# Patient Record
Sex: FEMALE | Race: WHITE | NOT HISPANIC OR LATINO | Employment: OTHER | URBAN - NONMETROPOLITAN AREA
[De-identification: names, ages, dates, MRNs, and addresses within clinical notes are randomized per-mention and may not be internally consistent; named-entity substitution may affect disease eponyms.]

---

## 2017-03-23 ENCOUNTER — APPOINTMENT (OUTPATIENT)
Dept: CT IMAGING | Facility: HOSPITAL | Age: 32
End: 2017-03-23

## 2017-03-23 ENCOUNTER — HOSPITAL ENCOUNTER (EMERGENCY)
Facility: HOSPITAL | Age: 32
Discharge: HOME | End: 2017-03-23
Attending: EMERGENCY MEDICINE | Admitting: EMERGENCY MEDICINE

## 2017-03-23 ENCOUNTER — APPOINTMENT (OUTPATIENT)
Dept: GENERAL RADIOLOGY | Facility: HOSPITAL | Age: 32
End: 2017-03-23

## 2017-03-23 VITALS
WEIGHT: 200 LBS | TEMPERATURE: 98.2 F | OXYGEN SATURATION: 98 % | DIASTOLIC BLOOD PRESSURE: 76 MMHG | HEIGHT: 59 IN | SYSTOLIC BLOOD PRESSURE: 148 MMHG | BODY MASS INDEX: 40.32 KG/M2 | RESPIRATION RATE: 16 BRPM | HEART RATE: 70 BPM

## 2017-03-23 DIAGNOSIS — R10.32 LEFT LOWER QUADRANT PAIN: Primary | ICD-10-CM

## 2017-03-23 LAB
ALBUMIN SERPL-MCNC: 4 G/DL (ref 3.5–5)
ALBUMIN/GLOB SERPL: 1.2 G/DL (ref 1.5–2.5)
ALP SERPL-CCNC: 81 U/L (ref 35–104)
ALT SERPL W P-5'-P-CCNC: 39 U/L (ref 10–36)
AMYLASE SERPL-CCNC: 24 U/L (ref 28–100)
ANION GAP SERPL CALCULATED.3IONS-SCNC: 7.1 MMOL/L (ref 3.6–11.2)
AST SERPL-CCNC: 28 U/L (ref 10–30)
B-HCG UR QL: NEGATIVE
BASOPHILS # BLD AUTO: 0.01 10*3/MM3 (ref 0–0.3)
BASOPHILS NFR BLD AUTO: 0.1 % (ref 0–2)
BILIRUB SERPL-MCNC: 0.8 MG/DL (ref 0.2–1.8)
BILIRUB UR QL STRIP: NEGATIVE
BUN BLD-MCNC: 10 MG/DL (ref 7–21)
BUN/CREAT SERPL: 14.3 (ref 7–25)
CALCIUM SPEC-SCNC: 8.9 MG/DL (ref 7.7–10)
CHLORIDE SERPL-SCNC: 103 MMOL/L (ref 99–112)
CLARITY UR: ABNORMAL
CO2 SERPL-SCNC: 27.9 MMOL/L (ref 24.3–31.9)
COLOR UR: ABNORMAL
CREAT BLD-MCNC: 0.7 MG/DL (ref 0.43–1.29)
DEPRECATED RDW RBC AUTO: 40.8 FL (ref 37–54)
EOSINOPHIL # BLD AUTO: 0.23 10*3/MM3 (ref 0–0.7)
EOSINOPHIL NFR BLD AUTO: 3.3 % (ref 0–5)
ERYTHROCYTE [DISTWIDTH] IN BLOOD BY AUTOMATED COUNT: 13 % (ref 11.5–14.5)
GFR SERPL CREATININE-BSD FRML MDRD: 96 ML/MIN/1.73
GLOBULIN UR ELPH-MCNC: 3.3 GM/DL
GLUCOSE BLD-MCNC: 202 MG/DL (ref 70–110)
GLUCOSE UR STRIP-MCNC: NEGATIVE MG/DL
HCT VFR BLD AUTO: 41.2 % (ref 37–47)
HGB BLD-MCNC: 14 G/DL (ref 12–16)
HGB UR QL STRIP.AUTO: NEGATIVE
IMM GRANULOCYTES # BLD: 0.04 10*3/MM3 (ref 0–0.03)
IMM GRANULOCYTES NFR BLD: 0.6 % (ref 0–0.5)
KETONES UR QL STRIP: NEGATIVE
LEUKOCYTE ESTERASE UR QL STRIP.AUTO: NEGATIVE
LIPASE SERPL-CCNC: 35 U/L (ref 13–60)
LYMPHOCYTES # BLD AUTO: 1.96 10*3/MM3 (ref 1–3)
LYMPHOCYTES NFR BLD AUTO: 28.4 % (ref 21–51)
MCH RBC QN AUTO: 29.3 PG (ref 27–33)
MCHC RBC AUTO-ENTMCNC: 34 G/DL (ref 33–37)
MCV RBC AUTO: 86.2 FL (ref 80–94)
MONOCYTES # BLD AUTO: 0.54 10*3/MM3 (ref 0.1–0.9)
MONOCYTES NFR BLD AUTO: 7.8 % (ref 0–10)
NEUTROPHILS # BLD AUTO: 4.11 10*3/MM3 (ref 1.4–6.5)
NEUTROPHILS NFR BLD AUTO: 59.8 % (ref 30–70)
NITRITE UR QL STRIP: NEGATIVE
OSMOLALITY SERPL CALC.SUM OF ELEC: 280.5 MOSM/KG (ref 273–305)
PH UR STRIP.AUTO: 5.5 [PH] (ref 5–8)
PLATELET # BLD AUTO: 131 10*3/MM3 (ref 130–400)
PMV BLD AUTO: 11.7 FL (ref 6–10)
POTASSIUM BLD-SCNC: 4 MMOL/L (ref 3.5–5.3)
PROT SERPL-MCNC: 7.3 G/DL (ref 6–8)
PROT UR QL STRIP: NEGATIVE
RBC # BLD AUTO: 4.78 10*6/MM3 (ref 4.2–5.4)
SODIUM BLD-SCNC: 138 MMOL/L (ref 135–153)
SP GR UR STRIP: >1.03 (ref 1–1.03)
UROBILINOGEN UR QL STRIP: ABNORMAL
WBC NRBC COR # BLD: 6.89 10*3/MM3 (ref 4.5–12.5)

## 2017-03-23 PROCEDURE — 74177 CT ABD & PELVIS W/CONTRAST: CPT

## 2017-03-23 PROCEDURE — 25010000002 ONDANSETRON PER 1 MG: Performed by: PHYSICIAN ASSISTANT

## 2017-03-23 PROCEDURE — 96374 THER/PROPH/DIAG INJ IV PUSH: CPT

## 2017-03-23 PROCEDURE — 99283 EMERGENCY DEPT VISIT LOW MDM: CPT

## 2017-03-23 PROCEDURE — 96376 TX/PRO/DX INJ SAME DRUG ADON: CPT

## 2017-03-23 PROCEDURE — 96375 TX/PRO/DX INJ NEW DRUG ADDON: CPT

## 2017-03-23 PROCEDURE — 85025 COMPLETE CBC W/AUTO DIFF WBC: CPT | Performed by: PHYSICIAN ASSISTANT

## 2017-03-23 PROCEDURE — 25010000002 HYDROMORPHONE PER 4 MG: Performed by: EMERGENCY MEDICINE

## 2017-03-23 PROCEDURE — 25010000002 KETOROLAC TROMETHAMINE PER 15 MG: Performed by: PHYSICIAN ASSISTANT

## 2017-03-23 PROCEDURE — 80053 COMPREHEN METABOLIC PANEL: CPT | Performed by: PHYSICIAN ASSISTANT

## 2017-03-23 PROCEDURE — 74022 RADEX COMPL AQT ABD SERIES: CPT | Performed by: RADIOLOGY

## 2017-03-23 PROCEDURE — 74177 CT ABD & PELVIS W/CONTRAST: CPT | Performed by: RADIOLOGY

## 2017-03-23 PROCEDURE — 83690 ASSAY OF LIPASE: CPT | Performed by: PHYSICIAN ASSISTANT

## 2017-03-23 PROCEDURE — 96361 HYDRATE IV INFUSION ADD-ON: CPT

## 2017-03-23 PROCEDURE — 74022 RADEX COMPL AQT ABD SERIES: CPT

## 2017-03-23 PROCEDURE — 82150 ASSAY OF AMYLASE: CPT | Performed by: PHYSICIAN ASSISTANT

## 2017-03-23 PROCEDURE — 0 IOPAMIDOL 61 % SOLUTION: Performed by: EMERGENCY MEDICINE

## 2017-03-23 PROCEDURE — 81003 URINALYSIS AUTO W/O SCOPE: CPT | Performed by: PHYSICIAN ASSISTANT

## 2017-03-23 PROCEDURE — 81025 URINE PREGNANCY TEST: CPT | Performed by: PHYSICIAN ASSISTANT

## 2017-03-23 RX ORDER — ONDANSETRON 2 MG/ML
4 INJECTION INTRAMUSCULAR; INTRAVENOUS ONCE
Status: COMPLETED | OUTPATIENT
Start: 2017-03-23 | End: 2017-03-23

## 2017-03-23 RX ORDER — DICYCLOMINE HCL 20 MG
20 TABLET ORAL EVERY 8 HOURS PRN
Qty: 20 TABLET | Refills: 0 | Status: SHIPPED | OUTPATIENT
Start: 2017-03-23 | End: 2018-05-04

## 2017-03-23 RX ORDER — KETOROLAC TROMETHAMINE 30 MG/ML
30 INJECTION, SOLUTION INTRAMUSCULAR; INTRAVENOUS ONCE
Status: COMPLETED | OUTPATIENT
Start: 2017-03-23 | End: 2017-03-23

## 2017-03-23 RX ORDER — SODIUM CHLORIDE 0.9 % (FLUSH) 0.9 %
10 SYRINGE (ML) INJECTION AS NEEDED
Status: DISCONTINUED | OUTPATIENT
Start: 2017-03-23 | End: 2017-03-23 | Stop reason: HOSPADM

## 2017-03-23 RX ADMIN — SODIUM CHLORIDE 1000 ML: 9 INJECTION, SOLUTION INTRAVENOUS at 09:34

## 2017-03-23 RX ADMIN — IOPAMIDOL 100 ML: 612 INJECTION, SOLUTION INTRAVENOUS at 16:16

## 2017-03-23 RX ADMIN — KETOROLAC TROMETHAMINE 30 MG: 30 INJECTION, SOLUTION INTRAMUSCULAR at 11:08

## 2017-03-23 RX ADMIN — ONDANSETRON 4 MG: 2 INJECTION INTRAMUSCULAR; INTRAVENOUS at 11:06

## 2017-03-23 RX ADMIN — HYDROMORPHONE HYDROCHLORIDE 1 MG: 1 INJECTION, SOLUTION INTRAMUSCULAR; INTRAVENOUS; SUBCUTANEOUS at 13:45

## 2017-03-23 RX ADMIN — ONDANSETRON 4 MG: 2 INJECTION INTRAMUSCULAR; INTRAVENOUS at 13:43

## 2017-03-23 NOTE — ED PROVIDER NOTES
Subjective   Patient is a 31 y.o. female presenting with abdominal pain.   History provided by:  Patient   used: No    Abdominal Pain   Pain location:  Generalized  Pain quality: dull    Pain severity:  Moderate  Onset quality:  Sudden  Duration:  3 days  Timing:  Intermittent  Progression:  Worsening  Chronicity:  New  Ineffective treatments:  None tried  Associated symptoms: constipation and nausea    Associated symptoms: no chest pain, no chills, no cough, no diarrhea, no dysuria, no fever, no shortness of breath, no sore throat and no vomiting    Risk factors: obesity    Risk factors: not pregnant        Review of Systems   Constitutional: Negative for chills and fever.   HENT: Negative for congestion, ear pain and sore throat.    Respiratory: Negative for cough, shortness of breath and wheezing.    Cardiovascular: Negative for chest pain.   Gastrointestinal: Positive for abdominal pain, constipation and nausea. Negative for diarrhea and vomiting.   Genitourinary: Negative for dysuria and flank pain.   Skin: Negative for rash.   Neurological: Negative for headaches.   Psychiatric/Behavioral: The patient is not nervous/anxious.    All other systems reviewed and are negative.      Past Medical History:   Diagnosis Date   • GERD (gastroesophageal reflux disease)    • Hypertension    • PCOS (polycystic ovarian syndrome)    • Sleep apnea        Allergies   Allergen Reactions   • Dilantin [Phenytoin]    • Keppra [Levetiracetam]    • Topamax [Topiramate]        Past Surgical History:   Procedure Laterality Date   • CARPAL TUNNEL RELEASE     • FRACTURE SURGERY         History reviewed. No pertinent family history.    Social History     Social History   • Marital status:      Spouse name: N/A   • Number of children: N/A   • Years of education: N/A     Social History Main Topics   • Smoking status: Never Smoker   • Smokeless tobacco: None   • Alcohol use No   • Drug use: No   • Sexual activity:  Not Asked     Other Topics Concern   • None     Social History Narrative           Objective   Physical Exam   Constitutional: She is oriented to person, place, and time. She appears well-developed and well-nourished.   HENT:   Head: Normocephalic.   Mouth/Throat: Oropharynx is clear and moist.   Neck: Neck supple.   Cardiovascular: Normal rate and regular rhythm.    Pulmonary/Chest: Effort normal and breath sounds normal.   Abdominal: Soft. Bowel sounds are normal. There is tenderness. There is no rebound and no guarding.   Musculoskeletal: Normal range of motion.   Neurological: She is alert and oriented to person, place, and time.   Skin: Skin is warm and dry.   Psychiatric: She has a normal mood and affect. Her behavior is normal. Judgment and thought content normal.   Nursing note and vitals reviewed.      Procedures         ED Course  ED Course   Comment By Time   Pt requesting ct scan TAMIKA Brown 03/23 1330                  [facility]    Final diagnoses:   Left lower quadrant pain            TAMIKA Brown  03/28/17 [id number]

## 2017-07-12 ENCOUNTER — TRANSCRIBE ORDERS (OUTPATIENT)
Dept: ADMINISTRATIVE | Facility: HOSPITAL | Age: 32
End: 2017-07-12

## 2017-07-12 DIAGNOSIS — Z79.4 TYPE 2 DIABETES MELLITUS WITHOUT COMPLICATION, WITH LONG-TERM CURRENT USE OF INSULIN (HCC): Primary | ICD-10-CM

## 2017-07-12 DIAGNOSIS — E11.9 TYPE 2 DIABETES MELLITUS WITHOUT COMPLICATION, WITH LONG-TERM CURRENT USE OF INSULIN (HCC): Primary | ICD-10-CM

## 2017-11-21 ENCOUNTER — HOSPITAL ENCOUNTER (OUTPATIENT)
Dept: GENERAL RADIOLOGY | Facility: HOSPITAL | Age: 32
Discharge: HOME | End: 2017-11-21
Admitting: NURSE PRACTITIONER

## 2017-11-21 ENCOUNTER — TRANSCRIBE ORDERS (OUTPATIENT)
Dept: LAB | Facility: HOSPITAL | Age: 32
End: 2017-11-21

## 2017-11-21 ENCOUNTER — APPOINTMENT (OUTPATIENT)
Dept: GENERAL RADIOLOGY | Facility: HOSPITAL | Age: 32
End: 2017-11-21

## 2017-11-21 DIAGNOSIS — R68.84 MANDIBLE PAIN: ICD-10-CM

## 2017-11-21 DIAGNOSIS — M26.621 ARTHRALGIA OF RIGHT TEMPOROMANDIBULAR JOINT: Primary | ICD-10-CM

## 2017-11-21 DIAGNOSIS — M26.621 ARTHRALGIA OF RIGHT TEMPOROMANDIBULAR JOINT: ICD-10-CM

## 2017-11-21 PROCEDURE — 70110 X-RAY EXAM OF JAW 4/> VIEWS: CPT

## 2017-11-21 PROCEDURE — 70330 X-RAY EXAM OF JAW JOINTS: CPT

## 2017-11-21 PROCEDURE — 70110 X-RAY EXAM OF JAW 4/> VIEWS: CPT | Performed by: RADIOLOGY

## 2017-11-21 PROCEDURE — 70330 X-RAY EXAM OF JAW JOINTS: CPT | Performed by: RADIOLOGY

## 2018-03-02 ENCOUNTER — HOSPITAL ENCOUNTER (EMERGENCY)
Facility: HOSPITAL | Age: 33
Discharge: HOME | End: 2018-03-03
Attending: EMERGENCY MEDICINE | Admitting: EMERGENCY MEDICINE

## 2018-03-02 ENCOUNTER — APPOINTMENT (OUTPATIENT)
Dept: CT IMAGING | Facility: HOSPITAL | Age: 33
End: 2018-03-02

## 2018-03-02 DIAGNOSIS — L02.11 ABSCESS, NECK: Primary | ICD-10-CM

## 2018-03-02 LAB
ANION GAP SERPL CALCULATED.3IONS-SCNC: 10.9 MMOL/L (ref 3.6–11.2)
B-HCG UR QL: NEGATIVE
BASOPHILS # BLD AUTO: 0.02 10*3/MM3 (ref 0–0.3)
BASOPHILS NFR BLD AUTO: 0.2 % (ref 0–2)
BILIRUB UR QL STRIP: NEGATIVE
BUN BLD-MCNC: 10 MG/DL (ref 7–21)
BUN/CREAT SERPL: 14.3 (ref 7–25)
CALCIUM SPEC-SCNC: 9.4 MG/DL (ref 7.7–10)
CHLORIDE SERPL-SCNC: 102 MMOL/L (ref 99–112)
CLARITY UR: CLEAR
CO2 SERPL-SCNC: 29.1 MMOL/L (ref 24.3–31.9)
COLOR UR: YELLOW
CREAT BLD-MCNC: 0.7 MG/DL (ref 0.43–1.29)
DEPRECATED RDW RBC AUTO: 37.7 FL (ref 37–54)
EOSINOPHIL # BLD AUTO: 0.14 10*3/MM3 (ref 0–0.7)
EOSINOPHIL NFR BLD AUTO: 1.5 % (ref 0–5)
ERYTHROCYTE [DISTWIDTH] IN BLOOD BY AUTOMATED COUNT: 12.7 % (ref 11.5–14.5)
GFR SERPL CREATININE-BSD FRML MDRD: 96 ML/MIN/1.73
GLUCOSE BLD-MCNC: 168 MG/DL (ref 70–110)
GLUCOSE UR STRIP-MCNC: ABNORMAL MG/DL
HCT VFR BLD AUTO: 39 % (ref 37–47)
HGB BLD-MCNC: 13.7 G/DL (ref 12–16)
HGB UR QL STRIP.AUTO: NEGATIVE
IMM GRANULOCYTES # BLD: 0.03 10*3/MM3 (ref 0–0.03)
IMM GRANULOCYTES NFR BLD: 0.3 % (ref 0–0.5)
KETONES UR QL STRIP: ABNORMAL
LEUKOCYTE ESTERASE UR QL STRIP.AUTO: NEGATIVE
LYMPHOCYTES # BLD AUTO: 2.8 10*3/MM3 (ref 1–3)
LYMPHOCYTES NFR BLD AUTO: 30.8 % (ref 21–51)
MCH RBC QN AUTO: 29 PG (ref 27–33)
MCHC RBC AUTO-ENTMCNC: 35.1 G/DL (ref 33–37)
MCV RBC AUTO: 82.5 FL (ref 80–94)
MONOCYTES # BLD AUTO: 0.58 10*3/MM3 (ref 0.1–0.9)
MONOCYTES NFR BLD AUTO: 6.4 % (ref 0–10)
NEUTROPHILS # BLD AUTO: 5.53 10*3/MM3 (ref 1.4–6.5)
NEUTROPHILS NFR BLD AUTO: 60.8 % (ref 30–70)
NITRITE UR QL STRIP: NEGATIVE
OSMOLALITY SERPL CALC.SUM OF ELEC: 286 MOSM/KG (ref 273–305)
PH UR STRIP.AUTO: 5.5 [PH] (ref 5–8)
PLATELET # BLD AUTO: 178 10*3/MM3 (ref 130–400)
PMV BLD AUTO: 10.2 FL (ref 6–10)
POTASSIUM BLD-SCNC: 3.6 MMOL/L (ref 3.5–5.3)
PROT UR QL STRIP: NEGATIVE
RBC # BLD AUTO: 4.73 10*6/MM3 (ref 4.2–5.4)
SODIUM BLD-SCNC: 142 MMOL/L (ref 135–153)
SP GR UR STRIP: 1.03 (ref 1–1.03)
UROBILINOGEN UR QL STRIP: ABNORMAL
WBC NRBC COR # BLD: 9.1 10*3/MM3 (ref 4.5–12.5)

## 2018-03-02 PROCEDURE — 70490 CT SOFT TISSUE NECK W/O DYE: CPT

## 2018-03-02 PROCEDURE — 70490 CT SOFT TISSUE NECK W/O DYE: CPT | Performed by: RADIOLOGY

## 2018-03-02 PROCEDURE — 96372 THER/PROPH/DIAG INJ SC/IM: CPT

## 2018-03-02 PROCEDURE — 80048 BASIC METABOLIC PNL TOTAL CA: CPT | Performed by: PHYSICIAN ASSISTANT

## 2018-03-02 PROCEDURE — 85025 COMPLETE CBC W/AUTO DIFF WBC: CPT | Performed by: PHYSICIAN ASSISTANT

## 2018-03-02 PROCEDURE — 99283 EMERGENCY DEPT VISIT LOW MDM: CPT

## 2018-03-02 PROCEDURE — 81025 URINE PREGNANCY TEST: CPT | Performed by: PHYSICIAN ASSISTANT

## 2018-03-02 PROCEDURE — 81003 URINALYSIS AUTO W/O SCOPE: CPT | Performed by: PHYSICIAN ASSISTANT

## 2018-03-02 RX ORDER — GLIPIZIDE 10 MG/1
TABLET ORAL
COMMUNITY
End: 2019-10-09

## 2018-03-03 VITALS
TEMPERATURE: 98 F | WEIGHT: 217 LBS | OXYGEN SATURATION: 99 % | DIASTOLIC BLOOD PRESSURE: 95 MMHG | SYSTOLIC BLOOD PRESSURE: 165 MMHG | RESPIRATION RATE: 16 BRPM | HEIGHT: 59 IN | HEART RATE: 75 BPM | BODY MASS INDEX: 43.75 KG/M2

## 2018-03-03 PROCEDURE — 25010000002 CEFTRIAXONE PER 250 MG: Performed by: PHYSICIAN ASSISTANT

## 2018-03-03 PROCEDURE — 96372 THER/PROPH/DIAG INJ SC/IM: CPT

## 2018-03-03 RX ORDER — CLINDAMYCIN HYDROCHLORIDE 300 MG/1
300 CAPSULE ORAL 3 TIMES DAILY
Qty: 30 CAPSULE | Refills: 0 | Status: SHIPPED | OUTPATIENT
Start: 2018-03-03 | End: 2018-03-19

## 2018-03-03 RX ORDER — CEFTRIAXONE 1 G/1
1 INJECTION, POWDER, FOR SOLUTION INTRAMUSCULAR; INTRAVENOUS ONCE
Status: COMPLETED | OUTPATIENT
Start: 2018-03-03 | End: 2018-03-03

## 2018-03-03 RX ORDER — IBUPROFEN 600 MG/1
600 TABLET ORAL EVERY 8 HOURS PRN
Qty: 15 TABLET | Refills: 0 | Status: SHIPPED | OUTPATIENT
Start: 2018-03-03 | End: 2018-07-09

## 2018-03-03 RX ORDER — LIDOCAINE HYDROCHLORIDE 10 MG/ML
2.1 INJECTION, SOLUTION EPIDURAL; INFILTRATION; INTRACAUDAL; PERINEURAL ONCE
Status: COMPLETED | OUTPATIENT
Start: 2018-03-03 | End: 2018-03-03

## 2018-03-03 RX ADMIN — CEFTRIAXONE 1 G: 1 INJECTION, POWDER, FOR SOLUTION INTRAMUSCULAR; INTRAVENOUS at 00:15

## 2018-03-03 RX ADMIN — IBUPROFEN 600 MG: 400 TABLET ORAL at 00:29

## 2018-03-03 RX ADMIN — LIDOCAINE HYDROCHLORIDE 2.1 ML: 10 INJECTION, SOLUTION EPIDURAL; INFILTRATION; INTRACAUDAL at 00:15

## 2018-03-03 NOTE — ED PROVIDER NOTES
Subjective   Patient is a 32 y.o. female presenting with abscess.   History provided by:  Patient   used: No    Abscess   Location:  Head/neck  Head/neck abscess location:  R neck  Abscess quality: painful, redness and warmth    Abscess quality: not draining, no fluctuance and no induration    Duration:  3 days  Progression:  Unchanged  Pain details:     Quality:  Dull and hot    Severity:  Moderate    Timing:  Sporadic    Progression:  Unchanged  Chronicity:  New  Context: diabetes    Context: not injected drug use and not insect bite/sting    Relieved by:  Nothing  Exacerbated by: pressure.  Ineffective treatments:  None tried  Associated symptoms: no fever, no nausea and no vomiting    Risk factors: no hx of MRSA        Review of Systems   Constitutional: Negative.  Negative for fever.   HENT: Negative.    Respiratory: Negative.    Cardiovascular: Negative.  Negative for chest pain.   Gastrointestinal: Negative for abdominal pain, nausea and vomiting.   Endocrine: Negative.    Genitourinary: Negative.  Negative for dysuria.   Skin: Positive for wound.   Neurological: Negative.    Psychiatric/Behavioral: Negative.    All other systems reviewed and are negative.      Past Medical History:   Diagnosis Date   • Diabetes mellitus    • GERD (gastroesophageal reflux disease)    • Hypertension    • PCOS (polycystic ovarian syndrome)    • Sleep apnea        Allergies   Allergen Reactions   • Dilantin [Phenytoin]    • Keppra [Levetiracetam]    • Topamax [Topiramate]        Past Surgical History:   Procedure Laterality Date   • CARPAL TUNNEL RELEASE     • FRACTURE SURGERY         History reviewed. No pertinent family history.    Social History     Social History   • Marital status:      Social History Main Topics   • Smoking status: Never Smoker   • Alcohol use No   • Drug use: No           Objective   Physical Exam   Constitutional: She is oriented to person, place, and time. She appears  well-developed and well-nourished. No distress.   HENT:   Head: Normocephalic and atraumatic.   Right Ear: External ear normal.   Left Ear: External ear normal.   Nose: Nose normal.   Eyes: Conjunctivae and EOM are normal. Pupils are equal, round, and reactive to light.   Neck: Normal range of motion. Neck supple. No JVD present. No tracheal deviation present.   Cardiovascular: Normal rate, regular rhythm and normal heart sounds.    No murmur heard.  Pulmonary/Chest: Effort normal and breath sounds normal. No respiratory distress. She has no wheezes.   Abdominal: Soft. Bowel sounds are normal. There is no tenderness.   Musculoskeletal: Normal range of motion. She exhibits no edema or deformity.   Neurological: She is alert and oriented to person, place, and time. No cranial nerve deficit.   Skin: Skin is warm and dry. Rash noted. Rash is nodular. She is not diaphoretic. No erythema. No pallor.        Acanthosis nigricans along back side of neck from left to right   Psychiatric: She has a normal mood and affect. Her behavior is normal. Thought content normal.   Nursing note and vitals reviewed.      Procedures         ED Course  ED Course   Comment By Time   Right neck abscess wasn't drained as there was no fluctuance, abscess was tender and hard to palpation. Gave 1g Rocephin while in ED and dc'ed on Clindamycin x 10 days. It was recommended to patient that should she develop fever, chills, worsening abscess to come back to the ER otherwise follow up with PCP for resolution follow up. Jonathan Edmond PA-C 03/03 0116                  [facility]  Number of Diagnoses or Management Options  Abscess, neck: new and requires workup     Amount and/or Complexity of Data Reviewed  Clinical lab tests: reviewed and ordered  Tests in the radiology section of CPT®: reviewed and ordered  Independent visualization of images, tracings, or specimens: yes    Risk of Complications, Morbidity, and/or Mortality  Presenting problems:  moderate  Diagnostic procedures: moderate  Management options: moderate    Patient Progress  Patient progress: stable      Final diagnoses:   Abscess, neck            Jonathan Edmond PA-C  03/03/18 0117

## 2018-03-03 NOTE — DISCHARGE INSTRUCTIONS
Skin Abscess  A skin abscess is an infected area on or under your skin that contains pus and other material. An abscess can happen almost anywhere on your body. Some abscesses break open (rupture) on their own. Most continue to get worse unless they are treated. The infection can spread deeper into the body and into your blood, which can make you feel sick. Treatment usually involves draining the abscess.  Follow these instructions at home:  Abscess Care   · If you have an abscess that has not drained, place a warm, clean, wet washcloth over the abscess several times a day. Do this as told by your doctor.  · Follow instructions from your doctor about how to take care of your abscess. Make sure you:  ¨ Cover the abscess with a bandage (dressing).  ¨ Change your bandage or gauze as told by your doctor.  ¨ Wash your hands with soap and water before you change the bandage or gauze. If you cannot use soap and water, use hand .  · Check your abscess every day for signs that the infection is getting worse. Check for:  ¨ More redness, swelling, or pain.  ¨ More fluid or blood.  ¨ Warmth.  ¨ More pus or a bad smell.  Medicines     · Take over-the-counter and prescription medicines only as told by your doctor.  · If you were prescribed an antibiotic medicine, take it as told by your doctor. Do not stop taking the antibiotic even if you start to feel better.  General instructions   · To avoid spreading the infection:  ¨ Do not share personal care items, towels, or hot tubs with others.  ¨ Avoid making skin-to-skin contact with other people.  · Keep all follow-up visits as told by your doctor. This is important.  Contact a doctor if:  · You have more redness, swelling, or pain around your abscess.  · You have more fluid or blood coming from your abscess.  · Your abscess feels warm when you touch it.  · You have more pus or a bad smell coming from your abscess.  · You have a fever.  · Your muscles ache.  · You have  chills.  · You feel sick.  Get help right away if:  · You have very bad (severe) pain.  · You see red streaks on your skin spreading away from the abscess.  This information is not intended to replace advice given to you by your health care provider. Make sure you discuss any questions you have with your health care provider.  Document Released: 06/05/2009 Document Revised: 08/13/2017 Document Reviewed: 10/26/2016  [employer] Interactive Patient Education © 2017 [employer]

## 2018-03-07 ENCOUNTER — OFFICE VISIT (OUTPATIENT)
Dept: PSYCHIATRY | Facility: CLINIC | Age: 33
End: 2018-03-07

## 2018-03-07 DIAGNOSIS — F33.1 MAJOR DEPRESSIVE DISORDER, RECURRENT EPISODE, MODERATE (HCC): Primary | ICD-10-CM

## 2018-03-07 DIAGNOSIS — F40.01 PANIC DISORDER WITH AGORAPHOBIA: ICD-10-CM

## 2018-03-07 PROCEDURE — 90791 PSYCH DIAGNOSTIC EVALUATION: CPT | Performed by: SOCIAL WORKER

## 2018-03-07 NOTE — PROGRESS NOTES
IDENTIFYING INFORMATION:   The patient is a 32 y.o. female who is here today for initial appointment from 10 AM to 11 AM.     CHIEF COMPLIANT:  Patient reports having depression and in the last 6 months has had a reoccurrence of a fleeting visual hallucination of a female figure passing by.    HPI: Patient reports having depression off and on since about 2011.  She is very irritable and hateful although she is normally calm and patient.  She has little energy or motivation, and no interest in doing things she used to enjoy.  Patient has has poor sleep, waking up through the night, but she sleeps in the daytime and can't stay awake.  There are periods of time when she just doesn't care about doing things; other times things matter (like housework), but she can't make herself do it.  Patient feels hopeless and worthless, and at times thinks she would be at peace, and others would be better off, if she wasn't here.  Patient has difficulty thinking, concentrating, and making decisions.    Patient feels agitated, anxious, and fatigued at times.  She has panic attacks when her heart rate increases, she feels nauseous and short of breath.  She sometimes feels pressure on her chest and dizzy.  She said that she can't tell people no and takes on too much responsibility as a result.    PAST PSYCHIATRIC HISTORY:  Patient has been hospitalized with similar symptoms 2 or 3 times at the [facility].  This was between 2011 and 2012.  She also saw an outpatient therapist at the [facility] around the same time.      SUBSTANCE ABUSE HISTORY:  No substance abuse history.      MEDICAL HISTORY:  Diabetes, sleep apnea, high blood pressure, PCOS, narcolepsy.  Patient had surgery for a left arm fracture in 2012.  Carpal tunnel surgery on right arm 2001.  Patient is allergic to Dilantin, Keppra, and Topamax      CURRENT MEDICATIONS:  Current Outpatient Prescriptions   Medication Sig Dispense Refill   • bisoprolol (ZEBETA) 10 MG  tablet Take 10 mg by mouth daily. Unsure of MG     • clindamycin (CLEOCIN) 300 MG capsule Take 1 capsule by mouth 3 (Three) Times a Day. 30 capsule 0   • dicyclomine (BENTYL) 20 MG tablet Take 1 tablet by mouth Every 8 (Eight) Hours As Needed (abd cramping). 20 tablet 0   • glipiZIDE (GLUCOTROL) 10 MG tablet Take  by mouth 2 (Two) Times a Day Before Meals. Pt unsure of dose     • ibuprofen (ADVIL,MOTRIN) 600 MG tablet Take 1 tablet by mouth Every 8 (Eight) Hours As Needed for Mild Pain . 15 tablet 0   • metFORMIN (GLUCOPHAGE) 1000 MG tablet Take 1,000 mg by mouth 2 (Two) Times a Day With Meals.     • ondansetron ODT (ZOFRAN-ODT) 4 MG disintegrating tablet Take 1 tablet by mouth every 6 (six) hours as needed for nausea or vomiting. 12 tablet 0   • sertraline (ZOLOFT) 100 MG tablet Take 100 mg by mouth daily.     • Specialty Vitamins Products (STOMACH PO) Take  by mouth daily. Unsure of medication       No current facility-administered medications for this visit.          FAMILY HISTORY:  Patient's mother has depression and anxiety.  Sister has anxiety.  Another sister abuses substances.      SOCIAL HISTORY:  Patient was raised in a two-parent family.  Her mother  in  from heart disease.  She has 2 sisters and 1 brother.  She reports a normal childhood, saying she was a good kid.  Patient reports she loved elementary school, but in middle school she got in trouble for something she didn't do and was humiliated in front of her class.  She moved to a private school where she finished high school, but she later learned that the diploma was no good because the school was not accredited.  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  She had to quit her last job at the call center because she was having seizures, difficulty functioning on the job, and having blackouts.  She has been on SSDI since then.     Patient has been  to her current  for 11 years.   "They have no children but they have custody of patient's niece and her aunts 2 daughters ages 17 and 18 also live with them full-time.  They also care for a \"drug baby\" as often as his father allows it (4 or 5 days a week).  Patient is not currently affiliated with the [employer].  Patient said she doesn't do anything for fun and doesn't really have any interests.  She will go along and do things that her  enjoys.  She reports that she used to be active in [employer], the food pantry, and they used to enjoy going to [employer] etc.      MENTAL STATUS EXAM:   Hygiene:   good  Cooperation:  Cooperative  Eye Contact:  Fair  Psychomotor Behavior:  Appropriate  Affect:  Appropriate  Hopelessness: 7  Speech:  Rambling  Thought Process:  Circum  Thought Content:  Normal  Suicidal:  Passive suicidal ideation  Homicidal:  None  Hallucinations:  Visual  Delusion:  None  Memory:  Intact  Orientation:  Person, Place, Time and Situation  Reliability:  fair  Insight:  Fair  Judgement:  Good  Impulse Control:  Good  Physical/Medical Issues:  Diabetes, high blood pressure, sleep apnea, narcolepsy, PCOS    PROBLEM LIST:  Depression, panic attacks      STRENGTHS:  Good work ethic, stable home, stable marriage, motivated for treatment       WEAKNESSES:  Can't say no.      SHORT-TERM GOALS: Patient will be compliant with clinic appointments.  Patient will be engaged in therapy, medication compliant with minimal side effects. Patient  will report decrease of symptoms and frequency.    LONG-TERM GOALS: Patient will have cessation of symptoms and be able to function at optimal levels without continued treatment.     DIAGNOSIS:     ICD-10-CM ICD-9-CM   1. Major depressive disorder, recurrent episode, moderate F33.1 296.32   2. Panic disorder with agoraphobia F40.01 300.21         PLAN:   Patient will continue in individual outpatient treatment Every other week and pharmacotherapy as scheduled.        The patient was instructed to " contact the clinic, call 911, or present to the nearest emergency room if crisis occurs.         Nuzhat Beaver LCSW

## 2018-03-19 ENCOUNTER — OFFICE VISIT (OUTPATIENT)
Dept: PSYCHIATRY | Facility: CLINIC | Age: 33
End: 2018-03-19

## 2018-03-19 ENCOUNTER — PRIOR AUTHORIZATION (OUTPATIENT)
Dept: PSYCHIATRY | Facility: CLINIC | Age: 33
End: 2018-03-19

## 2018-03-19 VITALS
SYSTOLIC BLOOD PRESSURE: 151 MMHG | DIASTOLIC BLOOD PRESSURE: 88 MMHG | WEIGHT: 215 LBS | HEART RATE: 89 BPM | BODY MASS INDEX: 43.34 KG/M2 | HEIGHT: 59 IN

## 2018-03-19 DIAGNOSIS — F33.1 MAJOR DEPRESSIVE DISORDER, RECURRENT EPISODE, MODERATE (HCC): Primary | ICD-10-CM

## 2018-03-19 DIAGNOSIS — R45.86 MOOD DISTURBANCE: ICD-10-CM

## 2018-03-19 DIAGNOSIS — F31.5 BIPOLAR DISORDER, CURRENT EPISODE DEPRESSED, SEVERE, WITH PSYCHOTIC FEATURES (HCC): ICD-10-CM

## 2018-03-19 DIAGNOSIS — F40.01 PANIC DISORDER WITH AGORAPHOBIA: ICD-10-CM

## 2018-03-19 PROCEDURE — 99214 OFFICE O/P EST MOD 30 MIN: CPT | Performed by: NURSE PRACTITIONER

## 2018-03-19 RX ORDER — METOPROLOL TARTRATE AND HYDROCHLOROTHIAZIDE 50; 25 MG/1; MG/1
TABLET ORAL
COMMUNITY
Start: 2018-03-14 | End: 2018-07-09 | Stop reason: SDUPTHER

## 2018-03-19 RX ORDER — LURASIDONE HYDROCHLORIDE 40 MG/1
40 TABLET, FILM COATED ORAL DAILY
Qty: 30 TABLET | Refills: 0 | Status: SHIPPED | OUTPATIENT
Start: 2018-03-19 | End: 2018-05-16

## 2018-03-19 RX ORDER — OMEPRAZOLE 40 MG/1
40 CAPSULE, DELAYED RELEASE ORAL DAILY
COMMUNITY
End: 2022-07-09

## 2018-03-19 NOTE — PROGRESS NOTES
"Subjective   Antoinette Pack is a 32 y.o. female who is here today for initial appointment to evaluate for medication options.     Chief Complaint:  meds arent working    HPI:    Pt presents by herself and on time.  Says she was referred her by [facility] and saw Nuzhat who thought she needed med management.  Pt states she has been on Zoloft for years.  Pt says 2011 she started seeing a \"figure' following her around and at that time she could talk to it.  Now today she sees it but it is not talking to her.  In the past the figure would not command but would \"suggest things\".  Pt does not know any trigger in 2011.  Lately her family has lost a child they were raising.  Child went back to his biological father.  Recently has been seeing the child again some and child cries to not go home with parent and maybe that is triggering the figure.  No hx abuse.  No PTSD. No OCD traits.  Pt goes through aprox 2 days of feeling \"really good\" with lots of energy.  Then will have have 1-2 weeks of not wanting to get out of bed.  Not working at present.No suicidal ideation.  \" I would never do that, it is against my [employer]\".  Rates depression currently a 7/10.  Rates anxiety a 8-9/10.  Pts main complaint is irritability.  Easily agitated. Positive mood swings.  Isolates herself.  Cannot stand to go out in public.  Wont go to her nieces child gymnastics show because she cant stand people.  Does not go to [employer].  At times feels like public places she is afraid she cannot escape.  Says the \"fear\" turns into rage.  Pt has not had menstral period for over 10 years she says due to PCOS.  Pt gets aprox 5-6 hours sleep and gets up twice to urinate.  Mind races at night.  No trouble falling asleep. Does use a Cpap mask at night.  Seeking bariatric surgery thru PCP Pt is currently taking Zoloft 50mg daily. Pt is having panic like symptoms several times a week with feeling that \"everything is closing in\".      Past Psych History:  " Pt states she has battled depression her whole life.  No suicide attempts. Hospitalized twice in [facility] for depression with suicidal ideation.      Previous Psych Meds:   Pt cannot remember previous med attempts mainly remembers Zoloft.      Substance Abuse:  No ETOH, previous tobacco use.  No hx of illiicit drug use.      Social History:  Not working.  Receives disability.  .  3 nieces live with her.  She has custody of 1 of them.         Family Psychiatric History:  family history is not on file.    Medical/Surgical History:  Past Medical History:   Diagnosis Date   • Diabetes mellitus    • GERD (gastroesophageal reflux disease)    • Hypertension    • PCOS (polycystic ovarian syndrome)    • Sleep apnea      Past Surgical History:   Procedure Laterality Date   • CARPAL TUNNEL RELEASE     • FRACTURE SURGERY         Allergies   Allergen Reactions   • Dilantin [Phenytoin]    • Keppra [Levetiracetam]    • Topamax [Topiramate]            Current Medications:   Current Outpatient Prescriptions   Medication Sig Dispense Refill   • bisoprolol (ZEBETA) 10 MG tablet Take 10 mg by mouth daily. Unsure of MG     • glipiZIDE (GLUCOTROL) 10 MG tablet Take  by mouth 2 (Two) Times a Day Before Meals. Pt unsure of dose     • metFORMIN (GLUCOPHAGE) 1000 MG tablet Take 1,000 mg by mouth 2 (Two) Times a Day With Meals.     • metoprolol-hydrochlorothiazide (LOPRESSOR HCT) 50-25 MG per tablet      • sertraline (ZOLOFT) 100 MG tablet Take 100 mg by mouth daily.     • dicyclomine (BENTYL) 20 MG tablet Take 1 tablet by mouth Every 8 (Eight) Hours As Needed (abd cramping). 20 tablet 0   • ibuprofen (ADVIL,MOTRIN) 600 MG tablet Take 1 tablet by mouth Every 8 (Eight) Hours As Needed for Mild Pain . 15 tablet 0   • lurasidone (LATUDA) 40 MG tablet tablet Take 1 tablet by mouth Daily. 30 tablet 0   • omeprazole (PRILOSEC) 40 MG capsule      • ondansetron ODT (ZOFRAN-ODT) 4 MG disintegrating tablet Take 1 tablet by mouth every 6  "(six) hours as needed for nausea or vomiting. 12 tablet 0   • Specialty Vitamins Products (STOMACH PO) Take  by mouth daily. Unsure of medication       No current facility-administered medications for this visit.          Review of Systems   Musculoskeletal:        Tendonitis in elbow   Psychiatric/Behavioral: Positive for agitation, behavioral problems and hallucinations. The patient is nervous/anxious.     denies HEENT, cardiovascular, respiratory, liver, renal, GI/, endocrine, neuro, DERM, hematology, immunology, musculoskeletal disorders.    Objective   Physical Exam   Constitutional: She appears well-developed and well-nourished.   HENT:   Head: Normocephalic.   Neck: Neck supple.   Psychiatric: She has a normal mood and affect. Her behavior is normal.     Blood pressure 151/88, pulse 89, height 149.9 cm (59\"), weight 97.5 kg (215 lb).    Mental Status Exam:   Hygiene:   good  Cooperation:  Cooperative  Eye Contact:  Good  Psychomotor Behavior:  Appropriate  Affect:  Full range  Hopelessness: 3  Speech:  Normal  Thought Process:  Goal directed  Thought Content:  Normal  Suicidal:  None  Homicidal:  None  Hallucinations:  Visual  Delusion:  None  Memory:  Intact  Orientation:  Person, Place, Time and Situation  Reliability:  good  Insight:  Good  Judgement:  Good  Impulse Control:  Good  Physical/Medical Issues:  Yes uncontrolled DM      Short-term goals: Patient will be compliant with clinic appointments.  Patient will be engaged in therapy, medication compliant with minimal side effects. Patient  will report decrease of symptoms and frequency.    Long-term goals: Patient will have minimal symptoms of  with continued medication management. Patient will be compliant with treatment and appointments.       Problem list:   Strengths:Pt desires to feel better  Weaknesses: Feels like a failure to her family    Assessment/Plan   Problems Addressed this Visit     None      Visit Diagnoses     Major depressive " disorder, recurrent episode, moderate    -  Primary    Relevant Medications    lurasidone (LATUDA) 40 MG tablet tablet    Panic disorder with agoraphobia        Relevant Medications    lurasidone (LATUDA) 40 MG tablet tablet    Mood disturbance        Bipolar disorder, current episode depressed, severe, with psychotic features        Relevant Medications    lurasidone (LATUDA) 40 MG tablet tablet        Functionality: pt having significant impairment in important areas of daily functioning.  Prognosis: Guarded dependent on medication/follow up and treatment plan compliance.    Discussed medication options.  Begin Latuda. Due to patients continual hallucination.   Long discussion with patient on side effects and onset of action.  Pt is to keep close follow up with her PCP regarding uncontrolled DM.     At some point may add a mood stabalizer. Discussed the risks, benefits, and side effects of the medication; client acknowledged and verbally consented.  Patient is aware to contact the [facility] with any worsening of symptom.  Patient is agreeable to go to the ER or call 911 should they begin SI/HI. Continue psycho therapy   Return in 4 weeks

## 2018-03-26 ENCOUNTER — OFFICE VISIT (OUTPATIENT)
Dept: PSYCHIATRY | Facility: CLINIC | Age: 33
End: 2018-03-26

## 2018-03-26 DIAGNOSIS — F31.5 BIPOLAR DISORDER, CURRENT EPISODE DEPRESSED, SEVERE, WITH PSYCHOTIC FEATURES (HCC): ICD-10-CM

## 2018-03-26 DIAGNOSIS — F33.1 MAJOR DEPRESSIVE DISORDER, RECURRENT EPISODE, MODERATE (HCC): Primary | ICD-10-CM

## 2018-03-26 DIAGNOSIS — F40.01 PANIC DISORDER WITH AGORAPHOBIA: ICD-10-CM

## 2018-03-26 PROCEDURE — 90834 PSYTX W PT 45 MINUTES: CPT | Performed by: SOCIAL WORKER

## 2018-03-26 NOTE — PROGRESS NOTES
"Date of Service: March 26, 2018  Time In: 9:30 AM  Time Out:  10:15 AM      PROGRESS NOTE  Data:  Antoinette Pack is a 32 y.o. female who met with the undersigned for a regularly scheduled individual outpatient psychotherapy session at the [facility].      HPI:   Patient reported seeing nurse practitioner, Shruthi Sierra, who put her on Latuda in addition to the Zoloft prescribed by her PCP.  She reports she is very tired today and struggled to stay alert and attentive.  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  Patient reports the child cries when he has to go home and wants to stay with her and her .  The child was a \"drug baby\" and has challenging behaviors.  Patient thinks the child's home situation is less than ideal, but she does not know of any abuse or neglect.  She doesn't want to make a report to [facility] anyway because she is afraid that dad would not let him visit if she did.  Patient feels considerable stress about this situation, but doesn't know how to handle it.      Clinical Maneuvering/Intervention:  Assisted patient in processing above session content; acknowledged and normalized patient’s thoughts, feelings, and concerns.  Expressed to patient that she really doesn't have any legal options regarding the child unless she knows that he is being abused or neglected.  If that's the case and she reports to [facility] it's possible he would be placed with her again.  Suggested that if patient wants to remain a part of the child's life that she needs to develop a positive relationship with the parent, and be supportive of the father's role and authority.  Reiterated that patient doesn't really have any legal options in terms of changing things for the child.    Discussed with patient does to take care of herself and/or to manage her depression and anxiety.  Patient said that she doesn't do anything for " fun.  When asked if she had followed up with her doctor about her diabetes, she said she had not.  She was also vague about how she manages her diabetes.  Encouraged her to do some stretching exercises to deal with pain in her hip which may allow her to begin doing some walking as a starting point for managing diabetes and weight.    Allowed patient to freely discuss issues without interruption or judgment. Provided safe, confidential environment to facilitate the development of positive therapeutic relationship and encourage open, honest communication. Assisted patient in identifying risk factors which would indicate the need for higher level of care including thoughts to harm self or others and/or self-harming behavior and encouraged patient to contact this office, call 911, or present to the nearest emergency room should any of these events occur. Discussed crisis intervention services and means to access.  Patient adamantly and convincingly denies current suicidal or homicidal ideation or perceptual disturbance.    Assessment     Diagnoses and all orders for this visit:    Major depressive disorder, recurrent episode, moderate    Bipolar disorder, current episode depressed, severe, with psychotic features    Panic disorder with agoraphobia               Mental Status Exam  Hygiene:  good  Dress:  casual  Attitude:  Cooperative  Motor Activity:  Slow  Speech:  Normal  Mood:  depressed  Affect:  depressed  Thought Processes:  Goal directed and Linear  Thought Content:  normal  Suicidal Thoughts:  denies  Homicidal Thoughts:  denies  Crisis Safety Plan: yes, to come to the emergency room.  Hallucinations:  reports    Patient's Support Network Includes:   and extended family    Progress toward goal: Not at goal    Functional Status: Moderate impairment     Prognosis: Fair with Ongoing Treatment     Plan   Patient will continue individual outpatient therapy every 2 weeks with focus on improved functioning,  decrease in symptoms, and avoiding the need for higher level of care    Patient will adhere to medication regimen as prescribed and report any side effects. Patient will contact this office, call 911 or present to the nearest emergency room should suicidal or homicidal ideations occur. Provide Cognitive Behavioral Therapy and Integrative Therapy to improve functioning, maintain stability, and avoid decompensation and the need for higher level of care.          Return in about 2 weeks (around 4/9/2018).      This document signed by Nuzhat Beaver LCSW              March 26, 2018 5:20 PM

## 2018-05-04 ENCOUNTER — APPOINTMENT (OUTPATIENT)
Dept: CT IMAGING | Facility: HOSPITAL | Age: 33
End: 2018-05-04

## 2018-05-04 ENCOUNTER — APPOINTMENT (OUTPATIENT)
Dept: ULTRASOUND IMAGING | Facility: HOSPITAL | Age: 33
End: 2018-05-04

## 2018-05-04 ENCOUNTER — HOSPITAL ENCOUNTER (EMERGENCY)
Facility: HOSPITAL | Age: 33
Discharge: HOME | End: 2018-05-04
Attending: INTERNAL MEDICINE | Admitting: EMERGENCY MEDICINE

## 2018-05-04 VITALS
WEIGHT: 220 LBS | TEMPERATURE: 98.5 F | OXYGEN SATURATION: 94 % | RESPIRATION RATE: 15 BRPM | HEIGHT: 59 IN | BODY MASS INDEX: 44.35 KG/M2 | HEART RATE: 107 BPM | DIASTOLIC BLOOD PRESSURE: 85 MMHG | SYSTOLIC BLOOD PRESSURE: 137 MMHG

## 2018-05-04 DIAGNOSIS — R11.2 NAUSEA VOMITING AND DIARRHEA: Primary | ICD-10-CM

## 2018-05-04 DIAGNOSIS — R10.9 ABDOMINAL PAIN, UNSPECIFIED ABDOMINAL LOCATION: ICD-10-CM

## 2018-05-04 DIAGNOSIS — R19.7 NAUSEA VOMITING AND DIARRHEA: Primary | ICD-10-CM

## 2018-05-04 DIAGNOSIS — I10 HYPERTENSION, UNSPECIFIED TYPE: ICD-10-CM

## 2018-05-04 LAB
ALBUMIN SERPL-MCNC: 4.3 G/DL (ref 3.5–5)
ALBUMIN/GLOB SERPL: 1.2 G/DL (ref 1.5–2.5)
ALP SERPL-CCNC: 65 U/L (ref 35–104)
ALT SERPL W P-5'-P-CCNC: 51 U/L (ref 10–36)
AMYLASE SERPL-CCNC: 44 U/L (ref 28–100)
ANION GAP SERPL CALCULATED.3IONS-SCNC: 7.6 MMOL/L (ref 3.6–11.2)
AST SERPL-CCNC: 29 U/L (ref 10–30)
BACTERIA UR QL AUTO: NORMAL /HPF
BASOPHILS # BLD AUTO: 0.02 10*3/MM3 (ref 0–0.3)
BASOPHILS NFR BLD AUTO: 0.2 % (ref 0–2)
BILIRUB SERPL-MCNC: 1 MG/DL (ref 0.2–1.8)
BILIRUB UR QL STRIP: NEGATIVE
BUN BLD-MCNC: 14 MG/DL (ref 7–21)
BUN/CREAT SERPL: 21.9 (ref 7–25)
CALCIUM SPEC-SCNC: 9.2 MG/DL (ref 7.7–10)
CHLORIDE SERPL-SCNC: 103 MMOL/L (ref 99–112)
CLARITY UR: CLEAR
CO2 SERPL-SCNC: 26.4 MMOL/L (ref 24.3–31.9)
COLOR UR: YELLOW
CREAT BLD-MCNC: 0.64 MG/DL (ref 0.43–1.29)
DEPRECATED RDW RBC AUTO: 38.4 FL (ref 37–54)
EOSINOPHIL # BLD AUTO: 0.11 10*3/MM3 (ref 0–0.7)
EOSINOPHIL NFR BLD AUTO: 1.3 % (ref 0–5)
ERYTHROCYTE [DISTWIDTH] IN BLOOD BY AUTOMATED COUNT: 13.3 % (ref 11.5–14.5)
GFR SERPL CREATININE-BSD FRML MDRD: 106 ML/MIN/1.73
GLOBULIN UR ELPH-MCNC: 3.5 GM/DL
GLUCOSE BLD-MCNC: 145 MG/DL (ref 70–110)
GLUCOSE UR STRIP-MCNC: NEGATIVE MG/DL
HCG SERPL QL: NEGATIVE
HCT VFR BLD AUTO: 40.6 % (ref 37–47)
HGB BLD-MCNC: 14.4 G/DL (ref 12–16)
HGB UR QL STRIP.AUTO: NEGATIVE
HYALINE CASTS UR QL AUTO: NORMAL /LPF
IMM GRANULOCYTES # BLD: 0.02 10*3/MM3 (ref 0–0.03)
IMM GRANULOCYTES NFR BLD: 0.2 % (ref 0–0.5)
INR PPP: 1.02 (ref 0.9–1.1)
KETONES UR QL STRIP: NEGATIVE
LEUKOCYTE ESTERASE UR QL STRIP.AUTO: NEGATIVE
LIPASE SERPL-CCNC: 44 U/L (ref 13–60)
LYMPHOCYTES # BLD AUTO: 1.56 10*3/MM3 (ref 1–3)
LYMPHOCYTES NFR BLD AUTO: 18.7 % (ref 21–51)
MCH RBC QN AUTO: 28.9 PG (ref 27–33)
MCHC RBC AUTO-ENTMCNC: 35.5 G/DL (ref 33–37)
MCV RBC AUTO: 81.5 FL (ref 80–94)
MONOCYTES # BLD AUTO: 0.44 10*3/MM3 (ref 0.1–0.9)
MONOCYTES NFR BLD AUTO: 5.3 % (ref 0–10)
NEUTROPHILS # BLD AUTO: 6.21 10*3/MM3 (ref 1.4–6.5)
NEUTROPHILS NFR BLD AUTO: 74.3 % (ref 30–70)
NITRITE UR QL STRIP: NEGATIVE
OSMOLALITY SERPL CALC.SUM OF ELEC: 276.9 MOSM/KG (ref 273–305)
PH UR STRIP.AUTO: 8 [PH] (ref 5–8)
PLATELET # BLD AUTO: 169 10*3/MM3 (ref 130–400)
PMV BLD AUTO: 10.1 FL (ref 6–10)
POTASSIUM BLD-SCNC: 4 MMOL/L (ref 3.5–5.3)
PROT SERPL-MCNC: 7.8 G/DL (ref 6–8)
PROT UR QL STRIP: ABNORMAL
PROTHROMBIN TIME: 13.6 SECONDS (ref 11–15.4)
RBC # BLD AUTO: 4.98 10*6/MM3 (ref 4.2–5.4)
RBC # UR: NORMAL /HPF
REF LAB TEST METHOD: NORMAL
SODIUM BLD-SCNC: 137 MMOL/L (ref 135–153)
SP GR UR STRIP: >1.03 (ref 1–1.03)
SQUAMOUS #/AREA URNS HPF: NORMAL /HPF
UROBILINOGEN UR QL STRIP: ABNORMAL
WBC NRBC COR # BLD: 8.36 10*3/MM3 (ref 4.5–12.5)
WBC UR QL AUTO: NORMAL /HPF

## 2018-05-04 PROCEDURE — 25010000002 IOPAMIDOL 61 % SOLUTION: Performed by: EMERGENCY MEDICINE

## 2018-05-04 PROCEDURE — 96375 TX/PRO/DX INJ NEW DRUG ADDON: CPT

## 2018-05-04 PROCEDURE — 96372 THER/PROPH/DIAG INJ SC/IM: CPT

## 2018-05-04 PROCEDURE — 85025 COMPLETE CBC W/AUTO DIFF WBC: CPT | Performed by: INTERNAL MEDICINE

## 2018-05-04 PROCEDURE — 25010000002 PROMETHAZINE PER 50 MG: Performed by: EMERGENCY MEDICINE

## 2018-05-04 PROCEDURE — 25010000002 DICYCLOMINE PER 20 MG: Performed by: EMERGENCY MEDICINE

## 2018-05-04 PROCEDURE — 80053 COMPREHEN METABOLIC PANEL: CPT | Performed by: INTERNAL MEDICINE

## 2018-05-04 PROCEDURE — 85610 PROTHROMBIN TIME: CPT | Performed by: INTERNAL MEDICINE

## 2018-05-04 PROCEDURE — 76705 ECHO EXAM OF ABDOMEN: CPT

## 2018-05-04 PROCEDURE — 25010000002 ONDANSETRON PER 1 MG

## 2018-05-04 PROCEDURE — 25010000002 ONDANSETRON PER 1 MG: Performed by: INTERNAL MEDICINE

## 2018-05-04 PROCEDURE — 74177 CT ABD & PELVIS W/CONTRAST: CPT | Performed by: RADIOLOGY

## 2018-05-04 PROCEDURE — 82150 ASSAY OF AMYLASE: CPT | Performed by: INTERNAL MEDICINE

## 2018-05-04 PROCEDURE — 81001 URINALYSIS AUTO W/SCOPE: CPT | Performed by: INTERNAL MEDICINE

## 2018-05-04 PROCEDURE — 99285 EMERGENCY DEPT VISIT HI MDM: CPT

## 2018-05-04 PROCEDURE — 96376 TX/PRO/DX INJ SAME DRUG ADON: CPT

## 2018-05-04 PROCEDURE — 25010000002 HYDROMORPHONE PER 4 MG: Performed by: EMERGENCY MEDICINE

## 2018-05-04 PROCEDURE — 25010000002 MORPHINE PER 10 MG: Performed by: EMERGENCY MEDICINE

## 2018-05-04 PROCEDURE — 83690 ASSAY OF LIPASE: CPT | Performed by: INTERNAL MEDICINE

## 2018-05-04 PROCEDURE — 96374 THER/PROPH/DIAG INJ IV PUSH: CPT

## 2018-05-04 PROCEDURE — 84703 CHORIONIC GONADOTROPIN ASSAY: CPT | Performed by: INTERNAL MEDICINE

## 2018-05-04 PROCEDURE — 74177 CT ABD & PELVIS W/CONTRAST: CPT

## 2018-05-04 PROCEDURE — 76705 ECHO EXAM OF ABDOMEN: CPT | Performed by: RADIOLOGY

## 2018-05-04 PROCEDURE — 25010000002 KETOROLAC TROMETHAMINE PER 15 MG: Performed by: EMERGENCY MEDICINE

## 2018-05-04 PROCEDURE — 96361 HYDRATE IV INFUSION ADD-ON: CPT

## 2018-05-04 RX ORDER — DICYCLOMINE HYDROCHLORIDE 10 MG/1
20 CAPSULE ORAL ONCE
Status: COMPLETED | OUTPATIENT
Start: 2018-05-04 | End: 2018-05-04

## 2018-05-04 RX ORDER — DICYCLOMINE HYDROCHLORIDE 10 MG/ML
10 INJECTION INTRAMUSCULAR ONCE
Status: COMPLETED | OUTPATIENT
Start: 2018-05-04 | End: 2018-05-04

## 2018-05-04 RX ORDER — PANTOPRAZOLE SODIUM 40 MG/10ML
40 INJECTION, POWDER, LYOPHILIZED, FOR SOLUTION INTRAVENOUS ONCE
Status: COMPLETED | OUTPATIENT
Start: 2018-05-04 | End: 2018-05-04

## 2018-05-04 RX ORDER — PROMETHAZINE HYDROCHLORIDE 25 MG/ML
12.5 INJECTION, SOLUTION INTRAMUSCULAR; INTRAVENOUS ONCE
Status: COMPLETED | OUTPATIENT
Start: 2018-05-04 | End: 2018-05-04

## 2018-05-04 RX ORDER — ONDANSETRON 4 MG/1
4 TABLET, FILM COATED ORAL EVERY 8 HOURS PRN
Qty: 8 TABLET | Refills: 0 | Status: SHIPPED | OUTPATIENT
Start: 2018-05-04 | End: 2018-07-09

## 2018-05-04 RX ORDER — HYDROMORPHONE HYDROCHLORIDE 1 MG/ML
0.5 INJECTION, SOLUTION INTRAMUSCULAR; INTRAVENOUS; SUBCUTANEOUS ONCE
Status: COMPLETED | OUTPATIENT
Start: 2018-05-04 | End: 2018-05-04

## 2018-05-04 RX ORDER — ALUMINA, MAGNESIA, AND SIMETHICONE 2400; 2400; 240 MG/30ML; MG/30ML; MG/30ML
15 SUSPENSION ORAL ONCE
Status: COMPLETED | OUTPATIENT
Start: 2018-05-04 | End: 2018-05-04

## 2018-05-04 RX ORDER — ONDANSETRON 2 MG/ML
4 INJECTION INTRAMUSCULAR; INTRAVENOUS ONCE
Status: COMPLETED | OUTPATIENT
Start: 2018-05-04 | End: 2018-05-04

## 2018-05-04 RX ORDER — PROMETHAZINE HYDROCHLORIDE 12.5 MG/1
12.5 TABLET ORAL EVERY 8 HOURS PRN
Qty: 10 TABLET | Refills: 0 | Status: SHIPPED | OUTPATIENT
Start: 2018-05-04 | End: 2018-07-09

## 2018-05-04 RX ORDER — KETOROLAC TROMETHAMINE 30 MG/ML
30 INJECTION, SOLUTION INTRAMUSCULAR; INTRAVENOUS ONCE
Status: COMPLETED | OUTPATIENT
Start: 2018-05-04 | End: 2018-05-04

## 2018-05-04 RX ORDER — DICYCLOMINE HCL 20 MG
20 TABLET ORAL EVERY 6 HOURS PRN
Qty: 15 TABLET | Refills: 0 | Status: SHIPPED | OUTPATIENT
Start: 2018-05-04 | End: 2018-08-29

## 2018-05-04 RX ORDER — MORPHINE SULFATE 2 MG/ML
2 INJECTION, SOLUTION INTRAMUSCULAR; INTRAVENOUS ONCE
Status: COMPLETED | OUTPATIENT
Start: 2018-05-04 | End: 2018-05-04

## 2018-05-04 RX ORDER — METOPROLOL TARTRATE 50 MG/1
50 TABLET, FILM COATED ORAL ONCE
Status: COMPLETED | OUTPATIENT
Start: 2018-05-04 | End: 2018-05-04

## 2018-05-04 RX ORDER — MORPHINE SULFATE 2 MG/ML
2 INJECTION, SOLUTION INTRAMUSCULAR; INTRAVENOUS ONCE
Status: DISCONTINUED | OUTPATIENT
Start: 2018-05-04 | End: 2018-05-04

## 2018-05-04 RX ORDER — ONDANSETRON 2 MG/ML
INJECTION INTRAMUSCULAR; INTRAVENOUS
Status: COMPLETED
Start: 2018-05-04 | End: 2018-05-04

## 2018-05-04 RX ORDER — HYDROCHLOROTHIAZIDE 25 MG/1
25 TABLET ORAL ONCE
Status: COMPLETED | OUTPATIENT
Start: 2018-05-04 | End: 2018-05-04

## 2018-05-04 RX ORDER — LORAZEPAM 2 MG/ML
INJECTION INTRAMUSCULAR
Status: DISCONTINUED
Start: 2018-05-04 | End: 2018-05-04 | Stop reason: WASHOUT

## 2018-05-04 RX ORDER — LABETALOL HYDROCHLORIDE 5 MG/ML
20 INJECTION, SOLUTION INTRAVENOUS ONCE
Status: COMPLETED | OUTPATIENT
Start: 2018-05-04 | End: 2018-05-04

## 2018-05-04 RX ORDER — SODIUM CHLORIDE 9 MG/ML
INJECTION, SOLUTION INTRAVENOUS
Status: DISCONTINUED
Start: 2018-05-04 | End: 2018-05-04 | Stop reason: HOSPADM

## 2018-05-04 RX ADMIN — MORPHINE SULFATE 2 MG: 2 INJECTION, SOLUTION INTRAMUSCULAR; INTRAVENOUS at 03:25

## 2018-05-04 RX ADMIN — ONDANSETRON 4 MG: 2 INJECTION INTRAMUSCULAR; INTRAVENOUS at 05:03

## 2018-05-04 RX ADMIN — HYDROCHLOROTHIAZIDE 25 MG: 25 TABLET ORAL at 09:45

## 2018-05-04 RX ADMIN — SODIUM CHLORIDE 1000 ML: 9 INJECTION, SOLUTION INTRAVENOUS at 02:05

## 2018-05-04 RX ADMIN — IOPAMIDOL 95 ML: 612 INJECTION, SOLUTION INTRAVENOUS at 04:11

## 2018-05-04 RX ADMIN — HYDROMORPHONE HYDROCHLORIDE 0.5 MG: 1 INJECTION, SOLUTION INTRAMUSCULAR; INTRAVENOUS; SUBCUTANEOUS at 04:58

## 2018-05-04 RX ADMIN — LABETALOL HYDROCHLORIDE 20 MG: 5 INJECTION, SOLUTION INTRAVENOUS at 09:04

## 2018-05-04 RX ADMIN — DICYCLOMINE HYDROCHLORIDE 20 MG: 10 CAPSULE ORAL at 09:45

## 2018-05-04 RX ADMIN — KETOROLAC TROMETHAMINE 30 MG: 30 INJECTION, SOLUTION INTRAMUSCULAR at 09:44

## 2018-05-04 RX ADMIN — DICYCLOMINE HYDROCHLORIDE 10 MG: 20 INJECTION, SOLUTION INTRAMUSCULAR at 04:57

## 2018-05-04 RX ADMIN — SODIUM CHLORIDE 1000 ML: 9 INJECTION, SOLUTION INTRAVENOUS at 09:03

## 2018-05-04 RX ADMIN — METOPROLOL TARTRATE 50 MG: 50 TABLET, FILM COATED ORAL at 09:45

## 2018-05-04 RX ADMIN — PANTOPRAZOLE SODIUM 40 MG: 40 INJECTION, POWDER, FOR SOLUTION INTRAVENOUS at 02:07

## 2018-05-04 RX ADMIN — ALUMINUM HYDROXIDE, MAGNESIUM HYDROXIDE, AND DIMETHICONE 15 ML: 400; 400; 40 SUSPENSION ORAL at 02:08

## 2018-05-04 RX ADMIN — LIDOCAINE HYDROCHLORIDE 10 ML: 20 SOLUTION ORAL; TOPICAL at 02:08

## 2018-05-04 RX ADMIN — ONDANSETRON 4 MG: 2 SOLUTION INTRAMUSCULAR; INTRAVENOUS at 02:06

## 2018-05-04 RX ADMIN — SODIUM CHLORIDE 1000 ML: 9 INJECTION, SOLUTION INTRAVENOUS at 05:18

## 2018-05-04 RX ADMIN — PROMETHAZINE HYDROCHLORIDE 12.5 MG: 25 INJECTION, SOLUTION INTRAMUSCULAR; INTRAVENOUS at 08:15

## 2018-05-04 NOTE — ED NOTES
INTO ROOM TO DISCHARGE PT, PT VITAL SIGNS BP AND HEART RATE ARE ELEVATED MD VALLE MADE AWARE DECISION TO HOLD PT FOR THE MOMENT AND TREAT SYMPTOMS     Antoinette Sánchez, RUPA  05/04/18 0816

## 2018-05-04 NOTE — ED PROVIDER NOTES
Subjective   The patient comes in complaining of abdominal pain.  The patient is prescribed as moderate.  This is in the epigastric area and in the right upper quadrant and in the left lower abdomen.  Started around 3 PM yesterday afternoon while she was eating a hamburger.  This was followed by an episode of vomiting which did relieve the pain but then the pain came back couple of hours ago.  She has also had 3 loose stools since then.  There is no hematemesis or hematochezia or melena.  She denies any fever or chills.  This is also associated with some belching of foul-smelling gas.  No other complaints            Review of Systems   Constitutional: Negative for activity change, appetite change, chills and fever.   HENT: Negative for congestion, ear pain and sore throat.    Eyes: Negative for pain and discharge.   Respiratory: Negative for cough and shortness of breath.    Cardiovascular: Negative for chest pain.   Gastrointestinal: Positive for abdominal pain, diarrhea, nausea and vomiting.   Genitourinary: Negative for difficulty urinating, flank pain and frequency.   Musculoskeletal: Negative for back pain.   Skin: Negative for rash.   Neurological: Negative for dizziness, weakness, numbness and headaches.   Psychiatric/Behavioral: Negative for behavioral problems and confusion.       Past Medical History:   Diagnosis Date   • Diabetes mellitus    • GERD (gastroesophageal reflux disease)    • Hypertension    • PCOS (polycystic ovarian syndrome)    • Sleep apnea        Allergies   Allergen Reactions   • Dilantin [Phenytoin]    • Keppra [Levetiracetam]    • Topamax [Topiramate]        Past Surgical History:   Procedure Laterality Date   • CARPAL TUNNEL RELEASE     • FRACTURE SURGERY         No family history on file.    Social History     Social History   • Marital status:      Social History Main Topics   • Smoking status: Never Smoker   • Alcohol use No   • Drug use: No     Other Topics Concern   • Not  on file           Objective   Physical Exam   Constitutional: She is oriented to person, place, and time. She appears well-developed and well-nourished. No distress.   In pain, appears uncomfortable   HENT:   Head: Normocephalic and atraumatic.   Nose: Nose normal.   Mouth/Throat: Oropharynx is clear and moist.   Eyes: Conjunctivae are normal. Pupils are equal, round, and reactive to light.   Neck: Normal range of motion. Neck supple. No JVD present.   Cardiovascular: Normal rate, regular rhythm and normal heart sounds.    Pulmonary/Chest: Effort normal and breath sounds normal. No respiratory distress. She has no wheezes.   Abdominal: Soft. She exhibits no distension. There is tenderness.   Tenderness in the right upper quadrant, epigastric area and left lower abdomen.  There is no guarding.  Abdomen is obese but benign.  There are no peritoneal signs.   Musculoskeletal: Normal range of motion. She exhibits no edema or deformity.   Neurological: She is alert and oriented to person, place, and time. No cranial nerve deficit. Coordination normal.   Skin: Skin is warm and dry.   Psychiatric: She has a normal mood and affect.       Procedures           ED Course  ED Course                  MDM  Number of Diagnoses or Management Options     Amount and/or Complexity of Data Reviewed  Clinical lab tests: ordered  Discuss the patient with other providers: yes    Risk of Complications, Morbidity, and/or Mortality  General comments: Signed out to Dr. Benjamin with labs pending           Final diagnoses:   None            Amita Harding MD  05/04/18 0137       Amita Harding MD  05/04/18 0142

## 2018-05-04 NOTE — ED NOTES
Called [facility], spoke with Jesusita. She will call us back with the radiologist.      Heather Symes  05/04/18 0545

## 2018-05-04 NOTE — ED PROVIDER NOTES
Subjective   History of Present Illness    Review of Systems    Past Medical History:   Diagnosis Date   • Diabetes mellitus    • GERD (gastroesophageal reflux disease)    • Hypertension    • PCOS (polycystic ovarian syndrome)    • Sleep apnea        Allergies   Allergen Reactions   • Dilantin [Phenytoin]    • Keppra [Levetiracetam]    • Topamax [Topiramate]        Past Surgical History:   Procedure Laterality Date   • CARPAL TUNNEL RELEASE     • FRACTURE SURGERY         History reviewed. No pertinent family history.    Social History     Social History   • Marital status:      Social History Main Topics   • Smoking status: Never Smoker   • Alcohol use No   • Drug use: No     Other Topics Concern   • Not on file           Objective   Physical Exam    Procedures           ED Course  ED Course   Comment By Time   I accepted this patient as a signout at 2 AM.  The patient is presenting with abdominal pain, vomiting.  Awaiting labs, ultrasound.Patient still appears very uncomfortable, has hypertension and tachycardia suggestive of pain as her labs are normal.  We'll CT the patient provides pain medication.  Disposition is pending Cristina Benjamin DO 05/04 9405   Patient had a negative second amount of nausea vomiting diarrhea while in the ER.  I did reconfirm with a second radiologist that the patient did not have any type of obstruction and the CT demonstrates gastroenteritis/diarrheal illness.  The patient has been kept here for some time and provided medications she has been resting comfortably.  I did discuss with her that she does not have a surgical issue and if she is wishes she may take medications at home and return if her symptoms worsen.  She states she would like to do that.  Plan is for discharge with good return instructions Cristina Benjamin,  05/04 4021   Patient was seen by Dr. Benjamin, please see her documentation above.  She had the patient up for discharge at time of shift change.  Nursing  came to me with questions about her heart rate and blood pressure.  Patient's diagnostic workup is reviewed.  On exam she is a pleasant white female who is in no apparent acute distress.  Skin is warm pink and dry.  Lungs are clear to auscultation throughout.  Heart is regular and tachycardic.  Abdomen is soft and nondistended, bowel sounds are normal.  There is mild generalized tenderness with no focal findings, no acute peritoneal signs.  Discussion with the patient and her  reveals that she is supposed take Lopressor HCT 50/25 every morning.  She states that she has only been taking a half of a tablet each morning, and she has not had this today.  Her last dose of this medication was yesterday.  I ordered some IV labetalol, and currently she is showing sinus rhythm with a rate of 105 on the monitor.  Most recent blood pressure is 157/90.  I had ordered some more Dilaudid, but nursing did not administer this as they report that the patient seemed quite drowsy when they went in to administer it.  I reviewed all the diagnostic workup and discussed the findings with the patient and her .  We have given her her morning dose of Lopressor HCT, as well as a dose of Bentyl by mouth.  Patient and her  are advised that her diagnostic workup is unrevealing, with the CAT scan showing only findings of a nonspecific diarrheal illness.  Patient is discharged home in care of her .  She will rest and drink plenty of fluids, take her medications as prescribed.  Her  will watch her closely.  She will return to the emergency department immediately if her symptoms worsen or she has any other problems. Gage Sidhu MD 05/04 1009                  [facility] [place]      Final diagnoses:   Nausea vomiting and diarrhea   Abdominal pain, unspecified abdominal location   Hypertension, unspecified type             Please note that portions of this note were completed with a voice recognition program. Efforts were  made to edit the dictations, but occasionally words are mistranscribed.       Gage Sidhu MD  05/04/18 1031

## 2018-05-04 NOTE — ED NOTES
Patient states she feels severely bloated and states she believes vomiting will help relieve her pain. Explained to patient medication ordered per provider, indications and side effects, understanding verbalized. Patient is alert and oriented x4, respirations are even and unlabored, NADN, will continue to monitor and follow plan of care.     Evelyn Enriquez RN  05/04/18 3464

## 2018-05-04 NOTE — ED NOTES
Pt drowsy, snoring respirations noted, pt awakens to verbal stimuli. Pain medication held at this time. md made aware.     Melly Fu RN  05/04/18 0973

## 2018-05-04 NOTE — ED NOTES
Patient presents to the ER from home with patient's family due to abdominal pain, nausea, and vomiting that began tonight. Patient states that she had a small bowel movement earlier, but denies any diarrhea. Patient states prior to coming to ER she had an episode of vomiting, reports that she had relief after vomiting, but states that it has built back up since. Patient states abdominal pain is around the periumbilical area and radiates into lower abdomen. Patient has no needs or questions at this time, patient and family updated on plan of care. Patient is alert and oriented x4, respirations are even and unlabored, NADN, will continue to monitor and follow plan of care.     Evelyn Enriquez, RN  05/04/18 0919

## 2018-05-04 NOTE — ED NOTES
Pt alert and oriented, skin pwd, no respiratory distress. No vomiting noted, pt reports pain and nausea is improved.      Melly Fu RN  05/04/18 7290

## 2018-05-04 NOTE — ED NOTES
Pt resting, snoring respirations noted, pt awakens easily to verbal stimuli.      Melly Fu RN  05/04/18 8128

## 2018-05-04 NOTE — DISCHARGE INSTRUCTIONS
Please come back to the ED urgently if you have any worsening symptoms  This would include inability to tolerate fluids, or fever or anything concerning

## 2018-05-04 NOTE — ED NOTES
Patient resting back on stretcher, patient ambulatory to the bathroom previously. Patient nauseous at this time, requests something to vomit in. Patient vomiting at this time, large amount of vomit noted in trash can, patient incontinent of loose brown stool at this time. Patient cleaned up, celine care performed. Patient helped back on stretcher, no further needs or questions at this time. Patient is alert and oriented x4, respirations are even and unlabored, NADN, will continue to monitor and follow plan of care.     Evelyn Enriquez RN  05/04/18 0309

## 2018-05-09 ENCOUNTER — OFFICE VISIT (OUTPATIENT)
Dept: PSYCHIATRY | Facility: CLINIC | Age: 33
End: 2018-05-09

## 2018-05-09 DIAGNOSIS — F31.5 BIPOLAR DISORDER, CURRENT EPISODE DEPRESSED, SEVERE, WITH PSYCHOTIC FEATURES (HCC): ICD-10-CM

## 2018-05-09 DIAGNOSIS — F33.1 MAJOR DEPRESSIVE DISORDER, RECURRENT EPISODE, MODERATE (HCC): Primary | ICD-10-CM

## 2018-05-09 DIAGNOSIS — F40.01 PANIC DISORDER WITH AGORAPHOBIA: ICD-10-CM

## 2018-05-09 PROCEDURE — 90834 PSYTX W PT 45 MINUTES: CPT | Performed by: SOCIAL WORKER

## 2018-05-09 NOTE — PROGRESS NOTES
"Date of Service: May 10, 2018  Time In: 10:00 AM  Time Out:  10:45 AM      PROGRESS NOTE  Data:  Antoinette Pack is a 32 y.o. female who met with the undersigned for a regularly scheduled individual outpatient therapy session at the [facility].     HPI:   Patient reports feeling tired today.  She feels like \"someone rammed me in the chest.\"   She reports her heart rate was in the 100s earlier today.  She reports having episodes of palpitations and pressure on her chest and feels exhausted afterward.  Patient doesn't know what causes it.  She couldn't identify any particular stressors, but wondered if she has a medical problem.    Clinical Maneuvering/Intervention:  Assisted patient in processing above session content; acknowledged and normalized patient’s thoughts, feelings, and concerns.  Urged her to go to the ER if she is concerned about having a heart attack, but said that it sounds like a panic attack.  Taught patient deep breathing exercise to use for intense anxiety or panic.  Encouraged patient to practice the exercise when she is not panicky in order to increase benefit when she has an episode.    Allowed patient to freely discuss issues without interruption or judgment. Provided safe, confidential environment to facilitate the development of positive therapeutic relationship and encourage open, honest communication. Assisted patient in identifying risk factors which would indicate the need for higher level of care including thoughts to harm self or others and/or self-harming behavior and encouraged patient to contact this office, call 911, or present to the nearest emergency room should any of these events occur. Discussed crisis intervention services and means to access.  Patient adamantly and convincingly denies current suicidal or homicidal ideation or perceptual disturbance.    Assessment     Diagnoses and all orders for this visit:    Major depressive disorder, recurrent episode, " moderate    Bipolar disorder, current episode depressed, severe, with psychotic features    Panic disorder with agoraphobia               Mental Status Exam  Hygiene:  good  Dress:  casual  Attitude:  Cooperative  Motor Activity:  Slow  Speech:  Normal  Mood:  constricted  Affect:  depressed  Thought Processes:  Goal directed and Linear  Thought Content:  normal  Suicidal Thoughts:  denies  Homicidal Thoughts:  denies  Crisis Safety Plan: yes, to come to the emergency room.  Hallucinations:  denies    Patient's Support Network Includes:   and extended family    Progress toward goal: Not at goal    Functional Status: Moderate impairment     Prognosis: Fair with Ongoing Treatment     Plan   Continue individual outpatient therapy every 2 weeks with focus on improved functioning and coping skills, maintaining stability and avoiding decompensation.    Patient will adhere to medication regimen as prescribed and report any side effects. Patient will contact this office, call 911 or present to the nearest emergency room should suicidal or homicidal ideations occur. Provide Cognitive Behavioral Therapy and Integrative Therapy to improve functioning, maintain stability, and avoid decompensation and the need for higher level of care.          Return in about 2 weeks (around 5/23/2018).      This document signed by Nuzhat Beaver LCSW,             May 10, 2018 6:17 PM

## 2018-05-16 ENCOUNTER — OFFICE VISIT (OUTPATIENT)
Dept: PSYCHIATRY | Facility: CLINIC | Age: 33
End: 2018-05-16

## 2018-05-16 VITALS
BODY MASS INDEX: 43.75 KG/M2 | DIASTOLIC BLOOD PRESSURE: 91 MMHG | HEART RATE: 91 BPM | WEIGHT: 217 LBS | SYSTOLIC BLOOD PRESSURE: 142 MMHG | HEIGHT: 59 IN

## 2018-05-16 DIAGNOSIS — F31.60 BIPOLAR AFFECTIVE DISORDER, CURRENT EPISODE MIXED, CURRENT EPISODE SEVERITY UNSPECIFIED (HCC): Primary | ICD-10-CM

## 2018-05-16 PROCEDURE — 99214 OFFICE O/P EST MOD 30 MIN: CPT | Performed by: NURSE PRACTITIONER

## 2018-05-16 RX ORDER — BUPROPION HYDROCHLORIDE 150 MG/1
150 TABLET ORAL DAILY
COMMUNITY
End: 2018-06-13 | Stop reason: SDUPTHER

## 2018-05-16 RX ORDER — METOPROLOL TARTRATE AND HYDROCHLOROTHIAZIDE 50; 25 MG/1; MG/1
TABLET ORAL
COMMUNITY
Start: 2018-05-07 | End: 2019-05-02

## 2018-05-16 RX ORDER — AMLODIPINE BESYLATE 2.5 MG/1
1 TABLET ORAL
COMMUNITY
Start: 2018-05-07 | End: 2020-02-03 | Stop reason: SDUPTHER

## 2018-05-16 NOTE — PROGRESS NOTES
"Subjective   Antoinette Pack is a 32 y.o. female who is here today for medication management follow-up    Chief Complaint:  \"I couldn't afford the Latuda\"    HPI:    Patient reports she could not afford Latuda.  She stated it was $600 with her insurance.  Patient reports she has been compliant with seeing  She reports having issues with irritability.  Patient continues to take Zoloft that she has been on for years.  Patient reports she is sleeping well.  \"I slept all night\".  Patient reports the Zoloft controls her tearfulness.  Rates depression 8/10.  Patient reports she worries a lot about things she can't control.  Patient reports she worries about things with her niece she takes care of and is afraid to discipline her because of . Patient reports she continues to have problems going in stores.  She does not like to go into crowded places \"it drives me crazy\".    No new medical concerns.  No new stressors.  Patient reports she can't stay awake during the day.  Patient reports she sees a shadow and sometimes a girl that briefly goes by when she is laying in bed.  \"I know it's not real, it's not gotten worse\".  She reports it's been years since they have interacted with her.  She reports seeing these occur randomly and some days it doesn't happen all. Denies SI/HI/AH.  \"Sometimes I feel like I'm a burden on everybody\".  She reports no motivation or interest in doing things.  Patient reports she has been on multiple meds in the past.  She reports she was on Abilify in the past and doesn't remember it helping.  Patient reports she has been on Lamictal in the past but doesn't remember much about it because she had a job loss and lost her job and quit getting treatment. She reports she has been on Seroquel in the past and it did not help.  Patient denies any symptoms of tameka at this time.  She does report she has been struggling with \"bad mood swings\"  For the past five years.  She reports she can be " impulsive when spending money and buying things she can't really afford.  She reports in the past she had a period of being up one night with a lot of energy cleaning and then crashed for four days.                Medical/Surgical History:  Past Medical History:   Diagnosis Date   • Anxiety    • Depression    • Diabetes mellitus    • GERD (gastroesophageal reflux disease)    • Hypertension    • PCOS (polycystic ovarian syndrome)    • Sleep apnea      Past Surgical History:   Procedure Laterality Date   • CARPAL TUNNEL RELEASE     • FRACTURE SURGERY         Allergies   Allergen Reactions   • Dilantin [Phenytoin]    • Keppra [Levetiracetam]    • Topamax [Topiramate]            Current Medications:   Current Outpatient Prescriptions   Medication Sig Dispense Refill   • amLODIPine (NORVASC) 2.5 MG tablet Take 1 tablet by mouth.     • bisoprolol (ZEBETA) 10 MG tablet Take 10 mg by mouth daily. Unsure of MG     • buPROPion XL (WELLBUTRIN XL) 150 MG 24 hr tablet Take 150 mg by mouth Daily.     • dicyclomine (BENTYL) 20 MG tablet Take 1 tablet by mouth Every 6 (Six) Hours As Needed (abdominal cramping). 15 tablet 0   • glipiZIDE (GLUCOTROL) 10 MG tablet Take  by mouth 2 (Two) Times a Day Before Meals. Pt unsure of dose     • ibuprofen (ADVIL,MOTRIN) 600 MG tablet Take 1 tablet by mouth Every 8 (Eight) Hours As Needed for Mild Pain . 15 tablet 0   • metFORMIN (GLUCOPHAGE) 1000 MG tablet Take 1,000 mg by mouth 2 (Two) Times a Day With Meals.     • metoprolol-hydrochlorothiazide (LOPRESSOR HCT) 50-25 MG per tablet      • metoprolol-hydrochlorothiazide (LOPRESSOR HCT) 50-25 MG per tablet take 1 by oral route  every day for blood pressure     • omeprazole (PRILOSEC) 40 MG capsule      • ondansetron (ZOFRAN) 4 MG tablet Take 1 tablet by mouth Every 8 (Eight) Hours As Needed for Nausea or Vomiting. 8 tablet 0   • promethazine (PHENERGAN) 12.5 MG tablet Take 1 tablet by mouth Every 8 (Eight) Hours As Needed for Nausea or Vomiting.  "10 tablet 0   • sertraline (ZOLOFT) 100 MG tablet Take 100 mg by mouth daily.     • Cariprazine HCl (VRAYLAR) 1.5 MG capsule capsule Take 1 capsule by mouth Daily. 30 capsule 0     No current facility-administered medications for this visit.          Review of Systems   Constitutional: Positive for fatigue. Negative for activity change and appetite change.   HENT: Negative.    Eyes: Negative for visual disturbance.   Respiratory: Negative.    Cardiovascular: Negative.    Gastrointestinal: Negative for nausea.   Endocrine: Negative.    Genitourinary: Negative.    Musculoskeletal: Negative for arthralgias.        Tendonitis in elbow   Skin: Negative.    Allergic/Immunologic: Negative.    Neurological: Negative for dizziness, seizures and headaches.   Hematological: Negative.    Psychiatric/Behavioral: Positive for agitation, behavioral problems, dysphoric mood and hallucinations. Negative for confusion, decreased concentration, self-injury, sleep disturbance and suicidal ideas. The patient is nervous/anxious. The patient is not hyperactive.     denies HEENT, cardiovascular, respiratory, liver, renal, GI/, endocrine, neuro, DERM, hematology, immunology, musculoskeletal disorders.    Objective   Physical Exam   Constitutional: She is oriented to person, place, and time. She appears well-developed and well-nourished. No distress.   HENT:   Head: Normocephalic.   Neck: Neck supple.   Neurological: She is alert and oriented to person, place, and time.   Skin: She is not diaphoretic.   Psychiatric: She has a normal mood and affect. Her behavior is normal.   Vitals reviewed.    Blood pressure 142/91, pulse 91, height 149.9 cm (59.02\"), weight 98.4 kg (217 lb).    Mental Status Exam:   Hygiene:   good  Cooperation:  Cooperative  Eye Contact:  Good  Psychomotor Behavior:  Appropriate  Affect:  Full range  Hopelessness: 5  Speech:  Normal  Thought Process:  Goal directed  Thought Content:  Normal  Suicidal:  None  Homicidal:  " None  Hallucinations:  Visual  Delusion:  None  Memory:  Intact  Orientation:  Person, Place, Time and Situation  Reliability:  good  Insight:  Good  Judgement:  Good  Impulse Control:  Fair  Physical/Medical Issues:  Yes uncontrolled DM      Assessment/Plan   Problems Addressed this Visit     None      Visit Diagnoses     Bipolar affective disorder, current episode mixed, current episode severity unspecified    -  Primary    Relevant Medications    Cariprazine HCl (VRAYLAR) 1.5 MG capsule capsule    buPROPion XL (WELLBUTRIN XL) 150 MG 24 hr tablet        Functionality: pt having significant impairment in important areas of daily functioning.  Prognosis: Guarded dependent on medication/follow up and treatment plan compliance.  Body mass index is 43.8 kg/m².  Patient was educated on healthier and more balanced diet choices and encouraged exercise within physical limitations.    Discussed medication options.  Discontinue Latuda as patient did not get filled because she could not afford the copay.  Patient has been on Lamictal, Seroquel, and Abilify in the past which were not effective per patient report.  Patient continues to report VH, depressive symptoms, and mood swings.  Poor motivation, fatigue, and loss of interest.  Will trial patient on Vraylar for depression, mood, and psychosis.  Discussed the risks, benefits, and side effects of the medication; client acknowledged and verbally consented.  Patient is aware to contact the [facility] with any worsening of symptom.  Patient is agreeable to go to the ER or call 911 should they begin SI/HI. Continue psycho therapy   Return in 4 weeks

## 2018-05-22 ENCOUNTER — TELEPHONE (OUTPATIENT)
Dept: PSYCHIATRY | Facility: CLINIC | Age: 33
End: 2018-05-22

## 2018-05-22 ENCOUNTER — PRIOR AUTHORIZATION (OUTPATIENT)
Dept: PSYCHIATRY | Facility: CLINIC | Age: 33
End: 2018-05-22

## 2018-05-30 ENCOUNTER — OFFICE VISIT (OUTPATIENT)
Dept: PSYCHIATRY | Facility: CLINIC | Age: 33
End: 2018-05-30

## 2018-05-30 DIAGNOSIS — F33.1 MAJOR DEPRESSIVE DISORDER, RECURRENT EPISODE, MODERATE (HCC): Primary | ICD-10-CM

## 2018-05-30 DIAGNOSIS — F40.01 PANIC DISORDER WITH AGORAPHOBIA: ICD-10-CM

## 2018-05-30 PROCEDURE — 90837 PSYTX W PT 60 MINUTES: CPT | Performed by: SOCIAL WORKER

## 2018-05-30 NOTE — PROGRESS NOTES
Date of Service: May 31, 2018  Time In:  2:10 PM  Time Out:  3:10 PM      PROGRESS NOTE  Data:  Antoinette Pack is a 33 y.o. female who met with the undersigned for a regularly scheduled individual outpatient therapy session at the [facility].     HPI:   Patient presented on time and appeared to be somewhat less depressed today.  She reports the only medication she is taking currently is Wellbutrin and a new one, Vraylar.  Patient reports she doesn't like taking medications so she doesn't want to take anything that isn't helpful.  She reports she still has no motivation and she feels like she is in a fog since starting Vraylar.  Patient talked about her medical issues including diabetes and sleep apnea.  She said diabetes is not controlled and she does not sleep well.  Patient also talked about other people in her life and the problems they are dealing with.  Patient had trouble staying focused on her own issues and identifying what she wanted/needed from today's session.    Clinical Maneuvering/Intervention:  Assisted patient in processing above session content; acknowledged and normalized patient’s thoughts, feelings, and concerns.  Therapist assisted patient to refocus on her own needs each time she got off track.  Inquired what patient does to take care of herself, and she identified taking medication.  Patient expressed the expectation that medication should take care of her depression and other mental health problems.  Provided education about depression and interaction of medical and mental health problems.  Gave information that medication is only part of treating depression; that there are things she can do every day that will improve her mood.  Also said that poor sleep and uncontrolled diabetes will also affect her mood.  Encouraged patient to create a daily routine, and perhaps begin taking a walk every day.  These activities will also help her in regulating her sleep, blood sugar, and weight.   Patient was willing to start with walking.    Allowed patient to freely discuss issues without interruption or judgment. Provided safe, confidential environment to facilitate the development of positive therapeutic relationship and encourage open, honest communication. Assisted patient in identifying risk factors which would indicate the need for higher level of care including thoughts to harm self or others and/or self-harming behavior and encouraged patient to contact this office, call 911, or present to the nearest emergency room should any of these events occur. Discussed crisis intervention services and means to access.  Patient adamantly and convincingly denies current suicidal or homicidal ideation or perceptual disturbance.    Assessment    Patient appeared to be more present with somewhat brighter affect today, but had difficulty identifying her needs and staying focused on that rather than other people.  She continues to have low energy and no motivation and poor sleep.  She will need ongoing individual therapy to address these issues and to learn positive ways to manage both mental health and medical issues.    Diagnoses and all orders for this visit:    Major depressive disorder, recurrent episode, moderate    Panic disorder with agoraphobia      Mental Status Exam  Hygiene:  good  Dress:  casual  Attitude:  Cooperative  Motor Activity:  Appropriate  Speech:  Rambling  Mood:  decreased range  Affect:  flat  Thought Processes:  Circum  Thought Content:  normal  Suicidal Thoughts:  denies  Homicidal Thoughts:  denies  Crisis Safety Plan: yes, to come to the emergency room.  Hallucinations:  denies    Patient's Support Network Includes:   and extended family    Progress toward goal: Not at goal    Functional Status: Moderate impairment     Prognosis: Fair with Ongoing Treatment     Plan   Patient will continue individual outpatient therapy every 2 weeks with focus on decreasing depression, improving  coping skills, and avoiding decompensation and the need for higher level of care.    Patient will adhere to medication regimen as prescribed and report any side effects. Patient will contact this office, call 911 or present to the nearest emergency room should suicidal or homicidal ideations occur. Provide Cognitive Behavioral Therapy and Integrative Therapy to improve functioning, maintain stability, and avoid decompensation and the need for higher level of care.          Return in about 2 weeks (around 6/13/2018).      This document signed by Nuzhat Beaver LCSW,                May 31, 2018 9:17 AM

## 2018-06-11 DIAGNOSIS — F31.60 BIPOLAR AFFECTIVE DISORDER, CURRENT EPISODE MIXED, CURRENT EPISODE SEVERITY UNSPECIFIED (HCC): ICD-10-CM

## 2018-06-11 RX ORDER — CARIPRAZINE 1.5 MG/1
CAPSULE, GELATIN COATED ORAL
Qty: 30 CAPSULE | Refills: 0 | Status: SHIPPED | OUTPATIENT
Start: 2018-06-11 | End: 2018-06-13 | Stop reason: SDUPTHER

## 2018-06-13 ENCOUNTER — OFFICE VISIT (OUTPATIENT)
Dept: PSYCHIATRY | Facility: CLINIC | Age: 33
End: 2018-06-13

## 2018-06-13 VITALS
BODY MASS INDEX: 44.76 KG/M2 | HEIGHT: 59 IN | WEIGHT: 222 LBS | HEART RATE: 82 BPM | SYSTOLIC BLOOD PRESSURE: 119 MMHG | DIASTOLIC BLOOD PRESSURE: 69 MMHG

## 2018-06-13 DIAGNOSIS — F41.1 GENERALIZED ANXIETY DISORDER: ICD-10-CM

## 2018-06-13 DIAGNOSIS — F40.01 PANIC DISORDER WITH AGORAPHOBIA: ICD-10-CM

## 2018-06-13 DIAGNOSIS — F31.60 BIPOLAR AFFECTIVE DISORDER, CURRENT EPISODE MIXED, CURRENT EPISODE SEVERITY UNSPECIFIED (HCC): Primary | ICD-10-CM

## 2018-06-13 PROCEDURE — 99214 OFFICE O/P EST MOD 30 MIN: CPT | Performed by: NURSE PRACTITIONER

## 2018-06-13 RX ORDER — RANOLAZINE 500 MG/1
500 TABLET, EXTENDED RELEASE ORAL 2 TIMES DAILY
COMMUNITY
End: 2018-08-29

## 2018-06-13 RX ORDER — BUPROPION HYDROCHLORIDE 300 MG/1
300 TABLET ORAL DAILY
Qty: 30 TABLET | Refills: 1 | Status: SHIPPED | OUTPATIENT
Start: 2018-06-13 | End: 2018-07-17 | Stop reason: SDUPTHER

## 2018-06-13 RX ORDER — HYDROCHLOROTHIAZIDE 25 MG/1
1 TABLET ORAL
COMMUNITY
Start: 2018-05-25 | End: 2021-05-10

## 2018-06-13 RX ORDER — ISOSORBIDE MONONITRATE 30 MG/1
30 TABLET, EXTENDED RELEASE ORAL DAILY
COMMUNITY
End: 2019-01-05 | Stop reason: HOSPADM

## 2018-06-13 NOTE — PROGRESS NOTES
Subjective   Antoinette Pack is a 33 y.o. female who is here today for medication management follow-up    Chief Complaint: had to start taking more meds  HPI:    States she is hesitant about meds but the Vraylar has been great. She states she has more energy and motivation to get things done. She is sleeping well at night without issue other than having to get up to urinate due to diabetes. She continues to take Zoloft and Wellbutrin XL. She is having cardiac issues apparently has two blockages and being treated with meds. She was recently in hospital less than a week ago and had a stress test and heart cath but couldn't have interventions due to the blockage being in a small artery and instructed to exercise/healthier diet/ and  Exercise. Denies any SI/HI/AH/VH. States VH went from daily to only 3-4x this past month so she has had significant improvements. She states significant improvements in depression. Having some exacerbated anxiety  She reports being less irritable and agitated, having more patience. Reports goal is to get back to being able to work/ regain employment. No other medical concerns/stressors at this time.                 Medical/Surgical History:  Past Medical History:   Diagnosis Date   • Anxiety    • Depression    • Diabetes mellitus    • GERD (gastroesophageal reflux disease)    • Hypertension    • PCOS (polycystic ovarian syndrome)    • Sleep apnea      Past Surgical History:   Procedure Laterality Date   • CARPAL TUNNEL RELEASE     • FRACTURE SURGERY         Allergies   Allergen Reactions   • Dilantin [Phenytoin]    • Keppra [Levetiracetam]    • Topamax [Topiramate]            Current Medications:   Current Outpatient Prescriptions   Medication Sig Dispense Refill   • amLODIPine (NORVASC) 2.5 MG tablet Take 1 tablet by mouth.     • bisoprolol (ZEBETA) 10 MG tablet Take 10 mg by mouth daily. Unsure of MG     • buPROPion XL (WELLBUTRIN XL) 300 MG 24 hr tablet Take 1 tablet by mouth Daily. 30  tablet 1   • Cariprazine HCl (VRAYLAR) 1.5 MG capsule capsule Take 1 capsule by mouth Daily. 30 capsule 1   • glipiZIDE (GLUCOTROL) 10 MG tablet Take  by mouth 2 (Two) Times a Day Before Meals. Pt unsure of dose     • hydrochlorothiazide (HYDRODIURIL) 25 MG tablet Take 1 tablet by mouth.     • isosorbide mononitrate (IMDUR) 30 MG 24 hr tablet Take 30 mg by mouth Daily.     • metFORMIN (GLUCOPHAGE) 1000 MG tablet Take 1,000 mg by mouth 2 (Two) Times a Day With Meals.     • metoprolol-hydrochlorothiazide (LOPRESSOR HCT) 50-25 MG per tablet      • omeprazole (PRILOSEC) 40 MG capsule      • ranolazine (RANEXA) 500 MG 12 hr tablet Take 500 mg by mouth 2 (Two) Times a Day.     • sertraline (ZOLOFT) 100 MG tablet Take 100 mg by mouth daily.     • dicyclomine (BENTYL) 20 MG tablet Take 1 tablet by mouth Every 6 (Six) Hours As Needed (abdominal cramping). 15 tablet 0   • ibuprofen (ADVIL,MOTRIN) 600 MG tablet Take 1 tablet by mouth Every 8 (Eight) Hours As Needed for Mild Pain . 15 tablet 0   • metoprolol-hydrochlorothiazide (LOPRESSOR HCT) 50-25 MG per tablet take 1 by oral route  every day for blood pressure     • ondansetron (ZOFRAN) 4 MG tablet Take 1 tablet by mouth Every 8 (Eight) Hours As Needed for Nausea or Vomiting. 8 tablet 0   • promethazine (PHENERGAN) 12.5 MG tablet Take 1 tablet by mouth Every 8 (Eight) Hours As Needed for Nausea or Vomiting. 10 tablet 0     No current facility-administered medications for this visit.          Review of Systems   Constitutional: Positive for fatigue. Negative for activity change and appetite change.   HENT: Negative.    Eyes: Negative for visual disturbance.   Respiratory: Negative.    Cardiovascular: Negative.         Recent heart cath being monitored by cardiologist   Gastrointestinal: Negative for nausea.   Endocrine: Negative.    Genitourinary: Positive for urgency.        R/t diabetes   Musculoskeletal: Negative for arthralgias.        Tendonitis in elbow   Skin:  "Negative.    Allergic/Immunologic: Negative.    Neurological: Negative for dizziness, seizures and headaches.   Hematological: Negative.    Psychiatric/Behavioral: Positive for agitation, behavioral problems, decreased concentration, dysphoric mood and hallucinations. Negative for confusion, self-injury, sleep disturbance and suicidal ideas. The patient is nervous/anxious. The patient is not hyperactive.     denies HEENT, cardiovascular, respiratory, liver, renal, GI/, endocrine, neuro, DERM, hematology, immunology, musculoskeletal disorders.    Objective   Physical Exam   Constitutional: She is oriented to person, place, and time. She appears well-developed and well-nourished. No distress.   HENT:   Head: Normocephalic.   Neck: Neck supple.   Neurological: She is alert and oriented to person, place, and time.   Skin: She is not diaphoretic.   Psychiatric: She has a normal mood and affect. Her behavior is normal.   Vitals reviewed.    Blood pressure 119/69, pulse 82, height 149.9 cm (59.02\"), weight 101 kg (222 lb).    Mental Status Exam:   Hygiene:   good  Cooperation:  Cooperative  Eye Contact:  Good  Psychomotor Behavior:  Appropriate  Affect:  Full range  Hopelessness: 5  Speech:  Normal  Thought Process:  Goal directed  Thought Content:  Normal  Suicidal:  None  Homicidal:  None  Hallucinations:  Visual  Delusion:  None  Memory:  Intact  Orientation:  Person, Place, Time and Situation  Reliability:  good  Insight:  Good  Judgement:  Good  Impulse Control:  Fair  Physical/Medical Issues:  Yes uncontrolled DM      Assessment/Plan   Problems Addressed this Visit     None      Visit Diagnoses     Bipolar affective disorder, current episode mixed, current episode severity unspecified    -  Primary    Relevant Medications    buPROPion XL (WELLBUTRIN XL) 300 MG 24 hr tablet    Cariprazine HCl (VRAYLAR) 1.5 MG capsule capsule    Panic disorder with agoraphobia        Relevant Medications    buPROPion XL (WELLBUTRIN " XL) 300 MG 24 hr tablet    Cariprazine HCl (VRAYLAR) 1.5 MG capsule capsule    Generalized anxiety disorder        Relevant Medications    buPROPion XL (WELLBUTRIN XL) 300 MG 24 hr tablet    Cariprazine HCl (VRAYLAR) 1.5 MG capsule capsule        Functionality: pt having improvements in important areas of daily functioning.  Prognosis: Guarded dependent on medication/follow up and treatment plan compliance.  Body mass index is 44.81 kg/m².  Patient was educated on healthier and more balanced diet choices and encouraged exercise within physical limitations.    Discussed medication options. Vraylar is helping for depression, mood, and psychosis and we will stay at current dose. Increase Wellbutrin XL to 300mg for ongoing anxiety as she has been on the 150mg dose for over 2 years.  Discussed the risks, benefits, and side effects of the medication; client acknowledged and verbally consented.  Patient is aware to contact the [facility] with any worsening of symptom.  Patient is agreeable to go to the ER or call 911 should they begin SI/HI. Continue psycho therapy to work on coping skills in helping alleviate anxiety and regain functionality.   Return in 4 weeks

## 2018-06-14 ENCOUNTER — OFFICE VISIT (OUTPATIENT)
Dept: GASTROENTEROLOGY | Facility: CLINIC | Age: 33
End: 2018-06-14

## 2018-06-14 ENCOUNTER — LAB (OUTPATIENT)
Dept: LAB | Facility: HOSPITAL | Age: 33
End: 2018-06-14

## 2018-06-14 ENCOUNTER — HOSPITAL ENCOUNTER (OUTPATIENT)
Dept: GENERAL RADIOLOGY | Facility: HOSPITAL | Age: 33
Discharge: HOME | End: 2018-06-14
Admitting: PHYSICIAN ASSISTANT

## 2018-06-14 VITALS
BODY MASS INDEX: 44.15 KG/M2 | DIASTOLIC BLOOD PRESSURE: 92 MMHG | HEIGHT: 59 IN | WEIGHT: 219 LBS | SYSTOLIC BLOOD PRESSURE: 135 MMHG | OXYGEN SATURATION: 96 % | HEART RATE: 82 BPM

## 2018-06-14 DIAGNOSIS — R10.32 LLQ ABDOMINAL PAIN: ICD-10-CM

## 2018-06-14 DIAGNOSIS — R19.7 DIARRHEA, UNSPECIFIED TYPE: ICD-10-CM

## 2018-06-14 DIAGNOSIS — R19.7 DIARRHEA, UNSPECIFIED TYPE: Primary | ICD-10-CM

## 2018-06-14 DIAGNOSIS — R14.0 BLOATING: ICD-10-CM

## 2018-06-14 LAB
ALBUMIN SERPL-MCNC: 4.6 G/DL (ref 3.5–5)
ALBUMIN/GLOB SERPL: 1.6 G/DL (ref 1.5–2.5)
ALP SERPL-CCNC: 100 U/L (ref 35–104)
ALT SERPL W P-5'-P-CCNC: 41 U/L (ref 10–36)
ANION GAP SERPL CALCULATED.3IONS-SCNC: 7.3 MMOL/L (ref 3.6–11.2)
AST SERPL-CCNC: 27 U/L (ref 10–30)
BASOPHILS # BLD AUTO: 0.02 10*3/MM3 (ref 0–0.3)
BASOPHILS NFR BLD AUTO: 0.2 % (ref 0–2)
BILIRUB SERPL-MCNC: 0.6 MG/DL (ref 0.2–1.8)
BUN BLD-MCNC: 10 MG/DL (ref 7–21)
BUN/CREAT SERPL: 14.3 (ref 7–25)
CALCIUM SPEC-SCNC: 9.2 MG/DL (ref 7.7–10)
CHLORIDE SERPL-SCNC: 101 MMOL/L (ref 99–112)
CO2 SERPL-SCNC: 28.7 MMOL/L (ref 24.3–31.9)
CREAT BLD-MCNC: 0.7 MG/DL (ref 0.43–1.29)
CRP SERPL-MCNC: 0.62 MG/DL (ref 0–0.99)
DEPRECATED RDW RBC AUTO: 38.9 FL (ref 37–54)
EOSINOPHIL # BLD AUTO: 0.17 10*3/MM3 (ref 0–0.7)
EOSINOPHIL NFR BLD AUTO: 2.1 % (ref 0–5)
ERYTHROCYTE [DISTWIDTH] IN BLOOD BY AUTOMATED COUNT: 13.3 % (ref 11.5–14.5)
GFR SERPL CREATININE-BSD FRML MDRD: 95 ML/MIN/1.73
GLOBULIN UR ELPH-MCNC: 2.9 GM/DL
GLUCOSE BLD-MCNC: 161 MG/DL (ref 70–110)
HCT VFR BLD AUTO: 38.3 % (ref 37–47)
HGB BLD-MCNC: 13.5 G/DL (ref 12–16)
IMM GRANULOCYTES # BLD: 0.07 10*3/MM3 (ref 0–0.03)
IMM GRANULOCYTES NFR BLD: 0.9 % (ref 0–0.5)
LYMPHOCYTES # BLD AUTO: 2.69 10*3/MM3 (ref 1–3)
LYMPHOCYTES NFR BLD AUTO: 33.3 % (ref 21–51)
MCH RBC QN AUTO: 29.5 PG (ref 27–33)
MCHC RBC AUTO-ENTMCNC: 35.2 G/DL (ref 33–37)
MCV RBC AUTO: 83.6 FL (ref 80–94)
MONOCYTES # BLD AUTO: 0.37 10*3/MM3 (ref 0.1–0.9)
MONOCYTES NFR BLD AUTO: 4.6 % (ref 0–10)
NEUTROPHILS # BLD AUTO: 4.75 10*3/MM3 (ref 1.4–6.5)
NEUTROPHILS NFR BLD AUTO: 58.9 % (ref 30–70)
OSMOLALITY SERPL CALC.SUM OF ELEC: 276.3 MOSM/KG (ref 273–305)
PLATELET # BLD AUTO: 186 10*3/MM3 (ref 130–400)
PMV BLD AUTO: 10.9 FL (ref 6–10)
POTASSIUM BLD-SCNC: 4 MMOL/L (ref 3.5–5.3)
PROT SERPL-MCNC: 7.5 G/DL (ref 6–8)
RBC # BLD AUTO: 4.58 10*6/MM3 (ref 4.2–5.4)
SODIUM BLD-SCNC: 137 MMOL/L (ref 135–153)
WBC NRBC COR # BLD: 8.07 10*3/MM3 (ref 4.5–12.5)

## 2018-06-14 PROCEDURE — 80053 COMPREHEN METABOLIC PANEL: CPT

## 2018-06-14 PROCEDURE — 36415 COLL VENOUS BLD VENIPUNCTURE: CPT

## 2018-06-14 PROCEDURE — 99214 OFFICE O/P EST MOD 30 MIN: CPT | Performed by: PHYSICIAN ASSISTANT

## 2018-06-14 PROCEDURE — 85025 COMPLETE CBC W/AUTO DIFF WBC: CPT

## 2018-06-14 PROCEDURE — 74019 RADEX ABDOMEN 2 VIEWS: CPT

## 2018-06-14 PROCEDURE — 74019 RADEX ABDOMEN 2 VIEWS: CPT | Performed by: RADIOLOGY

## 2018-06-14 PROCEDURE — 86140 C-REACTIVE PROTEIN: CPT

## 2018-06-15 ENCOUNTER — LAB (OUTPATIENT)
Dept: LAB | Facility: HOSPITAL | Age: 33
End: 2018-06-15

## 2018-06-15 DIAGNOSIS — R19.7 DIARRHEA, UNSPECIFIED TYPE: ICD-10-CM

## 2018-06-15 DIAGNOSIS — R14.0 BLOATING: ICD-10-CM

## 2018-06-15 DIAGNOSIS — R10.32 LLQ ABDOMINAL PAIN: ICD-10-CM

## 2018-06-15 LAB
027 TOXIN: NORMAL
C DIFF TOX GENS STL QL NAA+PROBE: NEGATIVE
HEMOCCULT STL QL: NEGATIVE
LACTOFERRIN STL QL LA: NEGATIVE

## 2018-06-15 PROCEDURE — 87899 AGENT NOS ASSAY W/OPTIC: CPT

## 2018-06-15 PROCEDURE — 82272 OCCULT BLD FECES 1-3 TESTS: CPT

## 2018-06-15 PROCEDURE — 87046 STOOL CULTR AEROBIC BACT EA: CPT

## 2018-06-15 PROCEDURE — 87328 CRYPTOSPORIDIUM AG IA: CPT

## 2018-06-15 PROCEDURE — 87045 FECES CULTURE AEROBIC BACT: CPT

## 2018-06-15 PROCEDURE — 82656 EL-1 FECAL QUAL/SEMIQ: CPT

## 2018-06-15 PROCEDURE — 83631 LACTOFERRIN FECAL (QUANT): CPT

## 2018-06-15 PROCEDURE — 87493 C DIFF AMPLIFIED PROBE: CPT

## 2018-06-15 PROCEDURE — 87177 OVA AND PARASITES SMEARS: CPT

## 2018-06-15 PROCEDURE — 87209 SMEAR COMPLEX STAIN: CPT

## 2018-06-15 PROCEDURE — 87329 GIARDIA AG IA: CPT

## 2018-06-15 PROCEDURE — 83993 ASSAY FOR CALPROTECTIN FECAL: CPT

## 2018-06-17 LAB — BACTERIA SPEC AEROBE CULT: NORMAL

## 2018-06-18 LAB
CRYPTOSP AG STL QL IA: NEGATIVE
G LAMBLIA AG STL QL IA: NEGATIVE

## 2018-06-20 ENCOUNTER — TELEPHONE (OUTPATIENT)
Dept: PSYCHIATRY | Facility: CLINIC | Age: 33
End: 2018-06-20

## 2018-06-20 LAB — CALPROTECTIN STL-MCNT: 76 UG/G (ref 0–120)

## 2018-06-21 LAB — ELASTASE PANC STL-MCNT: >500 UG ELAST./G

## 2018-06-22 LAB
O+P SPEC MICRO: NORMAL
OVA + PARASITE RESULT 1: NORMAL

## 2018-06-26 ENCOUNTER — OFFICE VISIT (OUTPATIENT)
Dept: PSYCHIATRY | Facility: CLINIC | Age: 33
End: 2018-06-26

## 2018-06-26 DIAGNOSIS — F41.1 GENERALIZED ANXIETY DISORDER: ICD-10-CM

## 2018-06-26 DIAGNOSIS — F40.01 PANIC DISORDER WITH AGORAPHOBIA: ICD-10-CM

## 2018-06-26 DIAGNOSIS — F31.60 BIPOLAR AFFECTIVE DISORDER, CURRENT EPISODE MIXED, CURRENT EPISODE SEVERITY UNSPECIFIED (HCC): Primary | ICD-10-CM

## 2018-06-26 PROCEDURE — 90837 PSYTX W PT 60 MINUTES: CPT | Performed by: SOCIAL WORKER

## 2018-06-26 NOTE — PROGRESS NOTES
Date of Service: June 28, 2018  Time In: 10:30 AM  Time Out:  11:30 AM      PROGRESS NOTE  Data:  Antoinette Pack is a 33 y.o. female who met with the undersigned for a regularly scheduled individual outpatient therapy session at the [facility].     HPI:   Antoinette expressed feeling odd about coming to counseling to talk about her problems.  She states there is nothing therapist can do about it, and she shouldn't unload her problems on therapist.  She did go on to talk about her frustrations with various family members, and her sense of helplessness in terms of her ability to have any impact on her own circumstances or the difficulties of her relatives.  She also talked about her own health issues including uncontrolled blood sugar, high blood pressure, and pain due to arthritis and overweight.      She said that the woman she sees sometimes (hallucination) has started talking to her, and she wonders if Vraylar could be causing that.    Clinical Maneuvering/Intervention:  Assisted patient in processing above session content; acknowledged and normalized patient’s thoughts, feelings, and concerns.  Pressured patient that I am very willing to listen to her problems.  Provided education that while neither of us can change other people's behavior, we can work on changing her own behaviors and developing skills to help her change her own circumstances.  Demonstrated setting firm limits with family members; discussed especially how to do that with her 19-year-old nephew who is demanding and stealing from family members.  Encouraged patient to talk with her , brother and sister-in-law to make a plan for dealing with nephew's behaviors.    Again encouraged patient to eat a healthy diet and exercise within her ability in order to improve blood sugar, blood pressure, and arthritis.  Also encouraged her to discuss with nurse practitioner whether Vraylar could cause a change in her hallucination.    Allowed patient  to freely discuss issues without interruption or judgment. Provided safe, confidential environment to facilitate the development of positive therapeutic relationship and encourage open, honest communication. Assisted patient in identifying risk factors which would indicate the need for higher level of care including thoughts to harm self or others and/or self-harming behavior and encouraged patient to contact this office, call 911, or present to the nearest emergency room should any of these events occur. Discussed crisis intervention services and means to access.  Patient adamantly and convincingly denies current suicidal or homicidal ideation or perceptual disturbance.    Assessment     Diagnoses and all orders for this visit:    Bipolar affective disorder, current episode mixed, current episode severity unspecified    Panic disorder with agoraphobia    Generalized anxiety disorder      Mental Status Exam  Hygiene:  fair  Dress:  casual  Attitude:  Cooperative  Motor Activity:  Appropriate  Speech:  Normal  Mood:  depressed  Affect:  flat  Thought Processes:  Goal directed and Linear  Thought Content:  normal  Suicidal Thoughts:  denies  Homicidal Thoughts:  denies  Crisis Safety Plan: yes, to come to the emergency room.  Hallucinations:  reports    Patient's Support Network Includes:   and extended family    Progress toward goal: Not at goal    Functional Status: Moderate impairment     Prognosis: Fair with Ongoing Treatment     Plan   Continue individual outpatient therapy monthly with focus on improved functioning, maintaining stability, and avoiding decompensation and the need for a higher level of care.    Patient will adhere to medication regimen as prescribed and report any side effects. Patient will contact this office, call 911 or present to the nearest emergency room should suicidal or homicidal ideations occur. Provide Cognitive Behavioral Therapy and Integrative Therapy to improve functioning,  maintain stability, and avoid decompensation and the need for higher level of care.        Return in about 1 month (around 7/26/2018).      This document signed by Nuzhat Beaver LCSW                                  June 28, 2018 5:52 PM

## 2018-07-09 ENCOUNTER — OFFICE VISIT (OUTPATIENT)
Dept: GASTROENTEROLOGY | Facility: CLINIC | Age: 33
End: 2018-07-09

## 2018-07-09 VITALS
WEIGHT: 219.2 LBS | HEIGHT: 59 IN | SYSTOLIC BLOOD PRESSURE: 141 MMHG | DIASTOLIC BLOOD PRESSURE: 88 MMHG | BODY MASS INDEX: 44.19 KG/M2 | OXYGEN SATURATION: 95 % | HEART RATE: 87 BPM

## 2018-07-09 DIAGNOSIS — R19.7 DIARRHEA, UNSPECIFIED TYPE: Primary | ICD-10-CM

## 2018-07-09 DIAGNOSIS — R10.812 LEFT UPPER QUADRANT ABDOMINAL TENDERNESS WITHOUT REBOUND TENDERNESS: ICD-10-CM

## 2018-07-09 PROCEDURE — 99213 OFFICE O/P EST LOW 20 MIN: CPT | Performed by: PHYSICIAN ASSISTANT

## 2018-07-09 NOTE — PROGRESS NOTES
Chief Complaint   Patient presents with   • Diarrhea       Antoinette aPck is a 33 y.o. female who presents to the office today for follow up appointment regarding diarrhea.     HPI  Now, she is having 3-4 loose to formed stools daily. Previously, her stools were very watery. She feels that her diabetes medication is causing her to have frequent BMs. Abdominal pain is improved, especially in the upper quadrants. Mild LLQ abdominal pain still present and chronic.     Labs 6/14/2018:  CBC normal WBC, normal H/H, normal neutr  CMP mildly elevated ALT 41  CRP normal    Stool studies 6/15/2018:  Calprotectin normal  Lactoferrin normal  Pancreatic elastase normal  O&P negative  C diff negative  Stool culture normal  Occult blood neg  Giardia/crypto neg    Abdominal film 6/14/2018 normal     CT abd/pelv 5/4/2018:  Impression:  Fluid-filled small bowel loops and large bowel suggestive of enteritis.  Fatty liver     5/4/2018 US gb:  FINDINGS:   Visualized pancreas is unremarkable.   The gallbladder is unremarkable without stones or wall thickening.   The CBD measures 3.3 mm.  Probably fatty liver infiltration.    No ascites demonstrated.   Positive sonographic Calderon sign.    Review of Systems   Constitutional: Positive for fatigue. Negative for chills and fever.   HENT: Negative for congestion, sore throat and trouble swallowing.    Eyes: Positive for visual disturbance.   Respiratory: Negative.    Cardiovascular: Positive for chest pain and palpitations. Negative for leg swelling.   Gastrointestinal: Positive for abdominal distention, abdominal pain, anal bleeding, blood in stool and nausea. Negative for constipation, diarrhea, rectal pain and vomiting.   Endocrine: Positive for heat intolerance. Negative for cold intolerance.   Genitourinary: Negative.    Musculoskeletal: Negative.    Skin: Positive for wound.   Allergic/Immunologic: Negative for environmental allergies and food allergies.   Neurological: Negative for  dizziness and light-headedness.   Hematological: Does not bruise/bleed easily.   Psychiatric/Behavioral: Negative for sleep disturbance. The patient is nervous/anxious.      Past Medical History:   Diagnosis Date   • Anxiety    • Depression    • Diabetes mellitus (CMS/HCC)    • GERD (gastroesophageal reflux disease)    • Hypertension    • PCOS (polycystic ovarian syndrome)    • Sleep apnea      Past Surgical History:   Procedure Laterality Date   • CARDIAC CATHETERIZATION     • CARPAL TUNNEL RELEASE     • COLONOSCOPY     • ENDOSCOPY     • FRACTURE SURGERY       Family History   Problem Relation Age of Onset   • Family history unknown: Yes     Social History   Substance Use Topics   • Smoking status: Never Smoker   • Smokeless tobacco: Never Used   • Alcohol use No       CURRENT MEDICATION:  •  amLODIPine (NORVASC) 2.5 MG tablet, Take 1 tablet by mouth., Disp: , Rfl:   •  buPROPion XL (WELLBUTRIN XL) 300 MG 24 hr tablet, Take 1 tablet by mouth Daily., Disp: 30 tablet, Rfl: 1  •  Cariprazine HCl (VRAYLAR) 1.5 MG capsule capsule, Take 1 capsule by mouth Daily., Disp: 30 capsule, Rfl: 1  •  dicyclomine (BENTYL) 20 MG tablet, Take 1 tablet by mouth Every 6 (Six) Hours As Needed (abdominal cramping)., Disp: 15 tablet, Rfl: 0  •  glipiZIDE (GLUCOTROL) 10 MG tablet, Take  by mouth 2 (Two) Times a Day Before Meals. Pt unsure of dose, Disp: , Rfl:   •  hydrochlorothiazide (HYDRODIURIL) 25 MG tablet, Take 1 tablet by mouth., Disp: , Rfl:   •  isosorbide mononitrate (IMDUR) 30 MG 24 hr tablet, Take 30 mg by mouth Daily., Disp: , Rfl:   •  Liraglutide (VICTOZA SC), Inject  under the skin., Disp: , Rfl:   •  metFORMIN (GLUCOPHAGE) 1000 MG tablet, Take 1,000 mg by mouth 2 (Two) Times a Day With Meals., Disp: , Rfl:   •  metoprolol-hydrochlorothiazide (LOPRESSOR HCT) 50-25 MG per tablet, take 1 by oral route  every day for blood pressure, Disp: , Rfl:   •  omeprazole (PRILOSEC) 40 MG capsule, , Disp: , Rfl:   •  ranolazine (RANEXA)  "500 MG 12 hr tablet, Take 500 mg by mouth 2 (Two) Times a Day., Disp: , Rfl:   •  sertraline (ZOLOFT) 100 MG tablet, Take 100 mg by mouth daily., Disp: , Rfl:     ALLERGIES:  Dilantin [phenytoin]; Keppra [levetiracetam]; and Topamax [topiramate]    VISIT VITALS:  /88   Pulse 87   Ht 149.9 cm (59\")   Wt 99.4 kg (219 lb 3.2 oz)   SpO2 95%   BMI 44.27 kg/m²     Physical Exam   Constitutional: She is oriented to person, place, and time. She appears well-developed and well-nourished. No distress.   hirsutism    HENT:   Head: Normocephalic and atraumatic.   Nose: Nose normal.   Mouth/Throat: Oropharynx is clear and moist.   Eyes: Conjunctivae are normal. Right eye exhibits no discharge. Left eye exhibits no discharge. No scleral icterus.   Neck: Normal range of motion. No JVD present.   Cardiovascular: Normal rate, regular rhythm and normal heart sounds.  Exam reveals no gallop and no friction rub.    No murmur heard.  Pulmonary/Chest: Effort normal and breath sounds normal. No respiratory distress. She has no wheezes. She has no rales. She exhibits no tenderness.   Abdominal: Soft. Bowel sounds are normal. She exhibits no mass. There is tenderness (LUQ and LLQ).   Truncal obesity   Musculoskeletal: Normal range of motion. She exhibits no edema or deformity.   Neurological: She is alert and oriented to person, place, and time. Coordination normal.   Skin: Skin is warm and dry. No rash noted. She is not diaphoretic. No erythema.   Psychiatric: She has a normal mood and affect. Her behavior is normal. Judgment and thought content normal.   Vitals reviewed.      Assessment/Plan     1. Diarrhea, unspecified type    2. Left upper quadrant abdominal tenderness without rebound tenderness      She reports improvement in symptoms and declines further testing for now. She was offered HIDA scan to further evaluate diarrhea or EGD and colonoscopy. She would like to wait and monitor symptoms for now. We discussed her recent " labs, stool studies and imaging studies in detail today. I have asked her to call if symptoms worsen. She agrees.     Patient's Body mass index is 44.27 kg/m². BMI is above normal parameters. Recommendations include: educational material.        Return if symptoms worsen or fail to improve.       Electronically signed 7/9/2018 12:41 PM  Kayleigh Urena PA-C, [facility]

## 2018-07-17 ENCOUNTER — OFFICE VISIT (OUTPATIENT)
Dept: PSYCHIATRY | Facility: CLINIC | Age: 33
End: 2018-07-17

## 2018-07-17 VITALS
DIASTOLIC BLOOD PRESSURE: 93 MMHG | HEART RATE: 92 BPM | HEIGHT: 59 IN | WEIGHT: 220 LBS | SYSTOLIC BLOOD PRESSURE: 151 MMHG | BODY MASS INDEX: 44.35 KG/M2

## 2018-07-17 DIAGNOSIS — F40.01 PANIC DISORDER WITH AGORAPHOBIA: ICD-10-CM

## 2018-07-17 DIAGNOSIS — F31.60 BIPOLAR AFFECTIVE DISORDER, CURRENT EPISODE MIXED, CURRENT EPISODE SEVERITY UNSPECIFIED (HCC): ICD-10-CM

## 2018-07-17 DIAGNOSIS — F41.1 GENERALIZED ANXIETY DISORDER: ICD-10-CM

## 2018-07-17 PROCEDURE — 99214 OFFICE O/P EST MOD 30 MIN: CPT | Performed by: NURSE PRACTITIONER

## 2018-07-17 RX ORDER — TRIFLUOPERAZINE HYDROCHLORIDE 1 MG/1
1 TABLET, FILM COATED ORAL 2 TIMES DAILY PRN
Qty: 60 TABLET | Refills: 1 | Status: SHIPPED | OUTPATIENT
Start: 2018-07-17 | End: 2018-09-01 | Stop reason: SDUPTHER

## 2018-07-17 RX ORDER — BUPROPION HYDROCHLORIDE 150 MG/1
300 TABLET ORAL EVERY MORNING
Qty: 60 TABLET | Refills: 1 | Status: SHIPPED | OUTPATIENT
Start: 2018-07-17 | End: 2018-09-01 | Stop reason: SDUPTHER

## 2018-07-17 NOTE — PROGRESS NOTES
"Subjective   Antoinette Pack is a 33 y.o. female who is here today for medication management follow-up    Chief Complaint: I lost my dog on Saturday  HPI:    Reports difficulty for the past 3 days due to her dog passing, had her for 4 years and her kidneys quit working. Her VH have been worse. She is having dreams \"that the things that I see it's in my dreams and i'll wake up and it's standing in the corner of the room, by the bed and i'm just overwhelmed right now.\" Reports these hallucinations are sporadic occurring more at night. Her anxiety and stress has been exacerbated. She reports no difficulty sleeping but is often disturbed due to medical reasons such as having to urinate due to diabetes. States her diabetes is not controlled and they have been elevated more recently due to situation. She feels hopeless \"i just feel blah.\" She is having increased depressive symptoms including lack of motivation, not wanting to do anything, no interest. Her  is a good support and makes attempts to get her out of the house and to do things. Currently denies any SI/HI/AH/VH.  Body mass index is 44.43 kg/m².. patient was educated to eat healthier diet choices and encouraged exercise.  Cost of the increased wellbutrin has been an issue for the 300mg xl tabs. Requests 150mg tab which is less expensive. She is compliant with meds, denies any new or exacerbated medical concerns.            Medical/Surgical History:  Past Medical History:   Diagnosis Date   • Anxiety    • Depression    • Diabetes mellitus (CMS/HCC)    • GERD (gastroesophageal reflux disease)    • Hypertension    • PCOS (polycystic ovarian syndrome)    • Sleep apnea      Past Surgical History:   Procedure Laterality Date   • CARDIAC CATHETERIZATION     • CARPAL TUNNEL RELEASE     • COLONOSCOPY     • ENDOSCOPY     • FRACTURE SURGERY         Allergies   Allergen Reactions   • Dilantin [Phenytoin] Mental Status Change   • Keppra [Levetiracetam] Mental Status " Change   • Topamax [Topiramate] Mental Status Change           Current Medications:   Current Outpatient Prescriptions   Medication Sig Dispense Refill   • amLODIPine (NORVASC) 2.5 MG tablet Take 1 tablet by mouth.     • buPROPion XL (WELLBUTRIN XL) 150 MG 24 hr tablet Take 2 tablets by mouth Every Morning. 60 tablet 1   • glipiZIDE (GLUCOTROL) 10 MG tablet Take  by mouth 2 (Two) Times a Day Before Meals. Pt unsure of dose     • hydrochlorothiazide (HYDRODIURIL) 25 MG tablet Take 1 tablet by mouth.     • isosorbide mononitrate (IMDUR) 30 MG 24 hr tablet Take 30 mg by mouth Daily.     • Liraglutide (VICTOZA SC) Inject  under the skin.     • metFORMIN (GLUCOPHAGE) 1000 MG tablet Take 1,000 mg by mouth 2 (Two) Times a Day With Meals.     • metoprolol-hydrochlorothiazide (LOPRESSOR HCT) 50-25 MG per tablet take 1 by oral route  every day for blood pressure     • omeprazole (PRILOSEC) 40 MG capsule      • ranolazine (RANEXA) 500 MG 12 hr tablet Take 500 mg by mouth 2 (Two) Times a Day.     • sertraline (ZOLOFT) 100 MG tablet Take 100 mg by mouth daily.     • Cariprazine HCl 3 MG capsule Take 1 capsule by mouth Daily. 30 capsule 1   • dicyclomine (BENTYL) 20 MG tablet Take 1 tablet by mouth Every 6 (Six) Hours As Needed (abdominal cramping). 15 tablet 0   • trifluoperazine (STELAZINE) 1 MG tablet Take 1 tablet by mouth 2 (Two) Times a Day As Needed (anxiety). 60 tablet 1     No current facility-administered medications for this visit.          Review of Systems   Constitutional: Positive for fatigue. Negative for activity change and appetite change.   HENT: Negative.    Eyes: Negative for visual disturbance.   Respiratory: Negative.    Cardiovascular: Negative.         Recent heart cath being monitored by cardiologist   Gastrointestinal: Negative for nausea.   Endocrine: Negative.    Genitourinary: Positive for urgency.        R/t diabetes   Musculoskeletal: Negative for arthralgias.        Tendonitis in elbow   Skin:  "Negative.    Allergic/Immunologic: Negative.    Neurological: Negative for dizziness, seizures and headaches.   Hematological: Negative.    Psychiatric/Behavioral: Positive for agitation, behavioral problems, decreased concentration, dysphoric mood and hallucinations. Negative for confusion, self-injury, sleep disturbance and suicidal ideas. The patient is nervous/anxious. The patient is not hyperactive.     denies HEENT, cardiovascular, respiratory, liver, renal, GI/, endocrine, neuro, DERM, hematology, immunology, musculoskeletal disorders.    Objective   Physical Exam   Constitutional: She is oriented to person, place, and time. She appears well-developed and well-nourished. No distress.   HENT:   Head: Normocephalic.   Neck: Neck supple.   Neurological: She is alert and oriented to person, place, and time.   Skin: She is not diaphoretic.   Psychiatric: She has a normal mood and affect. Her behavior is normal.   Nursing note and vitals reviewed.    Blood pressure 151/93, pulse 92, height 149.9 cm (59\"), weight 99.8 kg (220 lb).    Mental Status Exam:   Hygiene:   good  Cooperation:  Cooperative  Eye Contact:  Good  Psychomotor Behavior:  Appropriate  Affect:  Full range  Hopelessness: 5  Speech:  Normal  Thought Process:  Goal directed  Thought Content:  Normal  Suicidal:  None  Homicidal:  None  Hallucinations:  Visual  Delusion:  None  Memory:  Intact  Orientation:  Person, Place, Time and Situation  Reliability:  good  Insight:  Good  Judgement:  Good  Impulse Control:  Fair  Physical/Medical Issues:  Yes uncontrolled DM      Assessment/Plan   Problems Addressed this Visit     None      Visit Diagnoses     Bipolar affective disorder, current episode mixed, current episode severity unspecified (CMS/[facility])        Relevant Medications    buPROPion XL (WELLBUTRIN XL) 150 MG 24 hr tablet    trifluoperazine (STELAZINE) 1 MG tablet    Cariprazine HCl 3 MG capsule    Panic disorder with agoraphobia        Relevant " Medications    buPROPion XL (WELLBUTRIN XL) 150 MG 24 hr tablet    trifluoperazine (STELAZINE) 1 MG tablet    Cariprazine HCl 3 MG capsule    Generalized anxiety disorder        Relevant Medications    buPROPion XL (WELLBUTRIN XL) 150 MG 24 hr tablet    trifluoperazine (STELAZINE) 1 MG tablet    Cariprazine HCl 3 MG capsule        Functionality: pt exacerbation of anxiety due to recent loss of family pet.  Increased anxiety and stress has resulted in patient having more frequent visual hallucinations and some depressive symptoms as well.  Prognosis: Guarded dependent on medication/follow up and treatment plan compliance.  Body mass index is 44.43 kg/m².  Patient was educated on healthier and more balanced diet choices and encouraged exercise within physical limitations.    Discussed medication options. Vraylar is helping for depression, mood, but we will increase to 3 mg as she continues to have some hallucinations that are now worsening due to recent life stressor. Increase Wellbutrin XL to 300mg for ongoing anxiety as she has been on the 150mg dose for over 2 years.  Discussed the risks, benefits, and side effects of the medication; client acknowledged and verbally consented.  Patient is aware to contact the [facility] with any worsening of symptom.  Patient is agreeable to go to the ER or call 911 should they begin SI/HI. Continue psycho therapy to work on coping skills in helping alleviate anxiety and regain functionality.   Return in 4 weeks

## 2018-07-26 ENCOUNTER — PRIOR AUTHORIZATION (OUTPATIENT)
Dept: PSYCHIATRY | Facility: CLINIC | Age: 33
End: 2018-07-26

## 2018-07-26 ENCOUNTER — OFFICE VISIT (OUTPATIENT)
Dept: PSYCHIATRY | Facility: CLINIC | Age: 33
End: 2018-07-26

## 2018-07-26 DIAGNOSIS — F41.1 GENERALIZED ANXIETY DISORDER: ICD-10-CM

## 2018-07-26 DIAGNOSIS — F31.60 BIPOLAR AFFECTIVE DISORDER, CURRENT EPISODE MIXED, CURRENT EPISODE SEVERITY UNSPECIFIED (HCC): Primary | ICD-10-CM

## 2018-07-26 DIAGNOSIS — F40.01 PANIC DISORDER WITH AGORAPHOBIA: ICD-10-CM

## 2018-07-26 PROCEDURE — 90834 PSYTX W PT 45 MINUTES: CPT | Performed by: SOCIAL WORKER

## 2018-07-26 NOTE — PROGRESS NOTES
Date of Service: July 26, 2018  Time In:  11:35 AM  Time Out:  12:15 PM      PROGRESS NOTE  Data:  Antoinette Pack is a 33 y.o. female who met with the undersigned for a regularly scheduled individual outpatient therapy session at the [facility].     HPI:   Patient reports she is overwhelmed today.  She had to put down her dog last week and she is really sad.  Patient said she didn't want to cry today because it's embarrassing.    Patient said that nurse practitioner increased one medication which she thinks is helping her depression, but she is still waiting on a preauthorization for a new medication.  Patient wanted to know if the medication increase could cause her hallucination to get worse because she seems to see that female figure more frequently.    Clinical Maneuvering/Intervention:  Assisted patient in processing above session content; acknowledged and normalized patient’s thoughts, feelings, and concerns.  Facilitated patient expressing her grief about the loss of her dog.  She shared what happened to her and stories about her, and expressed sadness through her tears.    Therapist provided education that increased stress can cause an increase in symptoms, and it may be that the loss of her dog has triggered an increase in the hallucination.    Allowed patient to freely discuss issues without interruption or judgment. Provided safe, confidential environment to facilitate the development of positive therapeutic relationship and encourage open, honest communication. Assisted patient in identifying risk factors which would indicate the need for higher level of care including thoughts to harm self or others and/or self-harming behavior and encouraged patient to contact this office, call 911, or present to the nearest emergency room should any of these events occur. Discussed crisis intervention services and means to access.  Patient adamantly and convincingly denies current suicidal or homicidal ideation  or perceptual disturbance.    Assessment     Diagnoses and all orders for this visit:    Bipolar affective disorder, current episode mixed, current episode severity unspecified (CMS/HCC)    Panic disorder with agoraphobia    Generalized anxiety disorder      Mental Status Exam  Hygiene:  good  Dress:  casual  Attitude:  Cooperative  Motor Activity:  Appropriate  Speech:  Normal  Mood:  depressed  Affect:  Sad  Thought Processes:  Goal directed and Linear  Thought Content:  normal  Suicidal Thoughts:  denies  Homicidal Thoughts:  denies  Crisis Safety Plan: yes, to come to the emergency room.  Hallucinations:  Has    Patient's Support Network Includes:  , children and extended family    Progress toward goal: Not at goal    Functional Status: Moderate impairment     Prognosis: Fair with Ongoing Treatment     Plan   Patient will continue in individual outpatient therapy monthly with focus on improved functioning, maintaining stability and avoiding decompensation and the need for a higher level of care.    Patient will adhere to medication regimen as prescribed and report any side effects. Patient will contact this office, call 911 or present to the nearest emergency room should suicidal or homicidal ideations occur. Provide Cognitive Behavioral Therapy and Integrative Therapy to improve functioning, maintain stability, and avoid decompensation and the need for higher level of care.          Return in about 1 month (around 8/26/2018).      This document signed by Nuzhat Beaver LCSW                               July 26, 2018 7:52 PM

## 2018-07-30 ENCOUNTER — TELEPHONE (OUTPATIENT)
Dept: PSYCHIATRY | Facility: CLINIC | Age: 33
End: 2018-07-30

## 2018-07-30 ENCOUNTER — PRIOR AUTHORIZATION (OUTPATIENT)
Dept: PSYCHIATRY | Facility: CLINIC | Age: 33
End: 2018-07-30

## 2018-07-31 ENCOUNTER — TELEPHONE (OUTPATIENT)
Dept: PSYCHIATRY | Facility: CLINIC | Age: 33
End: 2018-07-31

## 2018-07-31 NOTE — TELEPHONE ENCOUNTER
Called [facility] they ran the wellbutrin through it is cover just owes a $1 copay

## 2018-07-31 NOTE — TELEPHONE ENCOUNTER
Tried to contact pt this morning to inform her that her Ins denied the Wellbutrin Xl 300 Mg. No answer no vm.

## 2018-08-29 ENCOUNTER — OFFICE VISIT (OUTPATIENT)
Dept: PSYCHIATRY | Facility: CLINIC | Age: 33
End: 2018-08-29

## 2018-08-29 VITALS
SYSTOLIC BLOOD PRESSURE: 128 MMHG | DIASTOLIC BLOOD PRESSURE: 78 MMHG | HEIGHT: 59 IN | HEART RATE: 87 BPM | BODY MASS INDEX: 44.55 KG/M2 | WEIGHT: 221 LBS

## 2018-08-29 DIAGNOSIS — F40.01 PANIC DISORDER WITH AGORAPHOBIA: ICD-10-CM

## 2018-08-29 DIAGNOSIS — F31.60 BIPOLAR AFFECTIVE DISORDER, CURRENT EPISODE MIXED, CURRENT EPISODE SEVERITY UNSPECIFIED (HCC): Primary | ICD-10-CM

## 2018-08-29 DIAGNOSIS — F33.1 MAJOR DEPRESSIVE DISORDER, RECURRENT EPISODE, MODERATE (HCC): ICD-10-CM

## 2018-08-29 DIAGNOSIS — F41.1 GENERALIZED ANXIETY DISORDER: ICD-10-CM

## 2018-08-29 PROCEDURE — 99214 OFFICE O/P EST MOD 30 MIN: CPT | Performed by: NURSE PRACTITIONER

## 2018-09-01 RX ORDER — TRIFLUOPERAZINE HYDROCHLORIDE 1 MG/1
1 TABLET, FILM COATED ORAL 2 TIMES DAILY PRN
Qty: 60 TABLET | Refills: 1 | Status: SHIPPED | OUTPATIENT
Start: 2018-09-01 | End: 2018-11-15

## 2018-09-01 RX ORDER — SERTRALINE HYDROCHLORIDE 100 MG/1
100 TABLET, FILM COATED ORAL DAILY
Qty: 30 TABLET | Refills: 2 | Status: SHIPPED | OUTPATIENT
Start: 2018-09-01 | End: 2018-11-15

## 2018-09-01 RX ORDER — BUPROPION HYDROCHLORIDE 150 MG/1
300 TABLET ORAL EVERY MORNING
Qty: 60 TABLET | Refills: 1 | Status: SHIPPED | OUTPATIENT
Start: 2018-09-01 | End: 2018-11-15 | Stop reason: SDUPTHER

## 2018-10-04 ENCOUNTER — OFFICE VISIT (OUTPATIENT)
Dept: PSYCHIATRY | Facility: CLINIC | Age: 33
End: 2018-10-04

## 2018-10-04 DIAGNOSIS — F31.60 BIPOLAR AFFECTIVE DISORDER, CURRENT EPISODE MIXED, CURRENT EPISODE SEVERITY UNSPECIFIED (HCC): Primary | ICD-10-CM

## 2018-10-04 DIAGNOSIS — F41.1 GENERALIZED ANXIETY DISORDER: ICD-10-CM

## 2018-10-04 DIAGNOSIS — F40.01 PANIC DISORDER WITH AGORAPHOBIA: ICD-10-CM

## 2018-10-04 PROCEDURE — 90834 PSYTX W PT 45 MINUTES: CPT | Performed by: SOCIAL WORKER

## 2018-10-04 NOTE — PROGRESS NOTES
"aboutDate of Service: October 5, 2018  Time In:  1:30 PM  Time Out:  2:15 PM      PROGRESS NOTE  Data:  Antoinette Pack is a 33 y.o. female who met with the undersigned for a regularly scheduled individual outpatient therapy session at the [facility].     HPI:   Antoinette reports her life has been chaotic since last session which is why she missed appointments.  She reports her father has been very ill, was hospitalized, and is now at home trying to recover.  He is very uncooperative with his own treatment which is frustrating and stressful for patient and the rest of her family.  Patient is worried about losing him because he doesn't follow doctor's orders, and often refuses to allow family members to help in his care.    She also reports that her  just quit a good job and doesn't have a job to go to.  She said that for a long time he would change jobs about every 3 years for no obvious reason.  He would quit before finding another job, and it caused a lot of financial issues for them.  He has been at his most recent job for considerably more than 3 years, but suddenly decided he didn't want to do it anymore.   has made an application for another job but he has not yet been interviewed or hired, and their bills are due now.  She said her  doesn't understand the problems that are created because he doesn't handle their finances.    Clinical Maneuvering/Intervention:  Assisted patient in processing above session content; acknowledged and normalized patient’s thoughts, feelings, and concerns.  Provided empathy and support as patient processed and vented feelings about the above issues.  Validated her sense of helplessness about father's health.  Reminded her that she can't control his choices or behavior, but she might choose to have a \"come to [place]\" conversation with him about the risk of death if he does not cooperate.     Encouraged her also to communicate with her  about his " impulsive behavior around jobs and ask for his cooperation the next time he wants to make a job change, so that their finances are not thrown into chaos.  Inquired about patient's own self soothing and relaxation activities.  She reports she doesn't have any.  Taught deep breathing to use when her anxiety increases.  Encouraged her to find some activities such as adult coloring, puzzles, reading, exercise that she can enjoy by herself and make time to use.  We'll discuss further in our next session.    Allowed patient to freely discuss issues without interruption or judgment. Provided safe, confidential environment to facilitate the development of positive therapeutic relationship and encourage open, honest communication. Assisted patient in identifying risk factors which would indicate the need for higher level of care including thoughts to harm self or others and/or self-harming behavior and encouraged patient to contact this office, call 911, or present to the nearest emergency room should any of these events occur. Discussed crisis intervention services and means to access.  Patient adamantly and convincingly denies current suicidal or homicidal ideation or perceptual disturbance.    Assessment     Diagnoses and all orders for this visit:    Bipolar affective disorder, current episode mixed, current episode severity unspecified (CMS/HCC)    Panic disorder with agoraphobia    Generalized anxiety disorder    Mental Status Exam  Hygiene:  good  Dress:  casual  Attitude:  Cooperative  Motor Activity:  Appropriate  Speech:  Normal  Mood:  anxious  Affect:  anxious and aggitated  Thought Processes:  Goal directed and Linear  Thought Content:  normal  Suicidal Thoughts:  denies  Homicidal Thoughts:  denies  Crisis Safety Plan: yes, to come to the emergency room.  Hallucinations:  denies    Patient's Support Network Includes:  , father and extended family    Progress toward goal: Not at goal    Functional Status:  Moderate impairment     Prognosis: Fair with Ongoing Treatment     Plan   Patient will continue in individual outpatient therapy every 2 weeks with focus on improved functioning and coping skills, maintaining stability and avoiding decompensation and the need for a higher level of care.    Patient will adhere to medication regimen as prescribed and report any side effects. Patient will contact this office, call 911 or present to the nearest emergency room should suicidal or homicidal ideations occur. Provide Cognitive Behavioral Therapy and Integrative Therapy to improve functioning, maintain stability, and avoid decompensation and the need for higher level of care.     Return in about 1 month (around 11/4/2018).      This document signed by Nuzhat Beaver LCSW,  October 5, 2018 11:52 AM

## 2018-10-05 NOTE — TREATMENT PLAN
Multi-Disciplinary Problems (from Behavioral Health Treatment Plan)    Active Problems     Problem: Anxiety  Start Date: 10/05/18    Problem Details:  The patient self-scales this problem as a 8 with 10 being the worst.       Goal Priority Start Date Expected End Date End Date    Patient will develop and implement behavioral and cognitive strategies to reduce anxiety and irrational fears. -- 10/05/18 10/05/19 --    Goal Details:  Progress toward goal:  The patient self-scales their progress related to this goal as a 7 with 10 being the worst.       Goal Intervention Frequency Start Date End Date    Help patient explore past emotional issues in relation to present anxiety. Q Month 10/05/18 10/05/19    Intervention Details:  Duration of treatment until until remission of symptoms.       Goal Intervention Frequency Start Date End Date    Help patient develop an awareness of their cognitive and physical responses to anxiety. Q Month 10/05/18 10/05/19    Intervention Details:  Duration of treatment until until remission of symptoms.             Problem: Mood Instability  Start Date: 10/05/18    Problem Details:  The patient self-scales this problem as a 6 with 10 being the worst.        Goal Priority Start Date Expected End Date End Date    Patient will achieve mood stability as evidenced by controlled behavior and a more deliberate thought process -- 10/05/18 10/05/19 --    Goal Details:  Progress toward goal:  The patient self-scales their progress related to this goal as a 5 with 10 being the worst.        Goal Intervention Frequency Start Date End Date    Provide structure and focus to patient's thoughts and actions by establishing plans and routine. Q Month 10/05/18 10/05/19    Intervention Details:  Duration of treatment until until remission of symptoms.        Goal Intervention Frequency Start Date End Date    Assist patient in setting responsible goals and limits in behavior. Q Month 10/05/18 10/05/19     Intervention Details:  Duration of treatment until until remission of symptoms.                    Reviewed By     Nuzhat Beaver LCSW 10/05/18 8551                 I have discussed and reviewed this treatment plan with the patient.  It has been printed for signatures.

## 2018-10-11 ENCOUNTER — APPOINTMENT (OUTPATIENT)
Dept: ULTRASOUND IMAGING | Facility: HOSPITAL | Age: 33
End: 2018-10-11

## 2018-10-11 ENCOUNTER — HOSPITAL ENCOUNTER (EMERGENCY)
Facility: HOSPITAL | Age: 33
Discharge: HOME | End: 2018-10-11
Attending: EMERGENCY MEDICINE | Admitting: EMERGENCY MEDICINE

## 2018-10-11 ENCOUNTER — APPOINTMENT (OUTPATIENT)
Dept: CT IMAGING | Facility: HOSPITAL | Age: 33
End: 2018-10-11

## 2018-10-11 VITALS
RESPIRATION RATE: 17 BRPM | TEMPERATURE: 97.9 F | OXYGEN SATURATION: 98 % | DIASTOLIC BLOOD PRESSURE: 108 MMHG | HEART RATE: 84 BPM | HEIGHT: 59 IN | SYSTOLIC BLOOD PRESSURE: 177 MMHG | WEIGHT: 220 LBS | BODY MASS INDEX: 44.35 KG/M2

## 2018-10-11 DIAGNOSIS — R10.32 LLQ ABDOMINAL PAIN: Primary | ICD-10-CM

## 2018-10-11 LAB
ALBUMIN SERPL-MCNC: 4.5 G/DL (ref 3.5–5)
ALBUMIN/GLOB SERPL: 1.4 G/DL (ref 1.5–2.5)
ALP SERPL-CCNC: 123 U/L (ref 35–104)
ALT SERPL W P-5'-P-CCNC: 40 U/L (ref 10–36)
ANION GAP SERPL CALCULATED.3IONS-SCNC: 6.8 MMOL/L (ref 3.6–11.2)
AST SERPL-CCNC: 31 U/L (ref 10–30)
B-HCG UR QL: NEGATIVE
BASOPHILS # BLD AUTO: 0.02 10*3/MM3 (ref 0–0.3)
BASOPHILS NFR BLD AUTO: 0.3 % (ref 0–2)
BILIRUB SERPL-MCNC: 0.7 MG/DL (ref 0.2–1.8)
BILIRUB UR QL STRIP: NEGATIVE
BUN BLD-MCNC: <5 MG/DL (ref 7–21)
BUN/CREAT SERPL: ABNORMAL (ref 7–25)
CALCIUM SPEC-SCNC: 9.1 MG/DL (ref 7.7–10)
CHLORIDE SERPL-SCNC: 101 MMOL/L (ref 99–112)
CLARITY UR: CLEAR
CO2 SERPL-SCNC: 26.2 MMOL/L (ref 24.3–31.9)
COLOR UR: YELLOW
CREAT BLD-MCNC: 0.73 MG/DL (ref 0.43–1.29)
DEPRECATED RDW RBC AUTO: 36.7 FL (ref 37–54)
EOSINOPHIL # BLD AUTO: 0.16 10*3/MM3 (ref 0–0.7)
EOSINOPHIL NFR BLD AUTO: 2.3 % (ref 0–5)
ERYTHROCYTE [DISTWIDTH] IN BLOOD BY AUTOMATED COUNT: 13.1 % (ref 11.5–14.5)
GFR SERPL CREATININE-BSD FRML MDRD: 91 ML/MIN/1.73
GLOBULIN UR ELPH-MCNC: 3.2 GM/DL
GLUCOSE BLD-MCNC: 334 MG/DL (ref 70–110)
GLUCOSE UR STRIP-MCNC: ABNORMAL MG/DL
HCT VFR BLD AUTO: 40.8 % (ref 37–47)
HGB BLD-MCNC: 14.5 G/DL (ref 12–16)
HGB UR QL STRIP.AUTO: NEGATIVE
IMM GRANULOCYTES # BLD: 0.03 10*3/MM3 (ref 0–0.03)
IMM GRANULOCYTES NFR BLD: 0.4 % (ref 0–0.5)
KETONES UR QL STRIP: NEGATIVE
LEUKOCYTE ESTERASE UR QL STRIP.AUTO: NEGATIVE
LIPASE SERPL-CCNC: 47 U/L (ref 13–60)
LYMPHOCYTES # BLD AUTO: 2.33 10*3/MM3 (ref 1–3)
LYMPHOCYTES NFR BLD AUTO: 33.6 % (ref 21–51)
MCH RBC QN AUTO: 28.5 PG (ref 27–33)
MCHC RBC AUTO-ENTMCNC: 35.5 G/DL (ref 33–37)
MCV RBC AUTO: 80.2 FL (ref 80–94)
MONOCYTES # BLD AUTO: 0.46 10*3/MM3 (ref 0.1–0.9)
MONOCYTES NFR BLD AUTO: 6.6 % (ref 0–10)
NEUTROPHILS # BLD AUTO: 3.93 10*3/MM3 (ref 1.4–6.5)
NEUTROPHILS NFR BLD AUTO: 56.8 % (ref 30–70)
NITRITE UR QL STRIP: NEGATIVE
OSMOLALITY SERPL CALC.SUM OF ELEC: NORMAL MOSM/KG (ref 273–305)
PH UR STRIP.AUTO: 5.5 [PH] (ref 5–8)
PLATELET # BLD AUTO: 165 10*3/MM3 (ref 130–400)
PMV BLD AUTO: 10.7 FL (ref 6–10)
POTASSIUM BLD-SCNC: 4.3 MMOL/L (ref 3.5–5.3)
PROT SERPL-MCNC: 7.7 G/DL (ref 6–8)
PROT UR QL STRIP: NEGATIVE
RBC # BLD AUTO: 5.09 10*6/MM3 (ref 4.2–5.4)
SODIUM BLD-SCNC: 134 MMOL/L (ref 135–153)
SP GR UR STRIP: >=1.03 (ref 1–1.03)
TROPONIN I SERPL-MCNC: 0.03 NG/ML
UROBILINOGEN UR QL STRIP: ABNORMAL
WBC NRBC COR # BLD: 6.93 10*3/MM3 (ref 4.5–12.5)

## 2018-10-11 PROCEDURE — 74177 CT ABD & PELVIS W/CONTRAST: CPT | Performed by: RADIOLOGY

## 2018-10-11 PROCEDURE — 25010000002 IOPAMIDOL 61 % SOLUTION: Performed by: EMERGENCY MEDICINE

## 2018-10-11 PROCEDURE — 76856 US EXAM PELVIC COMPLETE: CPT | Performed by: RADIOLOGY

## 2018-10-11 PROCEDURE — 85025 COMPLETE CBC W/AUTO DIFF WBC: CPT | Performed by: PHYSICIAN ASSISTANT

## 2018-10-11 PROCEDURE — 81003 URINALYSIS AUTO W/O SCOPE: CPT | Performed by: PHYSICIAN ASSISTANT

## 2018-10-11 PROCEDURE — 81025 URINE PREGNANCY TEST: CPT | Performed by: PHYSICIAN ASSISTANT

## 2018-10-11 PROCEDURE — 96374 THER/PROPH/DIAG INJ IV PUSH: CPT

## 2018-10-11 PROCEDURE — 80053 COMPREHEN METABOLIC PANEL: CPT | Performed by: PHYSICIAN ASSISTANT

## 2018-10-11 PROCEDURE — 36415 COLL VENOUS BLD VENIPUNCTURE: CPT

## 2018-10-11 PROCEDURE — 96375 TX/PRO/DX INJ NEW DRUG ADDON: CPT

## 2018-10-11 PROCEDURE — 96361 HYDRATE IV INFUSION ADD-ON: CPT

## 2018-10-11 PROCEDURE — 74177 CT ABD & PELVIS W/CONTRAST: CPT

## 2018-10-11 PROCEDURE — 93005 ELECTROCARDIOGRAM TRACING: CPT | Performed by: PHYSICIAN ASSISTANT

## 2018-10-11 PROCEDURE — 93010 ELECTROCARDIOGRAM REPORT: CPT | Performed by: INTERNAL MEDICINE

## 2018-10-11 PROCEDURE — 25010000002 ONDANSETRON PER 1 MG: Performed by: EMERGENCY MEDICINE

## 2018-10-11 PROCEDURE — 99283 EMERGENCY DEPT VISIT LOW MDM: CPT

## 2018-10-11 PROCEDURE — 84484 ASSAY OF TROPONIN QUANT: CPT | Performed by: PHYSICIAN ASSISTANT

## 2018-10-11 PROCEDURE — 25010000002 MORPHINE SULFATE (PF) 2 MG/ML SOLUTION: Performed by: EMERGENCY MEDICINE

## 2018-10-11 PROCEDURE — 83690 ASSAY OF LIPASE: CPT | Performed by: PHYSICIAN ASSISTANT

## 2018-10-11 PROCEDURE — 76856 US EXAM PELVIC COMPLETE: CPT

## 2018-10-11 RX ORDER — CIPROFLOXACIN 500 MG/1
500 TABLET, FILM COATED ORAL 2 TIMES DAILY
Qty: 20 TABLET | Refills: 0 | Status: SHIPPED | OUTPATIENT
Start: 2018-10-11 | End: 2019-01-04

## 2018-10-11 RX ORDER — MORPHINE SULFATE 2 MG/ML
4 INJECTION, SOLUTION INTRAMUSCULAR; INTRAVENOUS ONCE
Status: COMPLETED | OUTPATIENT
Start: 2018-10-11 | End: 2018-10-11

## 2018-10-11 RX ORDER — SODIUM CHLORIDE 9 MG/ML
125 INJECTION, SOLUTION INTRAVENOUS CONTINUOUS
Status: DISCONTINUED | OUTPATIENT
Start: 2018-10-11 | End: 2018-10-11 | Stop reason: HOSPADM

## 2018-10-11 RX ORDER — SODIUM CHLORIDE 0.9 % (FLUSH) 0.9 %
10 SYRINGE (ML) INJECTION AS NEEDED
Status: DISCONTINUED | OUTPATIENT
Start: 2018-10-11 | End: 2018-10-11 | Stop reason: HOSPADM

## 2018-10-11 RX ORDER — ONDANSETRON 2 MG/ML
4 INJECTION INTRAMUSCULAR; INTRAVENOUS ONCE
Status: COMPLETED | OUTPATIENT
Start: 2018-10-11 | End: 2018-10-11

## 2018-10-11 RX ORDER — METRONIDAZOLE 500 MG/1
500 TABLET ORAL 3 TIMES DAILY
Qty: 30 TABLET | Refills: 0 | Status: SHIPPED | OUTPATIENT
Start: 2018-10-11 | End: 2018-12-13

## 2018-10-11 RX ADMIN — SODIUM CHLORIDE 125 ML/HR: 9 INJECTION, SOLUTION INTRAVENOUS at 08:48

## 2018-10-11 RX ADMIN — MORPHINE SULFATE 4 MG: 2 INJECTION, SOLUTION INTRAMUSCULAR; INTRAVENOUS at 08:48

## 2018-10-11 RX ADMIN — IOPAMIDOL 100 ML: 612 INJECTION, SOLUTION INTRAVENOUS at 10:05

## 2018-10-11 RX ADMIN — ONDANSETRON 4 MG: 2 INJECTION, SOLUTION INTRAMUSCULAR; INTRAVENOUS at 08:48

## 2018-10-11 NOTE — ED NOTES
Pt alert and oriented, skin pwd, respirations even and unlabored. Pt reports abdominal pain is still present but improving.      Melly Fu, RN  10/11/18 [id number]

## 2018-10-11 NOTE — ED PROVIDER NOTES
Subjective     Abdominal Pain   Pain location:  LLQ  Pain quality: sharp    Pain radiates to:  Back  Pain severity:  Moderate  Onset quality:  Sudden  Duration:  1 day  Timing:  Constant  Progression:  Worsening  Chronicity:  Recurrent  Context comment:  Reports that she has a history of both diverticulitis and kidney stones.  She states with urination or pain does radiate through her lower abdomen.  No burning with urination.  Last bowel movement was this morning and was normal.  Relieved by:  Nothing  Worsened by:  Urination  Ineffective treatments:  None tried  Associated symptoms: nausea    Associated symptoms: no chest pain, no diarrhea, no dysuria, no fever and no vomiting        Review of Systems   Constitutional: Negative.  Negative for fever.   HENT: Negative.    Respiratory: Negative.    Cardiovascular: Negative.  Negative for chest pain.   Gastrointestinal: Positive for abdominal pain and nausea. Negative for diarrhea and vomiting.   Endocrine: Negative.    Genitourinary: Negative.  Negative for dysuria.   Skin: Negative.    Neurological: Negative.    Psychiatric/Behavioral: Negative.    All other systems reviewed and are negative.      Past Medical History:   Diagnosis Date   • Anxiety    • Depression    • Diabetes mellitus (CMS/HCC)    • Diverticulitis    • GERD (gastroesophageal reflux disease)    • Hypertension    • Kidney stone    • PCOS (polycystic ovarian syndrome)    • Sleep apnea        Allergies   Allergen Reactions   • Dilantin [Phenytoin] Mental Status Change   • Keppra [Levetiracetam] Mental Status Change   • Topamax [Topiramate] Mental Status Change       Past Surgical History:   Procedure Laterality Date   • CARDIAC CATHETERIZATION     • CARPAL TUNNEL RELEASE     • COLONOSCOPY     • ENDOSCOPY     • FRACTURE SURGERY         Family History   Problem Relation Age of Onset   • Family history unknown: Yes       Social History     Social History   • Marital status:      Social History Main  Topics   • Smoking status: Never Smoker   • Smokeless tobacco: Never Used   • Alcohol use No   • Drug use: No   • Sexual activity: Defer     Other Topics Concern   • Not on file           Objective   Physical Exam   Constitutional: She is oriented to person, place, and time. She appears well-developed and well-nourished. No distress.   HENT:   Head: Normocephalic and atraumatic.   Right Ear: External ear normal.   Left Ear: External ear normal.   Nose: Nose normal.   Eyes: Pupils are equal, round, and reactive to light. Conjunctivae and EOM are normal.   Neck: Normal range of motion. Neck supple. No JVD present. No tracheal deviation present.   Cardiovascular: Normal rate, regular rhythm and normal heart sounds.    No murmur heard.  Pulmonary/Chest: Effort normal and breath sounds normal. No respiratory distress. She has no wheezes.   Abdominal: Soft. Bowel sounds are normal. There is tenderness (left lower quadrant.  A tenderness.).   Musculoskeletal: Normal range of motion. She exhibits no edema or deformity.   Neurological: She is alert and oriented to person, place, and time. No cranial nerve deficit.   Skin: Skin is warm and dry. No rash noted. She is not diaphoretic. No erythema. No pallor.   Psychiatric: She has a normal mood and affect. Her behavior is normal. Thought content normal.   Nursing note and vitals reviewed.      Procedures           ED Course                  MDM  Number of Diagnoses or Management Options  LLQ abdominal pain: new and requires workup     Amount and/or Complexity of Data Reviewed  Clinical lab tests: reviewed and ordered  Tests in the radiology section of CPT®: reviewed and ordered  Tests in the medicine section of CPT®: reviewed and ordered  Discuss the patient with other providers: yes    Risk of Complications, Morbidity, and/or Mortality  Presenting problems: moderate          Final diagnoses:   LLQ abdominal pain            Roel Clark PA  10/11/18 1509

## 2018-11-05 ENCOUNTER — OFFICE VISIT (OUTPATIENT)
Dept: PSYCHIATRY | Facility: CLINIC | Age: 33
End: 2018-11-05

## 2018-11-05 DIAGNOSIS — F41.1 GENERALIZED ANXIETY DISORDER: ICD-10-CM

## 2018-11-05 DIAGNOSIS — F31.60 BIPOLAR AFFECTIVE DISORDER, CURRENT EPISODE MIXED, CURRENT EPISODE SEVERITY UNSPECIFIED (HCC): Primary | ICD-10-CM

## 2018-11-05 DIAGNOSIS — F40.01 PANIC DISORDER WITH AGORAPHOBIA: ICD-10-CM

## 2018-11-05 PROCEDURE — 90837 PSYTX W PT 60 MINUTES: CPT | Performed by: SOCIAL WORKER

## 2018-11-05 NOTE — PROGRESS NOTES
"Date of Service: November 5, 2018  Time In: 12:42 PM  Time Out:  1:40 PM      PROGRESS NOTE  Data:  Antoinette Pack is a 33 y.o. female who met with the undersigned for a regularly scheduled individual outpatient therapy session at the [facility].     HPI:   Antoinette reports she has been feeling overwhelmed and has been crying frequently.  She doesn't understand why.  Her dog ran away and has been gone long enough they don't expect him back.  Patient became tearful as she shared this.  She reports she hasn't had her medication for several weeks.  Her previous insurance approved Viibryd on, but she did not pick it up before the end of the month, and then her  quit his job and insurance has changed.  She has not seen Shruthi since then, so she has gone without medication.  Patient reports her  is working again.  Patient continues to have visits with their 2-year-old foster child; he is with them for an extended period now because his father is sick and contagious.  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      When therapist inquired about her self-care, patient complains that taking care of herself takes away from other family members.    Clinical Maneuvering/Intervention:  Assisted patient in processing above session content; acknowledged and normalized patient’s thoughts, feelings, and concerns.  Provided empathy and support as patient processed the above issues.  Suggested that her excessive crying may be related to not taking her medication.  Encouraged her to ask Shruthi about assistance from the drug company.  Also inquired about her blood pressure and blood sugar.  Patient reports her blood pressure is up and down and her blood sugar is generally high.  Patient states that buying healthy food for her diabetic diet is expensive and prevents her from buying it.  She also doesn't want to \"force\" other family members to eat what she should " eat for her health.  Patient states that she would like to have weight loss surgery because she thinks it would help her with portion control, which would be good for her weight, diabetes, and blood pressure.    Therapist confronted her distorted and unrealistic thinking about food and self-care.  Educated patient about healthy food choices, and encouraged her to educate her family regarding healthy food choices.  Suggested that minor adjustments in their diet would be a positive thing for the entire family, and patient can limit the amount of sweets and junk food in the house without depriving her children or her .      Urged patient to choose one aspect of self-care that she is willing to work on every day until the next time we meet.  After considering several possibilities, patient decided she would work on eating a healthy breakfast every day.    Allowed patient to freely discuss issues without interruption or judgment. Provided safe, confidential environment to facilitate the development of positive therapeutic relationship and encourage open, honest communication. Assisted patient in identifying risk factors which would indicate the need for higher level of care including thoughts to harm self or others and/or self-harming behavior and encouraged patient to contact this office, call 911, or present to the nearest emergency room should any of these events occur. Discussed crisis intervention services and means to access.  Patient adamantly and convincingly denies current suicidal or homicidal ideation or perceptual disturbance.    Assessment     Diagnoses and all orders for this visit:    Bipolar affective disorder, current episode mixed, current episode severity unspecified (CMS/HCC)    Panic disorder with agoraphobia    Generalized anxiety disorder    Mental Status Exam  Hygiene:  good  Dress:  casual  Attitude:  Cooperative  Motor Activity:  Appropriate  Speech:  Normal  Mood:  sad  Affect:   depressed  Thought Processes:  Goal directed and Linear  Thought Content:  normal  Suicidal Thoughts:  denies  Homicidal Thoughts:  denies  Crisis Safety Plan: yes, to come to the emergency room.  Hallucinations:  admits to    Patient's Support Network Includes:  , father and extended family    Progress toward goal: Not at goal    Functional Status: Moderate impairment     Prognosis: Fair with Ongoing Treatment       Plan   Patient will continue in individual outpatient therapy monthly with focus on improved functioning and coping skills, maintaining stability, and avoiding decompensation and the need for a higher level of care.    Patient will adhere to medication regimen as prescribed and report any side effects. Patient will contact this office, call 911 or present to the nearest emergency room should suicidal or homicidal ideations occur. Provide Cognitive Behavioral Therapy and Integrative Therapy to improve functioning, maintain stability, and avoid decompensation and the need for higher level of care.     Return in about 1 month (around 12/5/2018).      This document signed by Nuzhat Beaver LCSW,  November 5, 2018 3:13 PM

## 2018-11-15 ENCOUNTER — OFFICE VISIT (OUTPATIENT)
Dept: PSYCHIATRY | Facility: CLINIC | Age: 33
End: 2018-11-15

## 2018-11-15 VITALS
DIASTOLIC BLOOD PRESSURE: 93 MMHG | HEART RATE: 80 BPM | SYSTOLIC BLOOD PRESSURE: 160 MMHG | HEIGHT: 59 IN | BODY MASS INDEX: 42.94 KG/M2 | WEIGHT: 213 LBS

## 2018-11-15 DIAGNOSIS — F40.01 PANIC DISORDER WITH AGORAPHOBIA: ICD-10-CM

## 2018-11-15 DIAGNOSIS — F31.60 BIPOLAR AFFECTIVE DISORDER, CURRENT EPISODE MIXED, CURRENT EPISODE SEVERITY UNSPECIFIED (HCC): ICD-10-CM

## 2018-11-15 DIAGNOSIS — F41.1 GENERALIZED ANXIETY DISORDER: ICD-10-CM

## 2018-11-15 PROCEDURE — 99214 OFFICE O/P EST MOD 30 MIN: CPT | Performed by: NURSE PRACTITIONER

## 2018-11-15 RX ORDER — CLONAZEPAM 0.5 MG/1
0.5 TABLET ORAL 2 TIMES DAILY PRN
Qty: 60 TABLET | Refills: 0 | OUTPATIENT
Start: 2018-11-15 | End: 2019-01-05 | Stop reason: HOSPADM

## 2018-11-15 RX ORDER — VILAZODONE HYDROCHLORIDE 20 MG/1
20 TABLET ORAL EVERY MORNING
Qty: 30 TABLET | Refills: 0 | Status: SHIPPED | OUTPATIENT
Start: 2018-11-15 | End: 2018-12-13 | Stop reason: SDUPTHER

## 2018-11-15 RX ORDER — BUPROPION HYDROCHLORIDE 300 MG/1
300 TABLET ORAL EVERY MORNING
Qty: 90 TABLET | Refills: 0 | Status: SHIPPED | OUTPATIENT
Start: 2018-11-15 | End: 2019-01-21 | Stop reason: SDUPTHER

## 2018-11-15 NOTE — PROGRESS NOTES
"Subjective   Antoinette Pack is a 33 y.o. female who is here today for medication management follow-up    Chief Complaint: Depression/psychosis    HPI:    Pt's  lost insurance when he switched jobs. Pt states she hasn't had her Vraylar  She couldn't come to last appointment due to overwhelmed with her emotions. She is tearful today. She has restlessness. Her anxiety is increased. She is unable to keep her thoughts straight. Everything is a challenge, lack of motivation, feeling HHW, irritable. She is not sleeping well at night due to all the increased anxiety, feeling on edge, muscle tension. Reports \"I've been so hateful, haven't even been able to go to [employer] for fear I'll be hateful and yell at somebody.\" Her 2yr old foster child has noticed pt's frustrations. She has been more emotional.   She is now having VH of shadows of people at the end of her bed that aren't really there \"I'm not talking to it and it's not talking to me.\" States she had a dream talking to this shadow and woke up and saw at the end of the bed.  Her dog ran away and has been gone long enough they don't expect him back.  Patient became tearful as she shared this.  She reports she hasn't had her medication for several weeks.  Her previous insurance approved Viibryd on, but she did not pick it up before the end of the month, and then her  quit his job and insurance has changed.  She has not seen Shruthi since then, so she has gone without medication.  Patient reports her  is working again.  Patient continues to have visits with their 2-year-old foster child who has returned back to his bio dad; he is with pt and her  recently for an extended period now because his father is sick and contagious.  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        She reports her BP has been elevated due to all her stress medical concerns.      AIMS score 0      Medical/Surgical " History:  Past Medical History:   Diagnosis Date   • Anxiety    • Depression    • Diabetes mellitus (CMS/HCC)    • Diverticulitis    • GERD (gastroesophageal reflux disease)    • Hypertension    • Kidney stone    • PCOS (polycystic ovarian syndrome)    • Sleep apnea      Past Surgical History:   Procedure Laterality Date   • CARDIAC CATHETERIZATION     • CARPAL TUNNEL RELEASE     • COLONOSCOPY     • ENDOSCOPY     • FRACTURE SURGERY         Allergies   Allergen Reactions   • Dilantin [Phenytoin] Mental Status Change   • Keppra [Levetiracetam] Mental Status Change   • Meperidine Rash   • Topamax [Topiramate] Mental Status Change           Current Medications:   Current Outpatient Medications   Medication Sig Dispense Refill   • amLODIPine (NORVASC) 2.5 MG tablet Take 1 tablet by mouth.     • buPROPion XL (WELLBUTRIN XL) 300 MG 24 hr tablet Take 1 tablet by mouth Every Morning. 90 tablet 0   • ciprofloxacin (CIPRO) 500 MG tablet Take 1 tablet by mouth 2 (Two) Times a Day. 20 tablet 0   • diclofenac (VOLTAREN) 50 MG EC tablet Take 1 tablet by mouth 3 (Three) Times a Day. 20 tablet 0   • glipiZIDE (GLUCOTROL) 10 MG tablet Take  by mouth 2 (Two) Times a Day Before Meals. Pt unsure of dose     • hydrochlorothiazide (HYDRODIURIL) 25 MG tablet Take 1 tablet by mouth.     • isosorbide mononitrate (IMDUR) 30 MG 24 hr tablet Take 30 mg by mouth Daily.     • Liraglutide (VICTOZA SC) Inject  under the skin.     • metFORMIN (GLUCOPHAGE) 1000 MG tablet Take 1,000 mg by mouth 2 (Two) Times a Day With Meals.     • metoprolol-hydrochlorothiazide (LOPRESSOR HCT) 50-25 MG per tablet take 1 by oral route  every day for blood pressure     • metroNIDAZOLE (FLAGYL) 500 MG tablet Take 1 tablet by mouth 3 (Three) Times a Day. 30 tablet 0   • omeprazole (PRILOSEC) 40 MG capsule      • Cariprazine HCl (VRAYLAR) 1.5 & 3 MG capsule therapy Take 1.5 mg by mouth Every Morning for 1 day, THEN 3 mg Every Morning for 29 days. 30 each 0   • clonazePAM  "(KLONOPIN) 0.5 MG tablet Take 1 tablet by mouth 2 (Two) Times a Day As Needed for Anxiety. 60 tablet 0   • vilazodone (VIIBRYD) 20 MG tablet tablet Take 1 tablet by mouth Every Morning. For the first 7 days take 1/2 tab QAM. 30 tablet 0     No current facility-administered medications for this visit.          Review of Systems   Constitutional: Negative for activity change, appetite change and fatigue.   HENT: Negative.    Eyes: Negative for visual disturbance.   Respiratory: Negative.    Cardiovascular: Negative.         Recent heart cath being monitored by cardiologist   Gastrointestinal: Negative for nausea.   Endocrine: Negative.    Genitourinary: Positive for urgency.        R/t diabetes   Musculoskeletal: Negative for arthralgias.        Tendonitis in elbow   Skin: Negative.    Allergic/Immunologic: Negative.    Neurological: Negative for dizziness, seizures and headaches.   Hematological: Negative.    Psychiatric/Behavioral: Positive for agitation, behavioral problems, decreased concentration, dysphoric mood, hallucinations and sleep disturbance. Negative for confusion, self-injury and suicidal ideas. The patient is nervous/anxious. The patient is not hyperactive.     denies HEENT, cardiovascular, respiratory, liver, renal, GI/, endocrine, neuro, DERM, hematology, immunology, musculoskeletal disorders.    Objective   Physical Exam   Constitutional: She is oriented to person, place, and time. She appears well-developed and well-nourished. No distress.   HENT:   Head: Normocephalic.   Neck: Neck supple.   Neurological: She is alert and oriented to person, place, and time.   Skin: She is not diaphoretic.   Psychiatric: She has a normal mood and affect. Her behavior is normal.   Nursing note and vitals reviewed.    Blood pressure 160/93, pulse 80, height 149.9 cm (59.02\"), weight 96.6 kg (213 lb).    Mental Status Exam:   Hygiene:   good  Cooperation:  Cooperative  Eye Contact:  Good  Psychomotor Behavior:  " Appropriate  Affect:  Full range  Hopelessness: 5  Speech:  Normal  Thought Process:  Goal directed  Thought Content:  Normal  Suicidal:  None  Homicidal:  None  Hallucinations:  Visual  Delusion:  None  Memory:  Intact  Orientation:  Person, Place, Time and Situation  Reliability:  good  Insight:  Good  Judgement:  Good  Impulse Control:  Fair  Physical/Medical Issues:  Yes uncontrolled DM      Assessment/Plan   Problems Addressed this Visit     None      Visit Diagnoses     Bipolar affective disorder, current episode mixed, current episode severity unspecified (CMS/[facility])        Relevant Medications    Cariprazine HCl (VRAYLAR) 1.5 & 3 MG capsule therapy    buPROPion XL (WELLBUTRIN XL) 300 MG 24 hr tablet    vilazodone (VIIBRYD) 20 MG tablet tablet    Panic disorder with agoraphobia        Relevant Medications    Cariprazine HCl (VRAYLAR) 1.5 & 3 MG capsule therapy    buPROPion XL (WELLBUTRIN XL) 300 MG 24 hr tablet    vilazodone (VIIBRYD) 20 MG tablet tablet    Generalized anxiety disorder        Relevant Medications    Cariprazine HCl (VRAYLAR) 1.5 & 3 MG capsule therapy    buPROPion XL (WELLBUTRIN XL) 300 MG 24 hr tablet    vilazodone (VIIBRYD) 20 MG tablet tablet        Functionality:  Increased anxiety and stress has resulted in patient having more frequent visual hallucinations and some depressive symptoms as well since not having Vraylar.Prognosis: Guarded dependent on medication/follow up and treatment plan compliance.  Body mass index is 43 kg/m².  Patient was educated on healthier and more balanced diet choices and encouraged exercise within physical limitations.    Discussed medication options. Vraylar is helping for mixed bipolar symptoms and we will restart pt on this medication. Continue Wellbutrin XL to 300mg for ongoing anxiety.  DC Stelazine as not efficacious. Begin Klonopin 0.5mg bid prn for panic episodes. Continue utilizing CPAP for sleep apnea. Begin Viibryd for ongoing depressive symptoms.    Discussed the risks, benefits, and side effects of the medication; client acknowledged and verbally consented.  Patient is aware to contact the [facility] with any worsening of symptom.  Patient is agreeable to go to the ER or call 911 should they begin SI/HI. Continue psycho therapy to work on coping skills in helping alleviate anxiety and regain functionality.     She will continue seeing Nuzhat Beaver LCSW for psychotherapy.     She will take CPT3 and we will review results at next appt.    Return in 4 weeks- call with questions/concerns.

## 2018-12-06 ENCOUNTER — OFFICE VISIT (OUTPATIENT)
Dept: PSYCHIATRY | Facility: CLINIC | Age: 33
End: 2018-12-06

## 2018-12-06 DIAGNOSIS — F40.01 PANIC DISORDER WITH AGORAPHOBIA: ICD-10-CM

## 2018-12-06 DIAGNOSIS — F41.1 GENERALIZED ANXIETY DISORDER: ICD-10-CM

## 2018-12-06 DIAGNOSIS — F31.60 BIPOLAR AFFECTIVE DISORDER, CURRENT EPISODE MIXED, CURRENT EPISODE SEVERITY UNSPECIFIED (HCC): Primary | ICD-10-CM

## 2018-12-06 PROCEDURE — 90834 PSYTX W PT 45 MINUTES: CPT | Performed by: SOCIAL WORKER

## 2018-12-06 NOTE — PROGRESS NOTES
"Date of Service: December 7, 2018  Time In:  9:45 AM  Time Out:  10:30 AM      PROGRESS NOTE  Data:  Antoinette Pack is a 33 y.o. female who met with the undersigned for a regularly scheduled individual outpatient therapy session at the [facility].     HPI:   Patient reports she is doing better today.  Nurse practitioner wants her to take Viibryd, but patient could not afford to fill this prescription even after new insurance took effect.  She reports she had some Zoloft left from an old prescription and started taking that instead which has helped her mood.  She has not yet discussed this with nurse practitioner.    Patient reports having problems with her niece who lives with her.  She states her behaviors are out of control; she yells at patient and says hateful things such as wishing she never came to live with her.  Patient is struggling with how to manage the behavior, and wonders if she should be in counseling.    Patient said she is still not doing well with self-care.  She has not followed through consistently with her plan to eat a good breakfast every day.  She said that her blood sugar has been up and down, and it doesn't seem to matter what she eats.  Patient continues to struggle with the idea of \"forcing the rest of the family to eat what I eat.\"  She said the family made some dietary changes when she was first diagnosed with diabetes, but those changes felt by the wayside.  She also feels that healthy food such as fresh fruit and vegetables cost more than other convenience foods or snack foods.    Clinical Maneuvering/Intervention:  Assisted patient in processing above session content; acknowledged and normalized patient’s thoughts, feelings, and concerns.  Provided empathy and support as patient processed the above content.  Encouraged patient to inform the nurse practitioner that she can afford Viibryd, so they need to find a different medication for her, and not to change her medications " or dosages without consulting with nurse practitioner.    Discussed providing consistent limits and consequences for her niece, and if she is not able to manage the child's behavior she can consider counseling for her.  The child also acts out at school with certain teachers or staff.    Discussed patient's difficulty with self-care.  Educated patient that consistently eating healthy food will help regulate her blood sugar, blood pressure, weight, and have a positive effect on her general health.  Pointed out that the extra expense of healthy food will be offset by a reduction in medical expenses.  Also recommended buying fruits and vegetables that are in season and on sale; frozen produce is equally healthy and often less expensive than fresh produce.  Suggested that patient also educate her family about eating a healthy diet.  She need not deny sweets and treats entirely, but they should be limited, and she can offer and shoes for herself healthier options.    Allowed patient to freely discuss issues without interruption or judgment. Provided safe, confidential environment to facilitate the development of positive therapeutic relationship and encourage open, honest communication. Assisted patient in identifying risk factors which would indicate the need for higher level of care including thoughts to harm self or others and/or self-harming behavior and encouraged patient to contact this office, call 911, or present to the nearest emergency room should any of these events occur. Discussed crisis intervention services and means to access.  Patient adamantly and convincingly denies current suicidal or homicidal ideation or perceptual disturbance.    Assessment     Diagnoses and all orders for this visit:    Bipolar affective disorder, current episode mixed, current episode severity unspecified (CMS/HCC)    Panic disorder with agoraphobia    Generalized anxiety disorder    Mental Status Exam  Hygiene:  good  Dress:   casual  Attitude:  Cooperative  Motor Activity:  Appropriate  Speech:  Normal  Mood:  constricted  Affect:  calm and pleasant  Thought Processes:  Goal directed and Linear  Thought Content:  normal  Suicidal Thoughts:  denies  Homicidal Thoughts:  denies  Crisis Safety Plan: yes, to come to the emergency room.  Hallucinations:  has    Patient's Support Network Includes:  , parents and extended family    Progress toward goal: Not at goal    Functional Status: Moderate impairment     Prognosis: Guarded with Ongoing Treatment      Plan   Patient will Continue individual outpatient therapy monthly with focus on improved functioning and coping skills, maintaining stability, and avoiding decompensation and the need for a higher level of care.    Patient will adhere to medication regimen as prescribed and report any side effects. Patient will contact this office, call 911 or present to the nearest emergency room should suicidal or homicidal ideations occur. Provide Cognitive Behavioral Therapy and Integrative Therapy to improve functioning, maintain stability, and avoid decompensation and the need for higher level of care.     Return in about 1 month (around 1/6/2019).      This document signed by Nuzhat Beaver LCSW, December 7, 2018 10:18 AM

## 2018-12-13 ENCOUNTER — OFFICE VISIT (OUTPATIENT)
Dept: PSYCHIATRY | Facility: CLINIC | Age: 33
End: 2018-12-13

## 2018-12-13 VITALS
SYSTOLIC BLOOD PRESSURE: 127 MMHG | BODY MASS INDEX: 42.98 KG/M2 | WEIGHT: 213.2 LBS | HEIGHT: 59 IN | DIASTOLIC BLOOD PRESSURE: 77 MMHG

## 2018-12-13 DIAGNOSIS — F41.1 GENERALIZED ANXIETY DISORDER: ICD-10-CM

## 2018-12-13 DIAGNOSIS — F33.1 MAJOR DEPRESSIVE DISORDER, RECURRENT EPISODE, MODERATE (HCC): ICD-10-CM

## 2018-12-13 DIAGNOSIS — F40.01 PANIC DISORDER WITH AGORAPHOBIA: ICD-10-CM

## 2018-12-13 DIAGNOSIS — Z79.899 MEDICATION MANAGEMENT: ICD-10-CM

## 2018-12-13 DIAGNOSIS — F31.60 BIPOLAR AFFECTIVE DISORDER, CURRENT EPISODE MIXED, CURRENT EPISODE SEVERITY UNSPECIFIED (HCC): Primary | ICD-10-CM

## 2018-12-13 LAB
AMPHETAMINE CUT-OFF: NORMAL
BENZODIAZIPINE CUT-OFF: NORMAL
BUPRENORPHINE CUT-OFF: NORMAL
COCAINE CUT-OFF: NORMAL
EXTERNAL AMPHETAMINE SCREEN URINE: NEGATIVE
EXTERNAL BENZODIAZEPINE SCREEN URINE: NEGATIVE
EXTERNAL BUPRENORPHINE SCREEN URINE: NEGATIVE
EXTERNAL COCAINE SCREEN URINE: NEGATIVE
EXTERNAL MDMA: NEGATIVE
EXTERNAL METHADONE SCREEN URINE: NEGATIVE
EXTERNAL METHAMPHETAMINE SCREEN URINE: NEGATIVE
EXTERNAL OPIATES SCREEN URINE: NEGATIVE
EXTERNAL OXYCODONE SCREEN URINE: NEGATIVE
EXTERNAL THC SCREEN URINE: NEGATIVE
MDMA CUT-OFF: NORMAL
METHADONE CUT-OFF: NORMAL
METHAMPHETAMINE CUT-OFF: NORMAL
OPIATES CUT-OFF: NORMAL
OXYCODONE CUT-OFF: NORMAL
THC CUT-OFF: NORMAL

## 2018-12-13 PROCEDURE — 90792 PSYCH DIAG EVAL W/MED SRVCS: CPT | Performed by: NURSE PRACTITIONER

## 2018-12-13 RX ORDER — VILAZODONE HYDROCHLORIDE 20 MG/1
20 TABLET ORAL EVERY MORNING
Qty: 90 TABLET | Refills: 0 | Status: SHIPPED | OUTPATIENT
Start: 2018-12-13 | End: 2019-01-21

## 2018-12-13 RX ORDER — RANITIDINE HCL 75 MG
2 TABLET ORAL EVERY 12 HOURS
COMMUNITY
Start: 2018-11-19 | End: 2020-02-03

## 2018-12-13 RX ORDER — LISINOPRIL 10 MG/1
10 TABLET ORAL DAILY
COMMUNITY
End: 2020-02-03 | Stop reason: SDUPTHER

## 2018-12-13 NOTE — PROGRESS NOTES
Subjective   Antoinette Pack is a 33 y.o. female who is here today for medication management  Chief Complaint: Depression/psychosis/Anxiety    HPI:    Patient reports she is overall doing better.  She states she didn't realize how significant her depression and anxiety symptoms were until she started taking the medicine, Viibryd.  She states she has no significant anxiety or depression today.  Stressors include being able to maintain on current medications including Viibryd) long as they are expensive.  Shares that the Vraylar is $400 a month and Viibryd is $100 a month and that she will probably not qualify for supplemental insurance to help cover further cost of medications. Appetite is good, tolerates diet. No SI/HI/AH/VH. No new medical concerns. States she has only had to take prn Klonopin a couple times. She has been attending psychotherapy with Nuzhat Beaver LCSW and finds this very helpful. She is sleeping well without issue.     The following portions of the patient's history were reviewed and updated as appropriate: allergies, current medications, past family history, past medical history, past social history, past surgical history and problem list.       AIMS score 0      Medical/Surgical History:  Past Medical History:   Diagnosis Date   • Anxiety    • Depression    • Diabetes mellitus (CMS/HCC)    • Diverticulitis    • GERD (gastroesophageal reflux disease)    • Hypertension    • Kidney stone    • PCOS (polycystic ovarian syndrome)    • Sleep apnea      Past Surgical History:   Procedure Laterality Date   • CARDIAC CATHETERIZATION     • CARPAL TUNNEL RELEASE     • COLONOSCOPY     • ENDOSCOPY     • FRACTURE SURGERY         Allergies   Allergen Reactions   • Dilantin [Phenytoin] Mental Status Change   • Keppra [Levetiracetam] Mental Status Change   • Meperidine Rash   • Topamax [Topiramate] Mental Status Change           Current Medications:   Current Outpatient Medications   Medication Sig Dispense  Refill   • amLODIPine (NORVASC) 2.5 MG tablet Take 1 tablet by mouth.     • buPROPion XL (WELLBUTRIN XL) 300 MG 24 hr tablet Take 1 tablet by mouth Every Morning. 90 tablet 0   • clonazePAM (KLONOPIN) 0.5 MG tablet Take 1 tablet by mouth 2 (Two) Times a Day As Needed for Anxiety. 60 tablet 0   • Ergocalciferol (VITAMIN D2 PO) Take 1 capsule by mouth.     • glipiZIDE (GLUCOTROL) 10 MG tablet Take  by mouth 2 (Two) Times a Day Before Meals. Pt unsure of dose     • hydrochlorothiazide (HYDRODIURIL) 25 MG tablet Take 1 tablet by mouth.     • isosorbide mononitrate (IMDUR) 30 MG 24 hr tablet Take 30 mg by mouth Daily.     • Liraglutide (VICTOZA SC) Inject  under the skin.     • lisinopril (PRINIVIL,ZESTRIL) 10 MG tablet Take 10 mg by mouth Daily.     • metFORMIN (GLUCOPHAGE) 1000 MG tablet Take 1,000 mg by mouth 2 (Two) Times a Day With Meals.     • omeprazole (PRILOSEC) 40 MG capsule      • raNITIdine (ZANTAC) 75 MG tablet Take 2 tablets by mouth Every 12 (Twelve) Hours.     • vilazodone (VIIBRYD) 20 MG tablet tablet Take 1 tablet by mouth Every Morning. For the first 7 days take 1/2 tab QAM. 90 tablet 0   • Cariprazine HCl (VRAYLAR) 3 MG capsule Take 1 capsule by mouth Daily. 90 capsule 0   • ciprofloxacin (CIPRO) 500 MG tablet Take 1 tablet by mouth 2 (Two) Times a Day. 20 tablet 0   • diclofenac (VOLTAREN) 50 MG EC tablet Take 1 tablet by mouth 3 (Three) Times a Day. 20 tablet 0   • metoprolol-hydrochlorothiazide (LOPRESSOR HCT) 50-25 MG per tablet take 1 by oral route  every day for blood pressure       No current facility-administered medications for this visit.          Review of Systems   Constitutional: Negative for activity change, appetite change and fatigue.   HENT: Negative.    Eyes: Negative for visual disturbance.   Respiratory: Negative.    Cardiovascular: Negative.         Recent heart cath being monitored by cardiologist   Gastrointestinal: Negative for nausea.   Endocrine: Negative.    Genitourinary:  "Negative for urgency.        R/t diabetes   Musculoskeletal: Negative for arthralgias.        Tendonitis in elbow   Skin: Negative.    Allergic/Immunologic: Negative.    Neurological: Negative for dizziness, seizures and headaches.   Hematological: Negative.    Psychiatric/Behavioral: Negative for agitation, behavioral problems, confusion, decreased concentration, dysphoric mood, hallucinations, self-injury, sleep disturbance and suicidal ideas. The patient is not nervous/anxious and is not hyperactive.     denies HEENT, cardiovascular, respiratory, liver, renal, GI/, endocrine, neuro, DERM, hematology, immunology, musculoskeletal disorders.    Objective   Physical Exam   Constitutional: She is oriented to person, place, and time. She appears well-developed and well-nourished. No distress.   HENT:   Head: Normocephalic.   Neck: Neck supple.   Neurological: She is alert and oriented to person, place, and time.   Skin: She is not diaphoretic.   Psychiatric: She has a normal mood and affect. Her behavior is normal.   Nursing note and vitals reviewed.    Blood pressure 127/77, height 149.9 cm (59.02\"), weight 96.7 kg (213 lb 3.2 oz).    Mental Status Exam:   Hygiene:   good  Cooperation:  Cooperative  Eye Contact:  Good  Psychomotor Behavior:  Appropriate  Affect:  Full range  Hopelessness: 5  Speech:  Normal  Thought Process:  Goal directed  Thought Content:  Normal  Suicidal:  None  Homicidal:  None  Hallucinations:  Visual  Delusion:  None  Memory:  Intact  Orientation:  Person, Place, Time and Situation  Reliability:  good  Insight:  Good  Judgement:  Good  Impulse Control:  Fair  Physical/Medical Issues:  Yes uncontrolled DM      Assessment/Plan   Problems Addressed this Visit     None      Visit Diagnoses     Bipolar affective disorder, current episode mixed, current episode severity unspecified (CMS/[facility])    -  Primary    Relevant Medications    vilazodone (VIIBRYD) 20 MG tablet tablet    Cariprazine HCl " (VRAYLAR) 3 MG capsule    Major depressive disorder, recurrent episode, moderate (CMS/HCC)        Relevant Medications    vilazodone (VIIBRYD) 20 MG tablet tablet    Cariprazine HCl (VRAYLAR) 3 MG capsule    Panic disorder with agoraphobia        Relevant Medications    vilazodone (VIIBRYD) 20 MG tablet tablet    Cariprazine HCl (VRAYLAR) 3 MG capsule    Generalized anxiety disorder        Relevant Medications    vilazodone (VIIBRYD) 20 MG tablet tablet    Cariprazine HCl (VRAYLAR) 3 MG capsule    Medication management        Relevant Orders    [facility] Drug Screen (Completed)        Functionality: pt showing improvements in important areas of daily functioning.  Prognosis: Guarded dependent on medication/follow up and treatment plan compliance.  Body mass index is 43.03 kg/m².  Patient was educated on healthier and more balanced diet choices and encouraged exercise within physical limitations.    Discussed medication options. Vraylar is helping for mixed bipolar symptoms and we will continue on this medication.  Communication with Jesica Mcelroy, drug rep for Vraylar and Viibryd, and will further attempt to get pt on their patient assistance program to help with copays of the meds. Continue Wellbutrin XL to 300mg for ongoing anxiety.  Continue Klonopin 0.5mg bid prn for panic episodes. Continue utilizing CPAP for sleep apnea. Continue Viibryd for ongoing depressive symptoms.   Discussed the risks, benefits, and side effects of the medication; client acknowledged and verbally consented.  Patient is aware to contact the [facility] with any worsening of symptom.  Patient is agreeable to go to the ER or call 911 should they begin SI/HI. Continue psycho therapy to work on coping skills in helping alleviate anxiety and regain functionality.     She will continue seeing Nuzhat Beaver [facility] for psychotherapy.   We will get her to take CPT at next visit to r/o any ADHD.  She will apply for supplemental insurance to help  cover med costs but if denied will work with drug rep and pt assistance program  Return in 4 weeks- call with questions/concerns.

## 2019-01-04 ENCOUNTER — OFFICE VISIT (OUTPATIENT)
Dept: RETAIL CLINIC | Facility: CLINIC | Age: 34
End: 2019-01-04

## 2019-01-04 VITALS
BODY MASS INDEX: 42.58 KG/M2 | HEART RATE: 90 BPM | HEIGHT: 59 IN | RESPIRATION RATE: 16 BRPM | SYSTOLIC BLOOD PRESSURE: 130 MMHG | OXYGEN SATURATION: 97 % | WEIGHT: 211.2 LBS | DIASTOLIC BLOOD PRESSURE: 90 MMHG | TEMPERATURE: 98 F

## 2019-01-04 DIAGNOSIS — J06.9 UPPER RESPIRATORY TRACT INFECTION, UNSPECIFIED TYPE: ICD-10-CM

## 2019-01-04 DIAGNOSIS — J40 BRONCHITIS: Primary | ICD-10-CM

## 2019-01-04 PROCEDURE — 99203 OFFICE O/P NEW LOW 30 MIN: CPT | Performed by: NURSE PRACTITIONER

## 2019-01-04 RX ORDER — AZELASTINE 1 MG/ML
1 SPRAY, METERED NASAL 2 TIMES DAILY
Qty: 1 EACH | Refills: 5 | Status: SHIPPED | OUTPATIENT
Start: 2019-01-04 | End: 2020-02-03

## 2019-01-04 RX ORDER — FLUCONAZOLE 150 MG/1
150 TABLET ORAL EVERY OTHER DAY
Qty: 2 TABLET | Refills: 0 | Status: SHIPPED | OUTPATIENT
Start: 2019-01-04 | End: 2019-01-21

## 2019-01-04 RX ORDER — ALBUTEROL SULFATE 90 UG/1
2 AEROSOL, METERED RESPIRATORY (INHALATION) EVERY 4 HOURS PRN
Qty: 1 INHALER | Refills: 0 | Status: SHIPPED | OUTPATIENT
Start: 2019-01-04 | End: 2020-02-03

## 2019-01-04 RX ORDER — CEFDINIR 300 MG/1
300 CAPSULE ORAL 2 TIMES DAILY
Qty: 20 CAPSULE | Refills: 0 | Status: SHIPPED | OUTPATIENT
Start: 2019-01-04 | End: 2019-01-14

## 2019-01-04 NOTE — PROGRESS NOTES
Antoinette presents to the clinic today c/o upper respiratory symptoms which started four to five days ago. Associated symptoms include fever, chills, cough with associated wheezing and fatigue. She reports her symptoms are rapidly worsening.  She has tried OTC Cough/Cold  without adequate relief.  She does have a history of diabetes but has not checked her blood sugars during this time.     URI    This is a new problem. The current episode started in the past 7 days. The problem has been rapidly worsening. The maximum temperature recorded prior to her arrival was 101 - 101.9 F. The fever has been present for 1 to 2 days. Associated symptoms include congestion, coughing, rhinorrhea and wheezing. Pertinent negatives include no ear pain, headaches, nausea, plugged ear sensation, rash, sinus pain, sneezing, sore throat or vomiting. She has tried sleep (OTC Cough/Cold) for the symptoms.   Refer to ROS for additional information.   Vitals:    01/04/19 1210   BP: 130/90   Pulse: 90   Resp: 16   Temp: 98 °F (36.7 °C)   SpO2: 97%     The following portions of the patient's history were reviewed and updated as appropriate: allergies, current medications, past family history, past medical history, past social history, past surgical history and problem list.    Review of Systems   Constitutional: Positive for activity change, appetite change, chills, fatigue and fever.   HENT: Positive for congestion and rhinorrhea. Negative for ear pain, sinus pain, sneezing, sore throat and tinnitus.    Eyes: Negative for discharge and redness.   Respiratory: Positive for cough, chest tightness and wheezing. Negative for shortness of breath.    Gastrointestinal: Negative for nausea and vomiting.   Musculoskeletal: Negative for myalgias.   Skin: Negative for rash.   Neurological: Positive for dizziness and light-headedness. Negative for headaches.   Hematological: Negative for adenopathy.   Psychiatric/Behavioral: Positive for sleep  disturbance.     Physical Exam   Constitutional: She is oriented to person, place, and time. She appears well-developed and well-nourished. No distress.   HENT:   Head: Normocephalic.   Right Ear: Ear canal normal. Tympanic membrane is bulging. Tympanic membrane is not erythematous. A middle ear effusion is present.   Left Ear: Ear canal normal. Tympanic membrane is bulging. Tympanic membrane is not erythematous. A middle ear effusion is present.   Nose: Mucosal edema and rhinorrhea present. Right sinus exhibits no maxillary sinus tenderness and no frontal sinus tenderness. Left sinus exhibits no maxillary sinus tenderness and no frontal sinus tenderness.   Mouth/Throat: Oropharynx is clear and moist and mucous membranes are normal. No oropharyngeal exudate or posterior oropharyngeal erythema.   Eyes: Conjunctivae are normal. Pupils are equal, round, and reactive to light. Right eye exhibits no discharge. Left eye exhibits no discharge. No scleral icterus.   Neck: Neck supple.   Cardiovascular: Normal rate, regular rhythm and normal heart sounds. Exam reveals no friction rub.   No murmur heard.  Pulmonary/Chest: Effort normal. No respiratory distress. She has wheezes in the right upper field. She has rhonchi in the right upper field. She has no rales.   Abdominal: Soft. There is no tenderness. There is no guarding.   Musculoskeletal: She exhibits no edema.   Lymphadenopathy:     She has no cervical adenopathy.   Neurological: She is alert and oriented to person, place, and time.   Skin: Skin is warm and dry. No rash noted. No erythema.   Psychiatric: She has a normal mood and affect. Her speech is normal and behavior is normal. Thought content normal. Cognition and memory are normal.   Vitals reviewed.    Assessment/Plan   Problems Addressed this Visit     None      Visit Diagnoses     Bronchitis    -  Primary    Relevant Medications    cefdinir (OMNICEF) 300 MG capsule    albuterol sulfate  (90 Base) MCG/ACT  inhaler    azelastine (ASTELIN) 0.1 % nasal spray    Upper respiratory tract infection, unspecified type        Relevant Medications    cefdinir (OMNICEF) 300 MG capsule    azelastine (ASTELIN) 0.1 % nasal spray        Findings and recommendations discussed with Antoinette. Treatment options reviewed. Counseled regarding supportive care measures. Emphasized importance of glycemic control at this time. Encouraged her to seek further medical evaluation if symptoms worsen or do not improve within 48-72 hours.

## 2019-01-05 ENCOUNTER — HOSPITAL ENCOUNTER (EMERGENCY)
Facility: HOSPITAL | Age: 34
Discharge: HOME | End: 2019-01-05
Attending: EMERGENCY MEDICINE | Admitting: NURSE PRACTITIONER

## 2019-01-05 ENCOUNTER — APPOINTMENT (OUTPATIENT)
Dept: GENERAL RADIOLOGY | Facility: HOSPITAL | Age: 34
End: 2019-01-05

## 2019-01-05 VITALS
SYSTOLIC BLOOD PRESSURE: 162 MMHG | WEIGHT: 211 LBS | RESPIRATION RATE: 18 BRPM | HEIGHT: 59 IN | DIASTOLIC BLOOD PRESSURE: 97 MMHG | BODY MASS INDEX: 42.54 KG/M2 | TEMPERATURE: 98.3 F | OXYGEN SATURATION: 97 % | HEART RATE: 88 BPM

## 2019-01-05 DIAGNOSIS — J06.9 UPPER RESPIRATORY TRACT INFECTION, UNSPECIFIED TYPE: Primary | ICD-10-CM

## 2019-01-05 LAB
6-ACETYL MORPHINE: NEGATIVE
ALBUMIN SERPL-MCNC: 4.6 G/DL (ref 3.5–5)
ALBUMIN/GLOB SERPL: 1.4 G/DL (ref 1.5–2.5)
ALP SERPL-CCNC: 102 U/L (ref 35–104)
ALT SERPL W P-5'-P-CCNC: 36 U/L (ref 10–36)
AMPHET+METHAMPHET UR QL: NEGATIVE
ANION GAP SERPL CALCULATED.3IONS-SCNC: 10.6 MMOL/L (ref 3.6–11.2)
AST SERPL-CCNC: 25 U/L (ref 10–30)
BARBITURATES UR QL SCN: NEGATIVE
BASOPHILS # BLD AUTO: 0.03 10*3/MM3 (ref 0–0.3)
BASOPHILS NFR BLD AUTO: 0.5 % (ref 0–2)
BENZODIAZ UR QL SCN: NEGATIVE
BILIRUB SERPL-MCNC: 0.8 MG/DL (ref 0.2–1.8)
BILIRUB UR QL STRIP: NEGATIVE
BUN BLD-MCNC: 9 MG/DL (ref 7–21)
BUN/CREAT SERPL: 12.9 (ref 7–25)
BUPRENORPHINE SERPL-MCNC: NEGATIVE NG/ML
CALCIUM SPEC-SCNC: 9.4 MG/DL (ref 7.7–10)
CANNABINOIDS SERPL QL: NEGATIVE
CHLORIDE SERPL-SCNC: 96 MMOL/L (ref 99–112)
CLARITY UR: CLEAR
CO2 SERPL-SCNC: 27.4 MMOL/L (ref 24.3–31.9)
COCAINE UR QL: NEGATIVE
COLOR UR: YELLOW
CREAT BLD-MCNC: 0.7 MG/DL (ref 0.43–1.29)
DEPRECATED RDW RBC AUTO: 39 FL (ref 37–54)
EOSINOPHIL # BLD AUTO: 0.11 10*3/MM3 (ref 0–0.7)
EOSINOPHIL NFR BLD AUTO: 1.7 % (ref 0–5)
ERYTHROCYTE [DISTWIDTH] IN BLOOD BY AUTOMATED COUNT: 13.7 % (ref 11.5–14.5)
FLUAV AG NPH QL: NEGATIVE
FLUBV AG NPH QL IA: NEGATIVE
GFR SERPL CREATININE-BSD FRML MDRD: 95 ML/MIN/1.73
GLOBULIN UR ELPH-MCNC: 3.4 GM/DL
GLUCOSE BLD-MCNC: 310 MG/DL (ref 70–110)
GLUCOSE UR STRIP-MCNC: ABNORMAL MG/DL
HCT VFR BLD AUTO: 40.6 % (ref 37–47)
HGB BLD-MCNC: 14.4 G/DL (ref 12–16)
HGB UR QL STRIP.AUTO: NEGATIVE
IMM GRANULOCYTES # BLD AUTO: 0.03 10*3/MM3 (ref 0–0.03)
IMM GRANULOCYTES NFR BLD AUTO: 0.5 % (ref 0–0.5)
KETONES UR QL STRIP: NEGATIVE
LEUKOCYTE ESTERASE UR QL STRIP.AUTO: NEGATIVE
LYMPHOCYTES # BLD AUTO: 2.39 10*3/MM3 (ref 1–3)
LYMPHOCYTES NFR BLD AUTO: 36.2 % (ref 21–51)
MCH RBC QN AUTO: 28.9 PG (ref 27–33)
MCHC RBC AUTO-ENTMCNC: 35.5 G/DL (ref 33–37)
MCV RBC AUTO: 81.5 FL (ref 80–94)
METHADONE UR QL SCN: NEGATIVE
MONOCYTES # BLD AUTO: 0.37 10*3/MM3 (ref 0.1–0.9)
MONOCYTES NFR BLD AUTO: 5.6 % (ref 0–10)
NEUTROPHILS # BLD AUTO: 3.67 10*3/MM3 (ref 1.4–6.5)
NEUTROPHILS NFR BLD AUTO: 55.5 % (ref 30–70)
NITRITE UR QL STRIP: NEGATIVE
OPIATES UR QL: NEGATIVE
OSMOLALITY SERPL CALC.SUM OF ELEC: 278.7 MOSM/KG (ref 273–305)
OXYCODONE UR QL SCN: NEGATIVE
PCP UR QL SCN: NEGATIVE
PH UR STRIP.AUTO: <=5 [PH] (ref 5–8)
PLATELET # BLD AUTO: 157 10*3/MM3 (ref 130–400)
PMV BLD AUTO: 10.3 FL (ref 6–10)
POTASSIUM BLD-SCNC: 4.1 MMOL/L (ref 3.5–5.3)
PROT SERPL-MCNC: 8 G/DL (ref 6–8)
PROT UR QL STRIP: NEGATIVE
RBC # BLD AUTO: 4.98 10*6/MM3 (ref 4.2–5.4)
SODIUM BLD-SCNC: 134 MMOL/L (ref 135–153)
SP GR UR STRIP: >1.03 (ref 1–1.03)
UROBILINOGEN UR QL STRIP: ABNORMAL
WBC NRBC COR # BLD: 6.6 10*3/MM3 (ref 4.5–12.5)

## 2019-01-05 PROCEDURE — 71046 X-RAY EXAM CHEST 2 VIEWS: CPT | Performed by: RADIOLOGY

## 2019-01-05 PROCEDURE — 80307 DRUG TEST PRSMV CHEM ANLYZR: CPT | Performed by: NURSE PRACTITIONER

## 2019-01-05 PROCEDURE — 85025 COMPLETE CBC W/AUTO DIFF WBC: CPT | Performed by: NURSE PRACTITIONER

## 2019-01-05 PROCEDURE — 87804 INFLUENZA ASSAY W/OPTIC: CPT | Performed by: NURSE PRACTITIONER

## 2019-01-05 PROCEDURE — 99283 EMERGENCY DEPT VISIT LOW MDM: CPT

## 2019-01-05 PROCEDURE — 71046 X-RAY EXAM CHEST 2 VIEWS: CPT

## 2019-01-05 PROCEDURE — 81003 URINALYSIS AUTO W/O SCOPE: CPT | Performed by: NURSE PRACTITIONER

## 2019-01-05 PROCEDURE — 36415 COLL VENOUS BLD VENIPUNCTURE: CPT | Performed by: NURSE PRACTITIONER

## 2019-01-05 PROCEDURE — 25010000002 ONDANSETRON PER 1 MG: Performed by: NURSE PRACTITIONER

## 2019-01-05 PROCEDURE — 80053 COMPREHEN METABOLIC PANEL: CPT | Performed by: NURSE PRACTITIONER

## 2019-01-05 PROCEDURE — 87040 BLOOD CULTURE FOR BACTERIA: CPT | Performed by: NURSE PRACTITIONER

## 2019-01-05 PROCEDURE — 96361 HYDRATE IV INFUSION ADD-ON: CPT

## 2019-01-05 PROCEDURE — 96374 THER/PROPH/DIAG INJ IV PUSH: CPT

## 2019-01-05 RX ORDER — ONDANSETRON 2 MG/ML
4 INJECTION INTRAMUSCULAR; INTRAVENOUS ONCE
Status: COMPLETED | OUTPATIENT
Start: 2019-01-05 | End: 2019-01-05

## 2019-01-05 RX ORDER — SODIUM CHLORIDE 0.9 % (FLUSH) 0.9 %
10 SYRINGE (ML) INJECTION AS NEEDED
Status: DISCONTINUED | OUTPATIENT
Start: 2019-01-05 | End: 2019-01-06 | Stop reason: HOSPADM

## 2019-01-05 RX ORDER — ONDANSETRON 4 MG/1
4 TABLET, ORALLY DISINTEGRATING ORAL EVERY 6 HOURS PRN
Qty: 15 TABLET | Refills: 0 | Status: SHIPPED | OUTPATIENT
Start: 2019-01-05 | End: 2019-10-09

## 2019-01-05 RX ADMIN — ONDANSETRON 4 MG: 2 INJECTION, SOLUTION INTRAMUSCULAR; INTRAVENOUS at 20:34

## 2019-01-05 RX ADMIN — SODIUM CHLORIDE 1000 ML: 9 INJECTION, SOLUTION INTRAVENOUS at 20:34

## 2019-01-06 NOTE — ED PROVIDER NOTES
Subjective     History provided by:  Patient  URI   Presenting symptoms: congestion, cough, fatigue, rhinorrhea and sore throat    Presenting symptoms: no fever    Severity:  Moderate  Onset quality:  Gradual  Duration:  5 days  Timing:  Constant  Progression:  Worsening  Chronicity:  New  Relieved by:  None tried  Worsened by:  Nothing  Ineffective treatments:  None tried      Review of Systems   Constitutional: Positive for fatigue. Negative for fever.   HENT: Positive for congestion, rhinorrhea and sore throat.    Respiratory: Positive for cough.    Cardiovascular: Negative.  Negative for chest pain.   Gastrointestinal: Negative.  Negative for abdominal pain.   Endocrine: Negative.    Genitourinary: Negative.  Negative for dysuria.   Skin: Negative.    Neurological: Negative.    Psychiatric/Behavioral: Negative.    All other systems reviewed and are negative.      Past Medical History:   Diagnosis Date   • Anxiety    • Depression    • Diabetes mellitus (CMS/HCC)    • Diverticulitis    • GERD (gastroesophageal reflux disease)    • Hypertension    • Kidney stone    • PCOS (polycystic ovarian syndrome)    • Sleep apnea        Allergies   Allergen Reactions   • Dilantin [Phenytoin] Mental Status Change   • Keppra [Levetiracetam] Mental Status Change   • Meperidine Rash   • Topamax [Topiramate] Mental Status Change       Past Surgical History:   Procedure Laterality Date   • CARDIAC CATHETERIZATION     • CARPAL TUNNEL RELEASE     • COLONOSCOPY     • ENDOSCOPY     • FRACTURE SURGERY         Family History   Problem Relation Age of Onset   • Heart disease Mother    • COPD Mother    • Heart disease Father    • COPD Father        Social History     Socioeconomic History   • Marital status:      Spouse name: Not on file   • Number of children: Not on file   • Years of education: Not on file   • Highest education level: Not on file   Occupational History   • Occupation: Not Working   Tobacco Use   • Smoking status:  Never Smoker   • Smokeless tobacco: Never Used   Substance and Sexual Activity   • Alcohol use: No   • Drug use: No   • Sexual activity: Defer           Objective   Physical Exam   Constitutional: She is oriented to person, place, and time. She appears well-developed and well-nourished. No distress.   HENT:   Head: Normocephalic and atraumatic.   Right Ear: External ear normal.   Left Ear: External ear normal.   Nose: Nose normal.   Mouth/Throat: Posterior oropharyngeal erythema present.   Eyes: Conjunctivae and EOM are normal. Pupils are equal, round, and reactive to light.   Neck: Normal range of motion. Neck supple. No JVD present. No tracheal deviation present.   Cardiovascular: Normal rate, regular rhythm and normal heart sounds.   No murmur heard.  Pulmonary/Chest: Effort normal and breath sounds normal. No respiratory distress. She has no wheezes.   Abdominal: Soft. Bowel sounds are normal. There is no tenderness.   Musculoskeletal: Normal range of motion. She exhibits no edema or deformity.   Neurological: She is alert and oriented to person, place, and time. No cranial nerve deficit.   Skin: Skin is warm and dry. No rash noted. She is not diaphoretic. No erythema. No pallor.   Psychiatric: She has a normal mood and affect. Her behavior is normal. Thought content normal.   Nursing note and vitals reviewed.      Procedures           ED Course                  MDM  Number of Diagnoses or Management Options  Upper respiratory tract infection, unspecified type: new and does not require workup     Amount and/or Complexity of Data Reviewed  Clinical lab tests: reviewed  Tests in the radiology section of CPT®: reviewed          Final diagnoses:   Upper respiratory tract infection, unspecified type            Linda West, APRN  01/06/19 0120

## 2019-01-10 LAB
BACTERIA SPEC AEROBE CULT: NORMAL
BACTERIA SPEC AEROBE CULT: NORMAL

## 2019-01-14 ENCOUNTER — OFFICE VISIT (OUTPATIENT)
Dept: PSYCHIATRY | Facility: CLINIC | Age: 34
End: 2019-01-14

## 2019-01-14 DIAGNOSIS — F41.1 GENERALIZED ANXIETY DISORDER: ICD-10-CM

## 2019-01-14 DIAGNOSIS — F40.01 PANIC DISORDER WITH AGORAPHOBIA: ICD-10-CM

## 2019-01-14 DIAGNOSIS — F31.60 BIPOLAR AFFECTIVE DISORDER, CURRENT EPISODE MIXED, CURRENT EPISODE SEVERITY UNSPECIFIED (HCC): Primary | ICD-10-CM

## 2019-01-14 DIAGNOSIS — F33.1 MAJOR DEPRESSIVE DISORDER, RECURRENT EPISODE, MODERATE (HCC): ICD-10-CM

## 2019-01-14 PROCEDURE — 90834 PSYTX W PT 45 MINUTES: CPT | Performed by: SOCIAL WORKER

## 2019-01-14 NOTE — PROGRESS NOTES
Date of Service: January 14, 2019  Time In:  9:40 AM  Time Out:  10:30 AM      PROGRESS NOTE  Data:  Antoinette Pack is a 33 y.o. female who met with the undersigned for a regularly scheduled individual outpatient therapy session at the [facility].     HPI:   Patient was walking gingerly on tiptoes and reported that her feet break open due to dry skin from diabetes.  She has special lotion to use but has a hard time getting her feet to heal.  Otherwise patient reported having a good holiday.  However she is struggling with the niece and nephew that she has custody of.  The 7-year-old girl is especially a challenge for patient.  The girl ignores patient's direction, talks back to her, refuses to obey.  Patient said she gets really frustrated and feels like she is always being angry and critical with the child.  She knows that is not good for the child, but is at her wits end.    Clinical Maneuvering/Intervention:  Assisted patient in processing above session content; acknowledged and normalized patient’s thoughts, feelings, and concerns.  Provided empathy and support as patient processed the above content.  Discussed, demonstrated, and practiced how to set limits with oppositional/defiant children.  Urged patient to give very brief and explicit instructions to the child, and then stop talking.  Encouraged her not to explain reasons more than twice, but simply give the instruction/limit and expect compliance.  If the child continues to argue or otherwise engage patient, simply ignore her.  Also discussed using enforceable consequences such as taking away a privilege or toy, and to identify the connection between behavior and consequence for the child.  Emphasized the importance of firm limits and immediate follow through with consequences.  Also encouraged her to notice and praised her niece out loud every time she sees her do something positive.  Patient said that she will work on these ideas.  Encouraged her  to bring up the issue in counseling as often as she needs to.    Allowed patient to freely discuss issues without interruption or judgment. Provided safe, confidential environment to facilitate the development of positive therapeutic relationship and encourage open, honest communication. Assisted patient in identifying risk factors which would indicate the need for higher level of care including thoughts to harm self or others and/or self-harming behavior and encouraged patient to contact this office, call 911, or present to the nearest emergency room should any of these events occur. Discussed crisis intervention services and means to access.  Patient adamantly and convincingly denies current suicidal or homicidal ideation or perceptual disturbance.    Assessment     Diagnoses and all orders for this visit:    Bipolar affective disorder, current episode mixed, current episode severity unspecified (CMS/HCC)    Panic disorder with agoraphobia    Major depressive disorder, recurrent episode, moderate (CMS/HCC)    Generalized anxiety disorder               Mental Status Exam  Hygiene:  good  Dress:  casual  Attitude:  Cooperative  Motor Activity:  Appropriate  Speech:  Normal  Mood:  anxious  Affect:  anxious  Thought Processes:  Goal directed and Linear  Thought Content:  normal  Suicidal Thoughts:  denies  Homicidal Thoughts:  denies  Crisis Safety Plan: yes, to come to the emergency room.  Hallucinations:  denies    Patient's Support Network Includes:  , mother and extended family    Progress toward goal: Not at goal    Functional Status: Moderate impairment     Prognosis: Fair with Ongoing Treatment       Plan   Patient will continue in individual outpatient therapy monthly with focus on improved functioning and coping skills, maintaining stability, and avoiding decompensation and the need for higher level of care.    Patient will adhere to medication regimen as prescribed and report any side effects.  Patient will contact this office, call 911 or present to the nearest emergency room should suicidal or homicidal ideations occur. Provide Cognitive Behavioral Therapy and Integrative Therapy to improve functioning, maintain stability, and avoid decompensation and the need for higher level of care.        Return in about 1 month (around 2/14/2019).      This document signed by Nuzhat Beaver LCSW,  January 14, 2019 1:16 PM

## 2019-01-14 NOTE — TREATMENT PLAN
Multi-Disciplinary Problems (from Behavioral Health Treatment Plan)    Active Problems     Problem: Anxiety  Start Date: 01/14/19    Problem Details:  The patient self-scales this problem as a 7 with 10 being the worst.       Goal Priority Start Date Expected End Date End Date    Patient will develop and implement behavioral and cognitive strategies to reduce anxiety and irrational fears. -- 01/14/19 01/14/20 --    Goal Details:  Progress toward goal:  The patient self-scales their progress related to this goal as a 6 with 10 being the worst.       Goal Intervention Frequency Start Date End Date    Help patient explore past emotional issues in relation to present anxiety. Q Month 01/14/19 01/14/20    Intervention Details:  Duration of treatment until until remission of symptoms.       Goal Intervention Frequency Start Date End Date    Help patient develop an awareness of their cognitive and physical responses to anxiety. Q Month 01/14/19 01/14/20    Intervention Details:  Duration of treatment until until remission of symptoms.             Problem: Depression  Start Date: 01/14/19    Problem Details:  The patient self-scales this problem as a 7 with 10 being the worst.       Goal Priority Start Date Expected End Date End Date    Patient will demonstrate the ability to initiate new constructive life skills outside of sessions on a consistent basis. -- 01/14/19 01/14/20 --    Goal Details:  Progress toward goal:  Not appropriate to rate progress toward goal since this is the initial treatment plan.       Goal Intervention Frequency Start Date End Date    Assist patient in setting attainable activities of daily living goals. Q Month 01/14/19 01/14/20    Goal Intervention Frequency Start Date End Date    Provide education about depression Q Month 01/14/19 01/14/20    Intervention Details:  Duration of treatment until until remission of symptoms.       Goal Intervention Frequency Start Date End Date    Assist patient in  developing healthy coping strategies. Q Month 01/14/19 01/14/20    Intervention Details:  Duration of treatment until until remission of symptoms.             Problem: Mood Instability  Start Date: 01/14/19    Problem Details:  The patient self-scales this problem as a 6 with 10 being the worst.       Goal Priority Start Date Expected End Date End Date    Patient will achieve mood stability as evidenced by controlled behavior and a more deliberate thought process -- 01/14/19 -- --    Goal Details:  Progress toward goal:  The patient self-scales their progress related to this goal as a 6 with 10 being the worst.       Goal Intervention Frequency Start Date End Date    Provide structure and focus to patient's thoughts and actions by establishing plans and routine. Q Month 01/14/19 01/14/20    Intervention Details:  Duration of treatment until until remission of symptoms.       Goal Intervention Frequency Start Date End Date    Assist patient in setting responsible goals and limits in behavior. Q Month 01/14/19 01/14/20    Intervention Details:  Duration of treatment until until remission of symptoms.                   Reviewed By     Nuzhat Beaver, [facility] 01/14/19 0444                 I have discussed and reviewed this treatment plan with the patient.  It has been printed for signatures.

## 2019-01-21 ENCOUNTER — OFFICE VISIT (OUTPATIENT)
Dept: PSYCHIATRY | Facility: CLINIC | Age: 34
End: 2019-01-21

## 2019-01-21 VITALS
BODY MASS INDEX: 42.54 KG/M2 | WEIGHT: 211 LBS | HEIGHT: 59 IN | SYSTOLIC BLOOD PRESSURE: 162 MMHG | DIASTOLIC BLOOD PRESSURE: 89 MMHG

## 2019-01-21 DIAGNOSIS — F31.60 BIPOLAR AFFECTIVE DISORDER, CURRENT EPISODE MIXED, CURRENT EPISODE SEVERITY UNSPECIFIED (HCC): Primary | ICD-10-CM

## 2019-01-21 DIAGNOSIS — F41.1 GENERALIZED ANXIETY DISORDER: ICD-10-CM

## 2019-01-21 DIAGNOSIS — Z79.899 MEDICATION MANAGEMENT: ICD-10-CM

## 2019-01-21 DIAGNOSIS — F40.01 PANIC DISORDER WITH AGORAPHOBIA: ICD-10-CM

## 2019-01-21 PROCEDURE — 99214 OFFICE O/P EST MOD 30 MIN: CPT | Performed by: NURSE PRACTITIONER

## 2019-01-21 RX ORDER — ZIPRASIDONE HYDROCHLORIDE 40 MG/1
40 CAPSULE ORAL 2 TIMES DAILY WITH MEALS
Qty: 60 CAPSULE | Refills: 1 | Status: SHIPPED | OUTPATIENT
Start: 2019-01-21 | End: 2019-05-02

## 2019-01-21 RX ORDER — SERTRALINE HYDROCHLORIDE 100 MG/1
100 TABLET, FILM COATED ORAL DAILY
Qty: 90 TABLET | Refills: 0 | Status: SHIPPED | OUTPATIENT
Start: 2019-01-21 | End: 2019-05-02 | Stop reason: SDUPTHER

## 2019-01-21 RX ORDER — BUPROPION HYDROCHLORIDE 300 MG/1
300 TABLET ORAL EVERY MORNING
Qty: 90 TABLET | Refills: 0 | Status: SHIPPED | OUTPATIENT
Start: 2019-01-21 | End: 2019-05-02 | Stop reason: SDUPTHER

## 2019-01-21 RX ORDER — LAMOTRIGINE 25 MG/1
25 TABLET ORAL DAILY
Qty: 30 TABLET | Refills: 1 | Status: SHIPPED | OUTPATIENT
Start: 2019-01-21 | End: 2019-05-02 | Stop reason: SDUPTHER

## 2019-01-21 RX ORDER — SIMVASTATIN 10 MG
TABLET ORAL
COMMUNITY
Start: 2018-12-27 | End: 2019-10-09

## 2019-01-21 NOTE — PROGRESS NOTES
Subjective   Antoinette Pack is a 33 y.o. female who is here today for medication management  Chief Complaint: Depression/psychosis/Anxiety    HPI:    Patient reports she is back on Zoloft since the cost of the Viibryd was not going to be affordable even with the patient assistance program. She is continuing to take Vraylar but is not going to be able to afford it.  She comes in today walking on her tiptoes stating that her heels are cracked due to dry skin and cold weather.  She states she is compliant with taking her medications and denies any side effects.  She has no other medical concerns and is only having to take Klonopin as needed.  She continues to see Nuzhat Beaver LCSW for psychotherapy and finds her helpful.  She is sleeping well at night without issue.  Her appetite is good, tolerating diet.  Her blood pressure continues to be elevated and pulse is elevated as well.  No SI/HI/AH/VH.     The following portions of the patient's history were reviewed and updated as appropriate: allergies, current medications, past family history, past medical history, past social history, past surgical history and problem list.       AIMS score 0      Medical/Surgical History:  Past Medical History:   Diagnosis Date   • Anxiety    • Depression    • Diabetes mellitus (CMS/HCC)    • Diverticulitis    • GERD (gastroesophageal reflux disease)    • Hypertension    • Kidney stone    • PCOS (polycystic ovarian syndrome)    • Sleep apnea      Past Surgical History:   Procedure Laterality Date   • CARDIAC CATHETERIZATION     • CARPAL TUNNEL RELEASE     • COLONOSCOPY     • ENDOSCOPY     • FRACTURE SURGERY         Allergies   Allergen Reactions   • Dilantin [Phenytoin] Mental Status Change   • Keppra [Levetiracetam] Mental Status Change   • Meperidine Rash   • Topamax [Topiramate] Mental Status Change           Current Medications:   Current Outpatient Medications   Medication Sig Dispense Refill   • amLODIPine (NORVASC) 2.5 MG  tablet Take 1 tablet by mouth.     • buPROPion XL (WELLBUTRIN XL) 300 MG 24 hr tablet Take 1 tablet by mouth Every Morning. 90 tablet 0   • Ergocalciferol (VITAMIN D2 PO) Take 1 capsule by mouth.     • glipiZIDE (GLUCOTROL) 10 MG tablet Take  by mouth 2 (Two) Times a Day Before Meals. Pt unsure of dose     • hydrochlorothiazide (HYDRODIURIL) 25 MG tablet Take 1 tablet by mouth.     • Liraglutide (VICTOZA SC) Inject  under the skin.     • lisinopril (PRINIVIL,ZESTRIL) 10 MG tablet Take 10 mg by mouth Daily.     • metFORMIN (GLUCOPHAGE) 1000 MG tablet Take 1,000 mg by mouth 2 (Two) Times a Day With Meals.     • metoprolol-hydrochlorothiazide (LOPRESSOR HCT) 50-25 MG per tablet take 1 by oral route  every day for blood pressure     • omeprazole (PRILOSEC) 40 MG capsule      • raNITIdine (ZANTAC) 75 MG tablet Take 2 tablets by mouth Every 12 (Twelve) Hours.     • simvastatin (ZOCOR) 10 MG tablet      • albuterol sulfate  (90 Base) MCG/ACT inhaler Inhale 2 puffs Every 4 (Four) Hours As Needed for Wheezing. 1 inhaler 0   • azelastine (ASTELIN) 0.1 % nasal spray 1 spray into the nostril(s) as directed by provider 2 (Two) Times a Day. 1 each 5   • lamoTRIgine (LaMICtal) 25 MG tablet Take 1 tablet by mouth Daily. 30 tablet 1   • ondansetron ODT (ZOFRAN-ODT) 4 MG disintegrating tablet Take 1 tablet by mouth Every 6 (Six) Hours As Needed for Nausea. 15 tablet 0   • sertraline (ZOLOFT) 100 MG tablet Take 1 tablet by mouth Daily. 90 tablet 0   • ziprasidone (GEODON) 40 MG capsule Take 1 capsule by mouth 2 (Two) Times a Day With Meals. 60 capsule 1     No current facility-administered medications for this visit.          Review of Systems   Constitutional: Positive for fatigue. Negative for activity change and appetite change.   HENT: Negative.    Eyes: Negative for visual disturbance.   Respiratory: Negative.    Cardiovascular: Negative.         Recent heart cath being monitored by cardiologist   Gastrointestinal:  "Negative for nausea.   Endocrine: Negative.    Genitourinary: Negative.  Negative for urgency.        R/t diabetes   Musculoskeletal: Negative for arthralgias.        Tendonitis in elbow   Skin: Negative.         Cracked heels impairing her ambulating, walking on tip toes   Allergic/Immunologic: Negative.    Neurological: Negative for dizziness, seizures and headaches.   Hematological: Negative.    Psychiatric/Behavioral: Positive for agitation, dysphoric mood and hallucinations. Negative for behavioral problems, confusion, decreased concentration, self-injury, sleep disturbance and suicidal ideas. The patient is nervous/anxious. The patient is not hyperactive.     denies HEENT, cardiovascular, respiratory, liver, renal, GI/, endocrine, neuro, DERM, hematology, immunology, musculoskeletal disorders.    Objective   Physical Exam   Constitutional: She is oriented to person, place, and time. She appears well-developed and well-nourished. No distress.   HENT:   Head: Normocephalic.   Neck: Neck supple.   Neurological: She is alert and oriented to person, place, and time.   Skin: She is not diaphoretic.   Psychiatric: She has a normal mood and affect. Her behavior is normal.   Nursing note and vitals reviewed.    Blood pressure 162/89, height 149.9 cm (59\"), weight 95.7 kg (211 lb).    Mental Status Exam:   Hygiene:   good  Cooperation:  Cooperative  Eye Contact:  Good  Psychomotor Behavior:  Appropriate  Affect:  Full range  Hopelessness: 5  Speech:  Normal  Thought Process:  Goal directed  Thought Content:  Normal  Suicidal:  None  Homicidal:  None  Hallucinations:  Visual  Delusion:  None  Memory:  Intact  Orientation:  Person, Place, Time and Situation  Reliability:  good  Insight:  Good  Judgement:  Good  Impulse Control:  Fair  Physical/Medical Issues:  Yes uncontrolled DM      Assessment/Plan   Problems Addressed this Visit     None      Visit Diagnoses     Bipolar affective disorder, current episode mixed, " current episode severity unspecified (CMS/HCC)    -  Primary    Relevant Medications    ziprasidone (GEODON) 40 MG capsule    buPROPion XL (WELLBUTRIN XL) 300 MG 24 hr tablet    sertraline (ZOLOFT) 100 MG tablet    Panic disorder with agoraphobia        Relevant Medications    ziprasidone (GEODON) 40 MG capsule    buPROPion XL (WELLBUTRIN XL) 300 MG 24 hr tablet    sertraline (ZOLOFT) 100 MG tablet    Generalized anxiety disorder        Relevant Medications    ziprasidone (GEODON) 40 MG capsule    buPROPion XL (WELLBUTRIN XL) 300 MG 24 hr tablet    sertraline (ZOLOFT) 100 MG tablet    Medication management            Functionality: pt showing improvements in important areas of daily functioning.  Prognosis: Guarded dependent on medication/follow up and treatment plan compliance.  Body mass index is 42.62 kg/m².  Patient was educated on healthier and more balanced diet choices and encouraged exercise within physical limitations.    Discussed medication options. Vraylar is helping for mixed bipolar symptoms but unaffordable and therefore we will discontinue Vraylar and begin Geodon twice daily for her mood stabilization..  Communication with Jesica Mcelroy, drug rep for Vraylar and Viibryd, and will further attempt to get pt on their patient assistance program to help with further costs in chance that we may be able to return to use of Viibryd and Vraylar. Continue Wellbutrin XL to 300mg for ongoing anxiety.  Continue Klonopin 0.5mg bid prn for panic episodes. Continue utilizing CPAP for sleep apnea. Continue Zoloft and discontinue Viibryd for ongoing depressive symptoms.   Discussed the risks, benefits, and side effects of the medication; client acknowledged and verbally consented.  Patient is aware to contact the [facility] with any worsening of symptom.  Patient is agreeable to go to the ER or call 911 should they begin SI/HI. Continue psycho therapy to work on coping skills in helping alleviate anxiety and regain  functionality.     She will continue seeing Nuzhat Beaver LCSW for psychotherapy.   We will get her to take CPT at next visit to r/o any ADHD.    Return in 4 weeks- call with questions/concerns.

## 2019-01-28 ENCOUNTER — TELEPHONE (OUTPATIENT)
Dept: PSYCHIATRY | Facility: CLINIC | Age: 34
End: 2019-01-28

## 2019-01-29 RX ORDER — OXCARBAZEPINE 300 MG/1
300 TABLET, FILM COATED ORAL 2 TIMES DAILY
Qty: 60 TABLET | Refills: 0 | Status: SHIPPED | OUTPATIENT
Start: 2019-01-29 | End: 2019-05-02 | Stop reason: SDUPTHER

## 2019-01-29 NOTE — TELEPHONE ENCOUNTER
Spoke with pt concerning Geodon and intolerable SE of excessive Sedation. DC'd the Geodon and placing pt on Trileptal 300mg bid, sent to pharmacy. Thanks.

## 2019-04-02 ENCOUNTER — TRANSCRIBE ORDERS (OUTPATIENT)
Dept: ADMINISTRATIVE | Facility: HOSPITAL | Age: 34
End: 2019-04-02

## 2019-05-02 ENCOUNTER — OFFICE VISIT (OUTPATIENT)
Dept: PSYCHIATRY | Facility: CLINIC | Age: 34
End: 2019-05-02

## 2019-05-02 VITALS
BODY MASS INDEX: 42.33 KG/M2 | SYSTOLIC BLOOD PRESSURE: 149 MMHG | HEIGHT: 59 IN | WEIGHT: 210 LBS | DIASTOLIC BLOOD PRESSURE: 96 MMHG | HEART RATE: 76 BPM

## 2019-05-02 DIAGNOSIS — F31.60 BIPOLAR AFFECTIVE DISORDER, CURRENT EPISODE MIXED, CURRENT EPISODE SEVERITY UNSPECIFIED (HCC): ICD-10-CM

## 2019-05-02 DIAGNOSIS — F40.01 PANIC DISORDER WITH AGORAPHOBIA: ICD-10-CM

## 2019-05-02 DIAGNOSIS — F41.1 GENERALIZED ANXIETY DISORDER: ICD-10-CM

## 2019-05-02 PROCEDURE — 99214 OFFICE O/P EST MOD 30 MIN: CPT | Performed by: NURSE PRACTITIONER

## 2019-05-02 RX ORDER — LAMOTRIGINE 100 MG/1
TABLET ORAL
Qty: 30 TABLET | Refills: 0 | Status: SHIPPED | OUTPATIENT
Start: 2019-05-02 | End: 2019-06-04 | Stop reason: SDUPTHER

## 2019-05-02 RX ORDER — BUPROPION HYDROCHLORIDE 300 MG/1
300 TABLET ORAL EVERY MORNING
Qty: 90 TABLET | Refills: 0 | Status: SHIPPED | OUTPATIENT
Start: 2019-05-02 | End: 2019-07-17 | Stop reason: SDUPTHER

## 2019-05-02 RX ORDER — SERTRALINE HYDROCHLORIDE 100 MG/1
100 TABLET, FILM COATED ORAL DAILY
Qty: 90 TABLET | Refills: 0 | Status: SHIPPED | OUTPATIENT
Start: 2019-05-02 | End: 2019-07-17 | Stop reason: SDUPTHER

## 2019-05-02 RX ORDER — FLUCONAZOLE 150 MG/1
TABLET ORAL
COMMUNITY
Start: 2019-04-09 | End: 2019-10-09

## 2019-05-02 RX ORDER — OXCARBAZEPINE 300 MG/1
300 TABLET, FILM COATED ORAL 2 TIMES DAILY
Qty: 60 TABLET | Refills: 0 | Status: SHIPPED | OUTPATIENT
Start: 2019-05-02 | End: 2019-07-17 | Stop reason: SDUPTHER

## 2019-05-02 RX ORDER — SIMVASTATIN 20 MG
TABLET ORAL
COMMUNITY
Start: 2019-04-11 | End: 2019-06-04

## 2019-05-02 NOTE — PROGRESS NOTES
Subjective   Antoinette Pack is a 33 y.o. female who is here today for medication management  Chief Complaint: Depression/psychosis/Anxiety    HPI:    Patient comes in today reporting that her kids have been sick so she has had to miss appointments. Patient reports that she has only been taking the Lamictal, Zoloft, and Wellbutrin. Patient reports that she has noticed an increasing with seeing a person but that is because she has not been taking the Geodon because she couldn't afford it but it was making her excessively tired so she was switched to [facility]. She reports that she is sleeping at least 7-8 hours on average. She states that she has been tired during the day and taking naps often. Patient reports that her appetite has been good. She reports that she is feeling overwhelmed and completing task and feels overwhelmed and feels in a fog at times and forgetful.  Patient states that she has not been able to keep track of her medicines as one kid has had the flu and then another kid got sick with the flu within the stomach virus which has been very difficult for her.  She states that there have been days where she has skipped 2 or 3 days of her medication so she can make it last as she did not want to call and be a bother.  Highly instructed patient to call and not run out of her medications so we can get it refilled until patient is seen next visit.  Patient denies any side effects to the medications but states that she has felt more anxious lately as well as depressed due to having to skip doses even on upon multiple days.  Patient reports that she had some leftover Klonopin from when she was first prescribed and had to take some of those due to her anxiety being increased but she still feels as if it is due to not being able to take her medication daily.  Patient has reported some increased irritability as well and seen the figure more frequently but she feels that is due to not being able to take her  meds consistently.  Patient reports that her appetite has been good.  Patient denies any SI or HI.  Patient denies any AH. Patient admits to VH but states it is just a figure at times but has increased due to not taking medication consistently.       The following portions of the patient's history were reviewed and updated as appropriate: allergies, current medications, past family history, past medical history, past social history, past surgical history and problem list.       AIMS score 0      Medical/Surgical History:  Past Medical History:   Diagnosis Date   • Anxiety    • Depression    • Diabetes mellitus (CMS/HCC)    • Diverticulitis    • GERD (gastroesophageal reflux disease)    • Hypertension    • Kidney stone    • PCOS (polycystic ovarian syndrome)    • Sleep apnea      Past Surgical History:   Procedure Laterality Date   • CARDIAC CATHETERIZATION     • CARPAL TUNNEL RELEASE     • COLONOSCOPY     • ENDOSCOPY     • FRACTURE SURGERY         Allergies   Allergen Reactions   • Dilantin [Phenytoin] Mental Status Change   • Keppra [Levetiracetam] Mental Status Change   • Meperidine Rash   • Topamax [Topiramate] Mental Status Change           Current Medications:   Current Outpatient Medications   Medication Sig Dispense Refill   • amLODIPine (NORVASC) 2.5 MG tablet Take 1 tablet by mouth.     • buPROPion XL (WELLBUTRIN XL) 300 MG 24 hr tablet Take 1 tablet by mouth Every Morning. 90 tablet 0   • Ergocalciferol (VITAMIN D2 PO) Take 1 capsule by mouth.     • fluconazole (DIFLUCAN) 150 MG tablet      • glipiZIDE (GLUCOTROL) 10 MG tablet Take  by mouth 2 (Two) Times a Day Before Meals. Pt unsure of dose     • halobetasol (ULTRAVATE) 0.05 % cream      • hydrochlorothiazide (HYDRODIURIL) 25 MG tablet Take 1 tablet by mouth.     • lamoTRIgine (LaMICtal) 100 MG tablet Take 1/2 for 2 weeks if no rash take whole tablet daily. 30 tablet 0   • Liraglutide (VICTOZA SC) Inject  under the skin.     • lisinopril  (PRINIVIL,ZESTRIL) 10 MG tablet Take 10 mg by mouth Daily.     • metFORMIN (GLUCOPHAGE) 1000 MG tablet Take 1,000 mg by mouth 2 (Two) Times a Day With Meals.     • omeprazole (PRILOSEC) 40 MG capsule      • OXcarbazepine (TRILEPTAL) 300 MG tablet Take 1 tablet by mouth 2 (Two) Times a Day. 60 tablet 0   • raNITIdine (ZANTAC) 75 MG tablet Take 2 tablets by mouth Every 12 (Twelve) Hours.     • sertraline (ZOLOFT) 100 MG tablet Take 1 tablet by mouth Daily. 90 tablet 0   • simvastatin (ZOCOR) 10 MG tablet      • albuterol sulfate  (90 Base) MCG/ACT inhaler Inhale 2 puffs Every 4 (Four) Hours As Needed for Wheezing. 1 inhaler 0   • azelastine (ASTELIN) 0.1 % nasal spray 1 spray into the nostril(s) as directed by provider 2 (Two) Times a Day. 1 each 5   • ondansetron ODT (ZOFRAN-ODT) 4 MG disintegrating tablet Take 1 tablet by mouth Every 6 (Six) Hours As Needed for Nausea. 15 tablet 0   • simvastatin (ZOCOR) 20 MG tablet        No current facility-administered medications for this visit.          Review of Systems   Constitutional: Positive for fatigue. Negative for activity change and appetite change.   HENT: Negative.    Eyes: Negative for visual disturbance.   Respiratory: Negative.    Cardiovascular: Negative.         Recent heart cath being monitored by cardiologist   Gastrointestinal: Negative for nausea.   Endocrine: Negative.    Genitourinary: Negative.  Negative for urgency.        R/t diabetes   Musculoskeletal: Negative for arthralgias.        Tendonitis in elbow   Skin: Negative.         Cracked heels impairing her ambulating, walking on tip toes   Allergic/Immunologic: Negative.    Neurological: Negative for dizziness, seizures and headaches.   Hematological: Negative.    Psychiatric/Behavioral: Positive for agitation, dysphoric mood and hallucinations. Negative for behavioral problems, confusion, decreased concentration, self-injury, sleep disturbance and suicidal ideas. The patient is  "nervous/anxious. The patient is not hyperactive.     denies HEENT, cardiovascular, respiratory, liver, renal, GI/, endocrine, neuro, DERM, hematology, immunology, musculoskeletal disorders.    Objective   Physical Exam   Constitutional: She is oriented to person, place, and time. She appears well-developed and well-nourished. No distress.   HENT:   Head: Normocephalic.   Neck: Neck supple.   Neurological: She is alert and oriented to person, place, and time.   Skin: She is not diaphoretic.   Psychiatric: Her speech is normal and behavior is normal. Her mood appears anxious. Cognition and memory are normal. She exhibits a depressed mood.   Nursing note and vitals reviewed.    Blood pressure 149/96, pulse 76, height 149.9 cm (59\"), weight 95.3 kg (210 lb). Body mass index is 42.41 kg/m².      Mental Status Exam:   Hygiene:   good  Cooperation:  Cooperative  Eye Contact:  Good  Psychomotor Behavior:  Appropriate  Affect:  Full range  Hopelessness: 5  Speech:  Normal  Thought Process:  Goal directed  Thought Content:  Normal  Suicidal:  None  Homicidal:  None  Hallucinations:  Visual  Delusion:  None  Memory:  Intact  Orientation:  Person, Place, Time and Situation  Reliability:  good  Insight:  Good  Judgement:  Good  Impulse Control:  Fair  Physical/Medical Issues:  Yes uncontrolled DM      Assessment/Plan   Problems Addressed this Visit     None      Visit Diagnoses     Bipolar affective disorder, current episode mixed, current episode severity unspecified (CMS/[facility])        Relevant Medications    buPROPion XL (WELLBUTRIN XL) 300 MG 24 hr tablet    lamoTRIgine (LaMICtal) 100 MG tablet    OXcarbazepine (TRILEPTAL) 300 MG tablet    sertraline (ZOLOFT) 100 MG tablet    Panic disorder with agoraphobia        Relevant Medications    buPROPion XL (WELLBUTRIN XL) 300 MG 24 hr tablet    sertraline (ZOLOFT) 100 MG tablet    Generalized anxiety disorder        Relevant Medications    buPROPion XL (WELLBUTRIN XL) 300 MG 24 hr " tablet    sertraline (ZOLOFT) 100 MG tablet        Functionality: pt having impairments in important areas of daily functioning.  Prognosis: Guarded dependent on medication/follow up and treatment plan compliance.  Body mass index is 42.41 kg/m².  Patient was educated on healthier and more balanced diet choices and encouraged exercise within physical limitations.    I spent a total of 25 minutes in direct patient care, greater than 15 minutes (greater than 50%) were spent in coordination of care, and counseling the patient regarding bipolar and anxiety. Answered any questions patient had with medication and plan.  Continue Wellbutrin 300 XL daily.  Continue Zoloft 100 mg daily for depressive symptoms.  Continue Trileptal 300 mg twice a day for mood.  Increase Lamictal for ongoing bipolar affective symptoms.  Instructed patient to take half (50mg) for 2 weeks if no rash can take whole tablet totaling 100 mg daily. We increase Lamictal in an effort to stabilize mood.  The patient was reminded to immediately come to the hospital should there be any loss of control.  Explanation was given to her regarding Lamictal and the potential for Ever Gene syndrome and significant rash.  Patient was encouraged to check skin prior to beginning.  Patient was encouraged to report any rash and to immediately stop medication.  Instructed patient to continue utilizing CPAP for sleep apnea.   Discussed the risks, benefits, and side effects of the medication; client acknowledged and verbally consented.  Patient is aware to contact the [facility] with any worsening of symptom.  Patient is agreeable to go to the ER or call 911 should they begin SI/HI. Continue psycho therapy to work on coping skills in helping alleviate anxiety and regain functionality.   Highly encouraged patient to not run out of her medication and to call for refill if she has to miss an appointment to avoid worsening side effects or even hospitalization patient  verbally agreed and consented.    She will continue seeing [facility] for psychotherapy.       Return in 4 weeks with Shruthi Sierra PMHNP- call with questions/concerns.

## 2019-06-04 ENCOUNTER — OFFICE VISIT (OUTPATIENT)
Dept: PSYCHIATRY | Facility: CLINIC | Age: 34
End: 2019-06-04

## 2019-06-04 VITALS
HEART RATE: 101 BPM | HEIGHT: 59 IN | WEIGHT: 210 LBS | DIASTOLIC BLOOD PRESSURE: 90 MMHG | BODY MASS INDEX: 42.33 KG/M2 | SYSTOLIC BLOOD PRESSURE: 160 MMHG

## 2019-06-04 DIAGNOSIS — Z79.899 MEDICATION MANAGEMENT: ICD-10-CM

## 2019-06-04 DIAGNOSIS — F41.1 GENERALIZED ANXIETY DISORDER: ICD-10-CM

## 2019-06-04 DIAGNOSIS — F31.60 BIPOLAR AFFECTIVE DISORDER, CURRENT EPISODE MIXED, CURRENT EPISODE SEVERITY UNSPECIFIED (HCC): Primary | ICD-10-CM

## 2019-06-04 DIAGNOSIS — F40.01 PANIC DISORDER WITH AGORAPHOBIA: ICD-10-CM

## 2019-06-04 PROCEDURE — 99214 OFFICE O/P EST MOD 30 MIN: CPT | Performed by: NURSE PRACTITIONER

## 2019-06-04 RX ORDER — LAMOTRIGINE 150 MG/1
150 TABLET ORAL DAILY
Qty: 30 TABLET | Refills: 1 | Status: SHIPPED | OUTPATIENT
Start: 2019-06-04 | End: 2019-07-17 | Stop reason: SDUPTHER

## 2019-06-04 RX ORDER — PROPRANOLOL HYDROCHLORIDE 10 MG/1
10 TABLET ORAL 2 TIMES DAILY
Qty: 60 TABLET | Refills: 1 | Status: SHIPPED | OUTPATIENT
Start: 2019-06-04 | End: 2019-07-17 | Stop reason: SDUPTHER

## 2019-07-17 ENCOUNTER — OFFICE VISIT (OUTPATIENT)
Dept: PSYCHIATRY | Facility: CLINIC | Age: 34
End: 2019-07-17

## 2019-07-17 VITALS
DIASTOLIC BLOOD PRESSURE: 93 MMHG | HEIGHT: 59 IN | WEIGHT: 208.2 LBS | BODY MASS INDEX: 41.97 KG/M2 | SYSTOLIC BLOOD PRESSURE: 170 MMHG | HEART RATE: 103 BPM

## 2019-07-17 DIAGNOSIS — F41.1 GENERALIZED ANXIETY DISORDER: ICD-10-CM

## 2019-07-17 DIAGNOSIS — F31.60 BIPOLAR AFFECTIVE DISORDER, CURRENT EPISODE MIXED, CURRENT EPISODE SEVERITY UNSPECIFIED (HCC): Primary | ICD-10-CM

## 2019-07-17 DIAGNOSIS — F40.01 PANIC DISORDER WITH AGORAPHOBIA: ICD-10-CM

## 2019-07-17 DIAGNOSIS — Z79.899 MEDICATION MANAGEMENT: ICD-10-CM

## 2019-07-17 PROCEDURE — 99214 OFFICE O/P EST MOD 30 MIN: CPT | Performed by: NURSE PRACTITIONER

## 2019-07-17 RX ORDER — OXCARBAZEPINE 300 MG/1
300 TABLET, FILM COATED ORAL 2 TIMES DAILY
Qty: 180 TABLET | Refills: 0 | Status: SHIPPED | OUTPATIENT
Start: 2019-07-17 | End: 2019-10-09 | Stop reason: SDUPTHER

## 2019-07-17 RX ORDER — SERTRALINE HYDROCHLORIDE 100 MG/1
100 TABLET, FILM COATED ORAL DAILY
Qty: 90 TABLET | Refills: 0 | Status: SHIPPED | OUTPATIENT
Start: 2019-07-17 | End: 2019-10-09 | Stop reason: SDUPTHER

## 2019-07-17 RX ORDER — BUPROPION HYDROCHLORIDE 300 MG/1
300 TABLET ORAL EVERY MORNING
Qty: 90 TABLET | Refills: 0 | Status: SHIPPED | OUTPATIENT
Start: 2019-07-17 | End: 2019-10-09 | Stop reason: SDUPTHER

## 2019-07-17 RX ORDER — PROPRANOLOL HYDROCHLORIDE 10 MG/1
10 TABLET ORAL 2 TIMES DAILY
Qty: 180 TABLET | Refills: 0 | Status: SHIPPED | OUTPATIENT
Start: 2019-07-17 | End: 2019-10-09 | Stop reason: SDUPTHER

## 2019-07-17 RX ORDER — MODAFINIL 100 MG/1
TABLET ORAL
COMMUNITY
Start: 2019-06-21 | End: 2020-02-03 | Stop reason: SDUPTHER

## 2019-07-17 RX ORDER — LAMOTRIGINE 150 MG/1
150 TABLET ORAL DAILY
Qty: 90 TABLET | Refills: 0 | Status: SHIPPED | OUTPATIENT
Start: 2019-07-17 | End: 2019-10-09 | Stop reason: SDUPTHER
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
## 2019-10-09 ENCOUNTER — OFFICE VISIT (OUTPATIENT)
Dept: PSYCHIATRY | Facility: CLINIC | Age: 34
End: 2019-10-09

## 2019-10-09 VITALS
HEIGHT: 59 IN | DIASTOLIC BLOOD PRESSURE: 62 MMHG | HEART RATE: 85 BPM | BODY MASS INDEX: 42.33 KG/M2 | WEIGHT: 210 LBS | SYSTOLIC BLOOD PRESSURE: 136 MMHG

## 2019-10-09 DIAGNOSIS — F40.01 PANIC DISORDER WITH AGORAPHOBIA: ICD-10-CM

## 2019-10-09 DIAGNOSIS — F41.1 GENERALIZED ANXIETY DISORDER: ICD-10-CM

## 2019-10-09 DIAGNOSIS — F31.60 BIPOLAR AFFECTIVE DISORDER, CURRENT EPISODE MIXED, CURRENT EPISODE SEVERITY UNSPECIFIED (HCC): Primary | ICD-10-CM

## 2019-10-09 DIAGNOSIS — Z79.899 MEDICATION MANAGEMENT: ICD-10-CM

## 2019-10-09 PROCEDURE — 90792 PSYCH DIAG EVAL W/MED SRVCS: CPT | Performed by: NURSE PRACTITIONER

## 2019-10-09 RX ORDER — SERTRALINE HYDROCHLORIDE 100 MG/1
100 TABLET, FILM COATED ORAL DAILY
Qty: 90 TABLET | Refills: 0 | Status: SHIPPED | OUTPATIENT
Start: 2019-10-09 | End: 2020-02-03 | Stop reason: SDUPTHER

## 2019-10-09 RX ORDER — LAMOTRIGINE 150 MG/1
150 TABLET ORAL DAILY
Qty: 90 TABLET | Refills: 0 | Status: SHIPPED | OUTPATIENT
Start: 2019-10-09 | End: 2020-02-03 | Stop reason: SDUPTHER

## 2019-10-09 RX ORDER — BUPROPION HYDROCHLORIDE 300 MG/1
300 TABLET ORAL EVERY MORNING
Qty: 90 TABLET | Refills: 0 | Status: SHIPPED | OUTPATIENT
Start: 2019-10-09 | End: 2020-02-03 | Stop reason: SDUPTHER

## 2019-10-09 RX ORDER — OXCARBAZEPINE 300 MG/1
300 TABLET, FILM COATED ORAL 2 TIMES DAILY
Qty: 180 TABLET | Refills: 0 | Status: SHIPPED | OUTPATIENT
Start: 2019-10-09 | End: 2020-02-03

## 2019-10-09 RX ORDER — PROPRANOLOL HYDROCHLORIDE 10 MG/1
10 TABLET ORAL 2 TIMES DAILY
Qty: 180 TABLET | Refills: 0 | Status: SHIPPED | OUTPATIENT
Start: 2019-10-09 | End: 2020-02-03 | Stop reason: SDUPTHER

## 2019-10-09 RX ORDER — INSULIN GLARGINE 100 [IU]/ML
20 INJECTION, SOLUTION SUBCUTANEOUS DAILY
COMMUNITY
End: 2021-12-02

## 2019-10-09 NOTE — PROGRESS NOTES
"Subjective   Antoinette Pack is a 34 y.o. female who is here today for medication management  Chief Complaint: Depression/psychosis/Anxiety    HPI:    She reports her mood has been \"doing okay\" feeling like her anxiety and depression is manageable. She states the anxiety/irritability have been more manageable after beginning the Trileptal and Lamictal. She is continuing to take the propranolol daily. Reports today her  is off work, and shares that he aggravates her, causing her increased anxiety/agitation.  She is just getting settled into her new place, but she is still adjusting. She said it has been difficult at times because the new house is in a more secluded area, with less access to certain things. Pt reports no difficulty with sleeping, but feels she might be sleeping too much. Wears CPAP nightly. Reports she has to take at least 1 nap daily. Good appetite, tolerating diet. She has been trying to decrease her sugar intake, checks her blood sugar several times a day. Blood sugars are averaging between 300-400 everyday. Has been trying to increase her water intake.  She denies any SI/HI. Reports some AH/VH, which she notices during times of increased stress. Reports she is able to keep busy/distract herself when these hallucinations occur. No new medical concerns.  Reports ongoing issues with her feet. She has diabetic shoes from the podiatrist, but does not wear them regularly.     Pt has been soaking her feet at least twice a day and utilizing urea cream and a steroid cream.   The following portions of the patient's history were reviewed and updated as appropriate: allergies, current medications, past family history, past medical history, past social history, past surgical history and problem list.    Past Medical History:   Diagnosis Date   • Anxiety    • Depression    • Diabetes mellitus (CMS/[facility])    • Diverticulitis    • GERD (gastroesophageal reflux disease)    • Hypertension    • Kidney stone "    • PCOS (polycystic ovarian syndrome)    • Sleep apnea      Family History   Problem Relation Age of Onset   • Heart disease Mother    • COPD Mother    • Heart disease Father    • COPD Father      Social History     Socioeconomic History   • Marital status:      Spouse name: Not on file   • Number of children: Not on file   • Years of education: Not on file   • Highest education level: Not on file   Occupational History   • Occupation: Not Working   Tobacco Use   • Smoking status: Never Smoker   • Smokeless tobacco: Never Used   Substance and Sexual Activity   • Alcohol use: No   • Drug use: No   • Sexual activity: Defer       Allergies   Allergen Reactions   • Dilantin [Phenytoin] Mental Status Change   • Keppra [Levetiracetam] Mental Status Change   • Meperidine Rash   • Topamax [Topiramate] Mental Status Change     Current Medications:   Current Outpatient Medications   Medication Sig Dispense Refill   • amLODIPine (NORVASC) 2.5 MG tablet Take 1 tablet by mouth.     • azelastine (ASTELIN) 0.1 % nasal spray 1 spray into the nostril(s) as directed by provider 2 (Two) Times a Day. 1 each 5   • buPROPion XL (WELLBUTRIN XL) 300 MG 24 hr tablet Take 1 tablet by mouth Every Morning. 90 tablet 0   • Ergocalciferol (VITAMIN D2 PO) Take 1 capsule by mouth.     • halobetasol (ULTRAVATE) 0.05 % cream      • hydrochlorothiazide (HYDRODIURIL) 25 MG tablet Take 1 tablet by mouth.     • insulin glargine (LANTUS) 100 UNIT/ML injection Inject  under the skin into the appropriate area as directed Daily.     • lamoTRIgine (LaMICtal) 150 MG tablet Take 1 tablet by mouth Daily. 90 tablet 0   • lisinopril (PRINIVIL,ZESTRIL) 10 MG tablet Take 10 mg by mouth Daily.     • modafinil (PROVIGIL) 100 MG tablet      • omeprazole (PRILOSEC) 40 MG capsule      • propranolol (INDERAL) 10 MG tablet Take 1 tablet by mouth 2 (Two) Times a Day. 180 tablet 0   • sertraline (ZOLOFT) 100 MG tablet Take 1 tablet by mouth Daily. 90 tablet 0   •  "albuterol sulfate  (90 Base) MCG/ACT inhaler Inhale 2 puffs Every 4 (Four) Hours As Needed for Wheezing. 1 inhaler 0   • OXcarbazepine (TRILEPTAL) 300 MG tablet Take 1 tablet by mouth 2 (Two) Times a Day. 180 tablet 0   • raNITIdine (ZANTAC) 75 MG tablet Take 2 tablets by mouth Every 12 (Twelve) Hours.       No current facility-administered medications for this visit.          Review of Systems   Constitutional: Negative for activity change, appetite change and fatigue.   HENT: Negative.    Eyes: Negative for visual disturbance.   Respiratory: Negative.    Cardiovascular: Negative.    Gastrointestinal: Negative for nausea.   Endocrine: Negative.    Genitourinary: Negative.  Negative for urgency.        R/t diabetes   Musculoskeletal: Negative for arthralgias.        Tendonitis in elbow   Skin: Negative.         Cracked heels impairing her ambulating   Allergic/Immunologic: Negative.    Neurological: Negative for dizziness, seizures and headaches.   Hematological: Negative.    Psychiatric/Behavioral: Negative for agitation, behavioral problems, confusion, decreased concentration, dysphoric mood, hallucinations, self-injury, sleep disturbance and suicidal ideas. The patient is not nervous/anxious and is not hyperactive.     denies HEENT, cardiovascular, respiratory, liver, renal, GI/, endocrine, neuro, DERM, hematology, immunology, musculoskeletal disorders.    Objective   Physical Exam   Constitutional: She is oriented to person, place, and time. She appears well-developed and well-nourished. No distress.   HENT:   Head: Normocephalic.   Neurological: She is alert and oriented to person, place, and time.   Skin: She is not diaphoretic.   Psychiatric: Her speech is normal and behavior is normal. Her mood appears anxious. Cognition and memory are normal. She exhibits a depressed mood.   Vitals reviewed.    Blood pressure 136/62, pulse 85, height 149.9 cm (59\"), weight 95.3 kg (210 lb). Body mass index is 42.41 " kg/m².      Mental Status Exam:   Hygiene:   good  Cooperation:  Cooperative  Eye Contact:  Good  Psychomotor Behavior:  Appropriate  Affect:  Full range  Hopelessness: 5  Speech:  Normal  Thought Process:  Goal directed  Thought Content:  Normal  Suicidal:  None  Homicidal:  None  Hallucinations:  Visual  Delusion:  None  Memory:  Intact  Orientation:  Person, Place, Time and Situation  Reliability:  good  Insight:  Good  Judgement:  Good  Impulse Control:  Fair  Physical/Medical Issues:  Yes uncontrolled DM      Assessment/Plan   Problems Addressed this Visit     None      Visit Diagnoses     Bipolar affective disorder, current episode mixed, current episode severity unspecified (CMS/HCC)    -  Primary    Relevant Medications    lamoTRIgine (LaMICtal) 150 MG tablet    OXcarbazepine (TRILEPTAL) 300 MG tablet    buPROPion XL (WELLBUTRIN XL) 300 MG 24 hr tablet    sertraline (ZOLOFT) 100 MG tablet    Panic disorder with agoraphobia        Relevant Medications    buPROPion XL (WELLBUTRIN XL) 300 MG 24 hr tablet    sertraline (ZOLOFT) 100 MG tablet    propranolol (INDERAL) 10 MG tablet    Generalized anxiety disorder        Relevant Medications    buPROPion XL (WELLBUTRIN XL) 300 MG 24 hr tablet    sertraline (ZOLOFT) 100 MG tablet    propranolol (INDERAL) 10 MG tablet    Medication management            Functionality: pt having improvements in important areas of daily functioning. Mood regulated. Managing anxiety and depression symptoms more efficiently.  Prognosis: Guarded dependent on medication/follow up and treatment plan compliance.  Body mass index is 42.41 kg/m².  Patient was educated on healthier and more balanced diet choices and encouraged exercise within physical limitations.   We will continue on current med regimen. The patient was reminded to immediately come to the hospital should there be any loss of control.  Instructed patient to continue utilizing CPAP for sleep apnea.   Discussed the risks,  benefits, and side effects of the medication; client acknowledged and verbally consented.  Patient is aware to contact the [facility] with any worsening of symptom.  Patient is agreeable to go to the ER or call 911 should they begin SI/HI. Continue psycho therapy to work on coping skills in helping alleviate anxiety and regain functionality.   Highly encouraged patient to not run out of her medication and to call for refill if she has to miss an appointment to avoid worsening side effects or even hospitalization patient verbally agreed and consented.    Encouraged psychotherapy.      Return in 8-12 weeks - call with questions/concerns.

## 2019-10-21 ENCOUNTER — TRANSCRIBE ORDERS (OUTPATIENT)
Dept: ADMINISTRATIVE | Facility: HOSPITAL | Age: 34
End: 2019-10-21

## 2019-10-21 DIAGNOSIS — M79.605 LEFT LEG PAIN: Primary | ICD-10-CM

## 2019-10-22 ENCOUNTER — APPOINTMENT (OUTPATIENT)
Dept: ULTRASOUND IMAGING | Facility: HOSPITAL | Age: 34
End: 2019-10-22

## 2019-10-22 ENCOUNTER — HOSPITAL ENCOUNTER (EMERGENCY)
Facility: HOSPITAL | Age: 34
Discharge: HOME | End: 2019-10-22
Attending: FAMILY MEDICINE | Admitting: FAMILY MEDICINE

## 2019-10-22 ENCOUNTER — APPOINTMENT (OUTPATIENT)
Dept: GENERAL RADIOLOGY | Facility: HOSPITAL | Age: 34
End: 2019-10-22

## 2019-10-22 DIAGNOSIS — M79.605 PAIN OF LEFT LOWER EXTREMITY: Primary | ICD-10-CM

## 2019-10-22 LAB
ALBUMIN SERPL-MCNC: 4.29 G/DL (ref 3.5–5.2)
ALBUMIN/GLOB SERPL: 1.5 G/DL
ALP SERPL-CCNC: 87 U/L (ref 39–117)
ALT SERPL W P-5'-P-CCNC: 36 U/L (ref 1–33)
ANION GAP SERPL CALCULATED.3IONS-SCNC: 12.1 MMOL/L (ref 5–15)
AST SERPL-CCNC: 33 U/L (ref 1–32)
BASOPHILS # BLD AUTO: 0.01 10*3/MM3 (ref 0–0.2)
BASOPHILS NFR BLD AUTO: 0.2 % (ref 0–1.5)
BILIRUB SERPL-MCNC: 0.6 MG/DL (ref 0.2–1.2)
BUN BLD-MCNC: 7 MG/DL (ref 6–20)
BUN/CREAT SERPL: 13 (ref 7–25)
CALCIUM SPEC-SCNC: 8.9 MG/DL (ref 8.6–10.5)
CHLORIDE SERPL-SCNC: 98 MMOL/L (ref 98–107)
CK SERPL-CCNC: 97 U/L (ref 20–180)
CO2 SERPL-SCNC: 24.9 MMOL/L (ref 22–29)
CREAT BLD-MCNC: 0.54 MG/DL (ref 0.57–1)
DEPRECATED RDW RBC AUTO: 38.1 FL (ref 37–54)
EOSINOPHIL # BLD AUTO: 0.12 10*3/MM3 (ref 0–0.4)
EOSINOPHIL NFR BLD AUTO: 2.4 % (ref 0.3–6.2)
ERYTHROCYTE [DISTWIDTH] IN BLOOD BY AUTOMATED COUNT: 12.7 % (ref 12.3–15.4)
GFR SERPL CREATININE-BSD FRML MDRD: 128 ML/MIN/1.73
GLOBULIN UR ELPH-MCNC: 2.9 GM/DL
GLUCOSE BLD-MCNC: 204 MG/DL (ref 65–99)
HCG SERPL QL: NEGATIVE
HCT VFR BLD AUTO: 34.4 % (ref 34–46.6)
HGB BLD-MCNC: 12.3 G/DL (ref 12–15.9)
IMM GRANULOCYTES # BLD AUTO: 0.04 10*3/MM3 (ref 0–0.05)
IMM GRANULOCYTES NFR BLD AUTO: 0.8 % (ref 0–0.5)
LARGE PLATELETS: NORMAL
LYMPHOCYTES # BLD AUTO: 1.61 10*3/MM3 (ref 0.7–3.1)
LYMPHOCYTES NFR BLD AUTO: 32.9 % (ref 19.6–45.3)
MCH RBC QN AUTO: 29.7 PG (ref 26.6–33)
MCHC RBC AUTO-ENTMCNC: 35.8 G/DL (ref 31.5–35.7)
MCV RBC AUTO: 83.1 FL (ref 79–97)
MONOCYTES # BLD AUTO: 0.36 10*3/MM3 (ref 0.1–0.9)
MONOCYTES NFR BLD AUTO: 7.3 % (ref 5–12)
NEUTROPHILS # BLD AUTO: 2.76 10*3/MM3 (ref 1.7–7)
NEUTROPHILS NFR BLD AUTO: 56.4 % (ref 42.7–76)
NRBC BLD AUTO-RTO: 0 /100 WBC (ref 0–0.2)
PLATELET # BLD AUTO: 132 10*3/MM3 (ref 140–450)
PMV BLD AUTO: 9.5 FL (ref 6–12)
POTASSIUM BLD-SCNC: 4.2 MMOL/L (ref 3.5–5.2)
PROT SERPL-MCNC: 7.2 G/DL (ref 6–8.5)
RBC # BLD AUTO: 4.14 10*6/MM3 (ref 3.77–5.28)
RBC MORPH BLD: NORMAL
SODIUM BLD-SCNC: 135 MMOL/L (ref 136–145)
WBC NRBC COR # BLD: 4.9 10*3/MM3 (ref 3.4–10.8)

## 2019-10-22 PROCEDURE — 80053 COMPREHEN METABOLIC PANEL: CPT | Performed by: FAMILY MEDICINE

## 2019-10-22 PROCEDURE — 73610 X-RAY EXAM OF ANKLE: CPT | Performed by: RADIOLOGY

## 2019-10-22 PROCEDURE — 73630 X-RAY EXAM OF FOOT: CPT

## 2019-10-22 PROCEDURE — 73562 X-RAY EXAM OF KNEE 3: CPT

## 2019-10-22 PROCEDURE — 99283 EMERGENCY DEPT VISIT LOW MDM: CPT

## 2019-10-22 PROCEDURE — 93971 EXTREMITY STUDY: CPT

## 2019-10-22 PROCEDURE — 85007 BL SMEAR W/DIFF WBC COUNT: CPT | Performed by: FAMILY MEDICINE

## 2019-10-22 PROCEDURE — 25010000002 KETOROLAC TROMETHAMINE PER 15 MG: Performed by: FAMILY MEDICINE

## 2019-10-22 PROCEDURE — 82550 ASSAY OF CK (CPK): CPT | Performed by: FAMILY MEDICINE

## 2019-10-22 PROCEDURE — 93971 EXTREMITY STUDY: CPT | Performed by: RADIOLOGY

## 2019-10-22 PROCEDURE — 85025 COMPLETE CBC W/AUTO DIFF WBC: CPT | Performed by: FAMILY MEDICINE

## 2019-10-22 PROCEDURE — 73562 X-RAY EXAM OF KNEE 3: CPT | Performed by: RADIOLOGY

## 2019-10-22 PROCEDURE — 96374 THER/PROPH/DIAG INJ IV PUSH: CPT

## 2019-10-22 PROCEDURE — 84703 CHORIONIC GONADOTROPIN ASSAY: CPT | Performed by: FAMILY MEDICINE

## 2019-10-22 PROCEDURE — 73610 X-RAY EXAM OF ANKLE: CPT

## 2019-10-22 PROCEDURE — 73630 X-RAY EXAM OF FOOT: CPT | Performed by: RADIOLOGY

## 2019-10-22 RX ORDER — KETOROLAC TROMETHAMINE 30 MG/ML
30 INJECTION, SOLUTION INTRAMUSCULAR; INTRAVENOUS ONCE
Status: COMPLETED | OUTPATIENT
Start: 2019-10-22 | End: 2019-10-22

## 2019-10-22 RX ORDER — CYCLOBENZAPRINE HCL 10 MG
10 TABLET ORAL 2 TIMES DAILY PRN
Qty: 10 TABLET | Refills: 0 | Status: SHIPPED | OUTPATIENT
Start: 2019-10-22 | End: 2020-02-03

## 2019-10-22 RX ORDER — SODIUM CHLORIDE 0.9 % (FLUSH) 0.9 %
10 SYRINGE (ML) INJECTION AS NEEDED
Status: DISCONTINUED | OUTPATIENT
Start: 2019-10-22 | End: 2019-10-22 | Stop reason: HOSPADM

## 2019-10-22 RX ADMIN — KETOROLAC TROMETHAMINE 30 MG: 30 INJECTION, SOLUTION INTRAMUSCULAR; INTRAVENOUS at 21:55

## 2019-10-23 VITALS
OXYGEN SATURATION: 97 % | TEMPERATURE: 98.3 F | HEIGHT: 59 IN | BODY MASS INDEX: 42.33 KG/M2 | DIASTOLIC BLOOD PRESSURE: 89 MMHG | RESPIRATION RATE: 18 BRPM | HEART RATE: 76 BPM | WEIGHT: 210 LBS | SYSTOLIC BLOOD PRESSURE: 176 MMHG

## 2019-10-23 NOTE — ED PROVIDER NOTES
Subjective   34-year-old female with history of diabetes hypertension presents the emergency room with left leg pain for the past 4 days.  Patient states she has had no known injury.  She states that her leg hurts at her left distal medial ankle radiates to her left calf.  She denies any known injury.  She states that pain is a burning sensation.  She states that she went to her family doctor who has given her some topical ointment to apply she states that is supposed to receive an ultrasound tomorrow.  She reports the pain is persistent she therefore decided come to the emergency room for evaluation        Leg Pain   Location:  Leg  Time since incident:  4 days  Injury: no    Leg location:  L leg  Pain details:     Quality:  Burning    Radiates to:  L leg    Severity:  Moderate    Duration:  4 days    Timing:  Constant    Progression:  Unchanged  Foreign body present:  No foreign bodies  Prior injury to area:  No  Relieved by:  Nothing  Worsened by:  Nothing  Associated symptoms: swelling and tingling    Associated symptoms: no back pain, no decreased ROM, no fatigue, no itching, no neck pain and no stiffness        Review of Systems   Constitutional: Negative for fatigue.   HENT: Negative for sore throat.    Respiratory: Negative for cough, shortness of breath and wheezing.    Cardiovascular: Negative for chest pain.   Gastrointestinal: Negative for abdominal pain, diarrhea, nausea and vomiting.   Musculoskeletal: Negative for back pain, neck pain and stiffness.   Skin: Negative for itching.   Neurological: Negative for weakness, light-headedness and numbness.   All other systems reviewed and are negative.      Past Medical History:   Diagnosis Date   • Anxiety    • Depression    • Diabetes mellitus (CMS/HCC)    • Diverticulitis    • GERD (gastroesophageal reflux disease)    • Hypertension    • Kidney stone    • PCOS (polycystic ovarian syndrome)    • Sleep apnea        Allergies   Allergen Reactions   • Dilantin  [Phenytoin] Mental Status Change   • Keppra [Levetiracetam] Mental Status Change   • Meperidine Rash   • Topamax [Topiramate] Mental Status Change       Past Surgical History:   Procedure Laterality Date   • CARDIAC CATHETERIZATION     • CARPAL TUNNEL RELEASE     • COLONOSCOPY     • ENDOSCOPY     • FRACTURE SURGERY         Family History   Problem Relation Age of Onset   • Heart disease Mother    • COPD Mother    • Heart disease Father    • COPD Father        Social History     Socioeconomic History   • Marital status:      Spouse name: Not on file   • Number of children: Not on file   • Years of education: Not on file   • Highest education level: Not on file   Occupational History   • Occupation: Not Working   Tobacco Use   • Smoking status: Never Smoker   • Smokeless tobacco: Never Used   Substance and Sexual Activity   • Alcohol use: No   • Drug use: No   • Sexual activity: Defer           Objective   Physical Exam   Constitutional: She is oriented to person, place, and time. No distress.   HENT:   Head: Normocephalic and atraumatic.   Eyes: EOM are normal. Pupils are equal, round, and reactive to light.   Neck: Neck supple. No JVD present.   Cardiovascular: Normal rate, regular rhythm and normal heart sounds.   No murmur heard.  2+ radial pulse 2+ dorsalis pedis pulses no extra systoles.   Pulmonary/Chest: Effort normal and breath sounds normal. She has no wheezes. She has no rales.   Abdominal: Soft. Bowel sounds are normal. There is no tenderness. There is no guarding.   Musculoskeletal: Normal range of motion. She exhibits tenderness. She exhibits no edema.   No joint effusion.  Left leg tenderness along.  Dry cracked scaly feet bilateral feet.  Pelvis stable.   Neurological: She is alert and oriented to person, place, and time. No cranial nerve deficit or sensory deficit.   Skin: Skin is warm and dry.   Psychiatric: She has a normal mood and affect.   Nursing note and vitals  reviewed.      Procedures           ED Course  ED Course as of Oct 22 2126   Tue Oct 22, 2019   2046 Patient white blood cell count is unremarkable  [BB]   2052 Patient's EKG is unremarkable.  Patient labs otherwise unremarkable.  [BB]   2121 Patient's ultrasound is negative for deep venous thrombosis.  Plain films are unremarkable.  [BB]   2124 Have discussed limitations of imaging.  Discussed patient likely has component of neuropathy secondary to her diabetes.  Patient also may have component of muscle strain.  Discussed need for follow-up with her family doctor have discussed warning signs and results emergency room patient verbalized understanding.  [BB]      ED Course User Index  [BB] Juan Chow MD                  MDM  Number of Diagnoses or Management Options  Pain of left lower extremity: minor     Amount and/or Complexity of Data Reviewed  Clinical lab tests: ordered and reviewed  Tests in the radiology section of CPT®: ordered and reviewed    Risk of Complications, Morbidity, and/or Mortality  Presenting problems: low  Diagnostic procedures: low  Management options: low    Patient Progress  Patient progress: stable      Final diagnoses:   Pain of left lower extremity              Juan Chow MD  10/22/19 2126

## 2020-01-19 ENCOUNTER — HOSPITAL ENCOUNTER (EMERGENCY)
Facility: HOSPITAL | Age: 35
Discharge: HOME | End: 2020-01-19
Attending: FAMILY MEDICINE | Admitting: FAMILY MEDICINE

## 2020-01-19 ENCOUNTER — NURSE TRIAGE (OUTPATIENT)
Dept: CALL CENTER | Facility: HOSPITAL | Age: 35
End: 2020-01-19

## 2020-01-19 ENCOUNTER — HOSPITAL ENCOUNTER (EMERGENCY)
Facility: HOSPITAL | Age: 35
Discharge: HOME | End: 2020-01-20
Attending: EMERGENCY MEDICINE | Admitting: EMERGENCY MEDICINE

## 2020-01-19 ENCOUNTER — APPOINTMENT (OUTPATIENT)
Dept: CT IMAGING | Facility: HOSPITAL | Age: 35
End: 2020-01-19

## 2020-01-19 VITALS
TEMPERATURE: 98.4 F | SYSTOLIC BLOOD PRESSURE: 160 MMHG | OXYGEN SATURATION: 98 % | DIASTOLIC BLOOD PRESSURE: 100 MMHG | WEIGHT: 210 LBS | HEART RATE: 82 BPM | BODY MASS INDEX: 42.33 KG/M2 | RESPIRATION RATE: 18 BRPM | HEIGHT: 59 IN

## 2020-01-19 DIAGNOSIS — R10.12 LUQ PAIN: Primary | ICD-10-CM

## 2020-01-19 DIAGNOSIS — R10.84 GENERALIZED ABDOMINAL PAIN: Primary | ICD-10-CM

## 2020-01-19 LAB
ALBUMIN SERPL-MCNC: 4.19 G/DL (ref 3.5–5.2)
ALBUMIN/GLOB SERPL: 1.3 G/DL
ALP SERPL-CCNC: 83 U/L (ref 39–117)
ALT SERPL W P-5'-P-CCNC: 57 U/L (ref 1–33)
AMYLASE SERPL-CCNC: 32 U/L (ref 28–100)
ANION GAP SERPL CALCULATED.3IONS-SCNC: 12.4 MMOL/L (ref 5–15)
AST SERPL-CCNC: 40 U/L (ref 1–32)
B-HCG UR QL: NEGATIVE
BASOPHILS # BLD AUTO: 0.03 10*3/MM3 (ref 0–0.2)
BASOPHILS NFR BLD AUTO: 0.5 % (ref 0–1.5)
BILIRUB SERPL-MCNC: 0.8 MG/DL (ref 0.2–1.2)
BILIRUB UR QL STRIP: NEGATIVE
BUN BLD-MCNC: 14 MG/DL (ref 6–20)
BUN/CREAT SERPL: 22.6 (ref 7–25)
CALCIUM SPEC-SCNC: 8.8 MG/DL (ref 8.6–10.5)
CHLORIDE SERPL-SCNC: 99 MMOL/L (ref 98–107)
CLARITY UR: CLEAR
CO2 SERPL-SCNC: 25.6 MMOL/L (ref 22–29)
COLOR UR: YELLOW
CREAT BLD-MCNC: 0.62 MG/DL (ref 0.57–1)
DEPRECATED RDW RBC AUTO: 36.2 FL (ref 37–54)
EOSINOPHIL # BLD AUTO: 0.15 10*3/MM3 (ref 0–0.4)
EOSINOPHIL NFR BLD AUTO: 2.3 % (ref 0.3–6.2)
ERYTHROCYTE [DISTWIDTH] IN BLOOD BY AUTOMATED COUNT: 12.3 % (ref 12.3–15.4)
GFR SERPL CREATININE-BSD FRML MDRD: 109 ML/MIN/1.73
GLOBULIN UR ELPH-MCNC: 3.1 GM/DL
GLUCOSE BLD-MCNC: 272 MG/DL (ref 65–99)
GLUCOSE BLDC GLUCOMTR-MCNC: 280 MG/DL (ref 70–130)
GLUCOSE UR STRIP-MCNC: ABNORMAL MG/DL
HCT VFR BLD AUTO: 37.5 % (ref 34–46.6)
HGB BLD-MCNC: 13.4 G/DL (ref 12–15.9)
HGB UR QL STRIP.AUTO: NEGATIVE
IMM GRANULOCYTES # BLD AUTO: 0.05 10*3/MM3 (ref 0–0.05)
IMM GRANULOCYTES NFR BLD AUTO: 0.8 % (ref 0–0.5)
KETONES UR QL STRIP: ABNORMAL
LEUKOCYTE ESTERASE UR QL STRIP.AUTO: NEGATIVE
LIPASE SERPL-CCNC: 32 U/L (ref 13–60)
LYMPHOCYTES # BLD AUTO: 2.61 10*3/MM3 (ref 0.7–3.1)
LYMPHOCYTES NFR BLD AUTO: 40.8 % (ref 19.6–45.3)
MCH RBC QN AUTO: 29.3 PG (ref 26.6–33)
MCHC RBC AUTO-ENTMCNC: 35.7 G/DL (ref 31.5–35.7)
MCV RBC AUTO: 82.1 FL (ref 79–97)
MONOCYTES # BLD AUTO: 0.34 10*3/MM3 (ref 0.1–0.9)
MONOCYTES NFR BLD AUTO: 5.3 % (ref 5–12)
NEUTROPHILS # BLD AUTO: 3.21 10*3/MM3 (ref 1.7–7)
NEUTROPHILS NFR BLD AUTO: 50.3 % (ref 42.7–76)
NITRITE UR QL STRIP: NEGATIVE
NRBC BLD AUTO-RTO: 0 /100 WBC (ref 0–0.2)
PH UR STRIP.AUTO: 6.5 [PH] (ref 5–8)
PLATELET # BLD AUTO: 141 10*3/MM3 (ref 140–450)
PMV BLD AUTO: 10.8 FL (ref 6–12)
POTASSIUM BLD-SCNC: 4.1 MMOL/L (ref 3.5–5.2)
PROT SERPL-MCNC: 7.3 G/DL (ref 6–8.5)
PROT UR QL STRIP: NEGATIVE
RBC # BLD AUTO: 4.57 10*6/MM3 (ref 3.77–5.28)
SODIUM BLD-SCNC: 137 MMOL/L (ref 136–145)
SP GR UR STRIP: >1.03 (ref 1–1.03)
TROPONIN T SERPL-MCNC: <0.01 NG/ML (ref 0–0.03)
UROBILINOGEN UR QL STRIP: ABNORMAL
WBC NRBC COR # BLD: 6.39 10*3/MM3 (ref 3.4–10.8)

## 2020-01-19 PROCEDURE — 96375 TX/PRO/DX INJ NEW DRUG ADDON: CPT

## 2020-01-19 PROCEDURE — 25010000002 HYDROMORPHONE PER 4 MG: Performed by: NURSE PRACTITIONER

## 2020-01-19 PROCEDURE — 81003 URINALYSIS AUTO W/O SCOPE: CPT | Performed by: NURSE PRACTITIONER

## 2020-01-19 PROCEDURE — 25010000002 ONDANSETRON PER 1 MG: Performed by: NURSE PRACTITIONER

## 2020-01-19 PROCEDURE — 83690 ASSAY OF LIPASE: CPT | Performed by: NURSE PRACTITIONER

## 2020-01-19 PROCEDURE — 84484 ASSAY OF TROPONIN QUANT: CPT | Performed by: NURSE PRACTITIONER

## 2020-01-19 PROCEDURE — 99284 EMERGENCY DEPT VISIT MOD MDM: CPT

## 2020-01-19 PROCEDURE — 96374 THER/PROPH/DIAG INJ IV PUSH: CPT

## 2020-01-19 PROCEDURE — 0 IOVERSOL 68 % SOLUTION: Performed by: FAMILY MEDICINE

## 2020-01-19 PROCEDURE — 81025 URINE PREGNANCY TEST: CPT | Performed by: NURSE PRACTITIONER

## 2020-01-19 PROCEDURE — 80053 COMPREHEN METABOLIC PANEL: CPT | Performed by: NURSE PRACTITIONER

## 2020-01-19 PROCEDURE — 93005 ELECTROCARDIOGRAM TRACING: CPT | Performed by: NURSE PRACTITIONER

## 2020-01-19 PROCEDURE — 74177 CT ABD & PELVIS W/CONTRAST: CPT

## 2020-01-19 PROCEDURE — 96376 TX/PRO/DX INJ SAME DRUG ADON: CPT

## 2020-01-19 PROCEDURE — 93010 ELECTROCARDIOGRAM REPORT: CPT | Performed by: INTERNAL MEDICINE

## 2020-01-19 PROCEDURE — 25010000002 PROMETHAZINE PER 50 MG: Performed by: EMERGENCY MEDICINE

## 2020-01-19 PROCEDURE — 82150 ASSAY OF AMYLASE: CPT | Performed by: NURSE PRACTITIONER

## 2020-01-19 PROCEDURE — 85025 COMPLETE CBC W/AUTO DIFF WBC: CPT | Performed by: NURSE PRACTITIONER

## 2020-01-19 PROCEDURE — 82962 GLUCOSE BLOOD TEST: CPT

## 2020-01-19 PROCEDURE — 74177 CT ABD & PELVIS W/CONTRAST: CPT | Performed by: RADIOLOGY

## 2020-01-19 PROCEDURE — 99285 EMERGENCY DEPT VISIT HI MDM: CPT

## 2020-01-19 RX ORDER — HYDROCODONE BITARTRATE AND ACETAMINOPHEN 5; 325 MG/1; MG/1
1 TABLET ORAL EVERY 6 HOURS PRN
Qty: 10 TABLET | Refills: 0 | Status: SHIPPED | OUTPATIENT
Start: 2020-01-19 | End: 2020-02-03

## 2020-01-19 RX ORDER — HYDROMORPHONE HYDROCHLORIDE 1 MG/ML
0.5 INJECTION, SOLUTION INTRAMUSCULAR; INTRAVENOUS; SUBCUTANEOUS ONCE
Status: COMPLETED | OUTPATIENT
Start: 2020-01-19 | End: 2020-01-19

## 2020-01-19 RX ORDER — NITROFURANTOIN 25; 75 MG/1; MG/1
100 CAPSULE ORAL 2 TIMES DAILY
Qty: 14 CAPSULE | Refills: 0 | Status: SHIPPED | OUTPATIENT
Start: 2020-01-19 | End: 2020-01-26

## 2020-01-19 RX ORDER — PROMETHAZINE HYDROCHLORIDE 25 MG/ML
25 INJECTION, SOLUTION INTRAMUSCULAR; INTRAVENOUS ONCE
Status: COMPLETED | OUTPATIENT
Start: 2020-01-19 | End: 2020-01-19

## 2020-01-19 RX ORDER — SODIUM CHLORIDE 0.9 % (FLUSH) 0.9 %
10 SYRINGE (ML) INJECTION AS NEEDED
Status: DISCONTINUED | OUTPATIENT
Start: 2020-01-19 | End: 2020-01-20 | Stop reason: HOSPADM

## 2020-01-19 RX ORDER — CLONIDINE HYDROCHLORIDE 0.1 MG/1
0.1 TABLET ORAL ONCE
Status: COMPLETED | OUTPATIENT
Start: 2020-01-19 | End: 2020-01-19

## 2020-01-19 RX ORDER — ONDANSETRON 4 MG/1
4 TABLET, ORALLY DISINTEGRATING ORAL EVERY 6 HOURS PRN
Qty: 12 TABLET | Refills: 0 | Status: SHIPPED | OUTPATIENT
Start: 2020-01-19 | End: 2020-02-03

## 2020-01-19 RX ORDER — SODIUM CHLORIDE 0.9 % (FLUSH) 0.9 %
10 SYRINGE (ML) INJECTION AS NEEDED
Status: DISCONTINUED | OUTPATIENT
Start: 2020-01-19 | End: 2020-01-19 | Stop reason: HOSPADM

## 2020-01-19 RX ORDER — ONDANSETRON 2 MG/ML
4 INJECTION INTRAMUSCULAR; INTRAVENOUS ONCE
Status: COMPLETED | OUTPATIENT
Start: 2020-01-19 | End: 2020-01-19

## 2020-01-19 RX ADMIN — PROMETHAZINE HYDROCHLORIDE 25 MG: 25 INJECTION INTRAMUSCULAR; INTRAVENOUS at 23:53

## 2020-01-19 RX ADMIN — IOVERSOL 100 ML: 678 INJECTION INTRA-ARTERIAL; INTRAVENOUS at 12:01

## 2020-01-19 RX ADMIN — SODIUM CHLORIDE 1000 ML: 9 INJECTION, SOLUTION INTRAVENOUS at 23:51

## 2020-01-19 RX ADMIN — HYDROMORPHONE HYDROCHLORIDE 0.5 MG: 1 INJECTION, SOLUTION INTRAMUSCULAR; INTRAVENOUS; SUBCUTANEOUS at 10:27

## 2020-01-19 RX ADMIN — CLONIDINE HYDROCHLORIDE 0.1 MG: 0.1 TABLET ORAL at 14:22

## 2020-01-19 RX ADMIN — HYDROMORPHONE HYDROCHLORIDE 0.5 MG: 1 INJECTION, SOLUTION INTRAMUSCULAR; INTRAVENOUS; SUBCUTANEOUS at 14:23

## 2020-01-19 RX ADMIN — ONDANSETRON 4 MG: 2 INJECTION INTRAMUSCULAR; INTRAVENOUS at 10:26

## 2020-01-19 NOTE — ED NOTES
Patient resting on stretcher with NADN at this time. VSS. Oxygenation maintained on room air and pt tolerating well. Respirations even, unlabored. Skin PWD. No needs or concerns voiced. Call light within reach. Family at bedside. Patient awaiting discharge home.       Michelle Franks, RN  01/19/20 7646

## 2020-01-19 NOTE — ED NOTES
Consent for IV contrast signed by patient. Patient denies any needs, no concerns voiced regarding contrast. Resting comfortably on stretcher. Will continue to monitor.      Jay Gardner RN  01/19/20 1971

## 2020-01-19 NOTE — DISCHARGE INSTRUCTIONS
Follow up with your primary care provider in 24-48 hours.    Return to the emergency room for worsening or persistent symptoms.

## 2020-01-19 NOTE — ED PROVIDER NOTES
Subjective     History provided by:  Patient   used: No    Abdominal Pain   Pain location:  LLQ  Timing:  Constant  Progression:  Worsening  Chronicity:  New  Context: not alcohol use, not awakening from sleep, not eating, not previous surgeries, not recent illness and not retching    Relieved by:  Nothing  Worsened by:  Nothing  Ineffective treatments:  None tried  Associated symptoms: constipation, dysuria and vomiting    Associated symptoms: no anorexia and no belching    Risk factors: obesity    Risk factors comment:  Insulin dependent diabetes      Review of Systems   Constitutional: Negative.    HENT: Negative.    Eyes: Negative.    Respiratory: Negative.    Cardiovascular: Negative.    Gastrointestinal: Positive for abdominal pain, constipation and vomiting. Negative for anorexia.   Endocrine: Negative.    Genitourinary: Positive for dysuria.   Musculoskeletal: Negative.    Skin: Negative.    Allergic/Immunologic: Negative.    Neurological: Negative.    Hematological: Negative.    Psychiatric/Behavioral: Negative.        Past Medical History:   Diagnosis Date   • Anxiety    • Depression    • Diabetes mellitus (CMS/HCC)    • Diverticulitis    • GERD (gastroesophageal reflux disease)    • Hypertension    • Kidney stone    • PCOS (polycystic ovarian syndrome)    • Sleep apnea        Allergies   Allergen Reactions   • Dilantin [Phenytoin] Mental Status Change   • Keppra [Levetiracetam] Mental Status Change   • Meperidine Rash   • Topamax [Topiramate] Mental Status Change       Past Surgical History:   Procedure Laterality Date   • CARDIAC CATHETERIZATION     • CARPAL TUNNEL RELEASE     • COLONOSCOPY     • ENDOSCOPY     • FRACTURE SURGERY         Family History   Problem Relation Age of Onset   • Heart disease Mother    • COPD Mother    • Heart disease Father    • COPD Father        Social History     Socioeconomic History   • Marital status:      Spouse name: Not on file   • Number of  children: Not on file   • Years of education: Not on file   • Highest education level: Not on file   Occupational History   • Occupation: Not Working   Tobacco Use   • Smoking status: Never Smoker   • Smokeless tobacco: Never Used   Substance and Sexual Activity   • Alcohol use: No   • Drug use: No   • Sexual activity: Defer           Objective   Physical Exam   Constitutional: She is oriented to person, place, and time. She appears well-developed and well-nourished.   HENT:   Head: Normocephalic.   Right Ear: External ear normal.   Left Ear: External ear normal.   Mouth/Throat: Oropharynx is clear and moist.   Eyes: Pupils are equal, round, and reactive to light. Conjunctivae and EOM are normal.   Neck: Normal range of motion. Neck supple.   Cardiovascular: Normal rate, regular rhythm, normal heart sounds and intact distal pulses.   Pulmonary/Chest: Effort normal and breath sounds normal.   Abdominal: Soft. Bowel sounds are normal. There is tenderness in the left lower quadrant.   Musculoskeletal: Normal range of motion.   Neurological: She is alert and oriented to person, place, and time.   Skin: Skin is warm and dry. Capillary refill takes less than 2 seconds.   Psychiatric: She has a normal mood and affect. Her behavior is normal. Thought content normal.   Nursing note and vitals reviewed.      Procedures           ED Course                                               MDM    Final diagnoses:   LUQ pain            Stevenson Andre, APRN  01/19/20 0588

## 2020-01-20 VITALS
HEART RATE: 69 BPM | HEIGHT: 59 IN | SYSTOLIC BLOOD PRESSURE: 132 MMHG | RESPIRATION RATE: 16 BRPM | BODY MASS INDEX: 42.33 KG/M2 | WEIGHT: 210 LBS | DIASTOLIC BLOOD PRESSURE: 88 MMHG | OXYGEN SATURATION: 100 % | TEMPERATURE: 97.9 F

## 2020-01-20 RX ORDER — KETOROLAC TROMETHAMINE 10 MG/1
10 TABLET, FILM COATED ORAL EVERY 6 HOURS PRN
Qty: 15 TABLET | Refills: 0 | Status: SHIPPED | OUTPATIENT
Start: 2020-01-20 | End: 2020-02-03

## 2020-01-20 RX ORDER — PROMETHAZINE HYDROCHLORIDE 25 MG/1
25 TABLET ORAL EVERY 6 HOURS PRN
Qty: 30 TABLET | Refills: 0 | Status: SHIPPED | OUTPATIENT
Start: 2020-01-20 | End: 2020-02-03

## 2020-01-20 NOTE — ED PROVIDER NOTES
Subjective   34-year-old female in the emergency department with continued abdominal pain.  Was in the emergency department earlier today, and had extensive work-up done with no acute abnormalities noted.  Back to the emergency department for continued pain and nausea.  Patient has significant past medical history of anxiety, depression, diabetes mellitus, diverticulitis, GERD, hypertension, kidney stone, PCOS, and sleep apnea.          Review of Systems   Gastrointestinal: Positive for abdominal pain and nausea.   All other systems reviewed and are negative.      Past Medical History:   Diagnosis Date   • Anxiety    • Depression    • Diabetes mellitus (CMS/HCC)    • Diverticulitis    • GERD (gastroesophageal reflux disease)    • Hypertension    • Kidney stone    • PCOS (polycystic ovarian syndrome)    • Sleep apnea        Allergies   Allergen Reactions   • Dilantin [Phenytoin] Mental Status Change   • Keppra [Levetiracetam] Mental Status Change   • Meperidine Rash   • Topamax [Topiramate] Mental Status Change       Past Surgical History:   Procedure Laterality Date   • CARDIAC CATHETERIZATION     • CARPAL TUNNEL RELEASE     • COLONOSCOPY     • ENDOSCOPY     • FRACTURE SURGERY         Family History   Problem Relation Age of Onset   • Heart disease Mother    • COPD Mother    • Heart disease Father    • COPD Father        Social History     Socioeconomic History   • Marital status:      Spouse name: Not on file   • Number of children: Not on file   • Years of education: Not on file   • Highest education level: Not on file   Occupational History   • Occupation: Not Working   Tobacco Use   • Smoking status: Never Smoker   • Smokeless tobacco: Never Used   Substance and Sexual Activity   • Alcohol use: No   • Drug use: No   • Sexual activity: Defer           Objective   Physical Exam   Constitutional: She is oriented to person, place, and time. She appears well-developed and well-nourished.  Non-toxic appearance.  No distress.   HENT:   Head: Normocephalic and atraumatic.   Right Ear: External ear normal.   Left Ear: External ear normal.   Nose: Nose normal.   Mouth/Throat: Oropharynx is clear and moist and mucous membranes are normal. No oropharyngeal exudate. No tonsillar exudate.   Eyes: Pupils are equal, round, and reactive to light. Conjunctivae, EOM and lids are normal.   Neck: Normal range of motion and full passive range of motion without pain. Neck supple. No thyromegaly present.   Cardiovascular: Normal rate, regular rhythm, S1 normal, S2 normal, normal heart sounds, intact distal pulses and normal pulses.   Pulmonary/Chest: Effort normal and breath sounds normal. No tachypnea. No respiratory distress. She has no decreased breath sounds. She has no wheezes. She has no rales. She exhibits no tenderness.   Abdominal: Soft. Normal appearance and bowel sounds are normal. She exhibits no distension. There is tenderness. There is no rebound and no guarding.   Musculoskeletal: Normal range of motion. She exhibits no edema, tenderness or deformity.   Lymphadenopathy:     She has no cervical adenopathy.   Neurological: She is alert and oriented to person, place, and time. She has normal strength. No cranial nerve deficit or sensory deficit. GCS eye subscore is 4. GCS verbal subscore is 5. GCS motor subscore is 6.   Skin: Skin is warm, dry and intact. No rash noted. She is not diaphoretic. No erythema. No pallor.   Psychiatric: She has a normal mood and affect. Her speech is normal and behavior is normal. Judgment and thought content normal. Cognition and memory are normal.   Nursing note and vitals reviewed.      Procedures           ED Course  ED Course as of Jan 20 0450   Mon Jan 20, 2020   0450 Blood Pressure : **/** mmHG  Vent. Rate : 063 BPM     Atrial Rate : 063 BPM     P-R Int : 140 ms          QRS Dur : 082 ms      QT Int : 412 ms       P-R-T Axes : 062 072 091 degrees     QTc Int : 421 ms     Normal sinus  rhythm  Normal ECG  When compared with ECG of 11-OCT-2018 08:39,  No significant change was found   ECG 12 Lead [ES]   3810 Patient reports feeling better with treatment in the emergency department, and is requested to be discharged home at this time.  Return to the emergency department with any worsening symptoms.    [ES]      ED Course User Index  [ES] Daniel Muller MD                                               MDM  Number of Diagnoses or Management Options  Generalized abdominal pain: established and worsening     Amount and/or Complexity of Data Reviewed  Review and summarize past medical records: yes  Independent visualization of images, tracings, or specimens: yes    Risk of Complications, Morbidity, and/or Mortality  Presenting problems: moderate  Diagnostic procedures: moderate  Management options: moderate    Patient Progress  Patient progress: stable      Final diagnoses:   Generalized abdominal pain            Daniel Muller MD  01/20/20 0454

## 2020-01-20 NOTE — TELEPHONE ENCOUNTER
"Pt. Had been in ER at [facility] today, is home and abd. Pain is worse and constant and her BP is up and now head ache is rated 8, she is vomiting, Her  is really upset, says he does not want his wife to die, he is worried. Triage nurse sent pt. Back to ER.     Reason for Disposition  • [1] SEVERE pain (e.g., excruciating) AND [2] present > 1 hour    Additional Information  • Negative: Severe difficulty breathing (e.g., struggling for each breath, speaks in single words)  • Negative: Shock suspected (e.g., cold/pale/clammy skin, too weak to stand, low BP, rapid pulse)  • Negative: Difficult to awaken or acting confused (e.g., disoriented, slurred speech)  • Negative: Passed out (i.e., lost consciousness, collapsed and was not responding)  • Negative: Visible sweat on face or sweat dripping down face  • Negative: Sounds like a life-threatening emergency to the triager  • Negative: Followed an abdomen (stomach) injury  • Negative: Chest pain  • Negative: [1] Pain lasts > 10 minutes AND [2] age > 50  • Negative: [1] Pain lasts > 10 minutes AND [2] age > 40 AND [3] associated chest, arm, neck, upper back or jaw pain  • Negative: [1] Pain lasts > 10 minutes AND [2] age > 35 AND [3] at least one cardiac risk factor (i.e., hypertension, diabetes, obesity, smoker or strong family history of heart disease)  • Negative: [1] Pain lasts > 10 minutes AND [2] history of heart disease (i.e., heart attack, bypass surgery, angina, angioplasty, CHF; not just a heart murmur)  • Negative: [1] Pain lasts > 10 minutes AND [2] difficulty breathing  • Negative: [1] Vomiting AND [2] contains red blood  (Exception: few streaks and only occurred once)  • Negative: [1] Vomiting AND [2] contains black (\"coffee ground\") material  • Negative: Blood in bowel movements  (Exception: Blood on surface of BM with constipation)  • Negative: Black or tarry bowel movements  (Exception: chronic-unchanged  black-grey bowel movements AND is taking iron " "pills or Pepto-bismol)  • Negative: [1] Pregnant > 24 weeks AND [2] hand or face swelling  • Negative: Patient sounds very sick or weak to the triager    Answer Assessment - Initial Assessment Questions  1. LOCATION: \"Where does it hurt?\"       abd pain rated 8-10 headache now rated 8 and vomiting  2. RADIATION: \"Does the pain shoot anywhere else?\" (e.g., chest, back)      To back  3. ONSET: \"When did the pain begin?\" (e.g., minutes, hours or days ago)       Few days now worse  4. SUDDEN: \"Gradual or sudden onset?\"      Gradual and getting worse constant now  5. PATTERN \"Does the pain come and go, or is it constant?\"     - If constant: \"Is it getting better, staying the same, or worsening?\"       (Note: Constant means the pain never goes away completely; most serious pain is constant and it progresses)      - If intermittent: \"How long does it last?\" \"Do you have pain now?\"      (Note: Intermittent means the pain goes away completely between bouts)      constant  6. SEVERITY: \"How bad is the pain?\"  (e.g., Scale 1-10; mild, moderate, or severe)     - MILD (1-3): doesn't interfere with normal activities, abdomen soft and not tender to touch      - MODERATE (4-7): interferes with normal activities or awakens from sleep, tender to touch      - SEVERE (8-10): excruciating pain, doubled over, unable to do any normal activities        8-10  7. RECURRENT SYMPTOM: \"Have you ever had this type of abdominal pain before?\" If so, ask: \"When was the last time?\" and \"What happened that time?\"       no  8. AGGRAVATING FACTORS: \"Does anything seem to cause this pain?\" (e.g., foods, stress, alcohol)     Hurts  All time  9. CARDIAC SYMPTOMS: \"Do you have any of the following symptoms: chest pain, difficulty breathing, sweating, nausea?\"      no  10. OTHER SYMPTOMS: \"Do you have any other symptoms?\" (e.g., fever, vomiting, diarrhea)        Vomiting and now headach   11. PREGNANCY: \"Is there any chance you are pregnant?\" \"When was your " "last menstrual period?\"        no    Protocols used: ABDOMINAL PAIN - UPPER-ADULT-AH      "

## 2020-01-23 ENCOUNTER — HOSPITAL ENCOUNTER (EMERGENCY)
Facility: HOSPITAL | Age: 35
Discharge: HOME | End: 2020-01-23
Attending: EMERGENCY MEDICINE | Admitting: EMERGENCY MEDICINE

## 2020-01-23 VITALS
WEIGHT: 206 LBS | OXYGEN SATURATION: 100 % | BODY MASS INDEX: 41.53 KG/M2 | TEMPERATURE: 98 F | SYSTOLIC BLOOD PRESSURE: 151 MMHG | DIASTOLIC BLOOD PRESSURE: 98 MMHG | RESPIRATION RATE: 18 BRPM | HEIGHT: 59 IN | HEART RATE: 91 BPM

## 2020-01-23 DIAGNOSIS — I10 HYPERTENSION, UNSPECIFIED TYPE: Primary | ICD-10-CM

## 2020-01-23 LAB
ALBUMIN SERPL-MCNC: 3.99 G/DL (ref 3.5–5.2)
ALBUMIN/GLOB SERPL: 1.3 G/DL
ALP SERPL-CCNC: 82 U/L (ref 39–117)
ALT SERPL W P-5'-P-CCNC: 62 U/L (ref 1–33)
ANION GAP SERPL CALCULATED.3IONS-SCNC: 11.9 MMOL/L (ref 5–15)
AST SERPL-CCNC: 45 U/L (ref 1–32)
BASOPHILS # BLD AUTO: 0.03 10*3/MM3 (ref 0–0.2)
BASOPHILS NFR BLD AUTO: 0.4 % (ref 0–1.5)
BILIRUB SERPL-MCNC: 0.9 MG/DL (ref 0.2–1.2)
BUN BLD-MCNC: 10 MG/DL (ref 6–20)
BUN/CREAT SERPL: 18.5 (ref 7–25)
CALCIUM SPEC-SCNC: 8.7 MG/DL (ref 8.6–10.5)
CHLORIDE SERPL-SCNC: 97 MMOL/L (ref 98–107)
CO2 SERPL-SCNC: 25.1 MMOL/L (ref 22–29)
CREAT BLD-MCNC: 0.54 MG/DL (ref 0.57–1)
DEPRECATED RDW RBC AUTO: 36.8 FL (ref 37–54)
EOSINOPHIL # BLD AUTO: 0.18 10*3/MM3 (ref 0–0.4)
EOSINOPHIL NFR BLD AUTO: 2.4 % (ref 0.3–6.2)
ERYTHROCYTE [DISTWIDTH] IN BLOOD BY AUTOMATED COUNT: 12.2 % (ref 12.3–15.4)
GFR SERPL CREATININE-BSD FRML MDRD: 128 ML/MIN/1.73
GLOBULIN UR ELPH-MCNC: 3 GM/DL
GLUCOSE BLD-MCNC: 251 MG/DL (ref 65–99)
HCT VFR BLD AUTO: 37.9 % (ref 34–46.6)
HGB BLD-MCNC: 13.4 G/DL (ref 12–15.9)
IMM GRANULOCYTES # BLD AUTO: 0.03 10*3/MM3 (ref 0–0.05)
IMM GRANULOCYTES NFR BLD AUTO: 0.4 % (ref 0–0.5)
LYMPHOCYTES # BLD AUTO: 2.62 10*3/MM3 (ref 0.7–3.1)
LYMPHOCYTES NFR BLD AUTO: 35.1 % (ref 19.6–45.3)
MCH RBC QN AUTO: 29.4 PG (ref 26.6–33)
MCHC RBC AUTO-ENTMCNC: 35.4 G/DL (ref 31.5–35.7)
MCV RBC AUTO: 83.1 FL (ref 79–97)
MONOCYTES # BLD AUTO: 0.36 10*3/MM3 (ref 0.1–0.9)
MONOCYTES NFR BLD AUTO: 4.8 % (ref 5–12)
NEUTROPHILS # BLD AUTO: 4.25 10*3/MM3 (ref 1.7–7)
NEUTROPHILS NFR BLD AUTO: 56.9 % (ref 42.7–76)
NRBC BLD AUTO-RTO: 0 /100 WBC (ref 0–0.2)
PLATELET # BLD AUTO: 144 10*3/MM3 (ref 140–450)
PMV BLD AUTO: 10.3 FL (ref 6–12)
POTASSIUM BLD-SCNC: 4 MMOL/L (ref 3.5–5.2)
PROT SERPL-MCNC: 7 G/DL (ref 6–8.5)
RBC # BLD AUTO: 4.56 10*6/MM3 (ref 3.77–5.28)
SODIUM BLD-SCNC: 134 MMOL/L (ref 136–145)
TROPONIN T SERPL-MCNC: <0.01 NG/ML (ref 0–0.03)
WBC NRBC COR # BLD: 7.47 10*3/MM3 (ref 3.4–10.8)

## 2020-01-23 PROCEDURE — 99284 EMERGENCY DEPT VISIT MOD MDM: CPT

## 2020-01-23 PROCEDURE — 93010 ELECTROCARDIOGRAM REPORT: CPT | Performed by: INTERNAL MEDICINE

## 2020-01-23 PROCEDURE — 93005 ELECTROCARDIOGRAM TRACING: CPT | Performed by: EMERGENCY MEDICINE

## 2020-01-23 PROCEDURE — 85025 COMPLETE CBC W/AUTO DIFF WBC: CPT | Performed by: NURSE PRACTITIONER

## 2020-01-23 PROCEDURE — 80053 COMPREHEN METABOLIC PANEL: CPT | Performed by: NURSE PRACTITIONER

## 2020-01-23 PROCEDURE — 84484 ASSAY OF TROPONIN QUANT: CPT | Performed by: NURSE PRACTITIONER

## 2020-01-23 RX ORDER — CLONIDINE HYDROCHLORIDE 0.1 MG/1
0.1 TABLET ORAL ONCE
Status: COMPLETED | OUTPATIENT
Start: 2020-01-23 | End: 2020-01-23

## 2020-01-23 RX ORDER — CLONIDINE HYDROCHLORIDE 0.1 MG/1
0.1 TABLET ORAL 3 TIMES DAILY PRN
Qty: 10 TABLET | Refills: 0 | Status: SHIPPED | OUTPATIENT
Start: 2020-01-23 | End: 2021-02-22

## 2020-01-23 RX ORDER — CLONIDINE HYDROCHLORIDE 0.1 MG/1
0.2 TABLET ORAL ONCE
Status: DISCONTINUED | OUTPATIENT
Start: 2020-01-23 | End: 2020-01-23

## 2020-01-23 RX ADMIN — CLONIDINE HYDROCHLORIDE 0.1 MG: 0.1 TABLET ORAL at 12:00

## 2020-01-23 RX ADMIN — CLONIDINE HYDROCHLORIDE 0.1 MG: 0.1 TABLET ORAL at 12:47

## 2020-01-23 NOTE — ED PROVIDER NOTES
Subjective     History provided by:  Patient   used: No    Hypertension   Severity:  Moderate  Onset quality:  Sudden  Timing:  Constant  Progression:  Worsening  Chronicity:  New  Context: normal sodium, not caffeine, not herbal remedies, not noncompliance, not OTC medications used and not stress    Relieved by:  Nothing  Worsened by:  Nothing  Ineffective treatments:  None tried  Associated symptoms: no anxiety, no fatigue, no headaches and no palpitations        Review of Systems   Constitutional: Negative.  Negative for fatigue.   HENT: Negative.    Eyes: Negative.    Respiratory: Negative.    Cardiovascular: Negative.  Negative for palpitations.   Gastrointestinal: Negative.    Endocrine: Negative.    Genitourinary: Negative.    Musculoskeletal: Negative.    Skin: Negative.    Allergic/Immunologic: Negative.    Neurological: Negative.  Negative for headaches.   Hematological: Negative.    Psychiatric/Behavioral: Negative.  The patient is not nervous/anxious.        Past Medical History:   Diagnosis Date   • Anxiety    • Depression    • Diabetes mellitus (CMS/HCC)    • Diverticulitis    • GERD (gastroesophageal reflux disease)    • Hypertension    • Kidney stone    • PCOS (polycystic ovarian syndrome)    • Sleep apnea        Allergies   Allergen Reactions   • Dilantin [Phenytoin] Mental Status Change   • Keppra [Levetiracetam] Mental Status Change   • Meperidine Rash   • Topamax [Topiramate] Mental Status Change       Past Surgical History:   Procedure Laterality Date   • CARDIAC CATHETERIZATION     • CARPAL TUNNEL RELEASE     • COLONOSCOPY     • ENDOSCOPY     • FRACTURE SURGERY         Family History   Problem Relation Age of Onset   • Heart disease Mother    • COPD Mother    • Heart disease Father    • COPD Father        Social History     Socioeconomic History   • Marital status:      Spouse name: Not on file   • Number of children: Not on file   • Years of education: Not on file    • Highest education level: Not on file   Occupational History   • Occupation: Not Working   Tobacco Use   • Smoking status: Never Smoker   • Smokeless tobacco: Never Used   Substance and Sexual Activity   • Alcohol use: No   • Drug use: No   • Sexual activity: Defer           Objective   Physical Exam   Constitutional: She is oriented to person, place, and time. She appears well-developed and well-nourished.   HENT:   Head: Normocephalic.   Right Ear: External ear normal.   Left Ear: External ear normal.   Mouth/Throat: Oropharynx is clear and moist.   Eyes: Pupils are equal, round, and reactive to light. Conjunctivae and EOM are normal.   Neck: Normal range of motion. Neck supple.   Cardiovascular: Normal rate, regular rhythm, normal heart sounds and intact distal pulses.   Pulmonary/Chest: Effort normal and breath sounds normal.   Abdominal: Soft. Bowel sounds are normal.   Musculoskeletal: Normal range of motion.   Neurological: She is alert and oriented to person, place, and time.   Skin: Skin is warm and dry. Capillary refill takes less than 2 seconds.   Psychiatric: She has a normal mood and affect. Her behavior is normal. Thought content normal.   Nursing note and vitals reviewed.      Procedures           ED Course                                               MDM    Final diagnoses:   Hypertension, unspecified type            Stevenson Andre, APRN  01/23/20 1531

## 2020-02-03 ENCOUNTER — OFFICE VISIT (OUTPATIENT)
Dept: PSYCHIATRY | Facility: CLINIC | Age: 35
End: 2020-02-03

## 2020-02-03 VITALS
HEART RATE: 90 BPM | SYSTOLIC BLOOD PRESSURE: 198 MMHG | WEIGHT: 207 LBS | BODY MASS INDEX: 41.73 KG/M2 | HEIGHT: 59 IN | DIASTOLIC BLOOD PRESSURE: 116 MMHG

## 2020-02-03 DIAGNOSIS — F41.1 GENERALIZED ANXIETY DISORDER: ICD-10-CM

## 2020-02-03 DIAGNOSIS — F40.01 PANIC DISORDER WITH AGORAPHOBIA: Primary | ICD-10-CM

## 2020-02-03 DIAGNOSIS — F31.60 BIPOLAR AFFECTIVE DISORDER, CURRENT EPISODE MIXED, CURRENT EPISODE SEVERITY UNSPECIFIED (HCC): ICD-10-CM

## 2020-02-03 DIAGNOSIS — G47.419 NARCOLEPSY WITHOUT CATAPLEXY: ICD-10-CM

## 2020-02-03 DIAGNOSIS — Z79.899 MEDICATION MANAGEMENT: ICD-10-CM

## 2020-02-03 PROCEDURE — 99214 OFFICE O/P EST MOD 30 MIN: CPT | Performed by: NURSE PRACTITIONER

## 2020-02-03 RX ORDER — LISINOPRIL 10 MG/1
10 TABLET ORAL DAILY
Qty: 30 TABLET | Refills: 0 | Status: SHIPPED | OUTPATIENT
Start: 2020-02-03 | End: 2021-12-02

## 2020-02-03 RX ORDER — AMLODIPINE BESYLATE 2.5 MG/1
2.5 TABLET ORAL DAILY
Qty: 30 TABLET | Refills: 0 | Status: SHIPPED | OUTPATIENT
Start: 2020-02-03 | End: 2021-05-10

## 2020-02-03 RX ORDER — LAMOTRIGINE 150 MG/1
150 TABLET ORAL DAILY
Qty: 30 TABLET | Refills: 2 | Status: SHIPPED | OUTPATIENT
Start: 2020-02-03 | End: 2020-06-08 | Stop reason: SDUPTHER

## 2020-02-03 RX ORDER — BUPROPION HYDROCHLORIDE 300 MG/1
300 TABLET ORAL EVERY MORNING
Qty: 30 TABLET | Refills: 2 | Status: SHIPPED | OUTPATIENT
Start: 2020-02-03 | End: 2020-06-08 | Stop reason: SDUPTHER

## 2020-02-03 RX ORDER — SERTRALINE HYDROCHLORIDE 100 MG/1
100 TABLET, FILM COATED ORAL DAILY
Qty: 30 TABLET | Refills: 2 | Status: SHIPPED | OUTPATIENT
Start: 2020-02-03 | End: 2020-06-08 | Stop reason: SDUPTHER

## 2020-02-03 RX ORDER — LAMOTRIGINE 100 MG/1
TABLET ORAL
Qty: 21 TABLET | Refills: 0 | Status: SHIPPED | OUTPATIENT
Start: 2020-02-03 | End: 2020-03-16

## 2020-02-03 RX ORDER — MODAFINIL 100 MG/1
100 TABLET ORAL DAILY
Qty: 30 TABLET | Refills: 0 | Status: SHIPPED | OUTPATIENT
Start: 2020-02-03 | End: 2021-02-22

## 2020-02-03 RX ORDER — PROPRANOLOL HYDROCHLORIDE 20 MG/1
20 TABLET ORAL 2 TIMES DAILY
Qty: 60 TABLET | Refills: 2 | Status: SHIPPED | OUTPATIENT
Start: 2020-02-03 | End: 2020-06-08 | Stop reason: SDUPTHER

## 2020-02-03 NOTE — PROGRESS NOTES
Subjective   Antoinette Pack is a 34 y.o. female who is here today for medication management  Chief Complaint: Depression/psychosis/Anxiety    HPI:    She reports she lost her phone after leaving it on top of car and forgetting about it. She forgot about her last appointment because it was on her phone and she hasn't went out and replaced the phone yet. Since missing her appt she ran out of medicines for the past 1 month. She is tearful, her blood pressure is very elevated. She shares that she has been having elevated BP/pulse due to being without meds. She denies any SI/HI/AH/VH. She has been irritable, mood is labile. She has been taking better care to her feet and share they are improving, less painful.   Wears CPAP nightly. Reports she has to take at least 1 nap daily. Good appetite, tolerating diet. She has been trying to decrease her sugar intake, checks her blood sugar several times a day. Has been trying to increase her water intake.  She denies any SI/HI. Reports some AH/VH, which she notices during times of increased stress. Reports she is able to keep busy/distract herself when these hallucinations occur. No new medical concerns.  She has diabetic shoes from the podiatrist, but does not wear them regularly.     Pt has been soaking her feet at least twice a day and utilizing urea cream and a steroid cream.   The following portions of the patient's history were reviewed and updated as appropriate: allergies, current medications, past family history, past medical history, past social history, past surgical history and problem list.      Allergies   Allergen Reactions   • Dilantin [Phenytoin] Mental Status Change   • Keppra [Levetiracetam] Mental Status Change   • Meperidine Rash   • Topamax [Topiramate] Mental Status Change     Current Medications:   Current Outpatient Medications   Medication Sig Dispense Refill   • amLODIPine (NORVASC) 2.5 MG tablet Take 1 tablet by mouth Daily. 30 tablet 0   •  buPROPion XL (WELLBUTRIN XL) 300 MG 24 hr tablet Take 1 tablet by mouth Every Morning. 30 tablet 2   • cloNIDine (CATAPRES) 0.1 MG tablet Take 1 tablet by mouth 3 (Three) Times a Day As Needed (if BP greateer than 170/110). 10 tablet 0   • halobetasol (ULTRAVATE) 0.05 % cream      • hydrochlorothiazide (HYDRODIURIL) 25 MG tablet Take 1 tablet by mouth.     • insulin glargine (LANTUS) 100 UNIT/ML injection Inject 20 Units under the skin into the appropriate area as directed Daily.     • lamoTRIgine (LaMICtal) 100 MG tablet Take 0.5 tablets by mouth Daily for 14 days, THEN 1 tablet Daily for 14 days, THEN 1.5 tablets Daily for 14 days. 21 tablet 0   • lisinopril (PRINIVIL,ZESTRIL) 10 MG tablet Take 1 tablet by mouth Daily. 30 tablet 0   • modafinil (PROVIGIL) 100 MG tablet Take 1 tablet by mouth Daily. For narcolepsy 30 tablet 0   • omeprazole (PRILOSEC) 40 MG capsule      • propranolol (INDERAL) 20 MG tablet Take 1 tablet by mouth 2 (Two) Times a Day. 60 tablet 2   • sertraline (ZOLOFT) 100 MG tablet Take 1 tablet by mouth Daily. 30 tablet 2   • lamoTRIgine (LaMICtal) 150 MG tablet Take 1 tablet by mouth Daily. Take after finishing bottle of 100mg dosing 30 tablet 2     No current facility-administered medications for this visit.          Review of Systems   Constitutional: Negative for activity change, appetite change and fatigue.   Eyes: Negative for visual disturbance.   Respiratory: Negative.    Cardiovascular: Negative.    Gastrointestinal: Negative for nausea.   Endocrine: Negative.    Genitourinary: Negative.  Negative for urgency.        R/t diabetes   Musculoskeletal: Negative for arthralgias.        Tendonitis in elbow   Skin: Negative.         Cracked heels impairing her ambulating   Allergic/Immunologic: Negative.    Neurological: Positive for headaches. Negative for dizziness and seizures.   Hematological: Negative.    Psychiatric/Behavioral: Positive for agitation, confusion, decreased concentration,  "dysphoric mood and hallucinations. Negative for behavioral problems, self-injury, sleep disturbance and suicidal ideas. The patient is nervous/anxious. The patient is not hyperactive.     denies HEENT, cardiovascular, respiratory, liver, renal, GI/, endocrine, neuro, DERM, hematology, immunology, musculoskeletal disorders.    Objective   Physical Exam   Constitutional: She is oriented to person, place, and time. She appears well-developed and well-nourished. No distress.   HENT:   Head: Normocephalic.   Neurological: She is alert and oriented to person, place, and time.   Skin: She is not diaphoretic.   Psychiatric: Her speech is normal and behavior is normal. Her mood appears anxious. Cognition and memory are normal. She exhibits a depressed mood.   Vitals reviewed.    Blood pressure (!) 198/116, pulse 90, height 149.9 cm (59.02\"), weight 93.9 kg (207 lb), last menstrual period 01/08/2020. Body mass index is 41.79 kg/m².      Mental Status Exam:   Hygiene:   good  Cooperation:  Cooperative  Eye Contact:  Good  Psychomotor Behavior:  Appropriate  Affect:  Full range  Hopelessness: 5  Speech:  Normal  Thought Process:  Goal directed  Thought Content:  Normal  Suicidal:  None  Homicidal:  None  Hallucinations:  Visual  Delusion:  None  Memory:  Intact  Orientation:  Person, Place, Time and Situation  Reliability:  good  Insight:  Good  Judgement:  Good  Impulse Control:  Fair  Physical/Medical Issues:  Yes uncontrolled DM      Assessment/Plan   Problems Addressed this Visit        Digestive    Body mass index (BMI) 40.0-44.9, adult (CMS/[facility])      Other Visit Diagnoses     Panic disorder with agoraphobia    -  Primary    Relevant Medications    propranolol (INDERAL) 20 MG tablet    buPROPion XL (WELLBUTRIN XL) 300 MG 24 hr tablet    sertraline (ZOLOFT) 100 MG tablet    modafinil (PROVIGIL) 100 MG tablet    Generalized anxiety disorder        Relevant Medications    propranolol (INDERAL) 20 MG tablet    buPROPion XL " (WELLBUTRIN XL) 300 MG 24 hr tablet    sertraline (ZOLOFT) 100 MG tablet    modafinil (PROVIGIL) 100 MG tablet    Bipolar affective disorder, current episode mixed, current episode severity unspecified (CMS/HCC)        Relevant Medications    buPROPion XL (WELLBUTRIN XL) 300 MG 24 hr tablet    lamoTRIgine (LaMICtal) 100 MG tablet    sertraline (ZOLOFT) 100 MG tablet    modafinil (PROVIGIL) 100 MG tablet    lamoTRIgine (LaMICtal) 150 MG tablet    Narcolepsy without cataplexy        Relevant Medications    modafinil (PROVIGIL) 100 MG tablet    Medication management            Functionality: pt having significant impairment in important areas of daily functioning due being without meds for the past 1 month. Prognosis: Guarded dependent on medication/follow up and treatment plan compliance.  Body mass index is 41.79 kg/m².  Patient was educated on healthier and more balanced diet choices and encouraged exercise within physical limitations.   We will continue/resume current med regimen. The patient was reminded to immediately come to the hospital should there be any loss of control.  Instructed patient to continue utilizing CPAP for sleep apnea.   Discussed the risks, benefits, and side effects of the medication; client acknowledged and verbally consented.  Patient is aware to contact the [facility] with any worsening of symptom.  Patient is agreeable to go to the ER or call 911 should they begin SI/HI. Continue psycho therapy to work on coping skills in helping alleviate anxiety and regain functionality.   Highly encouraged patient to not run out of her medication and to call for refill if she has to miss an appointment to avoid worsening side effects or even hospitalization patient verbally agreed and consented.    Encouraged psychotherapy.      Return in 8-12 weeks - call with questions/concerns.

## 2020-04-13 DIAGNOSIS — F31.60 BIPOLAR AFFECTIVE DISORDER, CURRENT EPISODE MIXED, CURRENT EPISODE SEVERITY UNSPECIFIED (HCC): ICD-10-CM

## 2020-04-13 DIAGNOSIS — F40.01 PANIC DISORDER WITH AGORAPHOBIA: ICD-10-CM

## 2020-04-13 DIAGNOSIS — G47.419 NARCOLEPSY WITHOUT CATAPLEXY: ICD-10-CM

## 2020-04-13 DIAGNOSIS — F41.1 GENERALIZED ANXIETY DISORDER: ICD-10-CM

## 2020-04-13 RX ORDER — MODAFINIL 100 MG/1
100 TABLET ORAL DAILY
Qty: 30 TABLET | Refills: 0 | OUTPATIENT
Start: 2020-04-13

## 2020-04-13 RX ORDER — LISINOPRIL 10 MG/1
10 TABLET ORAL DAILY
Qty: 30 TABLET | Refills: 0 | OUTPATIENT
Start: 2020-04-13

## 2020-04-13 RX ORDER — AMLODIPINE BESYLATE 2.5 MG/1
2.5 TABLET ORAL DAILY
Qty: 30 TABLET | Refills: 0 | OUTPATIENT
Start: 2020-04-13

## 2020-04-13 NOTE — TELEPHONE ENCOUNTER
Agree with Christi, there has also been no JORGE since May 2019. I ran JORGE, she has only filled Modafinil twice since 6/2019. A neurologist should be writing Modafinil if it is for narcolepsy and perhaps she should not be on it if she is requiring antihypertensives as well. She will need an appointment. Advise to follow up with PCP for hypertension.

## 2020-04-13 NOTE — TELEPHONE ENCOUNTER
PT TRANSFERRED TO YOUR CARE FROM [facility] SE[facility]/S TO NEXT AVAILABLE WHICH IS 7/7/20 PLEASE FILL UNTIL THAT DATE

## 2020-04-13 NOTE — TELEPHONE ENCOUNTER
Prescribing these medication are beyond my scope of practice, she will need to get them from her PCP.

## 2020-04-14 NOTE — TELEPHONE ENCOUNTER
Pt made aware she stated Shruthi sent those in as a one time thing due to not being able to get ahold of PCP to fill, she will go back to getting RX from him.

## 2020-06-08 DIAGNOSIS — F41.1 GENERALIZED ANXIETY DISORDER: ICD-10-CM

## 2020-06-08 DIAGNOSIS — G47.419 NARCOLEPSY WITHOUT CATAPLEXY: ICD-10-CM

## 2020-06-08 DIAGNOSIS — F40.01 PANIC DISORDER WITH AGORAPHOBIA: ICD-10-CM

## 2020-06-08 DIAGNOSIS — F31.60 BIPOLAR AFFECTIVE DISORDER, CURRENT EPISODE MIXED, CURRENT EPISODE SEVERITY UNSPECIFIED (HCC): ICD-10-CM

## 2020-06-08 RX ORDER — SERTRALINE HYDROCHLORIDE 100 MG/1
100 TABLET, FILM COATED ORAL DAILY
Qty: 30 TABLET | Refills: 2 | Status: SHIPPED | OUTPATIENT
Start: 2020-06-08 | End: 2020-09-23 | Stop reason: SDUPTHER

## 2020-06-08 RX ORDER — BUPROPION HYDROCHLORIDE 300 MG/1
300 TABLET ORAL EVERY MORNING
Qty: 30 TABLET | Refills: 2 | Status: SHIPPED | OUTPATIENT
Start: 2020-06-08 | End: 2020-09-23 | Stop reason: SDUPTHER

## 2020-06-08 RX ORDER — LAMOTRIGINE 150 MG/1
150 TABLET ORAL DAILY
Qty: 30 TABLET | Refills: 2 | Status: SHIPPED | OUTPATIENT
Start: 2020-06-08 | End: 2020-09-23 | Stop reason: SDUPTHER

## 2020-06-08 RX ORDER — PROPRANOLOL HYDROCHLORIDE 20 MG/1
20 TABLET ORAL 2 TIMES DAILY
Qty: 60 TABLET | Refills: 2 | Status: SHIPPED | OUTPATIENT
Start: 2020-06-08 | End: 2020-09-23 | Stop reason: SDUPTHER

## 2020-06-11 ENCOUNTER — OFFICE VISIT (OUTPATIENT)
Dept: PSYCHIATRY | Facility: CLINIC | Age: 35
End: 2020-06-11

## 2020-06-11 VITALS
HEART RATE: 74 BPM | DIASTOLIC BLOOD PRESSURE: 88 MMHG | SYSTOLIC BLOOD PRESSURE: 133 MMHG | BODY MASS INDEX: 41.78 KG/M2 | HEIGHT: 59 IN

## 2020-06-11 DIAGNOSIS — Z79.899 MEDICATION MANAGEMENT: ICD-10-CM

## 2020-06-11 DIAGNOSIS — F41.1 GENERALIZED ANXIETY DISORDER: ICD-10-CM

## 2020-06-11 DIAGNOSIS — F31.75 BIPOLAR DISORDER, IN PARTIAL REMISSION, MOST RECENT EPISODE DEPRESSED (HCC): Primary | ICD-10-CM

## 2020-06-11 DIAGNOSIS — F40.01 PANIC DISORDER WITH AGORAPHOBIA: ICD-10-CM

## 2020-06-11 PROCEDURE — 99214 OFFICE O/P EST MOD 30 MIN: CPT | Performed by: PSYCHIATRY & NEUROLOGY

## 2020-06-11 RX ORDER — DULAGLUTIDE 1.5 MG/.5ML
INJECTION, SOLUTION SUBCUTANEOUS
COMMUNITY
Start: 2020-06-10 | End: 2022-01-05

## 2020-06-11 RX ORDER — OXCARBAZEPINE 300 MG/1
300 TABLET, FILM COATED ORAL 2 TIMES DAILY
Qty: 60 TABLET | Refills: 2 | Status: SHIPPED | OUTPATIENT
Start: 2020-06-11 | End: 2020-09-23 | Stop reason: SDUPTHER

## 2020-06-15 NOTE — PROGRESS NOTES
Subjective   Antoinette Pack is a 35 y.o. female who presents today for follow up    Chief Complaint: Depression and anxiety    History of Present Illness: Patient is a 35-year-old  female who presents today for follow-up.  This is my initial encounter with the patient as she was previously seeing another provider.  She is somewhat frustrated today as she was scheduled to see another provider but was late to her appointment and was unable to be seen by the other provider.  I worked on de-escalating her frustration and patient was amenable and relaxed by the end of the interview.  She reports today that she is doing relatively well.  She feels that her current medications are working for her and she denies any significant symptom burden.  She denies any medication side effects.  She denies SI/HI/AVH.    The following portions of the patient's history were reviewed and updated as appropriate: allergies, current medications, past family history, past medical history, past social history, past surgical history and problem list.      Past Medical History:  Past Medical History:   Diagnosis Date   • Anxiety    • Depression    • Diabetes mellitus (CMS/HCC)    • Diverticulitis    • GERD (gastroesophageal reflux disease)    • Hypertension    • Kidney stone    • PCOS (polycystic ovarian syndrome)    • Sleep apnea        Social History:  Social History     Socioeconomic History   • Marital status:      Spouse name: Not on file   • Number of children: Not on file   • Years of education: Not on file   • Highest education level: Not on file   Occupational History   • Occupation: Not Working   Tobacco Use   • Smoking status: Never Smoker   • Smokeless tobacco: Never Used   Substance and Sexual Activity   • Alcohol use: No   • Drug use: No   • Sexual activity: Defer       Family History:  Family History   Problem Relation Age of Onset   • Heart disease Mother    • COPD Mother    • Heart disease Father    • COPD  Father        Past Surgical History:  Past Surgical History:   Procedure Laterality Date   • CARDIAC CATHETERIZATION     • CARPAL TUNNEL RELEASE     • COLONOSCOPY     • ENDOSCOPY     • FRACTURE SURGERY     • HARDWARE REMOVAL      WRIST   • TENDON REPAIR      HAND       Problem List:  Patient Active Problem List   Diagnosis   • Body mass index (BMI) 40.0-44.9, adult (CMS/[facility])       Allergy:   Allergies   Allergen Reactions   • Dilantin [Phenytoin] Mental Status Change   • Keppra [Levetiracetam] Mental Status Change   • Meperidine Rash   • Topamax [Topiramate] Mental Status Change        Current Medications:   Current Outpatient Medications   Medication Sig Dispense Refill   • amLODIPine (NORVASC) 2.5 MG tablet Take 1 tablet by mouth Daily. 30 tablet 0   • buPROPion XL (WELLBUTRIN XL) 300 MG 24 hr tablet Take 1 tablet by mouth Every Morning. 30 tablet 2   • cloNIDine (CATAPRES) 0.1 MG tablet Take 1 tablet by mouth 3 (Three) Times a Day As Needed (if BP greateer than 170/110). 10 tablet 0   • hydrochlorothiazide (HYDRODIURIL) 25 MG tablet Take 1 tablet by mouth.     • insulin glargine (LANTUS) 100 UNIT/ML injection Inject 20 Units under the skin into the appropriate area as directed Daily.     • lamoTRIgine (LaMICtal) 150 MG tablet Take 1 tablet by mouth Daily. Take after finishing bottle of 100mg dosing 30 tablet 2   • lisinopril (PRINIVIL,ZESTRIL) 10 MG tablet Take 1 tablet by mouth Daily. 30 tablet 0   • modafinil (PROVIGIL) 100 MG tablet Take 1 tablet by mouth Daily. For narcolepsy 30 tablet 0   • omeprazole (PRILOSEC) 40 MG capsule      • propranolol (INDERAL) 20 MG tablet Take 1 tablet by mouth 2 (Two) Times a Day. 60 tablet 2   • sertraline (ZOLOFT) 100 MG tablet Take 1 tablet by mouth Daily. 30 tablet 2   • TRULICITY 1.5 MG/0.5ML solution pen-injector      • halobetasol (ULTRAVATE) 0.05 % cream      • OXcarbazepine (Trileptal) 300 MG tablet Take 1 tablet by mouth 2 (Two) Times a Day. 60 tablet 2     No current  "facility-administered medications for this visit.        Review of Symptoms:    Review of Systems   Constitutional: Negative.    HENT: Negative.    Eyes: Negative.    Respiratory: Negative.    Cardiovascular: Negative.    Gastrointestinal: Negative.    Endocrine: Negative.    Genitourinary: Negative.    Musculoskeletal: Negative.    Skin: Negative.    Allergic/Immunologic: Negative.    Neurological: Negative.    Hematological: Negative.    Psychiatric/Behavioral: Positive for agitation and stress. Negative for sleep disturbance, suicidal ideas and depressed mood. The patient is not nervous/anxious.          Physical Exam:   Blood pressure 133/88, pulse 74, height 149.9 cm (59.02\").    Appearance: Overweight  female of stated age in no acute distress  Gait, Station, Strength: Within normal limits    Mental Status Exam:   Hygiene:   good  Cooperation:  Cooperative  Eye Contact:  Good  Psychomotor Behavior:  Appropriate  Affect:  Full range  Mood: normal  Hopelessness: Denies  Speech:  Normal  Thought Process:  Goal directed and Linear  Thought Content:  Normal and Mood congruent  Suicidal:  None  Homicidal:  None  Hallucinations:  None  Delusion:  None  Memory:  Intact  Orientation:  Person, Place, Time and Situation  Reliability:  good  Insight:  Good  Judgement:  Good  Impulse Control:  Good  Physical/Medical Issues:  No        Lab Results:   Office Visit on 06/11/2020   Component Date Value Ref Range Status   • External Amphetamine Screen Urine 06/11/2020 Negative   Final   • Amphetamine Cut-Off 06/11/2020 1000NG/ML   Final   • External Benzodiazepine Screen Uri* 06/11/2020 Negative   Final   • Benzodiazipine Cut-Off 06/11/2020 300NG/ML   Final   • External Cocaine Screen Urine 06/11/2020 Negative   Final   • Cocaine Cut-Off 06/11/2020 300NG/ML   Final   • External THC Screen Urine 06/11/2020 Negative   Final   • THC Cut-Off 06/11/2020 50NG/ML   Final   • External Methadone Screen Urine 06/11/2020 Negative "   Final   • Methadone Cut-Off 06/11/2020 300NG/ML   Final   • External Methamphetamine Screen Ur* 06/11/2020 Negative   Final   • Methamphetamine Cut-Off 06/11/2020 1000NG/ML   Final   • External Oxycodone Screen Urine 06/11/2020 Negative   Final   • Oxycodone Cut-Off 06/11/2020 100NG/ML   Final   • External Buprenorphine Screen Urine 06/11/2020 Negative   Final   • Buprenorphine Cut-Off 06/11/2020 10NG/ML   Final   • External MDMA 06/11/2020 Negative   Final   • MDMA Cut-Off 06/11/2020 500NG/ML   Final   • External Opiates Screen Urine 06/11/2020 Negative   Final   • Opiates Cut-Off 06/11/2020 300NG/ML   Final       Assessment/Plan   Diagnoses and all orders for this visit:    Bipolar disorder, in partial remission, most recent episode depressed (CMS/HCC)  -     OXcarbazepine (Trileptal) 300 MG tablet; Take 1 tablet by mouth 2 (Two) Times a Day.    Medication management  -     [facility] Drug Screen    Panic disorder with agoraphobia    Generalized anxiety disorder    -This is my initial encounter with the patient  -Patient is a 35-year-old  female with a history of bipolar affective disorder currently in remission who is doing well on her current medication regimen without any side effects.  Symptom burden is stable and reduced.  -Reviewed previous available documentation  -Reviewed most recent available labs  -JORGE reviewed and appropriate. Patient counseled on use of controlled substances.   -Continue Zoloft 100 mg p.o. daily for mood  -Continue Wellbutrin  mg p.o. daily for mood  -Continue Trileptal 300 mg twice daily for mood stabilization  -Continue propranolol 20 mg p.o. twice daily for anxiety  -Continue Lamictal 150 mg p.o. daily for mood stabilization    Visit Diagnoses:    ICD-10-CM ICD-9-CM   1. Bipolar disorder, in partial remission, most recent episode depressed (CMS/HCC) F31.75 296.55   2. Medication management Z79.899 V58.69   3. Panic disorder with agoraphobia F40.01 300.21   4.  Generalized anxiety disorder F41.1 300.02       TREATMENT PLAN/GOALS: Continue supportive psychotherapy efforts and medications as indicated. Treatment and medication options discussed during today's visit. Patient acknowledged and verbally consented to continue with current treatment plan and was educated on the importance of compliance with treatment and follow-up appointments.    MEDICATION ISSUES:    Discussed medication options and treatment plan of prescribed medication as well as the risks, benefits, and side effects including potential falls, possible impaired driving and metabolic adversities among others. Patient is agreeable to call the office with any worsening of symptoms or onset of side effects. Patient is agreeable to call 911 or go to the nearest ER should he/she begin having SI/HI.     MEDS ORDERED DURING VISIT:  New Medications Ordered This Visit   Medications   • OXcarbazepine (Trileptal) 300 MG tablet     Sig: Take 1 tablet by mouth 2 (Two) Times a Day.     Dispense:  60 tablet     Refill:  2       Return in about 3 months (around 9/11/2020).             This document has been electronically signed by Nayan Randall MD  Isabel 15, 2020 08:45

## 2020-09-15 ENCOUNTER — TRANSCRIBE ORDERS (OUTPATIENT)
Dept: ADMINISTRATIVE | Facility: HOSPITAL | Age: 35
End: 2020-09-15

## 2020-09-15 DIAGNOSIS — R79.89 ELEVATED LFTS: Primary | ICD-10-CM

## 2020-09-23 ENCOUNTER — HOSPITAL ENCOUNTER (OUTPATIENT)
Dept: ULTRASOUND IMAGING | Facility: HOSPITAL | Age: 35
End: 2020-09-23

## 2020-09-23 ENCOUNTER — OFFICE VISIT (OUTPATIENT)
Dept: PSYCHIATRY | Facility: CLINIC | Age: 35
End: 2020-09-23

## 2020-09-23 VITALS
WEIGHT: 199 LBS | HEART RATE: 92 BPM | TEMPERATURE: 97.2 F | BODY MASS INDEX: 40.12 KG/M2 | SYSTOLIC BLOOD PRESSURE: 133 MMHG | HEIGHT: 59 IN | DIASTOLIC BLOOD PRESSURE: 88 MMHG

## 2020-09-23 DIAGNOSIS — F31.75 BIPOLAR DISORDER, IN PARTIAL REMISSION, MOST RECENT EPISODE DEPRESSED (HCC): ICD-10-CM

## 2020-09-23 DIAGNOSIS — F31.60 BIPOLAR AFFECTIVE DISORDER, CURRENT EPISODE MIXED, CURRENT EPISODE SEVERITY UNSPECIFIED (HCC): ICD-10-CM

## 2020-09-23 DIAGNOSIS — F41.1 GENERALIZED ANXIETY DISORDER: ICD-10-CM

## 2020-09-23 DIAGNOSIS — F40.01 PANIC DISORDER WITH AGORAPHOBIA: ICD-10-CM

## 2020-09-23 PROCEDURE — 99214 OFFICE O/P EST MOD 30 MIN: CPT | Performed by: PSYCHIATRY & NEUROLOGY

## 2020-09-23 RX ORDER — PROPRANOLOL HYDROCHLORIDE 20 MG/1
20 TABLET ORAL 2 TIMES DAILY
Qty: 60 TABLET | Refills: 2 | Status: SHIPPED | OUTPATIENT
Start: 2020-09-23 | End: 2021-01-25 | Stop reason: SDUPTHER

## 2020-09-23 RX ORDER — SERTRALINE HYDROCHLORIDE 100 MG/1
150 TABLET, FILM COATED ORAL DAILY
Qty: 45 TABLET | Refills: 2 | Status: SHIPPED | OUTPATIENT
Start: 2020-09-23 | End: 2021-01-25 | Stop reason: SDUPTHER

## 2020-09-23 RX ORDER — OXCARBAZEPINE 300 MG/1
300 TABLET, FILM COATED ORAL 2 TIMES DAILY
Qty: 60 TABLET | Refills: 2 | Status: SHIPPED | OUTPATIENT
Start: 2020-09-23 | End: 2021-01-25 | Stop reason: SDUPTHER

## 2020-09-23 RX ORDER — LAMOTRIGINE 150 MG/1
150 TABLET ORAL DAILY
Qty: 30 TABLET | Refills: 2 | Status: SHIPPED | OUTPATIENT
Start: 2020-09-23 | End: 2021-01-25 | Stop reason: SDUPTHER

## 2020-09-23 RX ORDER — BUPROPION HYDROCHLORIDE 300 MG/1
300 TABLET ORAL EVERY MORNING
Qty: 30 TABLET | Refills: 2 | Status: SHIPPED | OUTPATIENT
Start: 2020-09-23 | End: 2021-01-25 | Stop reason: SDUPTHER

## 2020-09-25 NOTE — PROGRESS NOTES
Subjective   Antoinette Pack is a 35 y.o. female who presents today for follow up    Chief Complaint: Depression and anxiety    History of Present Illness: Patient is a 35-year-old  female who presents today for follow-up.   Patient reports that she was recently placed on Trulicity and it zaps her energy.  She also was found to have low vitamin D levels and is currently receiving supplementation.  She states that she has noticed some increased irritability and anxiety as of late.  She is not currently experiencing any medication side effects from her psychotropic medications.  She denies any issues with sleep or appetite.  She denies SI/HI/AVH.    The following portions of the patient's history were reviewed and updated as appropriate: allergies, current medications, past family history, past medical history, past social history, past surgical history and problem list.      Past Medical History:  Past Medical History:   Diagnosis Date   • Anxiety    • Depression    • Diabetes mellitus (CMS/HCC)    • Diverticulitis    • GERD (gastroesophageal reflux disease)    • Hypertension    • Kidney stone    • PCOS (polycystic ovarian syndrome)    • Sleep apnea        Social History:  Social History     Socioeconomic History   • Marital status:      Spouse name: Not on file   • Number of children: Not on file   • Years of education: Not on file   • Highest education level: Not on file   Occupational History   • Occupation: Not Working   Tobacco Use   • Smoking status: Never Smoker   • Smokeless tobacco: Never Used   Substance and Sexual Activity   • Alcohol use: No   • Drug use: No   • Sexual activity: Defer       Family History:  Family History   Problem Relation Age of Onset   • Heart disease Mother    • COPD Mother    • Heart disease Father    • COPD Father        Past Surgical History:  Past Surgical History:   Procedure Laterality Date   • CARDIAC CATHETERIZATION     • CARPAL TUNNEL RELEASE     •  COLONOSCOPY     • ENDOSCOPY     • FRACTURE SURGERY     • HARDWARE REMOVAL      WRIST   • TENDON REPAIR      HAND       Problem List:  Patient Active Problem List   Diagnosis   • Body mass index (BMI) 40.0-44.9, adult (CMS/[facility])       Allergy:   Allergies   Allergen Reactions   • Dilantin [Phenytoin] Mental Status Change   • Keppra [Levetiracetam] Mental Status Change   • Meperidine Rash   • Topamax [Topiramate] Mental Status Change        Current Medications:   Current Outpatient Medications   Medication Sig Dispense Refill   • amLODIPine (NORVASC) 2.5 MG tablet Take 1 tablet by mouth Daily. 30 tablet 0   • buPROPion XL (WELLBUTRIN XL) 300 MG 24 hr tablet Take 1 tablet by mouth Every Morning. 30 tablet 2   • cloNIDine (CATAPRES) 0.1 MG tablet Take 1 tablet by mouth 3 (Three) Times a Day As Needed (if BP greateer than 170/110). 10 tablet 0   • halobetasol (ULTRAVATE) 0.05 % cream      • hydrochlorothiazide (HYDRODIURIL) 25 MG tablet Take 1 tablet by mouth.     • insulin glargine (LANTUS) 100 UNIT/ML injection Inject 20 Units under the skin into the appropriate area as directed Daily.     • lamoTRIgine (LaMICtal) 150 MG tablet Take 1 tablet by mouth Daily. Take after finishing bottle of 100mg dosing 30 tablet 2   • lisinopril (PRINIVIL,ZESTRIL) 10 MG tablet Take 1 tablet by mouth Daily. 30 tablet 0   • metFORMIN (GLUCOPHAGE) 1000 MG tablet      • modafinil (PROVIGIL) 100 MG tablet Take 1 tablet by mouth Daily. For narcolepsy 30 tablet 0   • omeprazole (PRILOSEC) 40 MG capsule      • OXcarbazepine (Trileptal) 300 MG tablet Take 1 tablet by mouth 2 (Two) Times a Day. 60 tablet 2   • propranolol (INDERAL) 20 MG tablet Take 1 tablet by mouth 2 (Two) Times a Day. 60 tablet 2   • sertraline (ZOLOFT) 100 MG tablet Take 1.5 tablets by mouth Daily. 45 tablet 2   • TRULICITY 1.5 MG/0.5ML solution pen-injector        No current facility-administered medications for this visit.        Review of Symptoms:    Review of Systems  "  Constitutional: Negative.    HENT: Negative.    Eyes: Negative.    Respiratory: Negative.    Cardiovascular: Negative.    Gastrointestinal: Negative.    Endocrine: Negative.    Genitourinary: Negative.    Musculoskeletal: Negative.    Skin: Negative.    Allergic/Immunologic: Negative.    Neurological: Negative.    Hematological: Negative.    Psychiatric/Behavioral: Positive for agitation and stress. Negative for sleep disturbance, suicidal ideas and depressed mood. The patient is nervous/anxious.          Physical Exam:   Blood pressure 133/88, pulse 92, temperature 97.2 °F (36.2 °C), height 149.9 cm (59\"), weight 90.3 kg (199 lb).    Appearance: Overweight  female of stated age in no acute distress  Gait, Station, Strength: Within normal limits    Mental Status Exam:   Hygiene:   good  Cooperation:  Cooperative  Eye Contact:  Good  Psychomotor Behavior:  Appropriate  Affect:  Full range  Mood: fluctates with irritability and anxiety   Hopelessness: Denies  Speech:  Normal  Thought Process:  Goal directed and Linear  Thought Content:  Normal and Mood congruent  Suicidal:  None  Homicidal:  None  Hallucinations:  None  Delusion:  None  Memory:  Intact  Orientation:  Person, Place, Time and Situation  Reliability:  good  Insight:  Good  Judgement:  Good  Impulse Control:  Good  Physical/Medical Issues:  No        Lab Results:   No visits with results within 1 Month(s) from this visit.   Latest known visit with results is:   Office Visit on 06/11/2020   Component Date Value Ref Range Status   • External Amphetamine Screen Urine 06/11/2020 Negative   Final   • Amphetamine Cut-Off 06/11/2020 1000NG/ML   Final   • External Benzodiazepine Screen Uri* 06/11/2020 Negative   Final   • Benzodiazipine Cut-Off 06/11/2020 300NG/ML   Final   • External Cocaine Screen Urine 06/11/2020 Negative   Final   • Cocaine Cut-Off 06/11/2020 300NG/ML   Final   • External THC Screen Urine 06/11/2020 Negative   Final   • THC Cut-Off " 06/11/2020 50NG/ML   Final   • External Methadone Screen Urine 06/11/2020 Negative   Final   • Methadone Cut-Off 06/11/2020 300NG/ML   Final   • External Methamphetamine Screen Ur* 06/11/2020 Negative   Final   • Methamphetamine Cut-Off 06/11/2020 1000NG/ML   Final   • External Oxycodone Screen Urine 06/11/2020 Negative   Final   • Oxycodone Cut-Off 06/11/2020 100NG/ML   Final   • External Buprenorphine Screen Urine 06/11/2020 Negative   Final   • Buprenorphine Cut-Off 06/11/2020 10NG/ML   Final   • External MDMA 06/11/2020 Negative   Final   • MDMA Cut-Off 06/11/2020 500NG/ML   Final   • External Opiates Screen Urine 06/11/2020 Negative   Final   • Opiates Cut-Off 06/11/2020 300NG/ML   Final       Assessment/Plan   Diagnoses and all orders for this visit:    Bipolar affective disorder, current episode mixed, current episode severity unspecified (CMS/HCC)  -     lamoTRIgine (LaMICtal) 150 MG tablet; Take 1 tablet by mouth Daily. Take after finishing bottle of 100mg dosing  -     sertraline (ZOLOFT) 100 MG tablet; Take 1.5 tablets by mouth Daily.  -     buPROPion XL (WELLBUTRIN XL) 300 MG 24 hr tablet; Take 1 tablet by mouth Every Morning.    Panic disorder with agoraphobia  -     sertraline (ZOLOFT) 100 MG tablet; Take 1.5 tablets by mouth Daily.  -     buPROPion XL (WELLBUTRIN XL) 300 MG 24 hr tablet; Take 1 tablet by mouth Every Morning.  -     propranolol (INDERAL) 20 MG tablet; Take 1 tablet by mouth 2 (Two) Times a Day.    Generalized anxiety disorder  -     sertraline (ZOLOFT) 100 MG tablet; Take 1.5 tablets by mouth Daily.  -     buPROPion XL (WELLBUTRIN XL) 300 MG 24 hr tablet; Take 1 tablet by mouth Every Morning.  -     propranolol (INDERAL) 20 MG tablet; Take 1 tablet by mouth 2 (Two) Times a Day.    Bipolar disorder, in partial remission, most recent episode depressed (CMS/HCC)  -     OXcarbazepine (Trileptal) 300 MG tablet; Take 1 tablet by mouth 2 (Two) Times a Day.      -Patient reports depressive  symptoms are well controlled but she is having increased irritability and anxiety.  -Reviewed previous available documentation  -Reviewed most recent available labs  -Increase Zoloft to 150 mg p.o. daily for mood and irritability  -Continue Wellbutrin  mg p.o. daily for mood  -Continue Trileptal 300 mg twice daily for mood stabilization  -Continue propranolol 20 mg p.o. twice daily for anxiety  -Continue Lamictal 150 mg p.o. daily for mood stabilization    Visit Diagnoses:    ICD-10-CM ICD-9-CM   1. Bipolar affective disorder, current episode mixed, current episode severity unspecified (CMS/[facility])  F31.60 296.60   2. Panic disorder with agoraphobia  F40.01 300.21   3. Generalized anxiety disorder  F41.1 300.02   4. Bipolar disorder, in partial remission, most recent episode depressed (CMS/[facility])  F31.75 296.55       TREATMENT PLAN/GOALS: Continue supportive psychotherapy efforts and medications as indicated. Treatment and medication options discussed during today's visit. Patient acknowledged and verbally consented to continue with current treatment plan and was educated on the importance of compliance with treatment and follow-up appointments.    MEDICATION ISSUES:    Discussed medication options and treatment plan of prescribed medication as well as the risks, benefits, and side effects including potential falls, possible impaired driving and metabolic adversities among others. Patient is agreeable to call the office with any worsening of symptoms or onset of side effects. Patient is agreeable to call 911 or go to the nearest ER should he/she begin having SI/HI.     MEDS ORDERED DURING VISIT:  New Medications Ordered This Visit   Medications   • lamoTRIgine (LaMICtal) 150 MG tablet     Sig: Take 1 tablet by mouth Daily. Take after finishing bottle of 100mg dosing     Dispense:  30 tablet     Refill:  2   • sertraline (ZOLOFT) 100 MG tablet     Sig: Take 1.5 tablets by mouth Daily.     Dispense:  45 tablet      Refill:  2   • buPROPion XL (WELLBUTRIN XL) 300 MG 24 hr tablet     Sig: Take 1 tablet by mouth Every Morning.     Dispense:  30 tablet     Refill:  2   • OXcarbazepine (Trileptal) 300 MG tablet     Sig: Take 1 tablet by mouth 2 (Two) Times a Day.     Dispense:  60 tablet     Refill:  2   • propranolol (INDERAL) 20 MG tablet     Sig: Take 1 tablet by mouth 2 (Two) Times a Day.     Dispense:  60 tablet     Refill:  2       Return in about 3 months (around 12/23/2020).             This document has been electronically signed by Nayan Randall MD  September 25, 2020 08:35 EDT

## 2020-12-24 ENCOUNTER — APPOINTMENT (OUTPATIENT)
Dept: CT IMAGING | Facility: HOSPITAL | Age: 35
End: 2020-12-24

## 2020-12-24 ENCOUNTER — HOSPITAL ENCOUNTER (EMERGENCY)
Facility: HOSPITAL | Age: 35
Discharge: HOME | End: 2020-12-24
Attending: EMERGENCY MEDICINE | Admitting: EMERGENCY MEDICINE

## 2020-12-24 VITALS
HEIGHT: 59 IN | DIASTOLIC BLOOD PRESSURE: 78 MMHG | HEART RATE: 77 BPM | WEIGHT: 200 LBS | OXYGEN SATURATION: 97 % | SYSTOLIC BLOOD PRESSURE: 113 MMHG | BODY MASS INDEX: 40.32 KG/M2 | TEMPERATURE: 96.5 F | RESPIRATION RATE: 18 BRPM

## 2020-12-24 DIAGNOSIS — N10 ACUTE PYELONEPHRITIS: Primary | ICD-10-CM

## 2020-12-24 LAB
ALBUMIN SERPL-MCNC: 4.18 G/DL (ref 3.5–5.2)
ALBUMIN/GLOB SERPL: 1.2 G/DL
ALP SERPL-CCNC: 94 U/L (ref 39–117)
ALT SERPL W P-5'-P-CCNC: 32 U/L (ref 1–33)
ANION GAP SERPL CALCULATED.3IONS-SCNC: 10.5 MMOL/L (ref 5–15)
AST SERPL-CCNC: 27 U/L (ref 1–32)
B-HCG UR QL: NEGATIVE
BACTERIA UR QL AUTO: ABNORMAL /HPF
BASOPHILS # BLD AUTO: 0.01 10*3/MM3 (ref 0–0.2)
BASOPHILS NFR BLD AUTO: 0.1 % (ref 0–1.5)
BILIRUB SERPL-MCNC: 0.4 MG/DL (ref 0–1.2)
BILIRUB UR QL STRIP: NEGATIVE
BUN SERPL-MCNC: 10 MG/DL (ref 6–20)
BUN/CREAT SERPL: 12.3 (ref 7–25)
CALCIUM SPEC-SCNC: 8.9 MG/DL (ref 8.6–10.5)
CHLORIDE SERPL-SCNC: 102 MMOL/L (ref 98–107)
CLARITY UR: ABNORMAL
CO2 SERPL-SCNC: 26.5 MMOL/L (ref 22–29)
COLOR UR: YELLOW
CREAT SERPL-MCNC: 0.81 MG/DL (ref 0.57–1)
CRP SERPL-MCNC: 2.03 MG/DL (ref 0–0.5)
DEPRECATED RDW RBC AUTO: 36.6 FL (ref 37–54)
EOSINOPHIL # BLD AUTO: 0.17 10*3/MM3 (ref 0–0.4)
EOSINOPHIL NFR BLD AUTO: 2.4 % (ref 0.3–6.2)
ERYTHROCYTE [DISTWIDTH] IN BLOOD BY AUTOMATED COUNT: 12 % (ref 12.3–15.4)
GFR SERPL CREATININE-BSD FRML MDRD: 80 ML/MIN/1.73
GLOBULIN UR ELPH-MCNC: 3.5 GM/DL
GLUCOSE SERPL-MCNC: 242 MG/DL (ref 65–99)
GLUCOSE UR STRIP-MCNC: ABNORMAL MG/DL
HCT VFR BLD AUTO: 37.5 % (ref 34–46.6)
HGB BLD-MCNC: 12.6 G/DL (ref 12–15.9)
HGB UR QL STRIP.AUTO: ABNORMAL
HOLD SPECIMEN: NORMAL
HOLD SPECIMEN: NORMAL
HYALINE CASTS UR QL AUTO: ABNORMAL /LPF
IMM GRANULOCYTES # BLD AUTO: 0.03 10*3/MM3 (ref 0–0.05)
IMM GRANULOCYTES NFR BLD AUTO: 0.4 % (ref 0–0.5)
KETONES UR QL STRIP: ABNORMAL
LEUKOCYTE ESTERASE UR QL STRIP.AUTO: ABNORMAL
LIPASE SERPL-CCNC: 34 U/L (ref 13–60)
LYMPHOCYTES # BLD AUTO: 2.26 10*3/MM3 (ref 0.7–3.1)
LYMPHOCYTES NFR BLD AUTO: 31.8 % (ref 19.6–45.3)
MCH RBC QN AUTO: 28.3 PG (ref 26.6–33)
MCHC RBC AUTO-ENTMCNC: 33.6 G/DL (ref 31.5–35.7)
MCV RBC AUTO: 84.3 FL (ref 79–97)
MONOCYTES # BLD AUTO: 0.35 10*3/MM3 (ref 0.1–0.9)
MONOCYTES NFR BLD AUTO: 4.9 % (ref 5–12)
NEUTROPHILS NFR BLD AUTO: 4.28 10*3/MM3 (ref 1.7–7)
NEUTROPHILS NFR BLD AUTO: 60.4 % (ref 42.7–76)
NITRITE UR QL STRIP: NEGATIVE
NRBC BLD AUTO-RTO: 0 /100 WBC (ref 0–0.2)
PH UR STRIP.AUTO: 5.5 [PH] (ref 5–8)
PLATELET # BLD AUTO: 215 10*3/MM3 (ref 140–450)
PMV BLD AUTO: 9.7 FL (ref 6–12)
POTASSIUM SERPL-SCNC: 4.2 MMOL/L (ref 3.5–5.2)
PROT SERPL-MCNC: 7.7 G/DL (ref 6–8.5)
PROT UR QL STRIP: ABNORMAL
RBC # BLD AUTO: 4.45 10*6/MM3 (ref 3.77–5.28)
RBC # UR: ABNORMAL /HPF
REF LAB TEST METHOD: ABNORMAL
SODIUM SERPL-SCNC: 139 MMOL/L (ref 136–145)
SP GR UR STRIP: 1.03 (ref 1–1.03)
SQUAMOUS #/AREA URNS HPF: ABNORMAL /HPF
UROBILINOGEN UR QL STRIP: ABNORMAL
WBC # BLD AUTO: 7.1 10*3/MM3 (ref 3.4–10.8)
WBC UR QL AUTO: ABNORMAL /HPF
WHOLE BLOOD HOLD SPECIMEN: NORMAL
WHOLE BLOOD HOLD SPECIMEN: NORMAL

## 2020-12-24 PROCEDURE — 86140 C-REACTIVE PROTEIN: CPT | Performed by: PHYSICIAN ASSISTANT

## 2020-12-24 PROCEDURE — 74178 CT ABD&PLV WO CNTR FLWD CNTR: CPT

## 2020-12-24 PROCEDURE — 87086 URINE CULTURE/COLONY COUNT: CPT | Performed by: PHYSICIAN ASSISTANT

## 2020-12-24 PROCEDURE — 81001 URINALYSIS AUTO W/SCOPE: CPT | Performed by: PHYSICIAN ASSISTANT

## 2020-12-24 PROCEDURE — 25010000002 CEFTRIAXONE PER 250 MG: Performed by: PHYSICIAN ASSISTANT

## 2020-12-24 PROCEDURE — 96375 TX/PRO/DX INJ NEW DRUG ADDON: CPT

## 2020-12-24 PROCEDURE — 83690 ASSAY OF LIPASE: CPT | Performed by: PHYSICIAN ASSISTANT

## 2020-12-24 PROCEDURE — 25010000002 IOPAMIDOL 61 % SOLUTION: Performed by: PHYSICIAN ASSISTANT

## 2020-12-24 PROCEDURE — 85025 COMPLETE CBC W/AUTO DIFF WBC: CPT | Performed by: PHYSICIAN ASSISTANT

## 2020-12-24 PROCEDURE — 99283 EMERGENCY DEPT VISIT LOW MDM: CPT

## 2020-12-24 PROCEDURE — 25010000002 KETOROLAC TROMETHAMINE PER 15 MG: Performed by: PHYSICIAN ASSISTANT

## 2020-12-24 PROCEDURE — 80053 COMPREHEN METABOLIC PANEL: CPT | Performed by: PHYSICIAN ASSISTANT

## 2020-12-24 PROCEDURE — 96365 THER/PROPH/DIAG IV INF INIT: CPT

## 2020-12-24 PROCEDURE — 25010000002 ONDANSETRON PER 1 MG: Performed by: PHYSICIAN ASSISTANT

## 2020-12-24 PROCEDURE — 81025 URINE PREGNANCY TEST: CPT | Performed by: PHYSICIAN ASSISTANT

## 2020-12-24 RX ORDER — ONDANSETRON 2 MG/ML
4 INJECTION INTRAMUSCULAR; INTRAVENOUS ONCE
Status: COMPLETED | OUTPATIENT
Start: 2020-12-24 | End: 2020-12-24

## 2020-12-24 RX ORDER — SULFAMETHOXAZOLE AND TRIMETHOPRIM 800; 160 MG/1; MG/1
1 TABLET ORAL EVERY 12 HOURS SCHEDULED
Status: DISCONTINUED | OUTPATIENT
Start: 2020-12-24 | End: 2020-12-24 | Stop reason: HOSPADM

## 2020-12-24 RX ORDER — KETOROLAC TROMETHAMINE 30 MG/ML
30 INJECTION, SOLUTION INTRAMUSCULAR; INTRAVENOUS ONCE
Status: COMPLETED | OUTPATIENT
Start: 2020-12-24 | End: 2020-12-24

## 2020-12-24 RX ORDER — SODIUM CHLORIDE 0.9 % (FLUSH) 0.9 %
10 SYRINGE (ML) INJECTION AS NEEDED
Status: DISCONTINUED | OUTPATIENT
Start: 2020-12-24 | End: 2020-12-24 | Stop reason: HOSPADM

## 2020-12-24 RX ORDER — SULFAMETHOXAZOLE AND TRIMETHOPRIM 800; 160 MG/1; MG/1
1 TABLET ORAL 2 TIMES DAILY
Qty: 28 TABLET | Refills: 0 | Status: SHIPPED | OUTPATIENT
Start: 2020-12-24 | End: 2020-12-28 | Stop reason: SDUPTHER

## 2020-12-24 RX ADMIN — IOPAMIDOL 100 ML: 612 INJECTION, SOLUTION INTRAVENOUS at 20:31

## 2020-12-24 RX ADMIN — SULFAMETHOXAZOLE AND TRIMETHOPRIM 160 MG: 800; 160 TABLET ORAL at 21:47

## 2020-12-24 RX ADMIN — ONDANSETRON 4 MG: 2 INJECTION INTRAMUSCULAR; INTRAVENOUS at 19:43

## 2020-12-24 RX ADMIN — KETOROLAC TROMETHAMINE 30 MG: 30 INJECTION, SOLUTION INTRAMUSCULAR; INTRAVENOUS at 19:43

## 2020-12-24 RX ADMIN — CEFTRIAXONE 1 G: 1 INJECTION, POWDER, FOR SOLUTION INTRAMUSCULAR; INTRAVENOUS at 20:26

## 2020-12-24 RX ADMIN — SODIUM CHLORIDE 500 ML: 9 INJECTION, SOLUTION INTRAVENOUS at 19:40

## 2020-12-25 LAB — BACTERIA SPEC AEROBE CULT: NORMAL

## 2020-12-25 NOTE — ED PROVIDER NOTES
Subjective   35-year-old female with past medical history of diabetes, diverticulitis, GERD, hypertension, nephrolithiasis, PCOS, and sleep apnea presents to the emergency room with bilateral flank pain which began suddenly approximately 2 hours prior to arrival.  States her flank pain is worse on the left than on the right.  She denies fever, dysuria, hematuria, or abdominal pain.  She states this flank pain feels similar to kidney stones and diverticulitis which she has had in the past.  Aggravating factors include movement.  Denies any alleviating factors despite Tylenol at 6 PM.      History provided by:  Patient   used: No        Review of Systems   Constitutional: Negative.  Negative for fever.   HENT: Negative.    Respiratory: Negative.    Cardiovascular: Negative.  Negative for chest pain.   Gastrointestinal: Positive for abdominal pain and nausea. Negative for vomiting.   Endocrine: Negative.    Genitourinary: Positive for flank pain. Negative for dysuria and hematuria.   Skin: Negative.    Neurological: Negative.    Psychiatric/Behavioral: Negative.    All other systems reviewed and are negative.      Past Medical History:   Diagnosis Date   • Anxiety    • Depression    • Diabetes mellitus (CMS/HCC)    • Diverticulitis    • GERD (gastroesophageal reflux disease)    • Hypertension    • Kidney stone    • PCOS (polycystic ovarian syndrome)    • Sleep apnea        Allergies   Allergen Reactions   • Dilantin [Phenytoin] Mental Status Change   • Keppra [Levetiracetam] Mental Status Change   • Meperidine Rash   • Topamax [Topiramate] Mental Status Change       Past Surgical History:   Procedure Laterality Date   • CARDIAC CATHETERIZATION     • CARPAL TUNNEL RELEASE     • COLONOSCOPY     • ENDOSCOPY     • FRACTURE SURGERY     • HARDWARE REMOVAL      WRIST   • TENDON REPAIR      HAND       Family History   Problem Relation Age of Onset   • Heart disease Mother    • COPD Mother    • Heart disease  Father    • COPD Father        Social History     Socioeconomic History   • Marital status:      Spouse name: Not on file   • Number of children: Not on file   • Years of education: Not on file   • Highest education level: Not on file   Occupational History   • Occupation: Not Working   Tobacco Use   • Smoking status: Never Smoker   • Smokeless tobacco: Never Used   Substance and Sexual Activity   • Alcohol use: No   • Drug use: No   • Sexual activity: Defer           Objective   Physical Exam  Vitals signs and nursing note reviewed.   Constitutional:       General: She is not in acute distress.     Appearance: She is well-developed. She is not diaphoretic.   HENT:      Head: Normocephalic and atraumatic.      Right Ear: External ear normal.      Left Ear: External ear normal.      Nose: Nose normal.   Eyes:      Conjunctiva/sclera: Conjunctivae normal.      Pupils: Pupils are equal, round, and reactive to light.   Neck:      Musculoskeletal: Normal range of motion and neck supple.      Vascular: No JVD.      Trachea: No tracheal deviation.   Cardiovascular:      Rate and Rhythm: Normal rate and regular rhythm.      Heart sounds: Normal heart sounds. No murmur.   Pulmonary:      Effort: Pulmonary effort is normal. No respiratory distress.      Breath sounds: Normal breath sounds. No wheezing.   Abdominal:      General: Bowel sounds are normal. There is no distension.      Palpations: Abdomen is soft.      Tenderness: There is no abdominal tenderness. There is right CVA tenderness and left CVA tenderness.   Musculoskeletal: Normal range of motion.         General: No deformity.   Skin:     General: Skin is warm and dry.      Coloration: Skin is not pale.      Findings: No erythema or rash.   Neurological:      Mental Status: She is alert and oriented to person, place, and time.      Cranial Nerves: No cranial nerve deficit.   Psychiatric:         Behavior: Behavior normal.         Thought Content: Thought  content normal.         Procedures           ED Course  ED Course as of Dec 24 2205   Thu Dec 24, 2020   2128 IMPRESSION:   1. Imaging findings most characteristic of multifocal left hilum nephritis and sequela of ascending urinary tract infection. No distinct drainable fluid collection to suggest abscess at this time. No hydronephrosis or radiopaque stone.  2. Normal appendix.  3. Diverticulosis.  4. Hepatosplenomegaly and hepatic steatosis.  5. 5 mm noncalcified subpleural nodule in the right lower lobe, partially included in the field-of-view. See follow-up recommendations above.    Signer Name: Ag Oneil MD  Signed: 12/24/2020 8:48 PM  Workstation Name: [facility]   [facility]    CT Abdomen Pelvis With & Without Contrast [TK]      ED Course User Index  [TK] Jonathan Edmond PA-C                                           MDM  Number of Diagnoses or Management Options  Acute pyelonephritis: new and requires workup     Amount and/or Complexity of Data Reviewed  Clinical lab tests: reviewed and ordered  Tests in the radiology section of CPT®: reviewed and ordered    Risk of Complications, Morbidity, and/or Mortality  Presenting problems: moderate  Diagnostic procedures: moderate  Management options: moderate    Patient Progress  Patient progress: stable      Final diagnoses:   Acute pyelonephritis            Jonathan Edmond PA-C  12/24/20 2205

## 2020-12-25 NOTE — ED NOTES
Informed consent for IV contrast obtained and placed on chart at this time.       Collette, Ashley N, RN  12/24/20 1956

## 2020-12-27 ENCOUNTER — HOSPITAL ENCOUNTER (EMERGENCY)
Facility: HOSPITAL | Age: 35
Discharge: HOME | End: 2020-12-28
Attending: EMERGENCY MEDICINE | Admitting: EMERGENCY MEDICINE

## 2020-12-27 ENCOUNTER — APPOINTMENT (OUTPATIENT)
Dept: CT IMAGING | Facility: HOSPITAL | Age: 35
End: 2020-12-27

## 2020-12-27 DIAGNOSIS — N39.0 BACTERIAL UTI: Primary | ICD-10-CM

## 2020-12-27 DIAGNOSIS — A49.9 BACTERIAL UTI: Primary | ICD-10-CM

## 2020-12-27 DIAGNOSIS — N12 PYELONEPHRITIS: ICD-10-CM

## 2020-12-27 LAB
ALBUMIN SERPL-MCNC: 4.03 G/DL (ref 3.5–5.2)
ALBUMIN/GLOB SERPL: 1.2 G/DL
ALP SERPL-CCNC: 97 U/L (ref 39–117)
ALT SERPL W P-5'-P-CCNC: 28 U/L (ref 1–33)
ANION GAP SERPL CALCULATED.3IONS-SCNC: 10.1 MMOL/L (ref 5–15)
AST SERPL-CCNC: 32 U/L (ref 1–32)
BACTERIA UR QL AUTO: ABNORMAL /HPF
BASOPHILS # BLD AUTO: 0.03 10*3/MM3 (ref 0–0.2)
BASOPHILS NFR BLD AUTO: 0.3 % (ref 0–1.5)
BILIRUB SERPL-MCNC: 0.4 MG/DL (ref 0–1.2)
BILIRUB UR QL STRIP: NEGATIVE
BUN SERPL-MCNC: 8 MG/DL (ref 6–20)
BUN/CREAT SERPL: 12.1 (ref 7–25)
CALCIUM SPEC-SCNC: 9.1 MG/DL (ref 8.6–10.5)
CHLORIDE SERPL-SCNC: 104 MMOL/L (ref 98–107)
CLARITY UR: CLEAR
CO2 SERPL-SCNC: 25.9 MMOL/L (ref 22–29)
COLOR UR: YELLOW
CREAT SERPL-MCNC: 0.66 MG/DL (ref 0.57–1)
D-LACTATE SERPL-SCNC: 1.3 MMOL/L (ref 0.5–2)
DEPRECATED RDW RBC AUTO: 36 FL (ref 37–54)
EOSINOPHIL # BLD AUTO: 0.19 10*3/MM3 (ref 0–0.4)
EOSINOPHIL NFR BLD AUTO: 2.1 % (ref 0.3–6.2)
ERYTHROCYTE [DISTWIDTH] IN BLOOD BY AUTOMATED COUNT: 12 % (ref 12.3–15.4)
GFR SERPL CREATININE-BSD FRML MDRD: 101 ML/MIN/1.73
GLOBULIN UR ELPH-MCNC: 3.5 GM/DL
GLUCOSE SERPL-MCNC: 225 MG/DL (ref 65–99)
GLUCOSE UR STRIP-MCNC: NEGATIVE MG/DL
HCT VFR BLD AUTO: 36 % (ref 34–46.6)
HGB BLD-MCNC: 12.5 G/DL (ref 12–15.9)
HGB UR QL STRIP.AUTO: ABNORMAL
HOLD SPECIMEN: NORMAL
HOLD SPECIMEN: NORMAL
HYALINE CASTS UR QL AUTO: ABNORMAL /LPF
IMM GRANULOCYTES # BLD AUTO: 0.1 10*3/MM3 (ref 0–0.05)
IMM GRANULOCYTES NFR BLD AUTO: 1.1 % (ref 0–0.5)
KETONES UR QL STRIP: NEGATIVE
LEUKOCYTE ESTERASE UR QL STRIP.AUTO: ABNORMAL
LYMPHOCYTES # BLD AUTO: 2.04 10*3/MM3 (ref 0.7–3.1)
LYMPHOCYTES NFR BLD AUTO: 23 % (ref 19.6–45.3)
MCH RBC QN AUTO: 28.7 PG (ref 26.6–33)
MCHC RBC AUTO-ENTMCNC: 34.7 G/DL (ref 31.5–35.7)
MCV RBC AUTO: 82.6 FL (ref 79–97)
MONOCYTES # BLD AUTO: 0.41 10*3/MM3 (ref 0.1–0.9)
MONOCYTES NFR BLD AUTO: 4.6 % (ref 5–12)
NEUTROPHILS NFR BLD AUTO: 6.11 10*3/MM3 (ref 1.7–7)
NEUTROPHILS NFR BLD AUTO: 68.9 % (ref 42.7–76)
NITRITE UR QL STRIP: NEGATIVE
NRBC BLD AUTO-RTO: 0 /100 WBC (ref 0–0.2)
PH UR STRIP.AUTO: 6 [PH] (ref 5–8)
PLATELET # BLD AUTO: 217 10*3/MM3 (ref 140–450)
PMV BLD AUTO: 9.6 FL (ref 6–12)
POTASSIUM SERPL-SCNC: 4.4 MMOL/L (ref 3.5–5.2)
PROT SERPL-MCNC: 7.5 G/DL (ref 6–8.5)
PROT UR QL STRIP: ABNORMAL
RBC # BLD AUTO: 4.36 10*6/MM3 (ref 3.77–5.28)
RBC # UR: ABNORMAL /HPF
REF LAB TEST METHOD: ABNORMAL
SODIUM SERPL-SCNC: 140 MMOL/L (ref 136–145)
SP GR UR STRIP: 1.01 (ref 1–1.03)
SQUAMOUS #/AREA URNS HPF: ABNORMAL /HPF
UROBILINOGEN UR QL STRIP: ABNORMAL
WBC # BLD AUTO: 8.88 10*3/MM3 (ref 3.4–10.8)
WBC UR QL AUTO: ABNORMAL /HPF
WHOLE BLOOD HOLD SPECIMEN: NORMAL
WHOLE BLOOD HOLD SPECIMEN: NORMAL

## 2020-12-27 PROCEDURE — 96375 TX/PRO/DX INJ NEW DRUG ADDON: CPT

## 2020-12-27 PROCEDURE — 25010000002 KETOROLAC TROMETHAMINE PER 15 MG: Performed by: EMERGENCY MEDICINE

## 2020-12-27 PROCEDURE — 83605 ASSAY OF LACTIC ACID: CPT | Performed by: EMERGENCY MEDICINE

## 2020-12-27 PROCEDURE — 25010000002 IOPAMIDOL 61 % SOLUTION: Performed by: EMERGENCY MEDICINE

## 2020-12-27 PROCEDURE — 25010000002 HYDROMORPHONE 1 MG/ML SOLUTION: Performed by: EMERGENCY MEDICINE

## 2020-12-27 PROCEDURE — 99283 EMERGENCY DEPT VISIT LOW MDM: CPT

## 2020-12-27 PROCEDURE — 74177 CT ABD & PELVIS W/CONTRAST: CPT

## 2020-12-27 PROCEDURE — 25010000002 CEFTRIAXONE PER 250 MG: Performed by: EMERGENCY MEDICINE

## 2020-12-27 PROCEDURE — 96365 THER/PROPH/DIAG IV INF INIT: CPT

## 2020-12-27 PROCEDURE — 36415 COLL VENOUS BLD VENIPUNCTURE: CPT

## 2020-12-27 PROCEDURE — 85025 COMPLETE CBC W/AUTO DIFF WBC: CPT | Performed by: EMERGENCY MEDICINE

## 2020-12-27 PROCEDURE — 80053 COMPREHEN METABOLIC PANEL: CPT | Performed by: EMERGENCY MEDICINE

## 2020-12-27 PROCEDURE — 87040 BLOOD CULTURE FOR BACTERIA: CPT | Performed by: EMERGENCY MEDICINE

## 2020-12-27 PROCEDURE — 25010000002 ONDANSETRON PER 1 MG: Performed by: EMERGENCY MEDICINE

## 2020-12-27 PROCEDURE — 81001 URINALYSIS AUTO W/SCOPE: CPT | Performed by: EMERGENCY MEDICINE

## 2020-12-27 PROCEDURE — 87086 URINE CULTURE/COLONY COUNT: CPT | Performed by: EMERGENCY MEDICINE

## 2020-12-27 RX ORDER — KETOROLAC TROMETHAMINE 30 MG/ML
30 INJECTION, SOLUTION INTRAMUSCULAR; INTRAVENOUS ONCE
Status: COMPLETED | OUTPATIENT
Start: 2020-12-27 | End: 2020-12-27

## 2020-12-27 RX ORDER — SODIUM CHLORIDE 0.9 % (FLUSH) 0.9 %
10 SYRINGE (ML) INJECTION AS NEEDED
Status: DISCONTINUED | OUTPATIENT
Start: 2020-12-27 | End: 2020-12-28 | Stop reason: HOSPADM

## 2020-12-27 RX ORDER — ONDANSETRON 2 MG/ML
4 INJECTION INTRAMUSCULAR; INTRAVENOUS ONCE
Status: COMPLETED | OUTPATIENT
Start: 2020-12-27 | End: 2020-12-27

## 2020-12-27 RX ADMIN — CEFTRIAXONE 2 G: 2 INJECTION, POWDER, FOR SOLUTION INTRAMUSCULAR; INTRAVENOUS at 22:38

## 2020-12-27 RX ADMIN — IOPAMIDOL 85 ML: 612 INJECTION, SOLUTION INTRAVENOUS at 22:04

## 2020-12-27 RX ADMIN — SODIUM CHLORIDE 1000 ML: 9 INJECTION, SOLUTION INTRAVENOUS at 20:37

## 2020-12-27 RX ADMIN — HYDROMORPHONE HYDROCHLORIDE 1 MG: 1 INJECTION, SOLUTION INTRAMUSCULAR; INTRAVENOUS; SUBCUTANEOUS at 22:38

## 2020-12-27 RX ADMIN — ONDANSETRON 4 MG: 2 INJECTION INTRAMUSCULAR; INTRAVENOUS at 20:37

## 2020-12-27 RX ADMIN — KETOROLAC TROMETHAMINE 30 MG: 30 INJECTION, SOLUTION INTRAMUSCULAR; INTRAVENOUS at 20:37

## 2020-12-28 ENCOUNTER — EPISODE CHANGES (OUTPATIENT)
Dept: CASE MANAGEMENT | Facility: OTHER | Age: 35
End: 2020-12-28

## 2020-12-28 VITALS
DIASTOLIC BLOOD PRESSURE: 74 MMHG | WEIGHT: 200 LBS | BODY MASS INDEX: 40.32 KG/M2 | TEMPERATURE: 98 F | SYSTOLIC BLOOD PRESSURE: 110 MMHG | HEIGHT: 59 IN | HEART RATE: 71 BPM | OXYGEN SATURATION: 99 % | RESPIRATION RATE: 16 BRPM

## 2020-12-28 LAB
BACTERIA SPEC AEROBE CULT: ABNORMAL
FLUAV RNA RESP QL NAA+PROBE: NOT DETECTED
FLUBV RNA RESP QL NAA+PROBE: NOT DETECTED
QT INTERVAL: 422 MS
QTC INTERVAL: 452 MS
SARS-COV-2 RNA RESP QL NAA+PROBE: NOT DETECTED

## 2020-12-28 PROCEDURE — 25010000002 DROPERIDOL PER 5 MG: Performed by: EMERGENCY MEDICINE

## 2020-12-28 PROCEDURE — 25010000002 HYDROMORPHONE 1 MG/ML SOLUTION: Performed by: EMERGENCY MEDICINE

## 2020-12-28 PROCEDURE — 93005 ELECTROCARDIOGRAM TRACING: CPT | Performed by: EMERGENCY MEDICINE

## 2020-12-28 PROCEDURE — 93010 ELECTROCARDIOGRAM REPORT: CPT | Performed by: INTERNAL MEDICINE

## 2020-12-28 PROCEDURE — 96375 TX/PRO/DX INJ NEW DRUG ADDON: CPT

## 2020-12-28 PROCEDURE — 87636 SARSCOV2 & INF A&B AMP PRB: CPT | Performed by: EMERGENCY MEDICINE

## 2020-12-28 PROCEDURE — 96376 TX/PRO/DX INJ SAME DRUG ADON: CPT

## 2020-12-28 RX ORDER — DROPERIDOL 2.5 MG/ML
1.25 INJECTION, SOLUTION INTRAMUSCULAR; INTRAVENOUS ONCE
Status: COMPLETED | OUTPATIENT
Start: 2020-12-28 | End: 2020-12-28

## 2020-12-28 RX ORDER — METRONIDAZOLE 500 MG/1
500 TABLET ORAL 2 TIMES DAILY
Qty: 14 TABLET | Refills: 0 | Status: SHIPPED | OUTPATIENT
Start: 2020-12-28 | End: 2021-01-04

## 2020-12-28 RX ORDER — HYDROCODONE BITARTRATE AND ACETAMINOPHEN 5; 325 MG/1; MG/1
1 TABLET ORAL EVERY 6 HOURS PRN
Qty: 10 TABLET | Refills: 0 | Status: SHIPPED | OUTPATIENT
Start: 2020-12-28 | End: 2021-05-10

## 2020-12-28 RX ORDER — SULFAMETHOXAZOLE AND TRIMETHOPRIM 800; 160 MG/1; MG/1
1 TABLET ORAL 2 TIMES DAILY
Qty: 84 TABLET | Refills: 0 | Status: SHIPPED | OUTPATIENT
Start: 2020-12-28 | End: 2021-02-08

## 2020-12-28 RX ORDER — PROMETHAZINE HYDROCHLORIDE 25 MG/1
25 TABLET ORAL EVERY 6 HOURS PRN
Qty: 30 TABLET | Refills: 0 | Status: SHIPPED | OUTPATIENT
Start: 2020-12-28 | End: 2021-05-10

## 2020-12-28 RX ADMIN — HYDROMORPHONE HYDROCHLORIDE 1 MG: 1 INJECTION, SOLUTION INTRAMUSCULAR; INTRAVENOUS; SUBCUTANEOUS at 02:15

## 2020-12-28 RX ADMIN — DROPERIDOL 1.25 MG: 2.5 INJECTION, SOLUTION INTRAMUSCULAR; INTRAVENOUS at 04:46

## 2020-12-29 ENCOUNTER — PATIENT OUTREACH (OUTPATIENT)
Dept: CASE MANAGEMENT | Facility: OTHER | Age: 35
End: 2020-12-29

## 2020-12-29 NOTE — OUTREACH NOTE
Patient Outreach Note    Patient was seen in the ED 12/27/20 for pyelonephritis and a bacterial UTI. Patient states she is doing all right but is still having pain. Pt stated the doctor had mentioned admitting her but then she was discharged home, asked if it had been an insurance issue. ACM reviewed notes and advised it appeared that upon speaking with the on-call urologist, it was determined that she could be managed with outpatient antibiotics and a follow-up in the urologist's office, pt voiced understanding, states she has an apt with urology tomorrow. Pt had no other questions at this time, will return to ED if symptoms worsen.     Brittany Hatfield RN  Ambulatory     12/29/2020, 15:12 EST

## 2020-12-30 ENCOUNTER — OFFICE VISIT (OUTPATIENT)
Dept: UROLOGY | Facility: CLINIC | Age: 35
End: 2020-12-30

## 2020-12-30 VITALS — WEIGHT: 200 LBS | HEIGHT: 59 IN | BODY MASS INDEX: 40.32 KG/M2 | TEMPERATURE: 97.1 F

## 2020-12-30 DIAGNOSIS — N10 ACUTE PYELONEPHRITIS: Primary | ICD-10-CM

## 2020-12-30 DIAGNOSIS — R35.0 FREQUENCY OF URINATION: ICD-10-CM

## 2020-12-30 DIAGNOSIS — R10.9 ACUTE LEFT FLANK PAIN: ICD-10-CM

## 2020-12-30 DIAGNOSIS — N39.0 RECURRENT UTI (URINARY TRACT INFECTION): ICD-10-CM

## 2020-12-30 LAB
BILIRUB BLD-MCNC: NEGATIVE MG/DL
CLARITY, POC: CLEAR
COLOR UR: YELLOW
GLUCOSE UR STRIP-MCNC: NEGATIVE MG/DL
KETONES UR QL: NEGATIVE
LEUKOCYTE EST, POC: NEGATIVE
NITRITE UR-MCNC: NEGATIVE MG/ML
PH UR: 6 [PH] (ref 5–8)
PROT UR STRIP-MCNC: NEGATIVE MG/DL
RBC # UR STRIP: NEGATIVE /UL
SP GR UR: 1.02 (ref 1–1.03)
UROBILINOGEN UR QL: NORMAL

## 2020-12-30 PROCEDURE — 99214 OFFICE O/P EST MOD 30 MIN: CPT | Performed by: NURSE PRACTITIONER

## 2020-12-30 PROCEDURE — 81003 URINALYSIS AUTO W/O SCOPE: CPT | Performed by: NURSE PRACTITIONER

## 2020-12-30 RX ORDER — IBUPROFEN 800 MG/1
800 TABLET ORAL EVERY 8 HOURS PRN
Qty: 60 TABLET | Refills: 0 | Status: SHIPPED | OUTPATIENT
Start: 2020-12-30 | End: 2021-05-10

## 2020-12-30 RX ORDER — ONDANSETRON 4 MG/1
4 TABLET, FILM COATED ORAL DAILY PRN
Qty: 20 TABLET | Refills: 1 | Status: SHIPPED | OUTPATIENT
Start: 2020-12-30 | End: 2021-05-10

## 2020-12-30 NOTE — PROGRESS NOTES
Chief Complaint:          Chief Complaint   Patient presents with   • Left Pyelonephritis/Recurrent urinary Tract Infection     Er fu acute pyelonephritis/left flank pain       HPI:   35 y.o. female, very pleasant but ill looking evaluated in clinic today with numerous concerns.  She is an ER follow-up.  Patient reports she presented to the ER on 12/24/2020 secondary to severe bilateral flank pain with left flank pain> right flank, colicky in nature, but constant 10/10.she also had UTI symptoms of frequency, urgency, lower back pain consistent with her prior kidney stone pain.    Patient has a significant history of kidney stones, however she had a CT scan done which was completely negative for any nephrolithiasis.  It showed findings most compatible with left sided pyelonephritis and probable inflammation/infection to her proximal right ureter and renal pelvis. No drainable fluid collection or abscess is noted on the CT.     She is seen in clinic today leaning sideways over the exam table reporting significant left flank pain.  Reports flank pain that is still very significant, urinary frequency, urgency, but denies dysuria or any burning on urination.  She has pelvic/suprapubic discomfort, and denies any episodes of gross hematuria.  He has had nausea, denies vomiting, she has had chills, but denies any fevers.  Ports a significant history of recurrent UTIs, for which she has used numerous antibiotics in the past.  However she is unsure about any documented positive urine culture.    Her urine dipstick today is completely negative for any infection, it is negative for gross/microscopic hematuria.    She has a significant past medical history as listed below    Past Medical History:        Past Medical History:   Diagnosis Date   • Anxiety    • Depression    • Diabetes mellitus (CMS/[facility])    • Diverticulitis    • GERD (gastroesophageal reflux disease)    • Hypertension    • Kidney stone    • PCOS (polycystic ovarian  syndrome)    • Sleep apnea        The following portions of the patient's history were reviewed and updated as appropriate: allergies, current medications, past family history, past medical history, past social history, past surgical history and problem list.    Current Meds:     Current Outpatient Medications   Medication Sig Dispense Refill   • amLODIPine (NORVASC) 2.5 MG tablet Take 1 tablet by mouth Daily. 30 tablet 0   • buPROPion XL (WELLBUTRIN XL) 300 MG 24 hr tablet Take 1 tablet by mouth Every Morning. 30 tablet 2   • cloNIDine (CATAPRES) 0.1 MG tablet Take 1 tablet by mouth 3 (Three) Times a Day As Needed (if BP greateer than 170/110). 10 tablet 0   • halobetasol (ULTRAVATE) 0.05 % cream      • hydrochlorothiazide (HYDRODIURIL) 25 MG tablet Take 1 tablet by mouth.     • HYDROcodone-acetaminophen (NORCO) 5-325 MG per tablet Take 1 tablet by mouth Every 6 (Six) Hours As Needed for Severe Pain . 10 tablet 0   • insulin glargine (LANTUS) 100 UNIT/ML injection Inject 20 Units under the skin into the appropriate area as directed Daily.     • lamoTRIgine (LaMICtal) 150 MG tablet Take 1 tablet by mouth Daily. Take after finishing bottle of 100mg dosing 30 tablet 2   • lisinopril (PRINIVIL,ZESTRIL) 10 MG tablet Take 1 tablet by mouth Daily. 30 tablet 0   • metFORMIN (GLUCOPHAGE) 1000 MG tablet      • metroNIDAZOLE (FLAGYL) 500 MG tablet Take 1 tablet by mouth 2 (Two) Times a Day for 7 days. 14 tablet 0   • modafinil (PROVIGIL) 100 MG tablet Take 1 tablet by mouth Daily. For narcolepsy 30 tablet 0   • omeprazole (PRILOSEC) 40 MG capsule      • OXcarbazepine (Trileptal) 300 MG tablet Take 1 tablet by mouth 2 (Two) Times a Day. 60 tablet 2   • promethazine (PHENERGAN) 25 MG tablet Take 1 tablet by mouth Every 6 (Six) Hours As Needed for Vomiting. 30 tablet 0   • propranolol (INDERAL) 20 MG tablet Take 1 tablet by mouth 2 (Two) Times a Day. 60 tablet 2   • sertraline (ZOLOFT) 100 MG tablet Take 1.5 tablets by mouth  Daily. 45 tablet 2   • sulfamethoxazole-trimethoprim (BACTRIM DS,SEPTRA DS) 800-160 MG per tablet Take 1 tablet by mouth 2 (Two) Times a Day for 42 days. 84 tablet 0   • TRULICITY 1.5 MG/0.5ML solution pen-injector      • ibuprofen (ADVIL,MOTRIN) 800 MG tablet Take 1 tablet by mouth Every 8 (Eight) Hours As Needed for Moderate Pain . 60 tablet 0   • ondansetron (Zofran) 4 MG tablet Take 1 tablet by mouth Daily As Needed for Nausea or Vomiting. 20 tablet 1     No current facility-administered medications for this visit.         Allergies:      Allergies   Allergen Reactions   • Dilantin [Phenytoin] Mental Status Change   • Keppra [Levetiracetam] Mental Status Change   • Meperidine Rash   • Topamax [Topiramate] Mental Status Change        Past Surgical History:     Past Surgical History:   Procedure Laterality Date   • CARDIAC CATHETERIZATION     • CARPAL TUNNEL RELEASE     • COLONOSCOPY     • ENDOSCOPY     • FRACTURE SURGERY     • HARDWARE REMOVAL      WRIST   • TENDON REPAIR      HAND         Social History:     Social History     Socioeconomic History   • Marital status:      Spouse name: Not on file   • Number of children: Not on file   • Years of education: Not on file   • Highest education level: Not on file   Occupational History   • Occupation: Not Working   Tobacco Use   • Smoking status: Never Smoker   • Smokeless tobacco: Never Used   Substance and Sexual Activity   • Alcohol use: No   • Drug use: No   • Sexual activity: Defer       Family History:     Family History   Problem Relation Age of Onset   • Heart disease Mother    • COPD Mother    • Heart disease Father    • COPD Father        Review of Systems:     Review of Systems   Constitutional: Positive for activity change, appetite change, chills and fatigue. Negative for fever.   HENT: Negative for congestion and sinus pressure.    Eyes: Negative for blurred vision, pain and redness.   Respiratory: Negative for chest tightness and shortness of  breath.    Cardiovascular: Negative for chest pain.   Gastrointestinal: Positive for abdominal distention, abdominal pain and nausea. Negative for constipation, diarrhea and vomiting.   Genitourinary: Positive for dysuria, flank pain, frequency, pelvic pain, pelvic pressure and urgency. Negative for difficulty urinating, dyspareunia, genital sores, hematuria, urinary incontinence and vaginal discharge.   Musculoskeletal: Positive for back pain.   Skin: Positive for dry skin and pallor.   Allergic/Immunologic: Negative for food allergies.   Neurological: Negative for dizziness, headache and confusion.   Hematological: Bruises/bleeds easily.   Psychiatric/Behavioral: Positive for sleep disturbance and stress. Negative for behavioral problems and decreased concentration. The patient is nervous/anxious.         Physical Exam:     Physical Exam  Constitutional:       General: She is not in acute distress.     Appearance: She is well-developed. She is obese. She is ill-appearing.   HENT:      Head: Normocephalic and atraumatic.   Eyes:      Pupils: Pupils are equal, round, and reactive to light.   Neck:      Musculoskeletal: Normal range of motion.      Thyroid: No thyromegaly.      Trachea: No tracheal deviation.   Cardiovascular:      Rate and Rhythm: Normal rate and regular rhythm.      Heart sounds: No murmur.   Pulmonary:      Effort: Pulmonary effort is normal. No respiratory distress.      Breath sounds: Normal breath sounds. No stridor. No wheezing.   Abdominal:      General: Bowel sounds are normal.      Palpations: Abdomen is soft.      Tenderness: There is abdominal tenderness. There is left CVA tenderness and guarding.   Genitourinary:     Labia:         Right: No tenderness.         Left: No tenderness.       Vagina: Normal. No vaginal discharge.   Musculoskeletal: Normal range of motion.         General: Tenderness present. No deformity.   Skin:     General: Skin is warm and dry.      Capillary Refill:  Capillary refill takes less than 2 seconds.      Coloration: Skin is not pale.      Findings: No erythema or rash.   Neurological:      Mental Status: She is alert and oriented to person, place, and time.      Cranial Nerves: No cranial nerve deficit.      Sensory: No sensory deficit.      Motor: Weakness present.      Coordination: Coordination normal.   Psychiatric:         Behavior: Behavior normal.         Thought Content: Thought content normal.         Judgment: Judgment normal.         Procedure:       Assessment/Plan:        ASSESSMENT  Left Flank Pain /Pyelonephritis: THE Patient has been  diagnosed with pyelonephritis and referred to us from the ER.  She reports significant history of recurrent urinary tract infections for which she has used many antibiotics in the past.  However, she is unsure of any documented positive urine culture.  Last urine culture on 12/24 in the ED showed less than 50 CFU of gram-negative bacilli.  Her urine dipstick today is completely negative for any infection, is negative for gross/microscopic hematuria.    We have reviewed/discussed her recent CT done in the ED which showed  findings most compatible with left sided pyelonephritis and probable inflammation/infection to her proximal right ureter and renal pelvis. No drainable fluid collection or abscess is noted on the CT. CT is also negative for any nephrolithiasis.    We discussed Acute pyelonephritis AS a sudden and severe kidney infection. This causes the kidneys to swell and may permanently damage them. Pyelonephritis can be life-threatening. We discussed the facts that, The infection usually starts in the lower urinary tract.( patient reports numerous UTIs in the past).     We discussed the types of organisms that are found in the urinary tract indicating that the vast majority are results of the patient's own gastrointestinal adrian.  We discussed how many of the antibiotics that are utilized can actually exacerbate these  infections by creating resistant organisms and there is only a very few antibiotics that are concentrated in the urine and do not affect the rectal reservoir nor cause recurrent yeast vaginitis.      We discussed the risk factors for recurrent infections being intercourse in younger patients and atrophic changes in older patients.  We discussed the symptoms that are found including pain, pressure, burning, frequency, urgency suprapubic pain and painful intercourse.  I discussed upper tract symptoms including fevers, chills, and indicated the workup would be much more aggressive if the patient were to present with recurrent infections in the face of upper tract symptomatology such as fever. I discussed the history of vesicoureteral reflux in young patients and finally chronic renal scarring as a result of such.          PLAN    Patient had a urine culture pending, ED visit, I will call her with results if any positive bacterial growth resistant to the current therapy and Bactrim.    We discussed continuing Bactrim DS twice daily for at least 6 weeks     I strongly recommend concomitant probiotics with treatment with antibiotics to protect the rectal reservoir including over-the-counter yogurt preparations to romario oral pills containing the appropriate probiotics.    Continue Norco 5/325 as needed flank pain-given in ER    Motrin 800 mg as needed flank pain    Zofran 4 mg as needed nausea    We discussed lower tract investigation via cystoscopy with Dr. Hagan once infection     Follow-up in 6 weeks for repeat urine analysis at that time/go to ER with worsening symptoms    Patient is agreeable plan of care    Patient reports that she is not currently experiencing any symptoms of urinary incontinence.    Patient's Body mass index is 40.4 kg/m². BMI is above normal parameters. Recommendations include: educational material, exercise counseling and nutrition counseling.    Smoking Cessation Counseling:  Never a  smoker.  Patient does not currently use any tobacco products.       Counseling was given to patient for the following topics diagnostic results including: Left pyelonephritis/Recurrent UTI, instructions for management as follows: Increase p.o. fluid intake, blood sugar control, Bactrim DS twice daily for 6 weeks, nausea medicine, pain control, risk factor reductions including: Bladder irritants such as caffeine products, citrusy/spicy foods, hypoglycemia and impressions as follows: Continue Bactrim BID X 6 WEEKS, FOLLOW UP FOR RCHECK. The interim medical history and current results were reviewed.  A treatment plan with follow-up was made for: Left flank pain, recurrent UTIs, Acute pyelonephritis [N10].             This document has been electronically signed by Griselda Cheng-Akwa, APRN December 30, 2020 23:08 EST

## 2020-12-31 NOTE — PATIENT INSTRUCTIONS
"Dietary Guidelines to Help Prevent Kidney Stones  Kidney stones are deposits of minerals and salts that form inside your kidneys. Your risk of developing kidney stones may be greater depending on your diet, your lifestyle, the medicines you take, and whether you have certain medical conditions. Most people can reduce their chances of developing kidney stones by following the instructions below. Depending on your overall health and the type of kidney stones you tend to develop, your dietitian may give you more specific instructions.  What are tips for following this plan?  Reading food labels  · Choose foods with \"no salt added\" or \"low-salt\" labels. Limit your sodium intake to less than 1500 mg per day.  · Choose foods with calcium for each meal and snack. Try to eat about 300 mg of calcium at each meal. Foods that contain 200-500 mg of calcium per serving include:  ? 8 oz (237 ml) of milk, fortified nondairy milk, and fortified fruit juice.  ? 8 oz (237 ml) of kefir, yogurt, and soy yogurt.  ? 4 oz (118 ml) of tofu.  ? 1 oz of cheese.  ? 1 cup (300 g) of dried figs.  ? 1 cup (91 g) of cooked broccoli.  ? 1-3 oz can of sardines or mackerel.  · Most people need 1000 to 1500 mg of calcium each day. Talk to your dietitian about how much calcium is recommended for you.  Shopping  · Buy plenty of fresh fruits and vegetables. Most people do not need to avoid fruits and vegetables, even if they contain nutrients that may contribute to kidney stones.  · When shopping for convenience foods, choose:  ? Whole pieces of fruit.  ? Premade salads with dressing on the side.  ? Low-fat fruit and yogurt smoothies.  · Avoid buying frozen meals or prepared deli foods.  · Look for foods with live cultures, such as yogurt and kefir.  Cooking  · Do not add salt to food when cooking. Place a salt shaker on the table and allow each person to add his or her own salt to taste.  · Use vegetable protein, such as beans, textured vegetable " protein (TVP), or tofu instead of meat in pasta, casseroles, and soups.  Meal planning    · Eat less salt, if told by your dietitian. To do this:  ? Avoid eating processed or premade food.  ? Avoid eating fast food.  · Eat less animal protein, including cheese, meat, poultry, or fish, if told by your dietitian. To do this:  ? Limit the number of times you have meat, poultry, fish, or cheese each week. Eat a diet free of meat at least 2 days a week.  ? Eat only one serving each day of meat, poultry, fish, or seafood.  ? When you prepare animal protein, cut pieces into small portion sizes. For most meat and fish, one serving is about the size of one deck of cards.  · Eat at least 5 servings of fresh fruits and vegetables each day. To do this:  ? Keep fruits and vegetables on hand for snacks.  ? Eat 1 piece of fruit or a handful of berries with breakfast.  ? Have a salad and fruit at lunch.  ? Have two kinds of vegetables at dinner.  · Limit foods that are high in a substance called oxalate. These include:  ? Spinach.  ? Rhubarb.  ? Beets.  ? Potato chips and french fries.  ? Nuts.  · If you regularly take a diuretic medicine, make sure to eat at least 1-2 fruits or vegetables high in potassium each day. These include:  ? Avocado.  ? Banana.  ? Orange, prune, carrot, or tomato juice.  ? Baked potato.  ? Cabbage.  ? Beans and split peas.  General instructions    · Drink enough fluid to keep your urine clear or pale yellow. This is the most important thing you can do.  · Talk to your health care provider and dietitian about taking daily supplements. Depending on your health and the cause of your kidney stones, you may be advised:  ? Not to take supplements with vitamin C.  ? To take a calcium supplement.  ? To take a daily probiotic supplement.  ? To take other supplements such as magnesium, fish oil, or vitamin B6.  · Take all medicines and supplements as told by your health care provider.  · Limit alcohol intake to no  more than 1 drink a day for nonpregnant women and 2 drinks a day for men. One drink equals 12 oz of beer, 5 oz of wine, or 1½ oz of hard liquor.  · Lose weight if told by your health care provider. Work with your dietitian to find strategies and an eating plan that works best for you.  What foods are not recommended?  Limit your intake of the following foods, or as told by your dietitian. Talk to your dietitian about specific foods you should avoid based on the type of kidney stones and your overall health.  Grains  Breads. Bagels. Rolls. Baked goods. Salted crackers. Cereal. Pasta.  Vegetables  Spinach. Rhubarb. Beets. Canned vegetables. Pickles. Olives.  Meats and other protein foods  Nuts. Nut butters. Large portions of meat, poultry, or fish. Salted or cured meats. Deli meats. Hot dogs. Sausages.  Dairy  Cheese.  Beverages  Regular soft drinks. Regular vegetable juice.  Seasonings and other foods  Seasoning blends with salt. Salad dressings. Canned soups. Soy sauce. Ketchup. Barbecue sauce. Canned pasta sauce. Casseroles. Pizza. Lasagna. Frozen meals. Potato chips. French fries.  Summary  · You can reduce your risk of kidney stones by making changes to your diet.  · The most important thing you can do is drink enough fluid. You should drink enough fluid to keep your urine clear or pale yellow.  · Ask your health care provider or dietitian how much protein from animal sources you should eat each day, and also how much salt and calcium you should have each day.  This information is not intended to replace advice given to you by your health care provider. Make sure you discuss any questions you have with your health care provider.  Document Revised: 04/08/2020 Document Reviewed: 11/28/2017  [employer] Patient Education © 2020 [employer]      Low-Purine Eating Plan  A low-purine eating plan involves making food choices to limit your intake of purine. Purine is a kind of uric acid. Too much uric acid in your blood  can cause certain conditions, such as gout and kidney stones. Eating a low-purine diet can help control these conditions.  What are tips for following this plan?  Reading food labels    · Avoid foods with saturated or Trans fat.  · Check the ingredient list of grains-based foods, such as bread and cereal, to make sure that they contain whole grains.  · Check the ingredient list of sauces or soups to make sure they do not contain meat or fish.  · When choosing soft drinks, check the ingredient list to make sure they do not contain high-fructose corn syrup.  Shopping  · Buy plenty of fresh fruits and vegetables.  · Avoid buying canned or fresh fish.  · Buy dairy products labeled as low-fat or nonfat.  · Avoid buying premade or processed foods. These foods are often high in fat, salt (sodium), and added sugar.  Cooking  · Use olive oil instead of butter when cooking. Oils like olive oil, canola oil, and sunflower oil contain healthy fats.  Meal planning  · Learn which foods do or do not affect you. If you find out that a food tends to cause your gout symptoms to flare up, avoid eating that food. You can enjoy foods that do not cause problems. If you have any questions about a food item, talk with your dietitian or health care provider.  · Limit foods high in fat, especially saturated fat. Fat makes it harder for your body to get rid of uric acid.  · Choose foods that are lower in fat and are lean sources of protein.  General guidelines  · Limit alcohol intake to no more than 1 drink a day for nonpregnant women and 2 drinks a day for men. One drink equals 12 oz of beer, 5 oz of wine, or 1½ oz of hard liquor. Alcohol can affect the way your body gets rid of uric acid.  · Drink plenty of water to keep your urine clear or pale yellow. Fluids can help remove uric acid from your body.  · If directed by your health care provider, take a vitamin C supplement.  · Work with your health care provider and dietitian to develop a  plan to achieve or maintain a healthy weight. Losing weight can help reduce uric acid in your blood.  What foods are recommended?  The items listed may not be a complete list. Talk with your dietitian about what dietary choices are best for you.  Foods low in purines  Foods low in purines do not need to be limited. These include:  · All fruits.  · All low-purine vegetables, pickles, and olives.  · Breads, pasta, rice, cornbread, and popcorn. Cake and other baked goods.  · All dairy foods.  · Eggs, nuts, and nut butters.  · Spices and condiments, such as salt, herbs, and vinegar.  · Plant oils, butter, and margarine.  · Water, sugar-free soft drinks, tea, coffee, and cocoa.  · Vegetable-based soups, broths, sauces, and gravies.  Foods moderate in purines  Foods moderate in purines should be limited to the amounts listed.  · ½ cup of asparagus, cauliflower, spinach, mushrooms, or green peas, each day.  · 2/3 cup uncooked oatmeal, each day.  · ¼ cup dry wheat bran or wheat germ, each day.  · 2-3 ounces of meat or poultry, each day.  · 4-6 ounces of shellfish, such as crab, lobster, oysters, or shrimp, each day.  · 1 cup cooked beans, peas, or lentils, each day.  · Soup, broths, or bouillon made from meat or fish. Limit these foods as much as possible.  What foods are not recommended?  The items listed may not be a complete list. Talk with your dietitian about what dietary choices are best for you.  Limit your intake of foods high in purines, including:  · Beer and other alcohol.  · Meat-based gravy or sauce.  · Canned or fresh fish, such as:  ? Anchovies, sardines, herring, and tuna.  ? Mussels and scallops.  ? Codfish, trout, and margo.  · Perez.  · Organ meats, such as:  ? Liver or kidney.  ? Tripe.  ? Sweetbreads (thymus gland or pancreas).  · Wild game or goose.  · Yeast or yeast extract supplements.  · Drinks sweetened with high-fructose corn syrup.  Summary  · Eating a low-purine diet can help control  conditions caused by too much uric acid in the body, such as gout or kidney stones.  · Choose low-purine foods, limit alcohol, and limit foods high in fat.  · You will learn over time which foods do or do not affect you. If you find out that a food tends to cause your gout symptoms to flare up, avoid eating that food.  This information is not intended to replace advice given to you by your health care provider. Make sure you discuss any questions you have with your health care provider.  Document Revised: 11/30/2018 Document Reviewed: 01/31/2018  [employer] Patient Education © 2020 [employer]    Discussed a kidney stone prevention diet to include increasing p.o. fluid intake, to at least 1 to 2 L of water daily.  She is to avoid caffeine products such as cola, coffee, and to avoid soft or soda drinks.  She is to decrease her sodium consumption as in  Fast foods, barroso, salted nuts, canned foods, and smoked/cured foods. She is also to decrease her oxalate consumption, as in spinach, James greens, and Rhubarb.  Also important is to decrease protein intake, as in red meats, peanut butter, and also avoid nuts.  Flank Pain, Adult  Flank pain is pain in your side. The flank is the area of your side between your upper belly (abdomen) and your back. The pain may occur over a short time (acute), or it may be long-term or come back often (chronic). It may be mild or very bad. Pain in this area can be caused by many different things.  Follow these instructions at home:    · Drink enough fluid to keep your pee (urine) clear or pale yellow.  · Rest as told by your doctor.  · Take over-the-counter and prescription medicines only as told by your doctor.  · Keep a journal to keep track of:  ? What has caused your flank pain.  ? What has made it feel better.  · Keep all follow-up visits as told by your doctor. This is important.  Contact a doctor if:  · Medicine does not help your pain.  · You have new symptoms.  · Your pain gets  worse.  · You have a fever.  · Your symptoms last longer than 2-3 days.  · You have trouble peeing.  · You are peeing more often than normal.  Get help right away if:  · You have trouble breathing.  · You are short of breath.  · Your belly hurts, or it is swollen or red.  · You feel sick to your stomach (nauseous).  · You throw up (vomit).  · You feel like you will pass out, or you do pass out (faint).  · You have blood in your pee.  Summary  · Flank pain is pain in your side. The flank is the area of your side between your upper belly (abdomen) and your back.  · Flank pain may occur over a short time (acute), or it may be long-term or come back often (chronic). It may be mild or very bad.  · Pain in this area can be caused by many different things.  · Contact your doctor if your symptoms get worse or they last longer than 2-3 days.  This information is not intended to replace advice given to you by your health care provider. Make sure you discuss any questions you have with your health care provider.  Document Revised: 11/30/2018 Document Reviewed: 04/09/2018  [employer] Patient Education © 2020 [employer]  Urinary Tract Infection, Adult  A urinary tract infection (UTI) is an infection of any part of the urinary tract. The urinary tract includes:  · The kidneys.  · The ureters.  · The bladder.  · The urethra.  These organs make, store, and get rid of pee (urine) in the body.  What are the causes?  This is caused by germs (bacteria) in your genital area. These germs grow and cause swelling (inflammation) of your urinary tract.  What increases the risk?  You are more likely to develop this condition if:  · You have a small, thin tube (catheter) to drain pee.  · You cannot control when you pee or poop (incontinence).  · You are female, and:  ? You use these methods to prevent pregnancy:  § A medicine that kills sperm (spermicide).  § A device that blocks sperm (diaphragm).  ? You have low levels of a female hormone  (estrogen).  ? You are pregnant.  · You have genes that add to your risk.  · You are sexually active.  · You take antibiotic medicines.  · You have trouble peeing because of:  ? A prostate that is bigger than normal, if you are male.  ? A blockage in the part of your body that drains pee from the bladder (urethra).  ? A kidney stone.  ? A nerve condition that affects your bladder (neurogenic bladder).  ? Not getting enough to drink.  ? Not peeing often enough.  · You have other conditions, such as:  ? Diabetes.  ? A weak disease-fighting system (immune system).  ? Sickle cell disease.  ? Gout.  ? Injury of the spine.  What are the signs or symptoms?  Symptoms of this condition include:  · Needing to pee right away (urgently).  · Peeing often.  · Peeing small amounts often.  · Pain or burning when peeing.  · Blood in the pee.  · Pee that smells bad or not like normal.  · Trouble peeing.  · Pee that is cloudy.  · Fluid coming from the vagina, if you are female.  · Pain in the belly or lower back.  Other symptoms include:  · Throwing up (vomiting).  · No urge to eat.  · Feeling mixed up (confused).  · Being tired and grouchy (irritable).  · A fever.  · Watery poop (diarrhea).  How is this treated?  This condition may be treated with:  · Antibiotic medicine.  · Other medicines.  · Drinking enough water.  Follow these instructions at home:    Medicines  · Take over-the-counter and prescription medicines only as told by your doctor.  · If you were prescribed an antibiotic medicine, take it as told by your doctor. Do not stop taking it even if you start to feel better.  General instructions  · Make sure you:  ? Pee until your bladder is empty.  ? Do not hold pee for a long time.  ? Empty your bladder after sex.  ? Wipe from front to back after pooping if you are a female. Use each tissue one time when you wipe.  · Drink enough fluid to keep your pee pale yellow.  · Keep all follow-up visits as told by your doctor. This is  important.  Contact a doctor if:  · You do not get better after 1-2 days.  · Your symptoms go away and then come back.  Get help right away if:  · You have very bad back pain.  · You have very bad pain in your lower belly.  · You have a fever.  · You are sick to your stomach (nauseous).  · You are throwing up.  Summary  · A urinary tract infection (UTI) is an infection of any part of the urinary tract.  · This condition is caused by germs in your genital area.  · There are many risk factors for a UTI. These include having a small, thin tube to drain pee and not being able to control when you pee or poop.  · Treatment includes antibiotic medicines for germs.  · Drink enough fluid to keep your pee pale yellow.  This information is not intended to replace advice given to you by your health care provider. Make sure you discuss any questions you have with your health care provider.  Document Revised: 12/05/2019 Document Reviewed: 06/27/2019  [employer] Patient Education © 2020 [employer]  Pyelonephritis, Adult    Pyelonephritis is an infection that occurs in the kidney. The kidneys are organs that help clean the blood by moving waste out of the blood and into the pee (urine). This infection can happen quickly, or it can last for a long time. In most cases, it clears up with treatment and does not cause other problems.  What are the causes?  This condition may be caused by:  · Germs (bacteria) going from the bladder up to the kidney. This may happen after having a bladder infection.  · Germs going from the blood to the kidney.  What increases the risk?  This condition is more likely to develop in:  · Pregnant women.  · Older people.  · People who have any of these conditions:  ? Diabetes.  ? Inflammation of the prostate gland (prostatitis), in males.  ? Kidney stones or bladder stones.  ? Other problems with the kidney or the parts of your body that carry pee from the kidneys to the bladder  (ureters).  ? Cancer.  · People who have a small, thin tube (catheter) placed in the bladder.  · People who are sexually active.  · Women who use a medicine that kills sperm (spermicide) to prevent pregnancy.  · People who have had a prior urinary tract infection (UTI).  What are the signs or symptoms?  Symptoms of this condition include:  · Peeing often.  · A strong urge to pee right away.  · Burning or stinging when peeing.  · Belly pain.  · Back pain.  · Pain in the side (flank area).  · Fever or chills.  · Blood in the pee, or dark pee.  · Feeling sick to your stomach (nauseous) or throwing up (vomiting).  How is this treated?  This condition may be treated by:  · Taking antibiotic medicines by mouth (orally).  · Drinking enough fluids.  If the infection is bad, you may need to stay in the hospital. You may be given antibiotics and fluids that are put directly into a vein through an IV tube.  In some cases, other treatments may be needed.  Follow these instructions at home:  Medicines  · Take your antibiotic medicine as told by your doctor. Do not stop taking the antibiotic even if you start to feel better.  · Take over-the-counter and prescription medicines only as told by your doctor.  General instructions    · Drink enough fluid to keep your pee pale yellow.  · Avoid caffeine, tea, and carbonated drinks.  · Pee (urinate) often. Avoid holding in pee for long periods of time.  · Pee before and after sex.  · After pooping (having a bowel movement), women should wipe from front to back. Use each tissue only once.  · Keep all follow-up visits as told by your doctor. This is important.  Contact a doctor if:  · You do not feel better after 2 days.  · Your symptoms get worse.  · You have a fever.  Get help right away if:  · You cannot take your medicine or drink fluids as told.  · You have chills and shaking.  · You throw up.  · You have very bad pain in your side or back.  · You feel very weak or you pass out  (faint).  Summary  · Pyelonephritis is an infection that occurs in the kidney.  · In most cases, this infection clears up with treatment and does not cause other problems.  · Take your antibiotic medicine as told by your doctor. Do not stop taking the antibiotic even if you start to feel better.  · Drink enough fluid to keep your pee pale yellow.  This information is not intended to replace advice given to you by your health care provider. Make sure you discuss any questions you have with your health care provider.  Document Revised: 10/22/2019 Document Reviewed: 10/22/2019  [employer] Patient Education © 2020 [employer]

## 2021-01-01 LAB
BACTERIA SPEC AEROBE CULT: NORMAL
BACTERIA SPEC AEROBE CULT: NORMAL

## 2021-01-25 ENCOUNTER — TELEMEDICINE (OUTPATIENT)
Dept: PSYCHIATRY | Facility: CLINIC | Age: 36
End: 2021-01-25

## 2021-01-25 DIAGNOSIS — Z79.899 MEDICATION MANAGEMENT: ICD-10-CM

## 2021-01-25 DIAGNOSIS — F41.1 GENERALIZED ANXIETY DISORDER: ICD-10-CM

## 2021-01-25 DIAGNOSIS — G47.419 NARCOLEPSY WITHOUT CATAPLEXY: ICD-10-CM

## 2021-01-25 DIAGNOSIS — F40.01 PANIC DISORDER WITH AGORAPHOBIA: ICD-10-CM

## 2021-01-25 DIAGNOSIS — F31.75 BIPOLAR DISORDER, IN PARTIAL REMISSION, MOST RECENT EPISODE DEPRESSED (HCC): Primary | ICD-10-CM

## 2021-01-25 PROCEDURE — 90792 PSYCH DIAG EVAL W/MED SRVCS: CPT | Performed by: NURSE PRACTITIONER

## 2021-01-25 RX ORDER — PROPRANOLOL HYDROCHLORIDE 20 MG/1
20 TABLET ORAL 2 TIMES DAILY
Qty: 60 TABLET | Refills: 0 | Status: SHIPPED | OUTPATIENT
Start: 2021-01-25 | End: 2021-02-22 | Stop reason: SDUPTHER

## 2021-01-25 RX ORDER — SERTRALINE HYDROCHLORIDE 100 MG/1
150 TABLET, FILM COATED ORAL DAILY
Qty: 45 TABLET | Refills: 0 | Status: SHIPPED | OUTPATIENT
Start: 2021-01-25 | End: 2021-02-22 | Stop reason: SDUPTHER

## 2021-01-25 RX ORDER — LAMOTRIGINE 150 MG/1
150 TABLET ORAL DAILY
Qty: 30 TABLET | Refills: 0 | Status: SHIPPED | OUTPATIENT
Start: 2021-01-25 | End: 2021-02-22 | Stop reason: SDUPTHER

## 2021-01-25 RX ORDER — BUPROPION HYDROCHLORIDE 300 MG/1
300 TABLET ORAL EVERY MORNING
Qty: 30 TABLET | Refills: 0 | Status: SHIPPED | OUTPATIENT
Start: 2021-01-25 | End: 2021-02-22 | Stop reason: SDUPTHER

## 2021-01-25 RX ORDER — OXCARBAZEPINE 300 MG/1
300 TABLET, FILM COATED ORAL 2 TIMES DAILY
Qty: 60 TABLET | Refills: 0 | Status: SHIPPED | OUTPATIENT
Start: 2021-01-25 | End: 2021-02-22 | Stop reason: SDUPTHER

## 2021-01-25 NOTE — PROGRESS NOTES
"This provider is located at [facility], [address] [place] [address], [facility], [address] using a telephone in a secure private environment. The Patient is seen remotely at their home address in [place], using a private telephone.  The patient is unable to be seen through a MyChart Video Visit through [facility] at today's encounter because unable to have video connection, therefore a telephone encounter was conducted. Patient is being evaluated/treated via telehealth by telephone, and stated they are in a secure environment for this session. The patient's condition being diagnosed/treated is appropriate for telemedicine. The provider identified herself as well as her credentials.   The patient, and/or patient's guardian, consent to be seen remotely, and when consent is given they understand that the consent allows for patient identifiable information to be sent to a third party as needed.   They may refuse to be seen remotely at any time. The electronic data is encrypted and password protected, and the patient and/or guardian has been advised of the potential risks to privacy not withstanding such measures.    You have chosen to receive care through a telephone visit. Do you consent to use a telephone visit for your medical care today? Yes  This visit has been rescheduled as a phone visit to comply with patient safety concerns in accordance with CDC recommendations.      Subjective   Antoinette Pack is a 35 y.o. female who presents today for initial evaluation     Chief Complaint:  Bipolar, anxiety,     History of Present Illness:    Today is an intial evaluation. States \"I have had a lot of things going on,\".  PCP (WALDO Lambert), gave her medications a couple of weeks ago, she had been out of medication. Hx of depression, bipolar and anxiety.  She sees a \"person\", in the room with her, worse when she is stressed. Has hx of hallucinations in the past, first seen for depression in 2011 or 2012.. Having more depressive symptoms,  The " "patient reports depressive symptoms including depressed mood, crying spells, hypersomnia, overeating, anhedonia, feelings of guilt, feelings of helplessness, low energy and difficulty concentrating, and have caused impairment in important areas of functioning.  Depression rated 4/10 with 10 being the worst.  States was an \"8\" before she got back on her medications. The patient reports the following symptoms of anxiety: constant anxiety/worry, restlessness/on edge, difficulty concentrating, mind goes blank, irritability, muscle tension, sleep disturbance and anxiety causes distress/impairment in important areas of functioning and have caused impairment in important areas of functioning. Anxiety rated 7/10 with 10 being the worst.  Noise bothers her. Lives with spouse, niece, a 5 yo, (she has had him since he was 6 months old) which is her brothers ex girlfriends sisters baby.  She states her sister is in the hospital in [place] for possible COVID, still in the hospital. Raised with mother and father, 1 brother, 2 sisters. Father worked a lot, not home very much. Had a difficult relationshiip with her mother, she went to live with the Preacher and his wife at age 14. Mother had addiction to opioids. Brother had addiction with opioids and meth. Her mtoher had been against drugs, it was devastating to her to see her mother become addicted. Mother passed away about 4 years ago, she states it is easier to deal with her death, than her mother \"not knowing where she was\".  Father is still living, she lives beside of him, he is very quiet, \"doesn't communicate very well.\".  She has trust issues with her siblings due to the hx of their addiction. Denies any hx drug use or abuse. She worked her entire life \"until she got sick\", at [employer], and [employer] for several years until approx 2011 or 2012. Doing better since she was restarted on her medications. Hasn't gotten \"all the way\" back to herself yet.  Denies " SI/HI/AVH.  Denies thoughts of self-harm. Denies any side effects from her medications. Sleeping has improved, getting 6 to 7 hours a night, denies nightmares.  Appetite is good.                The following portions of the patient's history were reviewed and updated as appropriate: allergies, current medications, past family history, past medical history, past social history, past surgical history and problem list.    Past Medical History:  Past Medical History:   Diagnosis Date   • Anxiety    • Depression    • Diabetes mellitus (CMS/HCC)    • Diverticulitis    • GERD (gastroesophageal reflux disease)    • Hypertension    • Kidney stone    • PCOS (polycystic ovarian syndrome)    • Sleep apnea        Social History:  Social History     Socioeconomic History   • Marital status:      Spouse name: Not on file   • Number of children: Not on file   • Years of education: Not on file   • Highest education level: Not on file   Occupational History   • Occupation: Not Working   Tobacco Use   • Smoking status: Never Smoker   • Smokeless tobacco: Never Used   Substance and Sexual Activity   • Alcohol use: No   • Drug use: No   • Sexual activity: Defer       Family History:  Family History   Problem Relation Age of Onset   • Heart disease Mother    • COPD Mother    • Heart disease Father    • COPD Father        Past Surgical History:  Past Surgical History:   Procedure Laterality Date   • CARDIAC CATHETERIZATION     • CARPAL TUNNEL RELEASE     • COLONOSCOPY     • ENDOSCOPY     • FRACTURE SURGERY     • HARDWARE REMOVAL      WRIST   • TENDON REPAIR      HAND       Problem List:  Patient Active Problem List   Diagnosis   • Body mass index (BMI) 40.0-44.9, adult       Allergy:   Allergies   Allergen Reactions   • Dilantin [Phenytoin] Mental Status Change   • Keppra [Levetiracetam] Mental Status Change   • Meperidine Rash   • Topamax [Topiramate] Mental Status Change        Current Medications:   Current Outpatient  Medications   Medication Sig Dispense Refill   • buPROPion XL (WELLBUTRIN XL) 300 MG 24 hr tablet Take 1 tablet by mouth Every Morning. 30 tablet 0   • cloNIDine (CATAPRES) 0.1 MG tablet Take 1 tablet by mouth 3 (Three) Times a Day As Needed (if BP greateer than 170/110). 10 tablet 0   • insulin glargine (LANTUS) 100 UNIT/ML injection Inject 20 Units under the skin into the appropriate area as directed Daily.     • lamoTRIgine (LaMICtal) 150 MG tablet Take 1 tablet by mouth Daily. Take after finishing bottle of 100mg dosing 30 tablet 0   • lisinopril (PRINIVIL,ZESTRIL) 10 MG tablet Take 1 tablet by mouth Daily. 30 tablet 0   • metFORMIN (GLUCOPHAGE) 1000 MG tablet      • omeprazole (PRILOSEC) 40 MG capsule      • OXcarbazepine (Trileptal) 300 MG tablet Take 1 tablet by mouth 2 (Two) Times a Day. 60 tablet 0   • propranolol (INDERAL) 20 MG tablet Take 1 tablet by mouth 2 (Two) Times a Day. 60 tablet 0   • sertraline (ZOLOFT) 100 MG tablet Take 1.5 tablets by mouth Daily. 45 tablet 0   • sulfamethoxazole-trimethoprim (BACTRIM DS,SEPTRA DS) 800-160 MG per tablet Take 1 tablet by mouth 2 (Two) Times a Day for 42 days. 84 tablet 0   • TRULICITY 1.5 MG/0.5ML solution pen-injector      • amLODIPine (NORVASC) 2.5 MG tablet Take 1 tablet by mouth Daily. 30 tablet 0   • halobetasol (ULTRAVATE) 0.05 % cream      • hydrochlorothiazide (HYDRODIURIL) 25 MG tablet Take 1 tablet by mouth.     • HYDROcodone-acetaminophen (NORCO) 5-325 MG per tablet Take 1 tablet by mouth Every 6 (Six) Hours As Needed for Severe Pain . 10 tablet 0   • ibuprofen (ADVIL,MOTRIN) 800 MG tablet Take 1 tablet by mouth Every 8 (Eight) Hours As Needed for Moderate Pain . 60 tablet 0   • modafinil (PROVIGIL) 100 MG tablet Take 1 tablet by mouth Daily. For narcolepsy 30 tablet 0   • ondansetron (Zofran) 4 MG tablet Take 1 tablet by mouth Daily As Needed for Nausea or Vomiting. 20 tablet 1   • promethazine (PHENERGAN) 25 MG tablet Take 1 tablet by mouth Every 6  (Six) Hours As Needed for Vomiting. 30 tablet 0     No current facility-administered medications for this visit.        Review of Symptoms:    Review of Systems   Constitutional: Negative.    HENT: Negative.    Eyes: Negative.    Respiratory: Negative.    Cardiovascular: Negative.    Skin: Negative.    Neurological: Negative.    Psychiatric/Behavioral: Positive for sleep disturbance and depressed mood. The patient is nervous/anxious.          Physical Exam:   There were no vitals taken for this visit.   There is no height or weight on file to calculate BMI.    Due to extenuating circumstances and possible current health risks associated with the patient being present in a clinical setting (with current health restrictions in place in regards to possible COVID 19 transmission/exposure), the patient was seen remotely today via a MyChart Video Visit through [facility] and telephone encounter.  Unable to obtain vital signs due to nature of remote visit.  Height stated at 59 inches.  Weight stated at 200 pounds.         Mental Status Exam:   Hygiene:   unable to assess  Cooperation:  Cooperative  Eye Contact:  unable to assess  Psychomotor Behavior:  unable to assess  Affect:  unable to assess  Mood: normal  Hopelessness: Denies  Speech:  Normal  Thought Process:  Goal directed and Linear  Thought Content:  Normal  Suicidal:  None  Homicidal:  None  Hallucinations:  Visual  Delusion:  None  Memory:  Intact  Orientation:  Person, Place, Time and Situation  Reliability:  good  Insight:  Good  Judgement:  Good  Impulse Control:  Good  Physical/Medical Issues:  Yes Diabetes, HTN, GERD     PHQ-Score Total:  PHQ-9 Total Score: 1      Lab Results:   Office Visit on 12/30/2020   Component Date Value Ref Range Status   • Color 12/30/2020 Yellow  Yellow, Straw, Dark Yellow, Radha Final   • Clarity, UA 12/30/2020 Clear  Clear Final   • Specific Gravity  12/30/2020 1.025  1.005 - 1.030 Final   • pH, Urine 12/30/2020 6.0  5.0 - 8.0 Final      • Leukocytes 12/30/2020 Negative  Negative Final   • Nitrite, UA 12/30/2020 Negative  Negative Final   • Protein, POC 12/30/2020 Negative  Negative mg/dL Final   • Glucose, UA 12/30/2020 Negative  Negative, 1000 mg/dL (3+) mg/dL Final   • Ketones, UA 12/30/2020 Negative  Negative Final   • Urobilinogen, UA 12/30/2020 Normal  Normal Final   • Bilirubin 12/30/2020 Negative  Negative Final   • Blood, UA 12/30/2020 Negative  Negative Final   Admission on 12/27/2020, Discharged on 12/28/2020   Component Date Value Ref Range Status   • Color, UA 12/27/2020 Yellow  Yellow, Straw Final   • Appearance, UA 12/27/2020 Clear  Clear Final   • pH, UA 12/27/2020 6.0  5.0 - 8.0 Final   • Specific Gravity, UA 12/27/2020 1.013  1.005 - 1.030 Final   • Glucose, UA 12/27/2020 Negative  Negative Final   • Ketones, UA 12/27/2020 Negative  Negative Final   • Bilirubin, UA 12/27/2020 Negative  Negative Final   • Blood, UA 12/27/2020 Moderate (2+)* Negative Final   • Protein, UA 12/27/2020 30 mg/dL (1+)* Negative Final   • Leuk Esterase, UA 12/27/2020 Small (1+)* Negative Final   • Nitrite, UA 12/27/2020 Negative  Negative Final   • Urobilinogen, UA 12/27/2020 0.2 E.U./dL  0.2 - 1.0 E.U./dL Final   • Lactate 12/27/2020 1.3  0.5 - 2.0 mmol/L Final   • Blood Culture 12/27/2020 No growth at 5 days   Final   • Blood Culture 12/27/2020 No growth at 5 days   Final   • Extra Tube 12/27/2020 hold for add-on   Final    Auto resulted   • Extra Tube 12/27/2020 Hold for add-ons.   Final    Auto resulted.   • Extra Tube 12/27/2020 hold for add-on   Final    Auto resulted   • Extra Tube 12/27/2020 Hold for add-ons.   Final    Auto resulted.   • Glucose 12/27/2020 225* 65 - 99 mg/dL Final   • BUN 12/27/2020 8  6 - 20 mg/dL Final   • Creatinine 12/27/2020 0.66  0.57 - 1.00 mg/dL Final   • Sodium 12/27/2020 140  136 - 145 mmol/L Final   • Potassium 12/27/2020 4.4  3.5 - 5.2 mmol/L Final   • Chloride 12/27/2020 104  98 - 107 mmol/L Final   • CO2 12/27/2020  25.9  22.0 - 29.0 mmol/L Final   • Calcium 12/27/2020 9.1  8.6 - 10.5 mg/dL Final   • Total Protein 12/27/2020 7.5  6.0 - 8.5 g/dL Final   • Albumin 12/27/2020 4.03  3.50 - 5.20 g/dL Final   • ALT (SGPT) 12/27/2020 28  1 - 33 U/L Final   • AST (SGOT) 12/27/2020 32  1 - 32 U/L Final   • Alkaline Phosphatase 12/27/2020 97  39 - 117 U/L Final   • Total Bilirubin 12/27/2020 0.4  0.0 - 1.2 mg/dL Final   • eGFR Non African Amer 12/27/2020 101  >60 mL/min/1.73 Final   • Globulin 12/27/2020 3.5  gm/dL Final   • A/G Ratio 12/27/2020 1.2  g/dL Final   • BUN/Creatinine Ratio 12/27/2020 12.1  7.0 - 25.0 Final   • Anion Gap 12/27/2020 10.1  5.0 - 15.0 mmol/L Final   • WBC 12/27/2020 8.88  3.40 - 10.80 10*3/mm3 Final   • RBC 12/27/2020 4.36  3.77 - 5.28 10*6/mm3 Final   • Hemoglobin 12/27/2020 12.5  12.0 - 15.9 g/dL Final   • Hematocrit 12/27/2020 36.0  34.0 - 46.6 % Final   • MCV 12/27/2020 82.6  79.0 - 97.0 fL Final   • MCH 12/27/2020 28.7  26.6 - 33.0 pg Final   • MCHC 12/27/2020 34.7  31.5 - 35.7 g/dL Final   • RDW 12/27/2020 12.0* 12.3 - 15.4 % Final   • RDW-SD 12/27/2020 36.0* 37.0 - 54.0 fl Final   • MPV 12/27/2020 9.6  6.0 - 12.0 fL Final   • Platelets 12/27/2020 217  140 - 450 10*3/mm3 Final   • Neutrophil % 12/27/2020 68.9  42.7 - 76.0 % Final   • Lymphocyte % 12/27/2020 23.0  19.6 - 45.3 % Final   • Monocyte % 12/27/2020 4.6* 5.0 - 12.0 % Final   • Eosinophil % 12/27/2020 2.1  0.3 - 6.2 % Final   • Basophil % 12/27/2020 0.3  0.0 - 1.5 % Final   • Immature Grans % 12/27/2020 1.1* 0.0 - 0.5 % Final   • Neutrophils, Absolute 12/27/2020 6.11  1.70 - 7.00 10*3/mm3 Final   • Lymphocytes, Absolute 12/27/2020 2.04  0.70 - 3.10 10*3/mm3 Final   • Monocytes, Absolute 12/27/2020 0.41  0.10 - 0.90 10*3/mm3 Final   • Eosinophils, Absolute 12/27/2020 0.19  0.00 - 0.40 10*3/mm3 Final   • Basophils, Absolute 12/27/2020 0.03  0.00 - 0.20 10*3/mm3 Final   • Immature Grans, Absolute 12/27/2020 0.10* 0.00 - 0.05 10*3/mm3 Final   • nRBC  12/27/2020 0.0  0.0 - 0.2 /100 WBC Final   • RBC, UA 12/27/2020 Too Numerous to Count* None Seen, 0-2 /HPF Final   • WBC, UA 12/27/2020 Too Numerous to Count* None Seen, 0-2 /HPF Final   • Bacteria, UA 12/27/2020 None Seen  None Seen /HPF Final   • Squamous Epithelial Cells, UA 12/27/2020 0-2  None Seen, 0-2 /HPF Final   • Hyaline Casts, UA 12/27/2020 7-12  None Seen /LPF Final   • Methodology 12/27/2020 Automated Microscopy   Final   • Urine Culture 12/27/2020 50,000 CFU/mL Mixed Gram Positive Dinora*  Final   • COVID19 12/28/2020 Not Detected  Not Detected - Ref. Range Final   • Influenza A PCR 12/28/2020 Not Detected  Not Detected Final   • Influenza B PCR 12/28/2020 Not Detected  Not Detected Final   • QT Interval 12/28/2020 422  ms Final   • QTC Interval 12/28/2020 452  ms Final       Assessment/Plan   Problems Addressed this Visit     None      Visit Diagnoses     Bipolar disorder, in partial remission, most recent episode depressed (CMS/[facility])    -  Primary    Relevant Medications    buPROPion XL (WELLBUTRIN XL) 300 MG 24 hr tablet    sertraline (ZOLOFT) 100 MG tablet    OXcarbazepine (Trileptal) 300 MG tablet    lamoTRIgine (LaMICtal) 150 MG tablet    Generalized anxiety disorder        Relevant Medications    buPROPion XL (WELLBUTRIN XL) 300 MG 24 hr tablet    sertraline (ZOLOFT) 100 MG tablet    propranolol (INDERAL) 20 MG tablet    Panic disorder with agoraphobia        Relevant Medications    buPROPion XL (WELLBUTRIN XL) 300 MG 24 hr tablet    sertraline (ZOLOFT) 100 MG tablet    propranolol (INDERAL) 20 MG tablet    Medication management        Narcolepsy without cataplexy          Diagnoses       Codes Comments    Bipolar disorder, in partial remission, most recent episode depressed (CMS/HCC)    -  Primary ICD-10-CM: F31.75  ICD-9-CM: 296.55     Generalized anxiety disorder     ICD-10-CM: F41.1  ICD-9-CM: 300.02     Panic disorder with agoraphobia     ICD-10-CM: F40.01  ICD-9-CM: 300.21     Medication  management     ICD-10-CM: Z79.899  ICD-9-CM: V58.69     Narcolepsy without cataplexy     ICD-10-CM: G47.419  ICD-9-CM: 347.00         Social History     Tobacco Use   Smoking Status Never Smoker   Smokeless Tobacco Never Used       -JORGE reviewed and appropriate. Patient counseled on use of controlled substances.   -The benefits of a healthy diet and exercise were discussed with patient, especially the positive effects they have on mental health. Patient encouraged to consider lifestyle modification regarding  diet and exercise patterns to maximize results of mental health treatment.  -Reviewed previous available documentation  -Reviewed most recent available labs   -Continue Lamictal 150 mg daily for bipolar disorder.  -Continue Zoloft 100 mg 1-1/2 tablets daily, for depression anxiety bipolar disorder.  -We will continue Wellbutrin  mg daily for depression, anxiety.  -Continue propanolol 20 mg twice daily for anxiety, panic disorder  Continue Trileptal 300 mg twice daily for bipolar disorder    Visit Diagnoses:    ICD-10-CM ICD-9-CM   1. Bipolar disorder, in partial remission, most recent episode depressed (CMS/[facility])  F31.75 296.55   2. Generalized anxiety disorder  F41.1 300.02   3. Panic disorder with agoraphobia  F40.01 300.21   4. Medication management  Z79.899 V58.69   5. Narcolepsy without cataplexy  G47.419 347.00         GOALS:  Short Term Goals: Patient will be compliant with medication, and patient will have no significant medication related side effects.  Patient will be engaged in psychotherapy as indicated.  Patient will report subjective improvement of symptoms.  Long term goals: To stabilize mood and treat/improve subjective symptoms, the patient will stay out of the hospital, the patient will be at an optimal level of functioning, and the patient will take all medications as prescribed.  The patient/guardian verbalized understanding and agreement with goals that were mutually  set.      TREATMENT PLAN: Continue supportive psychotherapy efforts and medications as indicated.  Pharmacological and Non-Pharmacological treatment options discussed during today's visit. Patient/Guardian acknowledged and verbally consented with current treatment plan and was educated on the importance of compliance with treatment and follow-up appointments.      MEDICATION ISSUES:  Discussed medication options and treatment plan of prescribed medication as well as the risks, benefits, any black box warnings, and side effects including potential falls, possible impaired driving, and metabolic adversities among others. Patient is agreeable to call the office with any worsening of symptoms or onset of side effects, or if any concerns or questions arise.  The contact information for the office is made available to the patient. Patient is agreeable to call 911 or go to the nearest ER should they begin having any SI/HI, or if any urgent concerns arise. No medication side effects or related complaints today.     MEDS ORDERED DURING VISIT:  New Medications Ordered This Visit   Medications   • buPROPion XL (WELLBUTRIN XL) 300 MG 24 hr tablet     Sig: Take 1 tablet by mouth Every Morning.     Dispense:  30 tablet     Refill:  0   • sertraline (ZOLOFT) 100 MG tablet     Sig: Take 1.5 tablets by mouth Daily.     Dispense:  45 tablet     Refill:  0   • propranolol (INDERAL) 20 MG tablet     Sig: Take 1 tablet by mouth 2 (Two) Times a Day.     Dispense:  60 tablet     Refill:  0   • OXcarbazepine (Trileptal) 300 MG tablet     Sig: Take 1 tablet by mouth 2 (Two) Times a Day.     Dispense:  60 tablet     Refill:  0   • lamoTRIgine (LaMICtal) 150 MG tablet     Sig: Take 1 tablet by mouth Daily. Take after finishing bottle of 100mg dosing     Dispense:  30 tablet     Refill:  0       Return in about 4 weeks (around 2/22/2021) for Recheck.         Progress toward goal: Not at goal    Functional Status: Mild impairment     Prognosis:  Good with Ongoing Treatment         This document has been electronically signed by CICI Ruby  January 25, 2021 10:38 EST    Part of this note may be an electronic transcription/translation of spoken language to printed text using the Dragon Dictation System.

## 2021-02-09 ENCOUNTER — OFFICE VISIT (OUTPATIENT)
Dept: UROLOGY | Facility: CLINIC | Age: 36
End: 2021-02-09

## 2021-02-09 VITALS — TEMPERATURE: 98 F | HEIGHT: 59 IN | BODY MASS INDEX: 40.32 KG/M2 | WEIGHT: 200 LBS

## 2021-02-09 DIAGNOSIS — R10.30 LOWER ABDOMINAL PAIN: ICD-10-CM

## 2021-02-09 DIAGNOSIS — N39.0 RECURRENT UTI (URINARY TRACT INFECTION): ICD-10-CM

## 2021-02-09 DIAGNOSIS — N10 ACUTE PYELONEPHRITIS: Primary | ICD-10-CM

## 2021-02-09 LAB
BILIRUB BLD-MCNC: ABNORMAL MG/DL
CLARITY, POC: CLEAR
COLOR UR: YELLOW
GLUCOSE UR STRIP-MCNC: NEGATIVE MG/DL
KETONES UR QL: NEGATIVE
LEUKOCYTE EST, POC: NEGATIVE
NITRITE UR-MCNC: NEGATIVE MG/ML
PH UR: 5.5 [PH] (ref 5–8)
PROT UR STRIP-MCNC: ABNORMAL MG/DL
RBC # UR STRIP: NEGATIVE /UL
SP GR UR: 1.03 (ref 1–1.03)
UROBILINOGEN UR QL: NORMAL

## 2021-02-09 PROCEDURE — 87147 CULTURE TYPE IMMUNOLOGIC: CPT | Performed by: NURSE PRACTITIONER

## 2021-02-09 PROCEDURE — 81003 URINALYSIS AUTO W/O SCOPE: CPT | Performed by: NURSE PRACTITIONER

## 2021-02-09 PROCEDURE — 51798 US URINE CAPACITY MEASURE: CPT | Performed by: NURSE PRACTITIONER

## 2021-02-09 PROCEDURE — 87088 URINE BACTERIA CULTURE: CPT | Performed by: NURSE PRACTITIONER

## 2021-02-09 PROCEDURE — 99214 OFFICE O/P EST MOD 30 MIN: CPT | Performed by: NURSE PRACTITIONER

## 2021-02-09 PROCEDURE — 87086 URINE CULTURE/COLONY COUNT: CPT | Performed by: NURSE PRACTITIONER

## 2021-02-09 NOTE — PROGRESS NOTES
Chief Complaint:          Chief Complaint   Patient presents with   • Recurrent uti /Pyelonephritis     follow up Flank Pain /Acute Pyelonephritis       HPI:   35 y.o. female steve patient presents to clinic for follow up. She has completed her 6 week ABX therapy with bactrim and reports significant improvement in her prior urinary symptoms.      She still reports episodes of frequency, urgency, and nocturia 2-3 times. She has urine incontinence at time which she reports embarrassing. She has Significant flank pain with left CVA tenderness, lower abdominal pain and discomfort. However, she denies dysuria, burning on urination, or gross hematuria. Reports chronic back pain, but denies  Pelvic/suprapubic pain and pressure, she denies fever or chills, she does not have nausea, vomiting, or diarrhea. Her Urine Dipstick is negative for any infection, it is negative for Gross/Microscopic Hematuria.HER PVR-48CC.    Overall she reports doing better, She reports  increasing her p.o. fluid intake to at least 2 L daily, and has significantly reduced her intake of soda drinks. She is monitoring her Carbs and getting her blood sugars better controlled.    Past Medical History:        Past Medical History:   Diagnosis Date   • Anxiety    • Depression    • Diabetes mellitus (CMS/[facility])    • Diverticulitis    • GERD (gastroesophageal reflux disease)    • Hypertension    • Kidney stone    • PCOS (polycystic ovarian syndrome)    • Sleep apnea      The following portions of the patient's history were reviewed and updated as appropriate: allergies, current medications, past family history, past medical history, past social history, past surgical history and problem list.    Current Meds:     Current Outpatient Medications   Medication Sig Dispense Refill   • amLODIPine (NORVASC) 2.5 MG tablet Take 1 tablet by mouth Daily. 30 tablet 0   • buPROPion XL (WELLBUTRIN XL) 300 MG 24 hr tablet Take 1 tablet by mouth Every Morning. 30 tablet 0    • cloNIDine (CATAPRES) 0.1 MG tablet Take 1 tablet by mouth 3 (Three) Times a Day As Needed (if BP greateer than 170/110). 10 tablet 0   • halobetasol (ULTRAVATE) 0.05 % cream      • hydrochlorothiazide (HYDRODIURIL) 25 MG tablet Take 1 tablet by mouth.     • HYDROcodone-acetaminophen (NORCO) 5-325 MG per tablet Take 1 tablet by mouth Every 6 (Six) Hours As Needed for Severe Pain . 10 tablet 0   • ibuprofen (ADVIL,MOTRIN) 800 MG tablet Take 1 tablet by mouth Every 8 (Eight) Hours As Needed for Moderate Pain . 60 tablet 0   • insulin glargine (LANTUS) 100 UNIT/ML injection Inject 20 Units under the skin into the appropriate area as directed Daily.     • lamoTRIgine (LaMICtal) 150 MG tablet Take 1 tablet by mouth Daily. Take after finishing bottle of 100mg dosing 30 tablet 0   • lisinopril (PRINIVIL,ZESTRIL) 10 MG tablet Take 1 tablet by mouth Daily. 30 tablet 0   • metFORMIN (GLUCOPHAGE) 1000 MG tablet      • modafinil (PROVIGIL) 100 MG tablet Take 1 tablet by mouth Daily. For narcolepsy 30 tablet 0   • omeprazole (PRILOSEC) 40 MG capsule      • ondansetron (Zofran) 4 MG tablet Take 1 tablet by mouth Daily As Needed for Nausea or Vomiting. 20 tablet 1   • OXcarbazepine (Trileptal) 300 MG tablet Take 1 tablet by mouth 2 (Two) Times a Day. 60 tablet 0   • promethazine (PHENERGAN) 25 MG tablet Take 1 tablet by mouth Every 6 (Six) Hours As Needed for Vomiting. 30 tablet 0   • propranolol (INDERAL) 20 MG tablet Take 1 tablet by mouth 2 (Two) Times a Day. 60 tablet 0   • sertraline (ZOLOFT) 100 MG tablet Take 1.5 tablets by mouth Daily. 45 tablet 0   • TRULICITY 1.5 MG/0.5ML solution pen-injector        No current facility-administered medications for this visit.         Allergies:      Allergies   Allergen Reactions   • Dilantin [Phenytoin] Mental Status Change   • Keppra [Levetiracetam] Mental Status Change   • Meperidine Rash   • Topamax [Topiramate] Mental Status Change        Past Surgical History:     Past Surgical  History:   Procedure Laterality Date   • CARDIAC CATHETERIZATION     • CARPAL TUNNEL RELEASE     • COLONOSCOPY     • ENDOSCOPY     • FRACTURE SURGERY     • HARDWARE REMOVAL      WRIST   • TENDON REPAIR      HAND         Social History:     Social History     Socioeconomic History   • Marital status:      Spouse name: Not on file   • Number of children: Not on file   • Years of education: Not on file   • Highest education level: Not on file   Occupational History   • Occupation: Not Working   Tobacco Use   • Smoking status: Never Smoker   • Smokeless tobacco: Never Used   Substance and Sexual Activity   • Alcohol use: No   • Drug use: No   • Sexual activity: Defer       Family History:     Family History   Problem Relation Age of Onset   • Heart disease Mother    • COPD Mother    • Heart disease Father    • COPD Father        Review of Systems:     Review of Systems   Constitutional: Positive for activity change and fatigue. Negative for chills and fever.   HENT: Negative for congestion and sinus pressure.    Eyes: Negative for blurred vision, pain and redness.   Respiratory: Negative for chest tightness and shortness of breath.    Gastrointestinal: Positive for abdominal distention and abdominal pain. Negative for constipation, diarrhea, nausea and vomiting.   Genitourinary: Positive for flank pain ( left CVA tenderness), frequency, urgency and urinary incontinence. Negative for difficulty urinating, dyspareunia, dysuria, genital sores, hematuria, pelvic pain, pelvic pressure and vaginal discharge.   Musculoskeletal: Positive for back pain.   Allergic/Immunologic: Negative for food allergies.   Neurological: Negative for dizziness, headache and confusion.   Hematological: Does not bruise/bleed easily.   Psychiatric/Behavioral: Positive for sleep disturbance and stress. Negative for behavioral problems and decreased concentration. The patient is not nervous/anxious.         Physical Exam:     Physical  Exam  Constitutional:       General: She is not in acute distress.     Appearance: She is well-developed. She is obese. She is ill-appearing.   HENT:      Head: Normocephalic and atraumatic.   Eyes:      Pupils: Pupils are equal, round, and reactive to light.   Neck:      Musculoskeletal: Normal range of motion.      Thyroid: No thyromegaly.      Trachea: No tracheal deviation.   Cardiovascular:      Rate and Rhythm: Normal rate and regular rhythm.      Heart sounds: No murmur.   Pulmonary:      Effort: Pulmonary effort is normal. No respiratory distress.      Breath sounds: Normal breath sounds. No stridor. No wheezing.   Abdominal:      General: Bowel sounds are normal. There is distension.      Palpations: Abdomen is soft.      Tenderness: There is abdominal tenderness. There is left CVA tenderness and guarding.   Genitourinary:     Labia:         Right: No tenderness.         Left: No tenderness.       Vagina: Normal. No vaginal discharge.   Musculoskeletal: Normal range of motion.         General: Tenderness present. No deformity.   Skin:     General: Skin is warm and dry.      Capillary Refill: Capillary refill takes less than 2 seconds.      Coloration: Skin is not pale.      Findings: No erythema or rash.   Neurological:      Mental Status: She is alert and oriented to person, place, and time.      Cranial Nerves: No cranial nerve deficit.      Sensory: No sensory deficit.      Coordination: Coordination normal.   Psychiatric:         Behavior: Behavior normal.         Thought Content: Thought content normal.         Judgment: Judgment normal.         Procedure:       Assessment/Plan:        ASSESSMENT  Recurrent UTIs/Acute Pyelonephritis/ Overactive Bladder: Very Pleasant female patient returns to clinic for her 6 week follow up post ABX. Therapy for Pyelonephritis. She reports significant history of recurrent urinary tract infections for which she has used many antibiotics in the past.     She is in no  apparent distress, reports doing better compared to last time. She still reports back and abdominal pain, flank pain with left CVA tenderness, urine frequency, urgency and incontinent episodes that remain very bothersome to her. However, Her Urine Dipstick today is negative for any infection, it is negative for Gross/Microscopic Hematuria. PVR-48CC     Her last  CT scan was completely negative for any nephrolithiasis.  It showed findings most compatible with left sided pyelonephritis and probable inflammation/infection to her proximal right ureter and renal pelvis. No drainable fluid collection or abscess is noted on the CT.     Again, We discussed Acute pyelonephritis AS a sudden and severe kidney infection. This causes the kidneys to swell and may permanently damage them. Pyelonephritis can be life-threatening. We discussed the facts that, The infection usually starts in the lower urinary tract.( patient reports numerous UTIs in the past).     Next, We discussed Overactive Bladder. We discussed treatable and non-treatable causes of both stress and urge urinary incontinence.  With regards to stress urinary incontinence we discussed its relationship to childbirth and pelvic health.  We discussed the grading of stress incontinence with trying to quantitate the number of pads used.  We talked about leaking urine with laughing, lifting, coughing, and sexual intercourse.   I talked about the various therapeutic options including anticholinergics, beta 3 agonists, and alpha blockade if there is a component of obstruction.  I discussed the side effects of anti-cholinergic including dry mouth, double vision.     We  Also discussed the types of organisms that are found in the urinary tract indicating that the vast majority are results of the patient's own gastrointestinal adrian.  We discussed how many of the antibiotics that are utilized can actually exacerbate these infections by creating resistant organisms and there is  only a very few antibiotics that are concentrated in the urine and do not affect the rectal reservoir nor cause recurrent yeast vaginitis.       We discussed the risk factors for recurrent infections being intercourse in younger patients and atrophic changes in older patients.  We discussed the symptoms that are found including pain, pressure, burning, frequency, urgency suprapubic pain and painful intercourse.  I discussed the history of vesicoureteral reflux in young patients and finally chronic renal scarring as a result of such.      I discussed upper tract symptoms including fevers, chills, and indicated the workup would be much more aggressive if the patient were to present with recurrent infections in the face of upper tract symptomatology such as fever.                                                               PLAN     Will resend her urine for culture, I will call her with results if any positive bacteria growth.    We discussed restarting ABX suppressive therapy with Trimpex if positive.      I strongly recommend concomitant probiotics with treatment with antibiotics to protect the rectal reservoir including over-the-counter yogurt preparations to romario oral pills containing the appropriate probiotics.     Pyridium 200 mg PRN bladder pain/spasms     Motrin 800 mg as needed flank pain     Zofran 4 mg as needed nausea    Discussed better blood sugar control , pt states currently >250     We discussed lower tract investigation via cystoscopy with Dr. Hagan once infection is cleared.      Repeat CT Abdomen /Pelvis with/without Contrast-Pyelonephritis.    Follow-up Thursday post CT scan for Review and definitive plan of care.     Patient is agreeable with plan.      Patient reports that she is not currently experiencing any symptoms of urinary incontinence.    Patient's Body mass index is 40.4 kg/m². BMI is above normal parameters. Recommendations include: educational material, exercise counseling and  nutrition counseling.    Smoking Cessation Counseling:  Never a smoker.  Patient does not currently use any tobacco products.        Counseling was given to patient for the following topics diagnostic results including: Left pyelonephritis/Recurrent UTI, instructions for management as follows: Increase p.o. fluid intake, blood sugar control,  nausea medicine, pain control, risk factor reductions including: Bladder irritants such as caffeine products, citrusy/spicy foods, hypoglycemia and impressions as follows: Repeat CT Abd/Pelvis. The interim medical history and current results were reviewed.  A treatment plan with follow-up was made for: Left flank pain,OAB, Recurrent UTIs,  Acute pyelonephritis [N10].          This document has been electronically signed by Griselda Cheng-Akwa, APRN February 9, 2021 14:30 EST

## 2021-02-09 NOTE — PATIENT INSTRUCTIONS
Urinary Frequency, Adult  Urinary frequency means urinating more often than usual. You may urinate every 1-2 hours even though you drink a normal amount of fluid and do not have a bladder infection or condition. Although you urinate more often than normal, the total amount of urine produced in a day is normal.  With urinary frequency, you may have an urgent need to urinate often. The stress and anxiety of needing to find a bathroom quickly can make this urge worse. This condition may go away on its own or you may need treatment at home. Home treatment may include bladder training, exercises, taking medicines, or making changes to your diet.  Follow these instructions at home:  Bladder health    Keep a bladder diary if told by your health care provider. Keep track of:  What you eat and drink.  How often you urinate.  How much you urinate.  Follow a bladder training program if told by your health care provider. This may include:  Learning to delay going to the bathroom.  Double urinating (voiding). This helps if you are not completely emptying your bladder.  Scheduled voiding.  Do Kegel exercises as told by your health care provider. Kegel exercises strengthen the muscles that help control urination, which may help the condition.  Eating and drinking  If told by your health care provider, make diet changes, such as:  Avoiding caffeine.  Drinking fewer fluids, especially alcohol.  Not drinking in the evening.  Avoiding foods or drinks that may irritate the bladder. These include coffee, tea, soda, artificial sweeteners, citrus, tomato-based foods, and chocolate.  Eating foods that help prevent or ease constipation. Constipation can make this condition worse. Your health care provider may recommend that you:  Drink enough fluid to keep your urine pale yellow.  Take over-the-counter or prescription medicines.  Eat foods that are high in fiber, such as beans, whole grains, and fresh fruits and vegetables.  Limit foods  that are high in fat and processed sugars, such as fried or sweet foods.  General instructions  Take over-the-counter and prescription medicines only as told by your health care provider.  Keep all follow-up visits as told by your health care provider. This is important.  Contact a health care provider if:  You start urinating more often.  You feel pain or irritation when you urinate.  You notice blood in your urine.  Your urine looks cloudy.  You develop a fever.  You begin vomiting.  Get help right away if:  You are unable to urinate.  Summary  Urinary frequency means urinating more often than usual. With urinary frequency, you may urinate every 1-2 hours even though you drink a normal amount of fluid and do not have a bladder infection or other bladder condition.  Your health care provider may recommend that you keep a bladder diary, follow a bladder training program, or make dietary changes.  If told by your health care provider, do Kegel exercises to strengthen the muscles that help control urination.  Take over-the-counter and prescription medicines only as told by your health care provider.  Contact a health care provider if your symptoms do not improve or get worse.  This information is not intended to replace advice given to you by your health care provider. Make sure you discuss any questions you have with your health care provider.  Document Revised: 06/27/2019 Document Reviewed: 06/27/2019  [employer] Patient Education © 2020 [employer]  Flank Pain, Adult  Flank pain is pain in your side. The flank is the area of your side between your upper belly (abdomen) and your back. The pain may occur over a short time (acute), or it may be long-term or come back often (chronic). It may be mild or very bad. Pain in this area can be caused by many different things.  Follow these instructions at home:    Drink enough fluid to keep your pee (urine) clear or pale yellow.  Rest as told by your doctor.  Take  over-the-counter and prescription medicines only as told by your doctor.  Keep a journal to keep track of:  What has caused your flank pain.  What has made it feel better.  Keep all follow-up visits as told by your doctor. This is important.  Contact a doctor if:  Medicine does not help your pain.  You have new symptoms.  Your pain gets worse.  You have a fever.  Your symptoms last longer than 2-3 days.  You have trouble peeing.  You are peeing more often than normal.  Get help right away if:  You have trouble breathing.  You are short of breath.  Your belly hurts, or it is swollen or red.  You feel sick to your stomach (nauseous).  You throw up (vomit).  You feel like you will pass out, or you do pass out (faint).  You have blood in your pee.  Summary  Flank pain is pain in your side. The flank is the area of your side between your upper belly (abdomen) and your back.  Flank pain may occur over a short time (acute), or it may be long-term or come back often (chronic). It may be mild or very bad.  Pain in this area can be caused by many different things.  Contact your doctor if your symptoms get worse or they last longer than 2-3 days.  This information is not intended to replace advice given to you by your health care provider. Make sure you discuss any questions you have with your health care provider.  Document Revised: 11/30/2018 Document Reviewed: 04/09/2018  [employer] Patient Education © 2020 [employer]  Pyelonephritis, Adult    Pyelonephritis is an infection that occurs in the kidney. The kidneys are organs that help clean the blood by moving waste out of the blood and into the pee (urine). This infection can happen quickly, or it can last for a long time. In most cases, it clears up with treatment and does not cause other problems.  What are the causes?  This condition may be caused by:  · Germs (bacteria) going from the bladder up to the kidney. This may happen after having a bladder infection.  · Germs  going from the blood to the kidney.  What increases the risk?  This condition is more likely to develop in:  · Pregnant women.  · Older people.  · People who have any of these conditions:  ? Diabetes.  ? Inflammation of the prostate gland (prostatitis), in males.  ? Kidney stones or bladder stones.  ? Other problems with the kidney or the parts of your body that carry pee from the kidneys to the bladder (ureters).  ? Cancer.  · People who have a small, thin tube (catheter) placed in the bladder.  · People who are sexually active.  · Women who use a medicine that kills sperm (spermicide) to prevent pregnancy.  · People who have had a prior urinary tract infection (UTI).  What are the signs or symptoms?  Symptoms of this condition include:  · Peeing often.  · A strong urge to pee right away.  · Burning or stinging when peeing.  · Belly pain.  · Back pain.  · Pain in the side (flank area).  · Fever or chills.  · Blood in the pee, or dark pee.  · Feeling sick to your stomach (nauseous) or throwing up (vomiting).  How is this treated?  This condition may be treated by:  · Taking antibiotic medicines by mouth (orally).  · Drinking enough fluids.  If the infection is bad, you may need to stay in the hospital. You may be given antibiotics and fluids that are put directly into a vein through an IV tube.  In some cases, other treatments may be needed.  Follow these instructions at home:  Medicines  · Take your antibiotic medicine as told by your doctor. Do not stop taking the antibiotic even if you start to feel better.  · Take over-the-counter and prescription medicines only as told by your doctor.  General instructions    · Drink enough fluid to keep your pee pale yellow.  · Avoid caffeine, tea, and carbonated drinks.  · Pee (urinate) often. Avoid holding in pee for long periods of time.  · Pee before and after sex.  · After pooping (having a bowel movement), women should wipe from front to back. Use each tissue only  once.  · Keep all follow-up visits as told by your doctor. This is important.  Contact a doctor if:  · You do not feel better after 2 days.  · Your symptoms get worse.  · You have a fever.  Get help right away if:  · You cannot take your medicine or drink fluids as told.  · You have chills and shaking.  · You throw up.  · You have very bad pain in your side or back.  · You feel very weak or you pass out (faint).  Summary  · Pyelonephritis is an infection that occurs in the kidney.  · In most cases, this infection clears up with treatment and does not cause other problems.  · Take your antibiotic medicine as told by your doctor. Do not stop taking the antibiotic even if you start to feel better.  · Drink enough fluid to keep your pee pale yellow.  This information is not intended to replace advice given to you by your health care provider. Make sure you discuss any questions you have with your health care provider.  Document Revised: 10/22/2019 Document Reviewed: 10/22/2019  [employer] Patient Education © 2020 [employer]

## 2021-02-11 ENCOUNTER — APPOINTMENT (OUTPATIENT)
Dept: CT IMAGING | Facility: HOSPITAL | Age: 36
End: 2021-02-11

## 2021-02-11 LAB — BACTERIA SPEC AEROBE CULT: ABNORMAL

## 2021-02-22 ENCOUNTER — TELEMEDICINE (OUTPATIENT)
Dept: PSYCHIATRY | Facility: CLINIC | Age: 36
End: 2021-02-22

## 2021-02-22 DIAGNOSIS — G47.419 NARCOLEPSY WITHOUT CATAPLEXY: ICD-10-CM

## 2021-02-22 DIAGNOSIS — F31.60 BIPOLAR AFFECTIVE DISORDER, CURRENT EPISODE MIXED, CURRENT EPISODE SEVERITY UNSPECIFIED (HCC): Primary | ICD-10-CM

## 2021-02-22 DIAGNOSIS — F40.01 PANIC DISORDER WITH AGORAPHOBIA: ICD-10-CM

## 2021-02-22 DIAGNOSIS — F41.1 GENERALIZED ANXIETY DISORDER: ICD-10-CM

## 2021-02-22 DIAGNOSIS — Z79.899 MEDICATION MANAGEMENT: ICD-10-CM

## 2021-02-22 DIAGNOSIS — F31.75 BIPOLAR DISORDER, IN PARTIAL REMISSION, MOST RECENT EPISODE DEPRESSED (HCC): ICD-10-CM

## 2021-02-22 PROCEDURE — 99214 OFFICE O/P EST MOD 30 MIN: CPT | Performed by: NURSE PRACTITIONER

## 2021-02-22 RX ORDER — OXCARBAZEPINE 300 MG/1
300 TABLET, FILM COATED ORAL 2 TIMES DAILY
Qty: 60 TABLET | Refills: 0 | Status: SHIPPED | OUTPATIENT
Start: 2021-02-22 | End: 2021-03-15 | Stop reason: SDUPTHER

## 2021-02-22 RX ORDER — PROPRANOLOL HYDROCHLORIDE 20 MG/1
20 TABLET ORAL 2 TIMES DAILY
Qty: 60 TABLET | Refills: 0 | Status: SHIPPED | OUTPATIENT
Start: 2021-02-22 | End: 2021-04-09 | Stop reason: SDUPTHER

## 2021-02-22 RX ORDER — SERTRALINE HYDROCHLORIDE 100 MG/1
150 TABLET, FILM COATED ORAL DAILY
Qty: 45 TABLET | Refills: 0 | Status: SHIPPED | OUTPATIENT
Start: 2021-02-22 | End: 2021-04-09 | Stop reason: SDUPTHER

## 2021-02-22 RX ORDER — BUPROPION HYDROCHLORIDE 300 MG/1
300 TABLET ORAL EVERY MORNING
Qty: 30 TABLET | Refills: 0 | Status: SHIPPED | OUTPATIENT
Start: 2021-02-22 | End: 2021-04-09 | Stop reason: SDUPTHER

## 2021-02-22 RX ORDER — LAMOTRIGINE 150 MG/1
150 TABLET ORAL DAILY
Qty: 30 TABLET | Refills: 0 | Status: SHIPPED | OUTPATIENT
Start: 2021-02-22 | End: 2021-04-09 | Stop reason: SDUPTHER
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
## 2021-02-22 NOTE — PROGRESS NOTES
Subjective   Antoinette Pack is a 35 y.o. female who presents today for follow up    Chief Complaint:  Bipolar disorder, anxiety    History of Present Illness:   Here today for follow up visit.       The following portions of the patient's history were reviewed and updated as appropriate: allergies, current medications, past family history, past medical history, past social history, past surgical history and problem list.      Past Medical History:  Past Medical History:   Diagnosis Date   • Anxiety    • Depression    • Diabetes mellitus (CMS/HCC)    • Diverticulitis    • GERD (gastroesophageal reflux disease)    • Hypertension    • Kidney stone    • PCOS (polycystic ovarian syndrome)    • Sleep apnea        Social History:  Social History     Socioeconomic History   • Marital status:      Spouse name: Not on file   • Number of children: Not on file   • Years of education: Not on file   • Highest education level: Not on file   Occupational History   • Occupation: Not Working   Tobacco Use   • Smoking status: Never Smoker   • Smokeless tobacco: Never Used   Substance and Sexual Activity   • Alcohol use: No   • Drug use: No   • Sexual activity: Defer       Family History:  Family History   Problem Relation Age of Onset   • Heart disease Mother    • COPD Mother    • Heart disease Father    • COPD Father        Past Surgical History:  Past Surgical History:   Procedure Laterality Date   • CARDIAC CATHETERIZATION     • CARPAL TUNNEL RELEASE     • COLONOSCOPY     • ENDOSCOPY     • FRACTURE SURGERY     • HARDWARE REMOVAL      WRIST   • TENDON REPAIR      HAND       Problem List:  Patient Active Problem List   Diagnosis   • Body mass index (BMI) 40.0-44.9, adult       Allergy:   Allergies   Allergen Reactions   • Dilantin [Phenytoin] Mental Status Change   • Keppra [Levetiracetam] Mental Status Change   • Meperidine Rash   • Topamax [Topiramate] Mental Status Change        Current Medications:   Current  Outpatient Medications   Medication Sig Dispense Refill   • amLODIPine (NORVASC) 2.5 MG tablet Take 1 tablet by mouth Daily. 30 tablet 0   • buPROPion XL (WELLBUTRIN XL) 300 MG 24 hr tablet Take 1 tablet by mouth Every Morning. 30 tablet 0   • cloNIDine (CATAPRES) 0.1 MG tablet Take 1 tablet by mouth 3 (Three) Times a Day As Needed (if BP greateer than 170/110). 10 tablet 0   • halobetasol (ULTRAVATE) 0.05 % cream      • hydrochlorothiazide (HYDRODIURIL) 25 MG tablet Take 1 tablet by mouth.     • HYDROcodone-acetaminophen (NORCO) 5-325 MG per tablet Take 1 tablet by mouth Every 6 (Six) Hours As Needed for Severe Pain . 10 tablet 0   • ibuprofen (ADVIL,MOTRIN) 800 MG tablet Take 1 tablet by mouth Every 8 (Eight) Hours As Needed for Moderate Pain . 60 tablet 0   • insulin glargine (LANTUS) 100 UNIT/ML injection Inject 20 Units under the skin into the appropriate area as directed Daily.     • lamoTRIgine (LaMICtal) 150 MG tablet Take 1 tablet by mouth Daily. Take after finishing bottle of 100mg dosing 30 tablet 0   • lisinopril (PRINIVIL,ZESTRIL) 10 MG tablet Take 1 tablet by mouth Daily. 30 tablet 0   • metFORMIN (GLUCOPHAGE) 1000 MG tablet      • modafinil (PROVIGIL) 100 MG tablet Take 1 tablet by mouth Daily. For narcolepsy 30 tablet 0   • omeprazole (PRILOSEC) 40 MG capsule      • ondansetron (Zofran) 4 MG tablet Take 1 tablet by mouth Daily As Needed for Nausea or Vomiting. 20 tablet 1   • OXcarbazepine (Trileptal) 300 MG tablet Take 1 tablet by mouth 2 (Two) Times a Day. 60 tablet 0   • promethazine (PHENERGAN) 25 MG tablet Take 1 tablet by mouth Every 6 (Six) Hours As Needed for Vomiting. 30 tablet 0   • propranolol (INDERAL) 20 MG tablet Take 1 tablet by mouth 2 (Two) Times a Day. 60 tablet 0   • sertraline (ZOLOFT) 100 MG tablet Take 1.5 tablets by mouth Daily. 45 tablet 0   • TRULICITY 1.5 MG/0.5ML solution pen-injector        No current facility-administered medications for this visit.        Review of  Symptoms:    Review of Systems   Constitutional: Negative.    HENT: Negative.    Eyes: Negative.    Respiratory: Negative.    Cardiovascular: Negative.    Neurological: Negative.    Psychiatric/Behavioral: Positive for sleep disturbance and depressed mood. The patient is nervous/anxious.        Objective   Physical Exam:   There were no vitals taken for this visit.  There is no height or weight on file to calculate BMI.    Appearance:  female appears stated age, no acute distress noted.    Gait, Station, Strength: Steady, posture erect, WNL      Mental Status Exam:   Hygiene:   good  Cooperation:  Cooperative  Eye Contact:  Good  Psychomotor Behavior:  Appropriate  Affect:  Appropriate  Mood: normal  Hopelessness: Denies  Speech:  Normal  Thought Process:  Goal directed and Linear  Thought Content:  Normal  Suicidal:  None  Homicidal:  None  Hallucinations:  None  Delusion:  None  Memory:  Intact  Orientation:  Person, Place, Time and Situation  Reliability:  good  Insight:  Good  Judgement:  Good  Impulse Control:  Good  Physical/Medical Issues:  No      PHQ-Score Total:  PHQ-9 Total Score:      Lab Results:   Office Visit on 02/09/2021   Component Date Value Ref Range Status   • Color 02/09/2021 Yellow  Yellow, Straw, Dark Yellow, Radha Final   • Clarity, UA 02/09/2021 Clear  Clear Final   • Specific Gravity  02/09/2021 1.030  1.005 - 1.030 Final   • pH, Urine 02/09/2021 5.5  5.0 - 8.0 Final   • Leukocytes 02/09/2021 Negative  Negative Final   • Nitrite, UA 02/09/2021 Negative  Negative Final   • Protein, POC 02/09/2021 1+* Negative mg/dL Final   • Glucose, UA 02/09/2021 Negative  Negative, 1000 mg/dL (3+) mg/dL Final   • Ketones, UA 02/09/2021 Negative  Negative Final   • Urobilinogen, UA 02/09/2021 Normal  Normal Final   • Bilirubin 02/09/2021 1 mg/dL* Negative Final   • Blood, UA 02/09/2021 Negative  Negative Final   • Urine Culture 02/09/2021 >100,000 CFU/mL Lactobacillus species*  Final     Lactobacillus species is a component of normal human adrian.  Clindamycin, Penicillin, and Ampicillin generally are the most active in vitro agents.  Resistance to Vancomycin and Aminoglycosides is reported.         Assessment/Plan   Problems Addressed this Visit     None      Diagnoses    None.           -The benefits of a healthy diet and exercise were discussed with patient, especially the positive effects they have on mental health. Patient encouraged to consider lifestyle modification regarding  diet and exercise patterns to maximize results of mental health treatment.  -Reviewed previous available documentation  -Reviewed most recent available labs       Visit Diagnoses:  No diagnosis found.      TREATMENT PLAN/GOALS: Continue supportive psychotherapy efforts and medications as indicated. Treatment and medication options discussed during today's visit. Patient acknowledged and verbally consented to continue with current treatment plan and was educated on the importance of compliance with treatment and follow-up appointments.    MEDICATION ISSUES:  Discussed medication options and treatment plan of prescribed medication as well as the risks, benefits, and side effects including potential falls, possible impaired driving and metabolic adversities among others. Patient is agreeable to call the office with any worsening of symptoms or onset of side effects. Patient is agreeable to call 911 or go to the nearest ER should he/she begin having SI/HI.     MEDS ORDERED DURING VISIT:  No orders of the defined types were placed in this encounter.      No follow-ups on file.         Prognosis: Guarded dependent on medication/follow up and treatment plan compliance.  Functionality: pt showing improvements in important areas of daily functioning.     Short-term goals: Patient will adhere to medication regimen and note continued improvement in symptoms over the next 3 months.   Long-term goals: Patient will be adherent to  medication management and psychotherapy with continued improvement in symptoms over the next 6 months        This document has been electronically signed by CICI Ruby   February 22, 2021 07:36 EST    Part of this note may be an electronic transcription/translation of spoken language to printed text using the Dragon Dictation System.

## 2021-02-23 ENCOUNTER — OFFICE VISIT (OUTPATIENT)
Dept: UROLOGY | Facility: CLINIC | Age: 36
End: 2021-02-23

## 2021-02-23 ENCOUNTER — HOSPITAL ENCOUNTER (OUTPATIENT)
Dept: CT IMAGING | Facility: HOSPITAL | Age: 36
Discharge: HOME | End: 2021-02-23
Admitting: NURSE PRACTITIONER

## 2021-02-23 VITALS — BODY MASS INDEX: 40.32 KG/M2 | HEIGHT: 59 IN | WEIGHT: 200 LBS

## 2021-02-23 DIAGNOSIS — N10 ACUTE PYELONEPHRITIS: ICD-10-CM

## 2021-02-23 DIAGNOSIS — N39.0 RECURRENT UTI (URINARY TRACT INFECTION): ICD-10-CM

## 2021-02-23 DIAGNOSIS — R10.30 LOWER ABDOMINAL PAIN: ICD-10-CM

## 2021-02-23 DIAGNOSIS — N32.81 OVERACTIVE BLADDER: ICD-10-CM

## 2021-02-23 DIAGNOSIS — N10 ACUTE PYELONEPHRITIS: Primary | ICD-10-CM

## 2021-02-23 DIAGNOSIS — R10.9 ACUTE LEFT FLANK PAIN: ICD-10-CM

## 2021-02-23 LAB
BILIRUB BLD-MCNC: NEGATIVE MG/DL
CLARITY, POC: CLEAR
COLOR UR: YELLOW
CREAT BLDA-MCNC: 0.5 MG/DL (ref 0.6–1.3)
GLUCOSE UR STRIP-MCNC: ABNORMAL MG/DL
KETONES UR QL: NEGATIVE
LEUKOCYTE EST, POC: NEGATIVE
NITRITE UR-MCNC: NEGATIVE MG/ML
PH UR: 5.5 [PH] (ref 5–8)
PROT UR STRIP-MCNC: ABNORMAL MG/DL
RBC # UR STRIP: NEGATIVE /UL
SP GR UR: 1.01 (ref 1–1.03)
UROBILINOGEN UR QL: NORMAL

## 2021-02-23 PROCEDURE — 25010000002 IOPAMIDOL 61 % SOLUTION: Performed by: NURSE PRACTITIONER

## 2021-02-23 PROCEDURE — 74178 CT ABD&PLV WO CNTR FLWD CNTR: CPT | Performed by: RADIOLOGY

## 2021-02-23 PROCEDURE — 81003 URINALYSIS AUTO W/O SCOPE: CPT | Performed by: NURSE PRACTITIONER

## 2021-02-23 PROCEDURE — 82565 ASSAY OF CREATININE: CPT

## 2021-02-23 PROCEDURE — 99214 OFFICE O/P EST MOD 30 MIN: CPT | Performed by: NURSE PRACTITIONER

## 2021-02-23 PROCEDURE — 74178 CT ABD&PLV WO CNTR FLWD CNTR: CPT

## 2021-02-23 RX ADMIN — IOPAMIDOL 100 ML: 612 INJECTION, SOLUTION INTRAVENOUS at 15:16

## 2021-02-23 NOTE — PROGRESS NOTES
Chief Complaint:          Chief Complaint   Patient presents with   • Pyelonephritis     reVIEW cT SCAN       HPI:   35 y.o. female returns to clinic today for follow-up.  She is here to review her CT scan results, t which showed that the inflammatory changes seen in the midpole of the left kidney on the earlier exam consistent with pyelonephritis in December have completely resolved.      Patient is now 7 weeks post antibiotic therapy with Bactrim, and reports a significant improvement in her symptoms.  She was seen 2 weeks ago with ongoing complaints of urinary symptoms of frequency, urgency, bilateral flank pain with left CVA tenderness.  However her urine culture came back unremarkable.  Showed lactobacillus species distant with vaginal adrian.    TODAY, she denies dysuria, burning on urination, or gross hematuria. Reports chronic back pain, but denies  Pelvic/suprapubic pain and pressure, she denies fever or chills, she does not have nausea, vomiting, or diarrhea. Her Urine Dipstick today is again negative for any infection, it is negative for Gross/Microscopic Hematuria.she has 3+ proteinuria, and 3+ glucosuria.     Overall she reports doing better, She reports  increasing her p.o. fluid intake to at least 2 L daily, and has significantly reduced her intake of soda drinks. She is monitoring her Carbs and getting her blood sugars better controlled.       Past Medical History:        Past Medical History:   Diagnosis Date   • Anxiety    • Depression    • Diabetes mellitus (CMS/HCC)    • Diverticulitis    • GERD (gastroesophageal reflux disease)    • Hypertension    • Kidney stone    • PCOS (polycystic ovarian syndrome)    • Sleep apnea      The following portions of the patient's history were reviewed and updated as appropriate: allergies, current medications, past family history, past medical history, past social history, past surgical history and problem list.    Current Meds:     Current Outpatient Medications    Medication Sig Dispense Refill   • amLODIPine (NORVASC) 2.5 MG tablet Take 1 tablet by mouth Daily. 30 tablet 0   • buPROPion XL (WELLBUTRIN XL) 300 MG 24 hr tablet Take 1 tablet by mouth Every Morning. 30 tablet 0   • hydrochlorothiazide (HYDRODIURIL) 25 MG tablet Take 1 tablet by mouth.     • HYDROcodone-acetaminophen (NORCO) 5-325 MG per tablet Take 1 tablet by mouth Every 6 (Six) Hours As Needed for Severe Pain . 10 tablet 0   • ibuprofen (ADVIL,MOTRIN) 800 MG tablet Take 1 tablet by mouth Every 8 (Eight) Hours As Needed for Moderate Pain . 60 tablet 0   • insulin glargine (LANTUS) 100 UNIT/ML injection Inject 20 Units under the skin into the appropriate area as directed Daily.     • lamoTRIgine (LaMICtal) 150 MG tablet Take 1 tablet by mouth Daily. Take after finishing bottle of 100mg dosing 30 tablet 0   • lisinopril (PRINIVIL,ZESTRIL) 10 MG tablet Take 1 tablet by mouth Daily. 30 tablet 0   • metFORMIN (GLUCOPHAGE) 1000 MG tablet      • omeprazole (PRILOSEC) 40 MG capsule      • ondansetron (Zofran) 4 MG tablet Take 1 tablet by mouth Daily As Needed for Nausea or Vomiting. 20 tablet 1   • OXcarbazepine (Trileptal) 300 MG tablet Take 1 tablet by mouth 2 (Two) Times a Day. 60 tablet 0   • promethazine (PHENERGAN) 25 MG tablet Take 1 tablet by mouth Every 6 (Six) Hours As Needed for Vomiting. 30 tablet 0   • propranolol (INDERAL) 20 MG tablet Take 1 tablet by mouth 2 (Two) Times a Day. 60 tablet 0   • sertraline (ZOLOFT) 100 MG tablet Take 1.5 tablets by mouth Daily. 45 tablet 0   • TRULICITY 1.5 MG/0.5ML solution pen-injector        No current facility-administered medications for this visit.         Allergies:      Allergies   Allergen Reactions   • Dilantin [Phenytoin] Mental Status Change   • Keppra [Levetiracetam] Mental Status Change   • Meperidine Rash   • Topamax [Topiramate] Mental Status Change        Past Surgical History:     Past Surgical History:   Procedure Laterality Date   • CARDIAC  CATHETERIZATION     • CARPAL TUNNEL RELEASE     • COLONOSCOPY     • ENDOSCOPY     • FRACTURE SURGERY     • HARDWARE REMOVAL      WRIST   • TENDON REPAIR      HAND         Social History:     Social History     Socioeconomic History   • Marital status:      Spouse name: Not on file   • Number of children: Not on file   • Years of education: Not on file   • Highest education level: Not on file   Occupational History   • Occupation: Not Working   Tobacco Use   • Smoking status: Never Smoker   • Smokeless tobacco: Never Used   Substance and Sexual Activity   • Alcohol use: No   • Drug use: No   • Sexual activity: Defer       Family History:     Family History   Problem Relation Age of Onset   • Heart disease Mother    • COPD Mother    • Heart disease Father    • COPD Father        Review of Systems:     Review of Systems   Constitutional: Positive for activity change, appetite change and fatigue. Negative for chills and fever.   HENT: Negative for congestion and sinus pressure.    Eyes: Negative for blurred vision, pain and redness.   Respiratory: Negative for chest tightness and shortness of breath.    Gastrointestinal: Positive for abdominal distention and abdominal pain. Negative for constipation, nausea and vomiting.   Genitourinary: Positive for flank pain, frequency and urinary incontinence. Negative for difficulty urinating, dyspareunia, dysuria, genital sores, hematuria, urgency and vaginal discharge.   Musculoskeletal: Positive for back pain.   Neurological: Positive for weakness. Negative for dizziness, headache and confusion.   Hematological: Does not bruise/bleed easily.   Psychiatric/Behavioral: Positive for sleep disturbance and stress. Negative for behavioral problems and decreased concentration. The patient is nervous/anxious.         Physical Exam:     Physical Exam  Constitutional:       General: She is in acute distress.      Appearance: She is well-developed. She is obese. She is  ill-appearing.   HENT:      Head: Normocephalic and atraumatic.   Eyes:      Pupils: Pupils are equal, round, and reactive to light.   Neck:      Musculoskeletal: Normal range of motion.      Thyroid: No thyromegaly.      Trachea: No tracheal deviation.   Cardiovascular:      Rate and Rhythm: Normal rate and regular rhythm.      Heart sounds: No murmur.   Pulmonary:      Effort: Pulmonary effort is normal. No respiratory distress.      Breath sounds: Normal breath sounds. No stridor. No wheezing.   Abdominal:      General: Bowel sounds are normal. There is distension.      Palpations: Abdomen is soft.      Tenderness: There is abdominal tenderness.   Genitourinary:     Labia:         Right: No tenderness.         Left: No tenderness.       Vagina: Normal. No vaginal discharge.   Musculoskeletal: Normal range of motion.         General: Tenderness present. No deformity.   Skin:     General: Skin is warm and dry.      Capillary Refill: Capillary refill takes less than 2 seconds.      Coloration: Skin is not pale.      Findings: No erythema or rash.   Neurological:      Mental Status: She is alert and oriented to person, place, and time.      Cranial Nerves: No cranial nerve deficit.      Sensory: No sensory deficit.      Motor: Weakness present.      Coordination: Coordination normal.   Psychiatric:         Behavior: Behavior normal.         Thought Content: Thought content normal.         Judgment: Judgment normal.         Procedure:       Assessment/Plan:        ASSESSMENT  RECURRENT UTIs /LEFT PYELONEPHRITIS -RESOLVED/OAB: Pleasant patient significant history of recurrent UTIs, post recent episodes of pyelonephritis-resolved returns to clinic today for follow-up.  She is in no apparent distress, reports doing relatively better is completing her antibiotic therapy with Bactrim.  He reports back pain which is chronic in origin, abdominal pain, pelvic and suprapubic discomfort.  Has urinary symptoms of frequency,  urgency, incontinent episodes, but denies dysuria, gross hematuria, flank pain with CVA tenderness.    Urine dipstick today is completely negative for any infection, it is negative for gross/microscopic hematuria.  She has 3+ proteinuria, 3+ glucosuria.  Last urine culture had showed lactobacillus species.    We both reviewed/discussed her repeat CT abdomen and pelvis today which showed that the inflammatory changes seen in the midpole of the left kidney on the earlier exam consistent with pyelonephritis in December have completely resolved.  Also,No adenopathy has developed in the retroperitoneum. There is no ascites. Urinary bladder is smooth in contour.  No evidence of stone or obstruction was seen involving the collecting system of either kidney.     Again, We discussed Acute pyelonephritis AS a sudden and severe kidney infection. This causes the kidneys to swell and may permanently damage them. Pyelonephritis can be life-threatening. We discussed the facts that, The infection usually starts in the lower urinary tract.( patient reports numerous UTIs in the past).      We  Also discussed the types of organisms that are found in the urinary tract indicating that the vast majority are results of the patient's own gastrointestinal adrian.  We discussed the risk factors for recurrent infections being intercourse in younger patients and atrophic changes in older patients.  We discussed the symptoms that are found including pain, pressure, burning, frequency, urgency suprapubic pain and painful intercourse.  I discussed the history of vesicoureteral reflux in young patients and finally chronic renal scarring as a result of such.     I discussed upper tract symptoms including fevers, chills, and indicated the workup would be much more aggressive if the patient were to present with recurrent infections in the face of upper tract symptomatology such as fever.    Finally, We discussed Overactive Bladder. We discussed  treatable and non-treatable causes of both stress and urge urinary incontinence.  With regards to stress urinary incontinence we discussed its relationship to childbirth and pelvic health.  We discussed the grading of stress incontinence with trying to quantitate the number of pads used.  We talked about leaking urine with laughing, lifting, coughing, and sexual intercourse.   I talked about the various therapeutic options including anticholinergics, beta 3 agonists, and alpha blockade if there is a component of obstruction.  I discussed the side effects of anti-cholinergic including dry mouth, double vision.                                                             PLAN     Will resend her urine for culture, I will call her with results if any positive bacteria growth.     We discussed restarting ABX suppressive therapy with Trimpex if positive.      I strongly recommend concomitant probiotics with treatment with antibiotics to protect the rectal reservoir including over-the-counter yogurt preparations to romario oral pills containing the appropriate probiotics.     Pyridium 200 mg PRN bladder pain/spasms     Motrin 800 mg as needed flank pain     Zofran 4 mg as needed nausea     Discussed better blood sugar control , pt states currently >250     We discussed lower tract investigation via cystoscopy with Dr. Hagan once infection is cleared.     Patient will follow up in 6 weeks or sooner with definitive plan of care.    Patient is agreeable with plan.      Patient reports that she is currently experiencing Many symptoms of urinary incontinence.    Patient's Body mass index is 40.4 kg/m². BMI is above normal parameters. Recommendations include: educational material, exercise counseling and nutrition counseling.    Smoking Cessation Counseling:  Never a smoker.  Patient does not currently use any tobacco products.        Counseling was given to patient for the following topics diagnostic results including: Left  pyelonephritis-RESOLVED, /Recurrent UTI, instructions for management as follows: Increase p.o. fluid intake, blood sugar control,  nausea medicine, pain control, risk factor reductions including: Bladder irritants such as caffeine products, citrusy/spicy foods, hypoglycemia and impressions as follows: Follow-up in 6 weeks discuss cystoscopy.  The interim medical history and current results were reviewed.  A treatment plan with follow-up was made for: Left flank pain,OAB, Recurrent UTIs,   Acute pyelonephritis [N10].          This document has been electronically signed by Griselda Cheng-Akwa, APRN February 25, 2021 20:05 EST

## 2021-02-26 NOTE — PATIENT INSTRUCTIONS
Overactive Bladder, Adult    Overactive bladder refers to a condition in which a person has a sudden need to pass urine. The person may leak urine if he or she cannot get to the bathroom fast enough (urinary incontinence). A person with this condition may also wake up several times in the night to go to the bathroom.  Overactive bladder is associated with poor nerve signals between your bladder and your brain. Your bladder may get the signal to empty before it is full. You may also have very sensitive muscles that make your bladder squeeze too soon. These symptoms might interfere with daily work or social activities.  What are the causes?  This condition may be associated with or caused by:  · Urinary tract infection.  · Infection of nearby tissues, such as the prostate.  · Prostate enlargement.  · Surgery on the uterus or urethra.  · Bladder stones, inflammation, or tumors.  · Drinking too much caffeine or alcohol.  · Certain medicines, especially medicines that get rid of extra fluid in the body (diuretics).  · Muscle or nerve weakness, especially from:  ? A spinal cord injury.  ? Stroke.  ? Multiple sclerosis.  ? Parkinson's disease.  · Diabetes.  · Constipation.  What increases the risk?  You may be at greater risk for overactive bladder if you:  · Are an older adult.  · Smoke.  · Are going through menopause.  · Have prostate problems.  · Have a neurological disease, such as stroke, dementia, Parkinson's disease, or multiple sclerosis (MS).  · Eat or drink things that irritate the bladder. These include alcohol, spicy food, and caffeine.  · Are overweight or obese.  What are the signs or symptoms?  Symptoms of this condition include:  · Sudden, strong urge to urinate.  · Leaking urine.  · Urinating 8 or more times a day.  · Waking up to urinate 2 or more times a night.  How is this diagnosed?  Your health care provider may suspect overactive bladder based on your symptoms. He or she will diagnose this condition  by:  · A physical exam and medical history.  · Blood or urine tests. You might need bladder or urine tests to help determine what is causing your overactive bladder.  You might also need to see a health care provider who specializes in urinary tract problems (urologist).  How is this treated?  Treatment for overactive bladder depends on the cause of your condition and whether it is mild or severe. You can also make lifestyle changes at home. Options include:  · Bladder training. This may include:  ? Learning to control the urge to urinate by following a schedule that directs you to urinate at regular intervals (timed voiding).  ? Doing Kegel exercises to strengthen your pelvic floor muscles, which support your bladder. Toning these muscles can help you control urination, even if your bladder muscles are overactive.  · Special devices. This may include:  ? Biofeedback, which uses sensors to help you become aware of your body's signals.  ? Electrical stimulation, which uses electrodes placed inside the body (implanted) or outside the body. These electrodes send gentle pulses of electricity to strengthen the nerves or muscles that control the bladder.  ? Women may use a plastic device that fits into the vagina and supports the bladder (pessary).  · Medicines.  ? Antibiotics to treat bladder infection.  ? Antispasmodics to stop the bladder from releasing urine at the wrong time.  ? Tricyclic antidepressants to relax bladder muscles.  ? Injections of botulinum toxin type A directly into the bladder tissue to relax bladder muscles.  · Lifestyle changes. This may include:  ? Weight loss. Talk to your health care provider about weight loss methods that would work best for you.  ? Diet changes. This may include reducing how much alcohol and caffeine you consume, or drinking fluids at different times of the day.  ? Not smoking. Do not use any products that contain nicotine or tobacco, such as cigarettes and e-cigarettes. If  you need help quitting, ask your health care provider.  · Surgery.  ? A device may be implanted to help manage the nerve signals that control urination.  ? An electrode may be implanted to stimulate electrical signals in the bladder.  ? A procedure may be done to change the shape of the bladder. This is done only in very severe cases.  Follow these instructions at home:  Lifestyle  · Make any diet or lifestyle changes that are recommended by your health care provider. These may include:  ? Drinking less fluid or drinking fluids at different times of the day.  ? Cutting down on caffeine or alcohol.  ? Doing Kegel exercises.  ? Losing weight if needed.  ? Eating a healthy and balanced diet to prevent constipation. This may include:  § Eating foods that are high in fiber, such as fresh fruits and vegetables, whole grains, and beans.  § Limiting foods that are high in fat and processed sugars, such as fried and sweet foods.  General instructions  · Take over-the-counter and prescription medicines only as told by your health care provider.  · If you were prescribed an antibiotic medicine, take it as told by your health care provider. Do not stop taking the antibiotic even if you start to feel better.  · Use any implants or pessary as told by your health care provider.  · If needed, wear pads to absorb urine leakage.  · Keep a journal or log to track how much and when you drink and when you feel the need to urinate. This will help your health care provider monitor your condition.  · Keep all follow-up visits as told by your health care provider. This is important.  Contact a health care provider if:  · You have a fever.  · Your symptoms do not get better with treatment.  · Your pain and discomfort get worse.  · You have more frequent urges to urinate.  Get help right away if:  · You are not able to control your bladder.  Summary  · Overactive bladder refers to a condition in which a person has a sudden need to pass  urine.  · Several conditions may lead to an overactive bladder.  · Treatment for overactive bladder depends on the cause and severity of your condition.  · Follow your health care provider's instructions about lifestyle changes, doing Kegel exercises, keeping a journal, and taking medicines.  This information is not intended to replace advice given to you by your health care provider. Make sure you discuss any questions you have with your health care provider.  Document Revised: 04/09/2020 Document Reviewed: 01/03/2019  Elsevier Patient Education © 2020 [employer]  Pyelonephritis, Adult    Pyelonephritis is an infection that occurs in the kidney. The kidneys are organs that help clean the blood by moving waste out of the blood and into the pee (urine). This infection can happen quickly, or it can last for a long time. In most cases, it clears up with treatment and does not cause other problems.  What are the causes?  This condition may be caused by:  · Germs (bacteria) going from the bladder up to the kidney. This may happen after having a bladder infection.  · Germs going from the blood to the kidney.  What increases the risk?  This condition is more likely to develop in:  · Pregnant women.  · Older people.  · People who have any of these conditions:  ? Diabetes.  ? Inflammation of the prostate gland (prostatitis), in males.  ? Kidney stones or bladder stones.  ? Other problems with the kidney or the parts of your body that carry pee from the kidneys to the bladder (ureters).  ? Cancer.  · People who have a small, thin tube (catheter) placed in the bladder.  · People who are sexually active.  · Women who use a medicine that kills sperm (spermicide) to prevent pregnancy.  · People who have had a prior urinary tract infection (UTI).  What are the signs or symptoms?  Symptoms of this condition include:  · Peeing often.  · A strong urge to pee right away.  · Burning or stinging when peeing.  · Belly pain.  · Back  pain.  · Pain in the side (flank area).  · Fever or chills.  · Blood in the pee, or dark pee.  · Feeling sick to your stomach (nauseous) or throwing up (vomiting).  How is this treated?  This condition may be treated by:  · Taking antibiotic medicines by mouth (orally).  · Drinking enough fluids.  If the infection is bad, you may need to stay in the hospital. You may be given antibiotics and fluids that are put directly into a vein through an IV tube.  In some cases, other treatments may be needed.  Follow these instructions at home:  Medicines  · Take your antibiotic medicine as told by your doctor. Do not stop taking the antibiotic even if you start to feel better.  · Take over-the-counter and prescription medicines only as told by your doctor.  General instructions    · Drink enough fluid to keep your pee pale yellow.  · Avoid caffeine, tea, and carbonated drinks.  · Pee (urinate) often. Avoid holding in pee for long periods of time.  · Pee before and after sex.  · After pooping (having a bowel movement), women should wipe from front to back. Use each tissue only once.  · Keep all follow-up visits as told by your doctor. This is important.  Contact a doctor if:  · You do not feel better after 2 days.  · Your symptoms get worse.  · You have a fever.  Get help right away if:  · You cannot take your medicine or drink fluids as told.  · You have chills and shaking.  · You throw up.  · You have very bad pain in your side or back.  · You feel very weak or you pass out (faint).  Summary  · Pyelonephritis is an infection that occurs in the kidney.  · In most cases, this infection clears up with treatment and does not cause other problems.  · Take your antibiotic medicine as told by your doctor. Do not stop taking the antibiotic even if you start to feel better.  · Drink enough fluid to keep your pee pale yellow.  This information is not intended to replace advice given to you by your health care provider. Make sure you  discuss any questions you have with your health care provider.  Document Revised: 10/22/2019 Document Reviewed: 10/22/2019  Elsevier Patient Education © 2020 [employer]

## 2021-03-15 DIAGNOSIS — F31.75 BIPOLAR DISORDER, IN PARTIAL REMISSION, MOST RECENT EPISODE DEPRESSED (HCC): ICD-10-CM

## 2021-03-15 RX ORDER — OXCARBAZEPINE 300 MG/1
300 TABLET, FILM COATED ORAL 2 TIMES DAILY
Qty: 60 TABLET | Refills: 0 | Status: SHIPPED | OUTPATIENT
Start: 2021-03-15 | End: 2021-04-09 | Stop reason: SDUPTHER

## 2021-03-18 ENCOUNTER — BULK ORDERING (OUTPATIENT)
Dept: CASE MANAGEMENT | Facility: OTHER | Age: 36
End: 2021-03-18

## 2021-03-18 DIAGNOSIS — Z23 IMMUNIZATION DUE: ICD-10-CM

## 2021-04-09 ENCOUNTER — OFFICE VISIT (OUTPATIENT)
Dept: PSYCHIATRY | Facility: CLINIC | Age: 36
End: 2021-04-09

## 2021-04-09 VITALS
HEART RATE: 64 BPM | WEIGHT: 207.4 LBS | BODY MASS INDEX: 41.81 KG/M2 | OXYGEN SATURATION: 98 % | SYSTOLIC BLOOD PRESSURE: 132 MMHG | DIASTOLIC BLOOD PRESSURE: 86 MMHG | HEIGHT: 59 IN

## 2021-04-09 DIAGNOSIS — F40.01 PANIC DISORDER WITH AGORAPHOBIA: ICD-10-CM

## 2021-04-09 DIAGNOSIS — G47.419 NARCOLEPSY WITHOUT CATAPLEXY: ICD-10-CM

## 2021-04-09 DIAGNOSIS — F31.75 BIPOLAR DISORDER, IN PARTIAL REMISSION, MOST RECENT EPISODE DEPRESSED (HCC): Primary | ICD-10-CM

## 2021-04-09 DIAGNOSIS — Z79.899 MEDICATION MANAGEMENT: ICD-10-CM

## 2021-04-09 DIAGNOSIS — F41.1 GENERALIZED ANXIETY DISORDER: ICD-10-CM

## 2021-04-09 PROCEDURE — 99214 OFFICE O/P EST MOD 30 MIN: CPT | Performed by: NURSE PRACTITIONER

## 2021-04-09 RX ORDER — OXCARBAZEPINE 300 MG/1
300 TABLET, FILM COATED ORAL 2 TIMES DAILY
Qty: 60 TABLET | Refills: 0 | Status: SHIPPED | OUTPATIENT
Start: 2021-04-09 | End: 2021-05-10 | Stop reason: SDUPTHER

## 2021-04-09 RX ORDER — BUPROPION HYDROCHLORIDE 300 MG/1
300 TABLET ORAL EVERY MORNING
Qty: 30 TABLET | Refills: 0 | Status: SHIPPED | OUTPATIENT
Start: 2021-04-09 | End: 2021-05-10 | Stop reason: SDUPTHER

## 2021-04-09 RX ORDER — LAMOTRIGINE 150 MG/1
150 TABLET ORAL DAILY
Qty: 30 TABLET | Refills: 0 | Status: SHIPPED | OUTPATIENT
Start: 2021-04-09 | End: 2021-05-10 | Stop reason: SDUPTHER

## 2021-04-09 RX ORDER — PROPRANOLOL HYDROCHLORIDE 20 MG/1
20 TABLET ORAL 2 TIMES DAILY
Qty: 60 TABLET | Refills: 0 | Status: SHIPPED | OUTPATIENT
Start: 2021-04-09 | End: 2021-05-10 | Stop reason: SDUPTHER

## 2021-04-09 RX ORDER — SERTRALINE HYDROCHLORIDE 100 MG/1
150 TABLET, FILM COATED ORAL DAILY
Qty: 45 TABLET | Refills: 0 | Status: SHIPPED | OUTPATIENT
Start: 2021-04-09 | End: 2021-05-10 | Stop reason: SDUPTHER
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
## 2021-05-10 ENCOUNTER — OFFICE VISIT (OUTPATIENT)
Dept: PSYCHIATRY | Facility: CLINIC | Age: 36
End: 2021-05-10

## 2021-05-10 VITALS
OXYGEN SATURATION: 96 % | WEIGHT: 202 LBS | SYSTOLIC BLOOD PRESSURE: 122 MMHG | DIASTOLIC BLOOD PRESSURE: 84 MMHG | BODY MASS INDEX: 40.72 KG/M2 | HEIGHT: 59 IN | TEMPERATURE: 97.5 F | HEART RATE: 82 BPM

## 2021-05-10 DIAGNOSIS — G47.419 NARCOLEPSY WITHOUT CATAPLEXY: ICD-10-CM

## 2021-05-10 DIAGNOSIS — F31.75 BIPOLAR DISORDER, IN PARTIAL REMISSION, MOST RECENT EPISODE DEPRESSED (HCC): Primary | ICD-10-CM

## 2021-05-10 DIAGNOSIS — F40.01 PANIC DISORDER WITH AGORAPHOBIA: ICD-10-CM

## 2021-05-10 DIAGNOSIS — Z79.899 MEDICATION MANAGEMENT: ICD-10-CM

## 2021-05-10 DIAGNOSIS — F41.1 GENERALIZED ANXIETY DISORDER: ICD-10-CM

## 2021-05-10 PROCEDURE — 99214 OFFICE O/P EST MOD 30 MIN: CPT | Performed by: NURSE PRACTITIONER

## 2021-05-10 RX ORDER — ATORVASTATIN CALCIUM 40 MG/1
TABLET, FILM COATED ORAL
COMMUNITY
Start: 2021-04-20 | End: 2021-12-02

## 2021-05-10 RX ORDER — BUPROPION HYDROCHLORIDE 300 MG/1
300 TABLET ORAL EVERY MORNING
Qty: 30 TABLET | Refills: 0 | Status: SHIPPED | OUTPATIENT
Start: 2021-05-10 | End: 2021-07-07 | Stop reason: SDUPTHER

## 2021-05-10 RX ORDER — PROPRANOLOL HYDROCHLORIDE 20 MG/1
20 TABLET ORAL 2 TIMES DAILY
Qty: 60 TABLET | Refills: 0 | Status: SHIPPED | OUTPATIENT
Start: 2021-05-10 | End: 2021-07-07 | Stop reason: SDUPTHER

## 2021-05-10 RX ORDER — LAMOTRIGINE 150 MG/1
150 TABLET ORAL DAILY
Qty: 30 TABLET | Refills: 0 | Status: SHIPPED | OUTPATIENT
Start: 2021-05-10 | End: 2021-07-07 | Stop reason: SDUPTHER

## 2021-05-10 RX ORDER — CLOBETASOL PROPIONATE 0.5 MG/G
OINTMENT TOPICAL AS NEEDED
COMMUNITY
Start: 2021-04-20

## 2021-05-10 RX ORDER — SERTRALINE HYDROCHLORIDE 100 MG/1
150 TABLET, FILM COATED ORAL DAILY
Qty: 45 TABLET | Refills: 0 | Status: SHIPPED | OUTPATIENT
Start: 2021-05-10 | End: 2021-07-07 | Stop reason: SDUPTHER

## 2021-05-10 RX ORDER — OXCARBAZEPINE 300 MG/1
300 TABLET, FILM COATED ORAL 2 TIMES DAILY
Qty: 60 TABLET | Refills: 0 | Status: SHIPPED | OUTPATIENT
Start: 2021-05-10 | End: 2021-07-07 | Stop reason: SDUPTHER

## 2021-05-10 NOTE — PROGRESS NOTES
"Subjective  Answers for HPI/ROS submitted by the patient on 5/10/2021  Please describe your symptoms.: Depression  Have you had these symptoms before?: Yes  How long have you been having these symptoms?: Greater than 2 weeks  What is the primary reason for your visit?: Other      Antoinette Pack is a 35 y.o. female who presents today for follow up    Chief Complaint:  Bipolar disorder, anxiety    History of Present Illness:   Here today for follow up visit. She is taking her medication as prescribed, denies SE. She states things are about the same. She is seeing \"the person again\", she used to see it, now it has returned, she states she knows it isn't real, it doesn't scare her. She is under a lot of stress, she has lost her grandmother and her nephew.  She feels at peace about her grandmother. She forgot her therapy appointment.  Depression 7 /10, anxiety rated 8 /10, with 10 being the worst. Her  is remodeling the home, and he is wanting her to do things that she is not capable of doing and he doesn't understand. That increases her stress level, anxiety and depression. Sleeping is in excess, she states if she sits down, she usually falls asleep. She states she gets up through the night to \"go pee\" frequently. Appetite is good, she overeats frequently. Denies SI/HI/AVH.  Denies thoughts of self-harm. Chronic health issues, no acute physical or medical issues today.  She states they get \"cuts on her bottom of her feet\", she is seeing a foot and ankle in [place].                  The following portions of the patient's history were reviewed and updated as appropriate: allergies, current medications, past family history, past medical history, past social history, past surgical history and problem list.      Past Medical History:  Past Medical History:   Diagnosis Date   • Anxiety    • Depression    • Diabetes mellitus (CMS/HCC)    • Diverticulitis    • GERD (gastroesophageal reflux disease)    • Hypertension "    • Kidney stone    • PCOS (polycystic ovarian syndrome)    • Sleep apnea        Social History:  Social History     Socioeconomic History   • Marital status:      Spouse name: Not on file   • Number of children: Not on file   • Years of education: Not on file   • Highest education level: Not on file   Tobacco Use   • Smoking status: Never Smoker   • Smokeless tobacco: Never Used   Vaping Use   • Vaping Use: Never used   Substance and Sexual Activity   • Alcohol use: No   • Drug use: No   • Sexual activity: Defer       Family History:  Family History   Problem Relation Age of Onset   • Heart disease Mother    • COPD Mother    • Heart disease Father    • COPD Father        Past Surgical History:  Past Surgical History:   Procedure Laterality Date   • CARDIAC CATHETERIZATION     • CARPAL TUNNEL RELEASE     • COLONOSCOPY     • ENDOSCOPY     • FRACTURE SURGERY     • HARDWARE REMOVAL      WRIST   • TENDON REPAIR      HAND       Problem List:  Patient Active Problem List   Diagnosis   • Body mass index (BMI) 40.0-44.9, adult       Allergy:   Allergies   Allergen Reactions   • Dilantin [Phenytoin] Mental Status Change   • Keppra [Levetiracetam] Mental Status Change   • Meperidine Rash   • Topamax [Topiramate] Mental Status Change        Current Medications:   Current Outpatient Medications   Medication Sig Dispense Refill   • atorvastatin (LIPITOR) 40 MG tablet      • buPROPion XL (WELLBUTRIN XL) 300 MG 24 hr tablet Take 1 tablet by mouth Every Morning. 30 tablet 0   • clobetasol (TEMOVATE) 0.05 % ointment      • insulin glargine (LANTUS) 100 UNIT/ML injection Inject 20 Units under the skin into the appropriate area as directed Daily.     • lamoTRIgine (LaMICtal) 150 MG tablet Take 1 tablet by mouth Daily. Take after finishing bottle of 100mg dosing 30 tablet 0   • lisinopril (PRINIVIL,ZESTRIL) 10 MG tablet Take 1 tablet by mouth Daily. 30 tablet 0   • metFORMIN (GLUCOPHAGE) 1000 MG tablet      • omeprazole  "(PRILOSEC) 40 MG capsule      • OXcarbazepine (Trileptal) 300 MG tablet Take 1 tablet by mouth 2 (Two) Times a Day. 60 tablet 0   • Proctozone-HC 2.5 % rectal cream      • propranolol (INDERAL) 20 MG tablet Take 1 tablet by mouth 2 (Two) Times a Day. 60 tablet 0   • sertraline (ZOLOFT) 100 MG tablet Take 1.5 tablets by mouth Daily. 45 tablet 0   • TRULICITY 1.5 MG/0.5ML solution pen-injector        No current facility-administered medications for this visit.       Review of Symptoms:    Review of Systems   Constitutional: Negative.    HENT: Negative.    Eyes: Negative.    Respiratory: Negative.    Cardiovascular: Negative.    Musculoskeletal: Negative.    Skin: Positive for skin lesions (small cuts on bottom of feet).   Neurological: Negative.    Psychiatric/Behavioral: Positive for depressed mood and stress. The patient is nervous/anxious.        Objective   Physical Exam:   Blood pressure 122/84, pulse 82, temperature 97.5 °F (36.4 °C), height 149.9 cm (59.02\"), weight 91.6 kg (202 lb), SpO2 96 %.  Body mass index is 40.78 kg/m².    Appearance:  female appears stated age, no acute distress noted.    Gait, Station, Strength: Steady, posture erect, WNL      Mental Status Exam:   Hygiene:   good  Cooperation:  Cooperative  Eye Contact:  Good  Psychomotor Behavior:  Appropriate  Affect:  Appropriate  Mood: sad  Hopelessness: Denies  Speech:  Normal  Thought Process:  Goal directed and Linear  Thought Content:  Normal  Suicidal:  None  Homicidal:  None  Hallucinations:  Visual   Delusion:  None  Memory:  Intact  Orientation:  Person, Place, Time and Situation  Reliability:  good  Insight:  Fair  Judgement:  Fair  Impulse Control:  Good  Physical/Medical Issues:  Yes HTN, Diabetes, GERD     PHQ-Score Total:  PHQ-9 Total Score: 22    Lab Results:   No visits with results within 1 Month(s) from this visit.   Latest known visit with results is:   Hospital Outpatient Visit on 02/23/2021   Component Date Value Ref " Range Status   • Creatinine 02/23/2021 0.50* 0.60 - 1.30 mg/dL Final    Serial Number: [id number]:  [id number]       Assessment/Plan   Problems Addressed this Visit     None      Visit Diagnoses     Bipolar disorder, in partial remission, most recent episode depressed (CMS/HCC)    -  Primary    Relevant Medications    sertraline (ZOLOFT) 100 MG tablet    OXcarbazepine (Trileptal) 300 MG tablet    buPROPion XL (WELLBUTRIN XL) 300 MG 24 hr tablet    lamoTRIgine (LaMICtal) 150 MG tablet    Generalized anxiety disorder        Relevant Medications    sertraline (ZOLOFT) 100 MG tablet    propranolol (INDERAL) 20 MG tablet    buPROPion XL (WELLBUTRIN XL) 300 MG 24 hr tablet    Panic disorder with agoraphobia        Relevant Medications    sertraline (ZOLOFT) 100 MG tablet    propranolol (INDERAL) 20 MG tablet    buPROPion XL (WELLBUTRIN XL) 300 MG 24 hr tablet    Narcolepsy without cataplexy        Medication management          Diagnoses       Codes Comments    Bipolar disorder, in partial remission, most recent episode depressed (CMS/HCC)    -  Primary ICD-10-CM: F31.75  ICD-9-CM: 296.55     Generalized anxiety disorder     ICD-10-CM: F41.1  ICD-9-CM: 300.02     Panic disorder with agoraphobia     ICD-10-CM: F40.01  ICD-9-CM: 300.21     Narcolepsy without cataplexy     ICD-10-CM: G47.419  ICD-9-CM: 347.00     Medication management     ICD-10-CM: Z79.899  ICD-9-CM: V58.69         Social History     Tobacco Use   Smoking Status Never Smoker   Smokeless Tobacco Never Used           -The benefits of a healthy diet and exercise were discussed with patient, especially the positive effects they have on mental health. Patient encouraged to consider lifestyle modification regarding  diet and exercise patterns to maximize results of mental health treatment.  -Reviewed previous available documentation  -Reviewed most recent available labs       Visit Diagnoses:    ICD-10-CM ICD-9-CM   1. Bipolar disorder, in partial remission, most  recent episode depressed (CMS/[facility])  F31.75 296.55   2. Generalized anxiety disorder  F41.1 300.02   3. Panic disorder with agoraphobia  F40.01 300.21   4. Narcolepsy without cataplexy  G47.419 347.00   5. Medication management  Z79.899 V58.69         TREATMENT PLAN/GOALS: Continue supportive psychotherapy efforts and medications as indicated. Treatment and medication options discussed during today's visit. Patient acknowledged and verbally consented to continue with current treatment plan and was educated on the importance of compliance with treatment and follow-up appointments.    MEDICATION ISSUES:  Discussed medication options and treatment plan of prescribed medication as well as the risks, benefits, and side effects including potential falls, possible impaired driving and metabolic adversities among others. Patient is agreeable to call the office with any worsening of symptoms or onset of side effects. Patient is agreeable to call 911 or go to the nearest ER should he/she begin having SI/HI.     MEDS ORDERED DURING VISIT:  New Medications Ordered This Visit   Medications   • sertraline (ZOLOFT) 100 MG tablet     Sig: Take 1.5 tablets by mouth Daily.     Dispense:  45 tablet     Refill:  0   • propranolol (INDERAL) 20 MG tablet     Sig: Take 1 tablet by mouth 2 (Two) Times a Day.     Dispense:  60 tablet     Refill:  0   • OXcarbazepine (Trileptal) 300 MG tablet     Sig: Take 1 tablet by mouth 2 (Two) Times a Day.     Dispense:  60 tablet     Refill:  0   • buPROPion XL (WELLBUTRIN XL) 300 MG 24 hr tablet     Sig: Take 1 tablet by mouth Every Morning.     Dispense:  30 tablet     Refill:  0   • lamoTRIgine (LaMICtal) 150 MG tablet     Sig: Take 1 tablet by mouth Daily. Take after finishing bottle of 100mg dosing     Dispense:  30 tablet     Refill:  0     -Continue bupropion  mg tablet take 1 tablet by mouth every morning for mood disorder and anxiety.  -Continue Lamictal 150 mg tablet take 1 tablet by  mouth daily for bipolar disorder and anxiety  -Continue oxcarbazepine 300 mg tablet take 1 tablet by mouth 2 times a day for bipolar disorder and anxiety  -Continue sertraline 100 mg tablet take 1-1/2 tablets by mouth daily for anxiety, depression and panic disorder  -Continue propanolol 20 mg tablet take 1 tablet by mouth 2 times a day for anxiety and panic disorder  -She doesn't want to change or adjust her medications at this time.  Return in about 1 month (around 6/10/2021) for Recheck.  Recommend  psychotherapy.       Prognosis: Guarded dependent on medication/follow up and treatment plan compliance.  Functionality: pt showing improvements in important areas of daily functioning.     Short-term goals: Patient will adhere to medication regimen and note continued improvement in symptoms over the next 3 months.   Long-term goals: Patient will be adherent to medication management and psychotherapy with continued improvement in symptoms over the next 6 months        This document has been electronically signed by CICI Ruby   May 10, 2021 09:51 EDT    Part of this note may be an electronic transcription/translation of spoken language to printed text using the Dragon Dictation System.  Answers for HPI/ROS submitted by the patient on 4/9/2021  Please describe your symptoms.: Depression and anxiety  Have you had these symptoms before?: Yes  How long have you been having these symptoms?: Greater than 2 weeks  What is the primary reason for your visit?: Other

## 2021-06-09 ENCOUNTER — TRANSCRIBE ORDERS (OUTPATIENT)
Dept: OTHER | Facility: OTHER | Age: 36
End: 2021-06-09

## 2021-06-09 DIAGNOSIS — IMO0002 UNCONTROLLED DIABETES MELLITUS WITH DIABETIC POLYNEUROPATHY, WITH LONG-TERM CURRENT USE OF INSULIN: Primary | ICD-10-CM

## 2021-07-07 DIAGNOSIS — F31.75 BIPOLAR DISORDER, IN PARTIAL REMISSION, MOST RECENT EPISODE DEPRESSED (HCC): ICD-10-CM

## 2021-07-07 DIAGNOSIS — F40.01 PANIC DISORDER WITH AGORAPHOBIA: ICD-10-CM

## 2021-07-07 DIAGNOSIS — F41.1 GENERALIZED ANXIETY DISORDER: ICD-10-CM

## 2021-07-07 RX ORDER — SERTRALINE HYDROCHLORIDE 100 MG/1
150 TABLET, FILM COATED ORAL DAILY
Qty: 45 TABLET | Refills: 0 | Status: SHIPPED | OUTPATIENT
Start: 2021-07-07 | End: 2021-08-02 | Stop reason: SDUPTHER

## 2021-07-07 RX ORDER — BUPROPION HYDROCHLORIDE 300 MG/1
300 TABLET ORAL EVERY MORNING
Qty: 30 TABLET | Refills: 0 | Status: SHIPPED | OUTPATIENT
Start: 2021-07-07 | End: 2021-08-02 | Stop reason: SDUPTHER

## 2021-07-07 RX ORDER — OXCARBAZEPINE 300 MG/1
300 TABLET, FILM COATED ORAL 2 TIMES DAILY
Qty: 60 TABLET | Refills: 0 | Status: SHIPPED | OUTPATIENT
Start: 2021-07-07 | End: 2021-08-02 | Stop reason: SDUPTHER

## 2021-07-07 RX ORDER — LAMOTRIGINE 150 MG/1
150 TABLET ORAL DAILY
Qty: 30 TABLET | Refills: 0 | Status: SHIPPED | OUTPATIENT
Start: 2021-07-07 | End: 2021-08-02 | Stop reason: SDUPTHER

## 2021-07-07 RX ORDER — PROPRANOLOL HYDROCHLORIDE 20 MG/1
20 TABLET ORAL 2 TIMES DAILY
Qty: 60 TABLET | Refills: 0 | Status: SHIPPED | OUTPATIENT
Start: 2021-07-07 | End: 2021-08-30 | Stop reason: SDUPTHER

## 2021-08-02 ENCOUNTER — OFFICE VISIT (OUTPATIENT)
Dept: PSYCHIATRY | Facility: CLINIC | Age: 36
End: 2021-08-02

## 2021-08-02 VITALS
HEART RATE: 85 BPM | TEMPERATURE: 97.9 F | HEIGHT: 59 IN | BODY MASS INDEX: 40.72 KG/M2 | WEIGHT: 202 LBS | OXYGEN SATURATION: 94 %

## 2021-08-02 DIAGNOSIS — F31.75 BIPOLAR DISORDER, IN PARTIAL REMISSION, MOST RECENT EPISODE DEPRESSED (HCC): Primary | ICD-10-CM

## 2021-08-02 DIAGNOSIS — F40.01 PANIC DISORDER WITH AGORAPHOBIA: ICD-10-CM

## 2021-08-02 DIAGNOSIS — Z79.899 MEDICATION MANAGEMENT: ICD-10-CM

## 2021-08-02 DIAGNOSIS — F41.1 GENERALIZED ANXIETY DISORDER: ICD-10-CM

## 2021-08-02 PROCEDURE — 99214 OFFICE O/P EST MOD 30 MIN: CPT | Performed by: NURSE PRACTITIONER

## 2021-08-02 RX ORDER — SERTRALINE HYDROCHLORIDE 100 MG/1
150 TABLET, FILM COATED ORAL DAILY
Qty: 45 TABLET | Refills: 0 | Status: SHIPPED | OUTPATIENT
Start: 2021-08-02 | End: 2021-08-30 | Stop reason: SDUPTHER

## 2021-08-02 RX ORDER — OXCARBAZEPINE 300 MG/1
300 TABLET, FILM COATED ORAL 2 TIMES DAILY
Qty: 60 TABLET | Refills: 0 | Status: SHIPPED | OUTPATIENT
Start: 2021-08-02 | End: 2021-08-30 | Stop reason: SDUPTHER

## 2021-08-02 RX ORDER — BUPROPION HYDROCHLORIDE 300 MG/1
300 TABLET ORAL EVERY MORNING
Qty: 30 TABLET | Refills: 0 | Status: SHIPPED | OUTPATIENT
Start: 2021-08-02 | End: 2021-08-30 | Stop reason: SDUPTHER

## 2021-08-02 RX ORDER — LAMOTRIGINE 150 MG/1
150 TABLET ORAL DAILY
Qty: 30 TABLET | Refills: 0 | Status: SHIPPED | OUTPATIENT
Start: 2021-08-02 | End: 2021-08-30 | Stop reason: SDUPTHER

## 2021-08-16 NOTE — PROGRESS NOTES
"Subjective      Antoinette Pack is a 36 y.o. female who presents today for follow up    Chief Complaint:  Bipolar disorder, anxiety    History of Present Illness:   Here today for follow up visit. She is taking her medication as prescribed, denies SE.  States things are about the same. Depression rated 8/10, anxiety rated 7/10, with 10 being the worst.  Sleeping is the same, she continues to get up frequently during the night to go to the bathroom, she still sleeps during the day, but she tries not to.  States she has occasional nightmares. Appetite is good.   Denies SI/HI, states she sees a \"lady\", but she isn't scared, she tries not to focus, it happens about once a day, it doesn't last more than 2 minutes, she understands that she isn't real.   Denies thoughts of self-harm. Chronic health issues, no acute physical or medical issues today.         The following portions of the patient's history were reviewed and updated as appropriate: allergies, current medications, past family history, past medical history, past social history, past surgical history and problem list.      Past Medical History:  Past Medical History:   Diagnosis Date   • Anxiety    • Depression    • Diabetes mellitus (CMS/HCC)    • Diverticulitis    • GERD (gastroesophageal reflux disease)    • Hypertension    • Kidney stone    • PCOS (polycystic ovarian syndrome)    • Sleep apnea        Social History:  Social History     Socioeconomic History   • Marital status:      Spouse name: Not on file   • Number of children: Not on file   • Years of education: Not on file   • Highest education level: Not on file   Tobacco Use   • Smoking status: Never Smoker   • Smokeless tobacco: Never Used   Vaping Use   • Vaping Use: Never used   Substance and Sexual Activity   • Alcohol use: No   • Drug use: No   • Sexual activity: Defer       Family History:  Family History   Problem Relation Age of Onset   • Heart disease Mother    • COPD Mother    • " Heart disease Father    • COPD Father        Past Surgical History:  Past Surgical History:   Procedure Laterality Date   • CARDIAC CATHETERIZATION     • CARPAL TUNNEL RELEASE     • COLONOSCOPY     • ENDOSCOPY     • FRACTURE SURGERY     • HARDWARE REMOVAL      WRIST   • TENDON REPAIR      HAND       Problem List:  Patient Active Problem List   Diagnosis   • Body mass index (BMI) 40.0-44.9, adult       Allergy:   Allergies   Allergen Reactions   • Dilantin [Phenytoin] Mental Status Change   • Keppra [Levetiracetam] Mental Status Change   • Meperidine Rash   • Topamax [Topiramate] Mental Status Change        Current Medications:   Current Outpatient Medications   Medication Sig Dispense Refill   • atorvastatin (LIPITOR) 40 MG tablet      • buPROPion XL (WELLBUTRIN XL) 300 MG 24 hr tablet Take 1 tablet by mouth Every Morning. 30 tablet 0   • clobetasol (TEMOVATE) 0.05 % ointment      • insulin glargine (LANTUS) 100 UNIT/ML injection Inject 20 Units under the skin into the appropriate area as directed Daily.     • Insulin Lispro (HUMALOG KWIKPEN SC) Inject  under the skin into the appropriate area as directed.     • lamoTRIgine (LaMICtal) 200 MG tablet Take 1 tablet by mouth Daily. 30 tablet 0   • lisinopril (PRINIVIL,ZESTRIL) 10 MG tablet Take 1 tablet by mouth Daily. 30 tablet 0   • metFORMIN (GLUCOPHAGE) 1000 MG tablet      • omeprazole (PRILOSEC) 40 MG capsule      • OXcarbazepine (Trileptal) 300 MG tablet Take 1 tablet by mouth 2 (Two) Times a Day. 60 tablet 0   • propranolol (INDERAL) 20 MG tablet Take 1 tablet by mouth 2 (Two) Times a Day. 60 tablet 0   • sertraline (ZOLOFT) 100 MG tablet Take 1.5 tablets by mouth Daily. 45 tablet 0   • TRULICITY 1.5 MG/0.5ML solution pen-injector      • Proctozone-HC 2.5 % rectal cream        No current facility-administered medications for this visit.       Review of Symptoms:    Review of Systems   Constitutional: Negative.    HENT: Negative.    Eyes: Negative.    Respiratory:  "Negative.    Cardiovascular: Negative.    Musculoskeletal: Negative.    Skin: Positive for skin lesions (small cuts on bottom of feet).   Neurological: Negative.    Psychiatric/Behavioral: Positive for depressed mood and stress. The patient is nervous/anxious.        Objective   Physical Exam:   Blood pressure 149/88, pulse 85, height 149.9 cm (59.02\"), weight 91.4 kg (201 lb 9.6 oz).  Body mass index is 40.7 kg/m².    Appearance:  female appears stated age, no acute distress noted.    Gait, Station, Strength: Steady, posture erect, WNL      Mental Status Exam:   Hygiene:   good  Cooperation:  Cooperative  Eye Contact:  Good  Psychomotor Behavior:  Appropriate  Affect:  Appropriate  Mood: sad  Hopelessness: Denies  Speech:  Normal  Thought Process:  Goal directed and Linear  Thought Content:  Normal  Suicidal:  None  Homicidal:  None  Hallucinations:  Visual   Delusion:  None  Memory:  Intact  Orientation:  Person, Place, Time and Situation  Reliability:  good  Insight:  Fair  Judgement:  Fair  Impulse Control:  Good  Physical/Medical Issues:  Yes HTN, Diabetes, GERD     PHQ-Score Total:  PHQ-9 Total Score: 1    Lab Results:   No visits with results within 1 Month(s) from this visit.   Latest known visit with results is:   Hospital Outpatient Visit on 02/23/2021   Component Date Value Ref Range Status   • Creatinine 02/23/2021 0.50* 0.60 - 1.30 mg/dL Final    Serial Number: [id number]:  [id number]       Assessment/Plan   Problems Addressed this Visit     None      Visit Diagnoses     Bipolar disorder, in partial remission, most recent episode depressed (CMS/[facility])    -  Primary    Relevant Medications    buPROPion XL (WELLBUTRIN XL) 300 MG 24 hr tablet    lamoTRIgine (LaMICtal) 200 MG tablet    OXcarbazepine (Trileptal) 300 MG tablet    sertraline (ZOLOFT) 100 MG tablet    Generalized anxiety disorder        Relevant Medications    buPROPion XL (WELLBUTRIN XL) 300 MG 24 hr tablet    propranolol (INDERAL) 20 " MG tablet    sertraline (ZOLOFT) 100 MG tablet    Panic disorder with agoraphobia        Relevant Medications    buPROPion XL (WELLBUTRIN XL) 300 MG 24 hr tablet    propranolol (INDERAL) 20 MG tablet    sertraline (ZOLOFT) 100 MG tablet    Medication management          Diagnoses       Codes Comments    Bipolar disorder, in partial remission, most recent episode depressed (CMS/HCC)    -  Primary ICD-10-CM: F31.75  ICD-9-CM: 296.55     Generalized anxiety disorder     ICD-10-CM: F41.1  ICD-9-CM: 300.02     Panic disorder with agoraphobia     ICD-10-CM: F40.01  ICD-9-CM: 300.21     Medication management     ICD-10-CM: Z79.899  ICD-9-CM: V58.69         Social History     Tobacco Use   Smoking Status Never Smoker   Smokeless Tobacco Never Used           -The benefits of a healthy diet and exercise were discussed with patient, especially the positive effects they have on mental health. Patient encouraged to consider lifestyle modification regarding  diet and exercise patterns to maximize results of mental health treatment.  -Reviewed previous available documentation  -Reviewed most recent available labs       Visit Diagnoses:    ICD-10-CM ICD-9-CM   1. Bipolar disorder, in partial remission, most recent episode depressed (CMS/HCC)  F31.75 296.55   2. Generalized anxiety disorder  F41.1 300.02   3. Panic disorder with agoraphobia  F40.01 300.21   4. Medication management  Z79.899 V58.69         TREATMENT PLAN/GOALS: Continue supportive psychotherapy efforts and medications as indicated. Treatment and medication options discussed during today's visit. Patient acknowledged and verbally consented to continue with current treatment plan and was educated on the importance of compliance with treatment and follow-up appointments.    MEDICATION ISSUES:  Discussed medication options and treatment plan of prescribed medication as well as the risks, benefits, and side effects including potential falls, possible impaired driving and  metabolic adversities among others. Patient is agreeable to call the office with any worsening of symptoms or onset of side effects. Patient is agreeable to call 911 or go to the nearest ER should he/she begin having SI/HI.     MEDS ORDERED DURING VISIT:  New Medications Ordered This Visit   Medications   • buPROPion XL (WELLBUTRIN XL) 300 MG 24 hr tablet     Sig: Take 1 tablet by mouth Every Morning.     Dispense:  30 tablet     Refill:  0   • lamoTRIgine (LaMICtal) 200 MG tablet     Sig: Take 1 tablet by mouth Daily.     Dispense:  30 tablet     Refill:  0   • OXcarbazepine (Trileptal) 300 MG tablet     Sig: Take 1 tablet by mouth 2 (Two) Times a Day.     Dispense:  60 tablet     Refill:  0   • propranolol (INDERAL) 20 MG tablet     Sig: Take 1 tablet by mouth 2 (Two) Times a Day.     Dispense:  60 tablet     Refill:  0   • sertraline (ZOLOFT) 100 MG tablet     Sig: Take 1.5 tablets by mouth Daily.     Dispense:  45 tablet     Refill:  0     -Continue bupropion  mg tablet take 1 tablet by mouth every morning for mood disorder and anxiety.  -Increase Lamictal 200 mg tablet take 1 tablet by mouth daily for bipolar disorder and anxiety  -Continue oxcarbazepine 300 mg tablet take 1 tablet by mouth 2 times a day for bipolar disorder and anxiety  -Continue sertraline 100 mg tablet take 1-1/2 tablets by mouth daily for anxiety, depression and panic disorder  -Continue propanolol 20 mg tablet take 1 tablet by mouth 2 times a day for anxiety and panic disorder  -Recommend  psychotherapy.      Return in about 1 month (around 9/30/2021) for Recheck.       Prognosis: Guarded dependent on medication/follow up and treatment plan compliance.  Functionality: pt showing improvements in important areas of daily functioning.     Short-term goals: Patient will adhere to medication regimen and note continued improvement in symptoms over the next 3 months.   Long-term goals: Patient will be adherent to medication management and  psychotherapy with continued improvement in symptoms over the next 6 months    I spent 30 minutes caring for Antoinette on this date of service. This time includes time spent by me in the following activities: preparing for the visit, obtaining and/or reviewing a separately obtained history, performing a medically appropriate examination and/or evaluation, counseling and educating the patient/family/caregiver, ordering medications, tests, or procedures and documenting information in the medical record    Access to MH/SA Psychotherapy Notes: Patient cites privacy concerns and/or if otherwise noted, MH/SA records are not to be released to [facility]. Patient understands she can request a physical copy of Medical and/or MH/SA records at any time.          This document has been electronically signed by CICI Ruby   August 30, 2021 09:30 EDT

## 2021-08-27 ENCOUNTER — TRANSCRIBE ORDERS (OUTPATIENT)
Dept: ADMINISTRATIVE | Facility: HOSPITAL | Age: 36
End: 2021-08-27

## 2021-08-27 DIAGNOSIS — Z01.818 OTHER SPECIFIED PRE-OPERATIVE EXAMINATION: Primary | ICD-10-CM

## 2021-08-30 ENCOUNTER — OFFICE VISIT (OUTPATIENT)
Dept: PSYCHIATRY | Facility: CLINIC | Age: 36
End: 2021-08-30

## 2021-08-30 VITALS
SYSTOLIC BLOOD PRESSURE: 149 MMHG | BODY MASS INDEX: 40.64 KG/M2 | HEIGHT: 59 IN | HEART RATE: 85 BPM | DIASTOLIC BLOOD PRESSURE: 88 MMHG | WEIGHT: 201.6 LBS

## 2021-08-30 DIAGNOSIS — F31.75 BIPOLAR DISORDER, IN PARTIAL REMISSION, MOST RECENT EPISODE DEPRESSED (HCC): Primary | ICD-10-CM

## 2021-08-30 DIAGNOSIS — Z79.899 MEDICATION MANAGEMENT: ICD-10-CM

## 2021-08-30 DIAGNOSIS — F40.01 PANIC DISORDER WITH AGORAPHOBIA: ICD-10-CM

## 2021-08-30 DIAGNOSIS — F41.1 GENERALIZED ANXIETY DISORDER: ICD-10-CM

## 2021-08-30 PROCEDURE — 99214 OFFICE O/P EST MOD 30 MIN: CPT | Performed by: NURSE PRACTITIONER

## 2021-08-30 RX ORDER — SERTRALINE HYDROCHLORIDE 100 MG/1
150 TABLET, FILM COATED ORAL DAILY
Qty: 45 TABLET | Refills: 0 | Status: SHIPPED | OUTPATIENT
Start: 2021-08-30 | End: 2021-09-27 | Stop reason: SDUPTHER

## 2021-08-30 RX ORDER — PROPRANOLOL HYDROCHLORIDE 20 MG/1
20 TABLET ORAL 2 TIMES DAILY
Qty: 60 TABLET | Refills: 0 | Status: SHIPPED | OUTPATIENT
Start: 2021-08-30 | End: 2021-09-27 | Stop reason: SDUPTHER

## 2021-08-30 RX ORDER — BUPROPION HYDROCHLORIDE 300 MG/1
300 TABLET ORAL EVERY MORNING
Qty: 30 TABLET | Refills: 0 | Status: SHIPPED | OUTPATIENT
Start: 2021-08-30 | End: 2021-09-27 | Stop reason: SDUPTHER

## 2021-08-30 RX ORDER — LAMOTRIGINE 200 MG/1
200 TABLET ORAL DAILY
Qty: 30 TABLET | Refills: 0 | Status: SHIPPED | OUTPATIENT
Start: 2021-08-30 | End: 2021-09-27 | Stop reason: SDUPTHER

## 2021-08-30 RX ORDER — OXCARBAZEPINE 300 MG/1
300 TABLET, FILM COATED ORAL 2 TIMES DAILY
Qty: 60 TABLET | Refills: 0 | Status: SHIPPED | OUTPATIENT
Start: 2021-08-30 | End: 2021-09-27 | Stop reason: SDUPTHER

## 2021-09-01 ENCOUNTER — LAB (OUTPATIENT)
Dept: LAB | Facility: HOSPITAL | Age: 36
End: 2021-09-01

## 2021-09-01 DIAGNOSIS — Z01.818 OTHER SPECIFIED PRE-OPERATIVE EXAMINATION: ICD-10-CM

## 2021-09-01 PROCEDURE — C9803 HOPD COVID-19 SPEC COLLECT: HCPCS | Performed by: INTERNAL MEDICINE

## 2021-09-01 PROCEDURE — U0004 COV-19 TEST NON-CDC HGH THRU: HCPCS | Performed by: INTERNAL MEDICINE

## 2021-09-01 PROCEDURE — U0005 INFEC AGEN DETEC AMPLI PROBE: HCPCS | Performed by: INTERNAL MEDICINE

## 2021-09-02 LAB — SARS-COV-2 ORF1AB RESP QL NAA+PROBE: NOT DETECTED

## 2021-09-15 NOTE — PROGRESS NOTES
"Subjective      Antoinette Pack is a 36 y.o. female who presents today for follow up    Chief Complaint:  Bipolar disorder, anxiety    History of Present Illness:   Here today for follow up visit. She is taking her medication as prescribed, denies SE.        States things are about the same. Depression rated 8/10, anxiety rated 7/10, with 10 being the worst.  Sleeping is the same, she continues to get up frequently during the night to go to the bathroom, she still sleeps during the day, but she tries not to.  States she has occasional nightmares. Appetite is good.   Denies SI/HI, states she sees a \"lady\", but she isn't scared, she tries not to focus, it happens about once a day, it doesn't last more than 2 minutes, she understands that she isn't real.   Denies thoughts of self-harm. Chronic health issues, no acute physical or medical issues today.         The following portions of the patient's history were reviewed and updated as appropriate: allergies, current medications, past family history, past medical history, past social history, past surgical history and problem list.      Past Medical History:  Past Medical History:   Diagnosis Date   • Anxiety    • Depression    • Diabetes mellitus (CMS/HCC)    • Diverticulitis    • GERD (gastroesophageal reflux disease)    • Hypertension    • Kidney stone    • PCOS (polycystic ovarian syndrome)    • Sleep apnea        Social History:  Social History     Socioeconomic History   • Marital status:      Spouse name: Not on file   • Number of children: Not on file   • Years of education: Not on file   • Highest education level: Not on file   Tobacco Use   • Smoking status: Never Smoker   • Smokeless tobacco: Never Used   Vaping Use   • Vaping Use: Never used   Substance and Sexual Activity   • Alcohol use: No   • Drug use: No   • Sexual activity: Defer       Family History:  Family History   Problem Relation Age of Onset   • Heart disease Mother    • COPD Mother  "   • Heart disease Father    • COPD Father        Past Surgical History:  Past Surgical History:   Procedure Laterality Date   • CARDIAC CATHETERIZATION     • CARPAL TUNNEL RELEASE     • COLONOSCOPY     • ENDOSCOPY     • FRACTURE SURGERY     • HARDWARE REMOVAL      WRIST   • TENDON REPAIR      HAND       Problem List:  Patient Active Problem List   Diagnosis   • Body mass index (BMI) 40.0-44.9, adult       Allergy:   Allergies   Allergen Reactions   • Dilantin [Phenytoin] Mental Status Change   • Keppra [Levetiracetam] Mental Status Change   • Meperidine Rash   • Topamax [Topiramate] Mental Status Change        Current Medications:   Current Outpatient Medications   Medication Sig Dispense Refill   • atorvastatin (LIPITOR) 40 MG tablet      • buPROPion XL (WELLBUTRIN XL) 300 MG 24 hr tablet Take 1 tablet by mouth Every Morning. 30 tablet 0   • clobetasol (TEMOVATE) 0.05 % ointment      • insulin glargine (LANTUS) 100 UNIT/ML injection Inject 20 Units under the skin into the appropriate area as directed Daily.     • Insulin Lispro (HUMALOG KWIKPEN SC) Inject  under the skin into the appropriate area as directed.     • lamoTRIgine (LaMICtal) 200 MG tablet Take 1 tablet by mouth Daily. 30 tablet 0   • lisinopril (PRINIVIL,ZESTRIL) 10 MG tablet Take 1 tablet by mouth Daily. 30 tablet 0   • metFORMIN (GLUCOPHAGE) 1000 MG tablet      • omeprazole (PRILOSEC) 40 MG capsule      • OXcarbazepine (Trileptal) 300 MG tablet Take 1 tablet by mouth 2 (Two) Times a Day. 60 tablet 0   • Proctozone-HC 2.5 % rectal cream      • propranolol (INDERAL) 20 MG tablet Take 1 tablet by mouth 2 (Two) Times a Day. 60 tablet 0   • sertraline (ZOLOFT) 100 MG tablet Take 1.5 tablets by mouth Daily. 45 tablet 0   • TRULICITY 1.5 MG/0.5ML solution pen-injector        No current facility-administered medications for this visit.       Review of Symptoms:    Review of Systems   Constitutional: Negative.    HENT: Negative.    Eyes: Negative.     Respiratory: Negative.    Cardiovascular: Negative.    Skin: Positive for skin lesions (small cuts on bottom of feet).   Neurological: Negative.    Psychiatric/Behavioral: Positive for depressed mood and stress. The patient is nervous/anxious.        Objective   Physical Exam:   There were no vitals taken for this visit.  There is no height or weight on file to calculate BMI.    Appearance:  female appears stated age, no acute distress noted.    Gait, Station, Strength: Steady, posture erect, WNL      Mental Status Exam:   Hygiene:   good  Cooperation:  Cooperative  Eye Contact:  Good  Psychomotor Behavior:  Appropriate  Affect:  Appropriate  Mood: sad  Hopelessness: Denies  Speech:  Normal  Thought Process:  Goal directed and Linear  Thought Content:  Normal  Suicidal:  None  Homicidal:  None  Hallucinations:  Visual   Delusion:  None  Memory:  Intact  Orientation:  Person, Place, Time and Situation  Reliability:  good  Insight:  Fair  Judgement:  Fair  Impulse Control:  Good  Physical/Medical Issues:  Yes HTN, Diabetes, GERD     PHQ-Score Total:  PHQ-9 Total Score:      Lab Results:   No visits with results within 1 Month(s) from this visit.   Latest known visit with results is:   Transcribe Orders on 08/27/2021   Component Date Value Ref Range Status   • COVID19 09/01/2021 Not Detected  Not Detected - Ref. Range Final       Assessment/Plan   Problems Addressed this Visit     None      Visit Diagnoses     Bipolar disorder, in partial remission, most recent episode depressed (CMS/HCC)    -  Primary    Generalized anxiety disorder        Panic disorder with agoraphobia        Medication management          Diagnoses       Codes Comments    Bipolar disorder, in partial remission, most recent episode depressed (CMS/HCC)    -  Primary ICD-10-CM: F31.75  ICD-9-CM: 296.55     Generalized anxiety disorder     ICD-10-CM: F41.1  ICD-9-CM: 300.02     Panic disorder with agoraphobia     ICD-10-CM: F40.01  ICD-9-CM:  300.21     Medication management     ICD-10-CM: Z79.899  ICD-9-CM: V58.69         Social History     Tobacco Use   Smoking Status Never Smoker   Smokeless Tobacco Never Used     JORGE reviewed and appropriate. Patient counseled on use of controlled substances.         -The benefits of a healthy diet and exercise were discussed with patient, especially the positive effects they have on mental health. Patient encouraged to consider lifestyle modification regarding  diet and exercise patterns to maximize results of mental health treatment.  -Reviewed previous available documentation  -Reviewed most recent available labs       Visit Diagnoses:    ICD-10-CM ICD-9-CM   1. Bipolar disorder, in partial remission, most recent episode depressed (CMS/[facility])  F31.75 296.55   2. Generalized anxiety disorder  F41.1 300.02   3. Panic disorder with agoraphobia  F40.01 300.21   4. Medication management  Z79.899 V58.69         TREATMENT PLAN/GOALS: Continue supportive psychotherapy efforts and medications as indicated. Treatment and medication options discussed during today's visit. Patient acknowledged and verbally consented to continue with current treatment plan and was educated on the importance of compliance with treatment and follow-up appointments.    MEDICATION ISSUES:  Discussed medication options and treatment plan of prescribed medication as well as the risks, benefits, and side effects including potential falls, possible impaired driving and metabolic adversities among others. Patient is agreeable to call the office with any worsening of symptoms or onset of side effects. Patient is agreeable to call 911 or go to the nearest ER should he/she begin having SI/HI.     MEDS ORDERED DURING VISIT:  No orders of the defined types were placed in this encounter.    -Continue bupropion  mg tablet take 1 tablet by mouth every morning for mood disorder and anxiety.  -Increase Lamictal 200 mg tablet take 1 tablet by mouth daily  for bipolar disorder and anxiety  -Continue oxcarbazepine 300 mg tablet take 1 tablet by mouth 2 times a day for bipolar disorder and anxiety  -Continue sertraline 100 mg tablet take 1-1/2 tablets by mouth daily for anxiety, depression and panic disorder  -Continue propanolol 20 mg tablet take 1 tablet by mouth 2 times a day for anxiety and panic disorder  -Recommend  psychotherapy.      No follow-ups on file.       Prognosis: Guarded dependent on medication/follow up and treatment plan compliance.  Functionality: pt showing improvements in important areas of daily functioning.     Short-term goals: Patient will adhere to medication regimen and note continued improvement in symptoms over the next 3 months.   Long-term goals: Patient will be adherent to medication management and psychotherapy with continued improvement in symptoms over the next 6 months    I spent 30 minutes caring for Antoinette on this date of service. This time includes time spent by me in the following activities: preparing for the visit, obtaining and/or reviewing a separately obtained history, performing a medically appropriate examination and/or evaluation, counseling and educating the patient/family/caregiver, ordering medications, tests, or procedures and documenting information in the medical record    Access to MH/SA Psychotherapy Notes: Patient cites privacy concerns and/or if otherwise noted, MH/SA records are not to be released to [facility]. Patient understands she can request a physical copy of Medical and/or MH/SA records at any time.          This document has been electronically signed by CICI Ruby   September 15, 2021 07:48 EDT

## 2021-09-27 ENCOUNTER — TELEMEDICINE (OUTPATIENT)
Dept: PSYCHIATRY | Facility: CLINIC | Age: 36
End: 2021-09-27

## 2021-09-27 DIAGNOSIS — F31.75 BIPOLAR DISORDER, IN PARTIAL REMISSION, MOST RECENT EPISODE DEPRESSED (HCC): Primary | ICD-10-CM

## 2021-09-27 DIAGNOSIS — F40.01 PANIC DISORDER WITH AGORAPHOBIA: ICD-10-CM

## 2021-09-27 DIAGNOSIS — F41.1 GENERALIZED ANXIETY DISORDER: ICD-10-CM

## 2021-09-27 DIAGNOSIS — Z79.899 MEDICATION MANAGEMENT: ICD-10-CM

## 2021-09-27 PROCEDURE — 99214 OFFICE O/P EST MOD 30 MIN: CPT | Performed by: NURSE PRACTITIONER

## 2021-09-27 RX ORDER — OXCARBAZEPINE 300 MG/1
300 TABLET, FILM COATED ORAL 2 TIMES DAILY
Qty: 60 TABLET | Refills: 0 | Status: SHIPPED | OUTPATIENT
Start: 2021-09-27 | End: 2021-10-25 | Stop reason: SDUPTHER

## 2021-09-27 RX ORDER — PROPRANOLOL HYDROCHLORIDE 20 MG/1
20 TABLET ORAL 2 TIMES DAILY
Qty: 60 TABLET | Refills: 0 | Status: SHIPPED | OUTPATIENT
Start: 2021-09-27 | End: 2021-10-25 | Stop reason: SDUPTHER

## 2021-09-27 RX ORDER — LAMOTRIGINE 200 MG/1
200 TABLET ORAL DAILY
Qty: 30 TABLET | Refills: 0 | Status: SHIPPED | OUTPATIENT
Start: 2021-09-27 | End: 2021-10-25 | Stop reason: SDUPTHER

## 2021-09-27 RX ORDER — SERTRALINE HYDROCHLORIDE 100 MG/1
150 TABLET, FILM COATED ORAL DAILY
Qty: 45 TABLET | Refills: 0 | Status: SHIPPED | OUTPATIENT
Start: 2021-09-27 | End: 2021-10-25 | Stop reason: SDUPTHER

## 2021-09-27 RX ORDER — BUPROPION HYDROCHLORIDE 300 MG/1
300 TABLET ORAL EVERY MORNING
Qty: 30 TABLET | Refills: 0 | Status: SHIPPED | OUTPATIENT
Start: 2021-09-27 | End: 2021-10-25 | Stop reason: SDUPTHER

## 2021-09-27 RX ORDER — BUPROPION HYDROCHLORIDE 150 MG/1
150 TABLET ORAL EVERY MORNING
Qty: 30 TABLET | Refills: 0 | Status: SHIPPED | OUTPATIENT
Start: 2021-09-27 | End: 2021-10-25 | Stop reason: SDUPTHER

## 2021-09-27 NOTE — PROGRESS NOTES
"This provider is located at the [facility] (through [facility]), [address], [place], [address] using a secure [employer] Video Visit through [employer]. Patient is being seen remotely via telehealth at their home address in [place], and stated they are in a secure environment for this session. The patient's condition being diagnosed/treated is appropriate for telemedicine. The provider identified herself as well as her credentials.   The patient, and/or patients guardian, consent to be seen remotely, and when consent is given they understand that the consent allows for patient identifiable information to be sent to a third party as needed.   They may refuse to be seen remotely at any time. The electronic data is encrypted and password protected, and the patient and/or guardian has been advised of the potential risks to privacy not withstanding such measures.    Subjective   Antoinette Pack is a 36 y.o. female who presents today for follow up    Chief Complaint:  Bipolar disorder, depression, anxiety    History of Present Illness:   Here for a follow-up visit.  She is taking her medication as prescribed, denies side effects. Things are going \"alright\".  She still sees \"an image\", looks like a \"person\", sees it 2 to 3 times a week.  It has been happening for years. She understands it isn't really there.  It doesn't bother her.  Depression rated 8/10, anxiety rated 6/10, with 10 being the worst.  Sleeping is the same, she wants to \"sleep all of the time\".  She has nightmares, she states sometimes she has bad ones, that occur twice a week.  Appetite is good.  Denies SI/HI/AVH.  Denies thoughts of self-harm.  Chronic health issues, no acute physical or medical issues today       The following portions of the patient's history were reviewed and updated as appropriate: allergies, current medications, past family history, past medical history, past social history, past surgical " history and problem list.      Past Medical History:  Past Medical History:   Diagnosis Date   • Anxiety    • Depression    • Diabetes mellitus (CMS/HCC)    • Diverticulitis    • GERD (gastroesophageal reflux disease)    • Hypertension    • Kidney stone    • PCOS (polycystic ovarian syndrome)    • Sleep apnea        Social History:  Social History     Socioeconomic History   • Marital status:      Spouse name: Not on file   • Number of children: Not on file   • Years of education: Not on file   • Highest education level: Not on file   Tobacco Use   • Smoking status: Never Smoker   • Smokeless tobacco: Never Used   Vaping Use   • Vaping Use: Never used   Substance and Sexual Activity   • Alcohol use: No   • Drug use: No   • Sexual activity: Defer       Family History:  Family History   Problem Relation Age of Onset   • Heart disease Mother    • COPD Mother    • Heart disease Father    • COPD Father        Past Surgical History:  Past Surgical History:   Procedure Laterality Date   • CARDIAC CATHETERIZATION     • CARPAL TUNNEL RELEASE     • COLONOSCOPY     • ENDOSCOPY     • FRACTURE SURGERY     • HARDWARE REMOVAL      WRIST   • TENDON REPAIR      HAND       Problem List:  Patient Active Problem List   Diagnosis   • Body mass index (BMI) 40.0-44.9, adult        Allergy:   Allergies   Allergen Reactions   • Dilantin [Phenytoin] Mental Status Change   • Keppra [Levetiracetam] Mental Status Change   • Meperidine Rash   • Topamax [Topiramate] Mental Status Change        Current Medications:   Current Outpatient Medications   Medication Sig Dispense Refill   • atorvastatin (LIPITOR) 40 MG tablet      • buPROPion XL (Wellbutrin XL) 150 MG 24 hr tablet Take 1 tablet by mouth Every Morning. 30 tablet 0   • buPROPion XL (WELLBUTRIN XL) 300 MG 24 hr tablet Take 1 tablet by mouth Every Morning. 30 tablet 0   • clobetasol (TEMOVATE) 0.05 % ointment      • insulin glargine (LANTUS) 100 UNIT/ML injection Inject 20 Units under  the skin into the appropriate area as directed Daily.     • Insulin Lispro (HUMALOG KWIKPEN SC) Inject  under the skin into the appropriate area as directed.     • lamoTRIgine (LaMICtal) 200 MG tablet Take 1 tablet by mouth Daily. 30 tablet 0   • lisinopril (PRINIVIL,ZESTRIL) 10 MG tablet Take 1 tablet by mouth Daily. 30 tablet 0   • metFORMIN (GLUCOPHAGE) 1000 MG tablet      • omeprazole (PRILOSEC) 40 MG capsule      • OXcarbazepine (Trileptal) 300 MG tablet Take 1 tablet by mouth 2 (Two) Times a Day. 60 tablet 0   • propranolol (INDERAL) 20 MG tablet Take 1 tablet by mouth 2 (Two) Times a Day. 60 tablet 0   • sertraline (ZOLOFT) 100 MG tablet Take 1.5 tablets by mouth Daily. 45 tablet 0   • TRULICITY 1.5 MG/0.5ML solution pen-injector        No current facility-administered medications for this visit.       Review of Symptoms:    Review of Systems   Constitutional: Negative.    HENT: Negative.    Eyes: Negative.    Respiratory: Negative.    Cardiovascular: Negative.    Neurological: Negative.    Psychiatric/Behavioral: Positive for depressed mood. The patient is nervous/anxious.          Physical Exam:   There were no vitals taken for this visit.  There is no height or weight on file to calculate BMI.    Appearance:  female appears stated age, no acute distress noted.    Gait, Station, Strength: Steady, posture erect, WNL      Mental Status Exam:   Hygiene:   good  Cooperation:  Cooperative  Eye Contact:  Good  Psychomotor Behavior:  Appropriate  Affect:  Appropriate  Mood: depressed  Hopelessness: Denies  Speech:  Normal  Thought Process:  Goal directed and Linear  Thought Content:  Normal  Suicidal:  None  Homicidal:  None  Hallucinations:  Visual  Delusion:  None  Memory:  Intact  Orientation:  Person, Place, Time and Situation  Reliability:  good  Insight:  Fair  Judgement:  Fair  Impulse Control:  Fair  Physical/Medical Issues:  Yes DIABETES, HTN, GERD     PHQ-Score Total:  PHQ-9 Total Score:         Lab Results:   No visits with results within 1 Month(s) from this visit.   Latest known visit with results is:   Transcribe Orders on 08/27/2021   Component Date Value Ref Range Status   • COVID19 09/01/2021 Not Detected  Not Detected - Ref. Range Final       Assessment/Plan   Problems Addressed this Visit     None      Visit Diagnoses     Bipolar disorder, in partial remission, most recent episode depressed (CMS/HCC)    -  Primary    Relevant Medications    buPROPion XL (WELLBUTRIN XL) 300 MG 24 hr tablet    lamoTRIgine (LaMICtal) 200 MG tablet    OXcarbazepine (Trileptal) 300 MG tablet    sertraline (ZOLOFT) 100 MG tablet    buPROPion XL (Wellbutrin XL) 150 MG 24 hr tablet    Generalized anxiety disorder        Relevant Medications    propranolol (INDERAL) 20 MG tablet    buPROPion XL (WELLBUTRIN XL) 300 MG 24 hr tablet    sertraline (ZOLOFT) 100 MG tablet    buPROPion XL (Wellbutrin XL) 150 MG 24 hr tablet    Panic disorder with agoraphobia        Relevant Medications    propranolol (INDERAL) 20 MG tablet    buPROPion XL (WELLBUTRIN XL) 300 MG 24 hr tablet    sertraline (ZOLOFT) 100 MG tablet    buPROPion XL (Wellbutrin XL) 150 MG 24 hr tablet    Medication management        Bipolar disorder, in partial remission, most recent episode depressed (HCC)        Relevant Medications    buPROPion XL (WELLBUTRIN XL) 300 MG 24 hr tablet    lamoTRIgine (LaMICtal) 200 MG tablet    OXcarbazepine (Trileptal) 300 MG tablet    sertraline (ZOLOFT) 100 MG tablet    buPROPion XL (Wellbutrin XL) 150 MG 24 hr tablet      Diagnoses       Codes Comments    Bipolar disorder, in partial remission, most recent episode depressed (CMS/HCC)    -  Primary ICD-10-CM: F31.75  ICD-9-CM: 296.55     Generalized anxiety disorder     ICD-10-CM: F41.1  ICD-9-CM: 300.02     Panic disorder with agoraphobia     ICD-10-CM: F40.01  ICD-9-CM: 300.21     Medication management     ICD-10-CM: Z79.899  ICD-9-CM: V58.69     Bipolar disorder, in partial  remission, most recent episode depressed (HCC)     ICD-10-CM: F31.75  ICD-9-CM: 296.55         Social History     Tobacco Use   Smoking Status Never Smoker   Smokeless Tobacco Never Used     JORGE reviewed and appropriate. Patient counseled on use of controlled substances.     -The benefits of a healthy diet and exercise were discussed with patient, especially the positive effects they have on mental health. Patient encouraged to consider lifestyle modification regarding  diet and exercise patterns to maximize results of mental health treatment.  -Reviewed previous available documentation  -Reviewed most recent available labs   -Session began at 10:40 am and ended at 11:00 am    Visit Diagnoses:    ICD-10-CM ICD-9-CM   1. Bipolar disorder, in partial remission, most recent episode depressed (CMS/HCC)  F31.75 296.55   2. Generalized anxiety disorder  F41.1 300.02   3. Panic disorder with agoraphobia  F40.01 300.21   4. Medication management  Z79.899 V58.69   5. Bipolar disorder, in partial remission, most recent episode depressed (HCC)  F31.75 296.55       TREATMENT PLAN/GOALS: Continue supportive psychotherapy efforts and medications as indicated. Treatment and medication options discussed during today's visit. Patient acknowledged and verbally consented to continue with current treatment plan and was educated on the importance of compliance with treatment and follow-up appointments.    MEDICATION ISSUES:    Discussed medication options and treatment plan of prescribed medication as well as the risks, benefits, and side effects including potential falls, possible impaired driving and metabolic adversities among others. Patient is agreeable to call the office with any worsening of symptoms or onset of side effects. Patient is agreeable to call 911 or go to the nearest ER should he/she begin having SI/HI.     MEDS ORDERED DURING VISIT:  New Medications Ordered This Visit   Medications   • propranolol (INDERAL) 20 MG  tablet     Sig: Take 1 tablet by mouth 2 (Two) Times a Day.     Dispense:  60 tablet     Refill:  0   • buPROPion XL (WELLBUTRIN XL) 300 MG 24 hr tablet     Sig: Take 1 tablet by mouth Every Morning.     Dispense:  30 tablet     Refill:  0   • lamoTRIgine (LaMICtal) 200 MG tablet     Sig: Take 1 tablet by mouth Daily.     Dispense:  30 tablet     Refill:  0   • OXcarbazepine (Trileptal) 300 MG tablet     Sig: Take 1 tablet by mouth 2 (Two) Times a Day.     Dispense:  60 tablet     Refill:  0   • sertraline (ZOLOFT) 100 MG tablet     Sig: Take 1.5 tablets by mouth Daily.     Dispense:  45 tablet     Refill:  0   • buPROPion XL (Wellbutrin XL) 150 MG 24 hr tablet     Sig: Take 1 tablet by mouth Every Morning.     Dispense:  30 tablet     Refill:  0       Return in about 1 month (around 10/27/2021) for Recheck.  -Continue propanolol 20 mg tablet take 1 tablet 2 times a day for anxiety.  -Continue bupropion  mg 24-hour tablet take 1 tablet by mouth every morning for depression and anxiety  -Add bupropion  mg 24-hour tablet take 1 tablet by mouth every morning for depression and anxiety  -Continue lamotrigine 200 mg tablet take 1 tablet by mouth daily for bipolar disorder.  -Continue oxcarbazepine 300 mg tablet take 1 tablet by mouth 2 times a day for bipolar disorder.  -Continue sertraline 100 mg tablet take 1.5 tablets by mouth daily for anxiety and depression.             This document has been electronically signed by CICI Ruby  September 27, 2021 10:55 EDT    Part of this note may be an electronic transcription/translation of spoken language to printed text using the Dragon Dictation System.

## 2021-10-08 NOTE — PROGRESS NOTES
"Subjective      Antoinette Pack is a 36 y.o. female who presents today for follow up    Chief Complaint:  Bipolar disorder, anxiety    History of Present Illness:   Here today for follow up visit. She is taking her medication as prescribed, denies SE.        States things are about the same. Depression rated 8/10, anxiety rated 7/10, with 10 being the worst.  Sleeping is the same, she continues to get up frequently during the night to go to the bathroom, she still sleeps during the day, but she tries not to.  States she has occasional nightmares. Appetite is good.   Denies SI/HI, states she sees a \"lady\", but she isn't scared, she tries not to focus, it happens about once a day, it doesn't last more than 2 minutes, she understands that she isn't real.   Denies thoughts of self-harm. Chronic health issues, no acute physical or medical issues today.         The following portions of the patient's history were reviewed and updated as appropriate: allergies, current medications, past family history, past medical history, past social history, past surgical history and problem list.      Past Medical History:  Past Medical History:   Diagnosis Date   • Anxiety    • Depression    • Diabetes mellitus (CMS/HCC)    • Diverticulitis    • GERD (gastroesophageal reflux disease)    • Hypertension    • Kidney stone    • PCOS (polycystic ovarian syndrome)    • Sleep apnea        Social History:  Social History     Socioeconomic History   • Marital status:      Spouse name: Not on file   • Number of children: Not on file   • Years of education: Not on file   • Highest education level: Not on file   Tobacco Use   • Smoking status: Never Smoker   • Smokeless tobacco: Never Used   Vaping Use   • Vaping Use: Never used   Substance and Sexual Activity   • Alcohol use: No   • Drug use: No   • Sexual activity: Defer       Family History:  Family History   Problem Relation Age of Onset   • Heart disease Mother    • COPD Mother  "   • Heart disease Father    • COPD Father        Past Surgical History:  Past Surgical History:   Procedure Laterality Date   • CARDIAC CATHETERIZATION     • CARPAL TUNNEL RELEASE     • COLONOSCOPY     • ENDOSCOPY     • FRACTURE SURGERY     • HARDWARE REMOVAL      WRIST   • TENDON REPAIR      HAND       Problem List:  Patient Active Problem List   Diagnosis   • Body mass index (BMI) 40.0-44.9, adult       Allergy:   Allergies   Allergen Reactions   • Dilantin [Phenytoin] Mental Status Change   • Keppra [Levetiracetam] Mental Status Change   • Meperidine Rash   • Topamax [Topiramate] Mental Status Change        Current Medications:   Current Outpatient Medications   Medication Sig Dispense Refill   • atorvastatin (LIPITOR) 40 MG tablet      • buPROPion XL (Wellbutrin XL) 150 MG 24 hr tablet Take 1 tablet by mouth Every Morning. 30 tablet 0   • buPROPion XL (WELLBUTRIN XL) 300 MG 24 hr tablet Take 1 tablet by mouth Every Morning. 30 tablet 0   • clobetasol (TEMOVATE) 0.05 % ointment      • insulin glargine (LANTUS) 100 UNIT/ML injection Inject 20 Units under the skin into the appropriate area as directed Daily.     • Insulin Lispro (HUMALOG KWIKPEN SC) Inject  under the skin into the appropriate area as directed.     • lamoTRIgine (LaMICtal) 200 MG tablet Take 1 tablet by mouth Daily. 30 tablet 0   • lisinopril (PRINIVIL,ZESTRIL) 10 MG tablet Take 1 tablet by mouth Daily. 30 tablet 0   • metFORMIN (GLUCOPHAGE) 1000 MG tablet      • omeprazole (PRILOSEC) 40 MG capsule      • OXcarbazepine (Trileptal) 300 MG tablet Take 1 tablet by mouth 2 (Two) Times a Day. 60 tablet 0   • propranolol (INDERAL) 20 MG tablet Take 1 tablet by mouth 2 (Two) Times a Day. 60 tablet 0   • sertraline (ZOLOFT) 100 MG tablet Take 1.5 tablets by mouth Daily. 45 tablet 0   • TRULICITY 1.5 MG/0.5ML solution pen-injector        No current facility-administered medications for this visit.       Review of Symptoms:    Review of Systems    Constitutional: Negative.    HENT: Negative.    Eyes: Negative.    Respiratory: Negative.    Cardiovascular: Negative.    Musculoskeletal: Negative.    Skin: Positive for skin lesions (small cuts on bottom of feet).   Neurological: Negative.    Psychiatric/Behavioral: Positive for depressed mood and stress. The patient is nervous/anxious.        Objective   Physical Exam:   There were no vitals taken for this visit.  There is no height or weight on file to calculate BMI.    Appearance:  female appears stated age, no acute distress noted.    Gait, Station, Strength: Steady, posture erect, WNL      Mental Status Exam:   Hygiene:   good  Cooperation:  Cooperative  Eye Contact:  Good  Psychomotor Behavior:  Appropriate  Affect:  Appropriate  Mood: sad  Hopelessness: Denies  Speech:  Normal  Thought Process:  Goal directed and Linear  Thought Content:  Normal  Suicidal:  None  Homicidal:  None  Hallucinations:  Visual   Delusion:  None  Memory:  Intact  Orientation:  Person, Place, Time and Situation  Reliability:  good  Insight:  Fair  Judgement:  Fair  Impulse Control:  Good  Physical/Medical Issues:  Yes HTN, Diabetes, GERD     PHQ-Score Total:  PHQ-9 Total Score:      Lab Results:   No visits with results within 1 Month(s) from this visit.   Latest known visit with results is:   Transcribe Orders on 08/27/2021   Component Date Value Ref Range Status   • COVID19 09/01/2021 Not Detected  Not Detected - Ref. Range Final       Assessment/Plan   Problems Addressed this Visit     None      Visit Diagnoses     Bipolar disorder, in partial remission, most recent episode depressed (HCC)    -  Primary    Generalized anxiety disorder        Panic disorder with agoraphobia        Medication management          Diagnoses       Codes Comments    Bipolar disorder, in partial remission, most recent episode depressed (HCC)    -  Primary ICD-10-CM: F31.75  ICD-9-CM: 296.55     Generalized anxiety disorder     ICD-10-CM:  F41.1  ICD-9-CM: 300.02     Panic disorder with agoraphobia     ICD-10-CM: F40.01  ICD-9-CM: 300.21     Medication management     ICD-10-CM: Z79.899  ICD-9-CM: V58.69         Social History     Tobacco Use   Smoking Status Never Smoker   Smokeless Tobacco Never Used     JORGE reviewed and appropriate. Patient counseled on use of controlled substances.         -The benefits of a healthy diet and exercise were discussed with patient, especially the positive effects they have on mental health. Patient encouraged to consider lifestyle modification regarding  diet and exercise patterns to maximize results of mental health treatment.  -Reviewed previous available documentation  -Reviewed most recent available labs       Visit Diagnoses:    ICD-10-CM ICD-9-CM   1. Bipolar disorder, in partial remission, most recent episode depressed (HCC)  F31.75 296.55   2. Generalized anxiety disorder  F41.1 300.02   3. Panic disorder with agoraphobia  F40.01 300.21   4. Medication management  Z79.899 V58.69         TREATMENT PLAN/GOALS: Continue supportive psychotherapy efforts and medications as indicated. Treatment and medication options discussed during today's visit. Patient acknowledged and verbally consented to continue with current treatment plan and was educated on the importance of compliance with treatment and follow-up appointments.    MEDICATION ISSUES:  Discussed medication options and treatment plan of prescribed medication as well as the risks, benefits, and side effects including potential falls, possible impaired driving and metabolic adversities among others. Patient is agreeable to call the office with any worsening of symptoms or onset of side effects. Patient is agreeable to call 911 or go to the nearest ER should he/she begin having SI/HI.     MEDS ORDERED DURING VISIT:  No orders of the defined types were placed in this encounter.    -Continue bupropion  mg tablet take 1 tablet by mouth every morning for  mood disorder and anxiety.  -Increase Lamictal 200 mg tablet take 1 tablet by mouth daily for bipolar disorder and anxiety  -Continue oxcarbazepine 300 mg tablet take 1 tablet by mouth 2 times a day for bipolar disorder and anxiety  -Continue sertraline 100 mg tablet take 1-1/2 tablets by mouth daily for anxiety, depression and panic disorder  -Continue propanolol 20 mg tablet take 1 tablet by mouth 2 times a day for anxiety and panic disorder  -Recommend  psychotherapy.      No follow-ups on file.       Prognosis: Guarded dependent on medication/follow up and treatment plan compliance.  Functionality: pt showing improvements in important areas of daily functioning.     Short-term goals: Patient will adhere to medication regimen and note continued improvement in symptoms over the next 3 months.   Long-term goals: Patient will be adherent to medication management and psychotherapy with continued improvement in symptoms over the next 6 months    I spent 30 minutes caring for Antoinette on this date of service. This time includes time spent by me in the following activities: preparing for the visit, obtaining and/or reviewing a separately obtained history, performing a medically appropriate examination and/or evaluation, counseling and educating the patient/family/caregiver, ordering medications, tests, or procedures and documenting information in the medical record            This document has been electronically signed by CICI Ruby   October 8, 2021 07:44 EDT

## 2021-10-25 ENCOUNTER — TELEMEDICINE (OUTPATIENT)
Dept: PSYCHIATRY | Facility: CLINIC | Age: 36
End: 2021-10-25

## 2021-10-25 DIAGNOSIS — F40.01 PANIC DISORDER WITH AGORAPHOBIA: ICD-10-CM

## 2021-10-25 DIAGNOSIS — F41.1 GENERALIZED ANXIETY DISORDER: ICD-10-CM

## 2021-10-25 DIAGNOSIS — F31.75 BIPOLAR DISORDER, IN PARTIAL REMISSION, MOST RECENT EPISODE DEPRESSED (HCC): Primary | ICD-10-CM

## 2021-10-25 DIAGNOSIS — Z79.899 MEDICATION MANAGEMENT: ICD-10-CM

## 2021-10-25 PROCEDURE — 99214 OFFICE O/P EST MOD 30 MIN: CPT | Performed by: NURSE PRACTITIONER

## 2021-10-25 RX ORDER — BUPROPION HYDROCHLORIDE 150 MG/1
150 TABLET ORAL EVERY MORNING
Qty: 30 TABLET | Refills: 0 | Status: SHIPPED | OUTPATIENT
Start: 2021-10-25 | End: 2021-12-02 | Stop reason: SDUPTHER

## 2021-10-25 RX ORDER — PROPRANOLOL HYDROCHLORIDE 20 MG/1
20 TABLET ORAL 2 TIMES DAILY
Qty: 60 TABLET | Refills: 0 | Status: SHIPPED | OUTPATIENT
Start: 2021-10-25 | End: 2021-12-02 | Stop reason: SDUPTHER

## 2021-10-25 RX ORDER — OXCARBAZEPINE 300 MG/1
300 TABLET, FILM COATED ORAL 2 TIMES DAILY
Qty: 60 TABLET | Refills: 0 | Status: SHIPPED | OUTPATIENT
Start: 2021-10-25 | End: 2021-12-02 | Stop reason: SDUPTHER

## 2021-10-25 RX ORDER — SERTRALINE HYDROCHLORIDE 100 MG/1
150 TABLET, FILM COATED ORAL DAILY
Qty: 45 TABLET | Refills: 0 | Status: SHIPPED | OUTPATIENT
Start: 2021-10-25 | End: 2021-12-02 | Stop reason: SDUPTHER

## 2021-10-25 RX ORDER — BUPROPION HYDROCHLORIDE 300 MG/1
300 TABLET ORAL EVERY MORNING
Qty: 30 TABLET | Refills: 0 | Status: SHIPPED | OUTPATIENT
Start: 2021-10-25 | End: 2021-12-02 | Stop reason: SDUPTHER

## 2021-10-25 RX ORDER — LAMOTRIGINE 200 MG/1
200 TABLET ORAL DAILY
Qty: 30 TABLET | Refills: 0 | Status: SHIPPED | OUTPATIENT
Start: 2021-10-25 | End: 2021-12-02 | Stop reason: SDUPTHER

## 2021-10-25 NOTE — PROGRESS NOTES
"This provider is located at the [facility] (through [facility]), [address] [place], [facility], [address] using a secure [employer] Video Visit through [employer]. Patient is being seen remotely via telehealth at their home address in [place], and stated they are in a secure environment for this session. The patient's condition being diagnosed/treated is appropriate for telemedicine. The provider identified herself as well as her credentials.   The patient, and/or patients guardian, consent to be seen remotely, and when consent is given they understand that the consent allows for patient identifiable information to be sent to a third party as needed.   They may refuse to be seen remotely at any time. The electronic data is encrypted and password protected, and the patient and/or guardian has been advised of the potential risks to privacy not withstanding such measures.    Unable to complete visit using a video connection to the patient. A phone visit was used to complete this visits. Total time of discussion was 15 minutes.      Subjective   Antoinette Pack is a 36 y.o. female who presents today for follow up    Chief Complaint:  Bipolar disorder, depression, anxiety    History of Present Illness:   Here today for follow up visit. She is taking her medication as prescribed, denies SE.  She just started the new Wellbutrin dosage 2 days ago. She had difficulty getting it filled at the pharmacy. She states her memory is poor. Things are going alright. She states she feels ok, but sometimes she sleeps a lot. She states she sees \"that person that isn't there\" at times.  Depression rated 8/10, anxiety rated 7-8/10, with 10 being the worst.  Sleeping is up and down, she gets up frequently to go to the bathroom during the night. She has nightmares, has 3 to 4 times a week, not too bothersome. Appetite is good. Denies thoughts of self-harm. Chronic health issues, no acute physical or " medical issues today.           The following portions of the patient's history were reviewed and updated as appropriate: allergies, current medications, past family history, past medical history, past social history, past surgical history and problem list.      Past Medical History:  Past Medical History:   Diagnosis Date   • Anxiety    • Depression    • Diabetes mellitus (HCC)    • Diverticulitis    • GERD (gastroesophageal reflux disease)    • Hypertension    • Kidney stone    • PCOS (polycystic ovarian syndrome)    • Sleep apnea        Social History:  Social History     Socioeconomic History   • Marital status:    Tobacco Use   • Smoking status: Never Smoker   • Smokeless tobacco: Never Used   Vaping Use   • Vaping Use: Never used   Substance and Sexual Activity   • Alcohol use: No   • Drug use: No   • Sexual activity: Defer       Family History:  Family History   Problem Relation Age of Onset   • Heart disease Mother    • COPD Mother    • Heart disease Father    • COPD Father        Past Surgical History:  Past Surgical History:   Procedure Laterality Date   • CARDIAC CATHETERIZATION     • CARPAL TUNNEL RELEASE     • COLONOSCOPY     • ENDOSCOPY     • FRACTURE SURGERY     • HARDWARE REMOVAL      WRIST   • TENDON REPAIR      HAND       Problem List:  Patient Active Problem List   Diagnosis   • Body mass index (BMI) 40.0-44.9, adult        Allergy:   Allergies   Allergen Reactions   • Dilantin [Phenytoin] Mental Status Change   • Keppra [Levetiracetam] Mental Status Change   • Meperidine Rash   • Topamax [Topiramate] Mental Status Change        Current Medications:   Current Outpatient Medications   Medication Sig Dispense Refill   • atorvastatin (LIPITOR) 40 MG tablet      • buPROPion XL (Wellbutrin XL) 150 MG 24 hr tablet Take 1 tablet by mouth Every Morning. Take with the Bupropion  mg tablet for total daily dose of 450 mg 30 tablet 0   • buPROPion XL (WELLBUTRIN XL) 300 MG 24 hr tablet Take 1  tablet by mouth Every Morning. Take with Bupropion  mg tablet for total dosage of 450 mg per day. 30 tablet 0   • clobetasol (TEMOVATE) 0.05 % ointment      • insulin glargine (LANTUS) 100 UNIT/ML injection Inject 20 Units under the skin into the appropriate area as directed Daily.     • Insulin Lispro (HUMALOG KWIKPEN SC) Inject  under the skin into the appropriate area as directed.     • lamoTRIgine (LaMICtal) 200 MG tablet Take 1 tablet by mouth Daily. 30 tablet 0   • lisinopril (PRINIVIL,ZESTRIL) 10 MG tablet Take 1 tablet by mouth Daily. 30 tablet 0   • metFORMIN (GLUCOPHAGE) 1000 MG tablet      • omeprazole (PRILOSEC) 40 MG capsule      • OXcarbazepine (Trileptal) 300 MG tablet Take 1 tablet by mouth 2 (Two) Times a Day. 60 tablet 0   • propranolol (INDERAL) 20 MG tablet Take 1 tablet by mouth 2 (Two) Times a Day. 60 tablet 0   • sertraline (ZOLOFT) 100 MG tablet Take 1.5 tablets by mouth Daily. 45 tablet 0   • TRULICITY 1.5 MG/0.5ML solution pen-injector        No current facility-administered medications for this visit.       Review of Symptoms:    Review of Systems   Constitutional: Negative.    HENT: Negative.    Eyes: Negative.    Respiratory: Negative.    Cardiovascular: Negative.    Neurological: Negative.    Psychiatric/Behavioral: Positive for depressed mood. The patient is nervous/anxious.          Physical Exam:   There were no vitals taken for this visit.  There is no height or weight on file to calculate BMI.    Appearance:  female appears stated age, no acute distress noted.    Gait, Station, Strength: Steady, posture erect, WNL      Mental Status Exam:   Hygiene:   good  Cooperation:  Cooperative  Eye Contact:  Good  Psychomotor Behavior:  Appropriate  Affect:  Appropriate  Mood: depressed  Hopelessness: Denies  Speech:  Normal  Thought Process:  Goal directed and Linear  Thought Content:  Normal  Suicidal:  None  Homicidal:  None  Hallucinations:  Visual  Delusion:  None  Memory:   Deficits  Orientation:  Person, Place, Time and Situation  Reliability:  good  Insight:  Fair  Judgement:  Fair  Impulse Control:  Fair  Physical/Medical Issues:  Yes DIABETES, HTN, GERD     PHQ-Score Total:  PHQ-9 Total Score:        Lab Results:   No visits with results within 1 Month(s) from this visit.   Latest known visit with results is:   Transcribe Orders on 08/27/2021   Component Date Value Ref Range Status   • COVID19 09/01/2021 Not Detected  Not Detected - Ref. Range Final       Assessment/Plan   Problems Addressed this Visit     None      Visit Diagnoses     Bipolar disorder, in partial remission, most recent episode depressed (HCC)    -  Primary    Relevant Medications    buPROPion XL (Wellbutrin XL) 150 MG 24 hr tablet    buPROPion XL (WELLBUTRIN XL) 300 MG 24 hr tablet    lamoTRIgine (LaMICtal) 200 MG tablet    sertraline (ZOLOFT) 100 MG tablet    propranolol (INDERAL) 20 MG tablet    OXcarbazepine (Trileptal) 300 MG tablet    Generalized anxiety disorder        Relevant Medications    buPROPion XL (Wellbutrin XL) 150 MG 24 hr tablet    buPROPion XL (WELLBUTRIN XL) 300 MG 24 hr tablet    lamoTRIgine (LaMICtal) 200 MG tablet    sertraline (ZOLOFT) 100 MG tablet    propranolol (INDERAL) 20 MG tablet    OXcarbazepine (Trileptal) 300 MG tablet    Panic disorder with agoraphobia        Relevant Medications    buPROPion XL (Wellbutrin XL) 150 MG 24 hr tablet    buPROPion XL (WELLBUTRIN XL) 300 MG 24 hr tablet    lamoTRIgine (LaMICtal) 200 MG tablet    sertraline (ZOLOFT) 100 MG tablet    propranolol (INDERAL) 20 MG tablet    OXcarbazepine (Trileptal) 300 MG tablet    Medication management        Relevant Medications    buPROPion XL (Wellbutrin XL) 150 MG 24 hr tablet    buPROPion XL (WELLBUTRIN XL) 300 MG 24 hr tablet    lamoTRIgine (LaMICtal) 200 MG tablet    sertraline (ZOLOFT) 100 MG tablet    propranolol (INDERAL) 20 MG tablet    OXcarbazepine (Trileptal) 300 MG tablet      Diagnoses       Codes  Comments    Bipolar disorder, in partial remission, most recent episode depressed (HCC)    -  Primary ICD-10-CM: F31.75  ICD-9-CM: 296.55     Generalized anxiety disorder     ICD-10-CM: F41.1  ICD-9-CM: 300.02     Panic disorder with agoraphobia     ICD-10-CM: F40.01  ICD-9-CM: 300.21     Medication management     ICD-10-CM: Z79.899  ICD-9-CM: V58.69         Social History     Tobacco Use   Smoking Status Never Smoker   Smokeless Tobacco Never Used     JORGE reviewed and appropriate. Patient counseled on use of controlled substances.     -The benefits of a healthy diet and exercise were discussed with patient, especially the positive effects they have on mental health. Patient encouraged to consider lifestyle modification regarding  diet and exercise patterns to maximize results of mental health treatment.  -Reviewed previous available documentation  -Reviewed most recent available labs     -Session began at 8:30 am and ended at 8:45 am    Visit Diagnoses:    ICD-10-CM ICD-9-CM   1. Bipolar disorder, in partial remission, most recent episode depressed (HCC)  F31.75 296.55   2. Generalized anxiety disorder  F41.1 300.02   3. Panic disorder with agoraphobia  F40.01 300.21   4. Medication management  Z79.899 V58.69       TREATMENT PLAN/GOALS: Continue supportive psychotherapy efforts and medications as indicated. Treatment and medication options discussed during today's visit. Patient acknowledged and verbally consented to continue with current treatment plan and was educated on the importance of compliance with treatment and follow-up appointments.    MEDICATION ISSUES:    Discussed medication options and treatment plan of prescribed medication as well as the risks, benefits, and side effects including potential falls, possible impaired driving and metabolic adversities among others. Patient is agreeable to call the office with any worsening of symptoms or onset of side effects. Patient is agreeable to call 911 or go  to the nearest ER should he/she begin having SI/HI.     MEDS ORDERED DURING VISIT:  New Medications Ordered This Visit   Medications   • buPROPion XL (Wellbutrin XL) 150 MG 24 hr tablet     Sig: Take 1 tablet by mouth Every Morning. Take with the Bupropion  mg tablet for total daily dose of 450 mg     Dispense:  30 tablet     Refill:  0   • buPROPion XL (WELLBUTRIN XL) 300 MG 24 hr tablet     Sig: Take 1 tablet by mouth Every Morning. Take with Bupropion  mg tablet for total dosage of 450 mg per day.     Dispense:  30 tablet     Refill:  0   • lamoTRIgine (LaMICtal) 200 MG tablet     Sig: Take 1 tablet by mouth Daily.     Dispense:  30 tablet     Refill:  0   • sertraline (ZOLOFT) 100 MG tablet     Sig: Take 1.5 tablets by mouth Daily.     Dispense:  45 tablet     Refill:  0   • propranolol (INDERAL) 20 MG tablet     Sig: Take 1 tablet by mouth 2 (Two) Times a Day.     Dispense:  60 tablet     Refill:  0   • OXcarbazepine (Trileptal) 300 MG tablet     Sig: Take 1 tablet by mouth 2 (Two) Times a Day.     Dispense:  60 tablet     Refill:  0       Return in about 1 month (around 11/25/2021) for Recheck.  -Continue propanolol 20 mg tablet take 1 tablet 2 times a day for anxiety.  -Continue bupropion  mg 24-hour tablet take 1 tablet by mouth every morning for depression and anxiety  -Continue bupropion  mg 24-hour tablet take 1 tablet by mouth every morning for depression and anxiety  -Continue lamotrigine 200 mg tablet take 1 tablet by mouth daily for bipolar disorder.  -Continue oxcarbazepine 300 mg tablet take 1 tablet by mouth 2 times a day for bipolar disorder.  -Continue sertraline 100 mg tablet take 1.5 tablets by mouth daily for anxiety and depression.    I spent 30 minutes caring for Antoinette on this date of service. This time includes time spent by me in the following activities: preparing for the visit, obtaining and/or reviewing a separately obtained history, performing a medically  appropriate examination and/or evaluation, counseling and educating the patient/family/caregiver, ordering medications, tests, or procedures and documenting information in the medical record             This document has been electronically signed by Martha Conn, APRN  October 25, 2021 08:48 EDT    Part of this note may be an electronic transcription/translation of spoken language to printed text using the Dragon Dictation System.

## 2021-12-01 NOTE — PROGRESS NOTES
"Subjective      Antoinette Pack is a 36 y.o. female who presents today for follow up    Chief Complaint:  Bipolar disorder, anxiety    History of Present Illness:   Here today for follow up visit. She is taking her medication as prescribed, denies SE. Depression rated 8/10, anxiety rated 7/10, with 10 being the worst, moods rated 7-8/10, with 10 being the worst.   She is stating she has increased memory problems, forgetting appointments, etc. She states she discussed with PCP and she told her it was being caused by medications. She states her diabetes isn't Sleeping is up and down, she gets up frequently during the night to go to the bathroom, she has bad dreams, they are not particularly bothersome. Appetite is good. Denies SI/HI/, she continues to \"see a girl\", she states this has been occurring for several years. It has not changed significantly.  Denies thoughts of self-harm. Denies thoughts of self-harm. Chronic health issues, no acute physical or medical issues today.   She states she is going to PCP office today to have labs completed. She will bring copies for my review at next visit.        The following portions of the patient's history were reviewed and updated as appropriate: allergies, current medications, past family history, past medical history, past social history, past surgical history and problem list.      Past Medical History:  Past Medical History:   Diagnosis Date   • Anxiety    • Depression    • Diabetes mellitus (HCC)    • Diverticulitis    • GERD (gastroesophageal reflux disease)    • Hypertension    • Kidney stone    • PCOS (polycystic ovarian syndrome)    • Sleep apnea        Social History:  Social History     Socioeconomic History   • Marital status:    Tobacco Use   • Smoking status: Never Smoker   • Smokeless tobacco: Never Used   Vaping Use   • Vaping Use: Never used   Substance and Sexual Activity   • Alcohol use: No   • Drug use: No   • Sexual activity: Defer "       Family History:  Family History   Problem Relation Age of Onset   • Heart disease Mother    • COPD Mother    • Heart disease Father    • COPD Father        Past Surgical History:  Past Surgical History:   Procedure Laterality Date   • CARDIAC CATHETERIZATION     • CARPAL TUNNEL RELEASE     • COLONOSCOPY     • ENDOSCOPY     • FRACTURE SURGERY     • HARDWARE REMOVAL      WRIST   • TENDON REPAIR      HAND       Problem List:  Patient Active Problem List   Diagnosis   • Body mass index (BMI) 40.0-44.9, adult       Allergy:   Allergies   Allergen Reactions   • Dilantin [Phenytoin] Mental Status Change   • Keppra [Levetiracetam] Mental Status Change   • Meperidine Rash   • Topamax [Topiramate] Mental Status Change        Current Medications:   Current Outpatient Medications   Medication Sig Dispense Refill   • buPROPion XL (Wellbutrin XL) 150 MG 24 hr tablet Take 1 tablet by mouth Every Morning. Take with the Bupropion  mg tablet for total daily dose of 450 mg 30 tablet 0   • buPROPion XL (WELLBUTRIN XL) 300 MG 24 hr tablet Take 1 tablet by mouth Every Morning. Take with Bupropion  mg tablet for total dosage of 450 mg per day. 30 tablet 0   • clobetasol (TEMOVATE) 0.05 % ointment      • fluconazole (DIFLUCAN) 150 MG tablet      • fluticasone (FLONASE) 50 MCG/ACT nasal spray      • ibuprofen (ADVIL,MOTRIN) 800 MG tablet      • Januvia 100 MG tablet      • lamoTRIgine (LaMICtal) 200 MG tablet Take 1 tablet by mouth Daily. 30 tablet 0   • Levemir FlexTouch 100 UNIT/ML injection      • lisinopril (PRINIVIL,ZESTRIL) 40 MG tablet      • metFORMIN (GLUCOPHAGE) 1000 MG tablet      • neomycin-polymyxin-dexamethasone (MAXITROL) 3.5-14124-7.1 ophthalmic suspension      • NovoLOG FlexPen 100 UNIT/ML solution pen-injector sc pen      • omeprazole (PRILOSEC) 40 MG capsule      • OXcarbazepine (Trileptal) 150 MG tablet Take 1 tablet by mouth 2 (Two) Times a Day. 60 tablet 0   • propranolol (INDERAL) 20 MG tablet Take 1  "tablet by mouth 2 (Two) Times a Day. 60 tablet 0   • rosuvastatin (CRESTOR) 40 MG tablet      • sertraline (ZOLOFT) 100 MG tablet Take 1.5 tablets by mouth Daily. 45 tablet 0   • TRULICITY 1.5 MG/0.5ML solution pen-injector        No current facility-administered medications for this visit.       Review of Symptoms:    Review of Systems   Constitutional: Negative.    HENT: Negative.    Eyes: Negative.    Respiratory: Negative.    Cardiovascular: Negative.    Skin: Positive for skin lesions (small cuts on bottom of feet).   Neurological: Negative.    Psychiatric/Behavioral: Positive for depressed mood and stress. The patient is nervous/anxious.        Objective   Physical Exam:   Blood pressure 130/82, pulse 87, height 149.9 cm (59.02\"), weight 91.2 kg (201 lb).  Body mass index is 40.58 kg/m².    Appearance:  female appears stated age, no acute distress noted.    Gait, Station, Strength: Steady, posture erect, WNL      Mental Status Exam:   Hygiene:   good  Cooperation:  Cooperative  Eye Contact:  Good  Psychomotor Behavior:  Appropriate  Affect:  Appropriate  Mood: sad  Hopelessness: Denies  Speech:  Normal  Thought Process:  Goal directed and Linear  Thought Content:  Normal  Suicidal:  None  Homicidal:  None  Hallucinations:  Visual   Delusion:  None  Memory:  Deficits  Orientation:  Person, Place, Time and Situation  Reliability:  good  Insight:  Fair  Judgement:  Fair  Impulse Control:  Good  Physical/Medical Issues:  Yes HTN, Diabetes, GERD     PHQ-Score Total:  PHQ-9 Total Score: 17    Lab Results:   No visits with results within 1 Month(s) from this visit.   Latest known visit with results is:   Transcribe Orders on 08/27/2021   Component Date Value Ref Range Status   • COVID19 09/01/2021 Not Detected  Not Detected - Ref. Range Final       Assessment/Plan   Problems Addressed this Visit     None      Visit Diagnoses     Bipolar disorder, in partial remission, most recent episode depressed (HCC)    - "  Primary    Relevant Medications    OXcarbazepine (Trileptal) 150 MG tablet    buPROPion XL (Wellbutrin XL) 150 MG 24 hr tablet    buPROPion XL (WELLBUTRIN XL) 300 MG 24 hr tablet    lamoTRIgine (LaMICtal) 200 MG tablet    sertraline (ZOLOFT) 100 MG tablet    propranolol (INDERAL) 20 MG tablet    Generalized anxiety disorder        Relevant Medications    OXcarbazepine (Trileptal) 150 MG tablet    buPROPion XL (Wellbutrin XL) 150 MG 24 hr tablet    buPROPion XL (WELLBUTRIN XL) 300 MG 24 hr tablet    lamoTRIgine (LaMICtal) 200 MG tablet    sertraline (ZOLOFT) 100 MG tablet    propranolol (INDERAL) 20 MG tablet    Panic disorder with agoraphobia        Relevant Medications    OXcarbazepine (Trileptal) 150 MG tablet    buPROPion XL (Wellbutrin XL) 150 MG 24 hr tablet    buPROPion XL (WELLBUTRIN XL) 300 MG 24 hr tablet    lamoTRIgine (LaMICtal) 200 MG tablet    sertraline (ZOLOFT) 100 MG tablet    propranolol (INDERAL) 20 MG tablet    Medication management        Relevant Medications    OXcarbazepine (Trileptal) 150 MG tablet    buPROPion XL (Wellbutrin XL) 150 MG 24 hr tablet    buPROPion XL (WELLBUTRIN XL) 300 MG 24 hr tablet    lamoTRIgine (LaMICtal) 200 MG tablet    sertraline (ZOLOFT) 100 MG tablet    propranolol (INDERAL) 20 MG tablet      Diagnoses       Codes Comments    Bipolar disorder, in partial remission, most recent episode depressed (HCC)    -  Primary ICD-10-CM: F31.75  ICD-9-CM: 296.55     Generalized anxiety disorder     ICD-10-CM: F41.1  ICD-9-CM: 300.02     Panic disorder with agoraphobia     ICD-10-CM: F40.01  ICD-9-CM: 300.21     Medication management     ICD-10-CM: Z79.899  ICD-9-CM: V58.69         Social History     Tobacco Use   Smoking Status Never Smoker   Smokeless Tobacco Never Used       JORGE reviewed and appropriate. Patient counseled on use of controlled substances.       -The benefits of a healthy diet and exercise were discussed with patient, especially the positive effects they have  on mental health. Patient encouraged to consider lifestyle modification regarding  diet and exercise patterns to maximize results of mental health treatment.  -Reviewed previous available documentation  -Reviewed most recent available labs       Visit Diagnoses:    ICD-10-CM ICD-9-CM   1. Bipolar disorder, in partial remission, most recent episode depressed (HCC)  F31.75 296.55   2. Generalized anxiety disorder  F41.1 300.02   3. Panic disorder with agoraphobia  F40.01 300.21   4. Medication management  Z79.899 V58.69         TREATMENT PLAN/GOALS: Continue supportive psychotherapy efforts and medications as indicated. Treatment and medication options discussed during today's visit. Patient acknowledged and verbally consented to continue with current treatment plan and was educated on the importance of compliance with treatment and follow-up appointments.    MEDICATION ISSUES:  Discussed medication options and treatment plan of prescribed medication as well as the risks, benefits, and side effects including potential falls, possible impaired driving and metabolic adversities among others. Patient is agreeable to call the office with any worsening of symptoms or onset of side effects. Patient is agreeable to call 911 or go to the nearest ER should he/she begin having SI/HI.     MEDS ORDERED DURING VISIT:  New Medications Ordered This Visit   Medications   • OXcarbazepine (Trileptal) 150 MG tablet     Sig: Take 1 tablet by mouth 2 (Two) Times a Day.     Dispense:  60 tablet     Refill:  0   • buPROPion XL (Wellbutrin XL) 150 MG 24 hr tablet     Sig: Take 1 tablet by mouth Every Morning. Take with the Bupropion  mg tablet for total daily dose of 450 mg     Dispense:  30 tablet     Refill:  0   • buPROPion XL (WELLBUTRIN XL) 300 MG 24 hr tablet     Sig: Take 1 tablet by mouth Every Morning. Take with Bupropion  mg tablet for total dosage of 450 mg per day.     Dispense:  30 tablet     Refill:  0   •  lamoTRIgine (LaMICtal) 200 MG tablet     Sig: Take 1 tablet by mouth Daily.     Dispense:  30 tablet     Refill:  0   • sertraline (ZOLOFT) 100 MG tablet     Sig: Take 1.5 tablets by mouth Daily.     Dispense:  45 tablet     Refill:  0   • propranolol (INDERAL) 20 MG tablet     Sig: Take 1 tablet by mouth 2 (Two) Times a Day.     Dispense:  60 tablet     Refill:  0     -Continue bupropion  mg tablet take 1 tablet by mouth every morning for mood disorder and anxiety.  -Continue Lamictal 200 mg tablet take 1 tablet by mouth daily for bipolar disorder and anxiety  -Continue sertraline 100 mg tablet take 1-1/2 tablets by mouth daily for anxiety, depression and panic disorder  -Continue propanolol 20 mg tablet take 1 tablet by mouth 2 times a day for anxiety and panic disorder    -Decrease oxcarbazepine 150 mg tablet take 1 tablet by mouth 2 times a day for bipolar disorder and anxiety, see if memory improves.  -Recommend  psychotherapy.      Return in about 1 month (around 1/2/2022) for Recheck.       Prognosis: Guarded dependent on medication/follow up and treatment plan compliance.  Functionality: pt showing improvements in important areas of daily functioning.     Short-term goals: Patient will adhere to medication regimen and note continued improvement in symptoms over the next 3 months.   Long-term goals: Patient will be adherent to medication management and psychotherapy with continued improvement in symptoms over the next 6 months    I spent 30 minutes caring for Antoinette on this date of service. This time includes time spent by me in the following activities: preparing for the visit, obtaining and/or reviewing a separately obtained history, performing a medically appropriate examination and/or evaluation, counseling and educating the patient/family/caregiver, ordering medications, tests, or procedures and documenting information in the medical record            This document has been electronically signed  by Martha Conn, APRN   December 2, 2021 09:30 EST

## 2021-12-02 ENCOUNTER — OFFICE VISIT (OUTPATIENT)
Dept: PSYCHIATRY | Facility: CLINIC | Age: 36
End: 2021-12-02

## 2021-12-02 VITALS
WEIGHT: 201 LBS | HEART RATE: 87 BPM | BODY MASS INDEX: 40.52 KG/M2 | HEIGHT: 59 IN | DIASTOLIC BLOOD PRESSURE: 82 MMHG | SYSTOLIC BLOOD PRESSURE: 130 MMHG

## 2021-12-02 DIAGNOSIS — F40.01 PANIC DISORDER WITH AGORAPHOBIA: ICD-10-CM

## 2021-12-02 DIAGNOSIS — F41.1 GENERALIZED ANXIETY DISORDER: ICD-10-CM

## 2021-12-02 DIAGNOSIS — F31.75 BIPOLAR DISORDER, IN PARTIAL REMISSION, MOST RECENT EPISODE DEPRESSED (HCC): Primary | ICD-10-CM

## 2021-12-02 DIAGNOSIS — Z79.899 MEDICATION MANAGEMENT: ICD-10-CM

## 2021-12-02 PROCEDURE — 99214 OFFICE O/P EST MOD 30 MIN: CPT | Performed by: NURSE PRACTITIONER

## 2021-12-02 RX ORDER — FLUCONAZOLE 150 MG/1
TABLET ORAL
COMMUNITY
Start: 2021-11-30 | End: 2022-02-17

## 2021-12-02 RX ORDER — INSULIN DETEMIR 100 [IU]/ML
30 INJECTION, SOLUTION SUBCUTANEOUS 2 TIMES DAILY
COMMUNITY
Start: 2021-11-30 | End: 2022-02-17

## 2021-12-02 RX ORDER — BUPROPION HYDROCHLORIDE 300 MG/1
300 TABLET ORAL EVERY MORNING
Qty: 30 TABLET | Refills: 0 | Status: SHIPPED | OUTPATIENT
Start: 2021-12-02 | End: 2022-01-05 | Stop reason: SDUPTHER

## 2021-12-02 RX ORDER — IBUPROFEN 800 MG/1
TABLET ORAL AS NEEDED
COMMUNITY
Start: 2021-11-27 | End: 2022-12-13

## 2021-12-02 RX ORDER — PROPRANOLOL HYDROCHLORIDE 20 MG/1
20 TABLET ORAL 2 TIMES DAILY
Qty: 60 TABLET | Refills: 0 | Status: SHIPPED | OUTPATIENT
Start: 2021-12-02 | End: 2022-01-05 | Stop reason: SDUPTHER

## 2021-12-02 RX ORDER — INSULIN ASPART 100 [IU]/ML
5 INJECTION, SOLUTION INTRAVENOUS; SUBCUTANEOUS
COMMUNITY
Start: 2021-11-05 | End: 2022-02-17 | Stop reason: SDUPTHER

## 2021-12-02 RX ORDER — OXCARBAZEPINE 150 MG/1
150 TABLET, FILM COATED ORAL 2 TIMES DAILY
Qty: 60 TABLET | Refills: 0 | Status: SHIPPED | OUTPATIENT
Start: 2021-12-02 | End: 2022-01-05 | Stop reason: SDUPTHER

## 2021-12-02 RX ORDER — NEOMYCIN SULFATE, POLYMYXIN B SULFATE AND DEXAMETHASONE 3.5; 10000; 1 MG/ML; [USP'U]/ML; MG/ML
SUSPENSION/ DROPS OPHTHALMIC
COMMUNITY
Start: 2021-09-03 | End: 2022-02-17

## 2021-12-02 RX ORDER — LISINOPRIL 40 MG/1
40 TABLET ORAL DAILY
COMMUNITY
Start: 2021-11-03 | End: 2022-07-09

## 2021-12-02 RX ORDER — ROSUVASTATIN CALCIUM 40 MG/1
40 TABLET, COATED ORAL NIGHTLY
COMMUNITY
Start: 2021-11-30 | End: 2022-07-09

## 2021-12-02 RX ORDER — LAMOTRIGINE 200 MG/1
200 TABLET ORAL DAILY
Qty: 30 TABLET | Refills: 0 | Status: SHIPPED | OUTPATIENT
Start: 2021-12-02 | End: 2022-01-05 | Stop reason: SDUPTHER

## 2021-12-02 RX ORDER — BUPROPION HYDROCHLORIDE 150 MG/1
150 TABLET ORAL EVERY MORNING
Qty: 30 TABLET | Refills: 0 | Status: SHIPPED | OUTPATIENT
Start: 2021-12-02 | End: 2022-01-05 | Stop reason: SDUPTHER

## 2021-12-02 RX ORDER — SITAGLIPTIN 100 MG/1
100 TABLET, FILM COATED ORAL DAILY
COMMUNITY
Start: 2021-11-30 | End: 2022-02-17 | Stop reason: SDUPTHER

## 2021-12-02 RX ORDER — SERTRALINE HYDROCHLORIDE 100 MG/1
150 TABLET, FILM COATED ORAL DAILY
Qty: 45 TABLET | Refills: 0 | Status: SHIPPED | OUTPATIENT
Start: 2021-12-02 | End: 2022-01-05 | Stop reason: SDUPTHER

## 2021-12-02 RX ORDER — FLUTICASONE PROPIONATE 50 MCG
1 SPRAY, SUSPENSION (ML) NASAL DAILY PRN
COMMUNITY
Start: 2021-11-30 | End: 2022-12-13

## 2021-12-09 ENCOUNTER — TRANSCRIBE ORDERS (OUTPATIENT)
Dept: ADMINISTRATIVE | Facility: HOSPITAL | Age: 36
End: 2021-12-09

## 2021-12-09 DIAGNOSIS — E05.90 HYPERTHYROIDISM: Primary | ICD-10-CM

## 2022-01-05 ENCOUNTER — TELEMEDICINE (OUTPATIENT)
Dept: PSYCHIATRY | Facility: CLINIC | Age: 37
End: 2022-01-05

## 2022-01-05 DIAGNOSIS — F40.01 PANIC DISORDER WITH AGORAPHOBIA: ICD-10-CM

## 2022-01-05 DIAGNOSIS — F31.75 BIPOLAR DISORDER, IN PARTIAL REMISSION, MOST RECENT EPISODE DEPRESSED: ICD-10-CM

## 2022-01-05 DIAGNOSIS — Z79.899 MEDICATION MANAGEMENT: Primary | ICD-10-CM

## 2022-01-05 DIAGNOSIS — F41.1 GENERALIZED ANXIETY DISORDER: ICD-10-CM

## 2022-01-05 PROCEDURE — 99214 OFFICE O/P EST MOD 30 MIN: CPT | Performed by: NURSE PRACTITIONER

## 2022-01-05 RX ORDER — SERTRALINE HYDROCHLORIDE 100 MG/1
150 TABLET, FILM COATED ORAL DAILY
Qty: 45 TABLET | Refills: 1 | Status: SHIPPED | OUTPATIENT
Start: 2022-01-05 | End: 2022-03-02 | Stop reason: SDUPTHER

## 2022-01-05 RX ORDER — OXCARBAZEPINE 150 MG/1
150 TABLET, FILM COATED ORAL 2 TIMES DAILY
Qty: 60 TABLET | Refills: 1 | Status: SHIPPED | OUTPATIENT
Start: 2022-01-05 | End: 2022-05-26

## 2022-01-05 RX ORDER — PROPRANOLOL HYDROCHLORIDE 20 MG/1
20 TABLET ORAL 2 TIMES DAILY
Qty: 60 TABLET | Refills: 1 | Status: SHIPPED | OUTPATIENT
Start: 2022-01-05 | End: 2022-02-17

## 2022-01-05 RX ORDER — BUPROPION HYDROCHLORIDE 300 MG/1
300 TABLET ORAL EVERY MORNING
Qty: 30 TABLET | Refills: 1 | Status: SHIPPED | OUTPATIENT
Start: 2022-01-05 | End: 2022-03-02 | Stop reason: SDUPTHER

## 2022-01-05 RX ORDER — BUPROPION HYDROCHLORIDE 150 MG/1
150 TABLET ORAL EVERY MORNING
Qty: 30 TABLET | Refills: 1 | Status: SHIPPED | OUTPATIENT
Start: 2022-01-05 | End: 2022-02-17

## 2022-01-05 RX ORDER — LAMOTRIGINE 200 MG/1
200 TABLET ORAL DAILY
Qty: 30 TABLET | Refills: 1 | Status: SHIPPED | OUTPATIENT
Start: 2022-01-05 | End: 2022-03-02 | Stop reason: SDUPTHER

## 2022-01-05 NOTE — PROGRESS NOTES
"This provider is located at the [facility] (through [facility]), [address], [place], [address] using a secure [employer] Video Visit through [employer]. Patient is being seen remotely via telehealth at their home address in [place], and stated they are in a secure environment for this session. The patient's condition being diagnosed/treated is appropriate for telemedicine. The provider identified herself as well as her credentials.   The patient, and/or patients guardian, consent to be seen remotely, and when consent is given they understand that the consent allows for patient identifiable information to be sent to a third party as needed.   They may refuse to be seen remotely at any time. The electronic data is encrypted and password protected, and the patient and/or guardian has been advised of the potential risks to privacy not withstanding such measures.        Subjective   Antoinette Pack is a 36 y.o. female who presents today for follow up    Chief Complaint:  Bipolar disorder, depression, anxiety    History of Present Illness:   Here today for follow up visit. She is taking her medication as prescribed, denies SE.  Things are going about the same, she states she has had \"COVID\", she states she tires easily. Depression rated 7/10, anxiety rated 6/10, with 10 being the worst, moods rated 7/10, with 10 being the worst.  Sleeping has been excessive due to being ill with COVID, has occasional nightmares. Appetite is good, she has lost weight with being ill, her sense of smell hasn't completely returned from being ill. She states her  has been ill as well, and her stress has increased, her vision is still the \"girl\", a little more frequently.  Denies SI/HI/AH.  Denies thoughts of self-harm.  Chronic health issues, no acute physical or medical issues today.         The following portions of the patient's history were reviewed and updated as appropriate: " allergies, current medications, past family history, past medical history, past social history, past surgical history and problem list.      Past Medical History:  Past Medical History:   Diagnosis Date   • Anxiety    • Depression    • Diabetes mellitus (HCC)    • Diverticulitis    • GERD (gastroesophageal reflux disease)    • Hypertension    • Kidney stone    • PCOS (polycystic ovarian syndrome)    • Sleep apnea        Social History:  Social History     Socioeconomic History   • Marital status:    Tobacco Use   • Smoking status: Never Smoker   • Smokeless tobacco: Never Used   Vaping Use   • Vaping Use: Never used   Substance and Sexual Activity   • Alcohol use: No   • Drug use: No   • Sexual activity: Defer       Family History:  Family History   Problem Relation Age of Onset   • Heart disease Mother    • COPD Mother    • Heart disease Father    • COPD Father        Past Surgical History:  Past Surgical History:   Procedure Laterality Date   • CARDIAC CATHETERIZATION     • CARPAL TUNNEL RELEASE     • COLONOSCOPY     • ENDOSCOPY     • FRACTURE SURGERY     • HARDWARE REMOVAL      WRIST   • TENDON REPAIR      HAND       Problem List:  Patient Active Problem List   Diagnosis   • Body mass index (BMI) 40.0-44.9, adult        Allergy:   Allergies   Allergen Reactions   • Dilantin [Phenytoin] Mental Status Change   • Keppra [Levetiracetam] Mental Status Change   • Meperidine Rash   • Topamax [Topiramate] Mental Status Change        Current Medications:   Current Outpatient Medications   Medication Sig Dispense Refill   • buPROPion XL (Wellbutrin XL) 150 MG 24 hr tablet Take 1 tablet by mouth Every Morning. Take with the Bupropion  mg tablet for total daily dose of 450 mg 30 tablet 1   • buPROPion XL (WELLBUTRIN XL) 300 MG 24 hr tablet Take 1 tablet by mouth Every Morning. Take with Bupropion  mg tablet for total dosage of 450 mg per day. 30 tablet 1   • clobetasol (TEMOVATE) 0.05 % ointment      •  fluconazole (DIFLUCAN) 150 MG tablet      • fluticasone (FLONASE) 50 MCG/ACT nasal spray      • ibuprofen (ADVIL,MOTRIN) 800 MG tablet      • Januvia 100 MG tablet      • lamoTRIgine (LaMICtal) 200 MG tablet Take 1 tablet by mouth Daily. 30 tablet 1   • Levemir FlexTouch 100 UNIT/ML injection      • lisinopril (PRINIVIL,ZESTRIL) 40 MG tablet      • metFORMIN (GLUCOPHAGE) 1000 MG tablet      • neomycin-polymyxin-dexamethasone (MAXITROL) 3.5-14255-5.1 ophthalmic suspension      • NovoLOG FlexPen 100 UNIT/ML solution pen-injector sc pen      • omeprazole (PRILOSEC) 40 MG capsule      • OXcarbazepine (Trileptal) 150 MG tablet Take 1 tablet by mouth 2 (Two) Times a Day. 60 tablet 1   • propranolol (INDERAL) 20 MG tablet Take 1 tablet by mouth 2 (Two) Times a Day. 60 tablet 1   • rosuvastatin (CRESTOR) 40 MG tablet      • sertraline (ZOLOFT) 100 MG tablet Take 1.5 tablets by mouth Daily. 45 tablet 1     No current facility-administered medications for this visit.       Review of Symptoms:    Review of Systems   Constitutional: Negative.    HENT: Negative.    Eyes: Negative.    Respiratory: Negative.    Cardiovascular: Negative.    Neurological: Negative.    Psychiatric/Behavioral: Positive for depressed mood. The patient is nervous/anxious.          Physical Exam:   There were no vitals taken for this visit.  There is no height or weight on file to calculate BMI.    Appearance:  female appears stated age, no acute distress noted.    Gait, Station, Strength: Steady, posture erect, WNL      Mental Status Exam:   Hygiene:   good  Cooperation:  Cooperative  Eye Contact:  Good  Psychomotor Behavior:  Appropriate  Affect:  Appropriate  Mood: sad  Hopelessness: Denies  Speech:  Normal  Thought Process:  Goal directed and Linear  Thought Content:  Normal  Suicidal:  None  Homicidal:  None  Hallucinations:  Visual  Delusion:  None  Memory:  Deficits  Orientation:  Person, Place, Time and Situation  Reliability:   good  Insight:  Fair  Judgement:  Fair  Impulse Control:  Fair  Physical/Medical Issues:  Yes DIABETES, HTN, GERD     PHQ-Score Total:  PHQ-9 Total Score:        Lab Results:   No visits with results within 1 Month(s) from this visit.   Latest known visit with results is:   Transcribe Orders on 08/27/2021   Component Date Value Ref Range Status   • COVID19 09/01/2021 Not Detected  Not Detected - Ref. Range Final       Assessment/Plan   Problems Addressed this Visit     None      Visit Diagnoses     Medication management    -  Primary    Relevant Medications    buPROPion XL (Wellbutrin XL) 150 MG 24 hr tablet    buPROPion XL (WELLBUTRIN XL) 300 MG 24 hr tablet    lamoTRIgine (LaMICtal) 200 MG tablet    OXcarbazepine (Trileptal) 150 MG tablet    propranolol (INDERAL) 20 MG tablet    sertraline (ZOLOFT) 100 MG tablet    Other Relevant Orders    [facility] Drug Screen    Bipolar disorder, in partial remission, most recent episode depressed (HCC)        Relevant Medications    buPROPion XL (Wellbutrin XL) 150 MG 24 hr tablet    buPROPion XL (WELLBUTRIN XL) 300 MG 24 hr tablet    lamoTRIgine (LaMICtal) 200 MG tablet    OXcarbazepine (Trileptal) 150 MG tablet    propranolol (INDERAL) 20 MG tablet    sertraline (ZOLOFT) 100 MG tablet    Generalized anxiety disorder        Relevant Medications    buPROPion XL (Wellbutrin XL) 150 MG 24 hr tablet    buPROPion XL (WELLBUTRIN XL) 300 MG 24 hr tablet    lamoTRIgine (LaMICtal) 200 MG tablet    OXcarbazepine (Trileptal) 150 MG tablet    propranolol (INDERAL) 20 MG tablet    sertraline (ZOLOFT) 100 MG tablet    Panic disorder with agoraphobia        Relevant Medications    buPROPion XL (Wellbutrin XL) 150 MG 24 hr tablet    buPROPion XL (WELLBUTRIN XL) 300 MG 24 hr tablet    lamoTRIgine (LaMICtal) 200 MG tablet    OXcarbazepine (Trileptal) 150 MG tablet    propranolol (INDERAL) 20 MG tablet    sertraline (ZOLOFT) 100 MG tablet      Diagnoses       Codes Comments    Medication  management    -  Primary ICD-10-CM: Z79.899  ICD-9-CM: V58.69     Bipolar disorder, in partial remission, most recent episode depressed (HCC)     ICD-10-CM: F31.75  ICD-9-CM: 296.55     Generalized anxiety disorder     ICD-10-CM: F41.1  ICD-9-CM: 300.02     Panic disorder with agoraphobia     ICD-10-CM: F40.01  ICD-9-CM: 300.21         Social History     Tobacco Use   Smoking Status Never Smoker   Smokeless Tobacco Never Used     JORGE reviewed and appropriate. Patient counseled on use of controlled substances.     -The benefits of a healthy diet and exercise were discussed with patient, especially the positive effects they have on mental health. Patient encouraged to consider lifestyle modification regarding  diet and exercise patterns to maximize results of mental health treatment.  -Reviewed previous available documentation  -Reviewed most recent available labs   -Lengthy discussion with patient on the possible side effects of antipsychotic medications including increased cholesterol, increased blood sugar, and possibility of weight gain.  Also discussed the need to monitor lab work associated with this.  The risk of muscle movement disorders with this class of medication was also discussed.  \    -Session began at 8:45 am and ended at 9:05 am    Visit Diagnoses:    ICD-10-CM ICD-9-CM   1. Medication management  Z79.899 V58.69   2. Bipolar disorder, in partial remission, most recent episode depressed (HCC)  F31.75 296.55   3. Generalized anxiety disorder  F41.1 300.02   4. Panic disorder with agoraphobia  F40.01 300.21       TREATMENT PLAN/GOALS: Continue supportive psychotherapy efforts and medications as indicated. Treatment and medication options discussed during today's visit. Patient acknowledged and verbally consented to continue with current treatment plan and was educated on the importance of compliance with treatment and follow-up appointments.    MEDICATION ISSUES:    Discussed medication options and  treatment plan of prescribed medication as well as the risks, benefits, and side effects including potential falls, possible impaired driving and metabolic adversities among others. Patient is agreeable to call the office with any worsening of symptoms or onset of side effects. Patient is agreeable to call 911 or go to the nearest ER should he/she begin having SI/HI.     MEDS ORDERED DURING VISIT:  New Medications Ordered This Visit   Medications   • buPROPion XL (Wellbutrin XL) 150 MG 24 hr tablet     Sig: Take 1 tablet by mouth Every Morning. Take with the Bupropion  mg tablet for total daily dose of 450 mg     Dispense:  30 tablet     Refill:  1   • buPROPion XL (WELLBUTRIN XL) 300 MG 24 hr tablet     Sig: Take 1 tablet by mouth Every Morning. Take with Bupropion  mg tablet for total dosage of 450 mg per day.     Dispense:  30 tablet     Refill:  1   • lamoTRIgine (LaMICtal) 200 MG tablet     Sig: Take 1 tablet by mouth Daily.     Dispense:  30 tablet     Refill:  1   • OXcarbazepine (Trileptal) 150 MG tablet     Sig: Take 1 tablet by mouth 2 (Two) Times a Day.     Dispense:  60 tablet     Refill:  1   • propranolol (INDERAL) 20 MG tablet     Sig: Take 1 tablet by mouth 2 (Two) Times a Day.     Dispense:  60 tablet     Refill:  1   • sertraline (ZOLOFT) 100 MG tablet     Sig: Take 1.5 tablets by mouth Daily.     Dispense:  45 tablet     Refill:  1       Return in about 2 months (around 3/5/2022) for Recheck.  -Continue propanolol 20 mg tablet take 1 tablet 2 times a day for anxiety.  -Continue bupropion  mg 24-hour tablet take 1 tablet by mouth every morning for depression and anxiety  -Continue bupropion  mg 24-hour tablet take 1 tablet by mouth every morning for depression and anxiety  -Continue lamotrigine 200 mg tablet take 1 tablet by mouth daily for bipolar disorder.  -Continue oxcarbazepine 300 mg tablet take 1 tablet by mouth 2 times a day for bipolar disorder.  -Continue  sertraline 100 mg tablet take 1.5 tablets by mouth daily for anxiety and depression.    I spent 30 minutes caring for Antoinette on this date of service. This time includes time spent by me in the following activities: preparing for the visit, obtaining and/or reviewing a separately obtained history, performing a medically appropriate examination and/or evaluation, counseling and educating the patient/family/caregiver, ordering medications, tests, or procedures and documenting information in the medical record             This document has been electronically signed by Martha Conn, CICI  January 5, 2022 09:01 EST    Part of this note may be an electronic transcription/translation of spoken language to printed text using the Dragon Dictation System.

## 2022-01-12 ENCOUNTER — TRANSCRIBE ORDERS (OUTPATIENT)
Dept: LAB | Facility: HOSPITAL | Age: 37
End: 2022-01-12

## 2022-01-12 DIAGNOSIS — Z01.818 OTHER SPECIFIED PRE-OPERATIVE EXAMINATION: Primary | ICD-10-CM

## 2022-01-14 ENCOUNTER — HOSPITAL ENCOUNTER (OUTPATIENT)
Dept: ULTRASOUND IMAGING | Facility: HOSPITAL | Age: 37
Discharge: HOME | End: 2022-01-14

## 2022-01-14 ENCOUNTER — LAB (OUTPATIENT)
Dept: LAB | Facility: HOSPITAL | Age: 37
End: 2022-01-14

## 2022-01-14 DIAGNOSIS — E05.90 HYPERTHYROIDISM: ICD-10-CM

## 2022-01-14 DIAGNOSIS — Z01.818 OTHER SPECIFIED PRE-OPERATIVE EXAMINATION: ICD-10-CM

## 2022-01-14 PROCEDURE — 76536 US EXAM OF HEAD AND NECK: CPT | Performed by: RADIOLOGY

## 2022-01-14 PROCEDURE — 76536 US EXAM OF HEAD AND NECK: CPT

## 2022-02-17 ENCOUNTER — OFFICE VISIT (OUTPATIENT)
Dept: ENDOCRINOLOGY | Facility: CLINIC | Age: 37
End: 2022-02-17

## 2022-02-17 ENCOUNTER — LAB (OUTPATIENT)
Dept: LAB | Facility: HOSPITAL | Age: 37
End: 2022-02-17

## 2022-02-17 ENCOUNTER — SPECIALTY PHARMACY (OUTPATIENT)
Dept: PHARMACY | Facility: HOSPITAL | Age: 37
End: 2022-02-17

## 2022-02-17 VITALS
SYSTOLIC BLOOD PRESSURE: 115 MMHG | HEART RATE: 91 BPM | WEIGHT: 199 LBS | BODY MASS INDEX: 40.12 KG/M2 | OXYGEN SATURATION: 97 % | DIASTOLIC BLOOD PRESSURE: 72 MMHG | TEMPERATURE: 97.4 F | HEIGHT: 59 IN

## 2022-02-17 DIAGNOSIS — IMO0002 DIABETES MELLITUS TYPE 2, UNCONTROLLED, WITH COMPLICATIONS: Primary | ICD-10-CM

## 2022-02-17 DIAGNOSIS — E04.0 SIMPLE GOITER: ICD-10-CM

## 2022-02-17 LAB
ERYTHROCYTE [SEDIMENTATION RATE] IN BLOOD: 20 MM/HR (ref 0–20)
EXPIRATION DATE: ABNORMAL
GLUCOSE BLDC GLUCOMTR-MCNC: 186 MG/DL (ref 70–130)
HBA1C MFR BLD: 8.8 %
Lab: ABNORMAL
T3FREE SERPL-MCNC: 3.43 PG/ML (ref 2–4.4)
T4 FREE SERPL-MCNC: 0.97 NG/DL (ref 0.93–1.7)
TSH SERPL DL<=0.05 MIU/L-ACNC: 0.5 UIU/ML (ref 0.27–4.2)

## 2022-02-17 PROCEDURE — 3052F HG A1C>EQUAL 8.0%<EQUAL 9.0%: CPT | Performed by: NURSE PRACTITIONER

## 2022-02-17 PROCEDURE — 36415 COLL VENOUS BLD VENIPUNCTURE: CPT | Performed by: NURSE PRACTITIONER

## 2022-02-17 PROCEDURE — 83036 HEMOGLOBIN GLYCOSYLATED A1C: CPT | Performed by: NURSE PRACTITIONER

## 2022-02-17 PROCEDURE — 85652 RBC SED RATE AUTOMATED: CPT | Performed by: NURSE PRACTITIONER

## 2022-02-17 PROCEDURE — 82962 GLUCOSE BLOOD TEST: CPT | Performed by: NURSE PRACTITIONER

## 2022-02-17 PROCEDURE — 84443 ASSAY THYROID STIM HORMONE: CPT | Performed by: NURSE PRACTITIONER

## 2022-02-17 PROCEDURE — 84481 FREE ASSAY (FT-3): CPT | Performed by: NURSE PRACTITIONER

## 2022-02-17 PROCEDURE — 84439 ASSAY OF FREE THYROXINE: CPT | Performed by: NURSE PRACTITIONER

## 2022-02-17 PROCEDURE — 84445 ASSAY OF TSI GLOBULIN: CPT | Performed by: NURSE PRACTITIONER

## 2022-02-17 PROCEDURE — 99214 OFFICE O/P EST MOD 30 MIN: CPT | Performed by: NURSE PRACTITIONER

## 2022-02-17 RX ORDER — INSULIN DEGLUDEC INJECTION 100 U/ML
50 INJECTION, SOLUTION SUBCUTANEOUS
Qty: 15 ML | Refills: 5 | Status: SHIPPED | OUTPATIENT
Start: 2022-02-17 | End: 2022-05-26 | Stop reason: SDUPTHER

## 2022-02-17 RX ORDER — PROPRANOLOL HYDROCHLORIDE 10 MG/1
10 TABLET ORAL DAILY
COMMUNITY
End: 2022-05-26

## 2022-02-17 RX ORDER — SITAGLIPTIN 100 MG/1
100 TABLET, FILM COATED ORAL DAILY
Qty: 30 TABLET | Refills: 5 | Status: SHIPPED | OUTPATIENT
Start: 2022-02-17 | End: 2022-05-26 | Stop reason: SDUPTHER

## 2022-02-17 RX ORDER — INSULIN ASPART 100 [IU]/ML
5 INJECTION, SOLUTION INTRAVENOUS; SUBCUTANEOUS
Qty: 15 ML | Refills: 5 | Status: SHIPPED | OUTPATIENT
Start: 2022-02-17 | End: 2022-05-26 | Stop reason: SDUPTHER

## 2022-02-17 NOTE — ASSESSMENT & PLAN NOTE
-Clinically euthyroid.  -She has a noted 16mm nodule.  Once labs are results will determine further evaluation of this.  -Suspect that the patient may have thyroiditis based on history and symptoms.  Needs further labs to determine to be drawn today.  -Follow-up in 3 months.

## 2022-02-17 NOTE — ASSESSMENT & PLAN NOTE
-Diabetes is not at goal with A1C >8.   -Discussed the dietary and exercise guidelines with patient.  She has done diabetes education in the past but it has been a long time.  She is willing to speak with the diabetes educators again for dietary assistance.  -She is interested in a CGM.  Will send RX to pharmacy.  -She has seen podiatry in the past.  She will schedule an appt with them for diabetic foot care.  -Discussed the importance of keeping yearly eye exams.  -Since she is having issues with controlling her BG, will change the Levemir to Tresiba to 50 units qhs.  -Continue with Novolog 5 units TID with meals for now.  May need dose adjustments in time.  Will adjust as needed to help better control her BG's.  -Continue Metformin 1,000 mg BID.  -Follow-up in 3 months.

## 2022-02-17 NOTE — PROGRESS NOTES
Specialty Pharmacy Patient Management Program  Endocrinology Initial Assessment       Antoinette Pack is a 36 y.o. female with Type 2 Diabetes seen by an Endocrinology provider and enrolled in the Endocrinology Patient Management program offered by [facility].  An initial outreach was conducted, including assessment of therapy appropriateness and specialty medication education for Metformin, Januvia and Levemir. The patient was introduced to services offered by [facility], including: regular assessments, refill coordination, curbside pick-up or mail order delivery options, prior authorization maintenance, and financial assistance programs as applicable. The patient was also provided with contact information for the pharmacy team.     Insurance Coverage & Financial Support  Patient stated that she was on an assistance program that has  and will need further assistance with her medications.     Relevant Past Medical History and Comorbidities  Relevant medical history and concomitant health conditions were discussed with the patient. The patient's chart has been reviewed for relevant past medical history and comorbid health conditions and updated as necessary.   Past Medical History:   Diagnosis Date   • Anxiety    • Depression    • Diabetes mellitus (HCC)    • Diverticulitis    • GERD (gastroesophageal reflux disease)    • Hypertension    • Kidney stone    • PCOS (polycystic ovarian syndrome)    • Sleep apnea      Social History     Socioeconomic History   • Marital status:    Tobacco Use   • Smoking status: Former Smoker   • Smokeless tobacco: Never Used   Vaping Use   • Vaping Use: Never used   Substance and Sexual Activity   • Alcohol use: No   • Drug use: No   • Sexual activity: Defer       Allergies  Known allergies and reactions were discussed with the patient. The patient's chart has been reviewed for  allergy information and updated as  necessary.   Dilantin [phenytoin], Keppra [levetiracetam], Meperidine, and Topamax [topiramate]    Current Medication List  This medication list has been reviewed with the patient and evaluated for any interactions or necessary modifications/recommendations, and updated to include all prescription medications, OTC medications, and supplements the patient is currently taking.  This list reflects what is contained in the patient's profile, which has also been marked as reviewed to communicate to other providers it is the most up to date version of the patient's current medication therapy.     Current Outpatient Medications:   •  buPROPion XL (WELLBUTRIN XL) 300 MG 24 hr tablet, Take 1 tablet by mouth Every Morning. Take with Bupropion  mg tablet for total dosage of 450 mg per day., Disp: 30 tablet, Rfl: 1  •  clobetasol (TEMOVATE) 0.05 % ointment, , Disp: , Rfl:   •  fluticasone (FLONASE) 50 MCG/ACT nasal spray, 1 spray into the nostril(s) as directed by provider Daily., Disp: , Rfl:   •  Januvia 100 MG tablet, Take 100 mg by mouth Daily., Disp: , Rfl:   •  lamoTRIgine (LaMICtal) 200 MG tablet, Take 1 tablet by mouth Daily., Disp: 30 tablet, Rfl: 1  •  Levemir FlexTouch 100 UNIT/ML injection, Inject 30 Units under the skin into the appropriate area as directed 2 (Two) Times a Day., Disp: , Rfl:   •  lisinopril (PRINIVIL,ZESTRIL) 40 MG tablet, Take 40 mg by mouth Daily., Disp: , Rfl:   •  metFORMIN (GLUCOPHAGE) 1000 MG tablet, Take 1,000 mg by mouth 2 (Two) Times a Day With Meals., Disp: , Rfl:   •  NovoLOG FlexPen 100 UNIT/ML solution pen-injector sc pen, 5 Units 3 (Three) Times a Day With Meals. Takes based on what her glucose level is, Disp: , Rfl:   •  omeprazole (PRILOSEC) 40 MG capsule, Take 40 mg by mouth Daily., Disp: , Rfl:   •  OXcarbazepine (Trileptal) 150 MG tablet, Take 1 tablet by mouth 2 (Two) Times a Day. (Patient taking differently: Take 150 mg by mouth Daily.), Disp: 60 tablet, Rfl: 1  •   propranolol (INDERAL) 10 MG tablet, Take 10 mg by mouth Daily., Disp: , Rfl:   •  rosuvastatin (CRESTOR) 40 MG tablet, Take 40 mg by mouth Every Night., Disp: , Rfl:   •  sertraline (ZOLOFT) 100 MG tablet, Take 1.5 tablets by mouth Daily., Disp: 45 tablet, Rfl: 1  •  ibuprofen (ADVIL,MOTRIN) 800 MG tablet, , Disp: , Rfl:     Drug Interactions  Hypoglycemic effects of Insulin may be prolonged when given with Beta-Adrenergic Blockers(Nonselective). Unexpected effects (e.g. hypertension, bradycardia) may occur during hypoglycemic episodes.    Recommended Medications Assessment  • Aspirin - Patient was advised to take by another provider but refused  • Statin - Currently Taking  • ACEi/ARB - Currently Taking      Relevant Laboratory Values  A1C Last 3 Results    HGBA1C Last 3 Results 2/17/22   Hemoglobin A1C 8.8           Lab Results   Component Value Date    HGBA1C 8.8 02/17/2022     Lab Results   Component Value Date    GLUCOSE 225 (H) 12/27/2020    CALCIUM 9.1 12/27/2020     12/27/2020    K 4.4 12/27/2020    CO2 25.9 12/27/2020     12/27/2020    BUN 8 12/27/2020    CREATININE 0.50 (L) 02/23/2021    EGFRIFAFRI  09/05/2016      Comment:      <15 Indicative of kidney failure.    EGFRIFNONA 101 12/27/2020    BCR 12.1 12/27/2020    ANIONGAP 10.1 12/27/2020     Lab Results   Component Value Date    CHLPL 159 09/22/2015    TRIG 131 09/22/2015    HDL 38 (L) 09/22/2015    LDL 95 09/22/2015       Adherence and Self-Administration  • Barriers to Patient Adherence and/or Self-Administration: None  • Methods for Supporting Patient Adherence and/or Self-Administration: None    Vaccination Status:   COVID 19: Not interested  Influenza: Not interested  Pneumococcal: UTD  Hep B: Not interested      Goals of Therapy  • Patient Goals of Therapy: Take medication every day.    • Clinical Goals or Therapeutic Targets, If Applicable: A1C reduction      Reassessment Plan & Follow-Up  1. Patient was started on Tresiba, Novolog,  and FreeStyle Esvin. Levemir was discontinued.   2. Pharmacist to perform regular reassessments no more than (6) months from the previous assessment.  3. Welcome information and patient satisfaction survey to be sent by retail team with patient's initial fill.  4. Care Coordinator to set up future refill outreaches, coordinate prescription delivery, and escalate clinical questions to pharmacist.     Additional Plans, Therapy Recommendations or Therapy Problems to Be Addressed: Patient was interested in CGM. Prescription was sent in.   Recent Provider Changes:  Patient was started on Tresiba, Novolog, and FreeStyle Esvin. Levemir was discontinued.     Attestation  I attest that the initiated specialty medication(s) are appropriate for the patient based on my assessment.  If the prescribed therapy is at any point deemed not appropriate based on the current or future assessments, a consultation will be initiated with the patient's specialty care provider to determine the best course of action. The revised plan of therapy will be documented along with any additional patient education provided.       Sravani Perez, PharmKENNA  2/17/2022  10:27 EST

## 2022-02-17 NOTE — PROGRESS NOTES
Chief Complaint   Patient presents with   • Thyroid Problem     initial encounter        Referring Provider  Stanfrod Arriaza, *     HPI   Antoinette Pack is a 36 y.o. female had concerns including Thyroid Problem (initial encounter).     She had blood work done at her PCP and has some tenderness when they examined it.  She had a US thyroid performed. She had COVID in Dec 2021.  Has some mild edema in her feet and legs.  She has some intermittent compressive symptoms. She has noticed that her neck size has gotten larger.     Birth state: [place]  Previous history of radiation to face/neck:no  Consuming foods high in iodine: no  Family history of thyroid complications:none    The following portions of the patient's history were reviewed and updated as appropriate: allergies, current medications, past family history, past medical history, past social history, past surgical history and problem list.    Patient asked if we cared for diabetic patients as well and expressed that she would like for us to manage her diabetes as well as her thyroid issues.    Diabetes was diagnosed 8-9 years ago.  Complications include none.  Last ophtho exam was 2021, Dr. Bruno Office.  Current medications for diabetes include Januvia 100 mg daily, Metformin 1,000 mg BID, Novolog 5 units TID with meals, Levemir 30 units BID.  She also takes multiple doses of short acting insulin throughout the day to keep her blood sugar from getting too high.  Past meds: Trulicity, GI Issues, has problems with yeast, Glipizide-unsure why it was stopped.  She checks her blood sugar 2-3 times per day.   Hypos:rarely  Fasting range: 150-200+  Can be anywhere from 300-400    Diet: Is not really watching what she is eating.    Past Medical History:   Diagnosis Date   • Anxiety    • Depression    • Diabetes mellitus (HCC)    • Diverticulitis    • GERD (gastroesophageal reflux disease)    • Hypertension    • Kidney stone    • PCOS (polycystic ovarian  syndrome)    • Sleep apnea      Past Surgical History:   Procedure Laterality Date   • CARDIAC CATHETERIZATION     • CARPAL TUNNEL RELEASE     • COLONOSCOPY     • ENDOSCOPY     • FRACTURE SURGERY     • HARDWARE REMOVAL      WRIST   • TENDON REPAIR      HAND      Family History   Problem Relation Age of Onset   • Heart disease Mother    • COPD Mother    • Heart disease Father    • COPD Father       Social History     Socioeconomic History   • Marital status:    Tobacco Use   • Smoking status: Former Smoker   • Smokeless tobacco: Never Used   Vaping Use   • Vaping Use: Never used   Substance and Sexual Activity   • Alcohol use: No   • Drug use: No   • Sexual activity: Defer      Allergies   Allergen Reactions   • Dilantin [Phenytoin] Mental Status Change   • Keppra [Levetiracetam] Mental Status Change   • Meperidine Rash   • Topamax [Topiramate] Mental Status Change      Current Outpatient Medications on File Prior to Visit   Medication Sig Dispense Refill   • buPROPion XL (WELLBUTRIN XL) 300 MG 24 hr tablet Take 1 tablet by mouth Every Morning. Take with Bupropion  mg tablet for total dosage of 450 mg per day. 30 tablet 1   • clobetasol (TEMOVATE) 0.05 % ointment      • fluticasone (FLONASE) 50 MCG/ACT nasal spray 1 spray into the nostril(s) as directed by provider Daily.     • ibuprofen (ADVIL,MOTRIN) 800 MG tablet      • lamoTRIgine (LaMICtal) 200 MG tablet Take 1 tablet by mouth Daily. 30 tablet 1   • lisinopril (PRINIVIL,ZESTRIL) 40 MG tablet Take 40 mg by mouth Daily.     • omeprazole (PRILOSEC) 40 MG capsule Take 40 mg by mouth Daily.     • OXcarbazepine (Trileptal) 150 MG tablet Take 1 tablet by mouth 2 (Two) Times a Day. (Patient taking differently: Take 150 mg by mouth Daily.) 60 tablet 1   • propranolol (INDERAL) 10 MG tablet Take 10 mg by mouth Daily.     • rosuvastatin (CRESTOR) 40 MG tablet Take 40 mg by mouth Every Night.     • sertraline (ZOLOFT) 100 MG tablet Take 1.5 tablets by mouth  "Daily. 45 tablet 1   • [DISCONTINUED] Januvia 100 MG tablet Take 100 mg by mouth Daily.     • [DISCONTINUED] Levemir FlexTouch 100 UNIT/ML injection Inject 30 Units under the skin into the appropriate area as directed 2 (Two) Times a Day.     • [DISCONTINUED] metFORMIN (GLUCOPHAGE) 1000 MG tablet Take 1,000 mg by mouth 2 (Two) Times a Day With Meals.     • [DISCONTINUED] NovoLOG FlexPen 100 UNIT/ML solution pen-injector sc pen 5 Units 3 (Three) Times a Day With Meals. Takes based on what her glucose level is     • [DISCONTINUED] buPROPion XL (Wellbutrin XL) 150 MG 24 hr tablet Take 1 tablet by mouth Every Morning. Take with the Bupropion  mg tablet for total daily dose of 450 mg 30 tablet 1   • [DISCONTINUED] fluconazole (DIFLUCAN) 150 MG tablet      • [DISCONTINUED] neomycin-polymyxin-dexamethasone (MAXITROL) 3.5-14590-1.1 ophthalmic suspension      • [DISCONTINUED] propranolol (INDERAL) 20 MG tablet Take 1 tablet by mouth 2 (Two) Times a Day. 60 tablet 1     No current facility-administered medications on file prior to visit.      Review of Systems   Constitutional: Positive for fatigue. Negative for unexpected weight gain and unexpected weight loss.   HENT:        Neck tenderness   Eyes: Positive for blurred vision and visual disturbance.   Cardiovascular: Positive for palpitations.   Gastrointestinal: Positive for constipation.   Endocrine: Positive for heat intolerance, polydipsia, polyphagia and polyuria. Negative for cold intolerance.   Skin: Positive for dry skin.        Hair thinning   Neurological: Positive for tremors.   Psychiatric/Behavioral: Positive for agitation and sleep disturbance. The patient is nervous/anxious.    All other systems reviewed and are negative.     /72 (BP Location: Left arm, Patient Position: Sitting, Cuff Size: Adult)   Pulse 91   Temp 97.4 °F (36.3 °C) (Oral)   Ht 149.9 cm (59\")   Wt 90.3 kg (199 lb)   LMP 02/17/2022 (LMP Unknown)   SpO2 97%   Breastfeeding No "   BMI 40.19 kg/m²      Physical Exam  Vitals reviewed.   Constitutional:       Appearance: Normal appearance.   Eyes:      Extraocular Movements: Extraocular movements intact.   Neck:      Thyroid: Thyromegaly and thyroid tenderness present. No thyroid mass.   Cardiovascular:      Rate and Rhythm: Normal rate and regular rhythm.      Pulses: Normal pulses.           Dorsalis pedis pulses are 2+ on the right side and 2+ on the left side.        Posterior tibial pulses are 2+ on the right side and 2+ on the left side.      Heart sounds: Normal heart sounds.   Pulmonary:      Effort: Pulmonary effort is normal.      Breath sounds: Normal breath sounds.   Feet:      Right foot:      Protective Sensation: 10 sites tested. 10 sites sensed.      Skin integrity: Callus, dry skin and fissure present.      Toenail Condition: Right toenails are long.      Left foot:      Protective Sensation: 10 sites tested. 10 sites sensed.      Skin integrity: Callus, dry skin and fissure present.      Toenail Condition: Left toenails are long.      Comments: Diabetic Foot Exam Performed and Monofilament Test Performed  Skin:     General: Skin is warm.   Neurological:      Mental Status: She is alert and oriented to person, place, and time.   Psychiatric:         Mood and Affect: Mood normal.         Behavior: Behavior normal.         Thought Content: Thought content normal.         Judgment: Judgment normal.       Labs/Imaging  CMP  Lab Results   Component Value Date    GLUCOSE 225 (H) 12/27/2020    BUN 8 12/27/2020    CREATININE 0.50 (L) 02/23/2021    EGFRIFNONA 101 12/27/2020    EGFRIFAFRI  09/05/2016      Comment:      <15 Indicative of kidney failure.    BCR 12.1 12/27/2020    K 4.4 12/27/2020    CO2 25.9 12/27/2020    CALCIUM 9.1 12/27/2020    ALBUMIN 4.03 12/27/2020    LABIL2 1.3 (L) 02/05/2016    AST 32 12/27/2020    ALT 28 12/27/2020        CBC w/DIFF   Lab Results   Component Value Date    WBC 8.88 12/27/2020    RBC 4.36  12/27/2020    HGB 12.5 12/27/2020    HCT 36.0 12/27/2020    MCV 82.6 12/27/2020    MCH 28.7 12/27/2020    MCHC 34.7 12/27/2020    RDW 12.0 (L) 12/27/2020    RDWSD 36.0 (L) 12/27/2020    MPV 9.6 12/27/2020     12/27/2020    NEUTRORELPCT 68.9 12/27/2020    LYMPHORELPCT 23.0 12/27/2020    MONORELPCT 4.6 (L) 12/27/2020    EOSRELPCT 2.1 12/27/2020    BASORELPCT 0.3 12/27/2020    AUTOIGPER 1.1 (H) 12/27/2020    NEUTROABS 6.11 12/27/2020    LYMPHSABS 2.04 12/27/2020    MONOSABS 0.41 12/27/2020    EOSABS 0.19 12/27/2020    BASOSABS 0.03 12/27/2020    AUTOIGNUM 0.10 (H) 12/27/2020    NRBC 0.0 12/27/2020       TSH  Lab Results   Component Value Date    TSH 0.971 09/22/2015       T4  Lab Results   Component Value Date    FREET4 1.20 09/22/2015     No results found for: Y0WMNDA    T3  No results found for: T3FREE  No results found for: Z3FXKFU    TRAb  No results found for: TSURCPAB    TPO  No results found for: THYROIDAB    No valid procedures specified.    Assessment and Plan    Diagnoses and all orders for this visit:    1. Diabetes mellitus type 2, uncontrolled, with complications (HCC) (Primary)  Assessment & Plan:  -Diabetes is not at goal with A1C >8.   -Discussed the dietary and exercise guidelines with patient.  She has done diabetes education in the past but it has been a long time.  She is willing to speak with the diabetes educators again for dietary assistance.  -She is interested in a CGM.  Will send RX to pharmacy.  -She has seen podiatry in the past.  She will schedule an appt with them for diabetic foot care.  -Discussed the importance of keeping yearly eye exams.  -Since she is having issues with controlling her BG, will change the Levemir to Tresiba to 50 units qhs.  -Continue with Novolog 5 units TID with meals for now.  May need dose adjustments in time.  Will adjust as needed to help better control her BG's.  -Continue Metformin 1,000 mg BID.  -Follow-up in 3 months.    Orders:  -     POC  Glycosylated Hemoglobin (Hb A1C)  -     POC Glucose Fingerstick  -     Ambulatory Referral to Specialty Pharmacy  -     Ambulatory Referral to Diabetic Education    2. Simple goiter  Assessment & Plan:  -Clinically euthyroid.  -She has a noted 16mm nodule.  Once labs are results will determine further evaluation of this.  -Suspect that the patient may have thyroiditis based on history and symptoms.  Needs further labs to determine to be drawn today.  -Follow-up in 3 months.    Orders:  -     TSH  -     T4, Free  -     T3, Free  -     Thyroid Stimulating Immunoglobulin  -     Sedimentation Rate       Return in about 3 months (around 5/17/2022) for Follow-up appointment. The patient was instructed to contact the clinic with any interval questions or concerns.    CICI Veliz   Endocrinology

## 2022-02-19 LAB — TSI SER-ACNC: <0.1 IU/L (ref 0–0.55)

## 2022-03-02 ENCOUNTER — TELEMEDICINE (OUTPATIENT)
Dept: PSYCHIATRY | Facility: CLINIC | Age: 37
End: 2022-03-02

## 2022-03-02 DIAGNOSIS — F40.01 PANIC DISORDER WITH AGORAPHOBIA: ICD-10-CM

## 2022-03-02 DIAGNOSIS — Z79.899 MEDICATION MANAGEMENT: ICD-10-CM

## 2022-03-02 DIAGNOSIS — F31.75 BIPOLAR DISORDER, IN PARTIAL REMISSION, MOST RECENT EPISODE DEPRESSED: Primary | ICD-10-CM

## 2022-03-02 DIAGNOSIS — F41.1 GENERALIZED ANXIETY DISORDER: ICD-10-CM

## 2022-03-02 PROCEDURE — 99214 OFFICE O/P EST MOD 30 MIN: CPT | Performed by: NURSE PRACTITIONER

## 2022-03-02 RX ORDER — BUPROPION HYDROCHLORIDE 300 MG/1
300 TABLET ORAL EVERY MORNING
Qty: 30 TABLET | Refills: 1 | Status: SHIPPED | OUTPATIENT
Start: 2022-03-02 | End: 2022-05-09 | Stop reason: SDUPTHER

## 2022-03-02 RX ORDER — LAMOTRIGINE 200 MG/1
200 TABLET ORAL DAILY
Qty: 30 TABLET | Refills: 1 | Status: SHIPPED | OUTPATIENT
Start: 2022-03-02 | End: 2022-05-09 | Stop reason: SDUPTHER

## 2022-03-02 RX ORDER — SERTRALINE HYDROCHLORIDE 100 MG/1
150 TABLET, FILM COATED ORAL DAILY
Qty: 45 TABLET | Refills: 1 | Status: SHIPPED | OUTPATIENT
Start: 2022-03-02 | End: 2022-05-09 | Stop reason: SDUPTHER

## 2022-03-02 RX ORDER — BUPROPION HYDROCHLORIDE 150 MG/1
150 TABLET ORAL EVERY MORNING
Qty: 30 TABLET | Refills: 1 | Status: SHIPPED | OUTPATIENT
Start: 2022-03-02 | End: 2022-05-09 | Stop reason: SDUPTHER

## 2022-03-02 NOTE — PROGRESS NOTES
"Subjective      Antoinette Pack is a 36 y.o. female who presents today for follow up    Chief Complaint:  Bipolar disorder, anxiety    History of Present Illness:     Here today for follow up visit. She is taking her medication as prescribed, denies SE. She states she hasn't been taking Trileptal, she didn't get it at the pharmacy and hasn't taken it in about a month. She cannot tell a significant difference by not taking it. Things have been going ok. Her insulin has been changed. PCP is Shawna Lambert, last saw her in November, she is seeing endocrinologist in [place], they are managing her diabetes and thyroid.   Depression rated 7/10, anxiety rated 6/10, with 10 being the worst, moods rated 7/10, with 10 being the worst.  Sleeping is good, she states she saw her \"sleep apnea\" physician yesterday, he gave her a medication \"to help her stay awake\". Denies NM. Appetite is good. She states her  is about the same.  Denies SI/HI/AH. Her visual hallucinations have not changed.   Denies thoughts of self-harm.  Chronic health issues, no acute physical or medical issues today.                The following portions of the patient's history were reviewed and updated as appropriate: allergies, current medications, past family history, past medical history, past social history, past surgical history and problem list.      Past Medical History:  Past Medical History:   Diagnosis Date   • Anxiety    • Depression    • Diabetes mellitus (HCC)    • Diverticulitis    • GERD (gastroesophageal reflux disease)    • Hypertension    • Kidney stone    • PCOS (polycystic ovarian syndrome)    • Sleep apnea        Social History:  Social History     Socioeconomic History   • Marital status:    Tobacco Use   • Smoking status: Former Smoker   • Smokeless tobacco: Never Used   Vaping Use   • Vaping Use: Never used   Substance and Sexual Activity   • Alcohol use: No   • Drug use: No   • Sexual activity: Defer       Family " History:  Family History   Problem Relation Age of Onset   • Heart disease Mother    • COPD Mother    • Heart disease Father    • COPD Father        Past Surgical History:  Past Surgical History:   Procedure Laterality Date   • CARDIAC CATHETERIZATION     • CARPAL TUNNEL RELEASE     • COLONOSCOPY     • ENDOSCOPY     • FRACTURE SURGERY     • HARDWARE REMOVAL      WRIST   • TENDON REPAIR      HAND       Problem List:  Patient Active Problem List   Diagnosis   • Body mass index (BMI) 40.0-44.9, adult   • Simple goiter   • Diabetes mellitus type 2, uncontrolled, with complications ([facility])       Allergy:   Allergies   Allergen Reactions   • Dilantin [Phenytoin] Mental Status Change   • Keppra [Levetiracetam] Mental Status Change   • Meperidine Rash   • Topamax [Topiramate] Mental Status Change        Current Medications:   Current Outpatient Medications   Medication Sig Dispense Refill   • buPROPion XL (Wellbutrin XL) 150 MG 24 hr tablet Take 1 tablet by mouth Every Morning. 30 tablet 1   • buPROPion XL (WELLBUTRIN XL) 300 MG 24 hr tablet Take 1 tablet by mouth Every Morning. Take with Bupropion  mg tablet for total dosage of 450 mg per day. 30 tablet 1   • clobetasol (TEMOVATE) 0.05 % ointment      • Continuous Blood Gluc  (FreeStyle Esvin 2 [place]) device Use as directed to cotinuously monitor blood glucose. 1 each 0   • Continuous Blood Gluc Sensor (FreeStyle Esvin 2 Sensor) misc Use as directed and change sensor Every 14 (Fourteen) Days. 2 each 5   • fluticasone (FLONASE) 50 MCG/ACT nasal spray 1 spray into the nostril(s) as directed by provider Daily.     • ibuprofen (ADVIL,MOTRIN) 800 MG tablet      • insulin aspart (NovoLOG FlexPen) 100 UNIT/ML solution pen-injector sc pen Inject 5 Units under the skin into the appropriate area as directed 3 (Three) Times a Day With Meals. 15 mL 5   • insulin degludec (Tresiba FlexTouch) 100 UNIT/ML solution pen-injector injection Inject 50 Units under the skin into  the appropriate area as directed every night at bedtime. 15 mL 5   • Januvia 100 MG tablet Take 1 tablet by mouth Daily. 30 tablet 5   • lamoTRIgine (LaMICtal) 200 MG tablet Take 1 tablet by mouth Daily. 30 tablet 1   • lisinopril (PRINIVIL,ZESTRIL) 40 MG tablet Take 40 mg by mouth Daily.     • metFORMIN (GLUCOPHAGE) 1000 MG tablet Take 1 tablet by mouth 2 (Two) Times a Day With Meals. 60 tablet 5   • omeprazole (PRILOSEC) 40 MG capsule Take 40 mg by mouth Daily.     • OXcarbazepine (Trileptal) 150 MG tablet Take 1 tablet by mouth 2 (Two) Times a Day. (Patient taking differently: Take 150 mg by mouth Daily.) 60 tablet 1   • propranolol (INDERAL) 10 MG tablet Take 10 mg by mouth Daily.     • rosuvastatin (CRESTOR) 40 MG tablet Take 40 mg by mouth Every Night.     • sertraline (ZOLOFT) 100 MG tablet Take 1 & 1/2 tablets by mouth Daily. 45 tablet 1     No current facility-administered medications for this visit.       Review of Symptoms:    Review of Systems   Constitutional: Negative.    HENT: Negative.    Eyes: Negative.    Respiratory: Negative.    Cardiovascular: Negative.    Skin: Positive for skin lesions (small cuts on bottom of feet).   Neurological: Negative.    Psychiatric/Behavioral: Positive for depressed mood and stress. The patient is nervous/anxious.        Objective   Physical Exam:   Last menstrual period 02/17/2022, not currently breastfeeding.  There is no height or weight on file to calculate BMI.    Appearance:  female appears stated age, no acute distress noted.    Gait, Station, Strength: Steady, posture erect, WNL      Mental Status Exam:   Hygiene:   good  Cooperation:  Cooperative  Eye Contact:  Good  Psychomotor Behavior:  Appropriate  Affect:  Appropriate  Mood: sad  Hopelessness: Denies  Speech:  Normal  Thought Process:  Goal directed and Linear  Thought Content:  Normal  Suicidal:  None  Homicidal:  None  Hallucinations:  Visual   Delusion:  None  Memory:  Deficits  Orientation:   Person, Place, Time and Situation  Reliability:  good  Insight:  Fair  Judgement:  Fair  Impulse Control:  Good  Physical/Medical Issues:  Yes HTN, Diabetes, GERD     PHQ-Score Total:  PHQ-9 Total Score:      Lab Results:   Office Visit on 02/17/2022   Component Date Value Ref Range Status   • TSH 02/17/2022 0.497  0.270 - 4.200 uIU/mL Final   • Free T4 02/17/2022 0.97  0.93 - 1.70 ng/dL Final   • T3, Free 02/17/2022 3.43  2.00 - 4.40 pg/mL Final   • Thyroid Stimulating Immunoglobulin 02/17/2022 <0.10  0.00 - 0.55 IU/L Final   • Sed Rate 02/17/2022 20  0 - 20 mm/hr Final   • Hemoglobin A1C 02/17/2022 8.8  % Final   • Lot Number 02/17/2022 [id number]   Final   • Expiration Date 02/17/2022 10/21/2023   Final   • Glucose 02/17/2022 186* 70 - 130 mg/dL Final       Assessment/Plan   Problems Addressed this Visit     None      Visit Diagnoses     Bipolar disorder, in partial remission, most recent episode depressed (HCC)    -  Primary    Relevant Medications    buPROPion XL (WELLBUTRIN XL) 300 MG 24 hr tablet    lamoTRIgine (LaMICtal) 200 MG tablet    sertraline (ZOLOFT) 100 MG tablet    buPROPion XL (Wellbutrin XL) 150 MG 24 hr tablet    Generalized anxiety disorder        Relevant Medications    buPROPion XL (WELLBUTRIN XL) 300 MG 24 hr tablet    lamoTRIgine (LaMICtal) 200 MG tablet    sertraline (ZOLOFT) 100 MG tablet    buPROPion XL (Wellbutrin XL) 150 MG 24 hr tablet    Panic disorder with agoraphobia        Relevant Medications    buPROPion XL (WELLBUTRIN XL) 300 MG 24 hr tablet    lamoTRIgine (LaMICtal) 200 MG tablet    sertraline (ZOLOFT) 100 MG tablet    buPROPion XL (Wellbutrin XL) 150 MG 24 hr tablet    Medication management        Relevant Medications    buPROPion XL (WELLBUTRIN XL) 300 MG 24 hr tablet    lamoTRIgine (LaMICtal) 200 MG tablet    sertraline (ZOLOFT) 100 MG tablet      Diagnoses       Codes Comments    Bipolar disorder, in partial remission, most recent episode depressed (HCC)    -  Primary  ICD-10-CM: F31.75  ICD-9-CM: 296.55     Generalized anxiety disorder     ICD-10-CM: F41.1  ICD-9-CM: 300.02     Panic disorder with agoraphobia     ICD-10-CM: F40.01  ICD-9-CM: 300.21     Medication management     ICD-10-CM: Z79.899  ICD-9-CM: V58.69         Social History     Tobacco Use   Smoking Status Former Smoker   Smokeless Tobacco Never Used       JORGE reviewed and appropriate. Patient counseled on use of controlled substances.       -The benefits of a healthy diet and exercise were discussed with patient, especially the positive effects they have on mental health. Patient encouraged to consider lifestyle modification regarding  diet and exercise patterns to maximize results of mental health treatment.  -Reviewed previous available documentation  -Reviewed most recent available labs   -Lengthy discussion with patient on the possible side effects of antipsychotic medications including increased cholesterol, increased blood sugar, and possibility of weight gain.  Also discussed the need to monitor lab work associated with this.  The risk of muscle movement disorders with this class of medication was also discussed.      Visit Diagnoses:    ICD-10-CM ICD-9-CM   1. Bipolar disorder, in partial remission, most recent episode depressed (HCC)  F31.75 296.55   2. Generalized anxiety disorder  F41.1 300.02   3. Panic disorder with agoraphobia  F40.01 300.21   4. Medication management  Z79.899 V58.69         TREATMENT PLAN/GOALS: Continue supportive psychotherapy efforts and medications as indicated. Treatment and medication options discussed during today's visit. Patient acknowledged and verbally consented to continue with current treatment plan and was educated on the importance of compliance with treatment and follow-up appointments.    MEDICATION ISSUES:  Discussed medication options and treatment plan of prescribed medication as well as the risks, benefits, and side effects including potential falls, possible  impaired driving and metabolic adversities among others. Patient is agreeable to call the office with any worsening of symptoms or onset of side effects. Patient is agreeable to call 911 or go to the nearest ER should he/she begin having SI/HI.     MEDS ORDERED DURING VISIT:  New Medications Ordered This Visit   Medications   • buPROPion XL (WELLBUTRIN XL) 300 MG 24 hr tablet     Sig: Take 1 tablet by mouth Every Morning. Take with Bupropion  mg tablet for total dosage of 450 mg per day.     Dispense:  30 tablet     Refill:  1   • lamoTRIgine (LaMICtal) 200 MG tablet     Sig: Take 1 tablet by mouth Daily.     Dispense:  30 tablet     Refill:  1   • sertraline (ZOLOFT) 100 MG tablet     Sig: Take 1 & 1/2 tablets by mouth Daily.     Dispense:  45 tablet     Refill:  1   • buPROPion XL (Wellbutrin XL) 150 MG 24 hr tablet     Sig: Take 1 tablet by mouth Every Morning.     Dispense:  30 tablet     Refill:  1     Return in about 2 months (around 3/5/2022) for Recheck.    -Continue bupropion  mg 24-hour tablet take 1 tablet by mouth every morning for depression and anxiety  -Continue bupropion  mg 24-hour tablet take 1 tablet by mouth every morning for depression and anxiety  -Continue lamotrigine 200 mg tablet take 1 tablet by mouth daily for bipolar disorder.  -Hold Trileptal for now.  -Continue sertraline 100 mg tablet take 1.5 tablets by mouth daily for anxiety and depression.    Return in about 2 months (around 5/2/2022) for Recheck.       Prognosis: Guarded dependent on medication/follow up and treatment plan compliance.  Functionality: pt showing improvements in important areas of daily functioning.     Short-term goals: Patient will adhere to medication regimen and note continued improvement in symptoms over the next 3 months.   Long-term goals: Patient will be adherent to medication management and psychotherapy with continued improvement in symptoms over the next 6 months    I spent 30 minutes  caring for Antoinette on this date of service. This time includes time spent by me in the following activities: preparing for the visit, obtaining and/or reviewing a separately obtained history, performing a medically appropriate examination and/or evaluation, counseling and educating the patient/family/caregiver, ordering medications, tests, or procedures and documenting information in the medical record            This document has been electronically signed by CICI Ruby   March 2, 2022 13:17 EST

## 2022-03-16 ENCOUNTER — SPECIALTY PHARMACY (OUTPATIENT)
Dept: PHARMACY | Facility: HOSPITAL | Age: 37
End: 2022-03-16

## 2022-04-04 DIAGNOSIS — R10.9 ACUTE LEFT FLANK PAIN: Primary | ICD-10-CM

## 2022-04-05 RX ORDER — ONDANSETRON 4 MG/1
TABLET, FILM COATED ORAL
Qty: 20 TABLET | Refills: 3 | Status: SHIPPED | OUTPATIENT
Start: 2022-04-05 | End: 2022-07-09 | Stop reason: SDUPTHER

## 2022-04-11 ENCOUNTER — SPECIALTY PHARMACY (OUTPATIENT)
Dept: PHARMACY | Facility: HOSPITAL | Age: 37
End: 2022-04-11

## 2022-05-09 ENCOUNTER — SPECIALTY PHARMACY (OUTPATIENT)
Dept: PHARMACY | Facility: HOSPITAL | Age: 37
End: 2022-05-09

## 2022-05-09 DIAGNOSIS — Z79.899 MEDICATION MANAGEMENT: ICD-10-CM

## 2022-05-09 DIAGNOSIS — F41.1 GENERALIZED ANXIETY DISORDER: ICD-10-CM

## 2022-05-09 DIAGNOSIS — F40.01 PANIC DISORDER WITH AGORAPHOBIA: ICD-10-CM

## 2022-05-09 DIAGNOSIS — F31.75 BIPOLAR DISORDER, IN PARTIAL REMISSION, MOST RECENT EPISODE DEPRESSED: ICD-10-CM

## 2022-05-09 RX ORDER — BUPROPION HYDROCHLORIDE 150 MG/1
150 TABLET ORAL EVERY MORNING
Qty: 30 TABLET | Refills: 1 | Status: SHIPPED | OUTPATIENT
Start: 2022-05-09 | End: 2022-07-14 | Stop reason: SDUPTHER

## 2022-05-09 RX ORDER — BUPROPION HYDROCHLORIDE 300 MG/1
300 TABLET ORAL EVERY MORNING
Qty: 30 TABLET | Refills: 1 | Status: SHIPPED | OUTPATIENT
Start: 2022-05-09 | End: 2022-07-14 | Stop reason: SDUPTHER

## 2022-05-09 RX ORDER — LAMOTRIGINE 200 MG/1
200 TABLET ORAL DAILY
Qty: 30 TABLET | Refills: 1 | Status: SHIPPED | OUTPATIENT
Start: 2022-05-09 | End: 2022-07-14 | Stop reason: SDUPTHER

## 2022-05-09 RX ORDER — SERTRALINE HYDROCHLORIDE 100 MG/1
150 TABLET, FILM COATED ORAL DAILY
Qty: 45 TABLET | Refills: 1 | Status: SHIPPED | OUTPATIENT
Start: 2022-05-09 | End: 2022-07-14 | Stop reason: SDUPTHER

## 2022-05-26 ENCOUNTER — OFFICE VISIT (OUTPATIENT)
Dept: ENDOCRINOLOGY | Facility: CLINIC | Age: 37
End: 2022-05-26

## 2022-05-26 ENCOUNTER — SPECIALTY PHARMACY (OUTPATIENT)
Dept: PHARMACY | Facility: HOSPITAL | Age: 37
End: 2022-05-26

## 2022-05-26 VITALS
HEART RATE: 84 BPM | BODY MASS INDEX: 41.24 KG/M2 | OXYGEN SATURATION: 98 % | HEIGHT: 59 IN | WEIGHT: 204.6 LBS | DIASTOLIC BLOOD PRESSURE: 78 MMHG | SYSTOLIC BLOOD PRESSURE: 150 MMHG

## 2022-05-26 DIAGNOSIS — Z79.4 TYPE 2 DIABETES MELLITUS WITH HYPERGLYCEMIA, WITH LONG-TERM CURRENT USE OF INSULIN: Primary | ICD-10-CM

## 2022-05-26 DIAGNOSIS — E11.65 TYPE 2 DIABETES MELLITUS WITH HYPERGLYCEMIA, WITH LONG-TERM CURRENT USE OF INSULIN: Primary | ICD-10-CM

## 2022-05-26 LAB
EXPIRATION DATE: NORMAL
GLUCOSE BLDC GLUCOMTR-MCNC: 228 MG/DL (ref 70–130)
HBA1C MFR BLD: 8.7 %
Lab: NORMAL

## 2022-05-26 PROCEDURE — 83036 HEMOGLOBIN GLYCOSYLATED A1C: CPT | Performed by: NURSE PRACTITIONER

## 2022-05-26 PROCEDURE — 99213 OFFICE O/P EST LOW 20 MIN: CPT | Performed by: NURSE PRACTITIONER

## 2022-05-26 PROCEDURE — 82962 GLUCOSE BLOOD TEST: CPT | Performed by: NURSE PRACTITIONER

## 2022-05-26 PROCEDURE — 3052F HG A1C>EQUAL 8.0%<EQUAL 9.0%: CPT | Performed by: NURSE PRACTITIONER

## 2022-05-26 RX ORDER — INSULIN DEGLUDEC INJECTION 100 U/ML
50 INJECTION, SOLUTION SUBCUTANEOUS 2 TIMES DAILY
Qty: 90 ML | Refills: 1 | Status: SHIPPED | OUTPATIENT
Start: 2022-05-26 | End: 2022-09-12 | Stop reason: SDUPTHER

## 2022-05-26 RX ORDER — SITAGLIPTIN 100 MG/1
100 TABLET, FILM COATED ORAL DAILY
Qty: 90 TABLET | Refills: 1 | Status: SHIPPED | OUTPATIENT
Start: 2022-05-26 | End: 2022-09-12

## 2022-05-26 RX ORDER — INSULIN ASPART 100 [IU]/ML
10 INJECTION, SOLUTION INTRAVENOUS; SUBCUTANEOUS
Qty: 30 ML | Refills: 1 | Status: SHIPPED | OUTPATIENT
Start: 2022-05-26 | End: 2022-07-14

## 2022-05-26 RX ORDER — MODAFINIL 200 MG/1
200 TABLET ORAL DAILY
COMMUNITY
End: 2022-12-13

## 2022-05-26 NOTE — PROGRESS NOTES
Specialty Pharmacy Patient Management Program  Endocrinology Reassessment     Antoinette Pack is a 37 y.o. female with Type 2 Diabetes seen by an Endocrinology provider and enrolled in the Endocrinology Patient Management program offered by [facility].  A follow-up outreach was conducted, including assessment of continued therapy appropriateness, medication adherence, and side effect incidence and management for Insulin therapy, Metformin, Januvia, and CGM.    Patient is currently taking Novolog 5 units three times daily with meals, Tresiba 50 units in the morning and 50 units at night, Metformin 1,000 mg twice daily, and Januvia 100 mg daily. He uses FreeStyle Esvin to monitor BG with values between 200-300s. He reports no low BG <70.     Changes to Insurance Coverage or Financial Support  None - on patient assistance program     Relevant Past Medical History and Comorbidities  Relevant medical history and concomitant health conditions were discussed with the patient. The patient's chart has been reviewed for relevant past medical history and comorbid health conditions and updated as necessary.   Past Medical History:   Diagnosis Date   • Anxiety    • Depression    • Diabetes mellitus (HCC)    • Diverticulitis    • GERD (gastroesophageal reflux disease)    • Hypertension    • Kidney stone    • PCOS (polycystic ovarian syndrome)    • Sleep apnea      Social History     Socioeconomic History   • Marital status:    Tobacco Use   • Smoking status: Former Smoker   • Smokeless tobacco: Never Used   Vaping Use   • Vaping Use: Never used   Substance and Sexual Activity   • Alcohol use: No   • Drug use: No   • Sexual activity: Defer          Allergies  Known allergies and reactions were discussed with the patient. The patient's chart has been reviewed for allergy information and updated as necessary.   Dilantin [phenytoin], Keppra [levetiracetam], Meperidine, and Topamax  [topiramate]    Allergies reviewed by Sravani Perez [facility] on 5/26/2022 at  9:36 AM    Relevant Laboratory Values  A1C Last 3 Results    HGBA1C Last 3 Results 2/17/22 5/26/22   Hemoglobin A1C 8.8 8.7           Lab Results   Component Value Date    HGBA1C 8.7 05/26/2022     Lab Results   Component Value Date    GLUCOSE 225 (H) 12/27/2020    CALCIUM 9.1 12/27/2020     12/27/2020    K 4.4 12/27/2020    CO2 25.9 12/27/2020     12/27/2020    BUN 8 12/27/2020    CREATININE 0.50 (L) 02/23/2021    EGFRIFAFRI  09/05/2016      Comment:      <15 Indicative of kidney failure.    EGFRIFNONA 101 12/27/2020    BCR 12.1 12/27/2020    ANIONGAP 10.1 12/27/2020     Lab Results   Component Value Date    CHLPL 159 09/22/2015    TRIG 131 09/22/2015    HDL 38 (L) 09/22/2015    LDL 95 09/22/2015         Current Medication List  This medication list has been reviewed with the patient and evaluated for any interactions or necessary modifications/recommendations, and updated to include all prescription medications, OTC medications, and supplements the patient is currently taking.  This list reflects what is contained in the patient's profile, which has also been marked as reviewed to communicate to other providers it is the most up to date version of the patient's current medication therapy.     Current Outpatient Medications:   •  buPROPion XL (Wellbutrin XL) 150 MG 24 hr tablet, Take 1 tablet by mouth Every Morning., Disp: 30 tablet, Rfl: 1  •  buPROPion XL (WELLBUTRIN XL) 300 MG 24 hr tablet, Take 1 tablet by mouth Every Morning. Take with Bupropion  mg tablet for total dosage of 450 mg per day., Disp: 30 tablet, Rfl: 1  •  clobetasol (TEMOVATE) 0.05 % ointment, As Needed., Disp: , Rfl:   •  Continuous Blood Gluc  (FreeStyle Esvin 2 [place]) device, Use as directed to cotinuously monitor blood glucose., Disp: 1 each, Rfl: 0  •  Continuous Blood Gluc Sensor (FreeStyle Esvin 2 Sensor) misc, Use as directed and change  sensor Every 14 (Fourteen) Days., Disp: 6 each, Rfl: 1  •  fluticasone (FLONASE) 50 MCG/ACT nasal spray, 1 spray into the nostril(s) as directed by provider Daily As Needed., Disp: , Rfl:   •  ibuprofen (ADVIL,MOTRIN) 800 MG tablet, As Needed., Disp: , Rfl:   •  insulin aspart (NovoLOG FlexPen) 100 UNIT/ML solution pen-injector sc pen, Inject 10 Units under the skin into the appropriate area as directed 3 (Three) Times a Day With Meals., Disp: 30 mL, Rfl: 1  •  insulin degludec (Tresiba FlexTouch) 100 UNIT/ML solution pen-injector injection, Inject 50 Units under the skin into the appropriate area as directed 2 (Two) Times a Day., Disp: 90 mL, Rfl: 1  •  Januvia 100 MG tablet, Take 1 tablet by mouth Daily., Disp: 90 tablet, Rfl: 1  •  lamoTRIgine (LaMICtal) 200 MG tablet, Take 1 tablet by mouth Daily., Disp: 30 tablet, Rfl: 1  •  lisinopril (PRINIVIL,ZESTRIL) 40 MG tablet, Take 40 mg by mouth Daily., Disp: , Rfl:   •  metFORMIN (GLUCOPHAGE) 1000 MG tablet, Take 1 tablet by mouth 2 (Two) Times a Day With Meals., Disp: 180 tablet, Rfl: 1  •  modafinil (PROVIGIL) 200 MG tablet, Take 200 mg by mouth Daily., Disp: , Rfl:   •  omeprazole (priLOSEC) 40 MG capsule, Take 40 mg by mouth Daily., Disp: , Rfl:   •  ondansetron (ZOFRAN) 4 MG tablet, TAKE ONE TABLET BY MOUTH DAILY AS NEEDED FOR NAUSEA OR VOMITING, Disp: 20 tablet, Rfl: 3  •  rosuvastatin (CRESTOR) 40 MG tablet, Take 40 mg by mouth Every Night., Disp: , Rfl:   •  sertraline (ZOLOFT) 100 MG tablet, Take 1 & 1/2 tablets by mouth Daily., Disp: 45 tablet, Rfl: 1    Medicines reviewed by Sravani Perez [facility] on 5/26/2022 at  9:41 AM    Drug Interactions  · Coadministration of metformin and NSAIDs may increase the risk of acute renal failure and lactic acidosis.  · ACE inhibitors may enhance the adverse/toxic effect of metformin. This includes both a risk for hypoglycemia and for lactic acidosis.    Adverse Drug Reactions  • Adverse Reactions Experienced: None   • Plan for  ADR Management: Not Required    Hospitalizations and Urgent Care Since Last Assessment  • Hospitalizations or Admissions: None  • ED Visits: None   • Urgent Office Visits: None    Adherence and Self-Administration  Adherence related patient's specialty therapy was discussed with the patient. The Adherence segment of this outreach has been reviewed and updated.     • Ongoing or New Barriers to Patient Adherence and/or Self-Administration: None   • Methods for Supporting Patient Adherence and/or Self-Administration: None Required    Goals of Therapy  Goals related to the patient's specialty therapy was discussed with the patient. The Patient Goals segment of this outreach has been reviewed and updated.    Goals     • HEMOGLOBIN A1C < 7            Quality of Life Assessment   Quality of Life related to the patient's specialty therapy was discussed with the patient. The QOL segment of this outreach has been reviewed and updated.     Quality of Life Assessment  Quality of Life Improvement Scale: No change    Reassessment Plan & Follow-Up  1. Medication Therapy Changes: Patient's diabetes remains unchanged with A1C down to 8.7% from 8.8%. Increase Novolog to 10 units TID with meals.  2. Additional Plans, Therapy Recommendations, or Therapy Problems to Be Addressed: Patient needs refills on all medications. Will send in new RX to pharmacy and mail out to patient.  3. Pharmacist to perform regular reassessments no more than (6) months from the previous assessment.  4. Care Coordinator to set up future refill outreaches, coordinate prescription delivery, and escalate clinical questions to pharmacist.     Attestation  I attest that the specialty medication(s) addressed above are appropriate for the patient based on my reassessment.  If the prescribed therapy is at any point deemed not appropriate based on the current or future assessments, a consultation will be initiated with the patient's specialty care provider to  determine the best course of action. The revised plan of therapy will be documented along with any additional patient education provided.     Sravani Perez PharmD   5/26/2022  10:27 EDT

## 2022-05-26 NOTE — PATIENT INSTRUCTIONS
-Novolog 10 units three times day with meals.  Tresiba 50 twice day  Metformin 1000 mg twice day  Januvia 100 mg daily.

## 2022-05-26 NOTE — ASSESSMENT & PLAN NOTE
-Diabetes is not changed with A1C 8.8 to 8.7.   -Discussed the dietary and exercise guidelines with patient.  She has done diabetes education in the past but it has been a long time.  She is willing to speak with the diabetes educators again for dietary assistance.  -She is to continue using Freestyle Esvin CGM.  -Discussed the importance of keeping yearly eye exams.  -Continue Tresiba 50 units BID.  -Continue Novolog 10 units TID with meals.  -Continue Metformin 1,000 mg BID.  -Continue Januvia 100 mg QD.  -S/S hypoglycemia reviewed with Rule of 15's advised.  -Follow-up in 3 months.

## 2022-05-26 NOTE — PROGRESS NOTES
Chief Complaint   Patient presents with   • Diabetes        Antoinette Pack is a 37 y.o. female had concerns including Diabetes.     Birth state: [place]  Previous history of radiation to face/neck:no  Consuming foods high in iodine: no  Family history of thyroid complications:none    The following portions of the patient's history were reviewed and updated as appropriate: allergies, current medications, past family history, past medical history, past social history, past surgical history and problem list.    Diabetes was diagnosed 8-9 years ago.  Complications include none.  Last ophtho exam was 2021, Dr. Bruno Office.  Current medications for diabetes include Januvia 100 mg daily, Metformin 1,000 mg BID, Novolog 5 units TID with meals, Tresiba 50 units BID.  She also takes multiple doses of short acting insulin throughout the day to keep her blood sugar from getting too high.  Past meds: Trulicity, GI Issues, has problems with yeast, Glipizide-unsure why it was stopped.  She checks her blood sugar 2-3 times per day.   Hypos:rarely  Fasting range: 150-200+  Can be anywhere from 300-400    Diet: Is not really watching what she is eating.    Past Medical History:   Diagnosis Date   • Anxiety    • Depression    • Diabetes mellitus (HCC)    • Diverticulitis    • GERD (gastroesophageal reflux disease)    • Hypertension    • Kidney stone    • PCOS (polycystic ovarian syndrome)    • Sleep apnea      Past Surgical History:   Procedure Laterality Date   • CARDIAC CATHETERIZATION     • CARPAL TUNNEL RELEASE     • COLONOSCOPY     • ENDOSCOPY     • FRACTURE SURGERY     • HARDWARE REMOVAL      WRIST   • TENDON REPAIR      HAND      Family History   Problem Relation Age of Onset   • Heart disease Mother    • COPD Mother    • Heart disease Father    • COPD Father       Social History     Socioeconomic History   • Marital status:    Tobacco Use   • Smoking status: Former Smoker   • Smokeless tobacco: Never Used   Vaping Use    • Vaping Use: Never used   Substance and Sexual Activity   • Alcohol use: No   • Drug use: No   • Sexual activity: Defer      Allergies   Allergen Reactions   • Dilantin [Phenytoin] Mental Status Change   • Keppra [Levetiracetam] Mental Status Change   • Meperidine Rash   • Topamax [Topiramate] Mental Status Change      Current Outpatient Medications on File Prior to Visit   Medication Sig Dispense Refill   • buPROPion XL (Wellbutrin XL) 150 MG 24 hr tablet Take 1 tablet by mouth Every Morning. 30 tablet 1   • buPROPion XL (WELLBUTRIN XL) 300 MG 24 hr tablet Take 1 tablet by mouth Every Morning. Take with Bupropion  mg tablet for total dosage of 450 mg per day. 30 tablet 1   • clobetasol (TEMOVATE) 0.05 % ointment As Needed.     • Continuous Blood Gluc  (FreeStyle Esvin 2 [place]) device Use as directed to cotinuously monitor blood glucose. 1 each 0   • fluticasone (FLONASE) 50 MCG/ACT nasal spray 1 spray into the nostril(s) as directed by provider Daily As Needed.     • ibuprofen (ADVIL,MOTRIN) 800 MG tablet As Needed.     • lamoTRIgine (LaMICtal) 200 MG tablet Take 1 tablet by mouth Daily. 30 tablet 1   • lisinopril (PRINIVIL,ZESTRIL) 40 MG tablet Take 40 mg by mouth Daily.     • omeprazole (priLOSEC) 40 MG capsule Take 40 mg by mouth Daily.     • ondansetron (ZOFRAN) 4 MG tablet TAKE ONE TABLET BY MOUTH DAILY AS NEEDED FOR NAUSEA OR VOMITING 20 tablet 3   • rosuvastatin (CRESTOR) 40 MG tablet Take 40 mg by mouth Every Night.     • sertraline (ZOLOFT) 100 MG tablet Take 1 & 1/2 tablets by mouth Daily. 45 tablet 1   • [DISCONTINUED] Continuous Blood Gluc Sensor (FreeStyle Esvin 2 Sensor) misc Use as directed and change sensor Every 14 (Fourteen) Days. 2 each 5   • [DISCONTINUED] insulin aspart (NovoLOG FlexPen) 100 UNIT/ML solution pen-injector sc pen Inject 5 Units under the skin into the appropriate area as directed 3 (Three) Times a Day With Meals. 15 mL 5   • [DISCONTINUED] insulin degludec  "(Tresiba FlexTouch) 100 UNIT/ML solution pen-injector injection Inject 50 Units under the skin into the appropriate area as directed every night at bedtime. (Patient taking differently: Inject 50 Units under the skin into the appropriate area as directed 2 (Two) Times a Day. 50 units in the morning and 50 units at night) 15 mL 5   • [DISCONTINUED] Januvia 100 MG tablet Take 1 tablet by mouth Daily. 30 tablet 5   • [DISCONTINUED] metFORMIN (GLUCOPHAGE) 1000 MG tablet Take 1 tablet by mouth 2 (Two) Times a Day With Meals. 60 tablet 5   • [DISCONTINUED] OXcarbazepine (Trileptal) 150 MG tablet Take 1 tablet by mouth 2 (Two) Times a Day. (Patient taking differently: Take 150 mg by mouth Daily.) 60 tablet 1   • [DISCONTINUED] propranolol (INDERAL) 10 MG tablet Take 10 mg by mouth Daily.       No current facility-administered medications on file prior to visit.      Review of Systems   Constitutional: Positive for diaphoresis and fatigue. Negative for unexpected weight gain and unexpected weight loss.   HENT:        Neck tenderness   Eyes: Positive for blurred vision and visual disturbance.   Cardiovascular: Positive for palpitations.   Gastrointestinal: Positive for constipation.   Endocrine: Positive for heat intolerance, polydipsia, polyphagia and polyuria. Negative for cold intolerance.   Skin: Positive for dry skin.        Hair thinning   Neurological: Positive for tremors.   Psychiatric/Behavioral: Positive for agitation and sleep disturbance. The patient is nervous/anxious.    All other systems reviewed and are negative.     /78 (BP Location: Left arm, Patient Position: Sitting, Cuff Size: Adult)   Pulse 84   Ht 149.9 cm (59\")   Wt 92.8 kg (204 lb 9.6 oz)   SpO2 98%   BMI 41.32 kg/m²      Physical Exam  Vitals reviewed.   Constitutional:       Appearance: Normal appearance.   Eyes:      Extraocular Movements: Extraocular movements intact.   Cardiovascular:      Rate and Rhythm: Normal rate.      Pulses: " Normal pulses.   Pulmonary:      Effort: Pulmonary effort is normal.   Skin:     General: Skin is warm.   Neurological:      Mental Status: She is alert and oriented to person, place, and time.   Psychiatric:         Mood and Affect: Mood normal.         Behavior: Behavior normal.         Thought Content: Thought content normal.         Judgment: Judgment normal.       Labs/Imaging  CMP  Lab Results   Component Value Date    GLUCOSE 225 (H) 12/27/2020    BUN 8 12/27/2020    CREATININE 0.50 (L) 02/23/2021    EGFRIFNONA 101 12/27/2020    EGFRIFAFRI  09/05/2016      Comment:      <15 Indicative of kidney failure.    BCR 12.1 12/27/2020    K 4.4 12/27/2020    CO2 25.9 12/27/2020    CALCIUM 9.1 12/27/2020    ALBUMIN 4.03 12/27/2020    LABIL2 1.3 (L) 02/05/2016    AST 32 12/27/2020    ALT 28 12/27/2020        CBC w/DIFF   Lab Results   Component Value Date    WBC 8.88 12/27/2020    RBC 4.36 12/27/2020    HGB 12.5 12/27/2020    HCT 36.0 12/27/2020    MCV 82.6 12/27/2020    MCH 28.7 12/27/2020    MCHC 34.7 12/27/2020    RDW 12.0 (L) 12/27/2020    RDWSD 36.0 (L) 12/27/2020    MPV 9.6 12/27/2020     12/27/2020    NEUTRORELPCT 68.9 12/27/2020    LYMPHORELPCT 23.0 12/27/2020    MONORELPCT 4.6 (L) 12/27/2020    EOSRELPCT 2.1 12/27/2020    BASORELPCT 0.3 12/27/2020    AUTOIGPER 1.1 (H) 12/27/2020    NEUTROABS 6.11 12/27/2020    LYMPHSABS 2.04 12/27/2020    MONOSABS 0.41 12/27/2020    EOSABS 0.19 12/27/2020    BASOSABS 0.03 12/27/2020    AUTOIGNUM 0.10 (H) 12/27/2020    NRBC 0.0 12/27/2020       TSH  Lab Results   Component Value Date    TSH 0.497 02/17/2022    TSH 0.971 09/22/2015       T4  Lab Results   Component Value Date    FREET4 0.97 02/17/2022    FREET4 1.20 09/22/2015     No results found for: G0AKTWL    T3  Lab Results   Component Value Date    T3FREE 3.43 02/17/2022     No results found for: W0WWLRS    TRAb  No results found for: TSURCPAB    TPO  No results found for: THYROIDAB    No valid procedures  specified.    Assessment and Plan    Diagnoses and all orders for this visit:    1. Type 2 diabetes mellitus with hyperglycemia, with long-term current use of insulin (HCC) (Primary)  Assessment & Plan:  -Diabetes is not changed with A1C 8.8 to 8.7.   -Discussed the dietary and exercise guidelines with patient.  She has done diabetes education in the past but it has been a long time.  She is willing to speak with the diabetes educators again for dietary assistance.  -She is to continue using Freestyle Esvin CGM.  -Discussed the importance of keeping yearly eye exams.  -Continue Tresiba 50 units BID.  -Continue Novolog 10 units TID with meals.  -Continue Metformin 1,000 mg BID.  -Continue Januvia 100 mg QD.  -S/S hypoglycemia reviewed with Rule of 15's advised.  -Follow-up in 3 months.    Orders:  -     POC Glucose Fingerstick  -     POC Glycosylated Hemoglobin (Hb A1C)       Return in about 3 months (around 8/26/2022) for Follow-up appointment, A1C. The patient was instructed to contact the clinic with any interval questions or concerns.    This document has been electronically signed by CICI Veliz  May 26, 2022 10:35 EDT  Endocrinology

## 2022-07-09 ENCOUNTER — APPOINTMENT (OUTPATIENT)
Dept: CT IMAGING | Facility: HOSPITAL | Age: 37
End: 2022-07-09

## 2022-07-09 ENCOUNTER — HOSPITAL ENCOUNTER (EMERGENCY)
Facility: HOSPITAL | Age: 37
Discharge: HOME | End: 2022-07-09
Attending: EMERGENCY MEDICINE | Admitting: EMERGENCY MEDICINE

## 2022-07-09 ENCOUNTER — APPOINTMENT (OUTPATIENT)
Dept: ULTRASOUND IMAGING | Facility: HOSPITAL | Age: 37
End: 2022-07-09

## 2022-07-09 VITALS
HEIGHT: 59 IN | RESPIRATION RATE: 18 BRPM | OXYGEN SATURATION: 99 % | BODY MASS INDEX: 40.32 KG/M2 | DIASTOLIC BLOOD PRESSURE: 100 MMHG | WEIGHT: 200 LBS | TEMPERATURE: 97.7 F | SYSTOLIC BLOOD PRESSURE: 139 MMHG | HEART RATE: 88 BPM

## 2022-07-09 DIAGNOSIS — R10.9 ACUTE LEFT FLANK PAIN: ICD-10-CM

## 2022-07-09 DIAGNOSIS — K59.00 CONSTIPATION, UNSPECIFIED CONSTIPATION TYPE: ICD-10-CM

## 2022-07-09 DIAGNOSIS — R10.9 ABDOMINAL PAIN, UNSPECIFIED ABDOMINAL LOCATION: Primary | ICD-10-CM

## 2022-07-09 LAB
ALBUMIN SERPL-MCNC: 4.39 G/DL (ref 3.5–5.2)
ALBUMIN/GLOB SERPL: 1.4 G/DL
ALP SERPL-CCNC: 81 U/L (ref 39–117)
ALT SERPL W P-5'-P-CCNC: 35 U/L (ref 1–33)
ANION GAP SERPL CALCULATED.3IONS-SCNC: 11.3 MMOL/L (ref 5–15)
AST SERPL-CCNC: 28 U/L (ref 1–32)
BASOPHILS # BLD AUTO: 0.02 10*3/MM3 (ref 0–0.2)
BASOPHILS NFR BLD AUTO: 0.2 % (ref 0–1.5)
BILIRUB SERPL-MCNC: 0.5 MG/DL (ref 0–1.2)
BILIRUB UR QL STRIP: NEGATIVE
BUN SERPL-MCNC: 9 MG/DL (ref 6–20)
BUN/CREAT SERPL: 14.3 (ref 7–25)
CALCIUM SPEC-SCNC: 9.2 MG/DL (ref 8.6–10.5)
CHLORIDE SERPL-SCNC: 99 MMOL/L (ref 98–107)
CLARITY UR: CLEAR
CO2 SERPL-SCNC: 23.7 MMOL/L (ref 22–29)
COLOR UR: YELLOW
CREAT SERPL-MCNC: 0.63 MG/DL (ref 0.57–1)
CRP SERPL-MCNC: 0.62 MG/DL (ref 0–0.5)
DEPRECATED RDW RBC AUTO: 38.4 FL (ref 37–54)
EGFRCR SERPLBLD CKD-EPI 2021: 115.9 ML/MIN/1.73
EOSINOPHIL # BLD AUTO: 0.11 10*3/MM3 (ref 0–0.4)
EOSINOPHIL NFR BLD AUTO: 1.4 % (ref 0.3–6.2)
ERYTHROCYTE [DISTWIDTH] IN BLOOD BY AUTOMATED COUNT: 12.7 % (ref 12.3–15.4)
ERYTHROCYTE [SEDIMENTATION RATE] IN BLOOD: 16 MM/HR (ref 0–20)
GLOBULIN UR ELPH-MCNC: 3.1 GM/DL
GLUCOSE SERPL-MCNC: 225 MG/DL (ref 65–99)
GLUCOSE UR STRIP-MCNC: ABNORMAL MG/DL
HCG SERPL QL: NEGATIVE
HCT VFR BLD AUTO: 39.6 % (ref 34–46.6)
HGB BLD-MCNC: 14 G/DL (ref 12–15.9)
HGB UR QL STRIP.AUTO: NEGATIVE
HOLD SPECIMEN: NORMAL
IMM GRANULOCYTES # BLD AUTO: 0.03 10*3/MM3 (ref 0–0.05)
IMM GRANULOCYTES NFR BLD AUTO: 0.4 % (ref 0–0.5)
KETONES UR QL STRIP: NEGATIVE
LEUKOCYTE ESTERASE UR QL STRIP.AUTO: NEGATIVE
LIPASE SERPL-CCNC: 28 U/L (ref 13–60)
LYMPHOCYTES # BLD AUTO: 2.53 10*3/MM3 (ref 0.7–3.1)
LYMPHOCYTES NFR BLD AUTO: 31.4 % (ref 19.6–45.3)
MCH RBC QN AUTO: 29.6 PG (ref 26.6–33)
MCHC RBC AUTO-ENTMCNC: 35.4 G/DL (ref 31.5–35.7)
MCV RBC AUTO: 83.7 FL (ref 79–97)
MONOCYTES # BLD AUTO: 0.39 10*3/MM3 (ref 0.1–0.9)
MONOCYTES NFR BLD AUTO: 4.8 % (ref 5–12)
NEUTROPHILS NFR BLD AUTO: 4.99 10*3/MM3 (ref 1.7–7)
NEUTROPHILS NFR BLD AUTO: 61.8 % (ref 42.7–76)
NITRITE UR QL STRIP: NEGATIVE
NRBC BLD AUTO-RTO: 0 /100 WBC (ref 0–0.2)
PH UR STRIP.AUTO: 7.5 [PH] (ref 5–8)
PLATELET # BLD AUTO: 125 10*3/MM3 (ref 140–450)
PMV BLD AUTO: 9.8 FL (ref 6–12)
POTASSIUM SERPL-SCNC: 4.4 MMOL/L (ref 3.5–5.2)
PROT SERPL-MCNC: 7.5 G/DL (ref 6–8.5)
PROT UR QL STRIP: NEGATIVE
QT INTERVAL: 358 MS
QTC INTERVAL: 442 MS
RBC # BLD AUTO: 4.73 10*6/MM3 (ref 3.77–5.28)
SODIUM SERPL-SCNC: 134 MMOL/L (ref 136–145)
SP GR UR STRIP: 1.01 (ref 1–1.03)
TROPONIN T SERPL-MCNC: <0.01 NG/ML (ref 0–0.03)
UROBILINOGEN UR QL STRIP: ABNORMAL
WBC NRBC COR # BLD: 8.07 10*3/MM3 (ref 3.4–10.8)
WHOLE BLOOD HOLD COAG: NORMAL
WHOLE BLOOD HOLD SPECIMEN: NORMAL

## 2022-07-09 PROCEDURE — 25010000002 ONDANSETRON PER 1 MG: Performed by: EMERGENCY MEDICINE

## 2022-07-09 PROCEDURE — 74177 CT ABD & PELVIS W/CONTRAST: CPT | Performed by: RADIOLOGY

## 2022-07-09 PROCEDURE — 85652 RBC SED RATE AUTOMATED: CPT | Performed by: PHYSICIAN ASSISTANT

## 2022-07-09 PROCEDURE — 84484 ASSAY OF TROPONIN QUANT: CPT | Performed by: PHYSICIAN ASSISTANT

## 2022-07-09 PROCEDURE — 96374 THER/PROPH/DIAG INJ IV PUSH: CPT

## 2022-07-09 PROCEDURE — 84703 CHORIONIC GONADOTROPIN ASSAY: CPT | Performed by: PHYSICIAN ASSISTANT

## 2022-07-09 PROCEDURE — 85025 COMPLETE CBC W/AUTO DIFF WBC: CPT | Performed by: PHYSICIAN ASSISTANT

## 2022-07-09 PROCEDURE — 80053 COMPREHEN METABOLIC PANEL: CPT | Performed by: PHYSICIAN ASSISTANT

## 2022-07-09 PROCEDURE — 86140 C-REACTIVE PROTEIN: CPT | Performed by: PHYSICIAN ASSISTANT

## 2022-07-09 PROCEDURE — 96375 TX/PRO/DX INJ NEW DRUG ADDON: CPT

## 2022-07-09 PROCEDURE — 93005 ELECTROCARDIOGRAM TRACING: CPT | Performed by: PHYSICIAN ASSISTANT

## 2022-07-09 PROCEDURE — 36415 COLL VENOUS BLD VENIPUNCTURE: CPT

## 2022-07-09 PROCEDURE — 83690 ASSAY OF LIPASE: CPT | Performed by: PHYSICIAN ASSISTANT

## 2022-07-09 PROCEDURE — 76705 ECHO EXAM OF ABDOMEN: CPT

## 2022-07-09 PROCEDURE — 25010000002 MORPHINE PER 10 MG: Performed by: EMERGENCY MEDICINE

## 2022-07-09 PROCEDURE — 74177 CT ABD & PELVIS W/CONTRAST: CPT

## 2022-07-09 PROCEDURE — 81003 URINALYSIS AUTO W/O SCOPE: CPT | Performed by: PHYSICIAN ASSISTANT

## 2022-07-09 PROCEDURE — 25010000002 IOPAMIDOL 61 % SOLUTION: Performed by: EMERGENCY MEDICINE

## 2022-07-09 PROCEDURE — 99283 EMERGENCY DEPT VISIT LOW MDM: CPT

## 2022-07-09 PROCEDURE — 93010 ELECTROCARDIOGRAM REPORT: CPT | Performed by: SPECIALIST

## 2022-07-09 PROCEDURE — 76705 ECHO EXAM OF ABDOMEN: CPT | Performed by: RADIOLOGY

## 2022-07-09 RX ORDER — ONDANSETRON 4 MG/1
4 TABLET, FILM COATED ORAL EVERY 8 HOURS PRN
Qty: 15 TABLET | Refills: 0 | Status: SHIPPED | OUTPATIENT
Start: 2022-07-09 | End: 2022-12-13

## 2022-07-09 RX ORDER — POLYETHYLENE GLYCOL 3350 17 G/17G
17 POWDER, FOR SOLUTION ORAL DAILY
Qty: 578 G | Refills: 0 | Status: SHIPPED | OUTPATIENT
Start: 2022-07-09 | End: 2022-12-13

## 2022-07-09 RX ORDER — MAGNESIUM CARB/ALUMINUM HYDROX 105-160MG
296 TABLET,CHEWABLE ORAL ONCE
Status: COMPLETED | OUTPATIENT
Start: 2022-07-09 | End: 2022-07-09

## 2022-07-09 RX ORDER — SODIUM CHLORIDE 0.9 % (FLUSH) 0.9 %
10 SYRINGE (ML) INJECTION AS NEEDED
Status: DISCONTINUED | OUTPATIENT
Start: 2022-07-09 | End: 2022-07-09 | Stop reason: HOSPADM

## 2022-07-09 RX ORDER — ONDANSETRON 2 MG/ML
4 INJECTION INTRAMUSCULAR; INTRAVENOUS ONCE
Status: COMPLETED | OUTPATIENT
Start: 2022-07-09 | End: 2022-07-09

## 2022-07-09 RX ADMIN — MORPHINE SULFATE 4 MG: 4 INJECTION, SOLUTION INTRAMUSCULAR; INTRAVENOUS at 13:23

## 2022-07-09 RX ADMIN — IOPAMIDOL 87 ML: 612 INJECTION, SOLUTION INTRAVENOUS at 14:14

## 2022-07-09 RX ADMIN — ONDANSETRON 4 MG: 2 INJECTION INTRAMUSCULAR; INTRAVENOUS at 13:23

## 2022-07-09 RX ADMIN — MAGNESIUM CITRATE 296 ML: 1.75 LIQUID ORAL at 15:26

## 2022-07-13 NOTE — PROGRESS NOTES
"This provider is located at the [facility] (through [facility]), [address], [place], [address] using a secure [employer] Video Visit through [email or web address]. Patient is being seen remotely via telehealth at their home address in [place], and stated they are in a secure environment for this session. The patient's condition being diagnosed/treated is appropriate for telemedicine. The provider identified herself as well as her credentials.   The patient, and/or patients guardian, consent to be seen remotely, and when consent is given they understand that the consent allows for patient identifiable information to be sent to a third party as needed.   They may refuse to be seen remotely at any time. The electronic data is encrypted and password protected, and the patient and/or guardian has been advised of the potential risks to privacy not withstanding such measures.        Subjective   Antoinette Pack is a 37 y.o. female who presents today for follow up    Chief Complaint:  Bipolar disorder, depression, anxiety    History of Present Illness:   Today for follow up visit. She is taking her medication as prescribed, denies SE. Depression rated 7/10, anxiety rated 6/10, with 10 being the worst, moods rated 7/10, with 10 being the worst.            She states she hasn't been taking Trileptal, she didn't get it at the pharmacy and hasn't taken it in about a month. She cannot tell a significant difference by not taking it. Things have been going ok. Her insulin has been changed. PCP is Shawna Lambert, last saw her in November, she is seeing endocrinologist in [place], they are managing her diabetes and thyroid.     Sleeping is good, she states she saw her \"sleep apnea\" physician yesterday, he gave her a medication \"to help her stay awake\". Denies NM. Appetite is good. She states her  is about the same.  Denies SI/HI/AH. Her visual hallucinations have not changed.   Denies " thoughts of self-harm.  Chronic health issues, no acute physical or medical issues today.          The following portions of the patient's history were reviewed and updated as appropriate: allergies, current medications, past family history, past medical history, past social history, past surgical history and problem list.      Past Medical History:  Past Medical History:   Diagnosis Date   • Anxiety    • Depression    • Diabetes mellitus (HCC)    • Diverticulitis    • GERD (gastroesophageal reflux disease)    • Hypertension    • Kidney stone    • PCOS (polycystic ovarian syndrome)    • Sleep apnea        Social History:  Social History     Socioeconomic History   • Marital status:    Tobacco Use   • Smoking status: Former Smoker   • Smokeless tobacco: Never Used   Vaping Use   • Vaping Use: Never used   Substance and Sexual Activity   • Alcohol use: No   • Drug use: No   • Sexual activity: Defer       Family History:  Family History   Problem Relation Age of Onset   • Heart disease Mother    • COPD Mother    • Heart disease Father    • COPD Father        Past Surgical History:  Past Surgical History:   Procedure Laterality Date   • CARDIAC CATHETERIZATION     • CARPAL TUNNEL RELEASE     • COLONOSCOPY     • ENDOSCOPY     • FRACTURE SURGERY     • HARDWARE REMOVAL      WRIST   • TENDON REPAIR      HAND       Problem List:  Patient Active Problem List   Diagnosis   • Body mass index (BMI) 40.0-44.9, adult   • Simple goiter   • Type 2 diabetes mellitus with hyperglycemia, with long-term current use of insulin (HCC)   • Type 2 diabetes mellitus with hyperglycemia, with long-term current use of insulin (HCC)        Allergy:   Allergies   Allergen Reactions   • Dilantin [Phenytoin] Mental Status Change   • Keppra [Levetiracetam] Mental Status Change   • Meperidine Rash   • Topamax [Topiramate] Mental Status Change        Current Medications:   Current Outpatient Medications   Medication Sig Dispense Refill   •  buPROPion XL (Wellbutrin XL) 150 MG 24 hr tablet Take 1 tablet by mouth Every Morning. 30 tablet 1   • buPROPion XL (WELLBUTRIN XL) 300 MG 24 hr tablet Take 1 tablet by mouth Every Morning. Take with Bupropion  mg tablet for total dosage of 450 mg per day. 30 tablet 1   • clobetasol (TEMOVATE) 0.05 % ointment As Needed.     • Continuous Blood Gluc  (FreeStyle Esvin 2 Rescue) device Use as directed to cotinuously monitor blood glucose. 1 each 0   • Continuous Blood Gluc Sensor (FreeStyle Esvin 2 Sensor) misc Use as directed and change sensor Every 14 (Fourteen) Days. 6 each 1   • diclofenac (VOLTAREN) 50 MG EC tablet Take 1 tablet by mouth 3 (Three) Times a Day As Needed (pain). 20 tablet 0   • fluticasone (FLONASE) 50 MCG/ACT nasal spray 1 spray into the nostril(s) as directed by provider Daily As Needed.     • ibuprofen (ADVIL,MOTRIN) 800 MG tablet As Needed.     • insulin aspart (NovoLOG FlexPen) 100 UNIT/ML solution pen-injector sc pen Inject 10 Units under the skin into the appropriate area as directed 3 (Three) Times a Day With Meals. 30 mL 1   • insulin degludec (Tresiba FlexTouch) 100 UNIT/ML solution pen-injector injection Inject 50 Units under the skin into the appropriate area as directed 2 (Two) Times a Day. 90 mL 1   • Januvia 100 MG tablet Take 1 tablet by mouth Daily. 90 tablet 1   • lamoTRIgine (LaMICtal) 200 MG tablet Take 1 tablet by mouth Daily. 30 tablet 1   • lisinopril (PRINIVIL,ZESTRIL) 40 MG tablet Take 1 tablet by mouth Daily. 90 tablet 0   • metFORMIN (GLUCOPHAGE) 1000 MG tablet Take 1 tablet by mouth 2 (Two) Times a Day With Meals. 180 tablet 1   • modafinil (PROVIGIL) 200 MG tablet Take 200 mg by mouth Daily.     • omeprazole (priLOSEC) 40 MG capsule TAKE ONE (1) CAPSULE BY MOUTH EVERY DAY BEFORE A MEAL 90 capsule 1   • ondansetron (ZOFRAN) 4 MG tablet Take 1 tablet by mouth Every 8 (Eight) Hours As Needed for Nausea or Vomiting. 15 tablet 0   • polyethylene glycol (MIRALAX)  17 GM/SCOOP powder Take 17 g by mouth Daily. 578 g 0   • rosuvastatin (CRESTOR) 40 MG tablet Take 1 tablet by mouth Every Night at bedtime 90 tablet 0   • sertraline (ZOLOFT) 100 MG tablet Take 1 & 1/2 tablets by mouth Daily. 45 tablet 1     No current facility-administered medications for this visit.       Review of Symptoms:    Review of Systems   Constitutional: Negative.    HENT: Negative.    Eyes: Negative.    Respiratory: Negative.    Cardiovascular: Negative.    Neurological: Negative.    Psychiatric/Behavioral: Positive for depressed mood. The patient is nervous/anxious.    All other systems reviewed and are negative.        Physical Exam:   not currently breastfeeding.  There is no height or weight on file to calculate BMI.    Appearance:  female appears stated age, no acute distress noted.    Gait, Station, Strength: Steady, posture erect, WNL      Mental Status Exam:  Hygiene:   good  Cooperation:  Cooperative  Eye Contact:  Good  Psychomotor Behavior:  Appropriate  Affect:  Appropriate  Mood: sad  Hopelessness: Denies  Speech:  Normal  Thought Process:  Goal directed and Linear  Thought Content:  Normal  Suicidal:  None  Homicidal:  None  Hallucinations:  Visual  Delusion:  None  Memory:  Deficits  Orientation:  Person, Place, Time and Situation  Reliability:  good  Insight:  Fair  Judgement:  Fair  Impulse Control:  Fair  Physical/Medical Issues:  Yes DIABETES, HTN, GERD     PHQ-Score Total:  PHQ-9 Total Score:        Lab Results:   Admission on 07/09/2022, Discharged on 07/09/2022   Component Date Value Ref Range Status   • Glucose 07/09/2022 225 (A) 65 - 99 mg/dL Final   • BUN 07/09/2022 9  6 - 20 mg/dL Final   • Creatinine 07/09/2022 0.63  0.57 - 1.00 mg/dL Final   • Sodium 07/09/2022 134 (A) 136 - 145 mmol/L Final   • Potassium 07/09/2022 4.4  3.5 - 5.2 mmol/L Final   • Chloride 07/09/2022 99  98 - 107 mmol/L Final   • CO2 07/09/2022 23.7  22.0 - 29.0 mmol/L Final   • Calcium 07/09/2022 9.2   8.6 - 10.5 mg/dL Final   • Total Protein 07/09/2022 7.5  6.0 - 8.5 g/dL Final   • Albumin 07/09/2022 4.39  3.50 - 5.20 g/dL Final   • ALT (SGPT) 07/09/2022 35 (A) 1 - 33 U/L Final   • AST (SGOT) 07/09/2022 28  1 - 32 U/L Final   • Alkaline Phosphatase 07/09/2022 81  39 - 117 U/L Final   • Total Bilirubin 07/09/2022 0.5  0.0 - 1.2 mg/dL Final   • Globulin 07/09/2022 3.1  gm/dL Final   • A/G Ratio 07/09/2022 1.4  g/dL Final   • BUN/Creatinine Ratio 07/09/2022 14.3  7.0 - 25.0 Final   • Anion Gap 07/09/2022 11.3  5.0 - 15.0 mmol/L Final   • eGFR 07/09/2022 115.9  >60.0 mL/min/1.73 Final    National Kidney Foundation and American Society of Nephrology (ASN) Task Force recommended calculation based on the Chronic Kidney Disease Epidemiology Collaboration (CKD-EPI) equation refit without adjustment for race.   • Lipase 07/09/2022 28  13 - 60 U/L Final   • HCG Qualitative 07/09/2022 Negative  Negative Final   • Color, UA 07/09/2022 Yellow  Yellow, Straw Final   • Appearance, UA 07/09/2022 Clear  Clear Final   • pH, UA 07/09/2022 7.5  5.0 - 8.0 Final   • Specific Gravity, UA 07/09/2022 1.014  1.005 - 1.030 Final   • Glucose, UA 07/09/2022 250 mg/dL (1+) (A) Negative Final   • Ketones, UA 07/09/2022 Negative  Negative Final   • Bilirubin, UA 07/09/2022 Negative  Negative Final   • Blood, UA 07/09/2022 Negative  Negative Final   • Protein, UA 07/09/2022 Negative  Negative Final   • Leuk Esterase, UA 07/09/2022 Negative  Negative Final   • Nitrite, UA 07/09/2022 Negative  Negative Final   • Urobilinogen, UA 07/09/2022 0.2 E.U./dL  0.2 - 1.0 E.U./dL Final   • C-Reactive Protein 07/09/2022 0.62 (A) 0.00 - 0.50 mg/dL Final   • Sed Rate 07/09/2022 16  0 - 20 mm/hr Final   • QT Interval 07/09/2022 358  ms Final   • QTC Interval 07/09/2022 442  ms Final   • Troponin T 07/09/2022 <0.010  0.000 - 0.030 ng/mL Final   • WBC 07/09/2022 8.07  3.40 - 10.80 10*3/mm3 Final   • RBC 07/09/2022 4.73  3.77 - 5.28 10*6/mm3 Final   • Hemoglobin  07/09/2022 14.0  12.0 - 15.9 g/dL Final   • Hematocrit 07/09/2022 39.6  34.0 - 46.6 % Final   • MCV 07/09/2022 83.7  79.0 - 97.0 fL Final   • MCH 07/09/2022 29.6  26.6 - 33.0 pg Final   • MCHC 07/09/2022 35.4  31.5 - 35.7 g/dL Final   • RDW 07/09/2022 12.7  12.3 - 15.4 % Final   • RDW-SD 07/09/2022 38.4  37.0 - 54.0 fl Final   • MPV 07/09/2022 9.8  6.0 - 12.0 fL Final   • Platelets 07/09/2022 125 (A) 140 - 450 10*3/mm3 Final   • Neutrophil % 07/09/2022 61.8  42.7 - 76.0 % Final   • Lymphocyte % 07/09/2022 31.4  19.6 - 45.3 % Final   • Monocyte % 07/09/2022 4.8 (A) 5.0 - 12.0 % Final   • Eosinophil % 07/09/2022 1.4  0.3 - 6.2 % Final   • Basophil % 07/09/2022 0.2  0.0 - 1.5 % Final   • Immature Grans % 07/09/2022 0.4  0.0 - 0.5 % Final   • Neutrophils, Absolute 07/09/2022 4.99  1.70 - 7.00 10*3/mm3 Final   • Lymphocytes, Absolute 07/09/2022 2.53  0.70 - 3.10 10*3/mm3 Final   • Monocytes, Absolute 07/09/2022 0.39  0.10 - 0.90 10*3/mm3 Final   • Eosinophils, Absolute 07/09/2022 0.11  0.00 - 0.40 10*3/mm3 Final   • Basophils, Absolute 07/09/2022 0.02  0.00 - 0.20 10*3/mm3 Final   • Immature Grans, Absolute 07/09/2022 0.03  0.00 - 0.05 10*3/mm3 Final   • nRBC 07/09/2022 0.0  0.0 - 0.2 /100 WBC Final   • Extra Tube 07/09/2022 Hold for add-ons.   Final    Auto resulted.   • Extra Tube 07/09/2022 hold for add-on   Final    Auto resulted   • Extra Tube 07/09/2022 Hold for add-ons.   Final    Auto resulted       Assessment & Plan   Problems Addressed this Visit    None     Visit Diagnoses     Bipolar disorder, in partial remission, most recent episode depressed (HCC)    -  Primary    Panic disorder with agoraphobia        Generalized anxiety disorder        Medication management          Diagnoses       Codes Comments    Bipolar disorder, in partial remission, most recent episode depressed (HCC)    -  Primary ICD-10-CM: F31.75  ICD-9-CM: 296.55     Panic disorder with agoraphobia     ICD-10-CM: F40.01  ICD-9-CM: 300.21      Generalized anxiety disorder     ICD-10-CM: F41.1  ICD-9-CM: 300.02     Medication management     ICD-10-CM: Z79.899  ICD-9-CM: V58.69         Social History     Tobacco Use   Smoking Status Former Smoker   Smokeless Tobacco Never Used     JORGE reviewed and appropriate. Patient counseled on use of controlled substances.     -The benefits of a healthy diet and exercise were discussed with patient, especially the positive effects they have on mental health. Patient encouraged to consider lifestyle modification regarding  diet and exercise patterns to maximize results of mental health treatment.  -Reviewed previous available documentation  -Reviewed most recent available labs   -Lengthy discussion with patient on the possible side effects of antipsychotic medications including increased cholesterol, increased blood sugar, and possibility of weight gain.  Also discussed the need to monitor lab work associated with this.  The risk of muscle movement disorders with this class of medication was also discussed.      Visit Diagnoses:    ICD-10-CM ICD-9-CM   1. Bipolar disorder, in partial remission, most recent episode depressed (HCC)  F31.75 296.55   2. Panic disorder with agoraphobia  F40.01 300.21   3. Generalized anxiety disorder  F41.1 300.02   4. Medication management  Z79.899 V58.69       TREATMENT PLAN/GOALS: Continue supportive psychotherapy efforts and medications as indicated. Treatment and medication options discussed during today's visit. Patient acknowledged and verbally consented to continue with current treatment plan and was educated on the importance of compliance with treatment and follow-up appointments.    MEDICATION ISSUES:    Discussed medication options and treatment plan of prescribed medication as well as the risks, benefits, and side effects including potential falls, possible impaired driving and metabolic adversities among others. Patient is agreeable to call the office with any worsening of  symptoms or onset of side effects. Patient is agreeable to call 911 or go to the nearest ER should he/she begin having SI/HI.     MEDS ORDERED DURING VISIT:  No orders of the defined types were placed in this encounter.      No follow-ups on file.  -Continue propanolol 20 mg tablet take 1 tablet 2 times a day for anxiety.  -Continue bupropion  mg 24-hour tablet take 1 tablet by mouth every morning for depression and anxiety  -Continue bupropion  mg 24-hour tablet take 1 tablet by mouth every morning for depression and anxiety  -Continue lamotrigine 200 mg tablet take 1 tablet by mouth daily for bipolar disorder.  -Continue oxcarbazepine 300 mg tablet take 1 tablet by mouth 2 times a day for bipolar disorder.  -Continue sertraline 100 mg tablet take 1.5 tablets by mouth daily for anxiety and depression.    I spent *** minutes caring for Antoinette on this date of service. This time includes time spent by me in the following activities: {TIMEACTIVITIES:49065}             This document has been electronically signed by CICI Ruby  July 13, 2022 13:37 EDT    Part of this note may be an electronic transcription/translation of spoken language to printed text using the Dragon Dictation System.

## 2022-07-14 ENCOUNTER — OFFICE VISIT (OUTPATIENT)
Dept: PSYCHIATRY | Facility: CLINIC | Age: 37
End: 2022-07-14

## 2022-07-14 ENCOUNTER — SPECIALTY PHARMACY (OUTPATIENT)
Dept: PHARMACY | Facility: HOSPITAL | Age: 37
End: 2022-07-14

## 2022-07-14 DIAGNOSIS — F31.75 BIPOLAR DISORDER, IN PARTIAL REMISSION, MOST RECENT EPISODE DEPRESSED: Primary | ICD-10-CM

## 2022-07-14 DIAGNOSIS — F40.01 PANIC DISORDER WITH AGORAPHOBIA: ICD-10-CM

## 2022-07-14 DIAGNOSIS — Z79.899 MEDICATION MANAGEMENT: ICD-10-CM

## 2022-07-14 DIAGNOSIS — F41.1 GENERALIZED ANXIETY DISORDER: ICD-10-CM

## 2022-07-14 PROCEDURE — 99214 OFFICE O/P EST MOD 30 MIN: CPT | Performed by: NURSE PRACTITIONER

## 2022-07-14 RX ORDER — BUPROPION HYDROCHLORIDE 150 MG/1
150 TABLET ORAL EVERY MORNING
Qty: 30 TABLET | Refills: 1 | Status: SHIPPED | OUTPATIENT
Start: 2022-07-14 | End: 2022-09-08

## 2022-07-14 RX ORDER — INSULIN LISPRO 100 [IU]/ML
10 INJECTION, SOLUTION INTRAVENOUS; SUBCUTANEOUS 3 TIMES DAILY
Qty: 15 ML | Refills: 5 | Status: SHIPPED | OUTPATIENT
Start: 2022-07-14 | End: 2022-09-12 | Stop reason: SDUPTHER

## 2022-07-14 RX ORDER — SERTRALINE HYDROCHLORIDE 100 MG/1
150 TABLET, FILM COATED ORAL DAILY
Qty: 45 TABLET | Refills: 1 | Status: SHIPPED | OUTPATIENT
Start: 2022-07-14 | End: 2022-09-08 | Stop reason: SDUPTHER

## 2022-07-14 RX ORDER — LAMOTRIGINE 200 MG/1
200 TABLET ORAL DAILY
Qty: 30 TABLET | Refills: 1 | Status: SHIPPED | OUTPATIENT
Start: 2022-07-14 | End: 2022-09-08 | Stop reason: SDUPTHER

## 2022-07-14 RX ORDER — BUPROPION HYDROCHLORIDE 300 MG/1
300 TABLET ORAL EVERY MORNING
Qty: 30 TABLET | Refills: 1 | Status: SHIPPED | OUTPATIENT
Start: 2022-07-14 | End: 2022-09-08 | Stop reason: SDUPTHER

## 2022-07-14 NOTE — PROGRESS NOTES
This provider is located at [facility], [address] [place] [address], [facility], [id number] using a telephone in a secure private environment. The Patient is seen remotely at their home address in [place], using a private telephone.  The patient is unable to be seen through a [facility] at today's encounter because technical difficulties, therefore a telephone encounter was conducted. Patient is being evaluated/treated via telehealth by telephone, and stated they are in a secure environment for this session. The patient's condition being diagnosed/treated is appropriate for telemedicine. The provider identified herself as well as her credentials.   The patient, and/or patient's guardian, consent to be seen remotely, and when consent is given they understand that the consent allows for patient identifiable information to be sent to a third party as needed.   They may refuse to be seen remotely at any time. The electronic data is encrypted and password protected, and the patient and/or guardian has been advised of the potential risks to privacy not withstanding such measures.    You have chosen to receive care through a telephone visit. Do you consent to use a telephone visit for your medical care today? Yes  This visit has been rescheduled as a phone visit to comply with patient safety concerns in accordance with CDC recommendations.      Subjective   Antoinette Pack is a 37 y.o. female who presents today for follow up    Chief Complaint:  Bipolar disorder, anxiety    History of Present Illness:    Today for follow up visit. She is taking her medication as prescribed, denies SE. Things are going alright. Depression rated 6/10, anxiety rated 6/10, with 10 being the worst, moods rated 5/10, with 10 being the worst. Sleeping is good, getting about 4 to 5 hours a night, she takes naps during the day, denies NM.  Appetite is good. She is still trying to get blood sugar better under control, trying to eat  smaller portions, trying to cut down her carb's. She states visual hallucinations has not changed. Denies SI/HI/AH.  Denies thoughts of self-harm. Chronic health issues, no acute physical or medical issues today       The following portions of the patient's history were reviewed and updated as appropriate: allergies, current medications, past family history, past medical history, past social history, past surgical history and problem list.    Past Medical History:  Past Medical History:   Diagnosis Date   • Anxiety    • Depression    • Diabetes mellitus (HCC)    • Diverticulitis    • GERD (gastroesophageal reflux disease)    • Hypertension    • Kidney stone    • PCOS (polycystic ovarian syndrome)    • Sleep apnea        Social History:  Social History     Socioeconomic History   • Marital status:    Tobacco Use   • Smoking status: Former Smoker   • Smokeless tobacco: Never Used   Vaping Use   • Vaping Use: Never used   Substance and Sexual Activity   • Alcohol use: No   • Drug use: No   • Sexual activity: Defer       Family History:  Family History   Problem Relation Age of Onset   • Heart disease Mother    • COPD Mother    • Heart disease Father    • COPD Father        Past Surgical History:  Past Surgical History:   Procedure Laterality Date   • CARDIAC CATHETERIZATION     • CARPAL TUNNEL RELEASE     • COLONOSCOPY     • ENDOSCOPY     • FRACTURE SURGERY     • HARDWARE REMOVAL      WRIST   • TENDON REPAIR      HAND       Problem List:  Patient Active Problem List   Diagnosis   • Body mass index (BMI) 40.0-44.9, adult   • Simple goiter   • Type 2 diabetes mellitus with hyperglycemia, with long-term current use of insulin (HCC)   • Type 2 diabetes mellitus with hyperglycemia, with long-term current use of insulin ([facility])       Allergy:   Allergies   Allergen Reactions   • Dilantin [Phenytoin] Mental Status Change   • Keppra [Levetiracetam] Mental Status Change   • Meperidine Rash   • Topamax [Topiramate] Mental  Status Change        Current Medications:   Current Outpatient Medications   Medication Sig Dispense Refill   • buPROPion XL (Wellbutrin XL) 150 MG 24 hr tablet Take 1 tablet by mouth Every Morning. 30 tablet 1   • buPROPion XL (WELLBUTRIN XL) 300 MG 24 hr tablet Take 1 tablet by mouth Every Morning. Take with Bupropion  mg tablet for total dosage of 450 mg per day. 30 tablet 1   • lamoTRIgine (LaMICtal) 200 MG tablet Take 1 tablet by mouth Daily. 30 tablet 1   • sertraline (ZOLOFT) 100 MG tablet Take 1 & 1/2 tablets by mouth Daily. 45 tablet 1   • clobetasol (TEMOVATE) 0.05 % ointment As Needed.     • Continuous Blood Gluc  (FreeStyle Esvin 2 [place]) device Use as directed to cotinuously monitor blood glucose. 1 each 0   • Continuous Blood Gluc Sensor (FreeStyle Esvin 2 Sensor) misc Use as directed and change sensor Every 14 (Fourteen) Days. 6 each 1   • diclofenac (VOLTAREN) 50 MG EC tablet Take 1 tablet by mouth 3 (Three) Times a Day As Needed (pain). 20 tablet 0   • fluticasone (FLONASE) 50 MCG/ACT nasal spray 1 spray into the nostril(s) as directed by provider Daily As Needed.     • ibuprofen (ADVIL,MOTRIN) 800 MG tablet As Needed.     • insulin aspart (NovoLOG FlexPen) 100 UNIT/ML solution pen-injector sc pen Inject 10 Units under the skin into the appropriate area as directed 3 (Three) Times a Day With Meals. 30 mL 1   • insulin degludec (Tresiba FlexTouch) 100 UNIT/ML solution pen-injector injection Inject 50 Units under the skin into the appropriate area as directed 2 (Two) Times a Day. 90 mL 1   • Januvia 100 MG tablet Take 1 tablet by mouth Daily. 90 tablet 1   • lisinopril (PRINIVIL,ZESTRIL) 40 MG tablet Take 1 tablet by mouth Daily. 90 tablet 0   • metFORMIN (GLUCOPHAGE) 1000 MG tablet Take 1 tablet by mouth 2 (Two) Times a Day With Meals. 180 tablet 1   • modafinil (PROVIGIL) 200 MG tablet Take 200 mg by mouth Daily.     • omeprazole (priLOSEC) 40 MG capsule TAKE ONE (1) CAPSULE BY MOUTH  EVERY DAY BEFORE A MEAL 90 capsule 1   • ondansetron (ZOFRAN) 4 MG tablet Take 1 tablet by mouth Every 8 (Eight) Hours As Needed for Nausea or Vomiting. 15 tablet 0   • polyethylene glycol (MIRALAX) 17 GM/SCOOP powder Take 17 g by mouth Daily. 578 g 0   • rosuvastatin (CRESTOR) 40 MG tablet Take 1 tablet by mouth Every Night at bedtime 90 tablet 0     No current facility-administered medications for this visit.       Review of Symptoms:    Review of Systems   Psychiatric/Behavioral: Positive for hallucinations, sleep disturbance and depressed mood. Negative for self-injury and suicidal ideas. The patient is nervous/anxious.    All other systems reviewed and are negative.        Physical Exam:   not currently breastfeeding.   There is no height or weight on file to calculate BMI.    Due to extenuating circumstances and possible current health risks associated with the patient being present in a clinical setting (with current health restrictions in place in regards to possible COVID 19 transmission/exposure), the patient was seen remotely today via a MyChart Video Visit through [facility] and telephone encounter.  Unable to obtain vital signs due to nature of remote visit.  Height stated at 59 inches.  Weight stated at 200 pounds.         Mental Status Exam: 7/14/22  Hygiene:   unable to assess  Cooperation:  Cooperative  Eye Contact:  unable to assess  Psychomotor Behavior:  unable to assess  Affect:  unable to assess  Mood: normal  Hopelessness: Denies  Speech:  Normal  Thought Process:  Goal directed and Linear  Thought Content:  Normal  Suicidal:  None  Homicidal:  None  Hallucinations:  Visual  Delusion:  None  Memory:  Intact  Orientation:  Person, Place, Time and Situation  Reliability:  fair  Insight:  Fair  Judgement:  Fair  Impulse Control:  Fair  Physical/Medical Issues:  No      PHQ-Score Total:  PHQ-9 Total Score:        Lab Results:   Admission on 07/09/2022, Discharged on 07/09/2022   Component Date Value Ref  Range Status   • Glucose 07/09/2022 225 (A) 65 - 99 mg/dL Final   • BUN 07/09/2022 9  6 - 20 mg/dL Final   • Creatinine 07/09/2022 0.63  0.57 - 1.00 mg/dL Final   • Sodium 07/09/2022 134 (A) 136 - 145 mmol/L Final   • Potassium 07/09/2022 4.4  3.5 - 5.2 mmol/L Final   • Chloride 07/09/2022 99  98 - 107 mmol/L Final   • CO2 07/09/2022 23.7  22.0 - 29.0 mmol/L Final   • Calcium 07/09/2022 9.2  8.6 - 10.5 mg/dL Final   • Total Protein 07/09/2022 7.5  6.0 - 8.5 g/dL Final   • Albumin 07/09/2022 4.39  3.50 - 5.20 g/dL Final   • ALT (SGPT) 07/09/2022 35 (A) 1 - 33 U/L Final   • AST (SGOT) 07/09/2022 28  1 - 32 U/L Final   • Alkaline Phosphatase 07/09/2022 81  39 - 117 U/L Final   • Total Bilirubin 07/09/2022 0.5  0.0 - 1.2 mg/dL Final   • Globulin 07/09/2022 3.1  gm/dL Final   • A/G Ratio 07/09/2022 1.4  g/dL Final   • BUN/Creatinine Ratio 07/09/2022 14.3  7.0 - 25.0 Final   • Anion Gap 07/09/2022 11.3  5.0 - 15.0 mmol/L Final   • eGFR 07/09/2022 115.9  >60.0 mL/min/1.73 Final    National Kidney Foundation and American Society of Nephrology (ASN) Task Force recommended calculation based on the Chronic Kidney Disease Epidemiology Collaboration (CKD-EPI) equation refit without adjustment for race.   • Lipase 07/09/2022 28  13 - 60 U/L Final   • HCG Qualitative 07/09/2022 Negative  Negative Final   • Color, UA 07/09/2022 Yellow  Yellow, Straw Final   • Appearance, UA 07/09/2022 Clear  Clear Final   • pH, UA 07/09/2022 7.5  5.0 - 8.0 Final   • Specific Gravity, UA 07/09/2022 1.014  1.005 - 1.030 Final   • Glucose, UA 07/09/2022 250 mg/dL (1+) (A) Negative Final   • Ketones, UA 07/09/2022 Negative  Negative Final   • Bilirubin, UA 07/09/2022 Negative  Negative Final   • Blood, UA 07/09/2022 Negative  Negative Final   • Protein, UA 07/09/2022 Negative  Negative Final   • Leuk Esterase, UA 07/09/2022 Negative  Negative Final   • Nitrite, UA 07/09/2022 Negative  Negative Final   • Urobilinogen, UA 07/09/2022 0.2 E.U./dL  0.2 -  1.0 E.U./dL Final   • C-Reactive Protein 07/09/2022 0.62 (A) 0.00 - 0.50 mg/dL Final   • Sed Rate 07/09/2022 16  0 - 20 mm/hr Final   • QT Interval 07/09/2022 358  ms Final   • QTC Interval 07/09/2022 442  ms Final   • Troponin T 07/09/2022 <0.010  0.000 - 0.030 ng/mL Final   • WBC 07/09/2022 8.07  3.40 - 10.80 10*3/mm3 Final   • RBC 07/09/2022 4.73  3.77 - 5.28 10*6/mm3 Final   • Hemoglobin 07/09/2022 14.0  12.0 - 15.9 g/dL Final   • Hematocrit 07/09/2022 39.6  34.0 - 46.6 % Final   • MCV 07/09/2022 83.7  79.0 - 97.0 fL Final   • MCH 07/09/2022 29.6  26.6 - 33.0 pg Final   • MCHC 07/09/2022 35.4  31.5 - 35.7 g/dL Final   • RDW 07/09/2022 12.7  12.3 - 15.4 % Final   • RDW-SD 07/09/2022 38.4  37.0 - 54.0 fl Final   • MPV 07/09/2022 9.8  6.0 - 12.0 fL Final   • Platelets 07/09/2022 125 (A) 140 - 450 10*3/mm3 Final   • Neutrophil % 07/09/2022 61.8  42.7 - 76.0 % Final   • Lymphocyte % 07/09/2022 31.4  19.6 - 45.3 % Final   • Monocyte % 07/09/2022 4.8 (A) 5.0 - 12.0 % Final   • Eosinophil % 07/09/2022 1.4  0.3 - 6.2 % Final   • Basophil % 07/09/2022 0.2  0.0 - 1.5 % Final   • Immature Grans % 07/09/2022 0.4  0.0 - 0.5 % Final   • Neutrophils, Absolute 07/09/2022 4.99  1.70 - 7.00 10*3/mm3 Final   • Lymphocytes, Absolute 07/09/2022 2.53  0.70 - 3.10 10*3/mm3 Final   • Monocytes, Absolute 07/09/2022 0.39  0.10 - 0.90 10*3/mm3 Final   • Eosinophils, Absolute 07/09/2022 0.11  0.00 - 0.40 10*3/mm3 Final   • Basophils, Absolute 07/09/2022 0.02  0.00 - 0.20 10*3/mm3 Final   • Immature Grans, Absolute 07/09/2022 0.03  0.00 - 0.05 10*3/mm3 Final   • nRBC 07/09/2022 0.0  0.0 - 0.2 /100 WBC Final   • Extra Tube 07/09/2022 Hold for add-ons.   Final    Auto resulted.   • Extra Tube 07/09/2022 hold for add-on   Final    Auto resulted   • Extra Tube 07/09/2022 Hold for add-ons.   Final    Auto resulted       Assessment & Plan   Problems Addressed this Visit    None     Visit Diagnoses     Bipolar disorder, in partial remission, most  recent episode depressed (HCC)    -  Primary    Relevant Medications    sertraline (ZOLOFT) 100 MG tablet    lamoTRIgine (LaMICtal) 200 MG tablet    buPROPion XL (WELLBUTRIN XL) 300 MG 24 hr tablet    buPROPion XL (Wellbutrin XL) 150 MG 24 hr tablet    Panic disorder with agoraphobia        Relevant Medications    sertraline (ZOLOFT) 100 MG tablet    lamoTRIgine (LaMICtal) 200 MG tablet    buPROPion XL (WELLBUTRIN XL) 300 MG 24 hr tablet    buPROPion XL (Wellbutrin XL) 150 MG 24 hr tablet    Generalized anxiety disorder        Relevant Medications    sertraline (ZOLOFT) 100 MG tablet    lamoTRIgine (LaMICtal) 200 MG tablet    buPROPion XL (WELLBUTRIN XL) 300 MG 24 hr tablet    buPROPion XL (Wellbutrin XL) 150 MG 24 hr tablet    Medication management        Relevant Medications    sertraline (ZOLOFT) 100 MG tablet    lamoTRIgine (LaMICtal) 200 MG tablet    buPROPion XL (WELLBUTRIN XL) 300 MG 24 hr tablet      Diagnoses       Codes Comments    Bipolar disorder, in partial remission, most recent episode depressed (HCC)    -  Primary ICD-10-CM: F31.75  ICD-9-CM: 296.55     Panic disorder with agoraphobia     ICD-10-CM: F40.01  ICD-9-CM: 300.21     Generalized anxiety disorder     ICD-10-CM: F41.1  ICD-9-CM: 300.02     Medication management     ICD-10-CM: Z79.899  ICD-9-CM: V58.69           Social History     Tobacco Use   Smoking Status Former Smoker   Smokeless Tobacco Never Used     JORGE reviewed and appropriate. Patient counseled on use of controlled substances.     -The benefits of a healthy diet and exercise were discussed with patient, especially the positive effects they have on mental health. Patient encouraged to consider lifestyle modification regarding  diet and exercise patterns to maximize results of mental health treatment.  -Reviewed previous available documentation  -Reviewed most recent available labs   -Lengthy discussion with patient on the possible side effects of antipsychotic medications  including increased cholesterol, increased blood sugar, and possibility of weight gain.  Also discussed the need to monitor lab work associated with this.  The risk of muscle movement disorders with this class of medication was also discussed.    - Began at 8:30 am and ended at 8:43 am    Visit Diagnoses:    ICD-10-CM ICD-9-CM   1. Bipolar disorder, in partial remission, most recent episode depressed (HCC)  F31.75 296.55   2. Panic disorder with agoraphobia  F40.01 300.21   3. Generalized anxiety disorder  F41.1 300.02   4. Medication management  Z79.899 V58.69         GOALS:  Short Term Goals: Patient will be compliant with medication, and patient will have no significant medication related side effects.  Patient will be engaged in psychotherapy as indicated.  Patient will report subjective improvement of symptoms.  Long term goals: To stabilize mood and treat/improve subjective symptoms, the patient will stay out of the hospital, the patient will be at an optimal level of functioning, and the patient will take all medications as prescribed.  The patient/guardian verbalized understanding and agreement with goals that were mutually set.      TREATMENT PLAN: Continue supportive psychotherapy efforts and medications as indicated. Pharmacological and Non-Pharmacological treatment options discussed during today's visit. Patient/Guardian acknowledged and verbally consented with current treatment plan and was educated on the importance of compliance with treatment and follow-up appointments.      MEDICATION ISSUES:  Discussed medication options and treatment plan of prescribed medication as well as the risks, benefits, any black box warnings, and side effects including potential falls, possible impaired driving, and metabolic adversities among others. Patient is agreeable to call the office with any worsening of symptoms or onset of side effects, or if any concerns or questions arise.  The contact information for the  office is made available to the patient. Patient is agreeable to call 911 or go to the nearest ER should they begin having any SI/HI, or if any urgent concerns arise. No medication side effects or related complaints today.     MEDS ORDERED DURING VISIT:  New Medications Ordered This Visit   Medications   • sertraline (ZOLOFT) 100 MG tablet     Sig: Take 1 & 1/2 tablets by mouth Daily.     Dispense:  45 tablet     Refill:  1   • lamoTRIgine (LaMICtal) 200 MG tablet     Sig: Take 1 tablet by mouth Daily.     Dispense:  30 tablet     Refill:  1   • buPROPion XL (WELLBUTRIN XL) 300 MG 24 hr tablet     Sig: Take 1 tablet by mouth Every Morning. Take with Bupropion  mg tablet for total dosage of 450 mg per day.     Dispense:  30 tablet     Refill:  1   • buPROPion XL (Wellbutrin XL) 150 MG 24 hr tablet     Sig: Take 1 tablet by mouth Every Morning.     Dispense:  30 tablet     Refill:  1     Continue bupropion  mg 24-hour tablet take 1 tablet by mouth every morning for depression and anxiety  -Continue bupropion  mg 24-hour tablet take 1 tablet by mouth every morning for depression and anxiety  -Continue lamotrigine 200 mg tablet take 1 tablet by mouth daily for bipolar disorder.  -Continue sertraline 100 mg tablet take 1.5 tablets by mouth daily for anxiety and depression.       Return in about 2 months (around 9/14/2022) for Recheck.       Progress toward goal: Not at goal    Functional Status: Moderate impairment     Prognosis: Guarded with Ongoing Treatment    I spent 30 minutes caring for Antoinette on this date of service. This time includes time spent by me in the following activities: preparing for the visit, obtaining and/or reviewing a separately obtained history, performing a medically appropriate examination and/or evaluation, counseling and educating the patient/family/caregiver, ordering medications, tests, or procedures and documenting information in the medical record        This document  has been electronically signed by Martha Conn, CICI  July 14, 2022 08:51 EDT    Part of this note may be an electronic transcription/translation of spoken language to printed text using the Dragon Dictation System.

## 2022-07-14 NOTE — PROGRESS NOTES
Specialty Pharmacy Patient Management Program  Endocrinology Reassessment     Antoinette Pack is a 37 y.o. female with Type 2 Diabetes enrolled in the Endocrinology Patient Management program offered by [facility].  A follow-up was conducted, including assessment of continued therapy appropriateness, medication adherence, and side effect incidence and management for Insulin therapy, Metformin, Januvia, and CGM.    Patient's insurance is requiring the switch from Novolog to Humalog. Will send in new prescription to pharmacy. She is doing well with current medications and reports no issues.     Changes to Insurance Coverage or Financial Support  On patient assistance program  Insurance is requiring switch from Novolog to Humalog for coverage     Relevant Past Medical History and Comorbidities  Relevant medical history and concomitant health conditions were discussed with the patient. The patient's chart has been reviewed for relevant past medical history and comorbid health conditions and updated as necessary.   Past Medical History:   Diagnosis Date   • Anxiety    • Depression    • Diabetes mellitus (HCC)    • Diverticulitis    • GERD (gastroesophageal reflux disease)    • Hypertension    • Kidney stone    • PCOS (polycystic ovarian syndrome)    • Sleep apnea      Social History     Socioeconomic History   • Marital status:    Tobacco Use   • Smoking status: Former Smoker   • Smokeless tobacco: Never Used   Vaping Use   • Vaping Use: Never used   Substance and Sexual Activity   • Alcohol use: No   • Drug use: No   • Sexual activity: Defer          Allergies  Known allergies and reactions were discussed with the patient. The patient's chart has been reviewed for allergy information and updated as necessary.   Dilantin [phenytoin], Keppra [levetiracetam], Meperidine, and Topamax [topiramate]         Relevant Laboratory Values  A1C Last 3 Results    HGBA1C Last 3 Results 2/17/22  5/26/22   Hemoglobin A1C 8.8 8.7           Lab Results   Component Value Date    HGBA1C 8.7 05/26/2022     Lab Results   Component Value Date    GLUCOSE 225 (H) 07/09/2022    CALCIUM 9.2 07/09/2022     (L) 07/09/2022    K 4.4 07/09/2022    CO2 23.7 07/09/2022    CL 99 07/09/2022    BUN 9 07/09/2022    CREATININE 0.63 07/09/2022    EGFRIFAFRI  09/05/2016      Comment:      <15 Indicative of kidney failure.    EGFRIFNONA 101 12/27/2020    BCR 14.3 07/09/2022    ANIONGAP 11.3 07/09/2022     Lab Results   Component Value Date    CHLPL 159 09/22/2015    TRIG 131 09/22/2015    HDL 38 (L) 09/22/2015    LDL 95 09/22/2015         Current Medication List  This medication list has been reviewed with the patient and evaluated for any interactions or necessary modifications/recommendations, and updated to include all prescription medications, OTC medications, and supplements the patient is currently taking.  This list reflects what is contained in the patient's profile, which has also been marked as reviewed to communicate to other providers it is the most up to date version of the patient's current medication therapy.     Current Outpatient Medications:   •  buPROPion XL (Wellbutrin XL) 150 MG 24 hr tablet, Take 1 tablet by mouth Every Morning., Disp: 30 tablet, Rfl: 1  •  buPROPion XL (WELLBUTRIN XL) 300 MG 24 hr tablet, Take 1 tablet by mouth Every Morning. Take with Bupropion  mg tablet for total dosage of 450 mg per day., Disp: 30 tablet, Rfl: 1  •  clobetasol (TEMOVATE) 0.05 % ointment, As Needed., Disp: , Rfl:   •  Continuous Blood Gluc  (FreeStyle Esvin 2 [place]) device, Use as directed to cotinuously monitor blood glucose., Disp: 1 each, Rfl: 0  •  Continuous Blood Gluc Sensor (FreeStyle Esvin 2 Sensor) misc, Use as directed and change sensor Every 14 (Fourteen) Days., Disp: 6 each, Rfl: 1  •  diclofenac (VOLTAREN) 50 MG EC tablet, Take 1 tablet by mouth 3 (Three) Times a Day As Needed (pain)., Disp:  20 tablet, Rfl: 0  •  fluticasone (FLONASE) 50 MCG/ACT nasal spray, 1 spray into the nostril(s) as directed by provider Daily As Needed., Disp: , Rfl:   •  ibuprofen (ADVIL,MOTRIN) 800 MG tablet, As Needed., Disp: , Rfl:   •  insulin aspart (NovoLOG FlexPen) 100 UNIT/ML solution pen-injector sc pen, Inject 10 Units under the skin into the appropriate area as directed 3 (Three) Times a Day With Meals., Disp: 30 mL, Rfl: 1  •  insulin degludec (Tresiba FlexTouch) 100 UNIT/ML solution pen-injector injection, Inject 50 Units under the skin into the appropriate area as directed 2 (Two) Times a Day., Disp: 90 mL, Rfl: 1  •  Januvia 100 MG tablet, Take 1 tablet by mouth Daily., Disp: 90 tablet, Rfl: 1  •  lamoTRIgine (LaMICtal) 200 MG tablet, Take 1 tablet by mouth Daily., Disp: 30 tablet, Rfl: 1  •  lisinopril (PRINIVIL,ZESTRIL) 40 MG tablet, Take 1 tablet by mouth Daily., Disp: 90 tablet, Rfl: 0  •  metFORMIN (GLUCOPHAGE) 1000 MG tablet, Take 1 tablet by mouth 2 (Two) Times a Day With Meals., Disp: 180 tablet, Rfl: 1  •  modafinil (PROVIGIL) 200 MG tablet, Take 200 mg by mouth Daily., Disp: , Rfl:   •  omeprazole (priLOSEC) 40 MG capsule, TAKE ONE (1) CAPSULE BY MOUTH EVERY DAY BEFORE A MEAL, Disp: 90 capsule, Rfl: 1  •  ondansetron (ZOFRAN) 4 MG tablet, Take 1 tablet by mouth Every 8 (Eight) Hours As Needed for Nausea or Vomiting., Disp: 15 tablet, Rfl: 0  •  polyethylene glycol (MIRALAX) 17 GM/SCOOP powder, Take 17 g by mouth Daily., Disp: 578 g, Rfl: 0  •  rosuvastatin (CRESTOR) 40 MG tablet, Take 1 tablet by mouth Every Night at bedtime, Disp: 90 tablet, Rfl: 0  •  sertraline (ZOLOFT) 100 MG tablet, Take 1 & 1/2 tablets by mouth Daily., Disp: 45 tablet, Rfl: 1  No current facility-administered medications for this visit.       Drug Interactions  · Coadministration of metformin and NSAIDs may increase the risk of acute renal failure and lactic acidosis.  · ACE inhibitors may enhance the adverse/toxic effect of  metformin. This includes both a risk for hypoglycemia and for lactic acidosis.    Adverse Drug Reactions  • Adverse Reactions Experienced: None   • Plan for ADR Management: Not Required    Hospitalizations and Urgent Care Since Last Assessment  • Hospitalizations or Admissions: None  • ED Visits: None   • Urgent Office Visits: None    Adherence and Self-Administration  Adherence related patient's specialty therapy was discussed with the patient. The Adherence segment of this outreach has been reviewed and updated.     • Ongoing or New Barriers to Patient Adherence and/or Self-Administration: None   • Methods for Supporting Patient Adherence and/or Self-Administration: None Required    Goals of Therapy  Goals related to the patient's specialty therapy was discussed with the patient. The Patient Goals segment of this outreach has been reviewed and updated.    Goals     • HEMOGLOBIN A1C < 7          Reassessment Plan & Follow-Up  1. Medication Therapy Changes: Patient's insurance requires the switch from Novolog to Humalog. Will send in new RX and mail to patient.   2. Additional Plans, Therapy Recommendations, or Therapy Problems to Be Addressed: None  3. Pharmacist to perform regular reassessments no more than (6) months from the previous assessment.  4. Care Coordinator to set up future refill outreaches, coordinate prescription delivery, and escalate clinical questions to pharmacist.     Attestation  I attest that the specialty medication(s) addressed above are appropriate for the patient based on my reassessment.  If the prescribed therapy is at any point deemed not appropriate based on the current or future assessments, a consultation will be initiated with the patient's specialty care provider to determine the best course of action. The revised plan of therapy will be documented along with any additional patient education provided.     Sravani Perez, Manny   7/14/2022  14:20 EDT

## 2022-08-17 ENCOUNTER — SPECIALTY PHARMACY (OUTPATIENT)
Dept: PHARMACY | Facility: HOSPITAL | Age: 37
End: 2022-08-17

## 2022-09-01 ENCOUNTER — SPECIALTY PHARMACY (OUTPATIENT)
Dept: PHARMACY | Facility: HOSPITAL | Age: 37
End: 2022-09-01

## 2022-09-01 RX ORDER — METFORMIN HYDROCHLORIDE 500 MG/1
1000 TABLET, EXTENDED RELEASE ORAL 2 TIMES DAILY WITH MEALS
Qty: 360 TABLET | Refills: 1 | Status: SHIPPED | OUTPATIENT
Start: 2022-09-01 | End: 2023-01-24

## 2022-09-01 NOTE — PROGRESS NOTES
Specialty Pharmacy Patient Management Program  Endocrinology Reassessment     Antoinette Pack is a 37 y.o. female with Type 2 Diabetes enrolled in the Endocrinology Patient Management program offered by [facility].  A follow-up was conducted, including assessment of continued therapy appropriateness, medication adherence, and side effect incidence and management for Insulin therapy, Metformin, Januvia, and CGM.    Patient reports no changes to her medications. She reports no low BG <70. She is having issues with GI upset with metformin and would like to make changes to her therapy today.     Changes to Insurance Coverage or Financial Support  On patient assistance program    Relevant Past Medical History and Comorbidities  Relevant medical history and concomitant health conditions were discussed with the patient. The patient's chart has been reviewed for relevant past medical history and comorbid health conditions and updated as necessary.   Past Medical History:   Diagnosis Date   • Anxiety    • Depression    • Diabetes mellitus (HCC)    • Diverticulitis    • GERD (gastroesophageal reflux disease)    • Hypertension    • Kidney stone    • PCOS (polycystic ovarian syndrome)    • Sleep apnea      Social History     Socioeconomic History   • Marital status:    Tobacco Use   • Smoking status: Former Smoker   • Smokeless tobacco: Never Used   Vaping Use   • Vaping Use: Never used   Substance and Sexual Activity   • Alcohol use: No   • Drug use: No   • Sexual activity: Defer       Problem list reviewed by Sravani Perez RPH on 9/1/2022 at  3:08 PM    Allergies  Known allergies and reactions were discussed with the patient. The patient's chart has been reviewed for allergy information and updated as necessary.   Dilantin [phenytoin], Keppra [levetiracetam], Meperidine, and Topamax [topiramate]    Allergies reviewed by Sravani Perez RPH on 9/1/2022 at  3:03 PM    Relevant Laboratory  Values  A1C Last 3 Results    HGBA1C Last 3 Results 2/17/22 5/26/22   Hemoglobin A1C 8.8 8.7           Lab Results   Component Value Date    HGBA1C 8.7 05/26/2022     Lab Results   Component Value Date    GLUCOSE 225 (H) 07/09/2022    CALCIUM 9.2 07/09/2022     (L) 07/09/2022    K 4.4 07/09/2022    CO2 23.7 07/09/2022    CL 99 07/09/2022    BUN 9 07/09/2022    CREATININE 0.63 07/09/2022    EGFRIFAFRI  09/05/2016      Comment:      <15 Indicative of kidney failure.    EGFRIFNONA 101 12/27/2020    BCR 14.3 07/09/2022    ANIONGAP 11.3 07/09/2022     Lab Results   Component Value Date    CHLPL 159 09/22/2015    TRIG 131 09/22/2015    HDL 38 (L) 09/22/2015    LDL 95 09/22/2015         Current Medication List  This medication list has been reviewed with the patient and evaluated for any interactions or necessary modifications/recommendations, and updated to include all prescription medications, OTC medications, and supplements the patient is currently taking.  This list reflects what is contained in the patient's profile, which has also been marked as reviewed to communicate to other providers it is the most up to date version of the patient's current medication therapy.     Current Outpatient Medications:   •  buPROPion XL (WELLBUTRIN XL) 300 MG 24 hr tablet, Take 1 tablet by mouth Every Morning. Take with Bupropion  mg tablet for total dosage of 450 mg per day., Disp: 30 tablet, Rfl: 1  •  clobetasol (TEMOVATE) 0.05 % ointment, As Needed., Disp: , Rfl:   •  Continuous Blood Gluc  (FreeStyle Esvin 2 [place]) device, Use as directed to cotinuously monitor blood glucose., Disp: 1 each, Rfl: 0  •  Continuous Blood Gluc Sensor (FreeStyle Esvin 2 Sensor) misc, Use as directed and change sensor Every 14 (Fourteen) Days., Disp: 6 each, Rfl: 1  •  fluticasone (FLONASE) 50 MCG/ACT nasal spray, 1 spray into the nostril(s) as directed by provider Daily As Needed., Disp: , Rfl:   •  insulin degludec (Tresiba  FlexTouch) 100 UNIT/ML solution pen-injector injection, Inject 50 Units under the skin into the appropriate area as directed 2 (Two) Times a Day., Disp: 90 mL, Rfl: 1  •  Insulin Lispro, 1 Unit Dial, (HumaLOG KwikPen) 100 UNIT/ML solution pen-injector, Inject 10 Units under the skin into the appropriate area as directed 3 (Three) Times a Day., Disp: 15 mL, Rfl: 5  •  Januvia 100 MG tablet, Take 1 tablet by mouth Daily., Disp: 90 tablet, Rfl: 1  •  lamoTRIgine (LaMICtal) 200 MG tablet, Take 1 tablet by mouth Daily., Disp: 30 tablet, Rfl: 1  •  lisinopril (PRINIVIL,ZESTRIL) 40 MG tablet, Take 1 tablet by mouth Daily., Disp: 90 tablet, Rfl: 0  •  modafinil (PROVIGIL) 200 MG tablet, Take 200 mg by mouth Daily., Disp: , Rfl:   •  omeprazole (priLOSEC) 40 MG capsule, TAKE ONE (1) CAPSULE BY MOUTH EVERY DAY BEFORE A MEAL, Disp: 90 capsule, Rfl: 1  •  ondansetron (ZOFRAN) 4 MG tablet, Take 1 tablet by mouth Every 8 (Eight) Hours As Needed for Nausea or Vomiting., Disp: 15 tablet, Rfl: 0  •  rosuvastatin (CRESTOR) 40 MG tablet, Take 1 tablet by mouth Every Night at bedtime, Disp: 90 tablet, Rfl: 0  •  sertraline (ZOLOFT) 100 MG tablet, Take 1 & 1/2 tablets by mouth Daily., Disp: 45 tablet, Rfl: 1  •  brompheniramine-pseudoephedrine-DM (Bromfed DM) 30-2-10 MG/5ML syrup, Take 10 mL by mouth every 6 hours as needed., Disp: 180 mL, Rfl: 0  •  buPROPion XL (Wellbutrin XL) 150 MG 24 hr tablet, Take 1 tablet by mouth Every Morning., Disp: 30 tablet, Rfl: 1  •  ibuprofen (ADVIL,MOTRIN) 800 MG tablet, As Needed., Disp: , Rfl:   •  metFORMIN ER (GLUCOPHAGE-XR) 500 MG 24 hr tablet, Take 2 tablets by mouth 2 (Two) Times a Day With Meals., Disp: 360 tablet, Rfl: 1  •  Nirmatrelvir & Ritonavir (Paxlovid) 20 x 150 MG & 10 x 100MG tablet therapy pack tablet, Take 1 dose (3 tablets) by mouth 2 times daily for 5 days as directed on package instructions.., Disp: 30 tablet, Rfl: 0  •  polyethylene glycol (MIRALAX) 17 GM/SCOOP powder, Take 17 g  by mouth Daily., Disp: 578 g, Rfl: 0    Medicines reviewed by Sravani Perez [facility] on 9/1/2022 at  3:08 PM    Drug Interactions  · Coadministration of metformin and NSAIDs may increase the risk of acute renal failure and lactic acidosis.  · ACE inhibitors may enhance the adverse/toxic effect of metformin. This includes both a risk for hypoglycemia and for lactic acidosis.    Adverse Drug Reactions  • Adverse Reactions Experienced: Upset stomach with metformin  • Plan for ADR Management: Pharmacist to consult provider - consider switching to extended release formulation    Hospitalizations and Urgent Care Since Last Assessment  • Hospitalizations or Admissions: None  • ED Visits: None   • Urgent Office Visits: None    Adherence and Self-Administration  Adherence related patient's specialty therapy was discussed with the patient. The Adherence segment of this outreach has been reviewed and updated.     • Ongoing or New Barriers to Patient Adherence and/or Self-Administration: None   • Methods for Supporting Patient Adherence and/or Self-Administration: None Required    Goals of Therapy  Goals related to the patient's specialty therapy was discussed with the patient. The Patient Goals segment of this outreach has been reviewed and updated.    Goals     • HEMOGLOBIN A1C < 7          Reassessment Plan & Follow-Up  1. Medication Therapy Changes: Prescriber agreed to switch metformin from regular release to extended release. Will send new RX for Metformin XR 1,000 mg twice daily and mail to patient.   2. Additional Plans, Therapy Recommendations, or Therapy Problems to Be Addressed: None  3. Pharmacist to perform regular reassessments no more than (6) months from the previous assessment.  4. Care Coordinator to set up future refill outreaches, coordinate prescription delivery, and escalate clinical questions to pharmacist.     Attestation  I attest that the specialty medication(s) addressed above are appropriate for the  patient based on my reassessment.  If the prescribed therapy is at any point deemed not appropriate based on the current or future assessments, a consultation will be initiated with the patient's specialty care provider to determine the best course of action. The revised plan of therapy will be documented along with any additional patient education provided.     Sravani Perez, Manny   9/1/2022  16:12 EDT

## 2022-09-08 ENCOUNTER — TELEMEDICINE (OUTPATIENT)
Dept: PSYCHIATRY | Facility: CLINIC | Age: 37
End: 2022-09-08

## 2022-09-08 DIAGNOSIS — F40.01 PANIC DISORDER WITH AGORAPHOBIA: ICD-10-CM

## 2022-09-08 DIAGNOSIS — F31.75 BIPOLAR DISORDER, IN PARTIAL REMISSION, MOST RECENT EPISODE DEPRESSED: Primary | ICD-10-CM

## 2022-09-08 DIAGNOSIS — F41.1 GENERALIZED ANXIETY DISORDER: ICD-10-CM

## 2022-09-08 DIAGNOSIS — Z79.899 MEDICATION MANAGEMENT: ICD-10-CM

## 2022-09-08 PROCEDURE — 99214 OFFICE O/P EST MOD 30 MIN: CPT | Performed by: NURSE PRACTITIONER

## 2022-09-08 RX ORDER — SERTRALINE HYDROCHLORIDE 100 MG/1
150 TABLET, FILM COATED ORAL DAILY
Qty: 45 TABLET | Refills: 2 | Status: SHIPPED | OUTPATIENT
Start: 2022-09-08 | End: 2022-12-07 | Stop reason: SDUPTHER

## 2022-09-08 RX ORDER — BUPROPION HYDROCHLORIDE 300 MG/1
300 TABLET ORAL EVERY MORNING
Qty: 30 TABLET | Refills: 2 | Status: SHIPPED | OUTPATIENT
Start: 2022-09-08 | End: 2022-12-07 | Stop reason: SDUPTHER

## 2022-09-08 RX ORDER — LAMOTRIGINE 200 MG/1
200 TABLET ORAL DAILY
Qty: 30 TABLET | Refills: 2 | Status: SHIPPED | OUTPATIENT
Start: 2022-09-08 | End: 2022-12-07 | Stop reason: ALTCHOICE

## 2022-09-08 NOTE — PROGRESS NOTES
This provider is located at the [facility] (through [facility]), [address], [place], [address] using a secure MyChart Video Visit through [employer]. Patient is being seen remotely via telehealth at their home address in [place], and stated they are in a secure environment for this session. The patient's condition being diagnosed/treated is appropriate for telemedicine. The provider identified herself as well as her credentials.   The patient, and/or patients guardian, consent to be seen remotely, and when consent is given they understand that the consent allows for patient identifiable information to be sent to a third party as needed.   They may refuse to be seen remotely at any time. The electronic data is encrypted and password protected, and the patient and/or guardian has been advised of the potential risks to privacy not withstanding such measures.        Subjective   Antoinette Pack is a 37 y.o. female who presents today for follow up    Chief Complaint:  Bipolar disorder, depression, anxiety    History of Present Illness:   Today for follow up visit. She is taking her medication as prescribed, denies SE. Things are going well.   Depression rated 6/10, anxiety rated 7/10, with 10 being the worst, moods rated 6/10, with 10 being the worst. She states her moods are sometimes up and down.   Sleep is ok, but she gets up frequently to go to the bathroom. Getting about 5 to 6 hours, has occasional nightmares, she states she is able to cope with them, they have not changed.  Appetite is good.   Denies SI/HI/AH.  Denies thoughts of self-harm. Continues to have visual hallucinations, has not changed.  Chronic health issues, no acute physical or medical issues today         The following portions of the patient's history were reviewed and updated as appropriate: allergies, current medications, past family history, past medical history, past social history, past  surgical history and problem list.      Past Medical History:  Past Medical History:   Diagnosis Date   • Anxiety    • Depression    • Diabetes mellitus (HCC)    • Diverticulitis    • GERD (gastroesophageal reflux disease)    • Hypertension    • Kidney stone    • PCOS (polycystic ovarian syndrome)    • Sleep apnea        Social History:  Social History     Socioeconomic History   • Marital status:    Tobacco Use   • Smoking status: Former Smoker   • Smokeless tobacco: Never Used   Vaping Use   • Vaping Use: Never used   Substance and Sexual Activity   • Alcohol use: No   • Drug use: No   • Sexual activity: Defer       Family History:  Family History   Problem Relation Age of Onset   • Heart disease Mother    • COPD Mother    • Heart disease Father    • COPD Father        Past Surgical History:  Past Surgical History:   Procedure Laterality Date   • CARDIAC CATHETERIZATION     • CARPAL TUNNEL RELEASE     • COLONOSCOPY     • ENDOSCOPY     • FRACTURE SURGERY     • HARDWARE REMOVAL      WRIST   • TENDON REPAIR      HAND       Problem List:  Patient Active Problem List   Diagnosis   • Body mass index (BMI) 40.0-44.9, adult   • Simple goiter   • Type 2 diabetes mellitus with hyperglycemia, with long-term current use of insulin ([facility])   • Type 2 diabetes mellitus with hyperglycemia, with long-term current use of insulin ([facility])        Allergy:   Allergies   Allergen Reactions   • Dilantin [Phenytoin] Mental Status Change   • Keppra [Levetiracetam] Mental Status Change   • Meperidine Rash   • Topamax [Topiramate] Mental Status Change        Current Medications:   Current Outpatient Medications   Medication Sig Dispense Refill   • buPROPion XL (WELLBUTRIN XL) 300 MG 24 hr tablet Take 1 tablet by mouth Every Morning. 30 tablet 2   • lamoTRIgine (LaMICtal) 200 MG tablet Take 1 tablet by mouth Daily. 30 tablet 2   • sertraline (ZOLOFT) 100 MG tablet Take 1 & 1/2 tablets by mouth Daily. 45 tablet 2   • clobetasol (TEMOVATE)  0.05 % ointment As Needed.     • Continuous Blood Gluc  (FreeStyle Esvin 2 [place]) device Use as directed to cotinuously monitor blood glucose. 1 each 0   • Continuous Blood Gluc Sensor (FreeStyle Esvin 2 Sensor) misc Use as directed and change sensor Every 14 (Fourteen) Days. 6 each 1   • fluticasone (FLONASE) 50 MCG/ACT nasal spray 1 spray into the nostril(s) as directed by provider Daily As Needed.     • ibuprofen (ADVIL,MOTRIN) 800 MG tablet As Needed.     • insulin degludec (Tresiba FlexTouch) 100 UNIT/ML solution pen-injector injection Inject 50 Units under the skin into the appropriate area as directed 2 (Two) Times a Day. 90 mL 1   • Insulin Lispro, 1 Unit Dial, (HumaLOG KwikPen) 100 UNIT/ML solution pen-injector Inject 10 Units under the skin into the appropriate area as directed 3 (Three) Times a Day. 15 mL 5   • Januvia 100 MG tablet Take 1 tablet by mouth Daily. 90 tablet 1   • lisinopril (PRINIVIL,ZESTRIL) 40 MG tablet Take 1 tablet by mouth Daily. 90 tablet 0   • metFORMIN ER (GLUCOPHAGE-XR) 500 MG 24 hr tablet Take 2 tablets by mouth 2 (Two) Times a Day With Meals. 360 tablet 1   • modafinil (PROVIGIL) 200 MG tablet Take 200 mg by mouth Daily.     • omeprazole (priLOSEC) 40 MG capsule TAKE ONE (1) CAPSULE BY MOUTH EVERY DAY BEFORE A MEAL 90 capsule 1   • ondansetron (ZOFRAN) 4 MG tablet Take 1 tablet by mouth Every 8 (Eight) Hours As Needed for Nausea or Vomiting. 15 tablet 0   • polyethylene glycol (MIRALAX) 17 GM/SCOOP powder Take 17 g by mouth Daily. 578 g 0   • rosuvastatin (CRESTOR) 40 MG tablet Take 1 tablet by mouth Every Night at bedtime 90 tablet 0     No current facility-administered medications for this visit.       Review of Symptoms:    Review of Systems   Psychiatric/Behavioral: Positive for hallucinations and depressed mood. Negative for self-injury and suicidal ideas. The patient is nervous/anxious.    All other systems reviewed and are negative.        Physical Exam:   not  currently breastfeeding.  There is no height or weight on file to calculate BMI.    Appearance:  female appears stated age, no acute distress noted.    Gait, Station, Strength: Steady, posture erect, WNL      Mental Status Exam: 9/8/22  Hygiene:   good  Cooperation:  Cooperative  Eye Contact:  Good  Psychomotor Behavior:  Appropriate  Affect:  Appropriate  Mood: euthymic  Hopelessness: Denies  Speech:  Normal  Thought Process:  Linear  Thought Content:  Mood congruent  Suicidal:  None  Homicidal:  None  Hallucinations:  Visual  Delusion:  None  Memory:  Deficits  Orientation:  Person, Place, Time and Situation  Reliability:  good  Insight:  Fair  Judgement:  Fair  Impulse Control:  Fair  Physical/Medical Issues:  Yes DIABETES, HTN, GERD     PHQ-Score Total:  PHQ-9 Total Score:        Lab Results:   No visits with results within 1 Month(s) from this visit.   Latest known visit with results is:   Admission on 07/09/2022, Discharged on 07/09/2022   Component Date Value Ref Range Status   • Glucose 07/09/2022 225 (A) 65 - 99 mg/dL Final   • BUN 07/09/2022 9  6 - 20 mg/dL Final   • Creatinine 07/09/2022 0.63  0.57 - 1.00 mg/dL Final   • Sodium 07/09/2022 134 (A) 136 - 145 mmol/L Final   • Potassium 07/09/2022 4.4  3.5 - 5.2 mmol/L Final   • Chloride 07/09/2022 99  98 - 107 mmol/L Final   • CO2 07/09/2022 23.7  22.0 - 29.0 mmol/L Final   • Calcium 07/09/2022 9.2  8.6 - 10.5 mg/dL Final   • Total Protein 07/09/2022 7.5  6.0 - 8.5 g/dL Final   • Albumin 07/09/2022 4.39  3.50 - 5.20 g/dL Final   • ALT (SGPT) 07/09/2022 35 (A) 1 - 33 U/L Final   • AST (SGOT) 07/09/2022 28  1 - 32 U/L Final   • Alkaline Phosphatase 07/09/2022 81  39 - 117 U/L Final   • Total Bilirubin 07/09/2022 0.5  0.0 - 1.2 mg/dL Final   • Globulin 07/09/2022 3.1  gm/dL Final   • A/G Ratio 07/09/2022 1.4  g/dL Final   • BUN/Creatinine Ratio 07/09/2022 14.3  7.0 - 25.0 Final   • Anion Gap 07/09/2022 11.3  5.0 - 15.0 mmol/L Final   • eGFR 07/09/2022  115.9  >60.0 mL/min/1.73 Final    National Kidney Foundation and American Society of Nephrology (ASN) Task Force recommended calculation based on the Chronic Kidney Disease Epidemiology Collaboration (CKD-EPI) equation refit without adjustment for race.   • Lipase 07/09/2022 28  13 - 60 U/L Final   • HCG Qualitative 07/09/2022 Negative  Negative Final   • Color, UA 07/09/2022 Yellow  Yellow, Straw Final   • Appearance, UA 07/09/2022 Clear  Clear Final   • pH, UA 07/09/2022 7.5  5.0 - 8.0 Final   • Specific Gravity, UA 07/09/2022 1.014  1.005 - 1.030 Final   • Glucose, UA 07/09/2022 250 mg/dL (1+) (A) Negative Final   • Ketones, UA 07/09/2022 Negative  Negative Final   • Bilirubin, UA 07/09/2022 Negative  Negative Final   • Blood, UA 07/09/2022 Negative  Negative Final   • Protein, UA 07/09/2022 Negative  Negative Final   • Leuk Esterase, UA 07/09/2022 Negative  Negative Final   • Nitrite, UA 07/09/2022 Negative  Negative Final   • Urobilinogen, UA 07/09/2022 0.2 E.U./dL  0.2 - 1.0 E.U./dL Final   • C-Reactive Protein 07/09/2022 0.62 (A) 0.00 - 0.50 mg/dL Final   • Sed Rate 07/09/2022 16  0 - 20 mm/hr Final   • QT Interval 07/09/2022 358  ms Final   • QTC Interval 07/09/2022 442  ms Final   • Troponin T 07/09/2022 <0.010  0.000 - 0.030 ng/mL Final   • WBC 07/09/2022 8.07  3.40 - 10.80 10*3/mm3 Final   • RBC 07/09/2022 4.73  3.77 - 5.28 10*6/mm3 Final   • Hemoglobin 07/09/2022 14.0  12.0 - 15.9 g/dL Final   • Hematocrit 07/09/2022 39.6  34.0 - 46.6 % Final   • MCV 07/09/2022 83.7  79.0 - 97.0 fL Final   • MCH 07/09/2022 29.6  26.6 - 33.0 pg Final   • MCHC 07/09/2022 35.4  31.5 - 35.7 g/dL Final   • RDW 07/09/2022 12.7  12.3 - 15.4 % Final   • RDW-SD 07/09/2022 38.4  37.0 - 54.0 fl Final   • MPV 07/09/2022 9.8  6.0 - 12.0 fL Final   • Platelets 07/09/2022 125 (A) 140 - 450 10*3/mm3 Final   • Neutrophil % 07/09/2022 61.8  42.7 - 76.0 % Final   • Lymphocyte % 07/09/2022 31.4  19.6 - 45.3 % Final   • Monocyte % 07/09/2022  4.8 (A) 5.0 - 12.0 % Final   • Eosinophil % 07/09/2022 1.4  0.3 - 6.2 % Final   • Basophil % 07/09/2022 0.2  0.0 - 1.5 % Final   • Immature Grans % 07/09/2022 0.4  0.0 - 0.5 % Final   • Neutrophils, Absolute 07/09/2022 4.99  1.70 - 7.00 10*3/mm3 Final   • Lymphocytes, Absolute 07/09/2022 2.53  0.70 - 3.10 10*3/mm3 Final   • Monocytes, Absolute 07/09/2022 0.39  0.10 - 0.90 10*3/mm3 Final   • Eosinophils, Absolute 07/09/2022 0.11  0.00 - 0.40 10*3/mm3 Final   • Basophils, Absolute 07/09/2022 0.02  0.00 - 0.20 10*3/mm3 Final   • Immature Grans, Absolute 07/09/2022 0.03  0.00 - 0.05 10*3/mm3 Final   • nRBC 07/09/2022 0.0  0.0 - 0.2 /100 WBC Final   • Extra Tube 07/09/2022 Hold for add-ons.   Final    Auto resulted.   • Extra Tube 07/09/2022 hold for add-on   Final    Auto resulted   • Extra Tube 07/09/2022 Hold for add-ons.   Final    Auto resulted       Assessment & Plan   Problems Addressed this Visit    None     Visit Diagnoses     Bipolar disorder, in partial remission, most recent episode depressed (HCC)    -  Primary    Relevant Medications    lamoTRIgine (LaMICtal) 200 MG tablet    buPROPion XL (WELLBUTRIN XL) 300 MG 24 hr tablet    sertraline (ZOLOFT) 100 MG tablet    Panic disorder with agoraphobia        Relevant Medications    lamoTRIgine (LaMICtal) 200 MG tablet    buPROPion XL (WELLBUTRIN XL) 300 MG 24 hr tablet    sertraline (ZOLOFT) 100 MG tablet    Generalized anxiety disorder        Relevant Medications    lamoTRIgine (LaMICtal) 200 MG tablet    buPROPion XL (WELLBUTRIN XL) 300 MG 24 hr tablet    sertraline (ZOLOFT) 100 MG tablet    Medication management        Relevant Medications    lamoTRIgine (LaMICtal) 200 MG tablet    buPROPion XL (WELLBUTRIN XL) 300 MG 24 hr tablet    sertraline (ZOLOFT) 100 MG tablet      Diagnoses       Codes Comments    Bipolar disorder, in partial remission, most recent episode depressed (HCC)    -  Primary ICD-10-CM: F31.75  ICD-9-CM: 296.55     Panic disorder with  agoraphobia     ICD-10-CM: F40.01  ICD-9-CM: 300.21     Generalized anxiety disorder     ICD-10-CM: F41.1  ICD-9-CM: 300.02     Medication management     ICD-10-CM: Z79.899  ICD-9-CM: V58.69         Social History     Tobacco Use   Smoking Status Former Smoker   Smokeless Tobacco Never Used     JORGE reviewed and appropriate. Patient counseled on use of controlled substances.     -The benefits of a healthy diet and exercise were discussed with patient, especially the positive effects they have on mental health. Patient encouraged to consider lifestyle modification regarding  diet and exercise patterns to maximize results of mental health treatment.  -Reviewed previous available documentation  -Reviewed most recent available labs   -Lengthy discussion with patient on the possible side effects of antipsychotic medications including increased cholesterol, increased blood sugar, and possibility of weight gain.  Also discussed the need to monitor lab work associated with this.  The risk of muscle movement disorders with this class of medication was also discussed.  -This APRN has discussed that a very slow dose titration when starting, or changing doses, of lamotrigine may reduce the incidence of skin rash and other side effects.  The dosage should not be titrated upwards or increased faster than recommended due to the possibility of the discussed side effects and risk of development of a skin rash (which can become life threatening).  This APRN has also discussed that if the patient stops taking the lamotrigine for 5 days or longer, it will be necessary to restart the drug with an initial dose titration, as rashes have been reported on reexposure.  If the patient/guardian and Provider decide to stop the lamotrigine, the patient/guardian will follow the directions of this APRN/this office as a guided taper over about two weeks is appropriate due to the risk of relapse in bipolar disorder with those with bipolar  disorder, the risk of seizures in those with epilepsy, and discontinuation symptoms upon rapid discontinuation of lamotrigine. The patient/guardian verbalizes understanding of benefits and risks as discussed, the patient/guardian feels the benefits outweigh the risks and is agreeable to continue/take lamotrigine as discussed.  The patient/guardian is advised should any side effects or rash develops they are to stop the lamotrigine immediately and contact this APRN/this office or go to the emergency department immediately.  The patient/guardian verbalizes understanding and agreement with treatment plan in their own words.          Visit Diagnoses:    ICD-10-CM ICD-9-CM   1. Bipolar disorder, in partial remission, most recent episode depressed (HCC)  F31.75 296.55   2. Panic disorder with agoraphobia  F40.01 300.21   3. Generalized anxiety disorder  F41.1 300.02   4. Medication management  Z79.899 V58.69       TREATMENT PLAN/GOALS: Continue supportive psychotherapy efforts and medications as indicated. Treatment and medication options discussed during today's visit. Patient acknowledged and verbally consented to continue with current treatment plan and was educated on the importance of compliance with treatment and follow-up appointments.    MEDICATION ISSUES:    Discussed medication options and treatment plan of prescribed medication as well as the risks, benefits, and side effects including potential falls, possible impaired driving and metabolic adversities among others. Patient is agreeable to call the office with any worsening of symptoms or onset of side effects. Patient is agreeable to call 911 or go to the nearest ER should he/she begin having SI/HI.     MEDS ORDERED DURING VISIT:  New Medications Ordered This Visit   Medications   • lamoTRIgine (LaMICtal) 200 MG tablet     Sig: Take 1 tablet by mouth Daily.     Dispense:  30 tablet     Refill:  2   • buPROPion XL (WELLBUTRIN XL) 300 MG 24 hr tablet     Sig:  Take 1 tablet by mouth Every Morning.     Dispense:  30 tablet     Refill:  2   • sertraline (ZOLOFT) 100 MG tablet     Sig: Take 1 & 1/2 tablets by mouth Daily.     Dispense:  45 tablet     Refill:  2       Return in about 3 months (around 12/8/2022) for Recheck.    -Continue bupropion  mg 24-hour tablet take 1 tablet by mouth every morning for depression and anxiety  -D/c bupropion , patient requests to stop due to cost. May consider adding back on if moods worsen.   -Continue lamotrigine 200 mg tablet take 1 tablet by mouth daily for bipolar disorder.  -Continue sertraline 100 mg tablet take 1.5 tablets by mouth daily for anxiety and depression.    I spent 30 minutes caring for Antoinette on this date of service. This time includes time spent by me in the following activities: preparing for the visit, obtaining and/or reviewing a separately obtained history, performing a medically appropriate examination and/or evaluation, counseling and educating the patient/family/caregiver, ordering medications, tests, or procedures and documenting information in the medical record             This document has been electronically signed by Martha Conn, CICI  September 8, 2022 08:51 EDT    Part of this note may be an electronic transcription/translation of spoken language to printed text using the Dragon Dictation System.

## 2022-09-12 ENCOUNTER — SPECIALTY PHARMACY (OUTPATIENT)
Dept: PHARMACY | Facility: HOSPITAL | Age: 37
End: 2022-09-12

## 2022-09-12 ENCOUNTER — OFFICE VISIT (OUTPATIENT)
Dept: ENDOCRINOLOGY | Facility: CLINIC | Age: 37
End: 2022-09-12

## 2022-09-12 VITALS
DIASTOLIC BLOOD PRESSURE: 74 MMHG | OXYGEN SATURATION: 98 % | WEIGHT: 204.8 LBS | SYSTOLIC BLOOD PRESSURE: 142 MMHG | HEIGHT: 59 IN | BODY MASS INDEX: 41.29 KG/M2 | HEART RATE: 105 BPM

## 2022-09-12 DIAGNOSIS — E11.65 TYPE 2 DIABETES MELLITUS WITH HYPERGLYCEMIA, WITH LONG-TERM CURRENT USE OF INSULIN: Primary | ICD-10-CM

## 2022-09-12 DIAGNOSIS — Z79.4 TYPE 2 DIABETES MELLITUS WITH HYPERGLYCEMIA, WITH LONG-TERM CURRENT USE OF INSULIN: Primary | ICD-10-CM

## 2022-09-12 LAB
EXPIRATION DATE: NORMAL
GLUCOSE BLDC GLUCOMTR-MCNC: 253 MG/DL (ref 70–130)
HBA1C MFR BLD: 8.9 %
Lab: NORMAL

## 2022-09-12 PROCEDURE — 82962 GLUCOSE BLOOD TEST: CPT | Performed by: NURSE PRACTITIONER

## 2022-09-12 PROCEDURE — 83036 HEMOGLOBIN GLYCOSYLATED A1C: CPT | Performed by: NURSE PRACTITIONER

## 2022-09-12 PROCEDURE — 99213 OFFICE O/P EST LOW 20 MIN: CPT | Performed by: NURSE PRACTITIONER

## 2022-09-12 RX ORDER — INSULIN LISPRO 100 [IU]/ML
10 INJECTION, SOLUTION INTRAVENOUS; SUBCUTANEOUS 3 TIMES DAILY
Qty: 15 ML | Refills: 5 | Status: SHIPPED | OUTPATIENT
Start: 2022-09-12 | End: 2022-12-13 | Stop reason: SDUPTHER

## 2022-09-12 RX ORDER — INSULIN DEGLUDEC INJECTION 100 U/ML
50 INJECTION, SOLUTION SUBCUTANEOUS 2 TIMES DAILY
Qty: 90 ML | Refills: 1 | Status: SHIPPED | OUTPATIENT
Start: 2022-09-12 | End: 2022-12-13 | Stop reason: SDUPTHER

## 2022-09-12 NOTE — PATIENT INSTRUCTIONS
Metformin 1,000 mg BID  Tresiba 50 units BID  Novolog 5 units TID    Ozempic 0.25 mg weekly.  When you are taking Ozempic do not take Januvia.    If you cannot tolerate Ozempic, call the office for further instructions.

## 2022-09-12 NOTE — PROGRESS NOTES
Specialty Pharmacy Patient Management Program  Endocrinology Reassessment     Antoinette Pack is a 37 y.o. female with Type 2 Diabetes enrolled in the Endocrinology Patient Management program offered by [facility].  A follow-up was conducted, including assessment of continued therapy appropriateness, medication adherence, and side effect incidence and management for Insulin therapy, Metformin, Januvia, and CGM.    Patient reports taking Tresiba 50 units BID, Humalog 10 units TID with meals, Januvia 100 mg, Metformin ER 1,000 mg BID. She reports no low BG <70. She uses a Freestyle Libre2 to monitor her BG with an average of 242 mg/dL. She was reporting GI upset with Metformin, was switched to extended release formulation and just started taking in the past week. Patient still reports some GI upset, but may improve with continued use of ER formulation.     Patient denies history of pancreatitis/pancreas issues, denies history or family history of thyroid cancer, and reports issues with UTI/yeast infections.     In the past, the patient has tried:     Drug Dose Reason for Discontinuation Notes   Trulicity  GI upset    Glipizide   Unsure                   Changes to Insurance Coverage or Financial Support  On patient assistance program  · Ozempic would have a $25 co-pay but would be covered with assistance program     Relevant Past Medical History and Comorbidities  Relevant medical history and concomitant health conditions were discussed with the patient. The patient's chart has been reviewed for relevant past medical history and comorbid health conditions and updated as necessary.   Past Medical History:   Diagnosis Date   • Anxiety    • Depression    • Diabetes mellitus (HCC)    • Diverticulitis    • GERD (gastroesophageal reflux disease)    • Hypertension    • Kidney stone    • PCOS (polycystic ovarian syndrome)    • Sleep apnea      Social History     Socioeconomic History   • Marital  status:    Tobacco Use   • Smoking status: Former Smoker   • Smokeless tobacco: Never Used   Vaping Use   • Vaping Use: Never used   Substance and Sexual Activity   • Alcohol use: No   • Drug use: No   • Sexual activity: Defer       Problem list reviewed by Cesilia Nash, Pharmacy Intern on 9/12/2022 at  2:16 PM    Allergies  Known allergies and reactions were discussed with the patient. The patient's chart has been reviewed for allergy information and updated as necessary.   Dilantin [phenytoin], Keppra [levetiracetam], Meperidine, and Topamax [topiramate]    Allergies reviewed by Cseilia Nash, Pharmacy Intern on 9/12/2022 at  2:12 PM    Relevant Laboratory Values  A1C Last 3 Results    HGBA1C Last 3 Results 2/17/22 5/26/22 9/12/22   Hemoglobin A1C 8.8 8.7 8.9           Lab Results   Component Value Date    HGBA1C 8.9 09/12/2022     Lab Results   Component Value Date    GLUCOSE 225 (H) 07/09/2022    CALCIUM 9.2 07/09/2022     (L) 07/09/2022    K 4.4 07/09/2022    CO2 23.7 07/09/2022    CL 99 07/09/2022    BUN 9 07/09/2022    CREATININE 0.63 07/09/2022    EGFRIFAFRI  09/05/2016      Comment:      <15 Indicative of kidney failure.    EGFRIFNONA 101 12/27/2020    BCR 14.3 07/09/2022    ANIONGAP 11.3 07/09/2022     Lab Results   Component Value Date    CHLPL 159 09/22/2015    TRIG 131 09/22/2015    HDL 38 (L) 09/22/2015    LDL 95 09/22/2015       Current Medication List  This medication list has been reviewed with the patient and evaluated for any interactions or necessary modifications/recommendations, and updated to include all prescription medications, OTC medications, and supplements the patient is currently taking.  This list reflects what is contained in the patient's profile, which has also been marked as reviewed to communicate to other providers it is the most up to date version of the patient's current medication therapy.     Current Outpatient Medications:   •  buPROPion XL (WELLBUTRIN XL)  300 MG 24 hr tablet, Take 1 tablet by mouth Every Morning., Disp: 30 tablet, Rfl: 2  •  clobetasol (TEMOVATE) 0.05 % ointment, As Needed., Disp: , Rfl:   •  Continuous Blood Gluc  (FreeStyle Esvin 2 [place]) device, Use as directed to cotinuously monitor blood glucose., Disp: 1 each, Rfl: 0  •  Continuous Blood Gluc Sensor (FreeStyle Esvin 2 Sensor) misc, Use as directed and change sensor Every 14 (Fourteen) Days., Disp: 6 each, Rfl: 1  •  fluticasone (FLONASE) 50 MCG/ACT nasal spray, 1 spray into the nostril(s) as directed by provider Daily As Needed., Disp: , Rfl:   •  insulin degludec (Tresiba FlexTouch) 100 UNIT/ML solution pen-injector injection, Inject 50 Units under the skin into the appropriate area as directed 2 (Two) Times a Day., Disp: 90 mL, Rfl: 1  •  Insulin Lispro, 1 Unit Dial, (HumaLOG KwikPen) 100 UNIT/ML solution pen-injector, Inject 10 Units under the skin into the appropriate area as directed 3 (Three) Times a Day., Disp: 15 mL, Rfl: 5  •  Januvia 100 MG tablet, Take 1 tablet by mouth Daily., Disp: 90 tablet, Rfl: 1  •  lamoTRIgine (LaMICtal) 200 MG tablet, Take 1 tablet by mouth Daily., Disp: 30 tablet, Rfl: 2  •  lisinopril (PRINIVIL,ZESTRIL) 40 MG tablet, Take 1 tablet by mouth Daily., Disp: 90 tablet, Rfl: 0  •  metFORMIN ER (GLUCOPHAGE-XR) 500 MG 24 hr tablet, Take 2 tablets by mouth 2 (Two) Times a Day With Meals., Disp: 360 tablet, Rfl: 1  •  modafinil (PROVIGIL) 200 MG tablet, Take 200 mg by mouth Daily., Disp: , Rfl:   •  omeprazole (priLOSEC) 40 MG capsule, TAKE ONE (1) CAPSULE BY MOUTH EVERY DAY BEFORE A MEAL, Disp: 90 capsule, Rfl: 1  •  polyethylene glycol (MIRALAX) 17 GM/SCOOP powder, Take 17 g by mouth Daily. (Patient taking differently: Take 17 g by mouth As Needed.), Disp: 578 g, Rfl: 0  •  rosuvastatin (CRESTOR) 40 MG tablet, Take 1 tablet by mouth Every Night at bedtime, Disp: 90 tablet, Rfl: 0  •  sertraline (ZOLOFT) 100 MG tablet, Take 1 & 1/2 tablets by mouth Daily.,  Disp: 45 tablet, Rfl: 2  •  ibuprofen (ADVIL,MOTRIN) 800 MG tablet, As Needed., Disp: , Rfl:   •  ondansetron (ZOFRAN) 4 MG tablet, Take 1 tablet by mouth Every 8 (Eight) Hours As Needed for Nausea or Vomiting., Disp: 15 tablet, Rfl: 0    Medicines reviewed by Cesilia Nash, Pharmacy Intern on 9/12/2022 at  2:16 PM    Drug Interactions  · Coadministration of metformin and NSAIDs may increase the risk of acute renal failure and lactic acidosis.  · ACE inhibitors may enhance the adverse/toxic effect of metformin. This includes both a risk for hypoglycemia and for lactic acidosis.    Adverse Drug Reactions  • Adverse Reactions Experienced: None  • Plan for ADR Management: None required    Hospitalizations and Urgent Care Since Last Assessment  • Hospitalizations or Admissions: None  • ED Visits: None   • Urgent Office Visits: None    Adherence and Self-Administration  Adherence related patient's specialty therapy was discussed with the patient. The Adherence segment of this outreach has been reviewed and updated.   • Ongoing or New Barriers to Patient Adherence and/or Self-Administration: None   • Methods for Supporting Patient Adherence and/or Self-Administration: None Required    Goals of Therapy  Goals related to the patient's specialty therapy was discussed with the patient. The Patient Goals segment of this outreach has been reviewed and updated.    Goals     • Consistently take medications as prescribed     • HEMOGLOBIN A1C < 7          Reassessment Plan & Follow-Up  1. Medication Therapy Changes: Prescriber started Ozempic 0.25 mg weekly and provided patient with sample.   · Provided injection technique education to the patient, she seemed to have a good understanding of counseling with no questions.   2. Additional Plans, Therapy Recommendations, or Therapy Problems to Be Addressed:  · Patient's diabetes remains uncontrolled with a worsened A1c from 8.7% to 8.9% today.   · Patient has tried Trulicity in the  past but is open to trying a different GLP-1 agonist. Recommend switching to Ozempic, if started, stop Januvia as they cannot be used together.   · Patient reports not changing pen needles after each injection and reusing them. Discussed proper use of pen needles and the importance of changing needles to prevent infections.   3. Pharmacist to perform regular reassessments no more than (6) months from the previous assessment.  4. Care Coordinator to set up future refill outreaches, coordinate prescription delivery, and escalate clinical questions to pharmacist.   · Patient is due for refills, patient stated she will call when she gets him to inform us what she needs filled.     Education Provided  OZEMPIC® (semaglutide)  Medication Expectations   Why am I taking this medication? You are taking Ozempic, along with diet and exercise, to lower blood sugar because you have type 2 diabetes. Diabetes is not curable but with proper medication and treatment, we can keep your blood sugar within your personalized target range. This medication may also help you lose some weight, and it helps reduce the risk of death from heart attack, and stroke in adults with type 2 diabetes and known heart disease.   What should I expect while on this medication? You should expect to see your blood sugar and A1c decrease over time and you may also lose some weight.   How does the medication work? Ozempic is a non-insulin injection that works with your body's own ability to lower blood sugar and A1c and helps your body release its own insulin in response to your blood sugar rising.  This medication also slows down food from leaving your stomach, making you feel whittaker for longer.   How long will I be on this medication for? The amount of time you will be on this medication will be determined by your doctor based on blood sugar and A1c control. You will most likely be on this medication or another diabetes medication throughout your lifetime.  Do not abruptly stop this medication without talking to your doctor first.    How do I take this medication? Take as directed on your prescription label. Ozempic is injected under the skin (subcutaneously) of your stomach, thigh or upper arm.  Use this medication once weekly, on the same day each week, and it can be given with or without food.  Use a different injection site each week in the same body region.     For each new prefilled pen, prime the needle before the first injection by turning the dose selector to the flow check symbol and injecting into the air (priming is not required for subsequent injections).   • Once needle is inserted, continue to press the button until the dial has returned to 0 and for an additional 6 seconds before removing.    What are some possible side effects? You may notice you don't feel as hungry, especially when you first start using Ozempic.  The most common side effects are nausea, diarrhea, vomiting, stomach pain, and constipation.      Stop using Ozempic and call your doctor immediately if you have severe pain in your stomach area that will not go away as this could be a sign of pancreatitis (inflammation of your pancreas).  Tell your doctor if you get a lump or swelling in your neck, hoarseness, difficulty swallowing, or feel short of breath (these may be symptoms of thyroid cancer).  Talk with your doctor if you have changes in vision while taking Ozempic.   What happens if I miss a dose? If you miss a dose, take it as soon as you remember as long as it is within 5 days after your missed dose.  If more than 5 days have passed, skip the missed dose and resume Ozempic on the regularly scheduled day.     Medication Safety   What are things I should warn my doctor immediately about? Do not use Ozempic if you or a family member have ever had medullary thyroid cancer (MTC) or Multiple Endocrine Neoplasia syndrome type 2 (MEN 2).    • Tell your doctor if you get a lump or swelling  in your neck, hoarseness, difficulty swallowing, or feel short of breath (these may be symptoms of thyroid cancer).    Tell your doctor if you have or have had problems with your kidneys or pancreas.   • Stop using Ozempic and get medical help right away if you have severe pain in your stomach area that will not go away as this could be a sign of pancreatitis (inflammation of your pancreas)  Tell your doctor if you have problem digesting food or slowed emptying of your stomach (gastroparesis).    Let your doctor know if you have changes in vision while taking Ozempic or have been diagnosed with diabetic retinopathy.    Talk to your doctor if you are pregnant, planning to become pregnant, or breastfeeding.     Get medical help right away if you notice any signs/symptoms of an allergic reaction (rash, hives, difficulty breathing, etc.).   What are things that I should be cautious of? Be cautious of any side effects from this medication. Talk to your doctor if any new ones develop or aren't getting better.   What are some medications that can interact with this one? Taking Ozempic with other medications that also lower your blood sugar such as insulin and glipizide/glimepiride/glyburide may increase the risk of low blood sugar.  • Your doctor may reduce the dose of these medications when you start Ozempic to minimize low blood sugars    It should not be taken with other medicines called GLP-1 receptor agonists, because these work the same way as Ozempic.      Because Ozempic slows stomach emptying, it can affect the way some medicines work.    Always tell your doctor or pharmacist immediately if you start taking any new medications, including over-the-counter medications, vitamins, and herbal supplements.     Medication Storage/Handling   How should I handle this medication? Keep this medication out of reach of pets/children and keep the pen capped when not in use.   How does this medication need to be stored? Store in  the refrigerator prior to first use, but do not freeze.  After first use, you may continue to store in the refrigerator or at room temperature for 56 days.  Protect from excessive heat and sunlight.   How should I dispose of this medication? Used Ozempic pens should be thrown away after 56 days.  Place your used Ozempic pen and needle in an approved sharps container after use.  If you do not have a sharps container, you may use a household container made of heavy-duty plastic with a tight-fitting lid that is leak resistant (e.g., heavy-duty plastic laundry detergent bottle).    If your doctor decides to stop this medication, take to your local police station for proper disposal. Some pharmacies also have take-back bins for medication drop-off.      Resources/Support   How can I remind myself to take this medication? You can download reminder apps to help you manage your refills. You may also set an alarm on your phone to remind you.    Is financial support available?  [employer] can provide co-pay cards if you have commercial insurance or patient assistance if you have Medicare or no insurance.    Which vaccines are recommended for me? Talk to your doctor about these vaccines:  • Flu   • Coronavirus (COVID-19)   • Pneumococcal (pneumonia)   • Tdap   • Hepatitis B   • Zoster (shingles)        Attestation  I attest that the specialty medication(s) addressed above are appropriate for the patient based on my reassessment.  If the prescribed therapy is at any point deemed not appropriate based on the current or future assessments, a consultation will be initiated with the patient's specialty care provider to determine the best course of action. The revised plan of therapy will be documented along with any additional patient education provided.     Cesilia Nash, Pharmacy Intern  Sravani Perez, PriyaD   9/12/2022  14:25 EDT

## 2022-09-12 NOTE — PROGRESS NOTES
Chief Complaint   Patient presents with   • Diabetes     F/u        Antoinette Pack is a 37 y.o. female had concerns including Diabetes (F/u).     Birth state: [place]  Previous history of radiation to face/neck:no  Consuming foods high in iodine: no  Family history of thyroid complications:none    The following portions of the patient's history were reviewed and updated as appropriate: allergies, current medications, past family history, past medical history, past social history, past surgical history and problem list.    Diabetes was diagnosed 8-9 years ago.  Complications include none.  Last ophtho exam was 2021, Dr. Bruno Office.  Current medications for diabetes include Januvia 100 mg daily, Metformin 1,000 mg BID, Humalog 10 units TID with meals, Tresiba 50 units BID.  She also takes multiple doses of short acting insulin throughout the day to keep her blood sugar from getting too high.  Past meds: Trulicity, GI Issues, has problems with yeast, Glipizide-unsure why it was stopped.  She checks her blood with Freestyle Esvin.   Hypos: rarely  Fasting range: 150-200+  Can be anywhere from 300-400    Diet: Is not really watching what she is eating.    Past Medical History:   Diagnosis Date   • Anxiety    • Depression    • Diabetes mellitus (HCC)    • Diverticulitis    • GERD (gastroesophageal reflux disease)    • Hypertension    • Kidney stone    • PCOS (polycystic ovarian syndrome)    • Sleep apnea      Past Surgical History:   Procedure Laterality Date   • CARDIAC CATHETERIZATION     • CARPAL TUNNEL RELEASE     • COLONOSCOPY     • ENDOSCOPY     • FRACTURE SURGERY     • HARDWARE REMOVAL      WRIST   • TENDON REPAIR      HAND      Family History   Problem Relation Age of Onset   • Heart disease Mother    • COPD Mother    • Heart disease Father    • COPD Father       Social History     Socioeconomic History   • Marital status:    Tobacco Use   • Smoking status: Former Smoker   • Smokeless tobacco: Never Used    Vaping Use   • Vaping Use: Never used   Substance and Sexual Activity   • Alcohol use: No   • Drug use: No   • Sexual activity: Defer      Allergies   Allergen Reactions   • Dilantin [Phenytoin] Mental Status Change   • Keppra [Levetiracetam] Mental Status Change   • Meperidine Rash   • Topamax [Topiramate] Mental Status Change      Current Outpatient Medications on File Prior to Visit   Medication Sig Dispense Refill   • buPROPion XL (WELLBUTRIN XL) 300 MG 24 hr tablet Take 1 tablet by mouth Every Morning. 30 tablet 2   • clobetasol (TEMOVATE) 0.05 % ointment As Needed.     • Continuous Blood Gluc  (FreeStyle Esvin 2 [place]) device Use as directed to cotinuously monitor blood glucose. 1 each 0   • Continuous Blood Gluc Sensor (FreeStyle Esvin 2 Sensor) misc Use as directed and change sensor Every 14 (Fourteen) Days. 6 each 1   • fluticasone (FLONASE) 50 MCG/ACT nasal spray 1 spray into the nostril(s) as directed by provider Daily As Needed.     • ibuprofen (ADVIL,MOTRIN) 800 MG tablet As Needed.     • insulin degludec (Tresiba FlexTouch) 100 UNIT/ML solution pen-injector injection Inject 50 Units under the skin into the appropriate area as directed 2 (Two) Times a Day. 90 mL 1   • Insulin Lispro, 1 Unit Dial, (HumaLOG KwikPen) 100 UNIT/ML solution pen-injector Inject 10 Units under the skin into the appropriate area as directed 3 (Three) Times a Day. 15 mL 5   • Januvia 100 MG tablet Take 1 tablet by mouth Daily. 90 tablet 1   • lamoTRIgine (LaMICtal) 200 MG tablet Take 1 tablet by mouth Daily. 30 tablet 2   • lisinopril (PRINIVIL,ZESTRIL) 40 MG tablet Take 1 tablet by mouth Daily. 90 tablet 0   • metFORMIN ER (GLUCOPHAGE-XR) 500 MG 24 hr tablet Take 2 tablets by mouth 2 (Two) Times a Day With Meals. 360 tablet 1   • modafinil (PROVIGIL) 200 MG tablet Take 200 mg by mouth Daily.     • omeprazole (priLOSEC) 40 MG capsule TAKE ONE (1) CAPSULE BY MOUTH EVERY DAY BEFORE A MEAL 90 capsule 1   • ondansetron  "(ZOFRAN) 4 MG tablet Take 1 tablet by mouth Every 8 (Eight) Hours As Needed for Nausea or Vomiting. 15 tablet 0   • polyethylene glycol (MIRALAX) 17 GM/SCOOP powder Take 17 g by mouth Daily. (Patient taking differently: Take 17 g by mouth As Needed.) 578 g 0   • rosuvastatin (CRESTOR) 40 MG tablet Take 1 tablet by mouth Every Night at bedtime 90 tablet 0   • sertraline (ZOLOFT) 100 MG tablet Take 1 & 1/2 tablets by mouth Daily. 45 tablet 2     No current facility-administered medications on file prior to visit.      Review of Systems   Constitutional: Positive for diaphoresis and fatigue. Negative for unexpected weight gain and unexpected weight loss.   HENT:        Neck tenderness   Eyes: Positive for blurred vision and visual disturbance.   Cardiovascular: Positive for palpitations.   Gastrointestinal: Positive for constipation.   Endocrine: Positive for heat intolerance, polydipsia, polyphagia and polyuria. Negative for cold intolerance.   Skin: Positive for dry skin.        Hair thinning   Neurological: Positive for tremors.   Psychiatric/Behavioral: Positive for agitation and sleep disturbance. The patient is nervous/anxious.    All other systems reviewed and are negative.     /74 (BP Location: Left arm, Patient Position: Sitting, Cuff Size: Adult)   Pulse 105   Ht 149.9 cm (59\")   Wt 92.9 kg (204 lb 12.8 oz)   SpO2 98%   BMI 41.36 kg/m²      Physical Exam  Vitals reviewed.   Constitutional:       Appearance: Normal appearance.   Eyes:      Extraocular Movements: Extraocular movements intact.   Cardiovascular:      Rate and Rhythm: Normal rate.      Pulses: Normal pulses.   Pulmonary:      Effort: Pulmonary effort is normal.   Skin:     General: Skin is warm.   Neurological:      Mental Status: She is alert and oriented to person, place, and time.   Psychiatric:         Mood and Affect: Mood normal.         Behavior: Behavior normal.         Thought Content: Thought content normal.         Judgment: " Judgment normal.       Labs/Imaging  CMP  Lab Results   Component Value Date    GLUCOSE 225 (H) 07/09/2022    BUN 9 07/09/2022    CREATININE 0.63 07/09/2022    EGFRIFNONA 101 12/27/2020    EGFRIFAFRI  09/05/2016      Comment:      <15 Indicative of kidney failure.    BCR 14.3 07/09/2022    K 4.4 07/09/2022    CO2 23.7 07/09/2022    CALCIUM 9.2 07/09/2022    ALBUMIN 4.39 07/09/2022    LABIL2 1.3 (L) 02/05/2016    AST 28 07/09/2022    ALT 35 (H) 07/09/2022        CBC w/DIFF   Lab Results   Component Value Date    WBC 8.07 07/09/2022    RBC 4.73 07/09/2022    HGB 14.0 07/09/2022    HCT 39.6 07/09/2022    MCV 83.7 07/09/2022    MCH 29.6 07/09/2022    MCHC 35.4 07/09/2022    RDW 12.7 07/09/2022    RDWSD 38.4 07/09/2022    MPV 9.8 07/09/2022     (L) 07/09/2022    NEUTRORELPCT 61.8 07/09/2022    LYMPHORELPCT 31.4 07/09/2022    MONORELPCT 4.8 (L) 07/09/2022    EOSRELPCT 1.4 07/09/2022    BASORELPCT 0.2 07/09/2022    AUTOIGPER 0.4 07/09/2022    NEUTROABS 4.99 07/09/2022    LYMPHSABS 2.53 07/09/2022    MONOSABS 0.39 07/09/2022    EOSABS 0.11 07/09/2022    BASOSABS 0.02 07/09/2022    AUTOIGNUM 0.03 07/09/2022    NRBC 0.0 07/09/2022       TSH  Lab Results   Component Value Date    TSH 0.497 02/17/2022    TSH 0.971 09/22/2015       T4  Lab Results   Component Value Date    FREET4 0.97 02/17/2022    FREET4 1.20 09/22/2015     No results found for: [id number]    T3  Lab Results   Component Value Date    T3FREE 3.43 02/17/2022     No results found for: [id number]    TRAb  No results found for: TSURCPAB    TPO  No results found for: THYROIDAB    No valid procedures specified.    Assessment and Plan    Diagnoses and all orders for this visit:    1. Type 2 diabetes mellitus with hyperglycemia, with long-term current use of insulin (HCC) (Primary)  Assessment & Plan:  -Diabetes is not changed with A1C 8.7 to 8.9.   -Discussed the dietary and exercise guidelines with patient.  She has done diabetes education in the past but it has been  a long time.  She is willing to speak with the diabetes educators again for dietary assistance.  -She is to continue using Freestyle Esvin CGM.  -Discussed the importance of keeping yearly eye exams.  -Continue Tresiba 50 units BID.  -Continue Novolog 10 units TID with meals.  -Continue Metformin 1,000 mg BID.  -Discontinue Januvia 100 mg QD.  -Start Ozempic 0.25 mg weekly.  Patient has no personal history of pancreatitis, no family history of MEN syndrome or medullary thyroid cancer. Possible side effects including nausea, bloating, other GI upset and rarely pancreatitis were discussed. She was advised to call the office with any symptoms or concerns.   -S/S hypoglycemia reviewed with Rule of 15's advised.  -Follow-up in 4 weeks.    Orders:  -     POC Glucose Fingerstick  -     POC Glycosylated Hemoglobin (Hb A1C)  -     TSH  -     T4, Free  -     T3, Free       Return in about 4 weeks (around 10/10/2022) for Follow-up appointment. The patient was instructed to contact the clinic with any interval questions or concerns.    This document has been electronically signed by CICI Veliz  September 12, 2022 14:54 EDT  Endocrinology

## 2022-09-12 NOTE — ASSESSMENT & PLAN NOTE
-Diabetes is not changed with A1C 8.7 to 8.9.   -Discussed the dietary and exercise guidelines with patient.  She has done diabetes education in the past but it has been a long time.  She is willing to speak with the diabetes educators again for dietary assistance.  -She is to continue using Freestyle Esvin CGM.  -Discussed the importance of keeping yearly eye exams.  -Continue Tresiba 50 units BID.  -Continue Novolog 10 units TID with meals.  -Continue Metformin 1,000 mg BID.  -Discontinue Januvia 100 mg QD.  -Start Ozempic 0.25 mg weekly.  Patient has no personal history of pancreatitis, no family history of MEN syndrome or medullary thyroid cancer. Possible side effects including nausea, bloating, other GI upset and rarely pancreatitis were discussed. She was advised to call the office with any symptoms or concerns.   -S/S hypoglycemia reviewed with Rule of 15's advised.  -Follow-up in 4 weeks.

## 2022-10-04 ENCOUNTER — SPECIALTY PHARMACY (OUTPATIENT)
Dept: PHARMACY | Facility: HOSPITAL | Age: 37
End: 2022-10-04

## 2022-10-04 NOTE — PROGRESS NOTES
Have her stop the Ozempic for 2 weeks and see if the symptoms resolve or not.  Then we can adjust further based on those.

## 2022-10-04 NOTE — PROGRESS NOTES
Patient stated that she is still having GI issues. She isn't sure if the cause is metformin or Ozempic.

## 2022-10-10 ENCOUNTER — OFFICE VISIT (OUTPATIENT)
Dept: ENDOCRINOLOGY | Facility: CLINIC | Age: 37
End: 2022-10-10

## 2022-10-10 VITALS
BODY MASS INDEX: 41.57 KG/M2 | HEIGHT: 59 IN | OXYGEN SATURATION: 96 % | WEIGHT: 206.2 LBS | SYSTOLIC BLOOD PRESSURE: 142 MMHG | HEART RATE: 104 BPM | DIASTOLIC BLOOD PRESSURE: 78 MMHG

## 2022-10-10 DIAGNOSIS — E11.65 TYPE 2 DIABETES MELLITUS WITH HYPERGLYCEMIA, WITH LONG-TERM CURRENT USE OF INSULIN: Primary | ICD-10-CM

## 2022-10-10 DIAGNOSIS — Z79.4 TYPE 2 DIABETES MELLITUS WITH HYPERGLYCEMIA, WITH LONG-TERM CURRENT USE OF INSULIN: Primary | ICD-10-CM

## 2022-10-10 LAB — GLUCOSE BLDC GLUCOMTR-MCNC: 243 MG/DL (ref 70–130)

## 2022-10-10 PROCEDURE — 82962 GLUCOSE BLOOD TEST: CPT | Performed by: NURSE PRACTITIONER

## 2022-10-10 PROCEDURE — 99213 OFFICE O/P EST LOW 20 MIN: CPT | Performed by: NURSE PRACTITIONER

## 2022-10-10 NOTE — ASSESSMENT & PLAN NOTE
-Diabetes is managed.   -Discussed the dietary and exercise guidelines with patient.    -She is to continue using Freestyle Esvin CGM.  -Discussed the importance of keeping yearly eye exams.  -Continue Tresiba 50 units BID.  -Continue Novolog 10 units TID with meals.  -Discontinue for Metformin for 2 weeks to see if this resolves her GI Issues.  -Stay off of the Ozempic to see if this continues to resolve GI Issues..  -Discussed with patient that with these new medication adjustments that she may notice increased BG's.  If they are overall she is to increase Tresiba.  If they are PPD, she is to increase Humalog AC meals.   -S/S hypoglycemia reviewed with Rule of 15's advised.  -Follow-up in 4 weeks.

## 2022-10-10 NOTE — PROGRESS NOTES
Chief Complaint   Patient presents with   • Diabetes     F/u        Antoinette Pack is a 37 y.o. female had concerns including Diabetes (F/u).   T2DM    Birth state: [place]  Previous history of radiation to face/neck:no  Consuming foods high in iodine: no  Family history of thyroid complications:none    The following portions of the patient's history were reviewed and updated as appropriate: allergies, current medications, past family history, past medical history, past social history, past surgical history and problem list.    History:  Diabetes was diagnosed 8-9 years ago.  Complications include none.  Last ophtho exam was 2021, Dr. Bruno Office.  Current medications for diabetes include Metformin 1,000 mg BID, Humalog 10 units TID with meals, Tresiba 50 units BID, stopped Ozempic 0.25 mg weekly due to GI Issues.  Past meds: Trulicity, GI Issues, has problems with yeast, Glipizide-unsure why it was stopped  She checks her blood with Freestyle Esvin.   Hypos: rarely  Fasting range: 150-200+  Can be anywhere from 300-400    Diet: Is not really watching what she is eating.    She has continuing to have GI issues for some time.  Last visit we changed her Metformin to XR.  She is not sure if her GI issues are from the Ozempic or the Metformin.  She has stopped the Ozempic 2 weeks ago and really hasn't noticed an improvement in her symptoms.    Past Medical History:   Diagnosis Date   • Anxiety    • Depression    • Diabetes mellitus (HCC)    • Diverticulitis    • GERD (gastroesophageal reflux disease)    • Hypertension    • Kidney stone    • PCOS (polycystic ovarian syndrome)    • Sleep apnea      Past Surgical History:   Procedure Laterality Date   • CARDIAC CATHETERIZATION     • CARPAL TUNNEL RELEASE     • COLONOSCOPY     • ENDOSCOPY     • FRACTURE SURGERY     • HARDWARE REMOVAL      WRIST   • TENDON REPAIR      HAND      Family History   Problem Relation Age of Onset   • Heart disease Mother    • COPD Mother    •  Heart disease Father    • COPD Father       Social History     Socioeconomic History   • Marital status:    Tobacco Use   • Smoking status: Former   • Smokeless tobacco: Never   Vaping Use   • Vaping Use: Never used   Substance and Sexual Activity   • Alcohol use: No   • Drug use: No   • Sexual activity: Defer      Allergies   Allergen Reactions   • Dilantin [Phenytoin] Mental Status Change   • Keppra [Levetiracetam] Mental Status Change   • Meperidine Rash   • Topamax [Topiramate] Mental Status Change      Current Outpatient Medications on File Prior to Visit   Medication Sig Dispense Refill   • buPROPion XL (WELLBUTRIN XL) 300 MG 24 hr tablet Take 1 tablet by mouth Every Morning. 30 tablet 2   • clobetasol (TEMOVATE) 0.05 % ointment As Needed.     • Continuous Blood Gluc  (FreeStyle Esvin 2 [place]) device Use as directed to cotinuously monitor blood glucose. 1 each 0   • Continuous Blood Gluc Sensor (FreeStyle Esvin 2 Sensor) misc Use as directed and change sensor Every 14 (Fourteen) Days. 6 each 1   • fluticasone (FLONASE) 50 MCG/ACT nasal spray 1 spray into the nostril(s) as directed by provider Daily As Needed.     • ibuprofen (ADVIL,MOTRIN) 800 MG tablet As Needed.     • insulin degludec (Tresiba FlexTouch) 100 UNIT/ML solution pen-injector injection Inject 50 Units under the skin into the appropriate area as directed 2 (Two) Times a Day. 90 mL 1   • Insulin Lispro, 1 Unit Dial, (HumaLOG KwikPen) 100 UNIT/ML solution pen-injector Inject 10 Units under the skin into the appropriate area as directed 3 (Three) Times a Day. 15 mL 5   • lamoTRIgine (LaMICtal) 200 MG tablet Take 1 tablet by mouth Daily. 30 tablet 2   • lisinopril (PRINIVIL,ZESTRIL) 40 MG tablet Take 1 tablet by mouth Daily. 90 tablet 0   • metFORMIN ER (GLUCOPHAGE-XR) 500 MG 24 hr tablet Take 2 tablets by mouth 2 (Two) Times a Day With Meals. 360 tablet 1   • modafinil (PROVIGIL) 200 MG tablet Take 200 mg by mouth Daily.     •  "omeprazole (priLOSEC) 40 MG capsule TAKE ONE (1) CAPSULE BY MOUTH EVERY DAY BEFORE A MEAL 90 capsule 1   • ondansetron (ZOFRAN) 4 MG tablet Take 1 tablet by mouth Every 8 (Eight) Hours As Needed for Nausea or Vomiting. 15 tablet 0   • polyethylene glycol (MIRALAX) 17 GM/SCOOP powder Take 17 g by mouth Daily. (Patient taking differently: Take 17 g by mouth As Needed.) 578 g 0   • rosuvastatin (CRESTOR) 40 MG tablet Take 1 tablet by mouth Every Night at bedtime 90 tablet 0   • sertraline (ZOLOFT) 100 MG tablet Take 1 & 1/2 tablets by mouth Daily. 45 tablet 2     No current facility-administered medications on file prior to visit.      Review of Systems   Constitutional: Positive for diaphoresis and fatigue. Negative for unexpected weight gain and unexpected weight loss.   HENT:        Neck tenderness   Eyes: Positive for blurred vision and visual disturbance.   Cardiovascular: Positive for palpitations.   Gastrointestinal: Positive for constipation.   Endocrine: Positive for heat intolerance, polydipsia, polyphagia and polyuria. Negative for cold intolerance.   Skin: Positive for dry skin.        Hair thinning   Neurological: Positive for tremors.   Psychiatric/Behavioral: Positive for agitation and sleep disturbance. The patient is nervous/anxious.    All other systems reviewed and are negative.     /78 (BP Location: Left arm, Patient Position: Sitting, Cuff Size: Adult)   Pulse 104   Ht 149.9 cm (59\")   Wt 93.5 kg (206 lb 3.2 oz)   SpO2 96%   BMI 41.65 kg/m²      Physical Exam  Vitals reviewed.   Constitutional:       Appearance: Normal appearance.   Eyes:      Extraocular Movements: Extraocular movements intact.   Cardiovascular:      Rate and Rhythm: Normal rate.      Pulses: Normal pulses.   Pulmonary:      Effort: Pulmonary effort is normal.   Skin:     General: Skin is warm.   Neurological:      Mental Status: She is alert and oriented to person, place, and time.   Psychiatric:         Mood and " Affect: Mood normal.         Behavior: Behavior normal.         Thought Content: Thought content normal.         Judgment: Judgment normal.       Labs/Imaging  CMP  Lab Results   Component Value Date    GLUCOSE 225 (H) 07/09/2022    BUN 9 07/09/2022    CREATININE 0.63 07/09/2022    EGFRIFNONA 101 12/27/2020    EGFRIFAFRI  09/05/2016      Comment:      <15 Indicative of kidney failure.    BCR 14.3 07/09/2022    K 4.4 07/09/2022    CO2 23.7 07/09/2022    CALCIUM 9.2 07/09/2022    ALBUMIN 4.39 07/09/2022    LABIL2 1.3 (L) 02/05/2016    AST 28 07/09/2022    ALT 35 (H) 07/09/2022        CBC w/DIFF   Lab Results   Component Value Date    WBC 8.07 07/09/2022    RBC 4.73 07/09/2022    HGB 14.0 07/09/2022    HCT 39.6 07/09/2022    MCV 83.7 07/09/2022    MCH 29.6 07/09/2022    MCHC 35.4 07/09/2022    RDW 12.7 07/09/2022    RDWSD 38.4 07/09/2022    MPV 9.8 07/09/2022     (L) 07/09/2022    NEUTRORELPCT 61.8 07/09/2022    LYMPHORELPCT 31.4 07/09/2022    MONORELPCT 4.8 (L) 07/09/2022    EOSRELPCT 1.4 07/09/2022    BASORELPCT 0.2 07/09/2022    AUTOIGPER 0.4 07/09/2022    NEUTROABS 4.99 07/09/2022    LYMPHSABS 2.53 07/09/2022    MONOSABS 0.39 07/09/2022    EOSABS 0.11 07/09/2022    BASOSABS 0.02 07/09/2022    AUTOIGNUM 0.03 07/09/2022    NRBC 0.0 07/09/2022       TSH  Lab Results   Component Value Date    TSH 0.497 02/17/2022    TSH 0.971 09/22/2015       T4  Lab Results   Component Value Date    FREET4 0.97 02/17/2022    FREET4 1.20 09/22/2015     No results found for: [id number]    T3  Lab Results   Component Value Date    T3FREE 3.43 02/17/2022     No results found for: [id number]    TRAb  No results found for: TSURCPAB    TPO  No results found for: THYROIDAB    No valid procedures specified.    Assessment and Plan    Diagnoses and all orders for this visit:    1. Type 2 diabetes mellitus with hyperglycemia, with long-term current use of insulin (HCC) (Primary)  Assessment & Plan:  -Diabetes is managed.   -Discussed the dietary  and exercise guidelines with patient.    -She is to continue using Freestyle Esvin CGM.  -Discussed the importance of keeping yearly eye exams.  -Continue Tresiba 50 units BID.  -Continue Novolog 10 units TID with meals.  -Discontinue for Metformin for 2 weeks to see if this resolves her GI Issues.  -Stay off of the Ozempic to see if this continues to resolve GI Issues..  -Discussed with patient that with these new medication adjustments that she may notice increased BG's.  If they are overall she is to increase Tresiba.  If they are PPD, she is to increase Humalog AC meals.   -S/S hypoglycemia reviewed with Rule of 15's advised.  -Follow-up in 4 weeks.    Orders:  -     POC Glucose Fingerstick  -     Ambulatory Referral to Bariatric Surgery       Return in about 4 weeks (around 11/7/2022) for Follow-up appointment. The patient was instructed to contact the clinic with any interval questions or concerns.    This document has been electronically signed by CICI Veliz  October 10, 2022 16:41 EDT  Endocrinology

## 2022-10-18 ENCOUNTER — SPECIALTY PHARMACY (OUTPATIENT)
Dept: PHARMACY | Facility: HOSPITAL | Age: 37
End: 2022-10-18

## 2022-10-24 ENCOUNTER — HOSPITAL ENCOUNTER (EMERGENCY)
Facility: HOSPITAL | Age: 37
Discharge: HOME | End: 2022-10-24
Attending: STUDENT IN AN ORGANIZED HEALTH CARE EDUCATION/TRAINING PROGRAM | Admitting: STUDENT IN AN ORGANIZED HEALTH CARE EDUCATION/TRAINING PROGRAM

## 2022-10-24 ENCOUNTER — APPOINTMENT (OUTPATIENT)
Dept: ULTRASOUND IMAGING | Facility: HOSPITAL | Age: 37
End: 2022-10-24

## 2022-10-24 VITALS
HEART RATE: 74 BPM | BODY MASS INDEX: 41.53 KG/M2 | OXYGEN SATURATION: 98 % | WEIGHT: 206 LBS | TEMPERATURE: 98.1 F | HEIGHT: 59 IN | SYSTOLIC BLOOD PRESSURE: 122 MMHG | RESPIRATION RATE: 18 BRPM | DIASTOLIC BLOOD PRESSURE: 51 MMHG

## 2022-10-24 DIAGNOSIS — O03.9 MISCARRIAGE: Primary | ICD-10-CM

## 2022-10-24 LAB
ABO GROUP BLD: NORMAL
ABO GROUP BLD: NORMAL
ALBUMIN SERPL-MCNC: 4.05 G/DL (ref 3.5–5.2)
ALBUMIN/GLOB SERPL: 1.5 G/DL
ALP SERPL-CCNC: 69 U/L (ref 39–117)
ALT SERPL W P-5'-P-CCNC: 23 U/L (ref 1–33)
ANION GAP SERPL CALCULATED.3IONS-SCNC: 13.6 MMOL/L (ref 5–15)
AST SERPL-CCNC: 29 U/L (ref 1–32)
BASOPHILS # BLD AUTO: 0.02 10*3/MM3 (ref 0–0.2)
BASOPHILS NFR BLD AUTO: 0.3 % (ref 0–1.5)
BILIRUB SERPL-MCNC: 0.5 MG/DL (ref 0–1.2)
BLD GP AB SCN SERPL QL: NEGATIVE
BUN SERPL-MCNC: 10 MG/DL (ref 6–20)
BUN/CREAT SERPL: 16.1 (ref 7–25)
CALCIUM SPEC-SCNC: 8.8 MG/DL (ref 8.6–10.5)
CHLORIDE SERPL-SCNC: 101 MMOL/L (ref 98–107)
CO2 SERPL-SCNC: 21.4 MMOL/L (ref 22–29)
CREAT SERPL-MCNC: 0.62 MG/DL (ref 0.57–1)
CRP SERPL-MCNC: 1.01 MG/DL (ref 0–0.5)
DEPRECATED RDW RBC AUTO: 39.9 FL (ref 37–54)
EGFRCR SERPLBLD CKD-EPI 2021: 116.3 ML/MIN/1.73
EOSINOPHIL # BLD AUTO: 0.15 10*3/MM3 (ref 0–0.4)
EOSINOPHIL NFR BLD AUTO: 2.1 % (ref 0.3–6.2)
ERYTHROCYTE [DISTWIDTH] IN BLOOD BY AUTOMATED COUNT: 13.1 % (ref 12.3–15.4)
GLOBULIN UR ELPH-MCNC: 2.8 GM/DL
GLUCOSE SERPL-MCNC: 224 MG/DL (ref 65–99)
HCG INTACT+B SERPL-ACNC: 96.45 MIU/ML
HCT VFR BLD AUTO: 29 % (ref 34–46.6)
HCT VFR BLD AUTO: 32.1 % (ref 34–46.6)
HGB BLD-MCNC: 10.6 G/DL (ref 12–15.9)
HGB BLD-MCNC: 11.4 G/DL (ref 12–15.9)
HOLD SPECIMEN: NORMAL
HOLD SPECIMEN: NORMAL
IMM GRANULOCYTES # BLD AUTO: 0.05 10*3/MM3 (ref 0–0.05)
IMM GRANULOCYTES NFR BLD AUTO: 0.7 % (ref 0–0.5)
LYMPHOCYTES # BLD AUTO: 2.07 10*3/MM3 (ref 0.7–3.1)
LYMPHOCYTES NFR BLD AUTO: 29.3 % (ref 19.6–45.3)
MCH RBC QN AUTO: 30 PG (ref 26.6–33)
MCHC RBC AUTO-ENTMCNC: 35.5 G/DL (ref 31.5–35.7)
MCV RBC AUTO: 84.5 FL (ref 79–97)
MONOCYTES # BLD AUTO: 0.42 10*3/MM3 (ref 0.1–0.9)
MONOCYTES NFR BLD AUTO: 5.9 % (ref 5–12)
NEUTROPHILS NFR BLD AUTO: 4.36 10*3/MM3 (ref 1.7–7)
NEUTROPHILS NFR BLD AUTO: 61.7 % (ref 42.7–76)
NRBC BLD AUTO-RTO: 0 /100 WBC (ref 0–0.2)
NUMBER OF DOSES: NORMAL
PLATELET # BLD AUTO: 141 10*3/MM3 (ref 140–450)
PMV BLD AUTO: 9.8 FL (ref 6–12)
POTASSIUM SERPL-SCNC: 4.2 MMOL/L (ref 3.5–5.2)
PROT SERPL-MCNC: 6.8 G/DL (ref 6–8.5)
RBC # BLD AUTO: 3.8 10*6/MM3 (ref 3.77–5.28)
RH BLD: POSITIVE
RH BLD: POSITIVE
SODIUM SERPL-SCNC: 136 MMOL/L (ref 136–145)
WBC NRBC COR # BLD: 7.07 10*3/MM3 (ref 3.4–10.8)
WHOLE BLOOD HOLD COAG: NORMAL
WHOLE BLOOD HOLD SPECIMEN: NORMAL

## 2022-10-24 PROCEDURE — 86850 RBC ANTIBODY SCREEN: CPT | Performed by: NURSE PRACTITIONER

## 2022-10-24 PROCEDURE — 99283 EMERGENCY DEPT VISIT LOW MDM: CPT

## 2022-10-24 PROCEDURE — 80053 COMPREHEN METABOLIC PANEL: CPT | Performed by: NURSE PRACTITIONER

## 2022-10-24 PROCEDURE — 84702 CHORIONIC GONADOTROPIN TEST: CPT | Performed by: NURSE PRACTITIONER

## 2022-10-24 PROCEDURE — 36415 COLL VENOUS BLD VENIPUNCTURE: CPT

## 2022-10-24 PROCEDURE — 85014 HEMATOCRIT: CPT | Performed by: NURSE PRACTITIONER

## 2022-10-24 PROCEDURE — 76801 OB US < 14 WKS SINGLE FETUS: CPT

## 2022-10-24 PROCEDURE — 86140 C-REACTIVE PROTEIN: CPT | Performed by: NURSE PRACTITIONER

## 2022-10-24 PROCEDURE — 86900 BLOOD TYPING SEROLOGIC ABO: CPT

## 2022-10-24 PROCEDURE — 85025 COMPLETE CBC W/AUTO DIFF WBC: CPT | Performed by: NURSE PRACTITIONER

## 2022-10-24 PROCEDURE — 86901 BLOOD TYPING SEROLOGIC RH(D): CPT

## 2022-10-24 PROCEDURE — 85018 HEMOGLOBIN: CPT | Performed by: NURSE PRACTITIONER

## 2022-10-24 PROCEDURE — 86901 BLOOD TYPING SEROLOGIC RH(D): CPT | Performed by: NURSE PRACTITIONER

## 2022-10-24 PROCEDURE — 86900 BLOOD TYPING SEROLOGIC ABO: CPT | Performed by: NURSE PRACTITIONER

## 2022-10-24 RX ORDER — SODIUM CHLORIDE 0.9 % (FLUSH) 0.9 %
10 SYRINGE (ML) INJECTION AS NEEDED
Status: DISCONTINUED | OUTPATIENT
Start: 2022-10-24 | End: 2022-10-24 | Stop reason: HOSPADM

## 2022-10-24 RX ADMIN — SODIUM CHLORIDE, POTASSIUM CHLORIDE, SODIUM LACTATE AND CALCIUM CHLORIDE 1000 ML: 600; 310; 30; 20 INJECTION, SOLUTION INTRAVENOUS at 01:00

## 2022-10-24 NOTE — ED PROVIDER NOTES
Subjective   History of Present Illness  Patient is a 37-year-old female with significant past medical history positive for anxiety, depression, type 2 diabetes, hypertension, PCOS, sleep apnea presenting to the ER complaints of vaginal bleeding during pregnancy.  Patient states that she was pregnant but did not know that and until about 1 week ago when she started having vaginal bleeding she thought it was a heavy period and took a pregnancy test just because her friend stated that she only had heavy vaginal bleeding during a miscarriage.  Patient states her pregnancy test was positive so she seen her OB/GYN the next day which could not find a heartbeat for her baby.  Patient has had vaginal bleeding since.  Patient reports minimal Blight vaginal bleeding but had a gush of hemorrhage just prior to arrival.  Patient denies any dizziness, cough, tachycardia, palpitations, fever, nausea, vomiting or any additional symptoms today.    History provided by:  Patient   used: No        Review of Systems   Constitutional: Negative.  Negative for fever.   HENT: Negative.    Respiratory: Negative.    Cardiovascular: Negative.  Negative for chest pain.   Gastrointestinal: Negative.  Negative for abdominal pain.   Endocrine: Negative.    Genitourinary: Positive for vaginal bleeding. Negative for dysuria.   Skin: Negative.    Neurological: Negative.    Psychiatric/Behavioral: Negative.    All other systems reviewed and are negative.      Past Medical History:   Diagnosis Date   • Anxiety    • Depression    • Diabetes mellitus (HCC)    • Diverticulitis    • GERD (gastroesophageal reflux disease)    • Hypertension    • Kidney stone    • PCOS (polycystic ovarian syndrome)    • Sleep apnea        Allergies   Allergen Reactions   • Dilantin [Phenytoin] Mental Status Change   • Keppra [Levetiracetam] Mental Status Change   • Meperidine Rash   • Topamax [Topiramate] Mental Status Change       Past Surgical History:    Procedure Laterality Date   • CARDIAC CATHETERIZATION     • CARPAL TUNNEL RELEASE     • COLONOSCOPY     • ENDOSCOPY     • FRACTURE SURGERY     • HARDWARE REMOVAL      WRIST   • TENDON REPAIR      HAND       Family History   Problem Relation Age of Onset   • Heart disease Mother    • COPD Mother    • Heart disease Father    • COPD Father        Social History     Socioeconomic History   • Marital status:    Tobacco Use   • Smoking status: Former   • Smokeless tobacco: Never   Vaping Use   • Vaping Use: Never used   Substance and Sexual Activity   • Alcohol use: No   • Drug use: No   • Sexual activity: Defer           Objective   Physical Exam  Vitals and nursing note reviewed.   Constitutional:       General: She is not in acute distress.     Appearance: Normal appearance. She is well-developed. She is not diaphoretic.   HENT:      Head: Normocephalic and atraumatic.      Right Ear: External ear normal.      Left Ear: External ear normal.      Nose: Nose normal.   Eyes:      Conjunctiva/sclera: Conjunctivae normal.      Pupils: Pupils are equal, round, and reactive to light.   Neck:      Vascular: No JVD.      Trachea: No tracheal deviation.   Cardiovascular:      Rate and Rhythm: Normal rate and regular rhythm.      Heart sounds: Normal heart sounds. No murmur heard.  Pulmonary:      Effort: Pulmonary effort is normal. No respiratory distress.      Breath sounds: Normal breath sounds. No wheezing.   Abdominal:      General: Bowel sounds are normal.      Palpations: Abdomen is soft.      Tenderness: There is no abdominal tenderness.   Musculoskeletal:         General: No deformity. Normal range of motion.      Cervical back: Normal range of motion and neck supple.   Skin:     General: Skin is warm and dry.      Capillary Refill: Capillary refill takes less than 2 seconds.      Coloration: Skin is not pale.      Findings: No erythema or rash.   Neurological:      General: No focal deficit present.      Mental  Status: She is alert and oriented to person, place, and time. Mental status is at baseline.      Cranial Nerves: No cranial nerve deficit.   Psychiatric:         Mood and Affect: Mood normal.         Behavior: Behavior normal.         Thought Content: Thought content normal.         Judgment: Judgment normal.         Procedures           ED Course  ED Course as of 10/25/22 1540   Mon Oct 24, 2022   0155 US Ob < 14 Weeks Single or First Gestation  IMPRESSION:     1. No intrauterine or extrauterine gestation identified. Recommend continued follow-up with serial beta hCG and ultrasound as clinically warranted.  2. Neither ovary is clearly visualized. No adnexal region masses or significant free pelvic fluid.      Signer Name: Bryce Acevedo MD   Signed: 10/24/2022 1:50 AM   Workstation Name: [email or web address]-    [facility] []   0328 Work up and results were discussed throughly with the patients.  The patient will be discharged for further monitoring and management with their PCP.  Red flags, warning signs, worsening symptoms, and when to return to the ER discussed with and understood by the patients.  Patient will follow up with their PCP and OBGYN in a timely manner.  Vitals stable at discharge.  [SM]      ED Course User Index  [] Char Myrick, APRN                                           MDM    Final diagnoses:   Miscarriage       ED Disposition  ED Disposition     ED Disposition   Discharge    Condition   Stable    Comment   --             Shawna Lambert DO  [address] [place] [address] [phone]    Schedule an appointment as soon as possible for a visit in 2 days      Rashi Sequeira MD  [address]  [place] [address]  [phone]    Schedule an appointment as soon as possible for a visit today           Medication List      Changed    polyethylene glycol 17 GM/SCOOP powder  Commonly known as: MIRALAX  Take 17 g by mouth Daily.  What changed:    · when to take this  · reasons to take this             Char Myrick, CICI  10/24/22 6111       Char Myrick, CICI  10/25/22 6579

## 2022-10-27 ENCOUNTER — OFFICE VISIT (OUTPATIENT)
Dept: ENDOCRINOLOGY | Facility: CLINIC | Age: 37
End: 2022-10-27

## 2022-10-27 VITALS
WEIGHT: 207.8 LBS | HEART RATE: 77 BPM | OXYGEN SATURATION: 99 % | BODY MASS INDEX: 41.89 KG/M2 | SYSTOLIC BLOOD PRESSURE: 140 MMHG | DIASTOLIC BLOOD PRESSURE: 82 MMHG | HEIGHT: 59 IN

## 2022-10-27 DIAGNOSIS — E11.65 TYPE 2 DIABETES MELLITUS WITH HYPERGLYCEMIA, WITH LONG-TERM CURRENT USE OF INSULIN: Primary | ICD-10-CM

## 2022-10-27 DIAGNOSIS — Z79.4 TYPE 2 DIABETES MELLITUS WITH HYPERGLYCEMIA, WITH LONG-TERM CURRENT USE OF INSULIN: Primary | ICD-10-CM

## 2022-10-27 LAB — GLUCOSE BLDC GLUCOMTR-MCNC: 113 MG/DL (ref 70–130)

## 2022-10-27 PROCEDURE — 82962 GLUCOSE BLOOD TEST: CPT | Performed by: NURSE PRACTITIONER

## 2022-10-27 PROCEDURE — 99213 OFFICE O/P EST LOW 20 MIN: CPT | Performed by: NURSE PRACTITIONER

## 2022-10-27 NOTE — PROGRESS NOTES
Chief Complaint   Patient presents with   • Diabetes     F/u        Antoinette Pack is a 37 y.o. female had concerns including Diabetes (F/u).   T2DM    She had called and informed that she was pregnant.  At this time we stopped her Metformin and Ozempic and just had her on insulin therapy.  She had a miscarriage 5 days ago.  She has followed with OB and they are hopeful that she will be able to conceive again and they would like to keep trying to have a baby.      Birth state: [place]  Previous history of radiation to face/neck:no  Consuming foods high in iodine: no  Family history of thyroid complications:none    The following portions of the patient's history were reviewed and updated as appropriate: allergies, current medications, past family history, past medical history, past social history, past surgical history and problem list.    History:  Diabetes was diagnosed 8-9 years ago.  Complications include none.  Last ophtho exam was 2021, Dr. Bruno Office.  Current medications for diabetes include Humalog 10-20 units TID with meals, Tresiba 50 units BID.  Past meds: Trulicity, GI Issues, has problems with yeast, Glipizide-unsure why it was stopped  She checks her blood with Freestyle Esvin.   Hypos: rarely  Fasting range: 150-200+  Can be anywhere from 300-400    Diet: Is not really watching what she is eating.    She has continuing to have GI issues for some time.  Last visit we changed her Metformin to XR.  She is not sure if her GI issues are from the Ozempic or the Metformin.  She has stopped the Ozempic 2 weeks ago and really hasn't noticed an improvement in her symptoms.    Past Medical History:   Diagnosis Date   • Anxiety    • Depression    • Diabetes mellitus (HCC)    • Diverticulitis    • GERD (gastroesophageal reflux disease)    • Hypertension    • Kidney stone    • PCOS (polycystic ovarian syndrome)    • Sleep apnea      Past Surgical History:   Procedure Laterality Date   • CARDIAC CATHETERIZATION      • CARPAL TUNNEL RELEASE     • COLONOSCOPY     • ENDOSCOPY     • FRACTURE SURGERY     • HARDWARE REMOVAL      WRIST   • TENDON REPAIR      HAND      Family History   Problem Relation Age of Onset   • Heart disease Mother    • COPD Mother    • Heart disease Father    • COPD Father       Social History     Socioeconomic History   • Marital status:    Tobacco Use   • Smoking status: Former   • Smokeless tobacco: Never   Vaping Use   • Vaping Use: Never used   Substance and Sexual Activity   • Alcohol use: No   • Drug use: No   • Sexual activity: Defer      Allergies   Allergen Reactions   • Dilantin [Phenytoin] Mental Status Change   • Keppra [Levetiracetam] Mental Status Change   • Meperidine Rash   • Topamax [Topiramate] Mental Status Change      Current Outpatient Medications on File Prior to Visit   Medication Sig Dispense Refill   • buPROPion XL (WELLBUTRIN XL) 300 MG 24 hr tablet Take 1 tablet by mouth Every Morning. 30 tablet 2   • clobetasol (TEMOVATE) 0.05 % ointment As Needed.     • Continuous Blood Gluc  (FreeStyle Esvin 2 Thorp) device Use as directed to cotinuously monitor blood glucose. 1 each 0   • Continuous Blood Gluc Sensor (FreeStyle Esvin 2 Sensor) misc Use as directed and change sensor Every 14 (Fourteen) Days. 6 each 1   • fluticasone (FLONASE) 50 MCG/ACT nasal spray 1 spray into the nostril(s) as directed by provider Daily As Needed.     • ibuprofen (ADVIL,MOTRIN) 800 MG tablet As Needed.     • insulin degludec (Tresiba FlexTouch) 100 UNIT/ML solution pen-injector injection Inject 50 Units under the skin into the appropriate area as directed 2 (Two) Times a Day. 90 mL 1   • Insulin Lispro, 1 Unit Dial, (HumaLOG KwikPen) 100 UNIT/ML solution pen-injector Inject 10 Units under the skin into the appropriate area as directed 3 (Three) Times a Day. 15 mL 5   • lamoTRIgine (LaMICtal) 200 MG tablet Take 1 tablet by mouth Daily. 30 tablet 2   • lisinopril (PRINIVIL,ZESTRIL) 40 MG tablet  "Take 1 tablet by mouth Daily. 90 tablet 0   • metFORMIN ER (GLUCOPHAGE-XR) 500 MG 24 hr tablet Take 2 tablets by mouth 2 (Two) Times a Day With Meals. 360 tablet 1   • modafinil (PROVIGIL) 200 MG tablet Take 200 mg by mouth Daily.     • omeprazole (priLOSEC) 40 MG capsule TAKE ONE (1) CAPSULE BY MOUTH EVERY DAY BEFORE A MEAL 90 capsule 1   • ondansetron (ZOFRAN) 4 MG tablet Take 1 tablet by mouth Every 8 (Eight) Hours As Needed for Nausea or Vomiting. 15 tablet 0   • polyethylene glycol (MIRALAX) 17 GM/SCOOP powder Take 17 g by mouth Daily. (Patient taking differently: Take 17 g by mouth As Needed.) 578 g 0   • rosuvastatin (CRESTOR) 40 MG tablet Take 1 tablet by mouth Every Night at bedtime 90 tablet 0   • sertraline (ZOLOFT) 100 MG tablet Take 1 & 1/2 tablets by mouth Daily. 45 tablet 2     No current facility-administered medications on file prior to visit.      Review of Systems   Constitutional: Positive for diaphoresis and fatigue. Negative for unexpected weight gain and unexpected weight loss.   HENT:        Neck tenderness   Eyes: Positive for blurred vision and visual disturbance.   Cardiovascular: Positive for palpitations.   Gastrointestinal: Positive for constipation.   Endocrine: Positive for heat intolerance, polydipsia, polyphagia and polyuria. Negative for cold intolerance.   Skin: Positive for dry skin.        Hair thinning   Neurological: Positive for tremors.   Psychiatric/Behavioral: Positive for agitation and sleep disturbance. The patient is nervous/anxious.    All other systems reviewed and are negative.     /82 (BP Location: Left arm, Patient Position: Sitting, Cuff Size: Adult)   Pulse 77   Ht 149.9 cm (59\")   Wt 94.3 kg (207 lb 12.8 oz)   LMP  (LMP Unknown)   SpO2 99%   Breastfeeding No   BMI 41.97 kg/m²      Physical Exam  Vitals reviewed.   Constitutional:       Appearance: Normal appearance.   Eyes:      Extraocular Movements: Extraocular movements intact.   Cardiovascular: "      Rate and Rhythm: Normal rate.      Pulses: Normal pulses.   Pulmonary:      Effort: Pulmonary effort is normal.   Skin:     General: Skin is warm.   Neurological:      Mental Status: She is alert and oriented to person, place, and time.   Psychiatric:         Mood and Affect: Mood normal.         Behavior: Behavior normal.         Thought Content: Thought content normal.         Judgment: Judgment normal.       Labs/Imaging  CMP  Lab Results   Component Value Date    GLUCOSE 224 (H) 10/24/2022    BUN 10 10/24/2022    CREATININE 0.62 10/24/2022    EGFRIFNONA 101 12/27/2020    EGFRIFAFRI  09/05/2016      Comment:      <15 Indicative of kidney failure.    BCR 16.1 10/24/2022    K 4.2 10/24/2022    CO2 21.4 (L) 10/24/2022    CALCIUM 8.8 10/24/2022    ALBUMIN 4.05 10/24/2022    LABIL2 1.3 (L) 02/05/2016    AST 29 10/24/2022    ALT 23 10/24/2022        CBC w/DIFF   Lab Results   Component Value Date    WBC 7.07 10/24/2022    RBC 3.80 10/24/2022    HGB 10.6 (L) 10/24/2022    HCT 29.0 (L) 10/24/2022    MCV 84.5 10/24/2022    MCH 30.0 10/24/2022    MCHC 35.5 10/24/2022    RDW 13.1 10/24/2022    RDWSD 39.9 10/24/2022    MPV 9.8 10/24/2022     10/24/2022    NEUTRORELPCT 61.7 10/24/2022    LYMPHORELPCT 29.3 10/24/2022    MONORELPCT 5.9 10/24/2022    EOSRELPCT 2.1 10/24/2022    BASORELPCT 0.3 10/24/2022    AUTOIGPER 0.7 (H) 10/24/2022    NEUTROABS 4.36 10/24/2022    LYMPHSABS 2.07 10/24/2022    MONOSABS 0.42 10/24/2022    EOSABS 0.15 10/24/2022    BASOSABS 0.02 10/24/2022    AUTOIGNUM 0.05 10/24/2022    NRBC 0.0 10/24/2022       TSH  Lab Results   Component Value Date    TSH 0.497 02/17/2022    TSH 0.971 09/22/2015       T4  Lab Results   Component Value Date    FREET4 0.97 02/17/2022    FREET4 1.20 09/22/2015     No results found for: T6LFVCD    T3  Lab Results   Component Value Date    T3FREE 3.43 02/17/2022     No results found for: G9ORDRS    TRAb  No results found for: TSURCPAB    TPO  No results found for:  THYROIDAB    No valid procedures specified.    Assessment and Plan    Diagnoses and all orders for this visit:    1. Type 2 diabetes mellitus with hyperglycemia, with long-term current use of insulin (HCC) (Primary)  Assessment & Plan:  -Diabetes is managed.   -Discussed the dietary and exercise guidelines with patient.    -She is to continue using Freestyle Esvin CGM.  -Discussed the importance of keeping yearly eye exams.  -Continue Tresiba 50 units BID.  -Continue Novolog 10-20 units TID with meals.  -Discussed with patient and  that with their hopes on conceiving again that we will continue with insulin therapy for her diabetes management and manage things closer if she becomes pregnant again.   -S/S hypoglycemia reviewed with Rule of 15's advised.  -Follow-up in 2 months or sooner if she becomes pregnant.    Orders:  -     POC Glucose Fingerstick       Return in about 2 months (around 12/27/2022) for Follow-up appointment, A1C. The patient was instructed to contact the clinic with any interval questions or concerns.    This document has been electronically signed by CICI Veliz  October 27, 2022 12:25 EDT  Endocrinology

## 2022-10-27 NOTE — ASSESSMENT & PLAN NOTE
-Diabetes is managed.   -Discussed the dietary and exercise guidelines with patient.    -She is to continue using Freestyle Esvin CGM.  -Discussed the importance of keeping yearly eye exams.  -Continue Tresiba 50 units BID.  -Continue Novolog 10-20 units TID with meals.  -Discussed with patient and  that with their hopes on conceiving again that we will continue with insulin therapy for her diabetes management and manage things closer if she becomes pregnant again.   -S/S hypoglycemia reviewed with Rule of 15's advised.  -Follow-up in 2 months or sooner if she becomes pregnant.

## 2022-10-28 DIAGNOSIS — F41.1 GENERALIZED ANXIETY DISORDER: ICD-10-CM

## 2022-10-28 DIAGNOSIS — F31.75 BIPOLAR DISORDER, IN PARTIAL REMISSION, MOST RECENT EPISODE DEPRESSED: ICD-10-CM

## 2022-10-28 DIAGNOSIS — Z79.899 MEDICATION MANAGEMENT: ICD-10-CM

## 2022-10-28 DIAGNOSIS — F40.01 PANIC DISORDER WITH AGORAPHOBIA: ICD-10-CM

## 2022-10-28 RX ORDER — BUPROPION HYDROCHLORIDE 300 MG/1
300 TABLET ORAL EVERY MORNING
Qty: 30 TABLET | Refills: 2 | Status: CANCELLED | OUTPATIENT
Start: 2022-10-28

## 2022-11-16 ENCOUNTER — SPECIALTY PHARMACY (OUTPATIENT)
Dept: PHARMACY | Facility: HOSPITAL | Age: 37
End: 2022-11-16

## 2022-12-02 NOTE — PROGRESS NOTES
Subjective      Antoinette Pack is a 37 y.o. female who presents today for follow up    Chief Complaint:  Bipolar disorder, anxiety    History of Present Illness:   History of Present Illness    Here today for follow up visit. She is taking her medication as prescribed, denies SE. States medication has changed, she had a miscarriage at end of October/November. She is only taking Wellbutrin. She states she is wanting to become pregnant, OB/GYN and PCP took her off of multiple medications.  Depression rated 8/10, anxiety rated 6/10, with 10 being the worst, moods rated 10/10, with 10 being the worst.   Sleep is ok, getting about 4 to 6 hours a night, if her blood sugar is elevated she is up and down through the night. Has occasional nightmares.  Appetite is good.  Denies SI/HI/AH.  Denies thoughts of self-harm. States she is seeing more things, she states she is able to understand those aren't real.   Chronic health issues, no acute physical or medical issues today         The following portions of the patient's history were reviewed and updated as appropriate: allergies, current medications, past family history, past medical history, past social history, past surgical history and problem list.      Past Medical History:  Past Medical History:   Diagnosis Date   • Anxiety    • Depression    • Diabetes mellitus (HCC)    • Diverticulitis    • GERD (gastroesophageal reflux disease)    • Hypertension    • Kidney stone    • PCOS (polycystic ovarian syndrome)    • Sleep apnea        Social History:  Social History     Socioeconomic History   • Marital status:    Tobacco Use   • Smoking status: Former   • Smokeless tobacco: Never   Vaping Use   • Vaping Use: Never used   Substance and Sexual Activity   • Alcohol use: No   • Drug use: No   • Sexual activity: Defer       Family History:  Family History   Problem Relation Age of Onset   • Heart disease Mother    • COPD Mother    • Heart disease Father    • COPD  Father        Past Surgical History:  Past Surgical History:   Procedure Laterality Date   • CARDIAC CATHETERIZATION     • CARPAL TUNNEL RELEASE     • COLONOSCOPY     • ENDOSCOPY     • FRACTURE SURGERY     • HARDWARE REMOVAL      WRIST   • TENDON REPAIR      HAND       Problem List:  Patient Active Problem List   Diagnosis   • Body mass index (BMI) 40.0-44.9, adult   • Simple goiter   • Type 2 diabetes mellitus with hyperglycemia, with long-term current use of insulin (HCC)   • Type 2 diabetes mellitus with hyperglycemia, with long-term current use of insulin (HCC)       Allergy:   Allergies   Allergen Reactions   • Dilantin [Phenytoin] Mental Status Change   • Keppra [Levetiracetam] Mental Status Change   • Meperidine Rash   • Topamax [Topiramate] Mental Status Change        Current Medications:   Current Outpatient Medications   Medication Sig Dispense Refill   • amoxicillin (AMOXIL) 500 MG capsule Take 1 capsule by mouth every 12 hours. 20 capsule 0   • buPROPion XL (WELLBUTRIN XL) 300 MG 24 hr tablet Take 1 tablet by mouth Every Morning. 30 tablet 2   • clobetasol (TEMOVATE) 0.05 % ointment As Needed.     • Continuous Blood Gluc  (FreeStyle Esvin 2 [place]) device Use as directed to cotinuously monitor blood glucose. 1 each 0   • Continuous Blood Gluc Sensor (FreeStyle Esvin 2 Sensor) misc Use as directed and change sensor Every 14 (Fourteen) Days. 6 each 1   • famotidine (Pepcid) 40 MG tablet Take 1 tablet by mouth Daily. 30 tablet 2   • fluticasone (FLONASE) 50 MCG/ACT nasal spray 1 spray into the nostril(s) as directed by provider Daily As Needed.     • ibuprofen (ADVIL,MOTRIN) 800 MG tablet As Needed.     • insulin degludec (Tresiba FlexTouch) 100 UNIT/ML solution pen-injector injection Inject 50 Units under the skin into the appropriate area as directed 2 (Two) Times a Day. 90 mL 1   • Insulin Lispro, 1 Unit Dial, (HumaLOG KwikPen) 100 UNIT/ML solution pen-injector Inject 10 Units under the skin into  "the appropriate area as directed 3 (Three) Times a Day. 15 mL 5   • NIFEdipine CC (ADALAT CC) 30 MG 24 hr tablet Take 1 tablet by mouth Daily. 30 tablet 0   • omeprazole (priLOSEC) 40 MG capsule TAKE ONE (1) CAPSULE BY MOUTH EVERY DAY BEFORE A MEAL 90 capsule 1   • ondansetron (ZOFRAN) 4 MG tablet Take 1 tablet by mouth Every 8 (Eight) Hours As Needed for Nausea or Vomiting. 15 tablet 0   • polyethylene glycol (MIRALAX) 17 GM/SCOOP powder Take 17 g by mouth Daily. (Patient taking differently: Take 17 g by mouth As Needed.) 578 g 0   • sertraline (ZOLOFT) 50 MG tablet Take 1 tablet daily for 2 weeks, then increase to 2 tablets daily. 60 tablet 2   • clomiPHENE (CLOMID) 50 MG tablet TAKE 2 TABLETS BY MOUTH DAILY ON DAYS 3 through 7 OF MENSES. 10 tablet 3   • fluconazole (Diflucan) 150 MG tablet Take 1 tablet by mouth once. May repeat in 72 hours as needed. 2 tablet 0   • metFORMIN ER (GLUCOPHAGE-XR) 500 MG 24 hr tablet Take 2 tablets by mouth 2 (Two) Times a Day With Meals. 360 tablet 1   • modafinil (PROVIGIL) 200 MG tablet Take 200 mg by mouth Daily.     • rosuvastatin (CRESTOR) 40 MG tablet Take 1 tablet by mouth Every Night at bedtime 90 tablet 0     No current facility-administered medications for this visit.       Review of Symptoms:    Review of Systems   Psychiatric/Behavioral: Positive for hallucinations, sleep disturbance, depressed mood and stress. Negative for dysphoric mood, self-injury and suicidal ideas. The patient is nervous/anxious.    All other systems reviewed and are negative.      Objective   Physical Exam:   Blood pressure 138/84, pulse 96, height 149.9 cm (59\"), weight 96.2 kg (212 lb), not currently breastfeeding.  Body mass index is 42.82 kg/m².    Appearance:  female appears stated age, no acute distress noted.    Gait, Station, Strength: Steady, posture erect, WNL      Mental Status Exam: 12/7/22  Hygiene:   good  Cooperation:  Cooperative  Eye Contact:  Good  Psychomotor Behavior: "  Appropriate  Affect:  Appropriate  Mood: sad  Hopelessness: Denies  Speech:  Normal  Thought Process:  Linear  Thought Content:  Mood congruent  Suicidal:  None  Homicidal:  None  Hallucinations:  Visual   Delusion:  None  Memory:  Deficits  Orientation:  Person, Place, Time and Situation  Reliability:  good  Insight:  Fair  Judgement:  Fair  Impulse Control:  Good  Physical/Medical Issues:  Yes HTN, Diabetes, GERD     PHQ-Score Total:  PHQ-9 Total Score:      Lab Results:   No visits with results within 1 Month(s) from this visit.   Latest known visit with results is:   Office Visit on 10/27/2022   Component Date Value Ref Range Status   • Glucose 10/27/2022 113  70 - 130 mg/dL Final       Assessment & Plan   Problems Addressed this Visit    None  Visit Diagnoses     Bipolar disorder, in partial remission, most recent episode depressed (HCC)    -  Primary    Relevant Medications    buPROPion XL (WELLBUTRIN XL) 300 MG 24 hr tablet    sertraline (ZOLOFT) 50 MG tablet    Panic disorder with agoraphobia        Relevant Medications    buPROPion XL (WELLBUTRIN XL) 300 MG 24 hr tablet    sertraline (ZOLOFT) 50 MG tablet    Generalized anxiety disorder        Relevant Medications    buPROPion XL (WELLBUTRIN XL) 300 MG 24 hr tablet    sertraline (ZOLOFT) 50 MG tablet    Medication management        Relevant Medications    buPROPion XL (WELLBUTRIN XL) 300 MG 24 hr tablet    sertraline (ZOLOFT) 50 MG tablet    Narcolepsy without cataplexy          Diagnoses       Codes Comments    Bipolar disorder, in partial remission, most recent episode depressed (HCC)    -  Primary ICD-10-CM: F31.75  ICD-9-CM: 296.55     Panic disorder with agoraphobia     ICD-10-CM: F40.01  ICD-9-CM: 300.21     Generalized anxiety disorder     ICD-10-CM: F41.1  ICD-9-CM: 300.02     Medication management     ICD-10-CM: Z79.899  ICD-9-CM: V58.69     Narcolepsy without cataplexy     ICD-10-CM: G47.419  ICD-9-CM: 347.00         Social History     Tobacco  Use   Smoking Status Former   Smokeless Tobacco Never       JORGE reviewed and appropriate. Patient counseled on use of controlled substances.       -The benefits of a healthy diet and exercise were discussed with patient, especially the positive effects they have on mental health. Patient encouraged to consider lifestyle modification regarding  diet and exercise patterns to maximize results of mental health treatment.  -Reviewed previous available documentation  -Reviewed most recent available labs   -Lengthy discussion with patient on the possible side effects of antipsychotic medications including increased cholesterol, increased blood sugar, and possibility of weight gain.  Also discussed the need to monitor lab work associated with this.  The risk of muscle movement disorders with this class of medication was also discussed.      Visit Diagnoses:    ICD-10-CM ICD-9-CM   1. Bipolar disorder, in partial remission, most recent episode depressed (HCC)  F31.75 296.55   2. Panic disorder with agoraphobia  F40.01 300.21   3. Generalized anxiety disorder  F41.1 300.02   4. Medication management  Z79.899 V58.69   5. Narcolepsy without cataplexy  G47.419 347.00         TREATMENT PLAN/GOALS: Continue supportive psychotherapy efforts and medications as indicated. Treatment and medication options discussed during today's visit. Patient acknowledged and verbally consented to continue with current treatment plan and was educated on the importance of compliance with treatment and follow-up appointments.    MEDICATION ISSUES:  Discussed medication options and treatment plan of prescribed medication as well as the risks, benefits, and side effects including potential falls, possible impaired driving and metabolic adversities among others. Patient is agreeable to call the office with any worsening of symptoms or onset of side effects. Patient is agreeable to call 911 or go to the nearest ER should he/she begin having SI/HI.      MEDS ORDERED DURING VISIT:  New Medications Ordered This Visit   Medications   • buPROPion XL (WELLBUTRIN XL) 300 MG 24 hr tablet     Sig: Take 1 tablet by mouth Every Morning.     Dispense:  30 tablet     Refill:  2   • sertraline (ZOLOFT) 50 MG tablet     Sig: Take 1 tablet daily for 2 weeks, then increase to 2 tablets daily.     Dispense:  60 tablet     Refill:  2     Return in about 2 months (around 3/5/2022) for Recheck.    -Continue bupropion  mg 24-hour tablet take 1 tablet by mouth every morning for depression and anxiety  -D/C lamotrigine .  -D/C Trileptal  -Restart sertraline 50 mg tablet take 1 tablet daily x 2 weeks, then increase to 2 tablets daily.      -Patient was educated that the above medications can have potential risk to a developing fetus. Pt verbalizes understanding and acknowledged.      Return in about 3 months (around 3/7/2023).       Prognosis: Guarded dependent on medication/follow up and treatment plan compliance.  Functionality: pt showing improvements in important areas of daily functioning.     Short-term goals: Patient will adhere to medication regimen and note continued improvement in symptoms over the next 3 months.   Long-term goals: Patient will be adherent to medication management and psychotherapy with continued improvement in symptoms over the next 6 months    I spent 30 minutes caring for Antoinette on this date of service. This time includes time spent by me in the following activities: preparing for the visit, obtaining and/or reviewing a separately obtained history, performing a medically appropriate examination and/or evaluation, counseling and educating the patient/family/caregiver, ordering medications, tests, or procedures and documenting information in the medical record            This document has been electronically signed by CICI Lopez   December 7, 2022 09:00 EST

## 2022-12-07 ENCOUNTER — OFFICE VISIT (OUTPATIENT)
Dept: PSYCHIATRY | Facility: CLINIC | Age: 37
End: 2022-12-07

## 2022-12-07 VITALS
SYSTOLIC BLOOD PRESSURE: 138 MMHG | WEIGHT: 212 LBS | BODY MASS INDEX: 42.74 KG/M2 | HEIGHT: 59 IN | HEART RATE: 96 BPM | DIASTOLIC BLOOD PRESSURE: 84 MMHG

## 2022-12-07 DIAGNOSIS — G47.419 NARCOLEPSY WITHOUT CATAPLEXY: ICD-10-CM

## 2022-12-07 DIAGNOSIS — F41.1 GENERALIZED ANXIETY DISORDER: ICD-10-CM

## 2022-12-07 DIAGNOSIS — Z79.899 MEDICATION MANAGEMENT: ICD-10-CM

## 2022-12-07 DIAGNOSIS — F40.01 PANIC DISORDER WITH AGORAPHOBIA: ICD-10-CM

## 2022-12-07 DIAGNOSIS — F31.75 BIPOLAR DISORDER, IN PARTIAL REMISSION, MOST RECENT EPISODE DEPRESSED: Primary | ICD-10-CM

## 2022-12-07 PROCEDURE — 99214 OFFICE O/P EST MOD 30 MIN: CPT | Performed by: NURSE PRACTITIONER

## 2022-12-07 RX ORDER — BUPROPION HYDROCHLORIDE 300 MG/1
300 TABLET ORAL EVERY MORNING
Qty: 30 TABLET | Refills: 2 | Status: SHIPPED | OUTPATIENT
Start: 2022-12-07 | End: 2023-03-02 | Stop reason: SDUPTHER

## 2022-12-12 ENCOUNTER — SPECIALTY PHARMACY (OUTPATIENT)
Dept: PHARMACY | Facility: HOSPITAL | Age: 37
End: 2022-12-12

## 2022-12-13 ENCOUNTER — OFFICE VISIT (OUTPATIENT)
Dept: ENDOCRINOLOGY | Facility: CLINIC | Age: 37
End: 2022-12-13

## 2022-12-13 ENCOUNTER — SPECIALTY PHARMACY (OUTPATIENT)
Dept: PHARMACY | Facility: HOSPITAL | Age: 37
End: 2022-12-13

## 2022-12-13 VITALS
HEART RATE: 88 BPM | DIASTOLIC BLOOD PRESSURE: 92 MMHG | BODY MASS INDEX: 42.98 KG/M2 | SYSTOLIC BLOOD PRESSURE: 156 MMHG | WEIGHT: 213.2 LBS | HEIGHT: 59 IN | OXYGEN SATURATION: 97 %

## 2022-12-13 DIAGNOSIS — Z79.4 TYPE 2 DIABETES MELLITUS WITH HYPERGLYCEMIA, WITH LONG-TERM CURRENT USE OF INSULIN: Primary | ICD-10-CM

## 2022-12-13 DIAGNOSIS — E11.65 TYPE 2 DIABETES MELLITUS WITH HYPERGLYCEMIA, WITH LONG-TERM CURRENT USE OF INSULIN: Primary | ICD-10-CM

## 2022-12-13 LAB
EXPIRATION DATE: NORMAL
GLUCOSE BLDC GLUCOMTR-MCNC: 214 MG/DL (ref 70–130)
HBA1C MFR BLD: 7.2 %
Lab: NORMAL

## 2022-12-13 PROCEDURE — 82962 GLUCOSE BLOOD TEST: CPT | Performed by: NURSE PRACTITIONER

## 2022-12-13 PROCEDURE — 83036 HEMOGLOBIN GLYCOSYLATED A1C: CPT | Performed by: NURSE PRACTITIONER

## 2022-12-13 PROCEDURE — 99213 OFFICE O/P EST LOW 20 MIN: CPT | Performed by: NURSE PRACTITIONER

## 2022-12-13 RX ORDER — INSULIN LISPRO 100 [IU]/ML
60 INJECTION, SOLUTION INTRAVENOUS; SUBCUTANEOUS 3 TIMES DAILY
Qty: 60 ML | Refills: 5 | Status: SHIPPED | OUTPATIENT
Start: 2022-12-13 | End: 2023-03-15

## 2022-12-13 RX ORDER — BLOOD SUGAR DIAGNOSTIC
1 STRIP MISCELLANEOUS
Qty: 200 EACH | Refills: 5 | Status: SHIPPED | OUTPATIENT
Start: 2022-12-13 | End: 2023-01-24 | Stop reason: SDUPTHER

## 2022-12-13 RX ORDER — INSULIN DEGLUDEC INJECTION 100 U/ML
50 INJECTION, SOLUTION SUBCUTANEOUS 2 TIMES DAILY
Qty: 90 ML | Refills: 1 | Status: SHIPPED | OUTPATIENT
Start: 2022-12-13 | End: 2023-03-15 | Stop reason: SDUPTHER

## 2022-12-13 RX ORDER — BLOOD-GLUCOSE SENSOR
1 EACH MISCELLANEOUS
Qty: 6 EACH | Refills: 1 | Status: SHIPPED | OUTPATIENT
Start: 2022-12-13 | End: 2023-01-24

## 2022-12-13 NOTE — PROGRESS NOTES
Specialty Pharmacy Patient Management Program  Endocrinology Reassessment     Antoinette Pack is a 37 y.o. female with Type 2 Diabetes enrolled in the Endocrinology Patient Management program offered by [facility].  A follow-up was conducted, including assessment of continued therapy appropriateness, medication adherence, and side effect incidence and management for Insulin therapy and CGM.    Patient reports taking Tresiba 50 units BID and Humalog 30-60 units TID with meals. She uses a Freestyle Libre2 to monitor her BG. She denies low BG <70. Patient denies history of pancreatitis/pancreas issues, denies history or family history of thyroid cancer, and reports issues with UTI/yeast infections.     In the past, the patient has tried:     Drug Dose Reason for Discontinuation Notes   Trulicity  GI upset    Glipizide   Unsure                   Changes to Insurance Coverage or Financial Support  On patient assistance program    Relevant Past Medical History and Comorbidities  Relevant medical history and concomitant health conditions were discussed with the patient. The patient's chart has been reviewed for relevant past medical history and comorbid health conditions and updated as necessary.   Past Medical History:   Diagnosis Date   • Anxiety    • Depression    • Diabetes mellitus (HCC)    • Diverticulitis    • GERD (gastroesophageal reflux disease)    • Hypertension    • Kidney stone    • PCOS (polycystic ovarian syndrome)    • Sleep apnea      Social History     Socioeconomic History   • Marital status:    Tobacco Use   • Smoking status: Former   • Smokeless tobacco: Never   Vaping Use   • Vaping Use: Never used   Substance and Sexual Activity   • Alcohol use: No   • Drug use: No   • Sexual activity: Defer       Problem list reviewed by Sravani Perez RPH on 12/13/2022 at  9:53 AM    Allergies  Known allergies and reactions were discussed with the patient. The patient's chart  has been reviewed for allergy information and updated as necessary.   Dilantin [phenytoin], Keppra [levetiracetam], Meperidine, and Topamax [topiramate]    Allergies reviewed by Sravani Perez RP on 12/13/2022 at  9:46 AM    Relevant Laboratory Values  A1C Last 3 Results    HGBA1C Last 3 Results 5/26/22 9/12/22 12/13/22   Hemoglobin A1C 8.7 8.9 7.2           Lab Results   Component Value Date    HGBA1C 7.2 12/13/2022     Lab Results   Component Value Date    GLUCOSE 224 (H) 10/24/2022    CALCIUM 8.8 10/24/2022     10/24/2022    K 4.2 10/24/2022    CO2 21.4 (L) 10/24/2022     10/24/2022    BUN 10 10/24/2022    CREATININE 0.62 10/24/2022    EGFRIFAFRI  09/05/2016      Comment:      <15 Indicative of kidney failure.    EGFRIFNONA 101 12/27/2020    BCR 16.1 10/24/2022    ANIONGAP 13.6 10/24/2022     Lab Results   Component Value Date    CHLPL 159 09/22/2015    TRIG 131 09/22/2015    HDL 38 (L) 09/22/2015    LDL 95 09/22/2015       Current Medication List  This medication list has been reviewed with the patient and evaluated for any interactions or necessary modifications/recommendations, and updated to include all prescription medications, OTC medications, and supplements the patient is currently taking.  This list reflects what is contained in the patient's profile, which has also been marked as reviewed to communicate to other providers it is the most up to date version of the patient's current medication therapy.     Current Outpatient Medications:   •  buPROPion XL (WELLBUTRIN XL) 300 MG 24 hr tablet, Take 1 tablet by mouth Every Morning., Disp: 30 tablet, Rfl: 2  •  clobetasol (TEMOVATE) 0.05 % ointment, As Needed., Disp: , Rfl:   •  Continuous Blood Gluc  (FreeStyle Esvin 2 [place]) device, Use as directed to cotinuously monitor blood glucose., Disp: 1 each, Rfl: 0  •  famotidine (Pepcid) 40 MG tablet, Take 1 tablet by mouth Daily., Disp: 30 tablet, Rfl: 2  •  insulin degludec (Tresiba  FlexTouch) 100 UNIT/ML solution pen-injector injection, Inject 50 Units under the skin into the appropriate area as directed 2 (Two) Times a Day., Disp: 90 mL, Rfl: 1  •  Insulin Lispro, 1 Unit Dial, (HumaLOG KwikPen) 100 UNIT/ML solution pen-injector, Inject 10 Units under the skin into the appropriate area as directed 3 (Three) Times a Day. (Patient taking differently: Inject 30-60 Units under the skin into the appropriate area as directed 3 (Three) Times a Day.), Disp: 15 mL, Rfl: 5  •  NIFEdipine CC (ADALAT CC) 30 MG 24 hr tablet, Take 1 tablet by mouth Daily., Disp: 30 tablet, Rfl: 0  •  sertraline (ZOLOFT) 50 MG tablet, Take 1 tablet daily for 2 weeks, then increase to 2 tablets daily., Disp: 60 tablet, Rfl: 2  •  clomiPHENE (CLOMID) 50 MG tablet, TAKE 2 TABLETS BY MOUTH DAILY ON DAYS 3 through 7 OF MENSES., Disp: 10 tablet, Rfl: 3  •  metFORMIN ER (GLUCOPHAGE-XR) 500 MG 24 hr tablet, Take 2 tablets by mouth 2 (Two) Times a Day With Meals., Disp: 360 tablet, Rfl: 1  •  omeprazole (priLOSEC) 40 MG capsule, TAKE ONE (1) CAPSULE BY MOUTH EVERY DAY BEFORE A MEAL, Disp: 90 capsule, Rfl: 1    Medicines reviewed by Sravani Perez [facility] on 12/13/2022 at  9:52 AM    Drug Interactions  None relevant to diabetes care     Adverse Drug Reactions  • Adverse Reactions Experienced: None  • Plan for ADR Management: None required    Hospitalizations and Urgent Care Since Last Assessment  • Hospitalizations or Admissions: None  • ED Visits: None   • Urgent Office Visits: None    Adherence and Self-Administration  Adherence related patient's specialty therapy was discussed with the patient. The Adherence segment of this outreach has been reviewed and updated.   • Ongoing or New Barriers to Patient Adherence and/or Self-Administration: None   • Methods for Supporting Patient Adherence and/or Self-Administration: None Required    Goals of Therapy  Goals related to the patient's specialty therapy was discussed with the patient. The  Patient Goals segment of this outreach has been reviewed and updated.    Goals     • Consistently take medications as prescribed     • HEMOGLOBIN A1C < 7          Reassessment Plan & Follow-Up  1. Patient's diabetes has improved with A1C down to 7.2% from 8.9%.   2. Medication Therapy Changes:   · Humalog up to 60 units three times daily with meals   · Tresiba 50 units twice daily  3. Additional Plans, Therapy Recommendations, or Therapy Problems to Be Addressed:  · Patient would like to try FreeStyle Esvin 3. Helped her download nagi on her phone and provided sample in clinic.   · Will send updated prescriptions to [employer] for shipping services.    4. Pharmacist to perform regular reassessments no more than (6) months from the previous assessment.  5. Care Coordinator to set up future refill outreaches, coordinate prescription delivery, and escalate clinical questions to pharmacist.     Education Provided for DM medications:  The patient has been provided with the following education and any applicable administration techniques (i.e. self-injection) have been demonstrated for the therapies indicated. All questions and concerns have been addressed prior to the patient receiving the medication, and the patient has verbalized understanding of the education and any materials provided.  Additional patient education shall be provided and documented upon request by the patient, provider or payer.      FreeStyle Esvin 3:  Educated patient on using Freestyle Esvin 3 device to monitor BG:     Freestyle Esvin 3 has two parts: a sensor and a reader, which is an nagi on smart phone.   • The smart phone can allow readings to be automatically sent to the clinic using our invitation code allowing us to review for your appointment.    Sensors:  • Sensors come pre-assembled.  • Sensors are placed on back of patient's upper arm. Do not apply to the stomach.  • Educated on application process:   o Clean area with alcohol beforehand.  "  o Sensor placement is important and you will want to change your insertion site with each sensor. Using the same site too often might not allow the skin to heal, causing scarring or skin irritation.  • Must be changed every 14 days. The aaliyah will notify you when it's time to change the sensor. One month supply will include two sensors.  • Choose a site:   o At least 3 inches from any injection site  o Away from scarring, tattoos, irritation, and bones  o Unlikely to be bumped, pushed, or laid on while sleeping  • To apply the sensor, press firmly and listen for the click. Pull back slowly after a few seconds.   • Patient may purchase sensor covers if concerned about sensor falling off or use a band-aid.  • Sensor goes into the subcutaneous (fatty) part of the skin. This can cause a real-time lag. If a finger stick is slightly off from your sensor reading, this is why.     Aaliyah on Smart Phone:  • Will be used to alert you when blood sugar is low or high and to obtain blood sugar readings. Low alerts are set for 70 and high alerts for 250.   • To start a new sensor:  o Open the Aaliyah on smart phone and tap \"start new sensor\"  o Scan the sensor by touching it with the top of your smart phone. You'll receive a tone and vibration when you've successfully started it.   • When starting a new sensor, there is a 60 minute warm up period before you will get BG readings.  • Aaliyah with notify you when it is time to change sensor.    Attestation  I attest that the specialty medication(s) addressed above are appropriate for the patient based on my reassessment.  If the prescribed therapy is at any point deemed not appropriate based on the current or future assessments, a consultation will be initiated with the patient's specialty care provider to determine the best course of action. The revised plan of therapy will be documented along with any additional patient education provided.     Sravani Perez, PharmD   12/13/2022  10:00 EST  "

## 2022-12-13 NOTE — ASSESSMENT & PLAN NOTE
-Diabetes is improving with A1c down from 8.9 to 7.2.   -Discussed the dietary and exercise guidelines with patient.    -She is to continue using Freestyle Esvin CGM.  -Discussed the importance of keeping yearly eye exams.  -Continue Tresiba 50 units BID.  -Continue Novolog 30-60 units TID with meals.  -Discussed importance of following with OB.  -S/S hypoglycemia reviewed with Rule of 15's advised.  -Follow-up in 3 months.

## 2022-12-13 NOTE — PROGRESS NOTES
Chief Complaint   Patient presents with   • Diabetes     F/u. Pt stated she has had to use more insulin.         Antoinette Pack is a 37 y.o. female had concerns including Diabetes (F/u. Pt stated she has had to use more insulin. ).   T2DM    Birth state: [place]  Previous history of radiation to face/neck:no  Consuming foods high in iodine: no  Family history of thyroid complications:none    The following portions of the patient's history were reviewed and updated as appropriate: allergies, current medications, past family history, past medical history, past social history, past surgical history and problem list.    History:  Diabetes was diagnosed 8-9 years ago.  Complications include none.  Last ophtho exam was 2021, Dr. Bruno Office.  Current medications for diabetes include Humalog 30-60 units TID with meals, Tresiba 50 units BID.  Past meds: Trulicity, GI Issues, has problems with yeast, Glipizide-unsure why it was stopped  She checks her blood with Freestyle Esvin CGM. She would like to start using the Esvin 3.  Hypos: rarely  Fasting range: 150-200+    Diet: has made improvements to her diet.    She has had an abnormal menstrual cycle last week and is still seeing OB trying to conceive pregnancy.    Past Medical History:   Diagnosis Date   • Anxiety    • Depression    • Diabetes mellitus (HCC)    • Diverticulitis    • GERD (gastroesophageal reflux disease)    • Hypertension    • Kidney stone    • PCOS (polycystic ovarian syndrome)    • Sleep apnea      Past Surgical History:   Procedure Laterality Date   • CARDIAC CATHETERIZATION     • CARPAL TUNNEL RELEASE     • COLONOSCOPY     • ENDOSCOPY     • FRACTURE SURGERY     • HARDWARE REMOVAL      WRIST   • TENDON REPAIR      HAND      Family History   Problem Relation Age of Onset   • Heart disease Mother    • COPD Mother    • Heart disease Father    • COPD Father       Social History     Socioeconomic History   • Marital status:    Tobacco Use   • Smoking  status: Former   • Smokeless tobacco: Never   Vaping Use   • Vaping Use: Never used   Substance and Sexual Activity   • Alcohol use: No   • Drug use: No   • Sexual activity: Defer      Allergies   Allergen Reactions   • Dilantin [Phenytoin] Mental Status Change   • Keppra [Levetiracetam] Mental Status Change   • Meperidine Rash   • Topamax [Topiramate] Mental Status Change      Current Outpatient Medications on File Prior to Visit   Medication Sig Dispense Refill   • buPROPion XL (WELLBUTRIN XL) 300 MG 24 hr tablet Take 1 tablet by mouth Every Morning. 30 tablet 2   • clobetasol (TEMOVATE) 0.05 % ointment As Needed.     • clomiPHENE (CLOMID) 50 MG tablet TAKE 2 TABLETS BY MOUTH DAILY ON DAYS 3 through 7 OF MENSES. 10 tablet 3   • Continuous Blood Gluc  ([place] Esvin 2 [place]) device Use as directed to cotinuously monitor blood glucose. 1 each 0   • famotidine (Pepcid) 40 MG tablet Take 1 tablet by mouth Daily. 30 tablet 2   • insulin degludec (Tresiba FlexTouch) 100 UNIT/ML solution pen-injector injection Inject 50 Units under the skin into the appropriate area as directed 2 (Two) Times a Day. 90 mL 1   • Insulin Lispro, 1 Unit Dial, (HumaLOG KwikPen) 100 UNIT/ML solution pen-injector Inject 10 Units under the skin into the appropriate area as directed 3 (Three) Times a Day. (Patient taking differently: Inject 30-60 Units under the skin into the appropriate area as directed 3 (Three) Times a Day.) 15 mL 5   • metFORMIN ER (GLUCOPHAGE-XR) 500 MG 24 hr tablet Take 2 tablets by mouth 2 (Two) Times a Day With Meals. 360 tablet 1   • NIFEdipine CC (ADALAT CC) 30 MG 24 hr tablet Take 1 tablet by mouth Daily. 30 tablet 0   • omeprazole (priLOSEC) 40 MG capsule TAKE ONE (1) CAPSULE BY MOUTH EVERY DAY BEFORE A MEAL 90 capsule 1   • sertraline (ZOLOFT) 50 MG tablet Take 1 tablet daily for 2 weeks, then increase to 2 tablets daily. 60 tablet 2   • [DISCONTINUED] amoxicillin (AMOXIL) 500 MG capsule Take 1 capsule by  mouth every 12 hours. 20 capsule 0   • [DISCONTINUED] Continuous Blood Gluc Sensor (FreeStyle Esvin 2 Sensor) misc Use as directed and change sensor Every 14 (Fourteen) Days. 6 each 1   • [DISCONTINUED] fluconazole (Diflucan) 150 MG tablet Take 1 tablet by mouth once. May repeat in 72 hours as needed. 2 tablet 0   • [DISCONTINUED] fluticasone (FLONASE) 50 MCG/ACT nasal spray 1 spray into the nostril(s) as directed by provider Daily As Needed.     • [DISCONTINUED] ibuprofen (ADVIL,MOTRIN) 800 MG tablet As Needed.     • [DISCONTINUED] labetalol (NORMODYNE) 100 MG tablet Take 1/2 tablet by mouth 2 times every day 30 tablet 0   • [DISCONTINUED] modafinil (PROVIGIL) 200 MG tablet Take 200 mg by mouth Daily.     • [DISCONTINUED] ondansetron (ZOFRAN) 4 MG tablet Take 1 tablet by mouth Every 8 (Eight) Hours As Needed for Nausea or Vomiting. 15 tablet 0   • [DISCONTINUED] polyethylene glycol (MIRALAX) 17 GM/SCOOP powder Take 17 g by mouth Daily. (Patient taking differently: Take 17 g by mouth As Needed.) 578 g 0   • [DISCONTINUED] rosuvastatin (CRESTOR) 40 MG tablet Take 1 tablet by mouth Every Night at bedtime 90 tablet 0     No current facility-administered medications on file prior to visit.      Review of Systems   Constitutional: Positive for diaphoresis and fatigue. Negative for unexpected weight gain and unexpected weight loss.   HENT:        Neck tenderness   Eyes: Positive for blurred vision and visual disturbance.   Cardiovascular: Positive for palpitations.   Gastrointestinal: Positive for constipation.   Endocrine: Positive for heat intolerance, polydipsia, polyphagia and polyuria. Negative for cold intolerance.   Skin: Positive for dry skin.        Hair thinning   Neurological: Positive for tremors.   Psychiatric/Behavioral: Positive for agitation and sleep disturbance. The patient is nervous/anxious.    All other systems reviewed and are negative.     /92 (BP Location: Left arm, Patient Position: Sitting,  "Cuff Size: Adult)   Pulse 88   Ht 149.9 cm (59\")   Wt 96.7 kg (213 lb 3.2 oz)   SpO2 97%   BMI 43.06 kg/m²      Physical Exam  Vitals reviewed.   Constitutional:       Appearance: Normal appearance.   Eyes:      Extraocular Movements: Extraocular movements intact.   Cardiovascular:      Rate and Rhythm: Normal rate.      Pulses: Normal pulses.   Pulmonary:      Effort: Pulmonary effort is normal.   Skin:     General: Skin is warm.   Neurological:      Mental Status: She is alert and oriented to person, place, and time.   Psychiatric:         Mood and Affect: Mood normal.         Behavior: Behavior normal.         Thought Content: Thought content normal.         Judgment: Judgment normal.        Labs/Imaging  CMP  Lab Results   Component Value Date    GLUCOSE 224 (H) 10/24/2022    BUN 10 10/24/2022    CREATININE 0.62 10/24/2022    EGFRIFNONA 101 12/27/2020    EGFRIFAFRI  09/05/2016      Comment:      <15 Indicative of kidney failure.    BCR 16.1 10/24/2022    K 4.2 10/24/2022    CO2 21.4 (L) 10/24/2022    CALCIUM 8.8 10/24/2022    ALBUMIN 4.05 10/24/2022    LABIL2 1.3 (L) 02/05/2016    AST 29 10/24/2022    ALT 23 10/24/2022        CBC w/DIFF   Lab Results   Component Value Date    WBC 7.07 10/24/2022    RBC 3.80 10/24/2022    HGB 10.6 (L) 10/24/2022    HCT 29.0 (L) 10/24/2022    MCV 84.5 10/24/2022    MCH 30.0 10/24/2022    MCHC 35.5 10/24/2022    RDW 13.1 10/24/2022    RDWSD 39.9 10/24/2022    MPV 9.8 10/24/2022     10/24/2022    NEUTRORELPCT 61.7 10/24/2022    LYMPHORELPCT 29.3 10/24/2022    MONORELPCT 5.9 10/24/2022    EOSRELPCT 2.1 10/24/2022    BASORELPCT 0.3 10/24/2022    AUTOIGPER 0.7 (H) 10/24/2022    NEUTROABS 4.36 10/24/2022    LYMPHSABS 2.07 10/24/2022    MONOSABS 0.42 10/24/2022    EOSABS 0.15 10/24/2022    BASOSABS 0.02 10/24/2022    AUTOIGNUM 0.05 10/24/2022    NRBC 0.0 10/24/2022       TSH  Lab Results   Component Value Date    TSH 0.497 02/17/2022    TSH 0.971 09/22/2015       T4  Lab " Results   Component Value Date    FREET4 0.97 02/17/2022    FREET4 1.20 09/22/2015     No results found for: W7EJIUV    T3  Lab Results   Component Value Date    T3FREE 3.43 02/17/2022     No results found for: L4WTETR    TRAb  No results found for: TSURCPAB    TPO  No results found for: THYROIDAB    No valid procedures specified.    Assessment and Plan    Diagnoses and all orders for this visit:    1. Type 2 diabetes mellitus with hyperglycemia, with long-term current use of insulin (HCC) (Primary)  Assessment & Plan:  -Diabetes is improving with A1c down from 8.9 to 7.2.   -Discussed the dietary and exercise guidelines with patient.    -She is to continue using Freestyle Esvin CGM.  -Discussed the importance of keeping yearly eye exams.  -Continue Tresiba 50 units BID.  -Continue Novolog 30-60 units TID with meals.  -Discussed importance of following with OB.  -S/S hypoglycemia reviewed with Rule of 15's advised.  -Follow-up in 3 months.    Orders:  -     POC Glucose Fingerstick  -     POC Glycosylated Hemoglobin (Hb A1C)       Return in about 3 months (around 3/13/2023) for Follow-up appointment, A1C. The patient was instructed to contact the clinic with any interval questions or concerns.    This document has been electronically signed by CICI Veliz  December 13, 2022 10:24 EST  Endocrinology

## 2022-12-28 ENCOUNTER — SPECIALTY PHARMACY (OUTPATIENT)
Dept: PHARMACY | Facility: HOSPITAL | Age: 37
End: 2022-12-28

## 2022-12-28 RX ORDER — FLUCONAZOLE 150 MG/1
150 TABLET ORAL ONCE
Qty: 2 TABLET | Refills: 0 | Status: SHIPPED | OUTPATIENT
Start: 2022-12-28 | End: 2023-01-24

## 2023-01-17 ENCOUNTER — SPECIALTY PHARMACY (OUTPATIENT)
Dept: PHARMACY | Facility: HOSPITAL | Age: 38
End: 2023-01-17
Payer: COMMERCIAL

## 2023-01-17 DIAGNOSIS — Z79.899 MEDICATION MANAGEMENT: ICD-10-CM

## 2023-01-17 DIAGNOSIS — F31.75 BIPOLAR DISORDER, IN PARTIAL REMISSION, MOST RECENT EPISODE DEPRESSED: ICD-10-CM

## 2023-01-17 DIAGNOSIS — F41.1 GENERALIZED ANXIETY DISORDER: ICD-10-CM

## 2023-01-17 DIAGNOSIS — F40.01 PANIC DISORDER WITH AGORAPHOBIA: ICD-10-CM

## 2023-01-18 RX ORDER — SERTRALINE HYDROCHLORIDE 100 MG/1
100 TABLET, FILM COATED ORAL DAILY
Qty: 30 TABLET | Refills: 2 | Status: SHIPPED | OUTPATIENT
Start: 2023-01-18 | End: 2023-03-02 | Stop reason: SDUPTHER

## 2023-01-24 ENCOUNTER — SPECIALTY PHARMACY (OUTPATIENT)
Dept: PHARMACY | Facility: HOSPITAL | Age: 38
End: 2023-01-24
Payer: COMMERCIAL

## 2023-01-24 RX ORDER — INSULIN ASPART 100 [IU]/ML
60 INJECTION, SOLUTION INTRAVENOUS; SUBCUTANEOUS
Qty: 45 ML | Refills: 5 | Status: SHIPPED | OUTPATIENT
Start: 2023-01-24 | End: 2023-03-15 | Stop reason: SDUPTHER

## 2023-01-24 RX ORDER — PROCHLORPERAZINE 25 MG/1
1 SUPPOSITORY RECTAL CONTINUOUS
Qty: 1 EACH | Refills: 0 | Status: SHIPPED | OUTPATIENT
Start: 2023-01-24

## 2023-01-24 RX ORDER — PROCHLORPERAZINE 25 MG/1
1 SUPPOSITORY RECTAL
Qty: 1 EACH | Refills: 1 | Status: SHIPPED | OUTPATIENT
Start: 2023-01-24 | End: 2023-03-15 | Stop reason: SDUPTHER

## 2023-01-24 RX ORDER — PROCHLORPERAZINE 25 MG/1
SUPPOSITORY RECTAL
Qty: 9 EACH | Refills: 1 | Status: SHIPPED | OUTPATIENT
Start: 2023-01-24 | End: 2023-03-15 | Stop reason: SDUPTHER

## 2023-01-24 NOTE — PROGRESS NOTES
Specialty Pharmacy Patient Management Program  Endocrinology Reassessment     Antoinette Pack is a 37 y.o. female with Type 2 Diabetes enrolled in the Endocrinology Patient Management program offered by [facility].  A follow-up was conducted, including assessment of continued therapy appropriateness, medication adherence, and side effect incidence and management for Insulin therapy and CGM.    Patient reports taking Tresiba 50 units BID and Humalog 30-60 units TID with meals. She uses a Freestyle Libre2 to monitor her BG. She denies low BG <70. Patient denies history of pancreatitis/pancreas issues, denies history or family history of thyroid cancer, and reports issues with UTI/yeast infections.     Her insurance is requiring switch to Dexcom and Novolog for coverage.    In the past, the patient has tried:     Drug Dose Reason for Discontinuation Notes   Trulicity  GI upset    Glipizide   Unsure                   Changes to Insurance Coverage or Financial Support  None    Relevant Past Medical History and Comorbidities  Relevant medical history and concomitant health conditions were discussed with the patient. The patient's chart has been reviewed for relevant past medical history and comorbid health conditions and updated as necessary.   Past Medical History:   Diagnosis Date   • Anxiety    • Depression    • Diabetes mellitus (HCC)    • Diverticulitis    • GERD (gastroesophageal reflux disease)    • Hypertension    • Kidney stone    • PCOS (polycystic ovarian syndrome)    • Sleep apnea      Social History     Socioeconomic History   • Marital status:    Tobacco Use   • Smoking status: Former   • Smokeless tobacco: Never   Vaping Use   • Vaping Use: Never used   Substance and Sexual Activity   • Alcohol use: No   • Drug use: No   • Sexual activity: Defer       Problem list reviewed by Sravani Perez RP on 1/24/2023 at  8:54 AM    Allergies  Known allergies and reactions were  discussed with the patient. The patient's chart has been reviewed for allergy information and updated as necessary.   Dilantin [phenytoin], Keppra [levetiracetam], Meperidine, and Topamax [topiramate]    Allergies reviewed by Sravani Perez RP on 1/24/2023 at  8:54 AM    Relevant Laboratory Values  A1C Last 3 Results    HGBA1C Last 3 Results 5/26/22 9/12/22 12/13/22   Hemoglobin A1C 8.7 8.9 7.2           Lab Results   Component Value Date    HGBA1C 7.2 12/13/2022     Lab Results   Component Value Date    GLUCOSE 224 (H) 10/24/2022    CALCIUM 8.8 10/24/2022     10/24/2022    K 4.2 10/24/2022    CO2 21.4 (L) 10/24/2022     10/24/2022    BUN 10 10/24/2022    CREATININE 0.62 10/24/2022    EGFRIFAFRI  09/05/2016      Comment:      <15 Indicative of kidney failure.    EGFRIFNONA 101 12/27/2020    BCR 16.1 10/24/2022    ANIONGAP 13.6 10/24/2022     Lab Results   Component Value Date    CHLPL 159 09/22/2015    TRIG 131 09/22/2015    HDL 38 (L) 09/22/2015    LDL 95 09/22/2015       Current Medication List  This medication list has been reviewed with the patient and evaluated for any interactions or necessary modifications/recommendations, and updated to include all prescription medications, OTC medications, and supplements the patient is currently taking.  This list reflects what is contained in the patient's profile, which has also been marked as reviewed to communicate to other providers it is the most up to date version of the patient's current medication therapy.     Current Outpatient Medications:   •  buPROPion XL (WELLBUTRIN XL) 300 MG 24 hr tablet, Take 1 tablet by mouth Every Morning., Disp: 30 tablet, Rfl: 2  •  clobetasol (TEMOVATE) 0.05 % ointment, As Needed., Disp: , Rfl:   •  insulin degludec (Tresiba FlexTouch) 100 UNIT/ML solution pen-injector injection, Inject 50 Units under the skin into the appropriate area as directed 2 (Two) Times a Day., Disp: 90 mL, Rfl: 1  •  Insulin Lispro, 1 Unit Dial,  (HumaLOG KwikPen) 100 UNIT/ML solution pen-injector, Inject 60 Units under the skin into the appropriate area as directed 3 (Three) Times a Day., Disp: 60 mL, Rfl: 5  •  Insulin Pen Needle 32G X 4 MM misc, Use to inject insulin up to 4 times daily as directed, Disp: 200 each, Rfl: 5  •  NIFEdipine CC (ADALAT CC) 30 MG 24 hr tablet, Take 1 tablet by mouth Daily., Disp: 30 tablet, Rfl: 1  •  omeprazole (priLOSEC) 40 MG capsule, Take 1 capsule by mouth every day before a meal, Disp: 30 capsule, Rfl: 5  •  sertraline (ZOLOFT) 100 MG tablet, Take 1 tablet by mouth Daily., Disp: 30 tablet, Rfl: 2  •  clomiPHENE (CLOMID) 50 MG tablet, TAKE 2 TABLETS BY MOUTH DAILY ON DAYS 3 through 7 OF MENSES., Disp: 10 tablet, Rfl: 3  •  Continuous Blood Gluc  (Dexcom G6 ) device, Use to continuously monitor blood glucose, Disp: 1 each, Rfl: 0  •  Continuous Blood Gluc Sensor (Dexcom G6 Sensor), Change every 10 days, Disp: 9 each, Rfl: 1  •  Continuous Blood Gluc Transmit (Dexcom G6 Transmitter) misc, Use 1 Every 3 (Three) Months., Disp: 1 each, Rfl: 1  •  insulin aspart (NovoLOG FlexPen) 100 UNIT/ML solution pen-injector sc pen, Inject 60 Units under the skin into the appropriate area as directed 3 (Three) Times a Day With Meals., Disp: 45 mL, Rfl: 5    Medicines reviewed by Sravani Perez [facility] on 1/24/2023 at  8:57 AM    Drug Interactions  None relevant to diabetes care     Adverse Drug Reactions  • Adverse Reactions Experienced: None  • Plan for ADR Management: None required    Hospitalizations and Urgent Care Since Last Assessment  • Hospitalizations or Admissions: None  • ED Visits: None   • Urgent Office Visits: None    Adherence and Self-Administration  Adherence related patient's specialty therapy was discussed with the patient. The Adherence segment of this outreach has been reviewed and updated.   • Ongoing or New Barriers to Patient Adherence and/or Self-Administration: None   • Methods for Supporting Patient  Adherence and/or Self-Administration: None Required    Goals of Therapy  Goals related to the patient's specialty therapy was discussed with the patient. The Patient Goals segment of this outreach has been reviewed and updated.    Goals     • Consistently take medications as prescribed     • HEMOGLOBIN A1C < 7          Reassessment Plan & Follow-Up  1. Patient's insurance is requiring Dexcom and Novolog for coverage.   2. Medication Therapy Changes:   · Discontinue Humalog. Start Novolog up to 60 units three times daily with meals   · Discontinue FreeStyle Esvin. Start using Dexcom to monitor BG.   3. Additional Plans, Therapy Recommendations, or Therapy Problems to Be Addressed:   · Will send updated prescriptions to [facility] for shipping services.    4. Pharmacist to perform regular reassessments no more than (6) months from the previous assessment.  5. Care Coordinator to set up future refill outreaches, coordinate prescription delivery, and escalate clinical questions to pharmacist.     Education Provided for DM medications:  The patient has been provided with the following education and any applicable administration techniques (i.e. self-injection) have been demonstrated for the therapies indicated. All questions and concerns have been addressed prior to the patient receiving the medication, and the patient has verbalized understanding of the education and any materials provided.  Additional patient education shall be provided and documented upon request by the patient, provider or payer.      Dexcom Patient Education  Educated patient on using Dexcom device to monitor BG:   Patient is using phone to monitor blood sugars.    Sensors:  • Educated on application process: clean area with alcohol beforehand. Place the sensor patch in the applicator and remove the plastic covering the adhesive. Break the small orange piece off the button. Place the applicator against the skin where you'd like to place the sensor  and push the orange button to inject the needle into the skin. Discard the applicator. Slide the transmitter onto the sensor, narrow part first and click it into place.  • Must be changed every 10 days. One month supply will include three sensors in one box.  • Sensor is placed on the abdomen, back of upper arm, or the upper buttocks (ages 2-17 years). Sensor placement is important and you will want to change your insertion site with each sensor.    o Using the same site too often might not allow the skin to heal, causing scarring or skin irritation.  • Choose a site:  o At least 3 inches from any insulin pump infusion set or injection site  o Away from waistbands, scarring, tattoos, irritation, and bones  o Unlikely to be bumped, pushed, or laid on while sleeping    Transmitter:  • Do not discard the transmitter with each sensor change. The sensor is good for 90 days.  • When switching sensor, remove the transmitter from the sensor patch, discard the sensor, reapply a new sensor and place transmitter into sensor.      [place] on Smart Phone:  • Will be used to alert you when blood sugar is low or high and to obtain blood sugar readings. High BG alerts are set at 250 and low BG alerts are set at 70.   • Will take 2 hours to warm up with each new sensor.   • Each time a new sensor is started, type in the unique code for the new sensor.  • Each time a new transmitter is started, type in the unique code for the new transmitter.     Attestation  I attest that the specialty medication(s) addressed above are appropriate for the patient based on my reassessment.  If the prescribed therapy is at any point deemed not appropriate based on the current or future assessments, a consultation will be initiated with the patient's specialty care provider to determine the best course of action. The revised plan of therapy will be documented along with any additional patient education provided.     Sravani Perez, PharmD    1/24/2023  10:56 EST

## 2023-02-16 ENCOUNTER — SPECIALTY PHARMACY (OUTPATIENT)
Dept: PHARMACY | Facility: HOSPITAL | Age: 38
End: 2023-02-16
Payer: MEDICARE

## 2023-02-17 NOTE — PROGRESS NOTES
PA required for Dexcom sensors and transmitters on patients secondary insurance. Submitted today.     Sravani Perez, PharmD  Clinical Specialty Pharmacist, Endocrinology  02/17/23  11:56 EST

## 2023-03-02 ENCOUNTER — TELEMEDICINE (OUTPATIENT)
Dept: PSYCHIATRY | Facility: CLINIC | Age: 38
End: 2023-03-02
Payer: COMMERCIAL

## 2023-03-02 DIAGNOSIS — F31.75 BIPOLAR DISORDER, IN PARTIAL REMISSION, MOST RECENT EPISODE DEPRESSED: Primary | ICD-10-CM

## 2023-03-02 DIAGNOSIS — F40.01 PANIC DISORDER WITH AGORAPHOBIA: ICD-10-CM

## 2023-03-02 DIAGNOSIS — F41.1 GENERALIZED ANXIETY DISORDER: ICD-10-CM

## 2023-03-02 DIAGNOSIS — Z79.899 MEDICATION MANAGEMENT: ICD-10-CM

## 2023-03-02 PROCEDURE — 99214 OFFICE O/P EST MOD 30 MIN: CPT | Performed by: NURSE PRACTITIONER

## 2023-03-02 RX ORDER — SERTRALINE HYDROCHLORIDE 100 MG/1
150 TABLET, FILM COATED ORAL DAILY
Qty: 45 TABLET | Refills: 2 | Status: SHIPPED | OUTPATIENT
Start: 2023-03-02

## 2023-03-02 RX ORDER — BUPROPION HYDROCHLORIDE 300 MG/1
300 TABLET ORAL EVERY MORNING
Qty: 30 TABLET | Refills: 2 | Status: SHIPPED | OUTPATIENT
Start: 2023-03-02

## 2023-03-02 NOTE — PROGRESS NOTES
This provider is located at the [facility] (through [facility]), [address], [place], [address] using a secure MyChart Video Visit through [employer]. Patient is being seen remotely via telehealth at their home address in [place], and stated they are in a secure environment for this session. The patient's condition being diagnosed/treated is appropriate for telemedicine. The provider identified herself as well as her credentials.   The patient, and/or patients guardian, consent to be seen remotely, and when consent is given they understand that the consent allows for patient identifiable information to be sent to a third party as needed.   They may refuse to be seen remotely at any time. The electronic data is encrypted and password protected, and the patient and/or guardian has been advised of the potential risks to privacy not withstanding such measures.        Subjective   Antoinette Pack is a 37 y.o. female who presents today for follow up    Chief Complaint:  Bipolar disorder, depression, anxiety    History of Present Illness:   History of Present Illness  Here today for follow up visit. She is taking her medication as prescribed, denies SE. Things have been going alright. She states she is trying to get pregnant.   Depression rated 7-8/10, anxiety rated 6/10, with 10 being the worst, moods rated 6/10, with 10 being the worst.   Sleep is ok, getting about 5 hours a night,she gets up couple of times to go to bathroom. Has occasional nightmares, unchanged..  Appetite is good.  Denies SI/HI/AH.  Denies thoughts of self-harm. She states she randomly sees things, but occurring less frequent. .   Chronic health issues, no acute physical or medical issues today         The following portions of the patient's history were reviewed and updated as appropriate: allergies, current medications, past family history, past medical history, past social history, past surgical  history and problem list.      Past Medical History:  Past Medical History:   Diagnosis Date   • Anxiety    • Depression    • Diabetes mellitus (HCC)    • Diverticulitis    • GERD (gastroesophageal reflux disease)    • Hypertension    • Kidney stone    • PCOS (polycystic ovarian syndrome)    • Sleep apnea        Social History:  Social History     Socioeconomic History   • Marital status:    Tobacco Use   • Smoking status: Former   • Smokeless tobacco: Never   Vaping Use   • Vaping Use: Never used   Substance and Sexual Activity   • Alcohol use: No   • Drug use: No   • Sexual activity: Defer       Family History:  Family History   Problem Relation Age of Onset   • Heart disease Mother    • COPD Mother    • Heart disease Father    • COPD Father        Past Surgical History:  Past Surgical History:   Procedure Laterality Date   • CARDIAC CATHETERIZATION     • CARPAL TUNNEL RELEASE     • COLONOSCOPY     • ENDOSCOPY     • FRACTURE SURGERY     • HARDWARE REMOVAL      WRIST   • TENDON REPAIR      HAND       Problem List:  Patient Active Problem List   Diagnosis   • Body mass index (BMI) 40.0-44.9, adult   • Simple goiter   • Type 2 diabetes mellitus with hyperglycemia, with long-term current use of insulin ([facility])   • Type 2 diabetes mellitus with hyperglycemia, with long-term current use of insulin ([facility])        Allergy:   Allergies   Allergen Reactions   • Dilantin [Phenytoin] Mental Status Change   • Keppra [Levetiracetam] Mental Status Change   • Meperidine Rash   • Topamax [Topiramate] Mental Status Change        Current Medications:   Current Outpatient Medications   Medication Sig Dispense Refill   • buPROPion XL (WELLBUTRIN XL) 300 MG 24 hr tablet Take 1 tablet by mouth Every Morning. 30 tablet 2   • sertraline (ZOLOFT) 100 MG tablet Take 1.5 tablets by mouth Daily. 45 tablet 2   • clobetasol (TEMOVATE) 0.05 % ointment As Needed.     • clomiPHENE (CLOMID) 50 MG tablet TAKE 2 TABLETS BY MOUTH DAILY ON DAYS 3  through 7 OF MENSES. 10 tablet 3   • Continuous Blood Gluc  (Dexcom G6 ) device Use to continuously monitor blood glucose 1 each 0   • Continuous Blood Gluc Sensor (Dexcom G6 Sensor) Change every 10 days 9 each 1   • Continuous Blood Gluc Transmit (Dexcom G6 Transmitter) misc Use 1 Every 3 (Three) Months. 1 each 1   • insulin aspart (NovoLOG FlexPen) 100 UNIT/ML solution pen-injector sc pen Inject 60 Units under the skin into the appropriate area as directed 3 (Three) Times a Day With Meals. 45 mL 5   • insulin degludec (Tresiba FlexTouch) 100 UNIT/ML solution pen-injector injection Inject 50 Units under the skin into the appropriate area as directed 2 (Two) Times a Day. 90 mL 1   • Insulin Lispro, 1 Unit Dial, (HumaLOG KwikPen) 100 UNIT/ML solution pen-injector Inject 60 Units under the skin into the appropriate area as directed 3 (Three) Times a Day. 60 mL 5   • Insulin Pen Needle 32G X 4 MM misc Use to inject insulin up to 4 times daily as directed 200 each 5   • NIFEdipine CC (ADALAT CC) 30 MG 24 hr tablet Take 1 tablet by mouth Daily. 30 tablet 1   • NIFEdipine CC (ADALAT CC) 30 MG 24 hr tablet Take 1 tablet by mouth Daily. 30 tablet 1   • omeprazole (priLOSEC) 40 MG capsule Take 1 capsule by mouth every day before a meal 30 capsule 5     No current facility-administered medications for this visit.       Review of Symptoms:    Review of Systems   Psychiatric/Behavioral: Positive for hallucinations, sleep disturbance, depressed mood and stress. Negative for self-injury and suicidal ideas. The patient is nervous/anxious.    All other systems reviewed and are negative.        Physical Exam:   not currently breastfeeding.  There is no height or weight on file to calculate BMI.    Appearance:  female appears stated age, no acute distress noted.    Gait, Station, Strength: Steady, posture erect, WNL      Mental Status Exam: 3/2/23  Hygiene:   good  Cooperation:  Cooperative  Eye Contact:   Good  Psychomotor Behavior:  Appropriate  Affect:  Appropriate  Mood: euthymic  Hopelessness: Denies  Speech:  Normal  Thought Process:  Linear  Thought Content:  Mood congruent  Suicidal:  None  Homicidal:  None  Hallucinations:  Visual  Delusion:  None  Memory:  Deficits  Orientation:  Person, Place, Time and Situation  Reliability:  good  Insight:  Fair  Judgement:  Fair  Impulse Control:  Fair       PHQ-Score Total:  PHQ-9 Total Score:        Lab Results:   No visits with results within 1 Month(s) from this visit.   Latest known visit with results is:   Office Visit on 12/13/2022   Component Date Value Ref Range Status   • Glucose 12/13/2022 214 (A)  70 - 130 mg/dL Final   • Hemoglobin A1C 12/13/2022 7.2  % Final   • Lot Number 12/13/2022 [id number]   Final   • Expiration Date 12/13/2022 2024-07-03   Final       Assessment & Plan   Problems Addressed this Visit    None  Visit Diagnoses     Bipolar disorder, in partial remission, most recent episode depressed (HCC)    -  Primary    Relevant Medications    sertraline (ZOLOFT) 100 MG tablet    buPROPion XL (WELLBUTRIN XL) 300 MG 24 hr tablet    Panic disorder with agoraphobia        Relevant Medications    sertraline (ZOLOFT) 100 MG tablet    buPROPion XL (WELLBUTRIN XL) 300 MG 24 hr tablet    Generalized anxiety disorder        Relevant Medications    sertraline (ZOLOFT) 100 MG tablet    buPROPion XL (WELLBUTRIN XL) 300 MG 24 hr tablet    Medication management        Relevant Medications    sertraline (ZOLOFT) 100 MG tablet    buPROPion XL (WELLBUTRIN XL) 300 MG 24 hr tablet      Diagnoses       Codes Comments    Bipolar disorder, in partial remission, most recent episode depressed (HCC)    -  Primary ICD-10-CM: F31.75  ICD-9-CM: 296.55     Panic disorder with agoraphobia     ICD-10-CM: F40.01  ICD-9-CM: 300.21     Generalized anxiety disorder     ICD-10-CM: F41.1  ICD-9-CM: 300.02     Medication management     ICD-10-CM: Z79.899  ICD-9-CM: V58.69         Social  History     Tobacco Use   Smoking Status Former   Smokeless Tobacco Never     JORGE reviewed and appropriate. Patient counseled on use of controlled substances.     -The benefits of a healthy diet and exercise were discussed with patient, especially the positive effects they have on mental health. Patient encouraged to consider lifestyle modification regarding  diet and exercise patterns to maximize results of mental health treatment.  -Reviewed previous available documentation  -Reviewed most recent available labs   -Lengthy discussion with patient on the possible side effects of antipsychotic medications including increased cholesterol, increased blood sugar, and possibility of weight gain.  Also discussed the need to monitor lab work associated with this.  The risk of muscle movement disorders with this class of medication was also discussed.  -This APRN has discussed that a very slow dose titration when starting, or changing doses, of lamotrigine may reduce the incidence of skin rash and other side effects.  The dosage should not be titrated upwards or increased faster than recommended due to the possibility of the discussed side effects and risk of development of a skin rash (which can become life threatening).  This APRN has also discussed that if the patient stops taking the lamotrigine for 5 days or longer, it will be necessary to restart the drug with an initial dose titration, as rashes have been reported on reexposure.  If the patient/guardian and Provider decide to stop the lamotrigine, the patient/guardian will follow the directions of this APRN/this office as a guided taper over about two weeks is appropriate due to the risk of relapse in bipolar disorder with those with bipolar disorder, the risk of seizures in those with epilepsy, and discontinuation symptoms upon rapid discontinuation of lamotrigine. The patient/guardian verbalizes understanding of benefits and risks as discussed, the  patient/guardian feels the benefits outweigh the risks and is agreeable to continue/take lamotrigine as discussed.  The patient/guardian is advised should any side effects or rash develops they are to stop the lamotrigine immediately and contact this APRN/this office or go to the emergency department immediately.  The patient/guardian verbalizes understanding and agreement with treatment plan in their own words.          Visit Diagnoses:    ICD-10-CM ICD-9-CM   1. Bipolar disorder, in partial remission, most recent episode depressed (HCC)  F31.75 296.55   2. Panic disorder with agoraphobia  F40.01 300.21   3. Generalized anxiety disorder  F41.1 300.02   4. Medication management  Z79.899 V58.69       TREATMENT PLAN/GOALS: Continue supportive psychotherapy efforts and medications as indicated. Treatment and medication options discussed during today's visit. Patient acknowledged and verbally consented to continue with current treatment plan and was educated on the importance of compliance with treatment and follow-up appointments.    MEDICATION ISSUES:    Discussed medication options and treatment plan of prescribed medication as well as the risks, benefits, and side effects including potential falls, possible impaired driving and metabolic adversities among others. Patient is agreeable to call the office with any worsening of symptoms or onset of side effects. Patient is agreeable to call 911 or go to the nearest ER should he/she begin having SI/HI.     MEDS ORDERED DURING VISIT:  New Medications Ordered This Visit   Medications   • sertraline (ZOLOFT) 100 MG tablet     Sig: Take 1.5 tablets by mouth Daily.     Dispense:  45 tablet     Refill:  2   • buPROPion XL (WELLBUTRIN XL) 300 MG 24 hr tablet     Sig: Take 1 tablet by mouth Every Morning.     Dispense:  30 tablet     Refill:  2       Return in about 3 months (around 6/2/2023).  -Continue bupropion  mg 24-hour tablet take 1 tablet by mouth every morning for  depression and anxiety  -Increase sertraline 100 mg tablet take 1.5 tablest daily for depression    I spent 30 minutes caring for Antoinette on this date of service. This time includes time spent by me in the following activities: preparing for the visit, obtaining and/or reviewing a separately obtained history, performing a medically appropriate examination and/or evaluation, counseling and educating the patient/family/caregiver, ordering medications, tests, or procedures and documenting information in the medical record             This document has been electronically signed by CICI Lopez  March 2, 2023 08:37 EST    Part of this note may be an electronic transcription/translation of spoken language to printed text using the Dragon Dictation System.

## 2023-03-13 ENCOUNTER — SPECIALTY PHARMACY (OUTPATIENT)
Dept: PHARMACY | Facility: HOSPITAL | Age: 38
End: 2023-03-13
Payer: MEDICARE

## 2023-03-15 ENCOUNTER — SPECIALTY PHARMACY (OUTPATIENT)
Dept: PHARMACY | Facility: HOSPITAL | Age: 38
End: 2023-03-15
Payer: MEDICARE

## 2023-03-15 ENCOUNTER — OFFICE VISIT (OUTPATIENT)
Dept: ENDOCRINOLOGY | Facility: CLINIC | Age: 38
End: 2023-03-15
Payer: COMMERCIAL

## 2023-03-15 VITALS
HEART RATE: 78 BPM | OXYGEN SATURATION: 98 % | HEIGHT: 59 IN | WEIGHT: 215.2 LBS | SYSTOLIC BLOOD PRESSURE: 114 MMHG | BODY MASS INDEX: 43.38 KG/M2 | DIASTOLIC BLOOD PRESSURE: 82 MMHG

## 2023-03-15 DIAGNOSIS — Z79.4 TYPE 2 DIABETES MELLITUS WITH HYPERGLYCEMIA, WITH LONG-TERM CURRENT USE OF INSULIN: Primary | ICD-10-CM

## 2023-03-15 DIAGNOSIS — E11.65 TYPE 2 DIABETES MELLITUS WITH HYPERGLYCEMIA, WITH LONG-TERM CURRENT USE OF INSULIN: Primary | ICD-10-CM

## 2023-03-15 LAB
EXPIRATION DATE: NORMAL
GLUCOSE BLDC GLUCOMTR-MCNC: 103 MG/DL (ref 70–130)
HBA1C MFR BLD: 6.8 %
Lab: NORMAL

## 2023-03-15 PROCEDURE — 82962 GLUCOSE BLOOD TEST: CPT | Performed by: NURSE PRACTITIONER

## 2023-03-15 PROCEDURE — 99213 OFFICE O/P EST LOW 20 MIN: CPT | Performed by: NURSE PRACTITIONER

## 2023-03-15 PROCEDURE — 83036 HEMOGLOBIN GLYCOSYLATED A1C: CPT | Performed by: NURSE PRACTITIONER

## 2023-03-15 RX ORDER — PROCHLORPERAZINE 25 MG/1
1 SUPPOSITORY RECTAL
Qty: 1 EACH | Refills: 1 | Status: SHIPPED | OUTPATIENT
Start: 2023-03-15

## 2023-03-15 RX ORDER — PROCHLORPERAZINE 25 MG/1
SUPPOSITORY RECTAL
Qty: 9 EACH | Refills: 1 | Status: SHIPPED | OUTPATIENT
Start: 2023-03-15

## 2023-03-15 RX ORDER — INSULIN DEGLUDEC INJECTION 100 U/ML
50 INJECTION, SOLUTION SUBCUTANEOUS 2 TIMES DAILY
Qty: 90 ML | Refills: 1 | Status: SHIPPED | OUTPATIENT
Start: 2023-03-15

## 2023-03-15 RX ORDER — INSULIN ASPART 100 [IU]/ML
60 INJECTION, SOLUTION INTRAVENOUS; SUBCUTANEOUS
Qty: 60 ML | Refills: 5 | Status: SHIPPED | OUTPATIENT
Start: 2023-03-15

## 2023-03-15 NOTE — PROGRESS NOTES
Specialty Pharmacy Patient Management Program  Endocrinology Reassessment     Antoinette Pack is a 37 y.o. female with Type 2 Diabetes enrolled in the Endocrinology Patient Management program offered by [facility]. A follow-up was conducted, including assessment of continued therapy appropriateness, medication adherence, and side effect incidence and management for Insulin therapy and CGM.     Patient reports taking Tresiba 50 units BID and Humalog 60 units TID with meals. She uses Dexcom to monitor her BG. She denies low BG <70. Patient denies history of pancreatitis/pancreas issues, denies history or family history of thyroid cancer, and reports issues with UTI/yeast infections.     In the past, the patient has tried:     Drug Dose Reason for Discontinuation Notes   Trulicity  GI upset    Glipizide   Unsure                   Changes to Insurance Coverage or Financial Support  None    Relevant Past Medical History and Comorbidities  Relevant medical history and concomitant health conditions were discussed with the patient. The patient's chart has been reviewed for relevant past medical history and comorbid health conditions and updated as necessary.   Past Medical History:   Diagnosis Date   • Anxiety    • Depression    • Diabetes mellitus (HCC)    • Diverticulitis    • GERD (gastroesophageal reflux disease)    • Hypertension    • Kidney stone    • PCOS (polycystic ovarian syndrome)    • Sleep apnea      Social History     Socioeconomic History   • Marital status:    Tobacco Use   • Smoking status: Former   • Smokeless tobacco: Never   Vaping Use   • Vaping Use: Never used   Substance and Sexual Activity   • Alcohol use: No   • Drug use: No   • Sexual activity: Defer       Problem list reviewed by Sravani Perez RPH on 3/15/2023 at  9:03 AM    Allergies  Known allergies and reactions were discussed with the patient. The patient's chart has been reviewed for allergy information  and updated as necessary.   Dilantin [phenytoin], Keppra [levetiracetam], Meperidine, and Topamax [topiramate]    Allergies reviewed by Sravani Perez RP on 3/15/2023 at  9:03 AM    Relevant Laboratory Values  A1C Last 3 Results    HGBA1C Last 3 Results 9/12/22 12/13/22 3/15/23   Hemoglobin A1C 8.9 7.2 6.8           Lab Results   Component Value Date    HGBA1C 6.8 03/15/2023     Lab Results   Component Value Date    GLUCOSE 224 (H) 10/24/2022    CALCIUM 8.8 10/24/2022     10/24/2022    K 4.2 10/24/2022    CO2 21.4 (L) 10/24/2022     10/24/2022    BUN 10 10/24/2022    CREATININE 0.62 10/24/2022    EGFRIFAFRI  09/05/2016      Comment:      <15 Indicative of kidney failure.    EGFRIFNONA 101 12/27/2020    BCR 16.1 10/24/2022    ANIONGAP 13.6 10/24/2022     Lab Results   Component Value Date    CHLPL 159 09/22/2015    TRIG 131 09/22/2015    HDL 38 (L) 09/22/2015    LDL 95 09/22/2015       Current Medication List  This medication list has been reviewed with the patient and evaluated for any interactions or necessary modifications/recommendations, and updated to include all prescription medications, OTC medications, and supplements the patient is currently taking.  This list reflects what is contained in the patient's profile, which has also been marked as reviewed to communicate to other providers it is the most up to date version of the patient's current medication therapy.     Current Outpatient Medications:   •  buPROPion XL (WELLBUTRIN XL) 300 MG 24 hr tablet, Take 1 tablet by mouth Every Morning., Disp: 30 tablet, Rfl: 2  •  clobetasol (TEMOVATE) 0.05 % ointment, As Needed., Disp: , Rfl:   •  clomiPHENE (CLOMID) 50 MG tablet, TAKE 2 TABLETS BY MOUTH DAILY ON DAYS 3 through 7 OF MENSES., Disp: 10 tablet, Rfl: 3  •  Continuous Blood Gluc  (Dexcom G6 ) device, Use to continuously monitor blood glucose, Disp: 1 each, Rfl: 0  •  Continuous Blood Gluc Sensor (Dexcom G6 Sensor), Change every 10  days, Disp: 9 each, Rfl: 1  •  Continuous Blood Gluc Transmit (Dexcom G6 Transmitter) misc, Use 1 Every 3 (Three) Months., Disp: 1 each, Rfl: 1  •  insulin aspart (NovoLOG FlexPen) 100 UNIT/ML solution pen-injector sc pen, Inject 60 Units under the skin into the appropriate area as directed 3 (Three) Times a Day With Meals., Disp: 60 mL, Rfl: 5  •  insulin degludec (Tresiba FlexTouch) 100 UNIT/ML solution pen-injector injection, Inject 50 Units under the skin into the appropriate area as directed 2 (Two) Times a Day., Disp: 90 mL, Rfl: 1  •  Insulin Pen Needle 32G X 4 MM misc, Use to inject insulin up to 4 times daily as directed, Disp: 400 each, Rfl: 1  •  NIFEdipine CC (ADALAT CC) 30 MG 24 hr tablet, Take 1 tablet by mouth Daily., Disp: 30 tablet, Rfl: 1  •  NIFEdipine CC (ADALAT CC) 30 MG 24 hr tablet, Take 1 tablet by mouth Daily., Disp: 30 tablet, Rfl: 1  •  omeprazole (priLOSEC) 40 MG capsule, Take 1 capsule by mouth every day before a meal, Disp: 30 capsule, Rfl: 5  •  sertraline (ZOLOFT) 100 MG tablet, Take 1 & ½ tablets by mouth Daily., Disp: 45 tablet, Rfl: 2    Medicines reviewed by Sravani Perez, [facility] on 3/15/2023 at  9:05 AM    Drug Interactions  The subcutaneous absorption of Insulin may be accelerated by Nifedipine.    Adverse Drug Reactions  • Adverse Reactions Experienced: None  • Plan for ADR Management: None required    Hospitalizations and Urgent Care Since Last Assessment  • Hospitalizations or Admissions: None  • ED Visits: None   • Urgent Office Visits: None    Adherence and Self-Administration  Adherence related patient's specialty therapy was discussed with the patient. The Adherence segment of this outreach has been reviewed and updated.   • Ongoing or New Barriers to Patient Adherence and/or Self-Administration: None   • Methods for Supporting Patient Adherence and/or Self-Administration: None Required    Goals of Therapy  Goals related to the patient's specialty therapy was discussed with  the patient. The Patient Goals segment of this outreach has been reviewed and updated.    Goals     • Consistently take medications as prescribed     • HEMOGLOBIN A1C < 7          Reassessment Plan & Follow-Up  1. Patient's diabetes has improved and is now at goal with A1C down to 6.8% from 7.2%. No pharmacologic recommendations at this time   2. Medication Therapy Changes: None today.   · Continue Novolog 60 units three times daily   · Continue Tresiba 50 units twice daily   3. Additional Plans, Therapy Recommendations, or Therapy Problems to Be Addressed:   · Will send updated prescriptions to [facility] for shipping services.    4. Pharmacist to perform regular reassessments no more than (6) months from the previous assessment.  5. Care Coordinator to set up future refill outreaches, coordinate prescription delivery, and escalate clinical questions to pharmacist.     Attestation  I attest that the specialty medication(s) addressed above are appropriate for the patient based on my reassessment.  If the prescribed therapy is at any point deemed not appropriate based on the current or future assessments, a consultation will be initiated with the patient's specialty care provider to determine the best course of action. The revised plan of therapy will be documented along with any additional patient education provided.     Sravani Perez, PharmD, [facility]  Clinical Specialty Pharmacist, Endocrinology  3/15/2023  11:02 EDT

## 2023-03-15 NOTE — PROGRESS NOTES
Chief Complaint   Patient presents with   • Diabetes        Antoinette Pack is a 37 y.o. female had concerns including Diabetes.   T2DM    Birth state: [place]  Previous history of radiation to face/neck:no  Consuming foods high in iodine: no  Family history of thyroid complications:none    The following portions of the patient's history were reviewed and updated as appropriate: allergies, current medications, past family history, past medical history, past social history, past surgical history and problem list.    History:  Diabetes was diagnosed 8-9 years ago.  Complications include none.  Last ophtho exam was 2021, Dr. Bruno Office.  Current medications for diabetes include Humalog 30-60 units TID with meals, Tresiba 50 units BID.  Past meds: Trulicity, GI Issues, has problems with yeast, Glipizide-unsure why it was stopped  She checks her blood with Freestyle Esvin CGM. She would like to start using the Esvin 3.  Hypos: rarely  Fasting range: 150-200+    Diet: has made improvements to her diet.    She has had an abnormal menstrual cycle last week and is still seeing OB trying to conceive pregnancy.    Past Medical History:   Diagnosis Date   • Anxiety    • Depression    • Diabetes mellitus (HCC)    • Diverticulitis    • GERD (gastroesophageal reflux disease)    • Hypertension    • Kidney stone    • PCOS (polycystic ovarian syndrome)    • Sleep apnea      Past Surgical History:   Procedure Laterality Date   • CARDIAC CATHETERIZATION     • CARPAL TUNNEL RELEASE     • COLONOSCOPY     • ENDOSCOPY     • FRACTURE SURGERY     • HARDWARE REMOVAL      WRIST   • TENDON REPAIR      HAND      Family History   Problem Relation Age of Onset   • Heart disease Mother    • COPD Mother    • Heart disease Father    • COPD Father       Social History     Socioeconomic History   • Marital status:    Tobacco Use   • Smoking status: Former   • Smokeless tobacco: Never   Vaping Use   • Vaping Use: Never used   Substance and  Sexual Activity   • Alcohol use: No   • Drug use: No   • Sexual activity: Defer      Allergies   Allergen Reactions   • Dilantin [Phenytoin] Mental Status Change   • Keppra [Levetiracetam] Mental Status Change   • Meperidine Rash   • Topamax [Topiramate] Mental Status Change      Current Outpatient Medications on File Prior to Visit   Medication Sig Dispense Refill   • buPROPion XL (WELLBUTRIN XL) 300 MG 24 hr tablet Take 1 tablet by mouth Every Morning. 30 tablet 2   • clobetasol (TEMOVATE) 0.05 % ointment As Needed.     • clomiPHENE (CLOMID) 50 MG tablet TAKE 2 TABLETS BY MOUTH DAILY ON DAYS 3 through 7 OF MENSES. 10 tablet 3   • Continuous Blood Gluc  (Dexcom G6 ) device Use to continuously monitor blood glucose 1 each 0   • NIFEdipine CC (ADALAT CC) 30 MG 24 hr tablet Take 1 tablet by mouth Daily. 30 tablet 1   • NIFEdipine CC (ADALAT CC) 30 MG 24 hr tablet Take 1 tablet by mouth Daily. 30 tablet 1   • omeprazole (priLOSEC) 40 MG capsule Take 1 capsule by mouth every day before a meal 30 capsule 5   • sertraline (ZOLOFT) 100 MG tablet Take 1 & ½ tablets by mouth Daily. 45 tablet 2   • [DISCONTINUED] labetalol (NORMODYNE) 100 MG tablet Take 1/2 tablet by mouth 2 times every day 30 tablet 0     No current facility-administered medications on file prior to visit.      Review of Systems   Constitutional: Positive for diaphoresis and fatigue. Negative for unexpected weight gain and unexpected weight loss.   HENT:        Neck tenderness   Eyes: Positive for blurred vision and visual disturbance.   Cardiovascular: Positive for palpitations.   Gastrointestinal: Positive for constipation.   Endocrine: Positive for heat intolerance, polydipsia, polyphagia and polyuria. Negative for cold intolerance.   Skin: Positive for dry skin.        Hair thinning   Neurological: Positive for tremors.   Psychiatric/Behavioral: Positive for agitation and sleep disturbance. The patient is nervous/anxious.    All other  "systems reviewed and are negative.     /82 (BP Location: Left arm, Patient Position: Sitting, Cuff Size: Adult)   Pulse 78   Ht 149.9 cm (59\")   Wt 97.6 kg (215 lb 3.2 oz)   SpO2 98%   BMI 43.47 kg/m²      Physical Exam  Vitals reviewed.   Constitutional:       Appearance: Normal appearance.   Eyes:      Extraocular Movements: Extraocular movements intact.   Cardiovascular:      Rate and Rhythm: Normal rate.      Pulses: Normal pulses.   Pulmonary:      Effort: Pulmonary effort is normal.   Skin:     General: Skin is warm.   Neurological:      Mental Status: She is alert and oriented to person, place, and time.   Psychiatric:         Mood and Affect: Mood normal.         Behavior: Behavior normal.         Thought Content: Thought content normal.         Judgment: Judgment normal.        Labs/Imaging  CMP  Lab Results   Component Value Date    GLUCOSE 224 (H) 10/24/2022    BUN 10 10/24/2022    CREATININE 0.62 10/24/2022    EGFRIFNONA 101 12/27/2020    EGFRIFAFRI  09/05/2016      Comment:      <15 Indicative of kidney failure.    BCR 16.1 10/24/2022    K 4.2 10/24/2022    CO2 21.4 (L) 10/24/2022    CALCIUM 8.8 10/24/2022    ALBUMIN 4.05 10/24/2022    LABIL2 1.3 (L) 02/05/2016    AST 29 10/24/2022    ALT 23 10/24/2022        CBC w/DIFF   Lab Results   Component Value Date    WBC 7.07 10/24/2022    RBC 3.80 10/24/2022    HGB 10.6 (L) 10/24/2022    HCT 29.0 (L) 10/24/2022    MCV 84.5 10/24/2022    MCH 30.0 10/24/2022    MCHC 35.5 10/24/2022    RDW 13.1 10/24/2022    RDWSD 39.9 10/24/2022    MPV 9.8 10/24/2022     10/24/2022    NEUTRORELPCT 61.7 10/24/2022    LYMPHORELPCT 29.3 10/24/2022    MONORELPCT 5.9 10/24/2022    EOSRELPCT 2.1 10/24/2022    BASORELPCT 0.3 10/24/2022    AUTOIGPER 0.7 (H) 10/24/2022    NEUTROABS 4.36 10/24/2022    LYMPHSABS 2.07 10/24/2022    MONOSABS 0.42 10/24/2022    EOSABS 0.15 10/24/2022    BASOSABS 0.02 10/24/2022    AUTOIGNUM 0.05 10/24/2022    NRBC 0.0 10/24/2022 "       TSH  Lab Results   Component Value Date    TSH 0.497 02/17/2022    TSH 0.971 09/22/2015       T4  Lab Results   Component Value Date    FREET4 0.97 02/17/2022    FREET4 1.20 09/22/2015     No results found for: C4NXOKM    T3  Lab Results   Component Value Date    T3FREE 3.43 02/17/2022     No results found for: F2TAGUN    TRAb  No results found for: TSURCPAB    TPO  No results found for: THYROIDAB    No valid procedures specified.    Assessment and Plan    Diagnoses and all orders for this visit:    1. Type 2 diabetes mellitus with hyperglycemia, with long-term current use of insulin (HCC) (Primary)  Assessment & Plan:  -Diabetes is improving with A1c down from 7.2 to 6.8.   -Discussed the dietary and exercise guidelines with patient.    -She is to continue using Freestyle Esvin CGM.  -Discussed the importance of keeping yearly eye exams.  -Continue Tresiba 50 units BID.  -Continue Novolog 30-60 units TID with meals.  -Discussed importance of following with OB.  -S/S hypoglycemia reviewed with Rule of 15's advised.  -Follow-up in 3 months.    Orders:  -     POC Glucose Fingerstick  -     POC Glycosylated Hemoglobin (Hb A1C)       Return in about 3 months (around 6/15/2023) for Follow-up appointment, A1C. The patient was instructed to contact the clinic with any interval questions or concerns.    This document has been electronically signed by CICI Veliz  March 16, 2023 08:16 EDT  Endocrinology

## 2023-03-16 NOTE — ASSESSMENT & PLAN NOTE
-Diabetes is improving with A1c down from 7.2 to 6.8.   -Discussed the dietary and exercise guidelines with patient.    -She is to continue using Freestyle Esvin CGM.  -Discussed the importance of keeping yearly eye exams.  -Continue Tresiba 50 units BID.  -Continue Novolog 30-60 units TID with meals.  -Discussed importance of following with OB.  -S/S hypoglycemia reviewed with Rule of 15's advised.  -Follow-up in 3 months.

## 2023-03-23 ENCOUNTER — TELEPHONE (OUTPATIENT)
Dept: PHARMACY | Facility: HOSPITAL | Age: 38
End: 2023-03-23
Payer: MEDICARE

## 2023-03-23 NOTE — TELEPHONE ENCOUNTER
It looks like 1 was sent in by another provider. I usually only do this if the medication I prescribe causes the yeast infection. She is not on  meds that would cause it  So I would not generally write for diflucan

## 2023-05-03 ENCOUNTER — LAB (OUTPATIENT)
Dept: LAB | Facility: HOSPITAL | Age: 38
End: 2023-05-03
Payer: MEDICARE

## 2023-05-03 ENCOUNTER — TRANSCRIBE ORDERS (OUTPATIENT)
Dept: ADMINISTRATIVE | Facility: HOSPITAL | Age: 38
End: 2023-05-03
Payer: MEDICARE

## 2023-05-03 DIAGNOSIS — I10 ESSENTIAL (PRIMARY) HYPERTENSION: ICD-10-CM

## 2023-05-03 DIAGNOSIS — I10 ESSENTIAL (PRIMARY) HYPERTENSION: Primary | ICD-10-CM

## 2023-05-03 LAB
ALBUMIN SERPL-MCNC: 3.9 G/DL (ref 3.5–5.2)
ALBUMIN/GLOB SERPL: 1.4 G/DL
ALP SERPL-CCNC: 78 U/L (ref 39–117)
ALT SERPL W P-5'-P-CCNC: 22 U/L (ref 1–33)
ANION GAP SERPL CALCULATED.3IONS-SCNC: 8.7 MMOL/L (ref 5–15)
AST SERPL-CCNC: 18 U/L (ref 1–32)
BASOPHILS # BLD AUTO: 0.02 10*3/MM3 (ref 0–0.2)
BASOPHILS NFR BLD AUTO: 0.3 % (ref 0–1.5)
BILIRUB SERPL-MCNC: 0.5 MG/DL (ref 0–1.2)
BUN SERPL-MCNC: 11 MG/DL (ref 6–20)
BUN/CREAT SERPL: 19 (ref 7–25)
CALCIUM SPEC-SCNC: 9 MG/DL (ref 8.6–10.5)
CHLORIDE SERPL-SCNC: 105 MMOL/L (ref 98–107)
CHOLEST SERPL-MCNC: 184 MG/DL (ref 0–200)
CO2 SERPL-SCNC: 24.3 MMOL/L (ref 22–29)
CREAT SERPL-MCNC: 0.58 MG/DL (ref 0.57–1)
DEPRECATED RDW RBC AUTO: 42.3 FL (ref 37–54)
EGFRCR SERPLBLD CKD-EPI 2021: 118.2 ML/MIN/1.73
EOSINOPHIL # BLD AUTO: 0.24 10*3/MM3 (ref 0–0.4)
EOSINOPHIL NFR BLD AUTO: 3.9 % (ref 0.3–6.2)
ERYTHROCYTE [DISTWIDTH] IN BLOOD BY AUTOMATED COUNT: 14.5 % (ref 12.3–15.4)
GLOBULIN UR ELPH-MCNC: 2.8 GM/DL
GLUCOSE SERPL-MCNC: 75 MG/DL (ref 65–99)
HCT VFR BLD AUTO: 34.1 % (ref 34–46.6)
HDLC SERPL-MCNC: 40 MG/DL (ref 40–60)
HGB BLD-MCNC: 11.4 G/DL (ref 12–15.9)
IMM GRANULOCYTES # BLD AUTO: 0.03 10*3/MM3 (ref 0–0.05)
IMM GRANULOCYTES NFR BLD AUTO: 0.5 % (ref 0–0.5)
LDLC SERPL CALC-MCNC: 127 MG/DL (ref 0–100)
LDLC/HDLC SERPL: 3.13 {RATIO}
LYMPHOCYTES # BLD AUTO: 2.02 10*3/MM3 (ref 0.7–3.1)
LYMPHOCYTES NFR BLD AUTO: 32.5 % (ref 19.6–45.3)
MCH RBC QN AUTO: 27.1 PG (ref 26.6–33)
MCHC RBC AUTO-ENTMCNC: 33.4 G/DL (ref 31.5–35.7)
MCV RBC AUTO: 81 FL (ref 79–97)
MONOCYTES # BLD AUTO: 0.43 10*3/MM3 (ref 0.1–0.9)
MONOCYTES NFR BLD AUTO: 6.9 % (ref 5–12)
NEUTROPHILS NFR BLD AUTO: 3.47 10*3/MM3 (ref 1.7–7)
NEUTROPHILS NFR BLD AUTO: 55.9 % (ref 42.7–76)
NRBC BLD AUTO-RTO: 0 /100 WBC (ref 0–0.2)
PLATELET # BLD AUTO: 159 10*3/MM3 (ref 140–450)
PMV BLD AUTO: 10.4 FL (ref 6–12)
POTASSIUM SERPL-SCNC: 4.2 MMOL/L (ref 3.5–5.2)
PROT SERPL-MCNC: 6.7 G/DL (ref 6–8.5)
RBC # BLD AUTO: 4.21 10*6/MM3 (ref 3.77–5.28)
SODIUM SERPL-SCNC: 138 MMOL/L (ref 136–145)
T4 FREE SERPL-MCNC: 0.94 NG/DL (ref 0.93–1.7)
TRIGL SERPL-MCNC: 95 MG/DL (ref 0–150)
TSH SERPL DL<=0.05 MIU/L-ACNC: 0.61 UIU/ML (ref 0.27–4.2)
VLDLC SERPL-MCNC: 17 MG/DL (ref 5–40)
WBC NRBC COR # BLD: 6.21 10*3/MM3 (ref 3.4–10.8)

## 2023-05-03 PROCEDURE — 84443 ASSAY THYROID STIM HORMONE: CPT

## 2023-05-03 PROCEDURE — 36415 COLL VENOUS BLD VENIPUNCTURE: CPT

## 2023-05-03 PROCEDURE — 85025 COMPLETE CBC W/AUTO DIFF WBC: CPT

## 2023-05-03 PROCEDURE — 80061 LIPID PANEL: CPT

## 2023-05-03 PROCEDURE — 80053 COMPREHEN METABOLIC PANEL: CPT

## 2023-05-03 PROCEDURE — 84439 ASSAY OF FREE THYROXINE: CPT

## 2023-05-10 ENCOUNTER — APPOINTMENT (OUTPATIENT)
Dept: GENERAL RADIOLOGY | Facility: HOSPITAL | Age: 38
End: 2023-05-10
Payer: MEDICARE

## 2023-05-10 ENCOUNTER — HOSPITAL ENCOUNTER (EMERGENCY)
Facility: HOSPITAL | Age: 38
Discharge: HOME | End: 2023-05-10
Attending: EMERGENCY MEDICINE | Admitting: EMERGENCY MEDICINE
Payer: MEDICARE

## 2023-05-10 VITALS
RESPIRATION RATE: 18 BRPM | HEIGHT: 59 IN | HEART RATE: 96 BPM | TEMPERATURE: 98.1 F | OXYGEN SATURATION: 99 % | WEIGHT: 222 LBS | BODY MASS INDEX: 44.76 KG/M2 | DIASTOLIC BLOOD PRESSURE: 85 MMHG | SYSTOLIC BLOOD PRESSURE: 143 MMHG

## 2023-05-10 DIAGNOSIS — F41.9 ANXIETY: ICD-10-CM

## 2023-05-10 DIAGNOSIS — I10 PRIMARY HYPERTENSION: ICD-10-CM

## 2023-05-10 DIAGNOSIS — E11.69 TYPE 2 DIABETES MELLITUS WITH OTHER SPECIFIED COMPLICATION, UNSPECIFIED WHETHER LONG TERM INSULIN USE: Primary | ICD-10-CM

## 2023-05-10 LAB
ALBUMIN SERPL-MCNC: 4 G/DL (ref 3.5–5.2)
ALBUMIN/GLOB SERPL: 1.3 G/DL
ALP SERPL-CCNC: 100 U/L (ref 39–117)
ALT SERPL W P-5'-P-CCNC: 19 U/L (ref 1–33)
ANION GAP SERPL CALCULATED.3IONS-SCNC: 12.6 MMOL/L (ref 5–15)
AST SERPL-CCNC: 17 U/L (ref 1–32)
BASOPHILS # BLD AUTO: 0.04 10*3/MM3 (ref 0–0.2)
BASOPHILS NFR BLD AUTO: 0.5 % (ref 0–1.5)
BILIRUB SERPL-MCNC: 0.3 MG/DL (ref 0–1.2)
BUN SERPL-MCNC: 8 MG/DL (ref 6–20)
BUN/CREAT SERPL: 14.5 (ref 7–25)
CALCIUM SPEC-SCNC: 8.9 MG/DL (ref 8.6–10.5)
CHLORIDE SERPL-SCNC: 103 MMOL/L (ref 98–107)
CO2 SERPL-SCNC: 23.4 MMOL/L (ref 22–29)
CREAT SERPL-MCNC: 0.55 MG/DL (ref 0.57–1)
DEPRECATED RDW RBC AUTO: 41.1 FL (ref 37–54)
EGFRCR SERPLBLD CKD-EPI 2021: 119.7 ML/MIN/1.73
EOSINOPHIL # BLD AUTO: 0.27 10*3/MM3 (ref 0–0.4)
EOSINOPHIL NFR BLD AUTO: 3.5 % (ref 0.3–6.2)
ERYTHROCYTE [DISTWIDTH] IN BLOOD BY AUTOMATED COUNT: 14.1 % (ref 12.3–15.4)
GLOBULIN UR ELPH-MCNC: 3.1 GM/DL
GLUCOSE SERPL-MCNC: 215 MG/DL (ref 65–99)
HCT VFR BLD AUTO: 33.8 % (ref 34–46.6)
HGB BLD-MCNC: 11.3 G/DL (ref 12–15.9)
IMM GRANULOCYTES # BLD AUTO: 0.04 10*3/MM3 (ref 0–0.05)
IMM GRANULOCYTES NFR BLD AUTO: 0.5 % (ref 0–0.5)
LYMPHOCYTES # BLD AUTO: 2.31 10*3/MM3 (ref 0.7–3.1)
LYMPHOCYTES NFR BLD AUTO: 30.2 % (ref 19.6–45.3)
MCH RBC QN AUTO: 27.1 PG (ref 26.6–33)
MCHC RBC AUTO-ENTMCNC: 33.4 G/DL (ref 31.5–35.7)
MCV RBC AUTO: 81.1 FL (ref 79–97)
MONOCYTES # BLD AUTO: 0.37 10*3/MM3 (ref 0.1–0.9)
MONOCYTES NFR BLD AUTO: 4.8 % (ref 5–12)
NEUTROPHILS NFR BLD AUTO: 4.62 10*3/MM3 (ref 1.7–7)
NEUTROPHILS NFR BLD AUTO: 60.5 % (ref 42.7–76)
NRBC BLD AUTO-RTO: 0 /100 WBC (ref 0–0.2)
PLATELET # BLD AUTO: 158 10*3/MM3 (ref 140–450)
PMV BLD AUTO: 9.9 FL (ref 6–12)
POTASSIUM SERPL-SCNC: 3.6 MMOL/L (ref 3.5–5.2)
PROT SERPL-MCNC: 7.1 G/DL (ref 6–8.5)
RBC # BLD AUTO: 4.17 10*6/MM3 (ref 3.77–5.28)
SODIUM SERPL-SCNC: 139 MMOL/L (ref 136–145)
WBC NRBC COR # BLD: 7.65 10*3/MM3 (ref 3.4–10.8)

## 2023-05-10 PROCEDURE — 25010000002 LORAZEPAM PER 2 MG: Performed by: EMERGENCY MEDICINE

## 2023-05-10 PROCEDURE — 99283 EMERGENCY DEPT VISIT LOW MDM: CPT

## 2023-05-10 PROCEDURE — 71045 X-RAY EXAM CHEST 1 VIEW: CPT

## 2023-05-10 PROCEDURE — 93005 ELECTROCARDIOGRAM TRACING: CPT | Performed by: EMERGENCY MEDICINE

## 2023-05-10 PROCEDURE — 96376 TX/PRO/DX INJ SAME DRUG ADON: CPT

## 2023-05-10 PROCEDURE — 96374 THER/PROPH/DIAG INJ IV PUSH: CPT

## 2023-05-10 PROCEDURE — 85025 COMPLETE CBC W/AUTO DIFF WBC: CPT | Performed by: EMERGENCY MEDICINE

## 2023-05-10 PROCEDURE — 80053 COMPREHEN METABOLIC PANEL: CPT | Performed by: EMERGENCY MEDICINE

## 2023-05-10 PROCEDURE — 71045 X-RAY EXAM CHEST 1 VIEW: CPT | Performed by: RADIOLOGY

## 2023-05-10 RX ORDER — HYDROXYZINE HYDROCHLORIDE 25 MG/1
25 TABLET, FILM COATED ORAL 3 TIMES DAILY PRN
Qty: 20 TABLET | Refills: 0 | Status: SHIPPED | OUTPATIENT
Start: 2023-05-10

## 2023-05-10 RX ORDER — LORAZEPAM 2 MG/ML
1 INJECTION INTRAMUSCULAR ONCE
Status: COMPLETED | OUTPATIENT
Start: 2023-05-10 | End: 2023-05-10

## 2023-05-10 RX ORDER — SODIUM CHLORIDE 0.9 % (FLUSH) 0.9 %
10 SYRINGE (ML) INJECTION AS NEEDED
Status: DISCONTINUED | OUTPATIENT
Start: 2023-05-10 | End: 2023-05-10 | Stop reason: HOSPADM

## 2023-05-10 RX ADMIN — LORAZEPAM 1 MG: 2 INJECTION INTRAMUSCULAR; INTRAVENOUS at 21:07

## 2023-05-10 RX ADMIN — LORAZEPAM 1 MG: 2 INJECTION INTRAMUSCULAR; INTRAVENOUS at 19:01

## 2023-05-11 LAB
QT INTERVAL: 328 MS
QTC INTERVAL: 439 MS

## 2023-05-11 NOTE — ED PROVIDER NOTES
Subjective   History of Present Illness  Presents to ER with shortness of breath, and anxiety    Anxiety  Presents for initial visit. Symptoms include dry mouth, feeling of choking, hyperventilation and nervous/anxious behavior.     Her past medical history is significant for anxiety/panic attacks.       Review of Systems   Constitutional: Positive for activity change and fatigue.   Eyes: Negative.    Respiratory: Negative.    Cardiovascular: Negative.    Gastrointestinal: Negative.    Endocrine: Negative.    Genitourinary: Negative.    Musculoskeletal: Negative.    Allergic/Immunologic: Negative.    Neurological: Negative.    Hematological: Negative.    Psychiatric/Behavioral: The patient is nervous/anxious.        Past Medical History:   Diagnosis Date   • Anxiety    • Depression    • Diabetes mellitus    • Diverticulitis    • GERD (gastroesophageal reflux disease)    • Hypertension    • Kidney stone    • PCOS (polycystic ovarian syndrome)    • Sleep apnea        Allergies   Allergen Reactions   • Dilantin [Phenytoin] Mental Status Change   • Keppra [Levetiracetam] Mental Status Change   • Meperidine Rash   • Topamax [Topiramate] Mental Status Change       Past Surgical History:   Procedure Laterality Date   • CARDIAC CATHETERIZATION     • CARPAL TUNNEL RELEASE     • COLONOSCOPY     • ENDOSCOPY     • FRACTURE SURGERY     • HARDWARE REMOVAL      WRIST   • TENDON REPAIR      HAND       Family History   Problem Relation Age of Onset   • Heart disease Mother    • COPD Mother    • Heart disease Father    • COPD Father        Social History     Socioeconomic History   • Marital status:    Tobacco Use   • Smoking status: Former   • Smokeless tobacco: Never   Vaping Use   • Vaping Use: Never used   Substance and Sexual Activity   • Alcohol use: No   • Drug use: No   • Sexual activity: Defer           Objective   Physical Exam  Vitals and nursing note reviewed.   HENT:      Head: Normocephalic.   Eyes:      Pupils:  Pupils are equal, round, and reactive to light.   Cardiovascular:      Rate and Rhythm: Normal rate.   Pulmonary:      Effort: Pulmonary effort is normal.   Abdominal:      Palpations: Abdomen is soft.   Musculoskeletal:         General: Normal range of motion.      Cervical back: Normal range of motion.   Skin:     General: Skin is warm.      Capillary Refill: Capillary refill takes less than 2 seconds.   Neurological:      Mental Status: She is alert.   Psychiatric:      Comments: Anxious, hyperventilating         Procedures           ED Course  ED Course as of 05/10/23 2107   Wed May 10, 2023   1821 eCG 18:20 Sinus tachycardia, rate 108. QT/qTc 328/439 [MISHEL]      ED Course User Index  [MISHEL] Morales Brice MD                                           [facility]    Final diagnoses:   Type 2 diabetes mellitus with other specified complication, unspecified whether long term insulin use   Primary hypertension   Anxiety       ED Disposition  ED Disposition     ED Disposition   Discharge    Condition   Stable    Comment   --             Shawna Lambert DO  [address] [facility] [address]  [phone]    Schedule an appointment as soon as possible for a visit   If symptoms worsen         Medication List      New Prescriptions    hydrOXYzine 25 MG tablet  Commonly known as: ATARAX  Take 1 tablet by mouth 3 (Three) Times a Day As Needed for Itching.           Where to Get Your Medications      [facility] [place] [address]    Hours: 8AM-6PM Mon-Fri Phone: [phone]   · hydrOXYzine 25 MG tablet          Morales Brice MD  05/10/23 2107

## 2023-05-24 NOTE — PROGRESS NOTES
This provider is located at the [facility] (through [facility]), [address], [facility], [address] using a secure [employer] Video Visit through [employer]. Patient is being seen remotely via telehealth at their home address in [place], and stated they are in a secure environment for this session. The patient's condition being diagnosed/treated is appropriate for telemedicine. The provider identified herself as well as her credentials.   The patient, and/or patients guardian, consent to be seen remotely, and when consent is given they understand that the consent allows for patient identifiable information to be sent to a third party as needed.   They may refuse to be seen remotely at any time. The electronic data is encrypted and password protected, and the patient and/or guardian has been advised of the potential risks to privacy not withstanding such measures.    Unable to complete visit using a video connection to the patient. A phone visit was used to complete this visits. Total time of discussion was 10 minutes.      Subjective   Antoinette Pack is a 38 y.o. female who presents today for follow up    Chief Complaint:  Bipolar disorder, depression, anxiety    History of Present Illness:   History of Present Illness  Today is a follow-up visit.     Medication compliance: patient is compliant with medications, denies SE.  Depression rated 7/10,   anxiety rated 9/10, with 10 being the worst.  Sleep is good, getting about 6 hours a night,    Appetite is good.  Hallucinations: Patient has visual hallucinations and denies auditory hallucinations  Self-harm: Patient denies thoughts of self-harm.  Alcohol use: no  Drug use: no  Marijuana use: no     Chronic health issues, no acute physical or medical issues today.    Details:  Things have been going ok. She states she sees things, a girl in a dress, she knows that it isn't real. She has occurs more often when she is under more  stress. She states she is trying to get pregnant, she has period every month, last period was last Wednesday, normal for her.         The following portions of the patient's history were reviewed and updated as appropriate: allergies, current medications, past family history, past medical history, past social history, past surgical history and problem list.      Past Medical History:  Past Medical History:   Diagnosis Date   • Anxiety    • Depression    • Diabetes mellitus    • Diverticulitis    • GERD (gastroesophageal reflux disease)    • Hypertension    • Kidney stone    • PCOS (polycystic ovarian syndrome)    • Sleep apnea        Social History:  Social History     Socioeconomic History   • Marital status:    Tobacco Use   • Smoking status: Former   • Smokeless tobacco: Never   Vaping Use   • Vaping Use: Never used   Substance and Sexual Activity   • Alcohol use: No   • Drug use: No   • Sexual activity: Defer       Family History:  Family History   Problem Relation Age of Onset   • Heart disease Mother    • COPD Mother    • Heart disease Father    • COPD Father        Past Surgical History:  Past Surgical History:   Procedure Laterality Date   • CARDIAC CATHETERIZATION     • CARPAL TUNNEL RELEASE     • COLONOSCOPY     • ENDOSCOPY     • FRACTURE SURGERY     • HARDWARE REMOVAL      WRIST   • TENDON REPAIR      HAND       Problem List:  Patient Active Problem List   Diagnosis   • Body mass index (BMI) 40.0-44.9, adult   • Simple goiter   • Type 2 diabetes mellitus with hyperglycemia, with long-term current use of insulin   • Type 2 diabetes mellitus with hyperglycemia, with long-term current use of insulin        Allergy:   Allergies   Allergen Reactions   • Dilantin [Phenytoin] Mental Status Change   • Keppra [Levetiracetam] Mental Status Change   • Meperidine Rash   • Topamax [Topiramate] Mental Status Change        Current Medications:   Current Outpatient Medications   Medication Sig Dispense Refill   •  buPROPion XL (WELLBUTRIN XL) 300 MG 24 hr tablet Take 1 tablet by mouth Every Morning. 30 tablet 2   • sertraline (ZOLOFT) 100 MG tablet Take 1 & ½ tablets by mouth Daily. 45 tablet 2   • amoxicillin-clavulanate (AUGMENTIN) 875-125 MG per tablet Take 1 tablet by mouth Every 12 (Twelve) Hours. 20 tablet 0   • clobetasol (TEMOVATE) 0.05 % ointment As Needed.     • clomiPHENE (CLOMID) 50 MG tablet TAKE 2 TABLETS BY MOUTH DAILY ON DAYS 3 through 7 OF MENSES. 10 tablet 3   • Continuous Blood Gluc  (Dexcom G6 ) device Use to continuously monitor blood glucose 1 each 0   • Continuous Blood Gluc Sensor (Dexcom G6 Sensor) Change every 10 days 9 each 1   • Continuous Blood Gluc Transmit (Dexcom G6 Transmitter) misc Use 1 Every 3 (Three) Months. 1 each 1   • fluconazole (Diflucan) 150 MG tablet Take 1 tablet by mouth once 1 tablet 0   • hydrOXYzine (ATARAX) 25 MG tablet Take 1 tablet by mouth 3 (Three) Times a Day As Needed for Itching. 20 tablet 0   • insulin aspart (NovoLOG FlexPen) 100 UNIT/ML solution pen-injector sc pen Inject 60 Units under the skin into the appropriate area as directed 3 (Three) Times a Day With Meals. 60 mL 5   • insulin degludec (Tresiba FlexTouch) 100 UNIT/ML solution pen-injector injection Inject 50 Units under the skin into the appropriate area as directed 2 (Two) Times a Day. 90 mL 1   • Insulin Pen Needle 32G X 4 MM misc Use to inject insulin up to 4 times daily as directed 400 each 1   • NIFEdipine CC (ADALAT CC) 30 MG 24 hr tablet Take 1 tablet by mouth Daily. 30 tablet 1   • NIFEdipine CC (ADALAT CC) 30 MG 24 hr tablet Take 1 tablet by mouth Daily. 30 tablet 1   • NIFEdipine CC (ADALAT CC) 30 MG 24 hr tablet Take 1 tablet by mouth Daily. 30 tablet 1   • ofloxacin (FLOXIN) 0.3 % otic solution Instill 2 drops into affected ear(s) 2 times every day as directed by doctor. 10 mL 0   • omeprazole (priLOSEC) 40 MG capsule Take 1 capsule by mouth every day before a meal 30 capsule 5      No current facility-administered medications for this visit.       Review of Symptoms:    Review of Systems   Psychiatric/Behavioral: Positive for dysphoric mood, hallucinations, sleep disturbance, depressed mood and stress. Negative for self-injury and suicidal ideas. The patient is nervous/anxious.    All other systems reviewed and are negative.        Physical Exam:   not currently breastfeeding.  There is no height or weight on file to calculate BMI.    Appearance:  female appears stated age, no acute distress noted.    Gait, Station, Strength: Steady, posture erect, WNL      Mental Status Exam: 5/31/23  Hygiene:   unable to assess  Cooperation:  Cooperative  Eye Contact:  unable to assess  Psychomotor Behavior:  unable to assess  Affect:  unable to assess  Mood: euthymic  Hopelessness: Denies  Speech:  Normal  Thought Process:  Linear  Thought Content:  Mood congruent  Suicidal:  None  Homicidal:  None  Hallucinations:  Visual  Delusion:  None  Memory:  Deficits  Orientation:  Person, Place, Time and Situation  Reliability:  good  Insight:  Fair  Judgement:  Fair  Impulse Control:  Fair       PHQ-Score Total:  PHQ-9 Total Score:        Lab Results:   Admission on 05/10/2023, Discharged on 05/10/2023   Component Date Value Ref Range Status   • Glucose 05/10/2023 215 (H)  65 - 99 mg/dL Final   • BUN 05/10/2023 8  6 - 20 mg/dL Final   • Creatinine 05/10/2023 0.55 (L)  0.57 - 1.00 mg/dL Final   • Sodium 05/10/2023 139  136 - 145 mmol/L Final   • Potassium 05/10/2023 3.6  3.5 - 5.2 mmol/L Final   • Chloride 05/10/2023 103  98 - 107 mmol/L Final   • CO2 05/10/2023 23.4  22.0 - 29.0 mmol/L Final   • Calcium 05/10/2023 8.9  8.6 - 10.5 mg/dL Final   • Total Protein 05/10/2023 7.1  6.0 - 8.5 g/dL Final   • Albumin 05/10/2023 4.0  3.5 - 5.2 g/dL Final   • ALT (SGPT) 05/10/2023 19  1 - 33 U/L Final   • AST (SGOT) 05/10/2023 17  1 - 32 U/L Final   • Alkaline Phosphatase 05/10/2023 100  39 - 117 U/L Final   •  Total Bilirubin 05/10/2023 0.3  0.0 - 1.2 mg/dL Final   • Globulin 05/10/2023 3.1  gm/dL Final   • A/G Ratio 05/10/2023 1.3  g/dL Final   • BUN/Creatinine Ratio 05/10/2023 14.5  7.0 - 25.0 Final   • Anion Gap 05/10/2023 12.6  5.0 - 15.0 mmol/L Final   • eGFR 05/10/2023 119.7  >60.0 mL/min/1.73 Final   • WBC 05/10/2023 7.65  3.40 - 10.80 10*3/mm3 Final   • RBC 05/10/2023 4.17  3.77 - 5.28 10*6/mm3 Final   • Hemoglobin 05/10/2023 11.3 (L)  12.0 - 15.9 g/dL Final   • Hematocrit 05/10/2023 33.8 (L)  34.0 - 46.6 % Final   • MCV 05/10/2023 81.1  79.0 - 97.0 fL Final   • MCH 05/10/2023 27.1  26.6 - 33.0 pg Final   • MCHC 05/10/2023 33.4  31.5 - 35.7 g/dL Final   • RDW 05/10/2023 14.1  12.3 - 15.4 % Final   • RDW-SD 05/10/2023 41.1  37.0 - 54.0 fl Final   • MPV 05/10/2023 9.9  6.0 - 12.0 fL Final   • Platelets 05/10/2023 158  140 - 450 10*3/mm3 Final   • Neutrophil % 05/10/2023 60.5  42.7 - 76.0 % Final   • Lymphocyte % 05/10/2023 30.2  19.6 - 45.3 % Final   • Monocyte % 05/10/2023 4.8 (L)  5.0 - 12.0 % Final   • Eosinophil % 05/10/2023 3.5  0.3 - 6.2 % Final   • Basophil % 05/10/2023 0.5  0.0 - 1.5 % Final   • Immature Grans % 05/10/2023 0.5  0.0 - 0.5 % Final   • Neutrophils, Absolute 05/10/2023 4.62  1.70 - 7.00 10*3/mm3 Final   • Lymphocytes, Absolute 05/10/2023 2.31  0.70 - 3.10 10*3/mm3 Final   • Monocytes, Absolute 05/10/2023 0.37  0.10 - 0.90 10*3/mm3 Final   • Eosinophils, Absolute 05/10/2023 0.27  0.00 - 0.40 10*3/mm3 Final   • Basophils, Absolute 05/10/2023 0.04  0.00 - 0.20 10*3/mm3 Final   • Immature Grans, Absolute 05/10/2023 0.04  0.00 - 0.05 10*3/mm3 Final   • nRBC 05/10/2023 0.0  0.0 - 0.2 /100 WBC Final   • QT Interval 05/10/2023 328  ms Final   • QTC Interval 05/10/2023 439  ms Final   Lab on 05/03/2023   Component Date Value Ref Range Status   • Glucose 05/03/2023 75  65 - 99 mg/dL Final   • BUN 05/03/2023 11  6 - 20 mg/dL Final   • Creatinine 05/03/2023 0.58  0.57 - 1.00 mg/dL Final   • Sodium  05/03/2023 138  136 - 145 mmol/L Final   • Potassium 05/03/2023 4.2  3.5 - 5.2 mmol/L Final   • Chloride 05/03/2023 105  98 - 107 mmol/L Final   • CO2 05/03/2023 24.3  22.0 - 29.0 mmol/L Final   • Calcium 05/03/2023 9.0  8.6 - 10.5 mg/dL Final   • Total Protein 05/03/2023 6.7  6.0 - 8.5 g/dL Final   • Albumin 05/03/2023 3.9  3.5 - 5.2 g/dL Final   • ALT (SGPT) 05/03/2023 22  1 - 33 U/L Final   • AST (SGOT) 05/03/2023 18  1 - 32 U/L Final   • Alkaline Phosphatase 05/03/2023 78  39 - 117 U/L Final   • Total Bilirubin 05/03/2023 0.5  0.0 - 1.2 mg/dL Final   • Globulin 05/03/2023 2.8  gm/dL Final   • A/G Ratio 05/03/2023 1.4  g/dL Final   • BUN/Creatinine Ratio 05/03/2023 19.0  7.0 - 25.0 Final   • Anion Gap 05/03/2023 8.7  5.0 - 15.0 mmol/L Final   • eGFR 05/03/2023 118.2  >60.0 mL/min/1.73 Final   • Total Cholesterol 05/03/2023 184  0 - 200 mg/dL Final   • Triglycerides 05/03/2023 95  0 - 150 mg/dL Final   • HDL Cholesterol 05/03/2023 40  40 - 60 mg/dL Final   • LDL Cholesterol  05/03/2023 127 (H)  0 - 100 mg/dL Final   • VLDL Cholesterol 05/03/2023 17  5 - 40 mg/dL Final   • LDL/HDL Ratio 05/03/2023 3.13   Final   • TSH 05/03/2023 0.606  0.270 - 4.200 uIU/mL Final   • Free T4 05/03/2023 0.94  0.93 - 1.70 ng/dL Final   • WBC 05/03/2023 6.21  3.40 - 10.80 10*3/mm3 Final   • RBC 05/03/2023 4.21  3.77 - 5.28 10*6/mm3 Final   • Hemoglobin 05/03/2023 11.4 (L)  12.0 - 15.9 g/dL Final   • Hematocrit 05/03/2023 34.1  34.0 - 46.6 % Final   • MCV 05/03/2023 81.0  79.0 - 97.0 fL Final   • MCH 05/03/2023 27.1  26.6 - 33.0 pg Final   • MCHC 05/03/2023 33.4  31.5 - 35.7 g/dL Final   • RDW 05/03/2023 14.5  12.3 - 15.4 % Final   • RDW-SD 05/03/2023 42.3  37.0 - 54.0 fl Final   • MPV 05/03/2023 10.4  6.0 - 12.0 fL Final   • Platelets 05/03/2023 159  140 - 450 10*3/mm3 Final   • Neutrophil % 05/03/2023 55.9  42.7 - 76.0 % Final   • Lymphocyte % 05/03/2023 32.5  19.6 - 45.3 % Final   • Monocyte % 05/03/2023 6.9  5.0 - 12.0 % Final   •  Eosinophil % 05/03/2023 3.9  0.3 - 6.2 % Final   • Basophil % 05/03/2023 0.3  0.0 - 1.5 % Final   • Immature Grans % 05/03/2023 0.5  0.0 - 0.5 % Final   • Neutrophils, Absolute 05/03/2023 3.47  1.70 - 7.00 10*3/mm3 Final   • Lymphocytes, Absolute 05/03/2023 2.02  0.70 - 3.10 10*3/mm3 Final   • Monocytes, Absolute 05/03/2023 0.43  0.10 - 0.90 10*3/mm3 Final   • Eosinophils, Absolute 05/03/2023 0.24  0.00 - 0.40 10*3/mm3 Final   • Basophils, Absolute 05/03/2023 0.02  0.00 - 0.20 10*3/mm3 Final   • Immature Grans, Absolute 05/03/2023 0.03  0.00 - 0.05 10*3/mm3 Final   • nRBC 05/03/2023 0.0  0.0 - 0.2 /100 WBC Final       Assessment & Plan   Problems Addressed this Visit    None  Visit Diagnoses     Bipolar disorder, in partial remission, most recent episode depressed    -  Primary    Relevant Medications    sertraline (ZOLOFT) 100 MG tablet    buPROPion XL (WELLBUTRIN XL) 300 MG 24 hr tablet    Panic disorder with agoraphobia        Relevant Medications    sertraline (ZOLOFT) 100 MG tablet    buPROPion XL (WELLBUTRIN XL) 300 MG 24 hr tablet    Generalized anxiety disorder        Relevant Medications    sertraline (ZOLOFT) 100 MG tablet    buPROPion XL (WELLBUTRIN XL) 300 MG 24 hr tablet    Medication management        Relevant Medications    sertraline (ZOLOFT) 100 MG tablet    buPROPion XL (WELLBUTRIN XL) 300 MG 24 hr tablet      Diagnoses       Codes Comments    Bipolar disorder, in partial remission, most recent episode depressed    -  Primary ICD-10-CM: F31.75  ICD-9-CM: 296.55     Panic disorder with agoraphobia     ICD-10-CM: F40.01  ICD-9-CM: 300.21     Generalized anxiety disorder     ICD-10-CM: F41.1  ICD-9-CM: 300.02     Medication management     ICD-10-CM: Z79.899  ICD-9-CM: V58.69         Social History     Tobacco Use   Smoking Status Former   Smokeless Tobacco Never     JORGE reviewed and appropriate. Patient counseled on use of controlled substances.     -The benefits of a healthy diet and exercise were  discussed with patient, especially the positive effects they have on mental health. Patient encouraged to consider lifestyle modification regarding  diet and exercise patterns to maximize results of mental health treatment.  -Reviewed previous available documentation  -Reviewed most recent available labs   -Lengthy discussion with patient on the possible side effects of antipsychotic medications including increased cholesterol, increased blood sugar, and possibility of weight gain.  Also discussed the need to monitor lab work associated with this.  The risk of muscle movement disorders with this class of medication was also discussed.  -This APRN has discussed that a very slow dose titration when starting, or changing doses, of lamotrigine may reduce the incidence of skin rash and other side effects.  The dosage should not be titrated upwards or increased faster than recommended due to the possibility of the discussed side effects and risk of development of a skin rash (which can become life threatening).  This APRN has also discussed that if the patient stops taking the lamotrigine for 5 days or longer, it will be necessary to restart the drug with an initial dose titration, as rashes have been reported on reexposure.  If the patient/guardian and Provider decide to stop the lamotrigine, the patient/guardian will follow the directions of this APRN/this office as a guided taper over about two weeks is appropriate due to the risk of relapse in bipolar disorder with those with bipolar disorder, the risk of seizures in those with epilepsy, and discontinuation symptoms upon rapid discontinuation of lamotrigine. The patient/guardian verbalizes understanding of benefits and risks as discussed, the patient/guardian feels the benefits outweigh the risks and is agreeable to continue/take lamotrigine as discussed.  The patient/guardian is advised should any side effects or rash develops they are to stop the lamotrigine  immediately and contact this APRN/this office or go to the emergency department immediately.  The patient/guardian verbalizes understanding and agreement with treatment plan in their own words.          Visit Diagnoses:    ICD-10-CM ICD-9-CM   1. Bipolar disorder, in partial remission, most recent episode depressed  F31.75 296.55   2. Panic disorder with agoraphobia  F40.01 300.21   3. Generalized anxiety disorder  F41.1 300.02   4. Medication management  Z79.899 V58.69       TREATMENT PLAN/GOALS: Continue supportive psychotherapy efforts and medications as indicated. Treatment and medication options discussed during today's visit. Patient acknowledged and verbally consented to continue with current treatment plan and was educated on the importance of compliance with treatment and follow-up appointments.    MEDICATION ISSUES:    Discussed medication options and treatment plan of prescribed medication as well as the risks, benefits, and side effects including potential falls, possible impaired driving and metabolic adversities among others. Patient is agreeable to call the office with any worsening of symptoms or onset of side effects. Patient is agreeable to call 911 or go to the nearest ER should he/she begin having SI/HI.     MEDS ORDERED DURING VISIT:  New Medications Ordered This Visit   Medications   • sertraline (ZOLOFT) 100 MG tablet     Sig: Take 1 & ½ tablets by mouth Daily.     Dispense:  45 tablet     Refill:  2   • buPROPion XL (WELLBUTRIN XL) 300 MG 24 hr tablet     Sig: Take 1 tablet by mouth Every Morning.     Dispense:  30 tablet     Refill:  2       Return in about 3 months (around 8/31/2023).  -Continue bupropion  mg 24-hour tablet take 1 tablet by mouth every morning for depression and anxiety  -Continue sertraline 100 mg tablet take 1.5 tablest daily for depression    I spent 30 minutes caring for Antoinette on this date of service. This time includes time spent by me in the following  activities: preparing for the visit, obtaining and/or reviewing a separately obtained history, performing a medically appropriate examination and/or evaluation, counseling and educating the patient/family/caregiver, ordering medications, tests, or procedures and documenting information in the medical record             This document has been electronically signed by CICI Lopez  May 31, 2023 09:02 EDT    Part of this note may be an electronic transcription/translation of spoken language to printed text using the Dragon Dictation System.

## 2023-05-31 ENCOUNTER — TELEMEDICINE (OUTPATIENT)
Dept: PSYCHIATRY | Facility: CLINIC | Age: 38
End: 2023-05-31

## 2023-05-31 DIAGNOSIS — F40.01 PANIC DISORDER WITH AGORAPHOBIA: ICD-10-CM

## 2023-05-31 DIAGNOSIS — F31.75 BIPOLAR DISORDER, IN PARTIAL REMISSION, MOST RECENT EPISODE DEPRESSED: Primary | ICD-10-CM

## 2023-05-31 DIAGNOSIS — F41.1 GENERALIZED ANXIETY DISORDER: ICD-10-CM

## 2023-05-31 DIAGNOSIS — Z79.899 MEDICATION MANAGEMENT: ICD-10-CM

## 2023-05-31 RX ORDER — SERTRALINE HYDROCHLORIDE 100 MG/1
150 TABLET, FILM COATED ORAL DAILY
Qty: 45 TABLET | Refills: 2 | Status: SHIPPED | OUTPATIENT
Start: 2023-05-31

## 2023-05-31 RX ORDER — BUPROPION HYDROCHLORIDE 300 MG/1
300 TABLET ORAL EVERY MORNING
Qty: 30 TABLET | Refills: 2 | Status: SHIPPED | OUTPATIENT
Start: 2023-05-31

## 2023-06-06 ENCOUNTER — LAB (OUTPATIENT)
Dept: LAB | Facility: HOSPITAL | Age: 38
End: 2023-06-06
Payer: MEDICARE

## 2023-06-06 ENCOUNTER — TRANSCRIBE ORDERS (OUTPATIENT)
Dept: ADMINISTRATIVE | Facility: HOSPITAL | Age: 38
End: 2023-06-06
Payer: MEDICARE

## 2023-06-06 DIAGNOSIS — R60.9 EDEMA, UNSPECIFIED TYPE: ICD-10-CM

## 2023-06-06 DIAGNOSIS — R60.9 EDEMA, UNSPECIFIED TYPE: Primary | ICD-10-CM

## 2023-06-06 DIAGNOSIS — I10 ESSENTIAL (PRIMARY) HYPERTENSION: ICD-10-CM

## 2023-06-06 LAB
ANION GAP SERPL CALCULATED.3IONS-SCNC: 11 MMOL/L (ref 5–15)
BUN SERPL-MCNC: 11 MG/DL (ref 6–20)
BUN/CREAT SERPL: 18 (ref 7–25)
CALCIUM SPEC-SCNC: 8.6 MG/DL (ref 8.6–10.5)
CHLORIDE SERPL-SCNC: 103 MMOL/L (ref 98–107)
CO2 SERPL-SCNC: 24 MMOL/L (ref 22–29)
CREAT SERPL-MCNC: 0.61 MG/DL (ref 0.57–1)
EGFRCR SERPLBLD CKD-EPI 2021: 116.1 ML/MIN/1.73
GLUCOSE SERPL-MCNC: 114 MG/DL (ref 65–99)
POTASSIUM SERPL-SCNC: 4.2 MMOL/L (ref 3.5–5.2)
SODIUM SERPL-SCNC: 138 MMOL/L (ref 136–145)

## 2023-06-06 PROCEDURE — 80048 BASIC METABOLIC PNL TOTAL CA: CPT

## 2023-06-06 PROCEDURE — 36415 COLL VENOUS BLD VENIPUNCTURE: CPT

## 2023-07-20 ENCOUNTER — OFFICE VISIT (OUTPATIENT)
Dept: ENDOCRINOLOGY | Facility: CLINIC | Age: 38
End: 2023-07-20
Payer: COMMERCIAL

## 2023-07-20 VITALS
WEIGHT: 203.2 LBS | BODY MASS INDEX: 40.96 KG/M2 | HEART RATE: 86 BPM | SYSTOLIC BLOOD PRESSURE: 112 MMHG | DIASTOLIC BLOOD PRESSURE: 68 MMHG | OXYGEN SATURATION: 97 % | HEIGHT: 59 IN

## 2023-07-20 DIAGNOSIS — Z79.4 TYPE 2 DIABETES MELLITUS WITH HYPERGLYCEMIA, WITH LONG-TERM CURRENT USE OF INSULIN: Primary | ICD-10-CM

## 2023-07-20 DIAGNOSIS — E11.65 TYPE 2 DIABETES MELLITUS WITH HYPERGLYCEMIA, WITH LONG-TERM CURRENT USE OF INSULIN: Primary | ICD-10-CM

## 2023-07-20 LAB
EXPIRATION DATE: NORMAL
GLUCOSE BLDC GLUCOMTR-MCNC: 271 MG/DL (ref 70–130)
HBA1C MFR BLD: 7.1 %
Lab: NORMAL

## 2023-07-20 PROCEDURE — 3051F HG A1C>EQUAL 7.0%<8.0%: CPT | Performed by: NURSE PRACTITIONER

## 2023-07-20 PROCEDURE — 1159F MED LIST DOCD IN RCRD: CPT | Performed by: NURSE PRACTITIONER

## 2023-07-20 PROCEDURE — 1160F RVW MEDS BY RX/DR IN RCRD: CPT | Performed by: NURSE PRACTITIONER

## 2023-07-20 PROCEDURE — 99213 OFFICE O/P EST LOW 20 MIN: CPT | Performed by: NURSE PRACTITIONER

## 2023-07-20 PROCEDURE — 82947 ASSAY GLUCOSE BLOOD QUANT: CPT | Performed by: NURSE PRACTITIONER

## 2023-07-20 PROCEDURE — 83036 HEMOGLOBIN GLYCOSYLATED A1C: CPT | Performed by: NURSE PRACTITIONER

## 2023-07-20 NOTE — PROGRESS NOTES
Chief Complaint   Patient presents with    Type 2 diabetes mellitus with hyperglycemia, with long-term        Antoinettejessenia Pack is a 38 y.o. female had concerns including Type 2 diabetes mellitus with hyperglycemia, with long-term.   T2DM    She has been having increased GI issues and is going to have     Birth state: [place]  Previous history of radiation to face/neck:no  Consuming foods high in iodine: no  Family history of thyroid complications:none    The following portions of the patient's history were reviewed and updated as appropriate: allergies, current medications, past family history, past medical history, past social history, past surgical history and problem list.    History:  Diabetes was diagnosed 8-9 years ago.  Complications include none.  Last ophtho exam was 2021, Dr. Bruno Office.  Current medications for diabetes include Humalog 30-60 units TID with meals, Tresiba 50 units BID.  Past meds: Trulicity, GI Issues, has problems with yeast, Glipizide-unsure why it was stopped  She checks her blood with Freestyle Esvin CGM. She would like to start using the Esvin 3.  Hypos: rarely  Fasting range: 150-200+    Diet: has made improvements to her diet.    She has had an abnormal menstrual cycle last week and is still seeing OB trying to conceive pregnancy.    Past Medical History:   Diagnosis Date    Anxiety     Depression     Diabetes mellitus     Diverticulitis     GERD (gastroesophageal reflux disease)     Hypertension     Kidney stone     PCOS (polycystic ovarian syndrome)     Sleep apnea      Past Surgical History:   Procedure Laterality Date    CARDIAC CATHETERIZATION      CARPAL TUNNEL RELEASE      COLONOSCOPY      ENDOSCOPY      FRACTURE SURGERY      HARDWARE REMOVAL      WRIST    TENDON REPAIR      HAND      Family History   Problem Relation Age of Onset    Heart disease Mother     COPD Mother     Heart disease Father     COPD Father       Social History     Socioeconomic History    Marital status:     Tobacco Use    Smoking status: Former    Smokeless tobacco: Never   Vaping Use    Vaping Use: Never used   Substance and Sexual Activity    Alcohol use: No    Drug use: No    Sexual activity: Defer      Allergies   Allergen Reactions    Dilantin [Phenytoin] Mental Status Change    Keppra [Levetiracetam] Mental Status Change    Meperidine Rash    Topamax [Topiramate] Mental Status Change      Current Outpatient Medications on File Prior to Visit   Medication Sig Dispense Refill    buPROPion XL (WELLBUTRIN XL) 300 MG 24 hr tablet Take 1 tablet by mouth Every Morning. 30 tablet 2    Continuous Blood Gluc  (Dexcom G6 ) device Use to continuously monitor blood glucose 1 each 0    Continuous Blood Gluc Sensor (Dexcom G6 Sensor) Change every 10 days 9 each 1    Continuous Blood Gluc Transmit (Dexcom G6 Transmitter) misc Use 1 Every 3 (Three) Months. 1 each 1    Hydrocort-Pramoxine, Perianal, (Proctofoam HC) 1-1 % rectal foam Insert 1 application into the rectum 2 (Two) Times a Day. (Patient not taking: Reported on 7/20/2023) 10 g 0    insulin aspart (NovoLOG FlexPen) 100 UNIT/ML solution pen-injector sc pen Inject 60 Units under the skin into the appropriate area as directed 3 (Three) Times a Day With Meals. 60 mL 5    insulin degludec (Tresiba FlexTouch) 100 UNIT/ML solution pen-injector injection Inject 50 Units under the skin into the appropriate area as directed 2 (Two) Times a Day. 90 mL 1    Insulin Pen Needle 32G X 4 MM misc Use to inject insulin up to 4 times daily as directed 400 each 1    mesalamine (CANASA) 1000 MG suppository Insert 1 suppository into the rectum Every Night.      norgestimate-ethinyl estradiol (Ortho Tri-Cyclen, 28,) 0.18/0.215/0.25 MG-35 MCG per tablet Take 1 tablet by mouth daily 28 tablet 11    ondansetron ODT (ZOFRAN-ODT) 4 MG disintegrating tablet Place 1 tablet on the tongue Every 6 (Six) Hours As Needed for Nausea or Vomiting. 12 tablet 0    sertraline (ZOLOFT)  "100 MG tablet Take 1 & ½ tablets by mouth Daily. 45 tablet 2    [DISCONTINUED] labetalol (NORMODYNE) 100 MG tablet Take 1/2 tablet by mouth 2 times every day 30 tablet 0    [DISCONTINUED] metroNIDAZOLE (FLAGYL) 500 MG tablet Take 1 tablet by mouth 4 (Four) Times a Day for 10 days. 40 tablet 0    [DISCONTINUED] NIFEdipine CC (ADALAT CC) 30 MG 24 hr tablet Take 1 tablet by mouth Daily. 30 tablet 1    [DISCONTINUED] NIFEdipine CC (ADALAT CC) 30 MG 24 hr tablet Take 1 tablet by mouth Daily. 30 tablet 1    [DISCONTINUED] NIFEdipine CC (ADALAT CC) 30 MG 24 hr tablet Take 1 tablet by mouth Daily. 30 tablet 1    [DISCONTINUED] omeprazole (priLOSEC) 40 MG capsule Take 1 capsule by mouth every day before a meal 30 capsule 5    [DISCONTINUED] omeprazole (priLOSEC) 40 MG capsule Take 1 capsule by mouth Daily before a meal 90 capsule 0     No current facility-administered medications on file prior to visit.      Review of Systems   Constitutional:  Positive for diaphoresis and fatigue. Negative for unexpected weight gain and unexpected weight loss.   HENT:          Neck tenderness   Eyes:  Positive for blurred vision and visual disturbance.   Cardiovascular:  Positive for palpitations.   Gastrointestinal:  Positive for constipation.   Endocrine: Positive for heat intolerance, polydipsia, polyphagia and polyuria. Negative for cold intolerance.   Skin:  Positive for dry skin.        Hair thinning   Neurological:  Positive for tremors.   Psychiatric/Behavioral:  Positive for agitation and sleep disturbance. The patient is nervous/anxious.    All other systems reviewed and are negative.   /68 (BP Location: Right arm, Patient Position: Sitting, Cuff Size: Adult)   Pulse 86   Ht 149.9 cm (59\")   Wt 92.2 kg (203 lb 3.2 oz)   SpO2 97%   BMI 41.04 kg/m²      Physical Exam  Vitals reviewed.   Constitutional:       Appearance: Normal appearance.   Eyes:      Extraocular Movements: Extraocular movements intact.   Cardiovascular: "      Rate and Rhythm: Normal rate.      Pulses: Normal pulses.   Pulmonary:      Effort: Pulmonary effort is normal.   Skin:     General: Skin is warm.   Neurological:      Mental Status: She is alert and oriented to person, place, and time.   Psychiatric:         Mood and Affect: Mood normal.         Behavior: Behavior normal.         Thought Content: Thought content normal.         Judgment: Judgment normal.      Labs/Imaging  CMP  Lab Results   Component Value Date    GLUCOSE 119 (H) 07/01/2023    BUN 12 07/01/2023    CREATININE 0.64 07/01/2023    EGFRIFNONA 101 12/27/2020    EGFRIFAFRI  09/05/2016      Comment:      <15 Indicative of kidney failure.    BCR 18.8 07/01/2023    K 4.3 07/01/2023    CO2 23.2 07/01/2023    CALCIUM 9.0 07/01/2023    ALBUMIN 3.6 07/01/2023    LABIL2 1.3 (L) 02/05/2016    AST 13 07/01/2023    ALT 13 07/01/2023        CBC w/DIFF   Lab Results   Component Value Date    WBC 4.95 07/17/2023    RBC 3.96 07/17/2023    HGB 10.0 (L) 07/17/2023    HCT 31.0 (L) 07/17/2023    MCV 78.3 (L) 07/17/2023    MCH 25.3 (L) 07/17/2023    MCHC 32.3 07/17/2023    RDW 14.1 07/17/2023    RDWSD 39.1 07/17/2023    MPV 10.7 07/17/2023     07/17/2023    NEUTRORELPCT 58.5 07/01/2023    LYMPHORELPCT 33.7 07/01/2023    MONORELPCT 4.7 (L) 07/01/2023    EOSRELPCT 2.8 07/01/2023    BASORELPCT 0.3 07/01/2023    AUTOIGPER 0.0 07/01/2023    NEUTROABS 3.58 07/01/2023    LYMPHSABS 2.06 07/01/2023    MONOSABS 0.29 07/01/2023    EOSABS 0.17 07/01/2023    BASOSABS 0.02 07/01/2023    AUTOIGNUM 0.00 07/01/2023    NRBC 0.0 07/01/2023       TSH  Lab Results   Component Value Date    TSH 0.606 05/03/2023    TSH 0.497 02/17/2022    TSH 0.971 09/22/2015       T4  Lab Results   Component Value Date    FREET4 0.94 05/03/2023    FREET4 0.97 02/17/2022    FREET4 1.20 09/22/2015     No results found for: D3FLLZO    T3  Lab Results   Component Value Date    T3FREE 3.43 02/17/2022     No results found for: U9RTPQH    TRAb  No results  found for: TSURCPAB    TPO  No results found for: THYROIDAB    No valid procedures specified.    Assessment and Plan    Diagnoses and all orders for this visit:    1. Type 2 diabetes mellitus with hyperglycemia, with long-term current use of insulin (Primary)  -     POC Glucose, Blood  -     POC Glycosylated Hemoglobin (Hb A1C)         No follow-ups on file. The patient was instructed to contact the clinic with any interval questions or concerns.    This document has been electronically signed by CICI Veliz  July 20, 2023 15:49 EDT  Endocrinology

## 2023-07-20 NOTE — PROGRESS NOTES
Chief Complaint   Patient presents with    Type 2 diabetes mellitus with hyperglycemia, with long-term        Antoinettejessenia Pack is a 38 y.o. female had concerns including Type 2 diabetes mellitus with hyperglycemia, with long-term.   T2DM    She has been having increased GI issues and is working on getting this tested.  She is still interested in getting bariatric surgery.    Birth state: [place]  Previous history of radiation to face/neck:no  Consuming foods high in iodine: no  Family history of thyroid complications:none    The following portions of the patient's history were reviewed and updated as appropriate: allergies, current medications, past family history, past medical history, past social history, past surgical history and problem list.    History:  Diabetes was diagnosed 8-9 years ago.  Complications include none.  Last ophtho exam was 2021, Dr. Bruno Office.  Current medications for diabetes include Humalog 30-60 units TID with meals, Tresiba 50 units BID.  Past meds: Trulicity, GI Issues, has problems with yeast, Glipizide-unsure why it was stopped  She checks her blood with Freestyle Esvin CGM. She would like to start using the Esvin 3.  Hypos: rarely  Fasting range: 150-200+    Diet: has made improvements to her diet.    She has had an abnormal menstrual cycle last week and is still seeing OB trying to conceive pregnancy.    Past Medical History:   Diagnosis Date    Anxiety     Depression     Diabetes mellitus     Diverticulitis     GERD (gastroesophageal reflux disease)     Hypertension     Kidney stone     PCOS (polycystic ovarian syndrome)     Sleep apnea      Past Surgical History:   Procedure Laterality Date    CARDIAC CATHETERIZATION      CARPAL TUNNEL RELEASE      COLONOSCOPY      ENDOSCOPY      FRACTURE SURGERY      HARDWARE REMOVAL      WRIST    TENDON REPAIR      HAND      Family History   Problem Relation Age of Onset    Heart disease Mother     COPD Mother     Heart disease Father     COPD  Father       Social History     Socioeconomic History    Marital status:    Tobacco Use    Smoking status: Former    Smokeless tobacco: Never   Vaping Use    Vaping Use: Never used   Substance and Sexual Activity    Alcohol use: No    Drug use: No    Sexual activity: Defer      Allergies   Allergen Reactions    Dilantin [Phenytoin] Mental Status Change    Keppra [Levetiracetam] Mental Status Change    Meperidine Rash    Topamax [Topiramate] Mental Status Change      Current Outpatient Medications on File Prior to Visit   Medication Sig Dispense Refill    buPROPion XL (WELLBUTRIN XL) 300 MG 24 hr tablet Take 1 tablet by mouth Every Morning. 30 tablet 2    Continuous Blood Gluc  (Dexcom G6 ) device Use to continuously monitor blood glucose 1 each 0    Hydrocort-Pramoxine, Perianal, (Proctofoam HC) 1-1 % rectal foam Insert 1 application into the rectum 2 (Two) Times a Day. (Patient not taking: Reported on 7/20/2023) 10 g 0    Insulin Pen Needle 32G X 4 MM misc Use to inject insulin up to 4 times daily as directed 400 each 1    mesalamine (CANASA) 1000 MG suppository Insert 1 suppository into the rectum Every Night.      norgestimate-ethinyl estradiol (ORTHO TRI-CYCLEN,TRINESSA) 0.18/0.215/0.25 MG-35 MCG per tablet Take 1 tablet by mouth daily 28 tablet 11    ondansetron ODT (ZOFRAN-ODT) 4 MG disintegrating tablet Place 1 tablet on the tongue Every 6 (Six) Hours As Needed for Nausea or Vomiting. 12 tablet 0    sertraline (ZOLOFT) 100 MG tablet Take 1 & ½ tablets by mouth Daily. 45 tablet 2    [DISCONTINUED] labetalol (NORMODYNE) 100 MG tablet Take 1/2 tablet by mouth 2 times every day 30 tablet 0     No current facility-administered medications on file prior to visit.      Review of Systems   Constitutional:  Positive for diaphoresis and fatigue. Negative for unexpected weight gain and unexpected weight loss.   HENT:          Neck tenderness   Eyes:  Positive for blurred vision and visual  "disturbance.   Cardiovascular:  Positive for palpitations.   Gastrointestinal:  Positive for constipation.   Endocrine: Positive for heat intolerance, polydipsia, polyphagia and polyuria. Negative for cold intolerance.   Skin:  Positive for dry skin.        Hair thinning   Neurological:  Positive for tremors.   Psychiatric/Behavioral:  Positive for agitation and sleep disturbance. The patient is nervous/anxious.    All other systems reviewed and are negative.   Symptoms are improving.     /68 (BP Location: Right arm, Patient Position: Sitting, Cuff Size: Adult)   Pulse 86   Ht 149.9 cm (59\")   Wt 92.2 kg (203 lb 3.2 oz)   SpO2 97%   BMI 41.04 kg/m²      Physical Exam  Vitals reviewed.   Constitutional:       Appearance: Normal appearance.   Eyes:      Extraocular Movements: Extraocular movements intact.   Cardiovascular:      Rate and Rhythm: Normal rate.      Pulses: Normal pulses.   Pulmonary:      Effort: Pulmonary effort is normal.   Skin:     General: Skin is warm.   Neurological:      Mental Status: She is alert and oriented to person, place, and time.   Psychiatric:         Mood and Affect: Mood normal.         Behavior: Behavior normal.         Thought Content: Thought content normal.         Judgment: Judgment normal.      Labs/Imaging  CMP  Lab Results   Component Value Date    GLUCOSE 119 (H) 07/01/2023    BUN 12 07/01/2023    CREATININE 0.64 07/01/2023    EGFRIFNONA 101 12/27/2020    EGFRIFAFRI  09/05/2016      Comment:      <15 Indicative of kidney failure.    BCR 18.8 07/01/2023    K 4.3 07/01/2023    CO2 23.2 07/01/2023    CALCIUM 9.0 07/01/2023    ALBUMIN 3.6 07/01/2023    LABIL2 1.3 (L) 02/05/2016    AST 13 07/01/2023    ALT 13 07/01/2023        CBC w/DIFF   Lab Results   Component Value Date    WBC 4.95 07/17/2023    RBC 3.96 07/17/2023    HGB 10.0 (L) 07/17/2023    HCT 31.0 (L) 07/17/2023    MCV 78.3 (L) 07/17/2023    MCH 25.3 (L) 07/17/2023    MCHC 32.3 07/17/2023    RDW 14.1 " 07/17/2023    RDWSD 39.1 07/17/2023    MPV 10.7 07/17/2023     07/17/2023    NEUTRORELPCT 58.5 07/01/2023    LYMPHORELPCT 33.7 07/01/2023    MONORELPCT 4.7 (L) 07/01/2023    EOSRELPCT 2.8 07/01/2023    BASORELPCT 0.3 07/01/2023    AUTOIGPER 0.0 07/01/2023    NEUTROABS 3.58 07/01/2023    LYMPHSABS 2.06 07/01/2023    MONOSABS 0.29 07/01/2023    EOSABS 0.17 07/01/2023    BASOSABS 0.02 07/01/2023    AUTOIGNUM 0.00 07/01/2023    NRBC 0.0 07/01/2023       TSH  Lab Results   Component Value Date    TSH 0.606 05/03/2023    TSH 0.497 02/17/2022    TSH 0.971 09/22/2015       T4  Lab Results   Component Value Date    FREET4 0.94 05/03/2023    FREET4 0.97 02/17/2022    FREET4 1.20 09/22/2015     No results found for: O1VTZBR    T3  Lab Results   Component Value Date    T3FREE 3.43 02/17/2022     No results found for: E1LUXYG    TRAb  No results found for: TSURCPAB    TPO  No results found for: THYROIDAB    No valid procedures specified.    Assessment and Plan    Diagnoses and all orders for this visit:    1. Type 2 diabetes mellitus with hyperglycemia, with long-term current use of insulin (Primary)  Assessment & Plan:  -Diabetes remains stable with A1c 7.1.   -Discussed the dietary and exercise guidelines with patient.    -She is to continue using Freestyle Esvin CGM.  -Discussed the importance of keeping yearly eye exams.  -Continue Tresiba 50 units BID.  -Continue Novolog 30-60 units TID with meals.  -Discussed importance of following with GI.  We will send another referral for bariatric surgery once her GI issues are determined.  -S/S hypoglycemia reviewed with Rule of 15's advised.  -Follow-up in 3 months.    Orders:  -     POC Glucose, Blood  -     POC Glycosylated Hemoglobin (Hb A1C)         Return in about 3 months (around 10/20/2023) for Follow-up appointment, A1C. The patient was instructed to contact the clinic with any interval questions or concerns.    This document has been electronically signed by Vonnie  CICI Key  July 27, 2023 12:51 EDT  Endocrinology

## 2023-07-27 NOTE — ASSESSMENT & PLAN NOTE
-Diabetes remains stable with A1c 7.1.   -Discussed the dietary and exercise guidelines with patient.    -She is to continue using Freestyle Esvin CGM.  -Discussed the importance of keeping yearly eye exams.  -Continue Tresiba 50 units BID.  -Continue Novolog 30-60 units TID with meals.  -Discussed importance of following with GI.  We will send another referral for bariatric surgery once her GI issues are determined.  -S/S hypoglycemia reviewed with Rule of 15's advised.  -Follow-up in 3 months.

## 2023-08-08 ENCOUNTER — SPECIALTY PHARMACY (OUTPATIENT)
Dept: PHARMACY | Facility: HOSPITAL | Age: 38
End: 2023-08-08
Payer: MEDICARE

## 2023-08-08 RX ORDER — ACYCLOVIR 400 MG/1
1 TABLET ORAL
Qty: 3 EACH | Refills: 5 | Status: SHIPPED | OUTPATIENT
Start: 2023-08-08

## 2023-08-24 ENCOUNTER — TRANSCRIBE ORDERS (OUTPATIENT)
Dept: ADMINISTRATIVE | Facility: HOSPITAL | Age: 38
End: 2023-08-24
Payer: MEDICARE

## 2023-08-24 DIAGNOSIS — R07.9 CHEST PAIN, UNSPECIFIED TYPE: Primary | ICD-10-CM

## 2023-08-24 NOTE — PROGRESS NOTES
This provider is located at the [facility] (through [facility]), [address], [place], [address] using a secure MyChart Video Visit through [employer]. Patient is being seen remotely via telehealth at their home address in [place], and stated they are in a secure environment for this session. The patient's condition being diagnosed/treated is appropriate for telemedicine. The provider identified herself as well as her credentials.   The patient, and/or patients guardian, consent to be seen remotely, and when consent is given they understand that the consent allows for patient identifiable information to be sent to a third party as needed.   They may refuse to be seen remotely at any time. The electronic data is encrypted and password protected, and the patient and/or guardian has been advised of the potential risks to privacy not withstanding such measures.      Subjective   Antoinette Pack is a 38 y.o. female who presents today for follow up    Chief Complaint:  Bipolar disorder, depression, anxiety    History of Present Illness:   History of Present Illness  Today is a follow-up visit.     Medication compliance: patient is compliant with medications, denies SE.  Depression rated 8/10, with 10 being the worst.  Anxiety rated 8/10, with 10 being the worst.    Sleep is good, getting about 7 hours a night,  Nightmares: Occasional  Appetite is good.  Hallucinations: Patient has visual hallucinations, sees a girl in a dress, but usually when she is stressed, she is aware it isn't real.  Self-harm: Patient denies thoughts of self-harm.  Alcohol use: no  Drug use: no  Marijuana use: no     Chronic health issues, no acute physical or medical issues today.    Details: she states things have been going ok. She is under care of cardiologist at [facility]. Overall she is doing ok on current mediations.     The following portions of the patient's history were reviewed  and updated as appropriate: allergies, current medications, past family history, past medical history, past social history, past surgical history and problem list.      Past Medical History:  Past Medical History:   Diagnosis Date    Anxiety     Depression     Diabetes mellitus     Diverticulitis     GERD (gastroesophageal reflux disease)     Hypertension     Kidney stone     PCOS (polycystic ovarian syndrome)     Sleep apnea        Social History:  Social History     Socioeconomic History    Marital status:    Tobacco Use    Smoking status: Former    Smokeless tobacco: Never   Vaping Use    Vaping Use: Never used   Substance and Sexual Activity    Alcohol use: No    Drug use: No    Sexual activity: Defer       Family History:  Family History   Problem Relation Age of Onset    Heart disease Mother     COPD Mother     Heart disease Father     COPD Father        Past Surgical History:  Past Surgical History:   Procedure Laterality Date    CARDIAC CATHETERIZATION      CARPAL TUNNEL RELEASE      COLONOSCOPY      ENDOSCOPY      FRACTURE SURGERY      HARDWARE REMOVAL      WRIST    TENDON REPAIR      HAND       Problem List:  Patient Active Problem List   Diagnosis    Body mass index (BMI) 40.0-44.9, adult    Simple goiter    Type 2 diabetes mellitus with hyperglycemia, with long-term current use of insulin    Type 2 diabetes mellitus with hyperglycemia, with long-term current use of insulin        Allergy:   Allergies   Allergen Reactions    Dilantin [Phenytoin] Mental Status Change    Keppra [Levetiracetam] Mental Status Change    Meperidine Rash    Topamax [Topiramate] Mental Status Change        Current Medications:   Current Outpatient Medications   Medication Sig Dispense Refill    buPROPion XL (WELLBUTRIN XL) 300 MG 24 hr tablet Take 1 tablet by mouth Every Morning. 30 tablet 2    sertraline (ZOLOFT) 100 MG tablet Take 1.5 tablets by mouth Daily. 45 tablet 2    Continuous Blood Gluc  (Dexcom G6  ) device Use to continuously monitor blood glucose 1 each 0    Continuous Blood Gluc Sensor (Dexcom G7 Sensor) misc 1 each Every 10 (Ten) Days. 3 each 5    Continuous Blood Gluc Transmit (Dexcom G6 Transmitter) misc Use 1 Every 3 (Three) Months. 1 each 1    Hydrocort-Pramoxine, Perianal, (Proctofoam HC) 1-1 % rectal foam Insert 1 application into the rectum 2 (Two) Times a Day. (Patient not taking: Reported on 7/20/2023) 10 g 0    insulin aspart (NovoLOG FlexPen) 100 UNIT/ML solution pen-injector sc pen Inject 60 Units under the skin into the appropriate area as directed 3 (Three) Times a Day With Meals. 60 mL 5    insulin degludec (Tresiba FlexTouch) 100 UNIT/ML solution pen-injector injection Inject 50 Units under the skin into the appropriate area as directed 2 (Two) Times a Day. 90 mL 1    Insulin Pen Needle 32G X 4 MM misc Use to inject insulin up to 4 times daily as directed 400 each 1    mesalamine (CANASA) 1000 MG suppository Insert 1 suppository into the rectum Every Night.      norgestimate-ethinyl estradiol (ORTHO TRI-CYCLEN,TRINESSA) 0.18/0.215/0.25 MG-35 MCG per tablet Take 1 tablet by mouth daily 28 tablet 11    ondansetron ODT (ZOFRAN-ODT) 4 MG disintegrating tablet Place 1 tablet on the tongue Every 6 (Six) Hours As Needed for Nausea or Vomiting. 12 tablet 0    pantoprazole (PROTONIX) 40 MG EC tablet Take 1 tablet by mouth Daily.       No current facility-administered medications for this visit.       Review of Symptoms:    Review of Systems   Psychiatric/Behavioral:  Positive for dysphoric mood, hallucinations, sleep disturbance, depressed mood and stress. Negative for self-injury and suicidal ideas. The patient is nervous/anxious.    All other systems reviewed and are negative.      Physical Exam:   not currently breastfeeding.  There is no height or weight on file to calculate BMI.    8/31/23    MENTAL STATUS EXAM   General Appearance:  Cleanly groomed and dressed  Eye Contact:  Good eye  contact  Attitude:  Cooperative  Motor Activity:  Normal gait, posture  Speech:  Normal rate, tone, volume  Language:  Spontaneous  Mood and affect:  Normal, pleasant  Hopelessness:  Denies  Thought Process:  Logical  Associations/ Thought Content:  No delusions  Hallucinations:  None  Suicidal Ideations:  Not present  Homicidal Ideation:  Not present  Sensorium:  Alert  Orientation:  Person, place, time and situation  Insight:  Fair  Judgement:  Fair  Reliability:  Fair  Impulse Control:  Fair       PHQ-Score Total:  PHQ-9 Total Score:      Lab Results:   Admission on 08/26/2023, Discharged on 08/26/2023   Component Date Value Ref Range Status    QT Interval 08/26/2023 362  ms Final    QTC Interval 08/26/2023 440  ms Final    Glucose 08/26/2023 227 (H)  65 - 99 mg/dL Final    BUN 08/26/2023 9  6 - 20 mg/dL Final    Creatinine 08/26/2023 0.63  0.57 - 1.00 mg/dL Final    Sodium 08/26/2023 138  136 - 145 mmol/L Final    Potassium 08/26/2023 4.1  3.5 - 5.2 mmol/L Final    Chloride 08/26/2023 102  98 - 107 mmol/L Final    CO2 08/26/2023 24.6  22.0 - 29.0 mmol/L Final    Calcium 08/26/2023 9.3  8.6 - 10.5 mg/dL Final    Total Protein 08/26/2023 7.5  6.0 - 8.5 g/dL Final    Albumin 08/26/2023 3.9  3.5 - 5.2 g/dL Final    ALT (SGPT) 08/26/2023 22  1 - 33 U/L Final    AST (SGOT) 08/26/2023 22  1 - 32 U/L Final    Alkaline Phosphatase 08/26/2023 87  39 - 117 U/L Final    Total Bilirubin 08/26/2023 0.3  0.0 - 1.2 mg/dL Final    Globulin 08/26/2023 3.6  gm/dL Final    A/G Ratio 08/26/2023 1.1  g/dL Final    BUN/Creatinine Ratio 08/26/2023 14.3  7.0 - 25.0 Final    Anion Gap 08/26/2023 11.4  5.0 - 15.0 mmol/L Final    eGFR 08/26/2023 115.2  >60.0 mL/min/1.73 Final    HS Troponin T 08/26/2023 <6  <10 ng/L Final    Extra Tube 08/26/2023 Hold for add-ons.   Final    Auto resulted.    Extra Tube 08/26/2023 hold for add-on   Final    Auto resulted    Extra Tube 08/26/2023 Hold for add-ons.   Final    Auto resulted.    Extra Tube  08/26/2023 Hold for add-ons.   Final    Auto resulted    WBC 08/26/2023 6.93  3.40 - 10.80 10*3/mm3 Final    RBC 08/26/2023 4.26  3.77 - 5.28 10*6/mm3 Final    Hemoglobin 08/26/2023 10.1 (L)  12.0 - 15.9 g/dL Final    Hematocrit 08/26/2023 32.7 (L)  34.0 - 46.6 % Final    MCV 08/26/2023 76.8 (L)  79.0 - 97.0 fL Final    MCH 08/26/2023 23.7 (L)  26.6 - 33.0 pg Final    MCHC 08/26/2023 30.9 (L)  31.5 - 35.7 g/dL Final    RDW 08/26/2023 14.3  12.3 - 15.4 % Final    RDW-SD 08/26/2023 39.3  37.0 - 54.0 fl Final    MPV 08/26/2023 9.8  6.0 - 12.0 fL Final    Platelets 08/26/2023 202  140 - 450 10*3/mm3 Final    Neutrophil % 08/26/2023 56.5  42.7 - 76.0 % Final    Lymphocyte % 08/26/2023 34.3  19.6 - 45.3 % Final    Monocyte % 08/26/2023 5.9  5.0 - 12.0 % Final    Eosinophil % 08/26/2023 2.7  0.3 - 6.2 % Final    Basophil % 08/26/2023 0.3  0.0 - 1.5 % Final    Immature Grans % 08/26/2023 0.3  0.0 - 0.5 % Final    Neutrophils, Absolute 08/26/2023 3.91  1.70 - 7.00 10*3/mm3 Final    Lymphocytes, Absolute 08/26/2023 2.38  0.70 - 3.10 10*3/mm3 Final    Monocytes, Absolute 08/26/2023 0.41  0.10 - 0.90 10*3/mm3 Final    Eosinophils, Absolute 08/26/2023 0.19  0.00 - 0.40 10*3/mm3 Final    Basophils, Absolute 08/26/2023 0.02  0.00 - 0.20 10*3/mm3 Final    Immature Grans, Absolute 08/26/2023 0.02  0.00 - 0.05 10*3/mm3 Final    nRBC 08/26/2023 0.0  0.0 - 0.2 /100 WBC Final    HS Troponin T 08/26/2023 7  <10 ng/L Final    Troponin T Delta 08/26/2023    Final    Unable to calculate.       Assessment & Plan   Problems Addressed this Visit    None  Visit Diagnoses       Bipolar disorder, in partial remission, most recent episode depressed    -  Primary    Relevant Medications    buPROPion XL (WELLBUTRIN XL) 300 MG 24 hr tablet    sertraline (ZOLOFT) 100 MG tablet    Panic disorder with agoraphobia        Relevant Medications    buPROPion XL (WELLBUTRIN XL) 300 MG 24 hr tablet    sertraline (ZOLOFT) 100 MG tablet    Generalized anxiety  disorder        Relevant Medications    buPROPion XL (WELLBUTRIN XL) 300 MG 24 hr tablet    sertraline (ZOLOFT) 100 MG tablet    Medication management        Relevant Medications    buPROPion XL (WELLBUTRIN XL) 300 MG 24 hr tablet    sertraline (ZOLOFT) 100 MG tablet          Diagnoses         Codes Comments    Bipolar disorder, in partial remission, most recent episode depressed    -  Primary ICD-10-CM: F31.75  ICD-9-CM: 296.55     Panic disorder with agoraphobia     ICD-10-CM: F40.01  ICD-9-CM: 300.21     Generalized anxiety disorder     ICD-10-CM: F41.1  ICD-9-CM: 300.02     Medication management     ICD-10-CM: Z79.899  ICD-9-CM: V58.69           Social History     Tobacco Use   Smoking Status Former   Smokeless Tobacco Never     JORGE reviewed and appropriate. Patient counseled on use of controlled substances.     -The benefits of a healthy diet and exercise were discussed with patient, especially the positive effects they have on mental health. Patient encouraged to consider lifestyle modification regarding  diet and exercise patterns to maximize results of mental health treatment.  -Reviewed previous available documentation  -Reviewed most recent available labs   -Lengthy discussion with patient on the possible side effects of antipsychotic medications including increased cholesterol, increased blood sugar, and possibility of weight gain.  Also discussed the need to monitor lab work associated with this.  The risk of muscle movement disorders with this class of medication was also discussed.  -This APRN has discussed that a very slow dose titration when starting, or changing doses, of lamotrigine may reduce the incidence of skin rash and other side effects.  The dosage should not be titrated upwards or increased faster than recommended due to the possibility of the discussed side effects and risk of development of a skin rash (which can become life threatening).  This APRN has also discussed that if the  patient stops taking the lamotrigine for 5 days or longer, it will be necessary to restart the drug with an initial dose titration, as rashes have been reported on reexposure.  If the patient/guardian and Provider decide to stop the lamotrigine, the patient/guardian will follow the directions of this APRN/this office as a guided taper over about two weeks is appropriate due to the risk of relapse in bipolar disorder with those with bipolar disorder, the risk of seizures in those with epilepsy, and discontinuation symptoms upon rapid discontinuation of lamotrigine. The patient/guardian verbalizes understanding of benefits and risks as discussed, the patient/guardian feels the benefits outweigh the risks and is agreeable to continue/take lamotrigine as discussed.  The patient/guardian is advised should any side effects or rash develops they are to stop the lamotrigine immediately and contact this APRN/this office or go to the emergency department immediately.  The patient/guardian verbalizes understanding and agreement with treatment plan in their own words.      Visit Diagnoses:    ICD-10-CM ICD-9-CM   1. Bipolar disorder, in partial remission, most recent episode depressed  F31.75 296.55   2. Panic disorder with agoraphobia  F40.01 300.21   3. Generalized anxiety disorder  F41.1 300.02   4. Medication management  Z79.899 V58.69       TREATMENT PLAN/GOALS: Continue supportive psychotherapy efforts and medications as indicated. Treatment and medication options discussed during today's visit. Patient acknowledged and verbally consented to continue with current treatment plan and was educated on the importance of compliance with treatment and follow-up appointments.    MEDICATION ISSUES:    Discussed medication options and treatment plan of prescribed medication as well as the risks, benefits, and side effects including potential falls, possible impaired driving and metabolic adversities among others. Patient is  agreeable to call the office with any worsening of symptoms or onset of side effects. Patient is agreeable to call 911 or go to the nearest ER should he/she begin having SI/HI.     MEDS ORDERED DURING VISIT:  New Medications Ordered This Visit   Medications    buPROPion XL (WELLBUTRIN XL) 300 MG 24 hr tablet     Sig: Take 1 tablet by mouth Every Morning.     Dispense:  30 tablet     Refill:  2    sertraline (ZOLOFT) 100 MG tablet     Sig: Take 1.5 tablets by mouth Daily.     Dispense:  45 tablet     Refill:  2       Return in about 3 months (around 11/30/2023).  -Continue bupropion  mg 24-hour tablet take 1 tablet by mouth every morning for depression and anxiety  -Continue sertraline 100 mg tablet take 1.5 tablest daily for depression    I spent 30 minutes caring for Antoinette on this date of service. This time includes time spent by me in the following activities: preparing for the visit, obtaining and/or reviewing a separately obtained history, performing a medically appropriate examination and/or evaluation, counseling and educating the patient/family/caregiver, ordering medications, tests, or procedures, and documenting information in the medical record               This document has been electronically signed by CICI Lopez  August 31, 2023 09:12 EDT    Part of this note may be an electronic transcription/translation of spoken language to printed text using the Dragon Dictation System.

## 2023-08-26 ENCOUNTER — APPOINTMENT (OUTPATIENT)
Dept: GENERAL RADIOLOGY | Facility: HOSPITAL | Age: 38
End: 2023-08-26
Payer: MEDICARE

## 2023-08-26 ENCOUNTER — HOSPITAL ENCOUNTER (EMERGENCY)
Facility: HOSPITAL | Age: 38
Discharge: HOME | End: 2023-08-26
Attending: STUDENT IN AN ORGANIZED HEALTH CARE EDUCATION/TRAINING PROGRAM
Payer: MEDICARE

## 2023-08-26 VITALS
DIASTOLIC BLOOD PRESSURE: 65 MMHG | OXYGEN SATURATION: 95 % | HEIGHT: 59 IN | WEIGHT: 208 LBS | BODY MASS INDEX: 41.93 KG/M2 | TEMPERATURE: 98.2 F | SYSTOLIC BLOOD PRESSURE: 114 MMHG | RESPIRATION RATE: 18 BRPM | HEART RATE: 82 BPM

## 2023-08-26 DIAGNOSIS — R06.02 SHORTNESS OF BREATH: Primary | ICD-10-CM

## 2023-08-26 DIAGNOSIS — R00.2 PALPITATIONS: ICD-10-CM

## 2023-08-26 LAB
ALBUMIN SERPL-MCNC: 3.9 G/DL (ref 3.5–5.2)
ALBUMIN/GLOB SERPL: 1.1 G/DL
ALP SERPL-CCNC: 87 U/L (ref 39–117)
ALT SERPL W P-5'-P-CCNC: 22 U/L (ref 1–33)
ANION GAP SERPL CALCULATED.3IONS-SCNC: 11.4 MMOL/L (ref 5–15)
AST SERPL-CCNC: 22 U/L (ref 1–32)
BASOPHILS # BLD AUTO: 0.02 10*3/MM3 (ref 0–0.2)
BASOPHILS NFR BLD AUTO: 0.3 % (ref 0–1.5)
BILIRUB SERPL-MCNC: 0.3 MG/DL (ref 0–1.2)
BUN SERPL-MCNC: 9 MG/DL (ref 6–20)
BUN/CREAT SERPL: 14.3 (ref 7–25)
CALCIUM SPEC-SCNC: 9.3 MG/DL (ref 8.6–10.5)
CHLORIDE SERPL-SCNC: 102 MMOL/L (ref 98–107)
CO2 SERPL-SCNC: 24.6 MMOL/L (ref 22–29)
CREAT SERPL-MCNC: 0.63 MG/DL (ref 0.57–1)
DEPRECATED RDW RBC AUTO: 39.3 FL (ref 37–54)
EGFRCR SERPLBLD CKD-EPI 2021: 115.2 ML/MIN/1.73
EOSINOPHIL # BLD AUTO: 0.19 10*3/MM3 (ref 0–0.4)
EOSINOPHIL NFR BLD AUTO: 2.7 % (ref 0.3–6.2)
ERYTHROCYTE [DISTWIDTH] IN BLOOD BY AUTOMATED COUNT: 14.3 % (ref 12.3–15.4)
GEN 5 2HR TROPONIN T REFLEX: 7 NG/L
GLOBULIN UR ELPH-MCNC: 3.6 GM/DL
GLUCOSE SERPL-MCNC: 227 MG/DL (ref 65–99)
HCT VFR BLD AUTO: 32.7 % (ref 34–46.6)
HGB BLD-MCNC: 10.1 G/DL (ref 12–15.9)
HOLD SPECIMEN: NORMAL
HOLD SPECIMEN: NORMAL
IMM GRANULOCYTES # BLD AUTO: 0.02 10*3/MM3 (ref 0–0.05)
IMM GRANULOCYTES NFR BLD AUTO: 0.3 % (ref 0–0.5)
LYMPHOCYTES # BLD AUTO: 2.38 10*3/MM3 (ref 0.7–3.1)
LYMPHOCYTES NFR BLD AUTO: 34.3 % (ref 19.6–45.3)
MCH RBC QN AUTO: 23.7 PG (ref 26.6–33)
MCHC RBC AUTO-ENTMCNC: 30.9 G/DL (ref 31.5–35.7)
MCV RBC AUTO: 76.8 FL (ref 79–97)
MONOCYTES # BLD AUTO: 0.41 10*3/MM3 (ref 0.1–0.9)
MONOCYTES NFR BLD AUTO: 5.9 % (ref 5–12)
NEUTROPHILS NFR BLD AUTO: 3.91 10*3/MM3 (ref 1.7–7)
NEUTROPHILS NFR BLD AUTO: 56.5 % (ref 42.7–76)
NRBC BLD AUTO-RTO: 0 /100 WBC (ref 0–0.2)
PLATELET # BLD AUTO: 202 10*3/MM3 (ref 140–450)
PMV BLD AUTO: 9.8 FL (ref 6–12)
POTASSIUM SERPL-SCNC: 4.1 MMOL/L (ref 3.5–5.2)
PROT SERPL-MCNC: 7.5 G/DL (ref 6–8.5)
RBC # BLD AUTO: 4.26 10*6/MM3 (ref 3.77–5.28)
SODIUM SERPL-SCNC: 138 MMOL/L (ref 136–145)
TROPONIN T DELTA: NORMAL
TROPONIN T SERPL HS-MCNC: <6 NG/L
WBC NRBC COR # BLD: 6.93 10*3/MM3 (ref 3.4–10.8)
WHOLE BLOOD HOLD COAG: NORMAL
WHOLE BLOOD HOLD SPECIMEN: NORMAL

## 2023-08-26 PROCEDURE — 80053 COMPREHEN METABOLIC PANEL: CPT | Performed by: STUDENT IN AN ORGANIZED HEALTH CARE EDUCATION/TRAINING PROGRAM

## 2023-08-26 PROCEDURE — 71045 X-RAY EXAM CHEST 1 VIEW: CPT

## 2023-08-26 PROCEDURE — 36415 COLL VENOUS BLD VENIPUNCTURE: CPT

## 2023-08-26 PROCEDURE — 84484 ASSAY OF TROPONIN QUANT: CPT | Performed by: STUDENT IN AN ORGANIZED HEALTH CARE EDUCATION/TRAINING PROGRAM

## 2023-08-26 PROCEDURE — 71045 X-RAY EXAM CHEST 1 VIEW: CPT | Performed by: RADIOLOGY

## 2023-08-26 PROCEDURE — 93005 ELECTROCARDIOGRAM TRACING: CPT | Performed by: STUDENT IN AN ORGANIZED HEALTH CARE EDUCATION/TRAINING PROGRAM

## 2023-08-26 PROCEDURE — 85025 COMPLETE CBC W/AUTO DIFF WBC: CPT | Performed by: STUDENT IN AN ORGANIZED HEALTH CARE EDUCATION/TRAINING PROGRAM

## 2023-08-26 PROCEDURE — 99284 EMERGENCY DEPT VISIT MOD MDM: CPT

## 2023-08-26 RX ORDER — SODIUM CHLORIDE 0.9 % (FLUSH) 0.9 %
10 SYRINGE (ML) INJECTION AS NEEDED
Status: DISCONTINUED | OUTPATIENT
Start: 2023-08-26 | End: 2023-08-27 | Stop reason: HOSPADM

## 2023-08-26 RX ORDER — ASPIRIN 81 MG/1
324 TABLET, CHEWABLE ORAL ONCE
Status: COMPLETED | OUTPATIENT
Start: 2023-08-26 | End: 2023-08-26

## 2023-08-26 RX ADMIN — ASPIRIN 243 MG: 81 TABLET, CHEWABLE ORAL at 17:23

## 2023-08-26 NOTE — ED PROVIDER NOTES
Subjective   History of Present Illness  38-year-old female with past medical history of diabetes, hypertension, and depression presents to the ER due to concerns for radiating chest pain.  Patient had chest pain radiating through the anterior chest.  Confirmed increasing shortness of breath with laying flat.  Noted symptoms are worsened with exertion.  Little relief with rest.  No obvious fever or chills.  No cough.  No congestion.  Vital signs stable.      Review of Systems   Constitutional:  Positive for fatigue.   Respiratory:  Positive for shortness of breath.    Cardiovascular:  Positive for chest pain.   All other systems reviewed and are negative.    Past Medical History:   Diagnosis Date    Anxiety     Depression     Diabetes mellitus     Diverticulitis     GERD (gastroesophageal reflux disease)     Hypertension     Kidney stone     PCOS (polycystic ovarian syndrome)     Sleep apnea        Allergies   Allergen Reactions    Dilantin [Phenytoin] Mental Status Change    Keppra [Levetiracetam] Mental Status Change    Meperidine Rash    Topamax [Topiramate] Mental Status Change       Past Surgical History:   Procedure Laterality Date    CARDIAC CATHETERIZATION      CARPAL TUNNEL RELEASE      COLONOSCOPY      ENDOSCOPY      FRACTURE SURGERY      HARDWARE REMOVAL      WRIST    TENDON REPAIR      HAND       Family History   Problem Relation Age of Onset    Heart disease Mother     COPD Mother     Heart disease Father     COPD Father        Social History     Socioeconomic History    Marital status:    Tobacco Use    Smoking status: Former    Smokeless tobacco: Never   Vaping Use    Vaping Use: Never used   Substance and Sexual Activity    Alcohol use: No    Drug use: No    Sexual activity: Defer           Objective   Physical Exam  Constitutional:       General: She is not in acute distress.     Appearance: Normal appearance. She is not ill-appearing.   HENT:      Head: Normocephalic and atraumatic.       Right Ear: External ear normal.      Left Ear: External ear normal.      Nose: Nose normal.      Mouth/Throat:      Mouth: Mucous membranes are moist.   Eyes:      Extraocular Movements: Extraocular movements intact.      Pupils: Pupils are equal, round, and reactive to light.   Cardiovascular:      Rate and Rhythm: Normal rate and regular rhythm.      Heart sounds: No murmur heard.  Pulmonary:      Effort: Pulmonary effort is normal. No respiratory distress.      Breath sounds: Normal breath sounds. No wheezing.   Abdominal:      General: Bowel sounds are normal.      Palpations: Abdomen is soft.      Tenderness: There is no abdominal tenderness.   Musculoskeletal:         General: No deformity or signs of injury. Normal range of motion.      Cervical back: Normal range of motion and neck supple.   Skin:     General: Skin is warm and dry.      Findings: No erythema.   Neurological:      General: No focal deficit present.      Mental Status: She is alert and oriented to person, place, and time. Mental status is at baseline.      Cranial Nerves: No cranial nerve deficit.   Psychiatric:         Mood and Affect: Mood normal.         Behavior: Behavior normal.         Thought Content: Thought content normal.       Procedures           ED Course  ED Course as of 08/26/23 2131   Sat Aug 26, 2023   1632 EKG notes sinus rhythm.  89 bpm.  I directly evaluated the EKG of this patient and noted no acute abnormalities.  Electronically signed by Gerardo Cisneros DO, 08/26/23, 4:32 PM EDT.   [SF]   8613 Care of this patient was transferred to the next attending physician at shift change.  Complete discussion of presentation, labs, imaging, care, and expected course occurred during transition of providers.  Vitals stable at transfer of care.    Electronically signed by Gerardo Cisneros DO, 08/26/23, 6:36 PM EDT.   [SF]      ED Course User Index  [SF] Gerardo Cisneros DO                                           Medical Decision  Making  21:28 EDT  I received pt in sign-out from Dr. Cisneros.  On my exam, 37 yo with 1-wk h/o intermittent shortness of breath and some palpitations.  EKG ok.  Cxr with question left lower infiltrate/atelectasis but no fever, no cough, wbc ok.  Trop x2 neg and labs otherwise unremarkable with no other evidence of ACS, pneumothorax, pulmonary edema, electrolyte abnormality or other emergent medical condition clinically.  Pt doing ok, appears comfortable, VSS, seems stable for home care.  Discussed results and offered empiric trx for pneumonia, but pt declined.  Discussed poc for dc home, symptom management, follow-up, return precautions.    Problems Addressed:  Palpitations: complicated acute illness or injury  Shortness of breath: complicated acute illness or injury    Amount and/or Complexity of Data Reviewed  External Data Reviewed: labs, radiology, ECG and notes.  Labs: ordered.  Radiology: ordered.  ECG/medicine tests: ordered.    Risk  OTC drugs.  Prescription drug management.        Final diagnoses:   Shortness of breath   Palpitations       ED Disposition  ED Disposition       ED Disposition   Discharge    Condition   Stable    Comment   --               Shawna Lambert DO  [address] [address]  [phone]          [facility] EMERGENCY DEPARTMENT  [address] [place] [id number] [phone]    As needed         Medication List      No changes were made to your prescriptions during this visit.            Andres Madrid MD  08/26/23 2746

## 2023-08-27 LAB
QT INTERVAL: 362 MS
QTC INTERVAL: 440 MS

## 2023-08-27 NOTE — DISCHARGE INSTRUCTIONS
Your were evaluated for shortness of breath and palpitations.  Your tests were ok.  Please continue to treat symptoms at home as needed and follow up with your doctor.  Please return to the Emergency Department with any concerning symptoms.  Andres Madrid MD

## 2023-08-31 ENCOUNTER — TELEMEDICINE (OUTPATIENT)
Dept: PSYCHIATRY | Facility: CLINIC | Age: 38
End: 2023-08-31
Payer: COMMERCIAL

## 2023-08-31 DIAGNOSIS — F41.1 GENERALIZED ANXIETY DISORDER: ICD-10-CM

## 2023-08-31 DIAGNOSIS — F31.75 BIPOLAR DISORDER, IN PARTIAL REMISSION, MOST RECENT EPISODE DEPRESSED: Primary | ICD-10-CM

## 2023-08-31 DIAGNOSIS — F40.01 PANIC DISORDER WITH AGORAPHOBIA: ICD-10-CM

## 2023-08-31 DIAGNOSIS — Z79.899 MEDICATION MANAGEMENT: ICD-10-CM

## 2023-08-31 RX ORDER — SERTRALINE HYDROCHLORIDE 100 MG/1
150 TABLET, FILM COATED ORAL DAILY
Qty: 45 TABLET | Refills: 2 | Status: SHIPPED | OUTPATIENT
Start: 2023-08-31 | End: 2023-08-31 | Stop reason: SDUPTHER

## 2023-08-31 RX ORDER — BUPROPION HYDROCHLORIDE 300 MG/1
300 TABLET ORAL EVERY MORNING
Qty: 30 TABLET | Refills: 2 | Status: SHIPPED | OUTPATIENT
Start: 2023-08-31 | End: 2023-08-31 | Stop reason: SDUPTHER

## 2023-08-31 RX ORDER — SERTRALINE HYDROCHLORIDE 100 MG/1
150 TABLET, FILM COATED ORAL DAILY
Qty: 45 TABLET | Refills: 2 | Status: SHIPPED | OUTPATIENT
Start: 2023-08-31

## 2023-08-31 RX ORDER — BUPROPION HYDROCHLORIDE 300 MG/1
300 TABLET ORAL EVERY MORNING
Qty: 30 TABLET | Refills: 2 | Status: SHIPPED | OUTPATIENT
Start: 2023-08-31

## 2023-09-03 ENCOUNTER — HOSPITAL ENCOUNTER (EMERGENCY)
Facility: HOSPITAL | Age: 38
Discharge: HOME | End: 2023-09-03
Attending: STUDENT IN AN ORGANIZED HEALTH CARE EDUCATION/TRAINING PROGRAM
Payer: COMMERCIAL

## 2023-09-03 VITALS
RESPIRATION RATE: 16 BRPM | SYSTOLIC BLOOD PRESSURE: 157 MMHG | HEART RATE: 87 BPM | TEMPERATURE: 98.7 F | WEIGHT: 208 LBS | OXYGEN SATURATION: 96 % | HEIGHT: 59 IN | BODY MASS INDEX: 41.93 KG/M2 | DIASTOLIC BLOOD PRESSURE: 90 MMHG

## 2023-09-03 DIAGNOSIS — L03.115 CELLULITIS OF RIGHT LOWER EXTREMITY: Primary | ICD-10-CM

## 2023-09-03 LAB
ALBUMIN SERPL-MCNC: 4 G/DL (ref 3.5–5.2)
ALBUMIN/GLOB SERPL: 1.1 G/DL
ALP SERPL-CCNC: 96 U/L (ref 39–117)
ALT SERPL W P-5'-P-CCNC: 19 U/L (ref 1–33)
ANION GAP SERPL CALCULATED.3IONS-SCNC: 12.4 MMOL/L (ref 5–15)
AST SERPL-CCNC: 23 U/L (ref 1–32)
BASOPHILS # BLD AUTO: 0.03 10*3/MM3 (ref 0–0.2)
BASOPHILS NFR BLD AUTO: 0.4 % (ref 0–1.5)
BILIRUB SERPL-MCNC: 0.4 MG/DL (ref 0–1.2)
BUN SERPL-MCNC: 13 MG/DL (ref 6–20)
BUN/CREAT SERPL: 20.6 (ref 7–25)
CALCIUM SPEC-SCNC: 9.2 MG/DL (ref 8.6–10.5)
CHLORIDE SERPL-SCNC: 99 MMOL/L (ref 98–107)
CO2 SERPL-SCNC: 21.6 MMOL/L (ref 22–29)
CREAT SERPL-MCNC: 0.63 MG/DL (ref 0.57–1)
CRP SERPL-MCNC: 1.55 MG/DL (ref 0–0.5)
DEPRECATED RDW RBC AUTO: 38.4 FL (ref 37–54)
EGFRCR SERPLBLD CKD-EPI 2021: 115.2 ML/MIN/1.73
EOSINOPHIL # BLD AUTO: 0.2 10*3/MM3 (ref 0–0.4)
EOSINOPHIL NFR BLD AUTO: 2.5 % (ref 0.3–6.2)
ERYTHROCYTE [DISTWIDTH] IN BLOOD BY AUTOMATED COUNT: 14.4 % (ref 12.3–15.4)
ERYTHROCYTE [SEDIMENTATION RATE] IN BLOOD: 57 MM/HR (ref 0–20)
GLOBULIN UR ELPH-MCNC: 3.6 GM/DL
GLUCOSE SERPL-MCNC: 253 MG/DL (ref 65–99)
HCT VFR BLD AUTO: 32.2 % (ref 34–46.6)
HGB BLD-MCNC: 10.1 G/DL (ref 12–15.9)
IMM GRANULOCYTES # BLD AUTO: 0.02 10*3/MM3 (ref 0–0.05)
IMM GRANULOCYTES NFR BLD AUTO: 0.3 % (ref 0–0.5)
LYMPHOCYTES # BLD AUTO: 2.13 10*3/MM3 (ref 0.7–3.1)
LYMPHOCYTES NFR BLD AUTO: 26.6 % (ref 19.6–45.3)
MCH RBC QN AUTO: 23.7 PG (ref 26.6–33)
MCHC RBC AUTO-ENTMCNC: 31.4 G/DL (ref 31.5–35.7)
MCV RBC AUTO: 75.4 FL (ref 79–97)
MONOCYTES # BLD AUTO: 0.43 10*3/MM3 (ref 0.1–0.9)
MONOCYTES NFR BLD AUTO: 5.4 % (ref 5–12)
NEUTROPHILS NFR BLD AUTO: 5.19 10*3/MM3 (ref 1.7–7)
NEUTROPHILS NFR BLD AUTO: 64.8 % (ref 42.7–76)
NRBC BLD AUTO-RTO: 0 /100 WBC (ref 0–0.2)
PLATELET # BLD AUTO: 192 10*3/MM3 (ref 140–450)
PMV BLD AUTO: 10.3 FL (ref 6–12)
POTASSIUM SERPL-SCNC: 4.2 MMOL/L (ref 3.5–5.2)
PROT SERPL-MCNC: 7.6 G/DL (ref 6–8.5)
RBC # BLD AUTO: 4.27 10*6/MM3 (ref 3.77–5.28)
SODIUM SERPL-SCNC: 133 MMOL/L (ref 136–145)
WBC NRBC COR # BLD: 8 10*3/MM3 (ref 3.4–10.8)

## 2023-09-03 PROCEDURE — 85652 RBC SED RATE AUTOMATED: CPT | Performed by: NURSE PRACTITIONER

## 2023-09-03 PROCEDURE — 96375 TX/PRO/DX INJ NEW DRUG ADDON: CPT

## 2023-09-03 PROCEDURE — 85025 COMPLETE CBC W/AUTO DIFF WBC: CPT | Performed by: NURSE PRACTITIONER

## 2023-09-03 PROCEDURE — 80053 COMPREHEN METABOLIC PANEL: CPT | Performed by: NURSE PRACTITIONER

## 2023-09-03 PROCEDURE — 96365 THER/PROPH/DIAG IV INF INIT: CPT

## 2023-09-03 PROCEDURE — 25010000002 DALBAVANCIN 500 MG RECONSTITUTED SOLUTION 1 EACH VIAL: Performed by: NURSE PRACTITIONER

## 2023-09-03 PROCEDURE — 99283 EMERGENCY DEPT VISIT LOW MDM: CPT

## 2023-09-03 PROCEDURE — 25010000002 KETOROLAC TROMETHAMINE PER 15 MG: Performed by: STUDENT IN AN ORGANIZED HEALTH CARE EDUCATION/TRAINING PROGRAM

## 2023-09-03 PROCEDURE — 86140 C-REACTIVE PROTEIN: CPT | Performed by: NURSE PRACTITIONER

## 2023-09-03 PROCEDURE — 63710000001 ONDANSETRON ODT 4 MG TABLET DISPERSIBLE: Performed by: NURSE PRACTITIONER

## 2023-09-03 RX ORDER — SODIUM CHLORIDE 0.9 % (FLUSH) 0.9 %
10 SYRINGE (ML) INJECTION AS NEEDED
Status: DISCONTINUED | OUTPATIENT
Start: 2023-09-03 | End: 2023-09-03 | Stop reason: HOSPADM

## 2023-09-03 RX ORDER — ONDANSETRON 4 MG/1
4 TABLET, ORALLY DISINTEGRATING ORAL ONCE
Status: COMPLETED | OUTPATIENT
Start: 2023-09-03 | End: 2023-09-03

## 2023-09-03 RX ORDER — KETOROLAC TROMETHAMINE 30 MG/ML
30 INJECTION, SOLUTION INTRAMUSCULAR; INTRAVENOUS ONCE
Status: COMPLETED | OUTPATIENT
Start: 2023-09-03 | End: 2023-09-03

## 2023-09-03 RX ORDER — DEXTROSE MONOHYDRATE 50 MG/ML
25 INJECTION, SOLUTION INTRAVENOUS AS NEEDED
Status: DISCONTINUED | OUTPATIENT
Start: 2023-09-03 | End: 2023-09-03 | Stop reason: HOSPADM

## 2023-09-03 RX ADMIN — KETOROLAC TROMETHAMINE 30 MG: 30 INJECTION, SOLUTION INTRAMUSCULAR at 18:02

## 2023-09-03 RX ADMIN — DALBAVANCIN 1500 MG: 500 INJECTION, POWDER, FOR SOLUTION INTRAVENOUS at 17:51

## 2023-09-03 RX ADMIN — DEXTROSE MONOHYDRATE 25 ML: 50 INJECTION, SOLUTION INTRAVENOUS at 17:51

## 2023-09-03 RX ADMIN — ONDANSETRON 4 MG: 4 TABLET, ORALLY DISINTEGRATING ORAL at 18:29

## 2023-09-03 NOTE — ED PROVIDER NOTES
Subjective   History of Present Illness  Patient is a 38-year-old female presents emerged today complaint of a possible abscess on her right posterior upper leg.  Patient reports it has been there for 4 to 5 days states that her has been well usually pop the areas and they would get better.  Patient reports that he did this and it did drain considerable amount of pus and states that it is now spreading around to the anterior portion of the right leg.  Patient denies fever.  Patient denies chest pain or shortness of breath.  Patient denies any alleviating or worsening factors.    Abscess    Review of Systems   Constitutional: Negative.    HENT: Negative.     Eyes: Negative.    Respiratory: Negative.     Cardiovascular: Negative.    Gastrointestinal: Negative.    Endocrine: Negative.    Genitourinary: Negative.    Musculoskeletal: Negative.    Skin: Negative.    Allergic/Immunologic: Negative.    Neurological: Negative.    Hematological: Negative.    Psychiatric/Behavioral: Negative.       Past Medical History:   Diagnosis Date    Anxiety     Depression     Diabetes mellitus     Diverticulitis     GERD (gastroesophageal reflux disease)     Hypertension     Kidney stone     PCOS (polycystic ovarian syndrome)     Sleep apnea        Allergies   Allergen Reactions    Dilantin [Phenytoin] Mental Status Change    Keppra [Levetiracetam] Mental Status Change    Meperidine Rash    Topamax [Topiramate] Mental Status Change       Past Surgical History:   Procedure Laterality Date    CARDIAC CATHETERIZATION      CARPAL TUNNEL RELEASE      COLONOSCOPY      ENDOSCOPY      FRACTURE SURGERY      HARDWARE REMOVAL      WRIST    TENDON REPAIR      HAND       Family History   Problem Relation Age of Onset    Heart disease Mother     COPD Mother     Heart disease Father     COPD Father        Social History     Socioeconomic History    Marital status:    Tobacco Use    Smoking status: Former    Smokeless tobacco: Never   Vaping Use     Vaping Use: Never used   Substance and Sexual Activity    Alcohol use: No    Drug use: No    Sexual activity: Defer           Objective   Physical Exam  Vitals and nursing note reviewed.   Constitutional:       Appearance: She is well-developed.   HENT:      Head: Normocephalic.      Right Ear: External ear normal.      Left Ear: External ear normal.   Eyes:      Conjunctiva/sclera: Conjunctivae normal.      Pupils: Pupils are equal, round, and reactive to light.   Cardiovascular:      Rate and Rhythm: Normal rate and regular rhythm.      Heart sounds: Normal heart sounds.   Pulmonary:      Effort: Pulmonary effort is normal.      Breath sounds: Normal breath sounds.   Abdominal:      General: Bowel sounds are normal.      Palpations: Abdomen is soft.   Musculoskeletal:         General: Normal range of motion.      Cervical back: Normal range of motion and neck supple.   Skin:     General: Skin is warm and dry.      Capillary Refill: Capillary refill takes less than 2 seconds.      Findings: Erythema (4 cm circular area of erythema on posterior thigh with streaking of erythema medially and anteriorly) present.   Neurological:      Mental Status: She is alert and oriented to person, place, and time.   Psychiatric:         Behavior: Behavior normal.         Thought Content: Thought content normal.       Procedures           ED Course                                           Medical Decision Making  Problems Addressed:  Cellulitis of right lower extremity: complicated acute illness or injury    Amount and/or Complexity of Data Reviewed  Labs: ordered.    Risk  Prescription drug management.        Final diagnoses:   Cellulitis of right lower extremity       ED Disposition  ED Disposition       ED Disposition   Discharge    Condition   Stable    Comment   --               Shawna Lambert, DO  [address] [place] [address]  [phone]    Schedule an appointment as soon as possible for a visit   For  further evaluation         Medication List      No changes were made to your prescriptions during this visit.            Stevenson Andre, APRN  09/09/23 102

## 2023-09-05 ENCOUNTER — HOSPITAL ENCOUNTER (OUTPATIENT)
Dept: NUCLEAR MEDICINE | Facility: HOSPITAL | Age: 38
Discharge: HOME | End: 2023-09-05
Payer: MEDICARE

## 2023-09-05 ENCOUNTER — HOSPITAL ENCOUNTER (OUTPATIENT)
Dept: CARDIOLOGY | Facility: HOSPITAL | Age: 38
Discharge: HOME | End: 2023-09-05
Payer: MEDICARE

## 2023-09-05 DIAGNOSIS — R07.9 CHEST PAIN, UNSPECIFIED TYPE: ICD-10-CM

## 2023-09-05 PROCEDURE — A9500 TC99M SESTAMIBI: HCPCS | Performed by: FAMILY MEDICINE

## 2023-09-05 PROCEDURE — 25010000002 REGADENOSON 0.4 MG/5ML SOLUTION: Performed by: FAMILY MEDICINE

## 2023-09-05 PROCEDURE — 93017 CV STRESS TEST TRACING ONLY: CPT

## 2023-09-05 PROCEDURE — 78452 HT MUSCLE IMAGE SPECT MULT: CPT

## 2023-09-05 PROCEDURE — 0 TECHNETIUM SESTAMIBI: Performed by: FAMILY MEDICINE

## 2023-09-05 RX ORDER — REGADENOSON 0.08 MG/ML
0.4 INJECTION, SOLUTION INTRAVENOUS
Status: COMPLETED | OUTPATIENT
Start: 2023-09-05 | End: 2023-09-05

## 2023-09-05 RX ADMIN — REGADENOSON 0.4 MG: 0.08 INJECTION, SOLUTION INTRAVENOUS at 11:09

## 2023-09-05 RX ADMIN — TECHNETIUM TC 99M SESTAMIBI 1 DOSE: 1 INJECTION INTRAVENOUS at 08:49

## 2023-09-05 RX ADMIN — TECHNETIUM TC 99M SESTAMIBI 1 DOSE: 1 INJECTION INTRAVENOUS at 11:09

## 2023-09-06 LAB
BH CV REST NUCLEAR ISOTOPE DOSE: 10.1 MCI
BH CV STRESS COMMENTS STAGE 1: NORMAL
BH CV STRESS DOSE REGADENOSON STAGE 1: 0.4
BH CV STRESS DURATION MIN STAGE 1: 0
BH CV STRESS DURATION SEC STAGE 1: 10
BH CV STRESS NUCLEAR ISOTOPE DOSE: 30.1 MCI
BH CV STRESS PROTOCOL 1: NORMAL
BH CV STRESS RECOVERY BP: NORMAL MMHG
BH CV STRESS RECOVERY HR: 88 BPM
BH CV STRESS STAGE 1: 1
LV EF NUC BP: 60 %
MAXIMAL PREDICTED HEART RATE: 180 BPM
PERCENT MAX PREDICTED HR: 63.33 %
STRESS BASELINE BP: NORMAL MMHG
STRESS BASELINE HR: 80 BPM
STRESS PERCENT HR: 75 %
STRESS POST PEAK BP: NORMAL MMHG
STRESS POST PEAK HR: 114 BPM
STRESS TARGET HR: 153 BPM

## 2023-09-15 ENCOUNTER — SPECIALTY PHARMACY (OUTPATIENT)
Dept: PHARMACY | Facility: HOSPITAL | Age: 38
End: 2023-09-15
Payer: MEDICARE

## 2023-09-16 PROCEDURE — 99284 EMERGENCY DEPT VISIT MOD MDM: CPT

## 2023-09-17 ENCOUNTER — HOSPITAL ENCOUNTER (EMERGENCY)
Facility: HOSPITAL | Age: 38
Discharge: HOME | End: 2023-09-17
Attending: STUDENT IN AN ORGANIZED HEALTH CARE EDUCATION/TRAINING PROGRAM | Admitting: STUDENT IN AN ORGANIZED HEALTH CARE EDUCATION/TRAINING PROGRAM
Payer: MEDICARE

## 2023-09-17 ENCOUNTER — APPOINTMENT (OUTPATIENT)
Dept: CT IMAGING | Facility: HOSPITAL | Age: 38
End: 2023-09-17
Payer: MEDICARE

## 2023-09-17 ENCOUNTER — APPOINTMENT (OUTPATIENT)
Dept: ULTRASOUND IMAGING | Facility: HOSPITAL | Age: 38
End: 2023-09-17
Payer: MEDICARE

## 2023-09-17 VITALS
DIASTOLIC BLOOD PRESSURE: 61 MMHG | HEIGHT: 59 IN | OXYGEN SATURATION: 92 % | SYSTOLIC BLOOD PRESSURE: 122 MMHG | TEMPERATURE: 98.7 F | BODY MASS INDEX: 41.33 KG/M2 | HEART RATE: 100 BPM | RESPIRATION RATE: 19 BRPM | WEIGHT: 205 LBS

## 2023-09-17 DIAGNOSIS — K85.00 IDIOPATHIC ACUTE PANCREATITIS WITHOUT INFECTION OR NECROSIS: Primary | ICD-10-CM

## 2023-09-17 LAB
ALBUMIN SERPL-MCNC: 4 G/DL (ref 3.5–5.2)
ALBUMIN/GLOB SERPL: 1.3 G/DL
ALP SERPL-CCNC: 102 U/L (ref 39–117)
ALT SERPL W P-5'-P-CCNC: 14 U/L (ref 1–33)
AMYLASE SERPL-CCNC: 116 U/L (ref 28–100)
ANION GAP SERPL CALCULATED.3IONS-SCNC: 11 MMOL/L (ref 5–15)
AST SERPL-CCNC: 12 U/L (ref 1–32)
B-HCG UR QL: NEGATIVE
BASOPHILS # BLD AUTO: 0.03 10*3/MM3 (ref 0–0.2)
BASOPHILS NFR BLD AUTO: 0.4 % (ref 0–1.5)
BILIRUB SERPL-MCNC: <0.2 MG/DL (ref 0–1.2)
BILIRUB UR QL STRIP: NEGATIVE
BUN SERPL-MCNC: 12 MG/DL (ref 6–20)
BUN/CREAT SERPL: 17.6 (ref 7–25)
CALCIUM SPEC-SCNC: 9.8 MG/DL (ref 8.6–10.5)
CHLORIDE SERPL-SCNC: 103 MMOL/L (ref 98–107)
CLARITY UR: CLEAR
CO2 SERPL-SCNC: 22 MMOL/L (ref 22–29)
COLOR UR: YELLOW
CREAT SERPL-MCNC: 0.68 MG/DL (ref 0.57–1)
CRP SERPL-MCNC: 1.11 MG/DL (ref 0–0.5)
D-LACTATE SERPL-SCNC: 1.4 MMOL/L (ref 0.5–2)
DEPRECATED RDW RBC AUTO: 40 FL (ref 37–54)
EGFRCR SERPLBLD CKD-EPI 2021: 113.1 ML/MIN/1.73
EOSINOPHIL # BLD AUTO: 0.22 10*3/MM3 (ref 0–0.4)
EOSINOPHIL NFR BLD AUTO: 3.2 % (ref 0.3–6.2)
ERYTHROCYTE [DISTWIDTH] IN BLOOD BY AUTOMATED COUNT: 14.6 % (ref 12.3–15.4)
ERYTHROCYTE [SEDIMENTATION RATE] IN BLOOD: 38 MM/HR (ref 0–20)
GLOBULIN UR ELPH-MCNC: 3.1 GM/DL
GLUCOSE SERPL-MCNC: 382 MG/DL (ref 65–99)
GLUCOSE UR STRIP-MCNC: ABNORMAL MG/DL
HBA1C MFR BLD: 9.3 % (ref 4.8–5.6)
HCG INTACT+B SERPL-ACNC: 18.36 MIU/ML
HCG SERPL QL: POSITIVE
HCT VFR BLD AUTO: 31.2 % (ref 34–46.6)
HGB BLD-MCNC: 9.5 G/DL (ref 12–15.9)
HGB UR QL STRIP.AUTO: NEGATIVE
HOLD SPECIMEN: NORMAL
IMM GRANULOCYTES # BLD AUTO: 0.02 10*3/MM3 (ref 0–0.05)
IMM GRANULOCYTES NFR BLD AUTO: 0.3 % (ref 0–0.5)
KETONES UR QL STRIP: NEGATIVE
LEUKOCYTE ESTERASE UR QL STRIP.AUTO: NEGATIVE
LIPASE SERPL-CCNC: 649 U/L (ref 13–60)
LYMPHOCYTES # BLD AUTO: 2.22 10*3/MM3 (ref 0.7–3.1)
LYMPHOCYTES NFR BLD AUTO: 32.2 % (ref 19.6–45.3)
MCH RBC QN AUTO: 23.2 PG (ref 26.6–33)
MCHC RBC AUTO-ENTMCNC: 30.4 G/DL (ref 31.5–35.7)
MCV RBC AUTO: 76.1 FL (ref 79–97)
MONOCYTES # BLD AUTO: 0.46 10*3/MM3 (ref 0.1–0.9)
MONOCYTES NFR BLD AUTO: 6.7 % (ref 5–12)
NEUTROPHILS NFR BLD AUTO: 3.95 10*3/MM3 (ref 1.7–7)
NEUTROPHILS NFR BLD AUTO: 57.2 % (ref 42.7–76)
NITRITE UR QL STRIP: NEGATIVE
NRBC BLD AUTO-RTO: 0 /100 WBC (ref 0–0.2)
PH UR STRIP.AUTO: 6 [PH] (ref 5–8)
PLATELET # BLD AUTO: 177 10*3/MM3 (ref 140–450)
PMV BLD AUTO: 10.2 FL (ref 6–12)
POTASSIUM SERPL-SCNC: 4.2 MMOL/L (ref 3.5–5.2)
PROT SERPL-MCNC: 7.1 G/DL (ref 6–8.5)
PROT UR QL STRIP: NEGATIVE
RBC # BLD AUTO: 4.1 10*6/MM3 (ref 3.77–5.28)
SODIUM SERPL-SCNC: 136 MMOL/L (ref 136–145)
SP GR UR STRIP: >1.03 (ref 1–1.03)
UROBILINOGEN UR QL STRIP: ABNORMAL
WBC NRBC COR # BLD: 6.9 10*3/MM3 (ref 3.4–10.8)
WHOLE BLOOD HOLD COAG: NORMAL
WHOLE BLOOD HOLD SPECIMEN: NORMAL

## 2023-09-17 PROCEDURE — 85025 COMPLETE CBC W/AUTO DIFF WBC: CPT | Performed by: NURSE PRACTITIONER

## 2023-09-17 PROCEDURE — 81003 URINALYSIS AUTO W/O SCOPE: CPT | Performed by: NURSE PRACTITIONER

## 2023-09-17 PROCEDURE — 36415 COLL VENOUS BLD VENIPUNCTURE: CPT

## 2023-09-17 PROCEDURE — 83690 ASSAY OF LIPASE: CPT | Performed by: NURSE PRACTITIONER

## 2023-09-17 PROCEDURE — 83605 ASSAY OF LACTIC ACID: CPT | Performed by: NURSE PRACTITIONER

## 2023-09-17 PROCEDURE — 81025 URINE PREGNANCY TEST: CPT | Performed by: NURSE PRACTITIONER

## 2023-09-17 PROCEDURE — 80053 COMPREHEN METABOLIC PANEL: CPT | Performed by: NURSE PRACTITIONER

## 2023-09-17 PROCEDURE — 83036 HEMOGLOBIN GLYCOSYLATED A1C: CPT | Performed by: NURSE PRACTITIONER

## 2023-09-17 PROCEDURE — 84702 CHORIONIC GONADOTROPIN TEST: CPT | Performed by: NURSE PRACTITIONER

## 2023-09-17 PROCEDURE — 63710000001 PROMETHAZINE PER 25 MG: Performed by: NURSE PRACTITIONER

## 2023-09-17 PROCEDURE — 25010000002 PROCHLORPERAZINE 10 MG/2ML SOLUTION: Performed by: NURSE PRACTITIONER

## 2023-09-17 PROCEDURE — 25010000002 MORPHINE PER 10 MG: Performed by: NURSE PRACTITIONER

## 2023-09-17 PROCEDURE — 76705 ECHO EXAM OF ABDOMEN: CPT

## 2023-09-17 PROCEDURE — 85652 RBC SED RATE AUTOMATED: CPT | Performed by: NURSE PRACTITIONER

## 2023-09-17 PROCEDURE — 86140 C-REACTIVE PROTEIN: CPT | Performed by: NURSE PRACTITIONER

## 2023-09-17 PROCEDURE — 96374 THER/PROPH/DIAG INJ IV PUSH: CPT

## 2023-09-17 PROCEDURE — 84703 CHORIONIC GONADOTROPIN ASSAY: CPT | Performed by: NURSE PRACTITIONER

## 2023-09-17 PROCEDURE — 96375 TX/PRO/DX INJ NEW DRUG ADDON: CPT

## 2023-09-17 PROCEDURE — 82150 ASSAY OF AMYLASE: CPT | Performed by: NURSE PRACTITIONER

## 2023-09-17 RX ORDER — PROMETHAZINE HYDROCHLORIDE 25 MG/1
25 TABLET ORAL ONCE
Status: COMPLETED | OUTPATIENT
Start: 2023-09-17 | End: 2023-09-17

## 2023-09-17 RX ORDER — SODIUM CHLORIDE 0.9 % (FLUSH) 0.9 %
10 SYRINGE (ML) INJECTION AS NEEDED
Status: DISCONTINUED | OUTPATIENT
Start: 2023-09-17 | End: 2023-09-17 | Stop reason: HOSPADM

## 2023-09-17 RX ORDER — HYDROCODONE BITARTRATE AND ACETAMINOPHEN 7.5; 325 MG/1; MG/1
1 TABLET ORAL ONCE
Status: COMPLETED | OUTPATIENT
Start: 2023-09-17 | End: 2023-09-17

## 2023-09-17 RX ORDER — OXYCODONE AND ACETAMINOPHEN 10; 325 MG/1; MG/1
1 TABLET ORAL ONCE
Status: COMPLETED | OUTPATIENT
Start: 2023-09-17 | End: 2023-09-17

## 2023-09-17 RX ORDER — PROCHLORPERAZINE EDISYLATE 5 MG/ML
5 INJECTION INTRAMUSCULAR; INTRAVENOUS ONCE
Status: COMPLETED | OUTPATIENT
Start: 2023-09-17 | End: 2023-09-17

## 2023-09-17 RX ORDER — PROMETHAZINE HYDROCHLORIDE 25 MG/1
25 TABLET ORAL EVERY 6 HOURS PRN
Qty: 20 TABLET | Refills: 0 | Status: SHIPPED | OUTPATIENT
Start: 2023-09-17 | End: 2023-09-22

## 2023-09-17 RX ORDER — OXYCODONE HYDROCHLORIDE AND ACETAMINOPHEN 5; 325 MG/1; MG/1
1 TABLET ORAL EVERY 6 HOURS PRN
Qty: 12 TABLET | Refills: 0 | Status: SHIPPED | OUTPATIENT
Start: 2023-09-17 | End: 2023-09-20

## 2023-09-17 RX ADMIN — PROCHLORPERAZINE EDISYLATE 5 MG: 5 INJECTION INTRAMUSCULAR; INTRAVENOUS at 01:46

## 2023-09-17 RX ADMIN — MORPHINE SULFATE 4 MG: 4 INJECTION, SOLUTION INTRAMUSCULAR; INTRAVENOUS at 01:46

## 2023-09-17 RX ADMIN — OXYCODONE HYDROCHLORIDE AND ACETAMINOPHEN 1 TABLET: 10; 325 TABLET ORAL at 05:08

## 2023-09-17 RX ADMIN — PROMETHAZINE HYDROCHLORIDE 25 MG: 25 TABLET ORAL at 05:08

## 2023-09-17 RX ADMIN — SODIUM CHLORIDE 1000 ML: 9 INJECTION, SOLUTION INTRAVENOUS at 01:46

## 2023-09-17 RX ADMIN — HYDROCODONE BITARTRATE AND ACETAMINOPHEN 1 TABLET: 7.5; 325 TABLET ORAL at 04:03

## 2023-09-17 NOTE — ED NOTES
MEDICAL SCREENING:    Reason for Visit: Abdominal Pain    Patient initially seen in triage.  The patient was advised further evaluation and diagnostic testing will be needed, some of the treatment and testing will be initiated in the lobby in order to begin the process.  The patient will be returned to the waiting area for the time being and possibly be re-assessed by a subsequent ED provider.  The patient will be brought back to the treatment area in as timely manner as possible.     Char Myrick, APRN  09/17/23 0009

## 2023-09-18 ENCOUNTER — HOSPITAL ENCOUNTER (OUTPATIENT)
Facility: HOSPITAL | Age: 38
Setting detail: OBSERVATION
Discharge: HOME | End: 2023-09-20
Attending: EMERGENCY MEDICINE | Admitting: STUDENT IN AN ORGANIZED HEALTH CARE EDUCATION/TRAINING PROGRAM
Payer: MEDICARE

## 2023-09-18 ENCOUNTER — APPOINTMENT (OUTPATIENT)
Dept: ULTRASOUND IMAGING | Facility: HOSPITAL | Age: 38
End: 2023-09-18
Payer: MEDICARE

## 2023-09-18 DIAGNOSIS — K85.00 IDIOPATHIC ACUTE PANCREATITIS WITHOUT INFECTION OR NECROSIS: Primary | ICD-10-CM

## 2023-09-18 PROBLEM — K85.90 ACUTE PANCREATITIS: Status: ACTIVE | Noted: 2023-09-18

## 2023-09-18 LAB
ALBUMIN SERPL-MCNC: 4.2 G/DL (ref 3.5–5.2)
ALBUMIN/GLOB SERPL: 1.2 G/DL
ALP SERPL-CCNC: 89 U/L (ref 39–117)
ALT SERPL W P-5'-P-CCNC: 16 U/L (ref 1–33)
AMYLASE SERPL-CCNC: 79 U/L (ref 28–100)
ANION GAP SERPL CALCULATED.3IONS-SCNC: 8.1 MMOL/L (ref 5–15)
AST SERPL-CCNC: 16 U/L (ref 1–32)
BASOPHILS # BLD AUTO: 0.03 10*3/MM3 (ref 0–0.2)
BASOPHILS NFR BLD AUTO: 0.5 % (ref 0–1.5)
BILIRUB SERPL-MCNC: 0.5 MG/DL (ref 0–1.2)
BILIRUB UR QL STRIP: NEGATIVE
BUN SERPL-MCNC: 11 MG/DL (ref 6–20)
BUN/CREAT SERPL: 18.3 (ref 7–25)
CALCIUM SPEC-SCNC: 9.2 MG/DL (ref 8.6–10.5)
CHLORIDE SERPL-SCNC: 103 MMOL/L (ref 98–107)
CHOLEST SERPL-MCNC: 173 MG/DL (ref 0–200)
CLARITY UR: CLEAR
CO2 SERPL-SCNC: 23.9 MMOL/L (ref 22–29)
COLOR UR: YELLOW
CREAT SERPL-MCNC: 0.6 MG/DL (ref 0.57–1)
CRP SERPL-MCNC: 1.61 MG/DL (ref 0–0.5)
D-LACTATE SERPL-SCNC: 0.7 MMOL/L (ref 0.5–2)
DEPRECATED RDW RBC AUTO: 39.3 FL (ref 37–54)
EGFRCR SERPLBLD CKD-EPI 2021: 116.5 ML/MIN/1.73
EOSINOPHIL # BLD AUTO: 0.16 10*3/MM3 (ref 0–0.4)
EOSINOPHIL NFR BLD AUTO: 2.5 % (ref 0.3–6.2)
ERYTHROCYTE [DISTWIDTH] IN BLOOD BY AUTOMATED COUNT: 14.6 % (ref 12.3–15.4)
GLOBULIN UR ELPH-MCNC: 3.5 GM/DL
GLUCOSE BLDC GLUCOMTR-MCNC: 95 MG/DL (ref 70–130)
GLUCOSE SERPL-MCNC: 212 MG/DL (ref 65–99)
GLUCOSE UR STRIP-MCNC: NEGATIVE MG/DL
HCG INTACT+B SERPL-ACNC: 49.53 MIU/ML
HCG SERPL QL: POSITIVE
HCT VFR BLD AUTO: 33 % (ref 34–46.6)
HDLC SERPL-MCNC: 33 MG/DL (ref 40–60)
HGB BLD-MCNC: 10.2 G/DL (ref 12–15.9)
HGB UR QL STRIP.AUTO: NEGATIVE
HOLD SPECIMEN: NORMAL
HOLD SPECIMEN: NORMAL
IMM GRANULOCYTES # BLD AUTO: 0.02 10*3/MM3 (ref 0–0.05)
IMM GRANULOCYTES NFR BLD AUTO: 0.3 % (ref 0–0.5)
KETONES UR QL STRIP: NEGATIVE
LDLC SERPL CALC-MCNC: 120 MG/DL (ref 0–100)
LDLC/HDLC SERPL: 3.57 {RATIO}
LEUKOCYTE ESTERASE UR QL STRIP.AUTO: NEGATIVE
LIPASE SERPL-CCNC: 222 U/L (ref 13–60)
LYMPHOCYTES # BLD AUTO: 1.7 10*3/MM3 (ref 0.7–3.1)
LYMPHOCYTES NFR BLD AUTO: 26.1 % (ref 19.6–45.3)
MCH RBC QN AUTO: 23.3 PG (ref 26.6–33)
MCHC RBC AUTO-ENTMCNC: 30.9 G/DL (ref 31.5–35.7)
MCV RBC AUTO: 75.5 FL (ref 79–97)
MONOCYTES # BLD AUTO: 0.31 10*3/MM3 (ref 0.1–0.9)
MONOCYTES NFR BLD AUTO: 4.8 % (ref 5–12)
NEUTROPHILS NFR BLD AUTO: 4.29 10*3/MM3 (ref 1.7–7)
NEUTROPHILS NFR BLD AUTO: 65.8 % (ref 42.7–76)
NITRITE UR QL STRIP: NEGATIVE
NRBC BLD AUTO-RTO: 0 /100 WBC (ref 0–0.2)
PH UR STRIP.AUTO: 6.5 [PH] (ref 5–8)
PLATELET # BLD AUTO: 180 10*3/MM3 (ref 140–450)
PMV BLD AUTO: 9.8 FL (ref 6–12)
POTASSIUM SERPL-SCNC: 4.3 MMOL/L (ref 3.5–5.2)
PROT SERPL-MCNC: 7.7 G/DL (ref 6–8.5)
PROT UR QL STRIP: NEGATIVE
RBC # BLD AUTO: 4.37 10*6/MM3 (ref 3.77–5.28)
SODIUM SERPL-SCNC: 135 MMOL/L (ref 136–145)
SP GR UR STRIP: 1.01 (ref 1–1.03)
TRIGL SERPL-MCNC: 111 MG/DL (ref 0–150)
UROBILINOGEN UR QL STRIP: NORMAL
VLDLC SERPL-MCNC: 20 MG/DL (ref 5–40)
WBC NRBC COR # BLD: 6.51 10*3/MM3 (ref 3.4–10.8)
WHOLE BLOOD HOLD COAG: NORMAL
WHOLE BLOOD HOLD SPECIMEN: NORMAL

## 2023-09-18 PROCEDURE — 84703 CHORIONIC GONADOTROPIN ASSAY: CPT

## 2023-09-18 PROCEDURE — 76700 US EXAM ABDOM COMPLETE: CPT

## 2023-09-18 PROCEDURE — G0378 HOSPITAL OBSERVATION PER HR: HCPCS

## 2023-09-18 PROCEDURE — 96375 TX/PRO/DX INJ NEW DRUG ADDON: CPT

## 2023-09-18 PROCEDURE — 25010000002 PIPERACILLIN SOD-TAZOBACTAM PER 1 G: Performed by: STUDENT IN AN ORGANIZED HEALTH CARE EDUCATION/TRAINING PROGRAM

## 2023-09-18 PROCEDURE — 25010000002 ONDANSETRON PER 1 MG: Performed by: STUDENT IN AN ORGANIZED HEALTH CARE EDUCATION/TRAINING PROGRAM

## 2023-09-18 PROCEDURE — 25010000002 MORPHINE PER 10 MG

## 2023-09-18 PROCEDURE — 36415 COLL VENOUS BLD VENIPUNCTURE: CPT

## 2023-09-18 PROCEDURE — 76801 OB US < 14 WKS SINGLE FETUS: CPT | Performed by: RADIOLOGY

## 2023-09-18 PROCEDURE — 83690 ASSAY OF LIPASE: CPT

## 2023-09-18 PROCEDURE — 99284 EMERGENCY DEPT VISIT MOD MDM: CPT

## 2023-09-18 PROCEDURE — 84702 CHORIONIC GONADOTROPIN TEST: CPT

## 2023-09-18 PROCEDURE — 96376 TX/PRO/DX INJ SAME DRUG ADON: CPT

## 2023-09-18 PROCEDURE — 25010000002 ONDANSETRON PER 1 MG

## 2023-09-18 PROCEDURE — 85025 COMPLETE CBC W/AUTO DIFF WBC: CPT

## 2023-09-18 PROCEDURE — 63710000001 PROMETHAZINE PER 25 MG

## 2023-09-18 PROCEDURE — 83605 ASSAY OF LACTIC ACID: CPT

## 2023-09-18 PROCEDURE — 96365 THER/PROPH/DIAG IV INF INIT: CPT

## 2023-09-18 PROCEDURE — 93005 ELECTROCARDIOGRAM TRACING: CPT | Performed by: STUDENT IN AN ORGANIZED HEALTH CARE EDUCATION/TRAINING PROGRAM

## 2023-09-18 PROCEDURE — 82948 REAGENT STRIP/BLOOD GLUCOSE: CPT

## 2023-09-18 PROCEDURE — 86140 C-REACTIVE PROTEIN: CPT | Performed by: STUDENT IN AN ORGANIZED HEALTH CARE EDUCATION/TRAINING PROGRAM

## 2023-09-18 PROCEDURE — 81003 URINALYSIS AUTO W/O SCOPE: CPT

## 2023-09-18 PROCEDURE — 87040 BLOOD CULTURE FOR BACTERIA: CPT | Performed by: STUDENT IN AN ORGANIZED HEALTH CARE EDUCATION/TRAINING PROGRAM

## 2023-09-18 PROCEDURE — 96361 HYDRATE IV INFUSION ADD-ON: CPT

## 2023-09-18 PROCEDURE — 80053 COMPREHEN METABOLIC PANEL: CPT

## 2023-09-18 PROCEDURE — 76700 US EXAM ABDOM COMPLETE: CPT | Performed by: RADIOLOGY

## 2023-09-18 PROCEDURE — 25010000002 MORPHINE PER 10 MG: Performed by: STUDENT IN AN ORGANIZED HEALTH CARE EDUCATION/TRAINING PROGRAM

## 2023-09-18 PROCEDURE — 82150 ASSAY OF AMYLASE: CPT

## 2023-09-18 PROCEDURE — 76801 OB US < 14 WKS SINGLE FETUS: CPT

## 2023-09-18 PROCEDURE — 80061 LIPID PANEL: CPT | Performed by: STUDENT IN AN ORGANIZED HEALTH CARE EDUCATION/TRAINING PROGRAM

## 2023-09-18 PROCEDURE — 93010 ELECTROCARDIOGRAM REPORT: CPT | Performed by: INTERNAL MEDICINE

## 2023-09-18 RX ORDER — PROMETHAZINE HYDROCHLORIDE 25 MG/1
25 TABLET ORAL EVERY 6 HOURS PRN
Status: DISCONTINUED | OUTPATIENT
Start: 2023-09-18 | End: 2023-09-20 | Stop reason: HOSPADM

## 2023-09-18 RX ORDER — ROSUVASTATIN CALCIUM 20 MG/1
20 TABLET, COATED ORAL NIGHTLY
Status: DISCONTINUED | OUTPATIENT
Start: 2023-09-18 | End: 2023-09-18

## 2023-09-18 RX ORDER — AMOXICILLIN 250 MG
2 CAPSULE ORAL 2 TIMES DAILY
Status: DISCONTINUED | OUTPATIENT
Start: 2023-09-18 | End: 2023-09-20 | Stop reason: HOSPADM

## 2023-09-18 RX ORDER — METOPROLOL SUCCINATE 50 MG/1
50 TABLET, EXTENDED RELEASE ORAL DAILY
COMMUNITY
End: 2023-09-20 | Stop reason: HOSPADM

## 2023-09-18 RX ORDER — SODIUM CHLORIDE 9 MG/ML
40 INJECTION, SOLUTION INTRAVENOUS AS NEEDED
Status: DISCONTINUED | OUTPATIENT
Start: 2023-09-18 | End: 2023-09-20 | Stop reason: HOSPADM

## 2023-09-18 RX ORDER — MODAFINIL 100 MG/1
200 TABLET ORAL 2 TIMES DAILY
Status: CANCELLED | OUTPATIENT
Start: 2023-09-18

## 2023-09-18 RX ORDER — PROMETHAZINE HYDROCHLORIDE 25 MG/1
12.5 TABLET ORAL ONCE
Status: COMPLETED | OUTPATIENT
Start: 2023-09-18 | End: 2023-09-18

## 2023-09-18 RX ORDER — BISACODYL 10 MG
10 SUPPOSITORY, RECTAL RECTAL DAILY PRN
Status: DISCONTINUED | OUTPATIENT
Start: 2023-09-18 | End: 2023-09-18

## 2023-09-18 RX ORDER — FUROSEMIDE 40 MG/1
40 TABLET ORAL DAILY
Status: DISCONTINUED | OUTPATIENT
Start: 2023-09-18 | End: 2023-09-18

## 2023-09-18 RX ORDER — ACETAMINOPHEN 325 MG/1
650 TABLET ORAL EVERY 4 HOURS PRN
Status: DISCONTINUED | OUTPATIENT
Start: 2023-09-18 | End: 2023-09-20 | Stop reason: HOSPADM

## 2023-09-18 RX ORDER — LANSOPRAZOLE 30 MG/1
30 CAPSULE, DELAYED RELEASE ORAL 2 TIMES DAILY
COMMUNITY

## 2023-09-18 RX ORDER — LISINOPRIL 10 MG/1
40 TABLET ORAL DAILY
Status: CANCELLED | OUTPATIENT
Start: 2023-09-18

## 2023-09-18 RX ORDER — BISACODYL 5 MG/1
5 TABLET, DELAYED RELEASE ORAL DAILY PRN
Status: DISCONTINUED | OUTPATIENT
Start: 2023-09-18 | End: 2023-09-18

## 2023-09-18 RX ORDER — ACETAMINOPHEN AND CODEINE PHOSPHATE 300; 30 MG/1; MG/1
1 TABLET ORAL ONCE
Status: COMPLETED | OUTPATIENT
Start: 2023-09-18 | End: 2023-09-18

## 2023-09-18 RX ORDER — INSULIN LISPRO 100 [IU]/ML
20 INJECTION, SOLUTION INTRAVENOUS; SUBCUTANEOUS
Status: DISCONTINUED | OUTPATIENT
Start: 2023-09-19 | End: 2023-09-19

## 2023-09-18 RX ORDER — ASPIRIN 81 MG/1
81 TABLET ORAL DAILY
Status: DISCONTINUED | OUTPATIENT
Start: 2023-09-18 | End: 2023-09-20 | Stop reason: HOSPADM

## 2023-09-18 RX ORDER — ACETAMINOPHEN 500 MG
1000 TABLET ORAL ONCE
Status: COMPLETED | OUTPATIENT
Start: 2023-09-18 | End: 2023-09-18

## 2023-09-18 RX ORDER — ROSUVASTATIN CALCIUM 20 MG/1
20 TABLET, COATED ORAL NIGHTLY
COMMUNITY
End: 2023-09-20

## 2023-09-18 RX ORDER — ONDANSETRON 2 MG/ML
4 INJECTION INTRAMUSCULAR; INTRAVENOUS ONCE
Status: COMPLETED | OUTPATIENT
Start: 2023-09-18 | End: 2023-09-18

## 2023-09-18 RX ORDER — ENOXAPARIN SODIUM 100 MG/ML
40 INJECTION SUBCUTANEOUS NIGHTLY
Status: DISCONTINUED | OUTPATIENT
Start: 2023-09-18 | End: 2023-09-20 | Stop reason: HOSPADM

## 2023-09-18 RX ORDER — POTASSIUM CHLORIDE 20 MEQ/1
10 TABLET, EXTENDED RELEASE ORAL DAILY
Status: DISCONTINUED | OUTPATIENT
Start: 2023-09-18 | End: 2023-09-20 | Stop reason: HOSPADM

## 2023-09-18 RX ORDER — METOPROLOL SUCCINATE 50 MG/1
50 TABLET, EXTENDED RELEASE ORAL DAILY
Status: DISCONTINUED | OUTPATIENT
Start: 2023-09-18 | End: 2023-09-18

## 2023-09-18 RX ORDER — FUROSEMIDE 40 MG/1
40 TABLET ORAL DAILY
COMMUNITY

## 2023-09-18 RX ORDER — SODIUM CHLORIDE 0.9 % (FLUSH) 0.9 %
10 SYRINGE (ML) INJECTION AS NEEDED
Status: DISCONTINUED | OUTPATIENT
Start: 2023-09-18 | End: 2023-09-20 | Stop reason: HOSPADM

## 2023-09-18 RX ORDER — SODIUM CHLORIDE 0.9 % (FLUSH) 0.9 %
10 SYRINGE (ML) INJECTION EVERY 12 HOURS SCHEDULED
Status: DISCONTINUED | OUTPATIENT
Start: 2023-09-18 | End: 2023-09-20 | Stop reason: HOSPADM

## 2023-09-18 RX ORDER — ONDANSETRON 2 MG/ML
4 INJECTION INTRAMUSCULAR; INTRAVENOUS EVERY 6 HOURS PRN
Status: DISCONTINUED | OUTPATIENT
Start: 2023-09-18 | End: 2023-09-20 | Stop reason: HOSPADM

## 2023-09-18 RX ORDER — LISINOPRIL 40 MG/1
40 TABLET ORAL DAILY
COMMUNITY
End: 2023-09-20 | Stop reason: HOSPADM

## 2023-09-18 RX ORDER — MORPHINE SULFATE 2 MG/ML
2 INJECTION, SOLUTION INTRAMUSCULAR; INTRAVENOUS
Status: DISCONTINUED | OUTPATIENT
Start: 2023-09-18 | End: 2023-09-20 | Stop reason: HOSPADM

## 2023-09-18 RX ORDER — MODAFINIL 200 MG/1
200 TABLET ORAL 2 TIMES DAILY
COMMUNITY
End: 2023-09-20

## 2023-09-18 RX ORDER — MORPHINE SULFATE 2 MG/ML
2 INJECTION, SOLUTION INTRAMUSCULAR; INTRAVENOUS ONCE
Status: COMPLETED | OUTPATIENT
Start: 2023-09-18 | End: 2023-09-18

## 2023-09-18 RX ORDER — POLYETHYLENE GLYCOL 3350 17 G/17G
17 POWDER, FOR SOLUTION ORAL DAILY PRN
Status: DISCONTINUED | OUTPATIENT
Start: 2023-09-18 | End: 2023-09-20 | Stop reason: HOSPADM

## 2023-09-18 RX ORDER — SIMETHICONE 80 MG
80 TABLET,CHEWABLE ORAL ONCE
Status: COMPLETED | OUTPATIENT
Start: 2023-09-18 | End: 2023-09-18

## 2023-09-18 RX ORDER — PANTOPRAZOLE SODIUM 40 MG/1
40 TABLET, DELAYED RELEASE ORAL
Status: DISCONTINUED | OUTPATIENT
Start: 2023-09-19 | End: 2023-09-18

## 2023-09-18 RX ORDER — OXYCODONE HYDROCHLORIDE AND ACETAMINOPHEN 5; 325 MG/1; MG/1
1 TABLET ORAL EVERY 6 HOURS PRN
Status: CANCELLED | OUTPATIENT
Start: 2023-09-18 | End: 2023-09-20

## 2023-09-18 RX ORDER — MESALAMINE 1000 MG/1
1000 SUPPOSITORY RECTAL NIGHTLY
Status: DISCONTINUED | OUTPATIENT
Start: 2023-09-18 | End: 2023-09-20 | Stop reason: HOSPADM

## 2023-09-18 RX ORDER — POTASSIUM CHLORIDE 750 MG/1
10 TABLET, FILM COATED, EXTENDED RELEASE ORAL DAILY
COMMUNITY

## 2023-09-18 RX ORDER — SODIUM CHLORIDE 9 MG/ML
125 INJECTION, SOLUTION INTRAVENOUS CONTINUOUS
Status: DISCONTINUED | OUTPATIENT
Start: 2023-09-18 | End: 2023-09-18

## 2023-09-18 RX ORDER — SODIUM CHLORIDE, SODIUM LACTATE, POTASSIUM CHLORIDE, CALCIUM CHLORIDE 600; 310; 30; 20 MG/100ML; MG/100ML; MG/100ML; MG/100ML
125 INJECTION, SOLUTION INTRAVENOUS CONTINUOUS
Status: DISCONTINUED | OUTPATIENT
Start: 2023-09-18 | End: 2023-09-20 | Stop reason: HOSPADM

## 2023-09-18 RX ORDER — PANTOPRAZOLE SODIUM 40 MG/1
40 TABLET, DELAYED RELEASE ORAL
Status: DISCONTINUED | OUTPATIENT
Start: 2023-09-19 | End: 2023-09-20 | Stop reason: HOSPADM

## 2023-09-18 RX ORDER — ASPIRIN 81 MG/1
81 TABLET ORAL DAILY
COMMUNITY

## 2023-09-18 RX ADMIN — MORPHINE SULFATE 2 MG: 2 INJECTION, SOLUTION INTRAMUSCULAR; INTRAVENOUS at 17:32

## 2023-09-18 RX ADMIN — ONDANSETRON 4 MG: 2 INJECTION INTRAMUSCULAR; INTRAVENOUS at 20:16

## 2023-09-18 RX ADMIN — MORPHINE SULFATE 2 MG: 2 INJECTION, SOLUTION INTRAMUSCULAR; INTRAVENOUS at 08:58

## 2023-09-18 RX ADMIN — Medication 10 ML: at 20:17

## 2023-09-18 RX ADMIN — SODIUM CHLORIDE, POTASSIUM CHLORIDE, SODIUM LACTATE AND CALCIUM CHLORIDE 125 ML/HR: 600; 310; 30; 20 INJECTION, SOLUTION INTRAVENOUS at 21:45

## 2023-09-18 RX ADMIN — MESALAMINE 1000 MG: 1000 SUPPOSITORY RECTAL at 21:45

## 2023-09-18 RX ADMIN — ONDANSETRON 4 MG: 2 INJECTION INTRAMUSCULAR; INTRAVENOUS at 18:05

## 2023-09-18 RX ADMIN — PROMETHAZINE HYDROCHLORIDE 12.5 MG: 25 TABLET ORAL at 11:02

## 2023-09-18 RX ADMIN — ONDANSETRON 4 MG: 2 INJECTION INTRAMUSCULAR; INTRAVENOUS at 08:57

## 2023-09-18 RX ADMIN — MORPHINE SULFATE 2 MG: 2 INJECTION, SOLUTION INTRAMUSCULAR; INTRAVENOUS at 20:15

## 2023-09-18 RX ADMIN — SODIUM CHLORIDE 125 ML/HR: 9 INJECTION, SOLUTION INTRAVENOUS at 08:56

## 2023-09-18 RX ADMIN — PIPERACILLIN SODIUM AND TAZOBACTAM SODIUM 4.5 G: 4; .5 INJECTION, POWDER, LYOPHILIZED, FOR SOLUTION INTRAVENOUS at 20:16

## 2023-09-18 RX ADMIN — ACETAMINOPHEN 1000 MG: 500 TABLET ORAL at 11:02

## 2023-09-18 RX ADMIN — SIMETHICONE 80 MG: 80 TABLET, CHEWABLE ORAL at 11:02

## 2023-09-18 RX ADMIN — ACETAMINOPHEN AND CODEINE PHOSPHATE 1 TABLET: 300; 30 TABLET ORAL at 13:23

## 2023-09-18 RX ADMIN — ACETAMINOPHEN 650 MG: 325 TABLET ORAL at 23:06

## 2023-09-18 RX ADMIN — SIMETHICONE 80 MG: 80 TABLET, CHEWABLE ORAL at 23:02

## 2023-09-18 NOTE — ED PROVIDER NOTES
Subjective   History of Present Illness  Patient is a 38-year-old female with significant past medical history positive for PCOS, hypertension, GERD, diverticulitis, type 2 diabetes presenting to the ER complaints of epigastric abdominal pain radiating to her mid back.  Patient is not a daily drinker.  Patient has not started any new medications.  Patient reports that pain started abruptly about 2 days ago and worsening and becoming more constant.  Patient describes pain as squeezing.  Patient denies nausea or vomiting.  Patient denies diarrhea known sick contacts recent travel or any additional symptoms today.    History provided by:  Patient   used: No      Review of Systems   Constitutional: Negative.  Negative for fever.   HENT: Negative.     Respiratory: Negative.     Cardiovascular: Negative.  Negative for chest pain.   Gastrointestinal:  Positive for abdominal pain.   Endocrine: Negative.    Genitourinary: Negative.  Negative for dysuria.   Skin: Negative.    Neurological: Negative.    Psychiatric/Behavioral: Negative.     All other systems reviewed and are negative.    Past Medical History:   Diagnosis Date    Anxiety     Depression     Diabetes mellitus     Diverticulitis     GERD (gastroesophageal reflux disease)     Hypertension     Kidney stone     PCOS (polycystic ovarian syndrome)     Sleep apnea        Allergies   Allergen Reactions    Dilantin [Phenytoin] Mental Status Change    Keppra [Levetiracetam] Mental Status Change    Meperidine Rash    Topamax [Topiramate] Mental Status Change       Past Surgical History:   Procedure Laterality Date    CARDIAC CATHETERIZATION      CARPAL TUNNEL RELEASE      COLONOSCOPY      ENDOSCOPY      FRACTURE SURGERY      HARDWARE REMOVAL      WRIST    TENDON REPAIR      HAND       Family History   Problem Relation Age of Onset    Heart disease Mother     COPD Mother     Heart disease Father     COPD Father        Social History     Socioeconomic  History    Marital status:    Tobacco Use    Smoking status: Former    Smokeless tobacco: Never   Vaping Use    Vaping Use: Never used   Substance and Sexual Activity    Alcohol use: No    Drug use: No    Sexual activity: Defer           Objective   Physical Exam  Vitals and nursing note reviewed.   Constitutional:       General: She is in acute distress.      Appearance: She is well-developed. She is not diaphoretic.   HENT:      Head: Normocephalic and atraumatic.      Right Ear: External ear normal.      Left Ear: External ear normal.      Nose: Nose normal.   Eyes:      Conjunctiva/sclera: Conjunctivae normal.      Pupils: Pupils are equal, round, and reactive to light.   Neck:      Vascular: No JVD.      Trachea: No tracheal deviation.   Cardiovascular:      Rate and Rhythm: Normal rate and regular rhythm.      Heart sounds: Normal heart sounds. No murmur heard.  Pulmonary:      Effort: Pulmonary effort is normal. No respiratory distress.      Breath sounds: Normal breath sounds. No wheezing.   Abdominal:      General: Bowel sounds are normal.      Palpations: Abdomen is soft.      Tenderness: There is abdominal tenderness in the epigastric area.   Musculoskeletal:         General: No deformity. Normal range of motion.      Cervical back: Normal range of motion and neck supple.   Skin:     General: Skin is warm and dry.      Capillary Refill: Capillary refill takes 2 to 3 seconds.      Coloration: Skin is not pale.      Findings: No erythema or rash.   Neurological:      Mental Status: She is alert and oriented to person, place, and time.      Cranial Nerves: No cranial nerve deficit.   Psychiatric:         Behavior: Behavior normal.         Thought Content: Thought content normal.       Procedures       Results for orders placed or performed during the hospital encounter of 09/17/23   Comprehensive Metabolic Panel    Specimen: Blood   Result Value Ref Range    Glucose 382 (H) 65 - 99 mg/dL    BUN 12 6 -  20 mg/dL    Creatinine 0.68 0.57 - 1.00 mg/dL    Sodium 136 136 - 145 mmol/L    Potassium 4.2 3.5 - 5.2 mmol/L    Chloride 103 98 - 107 mmol/L    CO2 22.0 22.0 - 29.0 mmol/L    Calcium 9.8 8.6 - 10.5 mg/dL    Total Protein 7.1 6.0 - 8.5 g/dL    Albumin 4.0 3.5 - 5.2 g/dL    ALT (SGPT) 14 1 - 33 U/L    AST (SGOT) 12 1 - 32 U/L    Alkaline Phosphatase 102 39 - 117 U/L    Total Bilirubin <0.2 0.0 - 1.2 mg/dL    Globulin 3.1 gm/dL    A/G Ratio 1.3 g/dL    BUN/Creatinine Ratio 17.6 7.0 - 25.0    Anion Gap 11.0 5.0 - 15.0 mmol/L    eGFR 113.1 >60.0 mL/min/1.73   Lipase    Specimen: Blood   Result Value Ref Range    Lipase 649 (H) 13 - 60 U/L   hCG, Serum, Qualitative    Specimen: Blood   Result Value Ref Range    HCG Qualitative Positive (A) Negative   Urinalysis With Culture If Indicated - Urine, Clean Catch    Specimen: Urine, Clean Catch   Result Value Ref Range    Color, UA Yellow Yellow, Straw    Appearance, UA Clear Clear    pH, UA 6.0 5.0 - 8.0    Specific Gravity, UA >1.030 (H) 1.005 - 1.030    Glucose, UA >=1000 mg/dL (3+) (A) Negative    Ketones, UA Negative Negative    Bilirubin, UA Negative Negative    Blood, UA Negative Negative    Protein, UA Negative Negative    Leuk Esterase, UA Negative Negative    Nitrite, UA Negative Negative    Urobilinogen, UA 1.0 E.U./dL 0.2 - 1.0 E.U./dL   Lactic Acid, Plasma    Specimen: Blood   Result Value Ref Range    Lactate 1.4 0.5 - 2.0 mmol/L   Sedimentation Rate    Specimen: Blood   Result Value Ref Range    Sed Rate 38 (H) 0 - 20 mm/hr   C-reactive Protein    Specimen: Blood   Result Value Ref Range    C-Reactive Protein 1.11 (H) 0.00 - 0.50 mg/dL   CBC Auto Differential    Specimen: Blood   Result Value Ref Range    WBC 6.90 3.40 - 10.80 10*3/mm3    RBC 4.10 3.77 - 5.28 10*6/mm3    Hemoglobin 9.5 (L) 12.0 - 15.9 g/dL    Hematocrit 31.2 (L) 34.0 - 46.6 %    MCV 76.1 (L) 79.0 - 97.0 fL    MCH 23.2 (L) 26.6 - 33.0 pg    MCHC 30.4 (L) 31.5 - 35.7 g/dL    RDW 14.6 12.3 - 15.4  %    RDW-SD 40.0 37.0 - 54.0 fl    MPV 10.2 6.0 - 12.0 fL    Platelets 177 140 - 450 10*3/mm3    Neutrophil % 57.2 42.7 - 76.0 %    Lymphocyte % 32.2 19.6 - 45.3 %    Monocyte % 6.7 5.0 - 12.0 %    Eosinophil % 3.2 0.3 - 6.2 %    Basophil % 0.4 0.0 - 1.5 %    Immature Grans % 0.3 0.0 - 0.5 %    Neutrophils, Absolute 3.95 1.70 - 7.00 10*3/mm3    Lymphocytes, Absolute 2.22 0.70 - 3.10 10*3/mm3    Monocytes, Absolute 0.46 0.10 - 0.90 10*3/mm3    Eosinophils, Absolute 0.22 0.00 - 0.40 10*3/mm3    Basophils, Absolute 0.03 0.00 - 0.20 10*3/mm3    Immature Grans, Absolute 0.02 0.00 - 0.05 10*3/mm3    nRBC 0.0 0.0 - 0.2 /100 WBC   hCG, Quantitative, Pregnancy    Specimen: Blood   Result Value Ref Range    HCG Quantitative 18.36 mIU/mL   Hemoglobin A1c    Specimen: Blood   Result Value Ref Range    Hemoglobin A1C 9.30 (H) 4.80 - 5.60 %   Amylase    Specimen: Blood   Result Value Ref Range    Amylase 116 (H) 28 - 100 U/L   Pregnancy, Urine - Urine, Clean Catch    Specimen: Urine, Clean Catch   Result Value Ref Range    HCG, Urine QL Negative Negative   Green Top (Gel)   Result Value Ref Range    Extra Tube Hold for add-ons.    Lavender Top   Result Value Ref Range    Extra Tube hold for add-on    Light Blue Top   Result Value Ref Range    Extra Tube Hold for add-ons.       US Pancreas   Final Result   1. Suboptimal evaluation of the pancreas due to overlying bowel gas.   2. No definite cholelithiasis although evaluation of the gallbladder is   limited due to lack of an adequate acoustic window.       This report was finalized on 9/17/2023 4:04 AM by Ag Dunn MD.               ED Course  ED Course as of 09/18/23 1546   Sun Sep 17, 2023   0053 HCG Qualitative(!): Positive  Patient reports she was told that she couldn't get pregnant by her OBGYN. Patient has recently changed oral BC and been on antibiotics  [SM]   0107 Lipase(!): 649 [SM]   0115 C-Reactive Protein(!): 1.11 [SM]   0115 Hemoglobin A1C(!): 9.30 [SM]   0141 HCG  Quantitative: 18.36  Patient declines CT due to risk of pregnancy.  []   0147 Amylase(!): 116 [SM]   0410 US Pancreas []   0443 D/W Patient admission vs discharge. Patient would like to be discharged home and will return with worsening symptoms. D/W Attending provider she agrees.  []      ED Course User Index  [] Char Myrick, CICI                                           Medical Decision Making  Problems Addressed:  Idiopathic acute pancreatitis without infection or necrosis: complicated acute illness or injury    Amount and/or Complexity of Data Reviewed  Labs: ordered. Decision-making details documented in ED Course.  Radiology: ordered. Decision-making details documented in ED Course.    Risk  Prescription drug management.        Final diagnoses:   Idiopathic acute pancreatitis without infection or necrosis       ED Disposition  ED Disposition       ED Disposition   Discharge    Condition   Stable    Comment   --               Shawna Lambert DO  [address] [facility] [address] [phone]    Schedule an appointment as soon as possible for a visit in 2 days  As needed, If symptoms worsen         Medication List        New Prescriptions      oxyCODONE-acetaminophen 5-325 MG per tablet  Commonly known as: PERCOCET  Take 1 tablet by mouth Every 6 (Six) Hours As Needed for Severe Pain for up to 3 days.     promethazine 25 MG tablet  Commonly known as: PHENERGAN  Take 1 tablet by mouth Every 6 (Six) Hours As Needed for Nausea or Vomiting for up to 5 days.               Where to Get Your Medications        These medications were sent to [facility] [id number] - [place] [address] - [phone]  - [phone] FX  [address] [place] [place] [address]      Phone: [phone]   oxyCODONE-acetaminophen 5-325 MG per tablet  promethazine 25 MG tablet            Char Myrick, CICI  09/18/23 7959

## 2023-09-18 NOTE — CASE MANAGEMENT/SOCIAL WORK
Discharge Planning Assessment  ALLEN Palma     Patient Name: Antoinette Pack  MRN: [id number]  Today's Date: 9/18/2023    Admit Date: 9/18/2023    Plan: Spoke with patient at bedside. Patient lives with spouse Michelle [phone]. Patient has no POA or Living Will. Patient does use a c-pap machine from [facility]. Patient has no Oxygen or Home Health. PCP [facility] Gregorio Clark. Paitent will return home at discharge and family will transport.   Discharge Needs Assessment       Row Name 09/18/23 1446       Living Environment    People in Home spouse    Name(s) of People in Home [place] [phone]    Primary Care Provided by self    Provides Primary Care For no one    Family Caregiver if Needed spouse    Quality of Family Relationships unable to assess    Able to Return to Prior Arrangements yes       Resource/Environmental Concerns    Resource/Environmental Concerns none    Transportation Concerns none       Transition Planning    Patient/Family Anticipates Transition to home    Patient/Family Anticipated Services at Transition none    Transportation Anticipated family or friend will provide       Discharge Needs Assessment    Readmission Within the Last 30 Days no previous admission in last 30 days    Equipment Currently Used at Home cpap    Concerns to be Addressed discharge planning    Anticipated Changes Related to Illness none    Equipment Needed After Discharge none                   Discharge Plan       Row Name 09/18/23 4068       Plan    Plan Spoke with patient at bedside. Patient lives with spouse Michelle [phone]. Patient has no POA or Living Will. Patient does use a c-pap machine from [facility]. Patient has no Oxygen or Home Health. PCP [facility]. Yessenia Clark Paitent will return home at discharge and family will transport.                  Continued Care and Services - Admitted Since 9/18/2023    Coordination has not been started for this encounter.       Selected  Continued Care - Episodes Includes continued care and service providers with selected services from the active episodes listed below      [facility] Episode start date: 2/17/2022   There are no active outsourced providers for this episode.                 Expected Discharge Date and Time       Expected Discharge Date Expected Discharge Time    Sep 19, 2023            Demographic Summary       Row Name 09/18/23 1445       General Information    Admission Type observation    Arrived From home    Referral Source emergency department    Reason for Consult discharge planning    Preferred Language English                 Kemi Wang

## 2023-09-18 NOTE — ED PROVIDER NOTES
Subjective   History of Present Illness  38-year-old female patient presents to [facility] ED with chief complaint of left upper quadrant abdominal pain and nausea.  Patient states that she was evaluated in our facility's ED 2 nights ago and was diagnosed with pancreatitis.  She states that she was given oral antiemetics and analgesics.  Symptoms had improved yesterday, however returned and worsened today.  She denies any vomiting this morning.  She does report bright red bloody bowel movement 2 nights ago, however denies any since that time.  States that she had a colonoscopy at the beginning of the year and was diagnosed with inflammatory bowel disease, and at the time was having frequent bloody bowel movements which resolved after treatment.  She denies any fever, chills, chest pain, shortness of breath, palpitations, or other acute symptoms at this time.  She does report that her serum hCG was elevated 2 nights ago and although she is on birth control, she has been on oral antibiotics around 1 month ago for lower extremity cellulitis therefore she states it is possible that she could be pregnant as she is still having menstrual periods.     History provided by:  Patient   used: No      Review of Systems   Constitutional:  Negative for chills, fatigue and fever.   HENT:  Negative for congestion, sore throat and trouble swallowing.    Respiratory:  Negative for cough, shortness of breath and wheezing.    Cardiovascular:  Negative for chest pain, palpitations and leg swelling.   Gastrointestinal:  Positive for abdominal pain, blood in stool (48 hours ago) and nausea. Negative for constipation and diarrhea.   Genitourinary:  Negative for difficulty urinating, flank pain, frequency and urgency.   Musculoskeletal:  Negative for back pain, gait problem, neck pain and neck stiffness.   Neurological:  Negative for dizziness, tremors, seizures, syncope, facial asymmetry, speech difficulty, weakness,  light-headedness, numbness and headaches.     Past Medical History:   Diagnosis Date    Anxiety     Depression     Diabetes mellitus     Diverticulitis     GERD (gastroesophageal reflux disease)     Hypertension     Kidney stone     PCOS (polycystic ovarian syndrome)     Sleep apnea        Allergies   Allergen Reactions    Dilantin [Phenytoin] Mental Status Change    Keppra [Levetiracetam] Mental Status Change    Meperidine Rash    Topamax [Topiramate] Mental Status Change       Past Surgical History:   Procedure Laterality Date    CARDIAC CATHETERIZATION      CARPAL TUNNEL RELEASE      COLONOSCOPY      ENDOSCOPY      FRACTURE SURGERY      HARDWARE REMOVAL      WRIST    TENDON REPAIR      HAND       Family History   Problem Relation Age of Onset    Heart disease Mother     COPD Mother     Heart disease Father     COPD Father        Social History     Socioeconomic History    Marital status:    Tobacco Use    Smoking status: Former    Smokeless tobacco: Never   Vaping Use    Vaping Use: Never used   Substance and Sexual Activity    Alcohol use: No    Drug use: No    Sexual activity: Defer           Objective   Physical Exam  Vitals reviewed.   Constitutional:       General: She is awake. She is not in acute distress.     Appearance: She is obese. She is not ill-appearing or diaphoretic.      Comments: Room air.   HENT:      Head: Normocephalic and atraumatic.      Mouth/Throat:      Mouth: Mucous membranes are moist.      Pharynx: Oropharynx is clear.   Eyes:      Extraocular Movements: Extraocular movements intact.      Pupils: Pupils are equal, round, and reactive to light.   Cardiovascular:      Rate and Rhythm: Normal rate and regular rhythm.      Pulses: Normal pulses.           Dorsalis pedis pulses are 2+ on the right side and 2+ on the left side.      Heart sounds: Normal heart sounds. No murmur heard.    No friction rub.   Pulmonary:      Effort: Pulmonary effort is normal. No accessory muscle usage,  respiratory distress or retractions.      Breath sounds: No wheezing, rhonchi or rales.   Abdominal:      General: Bowel sounds are normal. There is distension (Mild).      Palpations: Abdomen is soft.      Tenderness: There is abdominal tenderness in the left upper quadrant. There is guarding.      Hernia: No hernia is present.   Musculoskeletal:      Cervical back: Neck supple. No rigidity.      Right lower leg: No edema.      Left lower leg: No edema.   Skin:     General: Skin is warm and dry.      Capillary Refill: Capillary refill takes 2 to 3 seconds.   Neurological:      Mental Status: She is alert and oriented to person, place, and time. Mental status is at baseline.      Cranial Nerves: No dysarthria or facial asymmetry.      Sensory: Sensation is intact.      Motor: No weakness or tremor.   Psychiatric:         Attention and Perception: Attention normal.         Mood and Affect: Mood normal.         Speech: Speech normal.         Behavior: Behavior normal. Behavior is cooperative.         Thought Content: Thought content normal.         Cognition and Memory: Cognition normal.         Judgment: Judgment normal.       Procedures           ED Course  ED Course as of 09/18/23 2042   Mon Sep 18, 2023   1352 Attempted to call OBGYN on-call physician; no answer at this time. [MC]   1405 Discussed with OBGYN (Dr. Joseph); states that patient is likely pregnant however no gestational sac would be visible until Hcg ~3000. She will need repeat Hcg levels in 48 hours. If patient is admitted for observation by Hospitalist due to pancreatitis, OB willing to consult.  [MC]   1443 Discussed with Hospitalist (Dr. Santoyo). Agreeable to admit.  [MC]      ED Course User Index  [MC] Amber Barraza APRN      Results for orders placed or performed during the hospital encounter of 09/18/23   Comprehensive Metabolic Panel    Specimen: Arm, Right; Blood   Result Value Ref Range    Glucose 212 (H) 65 - 99 mg/dL    BUN 11 6 - 20  mg/dL    Creatinine 0.60 0.57 - 1.00 mg/dL    Sodium 135 (L) 136 - 145 mmol/L    Potassium 4.3 3.5 - 5.2 mmol/L    Chloride 103 98 - 107 mmol/L    CO2 23.9 22.0 - 29.0 mmol/L    Calcium 9.2 8.6 - 10.5 mg/dL    Total Protein 7.7 6.0 - 8.5 g/dL    Albumin 4.2 3.5 - 5.2 g/dL    ALT (SGPT) 16 1 - 33 U/L    AST (SGOT) 16 1 - 32 U/L    Alkaline Phosphatase 89 39 - 117 U/L    Total Bilirubin 0.5 0.0 - 1.2 mg/dL    Globulin 3.5 gm/dL    A/G Ratio 1.2 g/dL    BUN/Creatinine Ratio 18.3 7.0 - 25.0    Anion Gap 8.1 5.0 - 15.0 mmol/L    eGFR 116.5 >60.0 mL/min/1.73   Lipase    Specimen: Arm, Right; Blood   Result Value Ref Range    Lipase 222 (H) 13 - 60 U/L   Urinalysis With Microscopic If Indicated (No Culture) - Urine, Clean Catch    Specimen: Urine, Clean Catch   Result Value Ref Range    Color, UA Yellow Yellow, Straw    Appearance, UA Clear Clear    pH, UA 6.5 5.0 - 8.0    Specific Gravity, UA 1.007 1.005 - 1.030    Glucose, UA Negative Negative    Ketones, UA Negative Negative    Bilirubin, UA Negative Negative    Blood, UA Negative Negative    Protein, UA Negative Negative    Leuk Esterase, UA Negative Negative    Nitrite, UA Negative Negative    Urobilinogen, UA 0.2 E.U./dL 0.2 - 1.0 E.U./dL   Lactic Acid, Plasma    Specimen: Arm, Right; Blood   Result Value Ref Range    Lactate 0.7 0.5 - 2.0 mmol/L   Amylase    Specimen: Arm, Right; Blood   Result Value Ref Range    Amylase 79 28 - 100 U/L   CBC Auto Differential    Specimen: Arm, Right; Blood   Result Value Ref Range    WBC 6.51 3.40 - 10.80 10*3/mm3    RBC 4.37 3.77 - 5.28 10*6/mm3    Hemoglobin 10.2 (L) 12.0 - 15.9 g/dL    Hematocrit 33.0 (L) 34.0 - 46.6 %    MCV 75.5 (L) 79.0 - 97.0 fL    MCH 23.3 (L) 26.6 - 33.0 pg    MCHC 30.9 (L) 31.5 - 35.7 g/dL    RDW 14.6 12.3 - 15.4 %    RDW-SD 39.3 37.0 - 54.0 fl    MPV 9.8 6.0 - 12.0 fL    Platelets 180 140 - 450 10*3/mm3    Neutrophil % 65.8 42.7 - 76.0 %    Lymphocyte % 26.1 19.6 - 45.3 %    Monocyte % 4.8 (L) 5.0 - 12.0  %    Eosinophil % 2.5 0.3 - 6.2 %    Basophil % 0.5 0.0 - 1.5 %    Immature Grans % 0.3 0.0 - 0.5 %    Neutrophils, Absolute 4.29 1.70 - 7.00 10*3/mm3    Lymphocytes, Absolute 1.70 0.70 - 3.10 10*3/mm3    Monocytes, Absolute 0.31 0.10 - 0.90 10*3/mm3    Eosinophils, Absolute 0.16 0.00 - 0.40 10*3/mm3    Basophils, Absolute 0.03 0.00 - 0.20 10*3/mm3    Immature Grans, Absolute 0.02 0.00 - 0.05 10*3/mm3    nRBC 0.0 0.0 - 0.2 /100 WBC   hCG, Serum, Qualitative    Specimen: Arm, Right; Blood   Result Value Ref Range    HCG Qualitative Positive (A) Negative   hCG, Quantitative, Pregnancy    Specimen: Arm, Right; Blood   Result Value Ref Range    HCG Quantitative 49.53 mIU/mL   C-reactive Protein    Specimen: Arm, Right; Blood   Result Value Ref Range    C-Reactive Protein 1.61 (H) 0.00 - 0.50 mg/dL   Lipid Panel    Specimen: Arm, Right; Blood   Result Value Ref Range    Total Cholesterol 173 0 - 200 mg/dL    Triglycerides 111 0 - 150 mg/dL    HDL Cholesterol 33 (L) 40 - 60 mg/dL    LDL Cholesterol  120 (H) 0 - 100 mg/dL    VLDL Cholesterol 20 5 - 40 mg/dL    LDL/HDL Ratio 3.57    Green Top (Gel)   Result Value Ref Range    Extra Tube Hold for add-ons.    Lavender Top   Result Value Ref Range    Extra Tube hold for add-on    Gold Top - SST   Result Value Ref Range    Extra Tube Hold for add-ons.    Light Blue Top   Result Value Ref Range    Extra Tube Hold for add-ons.     US Abdomen Complete    Result Date: 9/18/2023  1.  No biliary dilatation. 2.  Question minimal amount of pericholecystic fluid without significant gallbladder wall thickening. No shadowing gallstones identified.  This report was finalized on 9/18/2023 10:59 AM by Dr. Bharath Chávez MD.      US Ob < 14 Weeks Single or First Gestation    Result Date: 9/18/2023  1.  No intrauterine gestation identified. 2.  Unremarkable sonographic appearance of right ovary. 3.  Nonvisualization of left ovary. 4.  No complex cystic masses identified. Continued follow-up  with serial beta hCG measurements is recommended to exclude the possibility of an early ectopic.  This report was finalized on 9/18/2023 1:22 PM by Dr. Bharath Chávez MD.      US Pancreas    Result Date: 9/17/2023  1. Suboptimal evaluation of the pancreas due to overlying bowel gas. 2. No definite cholelithiasis although evaluation of the gallbladder is limited due to lack of an adequate acoustic window.  This report was finalized on 9/17/2023 4:04 AM by Ag Dunn MD.                                         Medical Decision Making  38-year-old female patient presents to [facility] [place] ED with chief complaint of left upper quadrant abdominal pain and nausea.  Patient states that she was evaluated in our facility's ED 2 nights ago and was diagnosed with pancreatitis.  She states that she was given oral antiemetics and analgesics.  Symptoms had improved yesterday, however returned and worsened today.  She denies any vomiting this morning.  She does report bright red bloody bowel movement 2 nights ago, however denies any since that time.  States that she had a colonoscopy at the beginning of the year and was diagnosed with inflammatory bowel disease, and at the time was having frequent bloody bowel movements which resolved after treatment.  She denies any fever, chills, chest pain, shortness of breath, palpitations, or other acute symptoms at this time.  She does report that her serum hCG was elevated 2 nights ago and although she is on birth control, she has been on oral antibiotics around 1 month ago for lower extremity cellulitis therefore she states it is possible that she could be pregnant as she is still having menstrual periods. Serum and urine hCG qualitative positive; hCG quantitative increased from 2 days ago.  OB ultrasound obtained which did not note IUP or ectopic.  Discussed with OB/GYN who states that patient is likely pregnant as gestational sac would not be visible this early.  States that it is okay for  patient to have morphine, Zofran, and IV fluids.  States that OB service will not admit the patient.  Discussed with hospitalist for observation in setting of intractable nausea and vomiting, pancreatitis.  Agreeable for admission.  Discussed with patient; she voiced agreement understanding with no further questions or concerns at this time.    Problems Addressed:  Idiopathic acute pancreatitis without infection or necrosis: complicated acute illness or injury    Amount and/or Complexity of Data Reviewed  Labs: ordered. Decision-making details documented in ED Course.  Radiology: ordered. Decision-making details documented in ED Course.  ECG/medicine tests: ordered.    Risk  OTC drugs.  Prescription drug management.  Decision regarding hospitalization.        Final diagnoses:   Idiopathic acute pancreatitis without infection or necrosis       ED Disposition  ED Disposition       ED Disposition   Decision to Admit    Condition   --    Comment   Level of Care: Med/Surg [1]   Diagnosis: Acute pancreatitis [577.0.ICD-9-CM]                 No follow-up provider specified.       Medication List      No changes were made to your prescriptions during this visit.            Amber Barraza, APRN  09/18/23 9021

## 2023-09-18 NOTE — H&P
[facility]    Patient Identification:  Name:  Antoinette Pack  Age:  38 y.o.  Sex:  female  :  1985  MRN:  [id number]   Admit Date: 2023   Visit Number:  [id number]  Room number:  114/14  Primary Care Physician:  Shawna Lambert DO     Subjective     Chief complaint:    Chief Complaint   Patient presents with    Abdominal Pain       History of presenting illness:   Patient is a 38-year-old female with history significant for type 2 diabetes mellitus, history of diverticulitis and hypertension who presented to the ER with complaints of abdominal pain and nausea.  Patient states her symptoms started on Saturday in which she experience midepigastric/left upper quadrant pain with radiation around to the back.  She notes several bowel movements throughout Saturday and  with hematochezia during that time but this is since subsided.  She was seen 2 days ago and diagnosed with mild pancreatitis which improved with oral antiemetics and pain medication was discharged home.  Patient states her pain worsened today which prompted presentation back to the ER.    2 nights ago when she was seen in the ER beta-hCG was positive, hCG quant was 18.  Repeat beta-hCG was positive with hCG quant 50.  She does take birth control and has not missed a menstrual cycle to date.  Non-OB ultrasound did not detect an IUP and could not visualize left ovary.  OB was consulted from ER which noted IUP would not be present until beta-hCG levels were much higher.    Of note, patient does take mesalamine raising question to ulcerative colitis diagnoses.  Patient has midepigastric tenderness to palpation and lipase greater than 3 times upper limit of normal (albeit decreased from 2 nights ago) therefore meeting 2/3 of the [place] criteria.    ---------------------------------------------------------------------------------------------------------------------   Review of Systems    Constitutional:  Negative for chills, fatigue and fever.   HENT:  Negative for ear pain, sinus pain and sore throat.    Respiratory:  Negative for cough, chest tightness, shortness of breath and wheezing.    Cardiovascular:  Negative for chest pain, palpitations and leg swelling.   Gastrointestinal:  Positive for abdominal pain, blood in stool, nausea and vomiting. Negative for constipation and diarrhea.   Genitourinary:  Negative for dysuria, hematuria and urgency.   Musculoskeletal:  Negative for arthralgias and myalgias.   Neurological:  Negative for dizziness, syncope and light-headedness.   Psychiatric/Behavioral:  Negative for confusion.    ---------------------------------------------------------------------------------------------------------------------   Past Medical History:   Diagnosis Date    Anxiety     Depression     Diabetes mellitus     Diverticulitis     GERD (gastroesophageal reflux disease)     Hypertension     Kidney stone     PCOS (polycystic ovarian syndrome)     Sleep apnea      Past Surgical History:   Procedure Laterality Date    CARDIAC CATHETERIZATION      CARPAL TUNNEL RELEASE      COLONOSCOPY      ENDOSCOPY      FRACTURE SURGERY      HARDWARE REMOVAL      WRIST    TENDON REPAIR      HAND     Family History   Problem Relation Age of Onset    Heart disease Mother     COPD Mother     Heart disease Father     COPD Father      Social History     Socioeconomic History    Marital status:    Tobacco Use    Smoking status: Former    Smokeless tobacco: Never   Vaping Use    Vaping Use: Never used   Substance and Sexual Activity    Alcohol use: No    Drug use: No    Sexual activity: Defer     ---------------------------------------------------------------------------------------------------------------------   Allergies:  Dilantin [phenytoin], Keppra [levetiracetam], Meperidine, and Topamax  [topiramate]  ---------------------------------------------------------------------------------------------------------------------   Medications below are reported home medications pulling from within the system; at this time, these medications have not been reconciled unless otherwise specified and are in the verification process for further verifcation as current home medications.    Prior to Admission Medications       Prescriptions Last Dose Informant Patient Reported? Taking?    aspirin 81 MG EC tablet Past Week Self Yes Yes    Take 1 tablet by mouth Daily.    furosemide (LASIX) 40 MG tablet Past Week Pharmacy, Self Yes Yes    Take 1 tablet by mouth Daily.    insulin aspart (NovoLOG FlexPen) 100 UNIT/ML solution pen-injector sc pen 9/17/2023 Pharmacy, Self No Yes    Inject 60 Units under the skin into the appropriate area as directed 3 (Three) Times a Day With Meals.    insulin degludec (Tresiba FlexTouch) 100 UNIT/ML solution pen-injector injection 9/17/2023 Pharmacy, Self No Yes    Inject 50 Units under the skin into the appropriate area as directed 2 (Two) Times a Day.    lansoprazole (PREVACID) 30 MG capsule Past Week Pharmacy, Self Yes Yes    Take 1 capsule by mouth 2 (Two) Times a Day.    lisinopril (PRINIVIL,ZESTRIL) 40 MG tablet Past Week Pharmacy, Self Yes Yes    Take 1 tablet by mouth Daily.    mesalamine (CANASA) 1000 MG suppository 9/17/2023 Pharmacy, Self Yes Yes    Insert 1 suppository into the rectum Every Night.    modafinil (PROVIGIL) 200 MG tablet Past Week Pharmacy, Self Yes Yes    Take 1 tablet by mouth 2 (Two) Times a Day.    oxyCODONE-acetaminophen (PERCOCET) 5-325 MG per tablet 9/17/2023 Pharmacy, Self No Yes    Take 1 tablet by mouth Every 6 (Six) Hours As Needed for Severe Pain for up to 3 days.    potassium chloride 10 MEQ CR tablet Past Week Pharmacy, Self Yes Yes    Take 1 tablet by mouth Daily.    promethazine (PHENERGAN) 25 MG tablet 9/17/2023 Pharmacy, Self No Yes    Take 1  tablet by mouth Every 6 (Six) Hours As Needed for Nausea or Vomiting for up to 5 days.    rosuvastatin (CRESTOR) 20 MG tablet 9/17/2023 Pharmacy, Self Yes Yes    Take 1 tablet by mouth Every Night.    Continuous Blood Gluc  (Dexcom G6 ) device Unknown Pharmacy, Self No No    Use to continuously monitor blood glucose    Continuous Blood Gluc Transmit (Dexcom G6 Transmitter) [facility] Unknown Pharmacy, Self No No    Use 1 Every 3 (Three) Months.    Insulin Pen Needle 32G X 4 MM [facility] Unknown Pharmacy, Self No No    Use to inject insulin up to 4 times daily as directed    metoprolol succinate XL (TOPROL-XL) 50 MG 24 hr tablet Unknown Pharmacy, Self Yes No    Take 1 tablet by mouth Daily.    norgestimate-ethinyl estradiol (ORTHO TRI-CYCLEN,TRINESSA) 0.18/0.215/0.25 MG-35 MCG per tablet 9/16/2023 Pharmacy, Self No No    Take 1 tablet by mouth daily          Objective     Vital Signs:  Temp:  [98.3 °F (36.8 °C)] 98.3 °F (36.8 °C)  Heart Rate:  [68-91] 72  Resp:  [19] 19  BP: (127-162)/() 128/72    Mean Arterial Pressure (Non-Invasive) for the past 24 hrs (Last 3 readings):   Noninvasive MAP (mmHg)   09/18/23 1530 108   09/18/23 1500 123   09/18/23 1430 109     SpO2:  [92 %-100 %] 97 %  on   ;   Device (Oxygen Therapy): room air  Body mass index is 41.4 kg/m².    Wt Readings from Last 3 Encounters:   09/18/23 93 kg (205 lb)   09/16/23 93 kg (205 lb)   09/03/23 94.3 kg (208 lb)      ---------------------------------------------------------------------------------------------------------------------   Physical Exam:  Constitutional: Obese middle-aged female, well-developed and well-nourished.  No respiratory distress.      HENT:  Head: Normocephalic and atraumatic.  Mouth:  Moist mucous membranes.    Eyes:  Conjunctivae and EOM are normal.  No scleral icterus.  Neck:  Neck supple.  No JVD present.    Cardiovascular:  Normal rate, regular rhythm and normal heart sounds with no murmur.  Pulmonary/Chest:  No  respiratory distress, no wheezes, no crackles, with normal breath sounds and good air movement.  Abdominal:  Soft.  Bowel sounds are normal.  No distention.  Generalized tenderness to palpation most notably in the mid epigastric and upper quadrants bilaterally.  No rebound or guarding.  Musculoskeletal:  No tenderness, and no deformity.  No red or swollen joints anywhere.    Neurological:  Alert and oriented to person, place, and time.  No cranial nerve deficit.  No tongue deviation.  No facial droop.  No slurred speech.   Skin:  Skin is warm and dry.  No rash noted.  No pallor.   Peripheral vascular:  No edema and pulses on all 4 extremities.    ---------------------------------------------------------------------------------------------------------------------  EKG: Ordered  No orders to display       Telemetry: Reviewed    I have personally looked at both the EKG and the telemetry strips.    Last echocardiogram:    --------------------------------------------------------------------------------------------------------------------  Labs:  Results from last 7 days   Lab Units 09/18/23  0828 09/17/23  0023   LACTATE mmol/L 0.7 1.4   SED RATE mm/hr  --  38*   CRP mg/dL  --  1.11*   WBC 10*3/mm3 6.51 6.90   HEMOGLOBIN g/dL 10.2* 9.5*   HEMATOCRIT % 33.0* 31.2*   MCV fL 75.5* 76.1*   MCHC g/dL 30.9* 30.4*   PLATELETS 10*3/mm3 180 177         Results from last 7 days   Lab Units 09/18/23  0828 09/17/23  0023   SODIUM mmol/L 135* 136   POTASSIUM mmol/L 4.3 4.2   CHLORIDE mmol/L 103 103   CO2 mmol/L 23.9 22.0   BUN mg/dL 11 12   CREATININE mg/dL 0.60 0.68   CALCIUM mg/dL 9.2 9.8   GLUCOSE mg/dL 212* 382*   ALBUMIN g/dL 4.2 4.0   BILIRUBIN mg/dL 0.5 <0.2   ALK PHOS U/L 89 102   AST (SGOT) U/L 16 12   ALT (SGPT) U/L 16 14   Estimated Creatinine Clearance: 130.8 mL/min (by C-G formula based on SCr of 0.6 mg/dL).    No results found for: AMMONIA          Hemoglobin A1C   Date/Time Value Ref Range Status   09/17/2023 0023 9.30  (H) 4.80 - 5.60 % Final     Lab Results   Component Value Date    TSH 0.606 05/03/2023    FREET4 0.94 05/03/2023     Lab Results   Component Value Date    PREGTESTUR Negative 09/17/2023    HCGQUANT 49.53 09/18/2023     Pain Management Panel  More data may exist         Latest Ref Rng & Units 1/5/2019 10/19/2015   Pain Management Panel   Amphetamine, Urine Qual Negative Negative  Negative    Barbiturates Screen, Urine Negative Negative  Negative    Benzodiazepine Screen, Urine Negative Negative  Negative    Buprenorphine, Screen, Urine Negative Negative  -   Cocaine Screen, Urine Negative Negative  Negative    Methadone Screen , Urine Negative Negative  Negative      Brief Urine Lab Results  (Last result in the past 365 days)        Color   Clarity   Blood   Leuk Est   Nitrite   Protein   CREAT   Urine HCG        09/18/23 0847 Yellow   Clear   Negative   Negative   Negative   Negative                 No results found for: BLOODCX  No results found for: URINECX  No results found for: WOUNDCX  No results found for: STOOLCX    I have personally looked at the labs and they are summarized above.  ----------------------------------------------------------------------------------------------------------------------  Detailed radiology reports for the last 24 hours:    Imaging Results (Last 24 Hours)       Procedure Component Value Units Date/Time    US Ob < 14 Weeks Single or First Gestation [[id number]] Collected: 09/18/23 1321     Updated: 09/18/23 1324    Narrative:      EXAM:    US Pregnancy, Transvaginal     EXAM DATE:    9/18/2023 12:41 PM     CLINICAL HISTORY:    rule out IUP or ectopic     TECHNIQUE:    Real-time transvaginal obstetrical ultrasound of the maternal pelvis  and a first trimester pregnancy with image documentation.  Transvaginal  imaging was used for better evaluation of the fetus and adnexa.     COMPARISON:    No relevant prior studies available.     FINDINGS:    Gestation:  No intrauterine gestation  sac identified.    Placenta/amniotic fluid:  Cannot be adequately evaluated due to the  early gestational age.    Uterus/cervix:  Uterus is 6.1 x 3.7 x 4.2 cm.  Endometrium is 9 mm in  thickness and appears to reflect the proliferative phase of cycle.  No  myometrial mass.    Ovaries:  Left ovary is nonvisualized.  Right ovary is 2.2 x 1.6 x 2.5  cm and demonstrates preserved Doppler flow.  No mass.    Free fluid:  No pelvic free fluid.       Impression:      1.  No intrauterine gestation identified.  2.  Unremarkable sonographic appearance of right ovary.  3.  Nonvisualization of left ovary.  4.  No complex cystic masses identified. Continued follow-up with serial  beta hCG measurements is recommended to exclude the possibility of an  early ectopic.     This report was finalized on 9/18/2023 1:22 PM by Dr. Bharath Chávez MD.       US Abdomen Complete [[id number]] Collected: 09/18/23 1057     Updated: 09/18/23 1101    Narrative:      EXAM:    US Abdomen Complete     EXAM DATE:    9/18/2023 10:26 AM     CLINICAL HISTORY:    recent dx pancreatitis, ongoing generalized abd pain, worse in the  LUQ, nausea, serum/urine Hcg positive     TECHNIQUE:    Real-time ultrasound of the abdomen with image documentation.     COMPARISON:    No relevant prior studies available.     FINDINGS:    Liver:  Unremarkable as visualized.  No mass.  No intrahepatic bile  duct dilation.    Gallbladder:  Question minimal amount of pericholecystic fluid without  significant gallbladder wall thickening. No shadowing gallstones  identified.    Common bile duct:  Common bile duct is 3.8 mm.  No stones.  No biliary  dilatation.    Pancreas:  Unremarkable as visualized.    Kidneys:  Right kidney is 12.6 x 4.9 cm.  Left kidney is 12.3 x 5.1  cm.  No stones.  No hydronephrosis.    Spleen:  Spleen is 13.9 cm.    Aorta:  Unremarkable as visualized.  No aneurysm.    Inferior vena cava:  Unremarkable as visualized.       Impression:      1.  No biliary  dilatation.  2.  Question minimal amount of pericholecystic fluid without significant  gallbladder wall thickening. No shadowing gallstones identified.     This report was finalized on 9/18/2023 10:59 AM by Dr. Bharath Chávez MD.             Final impressions for the last 30 days of radiology reports:    US Abdomen Complete    Result Date: 9/18/2023  1.  No biliary dilatation. 2.  Question minimal amount of pericholecystic fluid without significant gallbladder wall thickening. No shadowing gallstones identified.  This report was finalized on 9/18/2023 10:59 AM by Dr. Bharath Chávez MD.      US Ob < 14 Weeks Single or First Gestation    Result Date: 9/18/2023  1.  No intrauterine gestation identified. 2.  Unremarkable sonographic appearance of right ovary. 3.  Nonvisualization of left ovary. 4.  No complex cystic masses identified. Continued follow-up with serial beta hCG measurements is recommended to exclude the possibility of an early ectopic.  This report was finalized on 9/18/2023 1:22 PM by Dr. Bharath Chávez MD.      XR Chest 1 View    Result Date: 8/26/2023  Subsegmental atelectasis/airspace disease in the left lower lobe.  This report was finalized on 8/26/2023 9:11 PM by Alex Pallas, DO.      US Pancreas    Result Date: 9/17/2023  1. Suboptimal evaluation of the pancreas due to overlying bowel gas. 2. No definite cholelithiasis although evaluation of the gallbladder is limited due to lack of an adequate acoustic window.  This report was finalized on 9/17/2023 4:04 AM by Ag Dunn MD.     I have personally looked at the radiology images and read the final radiology report.    Assessment & Plan      Patient is a 38-year-old female with history significant for type 2 diabetes mellitus, history of diverticulitis and hypertension who presented to the ER with complaints of abdominal pain and nausea.    #Acute pancreatitis, unknown etiology  #Acute colitis versus diverticulitis versus UC flare  --Patient  presented w/ nausea/vomiting and hematochezia for the past 3 days.  Was seen in the ER 2 days ago, improved with antiemetics and pain meds and discharged home.  She does not take any GLP-1's or SGLT2's  --Patient does take mesalamine raising the question of ulcerative colitis, no previous biopsies on file.  Also has a history of diverticulitis and given the history provided hematochezia, raises concern for potential underlying infectious etiology although she is afebrile and inflammatory markers are only mildly elevated  --Admission labs showed lipase 649 yesterday, lipase 222 today  --CT not obtained due to positive pregnancy  --Blood cultures were x2  --Check inflammatory markers  --[place] diet if tolerated  --Continue IV fluid maintenance, as needed antiemetics and as needed pain control  --Continue mesalamine home med  --Start empiric Zosyn to cover infectious etiologies  --Admit to [facility]    #Early pregnancy  --Given her age and the fact that she takes oral contraceptives, initially felt beta-hCG may have been falsely positive.  Ultrasound ordered to rule out ectopic pregnancy, right ovary was unremarkable, left ovary not visualized  --Beta-hCG positive on 9/17, beta-hCG quant 18, repeat 50 today  --Ultrasound OB noted no IUP although noted by OB/GYN unable to detect in the situations given beta hCG quant levels, repeat beta hCG quant tomorrow  --OB/GYN consulted for inpatient assistance, appreciate recommendations     #Type 2 diabetes mellitus   --A1c 9.3%  --Continue basal 50 units twice daily, reduce mealtime to 20 units 3 times daily AC, adjust as needed to, would add glipizide but is pregnant, DC lisinopril    #Essential hypertension, DC lisinopril due to positive pregnancy test  #Generalized anxiety/depression disorder, mother did not feel DC due to pregnancy  #PCOS  #Sleep apnea    Checklist:  Antibiotics: Rocephin, Flagyl  Steroids: None  DVT ppx: lovenox  GI ppx: ppi  Diet: [place]  Code: CPR,  full  Risk/dispo: Patient is a high risk due to acute pancreatitis and presumed early pregnancy.  Anticipate 24 to 48-hour stay pending clinical course.  Disposition expected Home when medically stable.    Jarrett Santoyo DO  [facility] Center[facility]  09/18/23  18:25 EDT

## 2023-09-19 LAB
ANION GAP SERPL CALCULATED.3IONS-SCNC: 9.7 MMOL/L (ref 5–15)
BASOPHILS # BLD AUTO: 0.02 10*3/MM3 (ref 0–0.2)
BASOPHILS NFR BLD AUTO: 0.3 % (ref 0–1.5)
BUN SERPL-MCNC: 10 MG/DL (ref 6–20)
BUN/CREAT SERPL: 16.1 (ref 7–25)
CALCIUM SPEC-SCNC: 8.7 MG/DL (ref 8.6–10.5)
CHLORIDE SERPL-SCNC: 106 MMOL/L (ref 98–107)
CO2 SERPL-SCNC: 21.3 MMOL/L (ref 22–29)
CREAT SERPL-MCNC: 0.62 MG/DL (ref 0.57–1)
DEPRECATED RDW RBC AUTO: 40.1 FL (ref 37–54)
EGFRCR SERPLBLD CKD-EPI 2021: 115.6 ML/MIN/1.73
EOSINOPHIL # BLD AUTO: 0.15 10*3/MM3 (ref 0–0.4)
EOSINOPHIL NFR BLD AUTO: 2.4 % (ref 0.3–6.2)
ERYTHROCYTE [DISTWIDTH] IN BLOOD BY AUTOMATED COUNT: 14.6 % (ref 12.3–15.4)
ERYTHROCYTE [SEDIMENTATION RATE] IN BLOOD: 36 MM/HR (ref 0–20)
GLUCOSE BLDC GLUCOMTR-MCNC: 124 MG/DL (ref 70–130)
GLUCOSE SERPL-MCNC: 104 MG/DL (ref 65–99)
HCG INTACT+B SERPL-ACNC: 63.9 MIU/ML
HCT VFR BLD AUTO: 29 % (ref 34–46.6)
HGB BLD-MCNC: 9 G/DL (ref 12–15.9)
IMM GRANULOCYTES # BLD AUTO: 0.01 10*3/MM3 (ref 0–0.05)
IMM GRANULOCYTES NFR BLD AUTO: 0.2 % (ref 0–0.5)
LYMPHOCYTES # BLD AUTO: 2.37 10*3/MM3 (ref 0.7–3.1)
LYMPHOCYTES NFR BLD AUTO: 37.2 % (ref 19.6–45.3)
MCH RBC QN AUTO: 23.7 PG (ref 26.6–33)
MCHC RBC AUTO-ENTMCNC: 31 G/DL (ref 31.5–35.7)
MCV RBC AUTO: 76.5 FL (ref 79–97)
MONOCYTES # BLD AUTO: 0.38 10*3/MM3 (ref 0.1–0.9)
MONOCYTES NFR BLD AUTO: 6 % (ref 5–12)
NEUTROPHILS NFR BLD AUTO: 3.44 10*3/MM3 (ref 1.7–7)
NEUTROPHILS NFR BLD AUTO: 53.9 % (ref 42.7–76)
NRBC BLD AUTO-RTO: 0 /100 WBC (ref 0–0.2)
PLATELET # BLD AUTO: 163 10*3/MM3 (ref 140–450)
PMV BLD AUTO: 10.2 FL (ref 6–12)
POTASSIUM SERPL-SCNC: 3.8 MMOL/L (ref 3.5–5.2)
QT INTERVAL: 410 MS
QTC INTERVAL: 451 MS
RBC # BLD AUTO: 3.79 10*6/MM3 (ref 3.77–5.28)
SODIUM SERPL-SCNC: 137 MMOL/L (ref 136–145)
WBC NRBC COR # BLD: 6.37 10*3/MM3 (ref 3.4–10.8)

## 2023-09-19 PROCEDURE — 96375 TX/PRO/DX INJ NEW DRUG ADDON: CPT

## 2023-09-19 PROCEDURE — 25010000002 ONDANSETRON PER 1 MG: Performed by: STUDENT IN AN ORGANIZED HEALTH CARE EDUCATION/TRAINING PROGRAM

## 2023-09-19 PROCEDURE — 96366 THER/PROPH/DIAG IV INF ADDON: CPT

## 2023-09-19 PROCEDURE — 84702 CHORIONIC GONADOTROPIN TEST: CPT | Performed by: STUDENT IN AN ORGANIZED HEALTH CARE EDUCATION/TRAINING PROGRAM

## 2023-09-19 PROCEDURE — 96376 TX/PRO/DX INJ SAME DRUG ADON: CPT

## 2023-09-19 PROCEDURE — 85025 COMPLETE CBC W/AUTO DIFF WBC: CPT | Performed by: STUDENT IN AN ORGANIZED HEALTH CARE EDUCATION/TRAINING PROGRAM

## 2023-09-19 PROCEDURE — 82948 REAGENT STRIP/BLOOD GLUCOSE: CPT

## 2023-09-19 PROCEDURE — 25010000002 KETOROLAC TROMETHAMINE PER 15 MG: Performed by: STUDENT IN AN ORGANIZED HEALTH CARE EDUCATION/TRAINING PROGRAM

## 2023-09-19 PROCEDURE — G0378 HOSPITAL OBSERVATION PER HR: HCPCS

## 2023-09-19 PROCEDURE — 96361 HYDRATE IV INFUSION ADD-ON: CPT

## 2023-09-19 PROCEDURE — 85652 RBC SED RATE AUTOMATED: CPT | Performed by: STUDENT IN AN ORGANIZED HEALTH CARE EDUCATION/TRAINING PROGRAM

## 2023-09-19 PROCEDURE — 63710000001 INSULIN DETEMIR PER 5 UNITS: Performed by: STUDENT IN AN ORGANIZED HEALTH CARE EDUCATION/TRAINING PROGRAM

## 2023-09-19 PROCEDURE — 25010000002 PIPERACILLIN SOD-TAZOBACTAM PER 1 G: Performed by: STUDENT IN AN ORGANIZED HEALTH CARE EDUCATION/TRAINING PROGRAM

## 2023-09-19 PROCEDURE — 25010000002 MORPHINE PER 10 MG: Performed by: STUDENT IN AN ORGANIZED HEALTH CARE EDUCATION/TRAINING PROGRAM

## 2023-09-19 PROCEDURE — 80048 BASIC METABOLIC PNL TOTAL CA: CPT | Performed by: STUDENT IN AN ORGANIZED HEALTH CARE EDUCATION/TRAINING PROGRAM

## 2023-09-19 RX ORDER — KETOROLAC TROMETHAMINE 30 MG/ML
30 INJECTION, SOLUTION INTRAMUSCULAR; INTRAVENOUS EVERY 6 HOURS PRN
Status: DISCONTINUED | OUTPATIENT
Start: 2023-09-19 | End: 2023-09-20 | Stop reason: HOSPADM

## 2023-09-19 RX ADMIN — Medication 10 ML: at 08:03

## 2023-09-19 RX ADMIN — DOCUSATE SODIUM 50 MG AND SENNOSIDES 8.6 MG 2 TABLET: 8.6; 5 TABLET, FILM COATED ORAL at 21:09

## 2023-09-19 RX ADMIN — ACETAMINOPHEN 650 MG: 325 TABLET ORAL at 08:01

## 2023-09-19 RX ADMIN — MESALAMINE 1000 MG: 1000 SUPPOSITORY RECTAL at 21:09

## 2023-09-19 RX ADMIN — ASPIRIN 81 MG: 81 TABLET, COATED ORAL at 08:01

## 2023-09-19 RX ADMIN — PIPERACILLIN SODIUM AND TAZOBACTAM SODIUM 4.5 G: 4; .5 INJECTION, POWDER, LYOPHILIZED, FOR SOLUTION INTRAVENOUS at 03:11

## 2023-09-19 RX ADMIN — KETOROLAC TROMETHAMINE 30 MG: 30 INJECTION, SOLUTION INTRAMUSCULAR; INTRAVENOUS at 21:22

## 2023-09-19 RX ADMIN — KETOROLAC TROMETHAMINE 30 MG: 30 INJECTION, SOLUTION INTRAMUSCULAR; INTRAVENOUS at 11:29

## 2023-09-19 RX ADMIN — ONDANSETRON 4 MG: 2 INJECTION INTRAMUSCULAR; INTRAVENOUS at 14:39

## 2023-09-19 RX ADMIN — PIPERACILLIN SODIUM AND TAZOBACTAM SODIUM 4.5 G: 4; .5 INJECTION, POWDER, LYOPHILIZED, FOR SOLUTION INTRAVENOUS at 11:17

## 2023-09-19 RX ADMIN — Medication 10 ML: at 21:11

## 2023-09-19 RX ADMIN — INSULIN DETEMIR 45 UNITS: 100 INJECTION, SOLUTION SUBCUTANEOUS at 09:28

## 2023-09-19 RX ADMIN — ONDANSETRON 4 MG: 2 INJECTION INTRAMUSCULAR; INTRAVENOUS at 09:22

## 2023-09-19 RX ADMIN — PANTOPRAZOLE SODIUM 40 MG: 40 TABLET, DELAYED RELEASE ORAL at 18:07

## 2023-09-19 RX ADMIN — MORPHINE SULFATE 2 MG: 2 INJECTION, SOLUTION INTRAMUSCULAR; INTRAVENOUS at 03:34

## 2023-09-19 RX ADMIN — INSULIN DETEMIR 45 UNITS: 100 INJECTION, SOLUTION SUBCUTANEOUS at 21:10

## 2023-09-19 RX ADMIN — SODIUM CHLORIDE, POTASSIUM CHLORIDE, SODIUM LACTATE AND CALCIUM CHLORIDE 125 ML/HR: 600; 310; 30; 20 INJECTION, SOLUTION INTRAVENOUS at 07:59

## 2023-09-19 RX ADMIN — ONDANSETRON 4 MG: 2 INJECTION INTRAMUSCULAR; INTRAVENOUS at 03:11

## 2023-09-19 RX ADMIN — PANTOPRAZOLE SODIUM 40 MG: 40 TABLET, DELAYED RELEASE ORAL at 08:01

## 2023-09-19 RX ADMIN — DOCUSATE SODIUM 50 MG AND SENNOSIDES 8.6 MG 2 TABLET: 8.6; 5 TABLET, FILM COATED ORAL at 08:01

## 2023-09-19 RX ADMIN — PIPERACILLIN SODIUM AND TAZOBACTAM SODIUM 4.5 G: 4; .5 INJECTION, POWDER, LYOPHILIZED, FOR SOLUTION INTRAVENOUS at 18:01

## 2023-09-19 RX ADMIN — ACETAMINOPHEN 650 MG: 325 TABLET ORAL at 14:40

## 2023-09-19 NOTE — PROGRESS NOTES
Antimicrobial Length of Therapy:  Piperacillin/tazobactam day 1/7    Thank you,  Leonard Elizabeth  Pharmacy Resident  9/19/2023  12:48 EDT

## 2023-09-19 NOTE — PLAN OF CARE
Goal Outcome Evaluation:  Plan of Care Reviewed With: patient        Progress: no change  Outcome Evaluation: Pt. resting in bed at this time. Pt had complaints of pain and nausea, PRN meds given, see MAR. Pt refused bath this shift, Pt. educated on the importance of hygiene, pt. still refused at this time. Pt. ambulated to bathroom throughout shift. VSS, no acute changes at this time, Will continue with plan of care.

## 2023-09-19 NOTE — PAYOR COMM NOTE
"CONTACT: NIKITA GIBSON RN  UTILIZATION MANAGEMENT DEPT.  [facility]  [address] [place] [address]  PHONE: [phone]  FAX: [phone]    Observation status- AUTH REQUEST    REF# [id number]    Rafita Pack (38 y.o. Female)       Date of Birth   1985    Social Security Number       Address   [address] [id number]    Home Phone   [phone]    MRN   [id number]       Shinto   None    Marital Status                               Admission Date   9/18/23    Admission Type   Emergency    Admitting Provider   Jarrett Santoyo DO    Attending Provider   Jarrett Santoyo DO    Department, Room/Bed   [place] [address]/       Discharge Date       Discharge Disposition       Discharge Destination                                 Attending Provider: Jarrett Santoyo DO    Allergies: Dilantin [Phenytoin], Keppra [Levetiracetam], Meperidine, Topamax [Topiramate]    Isolation: None   Infection: COVID Screen (preop/placement) (01/12/22)   Code Status: CPR    Ht: 149.9 cm (59\")   Wt: 92.6 kg (204 lb 2.3 oz)    Admission Cmt: None   Principal Problem: Acute pancreatitis [K85.90]                   Active Insurance as of 9/18/2023       Primary Coverage       Payor Plan Insurance Group Employer/Plan Group    [facility]ICARE REPLACEMENT [facility]        Payor Plan Address Payor Plan Phone Number Payor Plan Fax Number Effective Dates    [address] [address] [phone]  2/14/2023 - None Entered    [facility] [address]         Subscriber Name Subscriber Birth Date Member ID       RAFITA PACK 1985 [id number]               Secondary Coverage       Payor Plan Insurance Group Employer/Plan Group    MISC REFERENCE BASED PRICING MISC REFERENCE BASED PRICING [id number]       Coverage Address Coverage Phone Number Coverage Fax Number Effective Dates    [address] [phone]  " 10/24/2022 - None Entered    [facility] [id number]         Subscriber Name Subscriber Birth Date Member ID       MICHELLE PACK 1989 [id number]                     Emergency Contacts        (Rel.) Home Phone Work Phone Mobile Phone    Jonel Iverson (Father) [phone] -- --    Michelle Pack (Spouse) [phone] -- --                 History & Physical        Jarrett Santoyo DO at 23 1824              [facility]    Patient Identification:  Name:  Antoinette Pack  Age:  38 y.o.  Sex:  female  :  1985  MRN:  [id number]   Admit Date: 2023   Visit Number:  [id number]  Room number:  114/14  Primary Care Physician:  Shawna Lambert DO     Subjective     Chief complaint:    Chief Complaint   Patient presents with    Abdominal Pain       History of presenting illness:   Patient is a 38-year-old female with history significant for type 2 diabetes mellitus, history of diverticulitis and hypertension who presented to the ER with complaints of abdominal pain and nausea.  Patient states her symptoms started on Saturday in which she experience midepigastric/left upper quadrant pain with radiation around to the back.  She notes several bowel movements throughout Saturday and  with hematochezia during that time but this is since subsided.  She was seen 2 days ago and diagnosed with mild pancreatitis which improved with oral antiemetics and pain medication was discharged home.  Patient states her pain worsened today which prompted presentation back to the ER.    2 nights ago when she was seen in the ER beta-hCG was positive, hCG quant was 18.  Repeat beta-hCG was positive with hCG quant 50.  She does take birth control and has not missed a menstrual cycle to date.  Non-OB ultrasound did not detect an IUP and could not visualize left ovary.  OB was consulted from ER which noted IUP would not be present until beta-hCG levels were  much higher.    Of note, patient does take mesalamine raising question to ulcerative colitis diagnoses.  Patient has midepigastric tenderness to palpation and lipase greater than 3 times upper limit of normal (albeit decreased from 2 nights ago) therefore meeting 2/3 of the [place] criteria.    ---------------------------------------------------------------------------------------------------------------------   Review of Systems   Constitutional:  Negative for chills, fatigue and fever.   HENT:  Negative for ear pain, sinus pain and sore throat.    Respiratory:  Negative for cough, chest tightness, shortness of breath and wheezing.    Cardiovascular:  Negative for chest pain, palpitations and leg swelling.   Gastrointestinal:  Positive for abdominal pain, blood in stool, nausea and vomiting. Negative for constipation and diarrhea.   Genitourinary:  Negative for dysuria, hematuria and urgency.   Musculoskeletal:  Negative for arthralgias and myalgias.   Neurological:  Negative for dizziness, syncope and light-headedness.   Psychiatric/Behavioral:  Negative for confusion.    ---------------------------------------------------------------------------------------------------------------------   Past Medical History:   Diagnosis Date    Anxiety     Depression     Diabetes mellitus     Diverticulitis     GERD (gastroesophageal reflux disease)     Hypertension     Kidney stone     PCOS (polycystic ovarian syndrome)     Sleep apnea      Past Surgical History:   Procedure Laterality Date    CARDIAC CATHETERIZATION      CARPAL TUNNEL RELEASE      COLONOSCOPY      ENDOSCOPY      FRACTURE SURGERY      HARDWARE REMOVAL      WRIST    TENDON REPAIR      HAND     Family History   Problem Relation Age of Onset    Heart disease Mother     COPD Mother     Heart disease Father     COPD Father      Social History     Socioeconomic History    Marital status:    Tobacco Use    Smoking status: Former    Smokeless tobacco: Never    Vaping Use    Vaping Use: Never used   Substance and Sexual Activity    Alcohol use: No    Drug use: No    Sexual activity: Defer     ---------------------------------------------------------------------------------------------------------------------   Allergies:  Dilantin [phenytoin], Keppra [levetiracetam], Meperidine, and Topamax [topiramate]  ---------------------------------------------------------------------------------------------------------------------   Medications below are reported home medications pulling from within the system; at this time, these medications have not been reconciled unless otherwise specified and are in the verification process for further verifcation as current home medications.    Prior to Admission Medications       Prescriptions Last Dose Informant Patient Reported? Taking?    aspirin 81 MG EC tablet Past Week Self Yes Yes    Take 1 tablet by mouth Daily.    furosemide (LASIX) 40 MG tablet Past Week Pharmacy, Self Yes Yes    Take 1 tablet by mouth Daily.    insulin aspart (NovoLOG FlexPen) 100 UNIT/ML solution pen-injector sc pen 9/17/2023 Pharmacy, Self No Yes    Inject 60 Units under the skin into the appropriate area as directed 3 (Three) Times a Day With Meals.    insulin degludec (Tresiba FlexTouch) 100 UNIT/ML solution pen-injector injection 9/17/2023 Pharmacy, Self No Yes    Inject 50 Units under the skin into the appropriate area as directed 2 (Two) Times a Day.    lansoprazole (PREVACID) 30 MG capsule Past Week Pharmacy, Self Yes Yes    Take 1 capsule by mouth 2 (Two) Times a Day.    lisinopril (PRINIVIL,ZESTRIL) 40 MG tablet Past Week Pharmacy, Self Yes Yes    Take 1 tablet by mouth Daily.    mesalamine (CANASA) 1000 MG suppository 9/17/2023 Pharmacy, Self Yes Yes    Insert 1 suppository into the rectum Every Night.    modafinil (PROVIGIL) 200 MG tablet Past Week Pharmacy, Self Yes Yes    Take 1 tablet by mouth 2 (Two) Times a Day.    oxyCODONE-acetaminophen  (PERCOCET) 5-325 MG per tablet 9/17/2023 Pharmacy, Self No Yes    Take 1 tablet by mouth Every 6 (Six) Hours As Needed for Severe Pain for up to 3 days.    potassium chloride 10 MEQ CR tablet Past Week Pharmacy, Self Yes Yes    Take 1 tablet by mouth Daily.    promethazine (PHENERGAN) 25 MG tablet 9/17/2023 Pharmacy, Self No Yes    Take 1 tablet by mouth Every 6 (Six) Hours As Needed for Nausea or Vomiting for up to 5 days.    rosuvastatin (CRESTOR) 20 MG tablet 9/17/2023 Pharmacy, Self Yes Yes    Take 1 tablet by mouth Every Night.    Continuous Blood Gluc  (Dexcom G6 ) device Unknown Pharmacy, Self No No    Use to continuously monitor blood glucose    Continuous Blood Gluc Transmit (Dexcom G6 Transmitter) [facility] Unknown Pharmacy, Self No No    Use 1 Every 3 (Three) Months.    Insulin Pen Needle 32G X 4 MM [facility] Unknown Pharmacy, Self No No    Use to inject insulin up to 4 times daily as directed    metoprolol succinate XL (TOPROL-XL) 50 MG 24 hr tablet Unknown Pharmacy, Self Yes No    Take 1 tablet by mouth Daily.    norgestimate-ethinyl estradiol (ORTHO TRI-CYCLEN,TRINESSA) 0.18/0.215/0.25 MG-35 MCG per tablet 9/16/2023 Pharmacy, Self No No    Take 1 tablet by mouth daily          Objective     Vital Signs:  Temp:  [98.3 °F (36.8 °C)] 98.3 °F (36.8 °C)  Heart Rate:  [68-91] 72  Resp:  [19] 19  BP: (127-162)/() 128/72    Mean Arterial Pressure (Non-Invasive) for the past 24 hrs (Last 3 readings):   Noninvasive MAP (mmHg)   09/18/23 1530 108   09/18/23 1500 123   09/18/23 1430 109     SpO2:  [92 %-100 %] 97 %  on   ;   Device (Oxygen Therapy): room air  Body mass index is 41.4 kg/m².    Wt Readings from Last 3 Encounters:   09/18/23 93 kg (205 lb)   09/16/23 93 kg (205 lb)   09/03/23 94.3 kg (208 lb)      ---------------------------------------------------------------------------------------------------------------------   Physical Exam:  Constitutional: Obese middle-aged female, well-developed  and well-nourished.  No respiratory distress.      HENT:  Head: Normocephalic and atraumatic.  Mouth:  Moist mucous membranes.    Eyes:  Conjunctivae and EOM are normal.  No scleral icterus.  Neck:  Neck supple.  No JVD present.    Cardiovascular:  Normal rate, regular rhythm and normal heart sounds with no murmur.  Pulmonary/Chest:  No respiratory distress, no wheezes, no crackles, with normal breath sounds and good air movement.  Abdominal:  Soft.  Bowel sounds are normal.  No distention.  Generalized tenderness to palpation most notably in the mid epigastric and upper quadrants bilaterally.  No rebound or guarding.  Musculoskeletal:  No tenderness, and no deformity.  No red or swollen joints anywhere.    Neurological:  Alert and oriented to person, place, and time.  No cranial nerve deficit.  No tongue deviation.  No facial droop.  No slurred speech.   Skin:  Skin is warm and dry.  No rash noted.  No pallor.   Peripheral vascular:  No edema and pulses on all 4 extremities.    ---------------------------------------------------------------------------------------------------------------------  EKG: Ordered  No orders to display       Telemetry: Reviewed    I have personally looked at both the EKG and the telemetry strips.    Last echocardiogram:    --------------------------------------------------------------------------------------------------------------------  Labs:  Results from last 7 days   Lab Units 09/18/23  0828 09/17/23  0023   LACTATE mmol/L 0.7 1.4   SED RATE mm/hr  --  38*   CRP mg/dL  --  1.11*   WBC 10*3/mm3 6.51 6.90   HEMOGLOBIN g/dL 10.2* 9.5*   HEMATOCRIT % 33.0* 31.2*   MCV fL 75.5* 76.1*   MCHC g/dL 30.9* 30.4*   PLATELETS 10*3/mm3 180 177         Results from last 7 days   Lab Units 09/18/23  0828 09/17/23  0023   SODIUM mmol/L 135* 136   POTASSIUM mmol/L 4.3 4.2   CHLORIDE mmol/L 103 103   CO2 mmol/L 23.9 22.0   BUN mg/dL 11 12   CREATININE mg/dL 0.60 0.68   CALCIUM mg/dL 9.2 9.8   GLUCOSE  mg/dL 212* 382*   ALBUMIN g/dL 4.2 4.0   BILIRUBIN mg/dL 0.5 <0.2   ALK PHOS U/L 89 102   AST (SGOT) U/L 16 12   ALT (SGPT) U/L 16 14   Estimated Creatinine Clearance: 130.8 mL/min (by C-G formula based on SCr of 0.6 mg/dL).    No results found for: AMMONIA          Hemoglobin A1C   Date/Time Value Ref Range Status   09/17/2023 0023 9.30 (H) 4.80 - 5.60 % Final     Lab Results   Component Value Date    TSH 0.606 05/03/2023    FREET4 0.94 05/03/2023     Lab Results   Component Value Date    PREGTESTUR Negative 09/17/2023    HCGQUANT 49.53 09/18/2023     Pain Management Panel  More data may exist         Latest Ref Rng & Units 1/5/2019 10/19/2015   Pain Management Panel   Amphetamine, Urine Qual Negative Negative  Negative    Barbiturates Screen, Urine Negative Negative  Negative    Benzodiazepine Screen, Urine Negative Negative  Negative    Buprenorphine, Screen, Urine Negative Negative  -   Cocaine Screen, Urine Negative Negative  Negative    Methadone Screen , Urine Negative Negative  Negative      Brief Urine Lab Results  (Last result in the past 365 days)        Color   Clarity   Blood   Leuk Est   Nitrite   Protein   CREAT   Urine HCG        09/18/23 0847 Yellow   Clear   Negative   Negative   Negative   Negative                 No results found for: BLOODCX  No results found for: URINECX  No results found for: WOUNDCX  No results found for: STOOLCX    I have personally looked at the labs and they are summarized above.  ----------------------------------------------------------------------------------------------------------------------  Detailed radiology reports for the last 24 hours:    Imaging Results (Last 24 Hours)       Procedure Component Value Units Date/Time    US Ob < 14 Weeks Single or First Gestation [[id number]] Collected: 09/18/23 1321     Updated: 09/18/23 1324    Narrative:      EXAM:    US Pregnancy, Transvaginal     EXAM DATE:    9/18/2023 12:41 PM     CLINICAL HISTORY:    rule out IUP or  ectopic     TECHNIQUE:    Real-time transvaginal obstetrical ultrasound of the maternal pelvis  and a first trimester pregnancy with image documentation.  Transvaginal  imaging was used for better evaluation of the fetus and adnexa.     COMPARISON:    No relevant prior studies available.     FINDINGS:    Gestation:  No intrauterine gestation sac identified.    Placenta/amniotic fluid:  Cannot be adequately evaluated due to the  early gestational age.    Uterus/cervix:  Uterus is 6.1 x 3.7 x 4.2 cm.  Endometrium is 9 mm in  thickness and appears to reflect the proliferative phase of cycle.  No  myometrial mass.    Ovaries:  Left ovary is nonvisualized.  Right ovary is 2.2 x 1.6 x 2.5  cm and demonstrates preserved Doppler flow.  No mass.    Free fluid:  No pelvic free fluid.       Impression:      1.  No intrauterine gestation identified.  2.  Unremarkable sonographic appearance of right ovary.  3.  Nonvisualization of left ovary.  4.  No complex cystic masses identified. Continued follow-up with serial  beta hCG measurements is recommended to exclude the possibility of an  early ectopic.     This report was finalized on 9/18/2023 1:22 PM by Dr. Bharath Chávez MD.       US Abdomen Complete [[id number]] Collected: 09/18/23 1057     Updated: 09/18/23 1101    Narrative:      EXAM:    US Abdomen Complete     EXAM DATE:    9/18/2023 10:26 AM     CLINICAL HISTORY:    recent dx pancreatitis, ongoing generalized abd pain, worse in the  LUQ, nausea, serum/urine Hcg positive     TECHNIQUE:    Real-time ultrasound of the abdomen with image documentation.     COMPARISON:    No relevant prior studies available.     FINDINGS:    Liver:  Unremarkable as visualized.  No mass.  No intrahepatic bile  duct dilation.    Gallbladder:  Question minimal amount of pericholecystic fluid without  significant gallbladder wall thickening. No shadowing gallstones  identified.    Common bile duct:  Common bile duct is 3.8 mm.  No stones.  No  biliary  dilatation.    Pancreas:  Unremarkable as visualized.    Kidneys:  Right kidney is 12.6 x 4.9 cm.  Left kidney is 12.3 x 5.1  cm.  No stones.  No hydronephrosis.    Spleen:  Spleen is 13.9 cm.    Aorta:  Unremarkable as visualized.  No aneurysm.    Inferior vena cava:  Unremarkable as visualized.       Impression:      1.  No biliary dilatation.  2.  Question minimal amount of pericholecystic fluid without significant  gallbladder wall thickening. No shadowing gallstones identified.     This report was finalized on 9/18/2023 10:59 AM by Dr. Bharath Chávez MD.             Final impressions for the last 30 days of radiology reports:    US Abdomen Complete    Result Date: 9/18/2023  1.  No biliary dilatation. 2.  Question minimal amount of pericholecystic fluid without significant gallbladder wall thickening. No shadowing gallstones identified.  This report was finalized on 9/18/2023 10:59 AM by Dr. Bharath Chávez MD.      US Ob < 14 Weeks Single or First Gestation    Result Date: 9/18/2023  1.  No intrauterine gestation identified. 2.  Unremarkable sonographic appearance of right ovary. 3.  Nonvisualization of left ovary. 4.  No complex cystic masses identified. Continued follow-up with serial beta hCG measurements is recommended to exclude the possibility of an early ectopic.  This report was finalized on 9/18/2023 1:22 PM by Dr. Bharath Chávez MD.      XR Chest 1 View    Result Date: 8/26/2023  Subsegmental atelectasis/airspace disease in the left lower lobe.  This report was finalized on 8/26/2023 9:11 PM by Alex Pallas, DO.      US Pancreas    Result Date: 9/17/2023  1. Suboptimal evaluation of the pancreas due to overlying bowel gas. 2. No definite cholelithiasis although evaluation of the gallbladder is limited due to lack of an adequate acoustic window.  This report was finalized on 9/17/2023 4:04 AM by Ag Dunn MD.     I have personally looked at the radiology images and read the final radiology  report.    Assessment & Plan      Patient is a 38-year-old female with history significant for type 2 diabetes mellitus, history of diverticulitis and hypertension who presented to the ER with complaints of abdominal pain and nausea.    #Acute pancreatitis, unknown etiology  #Acute colitis versus diverticulitis versus UC flare  --Patient presented w/ nausea/vomiting and hematochezia for the past 3 days.  Was seen in the ER 2 days ago, improved with antiemetics and pain meds and discharged home.  She does not take any GLP-1's or SGLT2's  --Patient does take mesalamine raising the question of ulcerative colitis, no previous biopsies on file.  Also has a history of diverticulitis and given the history provided hematochezia, raises concern for potential underlying infectious etiology although she is afebrile and inflammatory markers are only mildly elevated  --Admission labs showed lipase 649 yesterday, lipase 222 today  --CT not obtained due to positive pregnancy  --Blood cultures were x2  --Check inflammatory markers  --Cascade diet if tolerated  --Continue IV fluid maintenance, as needed antiemetics and as needed pain control  --Continue mesalamine home med  --Start empiric Zosyn to cover infectious etiologies  --Admit to [facility]    #Early pregnancy  --Given her age and the fact that she takes oral contraceptives, initially felt beta-hCG may have been falsely positive.  Ultrasound ordered to rule out ectopic pregnancy, right ovary was unremarkable, left ovary not visualized  --Beta-hCG positive on 9/17, beta-hCG quant 18, repeat 50 today  --Ultrasound OB noted no IUP although noted by OB/GYN unable to detect in the situations given beta hCG quant levels, repeat beta hCG quant tomorrow  --OB/GYN consulted for inpatient assistance, appreciate recommendations     #Type 2 diabetes mellitus   --A1c 9.3%  --Continue basal 50 units twice daily, reduce mealtime to 20 units 3 times daily AC, adjust as needed to, would add  glipizide but is pregnant, DC lisinopril    #Essential hypertension, DC lisinopril due to positive pregnancy test  #Generalized anxiety/depression disorder, mother did not feel DC due to pregnancy  #PCOS  #Sleep apnea    Checklist:  Antibiotics: Rocephin, Flagyl  Steroids: None  DVT ppx: lovenox  GI ppx: ppi  Diet: Sunburst  Code: CPR, full  Risk/dispo: Patient is a high risk due to acute pancreatitis and presumed early pregnancy.  Anticipate 24 to 48-hour stay pending clinical course.  Disposition expected Home when medically stable.    Jarrett Santoyo DO  [facility]  09/18/23  18:25 EDT      Electronically signed by Jarrett Santoyo DO at 09/18/23 1902          Emergency Department Notes        Amber Barraza APRN at 09/18/23 0819          Subjective   History of Present Illness  38-year-old female patient presents to [facility] ED with chief complaint of left upper quadrant abdominal pain and nausea.  Patient states that she was evaluated in our facility's ED 2 nights ago and was diagnosed with pancreatitis.  She states that she was given oral antiemetics and analgesics.  Symptoms had improved yesterday, however returned and worsened today.  She denies any vomiting this morning.  She does report bright red bloody bowel movement 2 nights ago, however denies any since that time.  States that she had a colonoscopy at the beginning of the year and was diagnosed with inflammatory bowel disease, and at the time was having frequent bloody bowel movements which resolved after treatment.  She denies any fever, chills, chest pain, shortness of breath, palpitations, or other acute symptoms at this time.  She does report that her serum hCG was elevated 2 nights ago and although she is on birth control, she has been on oral antibiotics around 1 month ago for lower extremity cellulitis therefore she states it is possible that she could be pregnant as she is still having menstrual periods.     History  provided by:  Patient   used: No      Review of Systems   Constitutional:  Negative for chills, fatigue and fever.   HENT:  Negative for congestion, sore throat and trouble swallowing.    Respiratory:  Negative for cough, shortness of breath and wheezing.    Cardiovascular:  Negative for chest pain, palpitations and leg swelling.   Gastrointestinal:  Positive for abdominal pain, blood in stool (48 hours ago) and nausea. Negative for constipation and diarrhea.   Genitourinary:  Negative for difficulty urinating, flank pain, frequency and urgency.   Musculoskeletal:  Negative for back pain, gait problem, neck pain and neck stiffness.   Neurological:  Negative for dizziness, tremors, seizures, syncope, facial asymmetry, speech difficulty, weakness, light-headedness, numbness and headaches.     Past Medical History:   Diagnosis Date    Anxiety     Depression     Diabetes mellitus     Diverticulitis     GERD (gastroesophageal reflux disease)     Hypertension     Kidney stone     PCOS (polycystic ovarian syndrome)     Sleep apnea        Allergies   Allergen Reactions    Dilantin [Phenytoin] Mental Status Change    Keppra [Levetiracetam] Mental Status Change    Meperidine Rash    Topamax [Topiramate] Mental Status Change       Past Surgical History:   Procedure Laterality Date    CARDIAC CATHETERIZATION      CARPAL TUNNEL RELEASE      COLONOSCOPY      ENDOSCOPY      FRACTURE SURGERY      HARDWARE REMOVAL      WRIST    TENDON REPAIR      HAND       Family History   Problem Relation Age of Onset    Heart disease Mother     COPD Mother     Heart disease Father     COPD Father        Social History     Socioeconomic History    Marital status:    Tobacco Use    Smoking status: Former    Smokeless tobacco: Never   Vaping Use    Vaping Use: Never used   Substance and Sexual Activity    Alcohol use: No    Drug use: No    Sexual activity: Defer           Objective   Physical Exam  Vitals reviewed.    Constitutional:       General: She is awake. She is not in acute distress.     Appearance: She is obese. She is not ill-appearing or diaphoretic.      Comments: Room air.   HENT:      Head: Normocephalic and atraumatic.      Mouth/Throat:      Mouth: Mucous membranes are moist.      Pharynx: Oropharynx is clear.   Eyes:      Extraocular Movements: Extraocular movements intact.      Pupils: Pupils are equal, round, and reactive to light.   Cardiovascular:      Rate and Rhythm: Normal rate and regular rhythm.      Pulses: Normal pulses.           Dorsalis pedis pulses are 2+ on the right side and 2+ on the left side.      Heart sounds: Normal heart sounds. No murmur heard.    No friction rub.   Pulmonary:      Effort: Pulmonary effort is normal. No accessory muscle usage, respiratory distress or retractions.      Breath sounds: No wheezing, rhonchi or rales.   Abdominal:      General: Bowel sounds are normal. There is distension (Mild).      Palpations: Abdomen is soft.      Tenderness: There is abdominal tenderness in the left upper quadrant. There is guarding.      Hernia: No hernia is present.   Musculoskeletal:      Cervical back: Neck supple. No rigidity.      Right lower leg: No edema.      Left lower leg: No edema.   Skin:     General: Skin is warm and dry.      Capillary Refill: Capillary refill takes 2 to 3 seconds.   Neurological:      Mental Status: She is alert and oriented to person, place, and time. Mental status is at baseline.      Cranial Nerves: No dysarthria or facial asymmetry.      Sensory: Sensation is intact.      Motor: No weakness or tremor.   Psychiatric:         Attention and Perception: Attention normal.         Mood and Affect: Mood normal.         Speech: Speech normal.         Behavior: Behavior normal. Behavior is cooperative.         Thought Content: Thought content normal.         Cognition and Memory: Cognition normal.         Judgment: Judgment normal.       Procedures          ED  Course  ED Course as of 09/18/23 2042   Mon Sep 18, 2023   1352 Attempted to call OBGYN on-call physician; no answer at this time. [MC]   1405 Discussed with OBGYN (Dr. Joseph); states that patient is likely pregnant however no gestational sac would be visible until Hcg ~3000. She will need repeat Hcg levels in 48 hours. If patient is admitted for observation by Hospitalist due to pancreatitis, OB willing to consult.  []   1443 Discussed with Hospitalist (Dr. Santoyo). Agreeable to admit.  [MC]      ED Course User Index  [MC] Amber Barraza APRN      Results for orders placed or performed during the hospital encounter of 09/18/23   Comprehensive Metabolic Panel    Specimen: Arm, Right; Blood   Result Value Ref Range    Glucose 212 (H) 65 - 99 mg/dL    BUN 11 6 - 20 mg/dL    Creatinine 0.60 0.57 - 1.00 mg/dL    Sodium 135 (L) 136 - 145 mmol/L    Potassium 4.3 3.5 - 5.2 mmol/L    Chloride 103 98 - 107 mmol/L    CO2 23.9 22.0 - 29.0 mmol/L    Calcium 9.2 8.6 - 10.5 mg/dL    Total Protein 7.7 6.0 - 8.5 g/dL    Albumin 4.2 3.5 - 5.2 g/dL    ALT (SGPT) 16 1 - 33 U/L    AST (SGOT) 16 1 - 32 U/L    Alkaline Phosphatase 89 39 - 117 U/L    Total Bilirubin 0.5 0.0 - 1.2 mg/dL    Globulin 3.5 gm/dL    A/G Ratio 1.2 g/dL    BUN/Creatinine Ratio 18.3 7.0 - 25.0    Anion Gap 8.1 5.0 - 15.0 mmol/L    eGFR 116.5 >60.0 mL/min/1.73   Lipase    Specimen: Arm, Right; Blood   Result Value Ref Range    Lipase 222 (H) 13 - 60 U/L   Urinalysis With Microscopic If Indicated (No Culture) - Urine, Clean Catch    Specimen: Urine, Clean Catch   Result Value Ref Range    Color, UA Yellow Yellow, Straw    Appearance, UA Clear Clear    pH, UA 6.5 5.0 - 8.0    Specific Gravity, UA 1.007 1.005 - 1.030    Glucose, UA Negative Negative    Ketones, UA Negative Negative    Bilirubin, UA Negative Negative    Blood, UA Negative Negative    Protein, UA Negative Negative    Leuk Esterase, UA Negative Negative    Nitrite, UA Negative Negative     Urobilinogen, UA 0.2 E.U./dL 0.2 - 1.0 E.U./dL   Lactic Acid, Plasma    Specimen: Arm, Right; Blood   Result Value Ref Range    Lactate 0.7 0.5 - 2.0 mmol/L   Amylase    Specimen: Arm, Right; Blood   Result Value Ref Range    Amylase 79 28 - 100 U/L   CBC Auto Differential    Specimen: Arm, Right; Blood   Result Value Ref Range    WBC 6.51 3.40 - 10.80 10*3/mm3    RBC 4.37 3.77 - 5.28 10*6/mm3    Hemoglobin 10.2 (L) 12.0 - 15.9 g/dL    Hematocrit 33.0 (L) 34.0 - 46.6 %    MCV 75.5 (L) 79.0 - 97.0 fL    MCH 23.3 (L) 26.6 - 33.0 pg    MCHC 30.9 (L) 31.5 - 35.7 g/dL    RDW 14.6 12.3 - 15.4 %    RDW-SD 39.3 37.0 - 54.0 fl    MPV 9.8 6.0 - 12.0 fL    Platelets 180 140 - 450 10*3/mm3    Neutrophil % 65.8 42.7 - 76.0 %    Lymphocyte % 26.1 19.6 - 45.3 %    Monocyte % 4.8 (L) 5.0 - 12.0 %    Eosinophil % 2.5 0.3 - 6.2 %    Basophil % 0.5 0.0 - 1.5 %    Immature Grans % 0.3 0.0 - 0.5 %    Neutrophils, Absolute 4.29 1.70 - 7.00 10*3/mm3    Lymphocytes, Absolute 1.70 0.70 - 3.10 10*3/mm3    Monocytes, Absolute 0.31 0.10 - 0.90 10*3/mm3    Eosinophils, Absolute 0.16 0.00 - 0.40 10*3/mm3    Basophils, Absolute 0.03 0.00 - 0.20 10*3/mm3    Immature Grans, Absolute 0.02 0.00 - 0.05 10*3/mm3    nRBC 0.0 0.0 - 0.2 /100 WBC   hCG, Serum, Qualitative    Specimen: Arm, Right; Blood   Result Value Ref Range    HCG Qualitative Positive (A) Negative   hCG, Quantitative, Pregnancy    Specimen: Arm, Right; Blood   Result Value Ref Range    HCG Quantitative 49.53 mIU/mL   C-reactive Protein    Specimen: Arm, Right; Blood   Result Value Ref Range    C-Reactive Protein 1.61 (H) 0.00 - 0.50 mg/dL   Lipid Panel    Specimen: Arm, Right; Blood   Result Value Ref Range    Total Cholesterol 173 0 - 200 mg/dL    Triglycerides 111 0 - 150 mg/dL    HDL Cholesterol 33 (L) 40 - 60 mg/dL    LDL Cholesterol  120 (H) 0 - 100 mg/dL    VLDL Cholesterol 20 5 - 40 mg/dL    LDL/HDL Ratio 3.57    Green Top (Gel)   Result Value Ref Range    Extra Tube Hold for  add-ons.    Lavender Top   Result Value Ref Range    Extra Tube hold for add-on    Gold Top - SST   Result Value Ref Range    Extra Tube Hold for add-ons.    Light Blue Top   Result Value Ref Range    Extra Tube Hold for add-ons.     US Abdomen Complete    Result Date: 9/18/2023  1.  No biliary dilatation. 2.  Question minimal amount of pericholecystic fluid without significant gallbladder wall thickening. No shadowing gallstones identified.  This report was finalized on 9/18/2023 10:59 AM by Dr. Bharath Chávez MD.      US Ob < 14 Weeks Single or First Gestation    Result Date: 9/18/2023  1.  No intrauterine gestation identified. 2.  Unremarkable sonographic appearance of right ovary. 3.  Nonvisualization of left ovary. 4.  No complex cystic masses identified. Continued follow-up with serial beta hCG measurements is recommended to exclude the possibility of an early ectopic.  This report was finalized on 9/18/2023 1:22 PM by Dr. Bharath Chávez MD.      US Pancreas    Result Date: 9/17/2023  1. Suboptimal evaluation of the pancreas due to overlying bowel gas. 2. No definite cholelithiasis although evaluation of the gallbladder is limited due to lack of an adequate acoustic window.  This report was finalized on 9/17/2023 4:04 AM by Ag Dunn MD.                                         Medical Decision Making  38-year-old female patient presents to [facility] ED with chief complaint of left upper quadrant abdominal pain and nausea.  Patient states that she was evaluated in our facility's ED 2 nights ago and was diagnosed with pancreatitis.  She states that she was given oral antiemetics and analgesics.  Symptoms had improved yesterday, however returned and worsened today.  She denies any vomiting this morning.  She does report bright red bloody bowel movement 2 nights ago, however denies any since that time.  States that she had a colonoscopy at the beginning of the year and was diagnosed with inflammatory bowel disease,  and at the time was having frequent bloody bowel movements which resolved after treatment.  She denies any fever, chills, chest pain, shortness of breath, palpitations, or other acute symptoms at this time.  She does report that her serum hCG was elevated 2 nights ago and although she is on birth control, she has been on oral antibiotics around 1 month ago for lower extremity cellulitis therefore she states it is possible that she could be pregnant as she is still having menstrual periods. Serum and urine hCG qualitative positive; hCG quantitative increased from 2 days ago.  OB ultrasound obtained which did not note IUP or ectopic.  Discussed with OB/GYN who states that patient is likely pregnant as gestational sac would not be visible this early.  States that it is okay for patient to have morphine, Zofran, and IV fluids.  States that OB service will not admit the patient.  Discussed with hospitalist for observation in setting of intractable nausea and vomiting, pancreatitis.  Agreeable for admission.  Discussed with patient; she voiced agreement understanding with no further questions or concerns at this time.    Problems Addressed:  Idiopathic acute pancreatitis without infection or necrosis: complicated acute illness or injury    Amount and/or Complexity of Data Reviewed  Labs: ordered. Decision-making details documented in ED Course.  Radiology: ordered. Decision-making details documented in ED Course.  ECG/medicine tests: ordered.    Risk  OTC drugs.  Prescription drug management.  Decision regarding hospitalization.        Final diagnoses:   Idiopathic acute pancreatitis without infection or necrosis       ED Disposition  ED Disposition       ED Disposition   Decision to Admit    Condition   --    Comment   Level of Care: Med/Surg [1]   Diagnosis: Acute pancreatitis [577.0.ICD-9-CM]                 No follow-up provider specified.       Medication List      No changes were made to your prescriptions during  this visit.            Amber Barraza APRN  09/18/23 2042      Electronically signed by Amber Barraza APRN at 09/18/23 2042       Facility-Administered Medications as of 9/19/2023   Medication Dose Route Frequency Provider Last Rate Last Admin    [COMPLETED] acetaminophen (TYLENOL) tablet 1,000 mg  1,000 mg Oral Once Amber Barraza APRN   1,000 mg at 09/18/23 1102    acetaminophen (TYLENOL) tablet 650 mg  650 mg Oral Q4H PRN Jarrett Santoyo DO   650 mg at 09/19/23 0801    [COMPLETED] acetaminophen-codeine (TYLENOL with CODEINE #3) 300-30 MG per tablet 1 tablet  1 tablet Oral Once Amber Barraza APRN   1 tablet at 09/18/23 1323    aspirin EC tablet 81 mg  81 mg Oral Daily Jarrett Santoyo DO   81 mg at 09/19/23 0801    Enoxaparin Sodium (LOVENOX) syringe 40 mg  40 mg Subcutaneous Nightly Jarrett Santoyo DO        insulin detemir (LEVEMIR) injection 45 Units  45 Units Subcutaneous Q12H Jarrett Santoyo DO   45 Units at 09/19/23 0928    ketorolac (TORADOL) injection 30 mg  30 mg Intravenous Q6H PRN Jarrett Santoyo DO   30 mg at 09/19/23 1129    lactated ringers infusion  125 mL/hr Intravenous Continuous Jarrett Santoyo  mL/hr at 09/19/23 0759 125 mL/hr at 09/19/23 0759    mesalamine (CANASA) suppository 1,000 mg  1,000 mg Rectal Nightly Jarrett Santoyo DO   1,000 mg at 09/18/23 2145    [COMPLETED] morphine injection 2 mg  2 mg Intravenous Once Amber Barraza APRN   2 mg at 09/18/23 0858    morphine injection 2 mg  2 mg Intravenous Q3H PRN Jarrett Santoyo DO   2 mg at 09/19/23 0334    [COMPLETED] morphine injection 2 mg  2 mg Intravenous Once Amber Barraza APRN   2 mg at 09/18/23 1732    [COMPLETED] ondansetron (ZOFRAN) injection 4 mg  4 mg Intravenous Once Amber Barraza APRN   4 mg at 09/18/23 0857    ondansetron (ZOFRAN) injection 4 mg  4 mg Intravenous Q6H PRN Jarrett Santoyo DO   4 mg at 09/19/23 0951     [COMPLETED] ondansetron (ZOFRAN) injection 4 mg  4 mg Intravenous Once Jarrett Santoyo DO   4 mg at 09/18/23 1805    pantoprazole (PROTONIX) EC tablet 40 mg  40 mg Oral BID AC Jarrett Santoyo DO   40 mg at 09/19/23 0801    [COMPLETED] piperacillin-tazobactam (ZOSYN) IVPB 4.5 g in 100 mL NS VTB  4.5 g Intravenous Once Jarrett Santoyo DO   Stopped at 09/18/23 2155    piperacillin-tazobactam (ZOSYN) IVPB 4.5 g in 100 mL NS VTB  4.5 g Intravenous Q8H Jarrett Santoyo DO 0 mL/hr at 09/19/23 0430 4.5 g at 09/19/23 1117    sennosides-docusate (PERICOLACE) 8.6-50 MG per tablet 2 tablet  2 tablet Oral BID Jarrett Santoyo DO   2 tablet at 09/19/23 0801    And    polyethylene glycol (MIRALAX) packet 17 g  17 g Oral Daily PRN Jarrett Santoyo DO        potassium chloride (K-DUR,KLOR-CON) CR tablet 10 mEq  10 mEq Oral Daily Jarrett Santoyo DO        [COMPLETED] promethazine (PHENERGAN) tablet 12.5 mg  12.5 mg Oral Once Amber Barraza, APRN   12.5 mg at 09/18/23 1102    promethazine (PHENERGAN) tablet 25 mg  25 mg Oral Q6H PRN Jarrett Santoyo DO        [COMPLETED] simethicone (MYLICON) chewable tablet 80 mg  80 mg Oral Once Amber Barraza, APRN   80 mg at 09/18/23 1102    [COMPLETED] simethicone (MYLICON) chewable tablet 80 mg  80 mg Oral Once Nell Cancino PA-C   80 mg at 09/18/23 2302    sodium chloride 0.9 % flush 10 mL  10 mL Intravenous PRN Jarrett Santoyo DO        sodium chloride 0.9 % flush 10 mL  10 mL Intravenous PRN Jarrett Santoyo DO        sodium chloride 0.9 % flush 10 mL  10 mL Intravenous Q12H Jarrett Santoyo DO   10 mL at 09/19/23 0803    sodium chloride 0.9 % flush 10 mL  10 mL Intravenous PRN Jarrett Santoyo,         sodium chloride 0.9 % infusion 40 mL  40 mL Intravenous PRN Jarrett Santoyo,          Orders (all)        Start     Ordered    09/19/23 1000  insulin detemir  (LEVEMIR) injection 45 Units  Every 12 Hours Scheduled         09/19/23 0753    09/19/23 0906  PT Consult: Eval & Treat Functional Mobility Below Baseline  Once         09/19/23 0906 09/19/23 0905  ketorolac (TORADOL) injection 30 mg  Every 6 Hours PRN         09/19/23 0905    09/19/23 0800  Oral Care  2 Times Daily       09/18/23 1925 09/19/23 0800  Insulin Lispro (humaLOG) injection 20 Units  3 Times Daily With Meals,   Status:  Discontinued         09/18/23 1925 09/19/23 0730  pantoprazole (PROTONIX) EC tablet 40 mg  2 Times Daily Before Meals         09/18/23 1925 09/19/23 0600  pantoprazole (PROTONIX) EC tablet 40 mg  Every Early Morning,   Status:  Discontinued         09/18/23 1925 09/19/23 0600  Sedimentation Rate  Morning Draw         09/18/23 1925 09/19/23 0600  CBC & Differential  Daily       09/18/23 1925 09/19/23 0600  Basic Metabolic Panel  Daily       09/18/23 1925 09/19/23 0600  hCG, Quantitative, Pregnancy  Morning Draw         09/18/23 1901 09/19/23 0600  CBC Auto Differential  PROCEDURE ONCE         09/18/23 2205 09/19/23 0300  piperacillin-tazobactam (ZOSYN) IVPB 4.5 g in 100 mL NS VTB  Every 8 Hours         09/18/23 1932 09/19/23 0145  piperacillin-tazobactam (ZOSYN) IVPB 3.375 g in 100 mL NS VTB  Every 8 Hours,   Status:  Discontinued         09/18/23 1925 09/18/23 2145  simethicone (MYLICON) chewable tablet 80 mg  Once         09/18/23 2058 09/18/23 2100  sodium chloride 0.9 % flush 10 mL  Every 12 Hours Scheduled         09/18/23 1925 09/18/23 2100  sennosides-docusate (PERICOLACE) 8.6-50 MG per tablet 2 tablet  2 Times Daily        See Hyperspace for full Linked Orders Report.    09/18/23 1925 09/18/23 2100  Enoxaparin Sodium (LOVENOX) syringe 40 mg  Nightly         09/18/23 1925 09/18/23 2100  mesalamine (CANASA) suppository 1,000 mg  Nightly         09/18/23 1925 09/18/23 2100  rosuvastatin (CRESTOR) tablet 20 mg  Nightly,   Status:   Discontinued         09/18/23 1925 09/18/23 2100  insulin detemir (LEVEMIR) injection 50 Units  Every 12 Hours Scheduled,   Status:  Discontinued         09/18/23 1925 09/18/23 2100  piperacillin-tazobactam (ZOSYN) IVPB 4.5 g in 100 mL NS VTB  Once         09/18/23 1932 09/18/23 2047  POC Glucose Once  PROCEDURE ONCE        Comments: Complete no more than 45 minutes prior to patient eating      09/18/23 2040 09/18/23 2042  Initiate & Follow Hypercapnic Monitoring Guideline for Opioid Administration via EtCO2 and / or SpO2  Continuous        Comments: Follow Hypercapnic Monitoring Guideline As Outlined in Process Instructions (Open Order Report to View Full Instructions)    09/18/23 2041 09/18/23 2042  Opioid Administration - Document EtCO2 and / or SpO2 With Each Set of Vitals & Any Change in Patient Status  Per Order Details        Comments: With Each Set of Vitals & Any Change in Patient Status    09/18/23 2041 09/18/23 2042  Opioid Administration - Notify Provider Hypercapnic Monitoring  Until Discontinued         09/18/23 2041 09/18/23 2042  Opioid Administration - Continuous Pulse Oximetry (SpO2)  Continuous         09/18/23 2041 09/18/23 2015  lactated ringers infusion  Continuous         09/18/23 1925 09/18/23 2015  aspirin EC tablet 81 mg  Daily         09/18/23 1925 09/18/23 2015  furosemide (LASIX) tablet 40 mg  Daily,   Status:  Discontinued         09/18/23 1925 09/18/23 2015  metoprolol succinate XL (TOPROL-XL) 24 hr tablet 50 mg  Daily,   Status:  Discontinued         09/18/23 1925 09/18/23 2015  potassium chloride (K-DUR,KLOR-CON) CR tablet 10 mEq  Daily         09/18/23 1925 09/18/23 2015  piperacillin-tazobactam (ZOSYN) IVPB 3.375 g in 100 mL NS VTB  Once,   Status:  Discontinued         09/18/23 1925 09/18/23 2000  Vital Signs  Every 4 Hours       09/18/23 1925 09/18/23 1921  Intake & Output  Every Shift       09/18/23 1925 09/18/23 1921  Weigh  Patient  Once         09/18/23 1925 09/18/23 1921  Insert Peripheral IV  Once         09/18/23 1925 09/18/23 1921  Saline Lock & Maintain IV Access  Continuous         09/18/23 1925 09/18/23 1921  Discontinue Cardiac Monitoring  Once         09/18/23 1925 09/18/23 1921  C-reactive Protein  Once         09/18/23 1925 09/18/23 1921  ECG 12 Lead Pre-Op / Pre-Procedure  Once         09/18/23 1925 09/18/23 1921  Lipid Panel  Once         09/18/23 1925 09/18/23 1921  Diet: Gastrointestinal Diets; Low Irritant; Texture: Regular Texture (IDDSI 7); Fluid Consistency: Thin (IDDSI 0)  Diet Effective Now         09/18/23 1925 09/18/23 1921  Blood Culture - Blood, Arm, Left  Once        See Hyperspace for full Linked Orders Report.    09/18/23 1925 09/18/23 1921  Blood Culture - Blood, Arm, Left  Once        See Hyperspace for full Linked Orders Report.    09/18/23 1925 09/18/23 1920  polyethylene glycol (MIRALAX) packet 17 g  Daily PRN        See Hyperspace for full Linked Orders Report.    09/18/23 1925 09/18/23 1920  bisacodyl (DULCOLAX) EC tablet 5 mg  Daily PRN,   Status:  Discontinued        See Hyperspace for full Linked Orders Report.    09/18/23 1925 09/18/23 1920  bisacodyl (DULCOLAX) suppository 10 mg  Daily PRN,   Status:  Discontinued        See Hyperspace for full Linked Orders Report.    09/18/23 1925 09/18/23 1920  morphine injection 2 mg  Every 3 Hours PRN         09/18/23 1925 09/18/23 1920  sodium chloride 0.9 % flush 10 mL  As Needed         09/18/23 1925 09/18/23 1920  sodium chloride 0.9 % infusion 40 mL  As Needed         09/18/23 1925 09/18/23 1920  acetaminophen (TYLENOL) tablet 650 mg  Every 4 Hours PRN         09/18/23 1925 09/18/23 1920  ondansetron (ZOFRAN) injection 4 mg  Every 6 Hours PRN         09/18/23 1925 09/18/23 1920  promethazine (PHENERGAN) tablet 25 mg  Every 6 Hours PRN         09/18/23 1925 09/18/23 1756  ondansetron (ZOFRAN)  injection 4 mg  Once         09/18/23 1740    09/18/23 1730  morphine injection 2 mg  Once         09/18/23 1714    09/18/23 1440  Inpatient Obstetrics / Gynecology Consult  Once        Specialty:  Obstetrics and Gynecology  Provider:  Rigo Joseph DO    09/18/23 1440    09/18/23 1438  Initiate Observation Status  Once         09/18/23 1439    09/18/23 1438  Code Status and Medical Interventions:  Continuous         09/18/23 1439    09/18/23 1330  acetaminophen-codeine (TYLENOL with CODEINE #3) 300-30 MG per tablet 1 tablet  Once         09/18/23 1314    09/18/23 1152  hCG, Quantitative, Pregnancy  STAT         09/18/23 1151    09/18/23 1151  US Ob < 14 Weeks Single or First Gestation  1 Time Imaging         09/18/23 1151    09/18/23 0957  promethazine (PHENERGAN) tablet 12.5 mg  Once         09/18/23 0941    09/18/23 0957  simethicone (MYLICON) chewable tablet 80 mg  Once         09/18/23 0941    09/18/23 0957  acetaminophen (TYLENOL) tablet 1,000 mg  Once         09/18/23 0941    09/18/23 0938  US Abdomen Complete  1 Time Imaging         09/18/23 0938    09/18/23 0903  sodium chloride 0.9 % infusion  Continuous,   Status:  Discontinued         09/18/23 0847    09/18/23 0903  morphine injection 2 mg  Once         09/18/23 0847    09/18/23 0903  ondansetron (ZOFRAN) injection 4 mg  Once         09/18/23 0847    09/18/23 0844  Pregnancy, Urine - Urine, Clean Catch  STAT,   Status:  Canceled         09/18/23 0843    09/18/23 0843  hCG, Serum, Qualitative  STAT         09/18/23 0842    09/18/23 0820  Comprehensive Metabolic Panel  Once         09/18/23 0819    09/18/23 0820  Lipase  Once         09/18/23 0819    09/18/23 0820  Urinalysis With Microscopic If Indicated (No Culture) - Urine, Clean Catch  STAT         09/18/23 0819    09/18/23 0820  Lactic Acid, Plasma  Once         09/18/23 0819    09/18/23 0820  POC Urine Pregnancy  STAT,   Status:  Canceled         09/18/23 0819    09/18/23 0820  Amylase   Once         09/18/23 0819    09/18/23 0820  Cardiac Monitoring  Continuous,   Status:  Canceled        Comments: Follow Standing Orders As Outlined in Process Instructions (Open Order Report to View Full Instructions)    09/18/23 0819    09/18/23 0820  NPO Diet NPO Type: Strict NPO  Diet Effective Now,   Status:  Canceled         09/18/23 0819    09/18/23 0820  Undress and Gown  Once         09/18/23 0819    09/18/23 0820  Vital Signs  Per Hospital Policy         09/18/23 0819    09/18/23 0820  Insert Peripheral IV  Once,   Status:  Canceled        See Hyperspace for full Linked Orders Report.    09/18/23 0819 09/18/23 0819  sodium chloride 0.9 % flush 10 mL  As Needed        See Hyperspace for full Linked Orders Report.    09/18/23 0819    09/18/23 0819  Insert Peripheral IV  Once         09/18/23 0819    09/18/23 0819  sodium chloride 0.9 % flush 10 mL  As Needed         09/18/23 0819    09/18/23 0819  [place]ards Draw  Once         09/18/23 0819    09/18/23 0819  CBC & Differential  Once         09/18/23 0819    09/18/23 0819  Green Top (Gel)  PROCEDURE ONCE         09/18/23 0819    09/18/23 0819  Lavender Top  PROCEDURE ONCE         09/18/23 0819    09/18/23 0819  Gold Top - SST  PROCEDURE ONCE         09/18/23 0819    09/18/23 0819  Light Blue Top  PROCEDURE ONCE         09/18/23 0819    09/18/23 0819  CBC Auto Differential  PROCEDURE ONCE         09/18/23 0819    --  aspirin 81 MG EC tablet  Daily         09/18/23 1739    --  furosemide (LASIX) 40 MG tablet  Daily         09/18/23 1743    --  lansoprazole (PREVACID) 30 MG capsule  2 Times Daily         09/18/23 1743    --  lisinopril (PRINIVIL,ZESTRIL) 40 MG tablet  Daily         09/18/23 1743    --  metoprolol succinate XL (TOPROL-XL) 50 MG 24 hr tablet  Daily         09/18/23 1743    --  modafinil (PROVIGIL) 200 MG tablet  2 Times Daily         09/18/23 1743    --  potassium chloride 10 MEQ CR tablet  Daily         09/18/23 1743    --  rosuvastatin (CRESTOR)  20 MG tablet  Nightly         09/18/23 0131                  Physician Progress Notes (all)    No notes of this type exist for this encounter.       Consult Notes (all)    No notes of this type exist for this encounter.

## 2023-09-19 NOTE — PLAN OF CARE
Goal Outcome Evaluation:  Plan of Care Reviewed With: patient           Outcome Evaluation: Pt resting in bed at this time. Complaints of nausea and pain that was managed with PRN medication. Pt ambulates in room independently. New IV inserted due to IV in L arm leaking. New IV in R upper arm. Tolerated well. No acute changes. Will continue with plan of care.

## 2023-09-19 NOTE — PROGRESS NOTES
[facility] HOSPITALIST PROGRESS NOTE     Patient Identification:  Name:  Antoinette Pack  Age:  38 y.o.  Sex:  female  :  1985  MRN:  [id number]  Visit Number:  [id number]  ROOM: [address] [place] [address]     Primary Care Provider:  Shawna Lambert DO    Length of stay in inpatient status:  0    Subjective     Chief Compliant:    Chief Complaint   Patient presents with    Abdominal Pain       History of Presenting Illness:    Patient seen in follow-up for acute pancreatitis.  Says she feels better today and not as nauseous.  No fevers noted overnight.  She has no acute complaints.    Objective     Current Hospital Meds:aspirin, 81 mg, Oral, Daily  enoxaparin, 40 mg, Subcutaneous, Nightly  insulin glargine, 45 Units, Subcutaneous, Q12H  mesalamine, 1,000 mg, Rectal, Nightly  pantoprazole, 40 mg, Oral, BID AC  piperacillin-tazobactam, 4.5 g, Intravenous, Q8H  potassium chloride, 10 mEq, Oral, Daily  senna-docusate sodium, 2 tablet, Oral, BID  sodium chloride, 10 mL, Intravenous, Q12H    lactated ringers, 125 mL/hr, Last Rate: 125 mL/hr (23 0759)        Current Antimicrobial Therapy:  Anti-Infectives (From admission, onward)      Ordered     Dose/Rate Route Frequency Start Stop    23  piperacillin-tazobactam (ZOSYN) IVPB 4.5 g in 100 mL NS VTB        Ordering Provider: Jarrett Santoyo DO    4.5 g  over 4 Hours Intravenous Every 8 Hours 23 0300 23 0259    23 193  piperacillin-tazobactam (ZOSYN) IVPB 4.5 g in 100 mL NS VTB        Ordering Provider: Jarrett Santoyo DO    4.5 g  over 30 Minutes Intravenous Once 23 2100 23          Current Diuretic Therapy:  Diuretics (From admission, onward)      None          ----------------------------------------------------------------------------------------------------------------------  Vital Signs:  Temp:  [97.7 °F (36.5 °C)-98.9 °F (37.2 °C)] 98.4 °F (36.9 °C)  Heart Rate:  [68-91] 79  Resp:   [18-19] 18  BP: (113-155)/(57-92) 117/59  SpO2:  [92 %-98 %] 97 %  on   ;   Device (Oxygen Therapy): room air  Body mass index is 41.23 kg/m².    Wt Readings from Last 3 Encounters:   09/18/23 92.6 kg (204 lb 2.3 oz)   09/16/23 93 kg (205 lb)   09/03/23 94.3 kg (208 lb)     Intake & Output (last 3 days)         09/16 0701 09/17 0700 09/17 0701 09/18 0700 09/18 0701 09/19 0700 09/19 0701 09/20 0700    P.O.   0 180    Total Intake(mL/kg)   0 (0) 180 (1.9)    Net   0 +180            Urine Unmeasured Occurrence   3 x 2 x    Stool Unmeasured Occurrence    1 x          Diet: Gastrointestinal Diets; Low Irritant; Texture: Regular Texture (IDDSI 7); Fluid Consistency: Thin (IDDSI 0)  ----------------------------------------------------------------------------------------------------------------------  Physical exam:  Constitutional: Middle-age obese female, nontoxic, speaking clearly in full sentences,, Well-developed and well-nourished, resting comfortably in bed, no acute distress.      HENT:  Head:  Normocephalic and atraumatic.  Mouth:  Moist mucous membranes.    Eyes:  Conjunctivae and EOM are normal. No scleral icterus.   Cardiovascular:  Normal rate, regular rhythm and normal heart sounds with no murmur. No JVD.   Pulmonary/Chest:  No respiratory distress, no wheezes, no crackles, with normal breath sounds and good air movement. Unlabored. No accessory muscle use.  Abdominal:  Soft. No distension.  Mild tenderness palpation, improved from prior. bowel sounds present. No rebound or guarding.   Musculoskeletal:  No tenderness, and no deformity.  No red or swollen joints anywhere.    Neurological:  Alert and oriented to person, place, and time.  No cranial nerve deficit.   Nonfocal.   Skin:  Skin is warm and dry. No rash noted. No pallor.   Peripheral vascular:  No clubbing, no cyanosis, no edema. Pedal and tibial pulses 2 out of 4 bilaterally.    ----------------------------------------------------------------------------------------------------------------------  Results from last 7 days   Lab Units 09/19/23 0054 09/18/23 0828 09/17/23  0023   CRP mg/dL  --  1.61* 1.11*   LACTATE mmol/L  --  0.7 1.4   WBC 10*3/mm3 6.37 6.51 6.90   HEMOGLOBIN g/dL 9.0* 10.2* 9.5*   HEMATOCRIT % 29.0* 33.0* 31.2*   MCV fL 76.5* 75.5* 76.1*   MCHC g/dL 31.0* 30.9* 30.4*   PLATELETS 10*3/mm3 163 180 177         Results from last 7 days   Lab Units 09/19/23 0054 09/18/23 0828 09/17/23  0023   SODIUM mmol/L 137 135* 136   POTASSIUM mmol/L 3.8 4.3 4.2   CHLORIDE mmol/L 106 103 103   CO2 mmol/L 21.3* 23.9 22.0   BUN mg/dL 10 11 12   CREATININE mg/dL 0.62 0.60 0.68   CALCIUM mg/dL 8.7 9.2 9.8   GLUCOSE mg/dL 104* 212* 382*   ALBUMIN g/dL  --  4.2 4.0   BILIRUBIN mg/dL  --  0.5 <0.2   ALK PHOS U/L  --  89 102   AST (SGOT) U/L  --  16 12   ALT (SGPT) U/L  --  16 14   Estimated Creatinine Clearance: 126.2 mL/min (by C-G formula based on SCr of 0.62 mg/dL).  No results found for: AMMONIA          Results from last 7 days   Lab Units 09/18/23 0828   CHOLESTEROL mg/dL 173   TRIGLYCERIDES mg/dL 111   HDL CHOL mg/dL 33*   LDL CHOL mg/dL 120*     Hemoglobin A1C   Date/Time Value Ref Range Status   09/17/2023 0023 9.30 (H) 4.80 - 5.60 % Final     Glucose   Date/Time Value Ref Range Status   09/18/2023 2040 95 70 - 130 mg/dL Final     Lab Results   Component Value Date    TSH 0.606 05/03/2023    FREET4 0.94 05/03/2023     Lab Results   Component Value Date    PREGTESTUR Negative 09/17/2023    HCGQUANT 63.90 09/19/2023     Pain Management Panel  More data may exist         Latest Ref Rng & Units 1/5/2019 10/19/2015   Pain Management Panel   Amphetamine, Urine Qual Negative Negative  Negative    Barbiturates Screen, Urine Negative Negative  Negative    Benzodiazepine Screen, Urine Negative Negative  Negative    Buprenorphine, Screen, Urine Negative Negative  -   Cocaine Screen, Urine  Negative Negative  Negative    Methadone Screen , Urine Negative Negative  Negative      Brief Urine Lab Results  (Last result in the past 365 days)        Color   Clarity   Blood   Leuk Est   Nitrite   Protein   CREAT   Urine HCG        09/18/23 0847 Yellow   Clear   Negative   Negative   Negative   Negative                 No results found for: BLOODCX  No results found for: URINECX  No results found for: WOUNDCX  No results found for: STOOLCX  No results found for: RESPCX  No results found for: AFBCX  Results from last 7 days   Lab Units 09/19/23  0054 09/18/23  0828 09/17/23  0023   LACTATE mmol/L  --  0.7 1.4   SED RATE mm/hr 36*  --  38*   CRP mg/dL  --  1.61* 1.11*       I have personally looked at the labs and they are summarized above.  ----------------------------------------------------------------------------------------------------------------------  Detailed radiology reports for the last 24 hours:  Imaging Results (Last 24 Hours)       ** No results found for the last 24 hours. **          Assessment & Plan      Patient is a 38-year-old female with history significant for type 2 diabetes mellitus, history of diverticulitis and hypertension who presented to the ER with complaints of abdominal pain and nausea.     #Acute pancreatitis, unknown etiology  #Acute colitis versus diverticulitis  #Suspected ulcerative colitis versus Crohn's disease  --Patient presented w/ nausea/vomiting and hematochezia for the past 3 days.  Was seen in the ER 2 days ago, improved with antiemetics and pain meds and discharged home.  She does not take any GLP-1's or SGLT2's  --Patient does take mesalamine raising the question of ulcerative colitis, no previous biopsies on file.  Also has a history of diverticulitis and given the history provided hematochezia, raises concern for potential underlying infectious etiology although she is afebrile and inflammatory markers are only mildly elevated  --Previous colonoscopy noted  concerns for UC versus Crohn's with plan repeat colonoscopy in the future and have him put on mesalamine prior  --Admission labs showed lipase 649 9/17, lipase 222 9/18  --CT not obtained due to positive pregnancy  --Blood cultures NGTD  --Inflammatory markers mildly elevated therefore acute AI flare unlikely  --[place] diet if tolerated  --Continue IV fluid maintenance, as needed antiemetics and as needed pain control  --Continue mesalamine  --Continue empiric Zosyn to cover infectious etiologies  --[facility], likely home tomorrow if symptoms continue to improve     #Early pregnancy  --Given her age and the fact that she takes oral contraceptives, initially felt beta-hCG may have been falsely positive.  Ultrasound ordered to rule out ectopic pregnancy, right ovary was unremarkable, left ovary not visualized  --Beta-hCG positive on 9/17, beta-hCG quant 18, repeat 50 today  --Ultrasound OB noted no IUP although noted by OB/GYN unable to detect in the situations given beta hCG quant levels, repeat beta hCG as recommended by OB/GYN  --OB/GYN consulted for inpatient assistance, appreciate recommendations     #Type 2 diabetes mellitus   --A1c 9.3%  --Continue reduced basal twice daily, DC'd mealtime, SSI 3 times daily AC, adjust as needed to, would add glipizide but is pregnant     #Essential hypertension, DC lisinopril due to positive pregnancy test  #Generalized anxiety/depression disorder, mother did not feel DC due to pregnancy  #PCOS  #Sleep apnea     Checklist:  Antibiotics: zosyn  Steroids: None  DVT ppx: lovenox  GI ppx: ppi  Diet: [place]  Code: CPR, full  Risk/dispo: Patient is a high risk due to acute pancreatitis and presumed early pregnancy.  Anticipate 24 to 48-hour stay pending clinical course.  Disposition expected Home when medically stable.    Jarrett Santoyo,   [place] Hospitalist  09/19/23  14:41 EDT

## 2023-09-20 VITALS
BODY MASS INDEX: 41.16 KG/M2 | TEMPERATURE: 97.8 F | OXYGEN SATURATION: 98 % | DIASTOLIC BLOOD PRESSURE: 86 MMHG | HEIGHT: 59 IN | SYSTOLIC BLOOD PRESSURE: 147 MMHG | HEART RATE: 81 BPM | WEIGHT: 204.15 LBS | RESPIRATION RATE: 17 BRPM

## 2023-09-20 LAB
ANION GAP SERPL CALCULATED.3IONS-SCNC: 8.2 MMOL/L (ref 5–15)
BASOPHILS # BLD AUTO: 0.02 10*3/MM3 (ref 0–0.2)
BASOPHILS NFR BLD AUTO: 0.3 % (ref 0–1.5)
BUN SERPL-MCNC: 10 MG/DL (ref 6–20)
BUN/CREAT SERPL: 15.4 (ref 7–25)
CALCIUM SPEC-SCNC: 8.5 MG/DL (ref 8.6–10.5)
CHLORIDE SERPL-SCNC: 104 MMOL/L (ref 98–107)
CO2 SERPL-SCNC: 20.8 MMOL/L (ref 22–29)
CREAT SERPL-MCNC: 0.65 MG/DL (ref 0.57–1)
DEPRECATED RDW RBC AUTO: 39.2 FL (ref 37–54)
EGFRCR SERPLBLD CKD-EPI 2021: 114.3 ML/MIN/1.73
EOSINOPHIL # BLD AUTO: 0.18 10*3/MM3 (ref 0–0.4)
EOSINOPHIL NFR BLD AUTO: 2.9 % (ref 0.3–6.2)
ERYTHROCYTE [DISTWIDTH] IN BLOOD BY AUTOMATED COUNT: 14.6 % (ref 12.3–15.4)
GLUCOSE SERPL-MCNC: 187 MG/DL (ref 65–99)
HCT VFR BLD AUTO: 28.3 % (ref 34–46.6)
HGB BLD-MCNC: 8.7 G/DL (ref 12–15.9)
IMM GRANULOCYTES # BLD AUTO: 0.01 10*3/MM3 (ref 0–0.05)
IMM GRANULOCYTES NFR BLD AUTO: 0.2 % (ref 0–0.5)
LYMPHOCYTES # BLD AUTO: 2.12 10*3/MM3 (ref 0.7–3.1)
LYMPHOCYTES NFR BLD AUTO: 34.6 % (ref 19.6–45.3)
MCH RBC QN AUTO: 23.1 PG (ref 26.6–33)
MCHC RBC AUTO-ENTMCNC: 30.7 G/DL (ref 31.5–35.7)
MCV RBC AUTO: 75.1 FL (ref 79–97)
MONOCYTES # BLD AUTO: 0.44 10*3/MM3 (ref 0.1–0.9)
MONOCYTES NFR BLD AUTO: 7.2 % (ref 5–12)
NEUTROPHILS NFR BLD AUTO: 3.36 10*3/MM3 (ref 1.7–7)
NEUTROPHILS NFR BLD AUTO: 54.8 % (ref 42.7–76)
NRBC BLD AUTO-RTO: 0 /100 WBC (ref 0–0.2)
PLATELET # BLD AUTO: 162 10*3/MM3 (ref 140–450)
PMV BLD AUTO: 10.1 FL (ref 6–12)
POTASSIUM SERPL-SCNC: 3.8 MMOL/L (ref 3.5–5.2)
RBC # BLD AUTO: 3.77 10*6/MM3 (ref 3.77–5.28)
SODIUM SERPL-SCNC: 133 MMOL/L (ref 136–145)
WBC NRBC COR # BLD: 6.13 10*3/MM3 (ref 3.4–10.8)

## 2023-09-20 PROCEDURE — 85025 COMPLETE CBC W/AUTO DIFF WBC: CPT | Performed by: STUDENT IN AN ORGANIZED HEALTH CARE EDUCATION/TRAINING PROGRAM

## 2023-09-20 PROCEDURE — 80048 BASIC METABOLIC PNL TOTAL CA: CPT | Performed by: STUDENT IN AN ORGANIZED HEALTH CARE EDUCATION/TRAINING PROGRAM

## 2023-09-20 PROCEDURE — 25010000002 PIPERACILLIN SOD-TAZOBACTAM PER 1 G: Performed by: STUDENT IN AN ORGANIZED HEALTH CARE EDUCATION/TRAINING PROGRAM

## 2023-09-20 PROCEDURE — 25010000002 KETOROLAC TROMETHAMINE PER 15 MG: Performed by: STUDENT IN AN ORGANIZED HEALTH CARE EDUCATION/TRAINING PROGRAM

## 2023-09-20 PROCEDURE — 96376 TX/PRO/DX INJ SAME DRUG ADON: CPT

## 2023-09-20 PROCEDURE — 97161 PT EVAL LOW COMPLEX 20 MIN: CPT

## 2023-09-20 PROCEDURE — G0378 HOSPITAL OBSERVATION PER HR: HCPCS

## 2023-09-20 PROCEDURE — 63710000001 INSULIN DETEMIR PER 5 UNITS: Performed by: STUDENT IN AN ORGANIZED HEALTH CARE EDUCATION/TRAINING PROGRAM

## 2023-09-20 RX ORDER — BUDESONIDE 3 MG/1
9 CAPSULE, COATED PELLETS ORAL DAILY
Status: DISCONTINUED | OUTPATIENT
Start: 2023-09-20 | End: 2023-09-20 | Stop reason: HOSPADM

## 2023-09-20 RX ORDER — BUDESONIDE 3 MG/1
9 CAPSULE, COATED PELLETS ORAL DAILY
Qty: 9 CAPSULE | Refills: 0 | Status: SHIPPED | OUTPATIENT
Start: 2023-09-20 | End: 2023-09-23

## 2023-09-20 RX ORDER — CIPROFLOXACIN 500 MG/1
500 TABLET, FILM COATED ORAL 2 TIMES DAILY
Qty: 10 TABLET | Refills: 0 | Status: SHIPPED | OUTPATIENT
Start: 2023-09-20 | End: 2023-09-20

## 2023-09-20 RX ORDER — METRONIDAZOLE 500 MG/1
500 TABLET ORAL 3 TIMES DAILY
Qty: 15 TABLET | Refills: 0 | Status: SHIPPED | OUTPATIENT
Start: 2023-09-20 | End: 2023-09-20

## 2023-09-20 RX ORDER — AMOXICILLIN 250 MG
1 CAPSULE ORAL 2 TIMES DAILY
Qty: 60 TABLET | Refills: 0 | Status: SHIPPED | OUTPATIENT
Start: 2023-09-20 | End: 2023-10-20

## 2023-09-20 RX ORDER — INSULIN ASPART 100 [IU]/ML
20 INJECTION, SOLUTION INTRAVENOUS; SUBCUTANEOUS
Qty: 60 ML | Refills: 5
Start: 2023-09-20

## 2023-09-20 RX ORDER — POLYETHYLENE GLYCOL 3350 17 G/17G
17 POWDER, FOR SOLUTION ORAL DAILY PRN
Qty: 510 G | Refills: 0 | Status: SHIPPED | OUTPATIENT
Start: 2023-09-20

## 2023-09-20 RX ORDER — AMOXICILLIN AND CLAVULANATE POTASSIUM 875; 125 MG/1; MG/1
1 TABLET, FILM COATED ORAL EVERY 8 HOURS
Qty: 15 TABLET | Refills: 0 | Status: SHIPPED | OUTPATIENT
Start: 2023-09-20 | End: 2023-09-25

## 2023-09-20 RX ADMIN — PANTOPRAZOLE SODIUM 40 MG: 40 TABLET, DELAYED RELEASE ORAL at 08:27

## 2023-09-20 RX ADMIN — PIPERACILLIN SODIUM AND TAZOBACTAM SODIUM 4.5 G: 4; .5 INJECTION, POWDER, LYOPHILIZED, FOR SOLUTION INTRAVENOUS at 02:42

## 2023-09-20 RX ADMIN — SODIUM CHLORIDE, POTASSIUM CHLORIDE, SODIUM LACTATE AND CALCIUM CHLORIDE 125 ML/HR: 600; 310; 30; 20 INJECTION, SOLUTION INTRAVENOUS at 02:39

## 2023-09-20 RX ADMIN — INSULIN DETEMIR 45 UNITS: 100 INJECTION, SOLUTION SUBCUTANEOUS at 08:27

## 2023-09-20 RX ADMIN — KETOROLAC TROMETHAMINE 30 MG: 30 INJECTION, SOLUTION INTRAMUSCULAR; INTRAVENOUS at 06:22

## 2023-09-20 RX ADMIN — POTASSIUM CHLORIDE 10 MEQ: 1500 TABLET, EXTENDED RELEASE ORAL at 08:27

## 2023-09-20 RX ADMIN — DOCUSATE SODIUM 50 MG AND SENNOSIDES 8.6 MG 2 TABLET: 8.6; 5 TABLET, FILM COATED ORAL at 08:27

## 2023-09-20 NOTE — THERAPY EVALUATION
Acute Care - Physical Therapy Initial Evaluation   [facility]     Patient Name: Antoinette Pack  : 1985  MRN: [id number]  Today's Date: 2023   Onset of Illness/Injury or Date of Surgery: 23 (admission date)  Visit Dx:     ICD-10-CM ICD-9-CM   1. Idiopathic acute pancreatitis without infection or necrosis  K85.00 577.0     Patient Active Problem List   Diagnosis    Body mass index (BMI) 40.0-44.9, adult    Simple goiter    Type 2 diabetes mellitus with hyperglycemia, with long-term current use of insulin    Type 2 diabetes mellitus with hyperglycemia, with long-term current use of insulin    Acute pancreatitis     Past Medical History:   Diagnosis Date    Anxiety     Depression     Diabetes mellitus     Diverticulitis     GERD (gastroesophageal reflux disease)     Hypertension     Kidney stone     PCOS (polycystic ovarian syndrome)     Sleep apnea      Past Surgical History:   Procedure Laterality Date    CARDIAC CATHETERIZATION      CARPAL TUNNEL RELEASE      COLONOSCOPY      ENDOSCOPY      FRACTURE SURGERY      HARDWARE REMOVAL      WRIST    TENDON REPAIR      HAND     PT Assessment (last 12 hours)       PT Evaluation and Treatment       Row Name 23 1100          Physical Therapy Time and Intention    Document Type evaluation  -     Mode of Treatment physical therapy  -     Patient Effort good  -KH     Comment Pt seen for evaluation this date with pt in agreement prior to D/C from facility. Pt lives at home and was grossly (I) PLOF. Pt explains that she has dermal related issues with LE that may cause her to develop gait pattern d/t pain. Pt has not had falls at home, and does not have any AD based on discussion.  Evaluation performed for any potential pt benefit in which pt was eduated on sensory deficits of diabeties.  -       Row Name 23 1100          General Information    Patient Profile Reviewed yes  -DANA     Onset of Illness/Injury or Date of Surgery 23  admission  date  -     Referring Physician Dr. Santoyo  -     Patient Observations alert;cooperative;agree to therapy  -     Patient/Family/Caregiver Comments/Observations Pt states she has no issues with getting around at home. RN staff states pt has been ambulating independently  -     General Observations of Patient Pt seen sitting in recliner in room, pleasant and agreeable ot therapy evaluation.  -     Prior Level of Function independent:  -     Equipment Currently Used at Home none  -     Existing Precautions/Restrictions no known precautions/restrictions  -       Row Name 09/20/23 1100          Living Environment    Current Living Arrangements home  -     People in Home spouse;child(isabella), dependent  -       Row Name 09/20/23 1100          Cognition    Cognitive Function WNL  Pt not formally asked orientation questions however pt seems oriented and alert cognition  -       Row Name 09/20/23 1100          Range of Motion Comprehensive    Comment, General Range of Motion AROM assessed in chair, grossly WFL  -       Row Name 09/20/23 1100          Strength Comprehensive (MMT)    Comment, General Manual Muscle Testing (MMT) Assessment Grossly 5/5 with hip flex/abd/add, knee ext/flex, DF, PF seems WFL  -       Row Name 09/20/23 1100          Bed Mobility    Comment, (Bed Mobility) Not observed as pt was seen sitting in pt chair  -       Row Name 09/20/23 1100          Transfers    Transfers sit-stand transfer;stand-sit transfer  -       Row Name 09/20/23 1100          Sit-Stand Transfer    Sit-Stand [place] (Transfers) independent  -       Row Name 09/20/23 1100          Stand-Sit Transfer    Stand-Sit [place] (Transfers) independent  -       Row Name 09/20/23 1100          Gait/Stairs (Locomotion)    Gait/Stairs Locomotion gait/ambulation independence  -     [place] Level (Gait) independent  -     Patient was able to Ambulate yes  -     Distance in Feet (Gait) Grossly  8-10' for assessment  -     Deviations/Abnormal Patterns (Gait) other (see comments)  Pt demonstrates slight limp however pt explains it is related to pain from feet. Pt states she has had ot use a W/C prior.  -       Row Name 09/20/23 1100          Balance    Comment, Balance Not formally tested however pt states she has not had falls at home  -       Row Name 09/20/23 1100          Plan of Care Review    Outcome Evaluation Pt seen for evaluation this date prior to D/C. Pt does ont demonstrate need for therapeutic intervention at all. Pt may benefit from AD use of cane d/t pain in feet however pt explains she did not need one. PT asked if pt needed anything/equipment and she expressed interest in cane for family use.  -       Row Name 09/20/23 1100          Positioning and Restraints    Pre-Treatment Position sitting in chair/recliner  -     Post Treatment Position chair  -KH     In Chair sitting  -       Row Name 09/20/23 1100          Therapy Assessment/Plan (PT)    Therapy Frequency (PT) evaluation only  -               User Key  (r) = Recorded By, (t) = Taken By, (c) = Cosigned By      Initials Name Provider Type    Shiloh Mo PT Physical Therapist                      PT Recommendation and Plan  Therapy Frequency (PT): evaluation only  Outcome Evaluation: Pt seen for evaluation this date prior to D/C. Pt does ont demonstrate need for therapeutic intervention at all. Pt may benefit from AD use of cane d/t pain in feet however pt explains she did not need one. PT asked if pt needed anything/equipment and she expressed interest in cane for family use.       Time Calculation:    PT Charges       Row Name 09/20/23 5992             Time Calculation    Start Time 1100  -KH      PT Received On 09/20/23  -                User Key  (r) = Recorded By, (t) = Taken By, (c) = Cosigned By      Initials Name Provider Type    Shiloh Mo PT Physical Therapist                  Therapy Charges  for Today       Code Description Service Date Service Provider Modifiers Qty    24285681031 HC PT EVAL LOW COMPLEXITY 2 9/20/2023 Shiloh Obregon, PT GP 1            PT G-Codes  AM-PAC 6 Clicks Score (PT): 24    Shiloh Obregon, PT  9/20/2023

## 2023-09-20 NOTE — DISCHARGE SUMMARY
[facility] HOSPITALISTS DISCHARGE SUMMARY    Patient Identification:  Name:  Antoinette Pack  Age:  38 y.o.  Sex:  female  :  1985  MRN:  [id number]  Visit Number:  [id number]    Date of Admission: 2023  Date of Discharge:  2023     PCP: Shawna Lambert DO    DISCHARGE DIAGNOSIS  #Acute pancreatitis, unknown etiology  #Acute colitis versus diverticulitis  #Suspected ulcerative colitis versus Crohn's disease  #Type 2 diabetes mellitus    #Early pregnancy   #Essential hypertension  #Generalized anxiety/depression disorder  #PCOS  #Sleep apnea    CONSULTS   OB/GYN    PROCEDURES PERFORMED  None    HOSPITAL COURSE  Patient is a 38 y.o. female presented to [facility] complaining of abdominal pain and nausea.  Please see the admitting history and physical for further details.  Patient had a history significant for suspected ulcerative colitis versus Crohn's disease and has been on mesalamine suppositories with plans for repeat colonoscopy and biopsy in the future.  Pregnancy test was confirmed positive with increase in beta hCG therefore unable to obtain CT scan.  Lipase was mildly elevated but did meet criteria for pancreatitis-with typical midepigastric tenderness on exam therefore met 2/3 of the [place] criteria.  She was treated empirically with IV fluids and pain control.  Started empirically on antibiotics in the event this was an infectious colitis.  Unclear if it was infectious as she did not have a fever or no significant leukocytosis.  Did have some improvements but still complained of some discomfort.  Given the questionable Crohn's versus UC, I did give her 3 days of budesonide p.o. to avoid prednisone which would cause issues with glucose control.  Also not entirely convinced it is an autoimmune flare as inflammatory markers were mildly elevated.    On the day of discharge she had significantly improved and felt ready for discharge home.  In regards to  pregnancy, lisinopril was discontinued and medications adjusted due to suspected pregnancy.  She has an appointment with Dr. Razo today at [facility].    In regards to colitis, she was discharged with Augmentin to complete monotherapy for potential infectious source, budesonide for potential autoimmune flare and recommended to follow-up with GI within a week for further work-up and evaluation.    In regards to insulin regimen, instructed her to avoid high-dose mealtime insulin as she did not require to inpatient and to monitor glucose levels regularly as her appetite has not yet returned to baseline.  She stated she would contact endocrinology and would keep a close monitor on glucose as she was comfortable with this plan.    VITAL SIGNS:  Temp:  [97.7 °F (36.5 °C)-98.7 °F (37.1 °C)] 97.8 °F (36.6 °C)  Heart Rate:  [68-87] 81  Resp:  [17-18] 17  BP: (103-147)/(53-86) 147/86  SpO2:  [98 %] 98 %  on   ;   Device (Oxygen Therapy): room air    Body mass index is 41.23 kg/m².  Wt Readings from Last 3 Encounters:   09/18/23 92.6 kg (204 lb 2.3 oz)   09/16/23 93 kg (205 lb)   09/03/23 94.3 kg (208 lb)       PHYSICAL EXAM:  Constitutional:  Well-developed and well-nourished.  No respiratory distress.      HENT:  Head:  Normocephalic and atraumatic.  Mouth:  Moist mucous membranes.    Eyes:  Conjunctivae and EOM are normal.  No scleral icterus.    Neck:  Neck supple.  No JVD present.    Cardiovascular:  Normal rate, regular rhythm and normal heart sounds with no murmur.  Pulmonary/Chest:  No respiratory distress, no wheezes, no crackles, with normal breath sounds and good air movement.  Abdominal:  Soft. No distension and no tenderness.   Musculoskeletal:  No tenderness, and no deformity.  No red or swollen joints anywhere.    Neurological:  Alert and oriented to person, place, and time.  No cranial nerve deficit.    Skin:  Skin is warm and dry. No rash noted. No pallor.   Peripheral vascular: no clubbing, no cyanosis, no  edema.    DISCHARGE DISPOSITION   Stable    DISCHARGE MEDICATIONS:     Discharge Medications        New Medications        Instructions Start Date   amoxicillin-clavulanate 875-125 MG per tablet  Commonly known as: AUGMENTIN   1 tablet, Oral, Every 8 Hours      Budesonide 3 MG 24 hr capsule  Commonly known as: ENTOCORT EC   9 mg, Oral, Daily      polyethylene glycol 17 GM/SCOOP powder  Commonly known as: MIRALAX   Mix 17 grams of powder in 4 to 8 ounces of liquid and take by mouth Daily As Needed (Use if senna-docusate is ineffective).      sennosides-docusate 8.6-50 MG per tablet  Commonly known as: PERICOLACE   1 tablet, Oral, 2 Times Daily             Changes to Medications        Instructions Start Date   NovoLOG FlexPen 100 UNIT/ML solution pen-injector sc pen  Generic drug: insulin aspart  What changed: how much to take   20 Units, Subcutaneous, 3 Times Daily With Meals             Continue These Medications        Instructions Start Date   aspirin 81 MG EC tablet   81 mg, Oral, Daily      BD Pen Needle Brandee U/F 32G X 4 MM misc  Generic drug: Insulin Pen Needle   Use to inject insulin up to 4 times daily as directed      Dexcom G6  device   Use to continuously monitor blood glucose      Dexcom G6 Transmitter misc   Use 1 Every 3 (Three) Months.      furosemide 40 MG tablet  Commonly known as: LASIX   40 mg, Oral, Daily      lansoprazole 30 MG capsule  Commonly known as: PREVACID   30 mg, Oral, 2 Times Daily      mesalamine 1000 MG suppository  Commonly known as: CANASA   1,000 mg, Rectal, Nightly      oxyCODONE-acetaminophen 5-325 MG per tablet  Commonly known as: PERCOCET   1 tablet, Oral, Every 6 Hours PRN      potassium chloride 10 MEQ CR tablet   10 mEq, Oral, Daily      promethazine 25 MG tablet  Commonly known as: PHENERGAN   25 mg, Oral, Every 6 Hours PRN      Tresiba FlexTouch 100 UNIT/ML solution pen-injector injection  Generic drug: insulin degludec   50 Units, Subcutaneous, 2 Times Daily              Stop These Medications      lisinopril 40 MG tablet  Commonly known as: PRINIVIL,ZESTRIL     metoprolol succinate XL 50 MG 24 hr tablet  Commonly known as: TOPROL-XL     modafinil 200 MG tablet  Commonly known as: PROVIGIL     rosuvastatin 20 MG tablet  Commonly known as: CRESTOR     Tri-Sprintec 0.18/0.215/0.25 MG-35 MCG per tablet  Generic drug: norgestimate-ethinyl estradiol              Diet Instructions    GI low irritant         Activity Instructions    Activity as Tolerated         Additional Instructions for the Follow-ups that You Need to Schedule       Discharge Follow-up with PCP   As directed       Currently Documented PCP:    Shawna Lambert DO    PCP Phone Number:    [phone]     Follow Up Details: 1wk hsopital fu        Discharge Follow-up with Specialty: GI   As directed      Specialty: GI   Follow Up Details: UC vs chrons        Discharge Follow-up with Specialty: OBGYN; 1 Week   As directed      Specialty: OBGYN   Follow Up: 1 Week   Follow Up Details: +pregnancy               Follow-up Information       Shawna Lambert DO .    Specialty: Family Medicine  Why: 1wk hsopital fu  Contact information:  [address] [place] [id number]  [phone]                              TEST  RESULTS PENDING AT DISCHARGE  Pending Labs       Order Current Status    Blood Culture - Blood, Arm, Left Preliminary result    Blood Culture - Blood, Arm, Left Preliminary result             CODE STATUS  Code Status and Medical Interventions:   Ordered at: 09/18/23 1439     Code Status (Patient has no pulse and is not breathing):    CPR (Attempt to Resuscitate)     Medical Interventions (Patient has pulse or is breathing):    Full Support       The 10-year ASCVD risk score (Ac YADAV, et al., 2019) is: 2.9%    Values used to calculate the score:      Age: 38 years      Sex: Female      Is Non- : No      Diabetic: Yes      Tobacco smoker: No      Systolic Blood Pressure:  147 mmHg      Is BP treated: No      HDL Cholesterol: 33 mg/dL      Total Cholesterol: 173 mg/dL     Jarrett Santoyo DO  [facility] Hospitalist  09/20/23  11:06 EDT    Please note that this discharge summary required more than 30 minutes to complete.

## 2023-09-20 NOTE — PAYOR COMM NOTE
"CONTACT: NIKITA GIBSON RN  UTILIZATION MANAGEMENT DEPT.  [facility]  [address] [place] [address]  PHONE: [phone]  FAX: [phone]        DISCHARGE NOTIFICATION  DC DATE: 9/20/23 TO HOME    REF#[id number]     AWAITING OBSERVATION AUTHORIZATION PLEASE        Rafita Pack (38 y.o. Female)       Date of Birth   1985    Social Security Number       Address   [address] [id number]    Home Phone   [phone]    MRN   [id number]       Cheondoism   None    Marital Status                               Admission Date   9/18/23    Admission Type   Emergency    Admitting Provider   Jarrett Santoyo DO    Attending Provider       Department, Room/Bed   [place] [address]/       Discharge Date   9/20/2023    Discharge Disposition   Home or Self Care    Discharge Destination   Home                              Attending Provider: (none)   Allergies: Dilantin [Phenytoin], Keppra [Levetiracetam], Meperidine, Topamax [Topiramate]    Isolation: None   Infection: COVID Screen (preop/placement) (01/12/22)   Code Status: CPR    Ht: 149.9 cm (59\")   Wt: 92.6 kg (204 lb 2.3 oz)    Admission Cmt: None   Principal Problem: Acute pancreatitis [K85.90]                   Active Insurance as of 9/18/2023       Primary Coverage       Payor Plan Insurance Group Employer/Plan Group    [facility]IC[facility]        Payor Plan Address Payor Plan Phone Number Payor Plan Fax Number Effective Dates     [address] [address] [phone]  2/14/2023 - None Entered    [facility] [address]         Subscriber Name Subscriber Birth Date Member ID       RAFITA PACK 1985 [id number]               Secondary Coverage       Payor Plan Insurance Group Employer/Plan Group    MISC REFERENCE BASED PRICING MISC REFERENCE BASED PRICING [id number]       Coverage Address Coverage Phone Number Coverage Fax Number " Effective Dates    [address] [phone]  10/24/2022 - None Entered    [facility] [place] [address]         Subscriber Name Subscriber Birth Date Member ID       MICHELLE PACK 1989 [id number]                     Emergency Contacts        (Rel.) Home Phone Work Phone Mobile Phone    Jonel Iverson (Father) [phone] -- --    Michelle Pack (Spouse) [phone] -- --                 Discharge Summary        Jarrett Santoyo DO at 23 1106              [facility] HOSPITALISTS DISCHARGE SUMMARY    Patient Identification:  Name:  Antoinette Pack  Age:  38 y.o.  Sex:  female  :  1985  MRN:  [id number]  Visit Number:  [id number]    Date of Admission: 2023  Date of Discharge:  2023     PCP: Shawna Lambert DO    DISCHARGE DIAGNOSIS  #Acute pancreatitis, unknown etiology  #Acute colitis versus diverticulitis  #Suspected ulcerative colitis versus Crohn's disease  #Type 2 diabetes mellitus    #Early pregnancy   #Essential hypertension  #Generalized anxiety/depression disorder  #PCOS  #Sleep apnea    CONSULTS   OB/GYN    PROCEDURES PERFORMED  None    HOSPITAL COURSE  Patient is a 38 y.o. female presented to [facility] complaining of abdominal pain and nausea.  Please see the admitting history and physical for further details.  Patient had a history significant for suspected ulcerative colitis versus Crohn's disease and has been on mesalamine suppositories with plans for repeat colonoscopy and biopsy in the future.  Pregnancy test was confirmed positive with increase in beta hCG therefore unable to obtain CT scan.  Lipase was mildly elevated but did meet criteria for pancreatitis-with typical midepigastric tenderness on exam therefore met 2/3 of the Key criteria.  She was treated empirically with IV fluids and pain control.  Started empirically on antibiotics in the event this was an infectious colitis.  Unclear if it was infectious as she did not  have a fever or no significant leukocytosis.  Did have some improvements but still complained of some discomfort.  Given the questionable Crohn's versus UC, I did give her 3 days of budesonide p.o. to avoid prednisone which would cause issues with glucose control.  Also not entirely convinced it is an autoimmune flare as inflammatory markers were mildly elevated.    On the day of discharge she had significantly improved and felt ready for discharge home.  In regards to pregnancy, lisinopril was discontinued and medications adjusted due to suspected pregnancy.  She has an appointment with Dr. Razo today at [facility].    In regards to colitis, she was discharged with Augmentin to complete monotherapy for potential infectious source, budesonide for potential autoimmune flare and recommended to follow-up with GI within a week for further work-up and evaluation.    In regards to insulin regimen, instructed her to avoid high-dose mealtime insulin as she did not require to inpatient and to monitor glucose levels regularly as her appetite has not yet returned to baseline.  She stated she would contact endocrinology and would keep a close monitor on glucose as she was comfortable with this plan.    VITAL SIGNS:  Temp:  [97.7 °F (36.5 °C)-98.7 °F (37.1 °C)] 97.8 °F (36.6 °C)  Heart Rate:  [68-87] 81  Resp:  [17-18] 17  BP: (103-147)/(53-86) 147/86  SpO2:  [98 %] 98 %  on   ;   Device (Oxygen Therapy): room air    Body mass index is 41.23 kg/m².  Wt Readings from Last 3 Encounters:   09/18/23 92.6 kg (204 lb 2.3 oz)   09/16/23 93 kg (205 lb)   09/03/23 94.3 kg (208 lb)       PHYSICAL EXAM:  Constitutional:  Well-developed and well-nourished.  No respiratory distress.      HENT:  Head:  Normocephalic and atraumatic.  Mouth:  Moist mucous membranes.    Eyes:  Conjunctivae and EOM are normal.  No scleral icterus.    Neck:  Neck supple.  No JVD present.    Cardiovascular:  Normal rate, regular rhythm and normal heart sounds with no  murmur.  Pulmonary/Chest:  No respiratory distress, no wheezes, no crackles, with normal breath sounds and good air movement.  Abdominal:  Soft. No distension and no tenderness.   Musculoskeletal:  No tenderness, and no deformity.  No red or swollen joints anywhere.    Neurological:  Alert and oriented to person, place, and time.  No cranial nerve deficit.    Skin:  Skin is warm and dry. No rash noted. No pallor.   Peripheral vascular: no clubbing, no cyanosis, no edema.    DISCHARGE DISPOSITION   Stable    DISCHARGE MEDICATIONS:     Discharge Medications        New Medications        Instructions Start Date   amoxicillin-clavulanate 875-125 MG per tablet  Commonly known as: AUGMENTIN   1 tablet, Oral, Every 8 Hours      Budesonide 3 MG 24 hr capsule  Commonly known as: ENTOCORT EC   9 mg, Oral, Daily      polyethylene glycol 17 GM/SCOOP powder  Commonly known as: MIRALAX   Mix 17 grams of powder in 4 to 8 ounces of liquid and take by mouth Daily As Needed (Use if senna-docusate is ineffective).      sennosides-docusate 8.6-50 MG per tablet  Commonly known as: PERICOLACE   1 tablet, Oral, 2 Times Daily             Changes to Medications        Instructions Start Date   NovoLOG FlexPen 100 UNIT/ML solution pen-injector sc pen  Generic drug: insulin aspart  What changed: how much to take   20 Units, Subcutaneous, 3 Times Daily With Meals             Continue These Medications        Instructions Start Date   aspirin 81 MG EC tablet   81 mg, Oral, Daily      BD Pen Needle Brandee U/F 32G X 4 MM misc  Generic drug: Insulin Pen Needle   Use to inject insulin up to 4 times daily as directed      Dexcom G6  device   Use to continuously monitor blood glucose      Dexcom G6 Transmitter misc   Use 1 Every 3 (Three) Months.      furosemide 40 MG tablet  Commonly known as: LASIX   40 mg, Oral, Daily      lansoprazole 30 MG capsule  Commonly known as: PREVACID   30 mg, Oral, 2 Times Daily      mesalamine 1000 MG  suppository  Commonly known as: CANASA   1,000 mg, Rectal, Nightly      oxyCODONE-acetaminophen 5-325 MG per tablet  Commonly known as: PERCOCET   1 tablet, Oral, Every 6 Hours PRN      potassium chloride 10 MEQ CR tablet   10 mEq, Oral, Daily      promethazine 25 MG tablet  Commonly known as: PHENERGAN   25 mg, Oral, Every 6 Hours PRN      Tresiba FlexTouch 100 UNIT/ML solution pen-injector injection  Generic drug: insulin degludec   50 Units, Subcutaneous, 2 Times Daily             Stop These Medications      lisinopril 40 MG tablet  Commonly known as: PRINIVIL,ZESTRIL     metoprolol succinate XL 50 MG 24 hr tablet  Commonly known as: TOPROL-XL     modafinil 200 MG tablet  Commonly known as: PROVIGIL     rosuvastatin 20 MG tablet  Commonly known as: CRESTOR     Tri-Sprintec 0.18/0.215/0.25 MG-35 MCG per tablet  Generic drug: norgestimate-ethinyl estradiol              Diet Instructions    GI low irritant         Activity Instructions    Activity as Tolerated         Additional Instructions for the Follow-ups that You Need to Schedule       Discharge Follow-up with PCP   As directed       Currently Documented PCP:    Shawna Lambert DO    PCP Phone Number:    [phone]     Follow Up Details: 1wk hsopital fu        Discharge Follow-up with Specialty: GI   As directed      Specialty: GI   Follow Up Details: UC vs chrons        Discharge Follow-up with Specialty: OBGYN; 1 Week   As directed      Specialty: OBGYN   Follow Up: 1 Week   Follow Up Details: +pregnancy               Follow-up Information       Shawna Lambert DO .    Specialty: Family Medicine  Why: 1wk hsopital fu  Contact information:  [address] [place] [address] [phone]                              TEST  RESULTS PENDING AT DISCHARGE  Pending Labs       Order Current Status    Blood Culture - Blood, Arm, Left Preliminary result    Blood Culture - Blood, Arm, Left Preliminary result             CODE STATUS  Code Status and  Medical Interventions:   Ordered at: 09/18/23 1439     Code Status (Patient has no pulse and is not breathing):    CPR (Attempt to Resuscitate)     Medical Interventions (Patient has pulse or is breathing):    Full Support       The 10-year ASCVD risk score (Ac YADAV, et al., 2019) is: 2.9%    Values used to calculate the score:      Age: 38 years      Sex: Female      Is Non- : No      Diabetic: Yes      Tobacco smoker: No      Systolic Blood Pressure: 147 mmHg      Is BP treated: No      HDL Cholesterol: 33 mg/dL      Total Cholesterol: 173 mg/dL     Jarrett Santoyo DO  [facility]  09/20/23  11:06 EDT    Please note that this discharge summary required more than 30 minutes to complete.      Electronically signed by Jarrett Santoyo DO at 09/20/23 1114       Discharge Order (From admission, onward)       Start     Ordered    09/20/23 0848  Discharge patient  Once        Expected Discharge Date: 09/20/23   Expected Discharge Time: Morning   Discharge Disposition: Home or Self Care   Physician of Record for Attribution - Please select from Treatment Team: JARRETT SANTOYO [[id number]]   Review needed by CMO to determine Physician of Record: No      Question Answer Comment   Physician of Record for Attribution - Please select from Treatment Team JARRETT SANTOYO    Review needed by CMO to determine Physician of Record No        09/20/23 0863

## 2023-09-20 NOTE — PLAN OF CARE
Goal Outcome Evaluation:  Plan of Care Reviewed With: patient        Progress: no change  Outcome Evaluation: Pt has beed resting in bed throughout the night. pt c/o pain this shift, prn medication given. Pt ambulates in room independently. no other pt complaints or acute changes noted at this time. will continue with plan of care.

## 2023-09-21 ENCOUNTER — READMISSION MANAGEMENT (OUTPATIENT)
Dept: CALL CENTER | Facility: HOSPITAL | Age: 38
End: 2023-09-21
Payer: MEDICARE

## 2023-09-21 NOTE — OUTREACH NOTE
Prep Survey      Flowsheet Row Responses   [facility] facility patient discharged from? [facility]   Is LACE score < 7 ? No   Eligibility Readm Mgmt   Discharge diagnosis Acute pancreatitis   Does the patient have one of the following disease processes/diagnoses(primary or secondary)? Other   Does the patient have Home health ordered? No   Is there a DME ordered? No   Prep survey completed? Yes            Lizz GOEL - Registered Nurse

## 2023-09-23 LAB
BACTERIA SPEC AEROBE CULT: NORMAL
BACTERIA SPEC AEROBE CULT: NORMAL

## 2023-09-26 ENCOUNTER — READMISSION MANAGEMENT (OUTPATIENT)
Dept: CALL CENTER | Facility: HOSPITAL | Age: 38
End: 2023-09-26
Payer: MEDICARE

## 2023-09-26 NOTE — OUTREACH NOTE
Medical Week 1 Survey      Flowsheet Row Responses   [facility] patient discharged from? [facility]   Does the patient have one of the following disease processes/diagnoses(primary or secondary)? Other   Week 1 attempt successful? Yes   Call start time 1120   Call end time 1128   Discharge diagnosis Acute pancreatitis   Meds reviewed with patient/caregiver? Yes   Is the patient having any side effects they believe may be caused by any medication additions or changes? No   Does the patient have all medications ordered at discharge? Yes  [did not fill Miralax and stool softener, not needed]   Prescription comments was instructed by MD not to take Entocort due to causing increased glucoses   Is the patient taking all medications as directed (includes completed medication regime)? No   What is preventing the patient from taking all medications as directed? Side effects   Nursing Interventions Nurse provided patient education   Does the patient have a primary care provider?  Yes   Does the patient have an appointment with their PCP within 7 days of discharge? Yes   Has the patient kept scheduled appointments due by today? Yes   Has home health visited the patient within 72 hours of discharge? N/A   Psychosocial issues? No   Did the patient receive a copy of their discharge instructions? Yes   Nursing interventions Reviewed instructions with patient   What is the patient's perception of their health status since discharge? Improving   Is the patient/caregiver able to teach back signs and symptoms related to disease process for when to call PCP? Yes   Is the patient/caregiver able to teach back signs and symptoms related to disease process for when to call 911? Yes   Is the patient/caregiver able to teach back the hierarchy of who to call/visit for symptoms/problems? PCP, Specialist, Home health nurse, Urgent Care, ED, 911 Yes   If the patient is a current smoker, are they able to teach back resources for cessation? Not  a smoker   Additional teach back comments states digestion improving, has had some blood in stool, will discuss with GI specialist, endocrine and OB this week   Week 1 call completed? Yes   Call end time 1128            Chloé ZAMBRANO - Registered Nurse

## 2023-09-27 ENCOUNTER — LAB (OUTPATIENT)
Dept: LAB | Facility: HOSPITAL | Age: 38
End: 2023-09-27
Payer: MEDICARE

## 2023-09-27 ENCOUNTER — OFFICE VISIT (OUTPATIENT)
Dept: ENDOCRINOLOGY | Facility: CLINIC | Age: 38
End: 2023-09-27
Payer: COMMERCIAL

## 2023-09-27 ENCOUNTER — TRANSCRIBE ORDERS (OUTPATIENT)
Dept: ADMINISTRATIVE | Facility: HOSPITAL | Age: 38
End: 2023-09-27
Payer: MEDICARE

## 2023-09-27 ENCOUNTER — SPECIALTY PHARMACY (OUTPATIENT)
Dept: PHARMACY | Facility: HOSPITAL | Age: 38
End: 2023-09-27
Payer: MEDICARE

## 2023-09-27 VITALS
DIASTOLIC BLOOD PRESSURE: 76 MMHG | WEIGHT: 208.6 LBS | HEIGHT: 59 IN | HEART RATE: 76 BPM | BODY MASS INDEX: 42.05 KG/M2 | SYSTOLIC BLOOD PRESSURE: 126 MMHG | OXYGEN SATURATION: 100 %

## 2023-09-27 DIAGNOSIS — E11.65 TYPE 2 DIABETES MELLITUS WITH HYPERGLYCEMIA, WITH LONG-TERM CURRENT USE OF INSULIN: Primary | ICD-10-CM

## 2023-09-27 DIAGNOSIS — K62.5 HEMORRHAGE OF RECTUM AND ANUS: ICD-10-CM

## 2023-09-27 DIAGNOSIS — K62.5 HEMORRHAGE OF RECTUM AND ANUS: Primary | ICD-10-CM

## 2023-09-27 DIAGNOSIS — Z79.4 TYPE 2 DIABETES MELLITUS WITH HYPERGLYCEMIA, WITH LONG-TERM CURRENT USE OF INSULIN: Primary | ICD-10-CM

## 2023-09-27 PROCEDURE — 36415 COLL VENOUS BLD VENIPUNCTURE: CPT

## 2023-09-27 PROCEDURE — 85025 COMPLETE CBC W/AUTO DIFF WBC: CPT

## 2023-09-27 PROCEDURE — 80053 COMPREHEN METABOLIC PANEL: CPT

## 2023-09-27 PROCEDURE — 86140 C-REACTIVE PROTEIN: CPT

## 2023-09-27 RX ORDER — PROCHLORPERAZINE 25 MG/1
1 SUPPOSITORY RECTAL
Qty: 1 EACH | Refills: 1 | Status: SHIPPED | OUTPATIENT
Start: 2023-09-27

## 2023-09-27 RX ORDER — ACYCLOVIR 400 MG/1
1 TABLET ORAL
COMMUNITY
End: 2023-09-27

## 2023-09-27 RX ORDER — INSULIN PMP CART,AUT,G6/7,CNTR
1 EACH SUBCUTANEOUS EVERY OTHER DAY
Qty: 15 EACH | Refills: 5 | Status: SHIPPED | OUTPATIENT
Start: 2023-09-27

## 2023-09-27 RX ORDER — INSULIN PMP CART,AUT,G6/7,CNTR
1 EACH SUBCUTANEOUS TAKE AS DIRECTED
Qty: 1 KIT | Refills: 0 | Status: SHIPPED | OUTPATIENT
Start: 2023-09-27

## 2023-09-27 RX ORDER — PROCHLORPERAZINE 25 MG/1
SUPPOSITORY RECTAL
Qty: 3 EACH | Refills: 5 | Status: SHIPPED | OUTPATIENT
Start: 2023-09-27

## 2023-09-27 NOTE — PROGRESS NOTES
Specialty Pharmacy Patient Management Program  Endocrinology Reassessment     Antoinette Pack is a 38 y.o. female with Type 2 Diabetes (Pregnancy) enrolled in the Endocrinology Patient Management program offered by [facility]. A follow-up was conducted, including assessment of continued therapy appropriateness, medication adherence, and side effect incidence and management for Insulin therapy and CGM.     Patient has positive hCG with rising levels. She is currently awaiting confirmation via ultrasound for pregnancy. She reports taking Tresiba 50 units BID and Humalog on a sliding scale with meals. She uses Dexcom to monitor her BG. She denies low BG <70.     In the past, the patient has tried:     Drug Dose Reason for Discontinuation Notes   Trulicity  GI upset    Glipizide   Unsure                   Changes to Insurance Coverage or Financial Support  None    Relevant Past Medical History and Comorbidities  Relevant medical history and concomitant health conditions were discussed with the patient. The patient's chart has been reviewed for relevant past medical history and comorbid health conditions and updated as necessary.   Past Medical History:   Diagnosis Date    Anxiety     Depression     Diabetes mellitus     Diverticulitis     GERD (gastroesophageal reflux disease)     Hypertension     Kidney stone     PCOS (polycystic ovarian syndrome)     Sleep apnea      Social History     Socioeconomic History    Marital status:    Tobacco Use    Smoking status: Former    Smokeless tobacco: Never   Vaping Use    Vaping Use: Never used   Substance and Sexual Activity    Alcohol use: No    Drug use: No    Sexual activity: Defer       Problem list reviewed by Sravani Perez RPH on 9/27/2023 at 10:58 AM    Allergies  Known allergies and reactions were discussed with the patient. The patient's chart has been reviewed for allergy information and updated as necessary.   Dilantin [phenytoin],  Keppra [levetiracetam], Meperidine, and Topamax [topiramate]    Allergies reviewed by Sravani Perez RP on 9/27/2023 at 10:58 AM    Relevant Laboratory Values  A1C Last 3 Results          3/15/2023    09:01 7/20/2023    15:44 9/17/2023    00:23   HGBA1C Last 3 Results   Hemoglobin A1C 6.8  7.1  9.30      Lab Results   Component Value Date    HGBA1C 9.30 (H) 09/17/2023     Lab Results   Component Value Date    GLUCOSE 187 (H) 09/20/2023    CALCIUM 8.5 (L) 09/20/2023     (L) 09/20/2023    K 3.8 09/20/2023    CO2 20.8 (L) 09/20/2023     09/20/2023    BUN 10 09/20/2023    CREATININE 0.65 09/20/2023    EGFRIFAFRI  09/05/2016      Comment:      <15 Indicative of kidney failure.    EGFRIFNONA 101 12/27/2020    BCR 15.4 09/20/2023    ANIONGAP 8.2 09/20/2023     Lab Results   Component Value Date    CHOL 173 09/18/2023    CHLPL 159 09/22/2015    TRIG 111 09/18/2023    HDL 33 (L) 09/18/2023     (H) 09/18/2023       Current Medication List  This medication list has been reviewed with the patient and evaluated for any interactions or necessary modifications/recommendations, and updated to include all prescription medications, OTC medications, and supplements the patient is currently taking.  This list reflects what is contained in the patient's profile, which has also been marked as reviewed to communicate to other providers it is the most up to date version of the patient's current medication therapy.     Current Outpatient Medications:     Continuous Blood Gluc Sensor (Dexcom G7 Sensor) misc, Use 1 each Every 10 (Ten) Days., Disp: , Rfl:     insulin aspart (NovoLOG FlexPen) 100 UNIT/ML solution pen-injector sc pen, Inject 20 Units under the skin into the appropriate area as directed 3 (Three) Times a Day With Meals. (Patient taking differently: Inject  under the skin into the appropriate area as directed 3 (Three) Times a Day With Meals. Sliding scale with meals), Disp: 60 mL, Rfl: 5    insulin degludec  (Tresiba FlexTouch) 100 UNIT/ML solution pen-injector injection, Inject 50 Units under the skin into the appropriate area as directed 2 (Two) Times a Day., Disp: 90 mL, Rfl: 1    Insulin Pen Needle 32G X 4 MM misc, Use to inject insulin up to 4 times daily as directed, Disp: 400 each, Rfl: 1    lansoprazole (PREVACID) 30 MG capsule, Take 1 capsule by mouth 2 (Two) Times a Day., Disp: , Rfl:     mesalamine (CANASA) 1000 MG suppository, Insert 1 suppository into the rectum Every Night., Disp: , Rfl:     Prenatal Vit-Fe Fumarate-FA (PRENATAL PO), Take 1 tablet by mouth Daily., Disp: , Rfl:   No current facility-administered medications for this visit.    Medicines reviewed by Sravani Perez [facility] on 9/27/2023 at 11:03 AM    Drug Interactions  No significant drug-drug interactions with diabetes medications expected according to literature.     Adverse Drug Reactions  Adverse Reactions Experienced: None  Plan for ADR Management: None required    Hospitalizations and Urgent Care Since Last Assessment  Hospitalizations or Admissions: None  ED Visits: None   Urgent Office Visits: None    Adherence and Self-Administration  Adherence related patient's specialty therapy was discussed with the patient. The Adherence segment of this outreach has been reviewed and updated.   Ongoing or New Barriers to Patient Adherence and/or Self-Administration: None   Methods for Supporting Patient Adherence and/or Self-Administration: None Required    Goals of Therapy  Goals related to the patient's specialty therapy was discussed with the patient. The Patient Goals segment of this outreach has been reviewed and updated.    Goals Addressed Today        HEMOGLOBIN A1C < 7            Reassessment Plan & Follow-Up  Patient's diabetes has worsened with A1C up to 9.3% from 7.1%.  Medication Therapy Changes:   Pt will begin therapy with OmniPod 5 and Dexcom G6. These will integrate to help manage insulin delivery.   Pt will continue with daily  injections until she is trained and starts using insulin pump.   Additional Plans, Therapy Recommendations, or Therapy Problems to Be Addressed:   Pt will follow-up in clinic every 4 weeks throughout pregnancy once confirmed.   Pharmacist to perform regular reassessments no more than (6) months from the previous assessment.  Care Coordinator to set up future refill outreaches, coordinate prescription delivery, and escalate clinical questions to pharmacist.     Attestation  I attest the patient was actively involved in and has agreed to the above plan of care. If the prescribed therapy is at any point deemed not appropriate based on the current or future assessments, a consultation will be initiated with the patient's specialty care provider to determine the best course of action. The revised plan of therapy will be documented along with any required assessments and/or additional patient education provided.     Sravani Perez, PharmD, [facility]  Clinical Specialty Pharmacist, Endocrinology  9/27/2023  17:00 EDT

## 2023-09-27 NOTE — PROGRESS NOTES
Chief Complaint   Patient presents with    Diabetes        Antoinette Pack is a 38 y.o. female had concerns including Diabetes.   T2DM    She recently found out that she is pregnant.  She is very early and is noted to see OB/GYN this upcoming week for follow-up.  She has been feeling well.     Birth state: [place]  Previous history of radiation to face/neck:no  Consuming foods high in iodine: no  Family history of thyroid complications:none    The following portions of the patient's history were reviewed and updated as appropriate: allergies, current medications, past family history, past medical history, past social history, past surgical history and problem list.    History:  Diabetes was diagnosed 8-9 years ago.  Complications include none.  Last ophtho exam was 2021, Dr. Bruno Office.  Current medications for diabetes include Humalog 30-60 units TID with meals, Tresiba 50 units BID.  Past meds: Trulicity, GI Issues, has problems with yeast, Glipizide-unsure why it was stopped  She checks her BG's with Dexcom CGM.  Hypos: rarely  Fasting range: 150-200+    Diet: has made improvements to her diet.    She has had an abnormal menstrual cycle last week and is still seeing OB trying to conceive pregnancy.    Past Medical History:   Diagnosis Date    Anxiety     Depression     Diabetes mellitus     Diverticulitis     GERD (gastroesophageal reflux disease)     Hypertension     Kidney stone     PCOS (polycystic ovarian syndrome)     Sleep apnea      Past Surgical History:   Procedure Laterality Date    CARDIAC CATHETERIZATION      CARPAL TUNNEL RELEASE      COLONOSCOPY      ENDOSCOPY      FRACTURE SURGERY      HARDWARE REMOVAL      WRIST    TENDON REPAIR      HAND      Family History   Problem Relation Age of Onset    Heart disease Mother     COPD Mother     Heart disease Father     COPD Father       Social History     Socioeconomic History    Marital status:    Tobacco Use    Smoking status: Former    Smokeless  tobacco: Never   Vaping Use    Vaping Use: Never used   Substance and Sexual Activity    Alcohol use: No    Drug use: No    Sexual activity: Defer      Allergies   Allergen Reactions    Dilantin [Phenytoin] Mental Status Change    Keppra [Levetiracetam] Mental Status Change    Meperidine Rash    Topamax [Topiramate] Mental Status Change      Current Outpatient Medications on File Prior to Visit   Medication Sig Dispense Refill    insulin aspart (NovoLOG FlexPen) 100 UNIT/ML solution pen-injector sc pen Inject 20 Units under the skin into the appropriate area as directed 3 (Three) Times a Day With Meals. (Patient taking differently: Inject  under the skin into the appropriate area as directed 3 (Three) Times a Day With Meals. Sliding scale with meals) 60 mL 5    insulin degludec (Tresiba FlexTouch) 100 UNIT/ML solution pen-injector injection Inject 50 Units under the skin into the appropriate area as directed 2 (Two) Times a Day. 90 mL 1    Insulin Pen Needle 32G X 4 MM misc Use to inject insulin up to 4 times daily as directed 400 each 1    lansoprazole (PREVACID) 30 MG capsule Take 1 capsule by mouth 2 (Two) Times a Day.      mesalamine (CANASA) 1000 MG suppository Insert 1 suppository into the rectum Every Night.      [DISCONTINUED] labetalol (NORMODYNE) 100 MG tablet Take 1/2 tablet by mouth 2 times every day 30 tablet 0     No current facility-administered medications on file prior to visit.      Review of Systems   Constitutional:  Positive for diaphoresis and fatigue. Negative for unexpected weight gain and unexpected weight loss.   HENT:          Neck tenderness   Eyes:  Positive for blurred vision and visual disturbance.   Cardiovascular:  Positive for palpitations.   Gastrointestinal:  Positive for constipation.   Endocrine: Positive for heat intolerance, polydipsia, polyphagia and polyuria. Negative for cold intolerance.   Skin:  Positive for dry skin.        Hair thinning   Neurological:  Positive for  "tremors.   Psychiatric/Behavioral:  Positive for agitation and sleep disturbance. The patient is nervous/anxious.    All other systems reviewed and are negative.     /76 (BP Location: Left arm, Patient Position: Sitting, Cuff Size: Adult)   Pulse 76   Ht 149.9 cm (59\")   Wt 94.6 kg (208 lb 9.6 oz)   LMP  (LMP Unknown)   SpO2 100%   BMI 42.13 kg/m²      Physical Exam  Vitals reviewed.   Constitutional:       Appearance: Normal appearance.   Eyes:      Extraocular Movements: Extraocular movements intact.   Cardiovascular:      Rate and Rhythm: Normal rate.      Pulses: Normal pulses.   Pulmonary:      Effort: Pulmonary effort is normal.   Skin:     General: Skin is warm.   Neurological:      Mental Status: She is alert and oriented to person, place, and time.   Psychiatric:         Mood and Affect: Mood normal.         Behavior: Behavior normal.         Thought Content: Thought content normal.         Judgment: Judgment normal.      Labs/Imaging  CMP  Lab Results   Component Value Date    GLUCOSE 70 09/27/2023    BUN 9 09/27/2023    CREATININE 0.49 (L) 09/27/2023    EGFRIFNONA 101 12/27/2020    EGFRIFAFRI  09/05/2016      Comment:      <15 Indicative of kidney failure.    BCR 18.4 09/27/2023    K 4.1 09/27/2023    CO2 22.2 09/27/2023    CALCIUM 9.3 09/27/2023    ALBUMIN 4.3 09/27/2023    LABIL2 1.3 (L) 02/05/2016    AST 15 09/27/2023    ALT 14 09/27/2023        CBC w/DIFF   Lab Results   Component Value Date    WBC 6.89 09/27/2023    RBC 3.98 09/27/2023    HGB 9.5 (L) 09/27/2023    HCT 30.1 (L) 09/27/2023    MCV 75.6 (L) 09/27/2023    MCH 23.9 (L) 09/27/2023    MCHC 31.6 09/27/2023    RDW 15.8 (H) 09/27/2023    RDWSD 43.1 09/27/2023    MPV 10.8 09/27/2023     09/27/2023    NEUTRORELPCT 59.3 09/27/2023    LYMPHORELPCT 30.6 09/27/2023    MONORELPCT 7.1 09/27/2023    EOSRELPCT 2.3 09/27/2023    BASORELPCT 0.3 09/27/2023    AUTOIGPER 0.4 09/27/2023    NEUTROABS 4.08 09/27/2023    LYMPHSABS 2.11 " 2023    MONOSABS 0.49 2023    EOSABS 0.16 2023    BASOSABS 0.02 2023    AUTOIGNUM 0.03 2023    NRBC 0.0 2023       TSH  Lab Results   Component Value Date    TSH 0.606 2023    TSH 0.497 2022    TSH 0.971 2015       T4  Lab Results   Component Value Date    FREET4 0.94 2023    FREET4 0.97 2022    FREET4 1.20 2015     No results found for: U7EWRDI    T3  Lab Results   Component Value Date    T3FREE 3.43 2022     No results found for: E6FVIBS    TRAb  No results found for: TSURCPAB    TPO  No results found for: THYROIDAB    No valid procedures specified.    Assessment and Plan    Diagnoses and all orders for this visit:    1. Type 2 diabetes mellitus with hyperglycemia, with long-term current use of insulin (Primary)  Assessment & Plan:  -Reviewed instructions for glycemic monitoring and the monitoring of urine ketones.  -Discussed that gestational diabetes can become harder to control as pregnancy progresses. Reviewed need for routine follow up from now until delivery.   -Patient instructed to send glucose data weekly via [employer].  -Risks of gestational diabetes were reviewed, these include, but are not limited to: LGA infant, macrosomia, stillbirth,  hypoglycemia, hyperbilirubinemia, birth injury,  birth, respiratory complications following delivery, polyhydramnios, hypocalcemia, maternal preeclampsia.  -Nutritional goals reviewed: Patient advised to eat 3 moderate sized meals daily as well as 2-4 snacks, including a bedtime snack. Discussed need for carbohydrate awareness. Patient given goals for carbohydrate intake for meals/snacks.  -Glycemic Targets during pregnancy were reviewed, as follows: fasting glucose <95, 1 hour post prandial <140, 2 hour postprandial <120.  -Patient advised that she will need to monitor blood glucose up to 6-8 times daily. In the event that insurance may not cover adequate testing supplies, she  was instructed she may need to purchase on OTC meter and strips.  -Continue using CGM.  2 week data download is as follows:  Very High: 16%  High: 31%  In Range: 51%  Low: 1%   Very Low: <1%  Trend shows overall hypers with increased hypers with meals.  -Discussed improving BG's with the use of a CGM and insulin pump.  She would like to start using this. Will send information to  for pump to get her started on this.  -Continue with current insulin until starting on insulin pump.  -Patient will return for follow up in 4 weeks. She was instructed to contact the office in the interim if glucoses not at target.            Return in about 4 weeks (around 10/25/2023) for Follow-up appointment. The patient was instructed to contact the clinic with any interval questions or concerns.    This document has been electronically signed by CICI Veliz  September 28, 2023 09:44 EDT  Endocrinology

## 2023-09-28 LAB
ALBUMIN SERPL-MCNC: 4.3 G/DL (ref 3.5–5.2)
ALBUMIN/GLOB SERPL: 1.5 G/DL
ALP SERPL-CCNC: 65 U/L (ref 39–117)
ALT SERPL W P-5'-P-CCNC: 14 U/L (ref 1–33)
ANION GAP SERPL CALCULATED.3IONS-SCNC: 8.8 MMOL/L (ref 5–15)
AST SERPL-CCNC: 15 U/L (ref 1–32)
BASOPHILS # BLD AUTO: 0.02 10*3/MM3 (ref 0–0.2)
BASOPHILS NFR BLD AUTO: 0.3 % (ref 0–1.5)
BILIRUB SERPL-MCNC: 0.3 MG/DL (ref 0–1.2)
BUN SERPL-MCNC: 9 MG/DL (ref 6–20)
BUN/CREAT SERPL: 18.4 (ref 7–25)
CALCIUM SPEC-SCNC: 9.3 MG/DL (ref 8.6–10.5)
CHLORIDE SERPL-SCNC: 108 MMOL/L (ref 98–107)
CO2 SERPL-SCNC: 22.2 MMOL/L (ref 22–29)
CREAT SERPL-MCNC: 0.49 MG/DL (ref 0.57–1)
CRP SERPL-MCNC: 0.32 MG/DL (ref 0–0.5)
DEPRECATED RDW RBC AUTO: 43.1 FL (ref 37–54)
EGFRCR SERPLBLD CKD-EPI 2021: 122.4 ML/MIN/1.73
EOSINOPHIL # BLD AUTO: 0.16 10*3/MM3 (ref 0–0.4)
EOSINOPHIL NFR BLD AUTO: 2.3 % (ref 0.3–6.2)
ERYTHROCYTE [DISTWIDTH] IN BLOOD BY AUTOMATED COUNT: 15.8 % (ref 12.3–15.4)
GLOBULIN UR ELPH-MCNC: 2.8 GM/DL
GLUCOSE SERPL-MCNC: 70 MG/DL (ref 65–99)
HCT VFR BLD AUTO: 30.1 % (ref 34–46.6)
HGB BLD-MCNC: 9.5 G/DL (ref 12–15.9)
IMM GRANULOCYTES # BLD AUTO: 0.03 10*3/MM3 (ref 0–0.05)
IMM GRANULOCYTES NFR BLD AUTO: 0.4 % (ref 0–0.5)
LYMPHOCYTES # BLD AUTO: 2.11 10*3/MM3 (ref 0.7–3.1)
LYMPHOCYTES NFR BLD AUTO: 30.6 % (ref 19.6–45.3)
MCH RBC QN AUTO: 23.9 PG (ref 26.6–33)
MCHC RBC AUTO-ENTMCNC: 31.6 G/DL (ref 31.5–35.7)
MCV RBC AUTO: 75.6 FL (ref 79–97)
MONOCYTES # BLD AUTO: 0.49 10*3/MM3 (ref 0.1–0.9)
MONOCYTES NFR BLD AUTO: 7.1 % (ref 5–12)
NEUTROPHILS NFR BLD AUTO: 4.08 10*3/MM3 (ref 1.7–7)
NEUTROPHILS NFR BLD AUTO: 59.3 % (ref 42.7–76)
NRBC BLD AUTO-RTO: 0 /100 WBC (ref 0–0.2)
PLATELET # BLD AUTO: 200 10*3/MM3 (ref 140–450)
PMV BLD AUTO: 10.8 FL (ref 6–12)
POTASSIUM SERPL-SCNC: 4.1 MMOL/L (ref 3.5–5.2)
PROT SERPL-MCNC: 7.1 G/DL (ref 6–8.5)
RBC # BLD AUTO: 3.98 10*6/MM3 (ref 3.77–5.28)
SODIUM SERPL-SCNC: 139 MMOL/L (ref 136–145)
WBC NRBC COR # BLD: 6.89 10*3/MM3 (ref 3.4–10.8)

## 2023-09-28 NOTE — ASSESSMENT & PLAN NOTE
-Reviewed instructions for glycemic monitoring and the monitoring of urine ketones.  -Discussed that gestational diabetes can become harder to control as pregnancy progresses. Reviewed need for routine follow up from now until delivery.   -Patient instructed to send glucose data weekly via [employer].  -Risks of gestational diabetes were reviewed, these include, but are not limited to: LGA infant, macrosomia, stillbirth,  hypoglycemia, hyperbilirubinemia, birth injury,  birth, respiratory complications following delivery, polyhydramnios, hypocalcemia, maternal preeclampsia.  -Nutritional goals reviewed: Patient advised to eat 3 moderate sized meals daily as well as 2-4 snacks, including a bedtime snack. Discussed need for carbohydrate awareness. Patient given goals for carbohydrate intake for meals/snacks.  -Glycemic Targets during pregnancy were reviewed, as follows: fasting glucose <95, 1 hour post prandial <140, 2 hour postprandial <120.  -Patient advised that she will need to monitor blood glucose up to 6-8 times daily. In the event that insurance may not cover adequate testing supplies, she was instructed she may need to purchase on OTC meter and strips.  -Continue using CGM.  2 week data download is as follows:  Very High: 16%  High: 31%  In Range: 51%  Low: 1%   Very Low: <1%  Trend shows overall hypers with increased hypers with meals.  -Discussed improving BG's with the use of a CGM and insulin pump.  She would like to start using this. Will send information to  for pump to get her started on this.  -Continue with current insulin until starting on insulin pump.  -Patient will return for follow up in 4 weeks. She was instructed to contact the office in the interim if glucoses not at target.    47F, h.o HTN, HLD, thyroid?, smoker, p.w acute shortness of breath and difficulty breathing tonight. Never had similar symptoms, no copd or asthma. Denied any chest pain, fever, n/v, abdominal pain. No LE swelling or pain, no hormone use and no hx of clots/cancer. Pt states her thyroid is enlarging but no sign of stridor or wheezes, rash, rhinorrhea or sore throat.

## 2023-10-12 ENCOUNTER — TRANSCRIBE ORDERS (OUTPATIENT)
Dept: OBSTETRICS AND GYNECOLOGY | Facility: HOSPITAL | Age: 38
End: 2023-10-12
Payer: MEDICARE

## 2023-10-12 DIAGNOSIS — O09.511 PRIMIGRAVIDA OF ADVANCED MATERNAL AGE IN FIRST TRIMESTER: ICD-10-CM

## 2023-10-12 DIAGNOSIS — O99.210 OBESITY IN PREGNANCY, ANTEPARTUM: ICD-10-CM

## 2023-10-12 DIAGNOSIS — G40.909 MATERNAL SEIZURE DISORDER DURING PREGNANCY IN FIRST TRIMESTER: ICD-10-CM

## 2023-10-12 DIAGNOSIS — O99.351 MATERNAL SEIZURE DISORDER DURING PREGNANCY IN FIRST TRIMESTER: ICD-10-CM

## 2023-10-12 DIAGNOSIS — E11.65 TYPE 2 DIABETES MELLITUS WITH HYPERGLYCEMIA, WITH LONG-TERM CURRENT USE OF INSULIN: Primary | ICD-10-CM

## 2023-10-12 DIAGNOSIS — O10.919 HTN IN PREGNANCY, CHRONIC: ICD-10-CM

## 2023-10-12 DIAGNOSIS — Z79.4 TYPE 2 DIABETES MELLITUS WITH HYPERGLYCEMIA, WITH LONG-TERM CURRENT USE OF INSULIN: Primary | ICD-10-CM

## 2023-10-13 ENCOUNTER — TRANSCRIBE ORDERS (OUTPATIENT)
Dept: ADMINISTRATIVE | Facility: HOSPITAL | Age: 38
End: 2023-10-13
Payer: MEDICARE

## 2023-10-13 DIAGNOSIS — I50.9 HEART FAILURE, UNSPECIFIED HF CHRONICITY, UNSPECIFIED HEART FAILURE TYPE: Primary | ICD-10-CM

## 2023-10-18 ENCOUNTER — SPECIALTY PHARMACY (OUTPATIENT)
Dept: PHARMACY | Facility: HOSPITAL | Age: 38
End: 2023-10-18
Payer: MEDICARE

## 2023-10-18 RX ORDER — INSULIN ASPART 100 [IU]/ML
INJECTION, SOLUTION INTRAVENOUS; SUBCUTANEOUS
Qty: 60 ML | Refills: 5 | Status: SHIPPED | OUTPATIENT
Start: 2023-10-18

## 2023-10-18 NOTE — PROGRESS NOTES
Specialty Pharmacy Patient Management Program  Endocrinology Reassessment     Antoinette Pack is a 38 y.o. female with Type 2 Diabetes (Pregnancy) enrolled in the Endocrinology Patient Management program offered by [facility]. A follow-up was conducted, including assessment of continued therapy appropriateness, medication adherence, and side effect incidence and management for Insulin therapy and CGM.     Patient started on insulin pump. Needs a new prescription for novolog vials.    In the past, the patient has tried:     Drug Dose Reason for Discontinuation Notes   Trulicity  GI upset    Glipizide   Unsure                   Changes to Insurance Coverage or Financial Support  None    Relevant Past Medical History and Comorbidities  Relevant medical history and concomitant health conditions were discussed with the patient. The patient's chart has been reviewed for relevant past medical history and comorbid health conditions and updated as necessary.   Past Medical History:   Diagnosis Date    Anxiety     Depression     Diabetes mellitus     Diverticulitis     GERD (gastroesophageal reflux disease)     Hypertension     Kidney stone     PCOS (polycystic ovarian syndrome)     Sleep apnea      Social History     Socioeconomic History    Marital status:    Tobacco Use    Smoking status: Former    Smokeless tobacco: Never   Vaping Use    Vaping Use: Never used   Substance and Sexual Activity    Alcohol use: No    Drug use: No    Sexual activity: Defer       Problem list reviewed by Emperatriz Clarke RPH on 10/18/2023 at  8:49 AM    Allergies  Known allergies and reactions were discussed with the patient. The patient's chart has been reviewed for allergy information and updated as necessary.   Dilantin [phenytoin], Keppra [levetiracetam], Meperidine, and Topamax [topiramate]    Allergies reviewed by Emperatriz Clarke RPH on 10/18/2023 at  8:48 AM    Relevant Laboratory Values  A1C Last 3  Results          3/15/2023    09:01 7/20/2023    15:44 9/17/2023    00:23   HGBA1C Last 3 Results   Hemoglobin A1C 6.8  7.1  9.30      Lab Results   Component Value Date    HGBA1C 9.30 (H) 09/17/2023     Lab Results   Component Value Date    GLUCOSE 70 09/27/2023    CALCIUM 9.3 09/27/2023     09/27/2023    K 4.1 09/27/2023    CO2 22.2 09/27/2023     (H) 09/27/2023    BUN 9 09/27/2023    CREATININE 0.49 (L) 09/27/2023    EGFRIFAFRI  09/05/2016      Comment:      <15 Indicative of kidney failure.    EGFRIFNONA 101 12/27/2020    BCR 18.4 09/27/2023    ANIONGAP 8.8 09/27/2023     Lab Results   Component Value Date    CHOL 173 09/18/2023    CHLPL 159 09/22/2015    TRIG 111 09/18/2023    HDL 33 (L) 09/18/2023     (H) 09/18/2023       Current Medication List  This medication list has been reviewed with the patient and evaluated for any interactions or necessary modifications/recommendations, and updated to include all prescription medications, OTC medications, and supplements the patient is currently taking.  This list reflects what is contained in the patient's profile, which has also been marked as reviewed to communicate to other providers it is the most up to date version of the patient's current medication therapy.     Current Outpatient Medications:     Continuous Blood Gluc Sensor (Dexcom G6 Sensor), Use Every 10 (Ten) Days., Disp: 3 each, Rfl: 5    Continuous Blood Gluc Transmit (Dexcom G6 Transmitter) misc, Use 1 each Every 3 (Three) Months., Disp: 1 each, Rfl: 1    Insulin Aspart (novoLOG) 100 UNIT/ML injection, For use in insulin pump.  units daily., Disp: 60 mL, Rfl: 5    insulin degludec (Tresiba FlexTouch) 100 UNIT/ML solution pen-injector injection, Inject 50 Units under the skin into the appropriate area as directed 2 (Two) Times a Day., Disp: 90 mL, Rfl: 1    Insulin Disposable Pump (Omnipod 5 G6 Intro, Gen 5,) kit, Use 1 kit Take As Directed., Disp: 1 kit, Rfl: 0    Insulin  Disposable Pump (Omnipod 5 G6 Pod, Gen 5,) misc, Use 1 each Every Other Day., Disp: 15 each, Rfl: 5    Insulin Pen Needle 32G X 4 MM misc, Use to inject insulin up to 4 times daily as directed, Disp: 400 each, Rfl: 1    lansoprazole (PREVACID) 30 MG capsule, Take 1 capsule by mouth 2 (Two) Times a Day., Disp: , Rfl:     mesalamine (CANASA) 1000 MG suppository, Insert 1 suppository into the rectum Every Night., Disp: , Rfl:     Prenatal Vit-Fe Fumarate-FA (PRENATAL PO), Take 1 tablet by mouth Daily., Disp: , Rfl:   No current facility-administered medications for this visit.    Medicines reviewed by Emperatriz Clarke [facility] on 10/18/2023 at  8:48 AM    Drug Interactions  No significant drug-drug interactions with diabetes medications expected according to literature.     Adverse Drug Reactions  Adverse Reactions Experienced: None  Plan for ADR Management: None required    Hospitalizations and Urgent Care Since Last Assessment  Hospitalizations or Admissions: None  ED Visits: None   Urgent Office Visits: None    Adherence and Self-Administration  Adherence related patient's specialty therapy was discussed with the patient. The Adherence segment of this outreach has been reviewed and updated.   Ongoing or New Barriers to Patient Adherence and/or Self-Administration: None   Methods for Supporting Patient Adherence and/or Self-Administration: None Required    Goals of Therapy  Goals related to the patient's specialty therapy was discussed with the patient. The Patient Goals segment of this outreach has been reviewed and updated.    Goals Addressed Today        Consistently take medications as prescribed            Reassessment Plan & Follow-Up  Patient started on insulin pump. Will need script for novolog vials for use in pump.   Medication Therapy Changes:   Switch to novolog vials per provider  Additional Plans, Therapy Recommendations, or Therapy Problems to Be Addressed:   Will send updated rx to the  apothecary  Pharmacist to perform regular reassessments no more than (6) months from the previous assessment.  Care Coordinator to set up future refill outreaches, coordinate prescription delivery, and escalate clinical questions to pharmacist.     Attestation  I attest the patient was actively involved in and has agreed to the above plan of care. If the prescribed therapy is at any point deemed not appropriate based on the current or future assessments, a consultation will be initiated with the patient's specialty care provider to determine the best course of action. The revised plan of therapy will be documented along with any required assessments and/or additional patient education provided.    Thank you,    Emperatriz Clarke, PharmD  Community PGY1 Pharmacy Resident  [facility]  10/18/23  08:54 EDT

## 2023-10-24 ENCOUNTER — SPECIALTY PHARMACY (OUTPATIENT)
Dept: PHARMACY | Facility: HOSPITAL | Age: 38
End: 2023-10-24
Payer: MEDICARE

## 2023-10-24 ENCOUNTER — OFFICE VISIT (OUTPATIENT)
Dept: ENDOCRINOLOGY | Facility: CLINIC | Age: 38
End: 2023-10-24
Payer: COMMERCIAL

## 2023-10-24 VITALS — BODY MASS INDEX: 41.77 KG/M2 | WEIGHT: 207.2 LBS | HEIGHT: 59 IN

## 2023-10-24 DIAGNOSIS — E11.65 TYPE 2 DIABETES MELLITUS WITH HYPERGLYCEMIA, WITH LONG-TERM CURRENT USE OF INSULIN: Primary | ICD-10-CM

## 2023-10-24 DIAGNOSIS — Z79.4 TYPE 2 DIABETES MELLITUS WITH HYPERGLYCEMIA, WITH LONG-TERM CURRENT USE OF INSULIN: Primary | ICD-10-CM

## 2023-10-24 DIAGNOSIS — O24.111 PRE-EXISTING TYPE 2 DIABETES MELLITUS WITH HYPERGLYCEMIA DURING PREGNANCY IN FIRST TRIMESTER: ICD-10-CM

## 2023-10-24 DIAGNOSIS — E11.65 PRE-EXISTING TYPE 2 DIABETES MELLITUS WITH HYPERGLYCEMIA DURING PREGNANCY IN FIRST TRIMESTER: ICD-10-CM

## 2023-10-24 PROCEDURE — 99214 OFFICE O/P EST MOD 30 MIN: CPT | Performed by: NURSE PRACTITIONER

## 2023-10-24 PROCEDURE — 95251 CONT GLUC MNTR ANALYSIS I&R: CPT | Performed by: NURSE PRACTITIONER

## 2023-10-24 NOTE — PROGRESS NOTES
Chief Complaint   Patient presents with    Diabetes        Antoinette Pack is a 38 y.o. female had concerns including Diabetes.   T2DM    She is currently 9w3d pregnant.  She recently had an US and was noted to be normal and doing well.  She has been noting increased BG's and has been having to administer additional boluses with her regular meal time boluses to keep her BG's in range.     Birth state: [place]  Previous history of radiation to face/neck:no  Consuming foods high in iodine: no  Family history of thyroid complications:none    The following portions of the patient's history were reviewed and updated as appropriate: allergies, current medications, past family history, past medical history, past social history, past surgical history and problem list.    History:  Diabetes was diagnosed 8-9 years ago.  Complications include none.  Last ophtho exam was 2021, Dr. Bruno Office.  Current medications for diabetes include Humalog 30-60 units TID with meals, Tresiba 50 units BID.  Past meds: Trulicity, GI Issues, has problems with yeast, Glipizide-unsure why it was stopped  She checks her BG's with Dexcom CGM.  Hypos: rarely  Fasting range: 150-200+    Diet: has made improvements to her diet.    Past Medical History:   Diagnosis Date    Anxiety     Depression     Diabetes mellitus     Diverticulitis     GERD (gastroesophageal reflux disease)     Hypertension     Kidney stone     PCOS (polycystic ovarian syndrome)     Sleep apnea      Past Surgical History:   Procedure Laterality Date    CARDIAC CATHETERIZATION      CARPAL TUNNEL RELEASE      COLONOSCOPY      ENDOSCOPY      FRACTURE SURGERY      HARDWARE REMOVAL      WRIST    TENDON REPAIR      HAND      Family History   Problem Relation Age of Onset    Heart disease Mother     COPD Mother     Heart disease Father     COPD Father       Social History     Socioeconomic History    Marital status:    Tobacco Use    Smoking status: Former    Smokeless tobacco:  Never   Vaping Use    Vaping Use: Never used   Substance and Sexual Activity    Alcohol use: No    Drug use: No    Sexual activity: Defer      Allergies   Allergen Reactions    Dilantin [Phenytoin] Mental Status Change    Keppra [Levetiracetam] Mental Status Change    Meperidine Rash    Topamax [Topiramate] Mental Status Change      Current Outpatient Medications on File Prior to Visit   Medication Sig Dispense Refill    Continuous Blood Gluc Sensor (Dexcom G6 Sensor) Use Every 10 (Ten) Days. 3 each 5    Continuous Blood Gluc Transmit (Dexcom G6 Transmitter) misc Use 1 each Every 3 (Three) Months. 1 each 1    Insulin Aspart (novoLOG) 100 UNIT/ML injection For use in insulin pump. Max Daily Dose: 200 units daily. 60 mL 5    insulin degludec (Tresiba FlexTouch) 100 UNIT/ML solution pen-injector injection Inject 50 Units under the skin into the appropriate area as directed 2 (Two) Times a Day. 90 mL 1    Insulin Disposable Pump (Omnipod 5 G6 Intro, Gen 5,) kit Use 1 kit Take As Directed. 1 kit 0    Insulin Disposable Pump (Omnipod 5 G6 Pod, Gen 5,) misc Use 1 each Every Other Day. 15 each 5    Insulin Pen Needle 32G X 4 MM misc Use to inject insulin up to 4 times daily as directed 400 each 1    lansoprazole (PREVACID) 30 MG capsule Take 1 capsule by mouth 2 (Two) Times a Day.      mesalamine (CANASA) 1000 MG suppository Insert 1 suppository into the rectum Every Night.      Prenatal Vit-Fe Fumarate-FA (PRENATAL PO) Take 1 tablet by mouth Daily.      [DISCONTINUED] labetalol (NORMODYNE) 100 MG tablet Take 1/2 tablet by mouth 2 times every day 30 tablet 0     No current facility-administered medications on file prior to visit.      Review of Systems   Constitutional:  Positive for diaphoresis and fatigue. Negative for unexpected weight gain and unexpected weight loss.   HENT:          Neck tenderness   Eyes:  Positive for blurred vision and visual disturbance.   Cardiovascular:  Positive for palpitations.  "  Gastrointestinal:  Positive for constipation.   Endocrine: Positive for heat intolerance, polydipsia, polyphagia and polyuria. Negative for cold intolerance.   Skin:  Positive for dry skin.        Hair thinning   Neurological:  Positive for tremors.   Psychiatric/Behavioral:  Positive for agitation and sleep disturbance. The patient is nervous/anxious.    All other systems reviewed and are negative.       Ht 149.9 cm (59\")   Wt 94 kg (207 lb 3.2 oz)   LMP  (LMP Unknown)   BMI 41.85 kg/m²      Physical Exam  Vitals reviewed.   Constitutional:       Appearance: Normal appearance.   Eyes:      Extraocular Movements: Extraocular movements intact.   Cardiovascular:      Rate and Rhythm: Normal rate.      Pulses: Normal pulses.           Dorsalis pedis pulses are 2+ on the right side and 2+ on the left side.        Posterior tibial pulses are 2+ on the right side and 2+ on the left side.   Pulmonary:      Effort: Pulmonary effort is normal.   Feet:      Right foot:      Protective Sensation: 10 sites tested.  7 sites sensed.      Skin integrity: Callus and dry skin present.      Toenail Condition: Right toenails are abnormally thick.      Left foot:      Protective Sensation: 10 sites tested.  7 sites sensed.      Skin integrity: Callus and dry skin present.      Toenail Condition: Left toenails are abnormally thick.      Comments: Diabetic Foot Exam Performed and Monofilament Test Performed     Skin:     General: Skin is warm.   Neurological:      Mental Status: She is alert and oriented to person, place, and time.   Psychiatric:         Mood and Affect: Mood normal.         Behavior: Behavior normal.         Thought Content: Thought content normal.         Judgment: Judgment normal.        Labs/Imaging  CMP  Lab Results   Component Value Date    GLUCOSE 82 10/27/2023    BUN 10 10/27/2023    CREATININE 0.52 (L) 10/27/2023    EGFRIFNONA 101 12/27/2020    EGFRIFAFRI  09/05/2016      Comment:      <15 Indicative of " "kidney failure.    BCR 19.2 10/27/2023    K 3.7 10/27/2023    CO2 21.9 (L) 10/27/2023    CALCIUM 9.5 10/27/2023    ALBUMIN 4.3 10/27/2023    LABIL2 1.3 (L) 02/05/2016    AST 15 10/27/2023    ALT 17 10/27/2023        CBC w/DIFF   Lab Results   Component Value Date    WBC 10.33 10/27/2023    RBC 4.73 10/27/2023    HGB 11.2 (L) 10/27/2023    HCT 36.8 10/27/2023    MCV 77.8 (L) 10/27/2023    MCH 23.7 (L) 10/27/2023    MCHC 30.4 (L) 10/27/2023    RDW 15.1 10/27/2023    RDWSD 42.5 10/27/2023    MPV 10.2 10/27/2023     10/27/2023    NEUTRORELPCT 73.2 10/27/2023    LYMPHORELPCT 19.4 (L) 10/27/2023    MONORELPCT 5.7 10/27/2023    EOSRELPCT 1.0 10/27/2023    BASORELPCT 0.3 10/27/2023    AUTOIGPER 0.4 10/27/2023    NEUTROABS 7.57 (H) 10/27/2023    LYMPHSABS 2.00 10/27/2023    MONOSABS 0.59 10/27/2023    EOSABS 0.10 10/27/2023    BASOSABS 0.03 10/27/2023    AUTOIGNUM 0.04 10/27/2023    NRBC 0.0 10/27/2023       TSH  Lab Results   Component Value Date    TSH 0.606 05/03/2023    TSH 0.497 02/17/2022    TSH 0.971 09/22/2015       T4  Lab Results   Component Value Date    FREET4 0.94 05/03/2023    FREET4 0.97 02/17/2022    FREET4 1.20 09/22/2015     No results found for: \"W9RCMKB\"    T3  Lab Results   Component Value Date    T3FREE 3.43 02/17/2022     No results found for: \"L3CYPKK\"    TRAb  No results found for: \"TSURCPAB\"    TPO  No results found for: \"THYROIDAB\"    No valid procedures specified.    Assessment and Plan    Diagnoses and all orders for this visit:    1. Type 2 diabetes mellitus with hyperglycemia, with long-term current use of insulin (Primary)  Assessment & Plan:  -Reviewed instructions for glycemic monitoring and the monitoring of urine ketones.  -Discussed that gestational diabetes can become harder to control as pregnancy progresses. Reviewed need for routine follow up from now until delivery.   -Patient instructed to send glucose data weekly via [employer].  -Risks of gestational diabetes were reviewed, " these include, but are not limited to: LGA infant, macrosomia, stillbirth,  hypoglycemia, hyperbilirubinemia, birth injury,  birth, respiratory complications following delivery, polyhydramnios, hypocalcemia, maternal preeclampsia.  -Nutritional goals reviewed: Patient advised to eat 3 moderate sized meals daily as well as 2-4 snacks, including a bedtime snack. Discussed need for carbohydrate awareness. Patient given goals for carbohydrate intake for meals/snacks.  -Glycemic Targets during pregnancy were reviewed, as follows: fasting glucose <95, 1 hour post prandial <140, 2 hour postprandial <120.  -Patient advised that she will need to monitor blood glucose up to 6-8 times daily. In the event that insurance may not cover adequate testing supplies, she was instructed she may need to purchase on OTC meter and strips.  -Continue using CGM.  2 week data download is as follows:  Very High: 0%  High: 12%  In Range: 88%  Low: 0%   Very Low: 0%  Trend shows overall regulated BG's with some mild hypers post meal.  Insulin pump adjustments as below:  Basal: 2.3 u/hr  CR; 1:2  ISF: 18  -Patient will return for follow up in 4 weeks. She was instructed to contact the office in the interim if glucoses not at target.     Orders:  -     Ambulatory Referral to Podiatry    2. Pre-existing type 2 diabetes mellitus with hyperglycemia during pregnancy in first trimester  Assessment & Plan:  -Reviewed instructions for glycemic monitoring and the monitoring of urine ketones.  -Discussed that gestational diabetes can become harder to control as pregnancy progresses. Reviewed need for routine follow up from now until delivery.   -Patient instructed to send glucose data weekly via [employer].  -Risks of gestational diabetes were reviewed, these include, but are not limited to: LGA infant, macrosomia, stillbirth,  hypoglycemia, hyperbilirubinemia, birth injury,  birth, respiratory complications following delivery,  polyhydramnios, hypocalcemia, maternal preeclampsia.  -Nutritional goals reviewed: Patient advised to eat 3 moderate sized meals daily as well as 2-4 snacks, including a bedtime snack. Discussed need for carbohydrate awareness. Patient given goals for carbohydrate intake for meals/snacks.  -Glycemic Targets during pregnancy were reviewed, as follows: fasting glucose <95, 1 hour post prandial <140, 2 hour postprandial <120.  -Patient advised that she will need to monitor blood glucose up to 6-8 times daily. In the event that insurance may not cover adequate testing supplies, she was instructed she may need to purchase on OTC meter and strips.  -Continue using CGM.  2 week data download is as follows:  Very High: 0%  High: 12%  In Range: 88%  Low: 0%   Very Low: 0%  Trend shows overall regulated BG's with some mild hypers post meal.  Insulin pump adjustments as below:  Basal: 2.3 u/hr  CR; 1:2  ISF: 18  -Patient will return for follow up in 4 weeks. She was instructed to contact the office in the interim if glucoses not at target.              Return in about 4 weeks (around 11/21/2023) for Follow-up appointment. The patient was instructed to contact the clinic with any interval questions or concerns.    This document has been electronically signed by CICI Veliz  October 29, 2023 23:07 EDT  Endocrinology

## 2023-10-24 NOTE — PROGRESS NOTES
Specialty Pharmacy Patient Management Program  Endocrinology Reassessment     Antoinette Pack is a 38 y.o. female with Type 2 Diabetes (Pregnancy) enrolled in the Endocrinology Patient Management program offered by [facility]. A follow-up was conducted, including assessment of continued therapy appropriateness, medication adherence, and side effect incidence and management for Insulin therapy and CGM.     Patient started on insulin pump. Needs her omnipod filled, verified she received the novolog vials in the mail.     States she received a letter from her insurance company stating the company had not come to a deal with [facility]. Plans on bringing the letter to her next visit to discuss.     In the past, the patient has tried:     Drug Dose Reason for Discontinuation Notes   Trulicity  GI upset    Glipizide   Unsure                   Changes to Insurance Coverage or Financial Support  None    Relevant Past Medical History and Comorbidities  Relevant medical history and concomitant health conditions were discussed with the patient. The patient's chart has been reviewed for relevant past medical history and comorbid health conditions and updated as necessary.   Past Medical History:   Diagnosis Date    Anxiety     Depression     Diabetes mellitus     Diverticulitis     GERD (gastroesophageal reflux disease)     Hypertension     Kidney stone     PCOS (polycystic ovarian syndrome)     Sleep apnea      Social History     Socioeconomic History    Marital status:    Tobacco Use    Smoking status: Former    Smokeless tobacco: Never   Vaping Use    Vaping Use: Never used   Substance and Sexual Activity    Alcohol use: No    Drug use: No    Sexual activity: Defer            Allergies  Known allergies and reactions were discussed with the patient. The patient's chart has been reviewed for allergy information and updated as necessary.   Dilantin [phenytoin], Keppra [levetiracetam],  Meperidine, and Topamax [topiramate]         Relevant Laboratory Values  A1C Last 3 Results          3/15/2023    09:01 7/20/2023    15:44 9/17/2023    00:23   HGBA1C Last 3 Results   Hemoglobin A1C 6.8  7.1  9.30      Lab Results   Component Value Date    HGBA1C 9.30 (H) 09/17/2023     Lab Results   Component Value Date    GLUCOSE 70 09/27/2023    CALCIUM 9.3 09/27/2023     09/27/2023    K 4.1 09/27/2023    CO2 22.2 09/27/2023     (H) 09/27/2023    BUN 9 09/27/2023    CREATININE 0.49 (L) 09/27/2023    EGFRIFAFRI  09/05/2016      Comment:      <15 Indicative of kidney failure.    EGFRIFNONA 101 12/27/2020    BCR 18.4 09/27/2023    ANIONGAP 8.8 09/27/2023     Lab Results   Component Value Date    CHOL 173 09/18/2023    CHLPL 159 09/22/2015    TRIG 111 09/18/2023    HDL 33 (L) 09/18/2023     (H) 09/18/2023       Current Medication List  This medication list has been reviewed with the patient and evaluated for any interactions or necessary modifications/recommendations, and updated to include all prescription medications, OTC medications, and supplements the patient is currently taking.  This list reflects what is contained in the patient's profile, which has also been marked as reviewed to communicate to other providers it is the most up to date version of the patient's current medication therapy.     Current Outpatient Medications:     Continuous Blood Gluc Sensor (Dexcom G6 Sensor), Use Every 10 (Ten) Days., Disp: 3 each, Rfl: 5    Continuous Blood Gluc Transmit (Dexcom G6 Transmitter) misc, Use 1 each Every 3 (Three) Months., Disp: 1 each, Rfl: 1    Insulin Aspart (novoLOG) 100 UNIT/ML injection, For use in insulin pump. Max Daily Dose: 200 units daily., Disp: 60 mL, Rfl: 5    insulin degludec (Tresiba FlexTouch) 100 UNIT/ML solution pen-injector injection, Inject 50 Units under the skin into the appropriate area as directed 2 (Two) Times a Day., Disp: 90 mL, Rfl: 1    Insulin Disposable Pump  (Omnipod 5 G6 Intro, Gen 5,) kit, Use 1 kit Take As Directed., Disp: 1 kit, Rfl: 0    Insulin Disposable Pump (Omnipod 5 G6 Pod, Gen 5,) misc, Use 1 each Every Other Day., Disp: 15 each, Rfl: 5    Insulin Pen Needle 32G X 4 MM misc, Use to inject insulin up to 4 times daily as directed, Disp: 400 each, Rfl: 1    lansoprazole (PREVACID) 30 MG capsule, Take 1 capsule by mouth 2 (Two) Times a Day., Disp: , Rfl:     mesalamine (CANASA) 1000 MG suppository, Insert 1 suppository into the rectum Every Night., Disp: , Rfl:     Prenatal Vit-Fe Fumarate-FA (PRENATAL PO), Take 1 tablet by mouth Daily., Disp: , Rfl:          Drug Interactions  No significant drug-drug interactions with diabetes medications expected according to literature.     Adverse Drug Reactions  Adverse Reactions Experienced: None  Plan for ADR Management: None required    Hospitalizations and Urgent Care Since Last Assessment  Hospitalizations or Admissions: None  ED Visits: None   Urgent Office Visits: None    Adherence and Self-Administration  Adherence related patient's specialty therapy was discussed with the patient. The Adherence segment of this outreach has been reviewed and updated.   Ongoing or New Barriers to Patient Adherence and/or Self-Administration: None   Methods for Supporting Patient Adherence and/or Self-Administration: None Required    Goals of Therapy  Goals related to the patient's specialty therapy was discussed with the patient. The Patient Goals segment of this outreach has been reviewed and updated.    Goals Addressed Today    None        Reassessment Plan & Follow-Up  Fill Omnipods in the apothecary, patient planning to  following visit.   Pharmacist to perform regular reassessments no more than (6) months from the previous assessment.  Care Coordinator to set up future refill outreaches, coordinate prescription delivery, and escalate clinical questions to pharmacist.     Attestation  I attest the patient was actively  involved in and has agreed to the above plan of care. If the prescribed therapy is at any point deemed not appropriate based on the current or future assessments, a consultation will be initiated with the patient's specialty care provider to determine the best course of action. The revised plan of therapy will be documented along with any required assessments and/or additional patient education provided.    Thank you,    Emperatriz Clarke, PharmD  Community PGY1 Pharmacy Resident  [facility]  10/24/23  10:50 EDT

## 2023-10-27 ENCOUNTER — APPOINTMENT (OUTPATIENT)
Dept: ULTRASOUND IMAGING | Facility: HOSPITAL | Age: 38
End: 2023-10-27
Payer: MEDICARE

## 2023-10-27 LAB
ALBUMIN SERPL-MCNC: 4.3 G/DL (ref 3.5–5.2)
ALBUMIN/GLOB SERPL: 1.2 G/DL
ALP SERPL-CCNC: 72 U/L (ref 39–117)
ALT SERPL W P-5'-P-CCNC: 17 U/L (ref 1–33)
ANION GAP SERPL CALCULATED.3IONS-SCNC: 13.1 MMOL/L (ref 5–15)
AST SERPL-CCNC: 15 U/L (ref 1–32)
BASOPHILS # BLD AUTO: 0.03 10*3/MM3 (ref 0–0.2)
BASOPHILS NFR BLD AUTO: 0.3 % (ref 0–1.5)
BILIRUB SERPL-MCNC: 0.3 MG/DL (ref 0–1.2)
BUN SERPL-MCNC: 10 MG/DL (ref 6–20)
BUN/CREAT SERPL: 19.2 (ref 7–25)
CALCIUM SPEC-SCNC: 9.5 MG/DL (ref 8.6–10.5)
CHLORIDE SERPL-SCNC: 104 MMOL/L (ref 98–107)
CO2 SERPL-SCNC: 21.9 MMOL/L (ref 22–29)
CREAT SERPL-MCNC: 0.52 MG/DL (ref 0.57–1)
DEPRECATED RDW RBC AUTO: 42.5 FL (ref 37–54)
EGFRCR SERPLBLD CKD-EPI 2021: 120.6 ML/MIN/1.73
EOSINOPHIL # BLD AUTO: 0.1 10*3/MM3 (ref 0–0.4)
EOSINOPHIL NFR BLD AUTO: 1 % (ref 0.3–6.2)
ERYTHROCYTE [DISTWIDTH] IN BLOOD BY AUTOMATED COUNT: 15.1 % (ref 12.3–15.4)
GLOBULIN UR ELPH-MCNC: 3.6 GM/DL
GLUCOSE SERPL-MCNC: 82 MG/DL (ref 65–99)
HCT VFR BLD AUTO: 36.8 % (ref 34–46.6)
HGB BLD-MCNC: 11.2 G/DL (ref 12–15.9)
HOLD SPECIMEN: NORMAL
HOLD SPECIMEN: NORMAL
IMM GRANULOCYTES # BLD AUTO: 0.04 10*3/MM3 (ref 0–0.05)
IMM GRANULOCYTES NFR BLD AUTO: 0.4 % (ref 0–0.5)
LYMPHOCYTES # BLD AUTO: 2 10*3/MM3 (ref 0.7–3.1)
LYMPHOCYTES NFR BLD AUTO: 19.4 % (ref 19.6–45.3)
MCH RBC QN AUTO: 23.7 PG (ref 26.6–33)
MCHC RBC AUTO-ENTMCNC: 30.4 G/DL (ref 31.5–35.7)
MCV RBC AUTO: 77.8 FL (ref 79–97)
MONOCYTES # BLD AUTO: 0.59 10*3/MM3 (ref 0.1–0.9)
MONOCYTES NFR BLD AUTO: 5.7 % (ref 5–12)
NEUTROPHILS NFR BLD AUTO: 7.57 10*3/MM3 (ref 1.7–7)
NEUTROPHILS NFR BLD AUTO: 73.2 % (ref 42.7–76)
NRBC BLD AUTO-RTO: 0 /100 WBC (ref 0–0.2)
PLATELET # BLD AUTO: 174 10*3/MM3 (ref 140–450)
PMV BLD AUTO: 10.2 FL (ref 6–12)
POTASSIUM SERPL-SCNC: 3.7 MMOL/L (ref 3.5–5.2)
PROT SERPL-MCNC: 7.9 G/DL (ref 6–8.5)
RBC # BLD AUTO: 4.73 10*6/MM3 (ref 3.77–5.28)
SODIUM SERPL-SCNC: 139 MMOL/L (ref 136–145)
TROPONIN T SERPL HS-MCNC: 9 NG/L
WBC NRBC COR # BLD: 10.33 10*3/MM3 (ref 3.4–10.8)
WHOLE BLOOD HOLD COAG: NORMAL
WHOLE BLOOD HOLD SPECIMEN: NORMAL

## 2023-10-27 PROCEDURE — 76801 OB US < 14 WKS SINGLE FETUS: CPT | Performed by: RADIOLOGY

## 2023-10-27 PROCEDURE — 99284 EMERGENCY DEPT VISIT MOD MDM: CPT

## 2023-10-27 PROCEDURE — 93010 ELECTROCARDIOGRAM REPORT: CPT | Performed by: INTERNAL MEDICINE

## 2023-10-27 PROCEDURE — 84702 CHORIONIC GONADOTROPIN TEST: CPT | Performed by: NURSE PRACTITIONER

## 2023-10-27 PROCEDURE — 85025 COMPLETE CBC W/AUTO DIFF WBC: CPT | Performed by: STUDENT IN AN ORGANIZED HEALTH CARE EDUCATION/TRAINING PROGRAM

## 2023-10-27 PROCEDURE — 84484 ASSAY OF TROPONIN QUANT: CPT | Performed by: STUDENT IN AN ORGANIZED HEALTH CARE EDUCATION/TRAINING PROGRAM

## 2023-10-27 PROCEDURE — 80053 COMPREHEN METABOLIC PANEL: CPT | Performed by: STUDENT IN AN ORGANIZED HEALTH CARE EDUCATION/TRAINING PROGRAM

## 2023-10-27 PROCEDURE — 36415 COLL VENOUS BLD VENIPUNCTURE: CPT

## 2023-10-27 PROCEDURE — 93005 ELECTROCARDIOGRAM TRACING: CPT | Performed by: STUDENT IN AN ORGANIZED HEALTH CARE EDUCATION/TRAINING PROGRAM

## 2023-10-27 PROCEDURE — 76801 OB US < 14 WKS SINGLE FETUS: CPT

## 2023-10-27 RX ORDER — SODIUM CHLORIDE 0.9 % (FLUSH) 0.9 %
10 SYRINGE (ML) INJECTION AS NEEDED
Status: DISCONTINUED | OUTPATIENT
Start: 2023-10-27 | End: 2023-10-28 | Stop reason: HOSPADM

## 2023-10-28 ENCOUNTER — HOSPITAL ENCOUNTER (EMERGENCY)
Facility: HOSPITAL | Age: 38
Discharge: HOME | End: 2023-10-28
Attending: STUDENT IN AN ORGANIZED HEALTH CARE EDUCATION/TRAINING PROGRAM
Payer: MEDICARE

## 2023-10-28 VITALS
SYSTOLIC BLOOD PRESSURE: 157 MMHG | BODY MASS INDEX: 41.73 KG/M2 | HEIGHT: 59 IN | DIASTOLIC BLOOD PRESSURE: 94 MMHG | OXYGEN SATURATION: 99 % | WEIGHT: 207 LBS | HEART RATE: 80 BPM | RESPIRATION RATE: 18 BRPM | TEMPERATURE: 98 F

## 2023-10-28 DIAGNOSIS — R07.9 CHEST PAIN, UNSPECIFIED TYPE: Primary | ICD-10-CM

## 2023-10-28 LAB
GEN 5 2HR TROPONIN T REFLEX: 9 NG/L
HCG INTACT+B SERPL-ACNC: NORMAL MIU/ML
QT INTERVAL: 348 MS
QTC INTERVAL: 441 MS
TROPONIN T DELTA: 0 NG/L

## 2023-10-28 PROCEDURE — 84484 ASSAY OF TROPONIN QUANT: CPT | Performed by: STUDENT IN AN ORGANIZED HEALTH CARE EDUCATION/TRAINING PROGRAM

## 2023-10-28 NOTE — ED NOTES
MEDICAL SCREENING:    Reason for Visit: heart racing, chest pressure, 10wks pregnant    Patient initially seen in triage.  The patient was advised further evaluation and diagnostic testing will be needed, some of the treatment and testing will be initiated in the lobby in order to begin the process.  The patient will be returned to the waiting area for the time being and possibly be re-assessed by a subsequent ED provider.  The patient will be brought back to the treatment area in as timely manner as possible.       Jarrett Santoyo,   10/27/23 [id number]

## 2023-10-29 PROBLEM — O24.111: Status: ACTIVE | Noted: 2022-02-17

## 2023-10-29 NOTE — ED PROVIDER NOTES
Subjective   History of Present Illness  Patient is a 38-year-old female with past medical history significant for hypertension, GERD, diverticulitis, diabetes mellitus, depression, anxiety, and PCOS.  She presents the ED today with complaints of chest pain and palpitations.  She is currently 10 weeks pregnant.  She reports history of MI in the past.  She states that she felt like her heart was racing and she could feel it skipping beats.  She denies any shortness of breath.  Denies any other significant complaints.        Review of Systems   Constitutional: Negative.  Negative for fever.   HENT: Negative.     Eyes: Negative.    Respiratory: Negative.     Cardiovascular:  Positive for chest pain and palpitations.   Gastrointestinal: Negative.  Negative for abdominal pain.   Endocrine: Negative.    Genitourinary: Negative.  Negative for dysuria.   Musculoskeletal: Negative.    Skin: Negative.    Allergic/Immunologic: Negative.    Neurological: Negative.    Hematological: Negative.    Psychiatric/Behavioral: Negative.     All other systems reviewed and are negative.      Past Medical History:   Diagnosis Date    Anxiety     Depression     Diabetes mellitus     Diverticulitis     GERD (gastroesophageal reflux disease)     Hypertension     Kidney stone     PCOS (polycystic ovarian syndrome)     Sleep apnea        Allergies   Allergen Reactions    Dilantin [Phenytoin] Mental Status Change    Keppra [Levetiracetam] Mental Status Change    Meperidine Rash    Topamax [Topiramate] Mental Status Change       Past Surgical History:   Procedure Laterality Date    CARDIAC CATHETERIZATION      CARPAL TUNNEL RELEASE      COLONOSCOPY      ENDOSCOPY      FRACTURE SURGERY      HARDWARE REMOVAL      WRIST    TENDON REPAIR      HAND       Family History   Problem Relation Age of Onset    Heart disease Mother     COPD Mother     Heart disease Father     COPD Father        Social History     Socioeconomic History    Marital status:     Tobacco Use    Smoking status: Former    Smokeless tobacco: Never   Vaping Use    Vaping Use: Never used   Substance and Sexual Activity    Alcohol use: No    Drug use: No    Sexual activity: Defer           Objective   Physical Exam  Vitals and nursing note reviewed.   Constitutional:       General: She is not in acute distress.     Appearance: She is well-developed. She is not diaphoretic.   HENT:      Head: Normocephalic and atraumatic.      Right Ear: External ear normal.      Left Ear: External ear normal.      Nose: Nose normal.   Eyes:      Conjunctiva/sclera: Conjunctivae normal.      Pupils: Pupils are equal, round, and reactive to light.   Neck:      Vascular: No JVD.      Trachea: No tracheal deviation.   Cardiovascular:      Rate and Rhythm: Normal rate and regular rhythm.      Heart sounds: Normal heart sounds. No murmur heard.  Pulmonary:      Effort: Pulmonary effort is normal. No respiratory distress.      Breath sounds: Normal breath sounds. No wheezing.   Abdominal:      General: Bowel sounds are normal.      Palpations: Abdomen is soft.      Tenderness: There is no abdominal tenderness.   Musculoskeletal:         General: No deformity. Normal range of motion.      Cervical back: Normal range of motion and neck supple.   Skin:     General: Skin is warm and dry.      Coloration: Skin is not pale.      Findings: No erythema or rash.   Neurological:      Mental Status: She is alert and oriented to person, place, and time.      Cranial Nerves: No cranial nerve deficit.   Psychiatric:         Behavior: Behavior normal.         Thought Content: Thought content normal.         Procedures       Results for orders placed or performed during the hospital encounter of 10/28/23   Comprehensive Metabolic Panel    Specimen: Arm, Right; Blood   Result Value Ref Range    Glucose 82 65 - 99 mg/dL    BUN 10 6 - 20 mg/dL    Creatinine 0.52 (L) 0.57 - 1.00 mg/dL    Sodium 139 136 - 145 mmol/L    Potassium  3.7 3.5 - 5.2 mmol/L    Chloride 104 98 - 107 mmol/L    CO2 21.9 (L) 22.0 - 29.0 mmol/L    Calcium 9.5 8.6 - 10.5 mg/dL    Total Protein 7.9 6.0 - 8.5 g/dL    Albumin 4.3 3.5 - 5.2 g/dL    ALT (SGPT) 17 1 - 33 U/L    AST (SGOT) 15 1 - 32 U/L    Alkaline Phosphatase 72 39 - 117 U/L    Total Bilirubin 0.3 0.0 - 1.2 mg/dL    Globulin 3.6 gm/dL    A/G Ratio 1.2 g/dL    BUN/Creatinine Ratio 19.2 7.0 - 25.0    Anion Gap 13.1 5.0 - 15.0 mmol/L    eGFR 120.6 >60.0 mL/min/1.73   High Sensitivity Troponin T    Specimen: Arm, Right; Blood   Result Value Ref Range    HS Troponin T 9 <10 ng/L   CBC Auto Differential    Specimen: Arm, Right; Blood   Result Value Ref Range    WBC 10.33 3.40 - 10.80 10*3/mm3    RBC 4.73 3.77 - 5.28 10*6/mm3    Hemoglobin 11.2 (L) 12.0 - 15.9 g/dL    Hematocrit 36.8 34.0 - 46.6 %    MCV 77.8 (L) 79.0 - 97.0 fL    MCH 23.7 (L) 26.6 - 33.0 pg    MCHC 30.4 (L) 31.5 - 35.7 g/dL    RDW 15.1 12.3 - 15.4 %    RDW-SD 42.5 37.0 - 54.0 fl    MPV 10.2 6.0 - 12.0 fL    Platelets 174 140 - 450 10*3/mm3    Neutrophil % 73.2 42.7 - 76.0 %    Lymphocyte % 19.4 (L) 19.6 - 45.3 %    Monocyte % 5.7 5.0 - 12.0 %    Eosinophil % 1.0 0.3 - 6.2 %    Basophil % 0.3 0.0 - 1.5 %    Immature Grans % 0.4 0.0 - 0.5 %    Neutrophils, Absolute 7.57 (H) 1.70 - 7.00 10*3/mm3    Lymphocytes, Absolute 2.00 0.70 - 3.10 10*3/mm3    Monocytes, Absolute 0.59 0.10 - 0.90 10*3/mm3    Eosinophils, Absolute 0.10 0.00 - 0.40 10*3/mm3    Basophils, Absolute 0.03 0.00 - 0.20 10*3/mm3    Immature Grans, Absolute 0.04 0.00 - 0.05 10*3/mm3    nRBC 0.0 0.0 - 0.2 /100 WBC   High Sensitivity Troponin T 2Hr    Specimen: Arm, Left; Blood   Result Value Ref Range    HS Troponin T 9 <10 ng/L    Troponin T Delta 0 >=-4 - <+4 ng/L   hCG, Quantitative, Pregnancy    Specimen: Arm, Right; Blood   Result Value Ref Range    HCG Quantitative 86,484.00 mIU/mL   ECG 12 Lead Chest Pain   Result Value Ref Range    QT Interval 348 ms    QTC Interval 441 ms   Green  Top (Gel)   Result Value Ref Range    Extra Tube Hold for add-ons.    Lavender Top   Result Value Ref Range    Extra Tube hold for add-on    Gold Top - SST   Result Value Ref Range    Extra Tube Hold for add-ons.    Light Blue Top   Result Value Ref Range    Extra Tube Hold for add-ons.       US Ob < 14 Weeks Single or First Gestation   Final Result   1. Single live intrauterine pregnancy with size and dates as above.       This report was finalized on 10/28/2023 1:53 AM by Ag Dunn MD.               ED Course  ED Course as of 10/28/23 2150   Fri Oct 27, 2023   2053 EKG notes sinus rhythm.  97 bpm.  I directly evaluated the EKG of this patient and noted no acute abnormalities.  Electronically signed by Gerardo Cisneros DO, 10/27/23, 8:53 PM EDT.   [SF]   Sat Oct 28, 2023   0145 Patient is feeling better.  Denies any chest pain.  Advised her to follow-up with cardiology and OB.  Return to the ED with any worsening. [MB]      ED Course User Index  [MB] Daisy Mueller APRN  [SF] Gerardo Cisneros DO                                           Medical Decision Making  Problems Addressed:  Chest pain, unspecified type: complicated acute illness or injury    Amount and/or Complexity of Data Reviewed  Labs: ordered.  Radiology: ordered.  ECG/medicine tests: ordered.        Final diagnoses:   Chest pain, unspecified type       ED Disposition  ED Disposition       ED Disposition   Discharge    Condition   Stable    Comment   --               Shawna Lambert DO  [address] [facility] [address]  [phone]    Call in 2 days           Medication List      No changes were made to your prescriptions during this visit.            Daisy Mueller APRN  10/28/23 2150

## 2023-10-30 NOTE — ASSESSMENT & PLAN NOTE
-Reviewed instructions for glycemic monitoring and the monitoring of urine ketones.  -Discussed that gestational diabetes can become harder to control as pregnancy progresses. Reviewed need for routine follow up from now until delivery.   -Patient instructed to send glucose data weekly via [employer].  -Risks of gestational diabetes were reviewed, these include, but are not limited to: LGA infant, macrosomia, stillbirth,  hypoglycemia, hyperbilirubinemia, birth injury,  birth, respiratory complications following delivery, polyhydramnios, hypocalcemia, maternal preeclampsia.  -Nutritional goals reviewed: Patient advised to eat 3 moderate sized meals daily as well as 2-4 snacks, including a bedtime snack. Discussed need for carbohydrate awareness. Patient given goals for carbohydrate intake for meals/snacks.  -Glycemic Targets during pregnancy were reviewed, as follows: fasting glucose <95, 1 hour post prandial <140, 2 hour postprandial <120.  -Patient advised that she will need to monitor blood glucose up to 6-8 times daily. In the event that insurance may not cover adequate testing supplies, she was instructed she may need to purchase on OTC meter and strips.  -Continue using CGM.  2 week data download is as follows:  Very High: 0%  High: 12%  In Range: 88%  Low: 0%   Very Low: 0%  Trend shows overall regulated BG's with some mild hypers post meal.  Insulin pump adjustments as below:  Basal: 2.3 u/hr  CR; 1:2  ISF: 18  -Patient will return for follow up in 4 weeks. She was instructed to contact the office in the interim if glucoses not at target.

## 2023-11-03 LAB
EXTERNAL CHLAMYDIA SCREEN: NEGATIVE
EXTERNAL GONORRHEA SCREEN: NEGATIVE
EXTERNAL SYPHILIS RPR SCREEN: NORMAL

## 2023-11-06 ENCOUNTER — TELEPHONE (OUTPATIENT)
Dept: ENDOCRINOLOGY | Facility: CLINIC | Age: 38
End: 2023-11-06
Payer: COMMERCIAL

## 2023-11-06 NOTE — TELEPHONE ENCOUNTER
Attempted to call patient without answer.  Please call and have her increase her basal settings from 2.3 u/hr to 2.75 u/hr as it appears that she has been having to administer significant boluses to maintain BG's.  Have her caution with administering increase boluses as well to prevent further hypos.

## 2023-11-08 ENCOUNTER — HOSPITAL ENCOUNTER (OUTPATIENT)
Dept: CARDIOLOGY | Facility: HOSPITAL | Age: 38
Discharge: HOME | End: 2023-11-08
Admitting: OBSTETRICS & GYNECOLOGY
Payer: COMMERCIAL

## 2023-11-08 DIAGNOSIS — I50.9 HEART FAILURE, UNSPECIFIED HF CHRONICITY, UNSPECIFIED HEART FAILURE TYPE: ICD-10-CM

## 2023-11-08 LAB
BH CV ECHO MEAS - AO MAX PG: 12.8 MMHG
BH CV ECHO MEAS - AO MEAN PG: 7 MMHG
BH CV ECHO MEAS - AO ROOT DIAM: 2.6 CM
BH CV ECHO MEAS - AO V2 MAX: 179 CM/SEC
BH CV ECHO MEAS - AO V2 VTI: 34.9 CM
BH CV ECHO MEAS - AVA(I,D): 2.7 CM2
BH CV ECHO MEAS - EDV(MOD-SP4): 70.1 ML
BH CV ECHO MEAS - EF(MOD-BP): 56 %
BH CV ECHO MEAS - EF(MOD-SP4): 55.8 %
BH CV ECHO MEAS - ESV(MOD-SP4): 31 ML
BH CV ECHO MEAS - LA DIMENSION: 4.4 CM
BH CV ECHO MEAS - LAT PEAK E' VEL: 8.4 CM/SEC
BH CV ECHO MEAS - LV DIASTOLIC VOL/BSA (35-75): 37.5 CM2
BH CV ECHO MEAS - LV MAX PG: 6.9 MMHG
BH CV ECHO MEAS - LV MEAN PG: 3 MMHG
BH CV ECHO MEAS - LV SYSTOLIC VOL/BSA (12-30): 16.6 CM2
BH CV ECHO MEAS - LV V1 MAX: 131 CM/SEC
BH CV ECHO MEAS - LV V1 VTI: 27.6 CM
BH CV ECHO MEAS - LVOT AREA: 3.5 CM2
BH CV ECHO MEAS - LVOT DIAM: 2.1 CM
BH CV ECHO MEAS - MED PEAK E' VEL: 7.7 CM/SEC
BH CV ECHO MEAS - MV A MAX VEL: 80.8 CM/SEC
BH CV ECHO MEAS - MV E MAX VEL: 152 CM/SEC
BH CV ECHO MEAS - MV E/A: 1.88
BH CV ECHO MEAS - PA ACC TIME: 0.1 SEC
BH CV ECHO MEAS - RAP SYSTOLE: 10 MMHG
BH CV ECHO MEAS - RVSP: 35.2 MMHG
BH CV ECHO MEAS - SI(MOD-SP4): 20.9 ML/M2
BH CV ECHO MEAS - SV(LVOT): 95.6 ML
BH CV ECHO MEAS - SV(MOD-SP4): 39.1 ML
BH CV ECHO MEAS - TAPSE (>1.6): 3.2 CM
BH CV ECHO MEAS - TR MAX PG: 25.2 MMHG
BH CV ECHO MEAS - TR MAX VEL: 251 CM/SEC
BH CV ECHO MEASUREMENTS AVERAGE E/E' RATIO: 18.88
LEFT ATRIUM VOLUME INDEX: 34.4 ML/M2

## 2023-11-08 PROCEDURE — 93306 TTE W/DOPPLER COMPLETE: CPT

## 2023-11-15 ENCOUNTER — HOSPITAL ENCOUNTER (OUTPATIENT)
Dept: WOMENS IMAGING | Facility: HOSPITAL | Age: 38
Discharge: HOME | End: 2023-11-15
Admitting: OBSTETRICS & GYNECOLOGY
Payer: COMMERCIAL

## 2023-11-15 ENCOUNTER — LAB (OUTPATIENT)
Dept: LAB | Facility: HOSPITAL | Age: 38
End: 2023-11-15
Payer: COMMERCIAL

## 2023-11-15 ENCOUNTER — OFFICE VISIT (OUTPATIENT)
Dept: OBSTETRICS AND GYNECOLOGY | Facility: HOSPITAL | Age: 38
End: 2023-11-15
Payer: COMMERCIAL

## 2023-11-15 VITALS — WEIGHT: 209 LBS | SYSTOLIC BLOOD PRESSURE: 155 MMHG | BODY MASS INDEX: 42.21 KG/M2 | DIASTOLIC BLOOD PRESSURE: 75 MMHG

## 2023-11-15 DIAGNOSIS — Z3A.12 12 WEEKS GESTATION OF PREGNANCY: ICD-10-CM

## 2023-11-15 DIAGNOSIS — O24.119 PRE-EXISTING TYPE 2 DIABETES AFFECTING PREGNANCY, ANTEPARTUM: Primary | ICD-10-CM

## 2023-11-15 DIAGNOSIS — E11.65 TYPE 2 DIABETES MELLITUS WITH HYPERGLYCEMIA, WITH LONG-TERM CURRENT USE OF INSULIN: ICD-10-CM

## 2023-11-15 DIAGNOSIS — G40.909 MATERNAL SEIZURE DISORDER DURING PREGNANCY IN FIRST TRIMESTER: ICD-10-CM

## 2023-11-15 DIAGNOSIS — Z86.79 HISTORY OF CHF (CONGESTIVE HEART FAILURE): ICD-10-CM

## 2023-11-15 DIAGNOSIS — E11.65 PRE-EXISTING TYPE 2 DIABETES MELLITUS WITH HYPERGLYCEMIA DURING PREGNANCY IN FIRST TRIMESTER: ICD-10-CM

## 2023-11-15 DIAGNOSIS — O24.311 PREEXISTING DIABETES COMPLICATING PREGNANCY IN FIRST TRIMESTER, ANTEPARTUM: ICD-10-CM

## 2023-11-15 DIAGNOSIS — E66.01 MORBID OBESITY WITH BMI OF 40.0-44.9, ADULT: ICD-10-CM

## 2023-11-15 DIAGNOSIS — Z87.19 HISTORY OF PANCREATITIS: ICD-10-CM

## 2023-11-15 DIAGNOSIS — O99.210 OBESITY IN PREGNANCY, ANTEPARTUM: ICD-10-CM

## 2023-11-15 DIAGNOSIS — O24.111 PRE-EXISTING TYPE 2 DIABETES MELLITUS WITH HYPERGLYCEMIA DURING PREGNANCY IN FIRST TRIMESTER: ICD-10-CM

## 2023-11-15 DIAGNOSIS — O10.919 HTN IN PREGNANCY, CHRONIC: ICD-10-CM

## 2023-11-15 DIAGNOSIS — G47.30 SLEEP APNEA IN ADULT: ICD-10-CM

## 2023-11-15 DIAGNOSIS — Z79.4 TYPE 2 DIABETES MELLITUS WITH HYPERGLYCEMIA, WITH LONG-TERM CURRENT USE OF INSULIN: ICD-10-CM

## 2023-11-15 DIAGNOSIS — O10.919 CHRONIC HYPERTENSION AFFECTING PREGNANCY: ICD-10-CM

## 2023-11-15 DIAGNOSIS — O99.351 MATERNAL SEIZURE DISORDER DURING PREGNANCY IN FIRST TRIMESTER: ICD-10-CM

## 2023-11-15 DIAGNOSIS — O09.511 PRIMIGRAVIDA OF ADVANCED MATERNAL AGE IN FIRST TRIMESTER: ICD-10-CM

## 2023-11-15 LAB
ALP SERPL-CCNC: 58 U/L (ref 39–117)
ALT SERPL W P-5'-P-CCNC: 12 U/L (ref 1–33)
AST SERPL-CCNC: 13 U/L (ref 1–32)
BILIRUB SERPL-MCNC: 0.3 MG/DL (ref 0–1.2)
CREAT SERPL-MCNC: 0.45 MG/DL (ref 0.57–1)
DEPRECATED RDW RBC AUTO: 43.3 FL (ref 37–54)
ERYTHROCYTE [DISTWIDTH] IN BLOOD BY AUTOMATED COUNT: 15.7 % (ref 12.3–15.4)
HCT VFR BLD AUTO: 32.5 % (ref 34–46.6)
HGB BLD-MCNC: 10.4 G/DL (ref 12–15.9)
LDH SERPL-CCNC: 133 U/L (ref 135–214)
MCH RBC QN AUTO: 24.5 PG (ref 26.6–33)
MCHC RBC AUTO-ENTMCNC: 32 G/DL (ref 31.5–35.7)
MCV RBC AUTO: 76.5 FL (ref 79–97)
NT-PROBNP SERPL-MCNC: <36 PG/ML (ref 0–450)
PLATELET # BLD AUTO: 152 10*3/MM3 (ref 140–450)
PMV BLD AUTO: 11.4 FL (ref 6–12)
RBC # BLD AUTO: 4.25 10*6/MM3 (ref 3.77–5.28)
TSH SERPL DL<=0.05 MIU/L-ACNC: 0.01 UIU/ML (ref 0.27–4.2)
URATE SERPL-MCNC: 3.9 MG/DL (ref 2.4–5.7)
WBC NRBC COR # BLD: 8.59 10*3/MM3 (ref 3.4–10.8)

## 2023-11-15 PROCEDURE — 82565 ASSAY OF CREATININE: CPT | Performed by: OBSTETRICS & GYNECOLOGY

## 2023-11-15 PROCEDURE — 83615 LACTATE (LD) (LDH) ENZYME: CPT | Performed by: OBSTETRICS & GYNECOLOGY

## 2023-11-15 PROCEDURE — 84460 ALANINE AMINO (ALT) (SGPT): CPT | Performed by: OBSTETRICS & GYNECOLOGY

## 2023-11-15 PROCEDURE — 84450 TRANSFERASE (AST) (SGOT): CPT | Performed by: OBSTETRICS & GYNECOLOGY

## 2023-11-15 PROCEDURE — 83880 ASSAY OF NATRIURETIC PEPTIDE: CPT | Performed by: OBSTETRICS & GYNECOLOGY

## 2023-11-15 PROCEDURE — 84075 ASSAY ALKALINE PHOSPHATASE: CPT | Performed by: OBSTETRICS & GYNECOLOGY

## 2023-11-15 PROCEDURE — 76801 OB US < 14 WKS SINGLE FETUS: CPT

## 2023-11-15 PROCEDURE — 85027 COMPLETE CBC AUTOMATED: CPT | Performed by: OBSTETRICS & GYNECOLOGY

## 2023-11-15 PROCEDURE — 82247 BILIRUBIN TOTAL: CPT | Performed by: OBSTETRICS & GYNECOLOGY

## 2023-11-15 PROCEDURE — 84443 ASSAY THYROID STIM HORMONE: CPT | Performed by: OBSTETRICS & GYNECOLOGY

## 2023-11-15 PROCEDURE — 36415 COLL VENOUS BLD VENIPUNCTURE: CPT | Performed by: OBSTETRICS & GYNECOLOGY

## 2023-11-15 PROCEDURE — 76813 OB US NUCHAL MEAS 1 GEST: CPT

## 2023-11-15 PROCEDURE — 84550 ASSAY OF BLOOD/URIC ACID: CPT | Performed by: OBSTETRICS & GYNECOLOGY

## 2023-11-15 RX ORDER — FOLIC ACID 1 MG/1
1 TABLET ORAL DAILY
COMMUNITY

## 2023-11-15 NOTE — PROGRESS NOTES
"    Maternal/Fetal Medicine Consult Note     Name: Antoinette Pack    : 1985     MRN: [id number]     Referring Provider: Geovani Razo III, MD    Chief Complaint  hx CHF, CHTN, AMA, T2DM, obesity    Subjective     History of Present Illness:  Antoinette Pack is a 38 y.o.  12w4d who presents today for a dating/viability ultrasound and to discuss her multiple co-morbidities.     Patient denies VB/LOF/cramping.    ANDRE: Estimated Date of Delivery: 24     ROS:   As noted in HPI.     Past Medical History:   Diagnosis Date    Anxiety     CHF (congestive heart failure) 2023    Colitis     Depression     Diabetes mellitus 2018    Diverticulitis     GERD (gastroesophageal reflux disease)     Hypertension     Kidney stone     Pancreatitis 2023    PCOS (polycystic ovarian syndrome)     Sleep apnea       Past Surgical History:   Procedure Laterality Date    CARDIAC CATHETERIZATION      CARPAL TUNNEL RELEASE      COLONOSCOPY      ENDOSCOPY      FRACTURE SURGERY      HARDWARE REMOVAL      WRIST    TENDON REPAIR      HAND      OB History          2    Para   0    Term   0       0    AB   1    Living   0         SAB   1    IAB   0    Ectopic   0    Molar   0    Multiple   0    Live Births   0          Obstetric Comments   FOB #1  Pregnancy #1 SAB at 6 weeks  FOB #1 Pregnancy #2  current               Objective     Vital Signs  /75   Wt 94.8 kg (209 lb)   LMP  (LMP Unknown)   Estimated body mass index is 42.21 kg/m² as calculated from the following:    Height as of 10/27/23: 149.9 cm (59\").    Weight as of this encounter: 94.8 kg (209 lb).    Physical Exam  Constitutional:       Appearance: Normal appearance. She is obese.   HENT:      Head: Normocephalic and atraumatic.   Pulmonary:      Effort: Pulmonary effort is normal.   Musculoskeletal:         General: Normal range of motion.   Neurological:      General: No focal deficit present.      Mental Status: She is alert and " oriented to person, place, and time.   Psychiatric:         Mood and Affect: Mood normal.         Behavior: Behavior normal.         Thought Content: Thought content normal.         Judgment: Judgment normal.       Ultrasound Impression:   Viable SIUP with S=D, fundal placenta, nl CL, nl JACQUIE. NT measures WNL.     Assessment and Plan     Diagnoses and all orders for this visit:    1. Pre-existing type 2 diabetes affecting pregnancy, antepartum (Primary)  Assessment & Plan:  Patient sees Dr. Key. Currently using Omniopod%, approximately 200u/day. Reports basal is approximately 2.75u/hr. Explained that Omnipod5 is not my favorite insulin pump for pregnancy as the algorithm and target are not tight enough. Currently reports that her fasting blood glucose is in the 130's.     We reviewed the goals for her blood glucose in pregnancy, including fasting blood sugars < 90-95mg/dl and 2-hr post-prandial values < 110-120mg/dl. We talked about how a smooth blood glucose curve is almost as important as working towards these goals as baby and mom feel the impacts of the low values and baby particularly feels the high values. We discussed the importance of protein in her diet and of eating scheduled meals and eating high protein snacks between meals and at bedtime.    We reviewed the complications associated with poorly controlled diabetes in pregnancy including increased risk for miscarriage, congenital abnormalities, macrosomia, need for operative delivery, damage to maternal or fetal structures at delivery, stillbirth, and childhood obesity and/or early development of Type 2 DM.     - I have asked that she send us blood glucose for review weekly   - Rx sent in for daily ASA 81mgs   - Recommend an eye exam   - Ordered TSH, preeclampsia panel, and sent her home with supplies for a 24hr urine protein   - Follow-up scheduled in 4 weeks   - We will see her again at 20wks for an anatomic survey, at 24wks for a fetal echo, and  then q 4 wks for growth and delivery planning    3. Morbid obesity with BMI of 40.0-44.9, adult  -     [facility]; Future    4. Chronic hypertension affecting pregnancy  Assessment & Plan:  Patient reports having CHTN for many years. She is not currently taking any medications for it. BP today is 155/75, 159/90, 149/63.     Reviewed with patient that multiple things in her history put her at risk for preeclampsia and that we will want to keep her BP < 140/90 per ACOG guidelines to keep her safe.      - Recommend patient start a daily baby ASA 81mgs   - Recommend patient start Labetalol 100mgs TID   - Explained we will likely need to titrate this medication to achieve BP's < 140/90   - Ordered baseline labs to be sent today -- TSH, preeclampsia panel, BNP   - Patient sent home with supplies for 24hr urine protein   - We will plan q 4 wk ultrasounds and visits to check on patient    Orders:  -     [facility]; Future  -     Preeclampsia Panel  -     BNP  -     aspirin (ASPIR) 81 MG EC tablet; Take 1 tablet by mouth Daily.  Dispense: 30 tablet; Refill: 6  -     labetalol (NORMODYNE) 100 MG tablet; Take 1 tablet by mouth 3 times a day.  Dispense: 90 tablet; Refill: 6    5. History of CHF (congestive heart failure)  Assessment & Plan:  Diagnosed in 5/3/2023 when patient was noted to have markedly increased lower extremity swelling. She had had episodes of palpitations and mild shortness of air at about the same time frame.   Echo at that time performed at [facility] showed EF of 50%, mild LVH, posterior and lateral walls of LV were noted to be severely hypokinetic, and was noted to have Grade III diastolic dysfunction.   Patient was placed on metoprolol, lasix, and lisinopril and improved over time.   She then had a stress test performed on 2023 that showed EF of 60%, no wall motion abnormalities.   Finally, she had another echo in [place] on 10/13/2023 that showed EF of 56%,  normal diastolic dysfunction, left atrium moderately dilates, and abnormal appearing aortic valve with sclerosis.     Patient is currently without complaints of SOA/palpitations/CP/leg swelling. She is on no meds, but was told by her Cardiologist that I would likely put her on a B-blocker for her blood pressure and that this would help with any palpitations.     We discussed at length that treatment of heart failure with a preserved ejection fraction ('diastolic heart failure') includes strict control of systolic and diastolic hypertension, control of pulmonary congestion and peripheral edema with diuretics, and control of heart rate if needed. If patient requires diuresis, lasix and/or HCTZ are appropriate in pregnancy. Coreg is an alpha/beta blocker that is not expected to increase the risk of congenital anomalies. It has, however, been associated with  hypoglycemia and feeding difficulties. I would consider this a less ideal but appropriate second line agent if necessary for maternal stability.      - Recommended to patient that she follow closely with her Cardiologist, even q month, throughout the pregnancy   - Would at some point in patient's future recommend a JAYDON to get a better idea of what is going on with her aortic valve   - Ordered BNP as baseline today   - Reviewed with patient the importance of keeping /90 or less, will restart Labetalol to work towards this   - Asked patient to start checking her BP's at home   - We will see her q 4 wks for evaluation as well    - May need to deliver at hospital with Cardiac support available, will see how she tolerates pregnancy    Orders:  -     [facility]; Future  -     BNP    6. History of pancreatitis  Assessment & Plan:  Currently resolved and without symptoms. Unclear etiology.     Orders:  -     [facility]; Future    7. Sleep apnea in adult  -     [facility]; Future    8. 12  weeks gestation of pregnancy  -     [facility] [place]; Future    9. Preexisting diabetes complicating pregnancy in first trimester, antepartum         Follow Up  Return in about 4 weeks (around 2023).    I spent 45 minutes caring for the patient on the day of service. This included: obtaining or reviewing a separately obtained medical history, reviewing patient records, performing a medically appropriate exam and/or evaluation, counseling or educating the patient/family/caregiver, ordering medications, labs, and/or procedures and documenting such in the medical record. This does not include time spent on review and interpretation of other tests such as fetal ultrasound or the performance of other procedures such as amniocentesis or CVS.      Dolores Fernandez MD  11/15/2023

## 2023-11-15 NOTE — LETTER
2023     Geovani Razo III, MD  [address]  [address]  [facility] [address]    Patient: Antoinette Pack   YOB: 1985   Date of Visit: 11/15/2023       Dear Geovani Razo III, MD    Antoinette Pack was in my office today. Below is a copy of my note.    If you have questions, please do not hesitate to call me. I look forward to following Antoinette along with you.         Sincerely,        Dolores Fernandez MD        CC: No Recipients                    Maternal/Fetal Medicine Consult Note     Name: Antoinette Pack    : 1985     MRN: [id number]     Referring Provider: Geovani Razo III, MD    Chief Complaint  hx CHF, CHTN, AMA, T2DM, obesity    Subjective     History of Present Illness:  Antoinette Pack is a 38 y.o.  12w4d who presents today for a dating/viability ultrasound and to discuss her multiple co-morbidities.     Patient denies VB/LOF/cramping.    ANDRE: Estimated Date of Delivery: 24     ROS:   As noted in HPI.     Past Medical History:   Diagnosis Date   • Anxiety    • CHF (congestive heart failure) 2023   • Colitis    • Depression    • Diabetes mellitus    • Diverticulitis    • GERD (gastroesophageal reflux disease)    • Hypertension    • Kidney stone    • Pancreatitis 2023   • PCOS (polycystic ovarian syndrome)    • Sleep apnea       Past Surgical History:   Procedure Laterality Date   • CARDIAC CATHETERIZATION     • CARPAL TUNNEL RELEASE     • COLONOSCOPY     • ENDOSCOPY     • FRACTURE SURGERY     • HARDWARE REMOVAL      WRIST   • TENDON REPAIR      HAND      OB History          2    Para   0    Term   0       0    AB   1    Living   0         SAB   1    IAB   0    Ectopic   0    Molar   0    Multiple   0    Live Births   0          Obstetric Comments   FOB #1  Pregnancy #1 SAB at 6 weeks  FOB #1 Pregnancy #2  current               Objective     Vital Signs  /75   Wt 94.8 kg (209 lb)   LMP  (LMP  "Unknown)   Estimated body mass index is 42.21 kg/m² as calculated from the following:    Height as of 10/27/23: 149.9 cm (59\").    Weight as of this encounter: 94.8 kg (209 lb).    Physical Exam  Constitutional:       Appearance: Normal appearance. She is obese.   HENT:      Head: Normocephalic and atraumatic.   Pulmonary:      Effort: Pulmonary effort is normal.   Musculoskeletal:         General: Normal range of motion.   Neurological:      General: No focal deficit present.      Mental Status: She is alert and oriented to person, place, and time.   Psychiatric:         Mood and Affect: Mood normal.         Behavior: Behavior normal.         Thought Content: Thought content normal.         Judgment: Judgment normal.       Ultrasound Impression:   Viable SIUP with S=D, fundal placenta, nl CL, nl JACQUIE. NT measures WNL.     Assessment and Plan     Diagnoses and all orders for this visit:    1. Pre-existing type 2 diabetes affecting pregnancy, antepartum (Primary)  Assessment & Plan:  Patient sees Dr. Key. Currently using Omniopod%, approximately 200u/day. Reports basal is approximately 2.75u/hr. Explained that Omnipod5 is not my favorite insulin pump for pregnancy as the algorithm and target are not tight enough. Currently reports that her fasting blood glucose is in the 130's.     We reviewed the goals for her blood glucose in pregnancy, including fasting blood sugars < 90-95mg/dl and 2-hr post-prandial values < 110-120mg/dl. We talked about how a smooth blood glucose curve is almost as important as working towards these goals as baby and mom feel the impacts of the low values and baby particularly feels the high values. We discussed the importance of protein in her diet and of eating scheduled meals and eating high protein snacks between meals and at bedtime.    We reviewed the complications associated with poorly controlled diabetes in pregnancy including increased risk for miscarriage, congenital " abnormalities, macrosomia, need for operative delivery, damage to maternal or fetal structures at delivery, stillbirth, and childhood obesity and/or early development of Type 2 DM.     - I have asked that she send us blood glucose for review weekly   - Rx sent in for daily ASA 81mgs   - Recommend an eye exam   - Ordered TSH, preeclampsia panel, and sent her home with supplies for a 24hr urine protein   - Follow-up scheduled in 4 weeks   - We will see her again at 20wks for an anatomic survey, at 24wks for a fetal echo, and then q 4 wks for growth and delivery planning    3. Morbid obesity with BMI of 40.0-44.9, adult  -     [facility] [place]; Future    4. Chronic hypertension affecting pregnancy  Assessment & Plan:  Patient reports having CHTN for many years. She is not currently taking any medications for it. BP today is 155/75, 159/90, 149/63.     Reviewed with patient that multiple things in her history put her at risk for preeclampsia and that we will want to keep her BP < 140/90 per ACOG guidelines to keep her safe.      - Recommend patient start a daily baby ASA 81mgs   - Recommend patient start Labetalol 100mgs TID   - Explained we will likely need to titrate this medication to achieve BP's < 140/90   - Ordered baseline labs to be sent today -- TSH, preeclampsia panel, BNP   - Patient sent home with supplies for 24hr urine protein   - We will plan q 4 wk ultrasounds and visits to check on patient    Orders:  -     [facility] [place]; Future  -     Preeclampsia Panel  -     BNP  -     aspirin (ASPIR) 81 MG EC tablet; Take 1 tablet by mouth Daily.  Dispense: 30 tablet; Refill: 6  -     labetalol (NORMODYNE) 100 MG tablet; Take 1 tablet by mouth 3 times a day.  Dispense: 90 tablet; Refill: 6    5. History of CHF (congestive heart failure)  Assessment & Plan:  Diagnosed in 5/3/2023 when patient was noted to have markedly increased lower extremity swelling. She had had episodes of  palpitations and mild shortness of air at about the same time frame.   [place] [facility] [place] 50%, mild LVH, posterior and lateral walls of LV were noted to be severely hypokinetic, and was noted to have Grade III diastolic dysfunction.   Patient was placed on metoprolol, lasix, and lisinopril and improved over time.   She then had a stress test performed on 2023 that showed EF of 60%, no wall motion abnormalities.   Finally, she had another echo in [place] on 10/13/2023 that showed EF of 56%, normal diastolic dysfunction, left atrium moderately dilates, and abnormal appearing aortic valve with sclerosis.     Patient is currently without complaints of SOA/palpitations/CP/leg swelling. She is on no meds, but was told by her Cardiologist that I would likely put her on a B-blocker for her blood pressure and that this would help with any palpitations.     We discussed at length that treatment of heart failure with a preserved ejection fraction ('diastolic heart failure') includes strict control of systolic and diastolic hypertension, control of pulmonary congestion and peripheral edema with diuretics, and control of heart rate if needed. If patient requires diuresis, lasix and/or HCTZ are appropriate in pregnancy. Coreg is an alpha/beta blocker that is not expected to increase the risk of congenital anomalies. It has, however, been associated with  hypoglycemia and feeding difficulties. I would consider this a less ideal but appropriate second line agent if necessary for maternal stability.      - Recommended to patient that she follow closely with her Cardiologist, even q month, throughout the pregnancy   - Would at some point in patient's future recommend a JAYDON to get a better idea of what is going on with her aortic valve   - Ordered BNP as baseline today   - Reviewed with patient the importance of keeping /90 or less, will restart Labetalol to work towards this   - Asked  patient to start checking her BP's at home   - We will see her q 4 wks for evaluation as well    - May need to deliver at hospital with Cardiac support available, will see how she tolerates pregnancy    Orders:  -     [facility]; Future  -     BNP    6. History of pancreatitis  Assessment & Plan:  Currently resolved and without symptoms. Unclear etiology.     Orders:  -     [facility]; Future    7. Sleep apnea in adult  -     [facility]; Future    8. 12 weeks gestation of pregnancy  -     [facility]; Future    9. Preexisting diabetes complicating pregnancy in first trimester, antepartum         Follow Up  Return in about 4 weeks (around 2023).    I spent 45 minutes caring for the patient on the day of service. This included: obtaining or reviewing a separately obtained medical history, reviewing patient records, performing a medically appropriate exam and/or evaluation, counseling or educating the patient/family/caregiver, ordering medications, labs, and/or procedures and documenting such in the medical record. This does not include time spent on review and interpretation of other tests such as fetal ultrasound or the performance of other procedures such as amniocentesis or CVS.      Dolores Fernandez MD  11/15/2023

## 2023-11-15 NOTE — ASSESSMENT & PLAN NOTE
Patient with single episode of pancreatitis of unclear etiology, diagnosed approximately 8wks ago. Currently without symptoms.

## 2023-11-15 NOTE — PROGRESS NOTES
This provider is located at the [facility] (through [facility]), [address], [facility], [address] using a secure MyChart Video Visit through [employer]. Patient is being seen remotely via telehealth at their home address in [place], and stated they are in a secure environment for this session. The patient's condition being diagnosed/treated is appropriate for telemedicine. The provider identified herself as well as her credentials.   The patient, and/or patients guardian, consent to be seen remotely, and when consent is given they understand that the consent allows for patient identifiable information to be sent to a third party as needed.   They may refuse to be seen remotely at any time. The electronic data is encrypted and password protected, and the patient and/or guardian has been advised of the potential risks to privacy not withstanding such measures.      Subjective   Antoinette Pack is a 38 y.o. female who presents today for follow up    Chief Complaint:  Bipolar disorder, depression, anxiety    History of Present Illness:   History of Present Illness  Today is a follow-up visit.     Medication compliance: patient is NOT on psychiatric medications, were stopped when she found out she was pregnant.   Depression rated 5/10, with 10 being the worst.  Anxiety rated 7/10, with 10 being the worst.    Sleep is good, getting about 7 hours a night,  Nightmares: Occasional  Appetite is good.  Hallucinations: Patient denies auditory hallucinations and visual hallucinations  Self-harm: Patient denies thoughts of self-harm.  Alcohol use: no  Drug use: no  Marijuana use: no     Chronic health issues, no acute physical or medical issues today.    Details:  She is 14 weeks pregnant.  She states it was not a planned pregnancy. She was cautioned about not getting pregnant due to cardiac issues earlier this year. She also had pancreatitis recently. She is happy about the  pregnancy, it is a girl, she had genetic testing completed. She was taken off of all of her medications. She states her blood pressure is down, and working towards getting blood sugar under better control.  She now has insulin pump. She states she has been off of psychiatric medications for 3 months, she states she is little more emotional. She doesn't feel like her depression or anxiety has worsened significantly.  She is under care of OB/GYN at [facility], but she is going to start seeing a high risk pregnancy provider in [place]. Instructed to discuss possible restarting psychiatric medications in the future.         The following portions of the patient's history were reviewed and updated as appropriate: allergies, current medications, past family history, past medical history, past social history, past surgical history and problem list.      Past Medical History:  Past Medical History:   Diagnosis Date    Anxiety     CHF (congestive heart failure) 05/2023    Colitis 2023    Depression     Diabetes mellitus 2018    Diverticulitis     GERD (gastroesophageal reflux disease)     Hypertension     Kidney stone     Pancreatitis 09/2023    PCOS (polycystic ovarian syndrome)     Sleep apnea        Social History:  Social History     Socioeconomic History    Marital status:    Tobacco Use    Smoking status: Former    Smokeless tobacco: Never   Vaping Use    Vaping Use: Never used   Substance and Sexual Activity    Alcohol use: No    Drug use: No    Sexual activity: Defer       Family History:  Family History   Problem Relation Age of Onset    Heart disease Mother     COPD Mother     Heart disease Father     COPD Father        Past Surgical History:  Past Surgical History:   Procedure Laterality Date    CARDIAC CATHETERIZATION      CARPAL TUNNEL RELEASE      COLONOSCOPY      ENDOSCOPY      FRACTURE SURGERY      HARDWARE REMOVAL      WRIST    TENDON REPAIR      HAND       Problem List:  Patient Active Problem  List   Diagnosis    Simple goiter    Pre-existing type 2 diabetes mellitus with hyperglycemia during pregnancy in second trimester    Acute pancreatitis    History of CHF (congestive heart failure)    Chronic hypertension affecting pregnancy    History of pancreatitis    Morbid obesity with BMI of 40.0-44.9, adult    Pre-existing type 2 diabetes affecting pregnancy, antepartum    History of seizure disorder    Obstructive sleep apnea syndrome        Allergy:   Allergies   Allergen Reactions    Dilantin [Phenytoin] Mental Status Change    Keppra [Levetiracetam] Mental Status Change    Meperidine Rash    Topamax [Topiramate] Mental Status Change        Current Medications:   Current Outpatient Medications   Medication Sig Dispense Refill    aspirin (ASPIR) 81 MG EC tablet Take 1 tablet by mouth Daily. 30 tablet 6    Continuous Blood Gluc Sensor (Dexcom G6 Sensor) Use Every 10 (Ten) Days. 3 each 5    Continuous Blood Gluc Transmit (Dexcom G6 Transmitter) misc Use 1 each Every 3 (Three) Months. 1 each 1    folic acid (FOLVITE) 1 MG tablet Take 1 tablet by mouth Daily.      Insulin Aspart (novoLOG) 100 UNIT/ML injection For use in insulin pump. Max Daily Dose: 200 units daily. 60 mL 5    insulin degludec (Tresiba FlexTouch) 100 UNIT/ML solution pen-injector injection Inject 50 Units under the skin into the appropriate area as directed 2 (Two) Times a Day. (Patient not taking: Reported on 11/15/2023) 90 mL 1    Insulin Disposable Pump (Omnipod 5 G6 Intro, Gen 5,) kit Use 1 kit Take As Directed. 1 kit 0    Insulin Disposable Pump (Omnipod 5 G6 Pod, Gen 5,) misc Use 1 each Every Other Day. 15 each 5    Insulin Pen Needle 32G X 4 MM misc Use to inject insulin up to 4 times daily as directed 400 each 1    labetalol (NORMODYNE) 100 MG tablet Take 1 tablet by mouth 3 times a day. 90 tablet 6    lansoprazole (PREVACID) 30 MG capsule Take 1 capsule by mouth 2 (Two) Times a Day.      mesalamine (CANASA) 1000 MG suppository Insert  1 suppository into the rectum Every Night.      Prenatal Vit-Fe Fumarate-FA (PRENATAL PO) Take 1 tablet by mouth Daily.       No current facility-administered medications for this visit.       Review of Symptoms:    Review of Systems   Psychiatric/Behavioral:  Positive for dysphoric mood, depressed mood and stress. Negative for agitation, hallucinations, sleep disturbance and suicidal ideas. The patient is nervous/anxious.    All other systems reviewed and are negative.        Physical Exam:   not currently breastfeeding.  There is no height or weight on file to calculate BMI.    11/29/23    MENTAL STATUS EXAM   General Appearance:  Cleanly groomed and dressed  Eye Contact:  Good eye contact  Attitude:  Cooperative  Motor Activity:  Normal gait, posture  Speech:  Normal rate, tone, volume  Language:  Spontaneous  Mood and affect:  Normal, pleasant  Hopelessness:  Denies  Thought Process:  Logical  Associations/ Thought Content:  No delusions  Hallucinations:  None  Suicidal Ideations:  Not present  Homicidal Ideation:  Not present  Sensorium:  Alert  Orientation:  Person, place, time and situation  Insight:  Fair  Judgement:  Fair  Reliability:  Fair  Impulse Control:  Fair       PHQ-Score Total:  PHQ-9 Total Score:      Lab Results:   Office Visit on 11/15/2023   Component Date Value Ref Range Status    Alkaline Phosphatase 11/15/2023 58  39 - 117 U/L Final    ALT (SGPT) 11/15/2023 12  1 - 33 U/L Final    AST (SGOT) 11/15/2023 13  1 - 32 U/L Final    Creatinine 11/15/2023 0.45 (L)  0.57 - 1.00 mg/dL Final    Total Bilirubin 11/15/2023 0.3  0.0 - 1.2 mg/dL Final    LDH 11/15/2023 133 (L)  135 - 214 U/L Final    Uric Acid 11/15/2023 3.9  2.4 - 5.7 mg/dL Final    WBC 11/15/2023 8.59  3.40 - 10.80 10*3/mm3 Final    RBC 11/15/2023 4.25  3.77 - 5.28 10*6/mm3 Final    Hemoglobin 11/15/2023 10.4 (L)  12.0 - 15.9 g/dL Final    Hematocrit 11/15/2023 32.5 (L)  34.0 - 46.6 % Final    MCV 11/15/2023 76.5 (L)  79.0 - 97.0 fL  Final    MCH 11/15/2023 24.5 (L)  26.6 - 33.0 pg Final    MCHC 11/15/2023 32.0  31.5 - 35.7 g/dL Final    RDW 11/15/2023 15.7 (H)  12.3 - 15.4 % Final    RDW-SD 11/15/2023 43.3  37.0 - 54.0 fl Final    MPV 11/15/2023 11.4  6.0 - 12.0 fL Final    Platelets 11/15/2023 152  140 - 450 10*3/mm3 Final    TSH 11/15/2023 0.007 (L)  0.270 - 4.200 uIU/mL Final    proBNP 11/15/2023 <36.0  0.0 - 450.0 pg/mL Final   Hospital Outpatient Visit on 11/08/2023   Component Date Value Ref Range Status    LV Sys Vol (BSA corrected) 11/08/2023 16.6  cm2 Final    LV Hester Vol (BSA corrected) 11/08/2023 37.5  cm2 Final    LVOT area 11/08/2023 3.5  cm2 Final    LVOT diam 11/08/2023 2.10  cm Final    EDV(MOD-sp4) 11/08/2023 70.1  ml Final    ESV(MOD-sp4) 11/08/2023 31.0  ml Final    SV(MOD-sp4) 11/08/2023 39.1  ml Final    SI(MOD-sp4) 11/08/2023 20.9  ml/m2 Final    EF(MOD-sp4) 11/08/2023 55.8  % Final    MV E max florian 11/08/2023 152.0  cm/sec Final    MV A max florian 11/08/2023 80.8  cm/sec Final    MV E/A 11/08/2023 1.88   Final    LA ESV Index (BP) 11/08/2023 34.4  ml/m2 Final    Med Peak E' Florian 11/08/2023 7.7  cm/sec Final    Lat Peak E' Florian 11/08/2023 8.4  cm/sec Final    Avg E/e' ratio 11/08/2023 18.88   Final    SV(LVOT) 11/08/2023 95.6  ml Final    TAPSE (>1.6) 11/08/2023 3.2  cm Final    LA dimension (2D)  11/08/2023 4.4  cm Final    LV V1 max 11/08/2023 131.0  cm/sec Final    LV V1 max PG 11/08/2023 6.9  mmHg Final    LV V1 mean PG 11/08/2023 3.0  mmHg Final    LV V1 VTI 11/08/2023 27.6  cm Final    Ao pk florian 11/08/2023 179.0  cm/sec Final    Ao max PG 11/08/2023 12.8  mmHg Final    Ao mean PG 11/08/2023 7.0  mmHg Final    Ao V2 VTI 11/08/2023 34.9  cm Final    JONATHON(I,D) 11/08/2023 2.7  cm2 Final    TR max florian 11/08/2023 251.0  cm/sec Final    TR max PG 11/08/2023 25.2  mmHg Final    RVSP(TR) 11/08/2023 35.2  mmHg Final    RAP systole 11/08/2023 10.0  mmHg Final    PA acc time 11/08/2023 0.10  sec Final    Ao root diam 11/08/2023 2.6  cm  Final    EF(MOD-bp) 11/08/2023 56.0  % Final       Assessment & Plan   Problems Addressed this Visit    None  Visit Diagnoses       Bipolar disorder, in partial remission, most recent episode depressed    -  Primary    Panic disorder with agoraphobia        Generalized anxiety disorder        Medication management              Diagnoses         Codes Comments    Bipolar disorder, in partial remission, most recent episode depressed    -  Primary ICD-10-CM: F31.75  ICD-9-CM: 296.55     Panic disorder with agoraphobia     ICD-10-CM: F40.01  ICD-9-CM: 300.21     Generalized anxiety disorder     ICD-10-CM: F41.1  ICD-9-CM: 300.02     Medication management     ICD-10-CM: Z79.899  ICD-9-CM: V58.69           Social History     Tobacco Use   Smoking Status Former   Smokeless Tobacco Never     JORGE reviewed and appropriate. Patient counseled on use of controlled substances.     -The benefits of a healthy diet and exercise were discussed with patient, especially the positive effects they have on mental health. Patient encouraged to consider lifestyle modification regarding  diet and exercise patterns to maximize results of mental health treatment.  -Reviewed previous available documentation  -Reviewed most recent available labs     -At this time she doesn't want to restart psychiatric medications, she will discuss possible psychiatric medication with her high risk OB/GYN at her appt in 2 weeks.     -Instructed to contact this office if depression, anxiety or mood increases or changes significantly.       Visit Diagnoses:    ICD-10-CM ICD-9-CM   1. Bipolar disorder, in partial remission, most recent episode depressed  F31.75 296.55   2. Panic disorder with agoraphobia  F40.01 300.21   3. Generalized anxiety disorder  F41.1 300.02   4. Medication management  Z79.899 V58.69         TREATMENT PLAN/GOALS: Continue supportive psychotherapy efforts and medications as indicated. Treatment and medication options discussed during  today's visit. Patient acknowledged and verbally consented to continue with current treatment plan and was educated on the importance of compliance with treatment and follow-up appointments.    MEDICATION ISSUES:    Discussed medication options and treatment plan of prescribed medication as well as the risks, benefits, and side effects including potential falls, possible impaired driving and metabolic adversities among others. Patient is agreeable to call the office with any worsening of symptoms or onset of side effects. Patient is agreeable to call 911 or go to the nearest ER should he/she begin having SI/HI.     MEDS ORDERED DURING VISIT:  No orders of the defined types were placed in this encounter.      Return in about 1 month (around 12/29/2023).  -No medications prescribed today.    I spent 30 minutes caring for Antoinette on this date of service. This time includes time spent by me in the following activities: preparing for the visit, obtaining and/or reviewing a separately obtained history, performing a medically appropriate examination and/or evaluation, counseling and educating the patient/family/caregiver, ordering medications, tests, or procedures, and documenting information in the medical record               This document has been electronically signed by CICI Lopez  November 29, 2023 10:34 EST    Part of this note may be an electronic transcription/translation of spoken language to printed text using the Dragon Dictation System.

## 2023-11-15 NOTE — PROGRESS NOTES
Patient denies bleeding, leaking fluid or cramping  NIPT not done  Patients next follow up with Lori Razo is 12/8/2023  Patient is followed by [place] with [facility] in [place].  Patient has an omnipod and dexcom.  Dexcom sharing code is [id number]  Patient became diaphoretic with tachycardia.  BS per dexcom 61.  Peanut butter cracker and juice offered.  Cracker eaten.  BS up to 75  Patient did complain of headache this morning, denies at this time.

## 2023-11-16 LAB
EXTERNAL HEPATITIS B SURFACE ANTIGEN: NEGATIVE
EXTERNAL RUBELLA QUALITATIVE: NORMAL
EXTERNAL SYPHILIS RPR SCREEN: NORMAL
HIV1 P24 AG SERPL QL IA: NORMAL

## 2023-11-21 ENCOUNTER — SPECIALTY PHARMACY (OUTPATIENT)
Dept: PHARMACY | Facility: HOSPITAL | Age: 38
End: 2023-11-21
Payer: COMMERCIAL

## 2023-11-21 PROBLEM — G47.33 OBSTRUCTIVE SLEEP APNEA SYNDROME: Status: ACTIVE | Noted: 2023-11-21

## 2023-11-21 PROBLEM — O24.119 PRE-EXISTING TYPE 2 DIABETES AFFECTING PREGNANCY, ANTEPARTUM: Status: ACTIVE | Noted: 2023-11-21

## 2023-11-21 PROBLEM — Z86.69 HISTORY OF SEIZURE DISORDER: Status: ACTIVE | Noted: 2023-11-21

## 2023-11-21 PROBLEM — Z86.79 HISTORY OF CHF (CONGESTIVE HEART FAILURE): Status: ACTIVE | Noted: 2023-11-21

## 2023-11-21 PROBLEM — E66.01 MORBID OBESITY WITH BMI OF 40.0-44.9, ADULT: Status: ACTIVE | Noted: 2023-11-21

## 2023-11-21 PROBLEM — E11.65 TYPE 2 DIABETES MELLITUS WITH HYPERGLYCEMIA, WITH LONG-TERM CURRENT USE OF INSULIN: Status: ACTIVE | Noted: 2022-02-17

## 2023-11-21 PROBLEM — Z79.4 TYPE 2 DIABETES MELLITUS WITH HYPERGLYCEMIA, WITH LONG-TERM CURRENT USE OF INSULIN: Status: ACTIVE | Noted: 2022-02-17

## 2023-11-21 PROBLEM — Z87.19 HISTORY OF PANCREATITIS: Status: ACTIVE | Noted: 2023-11-21

## 2023-11-21 PROBLEM — O10.919 CHRONIC HYPERTENSION AFFECTING PREGNANCY: Status: ACTIVE | Noted: 2023-11-21

## 2023-11-21 PROBLEM — O24.311 PREEXISTING DIABETES COMPLICATING PREGNANCY IN FIRST TRIMESTER, ANTEPARTUM: Status: ACTIVE | Noted: 2022-02-17

## 2023-11-21 RX ORDER — LABETALOL 100 MG/1
100 TABLET, FILM COATED ORAL 3 TIMES DAILY
Qty: 90 TABLET | Refills: 6 | Status: SHIPPED | OUTPATIENT
Start: 2023-11-21

## 2023-11-21 RX ORDER — ASPIRIN 81 MG/1
81 TABLET ORAL DAILY
Qty: 30 TABLET | Refills: 6 | Status: SHIPPED | OUTPATIENT
Start: 2023-11-21

## 2023-11-21 NOTE — ASSESSMENT & PLAN NOTE
Patient reports having CHTN for many years. She is not currently taking any medications for it. BP today is 155/75, 159/90, 149/63.     Reviewed with patient that multiple things in her history put her at risk for preeclampsia and that we will want to keep her BP < 140/90 per ACOG guidelines to keep her safe.      - Recommend patient start a daily baby ASA 81mgs   - Recommend patient start Labetalol 100mgs TID   - Explained we will likely need to titrate this medication to achieve BP's < 140/90   - Ordered baseline labs to be sent today -- TSH, preeclampsia panel, BNP   - Patient sent home with supplies for 24hr urine protein   - We will plan q 4 wk ultrasounds and visits to check on patient

## 2023-11-21 NOTE — ASSESSMENT & PLAN NOTE
Diagnosed in 5/3/2023 when patient was noted to have markedly increased lower extremity swelling. She had had episodes of palpitations and mild shortness of air at about the same time frame.   Echo at that time performed at [facility] showed EF of 50%, mild LVH, posterior and lateral walls of LV were noted to be severely hypokinetic, and was noted to have Grade III diastolic dysfunction.   Patient was placed on metoprolol, lasix, and lisinopril and improved over time.   She then had a stress test performed on 2023 that showed EF of 60%, no wall motion abnormalities.   Finally, she had another echo in [place] on 10/13/2023 that showed EF of 56%, normal diastolic dysfunction, left atrium moderately dilates, and abnormal appearing aortic valve with sclerosis.     Patient is currently without complaints of SOA/palpitations/CP/leg swelling. She is on no meds, but was told by her Cardiologist that I would likely put her on a B-blocker for her blood pressure and that this would help with any palpitations.     We discussed at length that treatment of heart failure with a preserved ejection fraction ('diastolic heart failure') includes strict control of systolic and diastolic hypertension, control of pulmonary congestion and peripheral edema with diuretics, and control of heart rate if needed. If patient requires diuresis, lasix and/or HCTZ are appropriate in pregnancy. Coreg is an alpha/beta blocker that is not expected to increase the risk of congenital anomalies. It has, however, been associated with  hypoglycemia and feeding difficulties. I would consider this a less ideal but appropriate second line agent if necessary for maternal stability.      - Recommended to patient that she follow closely with her Cardiologist, even q month, throughout the pregnancy   - Would at some point in patient's future recommend a JAYDON to get a better idea of what is going on with her aortic valve   - Ordered BNP as baseline today   -  Reviewed with patient the importance of keeping /90 or less, will restart Labetalol to work towards this   - Asked patient to start checking her BP's at home   - We will see her q 4 wks for evaluation as well    - May need to deliver at hospital with Cardiac support available, will see how she tolerates pregnancy

## 2023-11-21 NOTE — ASSESSMENT & PLAN NOTE
Encourage patient to reliably use CPAP as ALEJANDRO is associated with hypertension refractory to medications.

## 2023-11-21 NOTE — ASSESSMENT & PLAN NOTE
Patient sees Dr. Key. Currently using Omniopod%, approximately 200u/day. Reports basal is approximately 2.75u/hr. Explained that Omnipod5 is not my favorite insulin pump for pregnancy as the algorithm and target are not tight enough. Currently reports that her fasting blood glucose is in the 130's.     We reviewed the goals for her blood glucose in pregnancy, including fasting blood sugars < 90-95mg/dl and 2-hr post-prandial values < 110-120mg/dl. We talked about how a smooth blood glucose curve is almost as important as working towards these goals as baby and mom feel the impacts of the low values and baby particularly feels the high values. We discussed the importance of protein in her diet and of eating scheduled meals and eating high protein snacks between meals and at bedtime.    We reviewed the complications associated with poorly controlled diabetes in pregnancy including increased risk for miscarriage, congenital abnormalities, macrosomia, need for operative delivery, damage to maternal or fetal structures at delivery, stillbirth, and childhood obesity and/or early development of Type 2 DM.     - I have asked that she send us blood glucose for review weekly   - Rx sent in for daily ASA 81mgs   - Recommend an eye exam   - Ordered TSH, preeclampsia panel, and sent her home with supplies for a 24hr urine protein   - Follow-up scheduled in 4 weeks   - We will see her again at 20wks for an anatomic survey, at 24wks for a fetal echo, and then q 4 wks for growth and delivery planning

## 2023-11-22 ENCOUNTER — OFFICE VISIT (OUTPATIENT)
Dept: ENDOCRINOLOGY | Facility: CLINIC | Age: 38
End: 2023-11-22
Payer: COMMERCIAL

## 2023-11-22 VITALS
HEIGHT: 59 IN | WEIGHT: 206.6 LBS | BODY MASS INDEX: 41.65 KG/M2 | DIASTOLIC BLOOD PRESSURE: 60 MMHG | HEART RATE: 82 BPM | SYSTOLIC BLOOD PRESSURE: 125 MMHG | OXYGEN SATURATION: 97 %

## 2023-11-22 DIAGNOSIS — O24.112 PRE-EXISTING TYPE 2 DIABETES MELLITUS WITH HYPERGLYCEMIA DURING PREGNANCY IN SECOND TRIMESTER: Primary | ICD-10-CM

## 2023-11-22 DIAGNOSIS — E11.65 PRE-EXISTING TYPE 2 DIABETES MELLITUS WITH HYPERGLYCEMIA DURING PREGNANCY IN SECOND TRIMESTER: Primary | ICD-10-CM

## 2023-11-22 NOTE — PROGRESS NOTES
Chief Complaint   Patient presents with    Diabetes        Antoinette Pack is a 38 y.o. female had concerns including Diabetes.   T2DM    She is currently 13w pregnant.  She recently had an US and was noted to be normal and doing well.  She has been noting increased BG's and has been having to administer additional boluses with her regular meal time boluses to keep her BG's in range.     Birth state: [place]  Previous history of radiation to face/neck: none  Consuming foods high in iodine: no  Family history of thyroid complications:none    The following portions of the patient's history were reviewed and updated as appropriate: allergies, current medications, past family history, past medical history, past social history, past surgical history and problem list.    History:  Diabetes was diagnosed 8-9 years ago.  Complications include none.  Last ophtho exam was 2021, Dr. Bruno Office.  Current medications for diabetes include uses Novolog in an Omnipod 5 insulin pump.  Past meds: Trulicity, GI Issues, has problems with yeast, Glipizide-unsure why it was stopped  She checks her BG's with Dexcom CGM.  Hypos: rarely  Fasting range: 150-200+  Current pump settings:  Basal:   12A: 2.75 u/hr  CR: 1:2  ISF: 18    Diet: has made improvements to her diet.    Past Medical History:   Diagnosis Date    Anxiety     CHF (congestive heart failure) 05/2023    Colitis 2023    Depression     Diabetes mellitus 2018    Diverticulitis     GERD (gastroesophageal reflux disease)     Hypertension     Kidney stone     Pancreatitis 09/2023    PCOS (polycystic ovarian syndrome)     Sleep apnea      Past Surgical History:   Procedure Laterality Date    CARDIAC CATHETERIZATION      CARPAL TUNNEL RELEASE      COLONOSCOPY      ENDOSCOPY      FRACTURE SURGERY      HARDWARE REMOVAL      WRIST    TENDON REPAIR      HAND      Family History   Problem Relation Age of Onset    Heart disease Mother     COPD Mother     Heart disease Father     COPD  Father       Social History     Socioeconomic History    Marital status:    Tobacco Use    Smoking status: Former    Smokeless tobacco: Never   Vaping Use    Vaping Use: Never used   Substance and Sexual Activity    Alcohol use: No    Drug use: No    Sexual activity: Defer      Allergies   Allergen Reactions    Dilantin [Phenytoin] Mental Status Change    Keppra [Levetiracetam] Mental Status Change    Meperidine Rash    Topamax [Topiramate] Mental Status Change      Current Outpatient Medications on File Prior to Visit   Medication Sig Dispense Refill    aspirin (ASPIR) 81 MG EC tablet Take 1 tablet by mouth Daily. 30 tablet 6    Continuous Blood Gluc Sensor (Dexcom G6 Sensor) Use Every 10 (Ten) Days. 3 each 5    Continuous Blood Gluc Transmit (Dexcom G6 Transmitter) misc Use 1 each Every 3 (Three) Months. 1 each 1    folic acid (FOLVITE) 1 MG tablet Take 1 tablet by mouth Daily.      Insulin Aspart (novoLOG) 100 UNIT/ML injection For use in insulin pump. Max Daily Dose: 200 units daily. 60 mL 5    insulin degludec (Tresiba FlexTouch) 100 UNIT/ML solution pen-injector injection Inject 50 Units under the skin into the appropriate area as directed 2 (Two) Times a Day. (Patient not taking: Reported on 11/15/2023) 90 mL 1    Insulin Disposable Pump (Omnipod 5 G6 Intro, Gen 5,) kit Use 1 kit Take As Directed. 1 kit 0    Insulin Disposable Pump (Omnipod 5 G6 Pod, Gen 5,) misc Use 1 each Every Other Day. 15 each 5    Insulin Pen Needle 32G X 4 MM misc Use to inject insulin up to 4 times daily as directed 400 each 1    labetalol (NORMODYNE) 100 MG tablet Take 1 tablet by mouth 3 times a day. 90 tablet 6    lansoprazole (PREVACID) 30 MG capsule Take 1 capsule by mouth 2 (Two) Times a Day.      mesalamine (CANASA) 1000 MG suppository Insert 1 suppository into the rectum Every Night.      Prenatal Vit-Fe Fumarate-FA (PRENATAL PO) Take 1 tablet by mouth Daily.       No current facility-administered medications on file  "prior to visit.      Review of Systems   Constitutional:  Positive for diaphoresis and fatigue. Negative for unexpected weight gain and unexpected weight loss.   HENT:          Neck tenderness   Eyes:  Positive for blurred vision and visual disturbance.   Cardiovascular:  Positive for palpitations.   Gastrointestinal:  Positive for constipation.   Endocrine: Positive for heat intolerance, polydipsia, polyphagia and polyuria. Negative for cold intolerance.   Skin:  Positive for dry skin.        Hair thinning   Neurological:  Positive for tremors.   Psychiatric/Behavioral:  Positive for agitation and sleep disturbance. The patient is nervous/anxious.    All other systems reviewed and are negative.       /60 (BP Location: Left arm, Patient Position: Sitting, Cuff Size: Adult)   Pulse 82   Ht 149.9 cm (59\")   Wt 93.7 kg (206 lb 9.6 oz)   LMP  (LMP Unknown)   SpO2 97%   BMI 41.73 kg/m²      Physical Exam  Vitals reviewed.   Constitutional:       Appearance: Normal appearance.   Eyes:      Extraocular Movements: Extraocular movements intact.   Cardiovascular:      Rate and Rhythm: Normal rate.   Pulmonary:      Effort: Pulmonary effort is normal.   Skin:     General: Skin is warm.   Neurological:      General: No focal deficit present.      Mental Status: She is alert and oriented to person, place, and time.   Psychiatric:         Mood and Affect: Mood normal.         Behavior: Behavior normal.         Thought Content: Thought content normal.         Judgment: Judgment normal.        Labs/Imaging  CMP  Lab Results   Component Value Date    GLUCOSE 82 10/27/2023    BUN 10 10/27/2023    CREATININE 0.45 (L) 11/15/2023    EGFRIFNONA 101 12/27/2020    EGFRIFAFRI  09/05/2016      Comment:      <15 Indicative of kidney failure.    BCR 19.2 10/27/2023    K 3.7 10/27/2023    CO2 21.9 (L) 10/27/2023    CALCIUM 9.5 10/27/2023    ALBUMIN 4.3 10/27/2023    LABIL2 1.3 (L) 02/05/2016    AST 13 11/15/2023    ALT 12 11/15/2023 " "       CBC w/DIFF   Lab Results   Component Value Date    WBC 8.59 11/15/2023    RBC 4.25 11/15/2023    HGB 10.4 (L) 11/15/2023    HCT 32.5 (L) 11/15/2023    MCV 76.5 (L) 11/15/2023    MCH 24.5 (L) 11/15/2023    MCHC 32.0 11/15/2023    RDW 15.7 (H) 11/15/2023    RDWSD 43.3 11/15/2023    MPV 11.4 11/15/2023     11/15/2023    NEUTRORELPCT 73.2 10/27/2023    LYMPHORELPCT 19.4 (L) 10/27/2023    MONORELPCT 5.7 10/27/2023    EOSRELPCT 1.0 10/27/2023    BASORELPCT 0.3 10/27/2023    AUTOIGPER 0.4 10/27/2023    NEUTROABS 7.57 (H) 10/27/2023    LYMPHSABS 2.00 10/27/2023    MONOSABS 0.59 10/27/2023    EOSABS 0.10 10/27/2023    BASOSABS 0.03 10/27/2023    AUTOIGNUM 0.04 10/27/2023    NRBC 0.0 10/27/2023       TSH  Lab Results   Component Value Date    TSH 0.007 (L) 11/15/2023    TSH 0.606 2023    TSH 0.497 2022       T4  Lab Results   Component Value Date    FREET4 0.94 2023    FREET4 0.97 2022    FREET4 1.20 2015     No results found for: \"W9DUFZS\"    T3  Lab Results   Component Value Date    T3FREE 3.43 2022     No results found for: \"U8YMNHW\"    TRAb  No results found for: \"TSURCPAB\"    TPO  No results found for: \"THYROIDAB\"    No valid procedures specified.    Assessment and Plan    Diagnoses and all orders for this visit:    1. Pre-existing type 2 diabetes mellitus with hyperglycemia during pregnancy in second trimester (Primary)  Assessment & Plan:  -Reviewed instructions for glycemic monitoring and the monitoring of urine ketones.  -Discussed that gestational diabetes can become harder to control as pregnancy progresses. Reviewed need for routine follow up from now until delivery.   -Patient instructed to send glucose data weekly via [employer].  -Risks of gestational diabetes were reviewed, these include, but are not limited to: LGA infant, macrosomia, stillbirth,  hypoglycemia, hyperbilirubinemia, birth injury,  birth, respiratory complications following delivery, " polyhydramnios, hypocalcemia, maternal preeclampsia.  -Nutritional goals reviewed: Patient advised to eat 3 moderate sized meals daily as well as 2-4 snacks, including a bedtime snack. Discussed need for carbohydrate awareness. Patient given goals for carbohydrate intake for meals/snacks.  -Glycemic Targets during pregnancy were reviewed, as follows: fasting glucose <95, 1 hour post prandial <140, 2 hour postprandial <120.  2 week insulin pump data download is as follows:  Very High: 0%  High: 14%  IN Range: 86%  Low: 0%  Very Low: 0%  Trend shows overall hyperglycemia above goal BG's for pregnancy.  -Discussed that her BG's are elevated above goal for pregnancy.  Will increase insulin pump settings:  Basal:   12A: 3 u/hr  CR: 1:2  ISF: 18  Discussed the importance of counting carbs appropriately and administering meal time doses of insulin.  -We discussed various insulin pumps as she has been having issues controlling her BG's with this pump.  Discussed switching to the tandem insulin pump to help better control BG's as well as this holds more insulin as she will require more insulin as she progresses with her pregnancy. She will look at this and let me know if she would like to switch.  -Patient advised that she will need to monitor blood glucose up to 6-8 times daily. In the event that insurance may not cover adequate testing supplies, she was instructed she may need to purchase on OTC meter and strips.  -Patient will return for follow up in 4 weeks. She was instructed to contact the office in the interim if glucoses not at target.                 Return in about 4 weeks (around 12/20/2023) for Follow-up appointment. The patient was instructed to contact the clinic with any interval questions or concerns.    This document has been electronically signed by CICI Veliz  November 22, 2023 15:54 EST  Endocrinology

## 2023-11-22 NOTE — ASSESSMENT & PLAN NOTE
-Reviewed instructions for glycemic monitoring and the monitoring of urine ketones.  -Discussed that gestational diabetes can become harder to control as pregnancy progresses. Reviewed need for routine follow up from now until delivery.   -Patient instructed to send glucose data weekly via [employer].  -Risks of gestational diabetes were reviewed, these include, but are not limited to: LGA infant, macrosomia, stillbirth,  hypoglycemia, hyperbilirubinemia, birth injury,  birth, respiratory complications following delivery, polyhydramnios, hypocalcemia, maternal preeclampsia.  -Nutritional goals reviewed: Patient advised to eat 3 moderate sized meals daily as well as 2-4 snacks, including a bedtime snack. Discussed need for carbohydrate awareness. Patient given goals for carbohydrate intake for meals/snacks.  -Glycemic Targets during pregnancy were reviewed, as follows: fasting glucose <95, 1 hour post prandial <140, 2 hour postprandial <120.  2 week insulin pump data download is as follows:  Very High: 0%  High: 14%  IN Range: 86%  Low: 0%  Very Low: 0%  Trend shows overall hyperglycemia above goal BG's for pregnancy.  -Discussed that her BG's are elevated above goal for pregnancy.  Will increase insulin pump settings:  Basal:   12A: 3 u/hr  CR: 1:2  ISF: 18  Discussed the importance of counting carbs appropriately and administering meal time doses of insulin.  -We discussed various insulin pumps as she has been having issues controlling her BG's with this pump.  Discussed switching to the tandem insulin pump to help better control BG's as well as this holds more insulin as she will require more insulin as she progresses with her pregnancy. She will look at this and let me know if she would like to switch.  -Patient advised that she will need to monitor blood glucose up to 6-8 times daily. In the event that insurance may not cover adequate testing supplies, she was instructed she may need to purchase on  OTC meter and strips.  -Patient will return for follow up in 4 weeks. She was instructed to contact the office in the interim if glucoses not at target.

## 2023-11-28 ENCOUNTER — TELEPHONE (OUTPATIENT)
Dept: OBSTETRICS AND GYNECOLOGY | Facility: HOSPITAL | Age: 38
End: 2023-11-28
Payer: COMMERCIAL

## 2023-11-29 ENCOUNTER — TELEMEDICINE (OUTPATIENT)
Dept: PSYCHIATRY | Facility: CLINIC | Age: 38
End: 2023-11-29
Payer: COMMERCIAL

## 2023-11-29 DIAGNOSIS — F40.01 PANIC DISORDER WITH AGORAPHOBIA: ICD-10-CM

## 2023-11-29 DIAGNOSIS — Z79.899 MEDICATION MANAGEMENT: ICD-10-CM

## 2023-11-29 DIAGNOSIS — F41.1 GENERALIZED ANXIETY DISORDER: ICD-10-CM

## 2023-11-29 DIAGNOSIS — F31.75 BIPOLAR DISORDER, IN PARTIAL REMISSION, MOST RECENT EPISODE DEPRESSED: Primary | ICD-10-CM

## 2023-12-06 ENCOUNTER — TELEPHONE (OUTPATIENT)
Dept: OBSTETRICS AND GYNECOLOGY | Facility: HOSPITAL | Age: 38
End: 2023-12-06
Payer: MEDICARE

## 2023-12-06 NOTE — TELEPHONE ENCOUNTER
Spoke with patient over the phone.  Informed patient that Dr. Fernandez has reviewed your log and settings she would like to make the following changes.  Max basal rat increase to 5units/hour  Active Insulin time change to 3 hours  Blood glucose correction threshold change to 110 mg/dl  Change insulin :Carb ratio to 1 unit:1.8 gms carbs.  Please send in blood sugar log and omnipod settings again next Tuesday.  Patient voices understanding.  A detailed [employer] message sent as well  Thank you  Hetal Marie RN

## 2023-12-08 ENCOUNTER — PREP FOR SURGERY (OUTPATIENT)
Dept: OTHER | Facility: HOSPITAL | Age: 38
End: 2023-12-08
Payer: MEDICARE

## 2023-12-08 ENCOUNTER — HOSPITAL ENCOUNTER (INPATIENT)
Facility: HOSPITAL | Age: 38
LOS: 1 days | Discharge: HOME | End: 2023-12-09
Attending: OBSTETRICS & GYNECOLOGY | Admitting: OBSTETRICS & GYNECOLOGY
Payer: MEDICARE

## 2023-12-08 DIAGNOSIS — O09.522 MULTIGRAVIDA OF ADVANCED MATERNAL AGE IN SECOND TRIMESTER: ICD-10-CM

## 2023-12-08 DIAGNOSIS — E66.01 MORBID OBESITY WITH BMI OF 40.0-44.9, ADULT: ICD-10-CM

## 2023-12-08 DIAGNOSIS — E11.9 TYPE 2 DIABETES MELLITUS WITHOUT COMPLICATION, WITH LONG-TERM CURRENT USE OF INSULIN: ICD-10-CM

## 2023-12-08 DIAGNOSIS — R56.9 SEIZURES: ICD-10-CM

## 2023-12-08 DIAGNOSIS — I10 ESSENTIAL HYPERTENSION: ICD-10-CM

## 2023-12-08 DIAGNOSIS — R06.01 ORTHOPNEA: Primary | ICD-10-CM

## 2023-12-08 DIAGNOSIS — Z86.79 HISTORY OF CHF (CONGESTIVE HEART FAILURE): ICD-10-CM

## 2023-12-08 DIAGNOSIS — Z79.4 TYPE 2 DIABETES MELLITUS WITHOUT COMPLICATION, WITH LONG-TERM CURRENT USE OF INSULIN: ICD-10-CM

## 2023-12-08 LAB
ANION GAP SERPL CALCULATED.3IONS-SCNC: 9.3 MMOL/L (ref 5–15)
BASOPHILS # BLD AUTO: 0.01 10*3/MM3 (ref 0–0.2)
BASOPHILS NFR BLD AUTO: 0.2 % (ref 0–1.5)
BUN SERPL-MCNC: 10 MG/DL (ref 6–20)
BUN/CREAT SERPL: 19.6 (ref 7–25)
CALCIUM SPEC-SCNC: 8.9 MG/DL (ref 8.6–10.5)
CHLORIDE SERPL-SCNC: 106 MMOL/L (ref 98–107)
CO2 SERPL-SCNC: 20.7 MMOL/L (ref 22–29)
CREAT SERPL-MCNC: 0.51 MG/DL (ref 0.57–1)
DEPRECATED RDW RBC AUTO: 42.1 FL (ref 37–54)
EGFRCR SERPLBLD CKD-EPI 2021: 121.2 ML/MIN/1.73
EOSINOPHIL # BLD AUTO: 0.06 10*3/MM3 (ref 0–0.4)
EOSINOPHIL NFR BLD AUTO: 0.9 % (ref 0.3–6.2)
ERYTHROCYTE [DISTWIDTH] IN BLOOD BY AUTOMATED COUNT: 14.4 % (ref 12.3–15.4)
FLUAV RNA RESP QL NAA+PROBE: NOT DETECTED
FLUBV RNA RESP QL NAA+PROBE: NOT DETECTED
GLUCOSE BLDC GLUCOMTR-MCNC: 105 MG/DL (ref 70–130)
GLUCOSE BLDC GLUCOMTR-MCNC: 114 MG/DL (ref 70–130)
GLUCOSE BLDC GLUCOMTR-MCNC: 79 MG/DL (ref 70–130)
GLUCOSE SERPL-MCNC: 113 MG/DL (ref 65–99)
HCT VFR BLD AUTO: 32 % (ref 34–46.6)
HGB BLD-MCNC: 9.8 G/DL (ref 12–15.9)
IMM GRANULOCYTES # BLD AUTO: 0.03 10*3/MM3 (ref 0–0.05)
IMM GRANULOCYTES NFR BLD AUTO: 0.5 % (ref 0–0.5)
LYMPHOCYTES # BLD AUTO: 1.35 10*3/MM3 (ref 0.7–3.1)
LYMPHOCYTES NFR BLD AUTO: 21.1 % (ref 19.6–45.3)
MCH RBC QN AUTO: 24.7 PG (ref 26.6–33)
MCHC RBC AUTO-ENTMCNC: 30.6 G/DL (ref 31.5–35.7)
MCV RBC AUTO: 80.8 FL (ref 79–97)
MONOCYTES # BLD AUTO: 0.26 10*3/MM3 (ref 0.1–0.9)
MONOCYTES NFR BLD AUTO: 4.1 % (ref 5–12)
NEUTROPHILS NFR BLD AUTO: 4.68 10*3/MM3 (ref 1.7–7)
NEUTROPHILS NFR BLD AUTO: 73.2 % (ref 42.7–76)
NRBC BLD AUTO-RTO: 0 /100 WBC (ref 0–0.2)
PLATELET # BLD AUTO: 136 10*3/MM3 (ref 140–450)
PMV BLD AUTO: 10.5 FL (ref 6–12)
POTASSIUM SERPL-SCNC: 3.6 MMOL/L (ref 3.5–5.2)
RBC # BLD AUTO: 3.96 10*6/MM3 (ref 3.77–5.28)
SARS-COV-2 RNA RESP QL NAA+PROBE: NOT DETECTED
SODIUM SERPL-SCNC: 136 MMOL/L (ref 136–145)
T4 FREE SERPL-MCNC: 1.03 NG/DL (ref 0.93–1.7)
TSH SERPL DL<=0.05 MIU/L-ACNC: 0.02 UIU/ML (ref 0.27–4.2)
WBC NRBC COR # BLD AUTO: 6.39 10*3/MM3 (ref 3.4–10.8)

## 2023-12-08 PROCEDURE — 87636 SARSCOV2 & INF A&B AMP PRB: CPT | Performed by: OBSTETRICS & GYNECOLOGY

## 2023-12-08 PROCEDURE — 85025 COMPLETE CBC W/AUTO DIFF WBC: CPT

## 2023-12-08 PROCEDURE — 84439 ASSAY OF FREE THYROXINE: CPT | Performed by: HOSPITALIST

## 2023-12-08 PROCEDURE — 99222 1ST HOSP IP/OBS MODERATE 55: CPT | Performed by: SPECIALIST

## 2023-12-08 PROCEDURE — 82948 REAGENT STRIP/BLOOD GLUCOSE: CPT

## 2023-12-08 PROCEDURE — 99233 SBSQ HOSP IP/OBS HIGH 50: CPT

## 2023-12-08 PROCEDURE — 80048 BASIC METABOLIC PNL TOTAL CA: CPT

## 2023-12-08 PROCEDURE — 84443 ASSAY THYROID STIM HORMONE: CPT

## 2023-12-08 RX ORDER — PANTOPRAZOLE SODIUM 40 MG/1
40 TABLET, DELAYED RELEASE ORAL
Status: DISCONTINUED | OUTPATIENT
Start: 2023-12-08 | End: 2023-12-09 | Stop reason: HOSPADM

## 2023-12-08 RX ORDER — SODIUM CHLORIDE 0.9 % (FLUSH) 0.9 %
10 SYRINGE (ML) INJECTION AS NEEDED
Status: DISCONTINUED | OUTPATIENT
Start: 2023-12-08 | End: 2023-12-09 | Stop reason: HOSPADM

## 2023-12-08 RX ORDER — SODIUM CHLORIDE 9 MG/ML
40 INJECTION, SOLUTION INTRAVENOUS AS NEEDED
Status: CANCELLED | OUTPATIENT
Start: 2023-12-08

## 2023-12-08 RX ORDER — ONDANSETRON 4 MG/1
8 TABLET, FILM COATED ORAL EVERY 8 HOURS PRN
Status: CANCELLED | OUTPATIENT
Start: 2023-12-08

## 2023-12-08 RX ORDER — ASPIRIN 81 MG/1
81 TABLET ORAL DAILY
Status: DISCONTINUED | OUTPATIENT
Start: 2023-12-08 | End: 2023-12-09 | Stop reason: HOSPADM

## 2023-12-08 RX ORDER — SODIUM CHLORIDE 9 MG/ML
40 INJECTION, SOLUTION INTRAVENOUS AS NEEDED
Status: DISCONTINUED | OUTPATIENT
Start: 2023-12-08 | End: 2023-12-09 | Stop reason: HOSPADM

## 2023-12-08 RX ORDER — DEXTROSE MONOHYDRATE 25 G/50ML
25 INJECTION, SOLUTION INTRAVENOUS
Status: DISCONTINUED | OUTPATIENT
Start: 2023-12-08 | End: 2023-12-09 | Stop reason: HOSPADM

## 2023-12-08 RX ORDER — DOCUSATE SODIUM 100 MG/1
100 CAPSULE, LIQUID FILLED ORAL 2 TIMES DAILY PRN
Status: DISCONTINUED | OUTPATIENT
Start: 2023-12-08 | End: 2023-12-09 | Stop reason: HOSPADM

## 2023-12-08 RX ORDER — LABETALOL 100 MG/1
100 TABLET, FILM COATED ORAL DAILY
COMMUNITY

## 2023-12-08 RX ORDER — NICOTINE POLACRILEX 4 MG
15 LOZENGE BUCCAL
Status: DISCONTINUED | OUTPATIENT
Start: 2023-12-08 | End: 2023-12-09 | Stop reason: HOSPADM

## 2023-12-08 RX ORDER — ONDANSETRON 2 MG/ML
4 INJECTION INTRAMUSCULAR; INTRAVENOUS EVERY 8 HOURS PRN
Status: DISCONTINUED | OUTPATIENT
Start: 2023-12-08 | End: 2023-12-09 | Stop reason: HOSPADM

## 2023-12-08 RX ORDER — PRENATAL VIT/IRON FUM/FOLIC AC 27MG-0.8MG
1 TABLET ORAL DAILY
Status: DISCONTINUED | OUTPATIENT
Start: 2023-12-08 | End: 2023-12-09 | Stop reason: HOSPADM

## 2023-12-08 RX ORDER — LABETALOL 100 MG/1
100 TABLET, FILM COATED ORAL DAILY
Status: DISCONTINUED | OUTPATIENT
Start: 2023-12-08 | End: 2023-12-09 | Stop reason: HOSPADM

## 2023-12-08 RX ORDER — ONDANSETRON 2 MG/ML
4 INJECTION INTRAMUSCULAR; INTRAVENOUS EVERY 8 HOURS PRN
Status: CANCELLED | OUTPATIENT
Start: 2023-12-08

## 2023-12-08 RX ORDER — MESALAMINE 1000 MG/1
1000 SUPPOSITORY RECTAL NIGHTLY
Status: DISCONTINUED | OUTPATIENT
Start: 2023-12-08 | End: 2023-12-09 | Stop reason: HOSPADM

## 2023-12-08 RX ORDER — SODIUM CHLORIDE 0.9 % (FLUSH) 0.9 %
10 SYRINGE (ML) INJECTION EVERY 12 HOURS SCHEDULED
Status: CANCELLED | OUTPATIENT
Start: 2023-12-08

## 2023-12-08 RX ORDER — DOCUSATE SODIUM 100 MG/1
100 CAPSULE, LIQUID FILLED ORAL 2 TIMES DAILY PRN
Status: CANCELLED | OUTPATIENT
Start: 2023-12-08

## 2023-12-08 RX ORDER — ACETAMINOPHEN 325 MG/1
650 TABLET ORAL EVERY 4 HOURS PRN
Status: CANCELLED | OUTPATIENT
Start: 2023-12-08

## 2023-12-08 RX ORDER — SODIUM CHLORIDE 0.9 % (FLUSH) 0.9 %
10 SYRINGE (ML) INJECTION AS NEEDED
Status: CANCELLED | OUTPATIENT
Start: 2023-12-08

## 2023-12-08 RX ORDER — SODIUM CHLORIDE 0.9 % (FLUSH) 0.9 %
10 SYRINGE (ML) INJECTION EVERY 12 HOURS SCHEDULED
Status: DISCONTINUED | OUTPATIENT
Start: 2023-12-08 | End: 2023-12-09 | Stop reason: HOSPADM

## 2023-12-08 RX ORDER — ACETAMINOPHEN 325 MG/1
650 TABLET ORAL EVERY 4 HOURS PRN
Status: DISCONTINUED | OUTPATIENT
Start: 2023-12-08 | End: 2023-12-09 | Stop reason: HOSPADM

## 2023-12-08 RX ORDER — FOLIC ACID 1 MG/1
1 TABLET ORAL DAILY
Status: DISCONTINUED | OUTPATIENT
Start: 2023-12-08 | End: 2023-12-09 | Stop reason: HOSPADM

## 2023-12-08 RX ORDER — BISACODYL 10 MG
10 SUPPOSITORY, RECTAL RECTAL DAILY PRN
Status: DISCONTINUED | OUTPATIENT
Start: 2023-12-08 | End: 2023-12-09 | Stop reason: HOSPADM

## 2023-12-08 RX ORDER — LIDOCAINE HYDROCHLORIDE 10 MG/ML
0.5 INJECTION, SOLUTION INFILTRATION; PERINEURAL ONCE AS NEEDED
Status: DISCONTINUED | OUTPATIENT
Start: 2023-12-08 | End: 2023-12-09 | Stop reason: HOSPADM

## 2023-12-08 RX ORDER — FAMOTIDINE 20 MG/1
20 TABLET, FILM COATED ORAL 2 TIMES DAILY PRN
Status: DISCONTINUED | OUTPATIENT
Start: 2023-12-08 | End: 2023-12-09 | Stop reason: HOSPADM

## 2023-12-08 RX ORDER — FAMOTIDINE 20 MG/1
20 TABLET, FILM COATED ORAL 2 TIMES DAILY PRN
Status: CANCELLED | OUTPATIENT
Start: 2023-12-08

## 2023-12-08 RX ORDER — ONDANSETRON 4 MG/1
8 TABLET, FILM COATED ORAL EVERY 8 HOURS PRN
Status: DISCONTINUED | OUTPATIENT
Start: 2023-12-08 | End: 2023-12-09 | Stop reason: HOSPADM

## 2023-12-08 RX ORDER — LIDOCAINE HYDROCHLORIDE 10 MG/ML
0.5 INJECTION, SOLUTION INFILTRATION; PERINEURAL ONCE AS NEEDED
Status: CANCELLED | OUTPATIENT
Start: 2023-12-08

## 2023-12-08 RX ORDER — BISACODYL 10 MG
10 SUPPOSITORY, RECTAL RECTAL DAILY PRN
Status: CANCELLED | OUTPATIENT
Start: 2023-12-08

## 2023-12-08 RX ADMIN — MESALAMINE 1000 MG: 1000 SUPPOSITORY RECTAL at 20:53

## 2023-12-08 RX ADMIN — PANTOPRAZOLE SODIUM 40 MG: 40 TABLET, DELAYED RELEASE ORAL at 21:00

## 2023-12-08 RX ADMIN — LABETALOL HYDROCHLORIDE 100 MG: 100 TABLET, FILM COATED ORAL at 20:59

## 2023-12-08 RX ADMIN — ACETAMINOPHEN 650 MG: 325 TABLET ORAL at 21:15

## 2023-12-08 NOTE — CONSULTS
[facility]  CONSULT  NOTE      Patient information:  Date of Admit: 12/8/2023  Date of Consult: 12/08/23  Hospitalist/Referring MD:Geovani Razo III, MD;   PCP: Shawna Lambert DO  MRN:  [id number]  Visit Number:  [id number]    LOS: 0  CODE STATUS:  Code Status and Medical Interventions:   Ordered at: 12/08/23 1024     Level Of Support Discussed With:    Patient     Code Status (Patient has no pulse and is not breathing):    CPR (Attempt to Resuscitate)     Medical Interventions (Patient has pulse or is breathing):    Full Support       PROBLEM LIST: Principal Problem:    Orthopnea      Inpatient Cardiology Consult  Consult performed by: Niharika Dean APRN  Consult ordered by: Geovani Razo III, MD        11:44 EST  12/8/2023    General Cardiology Consulting Physician: Dr. Janey Guerrero MD    Assessment    Patient is 15 weeks pregnant  History of HFpEF since May 2023 according to the patient was consistent orthopnea for many months  Recent upper respiratory symptoms with worsening shortness of breath class IIIb  Nonobstructive coronary artery disease with recent stress test negative for ischemia  Essential hypertension  Obstructive sleep apnea compliant with the CPAP  Dyslipidemia  Diabetes mellitus          Recommendations   1.  Patient is not overtly fluid overloaded her most recent echo back in October with normal systolic function we will repeat the echocardiac for further assessment  2.  It was noted that her TSH back on 11/15/2023 was 0.007?  Hyperthyroidism we will repeat the labs  3.  Blood pressure is mildly evaded will recommend starting labetalol if is okay with OB/GYN  4.  Continue the CPAP treatment  5.  No evidence of acute ischemia        Reason for Cardiology consultation:    Subjective Data   ADMISSION INFORMATION:  No chief complaint on file.    History of Present Illness    Antoinette Pack is a 38 y.o. female with a past medical  history significant for HTN, CHF, 15 weeks 6 days IUP, DM, PCOS, ALEJANDRO, GERD, Diverticulitis, Colitis, and Anxiety/Depression.      Patient presented to [facility] ([facility] emergency room (ER) on 12/8/2023 with complaints of increased shortness of breath x 1 week.     Cardiology has been consulted for further evaluation and management.     Primary Cardiologist has been Dr. Antoinette Lima and she was last seen in the office on 10/05/2023.     Patient is in room 233 and was examined by Dr. Guerrero.  Patient is lying in bed resting quietly. No acute distress  noted at this time.  She reports increased shortness of breath x 1 week associated with URI symptoms.  She reports seeing her PCP in Wednesday and was negative COVID and FLU.  She reports history of CHF in 05/2023 that had resolved but she has not been able to lay flat since 05/2023.  She reports being able to come off all her previously prescribed meds in May and on her last f/u with Cardiology she was doing great.      ORDERS:   Complete ECHO    Cardiac risk factors:diabetes mellitus, hypercholesterolemia, and Obesity      Last Echo: Results for orders placed during the hospital encounter of 11/08/23    Adult Transthoracic Echo Complete W/ Cont if Necessary Per Protocol    Interpretation Summary    Left ventricular systolic function is normal. Calculated left ventricular EF = 56% Left ventricular ejection fraction appears to be 56 - 60%.    Left ventricular diastolic function was normal.    The left atrial cavity is mild to moderately dilated.    Estimated right ventricular systolic pressure from tricuspid regurgitation is mildly elevated (35-45 mmHg).         Last Stress: Results for orders placed during the hospital encounter of 09/05/23    Stress test with myocardial perfusion one day    Interpretation Summary  Images from the original result were not included.      A pharmacological stress test was performed using regadenoson.    Findings  consistent with a normal ECG stress test.    Myocardial perfusion imaging indicates a normal myocardial perfusion study with no evidence of ischemia.    Subtle fixed anterior wall perfusion defect most likely presents breast on admission due to normal anterior wall motion on the gated scans.    Normal LV cavity size. Normal LV wall motion noted.    Left ventricular ejection fraction is normal (Calculated EF = 60%).    Impressions are consistent with a low risk study.        Last Cath:  none                         Past Medical History:   Diagnosis Date    Anxiety     CHF (congestive heart failure) 05/2023    Colitis 2023    Depression     Diabetes mellitus 2018    Diverticulitis     GERD (gastroesophageal reflux disease)     Hypertension     Kidney stone     Pancreatitis 09/2023    PCOS (polycystic ovarian syndrome)     Sleep apnea      Past Surgical History:   Procedure Laterality Date    CARDIAC CATHETERIZATION      CARPAL TUNNEL RELEASE      COLONOSCOPY      ENDOSCOPY      FRACTURE SURGERY      HARDWARE REMOVAL      WRIST    TENDON REPAIR      HAND     Family History   Problem Relation Age of Onset    Heart disease Mother     COPD Mother     Heart disease Father     COPD Father      Social History     Tobacco Use    Smoking status: Former    Smokeless tobacco: Never   Vaping Use    Vaping Use: Never used   Substance Use Topics    Alcohol use: No    Drug use: No     ALLERGIES: Dilantin [phenytoin], Keppra [levetiracetam], Meperidine, and Topamax [topiramate]    Medications listed below are reported home medications pulling from within the system:  Medications Prior to Admission   Medication Sig Dispense Refill Last Dose    aspirin (ASPIR) 81 MG EC tablet Take 1 tablet by mouth Daily. 30 tablet 6     Continuous Blood Gluc Sensor (Dexcom G6 Sensor) Use Every 10 (Ten) Days. 3 each 5     Continuous Blood Gluc Transmit (Dexcom G6 Transmitter) misc Use 1 each Every 3 (Three) Months. 1 each 1     folic acid (FOLVITE) 1  MG tablet Take 1 tablet by mouth Daily.       Insulin Aspart (novoLOG) 100 UNIT/ML injection For use in insulin pump. Max Daily Dose: 200 units daily. 60 mL 5     insulin degludec (Tresiba FlexTouch) 100 UNIT/ML solution pen-injector injection Inject 50 Units under the skin into the appropriate area as directed 2 (Two) Times a Day. (Patient not taking: Reported on 11/15/2023) 90 mL 1     Insulin Disposable Pump (Omnipod 5 G6 Intro, Gen 5,) kit Use 1 kit Take As Directed. 1 kit 0     Insulin Disposable Pump (Omnipod 5 G6 Pod, Gen 5,) misc Use 1 each Every Other Day. 15 each 5     Insulin Pen Needle 32G X 4 MM misc Use to inject insulin up to 4 times daily as directed 400 each 1     labetalol (NORMODYNE) 100 MG tablet Take 1 tablet by mouth 3 times a day. 90 tablet 6     lansoprazole (PREVACID) 30 MG capsule Take 1 capsule by mouth 2 (Two) Times a Day.       mesalamine (CANASA) 1000 MG suppository Insert 1 suppository into the rectum Every Night.       Prenatal Vit-Fe Fumarate-FA (PRENATAL PO) Take 1 tablet by mouth Daily.          Review of Systems   Constitutional:  Negative for activity change, appetite change and diaphoresis.   HENT:  Negative for facial swelling and trouble swallowing.    Eyes:  Negative for visual disturbance.   Respiratory:  Positive for shortness of breath.    Cardiovascular:  Negative for chest pain, palpitations and leg swelling.   Gastrointestinal:  Negative for blood in stool, nausea and vomiting.   Endocrine: Negative.    Genitourinary:  Negative for hematuria.   Musculoskeletal:  Negative for gait problem.   Skin:  Negative for color change.   Neurological:  Negative for dizziness, syncope, speech difficulty, weakness, light-headedness and headaches.   Psychiatric/Behavioral:  Negative for agitation and behavioral problems.        Objective Data   Vital Signs  Temp:  [97.8 °F (36.6 °C)] 97.8 °F (36.6 °C)  Heart Rate:  [96] 96  Resp:  [16] 16  BP: (149)/(80) 149/80  Device (Oxygen  Therapy): room air  Vital Signs (last 72 hrs)         12/05 0700  12/06 0659 12/06 0700  12/07 0659 12/07 0700  12/08 0659 12/08 0700 12/08 1144   Most Recent      Temp (°F)         97.8     97.8 (36.6) 12/08 0900    Heart Rate         96     96 12/08 0900    Resp         16     16 12/08 0900    BP         149/80     149/80 12/08 0900    SpO2 (%)         94     94 12/08 0900          BMI:   There is no height or weight on file to calculate BMI.  WEIGHT:  Wt Readings from Last 3 Encounters:   11/22/23 93.7 kg (206 lb 9.6 oz)   11/15/23 94.8 kg (209 lb)   10/27/23 93.9 kg (207 lb)     DIET:  Diet: Diabetic Diets; Gestational Consistent Carbohydrate; Texture: Regular Texture (IDDSI 7); Fluid Consistency: Thin (IDDSI 0)  I&O:  Intake & Output (last 3 days)       None            Physical Exam  Constitutional:       Appearance: Normal appearance.   HENT:      Head: Normocephalic and atraumatic.      Nose: Nose normal.      Mouth/Throat:      Mouth: Mucous membranes are moist.      Pharynx: Oropharynx is clear.   Eyes:      Conjunctiva/sclera: Conjunctivae normal.   Cardiovascular:      Rate and Rhythm: Normal rate and regular rhythm.      Pulses: Normal pulses.      Heart sounds: Normal heart sounds.   Pulmonary:      Effort: Pulmonary effort is normal.      Breath sounds: Wheezing present.   Abdominal:      General: Bowel sounds are normal.      Palpations: Abdomen is soft.   Musculoskeletal:         General: Normal range of motion.      Cervical back: Normal range of motion.      Right lower leg: Edema present.      Left lower leg: Edema present.      Comments: Trace edema BLE    Skin:     General: Skin is warm and dry.   Neurological:      General: No focal deficit present.      Mental Status: She is alert and oriented to person, place, and time.   Psychiatric:         Mood and Affect: Mood normal.         Behavior: Behavior normal.         Results review   Results Review:    I have reviewed the patient's new clinical  "results. 12/08/23 11:44 EST        Lab Results   Component Value Date    PROBNP <36.0 11/15/2023             Lab Results   Component Value Date    MG 2.0 07/01/2023     Lab Results   Component Value Date    CHOL 173 09/18/2023    TRIG 111 09/18/2023    HDL 33 (L) 09/18/2023     (H) 09/18/2023     Estimated Creatinine Clearance: 175.2 mL/min (A) (by C-G formula based on SCr of 0.45 mg/dL (L)).  Lab Results   Component Value Date    HGBA1C 9.30 (H) 09/17/2023    HGBA1C 7.1 07/20/2023    HGBA1C 6.8 03/15/2023     Lab Results   Component Value Date    INR 0.98 07/01/2023    INR 1.02 05/04/2018    INR 0.92 09/22/2015     No components found for: \"DIG\"  Lab Results   Component Value Date    TSH 0.007 (L) 11/15/2023      No results found for: \"URICACID\"  Pain Management Panel  More data may exist         Latest Ref Rng & Units 1/5/2019 10/19/2015   Pain Management Panel   Amphetamine, Urine Qual Negative Negative  Negative    Barbiturates Screen, Urine Negative Negative  Negative    Benzodiazepine Screen, Urine Negative Negative  Negative    Buprenorphine, Screen, Urine Negative Negative  -   Cocaine Screen, Urine Negative Negative  Negative    Methadone Screen , Urine Negative Negative  Negative      Microbiology Results (last 10 days)       ** No results found for the last 240 hours. **           No results found for: \"BLOODCX\"      ECG/EMG Results (last 24 hours)       ** No results found for the last 24 hours. **          TELEMETRY:   Patient is not on continuous cardia monitor at this time      RADIOLOGY STUDIES:  Imaging Results (Last 72 Hours)       ** No results found for the last 72 hours. **            ALLERGIES: Dilantin [phenytoin], Keppra [levetiracetam], Meperidine, and Topamax [topiramate]    CURRENT MEDICATIONS:  Current list of medications may not reflect those currently placed in orders that are not signed or are being held.     sodium chloride, 10 mL, Intravenous, Q12H           acetaminophen    " bisacodyl    docusate sodium    famotidine    lidocaine    ondansetron **OR** ondansetron    sodium chloride    sodium chloride        Thank you very much for asking us to be involved in this patient's care.  We will follow along with you. Please do not hesitate to call for any questions or concerns.     I have discussed the patients findings and recommendations with patient.    Electronically signed by CICI Greene, 12/08/23, 11:54 AM EST.   Electronically signed by Janey Guerrero MD, 12/08/23, 1:30 PM EST.                        Please note that portions of this note were copied and has been reviewed and is accurate as of 12/8/2023 .      Please note that portions of this note were completed with a voice recognition program.

## 2023-12-08 NOTE — H&P
Chief complaint Orthopnea. Back Pain      History of Present Illness: Patient is a 38 y.o. female who presents with IUP at 15w6d weeks gestation. G 2 P 0010      Past Medical History:   Diagnosis Date    Anxiety     CHF (congestive heart failure) 05/2023    Colitis 2023    Depression     Diabetes mellitus 2018    Diverticulitis     GERD (gastroesophageal reflux disease)     Hypertension     Kidney stone     Pancreatitis 09/2023    PCOS (polycystic ovarian syndrome)     Sleep apnea      Blood Type: O   Fetal Gender: Female    Past Surgical History:   Procedure Laterality Date    CARDIAC CATHETERIZATION      CARPAL TUNNEL RELEASE      COLONOSCOPY      ENDOSCOPY      FRACTURE SURGERY      HARDWARE REMOVAL      WRIST    TENDON REPAIR      HAND     Family History   Problem Relation Age of Onset    Heart disease Mother     COPD Mother     Heart disease Father     COPD Father      Social History     Tobacco Use    Smoking status: Former    Smokeless tobacco: Never   Vaping Use    Vaping Use: Never used   Substance Use Topics    Alcohol use: No    Drug use: No     (Not in a hospital admission)    Allergies:  Dilantin [phenytoin], Keppra [levetiracetam], Meperidine, and Topamax [topiramate]      Vital Signs       Radiology  Imaging Results (Last 24 Hours)       ** No results found for the last 24 hours. **            Labs  Lab Results (last 24 hours)       ** No results found for the last 24 hours. **              Review of Systems    The following systems were reviewed and negative;  ENT, cardiovascular, gastrointestinal, genitourinary, breast, endocrine and allergies / immunologic.    +Orthopnea, Difficulty laying flat    Physical Exam:      General Appearance:    Alert, cooperative, in no acute distress   Head:    Normocephalic, without obvious abnormality, atraumatic   Eyes:            Lids and lashes normal, conjunctivae and sclerae normal, no   icterus, no pallor, corneas clear, PERRLA   Ears:    Ears appear  intact with no abnormalities noted   Throat:   No oral lesions, no thrush, oral mucosa moist   Neck:   No adenopathy, supple, trachea midline, no thyromegaly, no     carotid bruit, no JVD   Back:     No kyphosis present, no scoliosis present, no skin lesions,       erythema or scars, no tenderness to percussion or                   palpation,   range of motion normal   Lungs:     Clear to auscultation,respirations regular, even and                   unlabored    Heart:    Regular rhythm and normal rate, normal S1 and S2, no            murmur, no gallop, no rub, no click   Breast Exam:    Deferred   Abdomen:     Normal bowel sounds, no masses, no organomegaly, soft        non-tender, non-distended, no guarding, no rebound                 tenderness   Genitalia:    Deferred   Extremities:   Moves all extremities well, no edema, no cyanosis, no              redness   Pulses:   Pulses palpable and equal bilaterally   Skin:   No bleeding, bruising or rash   Lymph nodes:   No palpable adenopathy   Neurologic:   Cranial nerves 2 - 12 grossly intact, sensation intact, DTR        present and equal bilaterally         Assessment: Patient is a 38 y.o. female who presents with IUP at 15w6d weeks gestation. G 2 P 0010. Orthopnea. Back Pain.       Plan of Care: Admit. Proceed with hospitalist consult & Cardiology consult       Geovani Razo III, MD  12/08/23  09:58 EST

## 2023-12-08 NOTE — H&P
Chief complaint Orthopnea. Back Pain      History of Present Illness: Patient is a 38 y.o. female who presents with IUP at 15w6d weeks gestation. G 2 P 0010      Past Medical History:   Diagnosis Date    Anxiety     CHF (congestive heart failure) 05/2023    Colitis 2023    Depression     Diabetes mellitus 2018    Diverticulitis     GERD (gastroesophageal reflux disease)     Hypertension     Kidney stone     Pancreatitis 09/2023    PCOS (polycystic ovarian syndrome)     Sleep apnea      Blood Type: O   Fetal Gender: Female    Past Surgical History:   Procedure Laterality Date    CARDIAC CATHETERIZATION      CARPAL TUNNEL RELEASE      COLONOSCOPY      ENDOSCOPY      FRACTURE SURGERY      HARDWARE REMOVAL      WRIST    TENDON REPAIR      HAND     Family History   Problem Relation Age of Onset    Heart disease Mother     COPD Mother     Heart disease Father     COPD Father      Social History     Tobacco Use    Smoking status: Former    Smokeless tobacco: Never   Vaping Use    Vaping Use: Never used   Substance Use Topics    Alcohol use: No    Drug use: No     Medications Prior to Admission   Medication Sig Dispense Refill Last Dose    aspirin (ASPIR) 81 MG EC tablet Take 1 tablet by mouth Daily. 30 tablet 6     Continuous Blood Gluc Sensor (Dexcom G6 Sensor) Use Every 10 (Ten) Days. 3 each 5     Continuous Blood Gluc Transmit (Dexcom G6 Transmitter) misc Use 1 each Every 3 (Three) Months. 1 each 1     folic acid (FOLVITE) 1 MG tablet Take 1 tablet by mouth Daily.       Insulin Aspart (novoLOG) 100 UNIT/ML injection For use in insulin pump. Max Daily Dose: 200 units daily. 60 mL 5     insulin degludec (Tresiba FlexTouch) 100 UNIT/ML solution pen-injector injection Inject 50 Units under the skin into the appropriate area as directed 2 (Two) Times a Day. (Patient not taking: Reported on 11/15/2023) 90 mL 1     Insulin Disposable Pump (Omnipod 5 G6 Intro, Gen 5,) kit Use 1 kit Take As Directed. 1 kit 0     Insulin  Disposable Pump (Omnipod 5 G6 Pod, Gen 5,) misc Use 1 each Every Other Day. 15 each 5     Insulin Pen Needle 32G X 4 MM misc Use to inject insulin up to 4 times daily as directed 400 each 1     labetalol (NORMODYNE) 100 MG tablet Take 1 tablet by mouth 3 times a day. 90 tablet 6     lansoprazole (PREVACID) 30 MG capsule Take 1 capsule by mouth 2 (Two) Times a Day.       mesalamine (CANASA) 1000 MG suppository Insert 1 suppository into the rectum Every Night.       Prenatal Vit-Fe Fumarate-FA (PRENATAL PO) Take 1 tablet by mouth Daily.        Allergies:  Dilantin [phenytoin], Keppra [levetiracetam], Meperidine, and Topamax [topiramate]      Vital Signs  Temp:  [97.8 °F (36.6 °C)] 97.8 °F (36.6 °C)  Heart Rate:  [96] 96  Resp:  [16] 16  BP: (149)/(80) 149/80    Radiology  Imaging Results (Last 24 Hours)       ** No results found for the last 24 hours. **            Labs  Lab Results (last 24 hours)       ** No results found for the last 24 hours. **              Review of Systems    The following systems were reviewed and negative;  ENT, cardiovascular, gastrointestinal, genitourinary, breast, endocrine and allergies / immunologic.    +Orthopnea, Difficulty laying flat    Physical Exam:      General Appearance:    Alert, cooperative, in no acute distress   Head:    Normocephalic, without obvious abnormality, atraumatic   Eyes:            Lids and lashes normal, conjunctivae and sclerae normal, no   icterus, no pallor, corneas clear, PERRLA   Ears:    Ears appear intact with no abnormalities noted   Throat:   No oral lesions, no thrush, oral mucosa moist   Neck:   No adenopathy, supple, trachea midline, no thyromegaly, no     carotid bruit, no JVD   Back:     No kyphosis present, no scoliosis present, no skin lesions,       erythema or scars, no tenderness to percussion or                   palpation,   range of motion normal   Lungs:     Clear to auscultation,respirations regular, even and                   unlabored     Heart:    Regular rhythm and normal rate, normal S1 and S2, no            murmur, no gallop, no rub, no click   Breast Exam:    Deferred   Abdomen:     Normal bowel sounds, no masses, no organomegaly, soft        non-tender, non-distended, no guarding, no rebound                 tenderness   Genitalia:    Deferred   Extremities:   Moves all extremities well, no edema, no cyanosis, no              redness   Pulses:   Pulses palpable and equal bilaterally   Skin:   No bleeding, bruising or rash   Lymph nodes:   No palpable adenopathy   Neurologic:   Cranial nerves 2 - 12 grossly intact, sensation intact, DTR        present and equal bilaterally         Assessment: Patient is a 38 y.o. female who presents with IUP at 15w6d weeks gestation. G 2 P 0010. Orthopnea. Back Pain.       Plan of Care: Admit. Proceed with hospitalist consult & Cardiology consult       Geovani Razo III, MD  12/08/23  11:29 EST

## 2023-12-08 NOTE — CONSULTS
[facility] Medicine Services  CONSULT NOTE     Inpatient Hospitalist Consult  Consult performed by: Rob Haywood PA-C  Consult ordered by: Geovani Razo III, MD          Patient Identification:  Name:  Antoinette Pack  Age:  38 y.o.  Sex:  female  :  1985  MRN:  [id number]  Visit Number:  [id number]  Primary care provider:  Shawna Lambert DO    Length of stay:  0    Subjective     Chief Complaint:  No chief complaint on file.      Requesting Provider: Dr. Razo     Reason for Consultation: IDDM management       History of presenting illness:     Antoinette Pack is a 38 y.o. female admitted by OB secondary to orthopnea and back pain. Her PMHx is significant for CAD, CHF, IDDM, ALEJANDRO, HTN, goiter     Hospitalist services were consulted for diabetes management.      Patient was seen and examined with family member at the bedside. She reports she has not been feeling well for about 9 days though symptoms seem to have improved significantly over the past 2 days. She has had rhinorrhea/congestion, cough, sore throat and mild shortness of breath. She is also significantly orthopneic but denies any increased edema. Was comfortable appearing on RA during exam without increased work of breathing. She denies any sick contacts. No fever or chills. She was seen in clinic and tested negative for covid, flu, and strep. She denied chest pain or palpitations during exam. She is an insulin requiring diabetic. She wears a dexcom and omnipod. Her omnipod is currently set to an automated mode which provides basal and correction dose insulin. She notes her blood sugar is fairly well controlled. She had visit with endocrinology this AM and some adjustments were made. Her last A1c was 7.2.     ---------------------------------------------------------------------------------------------------------------------  Review of Systems   Constitutional:  Negative for chills and fever.    HENT:  Positive for congestion, rhinorrhea and sore throat.    Respiratory:  Positive for cough and shortness of breath.    Cardiovascular:  Negative for chest pain and leg swelling.   Gastrointestinal:  Negative for abdominal pain.   Genitourinary:  Negative for difficulty urinating and dysuria.   Musculoskeletal:  Positive for arthralgias and myalgias.   Skin:  Negative for rash and wound.   Neurological:  Negative for dizziness and light-headedness.          ---------------------------------------------------------------------------------------------------------------------   Past History:  Past Medical History:   Diagnosis Date    Anxiety     CHF (congestive heart failure) 05/2023    Colitis 2023    Depression     Diabetes mellitus 2018    Diverticulitis     GERD (gastroesophageal reflux disease)     Hypertension     Kidney stone     Pancreatitis 09/2023    PCOS (polycystic ovarian syndrome)     Sleep apnea      Past Surgical History:   Procedure Laterality Date    CARDIAC CATHETERIZATION      CARPAL TUNNEL RELEASE      COLONOSCOPY      ENDOSCOPY      FRACTURE SURGERY      HARDWARE REMOVAL      WRIST    TENDON REPAIR      HAND     Family History   Problem Relation Age of Onset    Heart disease Mother     COPD Mother     Heart disease Father     COPD Father      Social History     Socioeconomic History    Marital status:    Tobacco Use    Smoking status: Former    Smokeless tobacco: Never   Vaping Use    Vaping Use: Never used   Substance and Sexual Activity    Alcohol use: No    Drug use: No    Sexual activity: Defer     ---------------------------------------------------------------------------------------------------------------------   Allergies:  Dilantin [phenytoin], Keppra [levetiracetam], Meperidine, and Topamax [topiramate]  ---------------------------------------------------------------------------------------------------------------------   Prior to Admission Medications       Prescriptions  Last Dose Informant Patient Reported? Taking?    aspirin (ASPIR) 81 MG EC tablet   No No    Take 1 tablet by mouth Daily.    Continuous Blood Gluc Sensor (Dexcom G6 Sensor)   No No    Use Every 10 (Ten) Days.    Continuous Blood Gluc Transmit (Dexcom G6 Transmitter) misc   No No    Use 1 each Every 3 (Three) Months.    folic acid (FOLVITE) 1 MG tablet   Yes No    Take 1 tablet by mouth Daily.    Insulin Aspart (novoLOG) 100 UNIT/ML injection   No No    For use in insulin pump. Max Daily Dose: 200 units daily.    insulin degludec (Tresiba FlexTouch) 100 UNIT/ML solution pen-injector injection  Pharmacy, Self No No    Inject 50 Units under the skin into the appropriate area as directed 2 (Two) Times a Day.    Patient not taking:  Reported on 11/15/2023    Insulin Disposable Pump (Omnipod 5 G6 Intro, Gen 5,) kit   No No    Use 1 kit Take As Directed.    Insulin Disposable Pump (Omnipod 5 G6 Pod, Gen 5,) misc   No No    Use 1 each Every Other Day.    Insulin Pen Needle 32G X 4 MM [facility]  Pharmacy, Self No No    Use to inject insulin up to 4 times daily as directed    labetalol (NORMODYNE) 100 MG tablet   No No    Take 1 tablet by mouth 3 times a day.    lansoprazole (PREVACID) 30 MG capsule  Pharmacy, Self Yes No    Take 1 capsule by mouth 2 (Two) Times a Day.    mesalamine (CANASA) 1000 MG suppository  Pharmacy, Self Yes No    Insert 1 suppository into the rectum Every Night.    Prenatal Vit-Fe Fumarate-FA (PRENATAL PO)   Yes No    Take 1 tablet by mouth Daily.          ---------------------------------------------------------------------------------------------------------------------     Objective      Procedures:      Hospital Meds:  sodium chloride, 10 mL, Intravenous, Q12H         ---------------------------------------------------------------------------------------------------------------------   Vital Signs:  Temp:  [97.8 °F (36.6 °C)] 97.8 °F (36.6 °C)  Heart Rate:  [96] 96  Resp:  [16] 16  BP: (149)/(80)  "149/80  No data found.  SpO2 Percentage    12/08/23 0900   SpO2: 94%     SpO2:  [94 %] 94 %  on   ;   Device (Oxygen Therapy): room air    There is no height or weight on file to calculate BMI.  Wt Readings from Last 3 Encounters:   11/22/23 93.7 kg (206 lb 9.6 oz)   11/15/23 94.8 kg (209 lb)   10/27/23 93.9 kg (207 lb)      No intake or output data in the 24 hours ending 12/08/23 1227  Diet: Diabetic Diets; Gestational Consistent Carbohydrate; Texture: Regular Texture (IDDSI 7); Fluid Consistency: Thin (IDDSI 0)  ---------------------------------------------------------------------------------------------------------------------   Physical exam:  Physical Exam  Vitals and nursing note reviewed.   Constitutional:       General: She is not in acute distress.  HENT:      Head: Normocephalic and atraumatic.   Eyes:      Extraocular Movements: Extraocular movements intact.      Conjunctiva/sclera: Conjunctivae normal.   Cardiovascular:      Rate and Rhythm: Normal rate and regular rhythm.   Pulmonary:      Effort: Pulmonary effort is normal.      Breath sounds: Normal breath sounds.   Abdominal:      Palpations: Abdomen is soft.   Musculoskeletal:      Right lower leg: No edema.      Left lower leg: No edema.   Skin:     General: Skin is warm and dry.   Neurological:      Mental Status: She is alert. Mental status is at baseline.   Psychiatric:         Mood and Affect: Mood normal.         Behavior: Behavior normal.       ---------------------------------------------------------------------------------------------------------------------     ---------------------------------------------------------------------------------------------------------------------                   Invalid input(s): \"PROT\"Estimated Creatinine Clearance: 175.2 mL/min (A) (by C-G formula based on SCr of 0.45 mg/dL (L)).  No results found for: \"AMMONIA\"    Glucose   Date/Time Value Ref Range Status   12/08/2023 1219 114 70 - 130 mg/dL Final " "    Lab Results   Component Value Date    HGBA1C 9.30 (H) 09/17/2023     Lab Results   Component Value Date    TSH 0.007 (L) 11/15/2023    FREET4 0.94 05/03/2023       No results found for: \"BLOODCX\"  No results found for: \"URINECX\"  No results found for: \"WOUNDCX\"  No results found for: \"STOOLCX\"  No results found for: \"RESPCX\"  Pain Management Panel  More data may exist         Latest Ref Rng & Units 1/5/2019 10/19/2015   Pain Management Panel   Amphetamine, Urine Qual Negative Negative  Negative    Barbiturates Screen, Urine Negative Negative  Negative    Benzodiazepine Screen, Urine Negative Negative  Negative    Buprenorphine, Screen, Urine Negative Negative  -   Cocaine Screen, Urine Negative Negative  Negative    Methadone Screen , Urine Negative Negative  Negative      I have personally reviewed the above laboratory results.   ---------------------------------------------------------------------------------------------------------------------  Imaging Results (Last 7 Days)       ** No results found for the last 168 hours. **          I have personally reviewed the above radiology results.   ----------------------------------------------------------------------------------------------------------------------    Assessment/Plan       IDDM II  Okay to continue with patient supplied omnipod for now  We will monitor FSBG closely and make changes as needed  Hypoglycemia protocol ordered  She will benefit from close follow up with endocrinology throughout pregnancy    Hx Thyroid goiter  TSH on recent outpatient labs significantly low at 0.007  Repeat TSH and T4 pending    Likely Viral URI  Basic labs pending  Patient reports symptoms present for 9 days, improving for the past 2. Had previously tested negative for Covid/flu  Stable on RA  Imaging risk felt to outweigh benefit currently  Would be hesitant to initiate antibiotics unless significant leukocytosis or fever develops  Will continue to monitor " closely    Orthopnea  CAD  CHF  Cardiology following  TTE pending    Pregnancy  Management per primary    Thank you for the consult and allowing us to participate in the care of your patient, Hospitalist Services will continue to follow. Please do not hesitate to call with any questions or concerns.      Rob Haywood PA-C  12/08/23  12:27 EST    Attestation: I personally discussed the patient's history of presenting illness, physical examination, assessment, and plan with my attending physician, Dr. Bloom.

## 2023-12-08 NOTE — PLAN OF CARE
Goal Outcome Evaluation:           Problem: Adult Inpatient Plan of Care  Goal: Plan of Care Review  Outcome: Ongoing, Progressing  Goal: Patient-Specific Goal (Individualized)  Outcome: Ongoing, Progressing  Goal: Absence of Hospital-Acquired Illness or Injury  Outcome: Ongoing, Progressing  Intervention: Identify and Manage Fall Risk  Recent Flowsheet Documentation  Taken 12/8/2023 0900 by Tiffanie Castellanos RN  Safety Promotion/Fall Prevention: safety round/check completed  Intervention: Prevent and Manage VTE (Venous Thromboembolism) Risk  Recent Flowsheet Documentation  Taken 12/8/2023 0900 by Tiffaine Castellanos RN  Activity Management: up ad nimesh  Goal: Optimal Comfort and Wellbeing  Outcome: Ongoing, Progressing  Intervention: Provide Person-Centered Care  Recent Flowsheet Documentation  Taken 12/8/2023 0900 by Tiffanie Castellanos RN  Trust Relationship/Rapport:   care explained   choices provided   emotional support provided   empathic listening provided   questions answered   reassurance provided   questions encouraged   thoughts/feelings acknowledged  Goal: Readiness for Transition of Care  Outcome: Ongoing, Progressing  Intervention: Mutually Develop Transition Plan  Recent Flowsheet Documentation  Taken 12/8/2023 1700 by Tiffanie Castellanos, RN  Equipment Currently Used at Home: cpap  Transportation Anticipated: car, drives self  Patient/Family Anticipated Services at Transition: none  Patient/Family Anticipates Transition to: home           Vss. Pt currently 96% on room air. Tolerating cons carb diet. Glucose monitored, pt managing with her device. Denies any n/v. Denies any pain. Pt has productive cough. Pt states she has to sit up straight to breathe comfortably. Wears cpap at night, pt has it at bedside.  this shift. Pt updated on current poc. Denies any needs at this time.

## 2023-12-09 ENCOUNTER — APPOINTMENT (OUTPATIENT)
Dept: CARDIOLOGY | Facility: HOSPITAL | Age: 38
End: 2023-12-09
Payer: MEDICARE

## 2023-12-09 VITALS
TEMPERATURE: 98.3 F | SYSTOLIC BLOOD PRESSURE: 148 MMHG | RESPIRATION RATE: 16 BRPM | OXYGEN SATURATION: 100 % | HEART RATE: 82 BPM | DIASTOLIC BLOOD PRESSURE: 81 MMHG

## 2023-12-09 PROBLEM — R06.01 ORTHOPNEA: Status: RESOLVED | Noted: 2023-12-08 | Resolved: 2023-12-09

## 2023-12-09 LAB
BH CV ECHO MEAS - ACS: 1.9 CM
BH CV ECHO MEAS - AO MAX PG: 14 MMHG
BH CV ECHO MEAS - AO MEAN PG: 8 MMHG
BH CV ECHO MEAS - AO ROOT DIAM: 3.1 CM
BH CV ECHO MEAS - AO V2 MAX: 187 CM/SEC
BH CV ECHO MEAS - AO V2 VTI: 37.8 CM
BH CV ECHO MEAS - EDV(CUBED): 185.2 ML
BH CV ECHO MEAS - EDV(MOD-SP4): 67.6 ML
BH CV ECHO MEAS - EF(MOD-SP4): 60.4 %
BH CV ECHO MEAS - ESV(CUBED): 59.3 ML
BH CV ECHO MEAS - ESV(MOD-SP4): 26.8 ML
BH CV ECHO MEAS - FS: 31.6 %
BH CV ECHO MEAS - IVS/LVPW: 1 CM
BH CV ECHO MEAS - IVSD: 1 CM
BH CV ECHO MEAS - LA DIMENSION: 4.3 CM
BH CV ECHO MEAS - LAT PEAK E' VEL: 11.2 CM/SEC
BH CV ECHO MEAS - LV DIASTOLIC VOL/BSA (35-75): 36.2 CM2
BH CV ECHO MEAS - LV MASS(C)D: 226.4 GRAMS
BH CV ECHO MEAS - LV SYSTOLIC VOL/BSA (12-30): 14.4 CM2
BH CV ECHO MEAS - LVIDD: 5.7 CM
BH CV ECHO MEAS - LVIDS: 3.9 CM
BH CV ECHO MEAS - LVOT AREA: 4.2 CM2
BH CV ECHO MEAS - LVOT DIAM: 2.3 CM
BH CV ECHO MEAS - LVPWD: 1 CM
BH CV ECHO MEAS - MED PEAK E' VEL: 9.3 CM/SEC
BH CV ECHO MEAS - MV A MAX VEL: 86.7 CM/SEC
BH CV ECHO MEAS - MV E MAX VEL: 153 CM/SEC
BH CV ECHO MEAS - MV E/A: 1.76
BH CV ECHO MEAS - PA ACC TIME: 0.11 SEC
BH CV ECHO MEAS - RAP SYSTOLE: 10 MMHG
BH CV ECHO MEAS - RVSP: 31.3 MMHG
BH CV ECHO MEAS - SI(MOD-SP4): 21.8 ML/M2
BH CV ECHO MEAS - SV(MOD-SP4): 40.8 ML
BH CV ECHO MEAS - TAPSE (>1.6): 1.63 CM
BH CV ECHO MEAS - TR MAX PG: 21.3 MMHG
BH CV ECHO MEAS - TR MAX VEL: 231 CM/SEC
BH CV ECHO MEASUREMENTS AVERAGE E/E' RATIO: 14.93
GLUCOSE BLDC GLUCOMTR-MCNC: 91 MG/DL (ref 70–130)
LEFT ATRIUM VOLUME INDEX: 29 ML/M2

## 2023-12-09 PROCEDURE — 93306 TTE W/DOPPLER COMPLETE: CPT

## 2023-12-09 PROCEDURE — 82948 REAGENT STRIP/BLOOD GLUCOSE: CPT

## 2023-12-09 PROCEDURE — 99232 SBSQ HOSP IP/OBS MODERATE 35: CPT

## 2023-12-09 PROCEDURE — 93306 TTE W/DOPPLER COMPLETE: CPT | Performed by: SPECIALIST

## 2023-12-09 PROCEDURE — 99232 SBSQ HOSP IP/OBS MODERATE 35: CPT | Performed by: SPECIALIST

## 2023-12-09 RX ADMIN — PANTOPRAZOLE SODIUM 40 MG: 40 TABLET, DELAYED RELEASE ORAL at 06:35

## 2023-12-09 RX ADMIN — ASPIRIN 81 MG: 81 TABLET, COATED ORAL at 09:09

## 2023-12-09 RX ADMIN — Medication 1 MG: at 09:09

## 2023-12-09 RX ADMIN — LABETALOL HYDROCHLORIDE 100 MG: 100 TABLET, FILM COATED ORAL at 09:09

## 2023-12-09 RX ADMIN — PRENATAL VIT W/ FE FUMARATE-FA TAB 27-0.8 MG 1 TABLET: 27-0.8 TAB at 09:09

## 2023-12-09 NOTE — PLAN OF CARE
Problem: Asthma Comorbidity  Goal: Maintenance of Asthma Control  Outcome: Adequate for Care Transition  Intervention: Maintain Asthma Symptom Control  Recent Flowsheet Documentation  Taken 12/9/2023 0925 by Lauren Sánchez RN  Medication Review/Management: medications reviewed     Problem: Behavioral Health Comorbidity  Goal: Maintenance of Behavioral Health Symptom Control  Outcome: Adequate for Care Transition  Intervention: Maintain Behavioral Health Symptom Control  Recent Flowsheet Documentation  Taken 12/9/2023 0925 by Lauren Sánchez RN  Medication Review/Management: medications reviewed     Problem: COPD (Chronic Obstructive Pulmonary Disease) Comorbidity  Goal: Maintenance of COPD Symptom Control  Outcome: Adequate for Care Transition  Intervention: Maintain COPD-Symptom Control  Recent Flowsheet Documentation  Taken 12/9/2023 0925 by Lauren Sánchez RN  Medication Review/Management: medications reviewed     Problem: Diabetes Comorbidity  Goal: Blood Glucose Level Within Targeted Range  Outcome: Adequate for Care Transition     Problem: Heart Failure Comorbidity  Goal: Maintenance of Heart Failure Symptom Control  Outcome: Adequate for Care Transition  Intervention: Maintain Heart Failure-Management  Recent Flowsheet Documentation  Taken 12/9/2023 0925 by Lauren Sánchez RN  Medication Review/Management: medications reviewed     Problem: Hypertension Comorbidity  Goal: Blood Pressure in Desired Range  Outcome: Adequate for Care Transition  Intervention: Maintain Blood Pressure Management  Recent Flowsheet Documentation  Taken 12/9/2023 0925 by Lauren Sánchez RN  Medication Review/Management: medications reviewed     Problem: Obstructive Sleep Apnea Risk or Actual Comorbidity Management  Goal: Unobstructed Breathing During Sleep  Outcome: Adequate for Care Transition     Problem: Osteoarthritis Comorbidity  Goal: Maintenance of Osteoarthritis Symptom Control  Outcome: Adequate for Care  Transition  Intervention: Maintain Osteoarthritis Symptom Control  Recent Flowsheet Documentation  Taken 12/9/2023 0925 by Lauren Sánchez RN  Activity Management:   up ad nimesh   ambulated to bathroom  Medication Review/Management: medications reviewed     Problem: Pain Chronic (Persistent) (Comorbidity Management)  Goal: Acceptable Pain Control and Functional Ability  Outcome: Adequate for Care Transition  Intervention: Manage Persistent Pain  Recent Flowsheet Documentation  Taken 12/9/2023 0925 by Lauren Sánchez RN  Medication Review/Management: medications reviewed  Intervention: Develop Pain Management Plan  Recent Flowsheet Documentation  Taken 12/9/2023 0925 by Lauren Sánchez RN  Pain Management Interventions: pain management plan reviewed with patient/caregiver  Intervention: Optimize Psychosocial Wellbeing  Recent Flowsheet Documentation  Taken 12/9/2023 0925 by Lauren Sánchez RN  Diversional Activities:   television   smartphone  Family/Support System Care: self-care encouraged     Problem: Seizure Disorder Comorbidity  Goal: Maintenance of Seizure Control  Outcome: Adequate for Care Transition     Problem: Maternal-Fetal Wellbeing  Goal: Optimal Maternal-Fetal Wellbeing  Outcome: Adequate for Care Transition  Intervention: Support Psychosocial Response to Complications During Pregnancy  Recent Flowsheet Documentation  Taken 12/9/2023 0925 by Lauren Sánchez RN  Family/Support System Care: self-care encouraged     Problem: Adult Inpatient Plan of Care  Goal: Plan of Care Review  Outcome: Adequate for Care Transition  Goal: Patient-Specific Goal (Individualized)  Outcome: Adequate for Care Transition  Goal: Absence of Hospital-Acquired Illness or Injury  Outcome: Adequate for Care Transition  Intervention: Identify and Manage Fall Risk  Recent Flowsheet Documentation  Taken 12/9/2023 0925 by Lauren Sánchez RN  Safety Promotion/Fall Prevention: safety round/check completed  Intervention: Prevent Skin  Injury  Recent Flowsheet Documentation  Taken 12/9/2023 0925 by Lauren Sánchez RN  Body Position: sitting up in bed  Intervention: Prevent and Manage VTE (Venous Thromboembolism) Risk  Recent Flowsheet Documentation  Taken 12/9/2023 0925 by Lauren Sánchez RN  Activity Management:   up ad nimesh   ambulated to bathroom  Goal: Optimal Comfort and Wellbeing  Outcome: Adequate for Care Transition  Intervention: Monitor Pain and Promote Comfort  Recent Flowsheet Documentation  Taken 12/9/2023 0925 by Lauren Sánchez RN  Pain Management Interventions: pain management plan reviewed with patient/caregiver  Intervention: Provide Person-Centered Care  Recent Flowsheet Documentation  Taken 12/9/2023 0925 by Lauren Sánchez RN  Trust Relationship/Rapport:   care explained   choices provided   questions answered   questions encouraged  Goal: Readiness for Transition of Care  Outcome: Adequate for Care Transition   Goal Outcome Evaluation:

## 2023-12-09 NOTE — DISCHARGE SUMMARY
Discharge Summary    Admit Date: 12/8/2023 10:11 AM    Admit Diagnosis: Orthopnea [R06.01]    Date of Discharge:  12/9/2023    Discharge Diagnosis: Same        Hospital Course  Patient is a 38 y.o. female, admitted yesterday secondary to orthopnea.  Patient doing better. Symptoms have improved. Patient tolerating a regular diet and ambulating without difficulty. Will discharge to home today.         Vital Signs  Temp:  [97.9 °F (36.6 °C)-98.5 °F (36.9 °C)] 98.3 °F (36.8 °C)  Heart Rate:  [82-94] 82  Resp:  [16-18] 16  BP: (122-148)/(65-81) 148/81    Review of Systems    The following systems were reviewed and negative;  ENT, respiratory, cardiovascular, gastrointestinal, genitourinary, breast, endocrine and allergies / immunologic.      Physical Exam:      General Appearance:    Alert, cooperative, in no acute distress   Head:    Normocephalic, without obvious abnormality, atraumatic   Eyes:            Lids and lashes normal, conjunctivae and sclerae normal, no   icterus, no pallor, corneas clear, PERRLA   Ears:    Ears appear intact with no abnormalities noted   Throat:   No oral lesions, no thrush, oral mucosa moist   Neck:   No adenopathy, supple, trachea midline, no thyromegaly, no     carotid bruit, no JVD   Back:     No kyphosis present, no scoliosis present, no skin lesions,       erythema or scars, no tenderness to percussion or                   palpation,   range of motion normal   Lungs:     Clear to auscultation,respirations regular, even and                   unlabored    Heart:    Regular rhythm and normal rate, normal S1 and S2, no            murmur, no gallop, no rub, no click   Breast Exam:    Deferred   Abdomen:     Normal bowel sounds, no masses, no organomegaly, soft        non-tender, non-distended, no guarding, no rebound                 tenderness   Genitalia:    Deferred   Extremities:   Moves all extremities well, no edema, no cyanosis, no              redness   Pulses:   Pulses palpable and  equal bilaterally   Skin:   No bleeding, bruising or rash   Lymph nodes:   No palpable adenopathy   Neurologic:   Cranial nerves 2 - 12 grossly intact, sensation intact, DTR        present and equal bilaterally             Condition on Discharge:  Stable    Urine Output Good    Discharge Diet: Regular    Follow-up Appointments  Patient will follow up in clinic next week.        Geovani Razo MD.   12/09/23  14:03 EST

## 2023-12-09 NOTE — PAYOR COMM NOTE
"[facility]  SERVANDO CAM  PHONE  [phone]  FAX  [phone]  NPI:  [id number]    REQUEST FOR INPATIENT AUTH    Antoinette Pack (38 y.o. Female)       Date of Birth   1985    Social Security Number       Address   [address] [facility] [address]    Home Phone   [phone]    MRN   [id number]       Pentecostalism   None    Marital Status                               Admission Date   12/8/23    Admission Type   Urgent    Admitting Provider   Geovani Razo III, MD    Attending Provider   Geovani Razo III, MD    Department, Room/Bed   [facility], W233/1       Discharge Date       Discharge Disposition       Discharge Destination                                 Attending Provider: Geovani Razo III, MD    Allergies: Dilantin [Phenytoin], Keppra [Levetiracetam], Meperidine, Topamax [Topiramate]    Isolation: None   Infection: COVID Screen (preop/placement) (01/12/22)   Code Status: CPR    Ht: 149.9 cm (59\")   Wt: 93.7 kg (206 lb 9.6 oz)    Admission Cmt: None   Principal Problem: Orthopnea [R06.01]                   Active Insurance as of 12/8/2023       Primary Coverage       Payor Plan Insurance Group Employer/Plan Group    [employer] [place] MEDICARE REPLACEMENT WELLCARE MED ADV PPO        Payor Plan Address Payor Plan Phone Number Payor Plan Fax Number Effective Dates    [address] [address]   11/1/2023 - None Entered    [facility] [id number]         Subscriber Name Subscriber Birth Date Member ID       ANTOINETTE PACK 1985 [id number]               Secondary Coverage       Payor Plan Insurance Group Employer/Plan Group    MISC REFERENCE BASED PRICING MISC REFERENCE BASED PRICING [id number]       Coverage Address Coverage Phone Number Coverage Fax Number Effective Dates    [address] [phone]  10/24/2022 - None Entered    [facility] [id number]         Subscriber Name Subscriber Birth Date Member ID       CHRISTOPHER PACK 8/22/1987 [id number]  "                    Emergency Contacts        (Rel.) Home Phone Work Phone Mobile Phone    Jonel Iverson (Father) [phone] -- --    Michelle Pack (Spouse) -- -- [phone]                 History & Physical        Geovani Razo III, MD at 12/08/23 1129                Chief complaint Orthopnea. Back Pain      History of Present Illness: Patient is a 38 y.o. female who presents with IUP at 15w6d weeks gestation. G 2 P 0010      Past Medical History:   Diagnosis Date    Anxiety     CHF (congestive heart failure) 05/2023    Colitis 2023    Depression     Diabetes mellitus 2018    Diverticulitis     GERD (gastroesophageal reflux disease)     Hypertension     Kidney stone     Pancreatitis 09/2023    PCOS (polycystic ovarian syndrome)     Sleep apnea      Blood Type: O   Fetal Gender: Female    Past Surgical History:   Procedure Laterality Date    CARDIAC CATHETERIZATION      CARPAL TUNNEL RELEASE      COLONOSCOPY      ENDOSCOPY      FRACTURE SURGERY      HARDWARE REMOVAL      WRIST    TENDON REPAIR      HAND     Family History   Problem Relation Age of Onset    Heart disease Mother     COPD Mother     Heart disease Father     COPD Father      Social History     Tobacco Use    Smoking status: Former    Smokeless tobacco: Never   Vaping Use    Vaping Use: Never used   Substance Use Topics    Alcohol use: No    Drug use: No     Medications Prior to Admission   Medication Sig Dispense Refill Last Dose    aspirin (ASPIR) 81 MG EC tablet Take 1 tablet by mouth Daily. 30 tablet 6     Continuous Blood Gluc Sensor (Dexcom G6 Sensor) Use Every 10 (Ten) Days. 3 each 5     Continuous Blood Gluc Transmit (Dexcom G6 Transmitter) misc Use 1 each Every 3 (Three) Months. 1 each 1     folic acid (FOLVITE) 1 MG tablet Take 1 tablet by mouth Daily.       Insulin Aspart (novoLOG) 100 UNIT/ML injection For use in insulin pump. Max Daily Dose: 200 units daily. 60 mL 5     insulin degludec (Tresiba FlexTouch) 100 UNIT/ML  solution pen-injector injection Inject 50 Units under the skin into the appropriate area as directed 2 (Two) Times a Day. (Patient not taking: Reported on 11/15/2023) 90 mL 1     Insulin Disposable Pump (Omnipod 5 G6 Intro, Gen 5,) kit Use 1 kit Take As Directed. 1 kit 0     Insulin Disposable Pump (Omnipod 5 G6 Pod, Gen 5,) misc Use 1 each Every Other Day. 15 each 5     Insulin Pen Needle 32G X 4 MM misc Use to inject insulin up to 4 times daily as directed 400 each 1     labetalol (NORMODYNE) 100 MG tablet Take 1 tablet by mouth 3 times a day. 90 tablet 6     lansoprazole (PREVACID) 30 MG capsule Take 1 capsule by mouth 2 (Two) Times a Day.       mesalamine (CANASA) 1000 MG suppository Insert 1 suppository into the rectum Every Night.       Prenatal Vit-Fe Fumarate-FA (PRENATAL PO) Take 1 tablet by mouth Daily.        Allergies:  Dilantin [phenytoin], Keppra [levetiracetam], Meperidine, and Topamax [topiramate]      Vital Signs  Temp:  [97.8 °F (36.6 °C)] 97.8 °F (36.6 °C)  Heart Rate:  [96] 96  Resp:  [16] 16  BP: (149)/(80) 149/80    Radiology  Imaging Results (Last 24 Hours)       ** No results found for the last 24 hours. **            Labs  Lab Results (last 24 hours)       ** No results found for the last 24 hours. **              Review of Systems    The following systems were reviewed and negative;  ENT, cardiovascular, gastrointestinal, genitourinary, breast, endocrine and allergies / immunologic.    +Orthopnea, Difficulty laying flat    Physical Exam:      General Appearance:    Alert, cooperative, in no acute distress   Head:    Normocephalic, without obvious abnormality, atraumatic   Eyes:            Lids and lashes normal, conjunctivae and sclerae normal, no   icterus, no pallor, corneas clear, PERRLA   Ears:    Ears appear intact with no abnormalities noted   Throat:   No oral lesions, no thrush, oral mucosa moist   Neck:   No adenopathy, supple, trachea midline, no thyromegaly, no     carotid bruit,  no JVD   Back:     No kyphosis present, no scoliosis present, no skin lesions,       erythema or scars, no tenderness to percussion or                   palpation,   range of motion normal   Lungs:     Clear to auscultation,respirations regular, even and                   unlabored    Heart:    Regular rhythm and normal rate, normal S1 and S2, no            murmur, no gallop, no rub, no click   Breast Exam:    Deferred   Abdomen:     Normal bowel sounds, no masses, no organomegaly, soft        non-tender, non-distended, no guarding, no rebound                 tenderness   Genitalia:    Deferred   Extremities:   Moves all extremities well, no edema, no cyanosis, no              redness   Pulses:   Pulses palpable and equal bilaterally   Skin:   No bleeding, bruising or rash   Lymph nodes:   No palpable adenopathy   Neurologic:   Cranial nerves 2 - 12 grossly intact, sensation intact, DTR        present and equal bilaterally         Assessment: Patient is a 38 y.o. female who presents with IUP at 15w6d weeks gestation. G 2 P 0010. Orthopnea. Back Pain.       Plan of Care: Admit. Proceed with hospitalist consult & Cardiology consult       Geovani Razo III, MD  12/08/23  11:29 EST      Electronically signed by Geovani Razo III, MD at 12/08/23 1129       Current Facility-Administered Medications   Medication Dose Route Frequency Provider Last Rate Last Admin    acetaminophen (TYLENOL) tablet 650 mg  650 mg Oral Q4H PRN Geovani Razo III, MD   650 mg at 12/08/23 2115    aspirin EC tablet 81 mg  81 mg Oral Daily Geovani Razo III, MD        bisacodyl (DULCOLAX) suppository 10 mg  10 mg Rectal Daily PRN Geovani Razo III, MD        dextrose (D50W) (25 g/50 mL) IV injection 25 g  25 g Intravenous Q15 Min PRN Rob Haywood PA-C        dextrose (GLUTOSE) oral gel 15 g  15 g Oral Q15 Min PRN Rob Haywood PA-C        docusate sodium (COLACE) capsule 100 mg  100 mg Oral BID PRN Geovani Razo III, MD         famotidine (PEPCID) tablet 20 mg  20 mg Oral BID PRN Geovani Razo III, MD        folic acid (FOLVITE) tablet 1 mg  1 mg Oral Daily Geovani Razo III, MD        glucagon (human recombinant) (GLUCAGEN DIAGNOSTIC) injection 1 mg  1 mg Intramuscular Q15 Min PRN Rob Haywood PA-C        insulin patient supplied pump   Subcutaneous Continuous Geovani Razo III, MD        labetalol (NORMODYNE) tablet 100 mg  100 mg Oral Daily Geovani Razo III, MD   100 mg at 12/08/23 2059    lidocaine (XYLOCAINE) 1 % injection 0.5 mL  0.5 mL Intradermal Once PRN Geovani Razo III, MD        mesalamine (CANASA) suppository 1,000 mg  1,000 mg Rectal Nightly Geovani Razo III, MD   1,000 mg at 12/08/23 2053    ondansetron (ZOFRAN) tablet 8 mg  8 mg Oral Q8H PRN Geovani Razo III, MD        Or    ondansetron (ZOFRAN) injection 4 mg  4 mg Intravenous Q8H PRN Geovani Razo III, MD        pantoprazole (PROTONIX) EC tablet 40 mg  40 mg Oral BID AC Geovani Razo III, MD   40 mg at 12/08/23 2100    prenatal vitamin 27-0.8 tablet 1 tablet  1 tablet Oral Daily Geovani Razo III, MD        sodium chloride 0.9 % flush 10 mL  10 mL Intravenous Q12H Geovani Razo III, MD        sodium chloride 0.9 % flush 10 mL  10 mL Intravenous PRN Geovani Razo III, MD        sodium chloride 0.9 % infusion 40 mL  40 mL Intravenous PRN Geovani Razo III, MD         Orders (last 24 hrs)        Start     Ordered    12/08/23 2116  POC Glucose Once  PROCEDURE ONCE        Comments: Complete no more than 45 minutes prior to patient eating      12/08/23 2108 12/08/23 2100  mesalamine (CANASA) suppository 1,000 mg  Nightly         12/08/23 1812    12/08/23 2000  aspirin EC tablet 81 mg  Daily         12/08/23 1851    12/08/23 2000  folic acid (FOLVITE) tablet 1 mg  Daily         12/08/23 1851 12/08/23 2000  labetalol (NORMODYNE) tablet 100 mg  Daily         12/08/23 1851    12/08/23 2000  pantoprazole (PROTONIX) EC tablet 40 mg  2 Times  Daily Before Meals         12/08/23 1851 12/08/23 2000  prenatal vitamin 27-0.8 tablet 1 tablet  Daily         12/08/23 1851    12/08/23 1945  insulin patient supplied pump  Continuous         12/08/23 1851 12/08/23 1704  POC Glucose Once  PROCEDURE ONCE        Comments: Complete no more than 45 minutes prior to patient eating      12/08/23 1649    12/08/23 1700  POC Glucose Finger 4x Daily Before Meals & at Bedtime  4 Times Daily Before Meals & at Bedtime,   Status:  Canceled      Comments: Complete no more than 45 minutes prior to patient eating      12/08/23 1244    12/08/23 1700  POC Glucose 4x Daily Before Meals & at Bedtime  4 Times Daily Before Meals & at Bedtime      Comments: Complete no more than 45 minutes prior to patient eating      12/08/23 1245    12/08/23 1647  COVID-19 and FLU A/B PCR, 1 HR TAT - Swab, Nasopharynx  Once         12/08/23 1646    12/08/23 1244  dextrose (GLUTOSE) oral gel 15 g  Every 15 Minutes PRN         12/08/23 1245    12/08/23 1244  dextrose (D50W) (25 g/50 mL) IV injection 25 g  Every 15 Minutes PRN         12/08/23 1245    12/08/23 1244  glucagon (human recombinant) (GLUCAGEN DIAGNOSTIC) injection 1 mg  Every 15 Minutes PRN         12/08/23 1245    12/08/23 1238  Basic Metabolic Panel  Once         12/08/23 1237    12/08/23 1238  TSH  Once         12/08/23 1237    12/08/23 1237  CBC & Differential  Once         12/08/23 1237    12/08/23 1237  CBC Auto Differential  PROCEDURE ONCE         12/08/23 1237    12/08/23 1228  POC Glucose Once  PROCEDURE ONCE        Comments: Complete no more than 45 minutes prior to patient eating      12/08/23 1219    12/08/23 1200  Vital Signs q 4 while awake  Every 4 Hours      Comments: While the patient is awake.    12/08/23 1024    12/08/23 1157  T4, Free  STAT         12/08/23 1157    12/08/23 1131  Admit To Obstetrics Inpatient  Once         12/08/23 1131    12/08/23 1119  Adult Transthoracic Echo Complete W/ Cont if Necessary Per  Protocol  Once         12/08/23 1119    12/08/23 1115  sodium chloride 0.9 % flush 10 mL  Every 12 Hours Scheduled         12/08/23 1024    12/08/23 1025  Inpatient Cardiology Consult  Once        Specialty:  Cardiology  Provider:  (Not yet assigned)    12/08/23 1024    12/08/23 1025  Inpatient Hospitalist Consult  Once        Specialty:  Hospitalist  Provider:  (Not yet assigned)    12/08/23 1024    12/08/23 1025  Code Status and Medical Interventions:  Continuous         12/08/23 1024    12/08/23 1025  Place Sequential Compression Device  Once         12/08/23 1024    12/08/23 1025  Maintain Sequential Compression Device  Continuous         12/08/23 1024    12/08/23 1025  Vital Signs Per hospital policy  Per Hospital Policy         12/08/23 1024    12/08/23 1025  Intermittent Auscultation FOR LOW RISK PATIENTS - NST on Admission Along With Intermittent Auscultation of Fetal Heart Tones.  Per Order Details        Comments: Intermittent Auscultation FOR LOW RISK PATIENTS - NST on Admission Along With Intermittent Auscultation of Fetal Heart Tones.    12/08/23 1024    12/08/23 1025  NST Low risk >24 weeks:  Monitor for 30 minutes BID (Antepartum)  Once        Comments: Fetal monitoring/NST:  Antepartum >24 weeks monitor fetus 30 minutes twice daily    12/08/23 1024    12/08/23 1025  Antepartum Patients  <24 Weeks - Document Fetal Heart Tones Daily and PRN.  Per Order Details        Comments: For Antepartum Patients Less Than 24 Weeks - Document Fetal Heart Tones Daily & PRN.    12/08/23 1024    12/08/23 1025  Notify Physician (specified)  Until Discontinued        Comments: /90 x 2.    12/08/23 1024    12/08/23 1025  Notify physician for hyperstimulus (per hospital algorithm)  Until Discontinued         12/08/23 1024    12/08/23 1025  Notify physician if membranes ruptured, bleeding greater than 1 pad an hour, fetal heart tone abnormality, and severe pain  Until Discontinued         12/08/23 1024    12/08/23  1025  Up as Tolerated  Until Discontinued         12/08/23 1024    12/08/23 1025  Insert Peripheral IV  Once         12/08/23 1024    12/08/23 1025  Saline Lock & Maintain IV Access  Continuous         12/08/23 1024    12/08/23 1025  Diet: Diabetic Diets; Gestational Consistent Carbohydrate; Texture: Regular Texture (IDDSI 7); Fluid Consistency: Thin (IDDSI 0)  Diet Effective Now         12/08/23 1024    12/08/23 1024  sodium chloride 0.9 % flush 10 mL  As Needed         12/08/23 1024    12/08/23 1024  sodium chloride 0.9 % infusion 40 mL  As Needed         12/08/23 1024    12/08/23 1024  lidocaine (XYLOCAINE) 1 % injection 0.5 mL  Once As Needed         12/08/23 1024    12/08/23 1024  acetaminophen (TYLENOL) tablet 650 mg  Every 4 Hours PRN         12/08/23 1024    12/08/23 1024  famotidine (PEPCID) tablet 20 mg  2 Times Daily PRN         12/08/23 1024    12/08/23 1024  ondansetron (ZOFRAN) tablet 8 mg  Every 8 Hours PRN        See Hyperspace for full Linked Orders Report.    12/08/23 1024    12/08/23 1024  ondansetron (ZOFRAN) injection 4 mg  Every 8 Hours PRN        See Hyperspace for full Linked Orders Report.    12/08/23 1024    12/08/23 1024  docusate sodium (COLACE) capsule 100 mg  2 Times Daily PRN         12/08/23 1024    12/08/23 1024  bisacodyl (DULCOLAX) suppository 10 mg  Daily PRN         12/08/23 1024    Unscheduled  Follow Hypoglycemia Standing Orders For Blood Glucose <70 & Notify Provider of Treatment  As Needed      Comments: Follow Hypoglycemia Orders As Outlined in Process Instructions (Open Order Report to View Full Instructions)  Notify Provider Any Time Hypoglycemia Treatment is Administered    12/08/23 1245    --  labetalol (NORMODYNE) 100 MG tablet  Daily         12/08/23 1701                  Physician Progress Notes (last 24 hours)  Notes from 12/08/23 0628 through 12/09/23 0628   No notes of this type exist for this encounter.          Consult Notes (last 24 hours)        Rob Haywood  EVANS Tovar at 23 1226        Consult Orders    1. Inpatient Hospitalist Consult [[id number]] ordered by Geovani Razo III, MD              Attestation signed by Maeve Bloom MD at 23 9450    I have reviewed this documentation and agree.  Also suggested patient for upright position for at least 30 minutes on our after eating or drinking for possibility of reflux.                         [facility] Medicine Services  CONSULT NOTE     Inpatient Hospitalist Consult  Consult performed by: Rob Haywood PA-C  Consult ordered by: Geovani Razo III, MD          Patient Identification:  Name:  Antoinette Pack  Age:  38 y.o.  Sex:  female  :  1985  MRN:  [id number]  Visit Number:  [id number]  Primary care provider:  Shawna Lambert DO    Length of stay:  0    Subjective     Chief Complaint:  No chief complaint on file.      Requesting Provider: Dr. Razo     Reason for Consultation: IDDM management       History of presenting illness:     Antoinette Pack is a 38 y.o. female admitted by OB secondary to orthopnea and back pain. Her PMHx is significant for CAD, CHF, IDDM, ALEJANDRO, HTN, goiter     Hospitalist services were consulted for diabetes management.      Patient was seen and examined with family member at the bedside. She reports she has not been feeling well for about 9 days though symptoms seem to have improved significantly over the past 2 days. She has had rhinorrhea/congestion, cough, sore throat and mild shortness of breath. She is also significantly orthopneic but denies any increased edema. Was comfortable appearing on RA during exam without increased work of breathing. She denies any sick contacts. No fever or chills. She was seen in clinic and tested negative for covid, flu, and strep. She denied chest pain or palpitations during exam. She is an insulin requiring diabetic. She wears a dexcom and omnipod. Her omnipod is currently set to an  automated mode which provides basal and correction dose insulin. She notes her blood sugar is fairly well controlled. She had visit with endocrinology this AM and some adjustments were made. Her last A1c was 7.2.     ---------------------------------------------------------------------------------------------------------------------  Review of Systems   Constitutional:  Negative for chills and fever.   HENT:  Positive for congestion, rhinorrhea and sore throat.    Respiratory:  Positive for cough and shortness of breath.    Cardiovascular:  Negative for chest pain and leg swelling.   Gastrointestinal:  Negative for abdominal pain.   Genitourinary:  Negative for difficulty urinating and dysuria.   Musculoskeletal:  Positive for arthralgias and myalgias.   Skin:  Negative for rash and wound.   Neurological:  Negative for dizziness and light-headedness.          ---------------------------------------------------------------------------------------------------------------------   Past History:  Past Medical History:   Diagnosis Date    Anxiety     CHF (congestive heart failure) 05/2023    Colitis 2023    Depression     Diabetes mellitus 2018    Diverticulitis     GERD (gastroesophageal reflux disease)     Hypertension     Kidney stone     Pancreatitis 09/2023    PCOS (polycystic ovarian syndrome)     Sleep apnea      Past Surgical History:   Procedure Laterality Date    CARDIAC CATHETERIZATION      CARPAL TUNNEL RELEASE      COLONOSCOPY      ENDOSCOPY      FRACTURE SURGERY      HARDWARE REMOVAL      WRIST    TENDON REPAIR      HAND     Family History   Problem Relation Age of Onset    Heart disease Mother     COPD Mother     Heart disease Father     COPD Father      Social History     Socioeconomic History    Marital status:    Tobacco Use    Smoking status: Former    Smokeless tobacco: Never   Vaping Use    Vaping Use: Never used   Substance and Sexual Activity    Alcohol use: No    Drug use: No    Sexual  activity: Defer     ---------------------------------------------------------------------------------------------------------------------   Allergies:  Dilantin [phenytoin], Keppra [levetiracetam], Meperidine, and Topamax [topiramate]  ---------------------------------------------------------------------------------------------------------------------   Prior to Admission Medications       Prescriptions Last Dose Informant Patient Reported? Taking?    aspirin (ASPIR) 81 MG EC tablet   No No    Take 1 tablet by mouth Daily.    Continuous Blood Gluc Sensor (Dexcom G6 Sensor)   No No    Use Every 10 (Ten) Days.    Continuous Blood Gluc Transmit (Dexcom G6 Transmitter) misc   No No    Use 1 each Every 3 (Three) Months.    folic acid (FOLVITE) 1 MG tablet   Yes No    Take 1 tablet by mouth Daily.    Insulin Aspart (novoLOG) 100 UNIT/ML injection   No No    For use in insulin pump. Max Daily Dose: 200 units daily.    insulin degludec (Tresiba FlexTouch) 100 UNIT/ML solution pen-injector injection  Pharmacy, Self No No    Inject 50 Units under the skin into the appropriate area as directed 2 (Two) Times a Day.    Patient not taking:  Reported on 11/15/2023    Insulin Disposable Pump (Omnipod 5 G6 Intro, Gen 5,) kit   No No    Use 1 kit Take As Directed.    Insulin Disposable Pump (Omnipod 5 G6 Pod, Gen 5,) misc   No No    Use 1 each Every Other Day.    Insulin Pen Needle 32G X 4 MM [facility] [place]  Pharmacy, Self No No    Use to inject insulin up to 4 times daily as directed    labetalol (NORMODYNE) 100 MG tablet   No No    Take 1 tablet by mouth 3 times a day.    lansoprazole (PREVACID) 30 MG capsule  Pharmacy, Self Yes No    Take 1 capsule by mouth 2 (Two) Times a Day.    mesalamine (CANASA) 1000 MG suppository  Pharmacy, Self Yes No    Insert 1 suppository into the rectum Every Night.    Prenatal Vit-Fe Fumarate-FA (PRENATAL PO)   Yes No    Take 1 tablet by mouth Daily.           ---------------------------------------------------------------------------------------------------------------------     Objective      Procedures:      Hospital Meds:  sodium chloride, 10 mL, Intravenous, Q12H         ---------------------------------------------------------------------------------------------------------------------   Vital Signs:  Temp:  [97.8 °F (36.6 °C)] 97.8 °F (36.6 °C)  Heart Rate:  [96] 96  Resp:  [16] 16  BP: (149)/(80) 149/80  No data found.  SpO2 Percentage    12/08/23 0900   SpO2: 94%     SpO2:  [94 %] 94 %  on   ;   Device (Oxygen Therapy): room air    There is no height or weight on file to calculate BMI.  Wt Readings from Last 3 Encounters:   11/22/23 93.7 kg (206 lb 9.6 oz)   11/15/23 94.8 kg (209 lb)   10/27/23 93.9 kg (207 lb)      No intake or output data in the 24 hours ending 12/08/23 1227  Diet: Diabetic Diets; Gestational Consistent Carbohydrate; Texture: Regular Texture (IDDSI 7); Fluid Consistency: Thin (IDDSI 0)  ---------------------------------------------------------------------------------------------------------------------   Physical exam:  Physical Exam  Vitals and nursing note reviewed.   Constitutional:       General: She is not in acute distress.  HENT:      Head: Normocephalic and atraumatic.   Eyes:      Extraocular Movements: Extraocular movements intact.      Conjunctiva/sclera: Conjunctivae normal.   Cardiovascular:      Rate and Rhythm: Normal rate and regular rhythm.   Pulmonary:      Effort: Pulmonary effort is normal.      Breath sounds: Normal breath sounds.   Abdominal:      Palpations: Abdomen is soft.   Musculoskeletal:      Right lower leg: No edema.      Left lower leg: No edema.   Skin:     General: Skin is warm and dry.   Neurological:      Mental Status: She is alert. Mental status is at baseline.   Psychiatric:         Mood and Affect: Mood normal.         Behavior: Behavior normal.  "      ---------------------------------------------------------------------------------------------------------------------     ---------------------------------------------------------------------------------------------------------------------                   Invalid input(s): \"PROT\"Estimated Creatinine Clearance: 175.2 mL/min (A) (by C-G formula based on SCr of 0.45 mg/dL (L)).  No results found for: \"AMMONIA\"    Glucose   Date/Time Value Ref Range Status   12/08/2023 1219 114 70 - 130 mg/dL Final     Lab Results   Component Value Date    HGBA1C 9.30 (H) 09/17/2023     Lab Results   Component Value Date    TSH 0.007 (L) 11/15/2023    FREET4 0.94 05/03/2023       No results found for: \"BLOODCX\"  No results found for: \"URINECX\"  No results found for: \"WOUNDCX\"  No results found for: \"STOOLCX\"  No results found for: \"RESPCX\"  Pain Management Panel  More data may exist         Latest Ref Rng & Units 1/5/2019 10/19/2015   Pain Management Panel   Amphetamine, Urine Qual Negative Negative  Negative    Barbiturates Screen, Urine Negative Negative  Negative    Benzodiazepine Screen, Urine Negative Negative  Negative    Buprenorphine, Screen, Urine Negative Negative  -   Cocaine Screen, Urine Negative Negative  Negative    Methadone Screen , Urine Negative Negative  Negative      I have personally reviewed the above laboratory results.   ---------------------------------------------------------------------------------------------------------------------  Imaging Results (Last 7 Days)       ** No results found for the last 168 hours. **          I have personally reviewed the above radiology results.   ----------------------------------------------------------------------------------------------------------------------    Assessment/Plan       IDDM II  Okay to continue with patient supplied omnipod for now  We will monitor [facility]SB[facility] closely and make changes as needed  Hypoglycemia protocol ordered  She will benefit from close " follow up with endocrinology throughout pregnancy    Hx Thyroid goiter  TSH on recent outpatient labs significantly low at 0.007  Repeat TSH and T4 pending    Likely Viral URI  Basic labs pending  Patient reports symptoms present for 9 days, improving for the past 2. Had previously tested negative for Covid/flu  Stable on RA  Imaging risk felt to outweigh benefit currently  Would be hesitant to initiate antibiotics unless significant leukocytosis or fever develops  Will continue to monitor closely    Orthopnea  CAD  CHF  Cardiology following  TTE pending    Pregnancy  Management per primary    Thank you for the consult and allowing us to participate in the care of your patient, Hospitalist Services will continue to follow. Please do not hesitate to call with any questions or concerns.      Rob Haywood PA-C  12/08/23  12:27 EST    Attestation: I personally discussed the patient's history of presenting illness, physical examination, assessment, and plan with my attending physician, Dr. Bloom.     Electronically signed by Maeve Bloom MD at 12/08/23 1634       Janey Guerrero MD at 12/08/23 1144        Consult Orders    1. Inpatient Cardiology Consult [[id number]] ordered by Geovani Razo III, MD                     [facility]  CONSULT  NOTE      Patient information:  Date of Admit: 12/8/2023  Date of Consult: 12/08/23  Hospitalist/Referring MD:Geovani Razo III, MD;   PCP: Shawna Lambert DO  MRN:  [id number]  Visit Number:  [id number]    LOS: 0  CODE STATUS:  Code Status and Medical Interventions:   Ordered at: 12/08/23 1024     Level Of Support Discussed With:    Patient     Code Status (Patient has no pulse and is not breathing):    CPR (Attempt to Resuscitate)     Medical Interventions (Patient has pulse or is breathing):    Full Support       PROBLEM LIST: Principal Problem:    Orthopnea      Inpatient Cardiology Consult  Consult performed by: Pool,  CICI Norwood  Consult ordered by: Geovani Razo III, MD        11:44 EST  12/8/2023    General Cardiology Consulting Physician: Dr. Janey Guerrero MD    Assessment    Patient is 15 weeks pregnant  History of HFpEF since May 2023 according to the patient was consistent orthopnea for many months  Recent upper respiratory symptoms with worsening shortness of breath class IIIb  Nonobstructive coronary artery disease with recent stress test negative for ischemia  Essential hypertension  Obstructive sleep apnea compliant with the CPAP  Dyslipidemia  Diabetes mellitus          Recommendations   1.  Patient is not overtly fluid overloaded her most recent echo back in October with normal systolic function we will repeat the echocardiac for further assessment  2.  It was noted that her TSH back on 11/15/2023 was 0.007?  Hyperthyroidism we will repeat the labs  3.  Blood pressure is mildly evaded will recommend starting labetalol if is okay with OB/GYN  4.  Continue the CPAP treatment  5.  No evidence of acute ischemia        Reason for Cardiology consultation:    Subjective Data   ADMISSION INFORMATION:  No chief complaint on file.    History of Present Illness    Antoinette Pack is a 38 y.o. female with a past medical history significant for HTN, CHF, 15 weeks 6 days IUP, DM, PCOS, ALEJANDRO, GERD, Diverticulitis, Colitis, and Anxiety/Depression.      Patient presented to [facility] emergency room (ER) on 12/8/2023 with complaints of increased shortness of breath x 1 week.     Cardiology has been consulted for further evaluation and management.     Primary Cardiologist has been Dr. Antoinette Lima and she was last seen in the office on 10/05/2023.     Patient is in room 233 and was examined by Dr. Guerrero.  Patient is lying in bed resting quietly. No acute distress  noted at this time.  She reports increased shortness of breath x 1 week associated with URI symptoms.  She reports seeing her PCP in  Wednesday and was negative COVID and FLU.  She reports history of CHF in 05/2023 that had resolved but she has not been able to lay flat since 05/2023.  She reports being able to come off all her previously prescribed meds in May and on her last f/u with Cardiology she was doing great.      ORDERS:   Complete ECHO    Cardiac risk factors:diabetes mellitus, hypercholesterolemia, and Obesity      Last Echo: Results for orders placed during the hospital encounter of 11/08/23    Adult Transthoracic Echo Complete W/ Cont if Necessary Per Protocol    Interpretation Summary    Left ventricular systolic function is normal. Calculated left ventricular EF = 56% Left ventricular ejection fraction appears to be 56 - 60%.    Left ventricular diastolic function was normal.    The left atrial cavity is mild to moderately dilated.    Estimated right ventricular systolic pressure from tricuspid regurgitation is mildly elevated (35-45 mmHg).         Last Stress: Results for orders placed during the hospital encounter of 09/05/23    Stress test with myocardial perfusion one day    Interpretation Summary  Images from the original result were not included.      A pharmacological stress test was performed using regadenoson.    Findings consistent with a normal ECG stress test.    Myocardial perfusion imaging indicates a normal myocardial perfusion study with no evidence of ischemia.    Subtle fixed anterior wall perfusion defect most likely presents breast on admission due to normal anterior wall motion on the gated scans.    Normal LV cavity size. Normal LV wall motion noted.    Left ventricular ejection fraction is normal (Calculated EF = 60%).    Impressions are consistent with a low risk study.        Last Cath:  none                         Past Medical History:   Diagnosis Date    Anxiety     CHF (congestive heart failure) 05/2023    Colitis 2023    Depression     Diabetes mellitus 2018    Diverticulitis     GERD (gastroesophageal  reflux disease)     Hypertension     Kidney stone     Pancreatitis 09/2023    PCOS (polycystic ovarian syndrome)     Sleep apnea      Past Surgical History:   Procedure Laterality Date    CARDIAC CATHETERIZATION      CARPAL TUNNEL RELEASE      COLONOSCOPY      ENDOSCOPY      FRACTURE SURGERY      HARDWARE REMOVAL      WRIST    TENDON REPAIR      HAND     Family History   Problem Relation Age of Onset    Heart disease Mother     COPD Mother     Heart disease Father     COPD Father      Social History     Tobacco Use    Smoking status: Former    Smokeless tobacco: Never   Vaping Use    Vaping Use: Never used   Substance Use Topics    Alcohol use: No    Drug use: No     ALLERGIES: Dilantin [phenytoin], Keppra [levetiracetam], Meperidine, and Topamax [topiramate]    Medications listed below are reported home medications pulling from within the system:  Medications Prior to Admission   Medication Sig Dispense Refill Last Dose    aspirin (ASPIR) 81 MG EC tablet Take 1 tablet by mouth Daily. 30 tablet 6     Continuous Blood Gluc Sensor (Dexcom G6 Sensor) Use Every 10 (Ten) Days. 3 each 5     Continuous Blood Gluc Transmit (Dexcom G6 Transmitter) misc Use 1 each Every 3 (Three) Months. 1 each 1     folic acid (FOLVITE) 1 MG tablet Take 1 tablet by mouth Daily.       Insulin Aspart (novoLOG) 100 UNIT/ML injection For use in insulin pump. Max Daily Dose: 200 units daily. 60 mL 5     insulin degludec (Tresiba FlexTouch) 100 UNIT/ML solution pen-injector injection Inject 50 Units under the skin into the appropriate area as directed 2 (Two) Times a Day. (Patient not taking: Reported on 11/15/2023) 90 mL 1     Insulin Disposable Pump (Omnipod 5 G6 Intro, Gen 5,) kit Use 1 kit Take As Directed. 1 kit 0     Insulin Disposable Pump (Omnipod 5 G6 Pod, Gen 5,) misc Use 1 each Every Other Day. 15 each 5     Insulin Pen Needle 32G X 4 MM misc Use to inject insulin up to 4 times daily as directed 400 each 1     labetalol (NORMODYNE) 100  MG tablet Take 1 tablet by mouth 3 times a day. 90 tablet 6     lansoprazole (PREVACID) 30 MG capsule Take 1 capsule by mouth 2 (Two) Times a Day.       mesalamine (CANASA) 1000 MG suppository Insert 1 suppository into the rectum Every Night.       Prenatal Vit-Fe Fumarate-FA (PRENATAL PO) Take 1 tablet by mouth Daily.          Review of Systems   Constitutional:  Negative for activity change, appetite change and diaphoresis.   HENT:  Negative for facial swelling and trouble swallowing.    Eyes:  Negative for visual disturbance.   Respiratory:  Positive for shortness of breath.    Cardiovascular:  Negative for chest pain, palpitations and leg swelling.   Gastrointestinal:  Negative for blood in stool, nausea and vomiting.   Endocrine: Negative.    Genitourinary:  Negative for hematuria.   Musculoskeletal:  Negative for gait problem.   Skin:  Negative for color change.   Neurological:  Negative for dizziness, syncope, speech difficulty, weakness, light-headedness and headaches.   Psychiatric/Behavioral:  Negative for agitation and behavioral problems.        Objective Data   Vital Signs  Temp:  [97.8 °F (36.6 °C)] 97.8 °F (36.6 °C)  Heart Rate:  [96] 96  Resp:  [16] 16  BP: (149)/(80) 149/80  Device (Oxygen Therapy): room air  Vital Signs (last 72 hrs)         12/05 0700  12/06 0659 12/06 0700  12/07 0659 12/07 0700  12/08 0659 12/08 0700  12/08 1144   Most Recent      Temp (°F)         97.8     97.8 (36.6) 12/08 0900    Heart Rate         96     96 12/08 0900    Resp         16     16 12/08 0900    BP         149/80     149/80 12/08 0900    SpO2 (%)         94     94 12/08 0900          BMI:   There is no height or weight on file to calculate BMI.  WEIGHT:  Wt Readings from Last 3 Encounters:   11/22/23 93.7 kg (206 lb 9.6 oz)   11/15/23 94.8 kg (209 lb)   10/27/23 93.9 kg (207 lb)     DIET:  Diet: Diabetic Diets; Gestational Consistent Carbohydrate; Texture: Regular Texture (IDDSI 7); Fluid Consistency: Thin (IDDSI  "0)  I&O:  Intake & Output (last 3 days)       None            Physical Exam  Constitutional:       Appearance: Normal appearance.   HENT:      Head: Normocephalic and atraumatic.      Nose: Nose normal.      Mouth/Throat:      Mouth: Mucous membranes are moist.      Pharynx: Oropharynx is clear.   Eyes:      Conjunctiva/sclera: Conjunctivae normal.   Cardiovascular:      Rate and Rhythm: Normal rate and regular rhythm.      Pulses: Normal pulses.      Heart sounds: Normal heart sounds.   Pulmonary:      Effort: Pulmonary effort is normal.      Breath sounds: Wheezing present.   Abdominal:      General: Bowel sounds are normal.      Palpations: Abdomen is soft.   Musculoskeletal:         General: Normal range of motion.      Cervical back: Normal range of motion.      Right lower leg: Edema present.      Left lower leg: Edema present.      Comments: Trace edema BLE    Skin:     General: Skin is warm and dry.   Neurological:      General: No focal deficit present.      Mental Status: She is alert and oriented to person, place, and time.   Psychiatric:         Mood and Affect: Mood normal.         Behavior: Behavior normal.         Results review   Results Review:    I have reviewed the patient's new clinical results. 12/08/23 11:44 EST        Lab Results   Component Value Date    PROBNP <36.0 11/15/2023             Lab Results   Component Value Date    MG 2.0 07/01/2023     Lab Results   Component Value Date    CHOL 173 09/18/2023    TRIG 111 09/18/2023    HDL 33 (L) 09/18/2023     (H) 09/18/2023     Estimated Creatinine Clearance: 175.2 mL/min (A) (by C-G formula based on SCr of 0.45 mg/dL (L)).  Lab Results   Component Value Date    HGBA1C 9.30 (H) 09/17/2023    HGBA1C 7.1 07/20/2023    HGBA1C 6.8 03/15/2023     Lab Results   Component Value Date    INR 0.98 07/01/2023    INR 1.02 05/04/2018    INR 0.92 09/22/2015     No components found for: \"DIG\"  Lab Results   Component Value Date    TSH 0.007 (L) " "11/15/2023      No results found for: \"URICACID\"  Pain Management Panel  More data may exist         Latest Ref Rng & Units 1/5/2019 10/19/2015   Pain Management Panel   Amphetamine, Urine Qual Negative Negative  Negative    Barbiturates Screen, Urine Negative Negative  Negative    Benzodiazepine Screen, Urine Negative Negative  Negative    Buprenorphine, Screen, Urine Negative Negative  -   Cocaine Screen, Urine Negative Negative  Negative    Methadone Screen , Urine Negative Negative  Negative      Microbiology Results (last 10 days)       ** No results found for the last 240 hours. **           No results found for: \"BLOODCX\"      ECG/EMG Results (last 24 hours)       ** No results found for the last 24 hours. **          TELEMETRY:   Patient is not on continuous cardia monitor at this time      RADIOLOGY STUDIES:  Imaging Results (Last 72 Hours)       ** No results found for the last 72 hours. **            ALLERGIES: Dilantin [phenytoin], Keppra [levetiracetam], Meperidine, and Topamax [topiramate]    CURRENT MEDICATIONS:  Current list of medications may not reflect those currently placed in orders that are not signed or are being held.     sodium chloride, 10 mL, Intravenous, Q12H           acetaminophen    bisacodyl    docusate sodium    famotidine    lidocaine    ondansetron **OR** ondansetron    sodium chloride    sodium chloride        Thank you very much for asking us to be involved in this patient's care.  We will follow along with you. Please do not hesitate to call for any questions or concerns.     I have discussed the patients findings and recommendations with patient.    Electronically signed by CICI Greene, 12/08/23, 11:54 AM EST.   Electronically signed by Janey Guerrero MD, 12/08/23, 1:30 PM EST.                        Please note that portions of this note were copied and has been reviewed and is accurate as of 12/8/2023 .      Please note that portions of this note were completed with " a voice recognition program.     Electronically signed by Janey Guerrero MD at 12/08/23 5648

## 2023-12-09 NOTE — PROGRESS NOTES
LOS: 1 day     Name: Antoinette Pack  Age/Sex: 38 y.o. female  :  1985        PCP: Shawna Lambert DO  REF: No Known Provider    Principal Problem:    Orthopnea      Reason for follow-up: Shortness of breath    Subjective     Subjective     Antoinette Pack is a 38 y.o. female with a past medical history significant for HTN, CHF, 15 weeks 6 days IUP, DM, PCOS, ALEJANDRO, GERD, Diverticulitis, Colitis, and Anxiety/Depression.      Interval History: Patient states her breathing is around the same.  TSH 0.022.  Echocardiogram has been ordered.  Reports she recently has been battling upper respiratory infection but is feeling better from this.  Blood pressure mildly elevated    Vital Signs  Temp:  [97.9 °F (36.6 °C)-98.5 °F (36.9 °C)] 98.3 °F (36.8 °C)  Heart Rate:  [82-94] 82  Resp:  [16-18] 16  BP: (122-148)/(65-81) 148/81  Vital Signs (last 72 hrs)          0700   0659  0700   0659  0700   0659  0700   1322   Most Recent      Temp (°F)     97.8 -  98.5      98.3     98.3 (36.8) 909    Heart Rate     82 -  96      82     82 909    Resp     16 -  18      16     16 909    BP     122/66 -  149/80      148/81     148/81 909    SpO2 (%)     94 -  100      100     100 909          There were no vitals filed for this visit.   There is no height or weight on file to calculate BMI.  No intake or output data in the 24 hours ending 23 1322  Objective:  Vital signs: (most recent): Blood pressure 148/81, pulse 82, temperature 98.3 °F (36.8 °C), temperature source Oral, resp. rate 16, SpO2 100%, not currently breastfeeding.                Objective       Physical Exam:     General Appearance:    Alert, cooperative, in no acute distress   Head:    Normocephalic, without obvious abnormality, atraumatic   Eyes:            Conjunctivae and sclerae normal, no   icterus, no pallor, corneas clear.   Neck:   No adenopathy, supple, trachea  midline, no thyromegaly, no   carotid bruit, no JVD   Lungs:     Clear to auscultation,respirations regular, even and                  unlabored    Heart:    Regular rhythm and normal rate, normal S1 and S2, no            murmur, no gallop, no rub, no click   Chest Wall:    No abnormalities observed   Abdomen:     Normal bowel sounds, no masses, no organomegaly, soft        nontender, nondistended, no guarding, no rebound                tenderness   Extremities:   Moves all extremities well, no edema, no cyanosis, no             redness   Pulses:   Pulses palpable and equal bilaterally   Skin:   No bleeding, bruising or rash       Neurologic:   Alert and oriented      Results review       Results Review:   Results from last 7 days   Lab Units 12/08/23  1239   WBC 10*3/mm3 6.39   HEMOGLOBIN g/dL 9.8*   PLATELETS 10*3/mm3 136*     Results from last 7 days   Lab Units 12/08/23  1220   SODIUM mmol/L 136   POTASSIUM mmol/L 3.6   CHLORIDE mmol/L 106   CO2 mmol/L 20.7*   BUN mg/dL 10   CREATININE mg/dL 0.51*   CALCIUM mg/dL 8.9   GLUCOSE mg/dL 113*         Lab Results   Component Value Date    INR 0.98 07/01/2023    INR 1.02 05/04/2018    INR 0.92 09/22/2015     Lab Results   Component Value Date    MG 2.0 07/01/2023     Lab Results   Component Value Date    TSH 0.022 (L) 12/08/2023    CHLPL 159 09/22/2015    TRIG 111 09/18/2023    HDL 33 (L) 09/18/2023     (H) 09/18/2023      Imaging Results (Last 48 Hours)       ** No results found for the last 48 hours. **          Lab Results   Component Value Date    BNP 11 09/21/2015              Echo   Results for orders placed during the hospital encounter of 11/08/23    Adult Transthoracic Echo Complete W/ Cont if Necessary Per Protocol    Interpretation Summary    Left ventricular systolic function is normal. Calculated left ventricular EF = 56% Left ventricular ejection fraction appears to be 56 - 60%.    Left ventricular diastolic function was normal.    The left atrial  cavity is mild to moderately dilated.    Estimated right ventricular systolic pressure from tricuspid regurgitation is mildly elevated (35-45 mmHg).       I reviewed the patient's new clinical results.    Telemetry: Not on telemetry monitor     Medication Review:   aspirin, 81 mg, Oral, Daily  folic acid, 1 mg, Oral, Daily  labetalol, 100 mg, Oral, Daily  mesalamine, 1,000 mg, Rectal, Nightly  pantoprazole, 40 mg, Oral, BID AC  prenatal vitamin 27-0.8, 1 tablet, Oral, Daily  sodium chloride, 10 mL, Intravenous, Q12H        insulin,         Assessment      Assessment:  Patient is 15 weeks pregnant  History of HFpEF since May 2023 according to the patient was consistent orthopnea for many months  Recent upper respiratory symptoms with worsening shortness of breath class IIIb  Nonobstructive coronary artery disease with recent stress test negative for ischemia  Essential hypertension  Obstructive sleep apnea compliant with the CPAP  Dyslipidemia  Diabetes mellitus  Hyperthyroidism        Plan     Recommendations:  Echocardiogram with normal systolic function patient not significantly fluid or fluid overloaded  Concern if the symptoms are partly related to hyperthyroidism with low TSH  Patient insisting to be discharged home with recommended follow-up with her primary cardiologist in [place] within a week or 2  Patient was seen by endocrinology previously regarding his thyroid status would also recommend follow-up with endocrinology    I discussed the patient's findings and my recommendations with patient and family    Electronically signed by CICI Porter, 12/09/23, 1:22 PM EST.   Electronically signed by Janey Guerrero MD, 12/09/23, 1:46 PM EST.      Please note that portions of this note were completed with a voice recognition program.

## 2023-12-09 NOTE — PROGRESS NOTES
Patient Identification:  Name:  Antoinette Pack  Age:  38 y.o.  Sex:  female  :  1985  MRN:  [id number]  Visit Number:  [id number]  Primary Care Provider:  Shawna Lambert DO    Length of stay:  1    Subjective/Interval History/Consultants/Procedures     Chief Complaint: No chief complaint on file.      Subjective/Interval History:    38 y.o. female who was admitted on 2023 with orthopnea, back pain    PMH is significant for CAD, CHF, IDDM, ALEJANDRO, HTN, goiter   For complete admission information, please see history and physical.     Today, the patient was seen and examined with family at bedside. Overall she stated she felt about the same. Appeared comfortable on RA. Continues to have nagging cough. We discussed abnormal TSH and she stated this began when she had Covid about a year ago and endocrinology has been monitoring. She had no new complaints.      Room location at the time of evaluation was [facility].    ----------------------------------------------------------------------------------------------------------------------  Current Hospital Meds:  aspirin, 81 mg, Oral, Daily  folic acid, 1 mg, Oral, Daily  labetalol, 100 mg, Oral, Daily  mesalamine, 1,000 mg, Rectal, Nightly  pantoprazole, 40 mg, Oral, BID AC  prenatal vitamin 27-0.8, 1 tablet, Oral, Daily  sodium chloride, 10 mL, Intravenous, Q12H      insulin,       ----------------------------------------------------------------------------------------------------------------------      Objective     Vital Signs:  Temp:  [97.9 °F (36.6 °C)-98.5 °F (36.9 °C)] 98.3 °F (36.8 °C)  Heart Rate:  [82-94] 82  Resp:  [16-18] 16  BP: (122-148)/(65-81) 148/81  There were no vitals filed for this visit.  There is no height or weight on file to calculate BMI.  No intake or output data in the 24 hours ending 23 1355  No intake/output data recorded.  Diet: Diabetic Diets; Gestational Consistent Carbohydrate; Texture: Regular Texture (IDDSI 7); Fluid  "Consistency: Thin (IDDSI 0)  ----------------------------------------------------------------------------------------------------------------------    Physical Exam  Vitals and nursing note reviewed.   Constitutional:       General: She is not in acute distress.  HENT:      Head: Normocephalic and atraumatic.   Eyes:      Extraocular Movements: Extraocular movements intact.      Conjunctiva/sclera: Conjunctivae normal.   Cardiovascular:      Rate and Rhythm: Normal rate.   Pulmonary:      Effort: Pulmonary effort is normal. No respiratory distress.   Musculoskeletal:      Right lower leg: No edema.      Left lower leg: No edema.   Skin:     General: Skin is warm and dry.   Neurological:      Mental Status: She is alert. Mental status is at baseline.   Psychiatric:         Mood and Affect: Mood normal.         Behavior: Behavior normal.                ----------------------------------------------------------------------------------------------------------------------  Tele:      ----------------------------------------------------------------------------------------------------------------------      Results from last 7 days   Lab Units 12/08/23  1239   WBC 10*3/mm3 6.39   HEMOGLOBIN g/dL 9.8*   HEMATOCRIT % 32.0*   MCV fL 80.8   MCHC g/dL 30.6*   PLATELETS 10*3/mm3 136*         Results from last 7 days   Lab Units 12/08/23  1220   SODIUM mmol/L 136   POTASSIUM mmol/L 3.6   CHLORIDE mmol/L 106   CO2 mmol/L 20.7*   BUN mg/dL 10   CREATININE mg/dL 0.51*   CALCIUM mg/dL 8.9   GLUCOSE mg/dL 113*   Estimated Creatinine Clearance: 154.6 mL/min (A) (by C-G formula based on SCr of 0.51 mg/dL (L)).  No results found for: \"AMMONIA\"      No results found for: \"BLOODCX\"  No results found for: \"URINECX\"  No results found for: \"WOUNDCX\"  No results found for: \"STOOLCX\"  ----------------------------------------------------------------------------------------------------------------------  Imaging Results (Last 24 Hours)       ** " No results found for the last 24 hours. **          ----------------------------------------------------------------------------------------------------------------------   I have reviewed the above laboratory values for 12/09/23    Assessment/Plan     Active Hospital Problems    Diagnosis  POA    **Orthopnea [R06.01]  Yes         ASSESSMENT/PLAN:    IDDM II  Okay to continue with patient supplied omnipod for now  We will monitor FSBG closely and make changes as needed  Hypoglycemia protocol ordered  Blood glucose has been very well controlled since admission.  She will benefit from close follow up with endocrinology throughout pregnancy     Hx Thyroid goiter  TSH on recent outpatient labs significantly low at 0.007  Repeat TSH low though T4 WNL  Patient noted this was an ongoing issue and endocrinology has been monitoring.   Recommend continued follow up.     Likely Viral URI  Patient reports symptoms present for 9 days, improving for the past 2. Had previously tested negative for Covid/flu- negative again this admission  Stable on RA  Repeat labs reviewed, no leukocytosis or left shift.  She remains stable on RA, afebrile  No further work up planned. Okay for DC from our standpoint.      Orthopnea  CAD  CHF  Cardiology following  TTE repeated this admission.     Pregnancy  Management per primary    Thank you for the consult and allowing us to participate in the care of your patient. Please do not hesitate to call with any questions or concerns.     Attestation: I personally discussed the patient's history of presenting illness, physical examination, assessment, and plan with my attending physician, Dr. Bloom.      Rob Haywood PA-C  12/09/23  13:55 EST

## 2023-12-09 NOTE — PLAN OF CARE
Goal Outcome Evaluation:         Pt tolerated shift well, pt v/s stable, I&O adequate, no s/s of distress noted. All pt needs met

## 2023-12-11 NOTE — PAYOR COMM NOTE
"CONTACT:  VLADIMIR CANTRELL MSN, RN  UTILIZATION MANAGEMENT DEPT.  [facility]  [address] [facility], [address]  PHONE:  [phone]  FAX: [phone]    PATIENT DISCHARGED TO HOME ON 12/9/23, AWAITING AUTHORIZATION DETERMINATION.    Antoinette Pack (38 y.o. Female)       Date of Birth   1985    Social Security Number       Address   [address] [facility] [address]    Home Phone   [phone]    MRN   [id number]       Yarsanism   None    Marital Status                               Admission Date   12/8/23    Admission Type   Urgent    Admitting Provider   Geovani Razo III, MD    Attending Provider       Department, Room/Bed   [facility], W233/1       Discharge Date   12/9/2023    Discharge Disposition   Home or Self Care    Discharge Destination                                 Attending Provider: (none)   Allergies: Dilantin [Phenytoin], Keppra [Levetiracetam], Meperidine, Topamax [Topiramate]    Isolation: None   Infection: COVID Screen (preop/placement) (01/12/22)   Code Status: Prior    Ht: 149.9 cm (59\")   Wt: 93.7 kg (206 lb 9.6 oz)    Admission Cmt: None   Principal Problem: Orthopnea [R06.01]                   Active Insurance as of 12/8/2023       Primary Coverage       Payor Plan Insurance Group Employer/Plan Group    [employer] [place] MEDICARE REPLACEMENT WELLCARE MED ADV PPO        Payor Plan Address Payor Plan Phone Number Payor Plan Fax Number Effective Dates    [address] [address]   11/1/2023 - None Entered    [facility] [address]         Subscriber Name Subscriber Birth Date Member ID       ANTOINETTE PACK 1985 [id number]               Secondary Coverage       Payor Plan Insurance Group Employer/Plan Group    MISC REFERENCE BASED PRICING MISC REFERENCE BASED PRICING [id number]       Coverage Address Coverage Phone Number Coverage Fax Number Effective Dates    [address] [phone]  10/24/2022 - None Entered    [facility]" [id number]         Subscriber Name Subscriber Birth Date Member ID       CHRISTOPHER PACK 8/22/1987 [id number]                     Emergency Contacts        (Rel.) Home Phone Work Phone Mobile Phone    Jonel Iverson (Father) [phone] -- --    Christopher Pack (Spouse) -- -- [phone]                 Discharge Summary        Geovani Razo III, MD at 12/09/23 1403          Discharge Summary    Admit Date: 12/8/2023 10:11 AM    Admit Diagnosis: Orthopnea [R06.01]    Date of Discharge:  12/9/2023    Discharge Diagnosis: Same        Hospital Course  Patient is a 38 y.o. female, admitted yesterday secondary to orthopnea.  Patient doing better. Symptoms have improved. Patient tolerating a regular diet and ambulating without difficulty. Will discharge to home today.         Vital Signs  Temp:  [97.9 °F (36.6 °C)-98.5 °F (36.9 °C)] 98.3 °F (36.8 °C)  Heart Rate:  [82-94] 82  Resp:  [16-18] 16  BP: (122-148)/(65-81) 148/81    Review of Systems    The following systems were reviewed and negative;  ENT, respiratory, cardiovascular, gastrointestinal, genitourinary, breast, endocrine and allergies / immunologic.      Physical Exam:      General Appearance:    Alert, cooperative, in no acute distress   Head:    Normocephalic, without obvious abnormality, atraumatic   Eyes:            Lids and lashes normal, conjunctivae and sclerae normal, no   icterus, no pallor, corneas clear, PERRLA   Ears:    Ears appear intact with no abnormalities noted   Throat:   No oral lesions, no thrush, oral mucosa moist   Neck:   No adenopathy, supple, trachea midline, no thyromegaly, no     carotid bruit, no JVD   Back:     No kyphosis present, no scoliosis present, no skin lesions,       erythema or scars, no tenderness to percussion or                   palpation,   range of motion normal   Lungs:     Clear to auscultation,respirations regular, even and                   unlabored    Heart:    Regular rhythm and normal rate, normal S1  and S2, no            murmur, no gallop, no rub, no click   Breast Exam:    Deferred   Abdomen:     Normal bowel sounds, no masses, no organomegaly, soft        non-tender, non-distended, no guarding, no rebound                 tenderness   Genitalia:    Deferred   Extremities:   Moves all extremities well, no edema, no cyanosis, no              redness   Pulses:   Pulses palpable and equal bilaterally   Skin:   No bleeding, bruising or rash   Lymph nodes:   No palpable adenopathy   Neurologic:   Cranial nerves 2 - 12 grossly intact, sensation intact, DTR        present and equal bilaterally             Condition on Discharge:  Stable    Urine Output Good    Discharge Diet: Regular    Follow-up Appointments  Patient will follow up in clinic next week.        Geovani Razo MD.   12/09/23  14:03 EST    Electronically signed by Geovani Razo III, MD at 12/09/23 3169

## 2023-12-11 NOTE — PAYOR COMM NOTE
"CONTACT:  VLADIMIR CANTRELL MSN, RN  UTILIZATION MANAGEMENT DEPT.  [facility]  [address] [facility], [address]  PHONE:  [phone]  FAX: [phone]    CLINICALS FOR INPATIENT AUTHORIZATION REQUEST    CASE REFERENCE # [id number]    Antoinette Pack (38 y.o. Female)       Date of Birth   1985    Social Security Number       Address   [address] [facility] [address]    Home Phone   [phone]    MRN   [id number]       [place]   None    Marital Status                               Admission Date   12/8/23    Admission Type   Urgent    Admitting Provider   Geovani Razo III, MD    Attending Provider       Department, Room/Bed   [facility], W233/1       Discharge Date   12/9/2023    Discharge Disposition   Home or Self Care    Discharge Destination                                 Attending Provider: (none)   Allergies: Dilantin [Phenytoin], Keppra [Levetiracetam], Meperidine, Topamax [Topiramate]    Isolation: None   Infection: COVID Screen (preop/placement) (01/12/22)   Code Status: Prior    Ht: 149.9 cm (59\")   Wt: 93.7 kg (206 lb 9.6 oz)    Admission Cmt: None   Principal Problem: Orthopnea [R06.01]                   Active Insurance as of 12/8/2023       Primary Coverage       Payor Plan Insurance Group Employer/Plan Group    [employer] [place] MEDICARE REPLACEMENT WELLCARE MED ADV PPO        Payor Plan Address Payor Plan Phone Number Payor Plan Fax Number Effective Dates    [address] [address]   11/1/2023 - None Entered    [facility] [id number]         Subscriber Name Subscriber Birth Date Member ID       ANTOINETTE PACK 1985 [id number]               Secondary Coverage       Payor Plan Insurance Group Employer/Plan Group    MISC REFERENCE BASED PRICING MISC REFERENCE BASED PRICING [id number]       Coverage Address Coverage Phone Number Coverage Fax Number Effective Dates    [address] [phone]  10/24/2022 - None Entered    [facility]" [place] [id number]         Subscriber Name Subscriber Birth Date Member ID       CHRISTOPHER PACK 8/22/1987 [id number]                     Emergency Contacts        (Rel.) Home Phone Work Phone Mobile Phone    Jonel Iverson (Father) [phone] -- --    Christopher Pack (Spouse) -- -- [phone]                 History & Physical        Geovani Razo III, MD at 12/08/23 1129                Chief complaint Orthopnea. Back Pain      History of Present Illness: Patient is a 38 y.o. female who presents with IUP at 15w6d weeks gestation. G 2 P 0010      Past Medical History:   Diagnosis Date    Anxiety     CHF (congestive heart failure) 05/2023    Colitis 2023    Depression     Diabetes mellitus 2018    Diverticulitis     GERD (gastroesophageal reflux disease)     Hypertension     Kidney stone     Pancreatitis 09/2023    PCOS (polycystic ovarian syndrome)     Sleep apnea      Blood Type: O   Fetal Gender: Female    Past Surgical History:   Procedure Laterality Date    CARDIAC CATHETERIZATION      CARPAL TUNNEL RELEASE      COLONOSCOPY      ENDOSCOPY      FRACTURE SURGERY      HARDWARE REMOVAL      WRIST    TENDON REPAIR      HAND     Family History   Problem Relation Age of Onset    Heart disease Mother     COPD Mother     Heart disease Father     COPD Father      Social History     Tobacco Use    Smoking status: Former    Smokeless tobacco: Never   Vaping Use    Vaping Use: Never used   Substance Use Topics    Alcohol use: No    Drug use: No     Medications Prior to Admission   Medication Sig Dispense Refill Last Dose    aspirin (ASPIR) 81 MG EC tablet Take 1 tablet by mouth Daily. 30 tablet 6     Continuous Blood Gluc Sensor (Dexcom G6 Sensor) Use Every 10 (Ten) Days. 3 each 5     Continuous Blood Gluc Transmit (Dexcom G6 Transmitter) misc Use 1 each Every 3 (Three) Months. 1 each 1     folic acid (FOLVITE) 1 MG tablet Take 1 tablet by mouth Daily.       Insulin Aspart (novoLOG) 100 UNIT/ML injection For use in  insulin pump. Max Daily Dose: 200 units daily. 60 mL 5     insulin degludec (Tresiba FlexTouch) 100 UNIT/ML solution pen-injector injection Inject 50 Units under the skin into the appropriate area as directed 2 (Two) Times a Day. (Patient not taking: Reported on 11/15/2023) 90 mL 1     Insulin Disposable Pump (Omnipod 5 G6 Intro, Gen 5,) kit Use 1 kit Take As Directed. 1 kit 0     Insulin Disposable Pump (Omnipod 5 G6 Pod, Gen 5,) misc Use 1 each Every Other Day. 15 each 5     Insulin Pen Needle 32G X 4 MM misc Use to inject insulin up to 4 times daily as directed 400 each 1     labetalol (NORMODYNE) 100 MG tablet Take 1 tablet by mouth 3 times a day. 90 tablet 6     lansoprazole (PREVACID) 30 MG capsule Take 1 capsule by mouth 2 (Two) Times a Day.       mesalamine (CANASA) 1000 MG suppository Insert 1 suppository into the rectum Every Night.       Prenatal Vit-Fe Fumarate-FA (PRENATAL PO) Take 1 tablet by mouth Daily.        Allergies:  Dilantin [phenytoin], Keppra [levetiracetam], Meperidine, and Topamax [topiramate]      Vital Signs  Temp:  [97.8 °F (36.6 °C)] 97.8 °F (36.6 °C)  Heart Rate:  [96] 96  Resp:  [16] 16  BP: (149)/(80) 149/80    Radiology  Imaging Results (Last 24 Hours)       ** No results found for the last 24 hours. **            Labs  Lab Results (last 24 hours)       ** No results found for the last 24 hours. **              Review of Systems    The following systems were reviewed and negative;  ENT, cardiovascular, gastrointestinal, genitourinary, breast, endocrine and allergies / immunologic.    +Orthopnea, Difficulty laying flat    Physical Exam:      General Appearance:    Alert, cooperative, in no acute distress   Head:    Normocephalic, without obvious abnormality, atraumatic   Eyes:            Lids and lashes normal, conjunctivae and sclerae normal, no   icterus, no pallor, corneas clear, PERRLA   Ears:    Ears appear intact with no abnormalities noted   Throat:   No oral lesions, no  thrush, oral mucosa moist   Neck:   No adenopathy, supple, trachea midline, no thyromegaly, no     carotid bruit, no JVD   Back:     No kyphosis present, no scoliosis present, no skin lesions,       erythema or scars, no tenderness to percussion or                   palpation,   range of motion normal   Lungs:     Clear to auscultation,respirations regular, even and                   unlabored    Heart:    Regular rhythm and normal rate, normal S1 and S2, no            murmur, no gallop, no rub, no click   Breast Exam:    Deferred   Abdomen:     Normal bowel sounds, no masses, no organomegaly, soft        non-tender, non-distended, no guarding, no rebound                 tenderness   Genitalia:    Deferred   Extremities:   Moves all extremities well, no edema, no cyanosis, no              redness   Pulses:   Pulses palpable and equal bilaterally   Skin:   No bleeding, bruising or rash   Lymph nodes:   No palpable adenopathy   Neurologic:   Cranial nerves 2 - 12 grossly intact, sensation intact, DTR        present and equal bilaterally         Assessment: Patient is a 38 y.o. female who presents with IUP at 15w6d weeks gestation. G 2 P 0010. Orthopnea. Back Pain.       Plan of Care: Admit. Proceed with hospitalist consult & Cardiology consult       Geovani Razo III, MD  12/08/23  11:29 EST      Electronically signed by Geovani Razo III, MD at 12/08/23 1129       Vital Signs (last day) before discharge       Date/Time Temp Temp src Pulse Resp BP Patient Position SpO2    12/09/23 0909 98.3 (36.8) Oral 82 16 148/81 -- 100    12/09/23 0500 98.1 (36.7) Oral 83 18 122/66 Lying 96    12/09/23 0300 -- -- -- -- -- -- 98    12/09/23 0100 -- -- -- -- -- -- 97    12/08/23 2300 98.5 (36.9) Oral 85 18 124/65 Lying 98    12/08/23 2059 -- -- 82 -- 141/75 -- --    12/08/23 1930 97.9 (36.6) Oral 94 18 132/74 Sitting 97    12/08/23 1609 97.9 (36.6) Oral 87 18 124/66 Lying 97    12/08/23 1300 97.9 (36.6) Oral 94 18 129/58 Lying 100     12/08/23 0900 97.8 (36.6) Oral 96 16 149/80 Lying 94          Intake & Output (last day)       None          Medication Administration Report for Antoinette Pack as of 12/11/23 0910     Legend:    Given Hold Not Given Due Canceled Entry Other Actions    Time Time (Time) Time Time-Action         Discontinued     Completed     Future     MAR Hold     Linked             Medications 12/08/23 12/09/23     Discontinued Medications    acetaminophen (TYLENOL) tablet 650 mg  Dose: 650 mg  Freq: Every 4 Hours PRN Route: PO  PRN Reasons: Mild Pain,Headache  Start: 12/08/23 1024   End: 12/09/23 1610   Admin Instructions:   Based on patient request - if ordered for moderate or severe pain, provider allows for administration of a medication prescribed for a lower pain scale.    Do not exceed 4 grams of acetaminophen in a 24 hr period. Max dose of 2gm for AST/ALT greater than 120 units/L.    If given for pain, use the following pain scale:   Mild Pain = Pain Score of 1-3, CPOT 1-2  Moderate Pain = Pain Score of 4-6, CPOT 3-4  Severe Pain = Pain Score of 7-10, CPOT 5-8    2115-Given                aspirin EC tablet 81 mg  Dose: 81 mg  Freq: Daily Route: PO  Start: 12/08/23 2000   End: 12/09/23 1610   Admin Instructions:   Do not crush or chew the capsules or tablets. The drug may not work as designed if the capsule or tablet is crushed or chewed. Swallow whole.  Do not exceed 4 grams of aspirin in a 24 hr period.    If given for pain, use the following pain scale:   Mild Pain = Pain Score of 1-3, CPOT 1-2  Moderate Pain = Pain Score of 4-6, CPOT 3-4  Severe Pain = Pain Score of 7-10, CPOT 5-8    (2049)-Not Given [C]            0909-Given               bisacodyl (DULCOLAX) suppository 10 mg  Dose: 10 mg  Freq: Daily PRN Route: RE  PRN Reason: Constipation  Start: 12/08/23 1024   End: 12/09/23 1610   Admin Instructions:   Hold for diarrhea         dextrose (D50W) (25 g/50 mL) IV injection 25 g  Dose: 25 g  Freq: Every 15  Minutes PRN Route: IV  PRN Reason: Low Blood Sugar  PRN Comment: Blood Sugar Less Than 70  Start: 12/08/23 1244   End: 12/09/23 1610   Admin Instructions:   Blood sugar less than 70; patient has IV access - Unresponsive, NPO or Unable To Safely Swallow         dextrose (GLUTOSE) oral gel 15 g  Dose: 15 g  Freq: Every 15 Minutes PRN Route: PO  PRN Reason: Low Blood Sugar  PRN Comment: Blood sugar less than 70  Start: 12/08/23 1244   End: 12/09/23 1610   Admin Instructions:   BS<70, Patient Alert, Is not NPO, Can safely swallow.         docusate sodium (COLACE) capsule 100 mg  Dose: 100 mg  Freq: 2 Times Daily PRN Route: PO  PRN Reason: Constipation  PRN Comment: constipation  Start: 12/08/23 1024   End: 12/09/23 1610   Admin Instructions:   Swallow whole.  Do not open, crush, or chew capsule.         famotidine (PEPCID) tablet 20 mg  Dose: 20 mg  Freq: 2 Times Daily PRN Route: PO  PRN Reason: Heartburn  Start: 12/08/23 1024   End: 12/09/23 1610         folic acid (FOLVITE) tablet 1 mg  Dose: 1 mg  Freq: Daily Route: PO  Start: 12/08/23 2000   End: 12/09/23 1610    (2050)-Not Given [C]            0909-Given               glucagon (human recombinant) (GLUCAGEN DIAGNOSTIC) injection 1 mg  Dose: 1 mg  Freq: Every 15 Minutes PRN Route: IM  PRN Reason: Low Blood Sugar  PRN Comment: Blood Glucose Less Than 70  Start: 12/08/23 1244   End: 12/09/23 1610   Admin Instructions:   Blood Glucose Less Than 70 - Patient Without IV Access - Unresponsive, NPO or Unable To Safely Swallow  Reconstitute powder for injection by adding 1 mL of -supplied sterile diluent or sterile water for injection to a vial containing 1 mg of the drug, to provide solutions containing 1 mg/mL. Shake vial gently to dissolve.         insulin patient supplied pump  Freq: Continuous Route: SC  Start: 12/08/23 1945   End: 12/09/23 1610   Admin Instructions:   Continue with patient managed pump.   Order specific questions:   Type of Insulin  Aspart  Type of Pump Other  Bolus Amount (unit(s)) 30  Basal Rate (unit(s)/hr) 5  Correction Factor (units/mL)  (Carb ratio 1.8)      1945                labetalol (NORMODYNE) tablet 100 mg  Dose: 100 mg  Freq: Daily Route: PO  Start: 12/08/23 2000   End: 12/09/23 1610   Admin Instructions:   Hold for SBP less than 100, DBP less than 60, or heart rate less than 50    2059-Given            0909-Given               lidocaine (XYLOCAINE) 1 % injection 0.5 mL  Dose: 0.5 mL  Freq: Once As Needed Route: ID  PRN Comment: IV Start  Start: 12/08/23 1024   End: 12/09/23 1610         mesalamine (CANASA) suppository 1,000 mg  Dose: 1,000 mg  Freq: Nightly Route: RE  Start: 12/08/23 2100   End: 12/09/23 1610   Admin Instructions:   Retain in rectum for at least 1-3 hours.    2053-Given                ondansetron (ZOFRAN) tablet 8 mg  Dose: 8 mg  Freq: Every 8 Hours PRN Route: PO  PRN Reasons: Nausea,Vomiting  Start: 12/08/23 1024   End: 12/09/23 1610   Admin Instructions:   If BOTH ondansetron (ZOFRAN) and promethazine (PHENERGAN) are ordered use ondansetron first and THEN promethazine IF ondansetron is ineffective.  If both oral and IV ordered, offer oral first, unless NPO.        Or  ondansetron (ZOFRAN) injection 4 mg  Dose: 4 mg  Freq: Every 8 Hours PRN Route: IV  PRN Reasons: Nausea,Vomiting  Start: 12/08/23 1024   End: 12/09/23 1610   Admin Instructions:   If BOTH ondansetron (ZOFRAN) and promethazine (PHENERGAN) are ordered use ondansetron first and THEN promethazine IF ondansetron is ineffective.  If both oral and IV ordered, offer oral first, unless NPO.         pantoprazole (PROTONIX) EC tablet 40 mg  Dose: 40 mg  Freq: 2 Times Daily Before Meals Route: PO  Start: 12/08/23 2000   End: 12/09/23 1610   Admin Instructions:   Swallow whole; do not crush, split, or chew.    2100-Given            0635-Given               prenatal vitamin 27-0.8 tablet 1 tablet  Dose: 1 tablet  Freq: Daily Route: PO  Start: 12/08/23 2000    End: 12/09/23 1610    (2119)-Not Given [C]            0909-Given               sodium chloride 0.9 % flush 10 mL  Dose: 10 mL  Freq: As Needed Route: IV  PRN Reason: Line Care  Start: 12/08/23 1024   End: 12/09/23 1610         sodium chloride 0.9 % flush 10 mL  Dose: 10 mL  Freq: Every 12 Hours Scheduled Route: IV  Start: 12/08/23 1115   End: 12/09/23 1610    1115     (2100)-Not Given           (0900)-Not Given               sodium chloride 0.9 % infusion 40 mL  Dose: 40 mL  Freq: As Needed Route: IV  PRN Reason: Line Care  Start: 12/08/23 1024   End: 12/09/23 1610   Admin Instructions:   Following administration of an IV intermittent medication, flush line with 40mL NS at 100mL/hr.                    Lab Results (all)       Procedure Component Value Units Date/Time    POC Glucose Once [[id number]]  (Normal) Collected: 12/09/23 0626    Specimen: Blood Updated: 12/09/23 0642     Glucose 91 mg/dL     POC Glucose Once [[id number]]  (Normal) Collected: 12/08/23 2108    Specimen: Blood Updated: 12/08/23 2115     Glucose 105 mg/dL     COVID-19 and FLU A/B PCR, 1 HR TAT - Swab, Nasopharynx [[id number]]  (Normal) Collected: 12/08/23 1712    Specimen: Swab from Nasopharynx Updated: 12/08/23 1820     COVID19 Not Detected     Influenza A PCR Not Detected     Influenza B PCR Not Detected    Narrative:      Fact sheet for providers: https://www.fda.gov/media/328564/download    Fact sheet for patients: https://www.fda.gov/media/875098/download    Test performed by PCR.    POC Glucose Once [[id number]]  (Normal) Collected: 12/08/23 1649    Specimen: Blood Updated: 12/08/23 1703     Glucose 79 mg/dL     TSH [[id number]]  (Abnormal) Collected: 12/08/23 1220    Specimen: Blood Updated: 12/08/23 1316     TSH 0.022 uIU/mL     Basic Metabolic Panel [[id number]]  (Abnormal) Collected: 12/08/23 1220    Specimen: Blood Updated: 12/08/23 1309     Glucose 113 mg/dL      BUN 10 mg/dL      Creatinine 0.51 mg/dL      Sodium 136 mmol/L      Potassium  3.6 mmol/L      Comment: Slight hemolysis detected by analyzer. Result may be falsely elevated.        Chloride 106 mmol/L      CO2 20.7 mmol/L      Calcium 8.9 mg/dL      BUN/Creatinine Ratio 19.6     Anion Gap 9.3 mmol/L      eGFR 121.2 mL/min/1.73     Narrative:      GFR Normal >60  Chronic Kidney Disease <60  Kidney Failure <15      T4, Free [[id number]]  (Normal) Collected: 12/08/23 1220    Specimen: Blood Updated: 12/08/23 1255     Free T4 1.03 ng/dL     Narrative:      Results may be falsely increased if patient taking Biotin.      CBC & Differential [[id number]]  (Abnormal) Collected: 12/08/23 1239    Specimen: Blood Updated: 12/08/23 1244    Narrative:      The following orders were created for panel order CBC & Differential.  Procedure                               Abnormality         Status                     ---------                               -----------         ------                     CBC Auto Differential[[id number]]        Abnormal            Final result                 Please view results for these tests on the individual orders.    CBC Auto Differential [[id number]]  (Abnormal) Collected: 12/08/23 1239    Specimen: Blood Updated: 12/08/23 1244     WBC 6.39 10*3/mm3      RBC 3.96 10*6/mm3      Hemoglobin 9.8 g/dL      Hematocrit 32.0 %      MCV 80.8 fL      MCH 24.7 pg      MCHC 30.6 g/dL      RDW 14.4 %      RDW-SD 42.1 fl      MPV 10.5 fL      Platelets 136 10*3/mm3      Neutrophil % 73.2 %      Lymphocyte % 21.1 %      Monocyte % 4.1 %      Eosinophil % 0.9 %      Basophil % 0.2 %      Immature Grans % 0.5 %      Neutrophils, Absolute 4.68 10*3/mm3      Lymphocytes, Absolute 1.35 10*3/mm3      Monocytes, Absolute 0.26 10*3/mm3      Eosinophils, Absolute 0.06 10*3/mm3      Basophils, Absolute 0.01 10*3/mm3      Immature Grans, Absolute 0.03 10*3/mm3      nRBC 0.0 /100 WBC     POC Glucose Once [[id number]]  (Normal) Collected: 12/08/23 1219    Specimen: Blood Updated: 12/08/23 1227     Glucose  114 mg/dL           Imaging Results (All)       None          ECG/EMG Results (all)       Procedure Component Value Units Date/Time    Adult Transthoracic Echo Complete W/ Cont if Necessary Per Protocol [[id number]] Resulted: 12/09/23 1336     Updated: 12/09/23 1339     LVIDd 5.7 cm      LVIDs 3.9 cm      IVSd 1.00 cm      LVPWd 1.00 cm      FS 31.6 %      IVS/LVPW 1.00 cm      ESV(cubed) 59.3 ml      LV Sys Vol (BSA corrected) 14.4 cm2      EDV(cubed) 185.2 ml      LV Hester Vol (BSA corrected) 36.2 cm2      LV mass(C)d 226.4 grams      LVOT area 4.2 cm2      LVOT diam 2.30 cm      EDV(MOD-sp4) 67.6 ml      ESV(MOD-sp4) 26.8 ml      SV(MOD-sp4) 40.8 ml      SI(MOD-sp4) 21.8 ml/m2      EF(MOD-sp4) 60.4 %      MV E max florian 153.0 cm/sec      MV A max florian 86.7 cm/sec      MV E/A 1.76     LA ESV Index (BP) 29.0 ml/m2      Med Peak E' Florian 9.3 cm/sec      Lat Peak E' Florian 11.2 cm/sec      Avg E/e' ratio 14.93     TAPSE (>1.6) 1.63 cm      LA dimension (2D)  4.3 cm      Ao pk florian 187.0 cm/sec      Ao max PG 14.0 mmHg      Ao mean PG 8.0 mmHg      Ao V2 VTI 37.8 cm      TR max florian 231.0 cm/sec      TR max PG 21.3 mmHg      RVSP(TR) 31.3 mmHg      RAP systole 10.0 mmHg      PA acc time 0.11 sec      Ao root diam 3.1 cm      ACS 1.90 cm     Narrative:        Left ventricular systolic function is normal. Left ventricular ejection   fraction appears to be 56 - 60%.    The left ventricular cavity is mildly dilated.    Left ventricular diastolic function was normal.    The left atrial cavity is mildly dilated.    Estimated right ventricular systolic pressure from tricuspid   regurgitation is normal (<35 mmHg).            Orders (all)        Start     Ordered    12/09/23 1405  Discontinue IV  Once,   Status:  Canceled         12/09/23 1405    12/09/23 1401  Discharge patient  Once         12/09/23 1400    12/09/23 0642  POC Glucose Once  PROCEDURE ONCE        Comments: Complete no more than 45 minutes prior to patient eating       12/09/23 0626    12/09/23 0000  Discharge Follow-up with Specified Provider: og; 1 Week         12/09/23 1405    12/08/23 2116  POC Glucose Once  PROCEDURE ONCE        Comments: Complete no more than 45 minutes prior to patient eating      12/08/23 2108    12/08/23 2100  mesalamine (CANASA) suppository 1,000 mg  Nightly,   Status:  Discontinued         12/08/23 1812 12/08/23 2000  aspirin EC tablet 81 mg  Daily,   Status:  Discontinued         12/08/23 1851 12/08/23 2000  folic acid (FOLVITE) tablet 1 mg  Daily,   Status:  Discontinued         12/08/23 1851 12/08/23 2000  labetalol (NORMODYNE) tablet 100 mg  Daily,   Status:  Discontinued         12/08/23 1851 12/08/23 2000  pantoprazole (PROTONIX) EC tablet 40 mg  2 Times Daily Before Meals,   Status:  Discontinued         12/08/23 1851 12/08/23 2000  prenatal vitamin 27-0.8 tablet 1 tablet  Daily,   Status:  Discontinued         12/08/23 1851 12/08/23 1945  insulin patient supplied pump  Continuous,   Status:  Discontinued         12/08/23 1851 12/08/23 1704  POC Glucose Once  PROCEDURE ONCE        Comments: Complete no more than 45 minutes prior to patient eating      12/08/23 1649    12/08/23 1700  POC Glucose Finger 4x Daily Before Meals & at Bedtime  4 Times Daily Before Meals & at Bedtime,   Status:  Canceled      Comments: Complete no more than 45 minutes prior to patient eating      12/08/23 1244    12/08/23 1700  POC Glucose 4x Daily Before Meals & at Bedtime  4 Times Daily Before Meals & at Bedtime,   Status:  Canceled      Comments: Complete no more than 45 minutes prior to patient eating      12/08/23 1245    12/08/23 1647  COVID-19 and FLU A/B PCR, 1 HR TAT - Swab, Nasopharynx  Once         12/08/23 1646    12/08/23 1244  Follow Hypoglycemia Standing Orders For Blood Glucose <70 & Notify Provider of Treatment  As Needed,   Status:  Canceled      Comments: Follow Hypoglycemia Orders As Outlined in Process Instructions (Open  Order Report to View Full Instructions)  Notify Provider Any Time Hypoglycemia Treatment is Administered    12/08/23 1245    12/08/23 1244  dextrose (GLUTOSE) oral gel 15 g  Every 15 Minutes PRN,   Status:  Discontinued         12/08/23 1245    12/08/23 1244  dextrose (D50W) (25 g/50 mL) IV injection 25 g  Every 15 Minutes PRN,   Status:  Discontinued         12/08/23 1245    12/08/23 1244  glucagon (human recombinant) (GLUCAGEN DIAGNOSTIC) injection 1 mg  Every 15 Minutes PRN,   Status:  Discontinued         12/08/23 1245    12/08/23 1238  Basic Metabolic Panel  Once         12/08/23 1237    12/08/23 1238  TSH  Once         12/08/23 1237    12/08/23 1237  CBC & Differential  Once         12/08/23 1237    12/08/23 1237  CBC Auto Differential  PROCEDURE ONCE         12/08/23 1237    12/08/23 1228  POC Glucose Once  PROCEDURE ONCE        Comments: Complete no more than 45 minutes prior to patient eating      12/08/23 1219    12/08/23 1200  Vital Signs q 4 while awake  Every 4 Hours,   Status:  Canceled      Comments: While the patient is awake.    12/08/23 1024    12/08/23 1157  T4, Free  STAT         12/08/23 1157    12/08/23 1131  Admit To Obstetrics Inpatient  Once         12/08/23 1131    12/08/23 1119  Adult Transthoracic Echo Complete W/ Cont if Necessary Per Protocol  Once         12/08/23 1119    12/08/23 1115  sodium chloride 0.9 % flush 10 mL  Every 12 Hours Scheduled,   Status:  Discontinued         12/08/23 1024    12/08/23 1025  Inpatient Cardiology Consult  Once        Specialty:  Cardiology  Provider:  (Not yet assigned)    12/08/23 1024    12/08/23 1025  Inpatient Hospitalist Consult  Once        Specialty:  Hospitalist  Provider:  (Not yet assigned)    12/08/23 1024    12/08/23 1025  Code Status and Medical Interventions:  Continuous,   Status:  Canceled         12/08/23 1024    12/08/23 1025  Place Sequential Compression Device  Once,   Status:  Canceled         12/08/23 1024    12/08/23 1025   Maintain Sequential Compression Device  Continuous,   Status:  Canceled         12/08/23 1024    12/08/23 1025  Vital Signs Per hospital policy  Per Hospital Policy,   Status:  Canceled         12/08/23 1024    12/08/23 1025  Intermittent Auscultation FOR LOW RISK PATIENTS - NST on Admission Along With Intermittent Auscultation of Fetal Heart Tones.  Per Order Details,   Status:  Canceled        Comments: Intermittent Auscultation FOR LOW RISK PATIENTS - NST on Admission Along With Intermittent Auscultation of Fetal Heart Tones.    12/08/23 1024    12/08/23 1025  NST Low risk >24 weeks:  Monitor for 30 minutes BID (Antepartum)  Once,   Status:  Canceled        Comments: Fetal monitoring/NST:  Antepartum >24 weeks monitor fetus 30 minutes twice daily    12/08/23 1024    12/08/23 1025  Antepartum Patients  <24 Weeks - Document Fetal Heart Tones Daily and PRN.  Per Order Details,   Status:  Canceled        Comments: For Antepartum Patients Less Than 24 Weeks - Document Fetal Heart Tones Daily & PRN.    12/08/23 1024    12/08/23 1025  Notify Physician (specified)  Until Discontinued,   Status:  Canceled        Comments: /90 x 2.    12/08/23 1024    12/08/23 1025  Notify physician for hyperstimulus (per hospital algorithm)  Until Discontinued,   Status:  Canceled         12/08/23 1024    12/08/23 1025  Notify physician if membranes ruptured, bleeding greater than 1 pad an hour, fetal heart tone abnormality, and severe pain  Until Discontinued,   Status:  Canceled         12/08/23 1024    12/08/23 1025  Up as Tolerated  Until Discontinued,   Status:  Canceled         12/08/23 1024    12/08/23 1025  Insert Peripheral IV  Once,   Status:  Canceled         12/08/23 1024    12/08/23 1025  Saline Lock & Maintain IV Access  Continuous,   Status:  Canceled         12/08/23 1024    12/08/23 1025  Diet: Diabetic Diets; Gestational Consistent Carbohydrate; Texture: Regular Texture (IDDSI 7); Fluid Consistency: Thin (IDDSI 0)   Diet Effective Now,   Status:  Canceled         23 1024    23 1024  sodium chloride 0.9 % flush 10 mL  As Needed,   Status:  Discontinued         23 1024    23 1024  sodium chloride 0.9 % infusion 40 mL  As Needed,   Status:  Discontinued         23 1024    23 1024  lidocaine (XYLOCAINE) 1 % injection 0.5 mL  Once As Needed,   Status:  Discontinued         23 1024    23 1024  acetaminophen (TYLENOL) tablet 650 mg  Every 4 Hours PRN,   Status:  Discontinued         23 1024    23 1024  famotidine (PEPCID) tablet 20 mg  2 Times Daily PRN,   Status:  Discontinued         23 1024    23 1024  ondansetron (ZOFRAN) tablet 8 mg  Every 8 Hours PRN,   Status:  Discontinued        See Hyperspace for full Linked Orders Report.    23 1024    23 1024  ondansetron (ZOFRAN) injection 4 mg  Every 8 Hours PRN,   Status:  Discontinued        See Hyperspace for full Linked Orders Report.    23 1024    23 1024  docusate sodium (COLACE) capsule 100 mg  2 Times Daily PRN,   Status:  Discontinued         23 1024    23 1024  bisacodyl (DULCOLAX) suppository 10 mg  Daily PRN,   Status:  Discontinued         23 1024    --  labetalol (NORMODYNE) 100 MG tablet  Daily         23 1701                     Physician Progress Notes (all)        Rob Haywood PA-C at 23 1355          Patient Identification:  Name:  Antoinette Pack  Age:  38 y.o.  Sex:  female  :  1985  MRN:  [id number]  Visit Number:  [id number]  Primary Care Provider:  Shawna Lambert DO    Length of stay:  1    Subjective/Interval History/Consultants/Procedures     Chief Complaint: No chief complaint on file.      Subjective/Interval History:    38 y.o. female who was admitted on 2023 with orthopnea, back pain    PMH is significant for CAD, CHF, IDDM, ALEJANDRO, HTN, goiter   For complete admission information, please see history  and physical.     Today, the patient was seen and examined with family at bedside. Overall she stated she felt about the same. Appeared comfortable on RA. Continues to have nagging cough. We discussed abnormal TSH and she stated this began when she had Covid about a year ago and endocrinology has been monitoring. She had no new complaints.      Room location at the time of evaluation was [facility].    ----------------------------------------------------------------------------------------------------------------------  Current Hospital Meds:  aspirin, 81 mg, Oral, Daily  folic acid, 1 mg, Oral, Daily  labetalol, 100 mg, Oral, Daily  mesalamine, 1,000 mg, Rectal, Nightly  pantoprazole, 40 mg, Oral, BID AC  prenatal vitamin 27-0.8, 1 tablet, Oral, Daily  sodium chloride, 10 mL, Intravenous, Q12H      insulin,       ----------------------------------------------------------------------------------------------------------------------      Objective     Vital Signs:  Temp:  [97.9 °F (36.6 °C)-98.5 °F (36.9 °C)] 98.3 °F (36.8 °C)  Heart Rate:  [82-94] 82  Resp:  [16-18] 16  BP: (122-148)/(65-81) 148/81  There were no vitals filed for this visit.  There is no height or weight on file to calculate BMI.  No intake or output data in the 24 hours ending 12/09/23 2927  No intake/output data recorded.  Diet: Diabetic Diets; Gestational Consistent Carbohydrate; Texture: Regular Texture (IDDSI 7); Fluid Consistency: Thin (IDDSI 0)  ----------------------------------------------------------------------------------------------------------------------    Physical Exam  Vitals and nursing note reviewed.   Constitutional:       General: She is not in acute distress.  HENT:      Head: Normocephalic and atraumatic.   Eyes:      Extraocular Movements: Extraocular movements intact.      Conjunctiva/sclera: Conjunctivae normal.   Cardiovascular:      Rate and Rhythm: Normal rate.   Pulmonary:      Effort: Pulmonary effort is normal. No  "respiratory distress.   Musculoskeletal:      Right lower leg: No edema.      Left lower leg: No edema.   Skin:     General: Skin is warm and dry.   Neurological:      Mental Status: She is alert. Mental status is at baseline.   Psychiatric:         Mood and Affect: Mood normal.         Behavior: Behavior normal.                ----------------------------------------------------------------------------------------------------------------------  Tele:      ----------------------------------------------------------------------------------------------------------------------      Results from last 7 days   Lab Units 12/08/23  1239   WBC 10*3/mm3 6.39   HEMOGLOBIN g/dL 9.8*   HEMATOCRIT % 32.0*   MCV fL 80.8   MCHC g/dL 30.6*   PLATELETS 10*3/mm3 136*         Results from last 7 days   Lab Units 12/08/23  1220   SODIUM mmol/L 136   POTASSIUM mmol/L 3.6   CHLORIDE mmol/L 106   CO2 mmol/L 20.7*   BUN mg/dL 10   CREATININE mg/dL 0.51*   CALCIUM mg/dL 8.9   GLUCOSE mg/dL 113*   Estimated Creatinine Clearance: 154.6 mL/min (A) (by C-G formula based on SCr of 0.51 mg/dL (L)).  No results found for: \"AMMONIA\"      No results found for: \"BLOODCX\"  No results found for: \"URINECX\"  No results found for: \"WOUNDCX\"  No results found for: \"STOOLCX\"  ----------------------------------------------------------------------------------------------------------------------  Imaging Results (Last 24 Hours)       ** No results found for the last 24 hours. **          ----------------------------------------------------------------------------------------------------------------------   I have reviewed the above laboratory values for 12/09/23    Assessment/Plan     Active Hospital Problems    Diagnosis  POA    **Orthopnea [R06.01]  Yes         ASSESSMENT/PLAN:    IDDM II  Okay to continue with patient supplied omnipod for now  We will monitor FSBG closely and make changes as needed  Hypoglycemia protocol ordered  Blood glucose has been very " well controlled since admission.  She will benefit from close follow up with endocrinology throughout pregnancy     Hx Thyroid goiter  TSH on recent outpatient labs significantly low at 0.007  Repeat TSH low though T4 WNL  Patient noted this was an ongoing issue and endocrinology has been monitoring.   Recommend continued follow up.     Likely Viral URI  Patient reports symptoms present for 9 days, improving for the past 2. Had previously tested negative for Covid/flu- negative again this admission  Stable on RA  Repeat labs reviewed, no leukocytosis or left shift.  She remains stable on RA, afebrile  No further work up planned. Okay for DC from our standpoint.      Orthopnea  CAD  CHF  Cardiology following  TTE repeated this admission.     Pregnancy  Management per primary    Thank you for the consult and allowing us to participate in the care of your patient. Please do not hesitate to call with any questions or concerns.     Attestation: I personally discussed the patient's history of presenting illness, physical examination, assessment, and plan with my attending physician, Dr. Bloom.      Rob Haywood PA-C  23  13:55 EST     Electronically signed by Rob Haywood PA-C at 23 1409       Janey Guerrero MD at 23 1322               LOS: 1 day     Name: Antoinette Pack  Age/Sex: 38 y.o. female  :  1985        PCP: Shawna Lambert DO  REF: No Known Provider    Principal Problem:    Orthopnea      Reason for follow-up: Shortness of breath    Subjective     Subjective     Antoinette Pack is a 38 y.o. female with a past medical history significant for HTN, CHF, 15 weeks 6 days IUP, DM, PCOS, ALEJANDOR, GERD, Diverticulitis, Colitis, and Anxiety/Depression.      Interval History: Patient states her breathing is around the same.  TSH 0.022.  Echocardiogram has been ordered.  Reports she recently has been battling upper respiratory infection but is feeling better from this.   Blood pressure mildly elevated    Vital Signs  Temp:  [97.9 °F (36.6 °C)-98.5 °F (36.9 °C)] 98.3 °F (36.8 °C)  Heart Rate:  [82-94] 82  Resp:  [16-18] 16  BP: (122-148)/(65-81) 148/81  Vital Signs (last 72 hrs)         12/06 0700  12/07 0659 12/07 0700  12/08 0659 12/08 0700 12/09 0659 12/09 0700 12/09 1322   Most Recent      Temp (°F)     97.8 -  98.5      98.3     98.3 (36.8) 12/09 0909    Heart Rate     82 -  96      82     82 12/09 0909    Resp     16 -  18      16     16 12/09 0909    BP     122/66 -  149/80      148/81     148/81 12/09 0909    SpO2 (%)     94 -  100      100     100 12/09 0909          There were no vitals filed for this visit.   There is no height or weight on file to calculate BMI.  No intake or output data in the 24 hours ending 12/09/23 1322  Objective:  Vital signs: (most recent): Blood pressure 148/81, pulse 82, temperature 98.3 °F (36.8 °C), temperature source Oral, resp. rate 16, SpO2 100%, not currently breastfeeding.                Objective       Physical Exam:     General Appearance:    Alert, cooperative, in no acute distress   Head:    Normocephalic, without obvious abnormality, atraumatic   Eyes:            Conjunctivae and sclerae normal, no   icterus, no pallor, corneas clear.   Neck:   No adenopathy, supple, trachea midline, no thyromegaly, no   carotid bruit, no JVD   Lungs:     Clear to auscultation,respirations regular, even and                  unlabored    Heart:    Regular rhythm and normal rate, normal S1 and S2, no            murmur, no gallop, no rub, no click   Chest Wall:    No abnormalities observed   Abdomen:     Normal bowel sounds, no masses, no organomegaly, soft        nontender, nondistended, no guarding, no rebound                tenderness   Extremities:   Moves all extremities well, no edema, no cyanosis, no             redness   Pulses:   Pulses palpable and equal bilaterally   Skin:   No bleeding, bruising or rash       Neurologic:   Alert and  oriented      Results review       Results Review:   Results from last 7 days   Lab Units 12/08/23  1239   WBC 10*3/mm3 6.39   HEMOGLOBIN g/dL 9.8*   PLATELETS 10*3/mm3 136*     Results from last 7 days   Lab Units 12/08/23  1220   SODIUM mmol/L 136   POTASSIUM mmol/L 3.6   CHLORIDE mmol/L 106   CO2 mmol/L 20.7*   BUN mg/dL 10   CREATININE mg/dL 0.51*   CALCIUM mg/dL 8.9   GLUCOSE mg/dL 113*         Lab Results   Component Value Date    INR 0.98 07/01/2023    INR 1.02 05/04/2018    INR 0.92 09/22/2015     Lab Results   Component Value Date    MG 2.0 07/01/2023     Lab Results   Component Value Date    TSH 0.022 (L) 12/08/2023    CHLPL 159 09/22/2015    TRIG 111 09/18/2023    HDL 33 (L) 09/18/2023     (H) 09/18/2023      Imaging Results (Last 48 Hours)       ** No results found for the last 48 hours. **          Lab Results   Component Value Date    BNP 11 09/21/2015              Echo   Results for orders placed during the hospital encounter of 11/08/23    Adult Transthoracic Echo Complete W/ Cont if Necessary Per Protocol    Interpretation Summary    Left ventricular systolic function is normal. Calculated left ventricular EF = 56% Left ventricular ejection fraction appears to be 56 - 60%.    Left ventricular diastolic function was normal.    The left atrial cavity is mild to moderately dilated.    Estimated right ventricular systolic pressure from tricuspid regurgitation is mildly elevated (35-45 mmHg).       I reviewed the patient's new clinical results.    Telemetry: Not on telemetry monitor     Medication Review:   aspirin, 81 mg, Oral, Daily  folic acid, 1 mg, Oral, Daily  labetalol, 100 mg, Oral, Daily  mesalamine, 1,000 mg, Rectal, Nightly  pantoprazole, 40 mg, Oral, BID AC  prenatal vitamin 27-0.8, 1 tablet, Oral, Daily  sodium chloride, 10 mL, Intravenous, Q12H        insulin,         Assessment      Assessment:  Patient is 15 weeks pregnant  History of HFpEF since May 2023 according to the patient  was consistent orthopnea for many months  Recent upper respiratory symptoms with worsening shortness of breath class IIIb  Nonobstructive coronary artery disease with recent stress test negative for ischemia  Essential hypertension  Obstructive sleep apnea compliant with the CPAP  Dyslipidemia  Diabetes mellitus  Hyperthyroidism        Plan     Recommendations:  Echocardiogram with normal systolic function patient not significantly fluid or fluid overloaded  Concern if the symptoms are partly related to hyperthyroidism with low TSH  Patient insisting to be discharged home with recommended follow-up with her primary cardiologist in [place] within a week or 2  Patient was seen by endocrinology previously regarding his thyroid status would also recommend follow-up with endocrinology    I discussed the patient's findings and my recommendations with patient and family    Electronically signed by CICI Porter, 23, 1:22 PM EST.   Electronically signed by Janey Guerrero MD, 23, 1:46 PM EST.      Please note that portions of this note were completed with a voice recognition program.     Electronically signed by Janey Guerrero MD at 23 1347          Consult Notes (all)        Rob Haywood PA-C at 23 1226        Consult Orders    1. Inpatient Hospitalist Consult [[id number]] ordered by Geovani Razo III, MD              Attestation signed by Maeve Bloom MD at 23 3218    I have reviewed this documentation and agree.  Also suggested patient for upright position for at least 30 minutes on our after eating or drinking for possibility of reflux.                         [facility]  CONSULT NOTE     Inpatient Hospitalist Consult  Consult performed by: Rob Haywood PA-C  Consult ordered by: Geovani Razo III, MD          Patient Identification:  Name:  Antoinette Pack  Age:  38 y.o.  Sex:  female  :  1985  MRN:   [id number]  Visit Number:  [id number]  Primary care provider:  Shawna Lambert DO    Length of stay:  0    Subjective     Chief Complaint:  No chief complaint on file.      Requesting Provider: Dr. Razo     Reason for Consultation: IDDM management       History of presenting illness:     Antoinette Pack is a 38 y.o. female admitted by OB secondary to orthopnea and back pain. Her PMHx is significant for CAD, CHF, IDDM, ALEJANDRO, HTN, goiter     Hospitalist services were consulted for diabetes management.      Patient was seen and examined with family member at the bedside. She reports she has not been feeling well for about 9 days though symptoms seem to have improved significantly over the past 2 days. She has had rhinorrhea/congestion, cough, sore throat and mild shortness of breath. She is also significantly orthopneic but denies any increased edema. Was comfortable appearing on RA during exam without increased work of breathing. She denies any sick contacts. No fever or chills. She was seen in clinic and tested negative for covid, flu, and strep. She denied chest pain or palpitations during exam. She is an insulin requiring diabetic. She wears a dexcom and omnipod. Her omnipod is currently set to an automated mode which provides basal and correction dose insulin. She notes her blood sugar is fairly well controlled. She had visit with endocrinology this AM and some adjustments were made. Her last A1c was 7.2.     ---------------------------------------------------------------------------------------------------------------------  Review of Systems   Constitutional:  Negative for chills and fever.   HENT:  Positive for congestion, rhinorrhea and sore throat.    Respiratory:  Positive for cough and shortness of breath.    Cardiovascular:  Negative for chest pain and leg swelling.   Gastrointestinal:  Negative for abdominal pain.   Genitourinary:  Negative for difficulty urinating and dysuria.    Musculoskeletal:  Positive for arthralgias and myalgias.   Skin:  Negative for rash and wound.   Neurological:  Negative for dizziness and light-headedness.          ---------------------------------------------------------------------------------------------------------------------   Past History:  Past Medical History:   Diagnosis Date    Anxiety     CHF (congestive heart failure) 05/2023    Colitis 2023    Depression     Diabetes mellitus 2018    Diverticulitis     GERD (gastroesophageal reflux disease)     Hypertension     Kidney stone     Pancreatitis 09/2023    PCOS (polycystic ovarian syndrome)     Sleep apnea      Past Surgical History:   Procedure Laterality Date    CARDIAC CATHETERIZATION      CARPAL TUNNEL RELEASE      COLONOSCOPY      ENDOSCOPY      FRACTURE SURGERY      HARDWARE REMOVAL      WRIST    TENDON REPAIR      HAND     Family History   Problem Relation Age of Onset    Heart disease Mother     COPD Mother     Heart disease Father     COPD Father      Social History     Socioeconomic History    Marital status:    Tobacco Use    Smoking status: Former    Smokeless tobacco: Never   Vaping Use    Vaping Use: Never used   Substance and Sexual Activity    Alcohol use: No    Drug use: No    Sexual activity: Defer     ---------------------------------------------------------------------------------------------------------------------   Allergies:  Dilantin [phenytoin], Keppra [levetiracetam], Meperidine, and Topamax [topiramate]  ---------------------------------------------------------------------------------------------------------------------   Prior to Admission Medications       Prescriptions Last Dose Informant Patient Reported? Taking?    aspirin (ASPIR) 81 MG EC tablet   No No    Take 1 tablet by mouth Daily.    Continuous Blood Gluc Sensor (Dexcom G6 Sensor)   No No    Use Every 10 (Ten) Days.    Continuous Blood Gluc Transmit (Dexcom G6 Transmitter) misc   No No    Use 1 each Every 3  (Three) Months.    folic acid (FOLVITE) 1 MG tablet   Yes No    Take 1 tablet by mouth Daily.    Insulin Aspart (novoLOG) 100 UNIT/ML injection   No No    For use in insulin pump. Max Daily Dose: 200 units daily.    insulin degludec (Tresiba FlexTouch) 100 UNIT/ML solution pen-injector injection  Pharmacy, Self No No    Inject 50 Units under the skin into the appropriate area as directed 2 (Two) Times a Day.    Patient not taking:  Reported on 11/15/2023    Insulin Disposable Pump (Omnipod 5 G6 Intro, Gen 5,) kit   No No    Use 1 kit Take As Directed.    Insulin Disposable Pump (Omnipod 5 G6 Pod, Gen 5,) misc   No No    Use 1 each Every Other Day.    Insulin Pen Needle 32G X 4 MM [facility]  Pharmacy, Self No No    Use to inject insulin up to 4 times daily as directed    labetalol (NORMODYNE) 100 MG tablet   No No    Take 1 tablet by mouth 3 times a day.    lansoprazole (PREVACID) 30 MG capsule  Pharmacy, Self Yes No    Take 1 capsule by mouth 2 (Two) Times a Day.    mesalamine (CANASA) 1000 MG suppository  Pharmacy, Self Yes No    Insert 1 suppository into the rectum Every Night.    Prenatal Vit-Fe Fumarate-FA (PRENATAL PO)   Yes No    Take 1 tablet by mouth Daily.          ---------------------------------------------------------------------------------------------------------------------     Objective      Procedures:      Hospital Meds:  sodium chloride, 10 mL, Intravenous, Q12H         ---------------------------------------------------------------------------------------------------------------------   Vital Signs:  Temp:  [97.8 °F (36.6 °C)] 97.8 °F (36.6 °C)  Heart Rate:  [96] 96  Resp:  [16] 16  BP: (149)/(80) 149/80  No data found.  SpO2 Percentage    12/08/23 0900   SpO2: 94%     SpO2:  [94 %] 94 %  on   ;   Device (Oxygen Therapy): room air    There is no height or weight on file to calculate BMI.  Wt Readings from Last 3 Encounters:   11/22/23 93.7 kg (206 lb 9.6 oz)   11/15/23 94.8 kg (209 lb)   10/27/23  "93.9 kg (207 lb)      No intake or output data in the 24 hours ending 12/08/23 3662  Diet: Diabetic Diets; Gestational Consistent Carbohydrate; Texture: Regular Texture (IDDSI 7); Fluid Consistency: Thin (IDDSI 0)  ---------------------------------------------------------------------------------------------------------------------   Physical exam:  Physical Exam  Vitals and nursing note reviewed.   Constitutional:       General: She is not in acute distress.  HENT:      Head: Normocephalic and atraumatic.   Eyes:      Extraocular Movements: Extraocular movements intact.      Conjunctiva/sclera: Conjunctivae normal.   Cardiovascular:      Rate and Rhythm: Normal rate and regular rhythm.   Pulmonary:      Effort: Pulmonary effort is normal.      Breath sounds: Normal breath sounds.   Abdominal:      Palpations: Abdomen is soft.   Musculoskeletal:      Right lower leg: No edema.      Left lower leg: No edema.   Skin:     General: Skin is warm and dry.   Neurological:      Mental Status: She is alert. Mental status is at baseline.   Psychiatric:         Mood and Affect: Mood normal.         Behavior: Behavior normal.       ---------------------------------------------------------------------------------------------------------------------     ---------------------------------------------------------------------------------------------------------------------                   Invalid input(s): \"PROT\"Estimated Creatinine Clearance: 175.2 mL/min (A) (by C-G formula based on SCr of 0.45 mg/dL (L)).  No results found for: \"AMMONIA\"    Glucose   Date/Time Value Ref Range Status   12/08/2023 1219 114 70 - 130 mg/dL Final     Lab Results   Component Value Date    HGBA1C 9.30 (H) 09/17/2023     Lab Results   Component Value Date    TSH 0.007 (L) 11/15/2023    FREET4 0.94 05/03/2023       No results found for: \"BLOODCX\"  No results found for: \"URINECX\"  No results found for: \"WOUNDCX\"  No results found for: \"STOOLCX\"  No results " "found for: \"RESPCX\"  Pain Management Panel  More data may exist         Latest Ref Rng & Units 1/5/2019 10/19/2015   Pain Management Panel   Amphetamine, Urine Qual Negative Negative  Negative    Barbiturates Screen, Urine Negative Negative  Negative    Benzodiazepine Screen, Urine Negative Negative  Negative    Buprenorphine, Screen, Urine Negative Negative  -   Cocaine Screen, Urine Negative Negative  Negative    Methadone Screen , Urine Negative Negative  Negative      I have personally reviewed the above laboratory results.   ---------------------------------------------------------------------------------------------------------------------  Imaging Results (Last 7 Days)       ** No results found for the last 168 hours. **          I have personally reviewed the above radiology results.   ----------------------------------------------------------------------------------------------------------------------    Assessment/Plan       IDDM II  Okay to continue with patient supplied omnipod for now  We will monitor FSBG closely and make changes as needed  Hypoglycemia protocol ordered  She will benefit from close follow up with endocrinology throughout pregnancy    Hx Thyroid goiter  TSH on recent outpatient labs significantly low at 0.007  Repeat TSH and T4 pending    Likely Viral URI  Basic labs pending  Patient reports symptoms present for 9 days, improving for the past 2. Had previously tested negative for Covid/flu  Stable on RA  Imaging risk felt to outweigh benefit currently  Would be hesitant to initiate antibiotics unless significant leukocytosis or fever develops  Will continue to monitor closely    Orthopnea  CAD  CHF  Cardiology following  TTE pending    Pregnancy  Management per primary    Thank you for the consult and allowing us to participate in the care of your patient, Hospitalist Services will continue to follow. Please do not hesitate to call with any questions or concerns.      Rob Haywood, " EVANS  12/08/23  12:27 EST    Attestation: I personally discussed the patient's history of presenting illness, physical examination, assessment, and plan with my attending physician, Dr. Bloom.     Electronically signed by Maeve Bloom MD at 12/08/23 1634       Janey Guerrero MD at 12/08/23 1144        Consult Orders    1. Inpatient Cardiology Consult [[id number]] ordered by Geovani Razo III, MD                     [facility]  CONSULT  NOTE      Patient information:  Date of Admit: 12/8/2023  Date of Consult: 12/08/23  Hospitalist/Referring MD:Geovani Razo III, MD;   PCP: Shawna Lambert DO  MRN:  [id number]  Visit Number:  [id number]    LOS: 0  CODE STATUS:  Code Status and Medical Interventions:   Ordered at: 12/08/23 1024     Level Of Support Discussed With:    Patient     Code Status (Patient has no pulse and is not breathing):    CPR (Attempt to Resuscitate)     Medical Interventions (Patient has pulse or is breathing):    Full Support       PROBLEM LIST: Principal Problem:    Orthopnea      Inpatient Cardiology Consult  Consult performed by: Niharika Dean APRN  Consult ordered by: Geovani Razo III, MD        11:44 EST  12/8/2023    General Cardiology Consulting Physician: Dr. Janey Guerrero MD    Assessment    Patient is 15 weeks pregnant  History of HFpEF since May 2023 according to the patient was consistent orthopnea for many months  Recent upper respiratory symptoms with worsening shortness of breath class IIIb  Nonobstructive coronary artery disease with recent stress test negative for ischemia  Essential hypertension  Obstructive sleep apnea compliant with the CPAP  Dyslipidemia  Diabetes mellitus          Recommendations   1.  Patient is not overtly fluid overloaded her most recent echo back in October with normal systolic function we will repeat the echocardiac for further assessment  2.  It was noted that her TSH back on 11/15/2023 was  0.007?  Hyperthyroidism we will repeat the labs  3.  Blood pressure is mildly evaded will recommend starting labetalol if is okay with OB/GYN  4.  Continue the CPAP treatment  5.  No evidence of acute ischemia        Reason for Cardiology consultation:    Subjective Data   ADMISSION INFORMATION:  No chief complaint on file.    History of Present Illness    Antoinette Pack is a 38 y.o. female with a past medical history significant for HTN, CHF, 15 weeks 6 days IUP, DM, PCOS, ALEJANDRO, GERD, Diverticulitis, Colitis, and Anxiety/Depression.      Patient presented to [facility] ([facility] emergency room (ER) on 12/8/2023 with complaints of increased shortness of breath x 1 week.     Cardiology has been consulted for further evaluation and management.     Primary Cardiologist has been Dr. Antoinette Lima and she was last seen in the office on 10/05/2023.     Patient is in room 233 and was examined by Dr. Guerrero.  Patient is lying in bed resting quietly. No acute distress  noted at this time.  She reports increased shortness of breath x 1 week associated with URI symptoms.  She reports seeing her PCP in Wednesday and was negative COVID and FLU.  She reports history of CHF in 05/2023 that had resolved but she has not been able to lay flat since 05/2023.  She reports being able to come off all her previously prescribed meds in May and on her last f/u with Cardiology she was doing great.      ORDERS:   Complete ECHO    Cardiac risk factors:diabetes mellitus, hypercholesterolemia, and Obesity      Last Echo: Results for orders placed during the hospital encounter of 11/08/23    Adult Transthoracic Echo Complete W/ Cont if Necessary Per Protocol    Interpretation Summary    Left ventricular systolic function is normal. Calculated left ventricular EF = 56% Left ventricular ejection fraction appears to be 56 - 60%.    Left ventricular diastolic function was normal.    The left atrial cavity is mild to moderately  dilated.    Estimated right ventricular systolic pressure from tricuspid regurgitation is mildly elevated (35-45 mmHg).         Last Stress: Results for orders placed during the hospital encounter of 09/05/23    Stress test with myocardial perfusion one day    Interpretation Summary  Images from the original result were not included.      A pharmacological stress test was performed using regadenoson.    Findings consistent with a normal ECG stress test.    Myocardial perfusion imaging indicates a normal myocardial perfusion study with no evidence of ischemia.    Subtle fixed anterior wall perfusion defect most likely presents breast on admission due to normal anterior wall motion on the gated scans.    Normal LV cavity size. Normal LV wall motion noted.    Left ventricular ejection fraction is normal (Calculated EF = 60%).    Impressions are consistent with a low risk study.        Last Cath:  none                         Past Medical History:   Diagnosis Date    Anxiety     CHF (congestive heart failure) 05/2023    Colitis 2023    Depression     Diabetes mellitus 2018    Diverticulitis     GERD (gastroesophageal reflux disease)     Hypertension     Kidney stone     Pancreatitis 09/2023    PCOS (polycystic ovarian syndrome)     Sleep apnea      Past Surgical History:   Procedure Laterality Date    CARDIAC CATHETERIZATION      CARPAL TUNNEL RELEASE      COLONOSCOPY      ENDOSCOPY      FRACTURE SURGERY      HARDWARE REMOVAL      WRIST    TENDON REPAIR      HAND     Family History   Problem Relation Age of Onset    Heart disease Mother     COPD Mother     Heart disease Father     COPD Father      Social History     Tobacco Use    Smoking status: Former    Smokeless tobacco: Never   Vaping Use    Vaping Use: Never used   Substance Use Topics    Alcohol use: No    Drug use: No     ALLERGIES: Dilantin [phenytoin], Keppra [levetiracetam], Meperidine, and Topamax [topiramate]    Medications listed below are reported home  medications pulling from within the system:  Medications Prior to Admission   Medication Sig Dispense Refill Last Dose    aspirin (ASPIR) 81 MG EC tablet Take 1 tablet by mouth Daily. 30 tablet 6     Continuous Blood Gluc Sensor (Dexcom G6 Sensor) Use Every 10 (Ten) Days. 3 each 5     Continuous Blood Gluc Transmit (Dexcom G6 Transmitter) misc Use 1 each Every 3 (Three) Months. 1 each 1     folic acid (FOLVITE) 1 MG tablet Take 1 tablet by mouth Daily.       Insulin Aspart (novoLOG) 100 UNIT/ML injection For use in insulin pump. Max Daily Dose: 200 units daily. 60 mL 5     insulin degludec (Tresiba FlexTouch) 100 UNIT/ML solution pen-injector injection Inject 50 Units under the skin into the appropriate area as directed 2 (Two) Times a Day. (Patient not taking: Reported on 11/15/2023) 90 mL 1     Insulin Disposable Pump (Omnipod 5 G6 Intro, Gen 5,) kit Use 1 kit Take As Directed. 1 kit 0     Insulin Disposable Pump (Omnipod 5 G6 Pod, Gen 5,) misc Use 1 each Every Other Day. 15 each 5     Insulin Pen Needle 32G X 4 MM misc Use to inject insulin up to 4 times daily as directed 400 each 1     labetalol (NORMODYNE) 100 MG tablet Take 1 tablet by mouth 3 times a day. 90 tablet 6     lansoprazole (PREVACID) 30 MG capsule Take 1 capsule by mouth 2 (Two) Times a Day.       mesalamine (CANASA) 1000 MG suppository Insert 1 suppository into the rectum Every Night.       Prenatal Vit-Fe Fumarate-FA (PRENATAL PO) Take 1 tablet by mouth Daily.          Review of Systems   Constitutional:  Negative for activity change, appetite change and diaphoresis.   HENT:  Negative for facial swelling and trouble swallowing.    Eyes:  Negative for visual disturbance.   Respiratory:  Positive for shortness of breath.    Cardiovascular:  Negative for chest pain, palpitations and leg swelling.   Gastrointestinal:  Negative for blood in stool, nausea and vomiting.   Endocrine: Negative.    Genitourinary:  Negative for hematuria.   Musculoskeletal:   Negative for gait problem.   Skin:  Negative for color change.   Neurological:  Negative for dizziness, syncope, speech difficulty, weakness, light-headedness and headaches.   Psychiatric/Behavioral:  Negative for agitation and behavioral problems.        Objective Data   Vital Signs  Temp:  [97.8 °F (36.6 °C)] 97.8 °F (36.6 °C)  Heart Rate:  [96] 96  Resp:  [16] 16  BP: (149)/(80) 149/80  Device (Oxygen Therapy): room air  Vital Signs (last 72 hrs)         12/05 0700  12/06 0659 12/06 0700  12/07 0659 12/07 0700  12/08 0659 12/08 0700  12/08 1144   Most Recent      Temp (°F)         97.8     97.8 (36.6) 12/08 0900    Heart Rate         96     96 12/08 0900    Resp         16     16 12/08 0900    BP         149/80     149/80 12/08 0900    SpO2 (%)         94     94 12/08 0900          BMI:   There is no height or weight on file to calculate BMI.  WEIGHT:  Wt Readings from Last 3 Encounters:   11/22/23 93.7 kg (206 lb 9.6 oz)   11/15/23 94.8 kg (209 lb)   10/27/23 93.9 kg (207 lb)     DIET:  Diet: Diabetic Diets; Gestational Consistent Carbohydrate; Texture: Regular Texture (IDDSI 7); Fluid Consistency: Thin (IDDSI 0)  I&O:  Intake & Output (last 3 days)       None            Physical Exam  Constitutional:       Appearance: Normal appearance.   HENT:      Head: Normocephalic and atraumatic.      Nose: Nose normal.      Mouth/Throat:      Mouth: Mucous membranes are moist.      Pharynx: Oropharynx is clear.   Eyes:      Conjunctiva/sclera: Conjunctivae normal.   Cardiovascular:      Rate and Rhythm: Normal rate and regular rhythm.      Pulses: Normal pulses.      Heart sounds: Normal heart sounds.   Pulmonary:      Effort: Pulmonary effort is normal.      Breath sounds: Wheezing present.   Abdominal:      General: Bowel sounds are normal.      Palpations: Abdomen is soft.   Musculoskeletal:         General: Normal range of motion.      Cervical back: Normal range of motion.      Right lower leg: Edema present.       "Left lower leg: Edema present.      Comments: Trace edema BLE    Skin:     General: Skin is warm and dry.   Neurological:      General: No focal deficit present.      Mental Status: She is alert and oriented to person, place, and time.   Psychiatric:         Mood and Affect: Mood normal.         Behavior: Behavior normal.         Results review   Results Review:    I have reviewed the patient's new clinical results. 12/08/23 11:44 EST        Lab Results   Component Value Date    PROBNP <36.0 11/15/2023             Lab Results   Component Value Date    MG 2.0 07/01/2023     Lab Results   Component Value Date    CHOL 173 09/18/2023    TRIG 111 09/18/2023    HDL 33 (L) 09/18/2023     (H) 09/18/2023     Estimated Creatinine Clearance: 175.2 mL/min (A) (by C-G formula based on SCr of 0.45 mg/dL (L)).  Lab Results   Component Value Date    HGBA1C 9.30 (H) 09/17/2023    HGBA1C 7.1 07/20/2023    HGBA1C 6.8 03/15/2023     Lab Results   Component Value Date    INR 0.98 07/01/2023    INR 1.02 05/04/2018    INR 0.92 09/22/2015     No components found for: \"DIG\"  Lab Results   Component Value Date    TSH 0.007 (L) 11/15/2023      No results found for: \"URICACID\"  Pain Management Panel  More data may exist         Latest Ref Rng & Units 1/5/2019 10/19/2015   Pain Management Panel   Amphetamine, Urine Qual Negative Negative  Negative    Barbiturates Screen, Urine Negative Negative  Negative    Benzodiazepine Screen, Urine Negative Negative  Negative    Buprenorphine, Screen, Urine Negative Negative  -   Cocaine Screen, Urine Negative Negative  Negative    Methadone Screen , Urine Negative Negative  Negative      Microbiology Results (last 10 days)       ** No results found for the last 240 hours. **           No results found for: \"BLOODCX\"      ECG/EMG Results (last 24 hours)       ** No results found for the last 24 hours. **          TELEMETRY:   Patient is not on continuous cardia monitor at this time      RADIOLOGY " STUDIES:  Imaging Results (Last 72 Hours)       ** No results found for the last 72 hours. **            ALLERGIES: Dilantin [phenytoin], Keppra [levetiracetam], Meperidine, and Topamax [topiramate]    CURRENT MEDICATIONS:  Current list of medications may not reflect those currently placed in orders that are not signed or are being held.     sodium chloride, 10 mL, Intravenous, Q12H           acetaminophen    bisacodyl    docusate sodium    famotidine    lidocaine    ondansetron **OR** ondansetron    sodium chloride    sodium chloride        Thank you very much for asking us to be involved in this patient's care.  We will follow along with you. Please do not hesitate to call for any questions or concerns.     I have discussed the patients findings and recommendations with patient.    Electronically signed by CICI Greene, 12/08/23, 11:54 AM EST.   Electronically signed by Janey Guerrero MD, 12/08/23, 1:30 PM EST.                        Please note that portions of this note were copied and has been reviewed and is accurate as of 12/8/2023 .      Please note that portions of this note were completed with a voice recognition program.     Electronically signed by Janey Guerrero MD at 12/08/23 1333          Discharge Summary        Geovani Razo III, MD at 12/09/23 1403          Discharge Summary    Admit Date: 12/8/2023 10:11 AM    Admit Diagnosis: Orthopnea [R06.01]    Date of Discharge:  12/9/2023    Discharge Diagnosis: Same        Hospital Course  Patient is a 38 y.o. female, admitted yesterday secondary to orthopnea.  Patient doing better. Symptoms have improved. Patient tolerating a regular diet and ambulating without difficulty. Will discharge to home today.         Vital Signs  Temp:  [97.9 °F (36.6 °C)-98.5 °F (36.9 °C)] 98.3 °F (36.8 °C)  Heart Rate:  [82-94] 82  Resp:  [16-18] 16  BP: (122-148)/(65-81) 148/81    Review of Systems    The following systems were reviewed and negative;  ENT,  respiratory, cardiovascular, gastrointestinal, genitourinary, breast, endocrine and allergies / immunologic.      Physical Exam:      General Appearance:    Alert, cooperative, in no acute distress   Head:    Normocephalic, without obvious abnormality, atraumatic   Eyes:            Lids and lashes normal, conjunctivae and sclerae normal, no   icterus, no pallor, corneas clear, PERRLA   Ears:    Ears appear intact with no abnormalities noted   Throat:   No oral lesions, no thrush, oral mucosa moist   Neck:   No adenopathy, supple, trachea midline, no thyromegaly, no     carotid bruit, no JVD   Back:     No kyphosis present, no scoliosis present, no skin lesions,       erythema or scars, no tenderness to percussion or                   palpation,   range of motion normal   Lungs:     Clear to auscultation,respirations regular, even and                   unlabored    Heart:    Regular rhythm and normal rate, normal S1 and S2, no            murmur, no gallop, no rub, no click   Breast Exam:    Deferred   Abdomen:     Normal bowel sounds, no masses, no organomegaly, soft        non-tender, non-distended, no guarding, no rebound                 tenderness   Genitalia:    Deferred   Extremities:   Moves all extremities well, no edema, no cyanosis, no              redness   Pulses:   Pulses palpable and equal bilaterally   Skin:   No bleeding, bruising or rash   Lymph nodes:   No palpable adenopathy   Neurologic:   Cranial nerves 2 - 12 grossly intact, sensation intact, DTR        present and equal bilaterally             Condition on Discharge:  Stable    Urine Output Good    Discharge Diet: Regular    Follow-up Appointments  Patient will follow up in clinic next week.        Geovani Razo MD.   12/09/23  14:03 EST    Electronically signed by Geovani Razo III, MD at 12/09/23 0524

## 2023-12-13 ENCOUNTER — TELEPHONE (OUTPATIENT)
Dept: OBSTETRICS AND GYNECOLOGY | Facility: HOSPITAL | Age: 38
End: 2023-12-13
Payer: MEDICARE

## 2023-12-14 ENCOUNTER — OFFICE VISIT (OUTPATIENT)
Dept: OBSTETRICS AND GYNECOLOGY | Facility: HOSPITAL | Age: 38
End: 2023-12-14
Payer: COMMERCIAL

## 2023-12-14 ENCOUNTER — HOSPITAL ENCOUNTER (OUTPATIENT)
Dept: WOMENS IMAGING | Facility: HOSPITAL | Age: 38
Discharge: HOME | End: 2023-12-14
Admitting: OBSTETRICS & GYNECOLOGY
Payer: COMMERCIAL

## 2023-12-14 VITALS — DIASTOLIC BLOOD PRESSURE: 70 MMHG | BODY MASS INDEX: 42.21 KG/M2 | WEIGHT: 209 LBS | SYSTOLIC BLOOD PRESSURE: 133 MMHG

## 2023-12-14 DIAGNOSIS — O10.919 CHRONIC HYPERTENSION AFFECTING PREGNANCY: ICD-10-CM

## 2023-12-14 DIAGNOSIS — E11.65 PRE-EXISTING TYPE 2 DIABETES MELLITUS WITH HYPERGLYCEMIA DURING PREGNANCY IN SECOND TRIMESTER: Primary | ICD-10-CM

## 2023-12-14 DIAGNOSIS — G47.30 SLEEP APNEA IN ADULT: ICD-10-CM

## 2023-12-14 DIAGNOSIS — Z86.79 HISTORY OF CHF (CONGESTIVE HEART FAILURE): ICD-10-CM

## 2023-12-14 DIAGNOSIS — Z3A.12 12 WEEKS GESTATION OF PREGNANCY: ICD-10-CM

## 2023-12-14 DIAGNOSIS — Z87.19 HISTORY OF PANCREATITIS: ICD-10-CM

## 2023-12-14 DIAGNOSIS — O24.111 PRE-EXISTING TYPE 2 DIABETES MELLITUS WITH HYPERGLYCEMIA DURING PREGNANCY IN FIRST TRIMESTER: ICD-10-CM

## 2023-12-14 DIAGNOSIS — E66.01 MORBID OBESITY WITH BMI OF 40.0-44.9, ADULT: ICD-10-CM

## 2023-12-14 DIAGNOSIS — E11.65 PRE-EXISTING TYPE 2 DIABETES MELLITUS WITH HYPERGLYCEMIA DURING PREGNANCY IN FIRST TRIMESTER: ICD-10-CM

## 2023-12-14 DIAGNOSIS — O24.112 PRE-EXISTING TYPE 2 DIABETES MELLITUS WITH HYPERGLYCEMIA DURING PREGNANCY IN SECOND TRIMESTER: Primary | ICD-10-CM

## 2023-12-14 DIAGNOSIS — G47.33 OBSTRUCTIVE SLEEP APNEA SYNDROME: ICD-10-CM

## 2023-12-14 PROCEDURE — 76815 OB US LIMITED FETUS(S): CPT

## 2023-12-14 NOTE — PROGRESS NOTES
Patient denies bleeding, leaking fluid or cramping  NIPT negative  Patients next follow up with Dr. Razo is 12/19/2023     Patient with SOB.  Resting pulse ox on room air was 98%.  Ambulated patient around unit.  Pulse ox remained between 98% and 100%.  After ambulating sitting in chair pulse ox at 98%.

## 2023-12-15 NOTE — PAYOR COMM NOTE
"[facility]  NPI:[id number]    Utilization Review  Contact: Jessica Carballo RN  Phone: [phone]  Fax:[phone]    DISCHARGE NOTIFICATION   Pending ref# [id number]       Rafita Pack (38 y.o. Female)       Date of Birth   1985    Social Security Number       Address   [address] [id number]    Home Phone   [phone]    MRN   [id number]       Yazidism   None    Marital Status                               Admission Date   12/8/23    Admission Type   Urgent    Admitting Provider   Geovani Razo III, MD    Attending Provider       Department, Room/Bed   [facility], W233/1       Discharge Date   12/9/2023    Discharge Disposition   Home or Self Care    Discharge Destination                                 Attending Provider: (none)   Allergies: Dilantin [Phenytoin], Keppra [Levetiracetam], Meperidine, Topamax [Topiramate]    Isolation: None   Infection: COVID Screen (preop/placement) (01/12/22)   Code Status: Prior    Ht: 149.9 cm (59\")   Wt: 94.8 kg (209 lb)    Admission Cmt: None   Principal Problem: Orthopnea [R06.01]                   Active Insurance as of 12/8/2023       Primary Coverage       Payor Plan Insurance Group Employer/Plan Group    [employer]LL[employer] [place] MEDICARE REPLACEMENT WELLCARE MED ADV PPO        Payor Plan Address Payor Plan Phone Number Payor Plan Fax Number Effective Dates    [address] [address]   11/1/2023 - None Entered    [facility] [address]         Subscriber Name Subscriber Birth Date Member ID       RAFITA PACK 1985 [id number]               Secondary Coverage       Payor Plan Insurance Group Employer/Plan Group    MISC REFERENCE BASED PRICING MISC REFERENCE BASED PRICING [id number]       Coverage Address Coverage Phone Number Coverage Fax Number Effective Dates    [address] [phone]  10/24/2022 - None Entered    [facility] [id number]         Subscriber Name Subscriber Birth Date " Member ID       CHRISTOPHER PACK 8/22/1987 [id number]                     Emergency Contacts        (Rel.) Home Phone Work Phone Mobile Phone    Jonel Iverson (Father) [phone] -- --    Christopher Pack (Spouse) -- -- [phone]                 Discharge Summary        Geovani Razo III, MD at 12/09/23 1403          Discharge Summary    Admit Date: 12/8/2023 10:11 AM    Admit Diagnosis: Orthopnea [R06.01]    Date of Discharge:  12/9/2023    Discharge Diagnosis: Same        Hospital Course  Patient is a 38 y.o. female, admitted yesterday secondary to orthopnea.  Patient doing better. Symptoms have improved. Patient tolerating a regular diet and ambulating without difficulty. Will discharge to home today.         Vital Signs  Temp:  [97.9 °F (36.6 °C)-98.5 °F (36.9 °C)] 98.3 °F (36.8 °C)  Heart Rate:  [82-94] 82  Resp:  [16-18] 16  BP: (122-148)/(65-81) 148/81    Review of Systems    The following systems were reviewed and negative;  ENT, respiratory, cardiovascular, gastrointestinal, genitourinary, breast, endocrine and allergies / immunologic.      Physical Exam:      General Appearance:    Alert, cooperative, in no acute distress   Head:    Normocephalic, without obvious abnormality, atraumatic   Eyes:            Lids and lashes normal, conjunctivae and sclerae normal, no   icterus, no pallor, corneas clear, PERRLA   Ears:    Ears appear intact with no abnormalities noted   Throat:   No oral lesions, no thrush, oral mucosa moist   Neck:   No adenopathy, supple, trachea midline, no thyromegaly, no     carotid bruit, no JVD   Back:     No kyphosis present, no scoliosis present, no skin lesions,       erythema or scars, no tenderness to percussion or                   palpation,   range of motion normal   Lungs:     Clear to auscultation,respirations regular, even and                   unlabored    Heart:    Regular rhythm and normal rate, normal S1 and S2, no            murmur, no gallop, no rub, no  click   Breast Exam:    Deferred   Abdomen:     Normal bowel sounds, no masses, no organomegaly, soft        non-tender, non-distended, no guarding, no rebound                 tenderness   Genitalia:    Deferred   Extremities:   Moves all extremities well, no edema, no cyanosis, no              redness   Pulses:   Pulses palpable and equal bilaterally   Skin:   No bleeding, bruising or rash   Lymph nodes:   No palpable adenopathy   Neurologic:   Cranial nerves 2 - 12 grossly intact, sensation intact, DTR        present and equal bilaterally             Condition on Discharge:  Stable    Urine Output Good    Discharge Diet: Regular    Follow-up Appointments  Patient will follow up in clinic next week.        Geovani Razo MD.   12/09/23  14:03 EST    Electronically signed by Geovani Razo III, MD at 12/09/23 4553

## 2023-12-19 NOTE — ASSESSMENT & PLAN NOTE
Reports good control. Has follow up with Endocrine scheduled.   Will be turning in baseline 24 hour urine kit

## 2023-12-19 NOTE — ASSESSMENT & PLAN NOTE
Patient admitted for rule out heart failure to [place] 12/8 - 12/9. Normal echo at that time. Has follow up with Cardiology.   Here vital signs are normal with normal ambulating pulse ox

## 2023-12-19 NOTE — PROGRESS NOTES
"    Maternal/Fetal Medicine Follow Up Note     Name: Antoinette Pack    : 1985     MRN: [id number]     Referring Provider: Geovani Razo III, MD    Chief Complaint  Advanced Maternal Age  T2DM   H/o diastolic heart failure   CHTN     Subjective     History of Present Illness:  Antoinette Pack is a 38 y.o.  17w3d who presents today for follow up in the setting of AMA, CHTN, diastolic heart failure history, T2DM   Was hospitalized - for viral respiratory illness though concern was for CHF. Echo was normal at that time. Patient reports feeling much improved though still with some residual SOA and dry cough. No orthopnea. Some swelling.        ANDRE: Estimated Date of Delivery: 24     ROS:   As noted in HPI.     Objective     Vital Signs  /70   Wt 94.8 kg (209 lb)   LMP  (LMP Unknown)   Estimated body mass index is 42.21 kg/m² as calculated from the following:    Height as of 23: 149.9 cm (59\").    Weight as of this encounter: 94.8 kg (209 lb).    Ultrasound Impression:   See viewpoint      Assessment and Plan     Antoinette Pack is a 38 y.o.  17w3d     Diagnoses and all orders for this visit:    1. Pre-existing type 2 diabetes mellitus with hyperglycemia during pregnancy in second trimester (Primary)  Assessment & Plan:  Reports good control. Has follow up with Endocrine scheduled.   Will be turning in baseline 24 hour urine kit         2. Obstructive sleep apnea syndrome  Assessment & Plan:  Using BiPap       3. History of CHF (congestive heart failure)  Assessment & Plan:  Patient admitted for rule out heart failure to [place]  - . Normal echo at that time. Has follow up with Cardiology.   Here vital signs no      4. Chronic hypertension affecting pregnancy  Assessment & Plan:  Stable on Labetalol 100 mg BID though its prescribed TID (she is going to increase)            Follow Up  4 weeks     I spent 30 minutes caring for the patient on the day of " service. This included: obtaining or reviewing a separately obtained medical history, reviewing patient records, performing a medically appropriate exam and/or evaluation, counseling or educating the patient/family/caregiver, ordering medications, labs, and/or procedures and documenting such in the medical record. This does not include time spent on review and interpretation of other tests such as fetal ultrasound or the performance of other procedures such as amniocentesis or CVS.    Rosmery Orellana MD FACOG  Maternal Fetal Medicine, [facility]     2023

## 2023-12-20 ENCOUNTER — TELEPHONE (OUTPATIENT)
Dept: OBSTETRICS AND GYNECOLOGY | Facility: HOSPITAL | Age: 38
End: 2023-12-20
Payer: MEDICARE

## 2023-12-20 NOTE — TELEPHONE ENCOUNTER
"Attempted to speak with patient over the phone but received voice mail.  Message left with call back number and stating a detailed [place] message is being sent.  Dr. Fernandez reviewed patients blood sugar report and s not making any changes this week.  She states \"These look really good!\".  Hetal Marie RN  "

## 2023-12-20 NOTE — PROGRESS NOTES
"Enter Query Response Below      Query Response: Orthopnea due to upper respiratory infection              If applicable, please update the problem list.     Patient: Antoinette Pack        : 1985  Account: [id number]           Admit Date: 2023        How to Respond to this query:       a. Click New Note     b. Answer query within the yellow box.                c. Update the Problem List, if applicable.      If you have any questions about this query contact me at: [email or web address]     Dr. Razo    38-year-old female with IUP at 15w6d admitted 23 with \"orthopnea\" per the H&P and dc summary, past medical history of HFpEF.   cardiology consult \"  She reports increased shortness of breath x 1 week associated  with URI symptoms.  She reports seeing her PCP in Wednesday and was negative COVID and FLU.  She reports history of CHF in 2023 that had resolved but she has not been able to lay flat since 2023\".  hospitalist \"She reports she has not been feeling well for about 9 days though symptoms seem to have improved significantly over the past 2 days. She has had rhinorrhea/congestion, cough, sore throat  and mild shortness of breath. She is also significantly orthopneic but denies any increased edema. \"    Please clarify the etiology of the patients orthopnea:  Orthopnea due to upper respiratory infection  Orthopnea due to ________  Other- specify_____  Unable to determine    By submitting this query, we are merely seeking further clarification of documentation to accurately reflect all conditions that you are monitoring, evaluating, treating or that extend the hospitalization or utilize additional resources of care. Please utilize your independent clinical judgment when addressing the question(s) above.     This query and your response, once completed, will be entered into the legal medical record.    Sincerely,  Ekaterina Minaya MSN, RN, CCDS  Clinical Documentation Integrity Program "

## 2023-12-21 ENCOUNTER — OFFICE VISIT (OUTPATIENT)
Dept: ENDOCRINOLOGY | Facility: CLINIC | Age: 38
End: 2023-12-21
Payer: MEDICARE

## 2023-12-21 ENCOUNTER — SPECIALTY PHARMACY (OUTPATIENT)
Dept: ENDOCRINOLOGY | Facility: CLINIC | Age: 38
End: 2023-12-21
Payer: MEDICARE

## 2023-12-21 VITALS
HEART RATE: 86 BPM | BODY MASS INDEX: 42.05 KG/M2 | DIASTOLIC BLOOD PRESSURE: 68 MMHG | SYSTOLIC BLOOD PRESSURE: 110 MMHG | WEIGHT: 208.6 LBS | OXYGEN SATURATION: 98 % | HEIGHT: 59 IN

## 2023-12-21 DIAGNOSIS — O24.112 PRE-EXISTING TYPE 2 DIABETES MELLITUS WITH HYPERGLYCEMIA DURING PREGNANCY IN SECOND TRIMESTER: Primary | ICD-10-CM

## 2023-12-21 DIAGNOSIS — E11.65 PRE-EXISTING TYPE 2 DIABETES MELLITUS WITH HYPERGLYCEMIA DURING PREGNANCY IN SECOND TRIMESTER: Primary | ICD-10-CM

## 2023-12-21 NOTE — PROGRESS NOTES
Chief Complaint   Patient presents with    Diabetes        Antoinette Pack is a 38 y.o. female had concerns including Diabetes.   T2DM    She is currently 17w6d pregnant.  She recently had an US and was noted to be normal and doing well.  She has been noting increased BG's and has been having to administer additional boluses with her regular meal time boluses to keep her BG's in range.   She is having a GIRL. She has been doing well and denies any issues.    Birth state: [place]  Previous history of radiation to face/neck: none  Consuming foods high in iodine: no  Family history of thyroid complications:none    The following portions of the patient's history were reviewed and updated as appropriate: allergies, current medications, past family history, past medical history, past social history, past surgical history and problem list.    History:  Diabetes was diagnosed 8-9 years ago.  Complications include none.  Last ophtho exam was 2021, Dr. Bruno Office.  Current medications for diabetes include uses Novolog in an Omnipod 5 insulin pump.  Past meds: Trulicity, GI Issues, has problems with yeast, Glipizide-unsure why it was stopped  She checks her BG's with Dexcom CGM.  Hypos: rarely  Fasting range: 150-200+  Current pump settings:  Basal:   12A: 3 u/hr  CR: 1:1.6  ISF: 18    Diet: has made improvements to her diet.    Past Medical History:   Diagnosis Date    Anxiety     CHF (congestive heart failure) 05/2023    Colitis 2023    Depression     Diabetes mellitus 2018    Diverticulitis     GERD (gastroesophageal reflux disease)     Hypertension     Kidney stone     Pancreatitis 09/2023    PCOS (polycystic ovarian syndrome)     Sleep apnea      Past Surgical History:   Procedure Laterality Date    CARDIAC CATHETERIZATION      CARPAL TUNNEL RELEASE      COLONOSCOPY      ENDOSCOPY      FRACTURE SURGERY      HARDWARE REMOVAL      WRIST    TENDON REPAIR      HAND      Family History   Problem Relation Age of Onset     Heart disease Mother     COPD Mother     Heart disease Father     COPD Father       Social History     Socioeconomic History    Marital status:    Tobacco Use    Smoking status: Former    Smokeless tobacco: Never   Vaping Use    Vaping Use: Never used   Substance and Sexual Activity    Alcohol use: No    Drug use: No    Sexual activity: Defer      Allergies   Allergen Reactions    Dilantin [Phenytoin] Mental Status Change    Keppra [Levetiracetam] Mental Status Change    Meperidine Rash    Topamax [Topiramate] Mental Status Change      Current Outpatient Medications on File Prior to Visit   Medication Sig Dispense Refill    aspirin (ASPIR) 81 MG EC tablet Take 1 tablet by mouth Daily. 30 tablet 6    Continuous Blood Gluc Sensor (Dexcom G6 Sensor) Use Every 10 (Ten) Days. 3 each 5    Continuous Blood Gluc Transmit (Dexcom G6 Transmitter) misc Use 1 each Every 3 (Three) Months. 1 each 1    folic acid (FOLVITE) 1 MG tablet Take 1 tablet by mouth Daily.      Insulin Aspart (novoLOG) 100 UNIT/ML injection For use in insulin pump. Max Daily Dose: 200 units daily. 60 mL 5    Insulin Disposable Pump (Omnipod 5 G6 Intro, Gen 5,) kit Use 1 kit Take As Directed. 1 kit 0    Insulin Disposable Pump (Omnipod 5 G6 Pod, Gen 5,) misc Use 1 each Every Other Day. 15 each 5    Insulin Pen Needle 32G X 4 MM misc Use to inject insulin up to 4 times daily as directed 400 each 1    labetalol (NORMODYNE) 100 MG tablet Take 1 tablet by mouth Daily.      lansoprazole (PREVACID) 30 MG capsule Take 1 capsule by mouth 2 (Two) Times a Day.      Prenatal Vit-Fe Fumarate-FA (PRENATAL PO) Take 1 tablet by mouth Daily.       No current facility-administered medications on file prior to visit.      Review of Systems   Constitutional:  Positive for diaphoresis and fatigue. Negative for unexpected weight gain and unexpected weight loss.   HENT:          Neck tenderness   Eyes:  Positive for blurred vision and visual disturbance.   Cardiovascular:  " Positive for palpitations.   Gastrointestinal:  Positive for constipation.   Endocrine: Positive for heat intolerance, polydipsia, polyphagia and polyuria. Negative for cold intolerance.   Skin:  Positive for dry skin.        Hair thinning   Neurological:  Positive for tremors.   Psychiatric/Behavioral:  Positive for agitation and sleep disturbance. The patient is nervous/anxious.    All other systems reviewed and are negative.     /68 (BP Location: Left arm, Patient Position: Sitting, Cuff Size: Adult)   Pulse 86   Ht 149.9 cm (59\")   Wt 94.6 kg (208 lb 9.6 oz)   LMP  (LMP Unknown)   SpO2 98%   BMI 42.13 kg/m²      Physical Exam  Vitals reviewed.   Constitutional:       Appearance: Normal appearance.   Eyes:      Extraocular Movements: Extraocular movements intact.   Cardiovascular:      Rate and Rhythm: Normal rate.   Pulmonary:      Effort: Pulmonary effort is normal.   Skin:     General: Skin is warm.   Neurological:      General: No focal deficit present.      Mental Status: She is alert and oriented to person, place, and time.   Psychiatric:         Mood and Affect: Mood normal.         Behavior: Behavior normal.         Thought Content: Thought content normal.         Judgment: Judgment normal.        Labs/Imaging  CMP  Lab Results   Component Value Date    GLUCOSE 113 (H) 12/08/2023    BUN 10 12/08/2023    CREATININE 0.51 (L) 12/08/2023    EGFRIFNONA 101 12/27/2020    EGFRIFAFRI  09/05/2016      Comment:      <15 Indicative of kidney failure.    BCR 19.6 12/08/2023    K 3.6 12/08/2023    CO2 20.7 (L) 12/08/2023    CALCIUM 8.9 12/08/2023    ALBUMIN 4.3 10/27/2023    LABIL2 1.3 (L) 02/05/2016    AST 13 11/15/2023    ALT 12 11/15/2023        CBC w/DIFF   Lab Results   Component Value Date    WBC 6.39 12/08/2023    RBC 3.96 12/08/2023    HGB 9.8 (L) 12/08/2023    HCT 32.0 (L) 12/08/2023    MCV 80.8 12/08/2023    MCH 24.7 (L) 12/08/2023    MCHC 30.6 (L) 12/08/2023    RDW 14.4 12/08/2023    RDWSD 42.1 " "2023    MPV 10.5 2023     (L) 2023    NEUTRORELPCT 73.2 2023    LYMPHORELPCT 21.1 2023    MONORELPCT 4.1 (L) 2023    EOSRELPCT 0.9 2023    BASORELPCT 0.2 2023    AUTOIGPER 0.5 2023    NEUTROABS 4.68 2023    LYMPHSABS 1.35 2023    MONOSABS 0.26 2023    EOSABS 0.06 2023    BASOSABS 0.01 2023    AUTOIGNUM 0.03 2023    NRBC 0.0 2023       TSH  Lab Results   Component Value Date    TSH 0.022 (L) 2023    TSH 0.007 (L) 11/15/2023    TSH 0.606 2023       T4  Lab Results   Component Value Date    FREET4 1.03 2023    FREET4 0.94 2023    FREET4 0.97 2022     No results found for: \"L0BFFYT\"    T3  Lab Results   Component Value Date    T3FREE 3.43 2022     No results found for: \"G2VQWBS\"    TRAb  No results found for: \"TSURCPAB\"    TPO  No results found for: \"THYROIDAB\"    No valid procedures specified.    Assessment and Plan    Diagnoses and all orders for this visit:    1. Pre-existing type 2 diabetes mellitus with hyperglycemia during pregnancy in second trimester (Primary)  Assessment & Plan:  -Reviewed instructions for glycemic monitoring and the monitoring of urine ketones.  -Discussed that gestational diabetes can become harder to control as pregnancy progresses. Reviewed need for routine follow up from now until delivery.   -Patient instructed to send glucose data weekly via [employer].  -Risks of gestational diabetes were reviewed, these include, but are not limited to: LGA infant, macrosomia, stillbirth,  hypoglycemia, hyperbilirubinemia, birth injury,  birth, respiratory complications following delivery, polyhydramnios, hypocalcemia, maternal preeclampsia.  -Nutritional goals reviewed: Patient advised to eat 3 moderate sized meals daily as well as 2-4 snacks, including a bedtime snack. Discussed need for carbohydrate awareness. Patient given goals for carbohydrate " intake for meals/snacks.  -Glycemic Targets during pregnancy were reviewed, as follows: fasting glucose <95, 1 hour post prandial <140, 2 hour postprandial <120.  2 week insulin pump data download is as follows:  Very High: 0%  High: 1%  IN Range: 97%  Low: 1%  Very Low: 1%  Trend shows overall hyperglycemia above goal BG's for pregnancy.  Basal: 22%  Bolus: 78%  -Discussed that her BG's are elevated above goal for pregnancy.  Will increase insulin pump settings:  Basal:   12A: 3 u/hr  CR: 1:1.6  ISF: 18  Discussed the importance of counting carbs appropriately and administering meal time doses of insulin.  -We discussed various insulin pumps as she has been having issues controlling her BG's with this pump.  Discussed switching to the tandem insulin pump to help better control BG's as well as this holds more insulin as she will require more insulin as she progresses with her pregnancy. She will look at this and let me know if she would like to switch.  -Patient advised that she will need to monitor blood glucose up to 6-8 times daily. In the event that insurance may not cover adequate testing supplies, she was instructed she may need to purchase on OTC meter and strips.  -Patient will return for follow up in 4 weeks. She was instructed to contact the office in the interim if glucoses not at target.                   Return in about 4 weeks (around 1/18/2024) for Follow-up appointment. The patient was instructed to contact the clinic with any interval questions or concerns.    This document has been electronically signed by CICI Veliz  December 22, 2023 13:06 EST  Endocrinology

## 2023-12-21 NOTE — PROGRESS NOTES
Specialty Pharmacy Patient Management Program  Endocrinology Reassessment     Antoinette Pack is a 38 y.o. female with Type 2 Diabetes (Pregnancy) enrolled in the Endocrinology Patient Management program offered by [facility]. A follow-up was conducted, including assessment of continued therapy appropriateness, medication adherence, and side effect incidence and management for Insulin therapy and CGM.     Patient is 17 w 6 days pregnant. She is using Novolog in OmniPod 5 insulin pump. She uses Dexcom to monitor her BG. She denies low BG <70.     In the past, the patient has tried:     Drug Dose Reason for Discontinuation Notes   Trulicity  GI upset    Glipizide   Unsure                   Changes to Insurance Coverage or Financial Support  None    Relevant Past Medical History and Comorbidities  Relevant medical history and concomitant health conditions were discussed with the patient. The patient's chart has been reviewed for relevant past medical history and comorbid health conditions and updated as necessary.   Past Medical History:   Diagnosis Date    Anxiety     CHF (congestive heart failure) 05/2023    Colitis 2023    Depression     Diabetes mellitus 2018    Diverticulitis     GERD (gastroesophageal reflux disease)     Hypertension     Kidney stone     Pancreatitis 09/2023    PCOS (polycystic ovarian syndrome)     Sleep apnea      Social History     Socioeconomic History    Marital status:    Tobacco Use    Smoking status: Former    Smokeless tobacco: Never   Vaping Use    Vaping Use: Never used   Substance and Sexual Activity    Alcohol use: No    Drug use: No    Sexual activity: Defer       Problem list reviewed by Sravani Perez RPH on 12/21/2023 at 10:29 AM    Allergies  Known allergies and reactions were discussed with the patient. The patient's chart has been reviewed for allergy information and updated as necessary.   Dilantin [phenytoin], Keppra [levetiracetam],  Meperidine, and Topamax [topiramate]    Allergies reviewed by Sravani Perez RPH on 12/21/2023 at 10:29 AM    Relevant Laboratory Values  A1C Last 3 Results          3/15/2023    09:01 7/20/2023    15:44 9/17/2023    00:23   HGBA1C Last 3 Results   Hemoglobin A1C 6.8  7.1  9.30      Lab Results   Component Value Date    HGBA1C 9.30 (H) 09/17/2023     Lab Results   Component Value Date    GLUCOSE 113 (H) 12/08/2023    CALCIUM 8.9 12/08/2023     12/08/2023    K 3.6 12/08/2023    CO2 20.7 (L) 12/08/2023     12/08/2023    BUN 10 12/08/2023    CREATININE 0.51 (L) 12/08/2023    EGFRIFAFRI  09/05/2016      Comment:      <15 Indicative of kidney failure.    EGFRIFNONA 101 12/27/2020    BCR 19.6 12/08/2023    ANIONGAP 9.3 12/08/2023     Lab Results   Component Value Date    CHOL 173 09/18/2023    CHLPL 159 09/22/2015    TRIG 111 09/18/2023    HDL 33 (L) 09/18/2023     (H) 09/18/2023       Current Medication List  This medication list has been reviewed with the patient and evaluated for any interactions or necessary modifications/recommendations, and updated to include all prescription medications, OTC medications, and supplements the patient is currently taking.  This list reflects what is contained in the patient's profile, which has also been marked as reviewed to communicate to other providers it is the most up to date version of the patient's current medication therapy.     Current Outpatient Medications:     aspirin (ASPIR) 81 MG EC tablet, Take 1 tablet by mouth Daily., Disp: 30 tablet, Rfl: 6    Continuous Blood Gluc Sensor (Dexcom G6 Sensor), Use Every 10 (Ten) Days., Disp: 3 each, Rfl: 5    Continuous Blood Gluc Transmit (Dexcom G6 Transmitter) misc, Use 1 each Every 3 (Three) Months., Disp: 1 each, Rfl: 1    folic acid (FOLVITE) 1 MG tablet, Take 1 tablet by mouth Daily., Disp: , Rfl:     Insulin Aspart (novoLOG) 100 UNIT/ML injection, For use in insulin pump. Max Daily Dose: 200 units daily.,  Disp: 60 mL, Rfl: 5    Insulin Disposable Pump (Omnipod 5 G6 Intro, Gen 5,) kit, Use 1 kit Take As Directed., Disp: 1 kit, Rfl: 0    Insulin Disposable Pump (Omnipod 5 G6 Pod, Gen 5,) misc, Use 1 each Every Other Day., Disp: 15 each, Rfl: 5    Insulin Pen Needle 32G X 4 MM misc, Use to inject insulin up to 4 times daily as directed, Disp: 400 each, Rfl: 1    labetalol (NORMODYNE) 100 MG tablet, Take 1 tablet by mouth Daily., Disp: , Rfl:     lansoprazole (PREVACID) 30 MG capsule, Take 1 capsule by mouth 2 (Two) Times a Day., Disp: , Rfl:     Prenatal Vit-Fe Fumarate-FA (PRENATAL PO), Take 1 tablet by mouth Daily., Disp: , Rfl:   No current facility-administered medications for this visit.    Medicines reviewed by Sravani Perez [facility] on 12/21/2023 at 10:29 AM    Drug Interactions  No significant drug-drug interactions with diabetes medications expected according to literature.     Adverse Drug Reactions  Adverse Reactions Experienced: None  Plan for ADR Management: None required    Hospitalizations and Urgent Care Since Last Assessment  Hospitalizations or Admissions: None  ED Visits: None   Urgent Office Visits: None    Adherence and Self-Administration  Adherence related patient's specialty therapy was discussed with the patient. The Adherence segment of this outreach has been reviewed and updated.   Ongoing or New Barriers to Patient Adherence and/or Self-Administration: None   Methods for Supporting Patient Adherence and/or Self-Administration: None Required    Goals of Therapy  Goals related to the patient's specialty therapy was discussed with the patient. The Patient Goals segment of this outreach has been reviewed and updated.    Goals Addressed Today        Consistently take medications as prescribed      Specialty Pharmacy General Goal      Prevent hypoglycemia             Reassessment Plan & Follow-Up  Patient's diabetes is improving since starting on OmniPod 5 insulin pump.   Medication Therapy Changes:  None today. Continue using Nogolog in insulin pump.   Additional Plans, Therapy Recommendations, or Therapy Problems to Be Addressed:   Pt will follow-up in clinic every 4 weeks throughout pregnancy.   Pharmacist to perform regular reassessments no more than (6) months from the previous assessment.  Care Coordinator to set up future refill outreaches, coordinate prescription delivery, and escalate clinical questions to pharmacist.     Attestation  I attest the patient was actively involved in and has agreed to the above plan of care. If the prescribed therapy is at any point deemed not appropriate based on the current or future assessments, a consultation will be initiated with the patient's specialty care provider to determine the best course of action. The revised plan of therapy will be documented along with any required assessments and/or additional patient education provided.     Sravani Perez, PharmD, [facility]  Clinical Specialty Pharmacist, Endocrinology  12/21/2023  15:38 EST

## 2023-12-22 NOTE — ASSESSMENT & PLAN NOTE
-Reviewed instructions for glycemic monitoring and the monitoring of urine ketones.  -Discussed that gestational diabetes can become harder to control as pregnancy progresses. Reviewed need for routine follow up from now until delivery.   -Patient instructed to send glucose data weekly via [email or web address].  -Risks of gestational diabetes were reviewed, these include, but are not limited to: LGA infant, macrosomia, stillbirth,  hypoglycemia, hyperbilirubinemia, birth injury,  birth, respiratory complications following delivery, polyhydramnios, hypocalcemia, maternal preeclampsia.  -Nutritional goals reviewed: Patient advised to eat 3 moderate sized meals daily as well as 2-4 snacks, including a bedtime snack. Discussed need for carbohydrate awareness. Patient given goals for carbohydrate intake for meals/snacks.  -Glycemic Targets during pregnancy were reviewed, as follows: fasting glucose <95, 1 hour post prandial <140, 2 hour postprandial <120.  2 week insulin pump data download is as follows:  Very High: 0%  High: 1%  IN Range: 97%  Low: 1%  Very Low: 1%  Trend shows overall hyperglycemia above goal BG's for pregnancy.  Basal: 22%  Bolus: 78%  -Discussed that her BG's are elevated above goal for pregnancy.  Will increase insulin pump settings:  Basal:   12A: 3 u/hr  CR: 1:1.6  ISF: 18  Discussed the importance of counting carbs appropriately and administering meal time doses of insulin.  -We discussed various insulin pumps as she has been having issues controlling her BG's with this pump.  Discussed switching to the tandem insulin pump to help better control BG's as well as this holds more insulin as she will require more insulin as she progresses with her pregnancy. She will look at this and let me know if she would like to switch.  -Patient advised that she will need to monitor blood glucose up to 6-8 times daily. In the event that insurance may not cover adequate testing supplies, she was instructed she  may need to purchase on OTC meter and strips.  -Patient will return for follow up in 4 weeks. She was instructed to contact the office in the interim if glucoses not at target.

## 2023-12-28 ENCOUNTER — TELEPHONE (OUTPATIENT)
Dept: OBSTETRICS AND GYNECOLOGY | Facility: HOSPITAL | Age: 38
End: 2023-12-28
Payer: COMMERCIAL

## 2023-12-28 ENCOUNTER — LAB (OUTPATIENT)
Dept: LAB | Facility: HOSPITAL | Age: 38
End: 2023-12-28
Payer: COMMERCIAL

## 2023-12-28 DIAGNOSIS — O10.919 CHRONIC HYPERTENSION AFFECTING PREGNANCY: Primary | ICD-10-CM

## 2023-12-28 DIAGNOSIS — O24.112 PRE-EXISTING TYPE 2 DIABETES MELLITUS WITH HYPERGLYCEMIA DURING PREGNANCY IN SECOND TRIMESTER: ICD-10-CM

## 2023-12-28 DIAGNOSIS — Z86.79 HISTORY OF CHF (CONGESTIVE HEART FAILURE): ICD-10-CM

## 2023-12-28 DIAGNOSIS — O09.522 MULTIGRAVIDA OF ADVANCED MATERNAL AGE IN SECOND TRIMESTER: ICD-10-CM

## 2023-12-28 DIAGNOSIS — Z87.19 HISTORY OF PANCREATITIS: ICD-10-CM

## 2023-12-28 DIAGNOSIS — G47.33 OBSTRUCTIVE SLEEP APNEA SYNDROME: ICD-10-CM

## 2023-12-28 DIAGNOSIS — E11.65 PRE-EXISTING TYPE 2 DIABETES MELLITUS WITH HYPERGLYCEMIA DURING PREGNANCY IN SECOND TRIMESTER: ICD-10-CM

## 2023-12-28 DIAGNOSIS — E66.01 MORBID OBESITY WITH BMI OF 40.0-44.9, ADULT: ICD-10-CM

## 2023-12-28 LAB
COLLECT DURATION TIME UR: 24 HRS
PROT 24H UR-MRATE: 310.8 MG/24HOURS (ref 0–150)
SPECIMEN VOL 24H UR: 2100 ML

## 2023-12-28 PROCEDURE — 84156 ASSAY OF PROTEIN URINE: CPT | Performed by: OBSTETRICS & GYNECOLOGY

## 2023-12-28 PROCEDURE — 81050 URINALYSIS VOLUME MEASURE: CPT | Performed by: OBSTETRICS & GYNECOLOGY

## 2023-12-28 NOTE — TELEPHONE ENCOUNTER
"Attempted to speak with patient over the phone.  Received voice mail.  Message left with call back number and that a detailed [employer] message has been sent.        Dr. Fernandez has reviewed your blood sugar log and states \"Overall looks good!\"  She is making one change to your omnipod setting.  Please change the Insulin to carb ratio to 1.5 g carbs  Please respond to this so I know you have seen this message.  If you have any questions or concerns please let us know.  Hetal Marie RN     "

## 2024-01-02 ENCOUNTER — TELEPHONE (OUTPATIENT)
Dept: OBSTETRICS AND GYNECOLOGY | Facility: HOSPITAL | Age: 39
End: 2024-01-02
Payer: COMMERCIAL

## 2024-01-02 RX ORDER — INSULIN PMP CART,AUT,G6/7,CNTR
1 EACH SUBCUTANEOUS DAILY
Qty: 30 EACH | Refills: 5 | Status: SHIPPED | OUTPATIENT
Start: 2024-01-02

## 2024-01-02 NOTE — TELEPHONE ENCOUNTER
Spoke with patient over the phone.  Patient states she has had her last 2 omnipods fail.  She has spoken with her endocrinology office and they are sending in a new prescription to change out daily and has been informed the pharmacy will have one for her in the morning.  Patient stated last failure was Sunday and she has been using Tresiba and 50 units BID and novolog 30-60 units with meals depending on her carb consumption.  Hetal Marie RN

## 2024-01-03 ENCOUNTER — TELEPHONE (OUTPATIENT)
Dept: OBSTETRICS AND GYNECOLOGY | Facility: HOSPITAL | Age: 39
End: 2024-01-03
Payer: COMMERCIAL

## 2024-01-03 NOTE — PROGRESS NOTES
This provider is located at the [facility] (through [facility]), [address], [place], [address] using a secure MyChart Video Visit through [employer]. Patient is being seen remotely via telehealth at their home address in [place], and stated they are in a secure environment for this session. The patient's condition being diagnosed/treated is appropriate for telemedicine. The provider identified herself as well as her credentials.   The patient, and/or patients guardian, consent to be seen remotely, and when consent is given they understand that the consent allows for patient identifiable information to be sent to a third party as needed.   They may refuse to be seen remotely at any time. The electronic data is encrypted and password protected, and the patient and/or guardian has been advised of the potential risks to privacy not withstanding such measures.      Subjective   Antoinette Pack is a 38 y.o. female who presents today for follow up    Chief Complaint:  Bipolar disorder, depression, anxiety    History of Present Illness:   History of Present Illness  Today is a follow-up visit.  She is 20 2/7 weeks pregnant.    Depression rated 4/10, with 10 being the worst.  Anxiety rated 7/10, with 10 being the worst.  Mood rated 7/10, with 10 being the worst.  Sleep is fair, getting about 6 hours a night,  Nightmares: Occasional  Appetite is good.  Hallucinations: Patient denies auditory hallucinations and visual hallucinations  Self-harm: Patient denies thoughts of self-harm.  Alcohol use: no  Drug use: no  Marijuana use: no     Chronic health issues, no acute physical or medical issues today.    Details: She is 20 2/7 weeks pregnant. She states she is doing well. She states her glucose is managed well. Ob/GYN is Dr Robbins (Louie) at [facility], sees him monthly. Endocrinologist is Dr. Vásquez, at [facility]. She states she sees high risk OB/GYN in [place], at  [facility], they monitor her glucose very closely. She isn't taking any medication for anxiety/depression/moods. She feels her moods are stable. She doesn't want to restart psychiatric medications.         The following portions of the patient's history were reviewed and updated as appropriate: allergies, current medications, past family history, past medical history, past social history, past surgical history and problem list.      Past Medical History:  Past Medical History:   Diagnosis Date    Anxiety     CHF (congestive heart failure) 05/2023    Colitis 2023    Depression     Diabetes mellitus 2018    Diverticulitis     GERD (gastroesophageal reflux disease)     Hypertension     Kidney stone     Pancreatitis 09/2023    PCOS (polycystic ovarian syndrome)     Sleep apnea        Social History:  Social History     Socioeconomic History    Marital status:    Tobacco Use    Smoking status: Former    Smokeless tobacco: Never   Vaping Use    Vaping Use: Never used   Substance and Sexual Activity    Alcohol use: No    Drug use: No    Sexual activity: Defer       Family History:  Family History   Problem Relation Age of Onset    Heart disease Mother     COPD Mother     Heart disease Father     COPD Father        Past Surgical History:  Past Surgical History:   Procedure Laterality Date    CARDIAC CATHETERIZATION      CARPAL TUNNEL RELEASE      COLONOSCOPY      ENDOSCOPY      FRACTURE SURGERY      HARDWARE REMOVAL      WRIST    TENDON REPAIR      HAND       Problem List:  Patient Active Problem List   Diagnosis    Simple goiter    Pre-existing type 2 diabetes mellitus with hyperglycemia during pregnancy in second trimester    Acute pancreatitis    History of CHF (congestive heart failure)    Chronic hypertension affecting pregnancy    History of pancreatitis    Morbid obesity with BMI of 40.0-44.9, adult    Pre-existing type 2 diabetes affecting pregnancy, antepartum    History of seizure disorder    Obstructive sleep  apnea syndrome        Allergy:   Allergies   Allergen Reactions    Dilantin [Phenytoin] Mental Status Change    Keppra [Levetiracetam] Mental Status Change    Meperidine Rash    Topamax [Topiramate] Mental Status Change        Current Medications:   Current Outpatient Medications   Medication Sig Dispense Refill    aspirin (ASPIR) 81 MG EC tablet Take 1 tablet by mouth Daily. 30 tablet 6    Continuous Blood Gluc Sensor (Dexcom G6 Sensor) Use Every 10 (Ten) Days. 3 each 5    Continuous Blood Gluc Transmit (Dexcom G6 Transmitter) misc Use 1 each Every 3 (Three) Months. 1 each 1    folic acid (FOLVITE) 1 MG tablet Take 1 tablet by mouth Daily.      Insulin Aspart (novoLOG) 100 UNIT/ML injection For use in insulin pump. Max Daily Dose: 200 units daily. 60 mL 5    Insulin Disposable Pump (Omnipod 5 G6 Pod, Gen 5,) misc Use 1 each Daily. 30 each 5    Insulin Pen Needle 32G X 4 MM misc Use to inject insulin up to 4 times daily as directed 400 each 1    labetalol (NORMODYNE) 100 MG tablet Take 1 tablet by mouth Daily.      lansoprazole (PREVACID) 30 MG capsule Take 1 capsule by mouth 2 (Two) Times a Day.      Prenatal Vit-Fe Fumarate-FA (PRENATAL PO) Take 1 tablet by mouth Daily.       No current facility-administered medications for this visit.       Review of Symptoms:    Review of Systems   Psychiatric/Behavioral:  Positive for dysphoric mood, depressed mood and stress. Negative for agitation, hallucinations, self-injury, sleep disturbance and suicidal ideas. The patient is nervous/anxious.    All other systems reviewed and are negative.        Physical Exam:   not currently breastfeeding.  There is no height or weight on file to calculate BMI.    1/8/24    MENTAL STATUS EXAM   General Appearance:  Cleanly groomed and dressed  Eye Contact:  Good eye contact  Attitude:  Cooperative  Motor Activity:  Normal gait, posture  Speech:  Normal rate, tone, volume  Language:  Spontaneous  Mood and affect:  Normal,  pleasant  Hopelessness:  Denies  Thought Process:  Logical  Associations/ Thought Content:  No delusions  Hallucinations:  None  Suicidal Ideations:  Not present  Homicidal Ideation:  Not present  Sensorium:  Alert  Orientation:  Person, place, time and situation  Insight:  Fair  Judgement:  Fair  Reliability:  Fair  Impulse Control:  Fair       PHQ-Score Total:  PHQ-9 Total Score:      Lab Results:   Orders Only on 12/28/2023   Component Date Value Ref Range Status    Protein, 24H Urine 12/28/2023 310.8 (H)  0.0 - 150.0 mg/24hours Final    24H Urine Volume 12/28/2023 2,100  mL Final    Time (Hours) 12/28/2023 24  hrs Final   Admission on 12/08/2023, Discharged on 12/09/2023   Component Date Value Ref Range Status    LVIDd 12/09/2023 5.7  cm Final    LVIDs 12/09/2023 3.9  cm Final    IVSd 12/09/2023 1.00  cm Final    LVPWd 12/09/2023 1.00  cm Final    FS 12/09/2023 31.6  % Final    IVS/LVPW 12/09/2023 1.00  cm Final    ESV(cubed) 12/09/2023 59.3  ml Final    LV Sys Vol (BSA corrected) 12/09/2023 14.4  cm2 Final    EDV(cubed) 12/09/2023 185.2  ml Final    LV Hester Vol (BSA corrected) 12/09/2023 36.2  cm2 Final    LV mass(C)d 12/09/2023 226.4  grams Final    LVOT area 12/09/2023 4.2  cm2 Final    LVOT diam 12/09/2023 2.30  cm Final    EDV(MOD-sp4) 12/09/2023 67.6  ml Final    ESV(MOD-sp4) 12/09/2023 26.8  ml Final    SV(MOD-sp4) 12/09/2023 40.8  ml Final    SI(MOD-sp4) 12/09/2023 21.8  ml/m2 Final    EF(MOD-sp4) 12/09/2023 60.4  % Final    MV E max florian 12/09/2023 153.0  cm/sec Final    MV A max florian 12/09/2023 86.7  cm/sec Final    MV E/A 12/09/2023 1.76   Final    LA ESV Index (BP) 12/09/2023 29.0  ml/m2 Final    Med Peak E' Florian 12/09/2023 9.3  cm/sec Final    Lat Peak E' Florian 12/09/2023 11.2  cm/sec Final    Avg E/e' ratio 12/09/2023 14.93   Final    TAPSE (>1.6) 12/09/2023 1.63  cm Final    LA dimension (2D)  12/09/2023 4.3  cm Final    Ao pk florian 12/09/2023 187.0  cm/sec Final    Ao max PG 12/09/2023 14.0  mmHg Final     Ao mean PG 12/09/2023 8.0  mmHg Final    Ao V2 VTI 12/09/2023 37.8  cm Final    TR max baldemar 12/09/2023 231.0  cm/sec Final    TR max PG 12/09/2023 21.3  mmHg Final    RVSP(TR) 12/09/2023 31.3  mmHg Final    RAP systole 12/09/2023 10.0  mmHg Final    PA acc time 12/09/2023 0.11  sec Final    Ao root diam 12/09/2023 3.1  cm Final    ACS 12/09/2023 1.90  cm Final    Free T4 12/08/2023 1.03  0.93 - 1.70 ng/dL Final    Glucose 12/08/2023 114  70 - 130 mg/dL Final    Glucose 12/08/2023 113 (H)  65 - 99 mg/dL Final    BUN 12/08/2023 10  6 - 20 mg/dL Final    Creatinine 12/08/2023 0.51 (L)  0.57 - 1.00 mg/dL Final    Sodium 12/08/2023 136  136 - 145 mmol/L Final    Potassium 12/08/2023 3.6  3.5 - 5.2 mmol/L Final    Slight hemolysis detected by analyzer. Result may be falsely elevated.    Chloride 12/08/2023 106  98 - 107 mmol/L Final    CO2 12/08/2023 20.7 (L)  22.0 - 29.0 mmol/L Final    Calcium 12/08/2023 8.9  8.6 - 10.5 mg/dL Final    BUN/Creatinine Ratio 12/08/2023 19.6  7.0 - 25.0 Final    Anion Gap 12/08/2023 9.3  5.0 - 15.0 mmol/L Final    eGFR 12/08/2023 121.2  >60.0 mL/min/1.73 Final    TSH 12/08/2023 0.022 (L)  0.270 - 4.200 uIU/mL Final    WBC 12/08/2023 6.39  3.40 - 10.80 10*3/mm3 Final    RBC 12/08/2023 3.96  3.77 - 5.28 10*6/mm3 Final    Hemoglobin 12/08/2023 9.8 (L)  12.0 - 15.9 g/dL Final    Hematocrit 12/08/2023 32.0 (L)  34.0 - 46.6 % Final    MCV 12/08/2023 80.8  79.0 - 97.0 fL Final    MCH 12/08/2023 24.7 (L)  26.6 - 33.0 pg Final    MCHC 12/08/2023 30.6 (L)  31.5 - 35.7 g/dL Final    RDW 12/08/2023 14.4  12.3 - 15.4 % Final    RDW-SD 12/08/2023 42.1  37.0 - 54.0 fl Final    MPV 12/08/2023 10.5  6.0 - 12.0 fL Final    Platelets 12/08/2023 136 (L)  140 - 450 10*3/mm3 Final    Neutrophil % 12/08/2023 73.2  42.7 - 76.0 % Final    Lymphocyte % 12/08/2023 21.1  19.6 - 45.3 % Final    Monocyte % 12/08/2023 4.1 (L)  5.0 - 12.0 % Final    Eosinophil % 12/08/2023 0.9  0.3 - 6.2 % Final    Basophil % 12/08/2023  0.2  0.0 - 1.5 % Final    Immature Grans % 12/08/2023 0.5  0.0 - 0.5 % Final    Neutrophils, Absolute 12/08/2023 4.68  1.70 - 7.00 10*3/mm3 Final    Lymphocytes, Absolute 12/08/2023 1.35  0.70 - 3.10 10*3/mm3 Final    Monocytes, Absolute 12/08/2023 0.26  0.10 - 0.90 10*3/mm3 Final    Eosinophils, Absolute 12/08/2023 0.06  0.00 - 0.40 10*3/mm3 Final    Basophils, Absolute 12/08/2023 0.01  0.00 - 0.20 10*3/mm3 Final    Immature Grans, Absolute 12/08/2023 0.03  0.00 - 0.05 10*3/mm3 Final    nRBC 12/08/2023 0.0  0.0 - 0.2 /100 WBC Final    COVID19 12/08/2023 Not Detected  Not Detected - Ref. Range Final    Influenza A PCR 12/08/2023 Not Detected  Not Detected Final    Influenza B PCR 12/08/2023 Not Detected  Not Detected Final    Glucose 12/08/2023 79  70 - 130 mg/dL Final    Glucose 12/08/2023 105  70 - 130 mg/dL Final    Glucose 12/09/2023 91  70 - 130 mg/dL Final       Assessment & Plan   Problems Addressed this Visit    None  Visit Diagnoses       Bipolar disorder, in partial remission, most recent episode depressed    -  Primary    Panic disorder with agoraphobia        Generalized anxiety disorder        Medication management              Diagnoses         Codes Comments    Bipolar disorder, in partial remission, most recent episode depressed    -  Primary ICD-10-CM: F31.75  ICD-9-CM: 296.55     Panic disorder with agoraphobia     ICD-10-CM: F40.01  ICD-9-CM: 300.21     Generalized anxiety disorder     ICD-10-CM: F41.1  ICD-9-CM: 300.02     Medication management     ICD-10-CM: Z79.899  ICD-9-CM: V58.69           Social History     Tobacco Use   Smoking Status Former   Smokeless Tobacco Never     JORGE reviewed and appropriate. Patient counseled on use of controlled substances.     -The benefits of a healthy diet and exercise were discussed with patient, especially the positive effects they have on mental health. Patient encouraged to consider lifestyle modification regarding  diet and exercise patterns to  maximize results of mental health treatment.  -Reviewed previous available documentation  -Reviewed most recent available labs     -At this time she doesn't want to restart psychiatric medications, she feels her moods are stable.    -Instructed to contact this office if depression, anxiety or mood increases or changes significantly.       Visit Diagnoses:    ICD-10-CM ICD-9-CM   1. Bipolar disorder, in partial remission, most recent episode depressed  F31.75 296.55   2. Panic disorder with agoraphobia  F40.01 300.21   3. Generalized anxiety disorder  F41.1 300.02   4. Medication management  Z79.899 V58.69       TREATMENT PLAN/GOALS: Continue supportive psychotherapy efforts and medications as indicated. Treatment and medication options discussed during today's visit. Patient acknowledged and verbally consented to continue with current treatment plan and was educated on the importance of compliance with treatment and follow-up appointments.    MEDICATION ISSUES:    Discussed medication options and treatment plan of prescribed medication as well as the risks, benefits, and side effects including potential falls, possible impaired driving and metabolic adversities among others. Patient is agreeable to call the office with any worsening of symptoms or onset of side effects. Patient is agreeable to call 911 or go to the nearest ER should he/she begin having SI/HI.     MEDS ORDERED DURING VISIT:  No orders of the defined types were placed in this encounter.      Return in about 2 months (around 3/8/2024).  -No medications prescribed today.    I spent 30 minutes caring for Antoinette on this date of service. This time includes time spent by me in the following activities: preparing for the visit, obtaining and/or reviewing a separately obtained history, performing a medically appropriate examination and/or evaluation, counseling and educating the patient/family/caregiver, ordering medications, tests, or procedures, and  documenting information in the medical record               This document has been electronically signed by CICI Lopez  January 8, 2024 10:47 EST    Part of this note may be an electronic transcription/translation of spoken language to printed text using the Dragon Dictation System.

## 2024-01-03 NOTE — TELEPHONE ENCOUNTER
Spoke with patient over the phone as a follow up regarding her omnipod.  Patient states she has received notice from the pharmacy that they are ready for pickup and she will be picking them up today.  Informed patient that Dr. Fernandez has reviewed her log and is not making changes at this time.  Patient voices understanding.  Encouraged patient to reach out if any other conerns or questions.  Hetal Marie RN

## 2024-01-08 ENCOUNTER — TELEMEDICINE (OUTPATIENT)
Dept: PSYCHIATRY | Facility: CLINIC | Age: 39
End: 2024-01-08
Payer: COMMERCIAL

## 2024-01-08 DIAGNOSIS — F41.1 GENERALIZED ANXIETY DISORDER: ICD-10-CM

## 2024-01-08 DIAGNOSIS — F40.01 PANIC DISORDER WITH AGORAPHOBIA: ICD-10-CM

## 2024-01-08 DIAGNOSIS — F31.75 BIPOLAR DISORDER, IN PARTIAL REMISSION, MOST RECENT EPISODE DEPRESSED: Primary | ICD-10-CM

## 2024-01-08 DIAGNOSIS — Z79.899 MEDICATION MANAGEMENT: ICD-10-CM

## 2024-01-08 PROCEDURE — 99214 OFFICE O/P EST MOD 30 MIN: CPT | Performed by: NURSE PRACTITIONER

## 2024-01-10 ENCOUNTER — TELEPHONE (OUTPATIENT)
Dept: OBSTETRICS AND GYNECOLOGY | Facility: HOSPITAL | Age: 39
End: 2024-01-10
Payer: COMMERCIAL

## 2024-01-10 NOTE — TELEPHONE ENCOUNTER
Spoke with patient over the phone.  Informed patient that Dr. Fernandez has reviewed her blood sugar log and is making the folowing changes to her omnipod.    Insulin to Carb Ratio change to 1.2g carbs and   Correction Factor change to 16mg/dl.  Also if persistantly elevated after you eat, give a correction.  Patient voices understanding.  Hetal Marie RN

## 2024-01-15 ENCOUNTER — OFFICE VISIT (OUTPATIENT)
Dept: OBSTETRICS AND GYNECOLOGY | Facility: HOSPITAL | Age: 39
End: 2024-01-15
Payer: MEDICARE

## 2024-01-15 ENCOUNTER — HOSPITAL ENCOUNTER (OUTPATIENT)
Dept: WOMENS IMAGING | Facility: HOSPITAL | Age: 39
Discharge: HOME | End: 2024-01-15
Admitting: OBSTETRICS & GYNECOLOGY
Payer: MEDICARE

## 2024-01-15 VITALS — SYSTOLIC BLOOD PRESSURE: 154 MMHG | DIASTOLIC BLOOD PRESSURE: 71 MMHG | WEIGHT: 210 LBS | BODY MASS INDEX: 42.41 KG/M2

## 2024-01-15 DIAGNOSIS — Z3A.21 21 WEEKS GESTATION OF PREGNANCY: ICD-10-CM

## 2024-01-15 DIAGNOSIS — O24.112 PRE-EXISTING TYPE 2 DIABETES MELLITUS WITH HYPERGLYCEMIA DURING PREGNANCY IN SECOND TRIMESTER: ICD-10-CM

## 2024-01-15 DIAGNOSIS — I13.0 HYPERTENSIVE HEART AND CHRONIC KIDNEY DISEASE WITH HEART FAILURE AND STAGE 1 THROUGH STAGE 4 CHRONIC KIDNEY DISEASE, OR UNSPECIFIED CHRONIC KIDNEY DISEASE: ICD-10-CM

## 2024-01-15 DIAGNOSIS — Z86.79 HISTORY OF CHF (CONGESTIVE HEART FAILURE): ICD-10-CM

## 2024-01-15 DIAGNOSIS — E11.65 PRE-EXISTING TYPE 2 DIABETES MELLITUS WITH HYPERGLYCEMIA DURING PREGNANCY IN SECOND TRIMESTER: ICD-10-CM

## 2024-01-15 DIAGNOSIS — K62.5 RECTAL BLEEDING: ICD-10-CM

## 2024-01-15 DIAGNOSIS — G47.33 OBSTRUCTIVE SLEEP APNEA SYNDROME: ICD-10-CM

## 2024-01-15 DIAGNOSIS — O10.919 CHRONIC HYPERTENSION AFFECTING PREGNANCY: ICD-10-CM

## 2024-01-15 DIAGNOSIS — O09.522 MULTIGRAVIDA OF ADVANCED MATERNAL AGE IN SECOND TRIMESTER: Primary | ICD-10-CM

## 2024-01-15 DIAGNOSIS — O12.10 PROTEINURIA AFFECTING PREGNANCY, ANTEPARTUM: ICD-10-CM

## 2024-01-15 DIAGNOSIS — O09.522 MULTIGRAVIDA OF ADVANCED MATERNAL AGE IN SECOND TRIMESTER: ICD-10-CM

## 2024-01-15 PROCEDURE — 76811 OB US DETAILED SNGL FETUS: CPT

## 2024-01-15 PROCEDURE — 99215 OFFICE O/P EST HI 40 MIN: CPT | Performed by: OBSTETRICS & GYNECOLOGY

## 2024-01-15 PROCEDURE — 76811 OB US DETAILED SNGL FETUS: CPT | Performed by: OBSTETRICS & GYNECOLOGY

## 2024-01-15 NOTE — ASSESSMENT & PLAN NOTE
Patient is very good about wearing machine at night when sleeps. Aware that untreated ALEJANDRO worsens her CHTN and therefore risk of preeclampsia.

## 2024-01-15 NOTE — ASSESSMENT & PLAN NOTE
"Reports intermittent upper abdominal pain that could be \"gas pain.\" Upper abdomen feels hard/distended at the same time. Is not constant like the pain with pancreatitis.    - Will obtain amylase/lipase to check  "

## 2024-01-15 NOTE — ASSESSMENT & PLAN NOTE
"Patient reports frequent blood with stools. Sometimes significant in amount. Patient has seen GI before and had colonoscopy looking for cause and was told \"inflammation.\" She has Mesalamine to use when it gets bad. She has been told to watch for dropping Hgb during pregnancy.    - If patient becomes significantly anemic in pregnancy, I might recommend IV iron infusions rather than po Fe supplementation that could   "

## 2024-01-15 NOTE — PROGRESS NOTES
Pt has appt with Dr. Razo on 1/18/24. NIPT negative. Pt c/o upper abdominal cramping. Pt denies vaginal bleeding or loss of fluid. Bp 154/71, repeat 145/76, pt states she is taking labetalol 100 mg bid. Has Omnipod- states that she will screenshot last weeks log and send to Dr. Fernandez now.

## 2024-01-15 NOTE — LETTER
January 15, 2024     Geovani Razo III, MD  [address]  [address]  [place] [address]    Patient: Antoinette Pack   YOB: 1985   Date of Visit: 1/15/2024       Dear Geovani Razo III, MD,    Thank you for referring Antoinette Pack to me for evaluation. Below is a copy of my consult note.    If you have questions, please do not hesitate to call me. I look forward to following Antoinette along with you.         Sincerely,        Dolores Fernandez MD        CC: No Recipients                    Maternal/Fetal Medicine Consult Note     Name: Antoinette Pack    : 1985     MRN: [id number]     Referring Provider: Geovani Razo III, MD    Chief Complaint  AMA, CHTN, T2DM, Seizure DO, Hx CHF, PCOS, BMI 42    Subjective     History of Present Illness:  Antoinette Pack is a 38 y.o.  21w2d who presents today for fetal anatomic survey and discussion of multiple co-morbidities. Has had left-sided abdominal pain and some upper abdominal pain that comes and goes with sense of fullness and hardness to abdomen. Has a history of rectal bleeding, s/p visit with GI where they attempted biopsy but only diagnosed inflammation. She uses mesalamine prn.     Pt denies LOF/VB/ctx's. Denies HA/BV/visual symptoms but does report upper abdominal pain as above.     ANDRE: Estimated Date of Delivery: 24     ROS:   As noted in HPI.     Past Medical History:   Diagnosis Date   • Anxiety    • CHF (congestive heart failure) 2023   • Colitis    • Depression    • Diabetes mellitus    • Diverticulitis    • GERD (gastroesophageal reflux disease)    • Hypertension    • Kidney stone    • Pancreatitis 2023   • PCOS (polycystic ovarian syndrome)    • Sleep apnea       Past Surgical History:   Procedure Laterality Date   • CARDIAC CATHETERIZATION     • CARPAL TUNNEL RELEASE     • COLONOSCOPY     • ENDOSCOPY     • FRACTURE SURGERY     • HARDWARE REMOVAL      WRIST   • TENDON REPAIR       "HAND      OB History          2    Para   0    Term   0       0    AB   1    Living   0         SAB   1    IAB   0    Ectopic   0    Molar   0    Multiple   0    Live Births   0          Obstetric Comments   FOB #1  Pregnancy #1 SAB at 6 weeks  FOB #1 Pregnancy #2  current               Objective     Vital Signs  /71   Wt 95.3 kg (210 lb)   LMP  (LMP Unknown)   Estimated body mass index is 42.41 kg/m² as calculated from the following:    Height as of 23: 149.9 cm (59\").    Weight as of this encounter: 95.3 kg (210 lb).    Physical Exam  Constitutional:       Appearance: Normal appearance. She is obese.   HENT:      Head: Normocephalic and atraumatic.   Pulmonary:      Effort: Pulmonary effort is normal.   Musculoskeletal:         General: Normal range of motion.   Neurological:      General: No focal deficit present.      Mental Status: She is alert and oriented to person, place, and time.   Psychiatric:         Mood and Affect: Mood normal.         Behavior: Behavior normal.         Thought Content: Thought content normal.         Judgment: Judgment normal.     Ultrasound Impression:   Vtx, S=D, anterior/fundal placenta, nl JACQUIE, nl CL, limited but normal appearing anatomy.     Assessment and Plan     Diagnoses and all orders for this visit:    1. Multigravida of advanced maternal age in second trimester (Primary)  -     [facility]; Future  -     Preeclampsia Panel  -     Lipase; Future  -     proBNP; Future  -     Amylase; Future    2. Pre-existing type 2 diabetes mellitus with hyperglycemia during pregnancy in second trimester  Assessment & Plan:  Patient currently has an Omnipod5 insulin pump with Dexcom6 CGM. She has been reliably sending us blood glucose logs each week for review. Her current settings that can be altered include insulin:carb ratio of 1.2gms carbs and correction factor of 16mg/dl > target.     She reports eating multiple small meals throughout " the day and then a larger dinner at night. We talked about needing to split any bolus of > 30u as that is the max the Omnipod will do at a time. We discussed possibly adding back subcutaneous boluses with meals as we are getting to a significant amount with boluses. We also discussed doing a delayed second bolus with meals with heavy carbs and heavy fat/protein content.      - Follow-up for fetal echo scheduled in 4 wks   - Recommend she change her carb ratio to 1u for every 1.1gms of carbs and her correction to 1u:15mg/dl > target   - At next visit, we may  need to transition to all meal boluses given subcutaneously   - We will do q 4 wk growth ultrasounds until delivery   - She will need to start twice weekly  testing at 32wks of pregnancy    Orders:  -     [facility]; Future  -     Preeclampsia Panel  -     Lipase; Future  -     proBNP; Future  -     Amylase; Future    3. History of CHF (congestive heart failure)  Assessment & Plan:  H/o congestive heart failure with EF at 50% and concern for stenotic aorta and left ventricular diastolic dysfunction. Follow-up echo in 2023 was normal. Has follow-up with Cardiology at [facility]. Reviewed si/sx's of worsening cardiac function. Knows to present for evaluation if experiencing SOA, markedly worsening swelling, or palpitations.     Orders:  -     [facility]; Future  -     Preeclampsia Panel  -     Lipase; Future  -     proBNP; Future  -     Amylase; Future    4. Chronic hypertension affecting pregnancy  Assessment & Plan:  Currently on Labetalol 100mgs BID and daily baby ASA 81mgs. BP initially elevated to 154/71 and then more normal at 145/76. Denies HA/BV/visual symptoms. Has had the intermittent abdominal pain described above.    - Ordered preeclampsia labs today to be safe   - Encouraged patient to check BP at home and record    Orders:  -     [facility]; Future  -     Preeclampsia  "Panel  -     Lipase; Future  -     proBNP; Future  -     Amylase; Future    5. Obstructive sleep apnea syndrome  Assessment & Plan:  Patient is very good about wearing machine at night when sleeps. Aware that untreated ALEJANDRO worsens her CHTN and therefore risk of preeclampsia.     Orders:  -     [facility]; Future  -     Preeclampsia Panel  -     Lipase; Future  -     proBNP; Future  -     Amylase; Future    6. Hypertensive heart and chronic kidney disease with heart failure and stage 1 through stage 4 chronic kidney disease, or unspecified chronic kidney disease  -     proBNP; Future  -     Amylase; Future    7. Proteinuria affecting pregnancy, antepartum  -     Amylase; Future    8. Rectal bleeding  Assessment & Plan:  Patient reports frequent blood with stools. Sometimes significant in amount. Patient has seen GI before and had colonoscopy looking for cause and was told \"inflammation.\" She has Mesalamine to use when it gets bad. She has been told to watch for dropping Hgb during pregnancy.    - If patient becomes significantly anemic in pregnancy, I might recommend IV iron infusions rather than po Fe supplementation that could     Orders:  -     Amylase; Future    9. 21 weeks gestation of pregnancy         Follow Up  Return in about 4 weeks (around 2024).    I spent 40 minutes caring for the patient on the day of service. This included: obtaining or reviewing a separately obtained medical history, reviewing patient records, performing a medically appropriate exam and/or evaluation, counseling or educating the patient/family/caregiver, ordering medications, labs, and/or procedures and documenting such in the medical record. This does not include time spent on review and interpretation of other tests such as fetal ultrasound or the performance of other procedures such as amniocentesis or CVS.      Dolores Fernandez MD  01/15/2024  "

## 2024-01-15 NOTE — ASSESSMENT & PLAN NOTE
H/o congestive heart failure with EF at 50% and concern for stenotic aorta and left ventricular diastolic dysfunction. Follow-up echo in 12/2023 was normal. Has follow-up with Cardiology at [facility] [place]. Reviewed si/sx's of worsening cardiac function. Knows to present for evaluation if experiencing SOA, markedly worsening swelling, or palpitations.

## 2024-01-15 NOTE — ASSESSMENT & PLAN NOTE
Currently on Labetalol 100mgs BID and daily baby ASA 81mgs. BP initially elevated to 154/71 and then more normal at 145/76. Denies HA/BV/visual symptoms. Has had the intermittent abdominal pain described above.    - Ordered preeclampsia labs today to be safe   - Encouraged patient to check BP at home and record

## 2024-01-15 NOTE — ASSESSMENT & PLAN NOTE
Patient currently has an Omnipod5 insulin pump with Dexcom6 CGM. She has been reliably sending us blood glucose logs each week for review. Her current settings that can be altered include insulin:carb ratio of 1.2gms carbs and correction factor of 16mg/dl > target.     She reports eating multiple small meals throughout the day and then a larger dinner at night. We talked about needing to split any bolus of > 30u as that is the max the Omnipod will do at a time. We discussed possibly adding back subcutaneous boluses with meals as we are getting to a significant amount with boluses. We also discussed doing a delayed second bolus with meals with heavy carbs and heavy fat/protein content.      - Follow-up for fetal echo scheduled in 4 wks   - Recommend she change her carb ratio to 1u for every 1.1gms of carbs and her correction to 1u:15mg/dl > target   - At next visit, we may  need to transition to all meal boluses given subcutaneously   - We will do q 4 wk growth ultrasounds until delivery   - She will need to start twice weekly  testing at 32wks of pregnancy

## 2024-01-15 NOTE — PROGRESS NOTES
"    Maternal/Fetal Medicine Consult Note     Name: Antoinette Pack    : 1985     MRN: [id number]     Referring Provider: Geovani Razo III, MD    Chief Complaint  AMA, CHTN, T2DM, Seizure DO, Hx CHF, PCOS, BMI 42    Subjective     History of Present Illness:  Antoinette Pack is a 38 y.o.  21w2d who presents today for fetal anatomic survey and discussion of multiple co-morbidities. Has had left-sided abdominal pain and some upper abdominal pain that comes and goes with sense of fullness and hardness to abdomen. Has a history of rectal bleeding, s/p visit with GI where they attempted biopsy but only diagnosed inflammation. She uses mesalamine prn.     Pt denies LOF/VB/ctx's. Denies HA/BV/visual symptoms but does report upper abdominal pain as above.     ANDRE: Estimated Date of Delivery: 24     ROS:   As noted in HPI.     Past Medical History:   Diagnosis Date    Anxiety     CHF (congestive heart failure) 2023    Colitis     Depression     Diabetes mellitus 2018    Diverticulitis     GERD (gastroesophageal reflux disease)     Hypertension     Kidney stone     Pancreatitis 2023    PCOS (polycystic ovarian syndrome)     Sleep apnea       Past Surgical History:   Procedure Laterality Date    CARDIAC CATHETERIZATION      CARPAL TUNNEL RELEASE      COLONOSCOPY      ENDOSCOPY      FRACTURE SURGERY      HARDWARE REMOVAL      WRIST    TENDON REPAIR      HAND      OB History          2    Para   0    Term   0       0    AB   1    Living   0         SAB   1    IAB   0    Ectopic   0    Molar   0    Multiple   0    Live Births   0          Obstetric Comments   FOB #1  Pregnancy #1 SAB at 6 weeks  FOB #1 Pregnancy #2  current               Objective     Vital Signs  /71   Wt 95.3 kg (210 lb)   LMP  (LMP Unknown)   Estimated body mass index is 42.41 kg/m² as calculated from the following:    Height as of 23: 149.9 cm (59\").    Weight as of this encounter: 95.3 kg (210 " lb).    Physical Exam  Constitutional:       Appearance: Normal appearance. She is obese.   HENT:      Head: Normocephalic and atraumatic.   Pulmonary:      Effort: Pulmonary effort is normal.   Musculoskeletal:         General: Normal range of motion.   Neurological:      General: No focal deficit present.      Mental Status: She is alert and oriented to person, place, and time.   Psychiatric:         Mood and Affect: Mood normal.         Behavior: Behavior normal.         Thought Content: Thought content normal.         Judgment: Judgment normal.     Ultrasound Impression:   Vtx, S=D, anterior/fundal placenta, nl JACQUIE, nl CL, limited but normal appearing anatomy.     Assessment and Plan     Diagnoses and all orders for this visit:    1. Multigravida of advanced maternal age in second trimester (Primary)  -     [facility]; Future  -     Preeclampsia Panel  -     Lipase; Future  -     proBNP; Future  -     Amylase; Future    2. Pre-existing type 2 diabetes mellitus with hyperglycemia during pregnancy in second trimester  Assessment & Plan:  Patient currently has an Omnipod5 insulin pump with Dexcom6 CGM. She has been reliably sending us blood glucose logs each week for review. Her current settings that can be altered include insulin:carb ratio of 1.2gms carbs and correction factor of 16mg/dl > target.     She reports eating multiple small meals throughout the day and then a larger dinner at night. We talked about needing to split any bolus of > 30u as that is the max the Omnipod will do at a time. We discussed possibly adding back subcutaneous boluses with meals as we are getting to a significant amount with boluses. We also discussed doing a delayed second bolus with meals with heavy carbs and heavy fat/protein content.      - Follow-up for fetal echo scheduled in 4 wks   - Recommend she change her carb ratio to 1u for every 1.1gms of carbs and her correction to 1u:15mg/dl > target   - At  next visit, we may  need to transition to all meal boluses given subcutaneously   - We will do q 4 wk growth ultrasounds until delivery   - She will need to start twice weekly  testing at 32wks of pregnancy    Orders:  -     [facility]; Future  -     Preeclampsia Panel  -     Lipase; Future  -     proBNP; Future  -     Amylase; Future    3. History of CHF (congestive heart failure)  Assessment & Plan:  H/o congestive heart failure with EF at 50% and concern for stenotic aorta and left ventricular diastolic dysfunction. Follow-up echo in 2023 was normal. Has follow-up with Cardiology at [facility] [place]. Reviewed si/sx's of worsening cardiac function. Knows to present for evaluation if experiencing SOA, markedly worsening swelling, or palpitations.     Orders:  -     [facility]; Future  -     Preeclampsia Panel  -     Lipase; Future  -     proBNP; Future  -     Amylase; Future    4. Chronic hypertension affecting pregnancy  Assessment & Plan:  Currently on Labetalol 100mgs BID and daily baby ASA 81mgs. BP initially elevated to 154/71 and then more normal at 145/76. Denies HA/BV/visual symptoms. Has had the intermittent abdominal pain described above.    - Ordered preeclampsia labs today to be safe   - Encouraged patient to check BP at home and record    Orders:  -     [facility]; Future  -     Preeclampsia Panel  -     Lipase; Future  -     proBNP; Future  -     Amylase; Future    5. Obstructive sleep apnea syndrome  Assessment & Plan:  Patient is very good about wearing machine at night when sleeps. Aware that untreated ALEJANDRO worsens her CHTN and therefore risk of preeclampsia.     Orders:  -     [facility]; Future  -     Preeclampsia Panel  -     Lipase; Future  -     proBNP; Future  -     Amylase; Future    6. Hypertensive heart and chronic kidney disease with heart failure and stage 1 through stage 4 chronic kidney  "disease, or unspecified chronic kidney disease  -     proBNP; Future  -     Amylase; Future    7. Proteinuria affecting pregnancy, antepartum  -     Amylase; Future    8. Rectal bleeding  Assessment & Plan:  Patient reports frequent blood with stools. Sometimes significant in amount. Patient has seen GI before and had colonoscopy looking for cause and was told \"inflammation.\" She has Mesalamine to use when it gets bad. She has been told to watch for dropping Hgb during pregnancy.    - If patient becomes significantly anemic in pregnancy, I might recommend IV iron infusions rather than po Fe supplementation that could     Orders:  -     Amylase; Future    9. 21 weeks gestation of pregnancy         Follow Up  Return in about 4 weeks (around 2/12/2024).    I spent 40 minutes caring for the patient on the day of service. This included: obtaining or reviewing a separately obtained medical history, reviewing patient records, performing a medically appropriate exam and/or evaluation, counseling or educating the patient/family/caregiver, ordering medications, labs, and/or procedures and documenting such in the medical record. This does not include time spent on review and interpretation of other tests such as fetal ultrasound or the performance of other procedures such as amniocentesis or CVS.      Dolores Fernandez MD  01/15/2024  "

## 2024-01-22 ENCOUNTER — SPECIALTY PHARMACY (OUTPATIENT)
Dept: PHARMACY | Facility: HOSPITAL | Age: 39
End: 2024-01-22
Payer: COMMERCIAL

## 2024-01-23 DIAGNOSIS — O12.10 PROTEINURIA AFFECTING PREGNANCY, ANTEPARTUM: ICD-10-CM

## 2024-01-23 DIAGNOSIS — O10.919 CHRONIC HYPERTENSION AFFECTING PREGNANCY: ICD-10-CM

## 2024-01-23 DIAGNOSIS — G47.33 OBSTRUCTIVE SLEEP APNEA SYNDROME: ICD-10-CM

## 2024-01-23 DIAGNOSIS — O24.112 PRE-EXISTING TYPE 2 DIABETES MELLITUS WITH HYPERGLYCEMIA DURING PREGNANCY IN SECOND TRIMESTER: ICD-10-CM

## 2024-01-23 DIAGNOSIS — O09.522 MULTIGRAVIDA OF ADVANCED MATERNAL AGE IN SECOND TRIMESTER: Primary | ICD-10-CM

## 2024-01-23 DIAGNOSIS — I13.0 HYPERTENSIVE HEART AND CHRONIC KIDNEY DISEASE WITH HEART FAILURE AND STAGE 1 THROUGH STAGE 4 CHRONIC KIDNEY DISEASE, OR UNSPECIFIED CHRONIC KIDNEY DISEASE: ICD-10-CM

## 2024-01-23 DIAGNOSIS — E11.65 PRE-EXISTING TYPE 2 DIABETES MELLITUS WITH HYPERGLYCEMIA DURING PREGNANCY IN SECOND TRIMESTER: ICD-10-CM

## 2024-01-23 DIAGNOSIS — Z86.79 HISTORY OF CHF (CONGESTIVE HEART FAILURE): ICD-10-CM

## 2024-01-23 DIAGNOSIS — Z87.19 HISTORY OF PANCREATITIS: ICD-10-CM

## 2024-01-24 ENCOUNTER — TELEPHONE (OUTPATIENT)
Dept: OBSTETRICS AND GYNECOLOGY | Facility: HOSPITAL | Age: 39
End: 2024-01-24
Payer: COMMERCIAL

## 2024-01-24 NOTE — TELEPHONE ENCOUNTER
"Spoke with patient over the phone.  Informed patient that Dr. Fernandez has reviewed her blood sugar log and states \"Overall looks good.  If high 1 hour after meal (>150 and increasing) give a correction.\"  Patient voices understanding.  Hetal Marie RN  "

## 2024-01-29 ENCOUNTER — APPOINTMENT (OUTPATIENT)
Dept: GENERAL RADIOLOGY | Facility: HOSPITAL | Age: 39
End: 2024-01-29
Payer: COMMERCIAL

## 2024-01-29 LAB
ALBUMIN SERPL-MCNC: 3.6 G/DL (ref 3.5–5.2)
ALBUMIN/GLOB SERPL: 1.1 G/DL
ALP SERPL-CCNC: 77 U/L (ref 39–117)
ALT SERPL W P-5'-P-CCNC: 13 U/L (ref 1–33)
ANION GAP SERPL CALCULATED.3IONS-SCNC: 7.2 MMOL/L (ref 5–15)
AST SERPL-CCNC: 16 U/L (ref 1–32)
BASOPHILS # BLD AUTO: 0.01 10*3/MM3 (ref 0–0.2)
BASOPHILS NFR BLD AUTO: 0.1 % (ref 0–1.5)
BILIRUB SERPL-MCNC: 0.3 MG/DL (ref 0–1.2)
BILIRUB UR QL STRIP: NEGATIVE
BUN SERPL-MCNC: 8 MG/DL (ref 6–20)
BUN/CREAT SERPL: 17 (ref 7–25)
CALCIUM SPEC-SCNC: 9.4 MG/DL (ref 8.6–10.5)
CHLORIDE SERPL-SCNC: 105 MMOL/L (ref 98–107)
CLARITY UR: CLEAR
CO2 SERPL-SCNC: 22.8 MMOL/L (ref 22–29)
COLOR UR: YELLOW
CREAT SERPL-MCNC: 0.47 MG/DL (ref 0.57–1)
DEPRECATED RDW RBC AUTO: 44 FL (ref 37–54)
EGFRCR SERPLBLD CKD-EPI 2021: 123.6 ML/MIN/1.73
EOSINOPHIL # BLD AUTO: 0.13 10*3/MM3 (ref 0–0.4)
EOSINOPHIL NFR BLD AUTO: 1.5 % (ref 0.3–6.2)
ERYTHROCYTE [DISTWIDTH] IN BLOOD BY AUTOMATED COUNT: 15.8 % (ref 12.3–15.4)
GLOBULIN UR ELPH-MCNC: 3.4 GM/DL
GLUCOSE SERPL-MCNC: 66 MG/DL (ref 65–99)
GLUCOSE UR STRIP-MCNC: NEGATIVE MG/DL
HCT VFR BLD AUTO: 30.9 % (ref 34–46.6)
HGB BLD-MCNC: 10.6 G/DL (ref 12–15.9)
HGB UR QL STRIP.AUTO: NEGATIVE
HOLD SPECIMEN: NORMAL
HOLD SPECIMEN: NORMAL
IMM GRANULOCYTES # BLD AUTO: 0.05 10*3/MM3 (ref 0–0.05)
IMM GRANULOCYTES NFR BLD AUTO: 0.6 % (ref 0–0.5)
KETONES UR QL STRIP: NEGATIVE
LEUKOCYTE ESTERASE UR QL STRIP.AUTO: NEGATIVE
LYMPHOCYTES # BLD AUTO: 1.77 10*3/MM3 (ref 0.7–3.1)
LYMPHOCYTES NFR BLD AUTO: 20.3 % (ref 19.6–45.3)
MCH RBC QN AUTO: 26.6 PG (ref 26.6–33)
MCHC RBC AUTO-ENTMCNC: 34.3 G/DL (ref 31.5–35.7)
MCV RBC AUTO: 77.6 FL (ref 79–97)
MONOCYTES # BLD AUTO: 0.46 10*3/MM3 (ref 0.1–0.9)
MONOCYTES NFR BLD AUTO: 5.3 % (ref 5–12)
NEUTROPHILS NFR BLD AUTO: 6.28 10*3/MM3 (ref 1.7–7)
NEUTROPHILS NFR BLD AUTO: 72.2 % (ref 42.7–76)
NITRITE UR QL STRIP: NEGATIVE
NRBC BLD AUTO-RTO: 0 /100 WBC (ref 0–0.2)
PH UR STRIP.AUTO: 7 [PH] (ref 5–8)
PLATELET # BLD AUTO: 144 10*3/MM3 (ref 140–450)
PMV BLD AUTO: 10.2 FL (ref 6–12)
POTASSIUM SERPL-SCNC: 3.9 MMOL/L (ref 3.5–5.2)
PROT SERPL-MCNC: 7 G/DL (ref 6–8.5)
PROT UR QL STRIP: NEGATIVE
RBC # BLD AUTO: 3.98 10*6/MM3 (ref 3.77–5.28)
SODIUM SERPL-SCNC: 135 MMOL/L (ref 136–145)
SP GR UR STRIP: 1.02 (ref 1–1.03)
TROPONIN T SERPL HS-MCNC: 9 NG/L
UROBILINOGEN UR QL STRIP: NORMAL
WBC NRBC COR # BLD AUTO: 8.7 10*3/MM3 (ref 3.4–10.8)
WHOLE BLOOD HOLD COAG: NORMAL
WHOLE BLOOD HOLD SPECIMEN: NORMAL

## 2024-01-29 PROCEDURE — 36415 COLL VENOUS BLD VENIPUNCTURE: CPT

## 2024-01-29 PROCEDURE — 84484 ASSAY OF TROPONIN QUANT: CPT | Performed by: PHYSICIAN ASSISTANT

## 2024-01-29 PROCEDURE — 81003 URINALYSIS AUTO W/O SCOPE: CPT | Performed by: PHYSICIAN ASSISTANT

## 2024-01-29 PROCEDURE — 99283 EMERGENCY DEPT VISIT LOW MDM: CPT

## 2024-01-29 PROCEDURE — 80053 COMPREHEN METABOLIC PANEL: CPT | Performed by: PHYSICIAN ASSISTANT

## 2024-01-29 PROCEDURE — 93010 ELECTROCARDIOGRAM REPORT: CPT | Performed by: INTERNAL MEDICINE

## 2024-01-29 PROCEDURE — 85025 COMPLETE CBC W/AUTO DIFF WBC: CPT | Performed by: PHYSICIAN ASSISTANT

## 2024-01-29 PROCEDURE — 93005 ELECTROCARDIOGRAM TRACING: CPT | Performed by: PHYSICIAN ASSISTANT

## 2024-01-29 NOTE — ED NOTES
MEDICAL SCREENING:    Reason for Visit: palpitations, pregnant     Patient initially seen in triage.  The patient was advised further evaluation and diagnostic testing will be needed, some of the treatment and testing will be initiated in the lobby in order to begin the process.  The patient will be returned to the waiting area for the time being and possibly be re-assessed by a subsequent ED provider.  The patient will be brought back to the treatment area in as timely manner as possible.       Africa Shelley PA  01/29/24 1828

## 2024-01-30 ENCOUNTER — APPOINTMENT (OUTPATIENT)
Dept: GENERAL RADIOLOGY | Facility: HOSPITAL | Age: 39
End: 2024-01-30
Payer: COMMERCIAL

## 2024-01-30 ENCOUNTER — HOSPITAL ENCOUNTER (EMERGENCY)
Facility: HOSPITAL | Age: 39
Discharge: HOME | End: 2024-01-30
Attending: STUDENT IN AN ORGANIZED HEALTH CARE EDUCATION/TRAINING PROGRAM | Admitting: STUDENT IN AN ORGANIZED HEALTH CARE EDUCATION/TRAINING PROGRAM
Payer: COMMERCIAL

## 2024-01-30 ENCOUNTER — TELEPHONE (OUTPATIENT)
Dept: OBSTETRICS AND GYNECOLOGY | Facility: HOSPITAL | Age: 39
End: 2024-01-30
Payer: COMMERCIAL

## 2024-01-30 VITALS
HEIGHT: 59 IN | DIASTOLIC BLOOD PRESSURE: 82 MMHG | OXYGEN SATURATION: 99 % | WEIGHT: 207 LBS | SYSTOLIC BLOOD PRESSURE: 149 MMHG | BODY MASS INDEX: 41.73 KG/M2 | TEMPERATURE: 97.9 F | RESPIRATION RATE: 20 BRPM | HEART RATE: 82 BPM

## 2024-01-30 DIAGNOSIS — R00.2 PALPITATIONS: Primary | ICD-10-CM

## 2024-01-30 LAB
QT INTERVAL: 326 MS
QTC INTERVAL: 396 MS

## 2024-01-30 NOTE — TELEPHONE ENCOUNTER
Spoke with patient over the phone.  Requested patient send in her omnipod settings.  Patient was thankful for the reminder and will do so.  Hetal Marie RN

## 2024-01-30 NOTE — ED PROVIDER NOTES
Subjective   History of Present Illness  38-year-old G1, P0 female currently at 23 weeks gestation presents to the ER due to concerns for increasing palpitations.  Patient noted symptoms have been present for the last 1 to 2 days.  Mild dizziness.  Patient notes mild lower extremity swelling as well.  Denies chest pain.  Denies fever or chills.  Denies changes in bowel or urinary habits.  Denies hematuria or dysuria.  No vaginal bleeding.  No lower pelvic pain.  Vital stable.  Afebrile.  Symptoms are intermittently worsened with exertion.      Review of Systems   Cardiovascular:  Positive for palpitations.   All other systems reviewed and are negative.      Past Medical History:   Diagnosis Date    Anxiety     CHF (congestive heart failure) 05/2023    Colitis 2023    Depression     Diabetes mellitus 2018    Diverticulitis     GERD (gastroesophageal reflux disease)     Hypertension     Kidney stone     Pancreatitis 09/2023    PCOS (polycystic ovarian syndrome)     Sleep apnea        Allergies   Allergen Reactions    Dilantin [Phenytoin] Mental Status Change    Keppra [Levetiracetam] Mental Status Change    Meperidine Rash    Topamax [Topiramate] Mental Status Change       Past Surgical History:   Procedure Laterality Date    CARDIAC CATHETERIZATION      CARPAL TUNNEL RELEASE      COLONOSCOPY      ENDOSCOPY      FRACTURE SURGERY      HARDWARE REMOVAL      WRIST    TENDON REPAIR      HAND       Family History   Problem Relation Age of Onset    Heart disease Mother     COPD Mother     Heart disease Father     COPD Father        Social History     Socioeconomic History    Marital status:    Tobacco Use    Smoking status: Former    Smokeless tobacco: Never   Vaping Use    Vaping Use: Never used   Substance and Sexual Activity    Alcohol use: No    Drug use: No    Sexual activity: Defer           Objective   Physical Exam  Constitutional:       General: She is not in acute distress.     Appearance: Normal  appearance. She is not ill-appearing.   HENT:      Head: Normocephalic and atraumatic.      Right Ear: External ear normal.      Left Ear: External ear normal.      Nose: Nose normal.      Mouth/Throat:      Mouth: Mucous membranes are moist.   Eyes:      Extraocular Movements: Extraocular movements intact.      Pupils: Pupils are equal, round, and reactive to light.   Cardiovascular:      Rate and Rhythm: Normal rate and regular rhythm.      Heart sounds: No murmur heard.  Pulmonary:      Effort: Pulmonary effort is normal. No respiratory distress.      Breath sounds: Normal breath sounds. No wheezing.   Abdominal:      General: Bowel sounds are normal.      Palpations: Abdomen is soft.      Tenderness: There is no abdominal tenderness.   Musculoskeletal:         General: No deformity or signs of injury. Normal range of motion.      Cervical back: Normal range of motion and neck supple.   Skin:     General: Skin is warm and dry.      Findings: No erythema.   Neurological:      General: No focal deficit present.      Mental Status: She is alert and oriented to person, place, and time. Mental status is at baseline.      Cranial Nerves: No cranial nerve deficit.   Psychiatric:         Mood and Affect: Mood normal.         Behavior: Behavior normal.         Thought Content: Thought content normal.         Procedures           ED Course  ED Course as of 01/30/24 0101   Mon Jan 29, 2024   1822 ECG 12 Lead Other; palpitations  Normal sinus rhythm.  Rate 89.  Normal axis.  Normal QT interval.  Nonspecific T wave changes.  No ST elevation or depression.  Abnormal EKG.  Interpreted by me.  Electronically signed by Delmar Salas MD, 01/29/24, 6:22 PM EST.   [BC]      ED Course User Index  [BC] Delmar Salas MD                                 Results for orders placed or performed during the hospital encounter of 01/30/24   Comprehensive Metabolic Panel    Specimen: Arm, Left; Blood   Result Value Ref Range    Glucose 66 65  - 99 mg/dL    BUN 8 6 - 20 mg/dL    Creatinine 0.47 (L) 0.57 - 1.00 mg/dL    Sodium 135 (L) 136 - 145 mmol/L    Potassium 3.9 3.5 - 5.2 mmol/L    Chloride 105 98 - 107 mmol/L    CO2 22.8 22.0 - 29.0 mmol/L    Calcium 9.4 8.6 - 10.5 mg/dL    Total Protein 7.0 6.0 - 8.5 g/dL    Albumin 3.6 3.5 - 5.2 g/dL    ALT (SGPT) 13 1 - 33 U/L    AST (SGOT) 16 1 - 32 U/L    Alkaline Phosphatase 77 39 - 117 U/L    Total Bilirubin 0.3 0.0 - 1.2 mg/dL    Globulin 3.4 gm/dL    A/G Ratio 1.1 g/dL    BUN/Creatinine Ratio 17.0 7.0 - 25.0    Anion Gap 7.2 5.0 - 15.0 mmol/L    eGFR 123.6 >60.0 mL/min/1.73   Urinalysis With Microscopic If Indicated (No Culture) - Urine, Clean Catch    Specimen: Urine, Clean Catch   Result Value Ref Range    Color, UA Yellow Yellow, Straw    Appearance, UA Clear Clear    pH, UA 7.0 5.0 - 8.0    Specific Gravity, UA 1.016 1.005 - 1.030    Glucose, UA Negative Negative    Ketones, UA Negative Negative    Bilirubin, UA Negative Negative    Blood, UA Negative Negative    Protein, UA Negative Negative    Leuk Esterase, UA Negative Negative    Nitrite, UA Negative Negative    Urobilinogen, UA 1.0 E.U./dL 0.2 - 1.0 E.U./dL   CBC Auto Differential    Specimen: Arm, Left; Blood   Result Value Ref Range    WBC 8.70 3.40 - 10.80 10*3/mm3    RBC 3.98 3.77 - 5.28 10*6/mm3    Hemoglobin 10.6 (L) 12.0 - 15.9 g/dL    Hematocrit 30.9 (L) 34.0 - 46.6 %    MCV 77.6 (L) 79.0 - 97.0 fL    MCH 26.6 26.6 - 33.0 pg    MCHC 34.3 31.5 - 35.7 g/dL    RDW 15.8 (H) 12.3 - 15.4 %    RDW-SD 44.0 37.0 - 54.0 fl    MPV 10.2 6.0 - 12.0 fL    Platelets 144 140 - 450 10*3/mm3    Neutrophil % 72.2 42.7 - 76.0 %    Lymphocyte % 20.3 19.6 - 45.3 %    Monocyte % 5.3 5.0 - 12.0 %    Eosinophil % 1.5 0.3 - 6.2 %    Basophil % 0.1 0.0 - 1.5 %    Immature Grans % 0.6 (H) 0.0 - 0.5 %    Neutrophils, Absolute 6.28 1.70 - 7.00 10*3/mm3    Lymphocytes, Absolute 1.77 0.70 - 3.10 10*3/mm3    Monocytes, Absolute 0.46 0.10 - 0.90 10*3/mm3    Eosinophils,  Absolute 0.13 0.00 - 0.40 10*3/mm3    Basophils, Absolute 0.01 0.00 - 0.20 10*3/mm3    Immature Grans, Absolute 0.05 0.00 - 0.05 10*3/mm3    nRBC 0.0 0.0 - 0.2 /100 WBC   High Sensitivity Troponin T    Specimen: Arm, Left; Blood   Result Value Ref Range    HS Troponin T 9 <14 ng/L   ECG 12 Lead Other; palpitations   Result Value Ref Range    QT Interval 326 ms    QTC Interval 396 ms   Green Top (Gel)   Result Value Ref Range    Extra Tube Hold for add-ons.    Lavender Top   Result Value Ref Range    Extra Tube hold for add-on    Gold Top - SST   Result Value Ref Range    Extra Tube Hold for add-ons.    Light Blue Top   Result Value Ref Range    Extra Tube Hold for add-ons.                  Medical Decision Making  CBC and CMP are unremarkable.  EKG listed.  Chest x-ray was refused by the patient.  Troponin within normal limits.  Fetal heart tones at 156 bpm.  Palpitations likely secondary to progression of pregnancy.  Patient denied chest pain or fever throughout entire ER course.  Denied cough.  Counseled the patient on expectations with pregnancy.  Counseled on close follow-up with OB/GYN.  Work up and results were discussed throughly with the patient.  The patient will be discharged for further monitoring and management with their PCP.  Red flags, warning signs, worsening symptoms, and when to return to the ER discussed with and understood by the patient.  Patient will follow up with their PCP in a timely manner.  Vitals stable at discharge.    Amount and/or Complexity of Data Reviewed  Labs: ordered. Decision-making details documented in ED Course.  Radiology: ordered.  ECG/medicine tests: ordered.        Final diagnoses:   Palpitations       ED Disposition  ED Disposition       ED Disposition   Discharge    Condition   Stable    Comment   --               Shawna Lambert DO  [address] [facility] [address]  [phone]    In 1 week      [facility] EMERGENCY DEPARTMENT  [address] [place] [address]  [place] [address]  [phone]    If symptoms worsen         Medication List      No changes were made to your prescriptions during this visit.            Gerardo Cisneros DO  01/30/24 0102

## 2024-01-31 ENCOUNTER — SPECIALTY PHARMACY (OUTPATIENT)
Dept: PHARMACY | Facility: HOSPITAL | Age: 39
End: 2024-01-31
Payer: COMMERCIAL

## 2024-01-31 ENCOUNTER — PRIOR AUTHORIZATION (OUTPATIENT)
Dept: ENDOCRINOLOGY | Facility: CLINIC | Age: 39
End: 2024-01-31

## 2024-01-31 ENCOUNTER — OFFICE VISIT (OUTPATIENT)
Dept: ENDOCRINOLOGY | Facility: CLINIC | Age: 39
End: 2024-01-31
Payer: COMMERCIAL

## 2024-01-31 ENCOUNTER — TELEPHONE (OUTPATIENT)
Dept: OBSTETRICS AND GYNECOLOGY | Facility: HOSPITAL | Age: 39
End: 2024-01-31
Payer: COMMERCIAL

## 2024-01-31 VITALS
SYSTOLIC BLOOD PRESSURE: 126 MMHG | OXYGEN SATURATION: 98 % | HEART RATE: 86 BPM | BODY MASS INDEX: 42.74 KG/M2 | WEIGHT: 212 LBS | HEIGHT: 59 IN | DIASTOLIC BLOOD PRESSURE: 65 MMHG

## 2024-01-31 DIAGNOSIS — O24.112 PRE-EXISTING TYPE 2 DIABETES MELLITUS WITH HYPERGLYCEMIA DURING PREGNANCY IN SECOND TRIMESTER: Primary | ICD-10-CM

## 2024-01-31 DIAGNOSIS — E11.65 PRE-EXISTING TYPE 2 DIABETES MELLITUS WITH HYPERGLYCEMIA DURING PREGNANCY IN SECOND TRIMESTER: Primary | ICD-10-CM

## 2024-01-31 LAB
EXPIRATION DATE: NORMAL
HBA1C MFR BLD: 5.3 % (ref 4.5–5.7)
Lab: NORMAL

## 2024-01-31 RX ORDER — MESALAMINE 1000 MG/1
1000 SUPPOSITORY RECTAL NIGHTLY
COMMUNITY
Start: 2023-12-19

## 2024-01-31 NOTE — ASSESSMENT & PLAN NOTE
-Reviewed instructions for glycemic monitoring and the monitoring of urine ketones.  -Discussed that gestational diabetes can become harder to control as pregnancy progresses. Reviewed need for routine follow up from now until delivery.   -Patient instructed to send glucose data weekly via [employer].  -Risks of gestational diabetes were reviewed, these include, but are not limited to: LGA infant, macrosomia, stillbirth,  hypoglycemia, hyperbilirubinemia, birth injury,  birth, respiratory complications following delivery, polyhydramnios, hypocalcemia, maternal preeclampsia.  -Nutritional goals reviewed: Patient advised to eat 3 moderate sized meals daily as well as 2-4 snacks, including a bedtime snack. Discussed need for carbohydrate awareness. Patient given goals for carbohydrate intake for meals/snacks.  -Glycemic Targets during pregnancy were reviewed, as follows: fasting glucose <95, 1 hour post prandial <140, 2 hour postprandial <120.  2 week insulin pump data download is as follows:  Very High: 0%  High: 9%  IN Range: 88%  Low: 3%  Very Low: 1%  Trend shows overall hyperglycemia above goal BG's for pregnancy.  Basal: 21%  Bolus: 79%  -Discussed that her BG's are elevated above goal for pregnancy.  Will increase insulin pump settings:  Basal:   12A: 3 u/hr  CR: 1:1.1  ISF: 15  Discussed the importance of counting carbs appropriately and administering meal time doses of insulin.  -We discussed various insulin pumps as she has been having issues controlling her BG's with this pump.  Discussed switching to the tandem insulin pump to help better control BG's as well as this holds more insulin as she will require more insulin as she progresses with her pregnancy. She will look at this and let me know if she would like to switch.  -Patient advised that she will need to monitor blood glucose up to 6-8 times daily. In the event that insurance may not cover adequate testing supplies, she was instructed she  may need to purchase on OTC meter and strips.  -Patient will return for follow up in 4 weeks. She was instructed to contact the office in the interim if glucoses not at target.

## 2024-01-31 NOTE — TELEPHONE ENCOUNTER
PA required for OMNIPOD . PA was submitted and approved on 1-31-24.       Authorization #: [id number]    This approval is for 01/31/2024 until further notice.    Melissa Ramos MA  01/31/24  11:27 EST

## 2024-01-31 NOTE — PROGRESS NOTES
Chief Complaint   Patient presents with    Diabetes        Antoinette Pack is a 38 y.o. female had concerns including Diabetes.   T2DM.    She is currently 23w pregnant.  She recently had an US and was noted to be normal and doing well.  She has been doing well and has not noted any overt values.  She is having a GIRL. She has been doing well and denies any issues.    Birth state: [place]  Previous history of radiation to face/neck: none  Consuming foods high in iodine: no  Family history of thyroid complications:none    The following portions of the patient's history were reviewed and updated as appropriate: allergies, current medications, past family history, past medical history, past social history, past surgical history and problem list.    History:  Diabetes was diagnosed 8-9 years ago.  Complications include none.  Last ophtho exam was 2021, Dr. Bruno Office.  Current medications for diabetes include uses Novolog in an Omnipod 5 insulin pump.  Past meds: Trulicity, GI Issues, has problems with yeast, Glipizide-unsure why it was stopped  She checks her BG's with Dexcom CGM.  Hypos: rarely  Fasting range: 150-200+  Current pump settings:  Basal:   12A: 3 u/hr  CR: 1:1.1  ISF: 15    Diet: has made improvements to her diet.    Past Medical History:   Diagnosis Date    Anxiety     CHF (congestive heart failure) 05/2023    Colitis 2023    Depression     Diabetes mellitus 2018    Diverticulitis     GERD (gastroesophageal reflux disease)     Hypertension     Kidney stone     Pancreatitis 09/2023    PCOS (polycystic ovarian syndrome)     Sleep apnea      Past Surgical History:   Procedure Laterality Date    CARDIAC CATHETERIZATION      CARPAL TUNNEL RELEASE      COLONOSCOPY      ENDOSCOPY      FRACTURE SURGERY      HARDWARE REMOVAL      WRIST    TENDON REPAIR      HAND      Family History   Problem Relation Age of Onset    Heart disease Mother     COPD Mother     Heart disease Father     COPD Father       Social  History     Socioeconomic History    Marital status:    Tobacco Use    Smoking status: Former    Smokeless tobacco: Never   Vaping Use    Vaping Use: Never used   Substance and Sexual Activity    Alcohol use: No    Drug use: No    Sexual activity: Defer      Allergies   Allergen Reactions    Dilantin [Phenytoin] Mental Status Change    Keppra [Levetiracetam] Mental Status Change    Meperidine Rash    Topamax [Topiramate] Mental Status Change      Current Outpatient Medications on File Prior to Visit   Medication Sig Dispense Refill    aspirin (ASPIR) 81 MG EC tablet Take 1 tablet by mouth Daily. 30 tablet 6    Continuous Blood Gluc Sensor (Dexcom G6 Sensor) Use Every 10 (Ten) Days. 3 each 5    Continuous Blood Gluc Transmit (Dexcom G6 Transmitter) misc Use 1 each Every 3 (Three) Months. 1 each 1    folic acid (FOLVITE) 1 MG tablet Take 1 tablet by mouth Daily.      Insulin Aspart (novoLOG) 100 UNIT/ML injection For use in insulin pump. Max Daily Dose: 200 units daily. 60 mL 5    Insulin Disposable Pump (Omnipod 5 G6 Pod, Gen 5,) misc Use 1 each Daily. 30 each 5    Insulin Pen Needle 32G X 4 MM misc Use to inject insulin up to 4 times daily as directed 400 each 1    labetalol (NORMODYNE) 100 MG tablet Take 1 tablet by mouth 2 (Two) Times a Day.      lansoprazole (PREVACID) 30 MG capsule Take 1 capsule by mouth 2 (Two) Times a Day.      Prenatal Vit-Fe Fumarate-FA (PRENATAL PO) Take 1 tablet by mouth Daily.       No current facility-administered medications on file prior to visit.      Review of Systems   Constitutional:  Positive for diaphoresis and fatigue. Negative for unexpected weight gain and unexpected weight loss.   HENT:          Neck tenderness   Eyes:  Positive for blurred vision and visual disturbance.   Cardiovascular:  Positive for palpitations.   Gastrointestinal:  Positive for constipation.   Endocrine: Positive for heat intolerance, polydipsia, polyphagia and polyuria. Negative for cold  "intolerance.   Skin:  Positive for dry skin.        Hair thinning   Neurological:  Positive for tremors.   Psychiatric/Behavioral:  Positive for agitation and sleep disturbance. The patient is nervous/anxious.    All other systems reviewed and are negative.     /65 (BP Location: Left arm, Patient Position: Sitting, Cuff Size: Adult)   Pulse 86   Ht 149.9 cm (59\")   Wt 96.2 kg (212 lb)   LMP  (LMP Unknown)   SpO2 98%   BMI 42.82 kg/m²      Physical Exam  Vitals reviewed.   Constitutional:       Appearance: Normal appearance.   Eyes:      Extraocular Movements: Extraocular movements intact.   Cardiovascular:      Rate and Rhythm: Normal rate.   Pulmonary:      Effort: Pulmonary effort is normal.   Skin:     General: Skin is warm.   Neurological:      General: No focal deficit present.      Mental Status: She is alert and oriented to person, place, and time.   Psychiatric:         Mood and Affect: Mood normal.         Behavior: Behavior normal.         Thought Content: Thought content normal.         Judgment: Judgment normal.        Labs/Imaging  CMP  Lab Results   Component Value Date    GLUCOSE 66 01/29/2024    BUN 8 01/29/2024    CREATININE 0.47 (L) 01/29/2024    EGFRIFNONA 101 12/27/2020    EGFRIFAFRI  09/05/2016      Comment:      <15 Indicative of kidney failure.    BCR 17.0 01/29/2024    K 3.9 01/29/2024    CO2 22.8 01/29/2024    CALCIUM 9.4 01/29/2024    ALBUMIN 3.6 01/29/2024    LABIL2 1.3 (L) 02/05/2016    AST 16 01/29/2024    ALT 13 01/29/2024        CBC w/DIFF   Lab Results   Component Value Date    WBC 8.70 01/29/2024    RBC 3.98 01/29/2024    HGB 10.6 (L) 01/29/2024    HCT 30.9 (L) 01/29/2024    MCV 77.6 (L) 01/29/2024    MCH 26.6 01/29/2024    MCHC 34.3 01/29/2024    RDW 15.8 (H) 01/29/2024    RDWSD 44.0 01/29/2024    MPV 10.2 01/29/2024     01/29/2024    NEUTRORELPCT 72.2 01/29/2024    LYMPHORELPCT 20.3 01/29/2024    MONORELPCT 5.3 01/29/2024    EOSRELPCT 1.5 01/29/2024    BASORELPCT " "0.1 2024    AUTOIGPER 0.6 (H) 2024    NEUTROABS 6.28 2024    LYMPHSABS 1.77 2024    MONOSABS 0.46 2024    EOSABS 0.13 2024    BASOSABS 0.01 2024    AUTOIGNUM 0.05 2024    NRBC 0.0 2024       TSH  Lab Results   Component Value Date    TSH 0.022 (L) 2023    TSH 0.007 (L) 11/15/2023    TSH 0.606 2023       T4  Lab Results   Component Value Date    FREET4 1.03 2023    FREET4 0.94 2023    FREET4 0.97 2022     No results found for: \"V5SHSQU\"    T3  Lab Results   Component Value Date    T3FREE 3.43 2022     No results found for: \"F0CEZGK\"    TRAb  No results found for: \"TSURCPAB\"    TPO  No results found for: \"THYROIDAB\"    No valid procedures specified.    Assessment and Plan    Diagnoses and all orders for this visit:    1. Pre-existing type 2 diabetes mellitus with hyperglycemia during pregnancy in second trimester (Primary)  Assessment & Plan:  -Reviewed instructions for glycemic monitoring and the monitoring of urine ketones.  -Discussed that gestational diabetes can become harder to control as pregnancy progresses. Reviewed need for routine follow up from now until delivery.   -Patient instructed to send glucose data weekly via [employer].  -Risks of gestational diabetes were reviewed, these include, but are not limited to: LGA infant, macrosomia, stillbirth,  hypoglycemia, hyperbilirubinemia, birth injury,  birth, respiratory complications following delivery, polyhydramnios, hypocalcemia, maternal preeclampsia.  -Nutritional goals reviewed: Patient advised to eat 3 moderate sized meals daily as well as 2-4 snacks, including a bedtime snack. Discussed need for carbohydrate awareness. Patient given goals for carbohydrate intake for meals/snacks.  -Glycemic Targets during pregnancy were reviewed, as follows: fasting glucose <95, 1 hour post prandial <140, 2 hour postprandial <120.  2 week insulin pump data download is " as follows:  Very High: 0%  High: 9%  IN Range: 88%  Low: 3%  Very Low: 1%  Trend shows overall hyperglycemia above goal BG's for pregnancy.  Basal: 21%  Bolus: 79%  -Discussed that her BG's are elevated above goal for pregnancy.  Will increase insulin pump settings:  Basal:   12A: 3 u/hr  CR: 1:1.1  ISF: 15  Discussed the importance of counting carbs appropriately and administering meal time doses of insulin.  -We discussed various insulin pumps as she has been having issues controlling her BG's with this pump.  Discussed switching to the tandem insulin pump to help better control BG's as well as this holds more insulin as she will require more insulin as she progresses with her pregnancy. She will look at this and let me know if she would like to switch.  -Patient advised that she will need to monitor blood glucose up to 6-8 times daily. In the event that insurance may not cover adequate testing supplies, she was instructed she may need to purchase on OTC meter and strips.  -Patient will return for follow up in 4 weeks. She was instructed to contact the office in the interim if glucoses not at target.      Orders:  -     POC Glycosylated Hemoglobin (Hb A1C)               Return in about 4 weeks (around 2/28/2024) for Follow-up appointment, A1C. The patient was instructed to contact the clinic with any interval questions or concerns.    This document has been electronically signed by CICI Veliz  January 31, 2024 12:17 EST  Endocrinology

## 2024-01-31 NOTE — TELEPHONE ENCOUNTER
Spoke with patient over the phone.  Informed patient that Dr. Fernandez has reviewed her blood sugar log and wants her to add a new time period to her insulin to carb ratio.  MN-8am 1.1 gms carbs  8am-MN 1.0 gms carbs.   She also read Dr. Bhakta note and strongly recommends you switch to a tandem pump as Dr. Key recommends.  Also she would like to know how much you are bolusing and if multiple boluses at a time?    Your correction dose is programmed into your pump.  That is your correction factor.  When you need to bolus it should prompt you to put in your blood sugar and it will make the calculation for you and give the bolus.  Patient states she is currently driving,so informed patient would also send a [employer] message for her review.  Encouraged patient to call with any questions or concerns.    Hetal Marie RN

## 2024-01-31 NOTE — PROGRESS NOTES
Specialty Pharmacy Patient Management Program  Endocrinology Reassessment     Antoinette Pack is a 38 y.o. female with Type 2 Diabetes (Pregnancy) enrolled in the Endocrinology Patient Management program offered by [facility]. A follow-up was conducted, including assessment of continued therapy appropriateness, medication adherence, and side effect incidence and management for Insulin therapy and CGM.    Patient is currently 23 weeks pregnant. She uses Novolog in her Omniopod insulin pump and a Dexcom G6 to monitor her BG. She reports her BG is consistently in the low 100's. She reports self inflicted low BG < 70 a couple times per week when she goes long periods without eating.     She reports her OBGYN specialist in [place] is considering adding bolus doses with insulin pens in addition to Omnipod boluses.     She spoke with Sravani this morning regarding refills.    In the past, the patient has tried:     Drug Dose Reason for Discontinuation Notes   Trulicity  GI upset    Glipizide   Unsure                   Changes to Insurance Coverage or Financial Support  None    Relevant Past Medical History and Comorbidities  Relevant medical history and concomitant health conditions were discussed with the patient. The patient's chart has been reviewed for relevant past medical history and comorbid health conditions and updated as necessary.   Past Medical History:   Diagnosis Date    Anxiety     CHF (congestive heart failure) 05/2023    Colitis 2023    Depression     Diabetes mellitus 2018    Diverticulitis     GERD (gastroesophageal reflux disease)     Hypertension     Kidney stone     Pancreatitis 09/2023    PCOS (polycystic ovarian syndrome)     Sleep apnea      Social History     Socioeconomic History    Marital status:    Tobacco Use    Smoking status: Former    Smokeless tobacco: Never   Vaping Use    Vaping Use: Never used   Substance and Sexual Activity    Alcohol use: No     Drug use: No    Sexual activity: Defer       Problem list reviewed by Shirley Fu RPH on 1/31/2024 at 10:30 AM    Allergies  Known allergies and reactions were discussed with the patient. The patient's chart has been reviewed for allergy information and updated as necessary.   Dilantin [phenytoin], Keppra [levetiracetam], Meperidine, and Topamax [topiramate]    Allergies reviewed by Shirley Fu RPH on 1/31/2024 at 10:27 AM    Relevant Laboratory Values  A1C Last 3 Results          7/20/2023    15:44 9/17/2023    00:23 1/31/2024    10:18   HGBA1C Last 3 Results   Hemoglobin A1C 7.1  9.30  5.3      Lab Results   Component Value Date    HGBA1C 5.3 01/31/2024     Lab Results   Component Value Date    GLUCOSE 66 01/29/2024    CALCIUM 9.4 01/29/2024     (L) 01/29/2024    K 3.9 01/29/2024    CO2 22.8 01/29/2024     01/29/2024    BUN 8 01/29/2024    CREATININE 0.47 (L) 01/29/2024    EGFRIFAFRI  09/05/2016      Comment:      <15 Indicative of kidney failure.    EGFRIFNONA 101 12/27/2020    BCR 17.0 01/29/2024    ANIONGAP 7.2 01/29/2024     Lab Results   Component Value Date    CHOL 173 09/18/2023    CHLPL 159 09/22/2015    TRIG 111 09/18/2023    HDL 33 (L) 09/18/2023     (H) 09/18/2023       Current Medication List  This medication list has been reviewed with the patient and evaluated for any interactions or necessary modifications/recommendations, and updated to include all prescription medications, OTC medications, and supplements the patient is currently taking.  This list reflects what is contained in the patient's profile, which has also been marked as reviewed to communicate to other providers it is the most up to date version of the patient's current medication therapy.     Current Outpatient Medications:     aspirin (ASPIR) 81 MG EC tablet, Take 1 tablet by mouth Daily., Disp: 30 tablet, Rfl: 6    Continuous Blood Gluc Sensor (Dexcom G6 Sensor), Use Every 10 (Ten) Days., Disp: 3 each,  Rfl: 5    Continuous Blood Gluc Transmit (Dexcom G6 Transmitter) misc, Use 1 each Every 3 (Three) Months., Disp: 1 each, Rfl: 1    folic acid (FOLVITE) 1 MG tablet, Take 1 tablet by mouth Daily., Disp: , Rfl:     Insulin Aspart (novoLOG) 100 UNIT/ML injection, For use in insulin pump. Max Daily Dose: 200 units daily., Disp: 60 mL, Rfl: 5    Insulin Disposable Pump (Omnipod 5 G6 Pod, Gen 5,) misc, Use 1 each Daily., Disp: 30 each, Rfl: 5    Insulin Pen Needle 32G X 4 MM misc, Use to inject insulin up to 4 times daily as directed, Disp: 400 each, Rfl: 1    labetalol (NORMODYNE) 100 MG tablet, Take 1 tablet by mouth 2 (Two) Times a Day., Disp: , Rfl:     lansoprazole (PREVACID) 30 MG capsule, Take 1 capsule by mouth 2 (Two) Times a Day., Disp: , Rfl:     mesalamine (CANASA) 1000 MG suppository, Insert 1 suppository into the rectum Every Night., Disp: , Rfl:     Prenatal Vit-Fe Fumarate-FA (PRENATAL PO), Take 1 tablet by mouth Daily., Disp: , Rfl:     Medicines reviewed by Shirley Fu RPH on 1/31/2024 at 10:28 AM    Drug Interactions  No significant drug-drug interactions with diabetes medications expected according to literature.     Adverse Drug Reactions  Adverse Reactions Experienced: None  Plan for ADR Management: None required    Hospitalizations and Urgent Care Since Last Assessment  Hospitalizations or Admissions: None  ED Visits: None   Urgent Office Visits: None    Adherence and Self-Administration  Adherence related patient's specialty therapy was discussed with the patient. The Adherence segment of this outreach has been reviewed and updated.   Ongoing or New Barriers to Patient Adherence and/or Self-Administration: None   Methods for Supporting Patient Adherence and/or Self-Administration: None Required    Goals of Therapy  Goals related to the patient's specialty therapy was discussed with the patient. The Patient Goals segment of this outreach has been reviewed and updated.    Goals Addressed Today         Consistently take medications as prescribed      Specialty Pharmacy General Goal      Prevent hypoglycemia            Reassessment Plan & Follow-Up  Patient's diabetes is controlled with A1C of 5.3%, down from 9.3%.  Medication Therapy Changes: None discussed with patient    Related Plans, Therapy Recommendations, or Issues to Be Addressed: Made provider aware of patient's report of [facility] specialist considering adding additional bolus doses (in addition to Omnipod boluses).       Attestation  I attest the patient was actively involved in and has agreed to the above plan of care. If the prescribed therapy is at any point deemed not appropriate based on the current or future assessments, a consultation will be initiated with the patient's specialty care provider to determine the best course of action. The revised plan of therapy will be documented along with any required assessments and/or additional patient education provided.    Shirley Fu RPH  PGY-1 Community Pharmacy Resident  1/31/2024  10:59 EST

## 2024-02-06 ENCOUNTER — TELEPHONE (OUTPATIENT)
Dept: OBSTETRICS AND GYNECOLOGY | Facility: HOSPITAL | Age: 39
End: 2024-02-06
Payer: COMMERCIAL

## 2024-02-06 NOTE — TELEPHONE ENCOUNTER
Spoke with patient over the phone.  Informed patient that Dr. Torres had reviewed her blood sugar log and omnipod settings and would like her to change her basal rate to 3.2 u/hr.  Patient voices understanding.  Hetal Marie RN

## 2024-02-12 ENCOUNTER — OFFICE VISIT (OUTPATIENT)
Dept: OBSTETRICS AND GYNECOLOGY | Facility: HOSPITAL | Age: 39
End: 2024-02-12
Payer: COMMERCIAL

## 2024-02-12 ENCOUNTER — HOSPITAL ENCOUNTER (OUTPATIENT)
Dept: WOMENS IMAGING | Facility: HOSPITAL | Age: 39
Discharge: HOME | End: 2024-02-12
Admitting: OBSTETRICS & GYNECOLOGY
Payer: COMMERCIAL

## 2024-02-12 VITALS — DIASTOLIC BLOOD PRESSURE: 70 MMHG | SYSTOLIC BLOOD PRESSURE: 145 MMHG | WEIGHT: 213 LBS | BODY MASS INDEX: 43.02 KG/M2

## 2024-02-12 DIAGNOSIS — Z86.79 HISTORY OF CHF (CONGESTIVE HEART FAILURE): ICD-10-CM

## 2024-02-12 DIAGNOSIS — I13.0 HYPERTENSIVE HEART AND CHRONIC KIDNEY DISEASE WITH HEART FAILURE AND STAGE 1 THROUGH STAGE 4 CHRONIC KIDNEY DISEASE, OR UNSPECIFIED CHRONIC KIDNEY DISEASE: ICD-10-CM

## 2024-02-12 DIAGNOSIS — K85.90 ACUTE PANCREATITIS, UNSPECIFIED COMPLICATION STATUS, UNSPECIFIED PANCREATITIS TYPE: ICD-10-CM

## 2024-02-12 DIAGNOSIS — O10.919 CHRONIC HYPERTENSION AFFECTING PREGNANCY: ICD-10-CM

## 2024-02-12 DIAGNOSIS — O24.112 PRE-EXISTING TYPE 2 DIABETES MELLITUS WITH HYPERGLYCEMIA DURING PREGNANCY IN SECOND TRIMESTER: ICD-10-CM

## 2024-02-12 DIAGNOSIS — Z87.19 HISTORY OF PANCREATITIS: ICD-10-CM

## 2024-02-12 DIAGNOSIS — E11.65 PRE-EXISTING TYPE 2 DIABETES MELLITUS WITH HYPERGLYCEMIA DURING PREGNANCY IN SECOND TRIMESTER: ICD-10-CM

## 2024-02-12 DIAGNOSIS — O24.119 PRE-EXISTING TYPE 2 DIABETES AFFECTING PREGNANCY, ANTEPARTUM: ICD-10-CM

## 2024-02-12 DIAGNOSIS — K62.5 RECTAL BLEEDING: ICD-10-CM

## 2024-02-12 DIAGNOSIS — G47.33 OBSTRUCTIVE SLEEP APNEA SYNDROME: ICD-10-CM

## 2024-02-12 DIAGNOSIS — O12.10 PROTEINURIA AFFECTING PREGNANCY, ANTEPARTUM: ICD-10-CM

## 2024-02-12 DIAGNOSIS — O09.522 MULTIGRAVIDA OF ADVANCED MATERNAL AGE IN SECOND TRIMESTER: ICD-10-CM

## 2024-02-12 DIAGNOSIS — R03.0 ELEVATED BLOOD PRESSURE READING: Primary | ICD-10-CM

## 2024-02-12 LAB
BILIRUB BLD-MCNC: NEGATIVE MG/DL
CLARITY, POC: CLEAR
COLOR UR: YELLOW
GLUCOSE UR STRIP-MCNC: NEGATIVE MG/DL
KETONES UR QL: NEGATIVE
LEUKOCYTE EST, POC: NEGATIVE
NITRITE UR-MCNC: NEGATIVE MG/ML
PH UR: 6 [PH] (ref 5–8)
PROT UR STRIP-MCNC: NEGATIVE MG/DL
RBC # UR STRIP: NEGATIVE /UL
SP GR UR: 1005 (ref 1–1.03)
UROBILINOGEN UR QL: NORMAL

## 2024-02-12 PROCEDURE — 76816 OB US FOLLOW-UP PER FETUS: CPT

## 2024-02-12 PROCEDURE — 93325 DOPPLER ECHO COLOR FLOW MAPG: CPT

## 2024-02-12 PROCEDURE — 76825 ECHO EXAM OF FETAL HEART: CPT

## 2024-02-14 ENCOUNTER — TELEPHONE (OUTPATIENT)
Dept: OBSTETRICS AND GYNECOLOGY | Facility: HOSPITAL | Age: 39
End: 2024-02-14
Payer: COMMERCIAL

## 2024-02-21 ENCOUNTER — TELEPHONE (OUTPATIENT)
Dept: OBSTETRICS AND GYNECOLOGY | Facility: HOSPITAL | Age: 39
End: 2024-02-21
Payer: COMMERCIAL

## 2024-02-21 NOTE — TELEPHONE ENCOUNTER
"Spoke with patient over the phone.  Informed patient that Dr. Fernandez has reviewed her blood sugar report and her omnipod settings.  Patient states the lowest setting she has for correct above is 110mg/dl and the highest settings is 200mg/dl. Dr. Fernandez states \"Make sure, if eating more than 30 gms of carbs, that you give a divided bolus as omnipod will only give you 30 units at one time.  If you are already doing that, then need to give  additional insulin with each meal, maybe additional 5 units bolus\"  Also \"When you are still >120 more than 3 hours after a bolus, enter your blood glucose into omnipod and make sure it gives corrections OR override it to get correction.\"  Patient voices understanding and states she sees CARL BOLANOS with endocrinology tomorrow.  Hetal Marie RN  "

## 2024-02-22 ENCOUNTER — OFFICE VISIT (OUTPATIENT)
Dept: ENDOCRINOLOGY | Facility: CLINIC | Age: 39
End: 2024-02-22
Payer: COMMERCIAL

## 2024-02-22 VITALS
WEIGHT: 216.4 LBS | HEART RATE: 85 BPM | DIASTOLIC BLOOD PRESSURE: 75 MMHG | HEIGHT: 59 IN | SYSTOLIC BLOOD PRESSURE: 128 MMHG | BODY MASS INDEX: 43.63 KG/M2 | OXYGEN SATURATION: 98 %

## 2024-02-22 DIAGNOSIS — E11.65 PRE-EXISTING TYPE 2 DIABETES MELLITUS WITH HYPERGLYCEMIA DURING PREGNANCY IN SECOND TRIMESTER: Primary | ICD-10-CM

## 2024-02-22 DIAGNOSIS — O24.112 PRE-EXISTING TYPE 2 DIABETES MELLITUS WITH HYPERGLYCEMIA DURING PREGNANCY IN SECOND TRIMESTER: Primary | ICD-10-CM

## 2024-02-22 PROCEDURE — 99214 OFFICE O/P EST MOD 30 MIN: CPT | Performed by: NURSE PRACTITIONER

## 2024-02-22 NOTE — PROGRESS NOTES
Chief Complaint   Patient presents with    Diabetes        Antoinette Pack is a 38 y.o. female had concerns including Diabetes.   T2DM.    She is currently 26w pregnant.  She recently had an US and was noted to be normal and doing well.  She has been doing well and has not noted any overt values.  She is having a GIRL. She has been doing well and denies any issues.    Birth state: [place]  Previous history of radiation to face/neck: none  Consuming foods high in iodine: no  Family history of thyroid complications:none    The following portions of the patient's history were reviewed and updated as appropriate: allergies, current medications, past family history, past medical history, past social history, past surgical history and problem list.    History:  Diabetes was diagnosed 8-9 years ago.  Complications include none.  Last ophtho exam was 2021, Dr. Bruno Office.  Current medications for diabetes include uses Novolog in an Omnipod 5 insulin pump.  Past meds: Trulicity, GI Issues, has problems with yeast, Glipizide-unsure why it was stopped  She checks her BG's with Dexcom CGM.  Hypos: rarely  Fasting range: 150-200+  Current pump settings:  Basal:   12A: 3 u/hr  CR: 1:1.1  ISF: 15    Diet: has made improvements to her diet.    Past Medical History:   Diagnosis Date    Anxiety     CHF (congestive heart failure) 05/2023    Colitis 2023    Depression     Diabetes mellitus 2018    Diverticulitis     GERD (gastroesophageal reflux disease)     Hypertension     Kidney stone     Pancreatitis 09/2023    PCOS (polycystic ovarian syndrome)     Sleep apnea      Past Surgical History:   Procedure Laterality Date    CARDIAC CATHETERIZATION      CARPAL TUNNEL RELEASE      COLONOSCOPY      ENDOSCOPY      FRACTURE SURGERY      HARDWARE REMOVAL      WRIST    TENDON REPAIR      HAND      Family History   Problem Relation Age of Onset    Heart disease Mother     COPD Mother     Heart disease Father     COPD Father       Social  History     Socioeconomic History    Marital status:    Tobacco Use    Smoking status: Former    Smokeless tobacco: Never   Vaping Use    Vaping Use: Never used   Substance and Sexual Activity    Alcohol use: No    Drug use: No    Sexual activity: Defer      Allergies   Allergen Reactions    Dilantin [Phenytoin] Mental Status Change    Keppra [Levetiracetam] Mental Status Change    Meperidine Rash    Topamax [Topiramate] Mental Status Change      Current Outpatient Medications on File Prior to Visit   Medication Sig Dispense Refill    aspirin (ASPIR) 81 MG EC tablet Take 1 tablet by mouth Daily. 30 tablet 6    Continuous Blood Gluc Sensor (Dexcom G6 Sensor) Use Every 10 (Ten) Days. 3 each 5    Continuous Blood Gluc Transmit (Dexcom G6 Transmitter) misc Use 1 each Every 3 (Three) Months. 1 each 1    folic acid (FOLVITE) 1 MG tablet Take 1 tablet by mouth Daily.      Insulin Pen Needle 32G X 4 MM misc Use to inject insulin up to 4 times daily as directed 400 each 1    labetalol (NORMODYNE) 100 MG tablet Take 1 tablet by mouth 2 (Two) Times a Day.      lansoprazole (PREVACID) 30 MG capsule Take 1 capsule by mouth 2 (Two) Times a Day.      mesalamine (CANASA) 1000 MG suppository Insert 1 suppository into the rectum Every Night.      Prenatal Vit-Fe Fumarate-FA (PRENATAL PO) Take 1 tablet by mouth Daily.       No current facility-administered medications on file prior to visit.      Review of Systems   Constitutional:  Positive for diaphoresis and fatigue. Negative for unexpected weight gain and unexpected weight loss.   HENT:          Neck tenderness   Eyes:  Positive for blurred vision and visual disturbance.   Cardiovascular:  Positive for palpitations.   Gastrointestinal:  Positive for constipation.   Endocrine: Positive for heat intolerance, polydipsia, polyphagia and polyuria. Negative for cold intolerance.   Skin:  Positive for dry skin.        Hair thinning   Neurological:  Positive for tremors.  "  Psychiatric/Behavioral:  Positive for agitation and sleep disturbance. The patient is nervous/anxious.    All other systems reviewed and are negative.     /75 (BP Location: Right arm, Patient Position: Sitting, Cuff Size: Adult)   Pulse 85   Ht 149.9 cm (59\")   Wt 98.2 kg (216 lb 6.4 oz)   LMP  (LMP Unknown)   SpO2 98%   BMI 43.71 kg/m²      Physical Exam  Vitals reviewed.   Constitutional:       Appearance: Normal appearance.   Eyes:      Extraocular Movements: Extraocular movements intact.   Cardiovascular:      Rate and Rhythm: Normal rate.   Pulmonary:      Effort: Pulmonary effort is normal.   Skin:     General: Skin is warm.   Neurological:      General: No focal deficit present.      Mental Status: She is alert and oriented to person, place, and time.   Psychiatric:         Mood and Affect: Mood normal.         Behavior: Behavior normal.         Thought Content: Thought content normal.         Judgment: Judgment normal.        Labs/Imaging  CMP  Lab Results   Component Value Date    GLUCOSE 66 01/29/2024    BUN 8 01/29/2024    CREATININE 0.47 (L) 01/29/2024    EGFRIFNONA 101 12/27/2020    EGFRIFAFRI  09/05/2016      Comment:      <15 Indicative of kidney failure.    BCR 17.0 01/29/2024    K 3.9 01/29/2024    CO2 22.8 01/29/2024    CALCIUM 9.4 01/29/2024    ALBUMIN 3.6 01/29/2024    LABIL2 1.3 (L) 02/05/2016    AST 16 01/29/2024    ALT 13 01/29/2024        CBC w/DIFF   Lab Results   Component Value Date    WBC 8.70 01/29/2024    RBC 3.98 01/29/2024    HGB 10.6 (L) 01/29/2024    HCT 30.9 (L) 01/29/2024    MCV 77.6 (L) 01/29/2024    MCH 26.6 01/29/2024    MCHC 34.3 01/29/2024    RDW 15.8 (H) 01/29/2024    RDWSD 44.0 01/29/2024    MPV 10.2 01/29/2024     01/29/2024    NEUTRORELPCT 72.2 01/29/2024    LYMPHORELPCT 20.3 01/29/2024    MONORELPCT 5.3 01/29/2024    EOSRELPCT 1.5 01/29/2024    BASORELPCT 0.1 01/29/2024    AUTOIGPER 0.6 (H) 01/29/2024    NEUTROABS 6.28 01/29/2024    LYMPHSABS 1.77 " "2024    MONOSABS 0.46 2024    EOSABS 0.13 2024    BASOSABS 0.01 2024    AUTOIGNUM 0.05 2024    NRBC 0.0 2024       TSH  Lab Results   Component Value Date    TSH 0.022 (L) 2023    TSH 0.007 (L) 11/15/2023    TSH 0.606 2023       T4  Lab Results   Component Value Date    FREET4 1.03 2023    FREET4 0.94 2023    FREET4 0.97 2022     No results found for: \"M8RCNKZ\"    T3  Lab Results   Component Value Date    T3FREE 3.43 2022     No results found for: \"P7HCSNI\"    TRAb  No results found for: \"TSURCPAB\"    TPO  No results found for: \"THYROIDAB\"    No valid procedures specified.    Assessment and Plan    Diagnoses and all orders for this visit:    1. Pre-existing type 2 diabetes mellitus with hyperglycemia during pregnancy in second trimester (Primary)  Assessment & Plan:  -Reviewed instructions for glycemic monitoring and the monitoring of urine ketones.  -Discussed that gestational diabetes can become harder to control as pregnancy progresses. Reviewed need for routine follow up from now until delivery.   -Patient instructed to send glucose data weekly via [employer].  -Risks of gestational diabetes were reviewed, these include, but are not limited to: LGA infant, macrosomia, stillbirth,  hypoglycemia, hyperbilirubinemia, birth injury,  birth, respiratory complications following delivery, polyhydramnios, hypocalcemia, maternal preeclampsia.  -Nutritional goals reviewed: Patient advised to eat 3 moderate sized meals daily as well as 2-4 snacks, including a bedtime snack. Discussed need for carbohydrate awareness. Patient given goals for carbohydrate intake for meals/snacks.  -Glycemic Targets during pregnancy were reviewed, as follows: fasting glucose <95, 1 hour post prandial <140, 2 hour postprandial <120.  -Discussed the importance of counting carbs appropriately and administering meal time doses of insulin.  -We discussed various " insulin pumps as she has been having issues controlling her BG's with this pump.  Discussed switching to the tandem insulin pump to help better control BG's as well as this holds more insulin as she will require more insulin as she progresses with her pregnancy. She will look at this and let me know if she would like to switch.  -Patient advised that she will need to monitor blood glucose up to 6-8 times daily. In the event that insurance may not cover adequate testing supplies, she was instructed she may need to purchase on OTC meter and strips.  -Patient will return for follow up in 4 weeks. She was instructed to contact the office in the interim if glucoses not at target.                     Return in about 4 weeks (around 3/21/2024) for Follow-up appointment. The patient was instructed to contact the clinic with any interval questions or concerns.    This document has been electronically signed by CICI Veliz  February 28, 2024 16:10 EST  Endocrinology

## 2024-02-27 ENCOUNTER — SPECIALTY PHARMACY (OUTPATIENT)
Dept: PHARMACY | Facility: HOSPITAL | Age: 39
End: 2024-02-27
Payer: COMMERCIAL

## 2024-02-27 DIAGNOSIS — O24.112 PRE-EXISTING TYPE 2 DIABETES MELLITUS WITH HYPERGLYCEMIA DURING PREGNANCY IN SECOND TRIMESTER: Primary | ICD-10-CM

## 2024-02-27 DIAGNOSIS — E11.65 PRE-EXISTING TYPE 2 DIABETES MELLITUS WITH HYPERGLYCEMIA DURING PREGNANCY IN SECOND TRIMESTER: Primary | ICD-10-CM

## 2024-02-27 RX ORDER — INSULIN PMP CART,AUT,G6/7,CNTR
1 EACH SUBCUTANEOUS 2 TIMES DAILY
Qty: 60 EACH | Refills: 5 | Status: SHIPPED | OUTPATIENT
Start: 2024-02-27

## 2024-02-27 RX ORDER — INSULIN ASPART 100 [IU]/ML
INJECTION, SOLUTION INTRAVENOUS; SUBCUTANEOUS
Qty: 80 ML | Refills: 5 | Status: SHIPPED | OUTPATIENT
Start: 2024-02-27

## 2024-02-27 NOTE — PROGRESS NOTES
Specialty Pharmacy Patient Management Program  Endocrinology Reassessment     Antoinette Pack is a 38 y.o. female with Type 2 Diabetes (Pregnancy) enrolled in the Endocrinology Patient Management program offered by [facility]. A follow-up was conducted, including assessment of continued therapy appropriateness, medication adherence, and side effect incidence and management for Insulin therapy and CGM.    Patient is currently 26 weeks pregnant. She uses Novolog in her Omniopod insulin pump and a Dexcom G6 to monitor her BG. Her insulin requirements have been increasing as she is getting further along with pregnancy.      In the past, the patient has tried:     Drug Dose Reason for Discontinuation Notes   Trulicity  GI upset    Glipizide   Unsure                   Changes to Insurance Coverage or Financial Support  None    Relevant Past Medical History and Comorbidities  Relevant medical history and concomitant health conditions were discussed with the patient. The patient's chart has been reviewed for relevant past medical history and comorbid health conditions and updated as necessary.   Past Medical History:   Diagnosis Date    Anxiety     CHF (congestive heart failure) 05/2023    Colitis 2023    Depression     Diabetes mellitus 2018    Diverticulitis     GERD (gastroesophageal reflux disease)     Hypertension     Kidney stone     Pancreatitis 09/2023    PCOS (polycystic ovarian syndrome)     Sleep apnea      Social History     Socioeconomic History    Marital status:    Tobacco Use    Smoking status: Former    Smokeless tobacco: Never   Vaping Use    Vaping Use: Never used   Substance and Sexual Activity    Alcohol use: No    Drug use: No    Sexual activity: Defer       Problem list reviewed by Sravani Perez RPH on 2/27/2024 at  1:24 PM    Allergies  Known allergies and reactions were discussed with the patient. The patient's chart has been reviewed for allergy information and  updated as necessary.   Dilantin [phenytoin], Keppra [levetiracetam], Meperidine, and Topamax [topiramate]    Allergies reviewed by Sravani Perez RP on 2/27/2024 at  1:23 PM    Relevant Laboratory Values  A1C Last 3 Results          7/20/2023    15:44 9/17/2023    00:23 1/31/2024    10:18   HGBA1C Last 3 Results   Hemoglobin A1C 7.1  9.30  5.3      Lab Results   Component Value Date    HGBA1C 5.3 01/31/2024     Lab Results   Component Value Date    GLUCOSE 66 01/29/2024    CALCIUM 9.4 01/29/2024     (L) 01/29/2024    K 3.9 01/29/2024    CO2 22.8 01/29/2024     01/29/2024    BUN 8 01/29/2024    CREATININE 0.47 (L) 01/29/2024    EGFRIFAFRI  09/05/2016      Comment:      <15 Indicative of kidney failure.    EGFRIFNONA 101 12/27/2020    BCR 17.0 01/29/2024    ANIONGAP 7.2 01/29/2024     Lab Results   Component Value Date    CHOL 173 09/18/2023    CHLPL 159 09/22/2015    TRIG 111 09/18/2023    HDL 33 (L) 09/18/2023     (H) 09/18/2023       Current Medication List  This medication list has been reviewed with the patient and evaluated for any interactions or necessary modifications/recommendations, and updated to include all prescription medications, OTC medications, and supplements the patient is currently taking.  This list reflects what is contained in the patient's profile, which has also been marked as reviewed to communicate to other providers it is the most up to date version of the patient's current medication therapy.     Current Outpatient Medications:     Insulin Aspart (novoLOG) 100 UNIT/ML injection, For use in insulin pump. Max Daily Dose: 250 units daily., Disp: 80 mL, Rfl: 5    aspirin (ASPIR) 81 MG EC tablet, Take 1 tablet by mouth Daily., Disp: 30 tablet, Rfl: 6    Continuous Blood Gluc Sensor (Dexcom G6 Sensor), Use Every 10 (Ten) Days., Disp: 3 each, Rfl: 5    Continuous Blood Gluc Transmit (Dexcom G6 Transmitter) misc, Use 1 each Every 3 (Three) Months., Disp: 1 each, Rfl: 1     folic acid (FOLVITE) 1 MG tablet, Take 1 tablet by mouth Daily., Disp: , Rfl:     Insulin Disposable Pump (Omnipod 5 G6 Pod, Gen 5,) misc, Change 2 (Two) Times a Day as directed., Disp: 60 each, Rfl: 5    Insulin Pen Needle 32G X 4 MM misc, Use to inject insulin up to 4 times daily as directed, Disp: 400 each, Rfl: 1    labetalol (NORMODYNE) 100 MG tablet, Take 1 tablet by mouth 2 (Two) Times a Day., Disp: , Rfl:     lansoprazole (PREVACID) 30 MG capsule, Take 1 capsule by mouth 2 (Two) Times a Day., Disp: , Rfl:     mesalamine (CANASA) 1000 MG suppository, Insert 1 suppository into the rectum Every Night., Disp: , Rfl:     Prenatal Vit-Fe Fumarate-FA (PRENATAL PO), Take 1 tablet by mouth Daily., Disp: , Rfl:     Medicines reviewed by Sravani Perez [facility] on 2/27/2024 at  1:23 PM    Drug Interactions  No significant drug-drug interactions with diabetes medications expected according to literature.     Adverse Drug Reactions  Adverse Reactions Experienced: None  Plan for ADR Management: None required    Hospitalizations and Urgent Care Since Last Assessment  Hospitalizations or Admissions: None  ED Visits: None   Urgent Office Visits: None    Adherence and Self-Administration  Adherence related patient's specialty therapy was discussed with the patient. The Adherence segment of this outreach has been reviewed and updated.   Ongoing or New Barriers to Patient Adherence and/or Self-Administration: None   Methods for Supporting Patient Adherence and/or Self-Administration: None Required    Goals of Therapy  Goals related to the patient's specialty therapy was discussed with the patient. The Patient Goals segment of this outreach has been reviewed and updated.    Goals Addressed Today        Consistently take medications as prescribed           Reassessment Plan & Follow-Up  Provider Medication Therapy Changes: Per Vonnie Key, CICI  Increase to Novolog for use in insulin pump with MDD of 250 units   Increase to  changing OmniPod up to twice daily as needed  Related Plans, Therapy Recommendations, or Issues to Be Addressed:   Updated RX's have been sent to [employer] for shipping services.   OmniPod 5 pods require PA for increased frequency. PA has been submitted. Will update patient with results.     Attestation  I attest the patient was actively involved in and has agreed to the above plan of care. If the prescribed therapy is at any point deemed not appropriate based on the current or future assessments, a consultation will be initiated with the patient's specialty care provider to determine the best course of action. The revised plan of therapy will be documented along with any required assessments and/or additional patient education provided.    Sravani Perez, Manny, AARON HUSTON  Clinical Specialty Pharmacist, Endocrinology   2/27/2024  13:44 EST

## 2024-02-28 ENCOUNTER — HOSPITAL ENCOUNTER (OUTPATIENT)
Facility: HOSPITAL | Age: 39
Discharge: HOME | End: 2024-02-28
Attending: OBSTETRICS & GYNECOLOGY | Admitting: OBSTETRICS & GYNECOLOGY
Payer: COMMERCIAL

## 2024-02-28 ENCOUNTER — TELEPHONE (OUTPATIENT)
Dept: OBSTETRICS AND GYNECOLOGY | Facility: HOSPITAL | Age: 39
End: 2024-02-28
Payer: COMMERCIAL

## 2024-02-28 VITALS
TEMPERATURE: 98 F | HEIGHT: 59 IN | SYSTOLIC BLOOD PRESSURE: 131 MMHG | HEART RATE: 86 BPM | BODY MASS INDEX: 43.75 KG/M2 | DIASTOLIC BLOOD PRESSURE: 65 MMHG | RESPIRATION RATE: 20 BRPM | WEIGHT: 217 LBS

## 2024-02-28 PROBLEM — R06.02 SHORTNESS OF BREATH: Status: ACTIVE | Noted: 2024-02-28

## 2024-02-28 PROCEDURE — 59025 FETAL NON-STRESS TEST: CPT

## 2024-02-28 PROCEDURE — G0463 HOSPITAL OUTPT CLINIC VISIT: HCPCS

## 2024-02-28 NOTE — ASSESSMENT & PLAN NOTE
-Reviewed instructions for glycemic monitoring and the monitoring of urine ketones.  -Discussed that gestational diabetes can become harder to control as pregnancy progresses. Reviewed need for routine follow up from now until delivery.   -Patient instructed to send glucose data weekly via [employer].  -Risks of gestational diabetes were reviewed, these include, but are not limited to: LGA infant, macrosomia, stillbirth,  hypoglycemia, hyperbilirubinemia, birth injury,  birth, respiratory complications following delivery, polyhydramnios, hypocalcemia, maternal preeclampsia.  -Nutritional goals reviewed: Patient advised to eat 3 moderate sized meals daily as well as 2-4 snacks, including a bedtime snack. Discussed need for carbohydrate awareness. Patient given goals for carbohydrate intake for meals/snacks.  -Glycemic Targets during pregnancy were reviewed, as follows: fasting glucose <95, 1 hour post prandial <140, 2 hour postprandial <120.  -Discussed the importance of counting carbs appropriately and administering meal time doses of insulin.  -We discussed various insulin pumps as she has been having issues controlling her BG's with this pump.  Discussed switching to the tandem insulin pump to help better control BG's as well as this holds more insulin as she will require more insulin as she progresses with her pregnancy. She will look at this and let me know if she would like to switch.  -Patient advised that she will need to monitor blood glucose up to 6-8 times daily. In the event that insurance may not cover adequate testing supplies, she was instructed she may need to purchase on OTC meter and strips.  -Patient will return for follow up in 4 weeks. She was instructed to contact the office in the interim if glucoses not at target.

## 2024-02-28 NOTE — DISCHARGE SUMMARY
"Patient is a 38 y.o. female  currently at 27w4d, who presents with SOB and palpitations and wanted to check on the baby.  She has COPD and ALEJANDRO, CHF, and a h/o pancreatitis and Class C DM on insulin.  She is followed by Cardiology at [facility] in [place].  She is to deliver at [facility].      Her prenatal care is complicated by CHTN, CHF, Class C DM on insulin, Pancreatitis, ALEJANDRO, morbid obesity.      The following portions of the patients history were reviewed and updated as appropriate: current medications, allergies, past medical history, past surgical history, past family history, past social history, and problem list .       Prenatal Information:   Maternal Prenatal Labs  Blood Type No results found for: \"ABO\"   Rh Status No results found for: \"RH\"   Antibody Screen No results found for: \"ABSCRN\"   Rapid Urine Drug Screen No results found for: \"AMPMETHU\", \"BARBITSCNUR\", \"LABBENZSCN\", \"LABMETHSCN\", \"LABOPIASCN\", \"THCURSCR\", \"COCAINEUR\", \"AMPHETSCREEN\", \"PROPOXSCN\", \"BUPRENORSCNU\", \"METAMPSCNUR\", \"OXYCODONESCN\", \"TRICYCLICSCN\"   Group B Strep Culture No results found for: \"GBSANTIGEN\"           External Prenatal Results       Pregnancy Outside Results - Transcribed From Office Records - See Scanned Records For Details       Test Value Date Time    ABO  O  10/24/22 0154    Rh  Positive  10/24/22 0154    Antibody Screen  Negative  10/24/22 0049    Varicella IgG       Rubella       Hgb  10.6 g/dL 24 1826       9.8 g/dL 23 1239       10.4 g/dL 11/15/23 1603       11.2 g/dL 10/27/23 2112       9.5 g/dL 23 1332       8.7 g/dL 23 0235       9.0 g/dL 23 0054       10.2 g/dL 23 0828       9.5 g/dL 23 0023       10.1 g/dL 23 1610       10.1 g/dL 23 1727    Hct  30.9 % 24 1826       32.0 % 23 1239       32.5 % 11/15/23 1603       36.8 % 10/27/23 2112       30.1 % 23 1332       28.3 % 23 0235       29.0 % 23 0054       33.0 % 23 0828      "  31.2 % 23 0023       32.2 % 23 1610       32.7 % 23 1727    Glucose Fasting GTT       Glucose Tolerance Test 1 hour       Glucose Tolerance Test 3 hour       Gonorrhea (discrete)       Chlamydia (discrete)       RPR       VDRL       Syphilis Antibody       HBsAg       Herpes Simplex Virus PCR       Herpes Simplex VIrus Culture       HIV       Hep C RNA Quant PCR       Hep C Antibody       AFP       Group B Strep       GBS Susceptibility to Clindamycin       GBS Susceptibility to Erythromycin       Fetal Fibronectin       Genetic Testing, Maternal Blood                 Drug Screening       Test Value Date Time    Urine Drug Screen       Amphetamine Screen  Negative  20 0953    Barbiturate Screen  Negative  19    Benzodiazepine Screen  Negative  20 0953    Methadone Screen  Negative  20 0953    Phencyclidine Screen  Negative  19    Opiates Screen  Negative  20 0953    THC Screen  Negative  20 0953    Cocaine Screen  Negative  20 0953    Propoxyphene Screen  Negative  10/19/15 1920    Buprenorphine Screen  Negative  20 0953    Methamphetamine Screen  Negative  20 0953    Oxycodone Screen  Negative  20 0953    Tricyclic Antidepressants Screen                 Legend    ^: Historical                              Past OB History:     OB History    Para Term  AB Living   2 0 0 0 1 0   SAB IAB Ectopic Molar Multiple Live Births   1 0 0 0 0 0      # Outcome Date GA Lbr [place]/2nd Weight Sex Delivery Anes PTL Lv   2 Current            1 SAB 10/2022 6w0d    SAB         Obstetric Comments   FOB #1  Pregnancy #1 SAB at 6 weeks   FOB #1 Pregnancy #2  current       Past Medical History: Past Medical History:   Diagnosis Date    Anxiety     CHF (congestive heart failure) 2023    Colitis     Depression     Diabetes mellitus 2018    Diverticulitis     GERD (gastroesophageal reflux disease)     Hypertension     Kidney stone      Pancreatitis 09/2023    PCOS (polycystic ovarian syndrome)     Sleep apnea       Past Surgical History Past Surgical History:   Procedure Laterality Date    CARDIAC CATHETERIZATION      CARPAL TUNNEL RELEASE      COLONOSCOPY      ENDOSCOPY      FRACTURE SURGERY      HARDWARE REMOVAL      WRIST    TENDON REPAIR      HAND      Family History: Family History   Problem Relation Age of Onset    Heart disease Mother     COPD Mother     Heart disease Father     COPD Father       Social History:  reports that she has quit smoking. She has never used smokeless tobacco.   reports no history of alcohol use.   reports no history of drug use.        Review of Systems                  Review of Systems   Pertinent items are noted in HPI, all other systems reviewed and negative      Objective     Vital Signs Range for the last 24 hours  Temperature: Temp:  [98 °F (36.7 °C)] 98 °F (36.7 °C)   Temp Source: Temp src: Oral   BP:     Pulse:     Respirations: Resp:  [20] 20   Weight: Weight:  [98.4 kg (217 lb)] 98.4 kg (217 lb)     Physical Examination: General appearance - alert, well appearing, and in no distress  Chest - clear to auscultation, no wheezes, rales or rhonchi, symmetric air entry  Heart - normal rate and regular rhythm  Abdomen - soft, nontender, nondistended, no masses or organomegaly  Extremities - pedal edema trace          Assessment & Plan       Shortness of breath      Assessment & Plan    Assessment/Plan:  1.  Intrauterine pregnancy at 27w4d weeks gestation with reactive fetal status.    2.  CHTN- stable on Labetalol 100mg bid, ASA, and followed by Cardiology and MFM.    3.CHF- will send to ER as she c/o SOB and palpitations.    4. Class C DM- on novolog.  Prevacid.

## 2024-02-28 NOTE — TELEPHONE ENCOUNTER
"Spoke with patient over the phone.  Informed patient that Dr. Fernandez has reviewed her blood sugar log and wants her to change her corrections Factor to 10 mg/dl and states \"If still high 1 hour after give pre-meal bolus and after have eaten, give bolus.  Use the \"use CGM\" in the smart bolus calculator as this will adjust correction for insulin already on board\"  Patient voices understanding.  Hetal Marie RN  "

## 2024-02-28 NOTE — NON STRESS TEST
Antoinette Pack, a  at 27w4d with an ANDRE of 2024, Date entered prior to episode creation, was seen at [facility] DEL[facility] for a nonstress test.    No chief complaint on file.      Patient Active Problem List   Diagnosis    Simple goiter    Pre-existing type 2 diabetes mellitus with hyperglycemia during pregnancy in second trimester    Acute pancreatitis    History of CHF (congestive heart failure)    Chronic hypertension affecting pregnancy    History of pancreatitis    Morbid obesity with BMI of 40.0-44.9, adult    Pre-existing type 2 diabetes affecting pregnancy, antepartum    History of seizure disorder    Obstructive sleep apnea syndrome    Proteinuria affecting pregnancy, antepartum    Rectal bleeding    Shortness of breath       Start Time: 1100  Stop Time: 1130    Interpretation A  Nonstress Test Interpretation A: Reactive  Comments A: Doreen GOODE

## 2024-02-28 NOTE — PROGRESS NOTES
Specialty Pharmacy Patient Management Program  Prior Authorization Approval     Prior Authorization for OmniPod 5 pods was Approved    Approval Start Date: 2/27/2024  Approval End Date:  12/31/2099  Authorization / Reference / Case Number: [id number]    CoverMyMeds Key: [id number]    Thank you,     Sravani Perez, PharmD, AARON HUSTON  Clinical Specialty Pharmacist, Endocrinology  02/28/24  09:26 EST

## 2024-02-28 NOTE — NURSING NOTE
Discharge instructions given with labor warnings of when to go to the hospital. Instructed to go to ER for further evaluation

## 2024-02-28 NOTE — H&P
[facility]  [facility] and Physical    No chief complaint on file.      Subjective     Patient is a 38 y.o. female  currently at 27w4d, who presents with SOB and palpitations and wanted to check on the baby.  She has COPD and ALEJANDRO, CHF, and a h/o pancreatitis and Class C DM on insulin.  She is followed by Cardiology at [facility] in [place].  She is to deliver at [facility].      Her prenatal care is complicated by CHTN, CHF, Class C DM on insulin, Pancreatitis, ALEJANDRO, morbid obesity.      The following portions of the patients history were reviewed and updated as appropriate: current medications, allergies, past medical history, past surgical history, past family history, past social history, and problem list .       Prenatal Information:   Maternal Prenatal Labs  Blood Type No results found for: \"ABO\"   Rh Status No results found for: \"RH\"   Antibody Screen No results found for: \"ABSCRN\"   Rapid Urine Drug Screen No results found for: \"AMPMETHU\", \"BARBITSCNUR\", \"LABBENZSCN\", \"LABMETHSCN\", \"LABOPIASCN\", \"THCURSCR\", \"COCAINEUR\", \"AMPHETSCREEN\", \"PROPOXSCN\", \"BUPRENORSCNU\", \"METAMPSCNUR\", \"OXYCODONESCN\", \"TRICYCLICSCN\"   Group B Strep Culture No results found for: \"GBSANTIGEN\"           External Prenatal Results       Pregnancy Outside Results - Transcribed From Office Records - See Scanned Records For Details       Test Value Date Time    ABO  O  10/24/22 0154    Rh  Positive  10/24/22 0154    Antibody Screen  Negative  10/24/22 0049    Varicella IgG       Rubella       Hgb  10.6 g/dL 24 1826       9.8 g/dL 23 1239       10.4 g/dL 11/15/23 1603       11.2 g/dL 10/27/23 2112       9.5 g/dL 23 1332       8.7 g/dL 23 0235       9.0 g/dL 23 0054       10.2 g/dL 23 0828       9.5 g/dL 23 0023       10.1 g/dL 23 1610       10.1 g/dL 23 1727    Hct  30.9 % 24 1826       32.0 % 23 1239       32.5 % 11/15/23 1603       36.8 % 10/27/23 2112       30.1 % " 23 1332       28.3 % 23 0235       29.0 % 23 0054       33.0 % 23 0828       31.2 % 23 0023       32.2 % 23 1610       32.7 % 23 1727    Glucose Fasting GTT       Glucose Tolerance Test 1 hour       Glucose Tolerance Test 3 hour       Gonorrhea (discrete)       Chlamydia (discrete)       RPR       VDRL       Syphilis Antibody       HBsAg       Herpes Simplex Virus PCR       Herpes Simplex VIrus Culture       HIV       Hep C RNA Quant PCR       Hep C Antibody       AFP       Group B Strep       GBS Susceptibility to Clindamycin       GBS Susceptibility to Erythromycin       Fetal Fibronectin       Genetic Testing, Maternal Blood                 Drug Screening       Test Value Date Time    Urine Drug Screen       Amphetamine Screen  Negative  20 0953    Barbiturate Screen  Negative  19    Benzodiazepine Screen  Negative  20 0953    Methadone Screen  Negative  20 0953    Phencyclidine Screen  Negative  19    Opiates Screen  Negative  20 0953    THC Screen  Negative  20 0953    Cocaine Screen  Negative  20 0953    Propoxyphene Screen  Negative  10/19/15 1920    Buprenorphine Screen  Negative  20 0953    Methamphetamine Screen  Negative  20 0953    Oxycodone Screen  Negative  20 0953    Tricyclic Antidepressants Screen                 Legend    ^: Historical                              Past OB History:     OB History    Para Term  AB Living   2 0 0 0 1 0   SAB IAB Ectopic Molar Multiple Live Births   1 0 0 0 0 0      # Outcome Date GA Lbr [place]/2nd Weight Sex Delivery Anes PTL Lv   2 Current            1 SAB 10/2022 6w0d    SAB         Obstetric Comments   FOB #1  Pregnancy #1 SAB at 6 weeks   FOB #1 Pregnancy #2  current       Past Medical History: Past Medical History:   Diagnosis Date    Anxiety     CHF (congestive heart failure) 2023    Colitis     Depression     Diabetes mellitus  2018    Diverticulitis     GERD (gastroesophageal reflux disease)     Hypertension     Kidney stone     Pancreatitis 09/2023    PCOS (polycystic ovarian syndrome)     Sleep apnea       Past Surgical History Past Surgical History:   Procedure Laterality Date    CARDIAC CATHETERIZATION      CARPAL TUNNEL RELEASE      COLONOSCOPY      ENDOSCOPY      FRACTURE SURGERY      HARDWARE REMOVAL      WRIST    TENDON REPAIR      HAND      Family History: Family History   Problem Relation Age of Onset    Heart disease Mother     COPD Mother     Heart disease Father     COPD Father       Social History:  reports that she has quit smoking. She has never used smokeless tobacco.   reports no history of alcohol use.   reports no history of drug use.        Review of Systems                  Review of Systems   Pertinent items are noted in HPI, all other systems reviewed and negative      Objective     Vital Signs Range for the last 24 hours  Temperature: Temp:  [98 °F (36.7 °C)] 98 °F (36.7 °C)   Temp Source: Temp src: Oral   BP:     Pulse:     Respirations: Resp:  [20] 20   Weight: Weight:  [98.4 kg (217 lb)] 98.4 kg (217 lb)     Physical Examination: General appearance - alert, well appearing, and in no distress  Chest - clear to auscultation, no wheezes, rales or rhonchi, symmetric air entry  Heart - normal rate and regular rhythm  Abdomen - soft, nontender, nondistended, no masses or organomegaly  Extremities - pedal edema trace          Assessment & Plan       Shortness of breath      Assessment & Plan    Assessment/Plan:  1.  Intrauterine pregnancy at 27w4d weeks gestation with reactive fetal status.    2.  CHTN- stable on Labetalol 100mg bid, ASA, and followed by Cardiology and MFM.    3.CHF- will send to ER as she c/o SOB and palpitations.    4. Class C DM- on novolog.  Prevacid.     Anais Brito DO  2/28/2024  13:42 EST

## 2024-03-04 NOTE — PROGRESS NOTES
This provider is located at the [facility] (through [facility]), [address], [place], [address] using a secure MyChart Video Visit through [employer]. Patient is being seen remotely via telehealth at their home address in [place], and stated they are in a secure environment for this session. The patient's condition being diagnosed/treated is appropriate for telemedicine. The provider identified herself as well as her credentials.   The patient, and/or patients guardian, consent to be seen remotely, and when consent is given they understand that the consent allows for patient identifiable information to be sent to a third party as needed.   They may refuse to be seen remotely at any time. The electronic data is encrypted and password protected, and the patient and/or guardian has been advised of the potential risks to privacy not withstanding such measures.      Subjective   Antoinette Pack is a 38 y.o. female who presents today for follow up    Chief Complaint:  Bipolar disorder, depression, anxiety    History of Present Illness:   History of Present Illness  Today is a follow-up visit.  She is 28  6/7 weeks pregnant.   Medication compliance: patient is NOT taking psychiatric medication.  Depression rated 7/10, with 10 being the worst.  Anxiety rated 7/10, with 10 being the worst.  Mood rated 8/10, with 10 being the worst.  Sleep is good, getting about 8 hours a night,  Nightmares: Occasional  Appetite is good.  Hallucinations: Patient denies auditory hallucinations and visual hallucinations  Self-harm: Patient denies thoughts of self-harm.  Alcohol use: no  Drug use: no  Marijuana use: no     Chronic health issues, no acute physical or medical issues today.    Details: She states she has been doing well. Pregnancy is uneventful so far, she states her diabetes isn't as controlled as it had been.  She states she feels that she is doing ok without current psychiatric  medications.        The following portions of the patient's history were reviewed and updated as appropriate: allergies, current medications, past family history, past medical history, past social history, past surgical history and problem list.      Past Medical History:  Past Medical History:   Diagnosis Date    Anxiety     CHF (congestive heart failure) 05/2023    Colitis 2023    Depression     Diabetes mellitus 2018    Diverticulitis     GERD (gastroesophageal reflux disease)     Hypertension     Kidney stone     Pancreatitis 09/2023    PCOS (polycystic ovarian syndrome)     Sleep apnea        Social History:  Social History     Socioeconomic History    Marital status:    Tobacco Use    Smoking status: Former    Smokeless tobacco: Never   Vaping Use    Vaping status: Never Used   Substance and Sexual Activity    Alcohol use: No    Drug use: No    Sexual activity: Defer       Family History:  Family History   Problem Relation Age of Onset    Heart disease Mother     COPD Mother     Heart disease Father     COPD Father        Past Surgical History:  Past Surgical History:   Procedure Laterality Date    CARDIAC CATHETERIZATION      CARPAL TUNNEL RELEASE      COLONOSCOPY      ENDOSCOPY      FRACTURE SURGERY      HARDWARE REMOVAL      WRIST    TENDON REPAIR      HAND       Problem List:  Patient Active Problem List   Diagnosis    Simple goiter    Pre-existing type 2 diabetes mellitus with hyperglycemia during pregnancy in second trimester    Acute pancreatitis    History of CHF (congestive heart failure)    Chronic hypertension affecting pregnancy    History of pancreatitis    Morbid obesity with BMI of 40.0-44.9, adult    Pre-existing type 2 diabetes affecting pregnancy, antepartum    History of seizure disorder    Obstructive sleep apnea syndrome    Proteinuria affecting pregnancy, antepartum    Rectal bleeding    Shortness of breath        Allergy:   Allergies   Allergen Reactions    Dilantin [Phenytoin]  Mental Status Change    Keppra [Levetiracetam] Mental Status Change    Meperidine Rash    Topamax [Topiramate] Mental Status Change        Current Medications:   Current Outpatient Medications   Medication Sig Dispense Refill    aspirin (ASPIR) 81 MG EC tablet Take 1 tablet by mouth Daily. 30 tablet 6    Continuous Blood Gluc Sensor (Dexcom G6 Sensor) Use Every 10 (Ten) Days. 3 each 5    Continuous Blood Gluc Transmit (Dexcom G6 Transmitter) misc Use 1 each Every 3 (Three) Months. 1 each 1    folic acid (FOLVITE) 1 MG tablet Take 1 tablet by mouth Daily.      Insulin Aspart (novoLOG) 100 UNIT/ML injection For use in insulin pump. Max Daily Dose: 250 units daily. 80 mL 5    Insulin Disposable Pump (Omnipod 5 G6 Pod, Gen 5,) misc Change 2 (Two) Times a Day as directed. 60 each 5    Insulin Pen Needle 32G X 4 MM misc Use to inject insulin up to 4 times daily as directed 400 each 1    labetalol (NORMODYNE) 100 MG tablet Take 1 tablet by mouth 2 (Two) Times a Day.      lansoprazole (PREVACID) 30 MG capsule Take 1 capsule by mouth 2 (Two) Times a Day.      mesalamine (CANASA) 1000 MG suppository Insert 1 suppository into the rectum Every Night.      Prenatal Vit-Fe Fumarate-FA (PRENATAL PO) Take 1 tablet by mouth Daily.       No current facility-administered medications for this visit.       Review of Symptoms:    Review of Systems   Psychiatric/Behavioral:  Positive for dysphoric mood, depressed mood and stress. Negative for agitation, hallucinations, self-injury, sleep disturbance and suicidal ideas. The patient is nervous/anxious.    All other systems reviewed and are negative.        Physical Exam:   not currently breastfeeding.  There is no height or weight on file to calculate BMI.    3/8/24    MENTAL STATUS EXAM   General Appearance:  Cleanly groomed and dressed  Eye Contact:  Good eye contact  Attitude:  Cooperative  Motor Activity:  Normal gait, posture  Speech:  Normal rate, tone, volume  Language:   Spontaneous  Mood and affect:  Normal, pleasant  Hopelessness:  Denies  Thought Process:  Logical  Associations/ Thought Content:  No delusions  Hallucinations:  None  Suicidal Ideations:  Not present  Homicidal Ideation:  Not present  Sensorium:  Alert  Orientation:  Person, place, time and situation  Insight:  Fair  Judgement:  Fair  Reliability:  Fair  Impulse Control:  Fair       PHQ-9 Total Score: 0      Lab Results:   Office Visit on 02/12/2024   Component Date Value Ref Range Status    Color 02/12/2024 Yellow  Yellow, Straw, Dark Yellow, Radha Final    Clarity, UA 02/12/2024 Clear  Clear Final    Glucose, UA 02/12/2024 Negative  Negative mg/dL Final    Bilirubin 02/12/2024 Negative  Negative Final    Ketones, UA 02/12/2024 Negative  Negative Final    Specific Gravity  02/12/2024 1,005.000 (A)  1.005 - 1.030 Final    Blood, UA 02/12/2024 Negative  Negative Final    pH, Urine 02/12/2024 6.0  5.0 - 8.0 Final    Protein, POC 02/12/2024 Negative  Negative mg/dL Final    Urobilinogen, UA 02/12/2024 Normal  Normal, 0.2 E.U./dL Final    Leukocytes 02/12/2024 Negative  Negative Final    Nitrite, UA 02/12/2024 Negative  Negative Final       Assessment & Plan   Problems Addressed this Visit    None  Visit Diagnoses       Bipolar disorder, in partial remission, most recent episode depressed    -  Primary    Panic disorder with agoraphobia        Generalized anxiety disorder        Medication management              Diagnoses         Codes Comments    Bipolar disorder, in partial remission, most recent episode depressed    -  Primary ICD-10-CM: F31.75  ICD-9-CM: 296.55     Panic disorder with agoraphobia     ICD-10-CM: F40.01  ICD-9-CM: 300.21     Generalized anxiety disorder     ICD-10-CM: F41.1  ICD-9-CM: 300.02     Medication management     ICD-10-CM: Z79.899  ICD-9-CM: V58.69           Social History     Tobacco Use   Smoking Status Former   Smokeless Tobacco Never     JORGE reviewed and appropriate. Patient  counseled on use of controlled substances.     -The benefits of a healthy diet and exercise were discussed with patient, especially the positive effects they have on mental health. Patient encouraged to consider lifestyle modification regarding  diet and exercise patterns to maximize results of mental health treatment.  -Reviewed previous available documentation  -Reviewed most recent available labs     -At this time she doesn't want to restart psychiatric medications, she feels her moods are stable.    -Instructed to contact this office if depression, anxiety or mood increases or changes significantly.       Visit Diagnoses:    ICD-10-CM ICD-9-CM   1. Bipolar disorder, in partial remission, most recent episode depressed  F31.75 296.55   2. Panic disorder with agoraphobia  F40.01 300.21   3. Generalized anxiety disorder  F41.1 300.02   4. Medication management  Z79.899 V58.69         TREATMENT PLAN/GOALS: Continue supportive psychotherapy efforts and medications as indicated. Treatment and medication options discussed during today's visit. Patient acknowledged and verbally consented to continue with current treatment plan and was educated on the importance of compliance with treatment and follow-up appointments.    MEDICATION ISSUES:    Discussed medication options and treatment plan of prescribed medication as well as the risks, benefits, and side effects including potential falls, possible impaired driving and metabolic adversities among others. Patient is agreeable to call the office with any worsening of symptoms or onset of side effects. Patient is agreeable to call 911 or go to the nearest ER should he/she begin having SI/HI.     MEDS ORDERED DURING VISIT:  No orders of the defined types were placed in this encounter.    Return in about 1 month (around 4/8/2024).  -No medications prescribed today.    I spent 30 minutes caring for Antoinette on this date of service. This time includes time spent by me in the  following activities: preparing for the visit, obtaining and/or reviewing a separately obtained history, performing a medically appropriate examination and/or evaluation, counseling and educating the patient/family/caregiver, ordering medications, tests, or procedures, and documenting information in the medical record               This document has been electronically signed by CICI Lopez  March 8, 2024 10:12 EST    Part of this note may be an electronic transcription/translation of spoken language to printed text using the Dragon Dictation System.

## 2024-03-06 ENCOUNTER — TELEPHONE (OUTPATIENT)
Dept: OBSTETRICS AND GYNECOLOGY | Facility: HOSPITAL | Age: 39
End: 2024-03-06
Payer: COMMERCIAL

## 2024-03-06 NOTE — TELEPHONE ENCOUNTER
"Spoke with patient at bedside.  Informed patient that Dr. Fernandez has reviewed her blood sugar log and omnipod settings.  Her comments and recommendations as follows;  \"If persistently elevated, need to give a correction.  IF your are doing that , and it's not bringing your blood sugar down, let us know, so we can adjust your correction dose.  Confirmed with patient she has an appointment here on 3/11/24  Hetal Marie RN  "

## 2024-03-08 ENCOUNTER — TELEMEDICINE (OUTPATIENT)
Dept: PSYCHIATRY | Facility: CLINIC | Age: 39
End: 2024-03-08
Payer: COMMERCIAL

## 2024-03-08 DIAGNOSIS — Z79.899 MEDICATION MANAGEMENT: ICD-10-CM

## 2024-03-08 DIAGNOSIS — F31.75 BIPOLAR DISORDER, IN PARTIAL REMISSION, MOST RECENT EPISODE DEPRESSED: Primary | ICD-10-CM

## 2024-03-08 DIAGNOSIS — F40.01 PANIC DISORDER WITH AGORAPHOBIA: ICD-10-CM

## 2024-03-08 DIAGNOSIS — F41.1 GENERALIZED ANXIETY DISORDER: ICD-10-CM

## 2024-03-11 ENCOUNTER — HOSPITAL ENCOUNTER (OUTPATIENT)
Dept: WOMENS IMAGING | Facility: HOSPITAL | Age: 39
Discharge: HOME | End: 2024-03-11
Admitting: OBSTETRICS & GYNECOLOGY
Payer: MEDICARE

## 2024-03-11 ENCOUNTER — OFFICE VISIT (OUTPATIENT)
Dept: OBSTETRICS AND GYNECOLOGY | Facility: HOSPITAL | Age: 39
End: 2024-03-11
Payer: MEDICARE

## 2024-03-11 VITALS — BODY MASS INDEX: 44.64 KG/M2 | DIASTOLIC BLOOD PRESSURE: 67 MMHG | SYSTOLIC BLOOD PRESSURE: 135 MMHG | WEIGHT: 221 LBS

## 2024-03-11 DIAGNOSIS — Z3A.29 29 WEEKS GESTATION OF PREGNANCY: ICD-10-CM

## 2024-03-11 DIAGNOSIS — Z86.79 HISTORY OF CHF (CONGESTIVE HEART FAILURE): ICD-10-CM

## 2024-03-11 DIAGNOSIS — G47.33 OBSTRUCTIVE SLEEP APNEA SYNDROME: ICD-10-CM

## 2024-03-11 DIAGNOSIS — K62.5 RECTAL BLEEDING: ICD-10-CM

## 2024-03-11 DIAGNOSIS — O09.529 ANTEPARTUM MULTIGRAVIDA OF ADVANCED MATERNAL AGE: ICD-10-CM

## 2024-03-11 DIAGNOSIS — E66.01 MORBID OBESITY WITH BMI OF 40.0-44.9, ADULT: ICD-10-CM

## 2024-03-11 DIAGNOSIS — O24.119 PRE-EXISTING TYPE 2 DIABETES AFFECTING PREGNANCY, ANTEPARTUM: ICD-10-CM

## 2024-03-11 DIAGNOSIS — O10.919 CHRONIC HYPERTENSION AFFECTING PREGNANCY: Primary | ICD-10-CM

## 2024-03-11 DIAGNOSIS — K85.90 ACUTE PANCREATITIS, UNSPECIFIED COMPLICATION STATUS, UNSPECIFIED PANCREATITIS TYPE: ICD-10-CM

## 2024-03-11 DIAGNOSIS — Z86.69 HISTORY OF SEIZURE DISORDER: ICD-10-CM

## 2024-03-11 DIAGNOSIS — R03.0 ELEVATED BLOOD PRESSURE READING: ICD-10-CM

## 2024-03-11 DIAGNOSIS — O10.919 CHRONIC HYPERTENSION AFFECTING PREGNANCY: ICD-10-CM

## 2024-03-11 DIAGNOSIS — O12.10 PROTEINURIA AFFECTING PREGNANCY, ANTEPARTUM: ICD-10-CM

## 2024-03-11 DIAGNOSIS — Z87.19 HISTORY OF PANCREATITIS: ICD-10-CM

## 2024-03-11 PROCEDURE — 99214 OFFICE O/P EST MOD 30 MIN: CPT | Performed by: OBSTETRICS & GYNECOLOGY

## 2024-03-11 PROCEDURE — 76816 OB US FOLLOW-UP PER FETUS: CPT

## 2024-03-11 PROCEDURE — 76816 OB US FOLLOW-UP PER FETUS: CPT | Performed by: OBSTETRICS & GYNECOLOGY

## 2024-03-11 NOTE — ASSESSMENT & PLAN NOTE
Patient is currently taking Labetalol 100mgs BID and a daily baby ASA 81mgs. Her BP today is normal at 135/67.     Her baseline 24hr urine protein was 310mgs/day. Her baseline preeclampsia labs are: serum creatinine 0.45, uric acid 3.9, AST/ALT/LDH 13/12/133.      - Continue current regimen and close observation for worsening CHTN or superimposed preeclampsia   - Continue q 4 wk growth ultrasounds

## 2024-03-11 NOTE — PROGRESS NOTES
Pt has an appt with Dr. Razo this week. NIPT negative. Pt denies vaginal bleeding, contractions, or loss of fluid. Pt continues to have intermittent rectal bleeding, varying between pink and dark red, mixed in stool, with occasional clots. Pt reports taking labetalol 100 mg bid. Pt has a dexcom and send blood sugars to RN at this office.

## 2024-03-11 NOTE — ASSESSMENT & PLAN NOTE
Continued bleeding and pain with straining. Sometimes passes clots rectally. Encourage continued use of stool softeners as a preventative measure.      - Recommend 3rd trimester CBC   - If evidence of anemia, recommend IV iron over po iron

## 2024-03-11 NOTE — ASSESSMENT & PLAN NOTE
Pt has Omnipod5/DexcomG6.   Her current blood glucose AGP shows average 134mg/dl, 60% in range (), 39% high, 0% very high, <1% low, 0% very low.   Current settings in Omnipod5:  Automatic mode  Target: 110mg/dl, Correct above 110mg/dl  Insulin:Carb ratio 1u:1gm of carbs  Correction Factor 1u:10mg/dl > 110mg/dl  Max basal rate 5u/hr  Max bolus 30u  Current basal rate: 3.5u/hr     - Recommend to patient that if she is elevated 1hr after meal, to give a correction using the Omnipod5 insulin pump as this will teach the pump that she is needing more for her meals than previous   - Reviewed with patient the many quirks of Omnipod5 that make it not the best insulin pump for pregnancy   - Will continue to review her blood glucose weekly and adjust setting as able

## 2024-03-11 NOTE — ASSESSMENT & PLAN NOTE
Pt is s/p low risk NIPT, normal anatomy survey, and normal fetal echo.      - Follow-up scheduled q 4 wks for growth    - Recommend patient start twice weekly  testing at 32wks of pregnancy   - Recommend delivery around 37-38wks

## 2024-03-11 NOTE — PROGRESS NOTES
"    Maternal/Fetal Medicine Consult Note     Name: Antoinette Pack    : 1985     MRN: [id number]     Referring Provider: Geovani Razo III, MD    Chief Complaint  MO, Crohns, CHTN, T2DM, AMA, acute pancreatitis, Hx CHF, re    Subjective     History of Present Illness:  Antoinette Pack is a 38 y.o.  29w2d who presents today for a follow-up ultrasound to assess fetal growth and wellbeing.     She denies LOF/VB/ctx's. +FM.     ANDRE: Estimated Date of Delivery: 24     ROS:   As noted in HPI.     Past Medical History:   Diagnosis Date    Anxiety     CHF (congestive heart failure) 2023    Colitis     Depression     Diabetes mellitus 2018    Diverticulitis     GERD (gastroesophageal reflux disease)     Hypertension     Kidney stone     Pancreatitis 2023    PCOS (polycystic ovarian syndrome)     Sleep apnea       Past Surgical History:   Procedure Laterality Date    CARDIAC CATHETERIZATION      CARPAL TUNNEL RELEASE      COLONOSCOPY      ENDOSCOPY      FRACTURE SURGERY      HARDWARE REMOVAL      WRIST    TENDON REPAIR      HAND      OB History          2    Para   0    Term   0       0    AB   1    Living   0         SAB   1    IAB   0    Ectopic   0    Molar   0    Multiple   0    Live Births   0          Obstetric Comments   FOB #1  Pregnancy #1 SAB at 6 weeks  FOB #1 Pregnancy #2  current               Objective     Vital Signs  /67   Wt 100 kg (221 lb)   LMP  (LMP Unknown)   Estimated body mass index is 44.64 kg/m² as calculated from the following:    Height as of 24: 149.9 cm (59\").    Weight as of this encounter: 100 kg (221 lb).    Physical Exam  Constitutional:       Appearance: Normal appearance. She is normal weight.   HENT:      Head: Normocephalic and atraumatic.   Pulmonary:      Effort: Pulmonary effort is normal.   Musculoskeletal:         General: Normal range of motion.   Neurological:      General: No focal deficit present.      Mental Status: " She is alert and oriented to person, place, and time.   Psychiatric:         Mood and Affect: Mood normal.         Behavior: Behavior normal.         Thought Content: Thought content normal.         Judgment: Judgment normal.       Ultrasound Impression:   Vtx, S=D, nl JACQUIE, posterior placenta, nl anatomy, nl UA Dopplers.     Assessment and Plan     Diagnoses and all orders for this visit:    1. Chronic hypertension affecting pregnancy (Primary)  Assessment & Plan:  Patient is currently taking Labetalol 100mgs BID and a daily baby ASA 81mgs. Her BP today is normal at 135/67.     Her baseline 24hr urine protein was 310mgs/day. Her baseline preeclampsia labs are: serum creatinine 0.45, uric acid 3.9, AST/ALT/LDH /133.      - Continue current regimen and close observation for worsening CHTN or superimposed preeclampsia   - Continue q 4 wk growth ultrasounds    Orders:  -      [facility]; Future    2. History of CHF (congestive heart failure)  Assessment & Plan:  Patient does report upper respiratory stuffiness/congestion that makes her feel like she is having difficulty breathing, but no actual increase in SOA.     She is s/p a maternal echo in 2023 with EF of 56-60% with mild left atrial and ventricular dilation. Nl left ventricular function is noted.      - Continue to follow-up with Cardiology   - Recommend a repeat echo at 32wks of pregnancy    Orders:  -      [facility]; Future    3. Pre-existing type 2 diabetes affecting pregnancy, antepartum  Assessment & Plan:  Pt has Omnipod5/DexcomG6.   Her current blood glucose AGP shows average 134mg/dl, 60% in range (), 39% high, 0% very high, <1% low, 0% very low.   Current settings in Omnipod5:  Automatic mode  Target: 110mg/dl, Correct above 110mg/dl  Insulin:Carb ratio 1u:1gm of carbs  Correction Factor 1u:10mg/dl > 110mg/dl  Max basal rate 5u/hr  Max bolus 30u  Current basal rate: 3.5u/hr     - Recommend to  patient that if she is elevated 1hr after meal, to give a correction using the Omnipod5 insulin pump as this will teach the pump that she is needing more for her meals than previous   - Reviewed with patient the many quirks of Omnipod5 that make it not the best insulin pump for pregnancy   - Will continue to review her blood glucose weekly and adjust setting as able    Orders:  -     [facility] [place]; Future    4. Obstructive sleep apnea syndrome  Assessment & Plan:  Patient continues to wear CPAP at night.     Orders:  -      [facility] [place]; Future    5. Proteinuria affecting pregnancy, antepartum  -     [facility] [place]; Future    6. Rectal bleeding  Assessment & Plan:  Continued bleeding and pain with straining. Sometimes passes clots rectally. Encourage continued use of stool softeners as a preventative measure.      - Recommend 3rd trimester CBC   - If evidence of anemia, recommend IV iron over po iron    Orders:  -      [facility] [place]; Future    7. History of pancreatitis  Assessment & Plan:  Currently resolved and without symptoms. Unclear etiology.       8. History of seizure disorder  Assessment & Plan:  On no meds. Last seizure was years ago.       9. Morbid obesity with BMI of 40.0-44.9, adult    10. Antepartum multigravida of advanced maternal age  Assessment & Plan:  Pt is s/p low risk NIPT, normal anatomy survey, and normal fetal echo.      - Follow-up scheduled q 4 wks for growth    - Recommend patient start twice weekly  testing at 32wks of pregnancy   - Recommend delivery around 37-38wks      11. 29 weeks gestation of pregnancy         Follow Up  Return in about 4 weeks (around 2024).    I spent 30 minutes caring for the patient on the day of service. This included: obtaining or reviewing a separately obtained medical history, reviewing patient records, performing a medically appropriate exam and/or evaluation,  counseling or educating the patient/family/caregiver, ordering medications, labs, and/or procedures and documenting such in the medical record. This does not include time spent on review and interpretation of other tests such as fetal ultrasound or the performance of other procedures such as amniocentesis or CVS.      Dolores Fernandez MD  03/11/2024

## 2024-03-11 NOTE — LETTER
2024     Geovani Razo III, MD  [address] [facility] [address] [facility] [address]  [address]  [place] [address]    Patient: Antoinette Pack   YOB: 1985   Date of Visit: 3/11/2024       Dear Geovani Razo III, MD,    Thank you for referring Antoinette Pack to me for evaluation. Below is a copy of my consult note.    If you have questions, please do not hesitate to call me. I look forward to following Antoinette along with you.         Sincerely,        Dolores Fernandez MD        CC: No Recipients                      Maternal/Fetal Medicine Consult Note     Name: Antoinette Pack    : 1985     MRN: [id number]     Referring Provider: Geovani Razo III, MD    Chief Complaint  MO, Crohns, CHTN, T2DM, AMA, acute pancreatitis, Hx CHF, re    Subjective     History of Present Illness:  Antoinette Pack is a 38 y.o.  29w2d who presents today for a follow-up ultrasound to assess fetal growth and wellbeing.     She denies LOF/VB/ctx's. +FM.     ANDRE: Estimated Date of Delivery: 24     ROS:   As noted in HPI.     Past Medical History:   Diagnosis Date   • Anxiety    • CHF (congestive heart failure) 2023   • Colitis    • Depression    • Diabetes mellitus    • Diverticulitis    • GERD (gastroesophageal reflux disease)    • Hypertension    • Kidney stone    • Pancreatitis 2023   • PCOS (polycystic ovarian syndrome)    • Sleep apnea       Past Surgical History:   Procedure Laterality Date   • CARDIAC CATHETERIZATION     • CARPAL TUNNEL RELEASE     • COLONOSCOPY     • ENDOSCOPY     • FRACTURE SURGERY     • HARDWARE REMOVAL      WRIST   • TENDON REPAIR      HAND      OB History          2    Para   0    Term   0       0    AB   1    Living   0         SAB   1    IAB   0    Ectopic   0    Molar   0    Multiple   0    Live Births   0          Obstetric Comments   FOB #1  Pregnancy #1 SAB at 6 weeks  FOB #1 Pregnancy #2  current               Objective     Vital Signs  BP  "135/67   Wt 100 kg (221 lb)   LMP  (LMP Unknown)   Estimated body mass index is 44.64 kg/m² as calculated from the following:    Height as of 24: 149.9 cm (59\").    Weight as of this encounter: 100 kg (221 lb).    Physical Exam  Constitutional:       Appearance: Normal appearance. She is normal weight.   HENT:      Head: Normocephalic and atraumatic.   Pulmonary:      Effort: Pulmonary effort is normal.   Musculoskeletal:         General: Normal range of motion.   Neurological:      General: No focal deficit present.      Mental Status: She is alert and oriented to person, place, and time.   Psychiatric:         Mood and Affect: Mood normal.         Behavior: Behavior normal.         Thought Content: Thought content normal.         Judgment: Judgment normal.       Ultrasound Impression:   Vtx, S=D, nl JACQUIE, posterior placenta, nl anatomy, nl UA Dopplers.     Assessment and Plan     Diagnoses and all orders for this visit:    1. Chronic hypertension affecting pregnancy (Primary)  Assessment & Plan:  Patient is currently taking Labetalol 100mgs BID and a daily baby ASA 81mgs. Her BP today is normal at 135/67.     Her baseline 24hr urine protein was 310mgs/day. Her baseline preeclampsia labs are: serum creatinine 0.45, uric acid 3.9, AST/ALT/LDH 13//133.      - Continue current regimen and close observation for worsening CHTN or superimposed preeclampsia   - Continue q 4 wk growth ultrasounds    Orders:  -     [facility]; Future    2. History of CHF (congestive heart failure)  Assessment & Plan:  Patient does report upper respiratory stuffiness/congestion that makes her feel like she is having difficulty breathing, but no actual increase in SOA.     She is s/p a maternal echo in 2023 with EF of 56-60% with mild left atrial and ventricular dilation. Nl left ventricular function is noted.      - Continue to follow-up with Cardiology   - Recommend a repeat echo at 32wks of " pregnancy    Orders:  -      [facility]; [facility]    3. Pre-existing type 2 diabetes affecting pregnancy, antepartum  Assessment & Plan:  Pt has Omnipod5/DexcomG6.   Her current blood glucose AGP shows average 134mg/dl, 60% in range (), 39% high, 0% very high, <1% low, 0% very low.   Current settings in Omnipod5:  Automatic mode  Target: 110mg/dl, Correct above 110mg/dl  Insulin:Carb ratio 1u:1gm of carbs  Correction Factor 1u:10mg/dl > 110mg/dl  Max basal rate 5u/hr  Max bolus 30u  Current basal rate: 3.5u/hr     - Recommend to patient that if she is elevated 1hr after meal, to give a correction using the Omnipod5 insulin pump as this will teach the pump that she is needing more for her meals than previous   - Reviewed with patient the many quirks of Omnipod5 that make it not the best insulin pump for pregnancy   - Will continue to review her blood glucose weekly and adjust setting as able    Orders:  -      [facility]; [facility]    4. Obstructive sleep apnea syndrome  Assessment & Plan:  Patient continues to wear CPAP at night.     Orders:  -      [facility]; Future    5. Proteinuria affecting pregnancy, antepartum  -      [facility]; Future    6. Rectal bleeding  Assessment & Plan:  Continued bleeding and pain with straining. Sometimes passes clots rectally. Encourage continued use of stool softeners as a preventative measure.      - Recommend 3rd trimester CBC   - If evidence of anemia, recommend IV iron over po iron    Orders:  -      [facility]; Future    7. History of pancreatitis  Assessment & Plan:  Currently resolved and without symptoms. Unclear etiology.       8. History of seizure disorder  Assessment & Plan:  On no meds. Last seizure was years ago.       9. Morbid obesity with BMI of 40.0-44.9, adult    10. Antepartum multigravida of advanced maternal age  Assessment & Plan:  Pt is s/p  low risk NIPT, normal anatomy survey, and normal fetal echo.      - Follow-up scheduled q 4 wks for growth    - Recommend patient start twice weekly  testing at 32wks of pregnancy   - Recommend delivery around 37-38wks      11. 29 weeks gestation of pregnancy         Follow Up  Return in about 4 weeks (around 2024).    I spent 30 minutes caring for the patient on the day of service. This included: obtaining or reviewing a separately obtained medical history, reviewing patient records, performing a medically appropriate exam and/or evaluation, counseling or educating the patient/family/caregiver, ordering medications, labs, and/or procedures and documenting such in the medical record. This does not include time spent on review and interpretation of other tests such as fetal ultrasound or the performance of other procedures such as amniocentesis or CVS.      Dolores Fernandez MD  2024

## 2024-03-11 NOTE — ASSESSMENT & PLAN NOTE
Patient does report upper respiratory stuffiness/congestion that makes her feel like she is having difficulty breathing, but no actual increase in SOA.     She is s/p a maternal echo in 12/2023 with EF of 56-60% with mild left atrial and ventricular dilation. Nl left ventricular function is noted.      - Continue to follow-up with Cardiology   - Recommend a repeat echo at 32wks of pregnancy

## 2024-03-19 ENCOUNTER — SPECIALTY PHARMACY (OUTPATIENT)
Dept: PHARMACY | Facility: HOSPITAL | Age: 39
End: 2024-03-19
Payer: COMMERCIAL

## 2024-03-19 RX ORDER — PROCHLORPERAZINE 25 MG/1
1 SUPPOSITORY RECTAL
Qty: 1 EACH | Refills: 1 | Status: SHIPPED | OUTPATIENT
Start: 2024-03-19

## 2024-03-19 NOTE — PROGRESS NOTES
Specialty Pharmacy Patient Management Program  Prior Authorization Approval     Prior Authorization for OmniPod 5 pods was Approved.    Approval Start Date: 3/1/2024   Approval End Date: 3/19/2025  Authorization / Reference / Case Number: [id number]    CoverMyMeds Key: [id number]    Thank you,     Sravani Perez, PharmD, [facility]  Clinical Specialty Pharmacist, Endocrinology  03/19/24  14:42 EDT

## 2024-03-20 ENCOUNTER — TELEPHONE (OUTPATIENT)
Dept: OBSTETRICS AND GYNECOLOGY | Facility: HOSPITAL | Age: 39
End: 2024-03-20
Payer: COMMERCIAL

## 2024-03-20 NOTE — TELEPHONE ENCOUNTER
"Attempted to reach patient by phone.  Received voice mail.  Message left stating [employer] message had been sent with omnipod changes.  Requested patient respond to [employer] message so we know she received it.   Changes include  Make sure max basal rate is set to at least 6 u/hr.  Change insulin to carb ratio to 0.7gms  Make sure IF eating meal where insulin bolus should be >30 u (so more than 38 gms carbs), she gives a second bolus to get full amount as pump will only give 30 u at a time.  Would do this by entering remaing bolus under \"total bolus\" part of bolus calculation.   Hetal Marie RN  "

## 2024-03-21 ENCOUNTER — OFFICE VISIT (OUTPATIENT)
Dept: ENDOCRINOLOGY | Facility: CLINIC | Age: 39
End: 2024-03-21
Payer: COMMERCIAL

## 2024-03-21 VITALS
SYSTOLIC BLOOD PRESSURE: 141 MMHG | WEIGHT: 222.4 LBS | DIASTOLIC BLOOD PRESSURE: 81 MMHG | BODY MASS INDEX: 44.84 KG/M2 | HEART RATE: 85 BPM | HEIGHT: 59 IN | OXYGEN SATURATION: 95 %

## 2024-03-21 DIAGNOSIS — E11.65 PRE-EXISTING TYPE 2 DIABETES MELLITUS WITH HYPERGLYCEMIA DURING PREGNANCY IN THIRD TRIMESTER: Primary | ICD-10-CM

## 2024-03-21 DIAGNOSIS — O24.113 PRE-EXISTING TYPE 2 DIABETES MELLITUS WITH HYPERGLYCEMIA DURING PREGNANCY IN THIRD TRIMESTER: Primary | ICD-10-CM

## 2024-03-21 NOTE — PROGRESS NOTES
Chief Complaint   Patient presents with    Diabetes        Antoinette Pack is a 38 y.o. female had concerns including Diabetes.   T2DM.    She is currently 30w5d pregnant.  She recently had an US and was noted to be normal and doing well.  She has been doing well and has not noted any overt values.  She is having a GIRL. She has been doing well and denies any issues.  She has some setting adjustment recommendations from her OB to help change on her Omnipod.    Birth state: [place]  Previous history of radiation to face/neck: none  Consuming foods high in iodine: no  Family history of thyroid complications:none    The following portions of the patient's history were reviewed and updated as appropriate: allergies, current medications, past family history, past medical history, past social history, past surgical history and problem list.    History:  Diabetes was diagnosed 8-9 years ago.  Complications include none.  Last ophtho exam was 2021, Dr. Bruno Office.  Current medications for diabetes include uses Novolog in an Omnipod 5 insulin pump.  Past meds: Trulicity, GI Issues, has problems with yeast, Glipizide-unsure why it was stopped  She checks her BG's with Dexcom CGM.  Hypos: rarely  Fasting range: 150-200+  Current pump settings:  Basal:   12A: 3 u/hr  CR: 1:1.1  ISF: 15    Diet: has made improvements to her diet.    Past Medical History:   Diagnosis Date    Anxiety     CHF (congestive heart failure) 05/2023    Colitis 2023    Depression     Diabetes mellitus 2018    Diverticulitis     GERD (gastroesophageal reflux disease)     Hypertension     Kidney stone     Pancreatitis 09/2023    PCOS (polycystic ovarian syndrome)     Sleep apnea      Past Surgical History:   Procedure Laterality Date    CARDIAC CATHETERIZATION      CARPAL TUNNEL RELEASE      COLONOSCOPY      ENDOSCOPY      FRACTURE SURGERY      HARDWARE REMOVAL      WRIST    TENDON REPAIR      HAND      Family History   Problem Relation Age of Onset     Heart disease Mother     COPD Mother     Heart disease Father     COPD Father       Social History     Socioeconomic History    Marital status:    Tobacco Use    Smoking status: Former    Smokeless tobacco: Never   Vaping Use    Vaping status: Never Used   Substance and Sexual Activity    Alcohol use: No    Drug use: No    Sexual activity: Defer      Allergies   Allergen Reactions    Dilantin [Phenytoin] Mental Status Change    Keppra [Levetiracetam] Mental Status Change    Meperidine Rash    Topamax [Topiramate] Mental Status Change      Current Outpatient Medications on File Prior to Visit   Medication Sig Dispense Refill    aspirin (ASPIR) 81 MG EC tablet Take 1 tablet by mouth Daily. 30 tablet 6    Continuous Blood Gluc Sensor (Dexcom G6 Sensor) Use Every 10 (Ten) Days. 3 each 5    Continuous Blood Gluc Transmit (Dexcom G6 Transmitter) misc Use 1 each Every 3 (Three) Months. 1 each 1    folic acid (FOLVITE) 1 MG tablet Take 1 tablet by mouth Daily.      Insulin Aspart (novoLOG) 100 UNIT/ML injection For use in insulin pump. Max Daily Dose: 250 units daily. 80 mL 5    Insulin Disposable Pump (Omnipod 5 G6 Pods, Gen 5,) misc Change 2 (Two) Times a Day as directed. 60 each 5    Insulin Pen Needle 32G X 4 MM misc Use to inject insulin up to 4 times daily as directed 400 each 1    labetalol (NORMODYNE) 100 MG tablet Take 1 tablet by mouth 2 (Two) Times a Day.      lansoprazole (PREVACID) 30 MG capsule Take 1 capsule by mouth 2 (Two) Times a Day.      mesalamine (CANASA) 1000 MG suppository Insert 1 suppository into the rectum Every Night.      Prenatal Vit-Fe Fumarate-FA (PRENATAL PO) Take 1 tablet by mouth Daily.       No current facility-administered medications on file prior to visit.      Review of Systems   Constitutional:  Positive for diaphoresis and fatigue. Negative for unexpected weight gain and unexpected weight loss.   HENT:          Neck tenderness   Eyes:  Positive for blurred vision and visual  "disturbance.   Cardiovascular:  Positive for palpitations.   Gastrointestinal:  Positive for constipation.   Endocrine: Positive for heat intolerance, polydipsia, polyphagia and polyuria. Negative for cold intolerance.   Skin:  Positive for dry skin.        Hair thinning   Neurological:  Positive for tremors.   Psychiatric/Behavioral:  Positive for agitation and sleep disturbance. The patient is nervous/anxious.    All other systems reviewed and are negative.     /81 (BP Location: Right arm, Patient Position: Sitting, Cuff Size: Adult)   Pulse 85   Ht 149.9 cm (59\")   Wt 101 kg (222 lb 6.4 oz)   LMP  (LMP Unknown)   SpO2 95%   BMI 44.92 kg/m²      Physical Exam  Vitals reviewed.   Constitutional:       Appearance: Normal appearance.   Eyes:      Extraocular Movements: Extraocular movements intact.   Cardiovascular:      Rate and Rhythm: Normal rate.   Pulmonary:      Effort: Pulmonary effort is normal.   Skin:     General: Skin is warm.   Neurological:      General: No focal deficit present.      Mental Status: She is alert and oriented to person, place, and time.   Psychiatric:         Mood and Affect: Mood normal.         Behavior: Behavior normal.         Thought Content: Thought content normal.         Judgment: Judgment normal.        Labs/Imaging  CMP  Lab Results   Component Value Date    GLUCOSE 66 01/29/2024    BUN 8 01/29/2024    CREATININE 0.47 (L) 01/29/2024    EGFRIFNONA 101 12/27/2020    EGFRIFAFRI  09/05/2016      Comment:      <15 Indicative of kidney failure.    BCR 17.0 01/29/2024    K 3.9 01/29/2024    CO2 22.8 01/29/2024    CALCIUM 9.4 01/29/2024    ALBUMIN 3.6 01/29/2024    LABIL2 1.3 (L) 02/05/2016    AST 16 01/29/2024    ALT 13 01/29/2024        CBC w/DIFF   Lab Results   Component Value Date    WBC 8.70 01/29/2024    RBC 3.98 01/29/2024    HGB 10.6 (L) 01/29/2024    HCT 30.9 (L) 01/29/2024    MCV 77.6 (L) 01/29/2024    MCH 26.6 01/29/2024    MCHC 34.3 01/29/2024    RDW 15.8 (H) " "2024    RDWSD 44.0 2024    MPV 10.2 2024     2024    NEUTRORELPCT 72.2 2024    LYMPHORELPCT 20.3 2024    MONORELPCT 5.3 2024    EOSRELPCT 1.5 2024    BASORELPCT 0.1 2024    AUTOIGPER 0.6 (H) 2024    NEUTROABS 6.28 2024    LYMPHSABS 1.77 2024    MONOSABS 0.46 2024    EOSABS 0.13 2024    BASOSABS 0.01 2024    AUTOIGNUM 0.05 2024    NRBC 0.0 2024       TSH  Lab Results   Component Value Date    TSH 0.022 (L) 2023    TSH 0.007 (L) 11/15/2023    TSH 0.606 2023       T4  Lab Results   Component Value Date    FREET4 1.03 2023    FREET4 0.94 2023    FREET4 0.97 2022     No results found for: \"C0VZOMB\"    T3  Lab Results   Component Value Date    T3FREE 3.43 2022     No results found for: \"K4ZFFBY\"    TRAb  No results found for: \"TSURCPAB\"    TPO  No results found for: \"THYROIDAB\"    No valid procedures specified.    Assessment and Plan    Diagnoses and all orders for this visit:    1. Pre-existing type 2 diabetes mellitus with hyperglycemia during pregnancy in third trimester (Primary)  Assessment & Plan:  -Reviewed instructions for glycemic monitoring and the monitoring of urine ketones.  -Discussed that gestational diabetes can become harder to control as pregnancy progresses. Reviewed need for routine follow up from now until delivery.   -Patient instructed to send glucose data weekly via [employer].  -Risks of gestational diabetes were reviewed, these include, but are not limited to: LGA infant, macrosomia, stillbirth,  hypoglycemia, hyperbilirubinemia, birth injury,  birth, respiratory complications following delivery, polyhydramnios, hypocalcemia, maternal preeclampsia.  -Nutritional goals reviewed: Patient advised to eat 3 moderate sized meals daily as well as 2-4 snacks, including a bedtime snack. Discussed need for carbohydrate awareness. Patient given goals " for carbohydrate intake for meals/snacks.  -Glycemic Targets during pregnancy were reviewed, as follows: fasting glucose <95, 1 hour post prandial <140, 2 hour postprandial <120.  -Discussed the importance of counting carbs appropriately and administering meal time doses of insulin.  -Patient currently using CGM with Omnipod insulin pump.  2 week data download is as follows:  Very High: 0%  High: 14%  IN Range: 86%  Low: 0%  Very Low: 0%  Basal: 17%  Bolus: 83%  Adjusted her settings:  Increased basal and CR to better control hypers without overt hypos.  -Patient advised that she will need to monitor blood glucose up to 6-8 times daily. In the event that insurance may not cover adequate testing supplies, she was instructed she may need to purchase on OTC meter and strips.  -Patient will return for follow up in 4 weeks. She was instructed to contact the office in the interim if glucoses not at target.                       Return in about 4 weeks (around 4/18/2024) for Follow-up appointment. The patient was instructed to contact the clinic with any interval questions or concerns.    This document has been electronically signed by CICI Veliz  March 24, 2024 20:14 EDT  Endocrinology

## 2024-03-24 PROBLEM — O24.113: Status: ACTIVE | Noted: 2022-02-17

## 2024-03-25 NOTE — ASSESSMENT & PLAN NOTE
-Reviewed instructions for glycemic monitoring and the monitoring of urine ketones.  -Discussed that gestational diabetes can become harder to control as pregnancy progresses. Reviewed need for routine follow up from now until delivery.   -Patient instructed to send glucose data weekly via [facility].  -Risks of gestational diabetes were reviewed, these include, but are not limited to: LGA infant, macrosomia, stillbirth,  hypoglycemia, hyperbilirubinemia, birth injury,  birth, respiratory complications following delivery, polyhydramnios, hypocalcemia, maternal preeclampsia.  -Nutritional goals reviewed: Patient advised to eat 3 moderate sized meals daily as well as 2-4 snacks, including a bedtime snack. Discussed need for carbohydrate awareness. Patient given goals for carbohydrate intake for meals/snacks.  -Glycemic Targets during pregnancy were reviewed, as follows: fasting glucose <95, 1 hour post prandial <140, 2 hour postprandial <120.  -Discussed the importance of counting carbs appropriately and administering meal time doses of insulin.  -Patient currently using CGM with Omnipod insulin pump.  2 week data download is as follows:  Very High: 0%  High: 14%  IN Range: 86%  Low: 0%  Very Low: 0%  Basal: 17%  Bolus: 83%  Adjusted her settings:  Increased basal and CR to better control hypers without overt hypos.  -Patient advised that she will need to monitor blood glucose up to 6-8 times daily. In the event that insurance may not cover adequate testing supplies, she was instructed she may need to purchase on OTC meter and strips.  -Patient will return for follow up in 4 weeks. She was instructed to contact the office in the interim if glucoses not at target.

## 2024-03-26 ENCOUNTER — TELEPHONE (OUTPATIENT)
Dept: OBSTETRICS AND GYNECOLOGY | Facility: HOSPITAL | Age: 39
End: 2024-03-26
Payer: COMMERCIAL

## 2024-03-26 NOTE — TELEPHONE ENCOUNTER
Note for diabetic conversation   Spoke with Ms Pack at length about her message from 3/20/24.  Per Dr Fernandez:  Pt will now Take the number of carbs that she is eating and divide that by 0.7.  Then give herself insulin (in split bolus if needed) for the number of carbs.   Example:  eating 60 grams of carbs  60  divided by 0.7 = 85  Put in 85 grams into the pump.  Will give 30 units of insulin with pump for bolus and then inject the rest of the insulin required subcutaneously.   The boluses given with her pump may take a while to give so injecting with the additional insulin (short acting) should keep blood sugars more stable over time .    Obtained patient new pump settings. Pt verbalizes understanding of new plan for insulin dosages and boluses. Will ask Dr Fernandez about ordering more insulin vials for pt.  Needs to be send to [facility] pharmacy in [place].  Pt also reports she has switched to [employer] insurance

## 2024-03-27 ENCOUNTER — APPOINTMENT (OUTPATIENT)
Dept: GENERAL RADIOLOGY | Facility: HOSPITAL | Age: 39
End: 2024-03-27
Payer: COMMERCIAL

## 2024-03-27 ENCOUNTER — HOSPITAL ENCOUNTER (EMERGENCY)
Facility: HOSPITAL | Age: 39
Discharge: HOME | End: 2024-03-27
Attending: EMERGENCY MEDICINE | Admitting: EMERGENCY MEDICINE
Payer: COMMERCIAL

## 2024-03-27 VITALS
HEIGHT: 59 IN | TEMPERATURE: 98.1 F | RESPIRATION RATE: 18 BRPM | DIASTOLIC BLOOD PRESSURE: 69 MMHG | BODY MASS INDEX: 43.75 KG/M2 | HEART RATE: 86 BPM | OXYGEN SATURATION: 97 % | WEIGHT: 217 LBS | SYSTOLIC BLOOD PRESSURE: 154 MMHG

## 2024-03-27 DIAGNOSIS — Z86.79 HISTORY OF CHF (CONGESTIVE HEART FAILURE): ICD-10-CM

## 2024-03-27 DIAGNOSIS — O24.119 PRE-EXISTING TYPE 2 DIABETES AFFECTING PREGNANCY, ANTEPARTUM: ICD-10-CM

## 2024-03-27 DIAGNOSIS — E66.01 MORBID OBESITY WITH BMI OF 40.0-44.9, ADULT: ICD-10-CM

## 2024-03-27 DIAGNOSIS — Z87.19 HISTORY OF PANCREATITIS: ICD-10-CM

## 2024-03-27 DIAGNOSIS — O12.10 PROTEINURIA AFFECTING PREGNANCY, ANTEPARTUM: ICD-10-CM

## 2024-03-27 DIAGNOSIS — R06.09 DOE (DYSPNEA ON EXERTION): Primary | ICD-10-CM

## 2024-03-27 DIAGNOSIS — G47.33 OBSTRUCTIVE SLEEP APNEA SYNDROME: ICD-10-CM

## 2024-03-27 DIAGNOSIS — O09.529 ANTEPARTUM MULTIGRAVIDA OF ADVANCED MATERNAL AGE: ICD-10-CM

## 2024-03-27 DIAGNOSIS — O10.919 CHRONIC HYPERTENSION AFFECTING PREGNANCY: Primary | ICD-10-CM

## 2024-03-27 DIAGNOSIS — O24.119 PRE-EXISTING TYPE 2 DIABETES AFFECTING PREGNANCY, ANTEPARTUM: Primary | ICD-10-CM

## 2024-03-27 LAB
A-A DO2: 17.1 MMHG (ref 0–300)
ALBUMIN SERPL-MCNC: 3.5 G/DL (ref 3.5–5.2)
ALBUMIN/GLOB SERPL: 1.3 G/DL
ALP SERPL-CCNC: 98 U/L (ref 39–117)
ALT SERPL W P-5'-P-CCNC: 12 U/L (ref 1–33)
ANION GAP SERPL CALCULATED.3IONS-SCNC: 11.1 MMOL/L (ref 5–15)
ARTERIAL PATENCY WRIST A: ABNORMAL
AST SERPL-CCNC: 16 U/L (ref 1–32)
ATMOSPHERIC PRESS: 727 MMHG
BASE EXCESS BLDA CALC-SCNC: -2.6 MMOL/L (ref 0–2)
BASOPHILS # BLD AUTO: 0.02 10*3/MM3 (ref 0–0.2)
BASOPHILS NFR BLD AUTO: 0.2 % (ref 0–1.5)
BDY SITE: ABNORMAL
BILIRUB SERPL-MCNC: 0.3 MG/DL (ref 0–1.2)
BUN SERPL-MCNC: 10 MG/DL (ref 6–20)
BUN/CREAT SERPL: 21.3 (ref 7–25)
CALCIUM SPEC-SCNC: 8.8 MG/DL (ref 8.6–10.5)
CHLORIDE SERPL-SCNC: 106 MMOL/L (ref 98–107)
CO2 BLDA-SCNC: 22.4 MMOL/L (ref 22–33)
CO2 SERPL-SCNC: 20.9 MMOL/L (ref 22–29)
COHGB MFR BLD: 1.5 % (ref 0–5)
CREAT SERPL-MCNC: 0.47 MG/DL (ref 0.57–1)
DEPRECATED RDW RBC AUTO: 44.4 FL (ref 37–54)
EGFRCR SERPLBLD CKD-EPI 2021: 123.6 ML/MIN/1.73
EOSINOPHIL # BLD AUTO: 0.14 10*3/MM3 (ref 0–0.4)
EOSINOPHIL NFR BLD AUTO: 1.4 % (ref 0.3–6.2)
ERYTHROCYTE [DISTWIDTH] IN BLOOD BY AUTOMATED COUNT: 14.6 % (ref 12.3–15.4)
FLUAV RNA RESP QL NAA+PROBE: NOT DETECTED
FLUBV RNA RESP QL NAA+PROBE: NOT DETECTED
GLOBULIN UR ELPH-MCNC: 2.8 GM/DL
GLUCOSE BLDC GLUCOMTR-MCNC: 45 MG/DL (ref 70–130)
GLUCOSE SERPL-MCNC: 69 MG/DL (ref 65–99)
HCO3 BLDA-SCNC: 21.4 MMOL/L (ref 20–26)
HCT VFR BLD AUTO: 30.5 % (ref 34–46.6)
HCT VFR BLD CALC: 30.3 % (ref 38–51)
HGB BLD-MCNC: 10 G/DL (ref 12–15.9)
HGB BLDA-MCNC: 9.9 G/DL (ref 13.5–17.5)
HOLD SPECIMEN: NORMAL
HOLD SPECIMEN: NORMAL
IMM GRANULOCYTES # BLD AUTO: 0.06 10*3/MM3 (ref 0–0.05)
IMM GRANULOCYTES NFR BLD AUTO: 0.6 % (ref 0–0.5)
INHALED O2 CONCENTRATION: 21 %
LYMPHOCYTES # BLD AUTO: 1.8 10*3/MM3 (ref 0.7–3.1)
LYMPHOCYTES NFR BLD AUTO: 18.3 % (ref 19.6–45.3)
Lab: ABNORMAL
MCH RBC QN AUTO: 27.6 PG (ref 26.6–33)
MCHC RBC AUTO-ENTMCNC: 32.8 G/DL (ref 31.5–35.7)
MCV RBC AUTO: 84.3 FL (ref 79–97)
METHGB BLD QL: 0.5 % (ref 0–3)
MODALITY: ABNORMAL
MONOCYTES # BLD AUTO: 0.51 10*3/MM3 (ref 0.1–0.9)
MONOCYTES NFR BLD AUTO: 5.2 % (ref 5–12)
NEUTROPHILS NFR BLD AUTO: 7.29 10*3/MM3 (ref 1.7–7)
NEUTROPHILS NFR BLD AUTO: 74.3 % (ref 42.7–76)
NRBC BLD AUTO-RTO: 0 /100 WBC (ref 0–0.2)
NT-PROBNP SERPL-MCNC: 75.1 PG/ML (ref 0–450)
OXYHGB MFR BLDV: 95.5 % (ref 94–99)
PCO2 BLDA: 33.2 MM HG (ref 35–45)
PCO2 TEMP ADJ BLD: ABNORMAL MM[HG]
PH BLDA: 7.42 PH UNITS (ref 7.35–7.45)
PH, TEMP CORRECTED: ABNORMAL
PLATELET # BLD AUTO: 150 10*3/MM3 (ref 140–450)
PMV BLD AUTO: 11.3 FL (ref 6–12)
PO2 BLDA: 88.3 MM HG (ref 83–108)
PO2 TEMP ADJ BLD: ABNORMAL MM[HG]
POTASSIUM SERPL-SCNC: 3.9 MMOL/L (ref 3.5–5.2)
PROT SERPL-MCNC: 6.3 G/DL (ref 6–8.5)
QT INTERVAL: 330 MS
QTC INTERVAL: 403 MS
RBC # BLD AUTO: 3.62 10*6/MM3 (ref 3.77–5.28)
SAO2 % BLDCOA: 97.5 % (ref 94–99)
SARS-COV-2 RNA RESP QL NAA+PROBE: NOT DETECTED
SODIUM SERPL-SCNC: 138 MMOL/L (ref 136–145)
TROPONIN T SERPL HS-MCNC: 13 NG/L
VENTILATOR MODE: ABNORMAL
WBC NRBC COR # BLD AUTO: 9.82 10*3/MM3 (ref 3.4–10.8)
WHOLE BLOOD HOLD COAG: NORMAL
WHOLE BLOOD HOLD SPECIMEN: NORMAL

## 2024-03-27 PROCEDURE — 82805 BLOOD GASES W/O2 SATURATION: CPT

## 2024-03-27 PROCEDURE — 80053 COMPREHEN METABOLIC PANEL: CPT | Performed by: EMERGENCY MEDICINE

## 2024-03-27 PROCEDURE — 36600 WITHDRAWAL OF ARTERIAL BLOOD: CPT

## 2024-03-27 PROCEDURE — 71046 X-RAY EXAM CHEST 2 VIEWS: CPT

## 2024-03-27 PROCEDURE — 87636 SARSCOV2 & INF A&B AMP PRB: CPT | Performed by: NURSE PRACTITIONER

## 2024-03-27 PROCEDURE — 93005 ELECTROCARDIOGRAM TRACING: CPT | Performed by: EMERGENCY MEDICINE

## 2024-03-27 PROCEDURE — 83050 HGB METHEMOGLOBIN QUAN: CPT

## 2024-03-27 PROCEDURE — 84484 ASSAY OF TROPONIN QUANT: CPT | Performed by: EMERGENCY MEDICINE

## 2024-03-27 PROCEDURE — 83880 ASSAY OF NATRIURETIC PEPTIDE: CPT | Performed by: EMERGENCY MEDICINE

## 2024-03-27 PROCEDURE — 99284 EMERGENCY DEPT VISIT MOD MDM: CPT

## 2024-03-27 PROCEDURE — 71046 X-RAY EXAM CHEST 2 VIEWS: CPT | Performed by: RADIOLOGY

## 2024-03-27 PROCEDURE — 82375 ASSAY CARBOXYHB QUANT: CPT

## 2024-03-27 PROCEDURE — 96374 THER/PROPH/DIAG INJ IV PUSH: CPT

## 2024-03-27 PROCEDURE — 93010 ELECTROCARDIOGRAM REPORT: CPT | Performed by: INTERNAL MEDICINE

## 2024-03-27 PROCEDURE — 82948 REAGENT STRIP/BLOOD GLUCOSE: CPT

## 2024-03-27 PROCEDURE — 85025 COMPLETE CBC W/AUTO DIFF WBC: CPT | Performed by: EMERGENCY MEDICINE

## 2024-03-27 RX ORDER — DEXTROSE MONOHYDRATE 25 G/50ML
25 INJECTION, SOLUTION INTRAVENOUS ONCE
Status: COMPLETED | OUTPATIENT
Start: 2024-03-27 | End: 2024-03-27

## 2024-03-27 RX ORDER — INSULIN ASPART 100 [IU]/ML
INJECTION, SOLUTION INTRAVENOUS; SUBCUTANEOUS
Qty: 100 ML | Refills: 11 | Status: SHIPPED | OUTPATIENT
Start: 2024-03-27

## 2024-03-27 RX ORDER — CALCIUM CARB/VITAMIN D3/VIT K1 500-100-40
1 TABLET,CHEWABLE ORAL
Qty: 100 EACH | Refills: 3 | Status: SHIPPED | OUTPATIENT
Start: 2024-03-27

## 2024-03-27 RX ORDER — SODIUM CHLORIDE 0.9 % (FLUSH) 0.9 %
10 SYRINGE (ML) INJECTION AS NEEDED
Status: DISCONTINUED | OUTPATIENT
Start: 2024-03-27 | End: 2024-03-27 | Stop reason: HOSPADM

## 2024-03-27 RX ADMIN — DEXTROSE MONOHYDRATE 25 G: 25 INJECTION, SOLUTION INTRAVENOUS at 13:32

## 2024-03-27 NOTE — ED PROVIDER NOTES
Subjective   History of Present Illness  HPI:   A 38-year-old female currently 32 weeks pregnant is a G2, P0 was sent over from Dr. Razo, for concern of a worsening shortness of breath.  Throughout this entire pregnancy patient's been having some dyspnea on exertion symptoms yet is amplified the last 3 days.  Symptoms are more with exertion, occasional she will feel palpitations but not necessarily racing denies fevers or chills.  Scant cough without sputum production.  Shortly after her last miscarriage a year ago patient did develop transient congestive heart failure but none since then.    Review of systems:  Negative fever and chills  Negative orthopnea  Negative palpitations  Negative chest pain  Negative nausea vomiting  Negative diarrhea or constipation  Negative ill contact or travel  Negative mobility  Negative lower extremity  swelling  Negative rash  Negative medication change  Negative headaches  Negative dysuria or hematuria  All other systems reviewed are negative    Past Medical history:  reviewed nursing notes:  Past Surrgical history: reviewed nursing notes:    Social History: Reviewed nursing notes:  Pertinent Family History:  Medications and allergies: reviewed nursing notes:    Physical examination:  Vital signs: Reviewed  General: Nontoxic, nondistressed, AOx 4  HEENT: Oral mucosa moist, TERRY,, sclera is clear  Neck: Unremarkable, no JVD at 40 degrees  Lungs: Nonlabored breathing, equal rise of the chest, bilateral breath sounds are clear, negative cough wheezing crackles  Cardiovascular: Regular rate and rhythm, negative rubs or murmurs  Abdomen: Soft, nontender to palpation, , positive bowel sounds,   Extremities: Negative for edema, negative calf tenderness, negative Homans' sign  Skin: Negative petechiae, negative rash  Neuro: No gross motor or sensory deficit, speech is clear, movements are fluid without ataxia  Mood/affect: Unremarkable    Tests and radiological imaging interpreted by  "provider  EKG: A sinus rhythm rate of 90 with without gross ST changes     Medical Decision Making:  after reviewing applicable available laboratory and radiological data, while reviewing my HPI and Review of Systems along with my physical exam findings:    Assessment and plan:   This patient is a nontoxic examination vital signs are stable here.  Patient occasionally checked and blood pressure was 160s over 70s yet during my 3 encounters blood pressure was in the 140s over lower 70s.  Clinically I did not see gross signs of volume overload on physical exam.  Symptoms not consistent with pulmonary embolism although does have risk factors being pregnant.  Laboratory work appears unremarkable.  Nursing staff had already had completed chest x-ray, I typically like to discuss imaging with patient but this was undertaken without my knowledge.  However patient is over 32 weeks do not feel that it is reasonable.  No gross signs of pneumonia.    I did review the case with cardiology Dr. Lujan.  He be happy to see in the outpatient setting does not feel that would be able to get echo later on today.   Did review the case again with OB Dr. Razo.  No other concerns at this time    Long discussion with the patient.  Patient feels uncomfortable and is glad that she is not in \"heart failure\".  Discussed limitations of the disease workup is limitations as well as its benefits and not proceeding with additional imaging.  Patient felt comfortable and was eager for discharge home.    Discussed with  laboratory, radiology studies, diagnosis and action plan, and follow-up.  Patient verbalized understanding and agreement    Clinical impression:  Dyspnea in pregnancy    Disposition: Home  Condition:stable     This note was performed, in part, by voice-recognition software and may contain dictated related graphical errors including word substitution errors or nonsensical phrases may exist.  These areas have been corrected to the best of " my ability, however some errors may remain due to prioritization of patient care.    Under the 21st Century Cures Act, medical progress note should be visible to the patient.  It must be taken into consideration that these notes will include professional medical terminology, protocols and practices that may be somewhat confusing without interpretation from your medical team.  It is strongly advisable to review this documentation with your primary care provider.  The intent of this progress note and other documents are to communicate information between medical professionals involved in your care and to serve as future reference for physicians or other professionals when reviewing your case            Review of Systems    Past Medical History:   Diagnosis Date    Anxiety     CHF (congestive heart failure) 05/2023    Colitis 2023    Depression     Diabetes mellitus 2018    Diverticulitis     GERD (gastroesophageal reflux disease)     Hypertension     Kidney stone     Pancreatitis 09/2023    PCOS (polycystic ovarian syndrome)     Sleep apnea        Allergies   Allergen Reactions    Dilantin [Phenytoin] Mental Status Change    Keppra [Levetiracetam] Mental Status Change    Meperidine Rash    Topamax [Topiramate] Mental Status Change       Past Surgical History:   Procedure Laterality Date    CARDIAC CATHETERIZATION      CARPAL TUNNEL RELEASE      COLONOSCOPY      ENDOSCOPY      FRACTURE SURGERY      HARDWARE REMOVAL      WRIST    TENDON REPAIR      HAND       Family History   Problem Relation Age of Onset    Heart disease Mother     COPD Mother     Heart disease Father     COPD Father        Social History     Socioeconomic History    Marital status:    Tobacco Use    Smoking status: Former    Smokeless tobacco: Never   Vaping Use    Vaping status: Never Used   Substance and Sexual Activity    Alcohol use: No    Drug use: No    Sexual activity: Defer           Objective   Physical Exam    Procedures           ED  "Course  ED Course as of 03/27/24 1325   Wed Mar 27, 2024   1322 Hemoglobin(!): 10.0 [SUNDAR]      ED Course User Index  [SUNDAR] Santos Jacob MD                                             Medical Decision Making  Problems Addressed:  BROOKS (dyspnea on exertion): complicated acute illness or injury    Amount and/or Complexity of Data Reviewed  Labs: ordered. Decision-making details documented in ED Course.  Radiology: ordered.  ECG/medicine tests: ordered.    Risk  Prescription drug management.        Final diagnoses:   None       ED Disposition  ED Disposition       None            No follow-up provider specified.       Medication List        New Prescriptions      Insulin Syringe 31G X 5/16\" 1 ML misc  Use 1 each 3 (Three) Times a Day With Meals. As needed for additional insulin bolus with meals.            Changed      * NovoLOG 100 UNIT/ML injection  Generic drug: Insulin Aspart  For use in insulin pump. Max Daily Dose: 250 units daily.  What changed: Another medication with the same name was added. Make sure you understand how and when to take each.     * Insulin Aspart 100 UNIT/ML injection  Commonly known as: NovoLOG  For use in insulin pump and for additional subcutaneous doses to supplement mealtime doses as bolused with pump. Max dose of 350u/day.  What changed: You were already taking a medication with the same name, and this prescription was added. Make sure you understand how and when to take each.           * This list has 2 medication(s) that are the same as other medications prescribed for you. Read the directions carefully, and ask your doctor or other care provider to review them with you.                   Where to Get Your Medications        These medications were sent to [id number]      Hours: Monday to Friday 7 AM to 6 PM Phone: [phone]   Insulin Aspart 100 UNIT/ML injection  Insulin Syringe 31G X 5/16\" 1 ML misc            Santos Jacob " MD ALISON  03/27/24 3908

## 2024-03-27 NOTE — ED NOTES
Updated pt on reason for delay; awaiting cardiology to call provider back.  Pt verbalizes understanding.

## 2024-03-27 NOTE — ED NOTES
Pt given diet soda and peanut butter crackers at this time, pt states her BGL is low due to her not eating lunch today.

## 2024-03-27 NOTE — ED NOTES
Pt declined recheck of her BGL, pt states her dexcom is reading a normal range and she feels fine at this time.

## 2024-04-08 ENCOUNTER — OFFICE VISIT (OUTPATIENT)
Dept: CARDIOLOGY | Facility: HOSPITAL | Age: 39
End: 2024-04-08
Payer: MEDICARE

## 2024-04-08 ENCOUNTER — HOSPITAL ENCOUNTER (OUTPATIENT)
Dept: CARDIOLOGY | Facility: HOSPITAL | Age: 39
Discharge: HOME | End: 2024-04-08
Payer: MEDICARE

## 2024-04-08 ENCOUNTER — HOSPITAL ENCOUNTER (OUTPATIENT)
Dept: WOMENS IMAGING | Facility: HOSPITAL | Age: 39
Discharge: HOME | End: 2024-04-08
Payer: MEDICARE

## 2024-04-08 ENCOUNTER — OFFICE VISIT (OUTPATIENT)
Dept: OBSTETRICS AND GYNECOLOGY | Facility: HOSPITAL | Age: 39
End: 2024-04-08
Payer: MEDICARE

## 2024-04-08 VITALS — SYSTOLIC BLOOD PRESSURE: 120 MMHG | WEIGHT: 233 LBS | BODY MASS INDEX: 47.06 KG/M2 | DIASTOLIC BLOOD PRESSURE: 58 MMHG

## 2024-04-08 VITALS
HEART RATE: 85 BPM | WEIGHT: 233 LBS | OXYGEN SATURATION: 98 % | SYSTOLIC BLOOD PRESSURE: 166 MMHG | HEIGHT: 59 IN | DIASTOLIC BLOOD PRESSURE: 85 MMHG | RESPIRATION RATE: 18 BRPM | BODY MASS INDEX: 46.97 KG/M2

## 2024-04-08 DIAGNOSIS — O12.10 PROTEINURIA AFFECTING PREGNANCY, ANTEPARTUM: ICD-10-CM

## 2024-04-08 DIAGNOSIS — R00.2 PALPITATIONS: ICD-10-CM

## 2024-04-08 DIAGNOSIS — R10.2 PAIN OF PELVIC GIRDLE: ICD-10-CM

## 2024-04-08 DIAGNOSIS — O10.919 CHRONIC HYPERTENSION AFFECTING PREGNANCY: Primary | ICD-10-CM

## 2024-04-08 DIAGNOSIS — Z87.19 HISTORY OF PANCREATITIS: ICD-10-CM

## 2024-04-08 DIAGNOSIS — Z86.69 HISTORY OF SEIZURE DISORDER: ICD-10-CM

## 2024-04-08 DIAGNOSIS — G47.33 OBSTRUCTIVE SLEEP APNEA SYNDROME: ICD-10-CM

## 2024-04-08 DIAGNOSIS — E66.01 MORBID OBESITY WITH BMI OF 40.0-44.9, ADULT: ICD-10-CM

## 2024-04-08 DIAGNOSIS — K62.5 RECTAL BLEEDING: ICD-10-CM

## 2024-04-08 DIAGNOSIS — Z3A.33 33 WEEKS GESTATION OF PREGNANCY: ICD-10-CM

## 2024-04-08 DIAGNOSIS — O24.119 PRE-EXISTING TYPE 2 DIABETES AFFECTING PREGNANCY, ANTEPARTUM: Primary | ICD-10-CM

## 2024-04-08 DIAGNOSIS — Z86.79 HISTORY OF CHF (CONGESTIVE HEART FAILURE): ICD-10-CM

## 2024-04-08 DIAGNOSIS — R06.09 DYSPNEA ON EXERTION: ICD-10-CM

## 2024-04-08 DIAGNOSIS — O24.119 PRE-EXISTING TYPE 2 DIABETES AFFECTING PREGNANCY, ANTEPARTUM: ICD-10-CM

## 2024-04-08 DIAGNOSIS — O10.919 CHRONIC HYPERTENSION AFFECTING PREGNANCY: ICD-10-CM

## 2024-04-08 DIAGNOSIS — O09.529 ANTEPARTUM MULTIGRAVIDA OF ADVANCED MATERNAL AGE: ICD-10-CM

## 2024-04-08 PROCEDURE — 76819 FETAL BIOPHYS PROFIL W/O NST: CPT | Performed by: OBSTETRICS & GYNECOLOGY

## 2024-04-08 PROCEDURE — 76816 OB US FOLLOW-UP PER FETUS: CPT

## 2024-04-08 PROCEDURE — 93246 EXT ECG>7D<15D RECORDING: CPT

## 2024-04-08 PROCEDURE — 1160F RVW MEDS BY RX/DR IN RCRD: CPT | Performed by: NURSE PRACTITIONER

## 2024-04-08 PROCEDURE — 1159F MED LIST DOCD IN RCRD: CPT | Performed by: NURSE PRACTITIONER

## 2024-04-08 PROCEDURE — 76819 FETAL BIOPHYS PROFIL W/O NST: CPT

## 2024-04-08 PROCEDURE — 76816 OB US FOLLOW-UP PER FETUS: CPT | Performed by: OBSTETRICS & GYNECOLOGY

## 2024-04-08 PROCEDURE — 99214 OFFICE O/P EST MOD 30 MIN: CPT | Performed by: NURSE PRACTITIONER

## 2024-04-08 PROCEDURE — 99214 OFFICE O/P EST MOD 30 MIN: CPT | Performed by: OBSTETRICS & GYNECOLOGY

## 2024-04-08 RX ORDER — LABETALOL 100 MG/1
100 TABLET, FILM COATED ORAL 3 TIMES DAILY
Qty: 90 TABLET | Refills: 0 | Status: ON HOLD | OUTPATIENT
Start: 2024-04-08 | End: 2024-04-15

## 2024-04-08 NOTE — PROGRESS NOTES
Chief Complaint  Congestive Heart Failure    Subjective      History of Present Illness {CC  Problem List  Visit  Diagnosis   Encounters  Notes  Medications  Labs  Result Review Imaging  Media :23}     Antoinette Pack, 38 y.o. female with past medical history significant for anxiety, CHF, colitis, depression, diabetes, diverticulitis, GERD, hypertension, pancreatitis, PCOS, and sleep apnea, who presents to [facility] for Congestive Heart Failure.  Patient presented to her maternal and fetal medicine provider on 4/8/2024.  During that time, patient endorsed worsening shortness of air, and more frequent palpitations.  Patient was referred to cardiology for further evaluation of dyspnea, and palpitations.    At time of today's exam, patient continues to endorse shortness of air both at rest and with activity.  She states she has palpitations occurring daily which are not related to activity.  She currently denies syncopal episodes but does endorse some lightheadedness.  Patient admits to lower extremity edema which is improved slightly after elevation.  Patient does endorse some mild nonexertional chest discomfort as well.  Most recent stress test from 9/6/2023 reviewed with perfusion imaging.  Patient states she does check her blood pressure at home which runs around 140/70.  Patient states that she has an adequate water intake drinking only 2 bottles per day.  She drinks significant amounts of caffeine in the form of unsweetened tea with Splenda, and multiple sodas throughout the day.  Patient states she does have significant family history    Echocardiogram 12/9/2023:    Left ventricular systolic function is normal. Left ventricular ejection fraction appears to be 56 - 60%.    The left ventricular cavity is mildly dilated.    Left ventricular diastolic function was normal.    The left atrial cavity is mildly dilated.    Estimated right ventricular systolic pressure from  "tricuspid regurgitation is normal (<35 mmHg).    Echocardiogram 11/8/2023    Left ventricular systolic function is normal. Calculated left ventricular EF = 56% Left ventricular ejection fraction appears to be 56 - 60%.    Left ventricular diastolic function was normal.    The left atrial cavity is mild to moderately dilated.    Estimated right ventricular systolic pressure from tricuspid regurgitation is mildly elevated (35-45 mmHg).    SPECT stress test 9/5/2023:    A pharmacological stress test was performed using regadenoson.    Findings consistent with a normal ECG stress test.    Myocardial perfusion imaging indicates a normal myocardial perfusion study with no evidence of ischemia.    Subtle fixed anterior wall perfusion defect most likely presents breast on admission due to normal anterior wall motion on the gated scans.    Normal LV cavity size. Normal LV wall motion noted.    Left ventricular ejection fraction is normal (Calculated EF = 60%).    Impressions are consistent with a low risk study.      Echocardiogram at outside facility 5/1/2023:  Impression:  Technically difficult study due to poor acoustic window.  Grossly normal LVSF. Est EF 50%.  Wall segments are not well seen but the posterior and lateral walls appear  severely hypokinetic.  Mild LVH.  Gr III diastolic dysfunction.  Valves are not well seen.  Mild MR. Trace TR.  Suggest JAYDON.          Objective     Vital Signs:   Vitals:    04/08/24 0945 04/08/24 0946   BP: 150/72 166/85   BP Location: Left arm Left arm   Patient Position: Standing Sitting   Pulse: 82 85   Resp:  18   SpO2: 98% 98%   Weight:  106 kg (233 lb)   Height:  149.9 cm (59\")     Body mass index is 47.06 kg/m².  Physical Exam  Vitals and nursing note reviewed.   Constitutional:       Appearance: Normal appearance. She is obese.   HENT:      Head: Normocephalic.   Eyes:      Pupils: Pupils are equal, round, and reactive to light.   Cardiovascular:      Rate and Rhythm: Normal rate " and regular rhythm.      Pulses: Normal pulses.      Heart sounds: Normal heart sounds. No murmur heard.  Pulmonary:      Effort: Pulmonary effort is normal.      Breath sounds: Normal breath sounds.   Abdominal:      General: Bowel sounds are normal.      Palpations: Abdomen is soft.      Comments: Gravid   Musculoskeletal:         General: Normal range of motion.      Cervical back: Normal range of motion.      Right lower le+ Edema present.      Left lower le+ Edema present.   Skin:     General: Skin is warm and dry.      Capillary Refill: Capillary refill takes less than 2 seconds.   Neurological:      Mental Status: She is alert and oriented to person, place, and time.   Psychiatric:         Mood and Affect: Mood normal.         Thought Content: Thought content normal.                Data Reviewed:{ Labs  Result Review  Imaging  Med Tab  Media :23}     Lab Results   Component Value Date    WBC 9.82 2024    HGB 10.0 (L) 2024    HCT 30.5 (L) 2024    MCV 84.3 2024     2024      Lab Results   Component Value Date    GLUCOSE 69 2024    BUN 10 2024    CREATININE 0.47 (L) 2024    EGFR 123.6 2024    BCR 21.3 2024    K 3.9 2024    CO2 20.9 (L) 2024    CALCIUM 8.8 2024    ALBUMIN 3.5 2024    BILITOT 0.3 2024    AST 16 2024    ALT 12 2024      Lab Results   Component Value Date    TSH 0.022 (L) 2023      Lab Results   Component Value Date    CKTOTAL 97 10/22/2019    CKMB <0.2 2015    CKMBINDEX 0.3 2015    TROPONINI 0.029 10/11/2018    TROPONINT 13 2024      ECG 12 Lead ED Triage Standing Order; SOA (2024 11:46)  Adult Transthoracic Echo Complete W/ Cont if Necessary Per Protocol (2023 12:44)  ECG 12 Lead Chest Pain (10/27/2023 20:50)  Adult Transthoracic Echo Complete W/ Cont if Necessary Per Protocol (2023 09:54)  Stress test with myocardial perfusion one day  (09/05/2023 15:23)  ECG 12 Lead Chest Pain (05/10/2023 18:20)  XR Chest 2 View (03/27/2024 12:09)   ECHO COMPLETE (DOPPLER / COLOR) W OR WO CONTRAST (05/01/2023 13:57)     Assessment & Plan   Assessment and Plan {CC Problem List  Visit Diagnosis  ROS  Review (Popup)  Health Maintenance  Quality  BestPractice  Medications  SmartSets  SnapShot Encounters  Media :23}     1. Chronic hypertension affecting pregnancy  -Patient states that most recent blood pressure reading obtained while in OB/GYN office is significantly abnormal for patient.  She states that at home her blood pressure is typically closer to 140 systolic.  Blood pressure also poorly controlled today during our visit.  -Will increase labetalol from 100 mg twice daily up to 100 mg 3 times daily.  -Patient encouraged to continue ambulatory blood pressure monitoring with goal blood pressure of 120/80.  -Further recommendations to be made at time of visit with cardiology  - Ambulatory Referral to Cardiology  - labetalol (NORMODYNE) 100 MG tablet; Take 1 tablet by mouth 3 times a day.  Dispense: 90 tablet; Refill: 0    2. Dyspnea on exertion  -Patient endorses ongoing dyspnea on exertion.  She was recently seen and evaluated in the ED in [place] on 3/27 also for shortness of air.  Twelve-lead EKG at that time showed sinus rhythm with PACs, rate 90.  Her BNP was found to be normal at 75.  High-sensitivity troponin also within normal limits at 13.  Chest x-ray without any cardiomegaly, pleural effusion, or pulmonary vascular congestion.  -Patient has had multiple echocardiograms over the past year.  In May 2023, she was noted to have grade 3 diastolic dysfunction.  Since that time, her diastolic function has improved and on her most recent echo from 12/9/2023 her diastolic function was normal.  Her systolic function is also normal.  Patient also had a recent stress test within the last year which was low risk.  -Due to worsening shortness of air, we  will obtain an echocardiogram to assess for any changes since most recent echocardiogram.  We have asked that this be done closer to patient's home either to be completed in [place] or in [place].  - Adult Transthoracic Echo Complete W/ Cont if Necessary Per Protocol; Future    3. Palpitations  -Most recent twelve-lead EKG reviewed with sinus rhythm with PACs  -Will obtain 7-day cardiac monitor to assess for burden of PACs/PVCs, as well as any arrhythmias which could be contributing to patient's symptoms.  -Patient should increase water intake to approximately 2 quarts daily, and decrease caffeine use  -Of note, patient is previously established with cardiology in [facility].  She was seen and evaluated as recently as 3/14/2024 at which time patient also endorsed palpitations.  She has been followed closely by cardiology in [place] and has been seen multiple times within the previous year.  Upon discussion with patient, patient is agreeable to transition her cardiology care to [facility].  We discussed that patient is more than welcome to follow-up with either facility, however for continuity of care we would recommend selecting just 1 cardiology group.  -I have placed an urgent referral to [facility] cardiology so that patient can be seen and evaluated ideally prior to delivering.  - Ambulatory Referral to Cardiology  - Holter Monitor - 72 Hour Up To 15 Days; Future    4. History of CHF (congestive heart failure)  -Echocardiogram from 05/2023 reviewed with grade 3 diastolic dysfunction.  Repeat echocardiogram since that time with normal diastolic function.  Most recent BNP upon ED visit normal.  Most recent chest x-ray while in ED also normal.  -Again, I have placed referral for patient to establish care with [facility] cardiology ASAP.  -Patient to follow-up with cardiology as scheduled.  Otherwise she may see heart and valve as needed or if symptoms change or worsen.      Follow Up {Instructions  Charge Capture  Follow-up Communications :23}     Return if symptoms worsen or fail to improve.      Patient was given instructions and counseling regarding her condition or for health maintenance advice. Please see specific information pulled into the AVS if appropriate.  Patient was instructed to call the [facility] with any questions, concerns, or worsening symptoms.    Dictated Utilizing Dragon Dictation   Please note that portions of this note were completed with a voice recognition program.   Part of this note may be an electronic transcription/translation of spoken language to printed text using the Dragon Dictation System.

## 2024-04-08 NOTE — LETTER
2024     Geovani Razo III, MD  [address]  [address]  [facility] [address]    Patient: Antoinette Pack   YOB: 1985   Date of Visit: 2024       Dear Geovani Razo III, MD,    Thank you for referring Antoinette Pack to me for evaluation. Below is a copy of my consult note.    If you have questions, please do not hesitate to call me. I look forward to following Antoinette along with you.         Sincerely,        Dolores Fernandez MD        CC: No Recipients                    Maternal/Fetal Medicine Consult Note     Name: Antoinette Pack    : 1985     MRN: [id number]     Referring Provider: Geovani Razo III, MD    Chief Complaint  CHTN    Subjective     History of Present Illness:  Antoinette Pack is a 38 y.o.  33w2d who presents today for a follow-up ultrasound to assess interval growth and fetal wellbeing. We review her blood sugars weekly and make suggestions for adjustment to her insulin regimen.     She denies LOF/VB/contractions. +FM. She is feeling intense and constant midline low pelvic pain that is worse with walking and relieved with sitting still. She also reports occasional hip pain. She reports worsening SOA and more frequent palpitations.     ANDRE: Estimated Date of Delivery: 24     ROS:   As noted in HPI.     Past Medical History:   Diagnosis Date   • Anxiety    • CHF (congestive heart failure) 2023   • Colitis    • Depression    • Diabetes mellitus    • Diverticulitis    • GERD (gastroesophageal reflux disease)    • Hypertension    • Kidney stone    • Pancreatitis 2023   • PCOS (polycystic ovarian syndrome)    • Sleep apnea       Past Surgical History:   Procedure Laterality Date   • CARDIAC CATHETERIZATION     • CARPAL TUNNEL RELEASE     • COLONOSCOPY     • ENDOSCOPY     • FRACTURE SURGERY     • HARDWARE REMOVAL      WRIST   • TENDON REPAIR      HAND      OB History          2    Para   0    Term   0     "   0    AB   1    Living   0         SAB   1    IAB   0    Ectopic   0    Molar   0    Multiple   0    Live Births   0          Obstetric Comments   FOB #1  Pregnancy #1 SAB at 6 weeks  FOB #1 Pregnancy #2  current               Objective     Vital Signs  /58   Wt 106 kg (233 lb)   LMP  (LMP Unknown)   Estimated body mass index is 47.06 kg/m² as calculated from the following:    Height as of 3/27/24: 149.9 cm (59\").    Weight as of this encounter: 106 kg (233 lb).    Physical Exam  Vitals reviewed.   Constitutional:       Appearance: Normal appearance. She is obese.   HENT:      Head: Normocephalic and atraumatic.   Cardiovascular:      Rate and Rhythm: Normal rate.   Pulmonary:      Effort: Pulmonary effort is normal.   Abdominal:      Comments: Positive TTP over pubic symphysis.   Musculoskeletal:         General: Normal range of motion.   Neurological:      General: No focal deficit present.      Mental Status: She is alert and oriented to person, place, and time.   Psychiatric:         Mood and Affect: Mood normal.         Behavior: Behavior normal.         Thought Content: Thought content normal.         Judgment: Judgment normal.       Ultrasound Impression:   Vtx, S=D, nl JACQUIE, posterior placenta, BPP 8/8, nl UA Dopplers, nl anatomy.    Assessment and Plan     Diagnoses and all orders for this visit:    1. Pre-existing type 2 diabetes affecting pregnancy, antepartum (Primary)  Assessment & Plan:  Patient's blood glucose data reviewed weekly and adjustments made as appropriate.    Orders:  -     Ambulatory Referral to Cardiology  -     [facility]; Future  -     Adult Transthoracic Echo Complete W/ Cont if Necessary Per Protocol; Future    2. Chronic hypertension affecting pregnancy  Assessment & Plan:  Patient is currently taking Labetalol 100mgs BID and a daily baby ASA 81mgs. Her BP today is normal at 120/58.     Her baseline 24hr urine protein was 310mgs/day. Her " baseline preeclampsia labs are: serum creatinine 0.45, uric acid 3.9, AST/ALT/LDH 13//133.      - Continue current regimen and close observation for worsening CHTN or superimposed preeclampsia   - Continue q 4 wk growth ultrasounds    Orders:  -     Ambulatory Referral to Cardiology  [facility]; Future  -     Adult Transthoracic Echo Complete W/ Cont if Necessary Per Protocol; Future    3. History of CHF (congestive heart failure)  Assessment & Plan:  Patient with worsening symptoms today. I had recommended a follow-up echo at 32wks of pregnancy as this is when there is maximal increase in fluid and CO. This somehow did not get scheduled. Patient reports evaluation in ER last week with Troponiin of 13 which is higher than her normal. No repeat was done.      - Patient sent to Cardiology for echo and evaluation   - Follow-up scheduled here in 2wks    Orders:  -     Ambulatory Referral to Cardiology  [facility]; Future  -     Adult Transthoracic Echo Complete W/ Cont if Necessary Per Protocol; Future    4. Obstructive sleep apnea syndrome  -     Ambulatory Referral to Cardiology  [facility]; Future  -     Adult Transthoracic Echo Complete W/ Cont if Necessary Per Protocol; Future    5. Proteinuria affecting pregnancy, antepartum  -     Ambulatory Referral to Cardiology  [facility]; Future  -     Adult Transthoracic Echo Complete W/ Cont if Necessary Per Protocol; Future    6. History of pancreatitis  -     Ambulatory Referral to Cardiology  [facility]; Future  -     Adult Transthoracic Echo Complete W/ Cont if Necessary Per Protocol; Future    7. Morbid obesity with BMI of 40.0-44.9, adult  -     Ambulatory Referral to Cardiology  [facility]; Future  -     Adult Transthoracic Echo Complete W/ Cont if Necessary Per Protocol; Future    8.  Antepartum multigravida of advanced maternal age  Assessment & Plan:  Pt is s/p low risk NIPT, normal anatomy survey, and normal fetal echo.      - Follow-up scheduled q 4 wks for growth    - Recommend patient start twice weekly  testing at 32wks of pregnancy   - Recommend delivery around 37-38wks    Orders:  -     [facility]; Future  -     Adult Transthoracic Echo Complete W/ Cont if Necessary Per Protocol; Future    9. History of seizure disorder  -     [facility]; Future  -     Adult Transthoracic Echo Complete W/ Cont if Necessary Per Protocol; Future    10. Pain of pelvic girdle  Assessment & Plan:  Likely secondary to pubic symphysis separation.      - Recommended abdominal binder   - Symptomatic treatment with Tylenol      11. 33 weeks gestation of pregnancy  -     Adult Transthoracic Echo Complete W/ Cont if Necessary Per Protocol; Future         Follow Up  Return in about 2 weeks (around 2024).    I spent 30 minutes caring for the patient on the day of service. This included: obtaining or reviewing a separately obtained medical history, reviewing patient records, performing a medically appropriate exam and/or evaluation, counseling or educating the patient/family/caregiver, ordering medications, labs, and/or procedures and documenting such in the medical record. This does not include time spent on review and interpretation of other tests such as fetal ultrasound or the performance of other procedures such as amniocentesis or CVS.      Dolores Fernandez MD  2024

## 2024-04-08 NOTE — PROGRESS NOTES
Patient has an appointment with Dr. Razo on 4/10/24. NIPT negative. Patient is having a large amount of pelvic pain, onset 2 days ago; denies vaginal bleeding. Patient reports occasional vaginal wetness, thinks it could possibly be urine but isn't sure. Seen in ED in [place] 2 weeks ago for shortness of air, was told to f/u with cardiology, has not made that appointment yet. Patient reports shortness of air and chest pressure this morning. Pulse: 80, sp02: 100%. Next appointment with Vonnie Key (endocrinology) on 4/18/24.

## 2024-04-08 NOTE — PROGRESS NOTES
"    Maternal/Fetal Medicine Consult Note     Name: Antoinette Pack    : 1985     MRN: [id number]     Referring Provider: Geovani Razo III, MD    Chief Complaint  CHTN    Subjective     History of Present Illness:  Antoinette Pack is a 38 y.o.  33w2d who presents today for a follow-up ultrasound to assess interval growth and fetal wellbeing. We review her blood sugars weekly and make suggestions for adjustment to her insulin regimen.     She denies LOF/VB/contractions. +FM. She is feeling intense and constant midline low pelvic pain that is worse with walking and relieved with sitting still. She also reports occasional hip pain. She reports worsening SOA and more frequent palpitations.     ANDRE: Estimated Date of Delivery: 24     ROS:   As noted in HPI.     Past Medical History:   Diagnosis Date    Anxiety     CHF (congestive heart failure) 2023    Colitis     Depression     Diabetes mellitus 2018    Diverticulitis     GERD (gastroesophageal reflux disease)     Hypertension     Kidney stone     Pancreatitis 2023    PCOS (polycystic ovarian syndrome)     Sleep apnea       Past Surgical History:   Procedure Laterality Date    CARDIAC CATHETERIZATION      CARPAL TUNNEL RELEASE      COLONOSCOPY      ENDOSCOPY      FRACTURE SURGERY      HARDWARE REMOVAL      WRIST    TENDON REPAIR      HAND      OB History          2    Para   0    Term   0       0    AB   1    Living   0         SAB   1    IAB   0    Ectopic   0    Molar   0    Multiple   0    Live Births   0          Obstetric Comments   FOB #1  Pregnancy #1 SAB at 6 weeks  FOB #1 Pregnancy #2  current               Objective     Vital Signs  /58   Wt 106 kg (233 lb)   LMP  (LMP Unknown)   Estimated body mass index is 47.06 kg/m² as calculated from the following:    Height as of 3/27/24: 149.9 cm (59\").    Weight as of this encounter: 106 kg (233 lb).    Physical Exam  Vitals reviewed.   Constitutional:       " Appearance: Normal appearance. She is obese.   HENT:      Head: Normocephalic and atraumatic.   Cardiovascular:      Rate and Rhythm: Normal rate.   Pulmonary:      Effort: Pulmonary effort is normal.   Abdominal:      Comments: Positive TTP over pubic symphysis.   Musculoskeletal:         General: Normal range of motion.   Neurological:      General: No focal deficit present.      Mental Status: She is alert and oriented to person, place, and time.   Psychiatric:         Mood and Affect: Mood normal.         Behavior: Behavior normal.         Thought Content: Thought content normal.         Judgment: Judgment normal.       Ultrasound Impression:   Vtx, S=D, nl JACQUIE, posterior placenta, BPP 8/8, nl UA Dopplers, nl anatomy.    Assessment and Plan     Diagnoses and all orders for this visit:    1. Pre-existing type 2 diabetes affecting pregnancy, antepartum (Primary)  Assessment & Plan:  Patient's blood glucose data reviewed weekly and adjustments made as appropriate.    Orders:  -     Ambulatory Referral to Cardiology  [facility]  [facility]; Future  -     Adult Transthoracic Echo Complete W/ Cont if Necessary Per Protocol; Future    2. Chronic hypertension affecting pregnancy  Assessment & Plan:  Patient is currently taking Labetalol 100mgs BID and a daily baby ASA 81mgs. Her BP today is normal at 120/58.     Her baseline 24hr urine protein was 310mgs/day. Her baseline preeclampsia labs are: serum creatinine 0.45, uric acid 3.9, AST/ALT/LDH 13/12/133.      - Continue current regimen and close observation for worsening CHTN or superimposed preeclampsia   - Continue q 4 wk growth ultrasounds    Orders:  -     Ambulatory Referral to Cardiology  [facility]; Future  -     Adult Transthoracic Echo Complete W/ Cont if Necessary Per Protocol; Future    3. History of CHF (congestive heart failure)  Assessment & Plan:  Patient with worsening symptoms today. I had recommended a  follow-up echo at 32wks of pregnancy as this is when there is maximal increase in fluid and CO. This somehow did not get scheduled. Patient reports evaluation in ER last week with Troponiin of 13 which is higher than her normal. No repeat was done.      - Patient sent to Cardiology for echo and evaluation   - Follow-up scheduled here in 2wks    Orders:  -     Ambulatory Referral to Cardiology  -     [facility]; Future  -     Adult Transthoracic Echo Complete W/ Cont if Necessary Per Protocol; Future    4. Obstructive sleep apnea syndrome  -     Ambulatory Referral to Cardiology  -     [facility]; Future  -     Adult Transthoracic Echo Complete W/ Cont if Necessary Per Protocol; Future    5. Proteinuria affecting pregnancy, antepartum  -     Ambulatory Referral to Cardiology  [facility]  Diagn[facility]; Future  -     Adult Transthoracic Echo Complete W/ Cont if Necessary Per Protocol; Future    6. History of pancreatitis  -     Ambulatory Referral to [facility]  Diagn[facility]; Future  -     Adult Transthoracic Echo Complete W/ Cont if Necessary Per Protocol; Future    7. Morbid obesity with BMI of 40.0-44.9, adult  -     Ambulatory Referral to Cardiology  [facility]  Diagn[facility]; Future  -     Adult Transthoracic Echo Complete W/ Cont if Necessary Per Protocol; Future    8. Antepartum multigravida of advanced maternal age  Assessment & Plan:  Pt is s/p low risk NIPT, normal anatomy survey, and normal fetal echo.      - Follow-up scheduled q 4 wks for growth    - Recommend patient start twice weekly  testing at 32wks of pregnancy   - Recommend delivery around 37-38wks    Orders:  -     [facility]; Future  -     Adult Transthoracic Echo Complete W/ Cont if Necessary Per Protocol; Future    9. History of seizure disorder  -     [facility]; Future  -     Adult  Transthoracic Echo Complete W/ Cont if Necessary Per Protocol; Future    10. Pain of pelvic girdle  Assessment & Plan:  Likely secondary to pubic symphysis separation.      - Recommended abdominal binder   - Symptomatic treatment with Tylenol      11. 33 weeks gestation of pregnancy  -     Adult Transthoracic Echo Complete W/ Cont if Necessary Per Protocol; Future         Follow Up  Return in about 2 weeks (around 4/22/2024).    I spent 30 minutes caring for the patient on the day of service. This included: obtaining or reviewing a separately obtained medical history, reviewing patient records, performing a medically appropriate exam and/or evaluation, counseling or educating the patient/family/caregiver, ordering medications, labs, and/or procedures and documenting such in the medical record. This does not include time spent on review and interpretation of other tests such as fetal ultrasound or the performance of other procedures such as amniocentesis or CVS.      Dolores Fernandez MD  04/08/2024

## 2024-04-08 NOTE — ASSESSMENT & PLAN NOTE
Patient is currently taking Labetalol 100mgs BID and a daily baby ASA 81mgs. Her BP today is normal at 120/58.     Her baseline 24hr urine protein was 310mgs/day. Her baseline preeclampsia labs are: serum creatinine 0.45, uric acid 3.9, AST/ALT/LDH 13/12/133.      - Continue current regimen and close observation for worsening CHTN or superimposed preeclampsia   - Continue q 4 wk growth ultrasounds

## 2024-04-08 NOTE — ASSESSMENT & PLAN NOTE
Patient with worsening symptoms today. I had recommended a follow-up echo at 32wks of pregnancy as this is when there is maximal increase in fluid and CO. This somehow did not get scheduled. Patient reports evaluation in ER last week with Troponiin of 13 which is higher than her normal. No repeat was done.      - Patient sent to Cardiology for echo and evaluation   - Follow-up scheduled here in 2wks

## 2024-04-08 NOTE — ASSESSMENT & PLAN NOTE
Likely secondary to pubic symphysis separation.      - Recommended abdominal binder   - Symptomatic treatment with Tylenol

## 2024-04-08 NOTE — PROGRESS NOTES
[facility] Heart Monitor Documentation    Antoinette Pack  1985  [id number]  04/08/24      [x] ZIO XT Patch  Model [id number] Prescribed for 7 Days    Serial Number: (N + 9 Digits) [id number]   Apply-By Date on Box:   USPS Tracking Number:   USPS Tracking        [] [employer] MINI PLUS Mobile Cardiac Telemetry  Model BGMINIPLUS Prescribed for  Days    Serial Number: (BGM + 7 Digits) BGM  Shipped-By Date on Box:   UPS Tracking Number: 1Z  UPS Tracking      [] [employer] MINI Holter Monitor  Model BGMINIEL Prescribed for  Days    Serial Number: (7 Digits)   Shipped-By Date on Box:   UPS Tracking Number: [id number]  UPS Tracking        This monitor was applied to the patient's chest and checked for proper functioning.  Ms. Antoinette Pack was instructed in the proper use of this monitor.  She was given the opportunity to ask questions and left the office with the device 's instruction manual.    Anais Stover MA, 10:46 EDT, 04/08/24                  [facility] Hospital[facility] 8.8.2019

## 2024-04-10 ENCOUNTER — TELEMEDICINE (OUTPATIENT)
Dept: PSYCHIATRY | Facility: CLINIC | Age: 39
End: 2024-04-10
Payer: MEDICARE

## 2024-04-10 ENCOUNTER — OFFICE VISIT (OUTPATIENT)
Dept: CARDIOLOGY | Facility: CLINIC | Age: 39
End: 2024-04-10
Payer: COMMERCIAL

## 2024-04-10 VITALS
BODY MASS INDEX: 47.66 KG/M2 | WEIGHT: 236.4 LBS | HEIGHT: 59 IN | HEART RATE: 91 BPM | DIASTOLIC BLOOD PRESSURE: 80 MMHG | SYSTOLIC BLOOD PRESSURE: 144 MMHG | OXYGEN SATURATION: 98 %

## 2024-04-10 DIAGNOSIS — R06.00 DYSPNEA, UNSPECIFIED TYPE: Primary | ICD-10-CM

## 2024-04-10 DIAGNOSIS — F40.01 PANIC DISORDER WITH AGORAPHOBIA: ICD-10-CM

## 2024-04-10 DIAGNOSIS — R00.2 PALPITATIONS: ICD-10-CM

## 2024-04-10 DIAGNOSIS — Z79.899 MEDICATION MANAGEMENT: ICD-10-CM

## 2024-04-10 DIAGNOSIS — F41.1 GENERALIZED ANXIETY DISORDER: ICD-10-CM

## 2024-04-10 DIAGNOSIS — Z3A.33 33 WEEKS GESTATION OF PREGNANCY: ICD-10-CM

## 2024-04-10 DIAGNOSIS — I10 HYPERTENSION, UNSPECIFIED TYPE: ICD-10-CM

## 2024-04-10 DIAGNOSIS — Z86.79 HISTORY OF CHF (CONGESTIVE HEART FAILURE): ICD-10-CM

## 2024-04-10 DIAGNOSIS — F31.75 BIPOLAR DISORDER, IN PARTIAL REMISSION, MOST RECENT EPISODE DEPRESSED: Primary | ICD-10-CM

## 2024-04-10 PROCEDURE — 93000 ELECTROCARDIOGRAM COMPLETE: CPT | Performed by: NURSE PRACTITIONER

## 2024-04-10 PROCEDURE — 99214 OFFICE O/P EST MOD 30 MIN: CPT | Performed by: NURSE PRACTITIONER

## 2024-04-10 NOTE — LETTER
April 11, 2024     Shawna Lambert DO  [address] [facility] [address]    Patient: Antoinette Pack   YOB: 1985   Date of Visit: 4/10/2024       Dear Shawna Lambert DO    Antoinette Pack was in my office today. Below is a copy of my note.    If you have questions, please do not hesitate to call me. I look forward to following Antoinette along with you.         Sincerely,        CICI Wolfe        CC: Dolores Fernandez MD    Subjective    Antoinette Pack is a 38 y.o. female.   Chief Complaint   Patient presents with   • Hypertension-Pregnant     History of Present Illness   Antoinette Pack is a 38-year-old female who presents to clinic today as a new patient for cardiology evaluation.    She has been seen by [facility] cardiology, [place] [facility] and today to establish care in our office which is closer to home for her.  She is currently 33 weeks and 4 days pregnant, with an estimated delivery date of May 25, 2023.  Patient was referred for palpitations, dyspnea and swelling.  She currently is completing a Holter monitor that was placed 2 days ago.  It was recommended by her maternal and fetal medicine provider that she have a repeat echocardiogram at 32 weeks when cardiac output is the greatest however she has been unable to get that scheduled.  She has hypertension with recent changes in her labetalol 2 days ago.  She states she did not increase dosing of medication until yesterday.  She reports continued monitoring of BP with some improvements.  She feels her swelling is stable and breathing is stable.  She states palpitations seem to be worse at times but denies syncope.  She denies underlying thyroid disease and reports anemia which is being monitored. She reports prior to pregnancy she was treated for HFpEF.  She has type 2 diabetes which is closely being monitored fetal and maternal provider and endocrinology.  Previous history of hyperlipidemia but discontinued  statin therapy when she discovered she was pregnant.     Patient Active Problem List   Diagnosis   • Simple goiter   • Pre-existing type 2 diabetes mellitus with hyperglycemia during pregnancy in third trimester   • Acute pancreatitis   • History of CHF (congestive heart failure)   • Chronic hypertension affecting pregnancy   • History of pancreatitis   • Morbid obesity with BMI of 40.0-44.9, adult   • Pre-existing type 2 diabetes affecting pregnancy, antepartum   • History of seizure disorder   • Obstructive sleep apnea syndrome   • Proteinuria affecting pregnancy, antepartum   • Rectal bleeding   • Shortness of breath   • Antepartum multigravida of advanced maternal age   • Pain of pelvic girdle     Past Medical History:   Diagnosis Date   • Anxiety    • CHF (congestive heart failure) 2023   • Colitis    • Depression    • Diabetes mellitus    • Diverticulitis    • GERD (gastroesophageal reflux disease)    • Hypertension    • Kidney stone    • Pancreatitis 2023   • PCOS (polycystic ovarian syndrome)    • Sleep apnea      Past Surgical History:   Procedure Laterality Date   • CARDIAC CATHETERIZATION     • CARPAL TUNNEL RELEASE     • COLONOSCOPY     • ENDOSCOPY     • FRACTURE SURGERY     • HARDWARE REMOVAL      WRIST   • TENDON REPAIR      HAND     Family History   Problem Relation Age of Onset   • Heart disease Mother    • COPD Mother    • Heart disease Father    • COPD Father      Social History     Tobacco Use   • Smoking status: Former     Current packs/day: 0.00     Average packs/day: 1 pack/day for 20.0 years (20.0 ttl pk-yrs)     Types: Cigarettes     Start date:      Quit date: 2015     Years since quittin.2     Passive exposure: Never   • Smokeless tobacco: Never   • Tobacco comments:     QUIT IN 2017   Vaping Use   • Vaping status: Never Used   Substance Use Topics   • Alcohol use: No   • Drug use: No     The following portions of the patient's history were reviewed and updated as  "appropriate: allergies, current medications, past family history, past medical history, past social history, past surgical history and problem list.    Allergies   Allergen Reactions   • Dilantin [Phenytoin] Mental Status Change   • Keppra [Levetiracetam] Mental Status Change   • Meperidine Rash   • Topamax [Topiramate] Mental Status Change         Current Outpatient Medications:   •  aspirin (ASPIR) 81 MG EC tablet, Take 1 tablet by mouth Daily., Disp: 30 tablet, Rfl: 6  •  Continuous Blood Gluc Sensor (Dexcom G6 Sensor), Use Every 10 (Ten) Days., Disp: 3 each, Rfl: 5  •  Continuous Blood Gluc Transmit (Dexcom G6 Transmitter) misc, Use 1 each Every 3 (Three) Months., Disp: 1 each, Rfl: 1  •  folic acid (FOLVITE) 1 MG tablet, Take 1 tablet by mouth Daily., Disp: , Rfl:   •  Insulin Aspart (novoLOG) 100 UNIT/ML injection, For use in insulin pump. Max Daily Dose: 250 units daily., Disp: 80 mL, Rfl: 5  •  Insulin Aspart (NovoLOG) 100 UNIT/ML injection, For use in insulin pump and for additional subcutaneous doses to supplement mealtime doses as bolused with pump. Max dose of 350u/day., Disp: 100 mL, Rfl: 11  •  Insulin Disposable Pump (Omnipod 5 G6 Pods, Gen 5,) misc, Change 2 (Two) Times a Day as directed., Disp: 60 each, Rfl: 5  •  Insulin Pen Needle 32G X 4 MM misc, Use to inject insulin up to 4 times daily as directed, Disp: 400 each, Rfl: 1  •  Insulin Syringe 31G X 5/16\" 1 ML misc, Use 1 each 3 (Three) Times a Day With Meals. As needed for additional insulin bolus with meals., Disp: 100 each, Rfl: 3  •  labetalol (NORMODYNE) 100 MG tablet, Take 1 tablet by mouth 3 times a day., Disp: 90 tablet, Rfl: 0  •  lansoprazole (PREVACID) 30 MG capsule, Take 1 capsule by mouth 2 (Two) Times a Day., Disp: , Rfl:   •  mesalamine (CANASA) 1000 MG suppository, Insert 1 suppository into the rectum Every Night., Disp: , Rfl:   •  Prenatal Vit-Fe Fumarate-FA (PRENATAL PO), Take 1 tablet by mouth Daily., Disp: , Rfl:     Review of " "Systems   Constitutional:  Negative for activity change, appetite change, chills, diaphoresis, fatigue and fever.   HENT:  Negative for congestion, drooling, ear discharge, ear pain, mouth sores, nosebleeds, postnasal drip, rhinorrhea, sinus pressure, sneezing and sore throat.    Eyes:  Negative for pain, discharge and visual disturbance.   Respiratory:  Positive for shortness of breath. Negative for cough, chest tightness and wheezing.    Cardiovascular:  Positive for palpitations and leg swelling. Negative for chest pain.   Gastrointestinal:  Negative for abdominal pain, constipation, diarrhea, nausea and vomiting.   Endocrine: Negative for cold intolerance, heat intolerance, polydipsia, polyphagia and polyuria.   Musculoskeletal:  Negative for arthralgias, myalgias and neck pain.   Skin:  Negative for rash and wound.   Neurological:  Negative for dizziness, syncope, speech difficulty, weakness, light-headedness and headaches.   Hematological:  Negative for adenopathy. Does not bruise/bleed easily.   Psychiatric/Behavioral:  Negative for confusion, dysphoric mood and sleep disturbance. The patient is not nervous/anxious.    All other systems reviewed and are negative.    /80 (BP Location: Left arm, Patient Position: Sitting, Cuff Size: Large Adult)   Pulse 91   Ht 149.9 cm (59\")   Wt 107 kg (236 lb 6.4 oz)   LMP  (LMP Unknown)   SpO2 98%   BMI 47.75 kg/m²     Objective  Allergies   Allergen Reactions   • Dilantin [Phenytoin] Mental Status Change   • Keppra [Levetiracetam] Mental Status Change   • Meperidine Rash   • Topamax [Topiramate] Mental Status Change       Physical Exam  Vitals reviewed.   Constitutional:       Appearance: Normal appearance. She is well-developed. She is obese.   HENT:      Head: Normocephalic.      Right Ear: Tympanic membrane normal.      Left Ear: Tympanic membrane normal.      Nose: Nose normal.   Eyes:      Conjunctiva/sclera: Conjunctivae normal.      Pupils: Pupils are " equal, round, and reactive to light.   Neck:      Thyroid: No thyromegaly.      Vascular: No carotid bruit or JVD.   Cardiovascular:      Rate and Rhythm: Normal rate and regular rhythm.   Pulmonary:      Effort: Pulmonary effort is normal.      Breath sounds: Normal breath sounds.   Abdominal:      General: Bowel sounds are normal.      Palpations: Abdomen is soft. There is no hepatomegaly, splenomegaly or mass.      Tenderness: There is no abdominal tenderness.   Musculoskeletal:         General: Normal range of motion.      Cervical back: Neck supple.      Right lower leg: Edema present.      Left lower leg: Edema present.   Skin:     General: Skin is warm and dry.   Neurological:      Mental Status: She is alert and oriented to person, place, and time.   Psychiatric:         Attention and Perception: Attention normal.         Mood and Affect: Mood normal.         Speech: Speech normal.         Behavior: Behavior normal. Behavior is cooperative.         Cognition and Memory: Cognition normal.           ECG 12 Lead    Date/Time: 4/10/2024 3:12 PM  Performed by: Irene Colon APRN    Authorized by: Irene Colon APRN  Comparison: compared with previous ECG   Similar to previous ECG  Rhythm: sinus rhythm  Rate: normal  BPM: 94    Clinical impression: normal ECG  Comments: QT/QTc - 341/393          LABS  WBC   Date Value Ref Range Status   03/27/2024 9.82 3.40 - 10.80 10*3/mm3 Final     RBC   Date Value Ref Range Status   03/27/2024 3.62 (L) 3.77 - 5.28 10*6/mm3 Final     Hemoglobin   Date Value Ref Range Status   03/27/2024 10.0 (L) 12.0 - 15.9 g/dL Final     Hematocrit   Date Value Ref Range Status   03/27/2024 30.5 (L) 34.0 - 46.6 % Final     MCV   Date Value Ref Range Status   03/27/2024 84.3 79.0 - 97.0 fL Final     MCH   Date Value Ref Range Status   03/27/2024 27.6 26.6 - 33.0 pg Final     MCHC   Date Value Ref Range Status   03/27/2024 32.8 31.5 - 35.7 g/dL Final     RDW   Date Value Ref Range  Status   03/27/2024 14.6 12.3 - 15.4 % Final     RDW-SD   Date Value Ref Range Status   03/27/2024 44.4 37.0 - 54.0 fl Final     MPV   Date Value Ref Range Status   03/27/2024 11.3 6.0 - 12.0 fL Final     Platelets   Date Value Ref Range Status   03/27/2024 150 140 - 450 10*3/mm3 Final     Neutrophil %   Date Value Ref Range Status   03/27/2024 74.3 42.7 - 76.0 % Final     Lymphocyte %   Date Value Ref Range Status   03/27/2024 18.3 (L) 19.6 - 45.3 % Final     Monocyte %   Date Value Ref Range Status   03/27/2024 5.2 5.0 - 12.0 % Final     Eosinophil %   Date Value Ref Range Status   03/27/2024 1.4 0.3 - 6.2 % Final     Basophil %   Date Value Ref Range Status   03/27/2024 0.2 0.0 - 1.5 % Final     Immature Grans %   Date Value Ref Range Status   03/27/2024 0.6 (H) 0.0 - 0.5 % Final     Neutrophils, Absolute   Date Value Ref Range Status   03/27/2024 7.29 (H) 1.70 - 7.00 10*3/mm3 Final     Lymphocytes, Absolute   Date Value Ref Range Status   03/27/2024 1.80 0.70 - 3.10 10*3/mm3 Final     Monocytes, Absolute   Date Value Ref Range Status   03/27/2024 0.51 0.10 - 0.90 10*3/mm3 Final     Eosinophils, Absolute   Date Value Ref Range Status   03/27/2024 0.14 0.00 - 0.40 10*3/mm3 Final     Basophils, Absolute   Date Value Ref Range Status   03/27/2024 0.02 0.00 - 0.20 10*3/mm3 Final     Immature Grans, Absolute   Date Value Ref Range Status   03/27/2024 0.06 (H) 0.00 - 0.05 10*3/mm3 Final     nRBC   Date Value Ref Range Status   03/27/2024 0.0 0.0 - 0.2 /100 WBC Final       Total Cholesterol   Date Value Ref Range Status   09/18/2023 173 0 - 200 mg/dL Final     Triglycerides   Date Value Ref Range Status   09/18/2023 111 0 - 150 mg/dL Final     HDL Cholesterol   Date Value Ref Range Status   09/18/2023 33 (L) 40 - 60 mg/dL Final     LDL Cholesterol    Date Value Ref Range Status   09/18/2023 120 (H) 0 - 100 mg/dL Final     IMAGING  XR Chest 2 View    Result Date: 3/27/2024    Unremarkable radiographic appearance of the  chest.   This report was finalized on 3/27/2024 12:44 PM by Dr. Bharath Chávez MD.        Nuclear Stress Test 9/6/2023  Interpretation Summary     •  A pharmacological stress test was performed using regadenoson.  •  Findings consistent with a normal ECG stress test.  •  Myocardial perfusion imaging indicates a normal myocardial perfusion study with no evidence of ischemia.  •  Subtle fixed anterior wall perfusion defect most likely presents breast on admission due to normal anterior wall motion on the gated scans.  •  Normal LV cavity size. Normal LV wall motion noted.  •  Left ventricular ejection fraction is normal (Calculated EF = 60%).  •  Impressions are consistent with a low risk study.      Cardiac Cath 9/25/2015  CORONARY ARTERY ARTERIOGRAMS:       On fluoroscopy, there was no significant epicardial calcification noted.      LEFT MAIN CORONARY ARTERY: Is normal and bifurcates into medium-size left  anterior descending and left circumflex systems in the usual fashion.      LEFT ANTERIOR DESCENDING CORONARY ARTERY: Gives rise to 2 small-to-medium-size  diagonal branches from the proximal segment and a small diagonal branch from  the mid segment. The left anterior descending coronary artery with the diagonal  branches appears angiographically normal.      LEFT CIRCUMFLEX CORONARY ARTERY: Gives rise to a small obtuse marginal branch  from the proximal segment and a medium and a small posterolateral branch  distally. The left circumflex system also appears angiographically normal.      RIGHT CORONARY ARTERY: Is a medium-caliber vessel and is dominant for posterior  circulation. It appears angiographically normal in the proximal and mid  segments. Distally, it gives rise to a medium-size posterior descending artery  branch that has mild diffuse narrowing of about 40% to 50% in its distal  segment. This is not hemodynamically significant.      LEFT VENTRICULAR ANGIOGRAM: Was done in 30' CABELLO projection. It reveals  normal  left ventricular chamber size, wall motion and systolic function with an  estimated LV ejection fraction of about 60% to 65%. There is no pericardial or  valvular calcification noted.      CONCLUSION AND COMMENTS: The patient is a 30-year-old  female with  persistent chest pain suspicious for unstable angina and an abnormal Lexiscan  sestamibi study that revealed some anterior wall myocardial ischemia. Her  cardiac catheterization reveals:     1.  Normal left main coronary artery.   2.  Normal left anterior descending and left circumflex systems.   3.  Right coronary artery is dominant for posterior circulation with about 40%  to 50% diffuse narrowing in the distal segment of a medium-size posterior  descending artery branch, which is not hemodynamically significant.   4.  Normal LV  chamber size, wall motion, and systolic function with an  estimated LV ejection fraction of about 60% to 65%.   5.  Elevated left ventricular end-diastolic pressures which are probably  related to diastolic noncompliance of the left ventricle.      RECOMMENDATIONS:    1.  In view of mild degree of atherosclerotic disease in the distal PDA, with a  normal left circumflex and right coronary artery, would continue to emphasize  on risk factor modification as needed for now and perhaps look at a noncardiac  cause for her symptoms should they persist.   2.  For her diastolic noncompliance of the left ventricle, I would try to  optimize her antihypertensive medications.             D:   VIBHA 09/24/2015 11:59:34  T:   shanae 09/25/2015 08:57:42  JOB ID:[id number]  [id number]  Revision Count:     0  cc:  Yinka Torrez MD                              Signature:__________________________                                     Magnus Martinez MD, [facility]  DO NOT TEXT EDIT THIS LINE :RCC:[id number]:  Authenticated by MAGNUS MARTINEZ M.D. On 09/25/2015 11:12:06 AM       Assessment & Plan  Diagnoses and all orders for this visit:    1. Dyspnea,  unspecified type (Primary)  -     Adult Transthoracic Echo Complete W/ Cont if Necessary Per Protocol; Future  Reorder echocardiogram as stat to reevaluate LVEF with changes in demand due to pregnancy   Recent ER visit with a normal BNP, HS troponin which was normal, chest x-ray without acute findings.  Most recent echo within normal LVEF.     2. Hypertension, unspecified type  -     ECG 12 Lead  Continue to monitor with recent changes in labetalol dose  Recommend twice daily log and return for follow-up visit  Sodium restrictions recommend    3. History of CHF (congestive heart failure)  No current evidence of heart failure, will continue to monitor    4. Palpitations  Holter monitor is currently on patient, she will complete and will await results for further recommendations.  See how she responds with recent changes in labetalol dosing.  Discuss and reviewed HR today is within normal range    Heart rate -- Resting heart rate begins to rise in the first trimester, with an average increase of 10 to 30 beats per minute (bpm) [14-16]. In a three-center, prospective, longitudinal cohort study, the median heart rate at 12 weeks was 82 bpm (3rd to 97th centiles: 63 to 105 bpm) and presley progressively until 34 weeks to a maximum of 91 bpm (3rd to 97th centiles: 68 to 115 bpm), as shown in the figure (figure 2) [17]. Heart rate then decreased slightly at 40 weeks to a median of 89 bpm (3rd to 97th centiles: 65 to 114 bpm). Thus, the upper limit of the resting heart rate typically is not greater than 115 bpm, and a heart rate exceeding 115 bpm warrants evaluation.      5. 33 weeks gestation of pregnancy  Continue to follow with fetal and maternal provider closely    Lifestyle modifications including heart healthy diet, regular exercise, and so desirable body weight and avoidance of tobacco product    Review of medical including previous notes, ER records and labs, previous testing     Follow-up in 2 to 3 weeks to discuss  and review testing results, sooner if needed.

## 2024-04-10 NOTE — PROGRESS NOTES
This provider is located at the [facility] (through [facility]), [address], [place], [address] using a secure MyChart Video Visit through [employer]. Patient is being seen remotely via telehealth at their home address in [place], and stated they are in a secure environment for this session. The patient's condition being diagnosed/treated is appropriate for telemedicine. The provider identified herself as well as her credentials.   The patient, and/or patients guardian, consent to be seen remotely, and when consent is given they understand that the consent allows for patient identifiable information to be sent to a third party as needed.   They may refuse to be seen remotely at any time. The electronic data is encrypted and password protected, and the patient and/or guardian has been advised of the potential risks to privacy not withstanding such measures.      Subjective   Antoinette Pack is a 38 y.o. female who presents today for follow up    Chief Complaint:  Bipolar disorder, depression, anxiety    History of Present Illness:   History of Present Illness  Today is a follow-up visit.  She is 33 4/7 weeks pregnant.   Medication compliance: patient is NOT taking psychiatric medications.  Depression rated 6/10, with 10 being the worst.  Anxiety rated 8/10, with 10 being the worst.  Mood rated 8/10, with 10 being the worst.  Sleep is poor, getting about 7 hours a night,  Nightmares: Occasional, she isn't sleeping well, she has a lot of pelvic pain.  Appetite is good.  Hallucinations: denies any AVH.  Denies thoughts of self harm, denies plan or intent to harm herself.  Alcohol use: no  Drug use: no  Marijuana use: no     Chronic health issues, no acute physical or medical issues today.    Details: She is having severe pelvic pain, she is seeing physical therapy, she is swelling, but pregnancy is going well.  She states her moods are stable.  She states blood sugars are  "\"not as good\" as they were, but she endocrinology, and OB/GYN specialist in [place] is monitoring that closely. Denies thoughts of self harm, plan or intent to harm herself. She doesn't want to initiate psychiatric medication at this time.         The following portions of the patient's history were reviewed and updated as appropriate: allergies, current medications, past family history, past medical history, past social history, past surgical history and problem list.      Past Medical History:  Past Medical History:   Diagnosis Date    Anxiety     CHF (congestive heart failure) 05/2023    Colitis 2023    Depression     Diabetes mellitus 2018    Diverticulitis     GERD (gastroesophageal reflux disease)     Hypertension     Kidney stone     Pancreatitis 09/2023    PCOS (polycystic ovarian syndrome)     Sleep apnea        Social History:  Social History     Socioeconomic History    Marital status:    Tobacco Use    Smoking status: Former     Current packs/day: 1.00     Types: Cigarettes     Passive exposure: Never    Smokeless tobacco: Never    Tobacco comments:     QUIT IN 2017   Vaping Use    Vaping status: Never Used   Substance and Sexual Activity    Alcohol use: No    Drug use: No    Sexual activity: Defer       Family History:  Family History   Problem Relation Age of Onset    Heart disease Mother     COPD Mother     Heart disease Father     COPD Father        Past Surgical History:  Past Surgical History:   Procedure Laterality Date    CARDIAC CATHETERIZATION      CARPAL TUNNEL RELEASE      COLONOSCOPY      ENDOSCOPY      FRACTURE SURGERY      HARDWARE REMOVAL      WRIST    TENDON REPAIR      HAND       Problem List:  Patient Active Problem List   Diagnosis    Simple goiter    Pre-existing type 2 diabetes mellitus with hyperglycemia during pregnancy in third trimester    Acute pancreatitis    History of CHF (congestive heart failure)    Chronic hypertension affecting pregnancy    History of " "pancreatitis    Morbid obesity with BMI of 40.0-44.9, adult    Pre-existing type 2 diabetes affecting pregnancy, antepartum    History of seizure disorder    Obstructive sleep apnea syndrome    Proteinuria affecting pregnancy, antepartum    Rectal bleeding    Shortness of breath    Antepartum multigravida of advanced maternal age    Pain of pelvic girdle        Allergy:   Allergies   Allergen Reactions    Dilantin [Phenytoin] Mental Status Change    Keppra [Levetiracetam] Mental Status Change    Meperidine Rash    Topamax [Topiramate] Mental Status Change        Current Medications:   Current Outpatient Medications   Medication Sig Dispense Refill    aspirin (ASPIR) 81 MG EC tablet Take 1 tablet by mouth Daily. 30 tablet 6    Continuous Blood Gluc Sensor (Dexcom G6 Sensor) Use Every 10 (Ten) Days. 3 each 5    Continuous Blood Gluc Transmit (Dexcom G6 Transmitter) misc Use 1 each Every 3 (Three) Months. 1 each 1    folic acid (FOLVITE) 1 MG tablet Take 1 tablet by mouth Daily.      Insulin Aspart (novoLOG) 100 UNIT/ML injection For use in insulin pump. Max Daily Dose: 250 units daily. 80 mL 5    Insulin Aspart (NovoLOG) 100 UNIT/ML injection For use in insulin pump and for additional subcutaneous doses to supplement mealtime doses as bolused with pump. Max dose of 350u/day. 100 mL 11    Insulin Disposable Pump (Omnipod 5 G6 Pods, Gen 5,) misc Change 2 (Two) Times a Day as directed. 60 each 5    Insulin Pen Needle 32G X 4 MM misc Use to inject insulin up to 4 times daily as directed 400 each 1    Insulin Syringe 31G X 5/16\" 1 ML misc Use 1 each 3 (Three) Times a Day With Meals. As needed for additional insulin bolus with meals. 100 each 3    labetalol (NORMODYNE) 100 MG tablet Take 1 tablet by mouth 3 times a day. 90 tablet 0    lansoprazole (PREVACID) 30 MG capsule Take 1 capsule by mouth 2 (Two) Times a Day.      mesalamine (CANASA) 1000 MG suppository Insert 1 suppository into the rectum Every Night.      Prenatal " Vit-Fe Fumarate-FA (PRENATAL PO) Take 1 tablet by mouth Daily.       No current facility-administered medications for this visit.       Review of Symptoms:    Review of Systems   Psychiatric/Behavioral:  Positive for dysphoric mood, sleep disturbance, depressed mood and stress. Negative for agitation, hallucinations, self-injury and suicidal ideas. The patient is nervous/anxious.    All other systems reviewed and are negative.        Physical Exam:   not currently breastfeeding.  There is no height or weight on file to calculate BMI.    4/10/24    MENTAL STATUS EXAM   General Appearance:  Cleanly groomed and dressed  Eye Contact:  Good eye contact  Attitude:  Cooperative  Motor Activity:  Normal gait, posture  Speech:  Normal rate, tone, volume  Language:  Spontaneous  Mood and affect:  Normal, pleasant  Hopelessness:  Denies  Thought Process:  Goal-directed and linear  Associations/ Thought Content:  No delusions  Hallucinations:  None  Suicidal Ideations:  Not present  Homicidal Ideation:  Not present  Sensorium:  Alert  Orientation:  Person, place, time and situation  Insight:  Fair  Judgement:  Fair  Reliability:  Fair  Impulse Control:  Fair       PHQ-9 Total Score:        Lab Results:   Admission on 03/27/2024, Discharged on 03/27/2024   Component Date Value Ref Range Status    QT Interval 03/27/2024 330  ms Final    QTC Interval 03/27/2024 403  ms Final    Glucose 03/27/2024 69  65 - 99 mg/dL Final    BUN 03/27/2024 10  6 - 20 mg/dL Final    Creatinine 03/27/2024 0.47 (L)  0.57 - 1.00 mg/dL Final    Sodium 03/27/2024 138  136 - 145 mmol/L Final    Potassium 03/27/2024 3.9  3.5 - 5.2 mmol/L Final    Chloride 03/27/2024 106  98 - 107 mmol/L Final    CO2 03/27/2024 20.9 (L)  22.0 - 29.0 mmol/L Final    Calcium 03/27/2024 8.8  8.6 - 10.5 mg/dL Final    Total Protein 03/27/2024 6.3  6.0 - 8.5 g/dL Final    Albumin 03/27/2024 3.5  3.5 - 5.2 g/dL Final    ALT (SGPT) 03/27/2024 12  1 - 33 U/L Final    AST (SGOT)  03/27/2024 16  1 - 32 U/L Final    Alkaline Phosphatase 03/27/2024 98  39 - 117 U/L Final    Total Bilirubin 03/27/2024 0.3  0.0 - 1.2 mg/dL Final    Globulin 03/27/2024 2.8  gm/dL Final    A/G Ratio 03/27/2024 1.3  g/dL Final    BUN/Creatinine Ratio 03/27/2024 21.3  7.0 - 25.0 Final    Anion Gap 03/27/2024 11.1  5.0 - 15.0 mmol/L Final    eGFR 03/27/2024 123.6  >60.0 mL/min/1.73 Final    proBNP 03/27/2024 75.1  0.0 - 450.0 pg/mL Final    HS Troponin T 03/27/2024 13  <14 ng/L Final    Extra Tube 03/27/2024 Hold for add-ons.   Final    Auto resulted.    Extra Tube 03/27/2024 hold for add-on   Final    Auto resulted    Extra Tube 03/27/2024 Hold for add-ons.   Final    Auto resulted.    Extra Tube 03/27/2024 Hold for add-ons.   Final    Auto resulted    WBC 03/27/2024 9.82  3.40 - 10.80 10*3/mm3 Final    RBC 03/27/2024 3.62 (L)  3.77 - 5.28 10*6/mm3 Final    Hemoglobin 03/27/2024 10.0 (L)  12.0 - 15.9 g/dL Final    Hematocrit 03/27/2024 30.5 (L)  34.0 - 46.6 % Final    MCV 03/27/2024 84.3  79.0 - 97.0 fL Final    MCH 03/27/2024 27.6  26.6 - 33.0 pg Final    MCHC 03/27/2024 32.8  31.5 - 35.7 g/dL Final    RDW 03/27/2024 14.6  12.3 - 15.4 % Final    RDW-SD 03/27/2024 44.4  37.0 - 54.0 fl Final    MPV 03/27/2024 11.3  6.0 - 12.0 fL Final    Platelets 03/27/2024 150  140 - 450 10*3/mm3 Final    Neutrophil % 03/27/2024 74.3  42.7 - 76.0 % Final    Lymphocyte % 03/27/2024 18.3 (L)  19.6 - 45.3 % Final    Monocyte % 03/27/2024 5.2  5.0 - 12.0 % Final    Eosinophil % 03/27/2024 1.4  0.3 - 6.2 % Final    Basophil % 03/27/2024 0.2  0.0 - 1.5 % Final    Immature Grans % 03/27/2024 0.6 (H)  0.0 - 0.5 % Final    Neutrophils, Absolute 03/27/2024 7.29 (H)  1.70 - 7.00 10*3/mm3 Final    Lymphocytes, Absolute 03/27/2024 1.80  0.70 - 3.10 10*3/mm3 Final    Monocytes, Absolute 03/27/2024 0.51  0.10 - 0.90 10*3/mm3 Final    Eosinophils, Absolute 03/27/2024 0.14  0.00 - 0.40 10*3/mm3 Final    Basophils, Absolute 03/27/2024 0.02  0.00 -  0.20 10*3/mm3 Final    Immature Grans, Absolute 03/27/2024 0.06 (H)  0.00 - 0.05 10*3/mm3 Final    nRBC 03/27/2024 0.0  0.0 - 0.2 /100 WBC Final    COVID19 03/27/2024 Not Detected  Not Detected - Ref. Range Final    Influenza A PCR 03/27/2024 Not Detected  Not Detected Final    Influenza B PCR 03/27/2024 Not Detected  Not Detected Final    Site 03/27/2024 Left Brachial   Final    John's Test 03/27/2024 N/A   Final    pH, Arterial 03/27/2024 7.419  7.350 - 7.450 pH units Final    pCO2, Arterial 03/27/2024 33.2 (L)  35.0 - 45.0 mm Hg Final    pO2, Arterial 03/27/2024 88.3  83.0 - 108.0 mm Hg Final    HCO3, Arterial 03/27/2024 21.4  20.0 - 26.0 mmol/L Final    Base Excess, Arterial 03/27/2024 -2.6 (L)  0.0 - 2.0 mmol/L Final    O2 Saturation, Arterial 03/27/2024 97.5  94.0 - 99.0 % Final    Hemoglobin, Blood Gas 03/27/2024 9.9 (L)  13.5 - 17.5 g/dL Final    84 Value below reference range    Hematocrit, Blood Gas 03/27/2024 30.3 (L)  38.0 - 51.0 % Final    84 Value below reference range    Oxyhemoglobin 03/27/2024 95.5  94 - 99 % Final    Methemoglobin 03/27/2024 0.50  0.00 - 3.00 % Final    Carboxyhemoglobin 03/27/2024 1.5  0 - 5 % Final    A-a DO2 03/27/2024 17.1  0.0 - 300.0 mmHg Final    CO2 Content 03/27/2024 22.4  22 - 33 mmol/L Final    Barometric Pressure for Blood Gas 03/27/2024 727  mmHg Final    Modality 03/27/2024 Room Air   Final    FIO2 03/27/2024 21  % Final    Ventilator Mode 03/27/2024 NA   Final    Collected by 03/27/2024 [id number]   Final    Meter: [id number]     :  [id number]    Glucose 03/27/2024 45 (C)  70 - 130 mg/dL Final       Assessment & Plan   Problems Addressed this Visit    None  Visit Diagnoses       Bipolar disorder, in partial remission, most recent episode depressed    -  Primary    Panic disorder with agoraphobia        Generalized anxiety disorder        Medication management              Diagnoses         Codes Comments    Bipolar disorder, in partial remission, most recent  episode depressed    -  Primary ICD-10-CM: F31.75  ICD-9-CM: 296.55     Panic disorder with agoraphobia     ICD-10-CM: F40.01  ICD-9-CM: 300.21     Generalized anxiety disorder     ICD-10-CM: F41.1  ICD-9-CM: 300.02     Medication management     ICD-10-CM: Z79.899  ICD-9-CM: V58.69           Social History     Tobacco Use   Smoking Status Former    Current packs/day: 1.00    Types: Cigarettes    Passive exposure: Never   Smokeless Tobacco Never   Tobacco Comments    QUIT IN 2017     JORGE reviewed and appropriate. Patient counseled on use of controlled substances.     -The benefits of a healthy diet and exercise were discussed with patient, especially the positive effects they have on mental health. Patient encouraged to consider lifestyle modification regarding  diet and exercise patterns to maximize results of mental health treatment.  -Reviewed previous available documentation  -Reviewed most recent available labs     -At this time she doesn't want to restart psychiatric medications, she feels her moods are stable.    -Instructed to contact this office if depression, anxiety or mood increases or changes significantly.       Visit Diagnoses:    ICD-10-CM ICD-9-CM   1. Bipolar disorder, in partial remission, most recent episode depressed  F31.75 296.55   2. Panic disorder with agoraphobia  F40.01 300.21   3. Generalized anxiety disorder  F41.1 300.02   4. Medication management  Z79.899 V58.69             TREATMENT PLAN/GOALS: Continue supportive psychotherapy efforts and medications as indicated. Treatment and medication options discussed during today's visit. Patient acknowledged and verbally consented to continue with current treatment plan and was educated on the importance of compliance with treatment and follow-up appointments.    MEDICATION ISSUES:    Discussed medication options and treatment plan of prescribed medication as well as the risks, benefits, and side effects including potential falls, possible  impaired driving and metabolic adversities among others. Patient is agreeable to call the office with any worsening of symptoms or onset of side effects. Patient is agreeable to call 911 or go to the nearest ER should he/she begin having SI/HI.     MEDS ORDERED DURING VISIT:  No orders of the defined types were placed in this encounter.    Return in about 1 month (around 5/10/2024).  -No medications prescribed today.    I spent 30 minutes caring for Antoinette on this date of service. This time includes time spent by me in the following activities: preparing for the visit, obtaining and/or reviewing a separately obtained history, performing a medically appropriate examination and/or evaluation, counseling and educating the patient/family/caregiver, ordering medications, tests, or procedures, and documenting information in the medical record               This document has been electronically signed by CICI Lopez  April 10, 2024 10:41 EDT    Part of this note may be an electronic transcription/translation of spoken language to printed text using the Dragon Dictation System.

## 2024-04-11 ENCOUNTER — HOSPITAL ENCOUNTER (OUTPATIENT)
Dept: CARDIOLOGY | Facility: HOSPITAL | Age: 39
Discharge: HOME | End: 2024-04-11
Admitting: NURSE PRACTITIONER
Payer: MEDICARE

## 2024-04-11 ENCOUNTER — TELEPHONE (OUTPATIENT)
Dept: CARDIOLOGY | Facility: CLINIC | Age: 39
End: 2024-04-11
Payer: MEDICARE

## 2024-04-11 ENCOUNTER — TELEPHONE (OUTPATIENT)
Dept: OBSTETRICS AND GYNECOLOGY | Facility: HOSPITAL | Age: 39
End: 2024-04-11
Payer: MEDICARE

## 2024-04-11 DIAGNOSIS — R06.00 DYSPNEA, UNSPECIFIED TYPE: ICD-10-CM

## 2024-04-11 LAB
BH CV ECHO MEAS - AO MAX PG: 8.6 MMHG
BH CV ECHO MEAS - AO MEAN PG: 5 MMHG
BH CV ECHO MEAS - AO ROOT DIAM: 3 CM
BH CV ECHO MEAS - AO V2 MAX: 147 CM/SEC
BH CV ECHO MEAS - AO V2 VTI: 30.1 CM
BH CV ECHO MEAS - EDV(CUBED): 148.9 ML
BH CV ECHO MEAS - EDV(MOD-SP4): 52.1 ML
BH CV ECHO MEAS - EF(MOD-SP4): 55.9 %
BH CV ECHO MEAS - ESV(CUBED): 39.3 ML
BH CV ECHO MEAS - ESV(MOD-SP4): 23 ML
BH CV ECHO MEAS - FS: 35.8 %
BH CV ECHO MEAS - IVS/LVPW: 1.11 CM
BH CV ECHO MEAS - IVSD: 1 CM
BH CV ECHO MEAS - LA DIMENSION: 3.6 CM
BH CV ECHO MEAS - LAT PEAK E' VEL: 7.6 CM/SEC
BH CV ECHO MEAS - LV DIASTOLIC VOL/BSA (35-75): 26.3 CM2
BH CV ECHO MEAS - LV MASS(C)D: 187.3 GRAMS
BH CV ECHO MEAS - LV SYSTOLIC VOL/BSA (12-30): 11.6 CM2
BH CV ECHO MEAS - LVIDD: 5.3 CM
BH CV ECHO MEAS - LVIDS: 3.4 CM
BH CV ECHO MEAS - LVOT AREA: 3.1 CM2
BH CV ECHO MEAS - LVOT DIAM: 2 CM
BH CV ECHO MEAS - LVPWD: 0.9 CM
BH CV ECHO MEAS - MED PEAK E' VEL: 7.1 CM/SEC
BH CV ECHO MEAS - MV A MAX VEL: 62.5 CM/SEC
BH CV ECHO MEAS - MV E MAX VEL: 179 CM/SEC
BH CV ECHO MEAS - MV E/A: 2.9
BH CV ECHO MEAS - SI(MOD-SP4): 14.7 ML/M2
BH CV ECHO MEAS - SV(MOD-SP4): 29.1 ML
BH CV ECHO MEAS - TAPSE (>1.6): 2.7 CM
BH CV ECHO MEASUREMENTS AVERAGE E/E' RATIO: 24.35
LEFT ATRIUM VOLUME INDEX: 36 ML/M2
LV EF 2D ECHO EST: 60 %

## 2024-04-11 PROCEDURE — 93306 TTE W/DOPPLER COMPLETE: CPT

## 2024-04-11 NOTE — TELEPHONE ENCOUNTER
Spoke with patient over the phone.  Informed patient Dr. Fernandez has reviewed her blood sugar log and omnipod settings.  Recommendations are: When you have a prolonged high, need to use the bolus function on the pump to give a correction.  Patient voices understanding.  Hetal Marie RN

## 2024-04-11 NOTE — PROGRESS NOTES
Subjective     Antoinette Pack is a 38 y.o. female.   Chief Complaint   Patient presents with    Hypertension-Pregnant     History of Present Illness   Antoinette Pack is a 38-year-old female who presents to clinic today as a new patient for cardiology evaluation.    She has been seen by [facility] cardiology, [facility] and today to establish care in our office which is closer to home for her.  She is currently 33 weeks and 4 days pregnant, with an estimated delivery date of May 25, 2023.  Patient was referred for palpitations, dyspnea and swelling.  She currently is completing a Holter monitor that was placed 2 days ago.  It was recommended by her maternal and fetal medicine provider that she have a repeat echocardiogram at 32 weeks when cardiac output is the greatest however she has been unable to get that scheduled.  She has hypertension with recent changes in her labetalol 2 days ago.  She states she did not increase dosing of medication until yesterday.  She reports continued monitoring of BP with some improvements.  She feels her swelling is stable and breathing is stable.  She states palpitations seem to be worse at times but denies syncope.  She denies underlying thyroid disease and reports anemia which is being monitored. She reports prior to pregnancy she was treated for HFpEF.  She has type 2 diabetes which is closely being monitored fetal and maternal provider and endocrinology.  Previous history of hyperlipidemia but discontinued statin therapy when she discovered she was pregnant.     Patient Active Problem List   Diagnosis    Simple goiter    Pre-existing type 2 diabetes mellitus with hyperglycemia during pregnancy in third trimester    Acute pancreatitis    History of CHF (congestive heart failure)    Chronic hypertension affecting pregnancy    History of pancreatitis    Morbid obesity with BMI of 40.0-44.9, adult    Pre-existing type 2 diabetes affecting pregnancy, antepartum    History of  seizure disorder    Obstructive sleep apnea syndrome    Proteinuria affecting pregnancy, antepartum    Rectal bleeding    Shortness of breath    Antepartum multigravida of advanced maternal age    Pain of pelvic girdle     Past Medical History:   Diagnosis Date    Anxiety     CHF (congestive heart failure) 2023    Colitis     Depression     Diabetes mellitus 2018    Diverticulitis     GERD (gastroesophageal reflux disease)     Hypertension     Kidney stone     Pancreatitis 2023    PCOS (polycystic ovarian syndrome)     Sleep apnea      Past Surgical History:   Procedure Laterality Date    CARDIAC CATHETERIZATION      CARPAL TUNNEL RELEASE      COLONOSCOPY      ENDOSCOPY      FRACTURE SURGERY      HARDWARE REMOVAL      WRIST    TENDON REPAIR      HAND     Family History   Problem Relation Age of Onset    Heart disease Mother     COPD Mother     Heart disease Father     COPD Father      Social History     Tobacco Use    Smoking status: Former     Current packs/day: 0.00     Average packs/day: 1 pack/day for 20.0 years (20.0 ttl pk-yrs)     Types: Cigarettes     Start date:      Quit date:      Years since quittin.2     Passive exposure: Never    Smokeless tobacco: Never    Tobacco comments:     QUIT IN    Vaping Use    Vaping status: Never Used   Substance Use Topics    Alcohol use: No    Drug use: No     The following portions of the patient's history were reviewed and updated as appropriate: allergies, current medications, past family history, past medical history, past social history, past surgical history and problem list.    Allergies   Allergen Reactions    Dilantin [Phenytoin] Mental Status Change    Keppra [Levetiracetam] Mental Status Change    Meperidine Rash    Topamax [Topiramate] Mental Status Change         Current Outpatient Medications:     aspirin (ASPIR) 81 MG EC tablet, Take 1 tablet by mouth Daily., Disp: 30 tablet, Rfl: 6    Continuous Blood Gluc Sensor (Dexcom G6  "Sensor), Use Every 10 (Ten) Days., Disp: 3 each, Rfl: 5    Continuous Blood Gluc Transmit (Dexcom G6 Transmitter) misc, Use 1 each Every 3 (Three) Months., Disp: 1 each, Rfl: 1    folic acid (FOLVITE) 1 MG tablet, Take 1 tablet by mouth Daily., Disp: , Rfl:     Insulin Aspart (novoLOG) 100 UNIT/ML injection, For use in insulin pump. Max Daily Dose: 250 units daily., Disp: 80 mL, Rfl: 5    Insulin Aspart (NovoLOG) 100 UNIT/ML injection, For use in insulin pump and for additional subcutaneous doses to supplement mealtime doses as bolused with pump. Max dose of 350u/day., Disp: 100 mL, Rfl: 11    Insulin Disposable Pump (Omnipod 5 G6 Pods, Gen 5,) misc, Change 2 (Two) Times a Day as directed., Disp: 60 each, Rfl: 5    Insulin Pen Needle 32G X 4 MM misc, Use to inject insulin up to 4 times daily as directed, Disp: 400 each, Rfl: 1    Insulin Syringe 31G X 5/16\" 1 ML misc, Use 1 each 3 (Three) Times a Day With Meals. As needed for additional insulin bolus with meals., Disp: 100 each, Rfl: 3    labetalol (NORMODYNE) 100 MG tablet, Take 1 tablet by mouth 3 times a day., Disp: 90 tablet, Rfl: 0    lansoprazole (PREVACID) 30 MG capsule, Take 1 capsule by mouth 2 (Two) Times a Day., Disp: , Rfl:     mesalamine (CANASA) 1000 MG suppository, Insert 1 suppository into the rectum Every Night., Disp: , Rfl:     Prenatal Vit-Fe Fumarate-FA (PRENATAL PO), Take 1 tablet by mouth Daily., Disp: , Rfl:     Review of Systems   Constitutional:  Negative for activity change, appetite change, chills, diaphoresis, fatigue and fever.   HENT:  Negative for congestion, drooling, ear discharge, ear pain, mouth sores, nosebleeds, postnasal drip, rhinorrhea, sinus pressure, sneezing and sore throat.    Eyes:  Negative for pain, discharge and visual disturbance.   Respiratory:  Positive for shortness of breath. Negative for cough, chest tightness and wheezing.    Cardiovascular:  Positive for palpitations and leg swelling. Negative for chest pain. " "  Gastrointestinal:  Negative for abdominal pain, constipation, diarrhea, nausea and vomiting.   Endocrine: Negative for cold intolerance, heat intolerance, polydipsia, polyphagia and polyuria.   Musculoskeletal:  Negative for arthralgias, myalgias and neck pain.   Skin:  Negative for rash and wound.   Neurological:  Negative for dizziness, syncope, speech difficulty, weakness, light-headedness and headaches.   Hematological:  Negative for adenopathy. Does not bruise/bleed easily.   Psychiatric/Behavioral:  Negative for confusion, dysphoric mood and sleep disturbance. The patient is not nervous/anxious.    All other systems reviewed and are negative.    /80 (BP Location: Left arm, Patient Position: Sitting, Cuff Size: Large Adult)   Pulse 91   Ht 149.9 cm (59\")   Wt 107 kg (236 lb 6.4 oz)   LMP  (LMP Unknown)   SpO2 98%   BMI 47.75 kg/m²     Objective   Allergies   Allergen Reactions    Dilantin [Phenytoin] Mental Status Change    Keppra [Levetiracetam] Mental Status Change    Meperidine Rash    Topamax [Topiramate] Mental Status Change       Physical Exam  Vitals reviewed.   Constitutional:       Appearance: Normal appearance. She is well-developed. She is obese.   HENT:      Head: Normocephalic.      Right Ear: Tympanic membrane normal.      Left Ear: Tympanic membrane normal.      Nose: Nose normal.   Eyes:      Conjunctiva/sclera: Conjunctivae normal.      Pupils: Pupils are equal, round, and reactive to light.   Neck:      Thyroid: No thyromegaly.      Vascular: No carotid bruit or JVD.   Cardiovascular:      Rate and Rhythm: Normal rate and regular rhythm.   Pulmonary:      Effort: Pulmonary effort is normal.      Breath sounds: Normal breath sounds.   Abdominal:      General: Bowel sounds are normal.      Palpations: Abdomen is soft. There is no hepatomegaly, splenomegaly or mass.      Tenderness: There is no abdominal tenderness.   Musculoskeletal:         General: Normal range of motion.      " Cervical back: Neck supple.      Right lower leg: Edema present.      Left lower leg: Edema present.   Skin:     General: Skin is warm and dry.   Neurological:      Mental Status: She is alert and oriented to person, place, and time.   Psychiatric:         Attention and Perception: Attention normal.         Mood and Affect: Mood normal.         Speech: Speech normal.         Behavior: Behavior normal. Behavior is cooperative.         Cognition and Memory: Cognition normal.           ECG 12 Lead    Date/Time: 4/10/2024 3:12 PM  Performed by: Irene Colon APRN    Authorized by: Irene Colon APRN  Comparison: compared with previous ECG   Similar to previous ECG  Rhythm: sinus rhythm  Rate: normal  BPM: 94    Clinical impression: normal ECG  Comments: QT/QTc - 341/393          LABS  WBC   Date Value Ref Range Status   03/27/2024 9.82 3.40 - 10.80 10*3/mm3 Final     RBC   Date Value Ref Range Status   03/27/2024 3.62 (L) 3.77 - 5.28 10*6/mm3 Final     Hemoglobin   Date Value Ref Range Status   03/27/2024 10.0 (L) 12.0 - 15.9 g/dL Final     Hematocrit   Date Value Ref Range Status   03/27/2024 30.5 (L) 34.0 - 46.6 % Final     MCV   Date Value Ref Range Status   03/27/2024 84.3 79.0 - 97.0 fL Final     MCH   Date Value Ref Range Status   03/27/2024 27.6 26.6 - 33.0 pg Final     MCHC   Date Value Ref Range Status   03/27/2024 32.8 31.5 - 35.7 g/dL Final     RDW   Date Value Ref Range Status   03/27/2024 14.6 12.3 - 15.4 % Final     RDW-SD   Date Value Ref Range Status   03/27/2024 44.4 37.0 - 54.0 fl Final     MPV   Date Value Ref Range Status   03/27/2024 11.3 6.0 - 12.0 fL Final     Platelets   Date Value Ref Range Status   03/27/2024 150 140 - 450 10*3/mm3 Final     Neutrophil %   Date Value Ref Range Status   03/27/2024 74.3 42.7 - 76.0 % Final     Lymphocyte %   Date Value Ref Range Status   03/27/2024 18.3 (L) 19.6 - 45.3 % Final     Monocyte %   Date Value Ref Range Status   03/27/2024 5.2 5.0 - 12.0 %  Final     Eosinophil %   Date Value Ref Range Status   03/27/2024 1.4 0.3 - 6.2 % Final     Basophil %   Date Value Ref Range Status   03/27/2024 0.2 0.0 - 1.5 % Final     Immature Grans %   Date Value Ref Range Status   03/27/2024 0.6 (H) 0.0 - 0.5 % Final     Neutrophils, Absolute   Date Value Ref Range Status   03/27/2024 7.29 (H) 1.70 - 7.00 10*3/mm3 Final     Lymphocytes, Absolute   Date Value Ref Range Status   03/27/2024 1.80 0.70 - 3.10 10*3/mm3 Final     Monocytes, Absolute   Date Value Ref Range Status   03/27/2024 0.51 0.10 - 0.90 10*3/mm3 Final     Eosinophils, Absolute   Date Value Ref Range Status   03/27/2024 0.14 0.00 - 0.40 10*3/mm3 Final     Basophils, Absolute   Date Value Ref Range Status   03/27/2024 0.02 0.00 - 0.20 10*3/mm3 Final     Immature Grans, Absolute   Date Value Ref Range Status   03/27/2024 0.06 (H) 0.00 - 0.05 10*3/mm3 Final     nRBC   Date Value Ref Range Status   03/27/2024 0.0 0.0 - 0.2 /100 WBC Final       Total Cholesterol   Date Value Ref Range Status   09/18/2023 173 0 - 200 mg/dL Final     Triglycerides   Date Value Ref Range Status   09/18/2023 111 0 - 150 mg/dL Final     HDL Cholesterol   Date Value Ref Range Status   09/18/2023 33 (L) 40 - 60 mg/dL Final     LDL Cholesterol    Date Value Ref Range Status   09/18/2023 120 (H) 0 - 100 mg/dL Final     IMAGING  XR Chest 2 View    Result Date: 3/27/2024    Unremarkable radiographic appearance of the chest.   This report was finalized on 3/27/2024 12:44 PM by Dr. Bharath Chávez MD.        Nuclear Stress Test 9/6/2023  Interpretation Summary       A pharmacological stress test was performed using regadenoson.    Findings consistent with a normal ECG stress test.    Myocardial perfusion imaging indicates a normal myocardial perfusion study with no evidence of ischemia.    Subtle fixed anterior wall perfusion defect most likely presents breast on admission due to normal anterior wall motion on the gated scans.    Normal LV cavity  size. Normal LV wall motion noted.    Left ventricular ejection fraction is normal (Calculated EF = 60%).    Impressions are consistent with a low risk study.      Cardiac Cath 9/25/2015  CORONARY ARTERY ARTERIOGRAMS:       On fluoroscopy, there was no significant epicardial calcification noted.      LEFT MAIN CORONARY ARTERY: Is normal and bifurcates into medium-size left  anterior descending and left circumflex systems in the usual fashion.      LEFT ANTERIOR DESCENDING CORONARY ARTERY: Gives rise to 2 small-to-medium-size  diagonal branches from the proximal segment and a small diagonal branch from  the mid segment. The left anterior descending coronary artery with the diagonal  branches appears angiographically normal.      LEFT CIRCUMFLEX CORONARY ARTERY: Gives rise to a small obtuse marginal branch  from the proximal segment and a medium and a small posterolateral branch  distally. The left circumflex system also appears angiographically normal.      RIGHT CORONARY ARTERY: Is a medium-caliber vessel and is dominant for posterior  circulation. It appears angiographically normal in the proximal and mid  segments. Distally, it gives rise to a medium-size posterior descending artery  branch that has mild diffuse narrowing of about 40% to 50% in its distal  segment. This is not hemodynamically significant.      LEFT VENTRICULAR ANGIOGRAM: Was done in 30' CABELLO projection. It reveals normal  left ventricular chamber size, wall motion and systolic function with an  estimated LV ejection fraction of about 60% to 65%. There is no pericardial or  valvular calcification noted.      CONCLUSION AND COMMENTS: The patient is a 30-year-old  female with  persistent chest pain suspicious for unstable angina and an abnormal Lexiscan  sestamibi study that revealed some anterior wall myocardial ischemia. Her  cardiac catheterization reveals:     1.  Normal left main coronary artery.   2.  Normal left anterior descending and  left circumflex systems.   3.  Right coronary artery is dominant for posterior circulation with about 40%  to 50% diffuse narrowing in the distal segment of a medium-size posterior  descending artery branch, which is not hemodynamically significant.   4.  Normal LV  chamber size, wall motion, and systolic function with an  estimated LV ejection fraction of about 60% to 65%.   5.  Elevated left ventricular end-diastolic pressures which are probably  related to diastolic noncompliance of the left ventricle.      RECOMMENDATIONS:    1.  In view of mild degree of atherosclerotic disease in the distal PDA, with a  normal left circumflex and right coronary artery, would continue to emphasize  on risk factor modification as needed for now and perhaps look at a noncardiac  cause for her symptoms should they persist.   2.  For her diastolic noncompliance of the left ventricle, I would try to  optimize her antihypertensive medications.             D:   VIBHA 09/24/2015 11:59:34  T:   shanae 09/25/2015 08:57:42  JOB ID:[id number]  [id number]  Revision Count:     0  cc:  Yinka Torrez MD                              Signature:__________________________                                     Magnus Martinez MD, [facility]  DO NOT TEXT EDIT THIS LINE :[id number]:  Authenticated by MAGNUS MARTINEZ M.D. On 09/25/2015 11:12:06 AM       Assessment & Plan   Diagnoses and all orders for this visit:    1. Dyspnea, unspecified type (Primary)  -     Adult Transthoracic Echo Complete W/ Cont if Necessary Per Protocol; Future  Reorder echocardiogram as stat to reevaluate LVEF with changes in demand due to pregnancy   Recent ER visit with a normal BNP, HS troponin which was normal, chest x-ray without acute findings.  Most recent echo within normal LVEF.     2. Hypertension, unspecified type  -     ECG 12 Lead  Continue to monitor with recent changes in labetalol dose  Recommend twice daily log and return for follow-up visit  Sodium restrictions  recommend    3. History of CHF (congestive heart failure)  No current evidence of heart failure, will continue to monitor    4. Palpitations  Holter monitor is currently on patient, she will complete and will await results for further recommendations.  See how she responds with recent changes in labetalol dosing.  Discuss and reviewed HR today is within normal range    Heart rate -- Resting heart rate begins to rise in the first trimester, with an average increase of 10 to 30 beats per minute (bpm) [14-16]. In a three-center, prospective, longitudinal cohort study, the median heart rate at 12 weeks was 82 bpm (3rd to 97th centiles: 63 to 105 bpm) and presley progressively until 34 weeks to a maximum of 91 bpm (3rd to 97th centiles: 68 to 115 bpm), as shown in the figure (figure 2) [17]. Heart rate then decreased slightly at 40 weeks to a median of 89 bpm (3rd to 97th centiles: 65 to 114 bpm). Thus, the upper limit of the resting heart rate typically is not greater than 115 bpm, and a heart rate exceeding 115 bpm warrants evaluation.      5. 33 weeks gestation of pregnancy  Continue to follow with fetal and maternal provider closely    Lifestyle modifications including heart healthy diet, regular exercise, and so desirable body weight and avoidance of tobacco product    Review of medical including previous notes, ER records and labs, previous testing     Follow-up in 2 to 3 weeks to discuss and review testing results, sooner if needed.

## 2024-04-11 NOTE — TELEPHONE ENCOUNTER
Called scheduling and spoke with Yao to get patients Echo scheduled asap.  She told me to tell the patient to come today by 2:30pm to register for her Echo.    Contacted patient and advised her to comet o the hospital for her echo by 2:30 pm today.  She v/u.

## 2024-04-15 ENCOUNTER — HOSPITAL ENCOUNTER (OUTPATIENT)
Facility: HOSPITAL | Age: 39
Discharge: HOME | End: 2024-04-15
Attending: OBSTETRICS & GYNECOLOGY | Admitting: OBSTETRICS & GYNECOLOGY
Payer: MEDICARE

## 2024-04-15 ENCOUNTER — SPECIALTY PHARMACY (OUTPATIENT)
Dept: PHARMACY | Facility: HOSPITAL | Age: 39
End: 2024-04-15
Payer: MEDICARE

## 2024-04-15 VITALS
BODY MASS INDEX: 45.75 KG/M2 | DIASTOLIC BLOOD PRESSURE: 74 MMHG | TEMPERATURE: 98.2 F | WEIGHT: 233 LBS | HEART RATE: 81 BPM | HEIGHT: 60 IN | RESPIRATION RATE: 18 BRPM | OXYGEN SATURATION: 99 % | SYSTOLIC BLOOD PRESSURE: 155 MMHG

## 2024-04-15 DIAGNOSIS — O10.919 CHRONIC HYPERTENSION AFFECTING PREGNANCY: ICD-10-CM

## 2024-04-15 PROBLEM — Z34.90 PREGNANCY: Status: ACTIVE | Noted: 2024-04-15

## 2024-04-15 LAB
ALBUMIN SERPL-MCNC: 3.1 G/DL (ref 3.5–5.2)
ALBUMIN/GLOB SERPL: 1.2 G/DL
ALP SERPL-CCNC: 109 U/L (ref 39–117)
ALT SERPL W P-5'-P-CCNC: 10 U/L (ref 1–33)
ANION GAP SERPL CALCULATED.3IONS-SCNC: 10.5 MMOL/L (ref 5–15)
AST SERPL-CCNC: 17 U/L (ref 1–32)
BILIRUB SERPL-MCNC: 0.3 MG/DL (ref 0–1.2)
BUN SERPL-MCNC: 11 MG/DL (ref 6–20)
BUN/CREAT SERPL: 24.4 (ref 7–25)
CALCIUM SPEC-SCNC: 8.5 MG/DL (ref 8.6–10.5)
CHLORIDE SERPL-SCNC: 108 MMOL/L (ref 98–107)
CO2 SERPL-SCNC: 19.5 MMOL/L (ref 22–29)
CREAT SERPL-MCNC: 0.45 MG/DL (ref 0.57–1)
DEPRECATED RDW RBC AUTO: 44.3 FL (ref 37–54)
EGFRCR SERPLBLD CKD-EPI 2021: 124.9 ML/MIN/1.73
ERYTHROCYTE [DISTWIDTH] IN BLOOD BY AUTOMATED COUNT: 14.6 % (ref 12.3–15.4)
GLOBULIN UR ELPH-MCNC: 2.5 GM/DL
GLUCOSE SERPL-MCNC: 116 MG/DL (ref 65–99)
HCT VFR BLD AUTO: 26.7 % (ref 34–46.6)
HGB BLD-MCNC: 8.4 G/DL (ref 12–15.9)
LDH SERPL-CCNC: 300 U/L (ref 135–214)
MCH RBC QN AUTO: 26.4 PG (ref 26.6–33)
MCHC RBC AUTO-ENTMCNC: 31.5 G/DL (ref 31.5–35.7)
MCV RBC AUTO: 84 FL (ref 79–97)
PLATELET # BLD AUTO: 122 10*3/MM3 (ref 140–450)
PMV BLD AUTO: 11.8 FL (ref 6–12)
POTASSIUM SERPL-SCNC: 4 MMOL/L (ref 3.5–5.2)
PROT SERPL-MCNC: 5.6 G/DL (ref 6–8.5)
RBC # BLD AUTO: 3.18 10*6/MM3 (ref 3.77–5.28)
SODIUM SERPL-SCNC: 138 MMOL/L (ref 136–145)
URATE SERPL-MCNC: 4.9 MG/DL (ref 2.4–5.7)
WBC NRBC COR # BLD AUTO: 11.35 10*3/MM3 (ref 3.4–10.8)

## 2024-04-15 PROCEDURE — 59025 FETAL NON-STRESS TEST: CPT

## 2024-04-15 PROCEDURE — 84156 ASSAY OF PROTEIN URINE: CPT | Performed by: OBSTETRICS & GYNECOLOGY

## 2024-04-15 PROCEDURE — 36415 COLL VENOUS BLD VENIPUNCTURE: CPT | Performed by: OBSTETRICS & GYNECOLOGY

## 2024-04-15 PROCEDURE — 84550 ASSAY OF BLOOD/URIC ACID: CPT | Performed by: OBSTETRICS & GYNECOLOGY

## 2024-04-15 PROCEDURE — G0463 HOSPITAL OUTPT CLINIC VISIT: HCPCS

## 2024-04-15 PROCEDURE — 80053 COMPREHEN METABOLIC PANEL: CPT | Performed by: OBSTETRICS & GYNECOLOGY

## 2024-04-15 PROCEDURE — 63710000001 LABETALOL 200 MG TABLET: Performed by: OBSTETRICS & GYNECOLOGY

## 2024-04-15 PROCEDURE — 83615 LACTATE (LD) (LDH) ENZYME: CPT | Performed by: OBSTETRICS & GYNECOLOGY

## 2024-04-15 PROCEDURE — 82570 ASSAY OF URINE CREATININE: CPT | Performed by: OBSTETRICS & GYNECOLOGY

## 2024-04-15 PROCEDURE — A9270 NON-COVERED ITEM OR SERVICE: HCPCS | Performed by: OBSTETRICS & GYNECOLOGY

## 2024-04-15 PROCEDURE — 85027 COMPLETE CBC AUTOMATED: CPT | Performed by: OBSTETRICS & GYNECOLOGY

## 2024-04-15 RX ORDER — LABETALOL 200 MG/1
200 TABLET, FILM COATED ORAL ONCE
Status: COMPLETED | OUTPATIENT
Start: 2024-04-15 | End: 2024-04-15

## 2024-04-15 RX ORDER — LABETALOL HYDROCHLORIDE 5 MG/ML
10 INJECTION, SOLUTION INTRAVENOUS ONCE
Status: DISCONTINUED | OUTPATIENT
Start: 2024-04-15 | End: 2024-04-15

## 2024-04-15 RX ORDER — LABETALOL 200 MG/1
200 TABLET, FILM COATED ORAL 3 TIMES DAILY
Qty: 90 TABLET | Refills: 0 | Status: SHIPPED | OUTPATIENT
Start: 2024-04-15 | End: 2024-04-15

## 2024-04-15 RX ORDER — LABETALOL 200 MG/1
200 TABLET, FILM COATED ORAL 3 TIMES DAILY
Qty: 90 TABLET | Refills: 0 | Status: SHIPPED | OUTPATIENT
Start: 2024-04-15 | End: 2024-04-24 | Stop reason: HOSPADM

## 2024-04-15 RX ADMIN — LABETALOL HYDROCHLORIDE 200 MG: 200 TABLET, FILM COATED ORAL at 22:03

## 2024-04-15 NOTE — PROGRESS NOTES
Specialty Pharmacy Refill Coordination Note     Antoinette is a 38 y.o. female contacted today regarding refills of  Omnipods specialty medication(s).    Reviewed and verified with patient:       Specialty medication(s) and dose(s) confirmed: yes    Refill Questions      Flowsheet Row Most Recent Value   Changes to allergies? No   Changes to medications? No   New conditions or infections since last clinic visit No   Unplanned office visit, urgent care, ED, or hospital admission in the last 4 weeks  No   How does patient/caregiver feel medication is working? Very good   Financial problems or insurance changes  No   If yes, describe changes in insurance or financial issues. na   Since the previous refill, were any specialty medication doses or scheduled injections missed or delayed?  No   If yes, please provide the amount na   Why were doses missed? na   Does this patient require a clinical escalation to a pharmacist? No            Delivery Questions      Flowsheet Row Most Recent Value   Copay verified? No   Copay amount na   Copay form of payment No copayment ($0)                   Follow-up: 30 day(s)     Carito Mills, Pharmacy Technician  Specialty Pharmacy Technician

## 2024-04-16 ENCOUNTER — SPECIALTY PHARMACY (OUTPATIENT)
Dept: PHARMACY | Facility: HOSPITAL | Age: 39
End: 2024-04-16
Payer: MEDICARE

## 2024-04-16 DIAGNOSIS — O24.113 PRE-EXISTING TYPE 2 DIABETES MELLITUS WITH HYPERGLYCEMIA DURING PREGNANCY IN THIRD TRIMESTER: Primary | ICD-10-CM

## 2024-04-16 DIAGNOSIS — O24.112 PRE-EXISTING TYPE 2 DIABETES MELLITUS WITH HYPERGLYCEMIA DURING PREGNANCY IN SECOND TRIMESTER: ICD-10-CM

## 2024-04-16 DIAGNOSIS — E11.65 PRE-EXISTING TYPE 2 DIABETES MELLITUS WITH HYPERGLYCEMIA DURING PREGNANCY IN THIRD TRIMESTER: Primary | ICD-10-CM

## 2024-04-16 DIAGNOSIS — E11.65 PRE-EXISTING TYPE 2 DIABETES MELLITUS WITH HYPERGLYCEMIA DURING PREGNANCY IN SECOND TRIMESTER: ICD-10-CM

## 2024-04-16 LAB
CREAT UR-MCNC: 135.1 MG/DL
PROT ?TM UR-MCNC: 34.1 MG/DL
PROT/CREAT UR: 252.4 MG/G CREA (ref 0–200)

## 2024-04-16 RX ORDER — INSULIN ASPART 100 [IU]/ML
INJECTION, SOLUTION INTRAVENOUS; SUBCUTANEOUS
Qty: 80 ML | Refills: 5 | Status: ON HOLD | OUTPATIENT
Start: 2024-04-16

## 2024-04-16 RX ORDER — INSULIN ASPART INJECTION 100 [IU]/ML
350 INJECTION, SOLUTION SUBCUTANEOUS DAILY
Qty: 110 ML | Refills: 5 | Status: ON HOLD | OUTPATIENT
Start: 2024-04-16

## 2024-04-16 NOTE — NON STRESS TEST
Antoinette Pack, a  at 34w2d with an ANDRE of 2024, Date entered prior to episode creation, was seen at [facility] for a nonstress test.    No chief complaint on file.      Patient Active Problem List   Diagnosis    Simple goiter    Pre-existing type 2 diabetes mellitus with hyperglycemia during pregnancy in third trimester    Acute pancreatitis    History of CHF (congestive heart failure)    Chronic hypertension affecting pregnancy    History of pancreatitis    Morbid obesity with BMI of 40.0-44.9, adult    Pre-existing type 2 diabetes affecting pregnancy, antepartum    History of seizure disorder    Obstructive sleep apnea syndrome    Proteinuria affecting pregnancy, antepartum    Rectal bleeding    Shortness of breath    Antepartum multigravida of advanced maternal age    Pain of pelvic girdle    Pregnancy       Start Time:   Stop Time:     Interpretation A  Nonstress Test Interpretation A: Reactive  Comments A: VERIFIED BY DENAE PIERSON RN

## 2024-04-16 NOTE — PROGRESS NOTES
Specialty Pharmacy Patient Management Program  Endocrinology Reassessment     Antoinette Pack is a 38 y.o. female with Type 2 Diabetes (Pregnancy) enrolled in the Endocrinology Patient Management program offered by [facility]. A follow-up was conducted, including assessment of continued therapy appropriateness, medication adherence, and side effect incidence and management for Insulin therapy and CGM.    Novolog is currently on manufacture back order. Patient called her insurance to discover that Fiasp is the alternative. Pt and provider agreeable to change.     Changes to Insurance Coverage or Financial Support  None    Relevant Past Medical History and Comorbidities  Relevant medical history and concomitant health conditions were discussed with the patient. The patient's chart has been reviewed for relevant past medical history and comorbid health conditions and updated as necessary.   Past Medical History:   Diagnosis Date    Anxiety     CHF (congestive heart failure) 2023    Colitis     Depression     Diabetes mellitus 2018    Diverticulitis     GERD (gastroesophageal reflux disease)     Hypertension     Kidney stone     Pancreatitis 2023    PCOS (polycystic ovarian syndrome)     Sleep apnea      Social History     Socioeconomic History    Marital status:    Tobacco Use    Smoking status: Former     Current packs/day: 0.00     Average packs/day: 1 pack/day for 20.0 years (20.0 ttl pk-yrs)     Types: Cigarettes     Start date:      Quit date: 2015     Years since quittin.2     Passive exposure: Never    Smokeless tobacco: Never    Tobacco comments:     QUIT IN 2017   Vaping Use    Vaping status: Never Used   Substance and Sexual Activity    Alcohol use: No    Drug use: No    Sexual activity: Defer       Problem list reviewed by Sravani Perez RPH on 2024 at 11:20 AM    Allergies  Known allergies and reactions were discussed with the patient. The  patient's chart has been reviewed for allergy information and updated as necessary.   Dilantin [phenytoin], Keppra [levetiracetam], Meperidine, and Topamax [topiramate]    Allergies reviewed by Sravani Perez RP on 4/16/2024 at 11:20 AM    Relevant Laboratory Values  A1C Last 3 Results          7/20/2023    15:44 9/17/2023    00:23 1/31/2024    10:18   HGBA1C Last 3 Results   Hemoglobin A1C 7.1  9.30  5.3      Lab Results   Component Value Date    HGBA1C 5.3 01/31/2024     Lab Results   Component Value Date    GLUCOSE 116 (H) 04/15/2024    CALCIUM 8.5 (L) 04/15/2024     04/15/2024    K 4.0 04/15/2024    CO2 19.5 (L) 04/15/2024     (H) 04/15/2024    BUN 11 04/15/2024    CREATININE 0.45 (L) 04/15/2024    EGFRIFAFRI  09/05/2016      Comment:      <15 Indicative of kidney failure.    EGFRIFNONA 101 12/27/2020    BCR 24.4 04/15/2024    ANIONGAP 10.5 04/15/2024     Lab Results   Component Value Date    CHOL 173 09/18/2023    CHLPL 159 09/22/2015    TRIG 111 09/18/2023    HDL 33 (L) 09/18/2023     (H) 09/18/2023       Current Medication List  This medication list has been reviewed with the patient and evaluated for any interactions or necessary modifications/recommendations, and updated to include all prescription medications, OTC medications, and supplements the patient is currently taking.  This list reflects what is contained in the patient's profile, which has also been marked as reviewed to communicate to other providers it is the most up to date version of the patient's current medication therapy.     Current Outpatient Medications:     aspirin (ASPIR) 81 MG EC tablet, Take 1 tablet by mouth Daily., Disp: 30 tablet, Rfl: 6    Continuous Blood Gluc Sensor (Dexcom G6 Sensor), Use Every 10 (Ten) Days., Disp: 3 each, Rfl: 5    Continuous Blood Gluc Transmit (Dexcom G6 Transmitter) misc, Use 1 each Every 3 (Three) Months., Disp: 1 each, Rfl: 1    folic acid (FOLVITE) 1 MG tablet, Take 1 tablet by mouth  "Daily., Disp: , Rfl:     Insulin Aspart, w/Niacinamide, (Fiasp) 100 UNIT/ML solution, Inject 350 Units as directed Daily. For use in insulin pump. Max dose of 350u/day., Disp: 110 mL, Rfl: 5    Insulin Disposable Pump (Omnipod 5 G6 Pods, Gen 5,) misc, Change 2 (Two) Times a Day as directed., Disp: 60 each, Rfl: 5    Insulin Pen Needle 32G X 4 MM misc, Use to inject insulin up to 4 times daily as directed, Disp: 400 each, Rfl: 1    Insulin Syringe 31G X 5/16\" 1 ML misc, Use 1 each 3 (Three) Times a Day With Meals. As needed for additional insulin bolus with meals., Disp: 100 each, Rfl: 3    labetalol (NORMODYNE) 200 MG tablet, Take 1 tablet by mouth 3 times a day., Disp: 90 tablet, Rfl: 0    lansoprazole (PREVACID) 30 MG capsule, Take 1 capsule by mouth 2 (Two) Times a Day., Disp: , Rfl:     mesalamine (CANASA) 1000 MG suppository, Insert 1 suppository into the rectum Every Night., Disp: , Rfl:     Prenatal Vit-Fe Fumarate-FA (PRENATAL PO), Take 1 tablet by mouth Daily., Disp: , Rfl:   No current facility-administered medications for this visit.    Medicines reviewed by Sravani Perez [facility] on 4/16/2024 at 11:20 AM    Drug Interactions  No significant drug-drug interactions with diabetes medications expected according to literature.     Adverse Drug Reactions  Adverse Reactions Experienced: None  Plan for ADR Management: None required    Hospitalizations and Urgent Care Since Last Assessment  Hospitalizations or Admissions: None  ED Visits: None   Urgent Office Visits: None    Adherence and Self-Administration  Adherence related patient's specialty therapy was discussed with the patient. The Adherence segment of this outreach has been reviewed and updated.   Ongoing or New Barriers to Patient Adherence and/or Self-Administration: None   Methods for Supporting Patient Adherence and/or Self-Administration: None Required    Goals of Therapy  Goals related to the patient's specialty therapy was discussed with the patient. " The Patient Goals segment of this outreach has been reviewed and updated.    Goals Addressed Today    None       Reassessment Plan & Follow-Up  Provider Medication Therapy Changes: Per CICI Veliz  Will order Fiasp for use in insulin pump MDD: 350   Discontinue Novolog at this time until manufacture shortages resolve.   Related Plans, Therapy Recommendations, or Issues to Be Addressed:   Updated RX's have been sent to [facility] for shipping services.     Attestation  I attest the patient was actively involved in and has agreed to the above plan of care. If the prescribed therapy is at any point deemed not appropriate based on the current or future assessments, a consultation will be initiated with the patient's specialty care provider to determine the best course of action. The revised plan of therapy will be documented along with any required assessments and/or additional patient education provided.    Sravani Perez, PharmD, [facility]  Clinical Specialty Pharmacist, Endocrinology   4/16/2024  11:20 EDT

## 2024-04-16 NOTE — NURSING NOTE
PT. C/O DECREASED FETAL MOVEMENT. REACTIVE NST OBTAINED. ELEVATED BP NOTED, PIH LABS COMPLETED AS ORDERED. PT. HAS FOLLOW UP WITH DR. OWEN WEDNESDAY, APRIL 17, 2024. DISCHARGE INSTRUCTIONS AND EMERGENCY WARNINGS REVIEWED WITH PT AND SPOUSE, UNDERSTANDING VERBALIZED. LABETALOL DOSE ADJUSTED TO 200MG TID.

## 2024-04-17 ENCOUNTER — HOSPITAL ENCOUNTER (INPATIENT)
Facility: HOSPITAL | Age: 39
LOS: 6 days | Discharge: HOME | End: 2024-04-24
Attending: OBSTETRICS & GYNECOLOGY | Admitting: OBSTETRICS & GYNECOLOGY
Payer: MEDICARE

## 2024-04-17 ENCOUNTER — TELEPHONE (OUTPATIENT)
Dept: OBSTETRICS AND GYNECOLOGY | Facility: HOSPITAL | Age: 39
End: 2024-04-17
Payer: MEDICARE

## 2024-04-17 ENCOUNTER — HOSPITAL ENCOUNTER (OUTPATIENT)
Facility: HOSPITAL | Age: 39
Discharge: HOME | End: 2024-04-17
Attending: OBSTETRICS & GYNECOLOGY | Admitting: OBSTETRICS & GYNECOLOGY
Payer: MEDICARE

## 2024-04-17 VITALS
DIASTOLIC BLOOD PRESSURE: 80 MMHG | SYSTOLIC BLOOD PRESSURE: 154 MMHG | HEART RATE: 80 BPM | HEIGHT: 60 IN | TEMPERATURE: 98.2 F | RESPIRATION RATE: 18 BRPM | OXYGEN SATURATION: 99 % | WEIGHT: 236 LBS | BODY MASS INDEX: 46.33 KG/M2

## 2024-04-17 DIAGNOSIS — K62.5 RECTAL BLEEDING: Primary | ICD-10-CM

## 2024-04-17 PROBLEM — R03.0 ELEVATED BLOOD PRESSURE READING: Status: ACTIVE | Noted: 2024-04-17

## 2024-04-17 LAB
ALBUMIN SERPL-MCNC: 3.3 G/DL (ref 3.5–5.2)
ALBUMIN/GLOB SERPL: 1.2 G/DL
ALP SERPL-CCNC: 117 U/L (ref 39–117)
ALT SERPL W P-5'-P-CCNC: 12 U/L (ref 1–33)
ANION GAP SERPL CALCULATED.3IONS-SCNC: 9.9 MMOL/L (ref 5–15)
AST SERPL-CCNC: 21 U/L (ref 1–32)
BILIRUB SERPL-MCNC: 0.3 MG/DL (ref 0–1.2)
BUN SERPL-MCNC: 11 MG/DL (ref 6–20)
BUN/CREAT SERPL: 21.2 (ref 7–25)
CALCIUM SPEC-SCNC: 8.3 MG/DL (ref 8.6–10.5)
CHLORIDE SERPL-SCNC: 106 MMOL/L (ref 98–107)
CO2 SERPL-SCNC: 21.1 MMOL/L (ref 22–29)
CREAT SERPL-MCNC: 0.52 MG/DL (ref 0.57–1)
DEPRECATED RDW RBC AUTO: 43.8 FL (ref 37–54)
EGFRCR SERPLBLD CKD-EPI 2021: 120.6 ML/MIN/1.73
ERYTHROCYTE [DISTWIDTH] IN BLOOD BY AUTOMATED COUNT: 14.5 % (ref 12.3–15.4)
GEN 5 2HR TROPONIN T REFLEX: 20 NG/L
GLOBULIN UR ELPH-MCNC: 2.7 GM/DL
GLUCOSE SERPL-MCNC: 62 MG/DL (ref 65–99)
HCT VFR BLD AUTO: 26.3 % (ref 34–46.6)
HGB BLD-MCNC: 8.2 G/DL (ref 12–15.9)
LDH SERPL-CCNC: 322 U/L (ref 135–214)
MCH RBC QN AUTO: 26.3 PG (ref 26.6–33)
MCHC RBC AUTO-ENTMCNC: 31.2 G/DL (ref 31.5–35.7)
MCV RBC AUTO: 84.3 FL (ref 79–97)
PLATELET # BLD AUTO: 130 10*3/MM3 (ref 140–450)
PMV BLD AUTO: 12.1 FL (ref 6–12)
POTASSIUM SERPL-SCNC: 4.1 MMOL/L (ref 3.5–5.2)
PROT SERPL-MCNC: 6 G/DL (ref 6–8.5)
QT INTERVAL: 370 MS
QTC INTERVAL: 434 MS
RBC # BLD AUTO: 3.12 10*6/MM3 (ref 3.77–5.28)
SODIUM SERPL-SCNC: 137 MMOL/L (ref 136–145)
TROPONIN T DELTA: 1 NG/L
TROPONIN T SERPL HS-MCNC: 19 NG/L
TROPONIN T SERPL HS-MCNC: 22 NG/L
URATE SERPL-MCNC: 5.2 MG/DL (ref 2.4–5.7)
WBC NRBC COR # BLD AUTO: 10.11 10*3/MM3 (ref 3.4–10.8)

## 2024-04-17 PROCEDURE — 59025 FETAL NON-STRESS TEST: CPT

## 2024-04-17 PROCEDURE — 99213 OFFICE O/P EST LOW 20 MIN: CPT | Performed by: NURSE PRACTITIONER

## 2024-04-17 PROCEDURE — 84550 ASSAY OF BLOOD/URIC ACID: CPT | Performed by: OBSTETRICS & GYNECOLOGY

## 2024-04-17 PROCEDURE — 93010 ELECTROCARDIOGRAM REPORT: CPT | Performed by: INTERNAL MEDICINE

## 2024-04-17 PROCEDURE — G0463 HOSPITAL OUTPT CLINIC VISIT: HCPCS

## 2024-04-17 PROCEDURE — 96376 TX/PRO/DX INJ SAME DRUG ADON: CPT

## 2024-04-17 PROCEDURE — 84484 ASSAY OF TROPONIN QUANT: CPT | Performed by: OBSTETRICS & GYNECOLOGY

## 2024-04-17 PROCEDURE — 83615 LACTATE (LD) (LDH) ENZYME: CPT | Performed by: OBSTETRICS & GYNECOLOGY

## 2024-04-17 PROCEDURE — A9270 NON-COVERED ITEM OR SERVICE: HCPCS | Performed by: OBSTETRICS & GYNECOLOGY

## 2024-04-17 PROCEDURE — 63710000001 LABETALOL 200 MG TABLET: Performed by: OBSTETRICS & GYNECOLOGY

## 2024-04-17 PROCEDURE — 85027 COMPLETE CBC AUTOMATED: CPT | Performed by: OBSTETRICS & GYNECOLOGY

## 2024-04-17 PROCEDURE — 25010000002 LABETALOL 5 MG/ML SOLUTION

## 2024-04-17 PROCEDURE — 93005 ELECTROCARDIOGRAM TRACING: CPT | Performed by: OBSTETRICS & GYNECOLOGY

## 2024-04-17 PROCEDURE — 80053 COMPREHEN METABOLIC PANEL: CPT | Performed by: OBSTETRICS & GYNECOLOGY

## 2024-04-17 PROCEDURE — 63710000001 LABETALOL 100 MG TABLET: Performed by: OBSTETRICS & GYNECOLOGY

## 2024-04-17 PROCEDURE — 96374 THER/PROPH/DIAG INJ IV PUSH: CPT

## 2024-04-17 PROCEDURE — 25010000002 LABETALOL 5 MG/ML SOLUTION: Performed by: OBSTETRICS & GYNECOLOGY

## 2024-04-17 PROCEDURE — 36415 COLL VENOUS BLD VENIPUNCTURE: CPT | Performed by: OBSTETRICS & GYNECOLOGY

## 2024-04-17 RX ORDER — FOLIC ACID 1 MG/1
1 TABLET ORAL DAILY
Status: DISCONTINUED | OUTPATIENT
Start: 2024-04-18 | End: 2024-04-17 | Stop reason: HOSPADM

## 2024-04-17 RX ORDER — MESALAMINE 1000 MG/1
1000 SUPPOSITORY RECTAL NIGHTLY
Status: DISCONTINUED | OUTPATIENT
Start: 2024-04-17 | End: 2024-04-17 | Stop reason: HOSPADM

## 2024-04-17 RX ORDER — INSULIN ASPART 100 [IU]/ML
1-12 INJECTION, SOLUTION INTRAVENOUS; SUBCUTANEOUS AS NEEDED
Status: DISCONTINUED | OUTPATIENT
Start: 2024-04-17 | End: 2024-04-17 | Stop reason: HOSPADM

## 2024-04-17 RX ORDER — PRENATAL VIT/IRON FUM/FOLIC AC 27MG-0.8MG
1 TABLET ORAL DAILY
Status: DISCONTINUED | OUTPATIENT
Start: 2024-04-18 | End: 2024-04-17 | Stop reason: HOSPADM

## 2024-04-17 RX ORDER — PANTOPRAZOLE SODIUM 40 MG/1
40 TABLET, DELAYED RELEASE ORAL
Status: DISCONTINUED | OUTPATIENT
Start: 2024-04-18 | End: 2024-04-17 | Stop reason: HOSPADM

## 2024-04-17 RX ORDER — LABETALOL 200 MG/1
200 TABLET, FILM COATED ORAL EVERY 8 HOURS SCHEDULED
Status: DISCONTINUED | OUTPATIENT
Start: 2024-04-17 | End: 2024-04-17

## 2024-04-17 RX ORDER — LABETALOL 200 MG/1
200 TABLET, FILM COATED ORAL 3 TIMES DAILY
Status: CANCELLED | OUTPATIENT
Start: 2024-04-17

## 2024-04-17 RX ORDER — NIFEDIPINE 10 MG/1
10-20 CAPSULE ORAL
Status: DISCONTINUED | OUTPATIENT
Start: 2024-04-17 | End: 2024-04-17 | Stop reason: HOSPADM

## 2024-04-17 RX ORDER — PRENATAL VIT/IRON FUM/FOLIC AC 27MG-0.8MG
1 TABLET ORAL DAILY
Status: DISCONTINUED | OUTPATIENT
Start: 2024-04-17 | End: 2024-04-17

## 2024-04-17 RX ORDER — HYDRALAZINE HYDROCHLORIDE 20 MG/ML
5-10 INJECTION INTRAMUSCULAR; INTRAVENOUS
Status: DISCONTINUED | OUTPATIENT
Start: 2024-04-17 | End: 2024-04-17 | Stop reason: HOSPADM

## 2024-04-17 RX ORDER — LABETALOL HYDROCHLORIDE 5 MG/ML
INJECTION, SOLUTION INTRAVENOUS
Status: COMPLETED
Start: 2024-04-17 | End: 2024-04-17

## 2024-04-17 RX ORDER — ASPIRIN 81 MG/1
81 TABLET ORAL DAILY
Status: DISCONTINUED | OUTPATIENT
Start: 2024-04-18 | End: 2024-04-17 | Stop reason: HOSPADM

## 2024-04-17 RX ORDER — LABETALOL HYDROCHLORIDE 5 MG/ML
20 INJECTION, SOLUTION INTRAVENOUS ONCE
Status: COMPLETED | OUTPATIENT
Start: 2024-04-17 | End: 2024-04-17

## 2024-04-17 RX ORDER — SODIUM CHLORIDE 0.9 % (FLUSH) 0.9 %
1-10 SYRINGE (ML) INJECTION AS NEEDED
Status: DISCONTINUED | OUTPATIENT
Start: 2024-04-17 | End: 2024-04-17 | Stop reason: HOSPADM

## 2024-04-17 RX ORDER — LABETALOL HYDROCHLORIDE 5 MG/ML
20-80 INJECTION, SOLUTION INTRAVENOUS
Status: DISCONTINUED | OUTPATIENT
Start: 2024-04-17 | End: 2024-04-17 | Stop reason: HOSPADM

## 2024-04-17 RX ORDER — SODIUM CHLORIDE 9 MG/ML
40 INJECTION, SOLUTION INTRAVENOUS AS NEEDED
Status: DISCONTINUED | OUTPATIENT
Start: 2024-04-17 | End: 2024-04-17 | Stop reason: HOSPADM

## 2024-04-17 RX ADMIN — LABETALOL HYDROCHLORIDE 40 MG: 5 INJECTION INTRAVENOUS at 17:57

## 2024-04-17 RX ADMIN — LABETALOL HYDROCHLORIDE 40 MG: 5 INJECTION, SOLUTION INTRAVENOUS at 17:57

## 2024-04-17 RX ADMIN — LABETALOL HYDROCHLORIDE 20 MG: 5 INJECTION, SOLUTION INTRAVENOUS at 17:16

## 2024-04-17 RX ADMIN — LABETALOL HYDROCHLORIDE 300 MG: 200 TABLET, FILM COATED ORAL at 16:07

## 2024-04-17 NOTE — TELEPHONE ENCOUNTER
"Attempted to reach patient by phone, received voice mail.  Left patient message stating that Dr. Fernandez has reviewed her blood sugar log and omnipod settings and state \"These Look good!  No changes.\"  Hetal Marie RN  "

## 2024-04-17 NOTE — NON STRESS TEST
Antoinette Pack, a  at 34w4d with an ANDRE of 2024, Date entered prior to episode creation, was seen at [facility] for a nonstress test.    Chief Complaint   Patient presents with    Non-stress Test    Elevated Blood Pressure     PT SEEN IN OB OFFICE TODAY. ELEVATED BP READINGS NOTED. SENT TO L&D FOR FURTHER EVALUATION       Patient Active Problem List   Diagnosis    Simple goiter    Pre-existing type 2 diabetes mellitus with hyperglycemia during pregnancy in third trimester    Acute pancreatitis    History of CHF (congestive heart failure)    Chronic hypertension affecting pregnancy    History of pancreatitis    Morbid obesity with BMI of 40.0-44.9, adult    Pre-existing type 2 diabetes affecting pregnancy, antepartum    History of seizure disorder    Obstructive sleep apnea syndrome    Proteinuria affecting pregnancy, antepartum    Rectal bleeding    Shortness of breath    Antepartum multigravida of advanced maternal age    Pain of pelvic girdle    Pregnancy    Elevated blood pressure reading       Start Time: 1200  Stop Time: 1220    Interpretation A  Nonstress Test Interpretation A: Reactive  Comments A: VERIFIED BY RENU MEJIA RN

## 2024-04-17 NOTE — H&P
"ALLEN Palma  Obstetric History and Physical    Chief Complaint   Patient presents with    Non-stress Test    Elevated Blood Pressure     PT SEEN IN OB OFFICE TODAY. ELEVATED BP READINGS NOTED. SENT TO L&D FOR FURTHER EVALUATION       Subjective     Patient is a 38 y.o. female  currently at 34w4d, who presents from the office for elevated BP and SOB.  Patient also complaining of new onset of chest pain that feels like a cramp in her chest that goes down to her left elbow.  She has been feeling short of air for several weeks.  She had a recent echocardiogram which was overall normal.  She is followed by cardiology.  She has COPD and chronic hypertension with a baseline 24 urine of 310 mg of protein.  She is a class C diabetic with an insulin pump on NovoLog.  She does have a history of congestive heart failure as well.  Advanced maternal age.  Currently on labetalol 100 mg 3 times daily, and baby aspirin.    Her prenatal care is complicated by  hypertension  chronic hypertension, diabetes  Diabetes has been  well controlled., and advanced maternal age  genetic screening was normal.  Her previous obstetric/gynecological history is noted for is non-contributory.    The following portions of the patients history were reviewed and updated as appropriate: current medications, allergies, past medical history, past surgical history, past family history, past social history, and problem list .       Prenatal Information:   Maternal Prenatal Labs  Blood Type No results found for: \"ABO\"   Rh Status No results found for: \"RH\"   Antibody Screen No results found for: \"ABSCRN\"   Rapid Urine Drug Screen No results found for: \"AMPMETHU\", \"BARBITSCNUR\", \"LABBENZSCN\", \"LABMETHSCN\", \"LABOPIASCN\", \"THCURSCR\", \"COCAINEUR\", \"AMPHETSCREEN\", \"PROPOXSCN\", \"BUPRENORSCNU\", \"METAMPSCNUR\", \"OXYCODONESCN\", \"TRICYCLICSCN\"   Group B Strep Culture No results found for: \"GBSANTIGEN\"           External Prenatal Results       Pregnancy Outside " Results - Transcribed From Office Records - See Scanned Records For Details       Test Value Date Time    ABO  O  10/24/22 0154    Rh  Positive  10/24/22 0154    Antibody Screen  Negative  10/24/22 0049    Varicella IgG       Rubella       Hgb  8.4 g/dL 04/15/24 2141       10.0 g/dL 03/27/24 1215       10.6 g/dL 01/29/24 1826       9.8 g/dL 12/08/23 1239       10.4 g/dL 11/15/23 1603       11.2 g/dL 10/27/23 2112       9.5 g/dL 09/27/23 1332       8.7 g/dL 09/20/23 0235       9.0 g/dL 09/19/23 0054       10.2 g/dL 09/18/23 0828       9.5 g/dL 09/17/23 0023       10.1 g/dL 09/03/23 1610       10.1 g/dL 08/26/23 1727    Hct  26.7 % 04/15/24 2141       30.5 % 03/27/24 1215       30.9 % 01/29/24 1826       32.0 % 12/08/23 1239       32.5 % 11/15/23 1603       36.8 % 10/27/23 2112       30.1 % 09/27/23 1332       28.3 % 09/20/23 0235       29.0 % 09/19/23 0054       33.0 % 09/18/23 0828       31.2 % 09/17/23 0023       32.2 % 09/03/23 1610       32.7 % 08/26/23 1727    Glucose Fasting GTT       Glucose Tolerance Test 1 hour       Glucose Tolerance Test 3 hour       Gonorrhea (discrete)       Chlamydia (discrete)       RPR       VDRL       Syphilis Antibody       HBsAg       Herpes Simplex Virus PCR       Herpes Simplex VIrus Culture       HIV       Hep C RNA Quant PCR       Hep C Antibody       AFP       Group B Strep       GBS Susceptibility to Clindamycin       GBS Susceptibility to Erythromycin       Fetal Fibronectin       Genetic Testing, Maternal Blood                 Drug Screening       Test Value Date Time    Urine Drug Screen       Amphetamine Screen  Negative  06/11/20 0953    Barbiturate Screen  Negative  01/05/19 2032    Benzodiazepine Screen  Negative  06/11/20 0953    Methadone Screen  Negative  06/11/20 0953    Phencyclidine Screen  Negative  01/05/19 2032    Opiates Screen  Negative  06/11/20 0953    THC Screen  Negative  06/11/20 0953    Cocaine Screen  Negative  06/11/20 0953    Propoxyphene Screen   Negative  10/19/15 1920    Buprenorphine Screen  Negative  20 0953    Methamphetamine Screen  Negative  20 0953    Oxycodone Screen  Negative  20 0953    Tricyclic Antidepressants Screen                 Legend    ^: Historical                              Past OB History:     OB History    Para Term  AB Living   2 0 0 0 1 0   SAB IAB Ectopic Molar Multiple Live Births   1 0 0 0 0 0      # Outcome Date GA Lbr [place]/2nd Weight Sex Type Anes PTL Lv   2 Current            1 SAB 10/2022 6w0d    SAB         Obstetric Comments   FOB #1  Pregnancy #1 SAB at 6 weeks   FOB #1 Pregnancy #2  current       Past Medical History: Past Medical History:   Diagnosis Date    Anxiety     CHF (congestive heart failure) 2023    Colitis     Depression     Diabetes mellitus 2018    Diverticulitis     GERD (gastroesophageal reflux disease)     Hypertension     Kidney stone     Pancreatitis 2023    PCOS (polycystic ovarian syndrome)     Sleep apnea       Past Surgical History Past Surgical History:   Procedure Laterality Date    CARDIAC CATHETERIZATION      CARPAL TUNNEL RELEASE      COLONOSCOPY      ENDOSCOPY      FRACTURE SURGERY      HARDWARE REMOVAL      WRIST    TENDON REPAIR      HAND      Family History: Family History   Problem Relation Age of Onset    Heart disease Mother     COPD Mother     Heart disease Father     COPD Father       Social History:  reports that she quit smoking about 9 years ago. Her smoking use included cigarettes. She started smoking about 29 years ago. She has a 20 pack-year smoking history. She has never been exposed to tobacco smoke. She has never used smokeless tobacco.   reports no history of alcohol use.   reports no history of drug use.        Review of Systems                  Review of Systems   All systems were reviewed and negative except for:  Respiratory: positive for  shortness of air  Cardiovascular: positive for  chest pressure / pain, at rest, lower  extremity edema, orthopnea, and palpitations      Objective     Vital Signs Range for the last 24 hours  Temperature: Temp:  [97.8 °F (36.6 °C)] 97.8 °F (36.6 °C)   Temp Source: Temp src: Oral   BP: BP: (160-184)/(79-81) 160/79   Pulse: Heart Rate:  [79-92] 88   Respirations: Resp:  [20] 20   Weight: Weight:  [107 kg (236 lb)] 107 kg (236 lb)     Physical Examination: General appearance - alert, well appearing, and in no distress. BS distant no murmur.  2+ edema LE b/l           Assessment & Plan       Elevated blood pressure reading      Assessment & Plan    Assessment/Plan:  1.  Intrauterine pregnancy at 34w4d weeks gestation with reactive fetal status.    2.  CHTN/COPD/CHF- c/o worsening SOA and new onset of CP today.  EKG and troponin ordered.  Recent echo nml.  Will consult Cardiology.    3. Elevated BP- on labetalol 200mg tid.  4/15 protein 244mg. Will observe and treat as indicated.    4. Class C DM- with insulin pump.  Current BS 81.    5. AMA- nml genetic testing.       Anais Brito DO  4/17/2024  12:24 EDT

## 2024-04-17 NOTE — CONSULTS
Consults  Date of Admit: 4/17/2024  Date of Consult: 04/17/24  Provider, No Known  Antoinette Pack  1985    Consulting Physician: Sohail Lujan MD    Cardiology consultation    Reason for consultation: Chest pain      History of Present Illness    Subjective     Chief Complaint   Patient presents with    Non-stress Test    Elevated Blood Pressure     PT SEEN IN OB OFFICE TODAY. ELEVATED BP READINGS NOTED. SENT TO L&D FOR FURTHER EVALUATION       Antoinette Pack is a 38 y.o. female with hypertension, insulin-dependent diabetes, COPD, morbid obesity, and history of CHF.  She is 34 weeks gestation and presented from her obstetrician's office with complaints of blood pressure elevation and shortness of breath.  She also reports new cramp-like chest pain that feels like a wave beginning substernally and radiating through to her left arm.  It lasts less than 10 seconds at a time and resolve spontaneously.  She has not noticed anything that makes it better or worse.  It does cause her to feel short of breath temporarily.    She denies palpitations or near syncope.  She does have lower extremity edema.    She had a normal stress test done September 2023 and an echocardiogram performed 6 days ago that revealed normal EF and grade 2 diastolic dysfunction.  Please see full results detailed below      Cardiac risk factors:diabetes mellitus, hypertension, Sedentary life style, and Obesity    Last Echo:  Results for orders placed during the hospital encounter of 04/11/24    Adult Transthoracic Echo Complete W/ Cont if Necessary Per Protocol    Interpretation Summary    Normal left ventricular cavity size and wall motion noted.    Left ventricular wall thickness is consistent with mild concentric hypertrophy.    Left ventricular diastolic function is consistent with (grade II w/high LAP) pseudonormalization.    LV ejection fraction is around 60%.    Aortic valve is normal there is no evidence of aortic stenosis or  aortic regurgitation detected.    Mild mitral regurgitation is noted, no significant valvular heart disease detected.    No pericardial effusion noted      Last Stress:   Results for orders placed during the hospital encounter of 09/05/23    Stress test with myocardial perfusion one day    Interpretation Summary  Images from the original result were not included.      A pharmacological stress test was performed using regadenoson.    Findings consistent with a normal ECG stress test.    Myocardial perfusion imaging indicates a normal myocardial perfusion study with no evidence of ischemia.    Subtle fixed anterior wall perfusion defect most likely presents breast on admission due to normal anterior wall motion on the gated scans.    Normal LV cavity size. Normal LV wall motion noted.    Left ventricular ejection fraction is normal (Calculated EF = 60%).    Impressions are consistent with a low risk study.      Last Cath:      Past Medical History:   Diagnosis Date    Anxiety     CHF (congestive heart failure) 05/2023    Colitis 2023    Depression     Diabetes mellitus 2018    Diverticulitis     GERD (gastroesophageal reflux disease)     Hypertension     Kidney stone     Pancreatitis 09/2023    PCOS (polycystic ovarian syndrome)     Polycystic ovary syndrome     Sleep apnea     Urinary tract infection      Past Surgical History:   Procedure Laterality Date    CARDIAC CATHETERIZATION      CARPAL TUNNEL RELEASE      COLONOSCOPY      ENDOSCOPY      FRACTURE SURGERY      HARDWARE REMOVAL      WRIST    TENDON REPAIR      HAND    WISDOM TOOTH EXTRACTION       Family History   Problem Relation Age of Onset    Heart disease Mother     COPD Mother     Heart disease Father     COPD Father      Social History     Tobacco Use    Smoking status: Former     Current packs/day: 0.00     Average packs/day: 1 pack/day for 20.0 years (20.0 ttl pk-yrs)     Types: Cigarettes     Start date: 1995     Quit date: 2015     Years since  "quittin.2     Passive exposure: Never    Smokeless tobacco: Never    Tobacco comments:     QUIT IN 2017   Vaping Use    Vaping status: Never Used   Substance Use Topics    Alcohol use: No    Drug use: No     Medications Prior to Admission   Medication Sig Dispense Refill Last Dose    aspirin (ASPIR) 81 MG EC tablet Take 1 tablet by mouth Daily. 30 tablet 6 2024 at 0700    folic acid (FOLVITE) 1 MG tablet Take 1 tablet by mouth Daily.   2024 at 0700    Insulin Aspart (novoLOG) 100 UNIT/ML injection For use in insulin pump. Max Daily Dose: 250 units daily. 80 mL 5 2024    labetalol (NORMODYNE) 200 MG tablet Take 1 tablet by mouth 3 times a day. 90 tablet 0 2024 at 0700    lansoprazole (PREVACID) 30 MG capsule Take 1 capsule by mouth 2 (Two) Times a Day.   2024 at 0700    mesalamine (CANASA) 1000 MG suppository Insert 1 suppository into the rectum Every Night.   2024 at PM    Prenatal Vit-Fe Fumarate-FA (PRENATAL PO) Take 1 tablet by mouth Daily.   2024 at 0700    Continuous Blood Gluc Sensor (Dexcom G6 Sensor) Use Every 10 (Ten) Days. 3 each 5     Continuous Blood Gluc Transmit (Dexcom G6 Transmitter) misc Use 1 each Every 3 (Three) Months. 1 each 1     Insulin Aspart, w/Niacinamide, (Fiasp) 100 UNIT/ML solution Inject 350 Units as directed Daily. For use in insulin pump. Max dose of 350u/day. 110 mL 5 Not started yet    Insulin Disposable Pump (Omnipod 5 G6 Pods, Gen 5,) misc Change 2 (Two) Times a Day as directed. 60 each 5     Insulin Pen Needle 32G X 4 MM misc Use to inject insulin up to 4 times daily as directed 400 each 1     Insulin Syringe 31G X 5/16\" 1 ML misc Use 1 each 3 (Three) Times a Day With Meals. As needed for additional insulin bolus with meals. 100 each 3      Allergies:  Dilantin [phenytoin], Keppra [levetiracetam], Meperidine, and Topamax [topiramate]    Review of Systems   Constitutional:  Positive for fatigue.   Respiratory:  Positive for shortness of " breath.    Cardiovascular:  Positive for chest pain and leg swelling. Negative for palpitations.          Objective      Vital Signs  Temp:  [97.8 °F (36.6 °C)] 97.8 °F (36.6 °C)  Heart Rate:  [73-97] 97  Resp:  [20] 20  BP: (139-184)/(72-83) 147/72  Body mass index is 46.09 kg/m².  No intake or output data in the 24 hours ending 04/17/24 1603    Physical Exam  Vitals and nursing note reviewed. Exam conducted with a chaperone present (Significant other at bedside).   Constitutional:       General: She is not in acute distress.     Appearance: She is obese.   HENT:      Head: Normocephalic and atraumatic.   Neck:      Vascular: No carotid bruit.   Cardiovascular:      Rate and Rhythm: Normal rate. Rhythm irregular.      Pulses:           Posterior tibial pulses are 2+ on the right side and 2+ on the left side.      Heart sounds: No murmur heard.     No friction rub. No gallop.   Pulmonary:      Effort: Pulmonary effort is normal.      Breath sounds: Normal breath sounds.   Abdominal:      Comments: Gravid.  Fetal monitor in place   Musculoskeletal:      Right lower leg: Edema present.      Left lower leg: Edema present.   Skin:     General: Skin is warm and dry.   Neurological:      General: No focal deficit present.      Mental Status: She is alert.   Psychiatric:         Mood and Affect: Mood normal.         Behavior: Behavior normal.         Results Review:   I reviewed the patient's new clinical results.  Results from last 7 days   Lab Units 04/17/24  1320 04/17/24  1145   HSTROP T ng/L 20* 19*     Results from last 7 days   Lab Units 04/17/24  1145 04/15/24  2141   WBC 10*3/mm3 10.11 11.35*   HEMOGLOBIN g/dL 8.2* 8.4*   PLATELETS 10*3/mm3 130* 122*     Results from last 7 days   Lab Units 04/17/24  1145 04/15/24  2141   SODIUM mmol/L 137 138   POTASSIUM mmol/L 4.1 4.0   CHLORIDE mmol/L 106 108*   CO2 mmol/L 21.1* 19.5*   BUN mg/dL 11 11   CREATININE mg/dL 0.52* 0.45*   CALCIUM mg/dL 8.3* 8.5*   GLUCOSE mg/dL 62*  116*   ALT (SGPT) U/L 12 10   AST (SGOT) U/L 21 17     Lab Results   Component Value Date    INR 0.98 07/01/2023    INR 1.02 05/04/2018    INR 0.92 09/22/2015     Lab Results   Component Value Date    MG 2.0 07/01/2023     Lab Results   Component Value Date    TSH 0.022 (L) 12/08/2023    CHLPL 159 09/22/2015    TRIG 111 09/18/2023    HDL 33 (L) 09/18/2023     (H) 09/18/2023      Lab Results   Component Value Date    PROBNP 75.1 03/27/2024    PROBNP <36.0 11/15/2023        EKG:     Imaging Results (Last 72 Hours)       ** No results found for the last 72 hours. **             Assessment:  1.  Chest pain  2.  Mild troponin elevation          Plan:  1.  Chest pain is atypical for angina.  EKG did not reveal signs worrisome for ischemia.  Negative stress test within the last year.  Echocardiogram within the last week that did not reveal decreased EF or abnormal wall motion.  2.  Likely secondary to numerous possible etiologies: Hypertension, anemia, strain of pregnancy    Patient seen and evaluated with Dr. Akhtar.  Plan of care reflects his recommendations.    Thank you very much for asking us to be involved in this patient's care.  We will follow along with you.      Electronically signed by CICI Herman, 04/17/24, 4:12 PM EDT.

## 2024-04-17 NOTE — DISCHARGE SUMMARY
Discharge Summary    Admit Date: 4/17/2024 11:28 AM    Admit Diagnosis: Elevated blood pressure reading [R03.0]    Date of Discharge:  4/17/2024    Discharge Diagnosis: Same        Hospital Course  Patient is a 38 y.o. female, G 2, P 0010. Patient was admitted due to elevated BP in office and new onset of CP with worsening SOB.  Pt has h/o CHF, COPD and is a Class C DM on insulin.  Troponin here increased to 19 and 20.  Nml Echo 4/11.  EKG nml today.  Pt has been seen by Cardiology.  She has Class C DM on insulin pump.  BS here is nml.  BP was initially elevated and mostly mild range on Labetalol 200mg tid.  She is increased to 300mg tid.  24hr urine on 4/15 is 252mg.  She denies HA, visual changes, N/V.  She is c/o CP and SOB as well.         Vital Signs  Temp:  [97.8 °F (36.6 °C)] 97.8 °F (36.6 °C)  Heart Rate:  [73-92] 90  Resp:  [20] 20  BP: (139-184)/(75-83) 163/78    Review of Systems    The following systems were reviewed and negative; Contraction, LOF, VB,  HA,  scotomata, N/V      The following systems were reviewed and positive; Good fetal movement, SOB, CP, edema           Physical Exam:      General Appearance:   NAD   Head:   NC   Eyes:           PEARLA   Ears:  deferred   Throat: deferred   Neck: No JVD   Back:    No CVAT   Lungs:    CTAB    Heart:   RRR    Breast Exam:  deferred   Abdomen:    gravid uterus.  Non-tender   Genitalia:   deferred   Extremities:  No c/c, 2+edema   Pulses:  Normal                  A/P:  1.CHTN- Labetalol increased to 300mg tid for this dose.  She is collecting a 24 hour urine for now.  SI Pre-eclampsia is not excluded.    2. H/O CHF/COPD- currently SOB with good O2 sat.  Mildly increased troponin.  Nml EKG/Echo.  Pulmonary edema also not excluded.  Discussed with MFM and pt accepted for transfer of care for NICU and MFM services.    3. Class C DM- will continue to check BS and pump for now.  NPO.  If BS drops will stop or half basal rate.  Pt did just eat around 1300.    4.  OSWALDO.         Condition on Discharge:  Stable    Urine Output Good    Discharge Diet: Regular    Follow-up Appointments  Patient will follow up in clinic this week.        Anais Brito DO  04/17/24  15:35 EDT

## 2024-04-18 ENCOUNTER — APPOINTMENT (OUTPATIENT)
Dept: GENERAL RADIOLOGY | Facility: HOSPITAL | Age: 39
End: 2024-04-18
Payer: MEDICARE

## 2024-04-18 ENCOUNTER — ANESTHESIA EVENT (OUTPATIENT)
Dept: LABOR AND DELIVERY | Facility: HOSPITAL | Age: 39
End: 2024-04-18
Payer: MEDICARE

## 2024-04-18 ENCOUNTER — APPOINTMENT (OUTPATIENT)
Dept: CARDIOLOGY | Facility: HOSPITAL | Age: 39
End: 2024-04-18
Payer: MEDICARE

## 2024-04-18 ENCOUNTER — ANESTHESIA (OUTPATIENT)
Dept: LABOR AND DELIVERY | Facility: HOSPITAL | Age: 39
End: 2024-04-18
Payer: MEDICARE

## 2024-04-18 PROBLEM — J81.1 PULMONARY EDEMA: Status: ACTIVE | Noted: 2024-04-18

## 2024-04-18 LAB
ABO GROUP BLD: NORMAL
ALBUMIN SERPL-MCNC: 3 G/DL (ref 3.5–5.2)
ALBUMIN SERPL-MCNC: 3.1 G/DL (ref 3.5–5.2)
ALBUMIN/GLOB SERPL: 0.9 G/DL
ALBUMIN/GLOB SERPL: 1.1 G/DL
ALP SERPL-CCNC: 111 U/L (ref 39–117)
ALP SERPL-CCNC: 116 U/L (ref 39–117)
ALT SERPL W P-5'-P-CCNC: 10 U/L (ref 1–33)
ALT SERPL W P-5'-P-CCNC: 11 U/L (ref 1–33)
ANION GAP SERPL CALCULATED.3IONS-SCNC: 11 MMOL/L (ref 5–15)
ANION GAP SERPL CALCULATED.3IONS-SCNC: 12 MMOL/L (ref 5–15)
AST SERPL-CCNC: 19 U/L (ref 1–32)
AST SERPL-CCNC: 22 U/L (ref 1–32)
ATMOSPHERIC PRESS: ABNORMAL MM[HG]
ATMOSPHERIC PRESS: ABNORMAL MM[HG]
BASE EXCESS BLDCOA CALC-SCNC: -3.3 MMOL/L (ref 0–2)
BASE EXCESS BLDCOV CALC-SCNC: -2.6 MMOL/L (ref 0–2)
BASOPHILS # BLD AUTO: 0.03 10*3/MM3 (ref 0–0.2)
BASOPHILS # BLD AUTO: 0.03 10*3/MM3 (ref 0–0.2)
BASOPHILS NFR BLD AUTO: 0.3 % (ref 0–1.5)
BASOPHILS NFR BLD AUTO: 0.3 % (ref 0–1.5)
BDY SITE: ABNORMAL
BDY SITE: ABNORMAL
BH CV ECHO MEAS - AO ROOT DIAM: 3 CM
BH CV ECHO MEAS - EDV(CUBED): 132.7 ML
BH CV ECHO MEAS - EDV(MOD-SP2): 126 ML
BH CV ECHO MEAS - EDV(MOD-SP4): 137 ML
BH CV ECHO MEAS - EF(MOD-BP): 56.4 %
BH CV ECHO MEAS - EF(MOD-SP2): 58.7 %
BH CV ECHO MEAS - EF(MOD-SP4): 56.6 %
BH CV ECHO MEAS - ESV(CUBED): 39.3 ML
BH CV ECHO MEAS - ESV(MOD-SP2): 52.1 ML
BH CV ECHO MEAS - ESV(MOD-SP4): 59.5 ML
BH CV ECHO MEAS - FS: 33.3 %
BH CV ECHO MEAS - IVS/LVPW: 1 CM
BH CV ECHO MEAS - IVSD: 1.2 CM
BH CV ECHO MEAS - LA DIMENSION: 3.9 CM
BH CV ECHO MEAS - LV MASS(C)D: 241.2 GRAMS
BH CV ECHO MEAS - LVIDD: 5.1 CM
BH CV ECHO MEAS - LVIDS: 3.4 CM
BH CV ECHO MEAS - LVOT AREA: 3.1 CM2
BH CV ECHO MEAS - LVOT DIAM: 2 CM
BH CV ECHO MEAS - LVPWD: 1.2 CM
BH CV ECHO MEAS - SV(MOD-SP2): 73.9 ML
BH CV ECHO MEAS - SV(MOD-SP4): 77.5 ML
BH CV XLRA - RV BASE: 3.3 CM
BH CV XLRA - RV LENGTH: 5.5 CM
BH CV XLRA - RV MID: 2.3 CM
BILIRUB SERPL-MCNC: 0.4 MG/DL (ref 0–1.2)
BILIRUB SERPL-MCNC: 0.6 MG/DL (ref 0–1.2)
BLD GP AB SCN SERPL QL: NEGATIVE
BODY TEMPERATURE: 37
BODY TEMPERATURE: 37
BUN SERPL-MCNC: 11 MG/DL (ref 6–20)
BUN SERPL-MCNC: 13 MG/DL (ref 6–20)
BUN/CREAT SERPL: 20 (ref 7–25)
BUN/CREAT SERPL: 21.2 (ref 7–25)
CALCIUM SPEC-SCNC: 8.5 MG/DL (ref 8.6–10.5)
CALCIUM SPEC-SCNC: 8.6 MG/DL (ref 8.6–10.5)
CHLORIDE SERPL-SCNC: 106 MMOL/L (ref 98–107)
CHLORIDE SERPL-SCNC: 106 MMOL/L (ref 98–107)
CO2 BLDA-SCNC: 25 MMOL/L (ref 22–33)
CO2 BLDA-SCNC: 26.6 MMOL/L (ref 22–33)
CO2 SERPL-SCNC: 19 MMOL/L (ref 22–29)
CO2 SERPL-SCNC: 22 MMOL/L (ref 22–29)
CREAT SERPL-MCNC: 0.52 MG/DL (ref 0.57–1)
CREAT SERPL-MCNC: 0.65 MG/DL (ref 0.57–1)
DEPRECATED RDW RBC AUTO: 44 FL (ref 37–54)
DEPRECATED RDW RBC AUTO: 44.8 FL (ref 37–54)
EGFRCR SERPLBLD CKD-EPI 2021: 114.3 ML/MIN/1.73
EGFRCR SERPLBLD CKD-EPI 2021: 120.6 ML/MIN/1.73
EOSINOPHIL # BLD AUTO: 0.07 10*3/MM3 (ref 0–0.4)
EOSINOPHIL # BLD AUTO: 0.22 10*3/MM3 (ref 0–0.4)
EOSINOPHIL NFR BLD AUTO: 0.6 % (ref 0.3–6.2)
EOSINOPHIL NFR BLD AUTO: 2.4 % (ref 0.3–6.2)
EPAP: 0
EPAP: 0
ERYTHROCYTE [DISTWIDTH] IN BLOOD BY AUTOMATED COUNT: 14.3 % (ref 12.3–15.4)
ERYTHROCYTE [DISTWIDTH] IN BLOOD BY AUTOMATED COUNT: 14.5 % (ref 12.3–15.4)
GLOBULIN UR ELPH-MCNC: 2.9 GM/DL
GLOBULIN UR ELPH-MCNC: 3.2 GM/DL
GLUCOSE BLDC GLUCOMTR-MCNC: 112 MG/DL (ref 70–130)
GLUCOSE SERPL-MCNC: 66 MG/DL (ref 65–99)
GLUCOSE SERPL-MCNC: 85 MG/DL (ref 65–99)
HCO3 BLDCOA-SCNC: 24.9 MMOL/L (ref 16.9–20.5)
HCO3 BLDCOV-SCNC: 23.7 MMOL/L (ref 18.6–21.4)
HCT VFR BLD AUTO: 27.1 % (ref 34–46.6)
HCT VFR BLD AUTO: 27.9 % (ref 34–46.6)
HGB BLD-MCNC: 8.3 G/DL (ref 12–15.9)
HGB BLD-MCNC: 8.4 G/DL (ref 12–15.9)
HGB BLDA-MCNC: 16.6 G/DL (ref 14–18)
HGB BLDA-MCNC: 17.5 G/DL (ref 14–18)
IMM GRANULOCYTES # BLD AUTO: 0.07 10*3/MM3 (ref 0–0.05)
IMM GRANULOCYTES # BLD AUTO: 0.07 10*3/MM3 (ref 0–0.05)
IMM GRANULOCYTES NFR BLD AUTO: 0.6 % (ref 0–0.5)
IMM GRANULOCYTES NFR BLD AUTO: 0.8 % (ref 0–0.5)
INHALED O2 CONCENTRATION: 21 %
INHALED O2 CONCENTRATION: 21 %
IPAP: 0
IPAP: 0
LYMPHOCYTES # BLD AUTO: 1.04 10*3/MM3 (ref 0.7–3.1)
LYMPHOCYTES # BLD AUTO: 1.79 10*3/MM3 (ref 0.7–3.1)
LYMPHOCYTES NFR BLD AUTO: 19.4 % (ref 19.6–45.3)
LYMPHOCYTES NFR BLD AUTO: 9.4 % (ref 19.6–45.3)
MCH RBC QN AUTO: 25.5 PG (ref 26.6–33)
MCH RBC QN AUTO: 26.2 PG (ref 26.6–33)
MCHC RBC AUTO-ENTMCNC: 30.1 G/DL (ref 31.5–35.7)
MCHC RBC AUTO-ENTMCNC: 30.6 G/DL (ref 31.5–35.7)
MCV RBC AUTO: 84.8 FL (ref 79–97)
MCV RBC AUTO: 85.5 FL (ref 79–97)
MODALITY: ABNORMAL
MODALITY: ABNORMAL
MONOCYTES # BLD AUTO: 0.48 10*3/MM3 (ref 0.1–0.9)
MONOCYTES # BLD AUTO: 0.6 10*3/MM3 (ref 0.1–0.9)
MONOCYTES NFR BLD AUTO: 5.2 % (ref 5–12)
MONOCYTES NFR BLD AUTO: 5.4 % (ref 5–12)
NEUTROPHILS NFR BLD AUTO: 6.62 10*3/MM3 (ref 1.7–7)
NEUTROPHILS NFR BLD AUTO: 71.9 % (ref 42.7–76)
NEUTROPHILS NFR BLD AUTO: 83.7 % (ref 42.7–76)
NEUTROPHILS NFR BLD AUTO: 9.28 10*3/MM3 (ref 1.7–7)
NOTE: 0
NRBC BLD AUTO-RTO: 0 /100 WBC (ref 0–0.2)
NRBC BLD AUTO-RTO: 0 /100 WBC (ref 0–0.2)
NT-PROBNP SERPL-MCNC: 254.2 PG/ML (ref 0–450)
PAW @ PEAK INSP FLOW SETTING VENT: 0 CMH2O
PAW @ PEAK INSP FLOW SETTING VENT: 0 CMH2O
PCO2 BLDCOA: 54.9 MMHG (ref 43.3–54.9)
PCO2 BLDCOV: 44.9 MM HG (ref 28–40)
PH BLDCOA: 7.27 PH UNITS (ref 7.22–7.3)
PH BLDCOV: 7.33 PH UNITS (ref 7.31–7.37)
PLATELET # BLD AUTO: 125 10*3/MM3 (ref 140–450)
PLATELET # BLD AUTO: 137 10*3/MM3 (ref 140–450)
PMV BLD AUTO: 12.1 FL (ref 6–12)
PMV BLD AUTO: 12.5 FL (ref 6–12)
PO2 BLDCOA: 17.4 MMHG (ref 11.5–43.3)
PO2 BLDCOV: 31.1 MM HG (ref 21–31)
POTASSIUM SERPL-SCNC: 4.1 MMOL/L (ref 3.5–5.2)
POTASSIUM SERPL-SCNC: 4.5 MMOL/L (ref 3.5–5.2)
PROT SERPL-MCNC: 6 G/DL (ref 6–8.5)
PROT SERPL-MCNC: 6.2 G/DL (ref 6–8.5)
QT INTERVAL: 332 MS
QTC INTERVAL: 408 MS
RBC # BLD AUTO: 3.17 10*6/MM3 (ref 3.77–5.28)
RBC # BLD AUTO: 3.29 10*6/MM3 (ref 3.77–5.28)
RH BLD: POSITIVE
SAO2 % BLDCOA: 30.7 %
SAO2 % BLDCOA: ABNORMAL %
SAO2 % BLDCOV: 68.5 %
SODIUM SERPL-SCNC: 137 MMOL/L (ref 136–145)
SODIUM SERPL-SCNC: 139 MMOL/L (ref 136–145)
T PALLIDUM IGG SER QL: NORMAL
T&S EXPIRATION DATE: NORMAL
TOTAL RATE: 0 BREATHS/MINUTE
TOTAL RATE: 0 BREATHS/MINUTE
TROPONIN T SERPL HS-MCNC: 19 NG/L
TROPONIN T SERPL HS-MCNC: 23 NG/L
URATE SERPL-MCNC: 5.5 MG/DL (ref 2.4–5.7)
VENTILATOR MODE: ABNORMAL
WBC NRBC COR # BLD AUTO: 11.09 10*3/MM3 (ref 3.4–10.8)
WBC NRBC COR # BLD AUTO: 9.21 10*3/MM3 (ref 3.4–10.8)

## 2024-04-18 PROCEDURE — 25010000002 FENTANYL CITRATE (PF) 100 MCG/2ML SOLUTION: Performed by: ANESTHESIOLOGY

## 2024-04-18 PROCEDURE — 86920 COMPATIBILITY TEST SPIN: CPT

## 2024-04-18 PROCEDURE — 25010000002 ONDANSETRON PER 1 MG: Performed by: OBSTETRICS & GYNECOLOGY

## 2024-04-18 PROCEDURE — 25010000002 GLYCOPYRROLATE 1 MG/5ML SOLUTION: Performed by: ANESTHESIOLOGY

## 2024-04-18 PROCEDURE — 0UT70ZZ RESECTION OF BILATERAL FALLOPIAN TUBES, OPEN APPROACH: ICD-10-PCS | Performed by: OBSTETRICS & GYNECOLOGY

## 2024-04-18 PROCEDURE — 85025 COMPLETE CBC W/AUTO DIFF WBC: CPT | Performed by: OBSTETRICS & GYNECOLOGY

## 2024-04-18 PROCEDURE — 25010000002 OXYTOCIN PER 10 UNITS: Performed by: ANESTHESIOLOGY

## 2024-04-18 PROCEDURE — 86850 RBC ANTIBODY SCREEN: CPT | Performed by: OBSTETRICS & GYNECOLOGY

## 2024-04-18 PROCEDURE — 88302 TISSUE EXAM BY PATHOLOGIST: CPT | Performed by: OBSTETRICS & GYNECOLOGY

## 2024-04-18 PROCEDURE — 93010 ELECTROCARDIOGRAM REPORT: CPT | Performed by: INTERNAL MEDICINE

## 2024-04-18 PROCEDURE — 25010000002 MORPHINE PER 10 MG: Performed by: ANESTHESIOLOGY

## 2024-04-18 PROCEDURE — 86780 TREPONEMA PALLIDUM: CPT | Performed by: OBSTETRICS & GYNECOLOGY

## 2024-04-18 PROCEDURE — 86900 BLOOD TYPING SEROLOGIC ABO: CPT | Performed by: OBSTETRICS & GYNECOLOGY

## 2024-04-18 PROCEDURE — 59515 CESAREAN DELIVERY: CPT | Performed by: OBSTETRICS & GYNECOLOGY

## 2024-04-18 PROCEDURE — 86901 BLOOD TYPING SEROLOGIC RH(D): CPT | Performed by: OBSTETRICS & GYNECOLOGY

## 2024-04-18 PROCEDURE — 71046 X-RAY EXAM CHEST 2 VIEWS: CPT

## 2024-04-18 PROCEDURE — 93308 TTE F-UP OR LMTD: CPT | Performed by: INTERNAL MEDICINE

## 2024-04-18 PROCEDURE — 83880 ASSAY OF NATRIURETIC PEPTIDE: CPT | Performed by: OBSTETRICS & GYNECOLOGY

## 2024-04-18 PROCEDURE — 25010000002 FUROSEMIDE PER 20 MG: Performed by: OBSTETRICS & GYNECOLOGY

## 2024-04-18 PROCEDURE — 99223 1ST HOSP IP/OBS HIGH 75: CPT | Performed by: INTERNAL MEDICINE

## 2024-04-18 PROCEDURE — 25010000002 MIDAZOLAM PER 1 MG: Performed by: ANESTHESIOLOGY

## 2024-04-18 PROCEDURE — 84550 ASSAY OF BLOOD/URIC ACID: CPT | Performed by: OBSTETRICS & GYNECOLOGY

## 2024-04-18 PROCEDURE — 25010000002 KETOROLAC TROMETHAMINE PER 15 MG: Performed by: OBSTETRICS & GYNECOLOGY

## 2024-04-18 PROCEDURE — 80053 COMPREHEN METABOLIC PANEL: CPT | Performed by: OBSTETRICS & GYNECOLOGY

## 2024-04-18 PROCEDURE — 25010000002 CEFAZOLIN PER 500 MG: Performed by: OBSTETRICS & GYNECOLOGY

## 2024-04-18 PROCEDURE — 93325 DOPPLER ECHO COLOR FLOW MAPG: CPT

## 2024-04-18 PROCEDURE — 25010000002 ONDANSETRON PER 1 MG: Performed by: ANESTHESIOLOGY

## 2024-04-18 PROCEDURE — 93321 DOPPLER ECHO F-UP/LMTD STD: CPT

## 2024-04-18 PROCEDURE — 86923 COMPATIBILITY TEST ELECTRIC: CPT

## 2024-04-18 PROCEDURE — 84484 ASSAY OF TROPONIN QUANT: CPT | Performed by: OBSTETRICS & GYNECOLOGY

## 2024-04-18 PROCEDURE — 25010000002 PROMETHAZINE PER 50 MG: Performed by: OBSTETRICS & GYNECOLOGY

## 2024-04-18 PROCEDURE — 93321 DOPPLER ECHO F-UP/LMTD STD: CPT | Performed by: INTERNAL MEDICINE

## 2024-04-18 PROCEDURE — 58611 LIGATE OVIDUCT(S) ADD-ON: CPT | Performed by: OBSTETRICS & GYNECOLOGY

## 2024-04-18 PROCEDURE — 93005 ELECTROCARDIOGRAM TRACING: CPT | Performed by: OBSTETRICS & GYNECOLOGY

## 2024-04-18 PROCEDURE — 93308 TTE F-UP OR LMTD: CPT

## 2024-04-18 PROCEDURE — 93325 DOPPLER ECHO COLOR FLOW MAPG: CPT | Performed by: INTERNAL MEDICINE

## 2024-04-18 PROCEDURE — 88307 TISSUE EXAM BY PATHOLOGIST: CPT | Performed by: OBSTETRICS & GYNECOLOGY

## 2024-04-18 PROCEDURE — 82805 BLOOD GASES W/O2 SATURATION: CPT

## 2024-04-18 PROCEDURE — 25810000003 LACTATED RINGERS PER 1000 ML: Performed by: ANESTHESIOLOGY

## 2024-04-18 PROCEDURE — C1755 CATHETER, INTRASPINAL: HCPCS | Performed by: ANESTHESIOLOGY

## 2024-04-18 PROCEDURE — 25810000003 LACTATED RINGERS PER 1000 ML: Performed by: OBSTETRICS & GYNECOLOGY

## 2024-04-18 DEVICE — SEAL HEMO SURG ARISTA/AH ABS/PWDR 3GM: Type: IMPLANTABLE DEVICE | Site: UTERUS | Status: FUNCTIONAL

## 2024-04-18 RX ORDER — OXYTOCIN/0.9 % SODIUM CHLORIDE 30/500 ML
250 PLASTIC BAG, INJECTION (ML) INTRAVENOUS CONTINUOUS
Status: ACTIVE | OUTPATIENT
Start: 2024-04-18 | End: 2024-04-18

## 2024-04-18 RX ORDER — OXYCODONE HYDROCHLORIDE 5 MG/1
10 TABLET ORAL EVERY 4 HOURS PRN
Status: DISPENSED | OUTPATIENT
Start: 2024-04-18 | End: 2024-04-23

## 2024-04-18 RX ORDER — SODIUM CHLORIDE 0.9 % (FLUSH) 0.9 %
10 SYRINGE (ML) INJECTION AS NEEDED
Status: DISCONTINUED | OUTPATIENT
Start: 2024-04-18 | End: 2024-04-19

## 2024-04-18 RX ORDER — OXYTOCIN/0.9 % SODIUM CHLORIDE 30/500 ML
125 PLASTIC BAG, INJECTION (ML) INTRAVENOUS ONCE AS NEEDED
Status: DISCONTINUED | OUTPATIENT
Start: 2024-04-18 | End: 2024-04-19

## 2024-04-18 RX ORDER — MORPHINE SULFATE 0.5 MG/ML
INJECTION, SOLUTION EPIDURAL; INTRATHECAL; INTRAVENOUS AS NEEDED
Status: DISCONTINUED | OUTPATIENT
Start: 2024-04-18 | End: 2024-04-18 | Stop reason: SURG

## 2024-04-18 RX ORDER — FUROSEMIDE 10 MG/ML
40 INJECTION INTRAMUSCULAR; INTRAVENOUS ONCE
Status: COMPLETED | OUTPATIENT
Start: 2024-04-18 | End: 2024-04-18

## 2024-04-18 RX ORDER — SODIUM CHLORIDE 0.9 % (FLUSH) 0.9 %
3 SYRINGE (ML) INJECTION EVERY 12 HOURS SCHEDULED
Status: DISCONTINUED | OUTPATIENT
Start: 2024-04-18 | End: 2024-04-19

## 2024-04-18 RX ORDER — FUROSEMIDE 10 MG/ML
40 INJECTION INTRAMUSCULAR; INTRAVENOUS ONCE
Status: DISCONTINUED | OUTPATIENT
Start: 2024-04-18 | End: 2024-04-18

## 2024-04-18 RX ORDER — ENOXAPARIN SODIUM 100 MG/ML
40 INJECTION SUBCUTANEOUS EVERY 12 HOURS SCHEDULED
Status: DISCONTINUED | OUTPATIENT
Start: 2024-04-19 | End: 2024-04-24 | Stop reason: HOSPADM

## 2024-04-18 RX ORDER — ACETAMINOPHEN 500 MG
1000 TABLET ORAL ONCE
Status: COMPLETED | OUTPATIENT
Start: 2024-04-18 | End: 2024-04-18

## 2024-04-18 RX ORDER — FUROSEMIDE 10 MG/ML
80 INJECTION INTRAMUSCULAR; INTRAVENOUS ONCE
Status: COMPLETED | OUTPATIENT
Start: 2024-04-18 | End: 2024-04-18

## 2024-04-18 RX ORDER — SODIUM CHLORIDE, SODIUM LACTATE, POTASSIUM CHLORIDE, CALCIUM CHLORIDE 600; 310; 30; 20 MG/100ML; MG/100ML; MG/100ML; MG/100ML
125 INJECTION, SOLUTION INTRAVENOUS CONTINUOUS
Status: DISCONTINUED | OUTPATIENT
Start: 2024-04-18 | End: 2024-04-19

## 2024-04-18 RX ORDER — PROMETHAZINE HYDROCHLORIDE 12.5 MG/1
12.5 SUPPOSITORY RECTAL EVERY 6 HOURS PRN
Status: DISCONTINUED | OUTPATIENT
Start: 2024-04-18 | End: 2024-04-24 | Stop reason: HOSPADM

## 2024-04-18 RX ORDER — GLYCOPYRROLATE 0.2 MG/ML
INJECTION INTRAMUSCULAR; INTRAVENOUS AS NEEDED
Status: DISCONTINUED | OUTPATIENT
Start: 2024-04-18 | End: 2024-04-18 | Stop reason: SURG

## 2024-04-18 RX ORDER — ONDANSETRON 2 MG/ML
4 INJECTION INTRAMUSCULAR; INTRAVENOUS EVERY 6 HOURS PRN
Status: DISCONTINUED | OUTPATIENT
Start: 2024-04-18 | End: 2024-04-19

## 2024-04-18 RX ORDER — LISINOPRIL 10 MG/1
10 TABLET ORAL
Status: DISCONTINUED | OUTPATIENT
Start: 2024-04-18 | End: 2024-04-19

## 2024-04-18 RX ORDER — ACETAMINOPHEN 325 MG/1
650 TABLET ORAL EVERY 6 HOURS
Status: DISCONTINUED | OUTPATIENT
Start: 2024-04-19 | End: 2024-04-24 | Stop reason: HOSPADM

## 2024-04-18 RX ORDER — HYDROCORTISONE 25 MG/G
1 CREAM TOPICAL AS NEEDED
Status: DISCONTINUED | OUTPATIENT
Start: 2024-04-18 | End: 2024-04-24 | Stop reason: HOSPADM

## 2024-04-18 RX ORDER — OXYTOCIN 10 [USP'U]/ML
INJECTION, SOLUTION INTRAMUSCULAR; INTRAVENOUS AS NEEDED
Status: DISCONTINUED | OUTPATIENT
Start: 2024-04-18 | End: 2024-04-18 | Stop reason: SURG

## 2024-04-18 RX ORDER — SODIUM CHLORIDE 9 MG/ML
40 INJECTION, SOLUTION INTRAVENOUS AS NEEDED
Status: DISCONTINUED | OUTPATIENT
Start: 2024-04-18 | End: 2024-04-19

## 2024-04-18 RX ORDER — SIMETHICONE 80 MG
80 TABLET,CHEWABLE ORAL 4 TIMES DAILY PRN
Status: DISCONTINUED | OUTPATIENT
Start: 2024-04-18 | End: 2024-04-24 | Stop reason: HOSPADM

## 2024-04-18 RX ORDER — PROMETHAZINE HYDROCHLORIDE 25 MG/1
25 TABLET ORAL EVERY 6 HOURS PRN
Status: DISCONTINUED | OUTPATIENT
Start: 2024-04-18 | End: 2024-04-24 | Stop reason: HOSPADM

## 2024-04-18 RX ORDER — CARBOPROST TROMETHAMINE 250 UG/ML
250 INJECTION, SOLUTION INTRAMUSCULAR
Status: DISCONTINUED | OUTPATIENT
Start: 2024-04-18 | End: 2024-04-19

## 2024-04-18 RX ORDER — LIDOCAINE HYDROCHLORIDE AND EPINEPHRINE 15; 5 MG/ML; UG/ML
INJECTION, SOLUTION EPIDURAL AS NEEDED
Status: DISCONTINUED | OUTPATIENT
Start: 2024-04-18 | End: 2024-04-18 | Stop reason: SURG

## 2024-04-18 RX ORDER — SODIUM CHLORIDE, SODIUM LACTATE, POTASSIUM CHLORIDE, CALCIUM CHLORIDE 600; 310; 30; 20 MG/100ML; MG/100ML; MG/100ML; MG/100ML
INJECTION, SOLUTION INTRAVENOUS CONTINUOUS PRN
Status: DISCONTINUED | OUTPATIENT
Start: 2024-04-18 | End: 2024-04-18 | Stop reason: SURG

## 2024-04-18 RX ORDER — CITRIC ACID/SODIUM CITRATE 334-500MG
30 SOLUTION, ORAL ORAL ONCE
Status: COMPLETED | OUTPATIENT
Start: 2024-04-18 | End: 2024-04-18

## 2024-04-18 RX ORDER — OXYTOCIN/0.9 % SODIUM CHLORIDE 30/500 ML
999 PLASTIC BAG, INJECTION (ML) INTRAVENOUS ONCE
Status: DISCONTINUED | OUTPATIENT
Start: 2024-04-18 | End: 2024-04-22 | Stop reason: HOSPADM

## 2024-04-18 RX ORDER — ACETAMINOPHEN 500 MG
1000 TABLET ORAL EVERY 6 HOURS
Status: DISPENSED | OUTPATIENT
Start: 2024-04-18 | End: 2024-04-19

## 2024-04-18 RX ORDER — KETOROLAC TROMETHAMINE 30 MG/ML
15 INJECTION, SOLUTION INTRAMUSCULAR; INTRAVENOUS EVERY 6 HOURS SCHEDULED
Qty: 20 ML | Refills: 0 | Status: DISCONTINUED | OUTPATIENT
Start: 2024-04-18 | End: 2024-04-19

## 2024-04-18 RX ORDER — OXYCODONE HYDROCHLORIDE 5 MG/1
5 TABLET ORAL EVERY 4 HOURS PRN
Status: DISPENSED | OUTPATIENT
Start: 2024-04-18 | End: 2024-04-23

## 2024-04-18 RX ORDER — ALUMINA, MAGNESIA, AND SIMETHICONE 2400; 2400; 240 MG/30ML; MG/30ML; MG/30ML
15 SUSPENSION ORAL EVERY 4 HOURS PRN
Status: DISCONTINUED | OUTPATIENT
Start: 2024-04-18 | End: 2024-04-24 | Stop reason: HOSPADM

## 2024-04-18 RX ORDER — LIDOCAINE HYDROCHLORIDE 10 MG/ML
0.5 INJECTION, SOLUTION EPIDURAL; INFILTRATION; INTRACAUDAL; PERINEURAL ONCE AS NEEDED
Status: DISCONTINUED | OUTPATIENT
Start: 2024-04-18 | End: 2024-04-22 | Stop reason: HOSPADM

## 2024-04-18 RX ORDER — MIDAZOLAM HYDROCHLORIDE 1 MG/ML
INJECTION INTRAMUSCULAR; INTRAVENOUS AS NEEDED
Status: DISCONTINUED | OUTPATIENT
Start: 2024-04-18 | End: 2024-04-18 | Stop reason: SURG

## 2024-04-18 RX ORDER — SODIUM CHLORIDE 0.9 % (FLUSH) 0.9 %
3-10 SYRINGE (ML) INJECTION AS NEEDED
Status: DISCONTINUED | OUTPATIENT
Start: 2024-04-18 | End: 2024-04-19

## 2024-04-18 RX ORDER — METHYLERGONOVINE MALEATE 0.2 MG/ML
200 INJECTION INTRAVENOUS ONCE AS NEEDED
Status: DISCONTINUED | OUTPATIENT
Start: 2024-04-18 | End: 2024-04-19

## 2024-04-18 RX ORDER — PRENATAL VIT/IRON FUM/FOLIC AC 27MG-0.8MG
1 TABLET ORAL DAILY
Status: DISCONTINUED | OUTPATIENT
Start: 2024-04-18 | End: 2024-04-24 | Stop reason: HOSPADM

## 2024-04-18 RX ORDER — MISOPROSTOL 200 UG/1
800 TABLET ORAL ONCE AS NEEDED
Status: DISCONTINUED | OUTPATIENT
Start: 2024-04-18 | End: 2024-04-19

## 2024-04-18 RX ORDER — DOCUSATE SODIUM 100 MG/1
100 CAPSULE, LIQUID FILLED ORAL 2 TIMES DAILY PRN
Status: DISCONTINUED | OUTPATIENT
Start: 2024-04-18 | End: 2024-04-24 | Stop reason: HOSPADM

## 2024-04-18 RX ORDER — FENTANYL CITRATE 50 UG/ML
INJECTION, SOLUTION INTRAMUSCULAR; INTRAVENOUS AS NEEDED
Status: DISCONTINUED | OUTPATIENT
Start: 2024-04-18 | End: 2024-04-18 | Stop reason: SURG

## 2024-04-18 RX ORDER — KETOROLAC TROMETHAMINE 30 MG/ML
30 INJECTION, SOLUTION INTRAMUSCULAR; INTRAVENOUS ONCE
Status: DISCONTINUED | OUTPATIENT
Start: 2024-04-18 | End: 2024-04-18

## 2024-04-18 RX ORDER — SODIUM CHLORIDE 0.9 % (FLUSH) 0.9 %
10 SYRINGE (ML) INJECTION EVERY 12 HOURS SCHEDULED
Status: DISCONTINUED | OUTPATIENT
Start: 2024-04-18 | End: 2024-04-22 | Stop reason: HOSPADM

## 2024-04-18 RX ORDER — LIDOCAINE HCL/EPINEPHRINE/PF 2%-1:200K
VIAL (ML) INJECTION AS NEEDED
Status: DISCONTINUED | OUTPATIENT
Start: 2024-04-18 | End: 2024-04-18 | Stop reason: SURG

## 2024-04-18 RX ORDER — FUROSEMIDE 10 MG/ML
80 INJECTION INTRAMUSCULAR; INTRAVENOUS EVERY 12 HOURS
Status: DISCONTINUED | OUTPATIENT
Start: 2024-04-18 | End: 2024-04-20

## 2024-04-18 RX ORDER — CALCIUM CARBONATE 500 MG/1
1 TABLET, CHEWABLE ORAL EVERY 4 HOURS PRN
Status: DISCONTINUED | OUTPATIENT
Start: 2024-04-18 | End: 2024-04-24 | Stop reason: HOSPADM

## 2024-04-18 RX ORDER — LABETALOL HYDROCHLORIDE 5 MG/ML
20-80 INJECTION, SOLUTION INTRAVENOUS
Status: ACTIVE | OUTPATIENT
Start: 2024-04-18 | End: 2024-04-19

## 2024-04-18 RX ORDER — ONDANSETRON 2 MG/ML
INJECTION INTRAMUSCULAR; INTRAVENOUS AS NEEDED
Status: DISCONTINUED | OUTPATIENT
Start: 2024-04-18 | End: 2024-04-18 | Stop reason: SURG

## 2024-04-18 RX ORDER — CEFAZOLIN SODIUM IN 0.9 % NACL 3 G/100 ML
3000 INTRAVENOUS SOLUTION, PIGGYBACK (ML) INTRAVENOUS ONCE
Status: COMPLETED | OUTPATIENT
Start: 2024-04-18 | End: 2024-04-18

## 2024-04-18 RX ORDER — FAMOTIDINE 10 MG/ML
INJECTION, SOLUTION INTRAVENOUS AS NEEDED
Status: DISCONTINUED | OUTPATIENT
Start: 2024-04-18 | End: 2024-04-18 | Stop reason: SURG

## 2024-04-18 RX ORDER — KETOROLAC TROMETHAMINE 30 MG/ML
30 INJECTION, SOLUTION INTRAMUSCULAR; INTRAVENOUS ONCE
Status: COMPLETED | OUTPATIENT
Start: 2024-04-18 | End: 2024-04-18

## 2024-04-18 RX ADMIN — ONDANSETRON 4 MG: 2 INJECTION INTRAMUSCULAR; INTRAVENOUS at 00:35

## 2024-04-18 RX ADMIN — LISINOPRIL 10 MG: 10 TABLET ORAL at 12:27

## 2024-04-18 RX ADMIN — GLYCOPYRROLATE 0.1 MG: 0.2 INJECTION INTRAMUSCULAR; INTRAVENOUS at 03:18

## 2024-04-18 RX ADMIN — Medication 10 ML: at 10:38

## 2024-04-18 RX ADMIN — SODIUM CHLORIDE, POTASSIUM CHLORIDE, SODIUM LACTATE AND CALCIUM CHLORIDE: 600; 310; 30; 20 INJECTION, SOLUTION INTRAVENOUS at 03:14

## 2024-04-18 RX ADMIN — FUROSEMIDE 40 MG: 10 INJECTION, SOLUTION INTRAMUSCULAR; INTRAVENOUS at 05:14

## 2024-04-18 RX ADMIN — Medication 10 ML: at 16:36

## 2024-04-18 RX ADMIN — FAMOTIDINE 20 MG: 10 INJECTION, SOLUTION INTRAVENOUS at 03:18

## 2024-04-18 RX ADMIN — OXYCODONE 5 MG: 5 TABLET ORAL at 08:32

## 2024-04-18 RX ADMIN — OXYTOCIN 10 UNITS: 10 INJECTION INTRAVENOUS at 04:04

## 2024-04-18 RX ADMIN — FUROSEMIDE 80 MG: 10 INJECTION, SOLUTION INTRAMUSCULAR; INTRAVENOUS at 15:06

## 2024-04-18 RX ADMIN — Medication 3 ML: at 20:43

## 2024-04-18 RX ADMIN — ONDANSETRON 4 MG: 2 INJECTION INTRAMUSCULAR; INTRAVENOUS at 03:18

## 2024-04-18 RX ADMIN — Medication 10 ML: at 20:40

## 2024-04-18 RX ADMIN — FENTANYL CITRATE 100 MCG: 50 INJECTION, SOLUTION INTRAMUSCULAR; INTRAVENOUS at 03:35

## 2024-04-18 RX ADMIN — PROMETHAZINE HYDROCHLORIDE 12.5 MG: 25 INJECTION INTRAMUSCULAR; INTRAVENOUS at 10:35

## 2024-04-18 RX ADMIN — LIDOCAINE HYDROCHLORIDE AND EPINEPHRINE 20 ML: 20; 5 INJECTION, SOLUTION EPIDURAL; INFILTRATION; INTRACAUDAL; PERINEURAL at 03:32

## 2024-04-18 RX ADMIN — Medication 10 ML: at 12:28

## 2024-04-18 RX ADMIN — SODIUM CITRATE AND CITRIC ACID MONOHYDRATE 30 ML: 500; 334 SOLUTION ORAL at 03:11

## 2024-04-18 RX ADMIN — KETOROLAC TROMETHAMINE 30 MG: 30 INJECTION, SOLUTION INTRAMUSCULAR; INTRAVENOUS at 12:26

## 2024-04-18 RX ADMIN — ONDANSETRON 4 MG: 2 INJECTION INTRAMUSCULAR; INTRAVENOUS at 16:36

## 2024-04-18 RX ADMIN — SODIUM CHLORIDE, POTASSIUM CHLORIDE, SODIUM LACTATE AND CALCIUM CHLORIDE 125 ML/HR: 600; 310; 30; 20 INJECTION, SOLUTION INTRAVENOUS at 03:05

## 2024-04-18 RX ADMIN — ACETAMINOPHEN 1000 MG: 500 TABLET ORAL at 20:30

## 2024-04-18 RX ADMIN — ACETAMINOPHEN 1000 MG: 500 TABLET ORAL at 14:33

## 2024-04-18 RX ADMIN — Medication 10 ML: at 15:07

## 2024-04-18 RX ADMIN — LIDOCAINE HYDROCHLORIDE AND EPINEPHRINE 3 ML: 15; 5 INJECTION, SOLUTION EPIDURAL at 03:27

## 2024-04-18 RX ADMIN — KETOROLAC TROMETHAMINE 15 MG: 30 INJECTION, SOLUTION INTRAMUSCULAR; INTRAVENOUS at 17:05

## 2024-04-18 RX ADMIN — ONDANSETRON 4 MG: 2 INJECTION INTRAMUSCULAR; INTRAVENOUS at 07:46

## 2024-04-18 RX ADMIN — MIDAZOLAM HYDROCHLORIDE 1 MG: 1 INJECTION, SOLUTION INTRAMUSCULAR; INTRAVENOUS at 03:18

## 2024-04-18 RX ADMIN — ONDANSETRON 4 MG: 2 INJECTION INTRAMUSCULAR; INTRAVENOUS at 21:30

## 2024-04-18 RX ADMIN — OXYTOCIN 20 UNITS: 10 INJECTION INTRAVENOUS at 04:14

## 2024-04-18 RX ADMIN — CEFAZOLIN 3000 MG: 10 INJECTION, POWDER, FOR SOLUTION INTRAVENOUS at 03:06

## 2024-04-18 RX ADMIN — ACETAMINOPHEN 1000 MG: 500 TABLET ORAL at 03:03

## 2024-04-18 RX ADMIN — MORPHINE SULFATE 3 MG: 0.5 INJECTION, SOLUTION EPIDURAL; INTRATHECAL; INTRAVENOUS at 04:08

## 2024-04-18 RX ADMIN — Medication 10 ML: at 07:53

## 2024-04-18 NOTE — H&P
"[facility]  Obstetric History and Physical    Transfer of care ... Louie - Dr. Brito    CC:   Transfer of care due to HTN and multiple other commodities      HPI:    Patient is a 38 y.o. female  currently at 34w5d, who presents as a transfer of care from [facility].   Antoinette was in the office today for her routine visit when it was noted that she had elevated blood pressures in the mild and severe range.  She denies RUQ pain and vision changes, but is having worsening SOA and is now unable to lie flat.   Her partner says she has put on about 10 lbs of fluid in the last 2 days. She is further complicated in that she has CHTN, DM type II, CHF, COPD, and a h/o pancreatitis.  Her insulin had been increased over the last few weeks, but the last coupe of day she has required less than half of her current dosing.   Her blood pressure meds were stopped when she was became pregnant, but 200 mg of Labetalol TID was started last week.   She had an ECHO last week which showed an EF of 60%.  She continues to struggle with breathing with the slightest of exertion.   She at times requires O2 to maintain 95% plus.    CXR today shows pulmonary edema.         The following portions of the patients history were reviewed and updated as appropriate: current medications, allergies, past medical history, past surgical history, past family history, past social history and problem list .       Prenatal Information:   Maternal Prenatal Labs  Blood Type ABO Type   Date Value Ref Range Status   2024 O  Final      Rh Status RH type   Date Value Ref Range Status   2024 Positive  Final      Antibody Screen Antibody Screen   Date Value Ref Range Status   2024 Negative  Final      Gonnorhea No results found for: \"GCCX\"   Chlamydia No results found for: \"CLAMYDCU\"   RPR No results found for: \"RPR\"   Syphilis Antibody No results found for: \"SYPHILIS\"   Rubella No results found for: \"RUBELLAIGGIN\"   Hepatitis B Surface " "Antigen No results found for: \"HEPBSAG\"   HIV-1 Antibody No results found for: \"LABHIV1\"   Hepatitis C Antibody No results found for: \"HEPCAB\"   Rapid Urin Drug Screen No results found for: \"AMPMETHU\", \"BARBITSCNUR\", \"LABBENZSCN\", \"LABMETHSCN\", \"LABOPIASCN\", \"THCURSCR\", \"COCAINEUR\", \"AMPHETSCREEN\", \"PROPOXSCN\", \"BUPRENORSCNU\", \"METAMPSCNUR\", \"OXYCODONESCN\", \"TRICYCLICSCN\"   Group B Strep Culture No results found for: \"GBSANTIGEN\"           External Prenatal Results       Pregnancy Outside Results - Transcribed From Office Records - See Scanned Records For Details       Test Value Date Time    ABO  O  04/18/24 0005    Rh  Positive  04/18/24 0005    Antibody Screen  Negative  04/18/24 0005    Varicella IgG       Rubella ^ Immune  11/16/23     Hgb  8.4 g/dL 04/18/24 0005       8.2 g/dL 04/17/24 1145       8.4 g/dL 04/15/24 2141       10.0 g/dL 03/27/24 1215       10.6 g/dL 01/29/24 1826       9.8 g/dL 12/08/23 1239       10.4 g/dL 11/15/23 1603       11.2 g/dL 10/27/23 2112       9.5 g/dL 09/27/23 1332       8.7 g/dL 09/20/23 0235       9.0 g/dL 09/19/23 0054       10.2 g/dL 09/18/23 0828       9.5 g/dL 09/17/23 0023       10.1 g/dL 09/03/23 1610       10.1 g/dL 08/26/23 1727    Hct  27.9 % 04/18/24 0005       26.3 % 04/17/24 1145       26.7 % 04/15/24 2141       30.5 % 03/27/24 1215       30.9 % 01/29/24 1826       32.0 % 12/08/23 1239       32.5 % 11/15/23 1603       36.8 % 10/27/23 2112       30.1 % 09/27/23 1332       28.3 % 09/20/23 0235       29.0 % 09/19/23 0054       33.0 % 09/18/23 0828       31.2 % 09/17/23 0023       32.2 % 09/03/23 1610       32.7 % 08/26/23 1727    Glucose Fasting GTT       Glucose Tolerance Test 1 hour       Glucose Tolerance Test 3 hour       Gonorrhea (discrete) ^ Negative  11/03/23     Chlamydia (discrete) ^ Negative  11/03/23     RPR ^ Non-Reactive  11/16/23       ^ Non-Reactive  11/03/23     VDRL       Syphilis Antibody       HBsAg ^ Negative  11/16/23     Herpes Simplex Virus PCR    "    Herpes Simplex VIrus Culture       HIV ^ Non-Reactive  23     Hep C RNA Quant PCR       Hep C Antibody       AFP       Group B Strep       GBS Susceptibility to Clindamycin       GBS Susceptibility to Erythromycin       Fetal Fibronectin       Genetic Testing, Maternal Blood                 Drug Screening       Test Value Date Time    Urine Drug Screen       Amphetamine Screen  Negative  20 0953    Barbiturate Screen  Negative  19    Benzodiazepine Screen  Negative  20 0953    Methadone Screen  Negative  20 0953    Phencyclidine Screen  Negative  19    Opiates Screen  Negative  20 0953    THC Screen  Negative  20 0953    Cocaine Screen  Negative  20 0953    Propoxyphene Screen  Negative  10/19/15 1920    Buprenorphine Screen  Negative  20 0953    Methamphetamine Screen  Negative  20 0953    Oxycodone Screen  Negative  20 0953    Tricyclic Antidepressants Screen                 Legend    ^: Historical                              Past OB History:     OB History    Para Term  AB Living   2 0 0 0 1 0   SAB IAB Ectopic Molar Multiple Live Births   1 0 0 0 0 0      # Outcome Date GA Lbr [place]/2nd Weight Sex Type Anes PTL Lv   2 Current            1 SAB 10/2022 6w0d    SAB         Obstetric Comments   FOB #1  Pregnancy #1 SAB at 6 weeks   FOB #1 Pregnancy #2  current       Past Medical History: Past Medical History:   Diagnosis Date    Anxiety     CHF (congestive heart failure) 2023    Colitis     Depression     Diabetes mellitus 2018    Diverticulitis     GERD (gastroesophageal reflux disease)     Hypertension     Kidney stone     Pancreatitis 2023    PCOS (polycystic ovarian syndrome)     Polycystic ovary syndrome     Rectal bleeding     SINCE  - USES MESALAMINE SUPPOSITORY NIGHTLY    Sleep apnea     Urinary tract infection       Past Surgical History Past Surgical History:   Procedure Laterality Date     CARDIAC CATHETERIZATION      CARPAL TUNNEL RELEASE      COLONOSCOPY      ENDOSCOPY      FRACTURE SURGERY      HARDWARE REMOVAL      WRIST    TENDON REPAIR      HAND    WISDOM TOOTH EXTRACTION        Family History: Family History   Problem Relation Age of Onset    Hypertension Mother     Depression Mother     Heart disease Mother     COPD Mother     Emphysema Mother     Hypertension Father     Diabetes Father     Heart disease Father     COPD Father     Heart failure Father     Hepatitis Father       Social History:  reports that she quit smoking about 9 years ago. Her smoking use included cigarettes. She started smoking about 29 years ago. She has a 20 pack-year smoking history. She has never been exposed to tobacco smoke. She has never used smokeless tobacco.   reports no history of alcohol use.   reports no history of drug use.        Review of Systems  Denies fever, HA, muscle weakness,                                             and rashes.  All other pertinent positives and negatives are addressed in the HPI       Objective     Vital Signs Range for the last 24 hours  Temperature: Temp:  [97.8 °F (36.6 °C)-98.7 °F (37.1 °C)] 98.7 °F (37.1 °C)   Temp Source: Temp src: Oral   BP: BP: (139-184)/(66-95) 169/84   Pulse: Heart Rate:  [] 88   Respirations: Resp:  [18-20] 18   SPO2: SpO2:  [90 %-100 %] 92 %   O2 Amount (l/min): Flow (L/min):  [2-4] 2   O2 Devices Device (Oxygen Therapy): nasal cannula   Weight: Weight:  [107 kg (236 lb)] 107 kg (236 lb)     Physical Examination: General appearance - alert, struggles with breathing with respiratory distress   and uncomfortable  Chest -  Diminished lung sound bilaterally, but right greater than left.   Crackles in the bases  Heart - Regular at.  Systolic murmur   Abdomen - soft, nontender, nondistended,   no rebound tenderness noted  No guarding, No RUQ pain, obese   Extremities - pedal edema +2      Fetal Heart Rate Assessment   Method: Fetal HR Assessment  Method: external   Beats/min: Fetal HR (beats/min): 135   Baseline: Fetal HR Baseline: normal range   Varibility: Fetal HR Variability: moderate (amplitude range 6 to 25 bpm)   Accels: Fetal HR Accelerations: greater than/equal to 15 bpm, lasting at least 15 seconds   Decels: Fetal HR Decelerations: absent   Tracing Category:       Uterine Assessment   Method: Method: external tocotransducer   Frequency (min):     Ctx Count in 10 min:     Duration:     Intensity: Contraction Intensity: no contractions   Intensity by IUPC:     Resting Tone: Uterine Resting Tone: soft by palpation   Resting Tone by IUPC:           Laboratory Results:   Lab Results   Component Value Date     (L) 04/18/2024    HGB 8.4 (L) 04/18/2024    HCT 27.9 (L) 04/18/2024    WBC 9.21 04/18/2024     Lab Results   Component Value Date     04/18/2024    K 4.1 04/18/2024     04/18/2024    CO2 19.0 (L) 04/18/2024    BUN 11 04/18/2024    CREATININE 0.52 (L) 04/18/2024    GLUCOSE 66 04/18/2024    ALBUMIN 3.0 (L) 04/18/2024    CALCIUM 8.5 (L) 04/18/2024    AST 19 04/18/2024    ALT 11 04/18/2024    BILITOT 0.6 04/18/2024      ProBnP: 254  Troponin:  23      Radiology:  Study Result    XR CHEST PA AND LATERAL     Date of Exam: 4/18/2024 1:11 AM EDT     Indication: soa and chest pain     Comparison: 3/27/2024.     Findings:  There is new interstitial disease present throughout the lungs. No pneumothorax or pleural effusion. Heart size is stable. No acute osseous abnormality. No focal lung consolidation.     IMPRESSION:  Impression:  New interstitial disease in the lungs concerning for pulmonary edema.        Electronically Signed: Yoseph Pace MD    4/18/2024 1:38 AM EDT    Workstation ID: [id number]          Assessment:  1. Intrauterine pregnancy at 34w5d weeks gestation with reassuring fetal status  2.  CHTN  3.  Type II DM  4.  CHF  5.  H/O seizure disorder   6.  H/O acute pancreatitis  7. Pulmonary edema  8. AMA  9. Obese      Plan:  1.   Admit  2.  IV, Labs, Chest x-ray, EKG,   3.  Monitor.  Because she is unable to lie down and her body habitus, continuous fetal monitoring is virtually impossible.   4.  Spoke with Dr. Fernandez and Anesthesia.   Given her current unstable condition with severe range blood pressures, SOA with pulmonary edema exacerbated by her other commodities, it is recommended that she be delivered ASAP.   Will move to primary LTCS with BTL tonight.    May require ICU admission after the surgery.       Axel Keys MD  4/18/2024  02:45 EDT

## 2024-04-18 NOTE — ANESTHESIA POSTPROCEDURE EVALUATION
Patient: Antoinette Pack    Procedure Summary       Date: 24 Room / Location: [facility] LABOR DELIVERY   [facility]OR [facility]IV[facility]    Anesthesia Start: 314 Anesthesia Stop: 440    Procedure:  SECTION PRIMARY (Abdomen) Diagnosis:     Surgeons: Axel Keys MD Provider: Desmond Guerra DO    Anesthesia Type: epidural ASA Status: 4 - Emergent            Anesthesia Type: epidural    Vitals  Vitals Value Taken Time   /82 24 0912   Temp 98.6 °F (37 °C) 24 0912   Pulse 63 24 0934   Resp 24 24 0912   SpO2 93 % 24   Vitals shown include unfiled device data.        Post Anesthesia Care and Evaluation    Patient location during evaluation: bedside  Patient participation: complete - patient participated  Level of consciousness: awake and alert  Pain management: adequate    Airway patency: patent  Anesthetic complications: No anesthetic complications    Cardiovascular status: acceptable  Respiratory status: acceptable  Hydration status: acceptable  Post Neuraxial Block status: Motor and sensory function returned to baseline and No signs or symptoms of PDPH

## 2024-04-18 NOTE — LACTATION NOTE
Per Floor RnElyssa, patient stated that she would like to wait and have lactation come tomorrow on 4/19/24.  She is not feeling well enough for a consultation or to start pumping.  Supplies and a pump provided to floor Rn.  Lactation to follow up tomorrow.

## 2024-04-18 NOTE — LACTATION NOTE
Per Floor RN, patient is not feeling well enough to begin pumping, to contact lactation when patient feels well enough to begin pumping.  Lactation RN will follow up later today.

## 2024-04-18 NOTE — OP NOTE
Luci  Antoinette Pack  : 1985  MRN: [id number]  [facility]: [id number]      Transfer of care ... [place]  Dr. Brito      Section Operative Note    Pre-Operative Dx:   Intrauterine pregnancy at 34w5d weeks   CHTN - possible super imposed pre-eclampsia   Pulmonary edema  CHF  Type II DM.       Postoperative dx:    Intrauterine pregnancy at 34w5d weeks   CHTN - possible superimposed Pre-eclampsia   Pulmonary edema   CHF  Type II diabetes  Unicornuate uterus            Procedure: Primary  (LTCS) with left total salpingectomy       Surgeon: Axel Keys MD   Assistant: Gaby Mangatar tech        Anesthesia: Epidural        EBL: 607 mls.   IV Fluids: 400 mls.   UOP: 100  mls.     Antibiotics: cefazolin 3 gms     Infant:      Name:  Jody      Gender: female  infant    Weight: 2220 g (4 lb 14.3 oz)     Apgars: 8  @ 1 minute / 9  @ 5 minutes     Procedure Details:   After the patient was adequately anesthetized, she was sterilely prepped and draped in the dorsal supine left lateral tilt position. A Pfannenstiel incision was created sharply with the knife. It was carried down to the fascia.  The fascia was cut transversely with the knife and extended in a curvilinear fashion with scissors. The fascia was freed from its midline insertion superiorly and inferiorly. Rectus muscles were  in the midline. The peritoneum was bluntly entered and a bladder flap was not  created. The lower uterine segment was scored transversely with the knife. Clear amniotic fluid was seen. The infant's head was delivered atraumatically.  A double nuchal cord was reduced on the abdomen.   The mouth and nose were bulb suctioned. The infant was handed to the delivery team which was in attendance. The placenta was spontaneously extracted. The uterus was exteriorized and wiped free of debris and clot. 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  The uterine  incision was closed with #1 Monocryl  in a continuous running locking fashion.   The left tube was traced to its fimbriated end.  Enseal was used to transect each tube near the cornua and at the distal end near the fimbria ovarica. I did trace a tube remnant coming of the right rudimentary horn and removed it as well although it was not functional.    Both tubes were removed in their entirety with minimal blood loss.    The uterus was returned to the abdomen. The paracolic gutters were cleared of debris and clot. The fascia was closed with 0 PDS. The subcutaneous tissue was copiously irrigated. Subcutaneous tissue was reapproximated with 3-0 Plain Gut and the skin was closed with Insorb staples  subcuticularly.   An Exofin dressing was applied.  All counts were correct.         Complications:   None      Disposition:   Mother to Remain in LD  in stable condition currently.   Baby to NICU  in stable condition currently.       Axel Keys MD  4/18/2024  04:56 EDT

## 2024-04-18 NOTE — CONSULTS
[facility]   Consult Note    Patient Name: Antoinette Pack  : 1985  MRN: [id number]  Primary Care Physician:  Shawna Lambert DO  Referring Physician: Axel Keys MD  Date of admission: 2024    Inpatient Cardiology Consult  Consult performed by: Pablo Cabrera MD  Consult ordered by: Rosmery Orellana MD        Subjective   Subjective     Problem List  -HFpEF, normal EKG, echo today shows preserved systolic function (in room during echo)  -hx of HFpEF  -CXR showed pulmonary edema  -C section 24 morning  -Long standing HTN  -DM  -morbid obesity  -COPD?  -ALEJANDRO    Mrs. Pack is a 38 year old woman being seen for CHF.  Was admitted a few years ago for CHF.  Last few days been having palpitations, uncontrolled HTN, orthopnea, shortness of breath and occasional chest pain.  Has been worsening over past few days.  Couldn't lay flat.   Was in [place] and transferred here secondary her distress and likely need for emergent deliverly.  Could not monitor the child so  was performed.  Child now in NICU but doing well.  Her symptoms are greatly improved.  Urine output is not great after one dose of lasix.  Trop very mildly elevated, nearly certainly form demand.  JVD to ear and positive hepaojugular reflex.  Echo has shown preserved function in past and does today.  Chest pain has essentially resolved.  ROS negative except for the above.        Personal History     Past Medical History:   Diagnosis Date    Anxiety     CHF (congestive heart failure) 2023    Colitis     Depression     Diabetes mellitus 2018    Diverticulitis     GERD (gastroesophageal reflux disease)     Hypertension     Kidney stone     Pancreatitis 2023    PCOS (polycystic ovarian syndrome)     Polycystic ovary syndrome     Rectal bleeding     SINCE  - USES MESALAMINE SUPPOSITORY NIGHTLY    Sleep apnea     Urinary tract infection        Past Surgical History:   Procedure Laterality Date     CARDIAC CATHETERIZATION      CARPAL TUNNEL RELEASE      COLONOSCOPY      ENDOSCOPY      FRACTURE SURGERY      HARDWARE REMOVAL      WRIST    TENDON REPAIR      HAND    WISDOM TOOTH EXTRACTION         Family History: family history includes COPD in her father and mother; Depression in her mother; Diabetes in her father; Emphysema in her mother; Heart disease in her father and mother; Heart failure in her father; Hepatitis in her father; Hypertension in her father and mother. Otherwise pertinent FHx was reviewed and not pertinent to current issue.    Social History:  reports that she quit smoking about 9 years ago. Her smoking use included cigarettes. She started smoking about 29 years ago. She has a 20 pack-year smoking history. She has never been exposed to tobacco smoke. She has never used smokeless tobacco. She reports that she does not drink alcohol and does not use drugs.    Home Medications:   Dexcom G6 Sensor, Dexcom G6 Transmitter, Insulin Aspart, Insulin Aspart (w/Niacinamide), Insulin Pen Needle, Insulin Syringe-Needle U-100, Omnipod 5 G6 Pods (Gen 5), Prenatal Vit-Fe Fumarate-FA, aspirin, folic acid, labetalol, lansoprazole, and mesalamine    Allergies:  Allergies   Allergen Reactions    Dilantin [Phenytoin] Mental Status Change    Keppra [Levetiracetam] Mental Status Change    Meperidine Rash    Topamax [Topiramate] Mental Status Change       Objective    Objective     Vitals:  Temp:  [97.7 °F (36.5 °C)-98.8 °F (37.1 °C)] 98.6 °F (37 °C)  Heart Rate:  [] 71  Resp:  [16-24] 24  BP: ()/(43-95) 148/76  Flow (L/min):  [2-4] 2    Physical Exam  HENT:      Head: Normocephalic.   Eyes:      Extraocular Movements: Extraocular movements intact.   Cardiovascular:      Rate and Rhythm: Normal rate and regular rhythm.      Heart sounds: No murmur heard.     No gallop.   Pulmonary:      Breath sounds: Rales present.   Abdominal:      Palpations: Abdomen is soft.   Musculoskeletal:      Right lower leg: No  edema.      Left lower leg: No edema.   Skin:     General: Skin is warm and dry.   Neurological:      General: No focal deficit present.      Mental Status: She is alert.   Psychiatric:         Mood and Affect: Mood normal.         Result Review    Result Review:  I have personally reviewed the results from the time of this admission to 4/18/2024 15:39 EDT and agree with these findings:  []  Laboratory list / accordion  []  Microbiology  []  Radiology  []  EKG/Telemetry   []  Cardiology/Vascular   []  Pathology  []  Old records  []  Other:        Assessment & Plan   Assessment / Plan     Assessment  -HFpEF, normal EKG, echo today shows preserved systolic function (in room during echo)  -hx of HFpEF  -CXR showed pulmonary edema  -C section 4/18/24 morning  -Long standing HTN  -DM  -morbid obesity  -COPD?  -ALEJANDRO    Plan:   -Does not appear to have peripartum cardiomyopathy but CHF exacerbation brought on by pregnancy, DM, HTN, morbid obesity, etc.  -blood work in morning  -lasix 80mv IV bid  -if does not respond well overnight to diuretic will likely need to be transferred off the floor tomorrow to telemetry or ICU as she will likely need lasix drip.  -will defer to Dr. Keys regarding breast feeding but will likely will need meds that would hold that contraindication  -Will monitor chest pain  -Will likely need SGLT2 and GLP1 agonist for CHF, morbid obesity and DM to help reduce risk of subsequent events.   -BP much better.  Labetolol and lasix for now.  Will likely add ARB, possibly ARNI (entresto) and MRA for fluid retention and BP management.     -Thyroid blood work    Glenn Cabrera MD  [phone]

## 2024-04-18 NOTE — OP NOTE
"[facility]  Antoinette Pack  : 1985  MRN: [id number]  [facility]: [id number]    Transfer of care from [place]..     Section Operative Note    Pre-Operative Dx:   Intrauterine pregnancy at 34w5d weeks   { indications:40643}      Postoperative dx:    Intrauterine pregnancy at 34w5d weeks   { indications:16194}           Procedure: {Type of delivery:69479}       Surgeon: Axel Keys MD   Assistant: {C/S assist:15645}       Anesthesia: Epidural        EBL: *** mls.   IV Fluids: *** mls.   UOP: *** mls.     Antibiotics: {Abx prophylaxis:79071}     Infant:      Name:  ***      Gender: female  infant    Weight: 2220 g (4 lb 14.3 oz)     Apgars: 8  @ 1 minute / 9  @ 5 minutes     Procedure Details:   After the patient was adequately anesthetized, she was sterilely prepped and draped in the dorsal supine left lateral tilt position. {C/S scar:18046}.  The fascia was cut transversely with the knife and extended in a curvilinear fashion with scissors. The fascia was freed from its midline insertion superiorly and inferiorly. Rectus muscles were  in the midline. The peritoneum was sharply entered and a bladder flap {WAS/WAS NOT:8196267424::\"was not\"} sharply created. The lower uterine segment was scored transversely with the knife. Clear amniotic fluid was seen. The infant's head was delivered atraumatically. The mouth and nose were bulb suctioned. The infant was handed to the delivery team which was in attendance. The placenta was spontaneously extracted. The uterus was exteriorized and wiped free of debris and clot. The uterine incision was closed with #1 Vicryl in a continuous running locking fashion. A second #1 Vicryl was used to imbricate across the first.  The bladder flap {WAS/WAS NOT:[id number]::\"was not\"} reapproximated.    The uterus was returned to the abdomen. The paracolic gutters were cleared of debris and clot. The fascia was closed with 0 PDS. The subcutaneous tissue " "was copiously irrigated. Subcutaneous tissue was reapproximated with 3-0 Plain Gut and the skin was closed with 4-0 Monocryl subcuticularly. Steri-Strips were applied.  All counts were correct.         Complications:   {Post Op Complications:55970}      Disposition:   Mother to {MATERNAL TRANSFER:81723::\"Mother Baby/Postpartum\"}  in {stable/unstable:764195} condition currently.   Baby to { TRANSFER:54982::\"NBN\"}  in {stable/unstable:466601} condition currently.       Axel Keys MD  2024  04:50 EDT        "

## 2024-04-18 NOTE — ANESTHESIA PREPROCEDURE EVALUATION
Anesthesia Evaluation     Patient summary reviewed and Nursing notes reviewed   NPO Solid Status: > 8 hours  NPO Liquid Status: > 6 hours           Airway   Mallampati: III  TM distance: >3 FB  Neck ROM: full  Possible difficult intubation  Dental      Pulmonary    (+) ,shortness of breath, sleep apnea on CPAP, decreased breath sounds  Cardiovascular     Rhythm: regular  Rate: normal    (+) hypertension, CHF       Neuro/Psych  (+) psychiatric history Anxiety and Depression  GI/Hepatic/Renal/Endo    (+) morbid obesity, GERD, renal disease-, diabetes mellitus type 2 poorly controlled using insulin    Musculoskeletal (-) negative ROS    Abdominal    Substance History - negative use     OB/GYN    (+) Pregnant, pregnancy induced hypertension    Comment: Urgent  for maternal pulmonary edema and SOB      Other                    Anesthesia Plan    ASA 4 - emergent     epidural       Anesthetic plan, risks, benefits, and alternatives have been provided, discussed and informed consent has been obtained with: patient.    Use of blood products discussed with patient .      CODE STATUS:    Level Of Support Discussed With: Patient  Code Status (Patient has no pulse and is not breathing): CPR (Attempt to Resuscitate)  Medical Interventions (Patient has pulse or is breathing): Full Support

## 2024-04-18 NOTE — PAYOR COMM NOTE
"Antoinette Pack (38 y.o. Female)     [facility] Ref#[id number]    Delivery information.    From: Rachel Willis LPN, Utilization Review  Phone #[phone]  Fax #[phone]        Date of Birth   1985    Social Security Number       [address] [address]    Home Phone       MRN   [id number]       Church   None    Marital Status                               Admission Date   4/17/24    Admission Type   Urgent    Admitting Provider   Axel Keys MD    Attending Provider   Axel Keys MD    Department, Room/Bed   [facility] ANTEPARTUM, N325/1       Discharge Date       Discharge Disposition       Discharge Destination                                 Attending Provider: Axel Keys MD    Allergies: Dilantin [Phenytoin], Keppra [Levetiracetam], Meperidine, Topamax [Topiramate]    Isolation: None   Infection: None   Code Status: CPR    Ht: 152.4 cm (60\")   Wt: 107 kg (236 lb)    Admission Cmt: None   Principal Problem: Pulmonary edema [J81.1]                   Active Insurance as of 4/17/2024       Primary Coverage       Payor Plan Insurance Group Employer/Plan Group    ANTHEM MEDICARE REPLACEMENT ANTHEM MEDICARE ADVANTAGE [id number]       Payor Plan Address Payor Plan Phone Number Payor Plan Fax Number Effective Dates    [address] [phone]  3/1/2024 - None Entered    [facility] [address]         Subscriber Name Subscriber Birth Date Member ID       ANTOINETTE PACK 1985 [id number]               Secondary Coverage       Payor Plan Insurance Group Employer/Plan Group    MISC COMMERCIAL MISC COMMERCIAL        Coverage Address Coverage Phone Number Coverage Fax Number Effective Dates    [address] [phone]  1/1/2024 - None Entered    [place] [id number]         Subscriber Name Subscriber Birth Date Member ID       CHRISTOPHER PACK 8/22/1987 [id number]                     Emergency Contacts       "  (Rel.) Home Phone Work Phone Mobile Phone    Jonel Iverson (Father) [phone] -- --    Michelle Pack (Spouse) -- -- [phone]              Insurance Information                  ANTHEM MEDICARE REPLACEMENT/ANTHEM MEDICARE ADVANTAGE Phone: [phone]    Subscriber: Antoinette Pack Subscriber#: [id number]    Group#: [id number] Precert#: --        MISC COMMERCIAL/MISC COMMERCIAL Phone: [phone]    Subscriber: Michelle Pack Subscriber#: [id number]    Group#: -- Precert#: --             History & Physical        Axel Keys MD at 24 0245          [facility]  Obstetric History and Physical    Transfer of care ... Louie - Dr. Brito    CC:   Transfer of care due to HTN and multiple other commodities      HPI:    Patient is a 38 y.o. female  currently at 34w5d, who presents as a transfer of care from [facility].   Antoinette was in the office today for her routine visit when it was noted that she had elevated blood pressures in the mild and severe range.  She denies RUQ pain and vision changes, but is having worsening SOA and is now unable to lie flat.   Her partner says she has put on about 10 lbs of fluid in the last 2 days. She is further complicated in that she has CHTN, DM type II, CHF, COPD, and a h/o pancreatitis.  Her insulin had been increased over the last few weeks, but the last coupe of day she has required less than half of her current dosing.   Her blood pressure meds were stopped when she was became pregnant, but 200 mg of Labetalol TID was started last week.   She had an ECHO last week which showed an EF of 60%.  She continues to struggle with breathing with the slightest of exertion.   She at times requires O2 to maintain 95% plus.    CXR today shows pulmonary edema.         The following portions of the patients history were reviewed and updated as appropriate: current medications, allergies, past medical history, past surgical history, past family  "history, past social history and problem list .       Prenatal Information:   Maternal Prenatal Labs  Blood Type ABO Type   Date Value Ref Range Status   04/18/2024 O  Final      Rh Status RH type   Date Value Ref Range Status   04/18/2024 Positive  Final      Antibody Screen Antibody Screen   Date Value Ref Range Status   04/18/2024 Negative  Final      Gonnorhea No results found for: \"GCCX\"   Chlamydia No results found for: \"CLAMYDCU\"   RPR No results found for: \"RPR\"   Syphilis Antibody No results found for: \"SYPHILIS\"   Rubella No results found for: \"RUBELLAIGGIN\"   Hepatitis B Surface Antigen No results found for: \"HEPBSAG\"   HIV-1 Antibody No results found for: \"LABHIV1\"   Hepatitis C Antibody No results found for: \"HEPCAB\"   Rapid Urin Drug Screen No results found for: \"AMPMETHU\", \"BARBITSCNUR\", \"LABBENZSCN\", \"LABMETHSCN\", \"LABOPIASCN\", \"THCURSCR\", \"COCAINEUR\", \"AMPHETSCREEN\", \"PROPOXSCN\", \"BUPRENORSCNU\", \"METAMPSCNUR\", \"OXYCODONESCN\", \"TRICYCLICSCN\"   Group B Strep Culture No results found for: \"GBSANTIGEN\"           External Prenatal Results       Pregnancy Outside Results - Transcribed From Office Records - See Scanned Records For Details       Test Value Date Time    ABO  O  04/18/24 0005    Rh  Positive  04/18/24 0005    Antibody Screen  Negative  04/18/24 0005    Varicella IgG       Rubella ^ Immune  11/16/23     Hgb  8.4 g/dL 04/18/24 0005       8.2 g/dL 04/17/24 1145       8.4 g/dL 04/15/24 2141       10.0 g/dL 03/27/24 1215       10.6 g/dL 01/29/24 1826       9.8 g/dL 12/08/23 1239       10.4 g/dL 11/15/23 1603       11.2 g/dL 10/27/23 2112       9.5 g/dL 09/27/23 1332       8.7 g/dL 09/20/23 0235       9.0 g/dL 09/19/23 0054       10.2 g/dL 09/18/23 0828       9.5 g/dL 09/17/23 0023       10.1 g/dL 09/03/23 1610       10.1 g/dL 08/26/23 1727    Hct  27.9 % 04/18/24 0005       26.3 % 04/17/24 1145       26.7 % 04/15/24 2141       30.5 % 03/27/24 1215       30.9 % 01/29/24 1826       32.0 % 12/08/23 " 1239       32.5 % 11/15/23 1603       36.8 % 10/27/23 2112       30.1 % 23 1332       28.3 % 23 0235       29.0 % 23 0054       33.0 % 23 0828       31.2 % 23 0023       32.2 % 23 1610       32.7 % 23 1727    Glucose Fasting GTT       Glucose Tolerance Test 1 hour       Glucose Tolerance Test 3 hour       Gonorrhea (discrete) ^ Negative  23     Chlamydia (discrete) ^ Negative  23     RPR ^ Non-Reactive  23       ^ Non-Reactive  23     VDRL       Syphilis Antibody       HBsAg ^ Negative  23     Herpes Simplex Virus PCR       Herpes Simplex VIrus Culture       HIV ^ Non-Reactive  23     Hep C RNA Quant PCR       Hep C Antibody       AFP       Group B Strep       GBS Susceptibility to Clindamycin       GBS Susceptibility to Erythromycin       Fetal Fibronectin       Genetic Testing, Maternal Blood                 Drug Screening       Test Value Date Time    Urine Drug Screen       Amphetamine Screen  Negative  20 0953    Barbiturate Screen  Negative  19    Benzodiazepine Screen  Negative  20 0953    Methadone Screen  Negative  20 0953    Phencyclidine Screen  Negative  19    Opiates Screen  Negative  20 0953    THC Screen  Negative  20 0953    Cocaine Screen  Negative  20 0953    Propoxyphene Screen  Negative  10/19/15 1920    Buprenorphine Screen  Negative  20 0953    Methamphetamine Screen  Negative  20 0953    Oxycodone Screen  Negative  20 0953    Tricyclic Antidepressants Screen                 Legend    ^: Historical                              Past OB History:     OB History    Para Term  AB Living   2 0 0 0 1 0   SAB IAB Ectopic Molar Multiple Live Births   1 0 0 0 0 0      # Outcome Date GA Lbr [place]/2nd Weight Sex Type Anes PTL Lv   2 Current            1 SAB 10/2022 6w0d    SAB         Obstetric Comments   FOB #1  Pregnancy #1 SAB at 6 weeks    FOB #1 Pregnancy #2  current       Past Medical History: Past Medical History:   Diagnosis Date    Anxiety     CHF (congestive heart failure) 05/2023    Colitis 2023    Depression     Diabetes mellitus 2018    Diverticulitis     GERD (gastroesophageal reflux disease)     Hypertension     Kidney stone     Pancreatitis 09/2023    PCOS (polycystic ovarian syndrome)     Polycystic ovary syndrome     Rectal bleeding     SINCE 2023 - USES MESALAMINE SUPPOSITORY NIGHTLY    Sleep apnea     Urinary tract infection       Past Surgical History Past Surgical History:   Procedure Laterality Date    CARDIAC CATHETERIZATION      CARPAL TUNNEL RELEASE      COLONOSCOPY      ENDOSCOPY      FRACTURE SURGERY      HARDWARE REMOVAL      WRIST    TENDON REPAIR      HAND    WISDOM TOOTH EXTRACTION        Family History: Family History   Problem Relation Age of Onset    Hypertension Mother     Depression Mother     Heart disease Mother     COPD Mother     Emphysema Mother     Hypertension Father     Diabetes Father     Heart disease Father     COPD Father     Heart failure Father     Hepatitis Father       Social History:  reports that she quit smoking about 9 years ago. Her smoking use included cigarettes. She started smoking about 29 years ago. She has a 20 pack-year smoking history. She has never been exposed to tobacco smoke. She has never used smokeless tobacco.   reports no history of alcohol use.   reports no history of drug use.        Review of Systems  Denies fever, HA, muscle weakness,                                             and rashes.  All other pertinent positives and negatives are addressed in the HPI       Objective    Vital Signs Range for the last 24 hours  Temperature: Temp:  [97.8 °F (36.6 °C)-98.7 °F (37.1 °C)] 98.7 °F (37.1 °C)   Temp Source: Temp src: Oral   BP: BP: (139-184)/(66-95) 169/84   Pulse: Heart Rate:  [] 88   Respirations: Resp:  [18-20] 18   SPO2: SpO2:  [90 %-100 %] 92 %   O2 Amount (l/min):  Flow (L/min):  [2-4] 2   O2 Devices Device (Oxygen Therapy): nasal cannula   Weight: Weight:  [107 kg (236 lb)] 107 kg (236 lb)     Physical Examination: General appearance - alert, struggles with breathing with respiratory distress   and uncomfortable  Chest -  Diminished lung sound bilaterally, but right greater than left.   Crackles in the bases  Heart - Regular at.  Systolic murmur   Abdomen - soft, nontender, nondistended,   no rebound tenderness noted  No guarding, No RUQ pain, obese   Extremities - pedal edema +2      Fetal Heart Rate Assessment   Method: Fetal HR Assessment Method: external   Beats/min: Fetal HR (beats/min): 135   Baseline: Fetal HR Baseline: normal range   Varibility: Fetal HR Variability: moderate (amplitude range 6 to 25 bpm)   Accels: Fetal HR Accelerations: greater than/equal to 15 bpm, lasting at least 15 seconds   Decels: Fetal HR Decelerations: absent   Tracing Category:       Uterine Assessment   Method: Method: external tocotransducer   Frequency (min):     Ctx Count in 10 min:     Duration:     Intensity: Contraction Intensity: no contractions   Intensity by IUPC:     Resting Tone: Uterine Resting Tone: soft by palpation   Resting Tone by IUPC:           Laboratory Results:   Lab Results   Component Value Date     (L) 04/18/2024    HGB 8.4 (L) 04/18/2024    HCT 27.9 (L) 04/18/2024    WBC 9.21 04/18/2024     Lab Results   Component Value Date     04/18/2024    K 4.1 04/18/2024     04/18/2024    CO2 19.0 (L) 04/18/2024    BUN 11 04/18/2024    CREATININE 0.52 (L) 04/18/2024    GLUCOSE 66 04/18/2024    ALBUMIN 3.0 (L) 04/18/2024    CALCIUM 8.5 (L) 04/18/2024    AST 19 04/18/2024    ALT 11 04/18/2024    BILITOT 0.6 04/18/2024      ProBnP: 254  Troponin:  23      Radiology:  Study Result    XR CHEST PA AND LATERAL     Date of Exam: 4/18/2024 1:11 AM EDT     Indication: soa and chest pain     Comparison: 3/27/2024.     Findings:  There is new interstitial disease  present throughout the lungs. No pneumothorax or pleural effusion. Heart size is stable. No acute osseous abnormality. No focal lung consolidation.     IMPRESSION:  Impression:  New interstitial disease in the lungs concerning for pulmonary edema.        Electronically Signed: Yoseph Pace MD    4/18/2024 1:38 AM EDT    Workstation ID: [id number]          Assessment:  1. Intrauterine pregnancy at 34w5d weeks gestation with reassuring fetal status  2.  CHTN  3.  Type II DM  4.  CHF  5.  H/O seizure disorder   6.  H/O acute pancreatitis  7. Pulmonary edema  8. AMA  9. Obese      Plan:  1.  Admit  2.  IV, Labs, Chest x-ray, EKG,   3.  Monitor.  Because she is unable to lie down and her body habitus, continuous fetal monitoring is virtually impossible.   4.  Spoke with Dr. Fernandez and Anesthesia.   Given her current unstable condition with severe range blood pressures, SOA with pulmonary edema exacerbated by her other commodities, it is recommended that she be delivered ASAP.   Will move to primary LTCS with BTL tonight.    May require ICU admission after the surgery.       Axel Keys MD  4/18/2024  02:45 EDT    Electronically signed by Axel Keys MD at 04/18/24 0455       Facility-Administered Medications as of 4/18/2024   Medication Dose Route Frequency Provider Last Rate Last Admin    [COMPLETED] acetaminophen (TYLENOL) tablet 1,000 mg  1,000 mg Oral Once Axel Keys MD   1,000 mg at 04/18/24 0303    acetaminophen (TYLENOL) tablet 1,000 mg  1,000 mg Oral Q6H Axel Keys MD        Followed by    [START ON 4/19/2024] acetaminophen (TYLENOL) tablet 650 mg  650 mg Oral Q6H Axel Keys MD        aluminum-magnesium hydroxide-simethicone (MAALOX MAX) 400-400-40 MG/5ML suspension 15 mL  15 mL Oral Q4H PRN Axel Keys MD        Or    calcium carbonate (TUMS) chewable tablet 500 mg (200 mg elemental)  1 tablet Oral Q4H PRN Axel Keys MD        carboprost (HEMABATE) injection 250 mcg  250  mcg Intramuscular Q15 Min PRN Axel Keys MD        [COMPLETED] ceFAZolin in Sodium Chloride (ANCEF) IVPB solution 3,000 mg  3,000 mg Intravenous Once Axel Keys  mL/hr at 24 0316 Currently Infusing at 24 0316    docusate sodium (COLACE) capsule 100 mg  100 mg Oral BID PRN Axel Keys MD        [COMPLETED] furosemide (LASIX) injection 40 mg  40 mg Intravenous Once Axel Keys MD   40 mg at 24 0514    Hydrocortisone (Perianal) (ANUSOL-HC) 2.5 % rectal cream 1 Application  1 Application Rectal PRN Axel Keys MD        [COMPLETED] labetalol (NORMODYNE,TRANDATE) injection 20 mg  20 mg Intravenous Once Anais Brito DO   20 mg at 24 1716    labetalol (NORMODYNE,TRANDATE) injection 20-80 mg  20-80 mg Intravenous Q10 Min PRN Axel Keys MD        lactated ringers bolus 1,000 mL  1,000 mL Intravenous Once Axel Keys MD        lactated ringers infusion  125 mL/hr Intravenous Continuous Axel Keys  mL/hr at 24 0316 125 mL/hr at 24 0316    lanolin topical 1 Application  1 Application Topical Q1H PRN Axel Keys MD        lidocaine PF 1% (XYLOCAINE) injection 0.5 mL  0.5 mL Intradermal Once PRN Axel Keys MD        methylergonovine (METHERGINE) injection 200 mcg  200 mcg Intramuscular Once PRN Axel Keys MD        miSOPROStol (CYTOTEC) tablet 800 mcg  800 mcg Rectal Once PRN Axel Keys MD        ondansetron (ZOFRAN) injection 4 mg  4 mg Intravenous Q6H PRN Axel Keys MD   4 mg at 24 0746    oxyCODONE (ROXICODONE) immediate release tablet 5 mg  5 mg Oral Q4H PRN Axel Keys MD   5 mg at 24 0832    Or    oxyCODONE (ROXICODONE) immediate release tablet 10 mg  10 mg Oral Q4H PRN Axel Keys MD        oxytocin (PITOCIN) 30 units in 0.9% sodium chloride 500 mL (premix)  999 mL/hr Intravenous Once Axel Keys MD        Followed by    [] oxytocin (PITOCIN) 30 units in 0.9% sodium  "chloride 500 mL (premix)  250 mL/hr Intravenous Continuous Axel Keys MD        oxytocin (PITOCIN) 30 units in 0.9% sodium chloride 500 mL (premix)  125 mL/hr Intravenous Once PRN Axel Keys MD        prenatal vitamin tablet 1 tablet  1 tablet Oral Daily Axel Keys MD        promethazine (PHENERGAN) 12.5 mg in sodium chloride 0.9 % 50 mL  12.5 mg Intravenous Q6H PRN Abhishek Cuba DO        promethazine (PHENERGAN) tablet 25 mg  25 mg Oral Q6H PRN Axel Keys MD        Or    promethazine (PHENERGAN) suppository 12.5 mg  12.5 mg Rectal Q6H PRN Axel Keys MD        simethicone (MYLICON) chewable tablet 80 mg  80 mg Oral 4x Daily PRN Axel Keys MD        [COMPLETED] Sod Citrate-Citric Acid (BICITRA) oral solution 30 mL  30 mL Oral Once Axel Keys MD   30 mL at 04/18/24 0311    sodium chloride 0.9 % flush 10 mL  10 mL Intravenous Q12H Axel Keys MD        sodium chloride 0.9 % flush 10 mL  10 mL Intravenous PRN Axel Keys MD   10 mL at 04/18/24 0753    sodium chloride 0.9 % flush 3 mL  3 mL Intravenous Q12H Axel Keys MD        sodium chloride 0.9 % flush 3-10 mL  3-10 mL Intravenous PRN Axel Keys MD        sodium chloride 0.9 % infusion 40 mL  40 mL Intravenous PRN Axel Keys MD        sodium chloride 0.9 % infusion 40 mL  40 mL Intravenous PRN Axel Keys MD         Orders (last 24 hrs)        Start     Ordered    04/19/24 0900  acetaminophen (TYLENOL) tablet 650 mg  Every 6 Hours        Placed in \"Followed by\" Linked Group    04/18/24 0449 04/19/24 0600  Discontinue Indwelling Urinary Catheter in AM  Once         04/18/24 0449    04/19/24 0600  CBC & Differential  Timed         04/18/24 0449    04/19/24 0000  Remove Abdominal Dressing  Once         04/18/24 0449    04/18/24 0954  promethazine (PHENERGAN) 12.5 mg in sodium chloride 0.9 % 50 mL  Every 6 Hours PRN         04/18/24 0956    04/18/24 0900  sodium chloride 0.9 % flush 10 mL " " Every 12 Hours Scheduled         04/18/24 0245    04/18/24 0900  CBC & Differential  Timed         04/18/24 0449    04/18/24 0900  Comprehensive Metabolic Panel  Timed         04/18/24 0449 04/18/24 0900  sodium chloride 0.9 % flush 3 mL  Every 12 Hours Scheduled         04/18/24 0449    04/18/24 0900  prenatal vitamin tablet 1 tablet  Daily         04/18/24 0449    04/18/24 0900  acetaminophen (TYLENOL) tablet 1,000 mg  Every 6 Hours        Placed in \"Followed by\" Linked Group    04/18/24 0449 04/18/24 0900  CBC Auto Differential  PROCEDURE ONCE         04/18/24 0449 04/18/24 0900  CBC (No Diff)  STAT,   Status:  Canceled         04/18/24 0459 04/18/24 0900  High Sensitivity Troponin T  Once         04/18/24 0720    04/18/24 0800  Ambulate Patient  2 Times Daily      Comments: After anesthesia wears off.    04/18/24 0449 04/18/24 0730  labetalol (NORMODYNE,TRANDATE) injection 20-80 mg  Every 10 Minutes PRN         04/18/24 0732    04/18/24 0600  POC Glucose 4x Daily Fasting & PC  4 Times Daily Fasting & After Meals      Comments: Complete no more than 45 minutes prior to patient eating      04/18/24 0449 04/18/24 0545  furosemide (LASIX) injection 40 mg  Once         04/18/24 0449    04/18/24 0500  Incentive spirometry RT  Every Hour       04/18/24 0449    04/18/24 0450  Continuous Pulse Oximetry  Continuous         04/18/24 0449    04/18/24 0447  Code Status and Medical Interventions:  Continuous         04/18/24 0449    04/18/24 0447  Vital Signs Per hospital policy  Per Hospital Policy         04/18/24 0449    04/18/24 0447  Notify Physician  Until Discontinued         04/18/24 0449 04/18/24 0447  Patient May Shower  Once        Comments: After anesthesia wears off and with assistance    04/18/24 0449    04/18/24 0447  Diet: Regular/House; Fluid Consistency: Thin (IDDSI 0)  Diet Effective Now         04/18/24 0449    04/18/24 0447  Advance Diet As Tolerated -Regular  Until Discontinued      " "   04/18/24 0449    04/18/24 0447  I/O  Every Shift       04/18/24 0449    04/18/24 0447  Fundal and Lochia Check  Per Hospital Policy        Comments: Q 30 min x 2, Q 1 hr x 4, Q 4 hrs x 24 hrs, then Q shift.    04/18/24 0449    04/18/24 0447  Continue Indwelling Urinary Catheter Already in Place  Once         04/18/24 0449    04/18/24 0447  Notify Provider if Bladder Distention Continues  Until Discontinued         04/18/24 0449    04/18/24 0447  Urinary Catheter Care  Every Shift       04/18/24 0449    04/18/24 0447  Turn Cough Deep Breathe  Once         04/18/24 0449    04/18/24 0447  Encourage early intake of PO fluids  Continuous         04/18/24 0449    04/18/24 0447  Ambulate Patient 3-5 times per day (with or without Cai)  Every Shift       04/18/24 0449    04/18/24 0447  Apply Abdominal Binding Until Discontinued  Until Discontinued         04/18/24 0449    04/18/24 0447  \"If patient tolerates food and liquids after completion of second bag of Pitocin, saline lock IV and discontinue IV fluid infusions.  Once         04/18/24 0449    04/18/24 0447  Breast pump to bed  Once         04/18/24 0449    04/18/24 0447  If indicated -- Please administer RH Immunoglobulin based on results of cord blood evaluation and fetal screen lab tests, pharmacy to dispense  Per Order Details        Comments: See Process Instructions For Reference Range Details.    04/18/24 0449    04/18/24 0447  Insert Peripheral IV  Once         04/18/24 0449    04/18/24 0447  Saline Lock & Maintain IV Access  Continuous         04/18/24 0449    04/18/24 0447  Place Sequential Compression Device  Once         04/18/24 0449    04/18/24 0447  Maintain Sequential Compression Device  Continuous         04/18/24 0449    04/18/24 0446  oxyCODONE (ROXICODONE) immediate release tablet 5 mg  Every 4 Hours PRN        Placed in \"Or\" Linked Group    04/18/24 0449    04/18/24 0446  oxyCODONE (ROXICODONE) immediate release tablet 10 mg  Every 4 Hours PRN  " "      Placed in \"Or\" Linked Group    04/18/24 0449    04/18/24 0446  lanolin topical 1 Application  Every 1 Hour PRN         04/18/24 0449 04/18/24 0446  Hydrocortisone (Perianal) (ANUSOL-HC) 2.5 % rectal cream 1 Application  As Needed         04/18/24 0449    04/18/24 0446  aluminum-magnesium hydroxide-simethicone (MAALOX MAX) 400-400-40 MG/5ML suspension 15 mL  Every 4 Hours PRN        Placed in \"Or\" Linked Group    04/18/24 0449    04/18/24 0446  calcium carbonate (TUMS) chewable tablet 500 mg (200 mg elemental)  Every 4 Hours PRN        Placed in \"Or\" Linked Group    04/18/24 0449 04/18/24 0446  sodium chloride 0.9 % flush 3-10 mL  As Needed         04/18/24 0449 04/18/24 0446  sodium chloride 0.9 % infusion 40 mL  As Needed         04/18/24 0449 04/18/24 0446  oxytocin (PITOCIN) 30 units in 0.9% sodium chloride 500 mL (premix)  Once As Needed         04/18/24 0449    04/18/24 0446  docusate sodium (COLACE) capsule 100 mg  2 Times Daily PRN         04/18/24 0449 04/18/24 0446  simethicone (MYLICON) chewable tablet 80 mg  4 Times Daily PRN         04/18/24 0449 04/18/24 0446  promethazine (PHENERGAN) tablet 25 mg  Every 6 Hours PRN        Placed in \"Or\" Linked Group    04/18/24 0449    04/18/24 0446  promethazine (PHENERGAN) suppository 12.5 mg  Every 6 Hours PRN        Placed in \"Or\" Linked Group    04/18/24 0449    04/18/24 0425  Blood Gas, Venous, Cord  PROCEDURE ONCE         04/18/24 0424    04/18/24 0424  Blood Gas, Arterial, Cord  PROCEDURE ONCE         04/18/24 0423    04/18/24 0400  Vital Signs q 4 while awake  Every 4 Hours      Comments: While the patient is awake.    04/18/24 0245    04/18/24 0400  oxytocin (PITOCIN) 30 units in 0.9% sodium chloride 500 mL (premix)  Continuous        Placed in \"Followed by\" Linked Group    04/18/24 0245    04/18/24 0345  lactated ringers bolus 1,000 mL  Once         04/18/24 0245    04/18/24 0345  lactated ringers infusion  Continuous         04/18/24 " "0245 04/18/24 0345  Sod Citrate-Citric Acid (BICITRA) oral solution 30 mL  Once         04/18/24 0245    04/18/24 0345  acetaminophen (TYLENOL) tablet 1,000 mg  Once         04/18/24 0245    04/18/24 0345  ketorolac (TORADOL) injection 30 mg  Once,   Status:  Discontinued         04/18/24 0245    04/18/24 0345  ceFAZolin in Sodium Chloride (ANCEF) IVPB solution 3,000 mg  Once         04/18/24 0245    04/18/24 0330  oxytocin (PITOCIN) 30 units in 0.9% sodium chloride 500 mL (premix)  Once        Placed in \"Followed by\" Linked Group    04/18/24 0245    04/18/24 0321  Tissue Pathology Exam  RELEASE UPON ORDERING         04/18/24 0321    04/18/24 0255  Verify Informed Consent for Blood Product Administration  Once         04/18/24 0254    04/18/24 0255  Prepare RBC, 2 Units  Blood - Once         04/18/24 0254    04/18/24 0246  Admit To Obstetrics Inpatient  Once         04/18/24 0245    04/18/24 0246  Code Status and Medical Interventions:  Continuous,   Status:  Canceled         04/18/24 0245 04/18/24 0246  Continuous Fetal Monitoring With NST on Admission and Prior to Initiation of Oxytocin.  Per Order Details        Comments: Continuous Fetal Monitoring With NST on Admission    04/18/24 0245    04/18/24 0246  External Uterine Contraction Monitoring  Per Hospital Policy         04/18/24 0245    04/18/24 0246  Notify Provider (Specified)  Continuous        Comments: Open Order Report to View Parameters Requiring Provider Notification    04/18/24 0245    04/18/24 0246  Notify Provider of Tachysystole (Per Hospital Algorithm)  Continuous        Comments: Open Order Report to View Parameters Requiring Provider Notification    04/18/24 0245    04/18/24 0246  Notify Provider if Membranes Ruptured, Bleeding Greater Than 1 Pad Per Hour, Fetal Heart Tone Abnormality or Severe Pain  Continuous        Comments: Open Order Report to View Parameters Requiring Provider Notification    04/18/24 0245    04/18/24 0246  Weigh " "Patient Daily  Daily       245    24  Initiate Group Beta Strep (GBS) Prophylaxis Protocol, If Criteria Met  Continuous        Comments: NO TREATMENT RECOMMENDED IF: 1) Maternal GBS Status Known Negative 2) Scheduled  Birth With Intact Membranes, Not in Labor 3) Maternal GBS Status Unknown, No Risk Factors  TREAT WITH ANTIBIOTICS IF:  1) Maternal GBS Status Known Positive 2) Maternal GBS Status Unknown With Risk Factors: a)  Previous Infant Affected By GBS Infection b) GBS Urinary Tract Infection (UTI) or Bacteriuria During Pregnancy c) Unexplained Maternal Fever (100.4F (38C) or Greater) During Labor d)  Prolonged Rupture of Membranes (18 or More Hours) e) Gestational Age Less Than 37 Weeks    24 0245    24 024  Insert Indwelling Urinary Catheter  Once        Comments: Follow Protocol As Outlined in Process Instructions (Open Order Report to View Full Instructions)   Placed in \"And\" Linked Group    24 024  Assess Need for Indwelling Urinary Catheter - Follow Removal Protocol  Continuous        Comments: Follow Protocol As Outlined in Process Instructions (Open Order Report to View Full Instructions)   Placed in \"And\" Linked Group    24 0245    24  Abdominal Prep With Clippers  Once         245    24 024  Chlorhexadine Skin Prep Unless Otherwise Indicated  Once         245    24 024  SCD (Sequential Compression Devices)  Once         24  POC Glucose Once  Once        Comments: Complete no more than 45 minutes prior to patient eating      24 024  Document Gatorade Consumption Prior to Admission (Yes or No)  Once         245    24 024  NPO Diet NPO Type: Ice Chips  Diet Effective Now,   Status:  Canceled         245    24 024  Insert Peripheral IV  Once         24  Saline Lock & Maintain IV " "Access  Continuous,   Status:  Canceled         04/18/24 0245    04/18/24 0246  Notify Provider (Specified)  Continuous        Comments: Open Order Report to View Parameters Requiring Provider Notification    04/18/24 0245    04/18/24 0246  Vital Signs Per Hospital Policy  Per Hospital Policy         04/18/24 0245    04/18/24 0246  Strict Bed Rest  Until Discontinued         04/18/24 0245    04/18/24 0246  Fundal & Lochia Check  Per Order Details        Comments: Every 15 Minutes x4, Then Every 30 Minutes x2, Then Every Shift    04/18/24 0245    04/18/24 0246  Fundal & Lochia Check  Every Shift       04/18/24 0245    04/18/24 0246  Diet: Regular/House; Fluid Consistency: Thin (IDDSI 0)  Diet Effective Now,   Status:  Canceled         04/18/24 0245    04/18/24 0245  methylergonovine (METHERGINE) injection 200 mcg  Once As Needed         04/18/24 0245    04/18/24 0245  carboprost (HEMABATE) injection 250 mcg  Every 15 Minutes PRN         04/18/24 0245    04/18/24 0245  miSOPROStol (CYTOTEC) tablet 800 mcg  Once As Needed         04/18/24 0245    04/18/24 0245  Urinary Catheter Care  Every Shift,   Status:  Canceled      Placed in \"And\" Linked Group    04/18/24 0245    04/18/24 0245  sodium chloride 0.9 % flush 10 mL  As Needed         04/18/24 0245    04/18/24 0245  sodium chloride 0.9 % infusion 40 mL  As Needed         04/18/24 0245    04/18/24 0245  lidocaine PF 1% (XYLOCAINE) injection 0.5 mL  Once As Needed         04/18/24 0245    04/18/24 0127  High Sensitivity Troponin T  STAT         04/17/24 2348    04/18/24 0104  XR Chest PA & Lateral  1 Time Imaging         04/18/24 0104    04/18/24 0030  ondansetron (ZOFRAN) injection 4 mg  Every 6 Hours PRN         04/18/24 0030    04/18/24 0024  ECG 12 Lead Chest Pain  STAT         04/18/24 0024    04/17/24 2315  proBNP  STAT         04/17/24 2314    04/17/24 2315  Uric Acid  STAT         04/17/24 2314    04/17/24 2315  Type & Screen  STAT         04/17/24 2314    " 04/17/24 2315  T Pallidum Antibody w/ reflex RPR (Syphilis)  STAT         04/17/24 2314    04/17/24 2314  CBC & Differential  STAT         04/17/24 2314    04/17/24 2314  Comprehensive Metabolic Panel  STAT         04/17/24 2314    04/17/24 2314  CBC Auto Differential  PROCEDURE ONCE         04/17/24 2314 04/17/24 2251  proBNP  Once,   Status:  Canceled         04/17/24 2251 04/17/24 2251  CBC & Differential  STAT,   Status:  Canceled         04/17/24 2251    04/17/24 2251  CBC Auto Differential  PROCEDURE ONCE,   Status:  Canceled         04/17/24 2251 04/17/24 2250  Comprehensive Metabolic Panel  STAT,   Status:  Canceled         04/17/24 2251 04/17/24 2250  Uric Acid  STAT,   Status:  Canceled         04/17/24 2251 04/17/24 2247  Type & Screen  STAT,   Status:  Canceled         04/17/24 2247 04/17/24 2247  CBC (No Diff)  STAT,   Status:  Canceled         04/17/24 2247    04/17/24 2247  Preeclampsia Panel  Once,   Status:  Canceled         04/17/24 2247 04/17/24 2247  T Pallidum Antibody w/ reflex RPR (Syphilis)  Once,   Status:  Canceled         04/17/24 2247    Unscheduled  Up with Assistance  As Needed       04/18/24 0449    Unscheduled  Bladder Scan if Patient Unable to Void 4-6 Hours After Catheter Removal  As Needed         04/18/24 0449    Unscheduled  Straight Cath Every 4-6 Hours As Needed If Patient is Unable to Void After 4-6 Hours, Bladder Scan Volume is Greater Than 500mL & Patient Has Symptoms of Bladder Discomfort / Distention  As Needed       04/18/24 0449    Unscheduled  Schedule / Prompt Voiding For Patients With Urinary Incontinence  As Needed       04/18/24 0449    Unscheduled  Wound Care  As Needed      Comments: Postop day 1. Remove dressing and leave incision open to air.    04/18/24 0449    Unscheduled  KPad  As Needed      Comments: PRN pain    04/18/24 0449    Unscheduled  Apply Ice Pack to Perineum  As Needed      Comments: For 20 min q 2hrs    04/18/24 0449     Unscheduled  Apply Waffle Cushion  As Needed      Comments: PRN perineal discomfort    24    Unscheduled  Chewing Gum  As Needed       24    Unscheduled  Warm compress  As Needed       24    Unscheduled  Apply ace wrap, tight bra, or binder  As Needed       24    Unscheduled  Apply ice packs  As Needed       24    Signed and Held  IV Site Care  As Needed       Signed and Held    Signed and Held  Oxygen Therapy- Nasal Cannula; 2 LPM  Continuous       Signed and Held    Signed and Held  Respirations  Every Hour      Comments: If respiratory rate is less than 10/min, notify the Anesthesiologist    Signed and Held    Signed and Held  If Respiratory Rate is Less Than 8/Min, See Narcan Order. Notify Anesthesiologist STAT.  Continuous       Signed and Held    Signed and Held  Blood Pressure and Pulse Every 4 Hours  Continuous       Signed and Held    Signed and Held  Blood Gas, Arterial -With Co-Ox Panel: Yes  As Needed       Signed and Held    Signed and Held  Activity & Removal of Cai Catheter, Per Obstetrician  Continuous       Signed and Held    Signed and Held  Notify Anesthesiologist for Any Questions / Problems  Continuous       Signed and Held    Signed and Held  Notify Anesthesia for Temp Over 101.4F  Continuous       Signed and Held    Signed and Held  Ambu Bag at Bedside  Continuous       Signed and Held                     Operative/Procedure Notes (last 24 hours)        Axel Keys MD at 24           Luci Pack  : 1985  MRN: [id number]  CSN: [id number]      Transfer of care ... Louie - Dr. Brito      Section Operative Note    Pre-Operative Dx:   Intrauterine pregnancy at 34w5d weeks   CHTN - possible super imposed pre-eclampsia   Pulmonary edema  CHF  Type II DM.       Postoperative dx:    Intrauterine pregnancy at 34w5d weeks   CHTN - possible superimposed Pre-eclampsia   Pulmonary edema    CHF  Type II diabetes  Unicornuate uterus            Procedure: Primary  (LTCS) with left total salpingectomy       Surgeon: Axel Keys MD   Assistant: Gaby urgical tech        Anesthesia: Epidural        EBL: 607 mls.   IV Fluids: 400 mls.   UOP: 100  mls.     Antibiotics: cefazolin 3 gms     Infant:      Name:  Jody      Gender: female  infant    Weight: 2220 g (4 lb 14.3 oz)     Apgars: 8  @ 1 minute / 9  @ 5 minutes     Procedure Details:   After the patient was adequately anesthetized, she was sterilely prepped and draped in the dorsal supine left lateral tilt position. A Pfannenstiel incision was created sharply with the knife. It was carried down to the fascia.  The fascia was cut transversely with the knife and extended in a curvilinear fashion with scissors. The fascia was freed from its midline insertion superiorly and inferiorly. Rectus muscles were  in the midline. The peritoneum was bluntly entered and a bladder flap was not  created. The lower uterine segment was scored transversely with the knife. Clear amniotic fluid was seen. The infant's head was delivered atraumatically.  A double nuchal cord was reduced on the abdomen.   The mouth and nose were bulb suctioned. The infant was handed to the delivery team which was in attendance. The placenta was spontaneously extracted. The uterus was exteriorized and wiped free of debris and clot. 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  The uterine incision was closed with #1 Monocryl  in a continuous running locking fashion.   The left tube was traced to its fimbriated end.  Enseal was used to transect each tube near the cornua and at the distal end near the fimbria ovarica. I did trace a tube remnant coming of the right rudimentary horn and removed it as well although it was not functional.    Both tubes were removed in their entirety with minimal blood loss.    The uterus was  returned to the abdomen. The paracolic gutters were cleared of debris and clot. The fascia was closed with 0 PDS. The subcutaneous tissue was copiously irrigated. Subcutaneous tissue was reapproximated with 3-0 Plain Gut and the skin was closed with Insorb staples  subcuticularly.   An Exofin dressing was applied.  All counts were correct.         Complications:   None      Disposition:   Mother to Remain in LD  in stable condition currently.   Baby to NICU  in stable condition currently.       Axel Keys MD  4/18/2024  04:56 EDT          Electronically signed by Axel Keys MD at 04/18/24 0549       Physician Progress Notes (last 24 hours)  Notes from 04/17/24 1016 through 04/18/24 1016   No notes of this type exist for this encounter.

## 2024-04-18 NOTE — ANESTHESIA PROCEDURE NOTES
Labor Epidural      Patient reassessed immediately prior to procedure    Patient location during procedure: OB  Performed By  Anesthesiologist: Desmond Guerra DO  Preanesthetic Checklist  Completed: patient identified, IV checked, site marked, risks and benefits discussed, surgical consent, monitors and equipment checked, pre-op evaluation and timeout performed  Prep:  Pt Position:sitting  Sterile Tech:gloves, mask, sterile barrier and cap  Prep:chlorhexidine gluconate and isopropyl alcohol  Monitoring:blood pressure monitoring and continuous pulse oximetry  Epidural Block Procedure:  Approach:midline  Guidance:landmark technique and palpation technique  Location:L3-L4  Needle Type:Tuohy  Needle Gauge:17 G  Loss of Resistance Medium: air  Loss of Resistance: 9cm  Cath Depth at skin:15 cm  Paresthesia: none  Aspiration:negative  Test Dose:negative  Number of Attempts: 1  Post Assessment:  Dressing:secured with tape and occlusive dressing applied (Tegaderm Placed)  Pt Tolerance:patient tolerated the procedure well with no apparent complications  Complications:no

## 2024-04-18 NOTE — ANESTHESIA POSTPROCEDURE EVALUATION
Patient: Antoinette Pack    Procedure Summary       Date: 24 Room / Location: [facility] LABOR DELIVERY   [facility] LABOR DELIVERY    Anesthesia Start: 314 Anesthesia Stop: 340    Procedure:  SECTION PRIMARY (Abdomen) Diagnosis:     Surgeons: Axel Keys MD Provider: Desmond Guerra DO    Anesthesia Type: epidural ASA Status: 4 - Emergent            Anesthesia Type: epidural    Vitals  Vitals Value Taken Time   /64 24 0500   Temp 97.7 °F (36.5 °C) 24 0440   Pulse 60 24 0507   Resp 16 24 0440   SpO2 98 % 24 0507   Vitals shown include unfiled device data.        Post Anesthesia Care and Evaluation    Patient location during evaluation: bedside  Patient participation: complete - patient participated  Level of consciousness: awake  Pain score: 0  Pain management: satisfactory to patient    Airway patency: patent  Anesthetic complications: No anesthetic complications  PONV Status: none  Cardiovascular status: acceptable and hemodynamically stable  Respiratory status: acceptable and nasal cannula  Hydration status: acceptable

## 2024-04-18 NOTE — PAYOR COMM NOTE
"Antoinette Pack (38 y.o. Female)     [employer] Medicare ID#[id number]     Delivery Information:  C Section 4/18/24 @ 04:02  Wt 2220 gms  Female  Apgars 8/9    From:Rachel Willis LPN, Utilization Review  Phone #[phone]  Fax #[phone]        Date of Birth   1985    Social Security Number       Address   [address] [address]    Home Phone       MRN   [id number]       Quaker   None    Marital Status                               Admission Date   4/17/24    Admission Type   Urgent    Admitting Provider   Axel Keys MD    Attending Provider   Axel Keys MD    Department, Room/Bed   [facility] ANTEPARTUM, N325/1       Discharge Date       Discharge Disposition       Discharge Destination                                 Attending Provider: Axel Keys MD    Allergies: Dilantin [Phenytoin], Keppra [Levetiracetam], Meperidine, Topamax [Topiramate]    Isolation: None   Infection: None   Code Status: CPR    Ht: 152.4 cm (60\")   Wt: 107 kg (236 lb)    Admission Cmt: None   Principal Problem: Pulmonary edema [J81.1]                   Active Insurance as of 4/17/2024       Primary Coverage       Payor Plan Insurance Group Employer/Plan Group    ANTH MEDICARE REPLACEMENT ANTHEM MEDICARE ADVANTAGE [id number]       Payor Plan Address Payor Plan Phone Number Payor Plan Fax Number Effective Dates    [address] [phone]  3/1/2024 - None Entered    [facility] [address]         Subscriber Name Subscriber Birth Date Member ID       ANTOINETTE PACK 1985 [id number]               Secondary Coverage       Payor Plan Insurance Group Employer/Plan Group    MISC COMMERCIAL MISC COMMERCIAL        Coverage Address Coverage Phone Number Coverage Fax Number Effective Dates    [address] [phone]  1/1/2024 - None Entered    [place] [id number]         Subscriber Name Subscriber Birth Date Member ID       CHRISTOPHER PACK 8/22/1987 " [id number]                     Emergency Contacts        (Rel.) Home Phone Work Phone Mobile Phone    Jonel Iverson (Father) [phone] -- --    Michelle Pack (Spouse) -- -- [phone]              Insurance Information                  ANTHEM MEDICARE REPLACEMENT/ANTHEM MEDICARE ADVANTAGE Phone: [phone]    Subscriber: Antoinette Pack Subscriber#: [id number]    Group#: [id number] Precert#: --        MISC COMMERCIAL/MISC COMMERCIAL Phone: [phone]    Subscriber: Michelle Pack Subscriber#: [id number]    Group#: -- Precert#: --             History & Physical        Axel Keys MD at 24 0245          [facility]  Obstetric History and Physical    Transfer of care ... Louie - Dr. Brito    CC:   Transfer of care due to HTN and multiple other commodities      HPI:    Patient is a 38 y.o. female  currently at 34w5d, who presents as a transfer of care from [facility].   Antoinette was in the office today for her routine visit when it was noted that she had elevated blood pressures in the mild and severe range.  She denies RUQ pain and vision changes, but is having worsening SOA and is now unable to lie flat.   Her partner says she has put on about 10 lbs of fluid in the last 2 days. She is further complicated in that she has CHTN, DM type II, CHF, COPD, and a h/o pancreatitis.  Her insulin had been increased over the last few weeks, but the last coupe of day she has required less than half of her current dosing.   Her blood pressure meds were stopped when she was became pregnant, but 200 mg of Labetalol TID was started last week.   She had an ECHO last week which showed an EF of 60%.  She continues to struggle with breathing with the slightest of exertion.   She at times requires O2 to maintain 95% plus.    CXR today shows pulmonary edema.         The following portions of the patients history were reviewed and updated as appropriate: current medications, allergies, past  "medical history, past surgical history, past family history, past social history and problem list .       Prenatal Information:   Maternal Prenatal Labs  Blood Type ABO Type   Date Value Ref Range Status   04/18/2024 O  Final      Rh Status RH type   Date Value Ref Range Status   04/18/2024 Positive  Final      Antibody Screen Antibody Screen   Date Value Ref Range Status   04/18/2024 Negative  Final      Gonnorhea No results found for: \"GCCX\"   Chlamydia No results found for: \"CLAMYDCU\"   RPR No results found for: \"RPR\"   Syphilis Antibody No results found for: \"SYPHILIS\"   Rubella No results found for: \"RUBELLAIGGIN\"   Hepatitis B Surface Antigen No results found for: \"HEPBSAG\"   HIV-1 Antibody No results found for: \"LABHIV1\"   Hepatitis C Antibody No results found for: \"HEPCAB\"   Rapid Urin Drug Screen No results found for: \"AMPMETHU\", \"BARBITSCNUR\", \"LABBENZSCN\", \"LABMETHSCN\", \"LABOPIASCN\", \"THCURSCR\", \"COCAINEUR\", \"AMPHETSCREEN\", \"PROPOXSCN\", \"BUPRENORSCNU\", \"METAMPSCNUR\", \"OXYCODONESCN\", \"TRICYCLICSCN\"   Group B Strep Culture No results found for: \"GBSANTIGEN\"           External Prenatal Results       Pregnancy Outside Results - Transcribed From Office Records - See Scanned Records For Details       Test Value Date Time    ABO  O  04/18/24 0005    Rh  Positive  04/18/24 0005    Antibody Screen  Negative  04/18/24 0005    Varicella IgG       Rubella ^ Immune  11/16/23     Hgb  8.4 g/dL 04/18/24 0005       8.2 g/dL 04/17/24 1145       8.4 g/dL 04/15/24 2141       10.0 g/dL 03/27/24 1215       10.6 g/dL 01/29/24 1826       9.8 g/dL 12/08/23 1239       10.4 g/dL 11/15/23 1603       11.2 g/dL 10/27/23 2112       9.5 g/dL 09/27/23 1332       8.7 g/dL 09/20/23 0235       9.0 g/dL 09/19/23 0054       10.2 g/dL 09/18/23 0828       9.5 g/dL 09/17/23 0023       10.1 g/dL 09/03/23 1610       10.1 g/dL 08/26/23 1727    Hct  27.9 % 04/18/24 0005       26.3 % 04/17/24 1145       26.7 % 04/15/24 2141       30.5 % 03/27/24 " 1215       30.9 % 24 1826       32.0 % 23 1239       32.5 % 11/15/23 1603       36.8 % 10/27/23 2112       30.1 % 23 1332       28.3 % 23 0235       29.0 % 23 0054       33.0 % 23 0828       31.2 % 23 0023       32.2 % 23 1610       32.7 % 23 1727    Glucose Fasting GTT       Glucose Tolerance Test 1 hour       Glucose Tolerance Test 3 hour       Gonorrhea (discrete) ^ Negative  23     Chlamydia (discrete) ^ Negative  23     RPR ^ Non-Reactive  23       ^ Non-Reactive  23     VDRL       Syphilis Antibody       HBsAg ^ Negative  23     Herpes Simplex Virus PCR       Herpes Simplex VIrus Culture       HIV ^ Non-Reactive  23     Hep C RNA Quant PCR       Hep C Antibody       AFP       Group B Strep       GBS Susceptibility to Clindamycin       GBS Susceptibility to Erythromycin       Fetal Fibronectin       Genetic Testing, Maternal Blood                 Drug Screening       Test Value Date Time    Urine Drug Screen       Amphetamine Screen  Negative  20 0953    Barbiturate Screen  Negative  19    Benzodiazepine Screen  Negative  20 0953    Methadone Screen  Negative  20 0953    Phencyclidine Screen  Negative  19    Opiates Screen  Negative  20 0953    THC Screen  Negative  20 0953    Cocaine Screen  Negative  20 0953    Propoxyphene Screen  Negative  10/19/15 1920    Buprenorphine Screen  Negative  20 0953    Methamphetamine Screen  Negative  20 0953    Oxycodone Screen  Negative  20 0953    Tricyclic Antidepressants Screen                 Legend    ^: Historical                              Past OB History:     OB History    Para Term  AB Living   2 0 0 0 1 0   SAB IAB Ectopic Molar Multiple Live Births   1 0 0 0 0 0      # Outcome Date GA Lbr [place]/2nd Weight Sex Type Anes PTL Lv   2 Current            1 SAB 10/2022 6w0d    SAB          Obstetric Comments   FOB #1  Pregnancy #1 SAB at 6 weeks   FOB #1 Pregnancy #2  current       Past Medical History: Past Medical History:   Diagnosis Date    Anxiety     CHF (congestive heart failure) 05/2023    Colitis 2023    Depression     Diabetes mellitus 2018    Diverticulitis     GERD (gastroesophageal reflux disease)     Hypertension     Kidney stone     Pancreatitis 09/2023    PCOS (polycystic ovarian syndrome)     Polycystic ovary syndrome     Rectal bleeding     SINCE 2023 - USES MESALAMINE SUPPOSITORY NIGHTLY    Sleep apnea     Urinary tract infection       Past Surgical History Past Surgical History:   Procedure Laterality Date    CARDIAC CATHETERIZATION      CARPAL TUNNEL RELEASE      COLONOSCOPY      ENDOSCOPY      FRACTURE SURGERY      HARDWARE REMOVAL      WRIST    TENDON REPAIR      HAND    WISDOM TOOTH EXTRACTION        Family History: Family History   Problem Relation Age of Onset    Hypertension Mother     Depression Mother     Heart disease Mother     COPD Mother     Emphysema Mother     Hypertension Father     Diabetes Father     Heart disease Father     COPD Father     Heart failure Father     Hepatitis Father       Social History:  reports that she quit smoking about 9 years ago. Her smoking use included cigarettes. She started smoking about 29 years ago. She has a 20 pack-year smoking history. She has never been exposed to tobacco smoke. She has never used smokeless tobacco.   reports no history of alcohol use.   reports no history of drug use.        Review of Systems  Denies fever, HA, muscle weakness,                                             and rashes.  All other pertinent positives and negatives are addressed in the HPI       Objective    Vital Signs Range for the last 24 hours  Temperature: Temp:  [97.8 °F (36.6 °C)-98.7 °F (37.1 °C)] 98.7 °F (37.1 °C)   Temp Source: Temp src: Oral   BP: BP: (139-184)/(66-95) 169/84   Pulse: Heart Rate:  [] 88   Respirations: Resp:  [18-20]  18   SPO2: SpO2:  [90 %-100 %] 92 %   O2 Amount (l/min): Flow (L/min):  [2-4] 2   O2 Devices Device (Oxygen Therapy): nasal cannula   Weight: Weight:  [107 kg (236 lb)] 107 kg (236 lb)     Physical Examination: General appearance - alert, struggles with breathing with respiratory distress   and uncomfortable  Chest -  Diminished lung sound bilaterally, but right greater than left.   Crackles in the bases  Heart - Regular at.  Systolic murmur   Abdomen - soft, nontender, nondistended,   no rebound tenderness noted  No guarding, No RUQ pain, obese   Extremities - pedal edema +2      Fetal Heart Rate Assessment   Method: Fetal HR Assessment Method: external   Beats/min: Fetal HR (beats/min): 135   Baseline: Fetal HR Baseline: normal range   Varibility: Fetal HR Variability: moderate (amplitude range 6 to 25 bpm)   Accels: Fetal HR Accelerations: greater than/equal to 15 bpm, lasting at least 15 seconds   Decels: Fetal HR Decelerations: absent   Tracing Category:       Uterine Assessment   Method: Method: external tocotransducer   Frequency (min):     Ctx Count in 10 min:     Duration:     Intensity: Contraction Intensity: no contractions   Intensity by IUPC:     Resting Tone: Uterine Resting Tone: soft by palpation   Resting Tone by IUPC:           Laboratory Results:   Lab Results   Component Value Date     (L) 04/18/2024    HGB 8.4 (L) 04/18/2024    HCT 27.9 (L) 04/18/2024    WBC 9.21 04/18/2024     Lab Results   Component Value Date     04/18/2024    K 4.1 04/18/2024     04/18/2024    CO2 19.0 (L) 04/18/2024    BUN 11 04/18/2024    CREATININE 0.52 (L) 04/18/2024    GLUCOSE 66 04/18/2024    ALBUMIN 3.0 (L) 04/18/2024    CALCIUM 8.5 (L) 04/18/2024    AST 19 04/18/2024    ALT 11 04/18/2024    BILITOT 0.6 04/18/2024      ProBnP: 254  Troponin:  23      Radiology:  Study Result    XR CHEST PA AND LATERAL     Date of Exam: 4/18/2024 1:11 AM EDT     Indication: soa and chest pain     Comparison:  3/27/2024.     Findings:  There is new interstitial disease present throughout the lungs. No pneumothorax or pleural effusion. Heart size is stable. No acute osseous abnormality. No focal lung consolidation.     IMPRESSION:  Impression:  New interstitial disease in the lungs concerning for pulmonary edema.        Electronically Signed: Yoseph Pace MD    4/18/2024 1:38 AM EDT    Workstation ID: [id number]          Assessment:  1. Intrauterine pregnancy at 34w5d weeks gestation with reassuring fetal status  2.  CHTN  3.  Type II DM  4.  CHF  5.  H/O seizure disorder   6.  H/O acute pancreatitis  7. Pulmonary edema  8. AMA  9. Obese      Plan:  1.  Admit  2.  IV, Labs, Chest x-ray, EKG,   3.  Monitor.  Because she is unable to lie down and her body habitus, continuous fetal monitoring is virtually impossible.   4.  Spoke with Dr. Fernandez and Anesthesia.   Given her current unstable condition with severe range blood pressures, SOA with pulmonary edema exacerbated by her other commodities, it is recommended that she be delivered ASAP.   Will move to primary LTCS with BTL tonight.    May require ICU admission after the surgery.       Axel Keys MD  4/18/2024  02:45 EDT    Electronically signed by Axel Kyes MD at 04/18/24 0455       Facility-Administered Medications as of 4/18/2024   Medication Dose Route Frequency Provider Last Rate Last Admin    [COMPLETED] acetaminophen (TYLENOL) tablet 1,000 mg  1,000 mg Oral Once Axel Keys MD   1,000 mg at 04/18/24 0303    acetaminophen (TYLENOL) tablet 1,000 mg  1,000 mg Oral Q6H Axel Keys MD        Followed by    [START ON 4/19/2024] acetaminophen (TYLENOL) tablet 650 mg  650 mg Oral Q6H Axle Keys MD        aluminum-magnesium hydroxide-simethicone (MAALOX MAX) 400-400-40 MG/5ML suspension 15 mL  15 mL Oral Q4H PRN Axel Keys MD        Or    calcium carbonate (TUMS) chewable tablet 500 mg (200 mg elemental)  1 tablet Oral Q4H PRN Ludmila  MD Axel        carboprost (HEMABATE) injection 250 mcg  250 mcg Intramuscular Q15 Min PRN Axel Keys MD        [COMPLETED] ceFAZolin in Sodium Chloride (ANCEF) IVPB solution 3,000 mg  3,000 mg Intravenous Once Axel Keys  mL/hr at 04/18/24 0316 Currently Infusing at 04/18/24 0316    docusate sodium (COLACE) capsule 100 mg  100 mg Oral BID PRN Axel Keys MD        [COMPLETED] furosemide (LASIX) injection 40 mg  40 mg Intravenous Once Axel Keys MD   40 mg at 04/18/24 0514    Hydrocortisone (Perianal) (ANUSOL-HC) 2.5 % rectal cream 1 Application  1 Application Rectal PRN Axel Keys MD        [COMPLETED] labetalol (NORMODYNE,TRANDATE) injection 20 mg  20 mg Intravenous Once Anais Brito DO   20 mg at 04/17/24 1716    labetalol (NORMODYNE,TRANDATE) injection 20-80 mg  20-80 mg Intravenous Q10 Min PRN Axel Keys MD        lactated ringers bolus 1,000 mL  1,000 mL Intravenous Once Axel Keys MD        lactated ringers infusion  125 mL/hr Intravenous Continuous Axel Keys  mL/hr at 04/18/24 0316 125 mL/hr at 04/18/24 0316    lanolin topical 1 Application  1 Application Topical Q1H PRN Axel Keys MD        lidocaine PF 1% (XYLOCAINE) injection 0.5 mL  0.5 mL Intradermal Once PRN Axel Keys MD        methylergonovine (METHERGINE) injection 200 mcg  200 mcg Intramuscular Once PRN Axel Keys MD        miSOPROStol (CYTOTEC) tablet 800 mcg  800 mcg Rectal Once PRN Axel Keys MD        ondansetron (ZOFRAN) injection 4 mg  4 mg Intravenous Q6H PRN Axel Keys MD   4 mg at 04/18/24 0746    oxyCODONE (ROXICODONE) immediate release tablet 5 mg  5 mg Oral Q4H PRN Axel Keys MD   5 mg at 04/18/24 0832    Or    oxyCODONE (ROXICODONE) immediate release tablet 10 mg  10 mg Oral Q4H PRN Axel Keys MD        oxytocin (PITOCIN) 30 units in 0.9% sodium chloride 500 mL (premix)  999 mL/hr Intravenous Once Axel Keys MD         "Followed by    [] oxytocin (PITOCIN) 30 units in 0.9% sodium chloride 500 mL (premix)  250 mL/hr Intravenous Continuous Axel Keys MD        oxytocin (PITOCIN) 30 units in 0.9% sodium chloride 500 mL (premix)  125 mL/hr Intravenous Once PRN Axel Keys MD        prenatal vitamin tablet 1 tablet  1 tablet Oral Daily Axel Keys MD        promethazine (PHENERGAN) 12.5 mg in sodium chloride 0.9 % 50 mL  12.5 mg Intravenous Q6H PRN Abhishek Cuba DO        promethazine (PHENERGAN) tablet 25 mg  25 mg Oral Q6H PRN Axel Keys MD        Or    promethazine (PHENERGAN) suppository 12.5 mg  12.5 mg Rectal Q6H PRN Axel Keys MD        simethicone (MYLICON) chewable tablet 80 mg  80 mg Oral 4x Daily PRN Axel Keys MD        [COMPLETED] Sod Citrate-Citric Acid (BICITRA) oral solution 30 mL  30 mL Oral Once Axel Keys MD   30 mL at 24 0311    sodium chloride 0.9 % flush 10 mL  10 mL Intravenous Q12H Axel Keys MD        sodium chloride 0.9 % flush 10 mL  10 mL Intravenous PRN Axel Keys MD   10 mL at 24 0753    sodium chloride 0.9 % flush 3 mL  3 mL Intravenous Q12H Axel Keys MD        sodium chloride 0.9 % flush 3-10 mL  3-10 mL Intravenous PRN Axel Keys MD        sodium chloride 0.9 % infusion 40 mL  40 mL Intravenous PRN Axel Keys MD        sodium chloride 0.9 % infusion 40 mL  40 mL Intravenous PRN Axel Keys MD         Orders (last 24 hrs)        Start     Ordered    24 0900  acetaminophen (TYLENOL) tablet 650 mg  Every 6 Hours        Placed in \"Followed by\" Linked Group    24 0449    24 0600  Discontinue Indwelling Urinary Catheter in AM  Once         24 0449    24 0600  CBC & Differential  Timed         24 0449    24 0000  Remove Abdominal Dressing  Once         24 0449    24 0954  promethazine (PHENERGAN) 12.5 mg in sodium chloride 0.9 % 50 mL  Every 6 Hours PRN      " "   04/18/24 0956    04/18/24 0900  sodium chloride 0.9 % flush 10 mL  Every 12 Hours Scheduled         04/18/24 0245    04/18/24 0900  CBC & Differential  Timed         04/18/24 0449    04/18/24 0900  Comprehensive Metabolic Panel  Timed         04/18/24 0449    04/18/24 0900  sodium chloride 0.9 % flush 3 mL  Every 12 Hours Scheduled         04/18/24 0449    04/18/24 0900  prenatal vitamin tablet 1 tablet  Daily         04/18/24 0449    04/18/24 0900  acetaminophen (TYLENOL) tablet 1,000 mg  Every 6 Hours        Placed in \"Followed by\" Linked Group    04/18/24 0449    04/18/24 0900  CBC Auto Differential  PROCEDURE ONCE         04/18/24 0449 04/18/24 0900  CBC (No Diff)  STAT,   Status:  Canceled         04/18/24 0459 04/18/24 0900  High Sensitivity Troponin T  Once         04/18/24 0720    04/18/24 0800  Ambulate Patient  2 Times Daily      Comments: After anesthesia wears off.    04/18/24 0449 04/18/24 0730  labetalol (NORMODYNE,TRANDATE) injection 20-80 mg  Every 10 Minutes PRN         04/18/24 0732    04/18/24 0600  POC Glucose 4x Daily Fasting & PC  4 Times Daily Fasting & After Meals      Comments: Complete no more than 45 minutes prior to patient eating      04/18/24 0449 04/18/24 0545  furosemide (LASIX) injection 40 mg  Once         04/18/24 0449    04/18/24 0500  Incentive spirometry RT  Every Hour       04/18/24 0449    04/18/24 0450  Continuous Pulse Oximetry  Continuous         04/18/24 0449    04/18/24 0447  Code Status and Medical Interventions:  Continuous         04/18/24 0449    04/18/24 0447  Vital Signs Per hospital policy  Per Hospital Policy         04/18/24 0449    04/18/24 0447  Notify Physician  Until Discontinued         04/18/24 0449 04/18/24 0447  Patient May Shower  Once        Comments: After anesthesia wears off and with assistance    04/18/24 0449    04/18/24 0447  Diet: Regular/House; Fluid Consistency: Thin (IDDSI 0)  Diet Effective Now         04/18/24 0449    " "04/18/24 0447  Advance Diet As Tolerated -Regular  Until Discontinued         04/18/24 0449    04/18/24 0447  I/O  Every Shift       04/18/24 0449    04/18/24 0447  Fundal and Lochia Check  Per Hospital Policy        Comments: Q 30 min x 2, Q 1 hr x 4, Q 4 hrs x 24 hrs, then Q shift.    04/18/24 0449    04/18/24 0447  Continue Indwelling Urinary Catheter Already in Place  Once         04/18/24 0449    04/18/24 0447  Notify Provider if Bladder Distention Continues  Until Discontinued         04/18/24 0449    04/18/24 0447  Urinary Catheter Care  Every Shift       04/18/24 0449    04/18/24 0447  Turn Cough Deep Breathe  Once         04/18/24 0449    04/18/24 0447  Encourage early intake of PO fluids  Continuous         04/18/24 0449    04/18/24 0447  Ambulate Patient 3-5 times per day (with or without Cai)  Every Shift       04/18/24 0449    04/18/24 0447  Apply Abdominal Binding Until Discontinued  Until Discontinued         04/18/24 0449    04/18/24 0447  \"If patient tolerates food and liquids after completion of second bag of Pitocin, saline lock IV and discontinue IV fluid infusions.  Once         04/18/24 0449    04/18/24 0447  Breast pump to bed  Once         04/18/24 0449    04/18/24 0447  If indicated -- Please administer RH Immunoglobulin based on results of cord blood evaluation and fetal screen lab tests, pharmacy to dispense  Per Order Details        Comments: See Process Instructions For Reference Range Details.    04/18/24 0449    04/18/24 0447  Insert Peripheral IV  Once         04/18/24 0449    04/18/24 0447  Saline Lock & Maintain IV Access  Continuous         04/18/24 0449    04/18/24 0447  Place Sequential Compression Device  Once         04/18/24 0449    04/18/24 0447  Maintain Sequential Compression Device  Continuous         04/18/24 0449    04/18/24 0446  oxyCODONE (ROXICODONE) immediate release tablet 5 mg  Every 4 Hours PRN        Placed in \"Or\" Linked Group    04/18/24 0449    04/18/24 0446 " " oxyCODONE (ROXICODONE) immediate release tablet 10 mg  Every 4 Hours PRN        Placed in \"Or\" Linked Group    04/18/24 0449    04/18/24 0446  lanolin topical 1 Application  Every 1 Hour PRN         04/18/24 0449    04/18/24 0446  Hydrocortisone (Perianal) (ANUSOL-HC) 2.5 % rectal cream 1 Application  As Needed         04/18/24 0449 04/18/24 0446  aluminum-magnesium hydroxide-simethicone (MAALOX MAX) 400-400-40 MG/5ML suspension 15 mL  Every 4 Hours PRN        Placed in \"Or\" Linked Group    04/18/24 0449    04/18/24 0446  calcium carbonate (TUMS) chewable tablet 500 mg (200 mg elemental)  Every 4 Hours PRN        Placed in \"Or\" Linked Group    04/18/24 0449    04/18/24 0446  sodium chloride 0.9 % flush 3-10 mL  As Needed         04/18/24 0449 04/18/24 0446  sodium chloride 0.9 % infusion 40 mL  As Needed         04/18/24 0449    04/18/24 0446  oxytocin (PITOCIN) 30 units in 0.9% sodium chloride 500 mL (premix)  Once As Needed         04/18/24 0449 04/18/24 0446  docusate sodium (COLACE) capsule 100 mg  2 Times Daily PRN         04/18/24 0449 04/18/24 0446  simethicone (MYLICON) chewable tablet 80 mg  4 Times Daily PRN         04/18/24 0449 04/18/24 0446  promethazine (PHENERGAN) tablet 25 mg  Every 6 Hours PRN        Placed in \"Or\" Linked Group    04/18/24 0449    04/18/24 0446  promethazine (PHENERGAN) suppository 12.5 mg  Every 6 Hours PRN        Placed in \"Or\" Linked Group    04/18/24 0449    04/18/24 0425  Blood Gas, Venous, Cord  PROCEDURE ONCE         04/18/24 0424    04/18/24 0424  Blood Gas, Arterial, Cord  PROCEDURE ONCE         04/18/24 0423    04/18/24 0400  Vital Signs q 4 while awake  Every 4 Hours      Comments: While the patient is awake.    04/18/24 0245    04/18/24 0400  oxytocin (PITOCIN) 30 units in 0.9% sodium chloride 500 mL (premix)  Continuous        Placed in \"Followed by\" Linked Group    04/18/24 0245    04/18/24 0345  lactated ringers bolus 1,000 mL  Once         04/18/24 " "0245 04/18/24 0345  lactated ringers infusion  Continuous         04/18/24 0245    04/18/24 0345  Sod Citrate-Citric Acid (BICITRA) oral solution 30 mL  Once         04/18/24 0245    04/18/24 0345  acetaminophen (TYLENOL) tablet 1,000 mg  Once         04/18/24 0245    04/18/24 0345  ketorolac (TORADOL) injection 30 mg  Once,   Status:  Discontinued         04/18/24 0245    04/18/24 0345  ceFAZolin in Sodium Chloride (ANCEF) IVPB solution 3,000 mg  Once         04/18/24 0245    04/18/24 0330  oxytocin (PITOCIN) 30 units in 0.9% sodium chloride 500 mL (premix)  Once        Placed in \"Followed by\" Linked Group    04/18/24 0245    04/18/24 0321  Tissue Pathology Exam  RELEASE UPON ORDERING         04/18/24 0321    04/18/24 0255  Verify Informed Consent for Blood Product Administration  Once         04/18/24 0254    04/18/24 0255  Prepare RBC, 2 Units  Blood - Once         04/18/24 0254    04/18/24 0246  Admit To Obstetrics Inpatient  Once         04/18/24 0245    04/18/24 0246  Code Status and Medical Interventions:  Continuous,   Status:  Canceled         04/18/24 0245    04/18/24 0246  Continuous Fetal Monitoring With NST on Admission and Prior to Initiation of Oxytocin.  Per Order Details        Comments: Continuous Fetal Monitoring With NST on Admission    04/18/24 0245    04/18/24 0246  External Uterine Contraction Monitoring  Per Hospital Policy         04/18/24 0245    04/18/24 0246  Notify Provider (Specified)  Continuous        Comments: Open Order Report to View Parameters Requiring Provider Notification    04/18/24 0245    04/18/24 0246  Notify Provider of Tachysystole (Per Hospital Algorithm)  Continuous        Comments: Open Order Report to View Parameters Requiring Provider Notification    04/18/24 0245    04/18/24 0246  Notify Provider if Membranes Ruptured, Bleeding Greater Than 1 Pad Per Hour, Fetal Heart Tone Abnormality or Severe Pain  Continuous        Comments: Open Order Report to View " "Parameters Requiring Provider Notification    245    24  Weigh Patient Daily  Daily       24  Initiate Group Beta Strep (GBS) Prophylaxis Protocol, If Criteria Met  Continuous        Comments: NO TREATMENT RECOMMENDED IF: 1) Maternal GBS Status Known Negative 2) Scheduled  Birth With Intact Membranes, Not in Labor 3) Maternal GBS Status Unknown, No Risk Factors  TREAT WITH ANTIBIOTICS IF:  1) Maternal GBS Status Known Positive 2) Maternal GBS Status Unknown With Risk Factors: a)  Previous Infant Affected By GBS Infection b) GBS Urinary Tract Infection (UTI) or Bacteriuria During Pregnancy c) Unexplained Maternal Fever (100.4F (38C) or Greater) During Labor d)  Prolonged Rupture of Membranes (18 or More Hours) e) Gestational Age Less Than 37 Weeks    245    24  Insert Indwelling Urinary Catheter  Once        Comments: Follow Protocol As Outlined in Process Instructions (Open Order Report to View Full Instructions)   Placed in \"And\" Linked Group    245    24  Assess Need for Indwelling Urinary Catheter - Follow Removal Protocol  Continuous        Comments: Follow Protocol As Outlined in Process Instructions (Open Order Report to View Full Instructions)   Placed in \"And\" Linked Group    245    24  Abdominal Prep With Clippers  Once         245    24  Chlorhexadine Skin Prep Unless Otherwise Indicated  Once         24  SCD (Sequential Compression Devices)  Once         24  POC Glucose Once  Once        Comments: Complete no more than 45 minutes prior to patient eating      24  Document Gatorade Consumption Prior to Admission (Yes or No)  Once         245    24  NPO Diet NPO Type: Ice Chips  Diet Effective Now,   Status:  Canceled         24  Insert " "Peripheral IV  Once         04/18/24 0245    04/18/24 0246  Saline Lock & Maintain IV Access  Continuous,   Status:  Canceled         04/18/24 0245    04/18/24 0246  Notify Provider (Specified)  Continuous        Comments: Open Order Report to View Parameters Requiring Provider Notification    04/18/24 0245    04/18/24 0246  Vital Signs Per Hospital Policy  Per Hospital Policy         04/18/24 0245    04/18/24 0246  Strict Bed Rest  Until Discontinued         04/18/24 0245    04/18/24 0246  Fundal & Lochia Check  Per Order Details        Comments: Every 15 Minutes x4, Then Every 30 Minutes x2, Then Every Shift    04/18/24 0245    04/18/24 0246  Fundal & Lochia Check  Every Shift       04/18/24 0245    04/18/24 0246  Diet: Regular/House; Fluid Consistency: Thin (IDDSI 0)  Diet Effective Now,   Status:  Canceled         04/18/24 0245    04/18/24 0245  methylergonovine (METHERGINE) injection 200 mcg  Once As Needed         04/18/24 0245    04/18/24 0245  carboprost (HEMABATE) injection 250 mcg  Every 15 Minutes PRN         04/18/24 0245    04/18/24 0245  miSOPROStol (CYTOTEC) tablet 800 mcg  Once As Needed         04/18/24 0245    04/18/24 0245  Urinary Catheter Care  Every Shift,   Status:  Canceled      Placed in \"And\" Linked Group    04/18/24 0245    04/18/24 0245  sodium chloride 0.9 % flush 10 mL  As Needed         04/18/24 0245    04/18/24 0245  sodium chloride 0.9 % infusion 40 mL  As Needed         04/18/24 0245    04/18/24 0245  lidocaine PF 1% (XYLOCAINE) injection 0.5 mL  Once As Needed         04/18/24 0245    04/18/24 0127  High Sensitivity Troponin T  STAT         04/17/24 2348    04/18/24 0104  XR Chest PA & Lateral  1 Time Imaging         04/18/24 0104    04/18/24 0030  ondansetron (ZOFRAN) injection 4 mg  Every 6 Hours PRN         04/18/24 0030    04/18/24 0024  ECG 12 Lead Chest Pain  STAT         04/18/24 0024    04/17/24 2315  proBNP  STAT         04/17/24 2314    04/17/24 2315  Uric Acid  STAT      "    04/17/24 2314 04/17/24 2315  Type & Screen  STAT         04/17/24 2314 04/17/24 2315  T Pallidum Antibody w/ reflex RPR (Syphilis)  STAT         04/17/24 2314 04/17/24 2314  CBC & Differential  STAT         04/17/24 2314 04/17/24 2314  Comprehensive Metabolic Panel  STAT         04/17/24 2314 04/17/24 2314  CBC Auto Differential  PROCEDURE ONCE         04/17/24 2314 04/17/24 2251  proBNP  Once,   Status:  Canceled         04/17/24 2251 04/17/24 2251  CBC & Differential  STAT,   Status:  Canceled         04/17/24 2251 04/17/24 2251  CBC Auto Differential  PROCEDURE ONCE,   Status:  Canceled         04/17/24 2251 04/17/24 2250  Comprehensive Metabolic Panel  STAT,   Status:  Canceled         04/17/24 2251 04/17/24 2250  Uric Acid  STAT,   Status:  Canceled         04/17/24 2251 04/17/24 2247  Type & Screen  STAT,   Status:  Canceled         04/17/24 2247 04/17/24 2247  CBC (No Diff)  STAT,   Status:  Canceled         04/17/24 2247 04/17/24 2247  Preeclampsia Panel  Once,   Status:  Canceled         04/17/24 2247 04/17/24 2247  T Pallidum Antibody w/ reflex RPR (Syphilis)  Once,   Status:  Canceled         04/17/24 2247    Unscheduled  Up with Assistance  As Needed       04/18/24 0449    Unscheduled  Bladder Scan if Patient Unable to Void 4-6 Hours After Catheter Removal  As Needed         04/18/24 0449    Unscheduled  Straight Cath Every 4-6 Hours As Needed If Patient is Unable to Void After 4-6 Hours, Bladder Scan Volume is Greater Than 500mL & Patient Has Symptoms of Bladder Discomfort / Distention  As Needed       04/18/24 0449    Unscheduled  Schedule / Prompt Voiding For Patients With Urinary Incontinence  As Needed       04/18/24 0449    Unscheduled  Wound Care  As Needed      Comments: Postop day 1. Remove dressing and leave incision open to air.    04/18/24 0449    Unscheduled  KPad  As Needed      Comments: PRN pain    04/18/24 0449    Unscheduled  Apply Ice Pack  to Perineum  As Needed      Comments: For 20 min q 2hrs    24    Unscheduled  Apply Waffle Cushion  As Needed      Comments: PRN perineal discomfort    24    Unscheduled  Chewing Gum  As Needed       24    Unscheduled  Warm compress  As Needed       24    Unscheduled  Apply ace wrap, tight bra, or binder  As Needed       24    Unscheduled  Apply ice packs  As Needed       24    Signed and Held  IV Site Care  As Needed       Signed and Held    Signed and Held  Oxygen Therapy- Nasal Cannula; 2 LPM  Continuous       Signed and Held    Signed and Held  Respirations  Every Hour      Comments: If respiratory rate is less than 10/min, notify the Anesthesiologist    Signed and Held    Signed and Held  If Respiratory Rate is Less Than 8/Min, See Narcan Order. Notify Anesthesiologist STAT.  Continuous       Signed and Held    Signed and Held  Blood Pressure and Pulse Every 4 Hours  Continuous       Signed and Held    Signed and Held  Blood Gas, Arterial -With Co-Ox Panel: Yes  As Needed       Signed and Held    Signed and Held  Activity & Removal of Cai Catheter, Per Obstetrician  Continuous       Signed and Held    Signed and Held  Notify Anesthesiologist for Any Questions / Problems  Continuous       Signed and Held    Signed and Held  Notify Anesthesia for Temp Over 101.4F  Continuous       Signed and Held    Signed and Held  Ambu Bag at Bedside  Continuous       Signed and Held                     Operative/Procedure Notes (last 24 hours)        Axel Keys MD at 24           Luci Franksjessenia Pack  : 1985  MRN: [id number]  CSN: [id number]      Transfer of care ... [facility] - Dr. Brito      Section Operative Note    Pre-Operative Dx:   Intrauterine pregnancy at 34w5d weeks   CHTN - possible super imposed pre-eclampsia   Pulmonary edema  CHF  Type II DM.       Postoperative dx:    Intrauterine pregnancy at 34w5d  weeks   CHTN - possible superimposed Pre-eclampsia   Pulmonary edema   CHF  Type II diabetes  Unicornuate uterus            Procedure: Primary  (LTCS) with left total salpingectomy       Surgeon: Axel Keys MD   Assistant: Gaby urgical tech        Anesthesia: Epidural        EBL: 607 mls.   IV Fluids: 400 mls.   UOP: 100  mls.     Antibiotics: cefazolin 3 gms     Infant:      Name:  Jody      Gender: female  infant    Weight: 2220 g (4 lb 14.3 oz)     Apgars: 8  @ 1 minute / 9  @ 5 minutes     Procedure Details:   After the patient was adequately anesthetized, she was sterilely prepped and draped in the dorsal supine left lateral tilt position. A Pfannenstiel incision was created sharply with the knife. It was carried down to the fascia.  The fascia was cut transversely with the knife and extended in a curvilinear fashion with scissors. The fascia was freed from its midline insertion superiorly and inferiorly. Rectus muscles were  in the midline. The peritoneum was bluntly entered and a bladder flap was not  created. The lower uterine segment was scored transversely with the knife. Clear amniotic fluid was seen. The infant's head was delivered atraumatically.  A double nuchal cord was reduced on the abdomen.   The mouth and nose were bulb suctioned. The infant was handed to the delivery team which was in attendance. The placenta was spontaneously extracted. The uterus was exteriorized and wiped free of debris and clot. 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  The uterine incision was closed with #1 Monocryl  in a continuous running locking fashion.   The left tube was traced to its fimbriated end.  Enseal was used to transect each tube near the cornua and at the distal end near the fimbria ovarica. I did trace a tube remnant coming of the right rudimentary horn and removed it as well although it was not functional.    Both tubes were  removed in their entirety with minimal blood loss.    The uterus was returned to the abdomen. The paracolic gutters were cleared of debris and clot. The fascia was closed with 0 PDS. The subcutaneous tissue was copiously irrigated. Subcutaneous tissue was reapproximated with 3-0 Plain Gut and the skin was closed with Insorb staples  subcuticularly.   An Exofin dressing was applied.  All counts were correct.         Complications:   None      Disposition:   Mother to Remain in LD  in stable condition currently.   Baby to NICU  in stable condition currently.       Axel Keys MD  4/18/2024  04:56 EDT          Electronically signed by Axel Keys MD at 04/18/24 0549       Physician Progress Notes (last 24 hours)  Notes from 04/17/24 0957 through 04/18/24 0957   No notes of this type exist for this encounter.

## 2024-04-19 ENCOUNTER — APPOINTMENT (OUTPATIENT)
Dept: GENERAL RADIOLOGY | Facility: HOSPITAL | Age: 39
End: 2024-04-19
Payer: MEDICARE

## 2024-04-19 LAB
ABO GROUP BLD: NORMAL
ALBUMIN SERPL-MCNC: 2.8 G/DL (ref 3.5–5.2)
ALBUMIN/GLOB SERPL: 1.1 G/DL
ALP SERPL-CCNC: 91 U/L (ref 39–117)
ALT SERPL W P-5'-P-CCNC: 11 U/L (ref 1–33)
AMPHET+METHAMPHET UR QL: NEGATIVE
AMPHETAMINES UR QL: NEGATIVE
ANION GAP SERPL CALCULATED.3IONS-SCNC: 9 MMOL/L (ref 5–15)
AST SERPL-CCNC: 25 U/L (ref 1–32)
BARBITURATES UR QL SCN: NEGATIVE
BASOPHILS # BLD AUTO: 0.03 10*3/MM3 (ref 0–0.2)
BASOPHILS NFR BLD AUTO: 0.4 % (ref 0–1.5)
BENZODIAZ UR QL SCN: NEGATIVE
BILIRUB SERPL-MCNC: 0.5 MG/DL (ref 0–1.2)
BUN SERPL-MCNC: 23 MG/DL (ref 6–20)
BUN/CREAT SERPL: 30.7 (ref 7–25)
BUPRENORPHINE SERPL-MCNC: NEGATIVE NG/ML
CALCIUM SPEC-SCNC: 8.1 MG/DL (ref 8.6–10.5)
CANNABINOIDS SERPL QL: NEGATIVE
CHLORIDE SERPL-SCNC: 105 MMOL/L (ref 98–107)
CO2 SERPL-SCNC: 22 MMOL/L (ref 22–29)
COCAINE UR QL: NEGATIVE
CREAT SERPL-MCNC: 0.75 MG/DL (ref 0.57–1)
CYTO UR: NORMAL
DEPRECATED RDW RBC AUTO: 44.7 FL (ref 37–54)
EGFRCR SERPLBLD CKD-EPI 2021: 103.4 ML/MIN/1.73
EOSINOPHIL # BLD AUTO: 0.1 10*3/MM3 (ref 0–0.4)
EOSINOPHIL NFR BLD AUTO: 1.4 % (ref 0.3–6.2)
ERYTHROCYTE [DISTWIDTH] IN BLOOD BY AUTOMATED COUNT: 14.6 % (ref 12.3–15.4)
FENTANYL UR-MCNC: NEGATIVE NG/ML
GLOBULIN UR ELPH-MCNC: 2.5 GM/DL
GLUCOSE BLDC GLUCOMTR-MCNC: 101 MG/DL (ref 70–130)
GLUCOSE BLDC GLUCOMTR-MCNC: 105 MG/DL (ref 70–130)
GLUCOSE BLDC GLUCOMTR-MCNC: 53 MG/DL (ref 70–130)
GLUCOSE BLDC GLUCOMTR-MCNC: 61 MG/DL (ref 70–130)
GLUCOSE BLDC GLUCOMTR-MCNC: 69 MG/DL (ref 70–130)
GLUCOSE SERPL-MCNC: 69 MG/DL (ref 65–99)
HCT VFR BLD AUTO: 21.9 % (ref 34–46.6)
HGB BLD-MCNC: 6.8 G/DL (ref 12–15.9)
IMM GRANULOCYTES # BLD AUTO: 0.03 10*3/MM3 (ref 0–0.05)
IMM GRANULOCYTES NFR BLD AUTO: 0.4 % (ref 0–0.5)
LAB AP CASE REPORT: NORMAL
LAB AP CLINICAL INFORMATION: NORMAL
LYMPHOCYTES # BLD AUTO: 1.35 10*3/MM3 (ref 0.7–3.1)
LYMPHOCYTES NFR BLD AUTO: 19.4 % (ref 19.6–45.3)
MCH RBC QN AUTO: 26.6 PG (ref 26.6–33)
MCHC RBC AUTO-ENTMCNC: 31.1 G/DL (ref 31.5–35.7)
MCV RBC AUTO: 85.5 FL (ref 79–97)
METHADONE UR QL SCN: NEGATIVE
MONOCYTES # BLD AUTO: 0.43 10*3/MM3 (ref 0.1–0.9)
MONOCYTES NFR BLD AUTO: 6.2 % (ref 5–12)
NEUTROPHILS NFR BLD AUTO: 5.03 10*3/MM3 (ref 1.7–7)
NEUTROPHILS NFR BLD AUTO: 72.2 % (ref 42.7–76)
NRBC BLD AUTO-RTO: 0 /100 WBC (ref 0–0.2)
NT-PROBNP SERPL-MCNC: 94.5 PG/ML (ref 0–450)
OPIATES UR QL: NEGATIVE
OXYCODONE UR QL SCN: NEGATIVE
PATH REPORT.FINAL DX SPEC: NORMAL
PATH REPORT.GROSS SPEC: NORMAL
PCP UR QL SCN: NEGATIVE
PLATELET # BLD AUTO: 117 10*3/MM3 (ref 140–450)
PMV BLD AUTO: 12.2 FL (ref 6–12)
POTASSIUM SERPL-SCNC: 4.2 MMOL/L (ref 3.5–5.2)
PROT SERPL-MCNC: 5.3 G/DL (ref 6–8.5)
RBC # BLD AUTO: 2.56 10*6/MM3 (ref 3.77–5.28)
RH BLD: POSITIVE
SODIUM SERPL-SCNC: 136 MMOL/L (ref 136–145)
TRICYCLICS UR QL SCN: NEGATIVE
TSH SERPL DL<=0.05 MIU/L-ACNC: 0.35 UIU/ML (ref 0.27–4.2)
WBC NRBC COR # BLD AUTO: 6.97 10*3/MM3 (ref 3.4–10.8)

## 2024-04-19 PROCEDURE — 25010000002 ENOXAPARIN PER 10 MG: Performed by: OBSTETRICS & GYNECOLOGY

## 2024-04-19 PROCEDURE — 82948 REAGENT STRIP/BLOOD GLUCOSE: CPT

## 2024-04-19 PROCEDURE — 83880 ASSAY OF NATRIURETIC PEPTIDE: CPT | Performed by: INTERNAL MEDICINE

## 2024-04-19 PROCEDURE — 86900 BLOOD TYPING SEROLOGIC ABO: CPT

## 2024-04-19 PROCEDURE — 25010000002 KETOROLAC TROMETHAMINE PER 15 MG: Performed by: OBSTETRICS & GYNECOLOGY

## 2024-04-19 PROCEDURE — P9016 RBC LEUKOCYTES REDUCED: HCPCS

## 2024-04-19 PROCEDURE — 36430 TRANSFUSION BLD/BLD COMPNT: CPT

## 2024-04-19 PROCEDURE — 71045 X-RAY EXAM CHEST 1 VIEW: CPT

## 2024-04-19 PROCEDURE — 99233 SBSQ HOSP IP/OBS HIGH 50: CPT | Performed by: INTERNAL MEDICINE

## 2024-04-19 PROCEDURE — 86901 BLOOD TYPING SEROLOGIC RH(D): CPT

## 2024-04-19 PROCEDURE — 25010000002 FUROSEMIDE PER 20 MG: Performed by: INTERNAL MEDICINE

## 2024-04-19 PROCEDURE — 84443 ASSAY THYROID STIM HORMONE: CPT | Performed by: INTERNAL MEDICINE

## 2024-04-19 PROCEDURE — 85025 COMPLETE CBC W/AUTO DIFF WBC: CPT | Performed by: OBSTETRICS & GYNECOLOGY

## 2024-04-19 PROCEDURE — 80053 COMPREHEN METABOLIC PANEL: CPT | Performed by: OBSTETRICS & GYNECOLOGY

## 2024-04-19 PROCEDURE — 80307 DRUG TEST PRSMV CHEM ANLYZR: CPT | Performed by: OBSTETRICS & GYNECOLOGY

## 2024-04-19 PROCEDURE — 25010000002 FUROSEMIDE PER 20 MG: Performed by: OBSTETRICS & GYNECOLOGY

## 2024-04-19 PROCEDURE — 25010000002 ONDANSETRON PER 1 MG: Performed by: OBSTETRICS & GYNECOLOGY

## 2024-04-19 RX ORDER — FUROSEMIDE 10 MG/ML
80 INJECTION INTRAMUSCULAR; INTRAVENOUS ONCE
Status: COMPLETED | OUTPATIENT
Start: 2024-04-19 | End: 2024-04-19

## 2024-04-19 RX ORDER — LISINOPRIL 10 MG/1
40 TABLET ORAL
Status: DISCONTINUED | OUTPATIENT
Start: 2024-04-20 | End: 2024-04-24 | Stop reason: HOSPADM

## 2024-04-19 RX ORDER — FUROSEMIDE 10 MG/ML
80 INJECTION INTRAMUSCULAR; INTRAVENOUS ONCE
Qty: 8 ML | Refills: 0 | Status: COMPLETED | OUTPATIENT
Start: 2024-04-19 | End: 2024-04-19

## 2024-04-19 RX ORDER — PANTOPRAZOLE SODIUM 40 MG/1
40 TABLET, DELAYED RELEASE ORAL
Status: DISCONTINUED | OUTPATIENT
Start: 2024-04-20 | End: 2024-04-24 | Stop reason: HOSPADM

## 2024-04-19 RX ORDER — PANTOPRAZOLE SODIUM 40 MG/1
40 TABLET, DELAYED RELEASE ORAL
Status: DISCONTINUED | OUTPATIENT
Start: 2024-04-19 | End: 2024-04-19 | Stop reason: SDUPTHER

## 2024-04-19 RX ADMIN — KETOROLAC TROMETHAMINE 15 MG: 30 INJECTION, SOLUTION INTRAMUSCULAR; INTRAVENOUS at 05:20

## 2024-04-19 RX ADMIN — ACETAMINOPHEN 1000 MG: 500 TABLET ORAL at 03:17

## 2024-04-19 RX ADMIN — ACETAMINOPHEN 650 MG: 325 TABLET ORAL at 14:50

## 2024-04-19 RX ADMIN — PRENATAL VITAMINS-IRON FUMARATE 27 MG IRON-FOLIC ACID 0.8 MG TABLET 1 TABLET: at 08:29

## 2024-04-19 RX ADMIN — KETOROLAC TROMETHAMINE 15 MG: 30 INJECTION, SOLUTION INTRAMUSCULAR; INTRAVENOUS at 17:23

## 2024-04-19 RX ADMIN — FUROSEMIDE 80 MG: 10 INJECTION, SOLUTION INTRAMUSCULAR; INTRAVENOUS at 05:19

## 2024-04-19 RX ADMIN — OXYCODONE 5 MG: 5 TABLET ORAL at 16:33

## 2024-04-19 RX ADMIN — PANTOPRAZOLE SODIUM 40 MG: 40 TABLET, DELAYED RELEASE ORAL at 18:05

## 2024-04-19 RX ADMIN — ACETAMINOPHEN 650 MG: 325 TABLET ORAL at 08:29

## 2024-04-19 RX ADMIN — Medication 10 ML: at 09:00

## 2024-04-19 RX ADMIN — KETOROLAC TROMETHAMINE 15 MG: 30 INJECTION, SOLUTION INTRAMUSCULAR; INTRAVENOUS at 00:09

## 2024-04-19 RX ADMIN — LISINOPRIL 10 MG: 10 TABLET ORAL at 08:29

## 2024-04-19 RX ADMIN — Medication 10 ML: at 22:23

## 2024-04-19 RX ADMIN — OXYCODONE 5 MG: 5 TABLET ORAL at 22:22

## 2024-04-19 RX ADMIN — FUROSEMIDE 80 MG: 10 INJECTION, SOLUTION INTRAMUSCULAR; INTRAVENOUS at 16:35

## 2024-04-19 RX ADMIN — FUROSEMIDE 80 MG: 10 INJECTION, SOLUTION INTRAMUSCULAR; INTRAVENOUS at 10:29

## 2024-04-19 RX ADMIN — KETOROLAC TROMETHAMINE 15 MG: 30 INJECTION, SOLUTION INTRAMUSCULAR; INTRAVENOUS at 12:26

## 2024-04-19 RX ADMIN — ACETAMINOPHEN 650 MG: 325 TABLET ORAL at 22:22

## 2024-04-19 RX ADMIN — ONDANSETRON 4 MG: 2 INJECTION INTRAMUSCULAR; INTRAVENOUS at 10:30

## 2024-04-19 RX ADMIN — DOCUSATE SODIUM 100 MG: 100 CAPSULE, LIQUID FILLED ORAL at 22:22

## 2024-04-19 RX ADMIN — FUROSEMIDE 80 MG: 10 INJECTION, SOLUTION INTRAMUSCULAR; INTRAVENOUS at 13:28

## 2024-04-19 NOTE — PROGRESS NOTES
Subjective:     Encounter Date:2024      Patient ID: Antoinette Pack is a 38 y.o. female.    Chief Complaint:  History of Present Illness    Problem List  -HFpEF, normal EKG, echo today shows preserved systolic function (in room during echo)  -hx of HFpEF  -CXR showed pulmonary edema  -C section 24 morning  -Long standing HTN  -DM  -morbid obesity  -Hx GI bleed  -COPD?  -ALEJANDRO     Mrs. Pack is a 38 year old woman being seen for CHF.  Was admitted a few years ago for CHF.  Last few days been having palpitations, uncontrolled HTN, orthopnea, shortness of breath and occasional chest pain.  Has been worsening over past few days.  Couldn't lay flat.   Was in [facility] and transferred here secondary her distress and likely need for emergent deliverly.  Could not monitor the child so  was performed.  Child now in NICU but doing well.  Her symptoms are greatly improved.  Urine output is not great after one dose of lasix.  Trop very mildly elevated, nearly certainly form demand.  JVD to ear and positive hepaojugular reflex.  Echo has shown preserved function in past and does today.  Chest pain has essentially resolved.  ROS negative except for the above.      Update 24  No complaints.  Edema better.  Shortness of breath improved.  BNP has gone from 400 to approx 100 (normal for her is likely less than 37).  Getting blood today.      ROS    Procedures       Objective:     Constitutional:       Appearance: Not in distress.   HENT:    Mouth/Throat:      Dentition: Normal.   Pulmonary:      Effort: Pulmonary effort is normal.   Cardiovascular:      PMI at left midclavicular line. Normal rate. Regular rhythm. Normal S1. Normal S2.       Murmurs: There is no murmur.      No gallop.  No click. No rub.   Pulses:     Intact distal pulses.   Edema:     Peripheral edema present.     Ankle: bilateral 2+ pitting edema of the ankle.     Feet: bilateral 2+ pitting edema of the feet.  Abdominal:       Tenderness: There is abdominal tenderness.   Musculoskeletal:      Cervical back: Normal range of motion. Skin:     General: Skin is warm and dry.   Neurological:      General: No focal deficit present.         Lab Review:       Assessment:       -HFpEF, normal EKG, echo today shows preserved systolic function (in room during echo)  -hx of HFpEF  -CXR showed pulmonary edema  -C section 4/18/24 morning  -Long standing HTN  -DM  -morbid obesity  -Hx GI bleed  -COPD?  -ALEJANDRO       Plan:       -Does not appear to have peripartum cardiomyopathy but CHF exacerbation brought on by pregnancy, DM, HTN, morbid obesity, etc.  -blood work in morning  -lasix 80mv IV bid.  Will give extra dose with blood.  Urine output not overly robust but edema much better.  Also lisinopril restarted.  Will titrate to 40mg.  -I am ok with staying were she is  -will defer to Dr. Keys regarding breast feeding but will likely will need meds that would hold that contraindication  -Will monitor chest pain  -BP much better.  Labetolol, lisinopril and lasix for now.    -repeat CXR

## 2024-04-19 NOTE — LACTATION NOTE
"   24 1300   Maternal Information   Date of Referral 24   Person Making Referral lactation consultant   Maternal Reason for Referral no prior breastfeeding experience   Infant Reason for Referral NICU admission   Maternal Assessment   Breast Size Issue none   Breast Shape Bilateral:;other (see comments)  (large)   Breast Density Bilateral:;soft   Nipples Bilateral:;short;other (see comments)  (kodi when stimulated)   Left Nipple Symptoms intact;nontender   Right Nipple Symptoms intact;nontender   Maternal Infant Feeding   Maternal Emotional State receptive;relaxed;other (see comments)  (tired; getting blood transfusion)   Support Person Involvement actively supporting mother;verbally supports mother   Milk Expression/Equipment   Breast Pump Type double electric, personal;double electric, hospital grade  (provided RX Spectra pump and utilized hospital grade pump at bedside)   Breast Pump Flange Type hard   Breast Pump Flange Size 24 mm   Equipment for Home Use breast pump provided   Breast Pumping   Breast Pumping Interventions early pumping promoted;frequent pumping encouraged  (encouraged pumping every three hours to optimize milk supply)   Breast Pumping double electric breast pump utilized     Assisted mother with pumping utilizing hospital grade pump; infant in NICU; provided instruction on how to clean/sterilize pump parts; provided sterile syringes and how to collect any colostrum/EBM; went over educational material; began mother pumping at 1320 today; no visible colostrum noted in flange; hand expressed left breast and visible colostrum drop noted; encouraged mother to pump every three hours to maximize milk supply; mother also getting blood transfusion and lasix and is a  mother; educated mother on how milk can be delayed considering these factors and that her body is trying to \"recover\" from all of this first, but encouraged to continue to pump to stimulate hormones; mother wanted to " try to pump and see how this works for her; encouraged to call lactation if needs assistance with anything.

## 2024-04-19 NOTE — PROGRESS NOTES
Postpartum Progress Note    Patient name: Antoinette Pack  YOB: 1985   MRN: [id number]  Referring Provider: Axel Keys MD  Admission Date: 2024  Date of Service: 2024    ID: 38 y.o.     Diagnosis:   S/p  delivery 1 Day Post-Op     Pulmonary edema       Subjective:      No complaints.  Light lochia.  Ambulating, , tolerating diet.  Pain well controlled.  The patient is currently breastfeeding.   This baby is in NICU  Urine output last shift 350 mL   300 mL current this shift.  Objective:      Vital signs:  Vital Signs Range for the last 24 hours  Temperature: Temp:  [98.7 °F (37.1 °C)-99.6 °F (37.6 °C)] 99.3 °F (37.4 °C)   Temp Source: Temp src: Oral   BP: BP: (106-164)/(52-77) 108/52   Pulse: Heart Rate:  [] 88   Respirations: Resp:  [14-20] 14   Weight: 107 kg (235 lb 14.3 oz)     General: Alert & oriented x4, in no apparent distress  Abdomen: soft, nontender  Uterus: firm, nontender  Incision: clean, dry, intact, dressing clean,   Extremities: nontender; 2+ edema      Labs:  Lab Results   Component Value Date    WBC 6.97 2024    HGB 6.8 (C) 2024    HCT 21.9 (L) 2024    MCV 85.5 2024     (L) 2024     Results from last 7 days   Lab Units 24  0525   ABO TYPING  O   RH TYPING  Positive     External Prenatal Results       Pregnancy Outside Results - Transcribed From Office Records - See Scanned Records For Details       Test Value Date Time    ABO  O  24 0525    Rh  Positive  24 0525    Antibody Screen  Negative  24 0005    Varicella IgG       Rubella ^ Immune  23     Hgb  6.8 g/dL 24 0525       8.3 g/dL 24 0858       8.4 g/dL 24 0005       8.2 g/dL 24 1145       8.4 g/dL 04/15/24 2141       10.0 g/dL 24 1215       10.6 g/dL 24 1826       9.8 g/dL 23 1239       10.4 g/dL 11/15/23 1603       11.2 g/dL 10/27/23 2112       9.5 g/dL 23 1332       8.7  g/dL 09/20/23 0235       9.0 g/dL 09/19/23 0054       10.2 g/dL 09/18/23 0828       9.5 g/dL 09/17/23 0023       10.1 g/dL 09/03/23 1610       10.1 g/dL 08/26/23 1727    Hct  21.9 % 04/19/24 0525       27.1 % 04/18/24 0858       27.9 % 04/18/24 0005       26.3 % 04/17/24 1145       26.7 % 04/15/24 2141       30.5 % 03/27/24 1215       30.9 % 01/29/24 1826       32.0 % 12/08/23 1239       32.5 % 11/15/23 1603       36.8 % 10/27/23 2112       30.1 % 09/27/23 1332       28.3 % 09/20/23 0235       29.0 % 09/19/23 0054       33.0 % 09/18/23 0828       31.2 % 09/17/23 0023       32.2 % 09/03/23 1610       32.7 % 08/26/23 1727    Glucose Fasting GTT       Glucose Tolerance Test 1 hour       Glucose Tolerance Test 3 hour       Gonorrhea (discrete) ^ Negative  11/03/23     Chlamydia (discrete) ^ Negative  11/03/23     RPR ^ Non-Reactive  11/16/23       ^ Non-Reactive  11/03/23     VDRL       Syphilis Antibody       HBsAg ^ Negative  11/16/23     Herpes Simplex Virus PCR       Herpes Simplex VIrus Culture       HIV ^ Non-Reactive  11/16/23     Hep C RNA Quant PCR       Hep C Antibody       AFP       Group B Strep       GBS Susceptibility to Clindamycin       GBS Susceptibility to Erythromycin       Fetal Fibronectin       Genetic Testing, Maternal Blood                 Drug Screening       Test Value Date Time    Urine Drug Screen       Amphetamine Screen  Negative  06/11/20 0953    Barbiturate Screen  Negative  01/05/19 2032    Benzodiazepine Screen  Negative  06/11/20 0953    Methadone Screen  Negative  06/11/20 0953    Phencyclidine Screen  Negative  01/05/19 2032    Opiates Screen  Negative  06/11/20 0953    THC Screen  Negative  06/11/20 0953    Cocaine Screen  Negative  06/11/20 0953    Propoxyphene Screen  Negative  10/19/15 1920    Buprenorphine Screen  Negative  06/11/20 0953    Methamphetamine Screen  Negative  06/11/20 0953    Oxycodone Screen  Negative  06/11/20 0953    Tricyclic Antidepressants Screen                  Legend    ^: Historical                            Assessment/Plan:      1 Day Post-Op s/p Procedure(s):   SECTION PRIMARY  1. POD# 1 status post primary low-transverse  section with total left salpingectomy (unicornuate uterus) surgical standpoint doing well  2. .  Acute on chronic anemia H&H 6.8/21.9 .  No evidence of intra-abdominal bleeding by bedside ultrasound       Transfuse 1 unit packed red blood cell   3.  Chronic hypertension   4.  Chronic heart failure with acute pulmonary edema on admission       Diuresing slowly       Appreciate cardiology input  5.   Type 2 diabetes  6    obstructive sleep apnea syndrome  7.   History of lower GI bleed colonoscopy and biopsy inconclusive 2023 per patient    PLAN  Transfuse 1 unit packed red blood cell  Continue postoperative care  Cardiology following.  I spoke with Dr. Velazquez   Questions were answered.

## 2024-04-20 LAB
ALBUMIN SERPL-MCNC: 3.1 G/DL (ref 3.5–5.2)
ALBUMIN/GLOB SERPL: 1 G/DL
ALP SERPL-CCNC: 99 U/L (ref 39–117)
ALT SERPL W P-5'-P-CCNC: 12 U/L (ref 1–33)
ANION GAP SERPL CALCULATED.3IONS-SCNC: 8 MMOL/L (ref 5–15)
AST SERPL-CCNC: 27 U/L (ref 1–32)
BASOPHILS # BLD AUTO: 0.03 10*3/MM3 (ref 0–0.2)
BASOPHILS NFR BLD AUTO: 0.4 % (ref 0–1.5)
BH BB BLOOD EXPIRATION DATE: NORMAL
BH BB BLOOD TYPE BARCODE: 5100
BH BB DISPENSE STATUS: NORMAL
BH BB PRODUCT CODE: NORMAL
BH BB UNIT NUMBER: NORMAL
BILIRUB SERPL-MCNC: 0.4 MG/DL (ref 0–1.2)
BUN SERPL-MCNC: 19 MG/DL (ref 6–20)
BUN/CREAT SERPL: 27.5 (ref 7–25)
CALCIUM SPEC-SCNC: 7.9 MG/DL (ref 8.6–10.5)
CHLORIDE SERPL-SCNC: 101 MMOL/L (ref 98–107)
CO2 SERPL-SCNC: 29 MMOL/L (ref 22–29)
CREAT SERPL-MCNC: 0.69 MG/DL (ref 0.57–1)
CROSSMATCH INTERPRETATION: NORMAL
DEPRECATED RDW RBC AUTO: 43.9 FL (ref 37–54)
EGFRCR SERPLBLD CKD-EPI 2021: 112.7 ML/MIN/1.73
EOSINOPHIL # BLD AUTO: 0.23 10*3/MM3 (ref 0–0.4)
EOSINOPHIL NFR BLD AUTO: 2.8 % (ref 0.3–6.2)
ERYTHROCYTE [DISTWIDTH] IN BLOOD BY AUTOMATED COUNT: 14.6 % (ref 12.3–15.4)
GLOBULIN UR ELPH-MCNC: 3.2 GM/DL
GLUCOSE BLDC GLUCOMTR-MCNC: 126 MG/DL (ref 70–130)
GLUCOSE BLDC GLUCOMTR-MCNC: 43 MG/DL (ref 70–130)
GLUCOSE BLDC GLUCOMTR-MCNC: 50 MG/DL (ref 70–130)
GLUCOSE BLDC GLUCOMTR-MCNC: 65 MG/DL (ref 70–130)
GLUCOSE BLDC GLUCOMTR-MCNC: 74 MG/DL (ref 70–130)
GLUCOSE BLDC GLUCOMTR-MCNC: 79 MG/DL (ref 70–130)
GLUCOSE SERPL-MCNC: 78 MG/DL (ref 65–99)
HCT VFR BLD AUTO: 28.7 % (ref 34–46.6)
HCV AB SER QL: NORMAL
HGB BLD-MCNC: 9.1 G/DL (ref 12–15.9)
IMM GRANULOCYTES # BLD AUTO: 0.03 10*3/MM3 (ref 0–0.05)
IMM GRANULOCYTES NFR BLD AUTO: 0.4 % (ref 0–0.5)
LYMPHOCYTES # BLD AUTO: 1.39 10*3/MM3 (ref 0.7–3.1)
LYMPHOCYTES NFR BLD AUTO: 17 % (ref 19.6–45.3)
MCH RBC QN AUTO: 26.7 PG (ref 26.6–33)
MCHC RBC AUTO-ENTMCNC: 31.7 G/DL (ref 31.5–35.7)
MCV RBC AUTO: 84.2 FL (ref 79–97)
MONOCYTES # BLD AUTO: 0.58 10*3/MM3 (ref 0.1–0.9)
MONOCYTES NFR BLD AUTO: 7.1 % (ref 5–12)
NEUTROPHILS NFR BLD AUTO: 5.91 10*3/MM3 (ref 1.7–7)
NEUTROPHILS NFR BLD AUTO: 72.3 % (ref 42.7–76)
NRBC BLD AUTO-RTO: 0 /100 WBC (ref 0–0.2)
NT-PROBNP SERPL-MCNC: 59.4 PG/ML (ref 0–450)
PLATELET # BLD AUTO: 141 10*3/MM3 (ref 140–450)
PMV BLD AUTO: 12.4 FL (ref 6–12)
POTASSIUM SERPL-SCNC: 4 MMOL/L (ref 3.5–5.2)
PROT SERPL-MCNC: 6.3 G/DL (ref 6–8.5)
RBC # BLD AUTO: 3.41 10*6/MM3 (ref 3.77–5.28)
SODIUM SERPL-SCNC: 138 MMOL/L (ref 136–145)
UNIT  ABO: NORMAL
UNIT  RH: NORMAL
WBC NRBC COR # BLD AUTO: 8.17 10*3/MM3 (ref 3.4–10.8)

## 2024-04-20 PROCEDURE — 80053 COMPREHEN METABOLIC PANEL: CPT | Performed by: INTERNAL MEDICINE

## 2024-04-20 PROCEDURE — 83880 ASSAY OF NATRIURETIC PEPTIDE: CPT | Performed by: INTERNAL MEDICINE

## 2024-04-20 PROCEDURE — 25010000002 ENOXAPARIN PER 10 MG: Performed by: OBSTETRICS & GYNECOLOGY

## 2024-04-20 PROCEDURE — 25010000002 FUROSEMIDE PER 20 MG: Performed by: INTERNAL MEDICINE

## 2024-04-20 PROCEDURE — 86803 HEPATITIS C AB TEST: CPT | Performed by: OBSTETRICS & GYNECOLOGY

## 2024-04-20 PROCEDURE — 85025 COMPLETE CBC W/AUTO DIFF WBC: CPT | Performed by: INTERNAL MEDICINE

## 2024-04-20 PROCEDURE — 82948 REAGENT STRIP/BLOOD GLUCOSE: CPT

## 2024-04-20 PROCEDURE — 99232 SBSQ HOSP IP/OBS MODERATE 35: CPT | Performed by: INTERNAL MEDICINE

## 2024-04-20 PROCEDURE — 99223 1ST HOSP IP/OBS HIGH 75: CPT | Performed by: NURSE PRACTITIONER

## 2024-04-20 PROCEDURE — 25010000002 ONDANSETRON PER 1 MG: Performed by: OBSTETRICS & GYNECOLOGY

## 2024-04-20 RX ORDER — PEG-3350, SODIUM SULFATE, SODIUM CHLORIDE, POTASSIUM CHLORIDE, SODIUM ASCORBATE AND ASCORBIC ACID 7.5-2.691G
1000 KIT ORAL
Status: DISCONTINUED | OUTPATIENT
Start: 2024-04-22 | End: 2024-04-21

## 2024-04-20 RX ORDER — PEG-3350, SODIUM SULFATE, SODIUM CHLORIDE, POTASSIUM CHLORIDE, SODIUM ASCORBATE AND ASCORBIC ACID 7.5-2.691G
1000 KIT ORAL
Status: DISCONTINUED | OUTPATIENT
Start: 2024-04-21 | End: 2024-04-21

## 2024-04-20 RX ORDER — DEXTROSE MONOHYDRATE 25 G/50ML
25 INJECTION, SOLUTION INTRAVENOUS
Status: DISCONTINUED | OUTPATIENT
Start: 2024-04-20 | End: 2024-04-24 | Stop reason: HOSPADM

## 2024-04-20 RX ORDER — FUROSEMIDE 40 MG/1
40 TABLET ORAL
Status: DISCONTINUED | OUTPATIENT
Start: 2024-04-20 | End: 2024-04-23

## 2024-04-20 RX ORDER — NICOTINE POLACRILEX 4 MG
15 LOZENGE BUCCAL
Status: DISCONTINUED | OUTPATIENT
Start: 2024-04-20 | End: 2024-04-24 | Stop reason: HOSPADM

## 2024-04-20 RX ORDER — IBUPROFEN 600 MG/1
1 TABLET ORAL
Status: DISCONTINUED | OUTPATIENT
Start: 2024-04-20 | End: 2024-04-24 | Stop reason: HOSPADM

## 2024-04-20 RX ORDER — ONDANSETRON 2 MG/ML
4 INJECTION INTRAMUSCULAR; INTRAVENOUS EVERY 6 HOURS PRN
Status: DISCONTINUED | OUTPATIENT
Start: 2024-04-20 | End: 2024-04-24 | Stop reason: HOSPADM

## 2024-04-20 RX ADMIN — ACETAMINOPHEN 650 MG: 325 TABLET ORAL at 11:02

## 2024-04-20 RX ADMIN — PRENATAL VITAMINS-IRON FUMARATE 27 MG IRON-FOLIC ACID 0.8 MG TABLET 1 TABLET: at 08:27

## 2024-04-20 RX ADMIN — FUROSEMIDE 40 MG: 40 TABLET ORAL at 18:51

## 2024-04-20 RX ADMIN — ONDANSETRON 4 MG: 2 INJECTION INTRAMUSCULAR; INTRAVENOUS at 04:02

## 2024-04-20 RX ADMIN — LISINOPRIL 40 MG: 10 TABLET ORAL at 08:27

## 2024-04-20 RX ADMIN — SIMETHICONE 80 MG: 80 TABLET, CHEWABLE ORAL at 04:04

## 2024-04-20 RX ADMIN — ACETAMINOPHEN 650 MG: 325 TABLET ORAL at 05:08

## 2024-04-20 RX ADMIN — FUROSEMIDE 80 MG: 10 INJECTION, SOLUTION INTRAMUSCULAR; INTRAVENOUS at 05:10

## 2024-04-20 RX ADMIN — OXYCODONE 5 MG: 5 TABLET ORAL at 04:06

## 2024-04-20 RX ADMIN — OXYCODONE 10 MG: 5 TABLET ORAL at 16:04

## 2024-04-20 RX ADMIN — ENOXAPARIN SODIUM 40 MG: 100 INJECTION SUBCUTANEOUS at 08:27

## 2024-04-20 RX ADMIN — DOCUSATE SODIUM 100 MG: 100 CAPSULE, LIQUID FILLED ORAL at 09:23

## 2024-04-20 RX ADMIN — ACETAMINOPHEN 650 MG: 325 TABLET ORAL at 16:04

## 2024-04-20 RX ADMIN — DEXTROSE MONOHYDRATE 25 G: 25 INJECTION, SOLUTION INTRAVENOUS at 10:59

## 2024-04-20 RX ADMIN — ENOXAPARIN SODIUM 40 MG: 100 INJECTION SUBCUTANEOUS at 20:17

## 2024-04-20 RX ADMIN — PANTOPRAZOLE SODIUM 40 MG: 40 TABLET, DELAYED RELEASE ORAL at 08:27

## 2024-04-20 RX ADMIN — ACETAMINOPHEN 650 MG: 325 TABLET ORAL at 22:02

## 2024-04-20 RX ADMIN — ONDANSETRON 4 MG: 2 INJECTION INTRAMUSCULAR; INTRAVENOUS at 21:31

## 2024-04-20 RX ADMIN — PANTOPRAZOLE SODIUM 40 MG: 40 TABLET, DELAYED RELEASE ORAL at 16:04

## 2024-04-20 RX ADMIN — OXYCODONE 10 MG: 5 TABLET ORAL at 09:40

## 2024-04-20 NOTE — PROGRESS NOTES
"      Cardiac Electrophysiology Inpatient Follow Up Note         [facility]    Progress Note    INITIAL REASON FOR CONSULT: Heart failure    CHIEF COMPLAINT:  No chief complaint on file.    Heart failure    Subjective   . Antoinette Pack denies vomiting, abdominal pain, fussiness, difficulty breathing, and has some postoperative pain at her incision site for the .        Objective   VITAL SIGNS  /65 (BP Location: Right arm, Patient Position: Sitting)   Pulse 80   Temp 97.8 °F (36.6 °C) (Oral)   Resp 18   Ht 152.4 cm (60\")   Wt 96.2 kg (212 lb 1.9 oz)   LMP  (LMP Unknown)   SpO2 96%   Breastfeeding Yes   BMI 41.43 kg/m²   [unfilled]  Pulse Ox: SpO2  Av.1 %  Min: 95 %  Max: 100 %  Supplemental O2:       Admit Weight  Weight: 107 kg (235 lb 14.3 oz)  Last 3 Weights  [unfilled]  Body mass index is 41.43 kg/m².    INTAKE/OUTPUT  I/O last 3 completed shifts:  In: 2020 [P.O.:1300; Blood:720]  Out: 5355 [Urine:5355]    Intake/Output Summary (Last 24 hours) at 2024 1006  Last data filed at 2024 0837  Gross per 24 hour   Intake 2020 ml   Output 5400 ml   Net -3380 ml       PHYSICAL EXAM  General appearance: awake, alert, oriented, moves all extremities  Lungs: no rhonchi, no wheezes, no rales  Heart: Regular rate and rhythm  Abdomen: positive bowel sounds, no bruits, no masses  Extremities: warm and dry, no cyanosis, no clubbing    LABS  Results from last 7 days   Lab Units 24  0721 24  0525 24  0858   WBC 10*3/mm3 8.17 6.97 11.09*   HEMOGLOBIN g/dL 9.1* 6.8* 8.3*   HEMATOCRIT % 28.7* 21.9* 27.1*   MCV fL 84.2 85.5 85.5   PLATELETS 10*3/mm3 141 117* 137*         Results from last 7 days   Lab Units 24  0721 24  0525 24  0858   POTASSIUM mmol/L 4.0 4.2 4.5   CHLORIDE mmol/L 101 105 106   CO2 mmol/L 29.0 22.0 22.0   BUN mg/dL 19 23* 13   CREATININE mg/dL 0.69 0.75 0.65   GLUCOSE mg/dL 78 69 85   CALCIUM " "mg/dL 7.9* 8.1* 8.6                             No results found for: \"CHOL\", \"TRIG\", \"HDL\"    CURRENT MEDICATIONS  acetaminophen, 650 mg, Oral, Q6H  enoxaparin, 40 mg, Subcutaneous, Q12H  furosemide, 80 mg, Intravenous, Q12H  lactated ringers, 1,000 mL, Intravenous, Once  lisinopril, 40 mg, Oral, Q24H  oxytocin, 999 mL/hr, Intravenous, Once  pantoprazole, 40 mg, Oral, BID AC  prenatal vitamin, 1 tablet, Oral, Daily  sodium chloride, 10 mL, Intravenous, Q12H      CONTINUOUS INFUSIONS           EKG: Sinus  ECHO:REVIEWED   STRESS: REVIEWED  CATH: REVIEWED  CXR: Noted    TELEMETRY: Sinus         Diagnosis Plan   1. Acute heart failure episode Etiology likely multifactorial namely because of BMI of 41 in combination with pregnancy as well as obstructive sleep apnea.    Normal echocardiogram without significant valvular heart disease.  Mild left ventricular hypertrophy perhaps consistent with history of hypertension.  Further outpatient workup and clarification will likely be of benefit.    She has diuresed well has clear lungs and no clinical signs at the bedside of heart failure at this time.    Back off on Lasix.  Transition to 40 mg of p.o. Lasix twice a day rather than 80 mg once daily intravenously.    Continue lisinopril as sole therapy for essential hypertension.   Ongoing GI bleeding In the context of a significantly abnormal endoscopic evaluation in [facility].  Patient had been scheduled to have a repeat biopsy however this was deferred by the gastroenterologist in [facility] due to her pregnancy.  She has now had worsening ongoing GI bleeding.  In combination with significant anemia during the stay worsening bleeding and significant acute heart failure on this admission I think it is worth evaluating this as an inpatient.  I will order a GI consult.        Body mass index is 41.43 kg/m².    I spent 38 minutes in consultation with this patient which included more than 65% of this time in direct " face-to-face counseling, physical examination and discussion of my assessment and findings and this shared decision making with the patient.  The remainder of the time not spent face-to-face was performing one, some or all of the following actions: preparing to see the patient (e.g. reviewing tests, prior clinicians' notes, etc), ordering medications, tests or procedures, coordination of care, discussion of the plan with other healthcare providers, documenting clinical information in epic as well as independently interpreting results and communication of these results to the patient family and/or caregiver(s).  Please note that this explicitly excludes time spent on other separate billable services such as performing procedures or test interpretation, when applicable.        Ruben Red DO, FACC, RS  Cardiac Electrophysiologist  [facility]

## 2024-04-20 NOTE — CASE MANAGEMENT/SOCIAL WORK
Continued Stay Note  [facility]     Patient Name: Antoinette Pack  MRN: [id number]  Today's Date: 4/20/2024    Admit Date: 4/17/2024    Plan: MSW available   Discharge Plan       Row Name 04/20/24 1250       Plan    Plan MSW available    Plan Comments MSW received consult due to NICU admission. MSW met with Pt and family ta bedside and provided NICU resource packet. Pt reports she had spoke to MB MSW regarding [facility]. MSW discussed additional resources. Pt denied needs or concerns. MSW available    Final Discharge Disposition Code 01 - home or self-care                   Discharge Codes    No documentation.                       JO ANN Devlin

## 2024-04-20 NOTE — CONSULTS
[facility]  Inpatient Gastroenterology Consult    Inpatient Gastroenterology Consult  Consult performed by: Vincent Viramontes APRN  Consult ordered by: Ruben Red DO  Reason for consult: Rectal bleeding, prior colonoscopy      Referring Provider: Geovani Razo III, MD  PCP: Shawna Lambert DO    Chief Complaint: Bloody stools    History of present illness:  Antoinette Pack is a 38 y.o. female who is admitted to hospital with concerns of heart failure who at that time was 34 weeks pregnant who ultimately required a .  GI consult received for ongoing issues with rectal bleeding and anemia and her history of prior colonoscopy.    Patient notes a tenuous past medical history regarding her GI tract.  Notes that she has intermittent rectal bleeding and has done so for some time and has sought care with other gastroenterologist in the past for this issue.  Patient reports that she had a colonoscopy last year per Dr. Peguero in [place] which was concerning for a large polyp in area of inflammation in the colon though she is data deficit on the underlying etiology thereof.  Patient also notes that in the past she has had issues with diverticulitis and pancreatitis as well.  Also, reports hemorrhoids.  In terms of extraintestinal manifestation of disease, patient does report some intermittent arthralgia and cutaneous skin manifestations of rashes intermittently.  Does not report any nausea, vomiting, diarrhea, shortness of breath, chest pain, or fever/chills.  Patient does report rectal bleeding as noted above and spouse reports significantly malodorous bloody stools at home.  Patient also notes some chronic left lower quadrant abdominal pain that turned into epigastric pain throughout her pregnancy; following delivery, has now settled back into left lower quadrant.  Following her , patient has had persistent rectal bleeding over the past 24 hours.  Does not report any  family history of IBD.  Patient and spouse does report that she was started on mesalamine suppositories following her most recent colonoscopy.  Past medical, surgical, social, and family histories are reviewed for accuracy.  No documented alleviating or exacerbating factors.  Does not endorse pain at time of exam.    Allergies:  Dilantin [phenytoin], Keppra [levetiracetam], Meperidine, and Topamax [topiramate]    Scheduled Meds:  acetaminophen, 650 mg, Oral, Q6H  enoxaparin, 40 mg, Subcutaneous, Q12H  furosemide, 40 mg, Oral, BID  lactated ringers, 1,000 mL, Intravenous, Once  lisinopril, 40 mg, Oral, Q24H  oxytocin, 999 mL/hr, Intravenous, Once  pantoprazole, 40 mg, Oral, BID AC  prenatal vitamin, 1 tablet, Oral, Daily  sodium chloride, 10 mL, Intravenous, Q12H    Infusions:     PRN Meds:    aluminum-magnesium hydroxide-simethicone **OR** calcium carbonate    dextrose    dextrose    docusate sodium    glucagon (human recombinant)    Hydrocortisone (Perianal)    lanolin    lidocaine PF 1%    ondansetron    oxyCODONE **OR** oxyCODONE    promethazine    promethazine **OR** promethazine    simethicone    Home Meds:  Medications Prior to Admission   Medication Sig Dispense Refill Last Dose    aspirin (ASPIR) 81 MG EC tablet Take 1 tablet by mouth Daily. 30 tablet 6 4/17/2024 at 0700    Continuous Blood Gluc Sensor (Dexcom G6 Sensor) Use Every 10 (Ten) Days. 3 each 5     Continuous Blood Gluc Transmit (Dexcom G6 Transmitter) misc Use 1 each Every 3 (Three) Months. 1 each 1     folic acid (FOLVITE) 1 MG tablet Take 1 tablet by mouth Daily.       Insulin Aspart (novoLOG) 100 UNIT/ML injection For use in insulin pump. Max Daily Dose: 250 units daily. 80 mL 5     Insulin Aspart, w/Niacinamide, (Fiasp) 100 UNIT/ML solution Inject 350 Units as directed Daily. For use in insulin pump. Max dose of 350u/day. 110 mL 5     Insulin Disposable Pump (Omnipod 5 G6 Pods, Gen 5,) misc Change 2 (Two) Times a Day as directed. 60 each 5      "Insulin Pen Needle 32G X 4 MM misc Use to inject insulin up to 4 times daily as directed 400 each 1     Insulin Syringe 31G X 5/16\" 1 ML misc Use 1 each 3 (Three) Times a Day With Meals. As needed for additional insulin bolus with meals. 100 each 3     labetalol (NORMODYNE) 200 MG tablet Take 1 tablet by mouth 3 times a day. 90 tablet 0     lansoprazole (PREVACID) 30 MG capsule Take 1 capsule by mouth 2 (Two) Times a Day.       mesalamine (CANASA) 1000 MG suppository Insert 1 suppository into the rectum Every Night.       Prenatal Vit-Fe Fumarate-FA (PRENATAL PO) Take 1 tablet by mouth Daily.          ROS: Review of Systems   Constitutional:  Positive for activity change and fatigue. Negative for appetite change, chills, diaphoresis, fever and unexpected weight change.   HENT:  Negative for sore throat, trouble swallowing and voice change.    Eyes: Negative.    Respiratory:  Negative for apnea, cough, choking, chest tightness, shortness of breath, wheezing and stridor.    Cardiovascular:  Positive for leg swelling. Negative for chest pain and palpitations.   Gastrointestinal:  Positive for abdominal pain, anal bleeding, blood in stool and diarrhea. Negative for abdominal distention, constipation, nausea, rectal pain and vomiting.   Endocrine: Negative.    Genitourinary: Negative.    Musculoskeletal:  Positive for arthralgias.   Skin:  Positive for rash.   Allergic/Immunologic: Negative.    Neurological: Negative.    Hematological: Negative.    Psychiatric/Behavioral: Negative.     All other systems reviewed and are negative.      PAST MED HX:  Past Medical History:   Diagnosis Date    Anxiety     CHF (congestive heart failure) 05/2023    Colitis 2023    Depression     Diabetes mellitus 2018    Diverticulitis     GERD (gastroesophageal reflux disease)     Hypertension     Kidney stone     Narcolepsy 2022    Pancreatitis 09/2023    PCOS (polycystic ovarian syndrome)     Polycystic ovary syndrome     Rectal bleeding  " "   SINCE  - USES MESALAMINE SUPPOSITORY NIGHTLY    Seizures     Sleep apnea     Urinary tract infection        PAST SURG HX:  Past Surgical History:   Procedure Laterality Date    CARDIAC CATHETERIZATION      CARPAL TUNNEL RELEASE       SECTION N/A 2024    Procedure:  SECTION PRIMARY;  Surgeon: Axel Keys MD;  Location: [facility] LABOR DELIVERY;  Service: Obstetrics/Gynecology;  Laterality: N/A;    COLONOSCOPY      ENDOSCOPY      FRACTURE SURGERY      HARDWARE REMOVAL      WRIST    TENDON REPAIR      HAND    WISDOM TOOTH EXTRACTION         FAM HX:  Family History   Problem Relation Age of Onset    Hypertension Mother     Depression Mother     Heart disease Mother     COPD Mother     Emphysema Mother     Hypertension Father     Diabetes Father     Heart disease Father     COPD Father     Heart failure Father     Hepatitis Father        SOC HX:  Social History     Socioeconomic History    Marital status:    Tobacco Use    Smoking status: Former     Current packs/day: 0.00     Average packs/day: 1 pack/day for 20.0 years (20.0 ttl pk-yrs)     Types: Cigarettes     Start date:      Quit date:      Years since quittin.3     Passive exposure: Never    Smokeless tobacco: Never    Tobacco comments:     QUIT IN 2017   Vaping Use    Vaping status: Never Used   Substance and Sexual Activity    Alcohol use: No    Drug use: No    Sexual activity: Defer       PHYSICAL EXAM  /76 (BP Location: Right arm, Patient Position: Sitting)   Pulse 88   Temp 98.5 °F (36.9 °C) (Oral)   Resp 18   Ht 152.4 cm (60\")   Wt 96.2 kg (212 lb 1.9 oz)   LMP  (LMP Unknown)   SpO2 96%   Breastfeeding Yes   BMI 41.43 kg/m²   Wt Readings from Last 3 Encounters:   24 96.2 kg (212 lb 1.9 oz)   24 107 kg (236 lb)   04/15/24 106 kg (233 lb)   ,body mass index is 41.43 kg/m².  Physical Exam  Vitals and nursing note reviewed.   Constitutional:       General: She is not in acute " distress.     Appearance: Normal appearance. She is obese. She is ill-appearing. She is not toxic-appearing.   HENT:      Head: Normocephalic and atraumatic.   Eyes:      General: No scleral icterus.     Extraocular Movements: Extraocular movements intact.      Conjunctiva/sclera: Conjunctivae normal.      Pupils: Pupils are equal, round, and reactive to light.   Cardiovascular:      Rate and Rhythm: Normal rate and regular rhythm.      Pulses: Normal pulses.      Heart sounds: Normal heart sounds.   Pulmonary:      Effort: Pulmonary effort is normal. No respiratory distress.      Breath sounds: Normal breath sounds.   Abdominal:      General: Abdomen is flat. Bowel sounds are normal. There is no distension.      Palpations: Abdomen is soft. There is no mass.      Tenderness: There is abdominal tenderness. There is no guarding or rebound.      Hernia: No hernia is present.   Genitourinary:     Rectum: Guaiac result positive.   Musculoskeletal:         General: Normal range of motion.      Right lower leg: Edema present.      Left lower leg: Edema present.   Skin:     General: Skin is warm and dry.      Capillary Refill: Capillary refill takes less than 2 seconds.      Coloration: Skin is not jaundiced or pale.   Neurological:      General: No focal deficit present.      Mental Status: She is alert and oriented to person, place, and time.   Psychiatric:         Mood and Affect: Mood normal.         Behavior: Behavior normal.         Thought Content: Thought content normal.         Judgment: Judgment normal.         Results Review:   I reviewed the patient's new clinical results.  I reviewed the patient's new imaging results and agree with the interpretation.  I reviewed the patient's other test results and agree with the interpretation  I personally viewed and interpreted the patient's EKG/Telemetry data    Lab Results   Component Value Date    WBC 8.17 04/20/2024    HGB 9.1 (L) 04/20/2024    HGB 6.8 (C) 04/19/2024     HGB 8.3 (L) 04/18/2024    HCT 28.7 (L) 04/20/2024    MCV 84.2 04/20/2024     04/20/2024       Lab Results   Component Value Date    INR 0.98 07/01/2023    INR 1.02 05/04/2018    INR 0.92 09/22/2015       Lab Results   Component Value Date    GLUCOSE 78 04/20/2024    BUN 19 04/20/2024    CREATININE 0.69 04/20/2024    EGFRIFNONA 101 12/27/2020    EGFRIFAFRI  09/05/2016      Comment:      <15 Indicative of kidney failure.    BCR 27.5 (H) 04/20/2024     04/20/2024    K 4.0 04/20/2024    CO2 29.0 04/20/2024    CALCIUM 7.9 (L) 04/20/2024    ALBUMIN 3.1 (L) 04/20/2024    ALKPHOS 99 04/20/2024    BILITOT 0.4 04/20/2024    ALT 12 04/20/2024    AST 27 04/20/2024       XR Chest 1 View    Result Date: 4/19/2024  XR CHEST 1 VW Date of Exam: 4/19/2024 10:11 AM EDT Indication: pulmonary edema Comparison: 4/18/2024 Findings: Heart shadow is upper normal limits borderline enlarged in size, for portable film technique. Pulmonary vasculature appears normal. Lungs appear moderately well expanded and clear.     Impression: Heart shadow in the upper range of normal size, but no evidence of congestive failure or other active disease. Electronically Signed: Kory Cisneros MD  4/19/2024 10:34 AM EDT  Workstation ID: [id number]    Adult Transthoracic Echo Limited W/ Cont if Necessary Per Protocol    Result Date: 4/18/2024    Left ventricular systolic function is normal. Calculated left ventricular EF = 56.4%   Left ventricular wall thickness is consistent with mild concentric hypertrophy.   Left ventricular diastolic function was not assessed.   There is a trivial pericardial effusion.     XR Chest PA & Lateral    Result Date: 4/18/2024  XR CHEST PA AND LATERAL Date of Exam: 4/18/2024 1:11 AM EDT Indication: soa and chest pain Comparison: 3/27/2024. Findings: There is new interstitial disease present throughout the lungs. No pneumothorax or pleural effusion. Heart size is stable. No acute osseous abnormality. No focal lung  consolidation.     Impression: New interstitial disease in the lungs concerning for pulmonary edema. Electronically Signed: Yoseph Pace MD  2024 1:38 AM EDT  Workstation ID: [id number]    Adult Transthoracic Echo Complete W/ Cont if Necessary Per Protocol    Result Date: 2024    Normal left ventricular cavity size and wall motion noted.   Left ventricular wall thickness is consistent with mild concentric hypertrophy.   Left ventricular diastolic function is consistent with (grade II w/high LAP) pseudonormalization.   LV ejection fraction is around 60%.   Aortic valve is normal there is no evidence of aortic stenosis or aortic regurgitation detected.   Mild mitral regurgitation is noted, no significant valvular heart disease detected.   No pericardial effusion noted     [facility] [place]    Result Date: 2024  PAT NAME: RAFITA BRISENO [facility] REC#: [id number] BIRTH DA: [id number] PAT GEND: F ACCOUNT#: [id number] PAT TYPE: O EXAM MARIELA: [id number] REF PHYS ASCANI III, ELIZA ACCESSION [id number] Comparison Studies ================= The findings of this study are compared to the prior ultrasound study dated 3/11/24 Patient Status ============ Outpatient Indication ======== AMA. CHTN. Type 2 diabetes. Seizure disorder. Hx CHF. PCOS. Morbid obesity BMI 47. Maternal Assessment ================== Height 150 cm Height (ft) 4 ft Height (in) 11 in Weight 106 kg Weight (lb) 233 lb BMI 46.97 kg/m² Method ======= Transabdominal ultrasound examination. View: Limited by patient body habitus. Suboptimal view: restricted by patient discomfort Pregnancy ========= Briggs pregnancy. Number of fetuses: 1 Dating ====== Method of dating: based on stated ANDRE GA by prior assessment 33 w + 2 d ANDRE by prior assessment: 2024 Ultrasound examination on: 2024 GA by U/S based upon: AC, BPD, Femur, HC GA by U/S 33 w + 3 d ANDRE by U/S: 2024 Previous dating: based on stated ANDRE, selected on 2024  Agreed ANDRE of previous datin2024 Assigned: based on stated ANDRE, selected on 2024 Assigned GA 33 w + 2 d Assigned ANDRE: 2024 Pregnancy length 280 d Fetal Biometry ============ Standard BPD 84.8 mm 34w 1d        71%        Hadlock .6 mm 35w 4d        91%        Neo .6 mm 34w 2d        39%        Hadlock .1 mm 32w 6d        40%        Hadlock Femur 62.0 mm 32w 1d        13%        Hadlock Humerus 56.0 mm 32w 4d        41%        Neo HC / AC 1.06 EFW 2,083 g         39%        Natanael EFW (lb) 4 lb EFW (oz) 9 oz EFW by: Hadlock (BPD-HC-AC-FL) Extended  8.0 mm Head / Face / Neck Cephalic index 0.79         35%        Nicolaides Extremities / Bony Struc FL / BPD 0.73 FL / HC 0.20 FL / AC 0.21 Other Structures  bpm General Evaluation ================ Cardiac activity present.  bpm. Fetal movements present. Presentation cephalic. Placenta Placental site: posterior, right. Amniotic fluid Amount of AF: normal. MVP 6.6 cm. JACQUIE 18.7 cm. Q1 2.8 cm, Q2 6.6 cm, Q3 6.5 cm, Q4 2.8 cm. Fetal Anatomy ============ Cranium: Normal Cerebellum: Normal Cisterna magna: Normal Head / Neck Rt lateral ventricle: Normal Lt lateral ventricle: Normal Lips: Normal Profile: Normal Nose: Normal 4-chamber view: suboptimal Heart / Thorax 3-vessel view: Normal 3-vessel-trachea view: normal Cord insertion: Normal Stomach: Appears normal Kidneys: Appears normal Bladder: Appears normal Gender: female Wants to know gender: yes Fetal Doppler =========== Arterial Umbilical A PI 1.19         94%        Alan Umbilical A RI 0.71         91%        Alan Umbilical A PS 23.24 cm/s         <1%        Ebbing Umbilical A ED 6.80 cm/s Umbilical A TAmax 13.84 cm/s         <1%        Ebbing Umbilical A S / D 3.42         88%        Alan Biophysical Profile =============== 2: Fetal breathing movements 2: Gross body movements 2: Fetal tone 2: Amniotic fluid volume 8/8 Biophysical profile score  Interpretation: normal Consultation / Office Visit ==================== See note in Epic. Impression ========= Antoinette presents for a follow-up ultrasound to assess interval growth and fetal wellbeing. On today's exam, SIUP is noted in cephalic presentation with biometry measuring consistent with dates. EFW measures at the 39th percentile. AC measures at the 40th percentile. There is a normal amount of amniotic fluid. Placenta is posterior/fundal. BPP is 8/8. UA Dopplers are normal. Limited but normal appearing anatomy is visualized. Recommendation =============== Follow-up scheduled in 2 wks. Patient sent for Cardiology evaluation and echo. Will get patient scheduled for delivery at approximately 37wks. Coding ====== Description: [id number] Follow Up Ultrasound Description: [id number] BPP without NST Sonographer: RT SANDRA Julio , [facility] Physician: Dolores Fernandez MD Electronically signed by: Dolores Fernandez MD at:  11:55    XR Chest 2 View    Result Date: 3/27/2024  EXAM:   XR Chest, 2 Views  EXAM DATE:   3/27/2024 11:55 AM  CLINICAL HISTORY:   SOA Triage Protocol  TECHNIQUE:   Frontal and lateral views of the chest.  COMPARISON:   2023  FINDINGS:   Lungs and pleural spaces:  Unremarkable as visualized.  No consolidation.  No pneumothorax.   Heart:  Unremarkable as visualized.  No cardiomegaly.   Mediastinum:  Unremarkable as visualized.  Normal mediastinal contour.   Bones/joints:  Unremarkable as visualized.  No acute fracture.        Unremarkable radiographic appearance of the chest.   This report was finalized on 3/27/2024 12:44 PM by Dr. Bharath Chávez MD.       ASSESSMENTS/PLANS  1.  Acute gastrointestinal bleeding with intermittent BRBPR and dark tarry stools  2.  Acute blood loss anemia, suspect secondary to surgical losses as well as above  3.  Abnormal colonoscopy findings, concerns for polyp and irregular inflammation of indeterminate etiology  4.  POD 3 following low transverse   section  5.  CHF.  6.  History of gastrointestinal bleeding    Antoinette Pack is a 38 y.o. female who presents to hospital with congestive heart failure who ultimately required  for delivery at 34w5d.  GI consult received for rectal bleeding.  Patient has a complex history of lower gastrointestinal bleeding that stems the greater part of the past few years though it has certainly gotten worse here recently.  Overall, symptoms are strongly concerning for underlying IBD with indeterminate clinical features identified on her colonoscopy last year warranting follow-up.  Given her postpartum state and issues with CHF throughout pregnancy, recommend inpatient bidirectional endoscopy on Monday, 2024.    >>> Okay for regular diet today; clear liquid diet to begin tomorrow  >>> Recommend bidirectional endoscopy on Monday, 2024.  >>> Bowel prep ordered to begin at 4 PM on 2024   MoviPrep: Give 8 ounces every 15 minutes ×2 L starting at 4 PM.  Repeat dose at 5 AM tomorrow.  Must finish both doses in 2 hours.  Stool should look like lemonade when finished.  Please call ENDO at [phone] at 7:30am if not clear.  >>> Monitor H&H and transfuse per protocol  >>> Labs ordered including: CRP, sed rate  >>> Stool studies ordered: Fecal calprotectin  >>> Obtain medical records from Dr. Peguero in [place] regarding patient's most recent colonoscopy and pertinent pathology findings.    I discussed the patient's findings and my recommendations with patient, family, nursing staff, and consulting provider    CICI Chacon  24  18:54 EDT

## 2024-04-20 NOTE — PROGRESS NOTES
Postpartum Progress Note    Patient name: Antoinette Pack  YOB: 1985   MRN: [id number]  Referring Provider: Geovani Razo III, MD  Admission Date: 2024  Date of Service: 2024    ID: 38 y.o.     Diagnosis:   S/p  delivery 2 Days Post-Op     Pulmonary edema       Subjective:      No complaints.  Light lochia.  Ambulating, voiding, tolerating diet. + Flatus   Pain well controlled.  The patient is currently breastfeeding.   This baby is in NICU  Total urine output out since admission 6100 mL  Fasting blood sugar this morning 74    Objective:      Vital signs:  Vital Signs Range for the last 24 hours  Temperature: Temp:  [97.8 °F (36.6 °C)-98.9 °F (37.2 °C)] 97.8 °F (36.6 °C)   Temp Source: Temp src: Oral   BP: BP: (110-173)/(56-81) 146/65   Pulse: Heart Rate:  [66-88] 80   Respirations: Resp:  [18-20] 18   Weight: 96.2 kg (212 lb 1.9 oz)     General: Alert & oriented x4, in no apparent distress  Abdomen: soft, nontender  Uterus: firm, nontender  Incision: clean, dry, intact,   Extremities: nontender; trace  edema      Labs:  Lab Results   Component Value Date    WBC 8.17 2024    HGB 9.1 (L) 2024    HCT 28.7 (L) 2024    MCV 84.2 2024     2024     Results from last 7 days   Lab Units 24  0525   ABO TYPING  O   RH TYPING  Positive     External Prenatal Results       Pregnancy Outside Results - Transcribed From Office Records - See Scanned Records For Details       Test Value Date Time    ABO  O  24 0525    Rh  Positive  24 0525    Antibody Screen  Negative  24 0005    Varicella IgG       Rubella ^ Immune  23     Hgb  9.1 g/dL 24 0721       6.8 g/dL 24 0525       8.3 g/dL 24 0858       8.4 g/dL 24 0005       8.2 g/dL 24 1145       8.4 g/dL 04/15/24 2141       10.0 g/dL 24 1215       10.6 g/dL 24 1826       9.8 g/dL 23 1239       10.4 g/dL 11/15/23 1603       11.2  g/dL 10/27/23 2112       9.5 g/dL 09/27/23 1332       8.7 g/dL 09/20/23 0235       9.0 g/dL 09/19/23 0054       10.2 g/dL 09/18/23 0828       9.5 g/dL 09/17/23 0023       10.1 g/dL 09/03/23 1610       10.1 g/dL 08/26/23 1727    Hct  28.7 % 04/20/24 0721       21.9 % 04/19/24 0525       27.1 % 04/18/24 0858       27.9 % 04/18/24 0005       26.3 % 04/17/24 1145       26.7 % 04/15/24 2141       30.5 % 03/27/24 1215       30.9 % 01/29/24 1826       32.0 % 12/08/23 1239       32.5 % 11/15/23 1603       36.8 % 10/27/23 2112       30.1 % 09/27/23 1332       28.3 % 09/20/23 0235       29.0 % 09/19/23 0054       33.0 % 09/18/23 0828       31.2 % 09/17/23 0023       32.2 % 09/03/23 1610       32.7 % 08/26/23 1727    Glucose Fasting GTT       Glucose Tolerance Test 1 hour       Glucose Tolerance Test 3 hour       Gonorrhea (discrete) ^ Negative  11/03/23     Chlamydia (discrete) ^ Negative  11/03/23     RPR ^ Non-Reactive  11/16/23       ^ Non-Reactive  11/03/23     VDRL       Syphilis Antibody       HBsAg ^ Negative  11/16/23     Herpes Simplex Virus PCR       Herpes Simplex VIrus Culture       HIV ^ Non-Reactive  11/16/23     Hep C RNA Quant PCR       Hep C Antibody  Non-Reactive  04/20/24 0721    AFP       Group B Strep       GBS Susceptibility to Clindamycin       GBS Susceptibility to Erythromycin       Fetal Fibronectin       Genetic Testing, Maternal Blood                 Drug Screening       Test Value Date Time    Urine Drug Screen       Amphetamine Screen  Negative  04/19/24 1152    Barbiturate Screen  Negative  04/19/24 1152    Benzodiazepine Screen  Negative  04/19/24 1152    Methadone Screen  Negative  04/19/24 1152    Phencyclidine Screen  Negative  04/19/24 1152    Opiates Screen  Negative  04/19/24 1152    THC Screen  Negative  04/19/24 1152    Cocaine Screen  Negative  06/11/20 0953    Propoxyphene Screen  Negative  10/19/15 1920    Buprenorphine Screen  Negative  04/19/24 1152    Methamphetamine Screen   Negative  20 0953    Oxycodone Screen  Negative  24 1152    Tricyclic Antidepressants Screen  Negative  24 1152              Legend    ^: Historical                            Assessment/Plan:      2 Days Post-Op s/p Procedure(s):   SECTION PRIMARY  1. POD# 3 after a low transverse  section with total left salpingectomy (unicornuate uterus): Continue postoperative care.  Doing well.  2. Infant feeding: Supportive care.  The patient is currently breastfeeding.  3.  Acute on chronic anemia H&H this a.m. 9..7 after 2 units of packed red blood cells(appropriate response.)  4.  CHF followed by cardiology(total urine output 6100 mL since admission)      Currently asymptomatic  5.  Chronic hypertension BPs stable on new dose of lisinopril 40 mg daily  6.  Type 2 diabetes  (OmniPod insulin pump)        Past blood sugar 74  7.   Obstructive sleep apnea syndrome   8.   History of lower GI bleed;, colonoscopy and biopsy inconclusive 2023 in [facility] patient.  She reports continues to have rectal bleeding.  PLAN  Continue routine postoperative care  Encourage ambulation incentive spirometer use  Cardiology to adjust cardiac meds  SCUDs and Lovenox for DVT prophylaxis  GI consult ordered by cardiology     with this patient of which greater than 50% was face to face counseling and coordination of care. Questions were answered.

## 2024-04-21 PROBLEM — E11.9 TYPE 2 DIABETES MELLITUS, WITH LONG-TERM CURRENT USE OF INSULIN: Chronic | Status: ACTIVE | Noted: 2024-04-21

## 2024-04-21 PROBLEM — Z98.891 S/P CESAREAN SECTION: Status: ACTIVE | Noted: 2024-04-21

## 2024-04-21 PROBLEM — I10 ESSENTIAL HYPERTENSION: Chronic | Status: ACTIVE | Noted: 2024-04-21

## 2024-04-21 PROBLEM — D64.9 ANEMIA: Chronic | Status: ACTIVE | Noted: 2024-04-21

## 2024-04-21 PROBLEM — Z79.4 TYPE 2 DIABETES MELLITUS, WITH LONG-TERM CURRENT USE OF INSULIN: Chronic | Status: ACTIVE | Noted: 2024-04-21

## 2024-04-21 LAB
CRP SERPL-MCNC: 12 MG/DL (ref 0–0.5)
DEPRECATED RDW RBC AUTO: 45.4 FL (ref 37–54)
ERYTHROCYTE [DISTWIDTH] IN BLOOD BY AUTOMATED COUNT: 14.6 % (ref 12.3–15.4)
ERYTHROCYTE [SEDIMENTATION RATE] IN BLOOD: 38 MM/HR (ref 0–20)
GLUCOSE BLDC GLUCOMTR-MCNC: 116 MG/DL (ref 70–130)
GLUCOSE BLDC GLUCOMTR-MCNC: 63 MG/DL (ref 70–130)
GLUCOSE BLDC GLUCOMTR-MCNC: 75 MG/DL (ref 70–130)
GLUCOSE BLDC GLUCOMTR-MCNC: 76 MG/DL (ref 70–130)
GLUCOSE BLDC GLUCOMTR-MCNC: 88 MG/DL (ref 70–130)
HCT VFR BLD AUTO: 29.9 % (ref 34–46.6)
HGB BLD-MCNC: 9.2 G/DL (ref 12–15.9)
LACTOFERRIN STL QL LA: POSITIVE
MCH RBC QN AUTO: 26.7 PG (ref 26.6–33)
MCHC RBC AUTO-ENTMCNC: 30.8 G/DL (ref 31.5–35.7)
MCV RBC AUTO: 86.7 FL (ref 79–97)
PLATELET # BLD AUTO: 145 10*3/MM3 (ref 140–450)
PMV BLD AUTO: 12.2 FL (ref 6–12)
RBC # BLD AUTO: 3.45 10*6/MM3 (ref 3.77–5.28)
WBC NRBC COR # BLD AUTO: 6.92 10*3/MM3 (ref 3.4–10.8)

## 2024-04-21 PROCEDURE — 83993 ASSAY FOR CALPROTECTIN FECAL: CPT | Performed by: NURSE PRACTITIONER

## 2024-04-21 PROCEDURE — 85027 COMPLETE CBC AUTOMATED: CPT | Performed by: OBSTETRICS & GYNECOLOGY

## 2024-04-21 PROCEDURE — 86140 C-REACTIVE PROTEIN: CPT | Performed by: NURSE PRACTITIONER

## 2024-04-21 PROCEDURE — 82948 REAGENT STRIP/BLOOD GLUCOSE: CPT

## 2024-04-21 PROCEDURE — 25010000002 ENOXAPARIN PER 10 MG: Performed by: OBSTETRICS & GYNECOLOGY

## 2024-04-21 PROCEDURE — 83630 LACTOFERRIN FECAL (QUAL): CPT | Performed by: NURSE PRACTITIONER

## 2024-04-21 PROCEDURE — 99232 SBSQ HOSP IP/OBS MODERATE 35: CPT | Performed by: INTERNAL MEDICINE

## 2024-04-21 PROCEDURE — 85652 RBC SED RATE AUTOMATED: CPT | Performed by: NURSE PRACTITIONER

## 2024-04-21 RX ORDER — PEG-3350, SODIUM SULFATE, SODIUM CHLORIDE, POTASSIUM CHLORIDE, SODIUM ASCORBATE AND ASCORBIC ACID 7.5-2.691G
1000 KIT ORAL
Status: COMPLETED | OUTPATIENT
Start: 2024-04-22 | End: 2024-04-22

## 2024-04-21 RX ORDER — PEG-3350, SODIUM SULFATE, SODIUM CHLORIDE, POTASSIUM CHLORIDE, SODIUM ASCORBATE AND ASCORBIC ACID 7.5-2.691G
1000 KIT ORAL
Status: COMPLETED | OUTPATIENT
Start: 2024-04-21 | End: 2024-04-21

## 2024-04-21 RX ADMIN — ENOXAPARIN SODIUM 40 MG: 100 INJECTION SUBCUTANEOUS at 08:51

## 2024-04-21 RX ADMIN — ACETAMINOPHEN 650 MG: 325 TABLET ORAL at 23:36

## 2024-04-21 RX ADMIN — PANTOPRAZOLE SODIUM 40 MG: 40 TABLET, DELAYED RELEASE ORAL at 17:05

## 2024-04-21 RX ADMIN — LISINOPRIL 40 MG: 10 TABLET ORAL at 08:51

## 2024-04-21 RX ADMIN — PRENATAL VITAMINS-IRON FUMARATE 27 MG IRON-FOLIC ACID 0.8 MG TABLET 1 TABLET: at 08:51

## 2024-04-21 RX ADMIN — Medication 10 ML: at 20:01

## 2024-04-21 RX ADMIN — OXYCODONE 5 MG: 5 TABLET ORAL at 13:09

## 2024-04-21 RX ADMIN — ACETAMINOPHEN 650 MG: 325 TABLET ORAL at 05:02

## 2024-04-21 RX ADMIN — ACETAMINOPHEN 650 MG: 325 TABLET ORAL at 17:05

## 2024-04-21 RX ADMIN — PEG-3350, SODIUM SULFATE, SODIUM CHLORIDE, POTASSIUM CHLORIDE, SODIUM ASCORBATE AND ASCORBIC ACID 1000 ML: KIT at 15:46

## 2024-04-21 RX ADMIN — FUROSEMIDE 40 MG: 40 TABLET ORAL at 08:51

## 2024-04-21 RX ADMIN — PANTOPRAZOLE SODIUM 40 MG: 40 TABLET, DELAYED RELEASE ORAL at 08:51

## 2024-04-21 RX ADMIN — ACETAMINOPHEN 650 MG: 325 TABLET ORAL at 13:09

## 2024-04-21 RX ADMIN — ENOXAPARIN SODIUM 40 MG: 100 INJECTION SUBCUTANEOUS at 21:12

## 2024-04-21 RX ADMIN — FUROSEMIDE 40 MG: 40 TABLET ORAL at 17:06

## 2024-04-21 NOTE — PROGRESS NOTES
"      Cardiac Electrophysiology Inpatient Follow Up Note         [facility]    Progress Note    INITIAL REASON FOR CONSULT: HFpEF    CHIEF COMPLAINT:  No chief complaint on file.    HFpEF    Subjective   Mrs. Antoinette Pack denies vomiting, abdominal pain, cough, difficulty breathing, and her  section incisional pain is reasonable.        Objective   VITAL SIGNS  /70 (BP Location: Right arm, Patient Position: Sitting)   Pulse 82   Temp 98.1 °F (36.7 °C) (Oral)   Resp 17   Ht 152.4 cm (60\")   Wt 96.2 kg (212 lb) Comment: Simultaneous filing. User may be unaware of other data.  LMP  (LMP Unknown)   SpO2 96%   Breastfeeding Yes   BMI 41.40 kg/m²   [unfilled]  Pulse Ox: No data recorded  Supplemental O2:       Admit Weight  Weight: 107 kg (235 lb 14.3 oz)  Last 3 Weights  [unfilled]  Body mass index is 41.4 kg/m².    INTAKE/OUTPUT  I/O last 3 completed shifts:  In: -   Out: 3950 [Urine:3950]    Intake/Output Summary (Last 24 hours) at 2024 1620  Last data filed at 2024 1000  Gross per 24 hour   Intake --   Output 2750 ml   Net -2750 ml       PHYSICAL EXAM  General appearance: awake, alert, oriented, moves all extremities  Lungs: no rhonchi, no wheezes, no rales  Heart: Regular rate and rhythm  Abdomen: positive bowel sounds, no bruits, no masses  Extremities: warm and dry, no cyanosis, no clubbing    LABS  Results from last 7 days   Lab Units 24  0646 24  0721 24  0525   WBC 10*3/mm3 6.92 8.17 6.97   HEMOGLOBIN g/dL 9.2* 9.1* 6.8*   HEMATOCRIT % 29.9* 28.7* 21.9*   MCV fL 86.7 84.2 85.5   PLATELETS 10*3/mm3 145 141 117*         Results from last 7 days   Lab Units 24  0721 24  0525 24  0858   POTASSIUM mmol/L 4.0 4.2 4.5   CHLORIDE mmol/L 101 105 106   CO2 mmol/L 29.0 22.0 22.0   BUN mg/dL 19 23* 13   CREATININE mg/dL 0.69 0.75 0.65   GLUCOSE mg/dL 78 69 85   CALCIUM mg/dL 7.9* 8.1* 8.6                       " "      No results found for: \"CHOL\", \"TRIG\", \"HDL\"    CURRENT MEDICATIONS  acetaminophen, 650 mg, Oral, Q6H  enoxaparin, 40 mg, Subcutaneous, Q12H  furosemide, 40 mg, Oral, BID  lactated ringers, 1,000 mL, Intravenous, Once  lisinopril, 40 mg, Oral, Q24H  oxytocin, 999 mL/hr, Intravenous, Once  pantoprazole, 40 mg, Oral, BID AC  [START ON 4/22/2024] PEG-KCl-NaCl-NaSulf-Na Asc-C, 1,000 mL, Oral, Once When Specified  prenatal vitamin, 1 tablet, Oral, Daily  sodium chloride, 10 mL, Intravenous, Q12H      CONTINUOUS INFUSIONS           EKG: Noted  ECHO:REVIEWED   STRESS: REVIEWED  CATH: REVIEWED  CXR: Noted    TELEMETRY: Noted         Diagnosis Plan   1. Acute exacerbation of heart failure, HFpEF Multifactorial etiology: BMI 41, sleep apnea, pregnancy.    Normal echocardiogram without significant valvular disease.  Mild left ventricular hypertrophy perhaps consistent with a history of hypertension.    Further outpatient follow-up with her cardiologist who will be resuming care tomorrow.    Oral Lasix presently.  Blood pressure still slightly elevated however we did transition her to lisinopril.  Continue to monitor blood pressure.  She may need further help with this.   2. Significant GI bleeding of uncertain etiology Inpatient bidirectional endoscopy tomorrow.    Thank you GI!     Body mass index is 41.4 kg/m².    I spent 36 minutes in consultation with this patient which included more than 65% of this time in direct face-to-face counseling, physical examination and discussion of my assessment and findings and this shared decision making with the patient.  The remainder of the time not spent face-to-face was performing one, some or all of the following actions: preparing to see the patient (e.g. reviewing tests, prior clinicians' notes, etc), ordering medications, tests or procedures, coordination of care, discussion of the plan with other healthcare providers, documenting clinical information in epic as well as " independently interpreting results and communication of these results to the patient family and/or caregiver(s).  Please note that this explicitly excludes time spent on other separate billable services such as performing procedures or test interpretation, when applicable.        Ruben Red DO, FACC, RS  Cardiac Electrophysiologist  [facility] / [facility]

## 2024-04-21 NOTE — PROGRESS NOTES
2024     PROGRESS NOTE    POD #4 LTCS total left salpingectomy (unicornuate uterus)    Pulmonary edema    History of CHF (congestive heart failure)    History of pancreatitis    Morbid obesity with BMI of 40.0-44.9, adult    History of seizure disorder    Obstructive sleep apnea syndrome    Rectal bleeding    Essential hypertension    Type 2 diabetes mellitus, with long-term current use of insulin    S/P  section, total salpingectomy left unicornuate uterus    Anemia      SUBJECTIVE   Pt standing up ready to shower this morning.  Is able to breath without difficulty but cannot lie down completely flat without SOB.  Feels 100% better.  States that her insulin was adjusted yesterday with Dr. Fernandez and (that she had been requiring 54 U of insulin for a 30g CHO and is now requiring like 4 U)  No current SOB or cp.    Doing well.  Pain controlled.  Ambulating.  Tolerating PO.    PHYSICAL EXAMINATION      Vital Signs Range for the last 24 hours  Temperature: Temp:  [98.1 °F (36.7 °C)-99.3 °F (37.4 °C)] 98.2 °F (36.8 °C)   BP: BP: (117-159)/(66-82) 134/82   Pulse: Heart Rate:  [84-88] 86   Respirations: Resp:  [16-18] 17   Patient Vitals for the past 24 hrs:   BP Temp Temp src Pulse Resp   24 0744 134/82 98.2 °F (36.8 °C) Oral 86 17   24 0347 117/66 98.1 °F (36.7 °C) Oral 88 17   24 0037 157/77 98.3 °F (36.8 °C) Oral 88 16   24 1936 155/79 99.3 °F (37.4 °C) Oral 84 17   24 1154 159/76 98.5 °F (36.9 °C) Oral 88 18    UOP 3105 past 24 hours  Constitutional:  No acute distress.   Abdomen:  Soft, non-distended, appropriately tender.  Fundus: firm and beneath umbilicus.  Incision:  Clean/dry/intact    Scheduled Meds:  acetaminophen, 650 mg, Oral, Q6H  enoxaparin, 40 mg, Subcutaneous, Q12H  furosemide, 40 mg, Oral, BID  lactated ringers, 1,000 mL, Intravenous, Once  lisinopril, 40 mg, Oral, Q24H  oxytocin, 999 mL/hr, Intravenous, Once  pantoprazole, 40 mg, Oral, BID  AC  PEG-KCl-NaCl-NaSulf-Na Asc-C, 1,000 mL, Oral, Once When Specified   Followed by  [START ON 4/22/2024] PEG-KCl-NaCl-NaSulf-Na Asc-C, 1,000 mL, Oral, Once When Specified  prenatal vitamin, 1 tablet, Oral, Daily  sodium chloride, 10 mL, Intravenous, Q12H        LABS    .   Latest Reference Range & Units 04/17/24 11:45 04/18/24 00:05 04/18/24 08:58 04/18/24 11:15 04/19/24 00:51 04/19/24 05:25 04/19/24 07:20 04/19/24 09:44 04/19/24 14:51 04/19/24 19:05 04/20/24 07:14 04/20/24 07:21 04/20/24 10:32 04/20/24 10:53 04/20/24 11:19 04/20/24 13:59 04/20/24 19:42 04/21/24 07:42   Glucose 70 - 130 mg/dL 62 (L) 66 85 112 105 69 69 (L) 61 (L) 53 (L) 101 74 78 50 (L) 43 (C) 126 79 65 (L) 116   (C): Data is critically low  (L): Data is abnormally low  Lab Results   Component Value Date    HEPBSAG Negative 11/16/2023                  Latest Reference Range & Units 06/14/18 13:07 12/24/20 19:33 07/09/22 13:06 10/24/22 00:49 07/01/23 13:48 09/03/23 16:10 09/17/23 00:23 09/18/23 08:28 09/27/23 13:32 04/21/24 05:37   C-Reactive Protein 0.00 - 0.50 mg/dL 0.62 2.03 (H) 0.62 (H) 1.01 (H) 1.44 (H) 1.55 (H) 1.11 (H) 1.61 (H) 0.32 12.00 (H)   (H): Data is abnormally high  (H): Data is abnormally high   Latest Reference Range & Units 09/06/14 00:06 09/21/15 21:10 09/22/15 03:04 09/22/15 22:33 09/24/15 00:45 10/19/15 19:00 02/05/16 20:45 09/05/16 22:19 03/23/17 09:24 03/02/18 22:37 05/04/18 01:42 06/14/18 13:07 10/11/18 08:41 01/05/19 20:32 10/22/19 20:16 01/19/20 10:21 01/23/20 12:43 12/24/20 19:33 12/27/20 20:33 07/09/22 13:06 10/24/22 00:49 10/24/22 02:44 05/03/23 09:31 05/10/23 19:01 07/01/23 13:48 07/17/23 13:17 08/26/23 17:27 09/03/23 16:10 09/17/23 00:23 09/18/23 08:28 09/19/23 00:54 09/20/23 02:35 09/27/23 13:32 10/27/23 21:12 11/15/23 16:03 12/08/23 12:39 01/29/24 18:26 03/27/24 12:15 04/15/24 21:41 04/17/24 11:45 04/18/24 00:05 04/18/24 08:58 04/19/24 05:25 04/20/24 07:21   Hemoglobin 12.0 - 15.9 g/dL 14.7 12.1 11.5 (L) 11.9 (L)  11.8 (L) 13.7 14.6 15.1 14.0 13.7 14.4 13.5 14.5 14.4 12.3 13.4 13.4 12.6 12.5 14.0 11.4 (L) 10.6 (L) 11.4 (L) 11.3 (L) 10.0 (L) 10.0 (L) 10.1 (L) 10.1 (L) 9.5 (L) 10.2 (L) 9.0 (L) 8.7 (L) 9.5 (L) 11.2 (L) 10.4 (L) 9.8 (L) 10.6 (L) 10.0 (L) 8.4 (L) 8.2 (L) 8.4 (L) 8.3 (L) 6.8 (C) 9.1 (L)   Hematocrit 34.0 - 46.6 % 42.4 34.8 (L) 34.1 (L) 34.6 (L) 35.1 (L) 40.0 41.8 42.7 41.2 39.0 40.6 38.3 40.8 40.6 34.4 37.5 37.9 37.5 36.0 39.6 32.1 (L) 29.0 (L) 34.1 33.8 (L) 31.7 (L) 31.0 (L) 32.7 (L) 32.2 (L) 31.2 (L) 33.0 (L) 29.0 (L) 28.3 (L) 30.1 (L) 36.8 32.5 (L) 32.0 (L) 30.9 (L) 30.5 (L) 26.7 (L) 26.3 (L) 27.9 (L) 27.1 (L) 21.9 (L) 28.7 (L)   (C): Data is critically low  (L): Data is abnormally low  Assessment  POD# 4 after  (LTCS) with left total salpingectomy unicornuate uterus    Pulmonary edema    History of CHF (congestive heart failure)    History of pancreatitis    Morbid obesity with BMI of 40.0-44.9, adult    History of seizure disorder    Obstructive sleep apnea syndrome    Rectal bleeding    Essential hypertension    Type 2 diabetes mellitus, with long-term current use of insulin    S/P  section, total salpingectomy left unicornuate uterus    Anemia    CRP past highest value 2.03 in 2020 now 12 today  Plan  Continue post-op care and current medications for complicated patient with multiple medical problems, glucose values and BP readings continue to improve and pt clinically feels better, check CBC this morning s/p transfusion appropriate Hct rise 6.1 to 9.1, CRP 12, ESR 34  2.   Appreciate cardiology and GI consults  3.  Bowel prep after lunch- 4 pm for GI scope tomorrow    This note has been electronically signed.        Carey Ramos MD  2024  08:41 EDT

## 2024-04-22 ENCOUNTER — ANESTHESIA EVENT (OUTPATIENT)
Dept: GASTROENTEROLOGY | Facility: HOSPITAL | Age: 39
End: 2024-04-22
Payer: MEDICARE

## 2024-04-22 ENCOUNTER — ANESTHESIA (OUTPATIENT)
Dept: GASTROENTEROLOGY | Facility: HOSPITAL | Age: 39
End: 2024-04-22
Payer: MEDICARE

## 2024-04-22 PROBLEM — K57.30: Status: RESOLVED | Noted: 2024-04-22 | Resolved: 2024-04-22

## 2024-04-22 PROBLEM — K51.20 ULCERATIVE PROCTITIS: Status: RESOLVED | Noted: 2024-04-22 | Resolved: 2024-04-22

## 2024-04-22 PROBLEM — K26.9 DUODENAL ULCER DISEASE: Status: ACTIVE | Noted: 2024-04-22

## 2024-04-22 PROBLEM — K51.20 ULCERATIVE PROCTITIS: Status: ACTIVE | Noted: 2024-04-22

## 2024-04-22 PROBLEM — K57.30: Status: ACTIVE | Noted: 2024-04-22

## 2024-04-22 LAB
BH BB BLOOD EXPIRATION DATE: NORMAL
BH BB BLOOD EXPIRATION DATE: NORMAL
BH BB BLOOD TYPE BARCODE: 5100
BH BB BLOOD TYPE BARCODE: 5100
BH BB DISPENSE STATUS: NORMAL
BH BB DISPENSE STATUS: NORMAL
BH BB PRODUCT CODE: NORMAL
BH BB PRODUCT CODE: NORMAL
BH BB UNIT NUMBER: NORMAL
BH BB UNIT NUMBER: NORMAL
CROSSMATCH INTERPRETATION: NORMAL
CROSSMATCH INTERPRETATION: NORMAL
GLUCOSE BLDC GLUCOMTR-MCNC: 114 MG/DL (ref 70–130)
GLUCOSE BLDC GLUCOMTR-MCNC: 126 MG/DL (ref 70–130)
GLUCOSE BLDC GLUCOMTR-MCNC: 147 MG/DL (ref 70–130)
GLUCOSE BLDC GLUCOMTR-MCNC: 192 MG/DL (ref 70–130)
GLUCOSE BLDC GLUCOMTR-MCNC: 58 MG/DL (ref 70–130)
GLUCOSE BLDC GLUCOMTR-MCNC: 59 MG/DL (ref 70–130)
GLUCOSE BLDC GLUCOMTR-MCNC: 61 MG/DL (ref 70–130)
GLUCOSE BLDC GLUCOMTR-MCNC: 71 MG/DL (ref 70–130)
GLUCOSE BLDC GLUCOMTR-MCNC: 83 MG/DL (ref 70–130)
UNIT  ABO: NORMAL
UNIT  ABO: NORMAL
UNIT  RH: NORMAL
UNIT  RH: NORMAL

## 2024-04-22 PROCEDURE — 0DB98ZX EXCISION OF DUODENUM, VIA NATURAL OR ARTIFICIAL OPENING ENDOSCOPIC, DIAGNOSTIC: ICD-10-PCS | Performed by: INTERNAL MEDICINE

## 2024-04-22 PROCEDURE — 43239 EGD BIOPSY SINGLE/MULTIPLE: CPT | Performed by: INTERNAL MEDICINE

## 2024-04-22 PROCEDURE — 0DBF8ZX EXCISION OF RIGHT LARGE INTESTINE, VIA NATURAL OR ARTIFICIAL OPENING ENDOSCOPIC, DIAGNOSTIC: ICD-10-PCS | Performed by: INTERNAL MEDICINE

## 2024-04-22 PROCEDURE — 25010000002 LABETALOL 5 MG/ML SOLUTION: Performed by: ANESTHESIOLOGY

## 2024-04-22 PROCEDURE — 25010000002 ONDANSETRON PER 1 MG: Performed by: NURSE ANESTHETIST, CERTIFIED REGISTERED

## 2024-04-22 PROCEDURE — 45380 COLONOSCOPY AND BIOPSY: CPT | Performed by: INTERNAL MEDICINE

## 2024-04-22 PROCEDURE — 25010000002 ENOXAPARIN PER 10 MG: Performed by: OBSTETRICS & GYNECOLOGY

## 2024-04-22 PROCEDURE — 25010000002 PROPOFOL 10 MG/ML EMULSION: Performed by: NURSE ANESTHETIST, CERTIFIED REGISTERED

## 2024-04-22 PROCEDURE — 0DBP8ZX EXCISION OF RECTUM, VIA NATURAL OR ARTIFICIAL OPENING ENDOSCOPIC, DIAGNOSTIC: ICD-10-PCS | Performed by: INTERNAL MEDICINE

## 2024-04-22 PROCEDURE — 0DB68ZX EXCISION OF STOMACH, VIA NATURAL OR ARTIFICIAL OPENING ENDOSCOPIC, DIAGNOSTIC: ICD-10-PCS | Performed by: INTERNAL MEDICINE

## 2024-04-22 PROCEDURE — 25810000003 LACTATED RINGERS SOLUTION: Performed by: OBSTETRICS & GYNECOLOGY

## 2024-04-22 PROCEDURE — 25810000003 LACTATED RINGERS PER 1000 ML: Performed by: NURSE ANESTHETIST, CERTIFIED REGISTERED

## 2024-04-22 PROCEDURE — 82948 REAGENT STRIP/BLOOD GLUCOSE: CPT

## 2024-04-22 PROCEDURE — 99232 SBSQ HOSP IP/OBS MODERATE 35: CPT

## 2024-04-22 PROCEDURE — 0DBG8ZX EXCISION OF LEFT LARGE INTESTINE, VIA NATURAL OR ARTIFICIAL OPENING ENDOSCOPIC, DIAGNOSTIC: ICD-10-PCS | Performed by: INTERNAL MEDICINE

## 2024-04-22 PROCEDURE — 88305 TISSUE EXAM BY PATHOLOGIST: CPT | Performed by: INTERNAL MEDICINE

## 2024-04-22 RX ORDER — SODIUM CHLORIDE, SODIUM LACTATE, POTASSIUM CHLORIDE, CALCIUM CHLORIDE 600; 310; 30; 20 MG/100ML; MG/100ML; MG/100ML; MG/100ML
9 INJECTION, SOLUTION INTRAVENOUS CONTINUOUS
Status: DISCONTINUED | OUTPATIENT
Start: 2024-04-22 | End: 2024-04-24 | Stop reason: HOSPADM

## 2024-04-22 RX ORDER — IPRATROPIUM BROMIDE AND ALBUTEROL SULFATE 2.5; .5 MG/3ML; MG/3ML
3 SOLUTION RESPIRATORY (INHALATION) ONCE AS NEEDED
Status: DISCONTINUED | OUTPATIENT
Start: 2024-04-22 | End: 2024-04-22 | Stop reason: HOSPADM

## 2024-04-22 RX ORDER — NIFEDIPINE 10 MG/1
10 CAPSULE ORAL ONCE AS NEEDED
Status: DISCONTINUED | OUTPATIENT
Start: 2024-04-22 | End: 2024-04-24 | Stop reason: HOSPADM

## 2024-04-22 RX ORDER — SODIUM CHLORIDE 0.9 % (FLUSH) 0.9 %
10 SYRINGE (ML) INJECTION EVERY 12 HOURS SCHEDULED
Status: DISCONTINUED | OUTPATIENT
Start: 2024-04-22 | End: 2024-04-22 | Stop reason: HOSPADM

## 2024-04-22 RX ORDER — NIFEDIPINE 10 MG/1
10 CAPSULE ORAL ONCE
Status: DISCONTINUED | OUTPATIENT
Start: 2024-04-22 | End: 2024-04-22

## 2024-04-22 RX ORDER — PHENYLEPHRINE HCL IN 0.9% NACL 1 MG/10 ML
SYRINGE (ML) INTRAVENOUS AS NEEDED
Status: DISCONTINUED | OUTPATIENT
Start: 2024-04-22 | End: 2024-04-22 | Stop reason: SURG

## 2024-04-22 RX ORDER — FAMOTIDINE 20 MG/1
20 TABLET, FILM COATED ORAL ONCE
Status: DISCONTINUED | OUTPATIENT
Start: 2024-04-22 | End: 2024-04-22 | Stop reason: HOSPADM

## 2024-04-22 RX ORDER — LIDOCAINE HYDROCHLORIDE 10 MG/ML
0.5 INJECTION, SOLUTION EPIDURAL; INFILTRATION; INTRACAUDAL; PERINEURAL ONCE AS NEEDED
Status: DISCONTINUED | OUTPATIENT
Start: 2024-04-22 | End: 2024-04-22 | Stop reason: HOSPADM

## 2024-04-22 RX ORDER — PROPOFOL 10 MG/ML
VIAL (ML) INTRAVENOUS AS NEEDED
Status: DISCONTINUED | OUTPATIENT
Start: 2024-04-22 | End: 2024-04-22 | Stop reason: SURG

## 2024-04-22 RX ORDER — SODIUM CHLORIDE 0.9 % (FLUSH) 0.9 %
10 SYRINGE (ML) INJECTION AS NEEDED
Status: DISCONTINUED | OUTPATIENT
Start: 2024-04-22 | End: 2024-04-22 | Stop reason: HOSPADM

## 2024-04-22 RX ORDER — MIDAZOLAM HYDROCHLORIDE 1 MG/ML
1 INJECTION INTRAMUSCULAR; INTRAVENOUS
Status: DISCONTINUED | OUTPATIENT
Start: 2024-04-22 | End: 2024-04-22 | Stop reason: HOSPADM

## 2024-04-22 RX ORDER — ONDANSETRON 2 MG/ML
INJECTION INTRAMUSCULAR; INTRAVENOUS AS NEEDED
Status: DISCONTINUED | OUTPATIENT
Start: 2024-04-22 | End: 2024-04-22 | Stop reason: SURG

## 2024-04-22 RX ORDER — SODIUM CHLORIDE 0.9 % (FLUSH) 0.9 %
10 SYRINGE (ML) INJECTION EVERY 12 HOURS
Status: DISCONTINUED | OUTPATIENT
Start: 2024-04-22 | End: 2024-04-24 | Stop reason: HOSPADM

## 2024-04-22 RX ORDER — FAMOTIDINE 10 MG/ML
20 INJECTION, SOLUTION INTRAVENOUS ONCE
Status: DISCONTINUED | OUTPATIENT
Start: 2024-04-22 | End: 2024-04-22 | Stop reason: HOSPADM

## 2024-04-22 RX ORDER — LABETALOL 200 MG/1
200 TABLET, FILM COATED ORAL EVERY 12 HOURS SCHEDULED
Status: DISCONTINUED | OUTPATIENT
Start: 2024-04-22 | End: 2024-04-24 | Stop reason: HOSPADM

## 2024-04-22 RX ORDER — SUCCINYLCHOLINE/SOD CL,ISO/PF 200MG/10ML
SYRINGE (ML) INTRAVENOUS AS NEEDED
Status: DISCONTINUED | OUTPATIENT
Start: 2024-04-22 | End: 2024-04-22 | Stop reason: SURG

## 2024-04-22 RX ORDER — SODIUM CHLORIDE, SODIUM LACTATE, POTASSIUM CHLORIDE, CALCIUM CHLORIDE 600; 310; 30; 20 MG/100ML; MG/100ML; MG/100ML; MG/100ML
30 INJECTION, SOLUTION INTRAVENOUS CONTINUOUS PRN
Status: DISCONTINUED | OUTPATIENT
Start: 2024-04-22 | End: 2024-04-24 | Stop reason: HOSPADM

## 2024-04-22 RX ORDER — BUDESONIDE 3 MG/1
9 CAPSULE, COATED PELLETS ORAL DAILY
Status: DISCONTINUED | OUTPATIENT
Start: 2024-04-22 | End: 2024-04-24 | Stop reason: HOSPADM

## 2024-04-22 RX ORDER — MESALAMINE 4 G/60ML
4 SUSPENSION RECTAL NIGHTLY
Status: DISCONTINUED | OUTPATIENT
Start: 2024-04-22 | End: 2024-04-24 | Stop reason: HOSPADM

## 2024-04-22 RX ORDER — LABETALOL HYDROCHLORIDE 5 MG/ML
5 INJECTION, SOLUTION INTRAVENOUS ONCE
Status: COMPLETED | OUTPATIENT
Start: 2024-04-22 | End: 2024-04-22

## 2024-04-22 RX ORDER — SUCRALFATE 1 G/1
1 TABLET ORAL
Qty: 28 TABLET | Refills: 0 | Status: DISCONTINUED | OUTPATIENT
Start: 2024-04-22 | End: 2024-04-24 | Stop reason: HOSPADM

## 2024-04-22 RX ORDER — LIDOCAINE HYDROCHLORIDE 10 MG/ML
INJECTION, SOLUTION EPIDURAL; INFILTRATION; INTRACAUDAL; PERINEURAL AS NEEDED
Status: DISCONTINUED | OUTPATIENT
Start: 2024-04-22 | End: 2024-04-22 | Stop reason: SURG

## 2024-04-22 RX ORDER — NIFEDIPINE 10 MG/1
10 CAPSULE ORAL ONCE
Status: COMPLETED | OUTPATIENT
Start: 2024-04-22 | End: 2024-04-22

## 2024-04-22 RX ORDER — SODIUM CHLORIDE 9 MG/ML
40 INJECTION, SOLUTION INTRAVENOUS AS NEEDED
Status: DISCONTINUED | OUTPATIENT
Start: 2024-04-22 | End: 2024-04-22 | Stop reason: HOSPADM

## 2024-04-22 RX ORDER — ONDANSETRON 2 MG/ML
4 INJECTION INTRAMUSCULAR; INTRAVENOUS ONCE AS NEEDED
Status: DISCONTINUED | OUTPATIENT
Start: 2024-04-22 | End: 2024-04-22 | Stop reason: HOSPADM

## 2024-04-22 RX ORDER — SODIUM CHLORIDE, SODIUM LACTATE, POTASSIUM CHLORIDE, CALCIUM CHLORIDE 600; 310; 30; 20 MG/100ML; MG/100ML; MG/100ML; MG/100ML
INJECTION, SOLUTION INTRAVENOUS CONTINUOUS PRN
Status: DISCONTINUED | OUTPATIENT
Start: 2024-04-22 | End: 2024-04-22 | Stop reason: SURG

## 2024-04-22 RX ADMIN — ENOXAPARIN SODIUM 40 MG: 100 INJECTION SUBCUTANEOUS at 11:30

## 2024-04-22 RX ADMIN — Medication 100 MCG: at 09:12

## 2024-04-22 RX ADMIN — SODIUM CHLORIDE, POTASSIUM CHLORIDE, SODIUM LACTATE AND CALCIUM CHLORIDE: 600; 310; 30; 20 INJECTION, SOLUTION INTRAVENOUS at 08:31

## 2024-04-22 RX ADMIN — PROPOFOL 200 MG: 10 INJECTION, EMULSION INTRAVENOUS at 08:35

## 2024-04-22 RX ADMIN — FUROSEMIDE 40 MG: 40 TABLET ORAL at 21:23

## 2024-04-22 RX ADMIN — Medication 100 MCG: at 08:53

## 2024-04-22 RX ADMIN — ACETAMINOPHEN 650 MG: 325 TABLET ORAL at 10:11

## 2024-04-22 RX ADMIN — ENOXAPARIN SODIUM 40 MG: 100 INJECTION SUBCUTANEOUS at 21:21

## 2024-04-22 RX ADMIN — PANTOPRAZOLE SODIUM 40 MG: 40 TABLET, DELAYED RELEASE ORAL at 16:50

## 2024-04-22 RX ADMIN — FUROSEMIDE 40 MG: 40 TABLET ORAL at 13:21

## 2024-04-22 RX ADMIN — BUDESONIDE 9 MG: 3 CAPSULE, COATED PELLETS ORAL at 12:48

## 2024-04-22 RX ADMIN — LIDOCAINE HYDROCHLORIDE 50 MG: 10 INJECTION, SOLUTION EPIDURAL; INFILTRATION; INTRACAUDAL; PERINEURAL at 08:35

## 2024-04-22 RX ADMIN — Medication 170 MG: at 08:36

## 2024-04-22 RX ADMIN — ACETAMINOPHEN 650 MG: 325 TABLET ORAL at 16:49

## 2024-04-22 RX ADMIN — PANTOPRAZOLE SODIUM 40 MG: 40 TABLET, DELAYED RELEASE ORAL at 11:29

## 2024-04-22 RX ADMIN — OXYCODONE 5 MG: 5 TABLET ORAL at 21:35

## 2024-04-22 RX ADMIN — OXYCODONE 5 MG: 5 TABLET ORAL at 11:29

## 2024-04-22 RX ADMIN — Medication 100 MCG: at 09:04

## 2024-04-22 RX ADMIN — DEXTROSE MONOHYDRATE 25 G: 25 INJECTION, SOLUTION INTRAVENOUS at 08:18

## 2024-04-22 RX ADMIN — SUCRALFATE 1 G: 1 TABLET ORAL at 21:26

## 2024-04-22 RX ADMIN — Medication 5 MG: at 10:11

## 2024-04-22 RX ADMIN — LABETALOL HYDROCHLORIDE 200 MG: 200 TABLET, FILM COATED ORAL at 21:25

## 2024-04-22 RX ADMIN — SUCRALFATE 1 G: 1 TABLET ORAL at 16:49

## 2024-04-22 RX ADMIN — LISINOPRIL 40 MG: 10 TABLET ORAL at 11:29

## 2024-04-22 RX ADMIN — PRENATAL VITAMINS-IRON FUMARATE 27 MG IRON-FOLIC ACID 0.8 MG TABLET 1 TABLET: at 11:30

## 2024-04-22 RX ADMIN — PEG-3350, SODIUM SULFATE, SODIUM CHLORIDE, POTASSIUM CHLORIDE, SODIUM ASCORBATE AND ASCORBIC ACID 1000 ML: KIT at 04:11

## 2024-04-22 RX ADMIN — NIFEDIPINE 10 MG: 10 CAPSULE ORAL at 16:50

## 2024-04-22 RX ADMIN — ONDANSETRON 4 MG: 2 INJECTION INTRAMUSCULAR; INTRAVENOUS at 08:57

## 2024-04-22 RX ADMIN — DEXTROSE MONOHYDRATE 25 G: 25 INJECTION, SOLUTION INTRAVENOUS at 10:16

## 2024-04-22 RX ADMIN — MESALAMINE 4 G: 4 ENEMA RECTAL at 21:28

## 2024-04-22 NOTE — ANESTHESIA PREPROCEDURE EVALUATION
Anesthesia Evaluation     Patient summary reviewed and Nursing notes reviewed   no history of anesthetic complications:   NPO Solid Status: > 8 hours  NPO Liquid Status: > 8 hours           Airway   Mallampati: II  TM distance: <3 FB  Neck ROM: full  Possible difficult intubation  Dental - normal exam     Pulmonary - normal exam   (+) a smoker Former,sleep apnea  Cardiovascular - normal exam    ECG reviewed    (+) hypertension, CHF (diuresed; doing well SOB has improved) Diastolic >=55%    ROS comment: 4/18/24  Interpretation Summary       ·  Left ventricular systolic function is normal. Calculated left ventricular EF = 56.4%  ·  Left ventricular wall thickness is consistent with mild concentric hypertrophy.  ·  Left ventricular diastolic function was not assessed.  ·  There is a trivial pericardial effusion.         Neuro/Psych  (+) seizures (last seizure 12 years ago) well controlled, psychiatric history Anxiety and Depression  GI/Hepatic/Renal/Endo    (+) obesity, morbid obesity, GERD, GI bleeding (rectal bleeding) , renal disease- stones, diabetes mellitus type 2, thyroid problem (h/o goiter)     ROS Comment: H/o pancreatitis    Musculoskeletal     Abdominal   (+) obese   Substance History      OB/GYN      Comment: S/p C section 4/18/24      Other   blood dyscrasia anemia,                 Anesthesia Plan    ASA 3     general   Rapid sequence  intravenous induction     Anesthetic plan, risks, benefits, and alternatives have been provided, discussed and informed consent has been obtained with: patient.    Use of blood products discussed with patient .    Plan discussed with CRNA.    CODE STATUS:    Code Status (Patient has no pulse and is not breathing): CPR (Attempt to Resuscitate)  Medical Interventions (Patient has pulse or is breathing): Full

## 2024-04-22 NOTE — ANESTHESIA PROCEDURE NOTES
Airway  Urgency: elective    Date/Time: 4/22/2024 8:36 AM  Airway not difficult    General Information and Staff    Patient location during procedure: OR  CRNA/CAA: Vanessa Cohn CRNA    Indications and Patient Condition  Indications for airway management: airway protection    Preoxygenated: yes  MILS not maintained throughout  Mask difficulty assessment: 0 - not attempted    Final Airway Details  Final airway type: endotracheal airway      Successful airway: ETT  Cuffed: yes   Successful intubation technique: RSI and video laryngoscopy  Facilitating devices/methods: intubating stylet and cricoid pressure  Endotracheal tube insertion site: oral  Blade: Nielsen  Blade size: 3  ETT size (mm): 7.0  Cormack-Lehane Classification: grade I - full view of glottis  Placement verified by: chest auscultation and capnometry   Measured from: lips  ETT/EBT  to lips (cm): 21  Number of attempts at approach: 1  Assessment: lips, teeth, and gum same as pre-op and atraumatic intubation    Additional Comments  Negative epigastric sounds, Breath sound equal bilaterally with symmetric chest rise and fall

## 2024-04-22 NOTE — ANESTHESIA POSTPROCEDURE EVALUATION
Patient: Antoinette Pack    Procedure Summary       Date: 04/22/24 Room / [facility]:  [facility]    Anesthesia Start: 0831 Anesthesia Stop: 0926    Procedures:       COLONOSCOPY      ESOPHAGOGASTRODUODENOSCOPY Diagnosis:       Rectal bleeding      (Rectal bleeding [K62.5])    Surgeons: Tom Mueller MD Provider: Maria Armstrong MD    Anesthesia Type: general ASA Status: 3            Anesthesia Type: general    Vitals  Vitals Value Taken Time   /67 04/22/24 0926   Temp 97.4 °F (36.3 °C) 04/22/24 0926   Pulse 89 04/22/24 0927   Resp 16 04/22/24 0926   SpO2 100 % 04/22/24 0927   Vitals shown include unfiled device data.        Post Anesthesia Care and Evaluation    Patient location during evaluation: PACU  Patient participation: complete - patient participated  Level of consciousness: awake  Pain score: 0  Pain management: adequate    Airway patency: patent  Anesthetic complications: No anesthetic complications  PONV Status: none  Cardiovascular status: blood pressure returned to baseline and acceptable  Respiratory status: nasal cannula and spontaneous ventilation  Hydration status: acceptable  No anesthesia care post op

## 2024-04-22 NOTE — PAYOR COMM NOTE
"Antoinette Pack \"Paris\" (38 y.o. Female)     Auth#[id number]     Clinical Update 4/21/24.    From:Rachel Willis LPN, Utilization Review  Phone #[phone]  Fax #[phone]        Date of Birth   1985    Social Security Number       [address]   [address] [address]    Home Phone       MRN   [id number]       [employer]   None    Marital Status                               Admission Date   4/17/24    Admission Type   Urgent    Admitting Provider   Axel Keys MD    Attending Provider   Axel Keys MD    Department, Room/Bed   [facility] ANTEPARTUM, N325/1       Discharge Date       Discharge Disposition       Discharge Destination                                 Attending Provider: Axel Keys MD    Allergies: Dilantin [Phenytoin], Keppra [Levetiracetam], Meperidine, Topamax [Topiramate]    Isolation: None   Infection: None   Code Status: CPR    Ht: 152.4 cm (60\")   Wt: 96.2 kg (212 lb)    Admission Cmt: None   Principal Problem: Pulmonary edema [J81.1]                   Active Insurance as of 4/17/2024       Primary Coverage       Payor Plan Insurance Group Employer/Plan Group    ANTHEM MEDICARE REPLACEMENT ANTHEM MEDICARE ADVANTAGE [id number]       Payor Plan Address Payor Plan Phone Number Payor Plan Fax Number Effective Dates    [address] [phone]  3/1/2024 - None Entered    [facility] [id number]         Subscriber Name Subscriber Birth Date Member ID       ANTOINETTE PACK 1985 [id number]               Secondary Coverage       Payor Plan Insurance Group Employer/Plan Group    MISC COMMERCIAL MISC COMMERCIAL        Coverage Address Coverage Phone Number Coverage Fax Number Effective Dates    [address] [phone]  1/1/2024 - None Entered    [place] [id number]         Subscriber Name Subscriber Birth Date Member ID       CHRISTOPHER PACK 8/22/1987 [id number]                     Emergency Contacts       Contact " Person (Rel.) Home Phone Work Phone Mobile Phone    Jonel Iverson (Father) [phone] -- --    Michelle Pack (Spouse) -- -- [phone]              Vital Signs (last 2 days)       Date/Time Temp Temp src Pulse Resp BP Patient Position SpO2    04/22/24 1137 -- -- 99 16 130/69 Sitting --    04/22/24 1055 -- -- -- -- -- -- --    Comment rows:    OBSERV: Pt back on unit at 04/22/24 1055    04/22/24 1030 -- -- 94 20 161/89 -- 100    04/22/24 1025 -- -- 93 20 117/93 Lying 100    04/22/24 1020 -- -- 81 18 164/77 -- 100    04/22/24 1015 -- -- 82 -- 159/87 -- 100    04/22/24 1010 -- -- 77 -- 164/91 -- 100    04/22/24 1005 -- -- 81 -- 173/90 -- 100    04/22/24 1000 -- -- 87 -- 176/84 -- 99    04/22/24 0955 -- -- 93 -- 172/85 -- 100    04/22/24 0950 -- -- 89 20 174/94 -- 100    04/22/24 0945 -- -- 89 -- 166/94 -- 100    04/22/24 0941 -- -- 89 -- 166/84 -- 100    04/22/24 0940 -- -- 105 20 166/84 -- 98    04/22/24 0935 -- -- 90 18 149/76 -- 100    04/22/24 0930 -- -- 86 18 135/66 -- 100    04/22/24 0926 97.4 (36.3)  Temporal 90  16  139/67  Lying 100     Temp: Simultaneous filing. User may not have seen previous data. at 04/22/24 0926    Pulse: Simultaneous filing. User may not have seen previous data. at 04/22/24 0926    Resp: Simultaneous filing. User may not have seen previous data. at 04/22/24 0926    BP: Simultaneous filing. User may not have seen previous data. at 04/22/24 0926    SpO2: Simultaneous filing. User may not have seen previous data. at 04/22/24 0926    04/22/24 0925 -- -- 90 -- 139/67 -- 100    04/22/24 0802 97.8 (36.6) Temporal 82 18 189/95 Sitting 100    04/22/24 0748 -- -- -- -- -- -- --    Comment rows:    OBSERV: Pt transported off unit via wheelchair for scheduled EGD and Colonoscopy at 04/22/24 0748    04/22/24 0725 98.2 (36.8) Oral 82 14 149/72 Lying 100    04/22/24 0407 98.1 (36.7) Oral 73 16 148/65 Lying 98    04/21/24 2344 98.7 (37.1) Oral 74 16 137/65 Sitting --    04/21/24 2235 -- -- -- -- -- --  --    Comment rows:    OBSERV: Patient and FOB headed to NICU at 04/21/24 2235    04/21/24 1956 98.8 (37.1) Oral 83 16 139/95 Sitting 98    04/21/24 1552 98.1 (36.7) Oral 82 17 159/70 Sitting --    04/21/24 1339 98.1 (36.7) Oral 83 17 155/70 Sitting --    04/21/24 1300 -- -- -- -- -- -- --    Comment rows:    OBSERV: Pt back from NICU. at 04/21/24 1300    04/21/24 1042 -- -- -- -- -- -- --    Comment rows:    OBSERV: Pt up to NICU. at 04/21/24 1042    04/21/24 0744 98.2 (36.8) Oral 86 17 134/82 Lying --    04/21/24 0347 98.1 (36.7) Oral 88 17 117/66 Sitting --    04/21/24 0037 98.3 (36.8) Oral 88 16 157/77 Sitting --    04/20/24 1936 99.3 (37.4) Oral 84 17 155/79 Sitting --    04/20/24 1406 -- -- -- -- -- -- --    Comment rows:    OBSERV: Pt up to NICU at 04/20/24 1406    04/20/24 1154 98.5 (36.9) Oral 88 18 159/76 Sitting --    04/20/24 0759 97.8 (36.6) Oral 80 18 146/65 Sitting --    04/20/24 0508 -- -- 85 -- 131/62 Sitting --    04/20/24 0350 98.2 (36.8) Oral 85 18 158/72 Sitting --          Current Facility-Administered Medications   Medication Dose Route Frequency Provider Last Rate Last Admin    acetaminophen (TYLENOL) tablet 650 mg  650 mg Oral Q6H Axel Keys MD   650 mg at 04/22/24 1011    aluminum-magnesium hydroxide-simethicone (MAALOX MAX) 400-400-40 MG/5ML suspension 15 mL  15 mL Oral Q4H PRN Axel Keys MD        Or    calcium carbonate (TUMS) chewable tablet 500 mg (200 mg elemental)  1 tablet Oral Q4H PRN Axel Keys MD        Budesonide (ENTOCORT EC) 24 hr capsule 9 mg  9 mg Oral Daily Tom Mueller MD        dextrose (D50W) (25 g/50 mL) IV injection 25 g  25 g Intravenous Q15 Min PRN Abhishek Cuba DO   25 g at 04/22/24 1016    dextrose (GLUTOSE) oral gel 15 g  15 g Oral Q15 Min PRN Abhishek Cuba DO        docusate sodium (COLACE) capsule 100 mg  100 mg Oral BID PRN Axel Keys MD   100 mg at 04/20/24 0923    Enoxaparin Sodium (LOVENOX) syringe 40 mg  40 mg  Subcutaneous Q12H Rosmery Orellana MD   40 mg at 04/22/24 1130    furosemide (LASIX) tablet 40 mg  40 mg Oral BID Ruben Red, DO   40 mg at 04/21/24 1706    glucagon (GLUCAGEN) injection 1 mg  1 mg Intramuscular Q15 Min PRN Abhishek Cuba DO        Hydrocortisone (Perianal) (ANUSOL-HC) 2.5 % rectal cream 1 Application  1 Application Rectal PRN Axel Keys MD        lactated ringers infusion  30 mL/hr Intravenous Continuous PRN Vincent Viramontes APRN        lactated ringers infusion  9 mL/hr Intravenous Continuous Maria Armstrong MD        lanolin topical 1 Application  1 Application Topical Q1H PRN Axel Keys MD        lisinopril (PRINIVIL,ZESTRIL) tablet 40 mg  40 mg Oral Q24H Pablo Cabrera MD   40 mg at 04/22/24 1129    mesalamine (ROWASA) enema 4 g  4 g Rectal Nightly Tom Mueller MD        ondansetron (ZOFRAN) injection 4 mg  4 mg Intravenous Q6H PRN Jarrett Almanzar MD   4 mg at 04/20/24 2131    oxyCODONE (ROXICODONE) immediate release tablet 5 mg  5 mg Oral Q4H PRN Axel Keys MD   5 mg at 04/22/24 1129    Or    oxyCODONE (ROXICODONE) immediate release tablet 10 mg  10 mg Oral Q4H PRN Axel Keys MD   10 mg at 04/20/24 1604    pantoprazole (PROTONIX) EC tablet 40 mg  40 mg Oral BID AC Axel Keys MD   40 mg at 04/22/24 1129    prenatal vitamin tablet 1 tablet  1 tablet Oral Daily Axel Keys MD   1 tablet at 04/22/24 1130    promethazine (PHENERGAN) 12.5 mg in sodium chloride 0.9 % 50 mL  12.5 mg Intravenous Q6H PRN Abhishek Cuba DO   12.5 mg at 04/18/24 1035    promethazine (PHENERGAN) tablet 25 mg  25 mg Oral Q6H PRN Axel Keys MD        Or    promethazine (PHENERGAN) suppository 12.5 mg  12.5 mg Rectal Q6H PRN Axel Keys MD        simethicone (MYLICON) chewable tablet 80 mg  80 mg Oral 4x Daily PRN Axel Keys MD   80 mg at 04/20/24 0404    sucralfate (CARAFATE) tablet 1 g  1 g Oral 4x Daily AC & at Bedtime Tom Mueller  MD         Lab Results (last 48 hours)       Procedure Component Value Units Date/Time    POC Glucose Once [[id number]]  (Abnormal) Collected: 04/22/24 1132    Specimen: Blood Updated: 04/22/24 1134     Glucose 147 mg/dL     POC Glucose Once [[id number]]  (Abnormal) Collected: 04/22/24 1037    Specimen: Blood Updated: 04/22/24 1038     Glucose 192 mg/dL     POC Glucose Once [[id number]]  (Abnormal) Collected: 04/22/24 1015    Specimen: Blood Updated: 04/22/24 1021     Glucose 59 mg/dL     POC Glucose Once [[id number]]  (Abnormal) Collected: 04/22/24 0953    Specimen: Blood Updated: 04/22/24 0955     Glucose 61 mg/dL     POC Glucose Once [[id number]]  (Normal) Collected: 04/22/24 0830    Specimen: Blood Updated: 04/22/24 0831     Glucose 83 mg/dL     POC Glucose Once [[id number]]  (Abnormal) Collected: 04/22/24 0816    Specimen: Blood Updated: 04/22/24 0817     Glucose 58 mg/dL     POC Glucose Once [[id number]]  (Normal) Collected: 04/22/24 0251    Specimen: Blood Updated: 04/22/24 0252     Glucose 71 mg/dL     POC Glucose Once [[id number]]  (Normal) Collected: 04/21/24 2225    Specimen: Blood Updated: 04/21/24 2226     Glucose 75 mg/dL     Fecal Lactoferrin Qual. - Stool, Per Rectum [[id number]]  (Abnormal) Collected: 04/21/24 1705    Specimen: Stool from Per Rectum Updated: 04/21/24 2120     Lactoferrin, Qual Positive    POC Glucose Once [[id number]]  (Abnormal) Collected: 04/21/24 2103    Specimen: Blood Updated: 04/21/24 2105     Glucose 63 mg/dL     Calprotectin, Fecal - Stool, Per Rectum [[id number]] Collected: 04/21/24 1705    Specimen: Stool from Per Rectum Updated: 04/21/24 1716    POC Glucose Once [[id number]]  (Normal) Collected: 04/21/24 1550    Specimen: Blood Updated: 04/21/24 1556     Glucose 76 mg/dL     POC Glucose Once [[id number]]  (Normal) Collected: 04/21/24 1337    Specimen: Blood Updated: 04/21/24 1346     Glucose 88 mg/dL     CBC (No Diff) [[id number]]  (Abnormal) Collected: 04/21/24 0646    Specimen: Blood  Updated: 04/21/24 0958     WBC 6.92 10*3/mm3      RBC 3.45 10*6/mm3      Hemoglobin 9.2 g/dL      Hematocrit 29.9 %      MCV 86.7 fL      MCH 26.7 pg      MCHC 30.8 g/dL      RDW 14.6 %      RDW-SD 45.4 fl      MPV 12.2 fL      Platelets 145 10*3/mm3     Sedimentation Rate [[id number]]  (Abnormal) Collected: 04/21/24 0646    Specimen: Blood Updated: 04/21/24 0754     Sed Rate 38 mm/hr     POC Glucose Once [[id number]]  (Normal) Collected: 04/21/24 0742    Specimen: Blood Updated: 04/21/24 0752     Glucose 116 mg/dL     C-reactive Protein [[id number]]  (Abnormal) Collected: 04/21/24 0537    Specimen: Blood Updated: 04/21/24 0610     C-Reactive Protein 12.00 mg/dL     POC Glucose Once [[id number]]  (Abnormal) Collected: 04/20/24 1942    Specimen: Blood Updated: 04/20/24 1945     Glucose 65 mg/dL     POC Glucose Once [[id number]]  (Normal) Collected: 04/20/24 1359    Specimen: Blood Updated: 04/20/24 1401     Glucose 79 mg/dL           Imaging Results (Last 48 Hours)       ** No results found for the last 48 hours. **          Orders (last 48 hrs)        Start     Ordered    04/22/24 2100  mesalamine (ROWASA) enema 4 g  Nightly         04/22/24 0943    04/22/24 1300  sucralfate (CARAFATE) tablet 1 g  4 Times Daily Before Meals & Nightly         04/22/24 0943    04/22/24 1300  Budesonide (ENTOCORT EC) 24 hr capsule 9 mg  Daily         04/22/24 0943    04/22/24 1135  POC Glucose Once  PROCEDURE ONCE        Comments: Complete no more than 45 minutes prior to patient eating      04/22/24 1132    04/22/24 1039  POC Glucose Once  PROCEDURE ONCE        Comments: Complete no more than 45 minutes prior to patient eating      04/22/24 1037    04/22/24 1022  POC Glucose Once  PROCEDURE ONCE        Comments: Complete no more than 45 minutes prior to patient eating      04/22/24 1015    04/22/24 1010  labetalol (NORMODYNE,TRANDATE) injection 5 mg  Once         04/22/24 1008    04/22/24 0956  POC Glucose Once  PROCEDURE ONCE         Comments: Complete no more than 45 minutes prior to patient eating      04/22/24 0953    04/22/24 0941  sodium chloride 0.9 % flush 10 mL  Every 12 Hours Scheduled,   Status:  Discontinued         04/22/24 0939    04/22/24 0941  lactated ringers infusion  Continuous         04/22/24 0939    04/22/24 0941  famotidine (PEPCID) tablet 20 mg  Once,   Status:  Discontinued         04/22/24 0939 04/22/24 0941  famotidine (PEPCID) injection 20 mg  Once,   Status:  Discontinued         04/22/24 0939    04/22/24 0939  Implement Anesthesia Orders Day of Procedure  Once         04/22/24 0939    04/22/24 0939  Obtain Informed Consent  Once         04/22/24 0939 04/22/24 0939  Continuous Pulse Oximetry  Continuous,   Status:  Canceled         04/22/24 0939 04/22/24 0939  POC Glucose Once  Once,   Status:  Canceled        Comments: For all diabetic patients. Notify Anesthesiologist for blood sugar > 180.      04/22/24 0939 04/22/24 0939  POC Urine Pregnancy  Once,   Status:  Canceled         04/22/24 0939    04/22/24 0939  Insert Peripheral IV  Once,   Status:  Canceled         04/22/24 0939 04/22/24 0939  Saline Lock & Maintain IV Access  Continuous,   Status:  Canceled         04/22/24 0939 04/22/24 0939  Potassium  Once        Comments: For all patients with renal disease within 7 days, ESRD day of, within 3 days if taking digoxin, potassium-depleting anti-hypertensives or diuretics within 7 days. Nursing: discontinue this order if not completed in pre-op; there is no need to act on this order once the patient is out of surgery.      04/22/24 0939 04/22/24 0939  ECG 12 Lead Pre-Op / Pre-Procedure  Once        Comments: If no ECG within the previous 6 months or any of the following: Heart/Valvular Disease; Previous Abnormal EKG; Diabetic; Renal Failure; Chest Pain; Hypertension; Emphysema/Respiratory Disease - Read by Anesthesiologist    04/22/24 0939 04/22/24 0939  May take Beta Blocker from home with  sip of water.  Once,   Status:  Canceled        Comments: If patient currently taking Beta Blocker and has not taken within 24 hours    04/22/24 0939 04/22/24 0939  Oxygen Therapy- Nasal Cannula; Titrate 1-6 LPM Per SpO2; 90 - 95%  Continuous         04/22/24 0939 04/22/24 0939  Continuous Pulse Oximetry  Continuous         04/22/24 0939 04/22/24 0939  Vital Signs Every 5 Minutes for 15 Minutes, Every 15 Minutes Thereafter.  Continuous,   Status:  Canceled         04/22/24 0939    04/22/24 0939  Notify Anesthesia of Any Acute Changes in Patient Condition  Until Discontinued,   Status:  Canceled         04/22/24 0939    04/22/24 0939  Notify Anesthesia for Unrelieved Pain  Until Discontinued,   Status:  Canceled         04/22/24 0939 04/22/24 0939  Once Discharge Criteria to Floor Met, Follow Surgeon Orders  Continuous,   Status:  Canceled         04/22/24 0939 04/22/24 0939  Discharge Patient From PACU When Discharge Criteria Met  Continuous,   Status:  Canceled         04/22/24 0939 04/22/24 0938  lactated ringers infusion  Continuous PRN         04/22/24 0939 04/22/24 0938  midazolam (VERSED) injection 1 mg  Every 10 Minutes PRN,   Status:  Discontinued         04/22/24 0939 04/22/24 0938  ondansetron (ZOFRAN) injection 4 mg  Once As Needed,   Status:  Discontinued         04/22/24 0939 04/22/24 0938  ipratropium-albuterol (DUO-NEB) nebulizer solution 3 mL  Once As Needed,   Status:  Discontinued         04/22/24 0939 04/22/24 0938  Vital Signs - Per Anesthesia Protocol  As Needed,   Status:  Canceled       04/22/24 0939 04/22/24 0938  sodium chloride 0.9 % flush 10 mL  As Needed,   Status:  Discontinued         04/22/24 0939 04/22/24 0938  sodium chloride 0.9 % infusion 40 mL  As Needed,   Status:  Discontinued         04/22/24 0939 04/22/24 0938  lidocaine PF 1% (XYLOCAINE) injection 0.5 mL  Once As Needed,   Status:  Discontinued         04/22/24 0939 04/22/24 0846   "Tissue Pathology Exam  RELEASE UPON ORDERING         04/22/24 0846    04/22/24 0832  POC Glucose Once  PROCEDURE ONCE        Comments: Complete no more than 45 minutes prior to patient eating      04/22/24 0830    04/22/24 0825  Upper GI Endoscopy  Once         04/22/24 0825    04/22/24 0825  Colonoscopy  Once         04/22/24 0825    04/22/24 0818  POC Glucose Once  PROCEDURE ONCE        Comments: Complete no more than 45 minutes prior to patient eating      04/22/24 0816    04/22/24 0400  PEG-KCl-NaCl-NaSulf-Na Asc-C (MOVIPREP) powder 1,000 mL  Once When Specified,   Status:  Discontinued        Placed in \"Followed by\" Linked Group    04/20/24 1914    04/22/24 0253  POC Glucose Once  PROCEDURE ONCE        Comments: Complete no more than 45 minutes prior to patient eating      04/22/24 0251    04/22/24 0200  PEG-KCl-NaCl-NaSulf-Na Asc-C (MOVIPREP) powder 1,000 mL  Once When Specified        Placed in \"Followed by\" Linked Group    04/21/24 0732    04/22/24 0001  NPO Diet NPO Type: Strict NPO  Diet Effective Midnight         04/21/24 1941    04/21/24 2227  POC Glucose Once  PROCEDURE ONCE        Comments: Complete no more than 45 minutes prior to patient eating      04/21/24 2225    04/21/24 2106  POC Glucose Once  PROCEDURE ONCE        Comments: Complete no more than 45 minutes prior to patient eating      04/21/24 2103    04/21/24 1800  PEG-KCl-NaCl-NaSulf-Na Asc-C (MOVIPREP) powder 1,000 mL  Once When Specified,   Status:  Discontinued        Placed in \"Followed by\" Linked Group    04/20/24 1914    04/21/24 1600  PEG-KCl-NaCl-NaSulf-Na Asc-C (MOVIPREP) powder 1,000 mL  Once When Specified        Placed in \"Followed by\" Linked Group    04/21/24 0732    04/21/24 1557  POC Glucose Once  PROCEDURE ONCE        Comments: Complete no more than 45 minutes prior to patient eating      04/21/24 1550    04/21/24 1347  POC Glucose Once  PROCEDURE ONCE        Comments: Complete no more than 45 minutes prior to patient eating   "    04/21/24 1337    04/21/24 0941  CBC (No Diff)  STAT         04/21/24 0943    04/21/24 0753  POC Glucose Once  PROCEDURE ONCE        Comments: Complete no more than 45 minutes prior to patient eating      04/21/24 0742    04/21/24 0600  C-reactive Protein  Morning Draw         04/20/24 1915 04/21/24 0600  Sedimentation Rate  Morning Draw         04/20/24 1915 04/21/24 0500  Diet: Liquid; Clear Liquid; Fluid Consistency: Thin (IDDSI 0)  Diet Effective 0500,   Status:  Canceled         04/20/24 1914 04/21/24 0000  Diet: Liquid; Clear Liquid; Fluid Consistency: Thin (IDDSI 0)  Diet Effective Now,   Status:  Canceled         04/20/24 1452    04/20/24 1946  POC Glucose Once  PROCEDURE ONCE        Comments: Complete no more than 45 minutes prior to patient eating      04/20/24 1942 04/20/24 1916  Fecal Lactoferrin Qual. - Stool, Per Rectum  Once         04/20/24 1915 04/20/24 1916  Obtain Medical Records  Until Discontinued        Comments: Dr. Peguero, gastroenterology at [facility] regarding patient's most recent colonoscopy and pathology findings.  It appears these procedures were conducted at the outpatient surgery center, [facility].    04/20/24 1916 04/20/24 1915  Calprotectin, Fecal - Stool, Per Rectum  Once         04/20/24 1915 04/20/24 1912  Case Request  Once         04/20/24 1914 04/20/24 1912  Verify Bowel Prep Was Successful  Once         04/20/24 1914 04/20/24 1800  furosemide (LASIX) tablet 40 mg  2 Times Daily (Diuretics)         04/20/24 1011    04/20/24 1402  POC Glucose Once  PROCEDURE ONCE        Comments: Complete no more than 45 minutes prior to patient eating      04/20/24 1359    04/20/24 1053  dextrose (GLUTOSE) oral gel 15 g  Every 15 Minutes PRN         04/20/24 1054    04/20/24 1053  dextrose (D50W) (25 g/50 mL) IV injection 25 g  Every 15 Minutes PRN         04/20/24 1054    04/20/24 1053  glucagon (GLUCAGEN) injection 1 mg  Every  "15 Minutes PRN         04/20/24 1054    04/20/24 0900  lisinopril (PRINIVIL,ZESTRIL) tablet 40 mg  Every 24 Hours Scheduled         04/19/24 1007    04/20/24 0730  pantoprazole (PROTONIX) EC tablet 40 mg  2 Times Daily Before Meals         04/19/24 1855    04/20/24 0356  ondansetron (ZOFRAN) injection 4 mg  Every 6 Hours PRN         04/20/24 0358    04/19/24 0900  acetaminophen (TYLENOL) tablet 650 mg  Every 6 Hours        Placed in \"Followed by\" Linked Group    04/18/24 0449    04/19/24 0900  Enoxaparin Sodium (LOVENOX) syringe 40 mg  Every 12 Hours Scheduled         04/18/24 1432    04/18/24 1100  Strict Intake & Output  Every Hour       04/18/24 1041    04/18/24 1042  Daily Weights  Daily       04/18/24 1041    04/18/24 0954  promethazine (PHENERGAN) 12.5 mg in sodium chloride 0.9 % 50 mL  Every 6 Hours PRN         04/18/24 0956    04/18/24 0900  sodium chloride 0.9 % flush 10 mL  Every 12 Hours Scheduled,   Status:  Discontinued         04/18/24 0245    04/18/24 0900  prenatal vitamin tablet 1 tablet  Daily         04/18/24 0449    04/18/24 0800  Ambulate Patient  2 Times Daily      Comments: After anesthesia wears off.    04/18/24 0449    04/18/24 0600  POC Glucose 4x Daily Fasting & PC  4 Times Daily Fasting & After Meals      Comments: Complete no more than 45 minutes prior to patient eating      04/18/24 0449    04/18/24 0447  I/O  Every Shift       04/18/24 0449    04/18/24 0447  Ambulate Patient 3-5 times per day (with or without Cai)  Every Shift       04/18/24 0449    04/18/24 0446  oxyCODONE (ROXICODONE) immediate release tablet 5 mg  Every 4 Hours PRN        Placed in \"Or\" Linked Group    04/18/24 0449    04/18/24 0446  oxyCODONE (ROXICODONE) immediate release tablet 10 mg  Every 4 Hours PRN        Placed in \"Or\" Linked Group    04/18/24 0449    04/18/24 0446  lanolin topical 1 Application  Every 1 Hour PRN         04/18/24 0449    04/18/24 0446  Hydrocortisone (Perianal) (ANUSOL-HC) 2.5 % rectal " "cream 1 Application  As Needed         04/18/24 0449 04/18/24 0446  aluminum-magnesium hydroxide-simethicone (MAALOX MAX) 400-400-40 MG/5ML suspension 15 mL  Every 4 Hours PRN        Placed in \"Or\" Linked Group    04/18/24 0449 04/18/24 0446  calcium carbonate (TUMS) chewable tablet 500 mg (200 mg elemental)  Every 4 Hours PRN        Placed in \"Or\" Linked Group    04/18/24 0449 04/18/24 0446  docusate sodium (COLACE) capsule 100 mg  2 Times Daily PRN         04/18/24 0449 04/18/24 0446  simethicone (MYLICON) chewable tablet 80 mg  4 Times Daily PRN         04/18/24 0449 04/18/24 0446  promethazine (PHENERGAN) tablet 25 mg  Every 6 Hours PRN        Placed in \"Or\" Linked Group    04/18/24 0449 04/18/24 0446  promethazine (PHENERGAN) suppository 12.5 mg  Every 6 Hours PRN        Placed in \"Or\" Linked Group    04/18/24 0449 04/18/24 0400  Vital Signs q 4 while awake  Every 4 Hours,   Status:  Canceled      Comments: While the patient is awake.    04/18/24 0245 04/18/24 0345  lactated ringers bolus 1,000 mL  Once,   Status:  Discontinued         04/18/24 0245 04/18/24 0330  oxytocin (PITOCIN) 30 units in 0.9% sodium chloride 500 mL (premix)  Once,   Status:  Discontinued        Placed in \"Followed by\" Linked Group    04/18/24 0245 04/18/24 0246  Weigh Patient Daily  Daily,   Status:  Canceled       04/18/24 0245 04/18/24 0246  Fundal & Lochia Check  Every Shift,   Status:  Canceled       04/18/24 0245 04/18/24 0245  lidocaine PF 1% (XYLOCAINE) injection 0.5 mL  Once As Needed,   Status:  Discontinued         04/18/24 0245    Unscheduled  Up with Assistance  As Needed       04/18/24 0449    Unscheduled  Bladder Scan if Patient Unable to Void 4-6 Hours After Catheter Removal  As Needed         04/18/24 0449    Unscheduled  Straight Cath Every 4-6 Hours As Needed If Patient is Unable to Void After 4-6 Hours, Bladder Scan Volume is Greater Than 500mL & Patient Has Symptoms of Bladder " Discomfort / Distention  As Needed       04/18/24 0449    Unscheduled  Schedule / Prompt Voiding For Patients With Urinary Incontinence  As Needed       04/18/24 0449    Unscheduled  Wound Care  As Needed      Comments: Postop day 1. Remove dressing and leave incision open to air.    04/18/24 0449    Unscheduled  KPad  As Needed      Comments: PRN pain    04/18/24 0449    Unscheduled  Apply Ice Pack to Perineum  As Needed      Comments: For 20 min q 2hrs    04/18/24 0449    Unscheduled  Apply Waffle Cushion  As Needed      Comments: PRN perineal discomfort    04/18/24 0449    Unscheduled  Chewing Gum  As Needed       04/18/24 0449    Unscheduled  Warm compress  As Needed       04/18/24 0449    Unscheduled  Apply ace wrap, tight bra, or binder  As Needed       04/18/24 0449    Unscheduled  Apply ice packs  As Needed       04/18/24 0449    Unscheduled  Follow Hypoglycemia Standing Orders For Blood Glucose <70 & Notify Provider of Treatment  As Needed      Comments: Follow Hypoglycemia Orders As Outlined in Process Instructions (Open Order Report to View Full Instructions)  Notify Provider Any Time Hypoglycemia Treatment is Administered    04/20/24 1054    Signed and Held  Oxygen Therapy- Nasal Cannula; Titrate 1-6 LPM Per SpO2; 90 - 95%  Continuous         Signed and Held    Signed and Held  Vital Signs  Per Hospital Policy         Signed and Held    Signed and Held  Continuous Pulse Oximetry  Continuous       Signed and Held    Signed and Held  Advance Diet As Tolerated -  Until Discontinued         Signed and Held    Signed and Held  Diet: Cardiac; Healthy Heart (2-3 Na+); Fluid Consistency: Thin (IDDSI 0)  Diet Effective Now         Signed and Held                     Operative/Procedure Notes (last 48 hours)        Procedures signed by Tom Mueller MD at 04/22/24 0958         Procedure Orders    1. Colonoscopy [[id number]] ordered by Tom Mueller MD at 04/22/24 0868               [Media Unavailable] Scan on  "2024 0957 by Tom Mueller MD: COLON          Electronically signed by Tom Mueller MD at 24 0958       Procedures signed by Tom Mueller MD at 24 0936         Procedure Orders    1. Upper GI Endoscopy [[id number]] ordered by Tom Mueller MD at 24 0825               [Media Unavailable] Scan on 2024 0934 by Tom Mueller MD: EGD          Electronically signed by Tom Mueller MD at 24 0936          Physician Progress Notes (last 48 hours)        Ruben Red DO at 24 1620                Cardiac Electrophysiology Inpatient Follow Up Note         [facility]    Progress Note    INITIAL REASON FOR CONSULT: HFpEF    CHIEF COMPLAINT:  No chief complaint on file.    HFpEF    Subjective   Mrs. Antoinette Pack denies vomiting, abdominal pain, cough, difficulty breathing, and her  section incisional pain is reasonable.        Objective   VITAL SIGNS  /70 (BP Location: Right arm, Patient Position: Sitting)   Pulse 82   Temp 98.1 °F (36.7 °C) (Oral)   Resp 17   Ht 152.4 cm (60\")   Wt 96.2 kg (212 lb) Comment: Simultaneous filing. User may be unaware of other data.  LMP  (LMP Unknown)   SpO2 96%   Breastfeeding Yes   BMI 41.40 kg/m²   [unfilled]  Pulse Ox: No data recorded  Supplemental O2:       Admit Weight  Weight: 107 kg (235 lb 14.3 oz)  Last 3 Weights  [unfilled]  Body mass index is 41.4 kg/m².    INTAKE/OUTPUT  I/O last 3 completed shifts:  In: -   Out: 3950 [Urine:3950]    Intake/Output Summary (Last 24 hours) at 2024 1620  Last data filed at 2024 1000  Gross per 24 hour   Intake --   Output 2750 ml   Net -2750 ml       PHYSICAL EXAM  General appearance: awake, alert, oriented, moves all extremities  Lungs: no rhonchi, no wheezes, no rales  Heart: Regular rate and rhythm  Abdomen: positive bowel sounds, no bruits, no masses  Extremities: warm and dry, no cyanosis, no clubbing    LABS  Results " "from last 7 days   Lab Units 04/21/24  0646 04/20/24  0721 04/19/24  0525   WBC 10*3/mm3 6.92 8.17 6.97   HEMOGLOBIN g/dL 9.2* 9.1* 6.8*   HEMATOCRIT % 29.9* 28.7* 21.9*   MCV fL 86.7 84.2 85.5   PLATELETS 10*3/mm3 145 141 117*         Results from last 7 days   Lab Units 04/20/24  0721 04/19/24  0525 04/18/24  0858   POTASSIUM mmol/L 4.0 4.2 4.5   CHLORIDE mmol/L 101 105 106   CO2 mmol/L 29.0 22.0 22.0   BUN mg/dL 19 23* 13   CREATININE mg/dL 0.69 0.75 0.65   GLUCOSE mg/dL 78 69 85   CALCIUM mg/dL 7.9* 8.1* 8.6                             No results found for: \"CHOL\", \"TRIG\", \"HDL\"    CURRENT MEDICATIONS  acetaminophen, 650 mg, Oral, Q6H  enoxaparin, 40 mg, Subcutaneous, Q12H  furosemide, 40 mg, Oral, BID  lactated ringers, 1,000 mL, Intravenous, Once  lisinopril, 40 mg, Oral, Q24H  oxytocin, 999 mL/hr, Intravenous, Once  pantoprazole, 40 mg, Oral, BID AC  [START ON 4/22/2024] PEG-KCl-NaCl-NaSulf-Na Asc-C, 1,000 mL, Oral, Once When Specified  prenatal vitamin, 1 tablet, Oral, Daily  sodium chloride, 10 mL, Intravenous, Q12H      CONTINUOUS INFUSIONS           EKG: Noted  ECHO:REVIEWED   STRESS: REVIEWED  CATH: REVIEWED  CXR: Noted    TELEMETRY: Noted         Diagnosis Plan   1. Acute exacerbation of heart failure, HFpEF Multifactorial etiology: BMI 41, sleep apnea, pregnancy.    Normal echocardiogram without significant valvular disease.  Mild left ventricular hypertrophy perhaps consistent with a history of hypertension.    Further outpatient follow-up with her cardiologist who will be resuming care tomorrow.    Oral Lasix presently.  Blood pressure still slightly elevated however we did transition her to lisinopril.  Continue to monitor blood pressure.  She may need further help with this.   2. Significant GI bleeding of uncertain etiology Inpatient bidirectional endoscopy tomorrow.    Thank you GI!     Body mass index is 41.4 kg/m².    I spent 36 minutes in consultation with this patient which included more than " 65% of this time in direct face-to-face counseling, physical examination and discussion of my assessment and findings and this shared decision making with the patient.  The remainder of the time not spent face-to-face was performing one, some or all of the following actions: preparing to see the patient (e.g. reviewing tests, prior clinicians' notes, etc), ordering medications, tests or procedures, coordination of care, discussion of the plan with other healthcare providers, documenting clinical information in epic as well as independently interpreting results and communication of these results to the patient family and/or caregiver(s).  Please note that this explicitly excludes time spent on other separate billable services such as performing procedures or test interpretation, when applicable.        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  Is able to breath without difficulty but cannot lie down completely flat without SOB.  Feels 100% better.  States that her insulin was adjusted yesterday with Dr. Fernandez and (that she had been requiring 54 U of insulin for a 30g CHO and is now requiring like 4 U)  No current SOB or cp.    Doing well.  Pain controlled.  Ambulating.  Tolerating  PO.    PHYSICAL EXAMINATION      Vital Signs Range for the last 24 hours  Temperature: Temp:  [98.1 °F (36.7 °C)-99.3 °F (37.4 °C)] 98.2 °F (36.8 °C)   BP: BP: (117-159)/(66-82) 134/82   Pulse: Heart Rate:  [84-88] 86   Respirations: Resp:  [16-18] 17   Patient Vitals for the past 24 hrs:   BP Temp Temp src Pulse Resp   04/21/24 0744 134/82 98.2 °F (36.8 °C) Oral 86 17   04/21/24 0347 117/66 98.1 °F (36.7 °C) Oral 88 17   04/21/24 0037 157/77 98.3 °F (36.8 °C) Oral 88 16   04/20/24 1936 155/79 99.3 °F (37.4 °C) Oral 84 17   04/20/24 1154 159/76 98.5 °F (36.9 °C) Oral 88 18    UOP 3105 past 24 hours  Constitutional:  No acute distress.   Abdomen:  Soft, non-distended, appropriately tender.  Fundus: firm and beneath umbilicus.  Incision:  Clean/dry/intact    Scheduled Meds:  acetaminophen, 650 mg, Oral, Q6H  enoxaparin, 40 mg, Subcutaneous, Q12H  furosemide, 40 mg, Oral, BID  lactated ringers, 1,000 mL, Intravenous, Once  lisinopril, 40 mg, Oral, Q24H  oxytocin, 999 mL/hr, Intravenous, Once  pantoprazole, 40 mg, Oral, BID AC  PEG-KCl-NaCl-NaSulf-Na Asc-C, 1,000 mL, Oral, Once When Specified   Followed by  [START ON 4/22/2024] PEG-KCl-NaCl-NaSulf-Na Asc-C, 1,000 mL, Oral, Once When Specified  prenatal vitamin, 1 tablet, Oral, Daily  sodium chloride, 10 mL, Intravenous, Q12H        LABS    .   Latest Reference Range & Units 04/17/24 11:45 04/18/24 00:05 04/18/24 08:58 04/18/24 11:15 04/19/24 00:51 04/19/24 05:25 04/19/24 07:20 04/19/24 09:44 04/19/24 14:51 04/19/24 19:05 04/20/24 07:14 04/20/24 07:21 04/20/24 10:32 04/20/24 10:53 04/20/24 11:19 04/20/24 13:59 04/20/24 19:42 04/21/24 07:42   Glucose 70 - 130 mg/dL 62 (L) 66 85 112 105 69 69 (L) 61 (L) 53 (L) 101 74 78 50 (L) 43 (C) 126 79 65 (L) 116   (C): Data is critically low  (L): Data is abnormally low  Lab Results   Component Value Date    HEPBSAG Negative 11/16/2023                  Latest Reference Range & Units 06/14/18 13:07 12/24/20 19:33 07/09/22 13:06  10/24/22 00:49 23 13:48 23 16:10 23 00:23 23 08:28 23 13:32 24 05:37   C-Reactive Protein 0.00 - 0.50 mg/dL 0.62 2.03 (H) 0.62 (H) 1.01 (H) 1.44 (H) 1.55 (H) 1.11 (H) 1.61 (H) 0.32 12.00 (H)   (H): Data is abnormally high  (H): Data is abnormally high   Latest Reference Range & Units 14 00:06 09/21/15 21:10 09/22/15 03:04 09/22/15 22:33 09/24/15 00:45 10/19/15 19:00 16 20:45 16 22:19 17 09:24 18 22:37 18 01:42 18 13:07 10/11/18 08:41 19 20:32 10/22/19 20:16 20 10:21 20 12:43 20 19:33 20 20:33 22 13:06 10/24/22 00:49 10/24/22 02:44 23 09:31 05/10/23 19:01 23 13:48 23 13:17 23 17:27 23 16:10 23 00:23 23 08:28 23 00:54 23 02:35 23 13:32 10/27/23 21:12 11/15/23 16:03 23 12:39 24 18:26 24 12:15 04/15/24 21:41 24 11:45 24 00:05 24 08:58 24 05:25 24 07:21   Hemoglobin 12.0 - 15.9 g/dL 14.7 12.1 11.5 (L) 11.9 (L) 11.8 (L) 13.7 14.6 15.1 14.0 13.7 14.4 13.5 14.5 14.4 12.3 13.4 13.4 12.6 12.5 14.0 11.4 (L) 10.6 (L) 11.4 (L) 11.3 (L) 10.0 (L) 10.0 (L) 10.1 (L) 10.1 (L) 9.5 (L) 10.2 (L) 9.0 (L) 8.7 (L) 9.5 (L) 11.2 (L) 10.4 (L) 9.8 (L) 10.6 (L) 10.0 (L) 8.4 (L) 8.2 (L) 8.4 (L) 8.3 (L) 6.8 (C) 9.1 (L)   Hematocrit 34.0 - 46.6 % 42.4 34.8 (L) 34.1 (L) 34.6 (L) 35.1 (L) 40.0 41.8 42.7 41.2 39.0 40.6 38.3 40.8 40.6 34.4 37.5 37.9 37.5 36.0 39.6 32.1 (L) 29.0 (L) 34.1 33.8 (L) 31.7 (L) 31.0 (L) 32.7 (L) 32.2 (L) 31.2 (L) 33.0 (L) 29.0 (L) 28.3 (L) 30.1 (L) 36.8 32.5 (L) 32.0 (L) 30.9 (L) 30.5 (L) 26.7 (L) 26.3 (L) 27.9 (L) 27.1 (L) 21.9 (L) 28.7 (L)   (C): Data is critically low  (L): Data is abnormally low  Assessment  POD# 4 after  (LTCS) with left total salpingectomy unicornuate uterus    Pulmonary edema    History of CHF (congestive heart failure)    History of pancreatitis    Morbid obesity with BMI  of 40.0-44.9, adult    History of seizure disorder    Obstructive sleep apnea syndrome    Rectal bleeding    Essential hypertension    Type 2 diabetes mellitus, with long-term current use of insulin    S/P  section, total salpingectomy left unicornuate uterus    Anemia    CRP past highest value 2.03 in 2020 now 12 today  Plan  Continue post-op care and current medications for complicated patient with multiple medical problems, glucose values and BP readings continue to improve and pt clinically feels better, check CBC this morning s/p transfusion appropriate Hct rise 6.1 to 9.1, CRP 12, ESR 34  2.   Appreciate cardiology and GI consults  3.  Bowel prep after lunch- 4 pm for GI scope tomorrow    This note has been electronically signed.        Carey Ramos MD  2024  08:41 EDT      Electronically signed by Carey Ramos MD at 24 1018          Consult Notes (last 48 hours)        Vincent Viramontes APRN at 24 1854        Consult Orders    1. Inpatient Gastroenterology Consult [[id number]] ordered by Ruben Red DO              Attestation signed by Brunner, Mark I, MD at 24 1856    I have reviewed this documentation and agree.                    [facility]  Inpatient Gastroenterology Consult    Inpatient Gastroenterology Consult  Consult performed by: Vincent Viramontes APRN  Consult ordered by: Ruben Red DO  Reason for consult: Rectal bleeding, prior colonoscopy      Referring Provider: Geovani Razo III, MD  PCP: Shawna Lambert DO    Chief Complaint: Bloody stools    History of present illness:  Antoinette Pack is a 38 y.o. female who is admitted to hospital with concerns of heart failure who at that time was 34 weeks pregnant who ultimately required a .  GI consult received for ongoing issues with rectal bleeding and anemia and her history of prior colonoscopy.    Patient notes a tenuous past medical history regarding her GI  tract.  Notes that she has intermittent rectal bleeding and has done so for some time and has sought care with other gastroenterologist in the past for this issue.  Patient reports that she had a colonoscopy last year per Dr. Peguero in [place] which was concerning for a large polyp in area of inflammation in the colon though she is data deficit on the underlying etiology thereof.  Patient also notes that in the past she has had issues with diverticulitis and pancreatitis as well.  Also, reports hemorrhoids.  In terms of extraintestinal manifestation of disease, patient does report some intermittent arthralgia and cutaneous skin manifestations of rashes intermittently.  Does not report any nausea, vomiting, diarrhea, shortness of breath, chest pain, or fever/chills.  Patient does report rectal bleeding as noted above and spouse reports significantly malodorous bloody stools at home.  Patient also notes some chronic left lower quadrant abdominal pain that turned into epigastric pain throughout her pregnancy; following delivery, has now settled back into left lower quadrant.  Following her , patient has had persistent rectal bleeding over the past 24 hours.  Does not report any family history of IBD.  Patient and spouse does report that she was started on mesalamine suppositories following her most recent colonoscopy.  Past medical, surgical, social, and family histories are reviewed for accuracy.  No documented alleviating or exacerbating factors.  Does not endorse pain at time of exam.    Allergies:  Dilantin [phenytoin], Keppra [levetiracetam], Meperidine, and Topamax [topiramate]    Scheduled Meds:  acetaminophen, 650 mg, Oral, Q6H  enoxaparin, 40 mg, Subcutaneous, Q12H  furosemide, 40 mg, Oral, BID  lactated ringers, 1,000 mL, Intravenous, Once  lisinopril, 40 mg, Oral, Q24H  oxytocin, 999 mL/hr, Intravenous, Once  pantoprazole, 40 mg, Oral, BID AC  prenatal vitamin, 1 tablet, Oral, Daily  sodium chloride,  "10 mL, Intravenous, Q12H    Infusions:     PRN Meds:    aluminum-magnesium hydroxide-simethicone **OR** calcium carbonate    dextrose    dextrose    docusate sodium    glucagon (human recombinant)    Hydrocortisone (Perianal)    lanolin    lidocaine PF 1%    ondansetron    oxyCODONE **OR** oxyCODONE    promethazine    promethazine **OR** promethazine    simethicone    Home Meds:  Medications Prior to Admission   Medication Sig Dispense Refill Last Dose    aspirin (ASPIR) 81 MG EC tablet Take 1 tablet by mouth Daily. 30 tablet 6 4/17/2024 at 0700    Continuous Blood Gluc Sensor (Dexcom G6 Sensor) Use Every 10 (Ten) Days. 3 each 5     Continuous Blood Gluc Transmit (Dexcom G6 Transmitter) misc Use 1 each Every 3 (Three) Months. 1 each 1     folic acid (FOLVITE) 1 MG tablet Take 1 tablet by mouth Daily.       Insulin Aspart (novoLOG) 100 UNIT/ML injection For use in insulin pump. Max Daily Dose: 250 units daily. 80 mL 5     Insulin Aspart, w/Niacinamide, (Fiasp) 100 UNIT/ML solution Inject 350 Units as directed Daily. For use in insulin pump. Max dose of 350u/day. 110 mL 5     Insulin Disposable Pump (Omnipod 5 G6 Pods, Gen 5,) misc Change 2 (Two) Times a Day as directed. 60 each 5     Insulin Pen Needle 32G X 4 MM misc Use to inject insulin up to 4 times daily as directed 400 each 1     Insulin Syringe 31G X 5/16\" 1 ML misc Use 1 each 3 (Three) Times a Day With Meals. As needed for additional insulin bolus with meals. 100 each 3     labetalol (NORMODYNE) 200 MG tablet Take 1 tablet by mouth 3 times a day. 90 tablet 0     lansoprazole (PREVACID) 30 MG capsule Take 1 capsule by mouth 2 (Two) Times a Day.       mesalamine (CANASA) 1000 MG suppository Insert 1 suppository into the rectum Every Night.       Prenatal Vit-Fe Fumarate-FA (PRENATAL PO) Take 1 tablet by mouth Daily.          ROS: Review of Systems   Constitutional:  Positive for activity change and fatigue. Negative for appetite change, chills, diaphoresis, " fever and unexpected weight change.   HENT:  Negative for sore throat, trouble swallowing and voice change.    Eyes: Negative.    Respiratory:  Negative for apnea, cough, choking, chest tightness, shortness of breath, wheezing and stridor.    Cardiovascular:  Positive for leg swelling. Negative for chest pain and palpitations.   Gastrointestinal:  Positive for abdominal pain, anal bleeding, blood in stool and diarrhea. Negative for abdominal distention, constipation, nausea, rectal pain and vomiting.   Endocrine: Negative.    Genitourinary: Negative.    Musculoskeletal:  Positive for arthralgias.   Skin:  Positive for rash.   Allergic/Immunologic: Negative.    Neurological: Negative.    Hematological: Negative.    Psychiatric/Behavioral: Negative.     All other systems reviewed and are negative.      PAST MED HX:  Past Medical History:   Diagnosis Date    Anxiety     CHF (congestive heart failure) 2023    Colitis     Depression     Diabetes mellitus 2018    Diverticulitis     GERD (gastroesophageal reflux disease)     Hypertension     Kidney stone     Narcolepsy     Pancreatitis 2023    PCOS (polycystic ovarian syndrome)     Polycystic ovary syndrome     Rectal bleeding     SINCE  - USES MESALAMINE SUPPOSITORY NIGHTLY    Seizures     Sleep apnea     Urinary tract infection        PAST SURG HX:  Past Surgical History:   Procedure Laterality Date    CARDIAC CATHETERIZATION      CARPAL TUNNEL RELEASE       SECTION N/A 2024    Procedure:  SECTION PRIMARY;  Surgeon: Axel Keys MD;  Location: [facility] LABOR DELIVERY;  Service: Obstetrics/Gynecology;  Laterality: N/A;    COLONOSCOPY      ENDOSCOPY      FRACTURE SURGERY      HARDWARE REMOVAL      WRIST    TENDON REPAIR      HAND    WISDOM TOOTH EXTRACTION         FAM HX:  Family History   Problem Relation Age of Onset    Hypertension Mother     Depression Mother     Heart disease Mother     COPD Mother     Emphysema Mother      "Hypertension Father     Diabetes Father     Heart disease Father     COPD Father     Heart failure Father     Hepatitis Father        SOC HX:  Social History     Socioeconomic History    Marital status:    Tobacco Use    Smoking status: Former     Current packs/day: 0.00     Average packs/day: 1 pack/day for 20.0 years (20.0 ttl pk-yrs)     Types: Cigarettes     Start date:      Quit date:      Years since quittin.3     Passive exposure: Never    Smokeless tobacco: Never    Tobacco comments:     QUIT IN 2017   Vaping Use    Vaping status: Never Used   Substance and Sexual Activity    Alcohol use: No    Drug use: No    Sexual activity: Defer       PHYSICAL EXAM  /76 (BP Location: Right arm, Patient Position: Sitting)   Pulse 88   Temp 98.5 °F (36.9 °C) (Oral)   Resp 18   Ht 152.4 cm (60\")   Wt 96.2 kg (212 lb 1.9 oz)   LMP  (LMP Unknown)   SpO2 96%   Breastfeeding Yes   BMI 41.43 kg/m²   Wt Readings from Last 3 Encounters:   24 96.2 kg (212 lb 1.9 oz)   24 107 kg (236 lb)   04/15/24 106 kg (233 lb)   ,body mass index is 41.43 kg/m².  Physical Exam  Vitals and nursing note reviewed.   Constitutional:       General: She is not in acute distress.     Appearance: Normal appearance. She is obese. She is ill-appearing. She is not toxic-appearing.   HENT:      Head: Normocephalic and atraumatic.   Eyes:      General: No scleral icterus.     Extraocular Movements: Extraocular movements intact.      Conjunctiva/sclera: Conjunctivae normal.      Pupils: Pupils are equal, round, and reactive to light.   Cardiovascular:      Rate and Rhythm: Normal rate and regular rhythm.      Pulses: Normal pulses.      Heart sounds: Normal heart sounds.   Pulmonary:      Effort: Pulmonary effort is normal. No respiratory distress.      Breath sounds: Normal breath sounds.   Abdominal:      General: Abdomen is flat. Bowel sounds are normal. There is no distension.      Palpations: Abdomen is soft. " There is no mass.      Tenderness: There is abdominal tenderness. There is no guarding or rebound.      Hernia: No hernia is present.   Genitourinary:     Rectum: Guaiac result positive.   Musculoskeletal:         General: Normal range of motion.      Right lower leg: Edema present.      Left lower leg: Edema present.   Skin:     General: Skin is warm and dry.      Capillary Refill: Capillary refill takes less than 2 seconds.      Coloration: Skin is not jaundiced or pale.   Neurological:      General: No focal deficit present.      Mental Status: She is alert and oriented to person, place, and time.   Psychiatric:         Mood and Affect: Mood normal.         Behavior: Behavior normal.         Thought Content: Thought content normal.         Judgment: Judgment normal.         Results Review:   I reviewed the patient's new clinical results.  I reviewed the patient's new imaging results and agree with the interpretation.  I reviewed the patient's other test results and agree with the interpretation  I personally viewed and interpreted the patient's EKG/Telemetry data    Lab Results   Component Value Date    WBC 8.17 04/20/2024    HGB 9.1 (L) 04/20/2024    HGB 6.8 (C) 04/19/2024    HGB 8.3 (L) 04/18/2024    HCT 28.7 (L) 04/20/2024    MCV 84.2 04/20/2024     04/20/2024       Lab Results   Component Value Date    INR 0.98 07/01/2023    INR 1.02 05/04/2018    INR 0.92 09/22/2015       Lab Results   Component Value Date    GLUCOSE 78 04/20/2024    BUN 19 04/20/2024    CREATININE 0.69 04/20/2024    EGFRIFNONA 101 12/27/2020    EGFRIFAFRI  09/05/2016      Comment:      <15 Indicative of kidney failure.    BCR 27.5 (H) 04/20/2024     04/20/2024    K 4.0 04/20/2024    CO2 29.0 04/20/2024    CALCIUM 7.9 (L) 04/20/2024    ALBUMIN 3.1 (L) 04/20/2024    ALKPHOS 99 04/20/2024    BILITOT 0.4 04/20/2024    ALT 12 04/20/2024    AST 27 04/20/2024       XR Chest 1 View    Result Date: 4/19/2024  XR CHEST 1 VW Date of Exam:  4/19/2024 10:11 AM EDT Indication: pulmonary edema Comparison: 4/18/2024 Findings: Heart shadow is upper normal limits borderline enlarged in size, for portable film technique. Pulmonary vasculature appears normal. Lungs appear moderately well expanded and clear.     Impression: Heart shadow in the upper range of normal size, but no evidence of congestive failure or other active disease. Electronically Signed: Kory Cisneros MD  4/19/2024 10:34 AM EDT  Workstation ID: [id number]    Adult Transthoracic Echo Limited W/ Cont if Necessary Per Protocol    Result Date: 4/18/2024    Left ventricular systolic function is normal. Calculated left ventricular EF = 56.4%   Left ventricular wall thickness is consistent with mild concentric hypertrophy.   Left ventricular diastolic function was not assessed.   There is a trivial pericardial effusion.     XR Chest PA & Lateral    Result Date: 4/18/2024  XR CHEST PA AND LATERAL Date of Exam: 4/18/2024 1:11 AM EDT Indication: soa and chest pain Comparison: 3/27/2024. Findings: There is new interstitial disease present throughout the lungs. No pneumothorax or pleural effusion. Heart size is stable. No acute osseous abnormality. No focal lung consolidation.     Impression: New interstitial disease in the lungs concerning for pulmonary edema. Electronically Signed: Yoseph Pace MD  4/18/2024 1:38 AM EDT  Workstation ID: [id number]    Adult Transthoracic Echo Complete W/ Cont if Necessary Per Protocol    Result Date: 4/11/2024    Normal left ventricular cavity size and wall motion noted.   Left ventricular wall thickness is consistent with mild concentric hypertrophy.   Left ventricular diastolic function is consistent with (grade II w/high LAP) pseudonormalization.   LV ejection fraction is around 60%.   Aortic valve is normal there is no evidence of aortic stenosis or aortic regurgitation detected.   Mild mitral regurgitation is noted, no significant valvular heart disease detected.   No  pericardial effusion noted     [facility] [facility]    Result Date: 2024  PAT NAME: RAFITA BRISENO [facility] REC#: [id number] BIRTH DA: 1985 PAT GEND: F ACCOUNT#: [id number] PAT TYPE: O EXAM MARIELA: [id number] REF PHYS ELIZA OWEN III ACCESSION [id number] Comparison Studies ================= The findings of this study are compared to the prior ultrasound study dated 3/11/24 Patient Status ============ Outpatient Indication ======== AMA. CHTN. Type 2 diabetes. Seizure disorder. Hx CHF. PCOS. Morbid obesity BMI 47. Maternal Assessment ================== Height 150 cm Height (ft) 4 ft Height (in) 11 in Weight 106 kg Weight (lb) 233 lb BMI 46.97 kg/m² Method ======= Transabdominal ultrasound examination. View: Limited by patient body habitus. Suboptimal view: restricted by patient discomfort Pregnancy ========= Briggs pregnancy. Number of fetuses: 1 Dating ====== Method of dating: based on stated ANDRE GA by prior assessment 33 w + 2 d ANDRE by prior assessment: 2024 Ultrasound examination on: 2024 GA by U/S based upon: AC, BPD, Femur, HC GA by U/S 33 w + 3 d ANDRE by U/S: 2024 Previous dating: based on stated ANDRE, selected on 2024 Agreed ANDRE of previous datin2024 Assigned: based on stated ANDRE, selected on 2024 Assigned GA 33 w + 2 d Assigned ANDRE: 2024 Pregnancy length 280 d Fetal Biometry ============ Standard BPD 84.8 mm 34w 1d        71%        Hadlock .6 mm 35w 4d        91%        Neo .6 mm 34w 2d        39%        Hadlock .1 mm 32w 6d        40%        Hadlock Femur 62.0 mm 32w 1d        13%        Hadlock Humerus 56.0 mm 32w 4d        41%        Neo HC / AC 1.06 EFW 2,083 g         39%        Natanael EFW (lb) 4 lb EFW (oz) 9 oz EFW by: Judson (BPD-HC-AC-FL) Extended  8.0 mm Head / Face / Neck Cephalic index 0.79         35%        Nicolaides Extremities / Bony Struc FL / BPD 0.73 FL / HC 0.20 FL / AC 0.21 Other Structures   bpm General Evaluation ================ Cardiac activity present.  bpm. Fetal movements present. Presentation cephalic. Placenta Placental site: posterior, right. Amniotic fluid Amount of AF: normal. MVP 6.6 cm. JACQUIE 18.7 cm. Q1 2.8 cm, Q2 6.6 cm, Q3 6.5 cm, Q4 2.8 cm. Fetal Anatomy ============ Cranium: Normal Cerebellum: Normal Cisterna magna: Normal Head / Neck Rt lateral ventricle: Normal Lt lateral ventricle: Normal Lips: Normal Profile: Normal Nose: Normal 4-chamber view: suboptimal Heart / Thorax 3-vessel view: Normal 3-vessel-trachea view: normal Cord insertion: Normal Stomach: Appears normal Kidneys: Appears normal Bladder: Appears normal Gender: female Wants to know gender: yes Fetal Doppler =========== Arterial Umbilical A PI 1.19         94%        Alan Umbilical A RI 0.71         91%        Alan Umbilical A PS 23.24 cm/s         <1%        Ebbing Umbilical A ED 6.80 cm/s Umbilical A TAmax 13.84 cm/s         <1%        Ebbing Umbilical A S / D 3.42         88%        Alan Biophysical Profile =============== 2: Fetal breathing movements 2: Gross body movements 2: Fetal tone 2: Amniotic fluid volume 8/8 Biophysical profile score Interpretation: normal Consultation / Office Visit ==================== See note in Epic. Impression ========= Antoinette presents for a follow-up ultrasound to assess interval growth and fetal wellbeing. On today's exam, SIUP is noted in cephalic presentation with biometry measuring consistent with dates. EFW measures at the 39th percentile. AC measures at the 40th percentile. There is a normal amount of amniotic fluid. Placenta is posterior/fundal. BPP is 8/8. UA Dopplers are normal. Limited but normal appearing anatomy is visualized. Recommendation =============== Follow-up scheduled in 2 wks. Patient sent for Cardiology evaluation and echo. Will get patient scheduled for delivery at approximately 37wks. Coding ====== Description: 60286-26 Follow Up  Ultrasound Description: 79447-30 BPP without NST Sonographer: Tiffanie Low RT R , [facility] Physician: Dolores Fernandez MD Electronically signed by: Dolores Fernandez MD at:  11:55    XR Chest 2 View    Result Date: 3/27/2024  EXAM:   XR Chest, 2 Views  EXAM DATE:   3/27/2024 11:55 AM  CLINICAL HISTORY:   SOA Triage Protocol  TECHNIQUE:   Frontal and lateral views of the chest.  COMPARISON:   2023  FINDINGS:   Lungs and pleural spaces:  Unremarkable as visualized.  No consolidation.  No pneumothorax.   Heart:  Unremarkable as visualized.  No cardiomegaly.   Mediastinum:  Unremarkable as visualized.  Normal mediastinal contour.   Bones/joints:  Unremarkable as visualized.  No acute fracture.        Unremarkable radiographic appearance of the chest.   This report was finalized on 3/27/2024 12:44 PM by Dr. Bharath Chávez MD.       ASSESSMENTS/PLANS  1.  Acute gastrointestinal bleeding with intermittent BRBPR and dark tarry stools  2.  Acute blood loss anemia, suspect secondary to surgical losses as well as above  3.  Abnormal colonoscopy findings, concerns for polyp and irregular inflammation of indeterminate etiology  4.  POD 3 following low transverse  section  5.  CHF.  6.  History of gastrointestinal bleeding    Antoinette Pack is a 38 y.o. female who presents to hospital with congestive heart failure who ultimately required  for delivery at 34w5d.  GI consult received for rectal bleeding.  Patient has a complex history of lower gastrointestinal bleeding that stems the greater part of the past few years though it has certainly gotten worse here recently.  Overall, symptoms are strongly concerning for underlying IBD with indeterminate clinical features identified on her colonoscopy last year warranting follow-up.  Given her postpartum state and issues with CHF throughout pregnancy, recommend inpatient bidirectional endoscopy on Monday, 2024.    >>> Okay for regular diet today; clear  liquid diet to begin tomorrow  >>> Recommend bidirectional endoscopy on Monday, 4/22/2024.  >>> Bowel prep ordered to begin at 4 PM on 4/21/2024   MoviPrep: Give 8 ounces every 15 minutes ×2 L starting at 4 PM.  Repeat dose at 5 AM tomorrow.  Must finish both doses in 2 hours.  Stool should look like lemonade when finished.  Please call ENDO at [phone] at 7:30am if not clear.  >>> Monitor H&H and transfuse per protocol  >>> Labs ordered including: CRP, sed rate  >>> Stool studies ordered: Fecal calprotectin  >>> Obtain medical records from Dr. Peguero in [place] regarding patient's most recent colonoscopy and pertinent pathology findings.    I discussed the patient's findings and my recommendations with patient, family, nursing staff, and consulting provider    CICI Chacon  04/20/24  18:54 EDT        Electronically signed by Brunner, Mark I, MD at 04/21/24 4739

## 2024-04-22 NOTE — CONSULTS
"                    Clinical Nutrition     Patient Name: Antoinette Pack  YOB: 1985  MRN: [id number]  Date of Encounter: 24 13:07 EDT  Admission date: 2024    Comments:    Diet order recommendations:  Consistent Carbohydrate, 2 gm sodium.  Outpatient Nutrition therapy may be beneficial, if available near [facility].        Reason for Visit   Identified at risk by screening criteria    Medical conditions   Admission Diagnosis:  Pulmonary edema [J81.1], pregnancy with subsequent  delivery of 35 week female infant (NICU admission), GI bleeding with low hgb/hct --- s/p EGD, Colonoscopy.     Problem List:    Pulmonary edema    History of CHF (congestive heart failure)    History of pancreatitis    Morbid obesity with BMI of 40.0-44.9, adult    History of seizure disorder    Obstructive sleep apnea syndrome    Rectal bleeding    Essential hypertension    Type 2 diabetes mellitus, with long-term current use of insulin    S/P  section, total salpingectomy left unicornuate uterus    Anemia      Anthropometric      Flowsheet Rows      Flowsheet Row First Filed Value   Admission Height 152.4 cm (60\") Documented at 2024 1632   Admission Weight 107 kg (235 lb 14.3 oz) Documented at 2024 1632        Height: 152.4 cm (60\")  Last Filed Weight: Weight: 96.2 kg (212 lb) (24 0802)  Weight Method: Stated  BMI: BMI (Calculated): 41.4  BMI classification: Obese Class III extreme obesity: > or equal to 40kg/m2   IBW:  100 lb     UBW:   changes related to edema   Weight change:   variable weights reported. Many are stated weights.     Nutrition Focused Physical Exam     Date:    24    Unable to perform exam due to: Defer pending indication     Reported/Observed/Food/Nutrition Related - Comments   When meals are documented intake is at >75%.       Current Nutrition Prescription   NPO Diet NPO Type: Strict NPO  Orders Placed This Encounter      Dietary Nutrition Supplements " Boost Glucose Control; chocolate    Average Intake from Charting: Insufficient data     Nutrition Diagnosis   Date:  Updated:     Problem Altered nutrition related laboratory values   Etiology Diabetes type II, pregnancy    Signs/Symptoms Hx of dx, elevated BG levels    Status:  new     Actions     Follow treatment progress, Care plan reviewed, Await begin PO, Encourage intake  Encourage pumping breast  milk for infant if she can medically accomplish this.      Monitor Per Protocol      Tanesha Johnson, TACO,  LD   Time Spent:  20 min

## 2024-04-22 NOTE — PROGRESS NOTES
Postpartum Progress Note    Patient name: Antoinette Pack  YOB: 1985   MRN: [id number]  Referring Provider: Geovani Razo III   Admission Date: 2024  Date of Service: 2024    ID: 38 y.o.     Diagnosis:   S/p  delivery POD#5    Pulmonary edema    History of CHF (congestive heart failure)    History of pancreatitis    Morbid obesity with BMI of 40.0-44.9, adult    History of seizure disorder    Obstructive sleep apnea syndrome    Rectal bleeding    Essential hypertension    Type 2 diabetes mellitus, with long-term current use of insulin    S/P  section, total salpingectomy left unicornuate uterus    Anemia       Subjective:      Patient is very tired  Ambulating, voiding, tolerating diet.  Pain well controlled.      Objective:      Vital signs:  Vital Signs Range for the last 24 hours  Temperature: Temp:  [97.4 °F (36.3 °C)-98.8 °F (37.1 °C)] 98.2 °F (36.8 °C)   Temp Source: Temp src: Oral   BP: BP: (117-189)/(65-95) 130/69   Pulse: Heart Rate:  [] 99   Respirations: Resp:  [14-20] 16   Weight: 96.2 kg (212 lb)     General: Alert & oriented x4, in no apparent distress  Abdomen: soft, nontender  Uterus: firm, nontender  Incision: clean, dry, intact,   Extremities: nontender; no edema      Labs:  Lab Results   Component Value Date    WBC 6.92 2024    HGB 9.2 (L) 2024    HCT 29.9 (L) 2024    MCV 86.7 2024     2024     Results from last 7 days   Lab Units 24  0525   ABO TYPING  O   RH TYPING  Positive   EGD 24:  - Normal esophagus. - Normal stomach. Biopsied. - Non-bleeding duodenal ulcers with a clean ulcer base (Chalo Class III). Biopsied.   Impression: - Await pathology results. - Continue twice daily PPI for 8 weeks. - Carafate 1g QID for 1 week. - Proceed with same day colonoscopy (see separate report)  Colonoscopy 24  - The examined portion of the ileum was normal. - The mucosa of the sigmoid,  descending colon, transverse colon, ascending colon and cecum are normal. Biopsied. - Diverticulosis in the sigmoid colon and in the descending colon. - Severe (Belle Score 3) proctitis ulcerative colitis. Biopsied. - Internal hemorrhoids. Impression: - Await pathology results. - Start Rowasa enema nightly. - Start budesonide 9 mg for 6-8 weeks with subsequent taper and monitor response. - Close follow up with primary gastroenterologist after discharge to assess response and consider long term maintenance therapy as able per her comorbid medical conditions as well as consideration for breastfeeding. - See same day separate EGD report for additional findings and recommendations. - Advance diet as tolerated                      Assessment/Plan:      Day of Surgery s/p Procedure(s):  COLONOSCOPY  ESOPHAGOGASTRODUODENOSCOPY      Pulmonary edema    History of CHF (congestive heart failure)    History of pancreatitis    Morbid obesity with BMI of 40.0-44.9, adult    History of seizure disorder    Obstructive sleep apnea syndrome    Rectal bleeding    Essential hypertension    Type 2 diabetes mellitus, with long-term current use of insulin    S/P  section, total salpingectomy left unicornuate uterus    Anemia    Pt described conversations with Dr. Fernandez and Dr. Mueller and voiced understandings that new medication for ulcers in her small intestine (PPI and carafate) as well as Budesonide 9 mg (6-8 weeks) and nightly Rowasa enemas hopefully will help decrease her issues with anemia from GI sources.  Pt understands the changes to her insulin pump made by Dr. Fernandez.  Will verify with [facility] cardiology's recommendation to add Coreg or Bisoprolol due to breastfeeding status.  Appreciate Nutrition consult  Awaiting GI biopsies    Carey Ramos MD  2024

## 2024-04-22 NOTE — PROGRESS NOTES
"  [facility] at [facility]  PROGRESS NOTE    Date of Admission: 2024  Date of Service: 24    Primary Care Physician: Shawna Lambert DO    Chief Complaint: follow up HF    Problem List:   Pulmonary edema    History of CHF (congestive heart failure)    History of pancreatitis    Morbid obesity with BMI of 40.0-44.9, adult    History of seizure disorder    Obstructive sleep apnea syndrome    Rectal bleeding    Essential hypertension    Type 2 diabetes mellitus, with long-term current use of insulin    S/P  section, total salpingectomy left unicornuate uterus    Anemia      Subjective      Had endoscopy/colonoscopy this morning. Source of bleeding found and treated. Patient resting with CPAP in place at the time of my visit. Is having some shortness of breath. Has ambulated some.      Objective   Vitals: /69 (BP Location: Right arm, Patient Position: Sitting)   Pulse 99   Temp 97.4 °F (36.3 °C) (Temporal) Comment: Simultaneous filing. User may not have seen previous data.  Resp 16   Ht 152.4 cm (60\")   Wt 96.2 kg (212 lb)   LMP  (LMP Unknown) Comment: Ceasaran Section 24  SpO2 100%   Breastfeeding Yes   BMI 41.40 kg/m²     Physical Exam:  GENERAL: in no acute distress.   HEENT:  no jugular venous distention  HEART: Regular rhythm, normal rate, and no murmurs, gallops, or rubs.   LUNGS: Clear to auscultation bilaterally. No wheezing, rales or rhonchi.  EXTREMITIES: No edema noted.     Results:  Results from last 7 days   Lab Units 24  0646 24  0721 24  0525   WBC 10*3/mm3 6.92 8.17 6.97   HEMOGLOBIN g/dL 9.2* 9.1* 6.8*   HEMATOCRIT % 29.9* 28.7* 21.9*   PLATELETS 10*3/mm3 145 141 117*     Results from last 7 days   Lab Units 24  0721 24  0525 24  0858   SODIUM mmol/L 138 136 139   POTASSIUM mmol/L 4.0 4.2 4.5   CHLORIDE mmol/L 101 105 106   CO2 mmol/L 29.0 22.0 22.0   BUN mg/dL 19 23* 13   CREATININE mg/dL 0.69 0.75 " 0.65   GLUCOSE mg/dL 78 69 85      Lab Results   Component Value Date    CHOL 173 2023    TRIG 111 2023    HDL 33 (L) 2023     (H) 2023    AST 27 2024    ALT 12 2024             Results from last 7 days   Lab Units 24  0525   TSH uIU/mL 0.346             Results from last 7 days   Lab Units 24  0858 24  0005 24  1819   HSTROP T ng/L 19* 23* 22*     Results from last 7 days   Lab Units 24  0721   PROBNP pg/mL 59.4         Intake/Output Summary (Last 24 hours) at 2024 1306  Last data filed at 2024 1015  Gross per 24 hour   Intake 780 ml   Output --   Net 780 ml       I personally reviewed the patient's EKG/Telemetry data    Radiology Data:   Results for orders placed during the hospital encounter of 24    Adult Transthoracic Echo Limited W/ Cont if Necessary Per Protocol    Interpretation Summary    Left ventricular systolic function is normal. Calculated left ventricular EF = 56.4%    Left ventricular wall thickness is consistent with mild concentric hypertrophy.    Left ventricular diastolic function was not assessed.    There is a trivial pericardial effusion.      Current Medications:  acetaminophen, 650 mg, Oral, Q6H  Budesonide, 9 mg, Oral, Daily  enoxaparin, 40 mg, Subcutaneous, Q12H  furosemide, 40 mg, Oral, BID  lisinopril, 40 mg, Oral, Q24H  mesalamine, 4 g, Rectal, Nightly  pantoprazole, 40 mg, Oral, BID AC  prenatal vitamin, 1 tablet, Oral, Daily  sucralfate, 1 g, Oral, 4x Daily AC & at Bedtime      lactated ringers, 30 mL/hr  lactated ringers, 9 mL/hr        Assessment:   HFpEF, EF 56% by echo  Normal MPS 2023  GI bleed, uncertain etiology, endoscopy today  Hypertension  Dyslipidemia  Diabetes  Obstructive sleep apnea  Recently pregnant,  2024    Plan:   Continue lisinopril 40mg daily for control of hypertension.   Would benefit from addition of beta blockade if okay with OB. (Coreg vs  Bisoprolol).  Give am dose of lasix now. On hold due to procedure this AM.   Increase activities as tolerated.      Sabra Lopez PA-C

## 2024-04-23 LAB
ALBUMIN SERPL-MCNC: 3.4 G/DL (ref 3.5–5.2)
ALBUMIN/GLOB SERPL: 1.4 G/DL
ALP SERPL-CCNC: 86 U/L (ref 39–117)
ALT SERPL W P-5'-P-CCNC: 13 U/L (ref 1–33)
ANION GAP SERPL CALCULATED.3IONS-SCNC: 7 MMOL/L (ref 5–15)
AST SERPL-CCNC: 19 U/L (ref 1–32)
BILIRUB SERPL-MCNC: 0.3 MG/DL (ref 0–1.2)
BUN SERPL-MCNC: 10 MG/DL (ref 6–20)
BUN/CREAT SERPL: 15.6 (ref 7–25)
CALCIUM SPEC-SCNC: 8.6 MG/DL (ref 8.6–10.5)
CHLORIDE SERPL-SCNC: 102 MMOL/L (ref 98–107)
CO2 SERPL-SCNC: 30 MMOL/L (ref 22–29)
CREAT SERPL-MCNC: 0.64 MG/DL (ref 0.57–1)
CYTO UR: NORMAL
DEPRECATED RDW RBC AUTO: 44 FL (ref 37–54)
EGFRCR SERPLBLD CKD-EPI 2021: 114.7 ML/MIN/1.73
ERYTHROCYTE [DISTWIDTH] IN BLOOD BY AUTOMATED COUNT: 14.4 % (ref 12.3–15.4)
GLOBULIN UR ELPH-MCNC: 2.4 GM/DL
GLUCOSE BLDC GLUCOMTR-MCNC: 100 MG/DL (ref 70–130)
GLUCOSE BLDC GLUCOMTR-MCNC: 112 MG/DL (ref 70–130)
GLUCOSE BLDC GLUCOMTR-MCNC: 114 MG/DL (ref 70–130)
GLUCOSE BLDC GLUCOMTR-MCNC: 90 MG/DL (ref 70–130)
GLUCOSE BLDC GLUCOMTR-MCNC: 92 MG/DL (ref 70–130)
GLUCOSE SERPL-MCNC: 104 MG/DL (ref 65–99)
HCT VFR BLD AUTO: 27.7 % (ref 34–46.6)
HGB BLD-MCNC: 8.8 G/DL (ref 12–15.9)
LAB AP CASE REPORT: NORMAL
LAB AP CLINICAL INFORMATION: NORMAL
MCH RBC QN AUTO: 26.9 PG (ref 26.6–33)
MCHC RBC AUTO-ENTMCNC: 31.8 G/DL (ref 31.5–35.7)
MCV RBC AUTO: 84.7 FL (ref 79–97)
PATH REPORT.FINAL DX SPEC: NORMAL
PATH REPORT.GROSS SPEC: NORMAL
PLATELET # BLD AUTO: 151 10*3/MM3 (ref 140–450)
PMV BLD AUTO: 10.9 FL (ref 6–12)
POTASSIUM SERPL-SCNC: 4.2 MMOL/L (ref 3.5–5.2)
PROT SERPL-MCNC: 5.8 G/DL (ref 6–8.5)
RBC # BLD AUTO: 3.27 10*6/MM3 (ref 3.77–5.28)
SODIUM SERPL-SCNC: 139 MMOL/L (ref 136–145)
WBC NRBC COR # BLD AUTO: 6.87 10*3/MM3 (ref 3.4–10.8)

## 2024-04-23 PROCEDURE — 82948 REAGENT STRIP/BLOOD GLUCOSE: CPT

## 2024-04-23 PROCEDURE — 25010000002 ENOXAPARIN PER 10 MG: Performed by: OBSTETRICS & GYNECOLOGY

## 2024-04-23 PROCEDURE — 99232 SBSQ HOSP IP/OBS MODERATE 35: CPT | Performed by: INTERNAL MEDICINE

## 2024-04-23 PROCEDURE — 99232 SBSQ HOSP IP/OBS MODERATE 35: CPT | Performed by: OBSTETRICS & GYNECOLOGY

## 2024-04-23 PROCEDURE — 80053 COMPREHEN METABOLIC PANEL: CPT | Performed by: OBSTETRICS & GYNECOLOGY

## 2024-04-23 PROCEDURE — 85027 COMPLETE CBC AUTOMATED: CPT | Performed by: OBSTETRICS & GYNECOLOGY

## 2024-04-23 RX ORDER — NIFEDIPINE 30 MG/1
60 TABLET, EXTENDED RELEASE ORAL
Status: DISCONTINUED | OUTPATIENT
Start: 2024-04-23 | End: 2024-04-24 | Stop reason: HOSPADM

## 2024-04-23 RX ORDER — FUROSEMIDE 40 MG/1
40 TABLET ORAL DAILY PRN
Status: DISCONTINUED | OUTPATIENT
Start: 2024-04-23 | End: 2024-04-24 | Stop reason: HOSPADM

## 2024-04-23 RX ADMIN — PANTOPRAZOLE SODIUM 40 MG: 40 TABLET, DELAYED RELEASE ORAL at 07:36

## 2024-04-23 RX ADMIN — ACETAMINOPHEN 650 MG: 325 TABLET ORAL at 16:45

## 2024-04-23 RX ADMIN — LISINOPRIL 40 MG: 10 TABLET ORAL at 09:42

## 2024-04-23 RX ADMIN — BUDESONIDE 9 MG: 3 CAPSULE, COATED PELLETS ORAL at 09:41

## 2024-04-23 RX ADMIN — FUROSEMIDE 40 MG: 40 TABLET ORAL at 09:41

## 2024-04-23 RX ADMIN — SUCRALFATE 1 G: 1 TABLET ORAL at 13:15

## 2024-04-23 RX ADMIN — ACETAMINOPHEN 650 MG: 325 TABLET ORAL at 22:05

## 2024-04-23 RX ADMIN — LABETALOL HYDROCHLORIDE 200 MG: 200 TABLET, FILM COATED ORAL at 09:42

## 2024-04-23 RX ADMIN — ENOXAPARIN SODIUM 40 MG: 100 INJECTION SUBCUTANEOUS at 21:09

## 2024-04-23 RX ADMIN — SUCRALFATE 1 G: 1 TABLET ORAL at 21:11

## 2024-04-23 RX ADMIN — PRENATAL VITAMINS-IRON FUMARATE 27 MG IRON-FOLIC ACID 0.8 MG TABLET 1 TABLET: at 09:41

## 2024-04-23 RX ADMIN — NIFEDIPINE 60 MG: 30 TABLET, EXTENDED RELEASE ORAL at 13:15

## 2024-04-23 RX ADMIN — ACETAMINOPHEN 650 MG: 325 TABLET ORAL at 12:52

## 2024-04-23 RX ADMIN — SUCRALFATE 1 G: 1 TABLET ORAL at 07:36

## 2024-04-23 RX ADMIN — PANTOPRAZOLE SODIUM 40 MG: 40 TABLET, DELAYED RELEASE ORAL at 16:45

## 2024-04-23 RX ADMIN — MESALAMINE 4 G: 4 ENEMA RECTAL at 21:12

## 2024-04-23 RX ADMIN — ACETAMINOPHEN 650 MG: 325 TABLET ORAL at 04:01

## 2024-04-23 RX ADMIN — ENOXAPARIN SODIUM 40 MG: 100 INJECTION SUBCUTANEOUS at 09:39

## 2024-04-23 RX ADMIN — LABETALOL HYDROCHLORIDE 200 MG: 200 TABLET, FILM COATED ORAL at 21:10

## 2024-04-23 RX ADMIN — SUCRALFATE 1 G: 1 TABLET ORAL at 18:36

## 2024-04-23 NOTE — PROGRESS NOTES
"  [facility] at [facility]  PROGRESS NOTE    Date of Admission: 2024  Date of Service: 24    Primary Care Physician: Shawna Lambert DO    Chief Complaint: follow up HF    Problem List:   Pulmonary edema    History of CHF (congestive heart failure)    History of pancreatitis    Morbid obesity with BMI of 40.0-44.9, adult    History of seizure disorder    Obstructive sleep apnea syndrome    Rectal bleeding    Essential hypertension    Type 2 diabetes mellitus, with long-term current use of insulin    S/P  section, total salpingectomy left unicornuate uterus    Anemia      Subjective      BP much better overall.  Edema essentially resolved.        Objective   Vitals: /74   Pulse 85   Temp 97.9 °F (36.6 °C) (Oral)   Resp 16   Ht 152.4 cm (60\")   Wt 93.9 kg (207 lb)   LMP  (LMP Unknown) Comment: Ceasaran Section 24  SpO2 100%   Breastfeeding Yes   BMI 40.43 kg/m²     Physical Exam:  GENERAL: in no acute distress.   HEENT:  no jugular venous distention  HEART: Regular rhythm, normal rate, and no murmurs, gallops, or rubs.   LUNGS: Clear to auscultation bilaterally. No wheezing, rales or rhonchi.  EXTREMITIES: No edema noted.     Results:  Results from last 7 days   Lab Units 24  0646 24  0721 24  0525   WBC 10*3/mm3 6.92 8.17 6.97   HEMOGLOBIN g/dL 9.2* 9.1* 6.8*   HEMATOCRIT % 29.9* 28.7* 21.9*   PLATELETS 10*3/mm3 145 141 117*     Results from last 7 days   Lab Units 24  0721 24  0525 24  0858   SODIUM mmol/L 138 136 139   POTASSIUM mmol/L 4.0 4.2 4.5   CHLORIDE mmol/L 101 105 106   CO2 mmol/L 29.0 22.0 22.0   BUN mg/dL 19 23* 13   CREATININE mg/dL 0.69 0.75 0.65   GLUCOSE mg/dL 78 69 85      Lab Results   Component Value Date    CHOL 173 2023    TRIG 111 2023    HDL 33 (L) 2023     (H) 2023    AST 27 2024    ALT 12 2024             Results from last 7 days   Lab Units " 24  0525   TSH uIU/mL 0.346             Results from last 7 days   Lab Units 24  0858 24  0005 24  1819   HSTROP T ng/L 19* 23* 22*     Results from last 7 days   Lab Units 24  0721   PROBNP pg/mL 59.4       No intake or output data in the 24 hours ending 24 1151      I personally reviewed the patient's EKG/Telemetry data    Radiology Data:   Results for orders placed during the hospital encounter of 24    Adult Transthoracic Echo Limited W/ Cont if Necessary Per Protocol    Interpretation Summary    Left ventricular systolic function is normal. Calculated left ventricular EF = 56.4%    Left ventricular wall thickness is consistent with mild concentric hypertrophy.    Left ventricular diastolic function was not assessed.    There is a trivial pericardial effusion.      Current Medications:  acetaminophen, 650 mg, Oral, Q6H  Budesonide, 9 mg, Oral, Daily  enoxaparin, 40 mg, Subcutaneous, Q12H  furosemide, 40 mg, Oral, BID  labetalol, 200 mg, Oral, Q12H  lisinopril, 40 mg, Oral, Q24H  mesalamine, 4 g, Rectal, Nightly  pantoprazole, 40 mg, Oral, BID AC  prenatal vitamin, 1 tablet, Oral, Daily  sodium chloride, 10 mL, Intravenous, Q12H  sucralfate, 1 g, Oral, 4x Daily AC & at Bedtime      lactated ringers, 30 mL/hr  lactated ringers, 9 mL/hr        Assessment:   HFpEF, EF 56% by echo  Normal MPS 2023  GI bleed, uncertain etiology, endoscopy today  Hypertension  Dyslipidemia  Diabetes  Obstructive sleep apnea  Recently pregnant,  2024    Plan:   Continue lisinopril 40mg daily for control of hypertension.  Continue labetalol.  Cont. Nifedipine (increased to long acting 60mg).    Lasix as needed  Will sign off.    1-3 month follow up      Pablo Cabrera MD

## 2024-04-23 NOTE — PROGRESS NOTES
Postpartum Progress Note    Patient name: Antoinette Pack  YOB: 1985   MRN: [id number]  Referring Provider: Geovani Razo III, MD  Admission Date: 2024  Date of Service: 2024    ID: 38 y.o.     Diagnosis:   S/p  delivery 1 Day Post-Op     Pulmonary edema    History of CHF (congestive heart failure)    History of pancreatitis    Morbid obesity with BMI of 40.0-44.9, adult    History of seizure disorder    Obstructive sleep apnea syndrome    Rectal bleeding    Essential hypertension    Type 2 diabetes mellitus, with long-term current use of insulin    S/P  section, total salpingectomy left unicornuate uterus    Anemia       Subjective:      No complaints.  Scant lochia.  Ambulating, voiding, tolerating diet.  Pain well controlled.  The patient is currently breastfeeding.   This baby is in NICU    Objective:      Vital signs:  Vital Signs Range for the last 24 hours  Temperature: Temp:  [97.4 °F (36.3 °C)-98.4 °F (36.9 °C)] 97.9 °F (36.6 °C)   Temp Source: Temp src: Oral   BP: BP: (117-176)/(56-94) 120/56   Pulse: Heart Rate:  [] 77   Respirations: Resp:  [16-20] 16   Weight: 93.9 kg (207 lb)     General: Alert & oriented x4, in no apparent distress  Abdomen: soft, nontender  Uterus: firm, nontender  Incision: clean, dry, intact,  suture  Extremities: nontender; no edema      Labs:  Lab Results   Component Value Date    WBC 6.92 2024    HGB 9.2 (L) 2024    HCT 29.9 (L) 2024    MCV 86.7 2024     2024     Results from last 7 days   Lab Units 24  0525   ABO TYPING  O   RH TYPING  Positive     External Prenatal Results       Pregnancy Outside Results - Transcribed From Office Records - See Scanned Records For Details       Test Value Date Time    ABO  O  24 0525    Rh  Positive  24 0525    Antibody Screen  Negative  24 0005    Varicella IgG       Rubella ^ Immune  23     Hgb  9.2 g/dL 24  0646       9.1 g/dL 04/20/24 0721       6.8 g/dL 04/19/24 0525       8.3 g/dL 04/18/24 0858       8.4 g/dL 04/18/24 0005       8.2 g/dL 04/17/24 1145       8.4 g/dL 04/15/24 2141       10.0 g/dL 03/27/24 1215       10.6 g/dL 01/29/24 1826       9.8 g/dL 12/08/23 1239       10.4 g/dL 11/15/23 1603       11.2 g/dL 10/27/23 2112       9.5 g/dL 09/27/23 1332       8.7 g/dL 09/20/23 0235       9.0 g/dL 09/19/23 0054       10.2 g/dL 09/18/23 0828       9.5 g/dL 09/17/23 0023       10.1 g/dL 09/03/23 1610       10.1 g/dL 08/26/23 1727    Hct  29.9 % 04/21/24 0646       28.7 % 04/20/24 0721       21.9 % 04/19/24 0525       27.1 % 04/18/24 0858       27.9 % 04/18/24 0005       26.3 % 04/17/24 1145       26.7 % 04/15/24 2141       30.5 % 03/27/24 1215       30.9 % 01/29/24 1826       32.0 % 12/08/23 1239       32.5 % 11/15/23 1603       36.8 % 10/27/23 2112       30.1 % 09/27/23 1332       28.3 % 09/20/23 0235       29.0 % 09/19/23 0054       33.0 % 09/18/23 0828       31.2 % 09/17/23 0023       32.2 % 09/03/23 1610       32.7 % 08/26/23 1727    Glucose Fasting GTT       Glucose Tolerance Test 1 hour       Glucose Tolerance Test 3 hour       Gonorrhea (discrete) ^ Negative  11/03/23     Chlamydia (discrete) ^ Negative  11/03/23     RPR ^ Non-Reactive  11/16/23       ^ Non-Reactive  11/03/23     VDRL       Syphilis Antibody       HBsAg ^ Negative  11/16/23     Herpes Simplex Virus PCR       Herpes Simplex VIrus Culture       HIV ^ Non-Reactive  11/16/23     Hep C RNA Quant PCR       Hep C Antibody  Non-Reactive  04/20/24 0721    AFP       Group B Strep       GBS Susceptibility to Clindamycin       GBS Susceptibility to Erythromycin       Fetal Fibronectin       Genetic Testing, Maternal Blood                 Drug Screening       Test Value Date Time    Urine Drug Screen       Amphetamine Screen  Negative  04/19/24 1152    Barbiturate Screen  Negative  04/19/24 1152    Benzodiazepine Screen  Negative  04/19/24 1152    Methadone  Screen  Negative  24 1152    Phencyclidine Screen  Negative  24 1152    Opiates Screen  Negative  24 1152    THC Screen  Negative  24 1152    Cocaine Screen  Negative  20 0953    Propoxyphene Screen  Negative  10/19/15 192    Buprenorphine Screen  Negative  24 1152    Methamphetamine Screen  Negative  20 0953    Oxycodone Screen  Negative  24 1152    Tricyclic Antidepressants Screen  Negative  24 1152              Legend    ^: Historical                            Assessment/Plan:      1 Day Post-Op s/p Procedure(s):  COLONOSCOPY  ESOPHAGOGASTRODUODENOSCOPY  1. POD # 5 after a low transverse  section with total left salpingectomy (unicornuate uterus) recovering well  2.  Status post day #1 after (endoscopy, EGD/colonoscopy with findings of duodenal ulcer and suspected ulcerative colitis biopsies pending       Per GI. (She is planning to have a virtual appointment with gastroenterology Louie today)  3.  Acute on chronic anemia status post 2 units packed red blood cells  H&H stable 9.1 28.7 asymptomatic  4.  Acute exacerbation of heart failure -improving       Lasix 40 mg every 12       Cardiology care with disposition to be determined  5.  Chronic hypertension BP stable on lisinopril 40 mg daily and labetalol 200 mg twice daily.  Cardiology adjusting meds as needed.  6.  Type 2 diabetes (OmniPod insulin pump-blood sugars under good control  7.   Obstructive sleep apnea syndrome  8.  Infant remains in the NICU in stable condition       Patient pumping/breast-feeding  9.  Obesity BMI 40.43       DVT prophylaxis with SCUDs and Lovenox    PLAN  Continue postoperative care  GI and cardiology disposition pending  Anticipate discharge home tomorrow  Repeat CBC and CMP today   Questions were answered.

## 2024-04-23 NOTE — PLAN OF CARE
Problem: Adult Inpatient Plan of Care  Goal: Plan of Care Review  Outcome: Ongoing, Progressing  Goal: Patient-Specific Goal (Individualized)  Outcome: Ongoing, Progressing  Goal: Absence of Hospital-Acquired Illness or Injury  Outcome: Ongoing, Progressing  Intervention: Identify and Manage Fall Risk  Recent Flowsheet Documentation  Taken 4/22/2024 1935 by Juliana Shirley RN  Safety Promotion/Fall Prevention:   clutter free environment maintained   nonskid shoes/slippers when out of bed   safety round/check completed  Intervention: Prevent Skin Injury  Recent Flowsheet Documentation  Taken 4/22/2024 1935 by Juliana Shirley RN  Body Position: supine  Intervention: Prevent and Manage VTE (Venous Thromboembolism) Risk  Recent Flowsheet Documentation  Taken 4/22/2024 1935 by Juliana Shirley RN  Activity Management: up ad nimesh  Goal: Optimal Comfort and Wellbeing  Outcome: Ongoing, Progressing  Intervention: Monitor Pain and Promote Comfort  Recent Flowsheet Documentation  Taken 4/23/2024 0401 by Juliana Shirley RN  Pain Management Interventions: see MAR  Intervention: Provide Person-Centered Care  Recent Flowsheet Documentation  Taken 4/22/2024 1935 by Juliana Shirley RN  Trust Relationship/Rapport:   care explained   choices provided   emotional support provided   empathic listening provided   questions answered   questions encouraged   reassurance provided   thoughts/feelings acknowledged  Goal: Readiness for Transition of Care  Outcome: Ongoing, Progressing     Problem: Skin Injury Risk Increased  Goal: Skin Health and Integrity  Outcome: Ongoing, Progressing  Intervention: Optimize Skin Protection  Recent Flowsheet Documentation  Taken 4/22/2024 1935 by Juliana Shirley, RN  Head of Bed (HOB) Positioning: HOB elevated   Goal Outcome Evaluation:

## 2024-04-23 NOTE — LACTATION NOTE
04/23/24 0950   Maternal Information   Date of Referral 04/23/24   Person Making Referral patient   Maternal Reason for Referral separation from infant;no prior breastfeeding experience;other (see comments)  (questions)   Infant Reason for Referral NICU admission   Maternal Infant Feeding   Maternal Emotional State receptive   Support Person Involvement actively supporting mother   Milk Expression/Equipment   Breast Pump Type double electric, hospital grade  (complaints of expressed milk leaking from bottom of flange)   Breast Pump Flange Type hard   Breast Pump Flange Size 21 mm   Equipment for Home Use other (see comments)  (encouraged to speak with NICU RN regarding pump for preemies program)   Breast Pumping   Breast Pumping Interventions frequent pumping encouraged  (reviewed pumping on NICU caretime schedule)   Lactation Referrals   Lactation Referrals outpatient lactation program   Outpatient Lactation Program Lactation Follow-up Date/Time as needed     Follow up visit per RN request. Patient has reported leaking expressed milk from bottom of flange while pumping. Switched to 21mm flange, and turned oblong flange so that long side goes under breast to hopefully provide better seal. MOB reports comfort with 21mm is the same as 24mm. Reviewed breast milk storage and provided reassurance that she is pumping great volumes given her extensive medical history. Encouraged to follow up with outpatient lactation as needed and after infant is discharged from NICU.

## 2024-04-23 NOTE — PROGRESS NOTES
"GI Daily Progress Note  Subjective:    Chief Complaint:  follow up hematochezia, anemia    Patient doing fairly well today.  Has not had BM today.  No n/v. Tolerating diet.     Objective:    /72 (BP Location: Right arm, Patient Position: Sitting)   Pulse 93   Temp 98.1 °F (36.7 °C) (Oral)   Resp 16   Ht 152.4 cm (60\")   Wt 93.9 kg (207 lb)   LMP  (LMP Unknown) Comment: Ceasaran Section 4/18/24  SpO2 100%   Breastfeeding Yes   BMI 40.43 kg/m²     Physical Exam   General: Patient awake, alert and cooperative   Cardiovascular: Regular rate, well-perfused extremities   Pulm: Equal expansion bilaterally, no increased WOB   Abdomen: Soft, nontender    Extremities: No rash or edema              Neuro: A&O, No obvious sign of focal deficit   Psychiatric: Normal mood and behavior; memory intact    Lab  Lab Results   Component Value Date    WBC 6.87 04/23/2024    HGB 8.8 (L) 04/23/2024    HGB 9.2 (L) 04/21/2024    HGB 9.1 (L) 04/20/2024    MCV 84.7 04/23/2024     04/23/2024    INR 0.98 07/01/2023    INR 1.02 05/04/2018    INR 0.92 09/22/2015       Lab Results   Component Value Date    GLUCOSE 104 (H) 04/23/2024    BUN 10 04/23/2024    CREATININE 0.64 04/23/2024    EGFRIFNONA 101 12/27/2020    EGFRIFAFRI  09/05/2016      Comment:      <15 Indicative of kidney failure.    BCR 15.6 04/23/2024     04/23/2024    K 4.2 04/23/2024    CO2 30.0 (H) 04/23/2024    CALCIUM 8.6 04/23/2024    ALBUMIN 3.4 (L) 04/23/2024    ALKPHOS 86 04/23/2024    BILITOT 0.3 04/23/2024    ALT 13 04/23/2024    AST 19 04/23/2024     Clinical Information    Rectal bleeding   Final Diagnosis   DUODENUM, BIOPSY:  Duodenal type mucosa with no significant histopathologic abnormalities  2.   STOMACH, BIOPSY:  Gastric antral type mucosa with mild chronic inactive gastritis  Negative for intestinal metaplasia or dysplasia  No Helicobacter pylori like organisms seen  3.   COLON, RIGHT, BIOPSY:  Colonic type mucosa with no significant " histopathologic abnormalities  4.   COLON, LEFT, BIOPSY:  Colonic type mucosa with no significant histopathologic abnormalities  5.   COLON, RECTOSIGMOID, BIOPSY:  Moderate chronic active colitis with ulceration  Negative for specific microorganisms  Negative for dysplasia or malignancy       Assessment:  Hematochezia  Melena  ABLA  H/o Abnormal Colonoscopy followed by outside GI  Duodenal Ulcers  UC Proctitis    Plan:  - Biopsies as noted above, negative H pylori.  Normal right and left colon biopsies, inflammation up to 18 cm from anal verge biopsies and notes moderate chronic active colitis with ulceration, negative for infectious findings, dysplasia or malignancy.  Endoscopic appearance consistent with UC proctitis  -  Continue twice daily PPI for 8 weeks.   - Carafate 1g QID for 1 week.  - Continue Rowasa enemas nightly  - Continue budesonide 9 mg for 6-8 weeks and then taper off pending response  - Close follow up with primary gastroenterologist after discharge to assess response and consider long term maintenance therapy as able per her comorbid medical conditions as well as consideration for breastfeeding.  Per bedside nurse, appointment has been scheduled for mid May with Dr. Peguero in [place]    GI will sign off and see PRN.  Please let us know if there are questions or concerns.    Tom Mueller MD  04/23/24  17:38 EDT

## 2024-04-24 VITALS
RESPIRATION RATE: 16 BRPM | BODY MASS INDEX: 40.05 KG/M2 | TEMPERATURE: 98 F | OXYGEN SATURATION: 95 % | DIASTOLIC BLOOD PRESSURE: 64 MMHG | SYSTOLIC BLOOD PRESSURE: 143 MMHG | HEART RATE: 87 BPM | HEIGHT: 60 IN | WEIGHT: 204 LBS

## 2024-04-24 LAB
GLUCOSE BLDC GLUCOMTR-MCNC: 111 MG/DL (ref 70–130)
GLUCOSE BLDC GLUCOMTR-MCNC: 87 MG/DL (ref 70–130)

## 2024-04-24 PROCEDURE — 99239 HOSP IP/OBS DSCHRG MGMT >30: CPT | Performed by: OBSTETRICS & GYNECOLOGY

## 2024-04-24 PROCEDURE — 25010000002 KETOROLAC TROMETHAMINE PER 15 MG: Performed by: OBSTETRICS & GYNECOLOGY

## 2024-04-24 PROCEDURE — 25010000002 ENOXAPARIN PER 10 MG: Performed by: OBSTETRICS & GYNECOLOGY

## 2024-04-24 PROCEDURE — 82948 REAGENT STRIP/BLOOD GLUCOSE: CPT

## 2024-04-24 RX ORDER — KETOROLAC TROMETHAMINE 30 MG/ML
60 INJECTION, SOLUTION INTRAMUSCULAR; INTRAVENOUS ONCE
Status: COMPLETED | OUTPATIENT
Start: 2024-04-24 | End: 2024-04-24

## 2024-04-24 RX ORDER — FUROSEMIDE 40 MG/1
40 TABLET ORAL DAILY PRN
Qty: 90 TABLET | Refills: 0 | Status: SHIPPED | OUTPATIENT
Start: 2024-04-24

## 2024-04-24 RX ORDER — LABETALOL 200 MG/1
200 TABLET, FILM COATED ORAL EVERY 12 HOURS SCHEDULED
Qty: 90 TABLET | Refills: 1 | Status: ON HOLD | OUTPATIENT
Start: 2024-04-24 | End: 2024-05-03

## 2024-04-24 RX ORDER — PANTOPRAZOLE SODIUM 40 MG/1
40 TABLET, DELAYED RELEASE ORAL
Qty: 120 TABLET | Refills: 0 | Status: SHIPPED | OUTPATIENT
Start: 2024-04-24

## 2024-04-24 RX ORDER — SUCRALFATE 1 G/1
1 TABLET ORAL
Qty: 28 TABLET | Refills: 0 | Status: ON HOLD | OUTPATIENT
Start: 2024-04-24 | End: 2024-05-03

## 2024-04-24 RX ORDER — MESALAMINE 4 G/60ML
4 SUSPENSION RECTAL NIGHTLY
Qty: 28 ENEMA | Refills: 0 | Status: SHIPPED | OUTPATIENT
Start: 2024-04-24

## 2024-04-24 RX ORDER — ENOXAPARIN SODIUM 100 MG/ML
40 INJECTION SUBCUTANEOUS EVERY 12 HOURS SCHEDULED
Qty: 11.2 ML | Refills: 0 | Status: SHIPPED | OUTPATIENT
Start: 2024-04-24 | End: 2024-05-08

## 2024-04-24 RX ORDER — BUDESONIDE 3 MG/1
9 CAPSULE, COATED PELLETS ORAL DAILY
Qty: 90 CAPSULE | Refills: 0 | Status: ON HOLD | OUTPATIENT
Start: 2024-04-24 | End: 2024-05-03

## 2024-04-24 RX ORDER — LISINOPRIL 40 MG/1
40 TABLET ORAL
Qty: 90 TABLET | Refills: 0 | Status: SHIPPED | OUTPATIENT
Start: 2024-04-24

## 2024-04-24 RX ADMIN — LABETALOL HYDROCHLORIDE 200 MG: 200 TABLET, FILM COATED ORAL at 09:38

## 2024-04-24 RX ADMIN — ACETAMINOPHEN 650 MG: 325 TABLET ORAL at 06:34

## 2024-04-24 RX ADMIN — ENOXAPARIN SODIUM 40 MG: 100 INJECTION SUBCUTANEOUS at 09:40

## 2024-04-24 RX ADMIN — NIFEDIPINE 60 MG: 30 TABLET, EXTENDED RELEASE ORAL at 09:38

## 2024-04-24 RX ADMIN — SUCRALFATE 1 G: 1 TABLET ORAL at 10:38

## 2024-04-24 RX ADMIN — LISINOPRIL 40 MG: 10 TABLET ORAL at 09:38

## 2024-04-24 RX ADMIN — PANTOPRAZOLE SODIUM 40 MG: 40 TABLET, DELAYED RELEASE ORAL at 07:32

## 2024-04-24 RX ADMIN — SUCRALFATE 1 G: 1 TABLET ORAL at 07:33

## 2024-04-24 RX ADMIN — BUDESONIDE 9 MG: 3 CAPSULE, COATED PELLETS ORAL at 09:39

## 2024-04-24 RX ADMIN — PRENATAL VITAMINS-IRON FUMARATE 27 MG IRON-FOLIC ACID 0.8 MG TABLET 1 TABLET: at 09:38

## 2024-04-24 RX ADMIN — KETOROLAC TROMETHAMINE 60 MG: 30 INJECTION, SOLUTION INTRAMUSCULAR; INTRAVENOUS at 00:55

## 2024-04-24 RX ADMIN — ACETAMINOPHEN 650 MG: 325 TABLET ORAL at 10:38

## 2024-04-24 NOTE — PAYOR COMM NOTE
"Antoinette Pack \"Paris\" (38 y.o. Female)     [facility] Auth#[id number]     Discharged 4/24/24.    Needs 3 additional days approved.    From:Rachel Willis LPN, Utilization Review  Phone #[phone]  Fax #[phone]        Date of Birth   1985    Social Security Number       Address   [address] [id number]    Home Phone       MRN   [id number]       Uatsdin   None    Marital Status                               Admission Date   4/17/24    Admission Type   Urgent    Admitting Provider   Axel Keys MD    Attending Provider       Department, Room/Bed   [facility] ANTEPARTUM, N325/1       Discharge Date   4/24/2024    Discharge Disposition   Home or Self Care    Discharge Destination                                 Attending Provider: (none)   Allergies: Dilantin [Phenytoin], Keppra [Levetiracetam], Meperidine, Topamax [Topiramate]    Isolation: None   Infection: None   Code Status: CPR    Ht: 152.4 cm (60\")   Wt: 92.5 kg (204 lb)    Admission Cmt: None   Principal Problem: Pulmonary edema [J81.1]                   Active Insurance as of 4/17/2024       Primary Coverage       Payor Plan Insurance Group Employer/Plan Group    ANTHEM MEDICARE REPLACEMENT ANTHEM MEDICARE ADVANTAGE [id number]       Payor Plan Address Payor Plan Phone Number Payor Plan Fax Number Effective Dates    [address] [phone]  3/1/2024 - None Entered    [facility] [address]         Subscriber Name Subscriber Birth Date Member ID       ANTOINETTE PACK 1985 [id number]               Secondary Coverage       Payor Plan Insurance Group Employer/Plan Group    MISC COMMERCIAL MISC COMMERCIAL        Coverage Address Coverage Phone Number Coverage Fax Number Effective Dates    [address] [phone]  1/1/2024 - None Entered    [place] [id number]         Subscriber Name Subscriber Birth Date Member ID       CHRISTOPHER PACK 8/22/1987 [id number]          "            Emergency Contacts        (Rel.) Home Phone Work Phone Mobile Phone    Jonel Iverson (Father) [phone] -- --    Michelle Pack (Spouse) -- -- [phone]              Insurance Information                  ANTHEM MEDICARE REPLACEMENT/ANTHEM MEDICARE ADVANTAGE Phone: [phone]    Subscriber: Antoinette Pack Subscriber#: [id number]    Group#: [id number] Precert#: --        MISC COMMERCIAL/MISC COMMERCIAL Phone: [phone]    Subscriber: Michelle Pack Subscriber#: [id number]    Group#: -- Precert#: --             History & Physical        Axel Keys MD at 24 0245          [facility]  Obstetric History and Physical    Transfer of care ... Louie - Dr. Brito    CC:   Transfer of care due to HTN and multiple other commodities      HPI:    Patient is a 38 y.o. female  currently at 34w5d, who presents as a transfer of care from  [place].   Antoinette was in the office today for her routine visit when it was noted that she had elevated blood pressures in the mild and severe range.  She denies RUQ pain and vision changes, but is having worsening SOA and is now unable to lie flat.   Her partner says she has put on about 10 lbs of fluid in the last 2 days. She is further complicated in that she has CHTN, DM type II, CHF, COPD, and a h/o pancreatitis.  Her insulin had been increased over the last few weeks, but the last coupe of day she has required less than half of her current dosing.   Her blood pressure meds were stopped when she was became pregnant, but 200 mg of Labetalol TID was started last week.   She had an ECHO last week which showed an EF of 60%.  She continues to struggle with breathing with the slightest of exertion.   She at times requires O2 to maintain 95% plus.    CXR today shows pulmonary edema.         The following portions of the patients history were reviewed and updated as appropriate: current medications, allergies, past medical history, past  "surgical history, past family history, past social history and problem list .       Prenatal Information:   Maternal Prenatal Labs  Blood Type ABO Type   Date Value Ref Range Status   04/18/2024 O  Final      Rh Status RH type   Date Value Ref Range Status   04/18/2024 Positive  Final      Antibody Screen Antibody Screen   Date Value Ref Range Status   04/18/2024 Negative  Final      Gonnorhea No results found for: \"GCCX\"   Chlamydia No results found for: \"CLAMYDCU\"   RPR No results found for: \"RPR\"   Syphilis Antibody No results found for: \"SYPHILIS\"   Rubella No results found for: \"RUBELLAIGGIN\"   Hepatitis B Surface Antigen No results found for: \"HEPBSAG\"   HIV-1 Antibody No results found for: \"LABHIV1\"   Hepatitis C Antibody No results found for: \"HEPCAB\"   Rapid Urin Drug Screen No results found for: \"AMPMETHU\", \"BARBITSCNUR\", \"LABBENZSCN\", \"LABMETHSCN\", \"LABOPIASCN\", \"THCURSCR\", \"COCAINEUR\", \"AMPHETSCREEN\", \"PROPOXSCN\", \"BUPRENORSCNU\", \"METAMPSCNUR\", \"OXYCODONESCN\", \"TRICYCLICSCN\"   Group B Strep Culture No results found for: \"GBSANTIGEN\"           External Prenatal Results       Pregnancy Outside Results - Transcribed From Office Records - See Scanned Records For Details       Test Value Date Time    ABO  O  04/18/24 0005    Rh  Positive  04/18/24 0005    Antibody Screen  Negative  04/18/24 0005    Varicella IgG       Rubella ^ Immune  11/16/23     Hgb  8.4 g/dL 04/18/24 0005       8.2 g/dL 04/17/24 1145       8.4 g/dL 04/15/24 2141       10.0 g/dL 03/27/24 1215       10.6 g/dL 01/29/24 1826       9.8 g/dL 12/08/23 1239       10.4 g/dL 11/15/23 1603       11.2 g/dL 10/27/23 2112       9.5 g/dL 09/27/23 1332       8.7 g/dL 09/20/23 0235       9.0 g/dL 09/19/23 0054       10.2 g/dL 09/18/23 0828       9.5 g/dL 09/17/23 0023       10.1 g/dL 09/03/23 1610       10.1 g/dL 08/26/23 1727    Hct  27.9 % 04/18/24 0005       26.3 % 04/17/24 1145       26.7 % 04/15/24 2141       30.5 % 03/27/24 1215       30.9 % " 24 1826       32.0 % 23 1239       32.5 % 11/15/23 1603       36.8 % 10/27/23 2112       30.1 % 23 1332       28.3 % 23 0235       29.0 % 23 0054       33.0 % 23 0828       31.2 % 23 0023       32.2 % 23 1610       32.7 % 23 1727    Glucose Fasting GTT       Glucose Tolerance Test 1 hour       Glucose Tolerance Test 3 hour       Gonorrhea (discrete) ^ Negative  23     Chlamydia (discrete) ^ Negative  23     RPR ^ Non-Reactive  23       ^ Non-Reactive  23     VDRL       Syphilis Antibody       HBsAg ^ Negative  23     Herpes Simplex Virus PCR       Herpes Simplex VIrus Culture       HIV ^ Non-Reactive  23     Hep C RNA Quant PCR       Hep C Antibody       AFP       Group B Strep       GBS Susceptibility to Clindamycin       GBS Susceptibility to Erythromycin       Fetal Fibronectin       Genetic Testing, Maternal Blood                 Drug Screening       Test Value Date Time    Urine Drug Screen       Amphetamine Screen  Negative  20 0953    Barbiturate Screen  Negative  19    Benzodiazepine Screen  Negative  20 0953    Methadone Screen  Negative  20 0953    Phencyclidine Screen  Negative  19    Opiates Screen  Negative  20 0953    THC Screen  Negative  20 0953    Cocaine Screen  Negative  20 0953    Propoxyphene Screen  Negative  10/19/15 1920    Buprenorphine Screen  Negative  20 0953    Methamphetamine Screen  Negative  20 0953    Oxycodone Screen  Negative  20 0953    Tricyclic Antidepressants Screen                 Legend    ^: Historical                              Past OB History:     OB History    Para Term  AB Living   2 0 0 0 1 0   SAB IAB Ectopic Molar Multiple Live Births   1 0 0 0 0 0      # Outcome Date GA Lbr [place]/2nd Weight Sex Type Anes PTL Lv   2 Current            1 SAB 10/2022 6w0d    SAB         Obstetric Comments    FOB #1  Pregnancy #1 SAB at 6 weeks   FOB #1 Pregnancy #2  current       Past Medical History: Past Medical History:   Diagnosis Date    Anxiety     CHF (congestive heart failure) 05/2023    Colitis 2023    Depression     Diabetes mellitus 2018    Diverticulitis     GERD (gastroesophageal reflux disease)     Hypertension     Kidney stone     Pancreatitis 09/2023    PCOS (polycystic ovarian syndrome)     Polycystic ovary syndrome     Rectal bleeding     SINCE 2023 - USES MESALAMINE SUPPOSITORY NIGHTLY    Sleep apnea     Urinary tract infection       Past Surgical History Past Surgical History:   Procedure Laterality Date    CARDIAC CATHETERIZATION      CARPAL TUNNEL RELEASE      COLONOSCOPY      ENDOSCOPY      FRACTURE SURGERY      HARDWARE REMOVAL      WRIST    TENDON REPAIR      HAND    WISDOM TOOTH EXTRACTION        Family History: Family History   Problem Relation Age of Onset    Hypertension Mother     Depression Mother     Heart disease Mother     COPD Mother     Emphysema Mother     Hypertension Father     Diabetes Father     Heart disease Father     COPD Father     Heart failure Father     Hepatitis Father       Social History:  reports that she quit smoking about 9 years ago. Her smoking use included cigarettes. She started smoking about 29 years ago. She has a 20 pack-year smoking history. She has never been exposed to tobacco smoke. She has never used smokeless tobacco.   reports no history of alcohol use.   reports no history of drug use.        Review of Systems  Denies fever, HA, muscle weakness,                                             and rashes.  All other pertinent positives and negatives are addressed in the HPI       Objective    Vital Signs Range for the last 24 hours  Temperature: Temp:  [97.8 °F (36.6 °C)-98.7 °F (37.1 °C)] 98.7 °F (37.1 °C)   Temp Source: Temp src: Oral   BP: BP: (139-184)/(66-95) 169/84   Pulse: Heart Rate:  [] 88   Respirations: Resp:  [18-20] 18   SPO2: SpO2:   [90 %-100 %] 92 %   O2 Amount (l/min): Flow (L/min):  [2-4] 2   O2 Devices Device (Oxygen Therapy): nasal cannula   Weight: Weight:  [107 kg (236 lb)] 107 kg (236 lb)     Physical Examination: General appearance - alert, struggles with breathing with respiratory distress   and uncomfortable  Chest -  Diminished lung sound bilaterally, but right greater than left.   Crackles in the bases  Heart - Regular at.  Systolic murmur   Abdomen - soft, nontender, nondistended,   no rebound tenderness noted  No guarding, No RUQ pain, obese   Extremities - pedal edema +2      Fetal Heart Rate Assessment   Method: Fetal HR Assessment Method: external   Beats/min: Fetal HR (beats/min): 135   Baseline: Fetal HR Baseline: normal range   Varibility: Fetal HR Variability: moderate (amplitude range 6 to 25 bpm)   Accels: Fetal HR Accelerations: greater than/equal to 15 bpm, lasting at least 15 seconds   Decels: Fetal HR Decelerations: absent   Tracing Category:       Uterine Assessment   Method: Method: external tocotransducer   Frequency (min):     Ctx Count in 10 min:     Duration:     Intensity: Contraction Intensity: no contractions   Intensity by IUPC:     Resting Tone: Uterine Resting Tone: soft by palpation   Resting Tone by IUPC:           Laboratory Results:   Lab Results   Component Value Date     (L) 04/18/2024    HGB 8.4 (L) 04/18/2024    HCT 27.9 (L) 04/18/2024    WBC 9.21 04/18/2024     Lab Results   Component Value Date     04/18/2024    K 4.1 04/18/2024     04/18/2024    CO2 19.0 (L) 04/18/2024    BUN 11 04/18/2024    CREATININE 0.52 (L) 04/18/2024    GLUCOSE 66 04/18/2024    ALBUMIN 3.0 (L) 04/18/2024    CALCIUM 8.5 (L) 04/18/2024    AST 19 04/18/2024    ALT 11 04/18/2024    BILITOT 0.6 04/18/2024      ProBnP: 254  Troponin:  23      Radiology:  Study Result    XR CHEST PA AND LATERAL     Date of Exam: 4/18/2024 1:11 AM EDT     Indication: soa and chest pain     Comparison: 3/27/2024.      Findings:  There is new interstitial disease present throughout the lungs. No pneumothorax or pleural effusion. Heart size is stable. No acute osseous abnormality. No focal lung consolidation.     IMPRESSION:  Impression:  New interstitial disease in the lungs concerning for pulmonary edema.        Electronically Signed: Yoesph Pace MD    2024 1:38 AM EDT    Workstation ID: [id number]          Assessment:  1. Intrauterine pregnancy at 34w5d weeks gestation with reassuring fetal status  2.  CHTN  3.  Type II DM  4.  CHF  5.  H/O seizure disorder   6.  H/O acute pancreatitis  7. Pulmonary edema  8. AMA  9. Obese      Plan:  1.  Admit  2.  IV, Labs, Chest x-ray, EKG,   3.  Monitor.  Because she is unable to lie down and her body habitus, continuous fetal monitoring is virtually impossible.   4.  Spoke with Dr. Fernandez and Anesthesia.   Given her current unstable condition with severe range blood pressures, SOA with pulmonary edema exacerbated by her other commodities, it is recommended that she be delivered ASAP.   Will move to primary LTCS with BTL tonight.    May require ICU admission after the surgery.       Axel Keys MD  2024  02:45 EDT    Electronically signed by Axel Keys MD at 24 0455          Operative/Procedure Notes (last 7 days)        Axel Keys MD at 24 0455           Luci  Antoinette Pack  : 1985  MRN: [id number]  CSN: [id number]      Transfer of care ... Louie  Dr. Brito      Section Operative Note    Pre-Operative Dx:   Intrauterine pregnancy at 34w5d weeks   CHTN - possible super imposed pre-eclampsia   Pulmonary edema  CHF  Type II DM.       Postoperative dx:    Intrauterine pregnancy at 34w5d weeks   CHTN - possible superimposed Pre-eclampsia   Pulmonary edema   CHF  Type II diabetes  Unicornuate uterus            Procedure: Primary  (LTCS) with left total salpingectomy       Surgeon: Axel Keys MD   Assistant:  [facility]        Anesthesia: Epidural        EBL: 607 mls.   IV Fluids: 400 mls.   UOP: 100  mls.     Antibiotics: cefazolin 3 gms     Infant:      Name:  Jody      Gender: female  infant    Weight: 2220 g (4 lb 14.3 oz)     Apgars: 8  @ 1 minute / 9  @ 5 minutes     Procedure Details:   After the patient was adequately anesthetized, she was sterilely prepped and draped in the dorsal supine left lateral tilt position. A Pfannenstiel incision was created sharply with the knife. It was carried down to the fascia.  The fascia was cut transversely with the knife and extended in a curvilinear fashion with scissors. The fascia was freed from its midline insertion superiorly and inferiorly. Rectus muscles were  in the midline. The peritoneum was bluntly entered and a bladder flap was not  created. The lower uterine segment was scored transversely with the knife. Clear amniotic fluid was seen. The infant's head was delivered atraumatically.  A double nuchal cord was reduced on the abdomen.   The mouth and nose were bulb suctioned. The infant was handed to the delivery team which was in attendance. The placenta was spontaneously extracted. The uterus was exteriorized and wiped free of debris and clot. 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  The uterine incision was closed with #1 Monocryl  in a continuous running locking fashion.   The left tube was traced to its fimbriated end.  Enseal was used to transect each tube near the cornua and at the distal end near the fimbria ovarica. I did trace a tube remnant coming of the right rudimentary horn and removed it as well although it was not functional.    Both tubes were removed in their entirety with minimal blood loss.    The uterus was returned to the abdomen. The paracolic gutters were cleared of debris and clot. The fascia was closed with 0 PDS. The subcutaneous tissue was copiously irrigated.  Subcutaneous tissue was reapproximated with 3-0 Plain Gut and the skin was closed with Insorb staples  subcuticularly.   An Exofin dressing was applied.  All counts were correct.         Complications:   None      Disposition:   Mother to Remain in LD  in stable condition currently.   Baby to NICU  in stable condition currently.       Axel Keys MD  4/18/2024  04:56 EDT          Electronically signed by Axel Keys MD at 04/18/24 0549       Procedures signed by Tom Mueller MD at 04/22/24 0958         Procedure Orders    1. Colonoscopy [[id number]] ordered by Tom Mueller MD at 04/22/24 0825               [Media Unavailable] Scan on 4/22/2024 0957 by Tom Mueller MD: COLON          Electronically signed by Tom Mueller MD at 04/22/24 0958       Procedures signed by Tom Mueller MD at 04/22/24 0936         Procedure Orders    1. Upper GI Endoscopy [[id number]] ordered by Tom Mueller MD at 04/22/24 0825               [Media Unavailable] Scan on 4/22/2024 0934 by Tom Mueller MD: EGD          Electronically signed by Tom Mueller MD at 04/22/24 0936       Discharge Summary    No notes of this type exist for this encounter.

## 2024-04-24 NOTE — PAYOR COMM NOTE
"Antoinette Pack \"Paris\" (38 y.o. Female)     [employer] Medicare ref# [id number]     Discharged 4/24/24.    Are all days approved?    From:Rachel Willis LPN, Utilization Review  Phone #[phone]  Fax #[phone]        Date of Birth   1985    Social Security Number       [address]   [address] [place] [id number]    Home Phone       MRN   [id number]       Christian   None    Marital Status                               Admission Date   4/17/24    Admission Type   Urgent    Admitting Provider   Axel Keys MD    Attending Provider       Department, Room/Bed   [facility] ANTEPARTUM, N325/1       Discharge Date   4/24/2024    Discharge Disposition   Home or Self Care    Discharge Destination                                 Attending Provider: (none)   Allergies: Dilantin [Phenytoin], Keppra [Levetiracetam], Meperidine, Topamax [Topiramate]    Isolation: None   Infection: None   Code Status: CPR    Ht: 152.4 cm (60\")   Wt: 92.5 kg (204 lb)    Admission Cmt: None   Principal Problem: Pulmonary edema [J81.1]                   Active Insurance as of 4/17/2024       Primary Coverage       Payor Plan Insurance Group Employer/Plan Group    ANTH MEDICARE REPLACEMENT ANTH MEDICARE ADVANTAGE [id number]       Payor Plan Address Payor Plan Phone Number Payor Plan Fax Number Effective Dates    [address] [phone]  3/1/2024 - None Entered    [facility] [address]         Subscriber Name Subscriber Birth Date Member ID       ANTOINETTE PACK 1985 [id number]               Secondary Coverage       Payor Plan Insurance Group Employer/Plan Group    MISC COMMERCIAL MISC COMMERCIAL        Coverage Address Coverage Phone Number Coverage Fax Number Effective Dates    [address] [phone]  1/1/2024 - None Entered    [place] [id number]         Subscriber Name Subscriber Birth Date Member ID       CHRISTOPHER PACK 8/22/1987 [id number]               "       Emergency Contacts        (Rel.) Home Phone Work Phone Mobile Phone    Jonel Iverson (Father) [phone] -- --    Michelle Pack (Spouse) -- -- [phone]                 History & Physical        Axel Keys MD at 24 0245          [facility]  Obstetric History and Physical    Transfer of care ... Louie - Dr. Brito    CC:   Transfer of care due to HTN and multiple other commodities      HPI:    Patient is a 38 y.o. female  currently at 34w5d, who presents as a transfer of care from [facility].   Antoinette was in the office today for her routine visit when it was noted that she had elevated blood pressures in the mild and severe range.  She denies RUQ pain and vision changes, but is having worsening SOA and is now unable to lie flat.   Her partner says she has put on about 10 lbs of fluid in the last 2 days. She is further complicated in that she has CHTN, DM type II, CHF, COPD, and a h/o pancreatitis.  Her insulin had been increased over the last few weeks, but the last coupe of day she has required less than half of her current dosing.   Her blood pressure meds were stopped when she was became pregnant, but 200 mg of Labetalol TID was started last week.   She had an ECHO last week which showed an EF of 60%.  She continues to struggle with breathing with the slightest of exertion.   She at times requires O2 to maintain 95% plus.    CXR today shows pulmonary edema.         The following portions of the patients history were reviewed and updated as appropriate: current medications, allergies, past medical history, past surgical history, past family history, past social history and problem list .       Prenatal Information:   Maternal Prenatal Labs  Blood Type ABO Type   Date Value Ref Range Status   2024 O  Final      Rh Status RH type   Date Value Ref Range Status   2024 Positive  Final      Antibody Screen Antibody Screen   Date Value Ref Range Status  "  04/18/2024 Negative  Final      Gonnorhea No results found for: \"GCCX\"   Chlamydia No results found for: \"CLAMYDCU\"   RPR No results found for: \"RPR\"   Syphilis Antibody No results found for: \"SYPHILIS\"   Rubella No results found for: \"RUBELLAIGGIN\"   Hepatitis B Surface Antigen No results found for: \"HEPBSAG\"   HIV-1 Antibody No results found for: \"LABHIV1\"   Hepatitis C Antibody No results found for: \"HEPCAB\"   Rapid Urin Drug Screen No results found for: \"AMPMETHU\", \"BARBITSCNUR\", \"LABBENZSCN\", \"LABMETHSCN\", \"LABOPIASCN\", \"THCURSCR\", \"COCAINEUR\", \"AMPHETSCREEN\", \"PROPOXSCN\", \"BUPRENORSCNU\", \"METAMPSCNUR\", \"OXYCODONESCN\", \"TRICYCLICSCN\"   Group B Strep Culture No results found for: \"GBSANTIGEN\"           External Prenatal Results       Pregnancy Outside Results - Transcribed From Office Records - See Scanned Records For Details       Test Value Date Time    ABO  O  04/18/24 0005    Rh  Positive  04/18/24 0005    Antibody Screen  Negative  04/18/24 0005    Varicella IgG       Rubella ^ Immune  11/16/23     Hgb  8.4 g/dL 04/18/24 0005       8.2 g/dL 04/17/24 1145       8.4 g/dL 04/15/24 2141       10.0 g/dL 03/27/24 1215       10.6 g/dL 01/29/24 1826       9.8 g/dL 12/08/23 1239       10.4 g/dL 11/15/23 1603       11.2 g/dL 10/27/23 2112       9.5 g/dL 09/27/23 1332       8.7 g/dL 09/20/23 0235       9.0 g/dL 09/19/23 0054       10.2 g/dL 09/18/23 0828       9.5 g/dL 09/17/23 0023       10.1 g/dL 09/03/23 1610       10.1 g/dL 08/26/23 1727    Hct  27.9 % 04/18/24 0005       26.3 % 04/17/24 1145       26.7 % 04/15/24 2141       30.5 % 03/27/24 1215       30.9 % 01/29/24 1826       32.0 % 12/08/23 1239       32.5 % 11/15/23 1603       36.8 % 10/27/23 2112       30.1 % 09/27/23 1332       28.3 % 09/20/23 0235       29.0 % 09/19/23 0054       33.0 % 09/18/23 0828       31.2 % 09/17/23 0023       32.2 % 09/03/23 1610       32.7 % 08/26/23 1727    Glucose Fasting GTT       Glucose Tolerance Test 1 hour       Glucose " Tolerance Test 3 hour       Gonorrhea (discrete) ^ Negative  23     Chlamydia (discrete) ^ Negative  23     RPR ^ Non-Reactive  23       ^ Non-Reactive  23     VDRL       Syphilis Antibody       HBsAg ^ Negative  23     Herpes Simplex Virus PCR       Herpes Simplex VIrus Culture       HIV ^ Non-Reactive  23     Hep C RNA Quant PCR       Hep C Antibody       AFP       Group B Strep       GBS Susceptibility to Clindamycin       GBS Susceptibility to Erythromycin       Fetal Fibronectin       Genetic Testing, Maternal Blood                 Drug Screening       Test Value Date Time    Urine Drug Screen       Amphetamine Screen  Negative  20 0953    Barbiturate Screen  Negative  19    Benzodiazepine Screen  Negative  20 0953    Methadone Screen  Negative  20 0953    Phencyclidine Screen  Negative  19    Opiates Screen  Negative  20 0953    THC Screen  Negative  20 0953    Cocaine Screen  Negative  20 0953    Propoxyphene Screen  Negative  10/19/15 1920    Buprenorphine Screen  Negative  20 0953    Methamphetamine Screen  Negative  20 0953    Oxycodone Screen  Negative  20 0953    Tricyclic Antidepressants Screen                 Legend    ^: Historical                              Past OB History:     OB History    Para Term  AB Living   2 0 0 0 1 0   SAB IAB Ectopic Molar Multiple Live Births   1 0 0 0 0 0      # Outcome Date GA Lbr Guanako/2nd Weight Sex Type Anes PTL Lv   2 Current            1 SAB 10/2022 6w0d    SAB         Obstetric Comments   FOB #1  Pregnancy #1 SAB at 6 weeks   FOB #1 Pregnancy #2  current       Past Medical History: Past Medical History:   Diagnosis Date    Anxiety     CHF (congestive heart failure) 2023    Colitis     Depression     Diabetes mellitus 2018    Diverticulitis     GERD (gastroesophageal reflux disease)     Hypertension     Kidney stone     Pancreatitis  09/2023    PCOS (polycystic ovarian syndrome)     Polycystic ovary syndrome     Rectal bleeding     SINCE 2023 - USES MESALAMINE SUPPOSITORY NIGHTLY    Sleep apnea     Urinary tract infection       Past Surgical History Past Surgical History:   Procedure Laterality Date    CARDIAC CATHETERIZATION      CARPAL TUNNEL RELEASE      COLONOSCOPY      ENDOSCOPY      FRACTURE SURGERY      HARDWARE REMOVAL      WRIST    TENDON REPAIR      HAND    WISDOM TOOTH EXTRACTION        Family History: Family History   Problem Relation Age of Onset    Hypertension Mother     Depression Mother     Heart disease Mother     COPD Mother     Emphysema Mother     Hypertension Father     Diabetes Father     Heart disease Father     COPD Father     Heart failure Father     Hepatitis Father       Social History:  reports that she quit smoking about 9 years ago. Her smoking use included cigarettes. She started smoking about 29 years ago. She has a 20 pack-year smoking history. She has never been exposed to tobacco smoke. She has never used smokeless tobacco.   reports no history of alcohol use.   reports no history of drug use.        Review of Systems  Denies fever, HA, muscle weakness,                                             and rashes.  All other pertinent positives and negatives are addressed in the HPI       Objective    Vital Signs Range for the last 24 hours  Temperature: Temp:  [97.8 °F (36.6 °C)-98.7 °F (37.1 °C)] 98.7 °F (37.1 °C)   Temp Source: Temp src: Oral   BP: BP: (139-184)/(66-95) 169/84   Pulse: Heart Rate:  [] 88   Respirations: Resp:  [18-20] 18   SPO2: SpO2:  [90 %-100 %] 92 %   O2 Amount (l/min): Flow (L/min):  [2-4] 2   O2 Devices Device (Oxygen Therapy): nasal cannula   Weight: Weight:  [107 kg (236 lb)] 107 kg (236 lb)     Physical Examination: General appearance - alert, struggles with breathing with respiratory distress   and uncomfortable  Chest -  Diminished lung sound bilaterally, but right greater than  left.   Crackles in the bases  Heart - Regular at.  Systolic murmur   Abdomen - soft, nontender, nondistended,   no rebound tenderness noted  No guarding, No RUQ pain, obese   Extremities - pedal edema +2      Fetal Heart Rate Assessment   Method: Fetal HR Assessment Method: external   Beats/min: Fetal HR (beats/min): 135   Baseline: Fetal HR Baseline: normal range   Varibility: Fetal HR Variability: moderate (amplitude range 6 to 25 bpm)   Accels: Fetal HR Accelerations: greater than/equal to 15 bpm, lasting at least 15 seconds   Decels: Fetal HR Decelerations: absent   Tracing Category:       Uterine Assessment   Method: Method: external tocotransducer   Frequency (min):     Ctx Count in 10 min:     Duration:     Intensity: Contraction Intensity: no contractions   Intensity by IUPC:     Resting Tone: Uterine Resting Tone: soft by palpation   Resting Tone by IUPC:           Laboratory Results:   Lab Results   Component Value Date     (L) 04/18/2024    HGB 8.4 (L) 04/18/2024    HCT 27.9 (L) 04/18/2024    WBC 9.21 04/18/2024     Lab Results   Component Value Date     04/18/2024    K 4.1 04/18/2024     04/18/2024    CO2 19.0 (L) 04/18/2024    BUN 11 04/18/2024    CREATININE 0.52 (L) 04/18/2024    GLUCOSE 66 04/18/2024    ALBUMIN 3.0 (L) 04/18/2024    CALCIUM 8.5 (L) 04/18/2024    AST 19 04/18/2024    ALT 11 04/18/2024    BILITOT 0.6 04/18/2024      ProBnP: 254  Troponin:  23      Radiology:  Study Result    XR CHEST PA AND LATERAL     Date of Exam: 4/18/2024 1:11 AM EDT     Indication: soa and chest pain     Comparison: 3/27/2024.     Findings:  There is new interstitial disease present throughout the lungs. No pneumothorax or pleural effusion. Heart size is stable. No acute osseous abnormality. No focal lung consolidation.     IMPRESSION:  Impression:  New interstitial disease in the lungs concerning for pulmonary edema.        Electronically Signed: Yoseph Pace MD    4/18/2024 1:38 AM EDT     Workstation ID: [id number]          Assessment:  1. Intrauterine pregnancy at 34w5d weeks gestation with reassuring fetal status  2.  CHTN  3.  Type II DM  4.  CHF  5.  H/O seizure disorder   6.  H/O acute pancreatitis  7. Pulmonary edema  8. AMA  9. Obese      Plan:  1.  Admit  2.  IV, Labs, Chest x-ray, EKG,   3.  Monitor.  Because she is unable to lie down and her body habitus, continuous fetal monitoring is virtually impossible.   4.  Spoke with Dr. Fernandez and Anesthesia.   Given her current unstable condition with severe range blood pressures, SOA with pulmonary edema exacerbated by her other commodities, it is recommended that she be delivered ASAP.   Will move to primary LTCS with BTL tonight.    May require ICU admission after the surgery.       Axel Keys MD  2024  02:45 EDT    Electronically signed by Axel Keys MD at 24 0455          Operative/Procedure Notes (last 7 days)        Axel Keys MD at 24 0455           Luci FOY Pack  : 1985  MRN: [id number]  CSN: [id number]      Transfer of care ... [place] - Dr. Brito      Section Operative Note    Pre-Operative Dx:   Intrauterine pregnancy at 34w5d weeks   CHTN - possible super imposed pre-eclampsia   Pulmonary edema  CHF  Type II DM.       Postoperative dx:    Intrauterine pregnancy at 34w5d weeks   CHTN - possible superimposed Pre-eclampsia   Pulmonary edema   CHF  Type II diabetes  Unicornuate uterus            Procedure: Primary  (LTCS) with left total salpingectomy       Surgeon: Axel Keys MD   Assistant: Gaby urgical tech        Anesthesia: Epidural        EBL: 607 mls.   IV Fluids: 400 mls.   UOP: 100  mls.     Antibiotics: cefazolin 3 gms     Infant:      Name:  Jody      Gender: female  infant    Weight: 2220 g (4 lb 14.3 oz)     Apgars: 8  @ 1 minute / 9  @ 5 minutes     Procedure Details:   After the patient was adequately anesthetized, she was sterilely  prepped and draped in the dorsal supine left lateral tilt position. A Pfannenstiel incision was created sharply with the knife. It was carried down to the fascia.  The fascia was cut transversely with the knife and extended in a curvilinear fashion with scissors. The fascia was freed from its midline insertion superiorly and inferiorly. Rectus muscles were  in the midline. The peritoneum was bluntly entered and a bladder flap was not  created. The lower uterine segment was scored transversely with the knife. Clear amniotic fluid was seen. The infant's head was delivered atraumatically.  A double nuchal cord was reduced on the abdomen.   The mouth and nose were bulb suctioned. The infant was handed to the delivery team which was in attendance. The placenta was spontaneously extracted. The uterus was exteriorized and wiped free of debris and clot. 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  The uterine incision was closed with #1 Monocryl  in a continuous running locking fashion.   The left tube was traced to its fimbriated end.  Enseal was used to transect each tube near the cornua and at the distal end near the fimbria ovarica. I did trace a tube remnant coming of the right rudimentary horn and removed it as well although it was not functional.    Both tubes were removed in their entirety with minimal blood loss.    The uterus was returned to the abdomen. The paracolic gutters were cleared of debris and clot. The fascia was closed with 0 PDS. The subcutaneous tissue was copiously irrigated. Subcutaneous tissue was reapproximated with 3-0 Plain Gut and the skin was closed with Insorb staples  subcuticularly.   An Exofin dressing was applied.  All counts were correct.         Complications:   None      Disposition:   Mother to Remain in LD  in stable condition currently.   Baby to NICU  in stable condition currently.       Axel Keys  MD  4/18/2024  04:56 EDT          Electronically signed by Axel Keys MD at 04/18/24 0549       Procedures signed by Tom Mueller MD at 04/22/24 0958         Procedure Orders    1. Colonoscopy [[id number]] ordered by Tom Mueller MD at 04/22/24 0825               [Media Unavailable] Scan on 4/22/2024 0957 by Tom Mueller MD: COLON          Electronically signed by Tom Mueller MD at 04/22/24 0958       Procedures signed by Tom Mueller MD at 04/22/24 0936         Procedure Orders    1. Upper GI Endoscopy [[id number]] ordered by Tom Mueller MD at 04/22/24 0825               [Media Unavailable] Scan on 4/22/2024 0934 by Tom Mueller MD: EGD          Electronically signed by Tom Mueller MD at 04/22/24 0936       Discharge Summary    No notes of this type exist for this encounter.

## 2024-04-24 NOTE — NURSING NOTE
Discharge instructions reviewed with patient and support person. Advised patient to return to labor torres or call the office if there is increased blood pressure, abnormal vaginal bleeding. Patient verbalized understanding. Patient discharged in stable condition with all belongings.

## 2024-04-24 NOTE — PROGRESS NOTES
Admission date: 2024  Discharge date: 24    Referring Provider: Geovani Razo III, MD    Admission diagnosis:  Pulmonary edema [J81.1]      Pulmonary edema    History of CHF (congestive heart failure)    History of pancreatitis    Morbid obesity with BMI of 40.0-44.9, adult    History of seizure disorder    Obstructive sleep apnea syndrome    Rectal bleeding    Essential hypertension    Type 2 diabetes mellitus, with long-term current use of insulin    S/P  section, total salpingectomy left unicornuate uterus    Anemia       Discharge diagnosis:  38-year-old -1-1-1 postop day #6 after a primary low-transverse  with left total salpingectomy section at 34 weeks 5-day gestation due to pulmonary edema/CHF  2.  Chronic hypertension stable   3.  Acute exacerbation of heart failure, HFpEF-stable  4.  Type 2 diabetes stable (OmniPod insulin pump)  5.  Duodenal ulcers  6.  Ulcerative colitis proctitis  7.  Obstructive of sleep apnea syndrome  8.  Acute and chronic anemia (blood loss) hematochezia/postop  9.  Obesity with BMI 40.43  10.  Clayton remains in the NICU in a stable condition  11.  Unicornuate uterus    Consultants: Maternal-fetal medicine, anesthesia, cardiology, gastroenterology, and nutrition  Procedures:  Primary low-transverse  section with total left salpingectomy  EGD/colonoscopy  Transfused with 2 units packed red blood cell  Hospital course:           Patient 38-year-old female -0-1-0 at 34 weeks 5 days, who presents as a transfer of care from [facility].  Antoinette was in the office for routine prenatal visit noted have elevated blood pressures in the severe range.  She reported increasing shortness of air and orthopnea.  10 pound weight gain in 2 days.  She has a complex medical history including chronic hypertension, type 2 diabetes requiring insulin, congestive heart failure, obstructive sleep apnea syndrome, obesity, anemia, and hematochezia  (prepregnancy colonoscopy inconclusive).  After evaluation patient noted to have acute pulmonary edema in the setting greater than 34 weeks gestation.it was decided to go and proceed with primary  section.  A viable male  delivered 4 pounds 14.3 ounces with Apgar scores of 8 and 9.  Aggressive diuresis initiated and magnesium sulfate was withheld due to cardiac dysfunction.  Cardiology consulted.  She slowly diuresed with resolution of symptoms.  Normal EKG, echo revealed normal systolic function.  Her diet and activity were advanced.  Antoinette continued to have hematochezia H&H katlin 6.8 /21.9.  She received 2 units packed red blood cells.  Gastroenterology consulted patient underwent bidirectional endoscopy.  Duodenal ulcers with evidence of ulcerative colitis /proctitis.  Postop day 5 H&H stable at 8.8/27.7 with normal electrolytes, anion gap of 7 BUN 10 and creatinine 0.64.  By postop day #6,  she reached her maximum benefit and discharged home.  Routine postop instructions given.  Continue medication as recommended by consultants and 2 weeks of prophylactic Lovenox.  She will follow-up [facility] cardiology in 1 month, follow-up with her primary gastroenterologist in [place] for continued treatment, [place] [facility]ology and primary OB in 2 weeks.    Vitals:    24 0945   BP:    Pulse: 87   Resp:    Temp:    SpO2: 95%       GENERAL:  Well-developed, well-nourished in no acute distress.   ABD: Abdomen soft, patient healing well without signs of erythema infection.  EXTREMITIES:  No clubbing, cyanosis or edema.  PSYCHIATRIC:  Normal affect and mood.      Discharge condition: stable  Discharge diet   Dietary Orders (From admission, onward)       Start     Ordered    24 7178  Diet: Diabetic; Consistent Carbohydrate; Fluid Consistency: Thin (IDDSI 0)  Diet Effective Now        References:    Diet Order [place]   Question Answer Comment   Diets: Diabetic    Diabetic Diet:  Consistent Carbohydrate    Fluid Consistency: Thin (IDDSI 0)        04/22/24 1537    04/19/24 1711  Dietary Nutrition Supplements Boost Glucose Control; chocolate  Once        Comments: TID EACH MEAL   Question Answer Comment   Select Supplement: Boost Glucose Control    Flavor: chocolate        04/19/24 1710                  Additional Instructions: Call with fevers, uncontrolled nausea/vomiting/pain.  Medications:      Discharge Medications        New Medications        Instructions Start Date   Budesonide 3 MG 24 hr capsule  Commonly known as: ENTOCORT EC   9 mg, Oral, Daily      Enoxaparin Sodium 40 MG/0.4ML solution prefilled syringe syringe  Commonly known as: LOVENOX   40 mg, Subcutaneous, Every 12 Hours Scheduled      furosemide 40 MG tablet  Commonly known as: LASIX   40 mg, Oral, Daily PRN      lisinopril 40 MG tablet  Commonly known as: PRINIVIL,ZESTRIL   40 mg, Oral, Every 24 Hours Scheduled      mesalamine 4 g enema  Commonly known as: ROWASA  Replaces: mesalamine 1000 MG suppository   4 g, Rectal, Nightly      NIFEdipine CC 60 MG 24 hr tablet  Commonly known as: ADALAT CC   60 mg, Oral, Every 24 Hours Scheduled      pantoprazole 40 MG EC tablet  Commonly known as: PROTONIX   40 mg, Oral, 2 Times Daily Before Meals      sucralfate 1 g tablet  Commonly known as: CARAFATE   1 g, Oral, 4 Times Daily Before Meals & Nightly             Changes to Medications        Instructions Start Date   labetalol 200 MG tablet  Commonly known as: NORMODYNE  What changed: when to take this   200 mg, Oral, Every 12 Hours Scheduled             Continue These Medications        Instructions Start Date   BD Pen Needle Brandee U/F 32G X 4 MM misc  Generic drug: Insulin Pen Needle   Use to inject insulin up to 4 times daily as directed      Dexcom G6 Sensor   Does not apply, Every 10 Days      Dexcom G6 Transmitter misc   1 each, Does not apply, Every 3 Months      Fiasp 100 UNIT/ML solution  Generic drug: Insulin Aspart  "(w/Niacinamide)   350 Units, Injection, Daily, For use in insulin pump. Max dose of 350u/day.      folic acid 1 MG tablet  Commonly known as: FOLVITE   1 mg, Oral, Daily      Insulin Aspart 100 UNIT/ML injection  Commonly known as: novoLOG   For use in insulin pump. Max Daily Dose: 250 units daily.      Omnipod 5 G6 Pods (Gen 5) misc   Change 2 (Two) Times a Day as directed.      PRENATAL PO   1 tablet, Oral, Daily      TRUEplus Insulin Syringe 31G X 5/16\" 1 ML misc  Generic drug: Insulin Syringe-Needle U-100   1 each, Does not apply, 3 Times Daily With Meals, As needed for additional insulin bolus with meals.             Stop These Medications      aspirin 81 MG EC tablet  Commonly known as: ASPIR     lansoprazole 30 MG capsule  Commonly known as: PREVACID     mesalamine 1000 MG suppository  Commonly known as: CANASA  Replaced by: mesalamine 4 g enema            Disposition:Home or Self Care  Follow up: Danni  in 2 weeks   Future Appointments   Date Time Provider Department Center   5/24/2024  1:30 PM Pablo Cabrera MD [facility] Y[facility]   Follow-up with [facility]    I spent over 30 minutes on discharge activity which included: face-to-face encounter counseling with the patient, reviewing the data in the system, coordination of the care with the nursing staff as well as consultants, documentation, and entering orders.      Abhishek Cuba,   10:31 EDT       "

## 2024-04-26 LAB — CALPROTECTIN STL-MCNT: 5960 UG/G (ref 0–120)

## 2024-04-26 NOTE — PAYOR COMM NOTE
"Antoinette Pack \"Paris\" (38 y.o. Female)  and NE summary [id number]       Date of Birth   1985    Social Security Number       Address   [address] [place] [facility] [place] [address]    Home Phone   [phone]    MRN   [id number]       Scientologist   None    Marital Status                               Admission Date   4/17/24    Admission Type   Urgent    Admitting Provider   Axel Keys MD    Attending Provider       Department, Room/Bed   [facility] ANTEPARTUM, N325/1       Discharge Date   4/24/2024    Discharge Disposition   Home or Self Care    Discharge Destination                                 Attending Provider: (none)   Allergies: Dilantin [Phenytoin], Keppra [Levetiracetam], Meperidine, Topamax [Topiramate]    Isolation: None   Infection: None   Code Status: Prior    Ht: 152.4 cm (60\")   Wt: 92.5 kg (204 lb)    Admission Cmt: None   Principal Problem: Pulmonary edema [J81.1]                   Active Insurance as of 4/17/2024       Primary Coverage       Payor Plan Insurance Group Employer/Plan Group    ANTHEM MEDICARE REPLACEMENT ANTHEM MEDICARE ADVANTAGE [id number]       Payor Plan Address Payor Plan Phone Number Payor Plan Fax Number Effective Dates    [address] [phone]  3/1/2024 - None Entered    [place], [place] [address]         Subscriber Name Subscriber Birth Date Member ID       ANTOINETTE PACK 1985 [id number]               Secondary Coverage       Payor Plan Insurance Group Employer/Plan Group    MISC COMMERCIAL MISC COMMERCIAL        Coverage Address Coverage Phone Number Coverage Fax Number Effective Dates    [address] [phone]  1/1/2024 - None Entered    [place] [id number]         Subscriber Name Subscriber Birth Date Member ID       CHRISTOPHER PACK 8/22/1987 [id number]                     Emergency Contacts        (Rel.) Home Phone Work Phone Mobile Phone    Jonel Iverson (Father) [phone] -- --    " Michelle Pack (Spouse) -- -- [phone]                 Physician Progress Notes (last 4 days)        Abhishek Cuba, DO at 24 0818          Admission date: 2024  Discharge date: 24    Referring Provider: Geovani Razo III, MD    Admission diagnosis:  Pulmonary edema [J81.1]      Pulmonary edema    History of CHF (congestive heart failure)    History of pancreatitis    Morbid obesity with BMI of 40.0-44.9, adult    History of seizure disorder    Obstructive sleep apnea syndrome    Rectal bleeding    Essential hypertension    Type 2 diabetes mellitus, with long-term current use of insulin    S/P  section, total salpingectomy left unicornuate uterus    Anemia       Discharge diagnosis:  38-year-old -1-1-1 postop day #6 after a primary low-transverse  with left total salpingectomy section at 34 weeks 5-day gestation due to pulmonary edema/CHF  2.  Chronic hypertension stable   3.  Acute exacerbation of heart failure, HFpEF-stable  4.  Type 2 diabetes stable (OmniPod insulin pump)  5.  Duodenal ulcers  6.  Ulcerative colitis proctitis  7.  Obstructive of sleep apnea syndrome  8.  Acute and chronic anemia (blood loss) hematochezia/postop  9.  Obesity with BMI 40.43  10.   remains in the NICU in a stable condition  11.  Unicornuate uterus    Consultants: Maternal-fetal medicine, anesthesia, cardiology, gastroenterology, and nutrition  Procedures:  Primary low-transverse  section with total left salpingectomy  EGD/colonoscopy  Transfused with 2 units packed red blood cell  Hospital course:           Patient 38-year-old female -0-1-0 at 34 weeks 5 days, who presents as a transfer of care from [facility].  Antoinette was in the office for routine prenatal visit noted have elevated blood pressures in the severe range.  She reported increasing shortness of air and orthopnea.  10 pound weight gain in 2 days.  She has a complex medical history including  chronic hypertension, type 2 diabetes requiring insulin, congestive heart failure, obstructive sleep apnea syndrome, obesity, anemia, and hematochezia (prepregnancy colonoscopy inconclusive).  After evaluation patient noted to have acute pulmonary edema in the setting greater than 34 weeks gestation.it was decided to go and proceed with primary  section.  A viable male  delivered 4 pounds 14.3 ounces with Apgar scores of 8 and 9.  Aggressive diuresis initiated and magnesium sulfate was withheld due to cardiac dysfunction.  Cardiology consulted.  She slowly diuresed with resolution of symptoms.  Normal EKG, echo revealed normal systolic function.  Her diet and activity were advanced.  Antoinette continued to have hematochezia H&H katlin 6.8 /21.9.  She received 2 units packed red blood cells.  Gastroenterology consulted patient underwent bidirectional endoscopy.  Duodenal ulcers with evidence of ulcerative colitis /proctitis.  Postop day 5 H&H stable at 8.8/27.7 with normal electrolytes, anion gap of 7 BUN 10 and creatinine 0.64.  By postop day #6,  she reached her maximum benefit and discharged home.  Routine postop instructions given.  Continue medication as recommended by consultants and 2 weeks of prophylactic Lovenox.  She will follow-up [facility] cardiology in 1 month, follow-up with her primary gastroenterologist in [place] for continued treatment, [place] [facility] and primary OB in 2 weeks.    Vitals:    24 0945   BP:    Pulse: 87   Resp:    Temp:    SpO2: 95%       GENERAL:  Well-developed, well-nourished in no acute distress.   ABD: Abdomen soft, patient healing well without signs of erythema infection.  EXTREMITIES:  No clubbing, cyanosis or edema.  PSYCHIATRIC:  Normal affect and mood.      Discharge condition: stable  Discharge diet   Dietary Orders (From admission, onward)       Start     Ordered    24 9157  Diet: Diabetic; Consistent Carbohydrate; Fluid  Consistency: Thin (IDDSI 0)  Diet Effective Now        References:    Diet Order [place]   Question Answer Comment   Diets: Diabetic    Diabetic Diet: Consistent Carbohydrate    Fluid Consistency: Thin (IDDSI 0)        04/22/24 1537    04/19/24 1711  Dietary Nutrition Supplements Boost Glucose Control; chocolate  Once        Comments: TID EACH MEAL   Question Answer Comment   Select Supplement: Boost Glucose Control    Flavor: chocolate        04/19/24 1710                  Additional Instructions: Call with fevers, uncontrolled nausea/vomiting/pain.  Medications:      Discharge Medications        New Medications        Instructions Start Date   Budesonide 3 MG 24 hr capsule  Commonly known as: ENTOCORT EC   9 mg, Oral, Daily      Enoxaparin Sodium 40 MG/0.4ML solution prefilled syringe syringe  Commonly known as: LOVENOX   40 mg, Subcutaneous, Every 12 Hours Scheduled      furosemide 40 MG tablet  Commonly known as: LASIX   40 mg, Oral, Daily PRN      lisinopril 40 MG tablet  Commonly known as: PRINIVIL,ZESTRIL   40 mg, Oral, Every 24 Hours Scheduled      mesalamine 4 g enema  Commonly known as: ROWASA  Replaces: mesalamine 1000 MG suppository   4 g, Rectal, Nightly      NIFEdipine CC 60 MG 24 hr tablet  Commonly known as: ADALAT CC   60 mg, Oral, Every 24 Hours Scheduled      pantoprazole 40 MG EC tablet  Commonly known as: PROTONIX   40 mg, Oral, 2 Times Daily Before Meals      sucralfate 1 g tablet  Commonly known as: CARAFATE   1 g, Oral, 4 Times Daily Before Meals & Nightly             Changes to Medications        Instructions Start Date   labetalol 200 MG tablet  Commonly known as: NORMODYNE  What changed: when to take this   200 mg, Oral, Every 12 Hours Scheduled             Continue These Medications        Instructions Start Date   BD Pen Needle Brandee U/F 32G X 4 MM misc  Generic drug: Insulin Pen Needle   Use to inject insulin up to 4 times daily as directed      Dexcom G6 Sensor   Does not apply, Every  "10 Days      Dexcom G6 Transmitter misc   1 each, Does not apply, Every 3 Months      Fiasp 100 UNIT/ML solution  Generic drug: Insulin Aspart (w/Niacinamide)   350 Units, Injection, Daily, For use in insulin pump. Max dose of 350u/day.      folic acid 1 MG tablet  Commonly known as: FOLVITE   1 mg, Oral, Daily      Insulin Aspart 100 UNIT/ML injection  Commonly known as: novoLOG   For use in insulin pump. Max Daily Dose: 250 units daily.      Omnipod 5 G6 Pods (Gen 5) misc   Change 2 (Two) Times a Day as directed.      PRENATAL PO   1 tablet, Oral, Daily      TRUEplus Insulin Syringe 31G X 5/16\" 1 ML misc  Generic drug: Insulin Syringe-Needle U-100   1 each, Does not apply, 3 Times Daily With Meals, As needed for additional insulin bolus with meals.             Stop These Medications      aspirin 81 MG EC tablet  Commonly known as: ASPIR     lansoprazole 30 MG capsule  Commonly known as: PREVACID     mesalamine 1000 MG suppository  Commonly known as: CANASA  Replaced by: mesalamine 4 g enema            Disposition:Home or Self Care  Follow up: [facility]  in 2 weeks   Future Appointments   Date Time Provider Department Center   5/24/2024  1:30 PM Pablo Cabrear MD [facility]   Follow-up with [facility]    I spent over 30 minutes on discharge activity which included: face-to-face encounter counseling with the patient, reviewing the data in the system, coordination of the care with the nursing staff as well as consultants, documentation, and entering orders.      Abhishek Cuba DO  10:31 EDT         Electronically signed by Abhishek Cuba DO at 04/24/24 1033       Tom Mueller MD at 04/23/24 1738          GI Daily Progress Note  Subjective:    Chief Complaint:  follow up hematochezia, anemia    Patient doing fairly well today.  Has not had BM today.  No n/v. Tolerating diet.     Objective:    /72 (BP Location: Right arm, Patient Position: Sitting)   Pulse 93   Temp 98.1 °F " "(36.7 °C) (Oral)   Resp 16   Ht 152.4 cm (60\")   Wt 93.9 kg (207 lb)   LMP  (LMP Unknown) Comment: Ceasaran Section 4/18/24  SpO2 100%   Breastfeeding Yes   BMI 40.43 kg/m²     Physical Exam   General: Patient awake, alert and cooperative   Cardiovascular: Regular rate, well-perfused extremities   Pulm: Equal expansion bilaterally, no increased WOB   Abdomen: Soft, nontender    Extremities: No rash or edema              Neuro: A&O, No obvious sign of focal deficit   Psychiatric: Normal mood and behavior; memory intact    Lab  Lab Results   Component Value Date    WBC 6.87 04/23/2024    HGB 8.8 (L) 04/23/2024    HGB 9.2 (L) 04/21/2024    HGB 9.1 (L) 04/20/2024    MCV 84.7 04/23/2024     04/23/2024    INR 0.98 07/01/2023    INR 1.02 05/04/2018    INR 0.92 09/22/2015       Lab Results   Component Value Date    GLUCOSE 104 (H) 04/23/2024    BUN 10 04/23/2024    CREATININE 0.64 04/23/2024    EGFRIFNONA 101 12/27/2020    EGFRIFAFRI  09/05/2016      Comment:      <15 Indicative of kidney failure.    BCR 15.6 04/23/2024     04/23/2024    K 4.2 04/23/2024    CO2 30.0 (H) 04/23/2024    CALCIUM 8.6 04/23/2024    ALBUMIN 3.4 (L) 04/23/2024    ALKPHOS 86 04/23/2024    BILITOT 0.3 04/23/2024    ALT 13 04/23/2024    AST 19 04/23/2024     Clinical Information    Rectal bleeding   Final Diagnosis   DUODENUM, BIOPSY:  Duodenal type mucosa with no significant histopathologic abnormalities  2.   STOMACH, BIOPSY:  Gastric antral type mucosa with mild chronic inactive gastritis  Negative for intestinal metaplasia or dysplasia  No Helicobacter pylori like organisms seen  3.   COLON, RIGHT, BIOPSY:  Colonic type mucosa with no significant histopathologic abnormalities  4.   COLON, LEFT, BIOPSY:  Colonic type mucosa with no significant histopathologic abnormalities  5.   COLON, RECTOSIGMOID, BIOPSY:  Moderate chronic active colitis with ulceration  Negative for specific microorganisms  Negative for dysplasia or " malignancy       Assessment:  Hematochezia  Melena  ABLA  H/o Abnormal Colonoscopy followed by outside GI  Duodenal Ulcers  UC Proctitis    Plan:  - Biopsies as noted above, negative H pylori.  Normal right and left colon biopsies, inflammation up to 18 cm from anal verge biopsies and notes moderate chronic active colitis with ulceration, negative for infectious findings, dysplasia or malignancy.  Endoscopic appearance consistent with UC proctitis  -  Continue twice daily PPI for 8 weeks.   - Carafate 1g QID for 1 week.  - Continue Rowasa enemas nightly  - Continue budesonide 9 mg for 6-8 weeks and then taper off pending response  - Close follow up with primary gastroenterologist after discharge to assess response and consider long term maintenance therapy as able per her comorbid medical conditions as well as consideration for breastfeeding.  Per bedside nurse, appointment has been scheduled for mid May with Dr. Peguero in [place]    GI will sign off and see PRN.  Please let us know if there are questions or concerns.    Tom Mueller MD  24  17:38 EDT       Electronically signed by Tom Mueller MD at 24 5893       Pablo Cabrera MD at 24 1151            [facility]  PROGRESS NOTE    Date of Admission: 2024  Date of Service: 24    Primary Care Physician: Shawna Lambert DO    Chief Complaint: follow up HF    Problem List:   Pulmonary edema    History of CHF (congestive heart failure)    History of pancreatitis    Morbid obesity with BMI of 40.0-44.9, adult    History of seizure disorder    Obstructive sleep apnea syndrome    Rectal bleeding    Essential hypertension    Type 2 diabetes mellitus, with long-term current use of insulin    S/P  section, total salpingectomy left unicornuate uterus    Anemia      Subjective      BP much better overall.  Edema essentially resolved.        Objective   Vitals: /74   Pulse 85   Temp  "97.9 °F (36.6 °C) (Oral)   Resp 16   Ht 152.4 cm (60\")   Wt 93.9 kg (207 lb)   LMP  (LMP Unknown) Comment: Ceasaran Section 4/18/24  SpO2 100%   Breastfeeding Yes   BMI 40.43 kg/m²     Physical Exam:  GENERAL: in no acute distress.   HEENT:  no jugular venous distention  HEART: Regular rhythm, normal rate, and no murmurs, gallops, or rubs.   LUNGS: Clear to auscultation bilaterally. No wheezing, rales or rhonchi.  EXTREMITIES: No edema noted.     Results:  Results from last 7 days   Lab Units 04/21/24  0646 04/20/24  0721 04/19/24  0525   WBC 10*3/mm3 6.92 8.17 6.97   HEMOGLOBIN g/dL 9.2* 9.1* 6.8*   HEMATOCRIT % 29.9* 28.7* 21.9*   PLATELETS 10*3/mm3 145 141 117*     Results from last 7 days   Lab Units 04/20/24  0721 04/19/24  0525 04/18/24  0858   SODIUM mmol/L 138 136 139   POTASSIUM mmol/L 4.0 4.2 4.5   CHLORIDE mmol/L 101 105 106   CO2 mmol/L 29.0 22.0 22.0   BUN mg/dL 19 23* 13   CREATININE mg/dL 0.69 0.75 0.65   GLUCOSE mg/dL 78 69 85      Lab Results   Component Value Date    CHOL 173 09/18/2023    TRIG 111 09/18/2023    HDL 33 (L) 09/18/2023     (H) 09/18/2023    AST 27 04/20/2024    ALT 12 04/20/2024             Results from last 7 days   Lab Units 04/19/24  0525   TSH uIU/mL 0.346             Results from last 7 days   Lab Units 04/18/24  0858 04/18/24  0005 04/17/24  1819   HSTROP T ng/L 19* 23* 22*     Results from last 7 days   Lab Units 04/20/24  0721   PROBNP pg/mL 59.4       No intake or output data in the 24 hours ending 04/23/24 1151      I personally reviewed the patient's EKG/Telemetry data    Radiology Data:   Results for orders placed during the hospital encounter of 04/17/24    Adult Transthoracic Echo Limited W/ Cont if Necessary Per Protocol    Interpretation Summary    Left ventricular systolic function is normal. Calculated left ventricular EF = 56.4%    Left ventricular wall thickness is consistent with mild concentric hypertrophy.    Left ventricular diastolic function " was not assessed.    There is a trivial pericardial effusion.      Current Medications:  acetaminophen, 650 mg, Oral, Q6H  Budesonide, 9 mg, Oral, Daily  enoxaparin, 40 mg, Subcutaneous, Q12H  furosemide, 40 mg, Oral, BID  labetalol, 200 mg, Oral, Q12H  lisinopril, 40 mg, Oral, Q24H  mesalamine, 4 g, Rectal, Nightly  pantoprazole, 40 mg, Oral, BID AC  prenatal vitamin, 1 tablet, Oral, Daily  sodium chloride, 10 mL, Intravenous, Q12H  sucralfate, 1 g, Oral, 4x Daily AC & at Bedtime      lactated ringers, 30 mL/hr  lactated ringers, 9 mL/hr        Assessment:   HFpEF, EF 56% by echo  Normal MPS 2023  GI bleed, uncertain etiology, endoscopy today  Hypertension  Dyslipidemia  Diabetes  Obstructive sleep apnea  Recently pregnant,  2024    Plan:   Continue lisinopril 40mg daily for control of hypertension.  Continue labetalol.  Cont. Nifedipine (increased to long acting 60mg).    Lasix as needed  Will sign off.    1-3 month follow up      Pablo Cabrera MD      Electronically signed by Pablo Cabrera MD at 24 1156       Abhishek Cuba DO at 24 0914              Postpartum Progress Note    Patient name: Antoinette Pack  YOB: 1985   MRN: [id number]  Referring Provider: Geovani Razo III, MD  Admission Date: 2024  Date of Service: 2024    ID: 38 y.o.     Diagnosis:   S/p  delivery 1 Day Post-Op     Pulmonary edema    History of CHF (congestive heart failure)    History of pancreatitis    Morbid obesity with BMI of 40.0-44.9, adult    History of seizure disorder    Obstructive sleep apnea syndrome    Rectal bleeding    Essential hypertension    Type 2 diabetes mellitus, with long-term current use of insulin    S/P  section, total salpingectomy left unicornuate uterus    Anemia       Subjective:      No complaints.  Scant lochia.  Ambulating, voiding, tolerating diet.  Pain well controlled.  The patient is currently  breastfeeding.   This baby is in NICU    Objective:      Vital signs:  Vital Signs Range for the last 24 hours  Temperature: Temp:  [97.4 °F (36.3 °C)-98.4 °F (36.9 °C)] 97.9 °F (36.6 °C)   Temp Source: Temp src: Oral   BP: BP: (117-176)/(56-94) 120/56   Pulse: Heart Rate:  [] 77   Respirations: Resp:  [16-20] 16   Weight: 93.9 kg (207 lb)     General: Alert & oriented x4, in no apparent distress  Abdomen: soft, nontender  Uterus: firm, nontender  Incision: clean, dry, intact,  suture  Extremities: nontender; no edema      Labs:  Lab Results   Component Value Date    WBC 6.92 04/21/2024    HGB 9.2 (L) 04/21/2024    HCT 29.9 (L) 04/21/2024    MCV 86.7 04/21/2024     04/21/2024     Results from last 7 days   Lab Units 04/19/24  0525   ABO TYPING  O   RH TYPING  Positive     External Prenatal Results       Pregnancy Outside Results - Transcribed From Office Records - See Scanned Records For Details       Test Value Date Time    ABO  O  04/19/24 0525    Rh  Positive  04/19/24 0525    Antibody Screen  Negative  04/18/24 0005    Varicella IgG       Rubella ^ Immune  11/16/23     Hgb  9.2 g/dL 04/21/24 0646       9.1 g/dL 04/20/24 0721       6.8 g/dL 04/19/24 0525       8.3 g/dL 04/18/24 0858       8.4 g/dL 04/18/24 0005       8.2 g/dL 04/17/24 1145       8.4 g/dL 04/15/24 2141       10.0 g/dL 03/27/24 1215       10.6 g/dL 01/29/24 1826       9.8 g/dL 12/08/23 1239       10.4 g/dL 11/15/23 1603       11.2 g/dL 10/27/23 2112       9.5 g/dL 09/27/23 1332       8.7 g/dL 09/20/23 0235       9.0 g/dL 09/19/23 0054       10.2 g/dL 09/18/23 0828       9.5 g/dL 09/17/23 0023       10.1 g/dL 09/03/23 1610       10.1 g/dL 08/26/23 1727    Hct  29.9 % 04/21/24 0646       28.7 % 04/20/24 0721       21.9 % 04/19/24 0525       27.1 % 04/18/24 0858       27.9 % 04/18/24 0005       26.3 % 04/17/24 1145       26.7 % 04/15/24 2141       30.5 % 03/27/24 1215       30.9 % 01/29/24 1826       32.0 % 12/08/23 1239       32.5 %  11/15/23 1603       36.8 % 10/27/23 2112       30.1 % 23 1332       28.3 % 23 0235       29.0 % 23 0054       33.0 % 23 0828       31.2 % 23 0023       32.2 % 23 1610       32.7 % 23 1727    Glucose Fasting GTT       Glucose Tolerance Test 1 hour       Glucose Tolerance Test 3 hour       Gonorrhea (discrete) ^ Negative  23     Chlamydia (discrete) ^ Negative  23     RPR ^ Non-Reactive  23       ^ Non-Reactive  23     VDRL       Syphilis Antibody       HBsAg ^ Negative  23     Herpes Simplex Virus PCR       Herpes Simplex VIrus Culture       HIV ^ Non-Reactive  23     Hep C RNA Quant PCR       Hep C Antibody  Non-Reactive  24 0721    AFP       Group B Strep       GBS Susceptibility to Clindamycin       GBS Susceptibility to Erythromycin       Fetal Fibronectin       Genetic Testing, Maternal Blood                 Drug Screening       Test Value Date Time    Urine Drug Screen       Amphetamine Screen  Negative  24 1152    Barbiturate Screen  Negative  24 1152    Benzodiazepine Screen  Negative  24 1152    Methadone Screen  Negative  24 1152    Phencyclidine Screen  Negative  24 1152    Opiates Screen  Negative  24 1152    THC Screen  Negative  24 1152    Cocaine Screen  Negative  20 0953    Propoxyphene Screen  Negative  10/19/15 1920    Buprenorphine Screen  Negative  24 1152    Methamphetamine Screen  Negative  20 0953    Oxycodone Screen  Negative  24 1152    Tricyclic Antidepressants Screen  Negative  24 1152              Legend    ^: Historical                            Assessment/Plan:      1 Day Post-Op s/p Procedure(s):  COLONOSCOPY  ESOPHAGOGASTRODUODENOSCOPY  1. POD # 5 after a low transverse  section with total left salpingectomy (unicornuate uterus) recovering well  2.  Status post day #1 after (endoscopy, EGD/colonoscopy with findings of  duodenal ulcer and suspected ulcerative colitis biopsies pending       Per GI. (She is planning to have a virtual appointment with gastroenterology [facility] today)  3.  Acute on chronic anemia status post 2 units packed red blood cells  H&H stable 9.1 28.7 asymptomatic  4.  Acute exacerbation of heart failure -improving       Lasix 40 mg every 12       Cardiology care with disposition to be determined  5.  Chronic hypertension BP stable on lisinopril 40 mg daily and labetalol 200 mg twice daily.  Cardiology adjusting meds as needed.  6.  Type 2 diabetes (OmniPod insulin pump-blood sugars under good control  7.   Obstructive sleep apnea syndrome  8.  Infant remains in the NICU in stable condition       Patient pumping/breast-feeding  9.  Obesity BMI 40.43       DVT prophylaxis with SCUDs and Lovenox    PLAN  Continue postoperative care  GI and cardiology disposition pending  Anticipate discharge home tomorrow  Repeat CBC and CMP today   Questions were answered.          Electronically signed by Abhishek Khan,  at 04/23/24 0906       [unfilled]  Discharge Summary    No notes of this type exist for this encounter.       Discharge Order (From admission, onward)       Start     Ordered    04/24/24 0903  Discharge patient  Once        Expected Discharge Date: 04/24/24   Discharge Disposition: Home or Self Care   Physician of Record for Attribution - Please select from Treatment Team: ABHISHEK KHAN [[id number]]   Review needed by CMO to determine Physician of Record: No      Question Answer Comment   Physician of Record for Attribution - Please select from Treatment Team ABHISHEK KHAN    Review needed by CMO to determine Physician of Record No        04/24/24 4589

## 2024-04-30 ENCOUNTER — TELEPHONE (OUTPATIENT)
Dept: OBSTETRICS AND GYNECOLOGY | Facility: HOSPITAL | Age: 39
End: 2024-04-30
Payer: MEDICARE

## 2024-04-30 NOTE — TELEPHONE ENCOUNTER
Spoke with patient over the phone.    Requested patient send in her omnipod settings today.  Patient is agreeable.  Patient states she and baby are doing well, baby remains in the NICU.  Patient denies needs at this time.  Patient does not have a follow up scheduled with Dr. Key, endocrinology, at this time but plans to make an appointment.  Patient is scheduled here 5/2/24.  Hetal Marie RN

## 2024-05-01 ENCOUNTER — HOSPITAL ENCOUNTER (OUTPATIENT)
Facility: HOSPITAL | Age: 39
Setting detail: OBSERVATION
LOS: 1 days | Discharge: HOME | End: 2024-05-03
Attending: OBSTETRICS & GYNECOLOGY | Admitting: OBSTETRICS & GYNECOLOGY
Payer: MEDICARE

## 2024-05-01 ENCOUNTER — POSTPARTUM VISIT (OUTPATIENT)
Dept: OBSTETRICS AND GYNECOLOGY | Facility: HOSPITAL | Age: 39
End: 2024-05-01
Payer: MEDICARE

## 2024-05-01 ENCOUNTER — PREP FOR SURGERY (OUTPATIENT)
Dept: OTHER | Facility: HOSPITAL | Age: 39
End: 2024-05-01
Payer: MEDICARE

## 2024-05-01 ENCOUNTER — TELEPHONE (OUTPATIENT)
Dept: OBSTETRICS AND GYNECOLOGY | Facility: HOSPITAL | Age: 39
End: 2024-05-01
Payer: MEDICARE

## 2024-05-01 VITALS
WEIGHT: 205.8 LBS | TEMPERATURE: 97.9 F | HEIGHT: 59 IN | RESPIRATION RATE: 18 BRPM | HEART RATE: 91 BPM | BODY MASS INDEX: 41.49 KG/M2 | DIASTOLIC BLOOD PRESSURE: 51 MMHG | SYSTOLIC BLOOD PRESSURE: 122 MMHG

## 2024-05-01 DIAGNOSIS — Z86.79 HISTORY OF CHF (CONGESTIVE HEART FAILURE): ICD-10-CM

## 2024-05-01 DIAGNOSIS — O10.919 CHRONIC HYPERTENSION AFFECTING PREGNANCY: ICD-10-CM

## 2024-05-01 DIAGNOSIS — G47.33 OBSTRUCTIVE SLEEP APNEA SYNDROME: ICD-10-CM

## 2024-05-01 DIAGNOSIS — Z98.891 S/P CESAREAN SECTION: Primary | ICD-10-CM

## 2024-05-01 DIAGNOSIS — O24.119 PRE-EXISTING TYPE 2 DIABETES AFFECTING PREGNANCY, ANTEPARTUM: Primary | ICD-10-CM

## 2024-05-01 DIAGNOSIS — T81.49XA SURGICAL WOUND INFECTION: ICD-10-CM

## 2024-05-01 DIAGNOSIS — T81.49XA WOUND INFECTION AFTER SURGERY: Primary | ICD-10-CM

## 2024-05-01 DIAGNOSIS — E66.01 MORBID OBESITY WITH BMI OF 40.0-44.9, ADULT: ICD-10-CM

## 2024-05-01 DIAGNOSIS — Z79.4 TYPE 2 DIABETES MELLITUS WITHOUT COMPLICATION, WITH LONG-TERM CURRENT USE OF INSULIN: Chronic | ICD-10-CM

## 2024-05-01 DIAGNOSIS — O24.119 PRE-EXISTING TYPE 2 DIABETES AFFECTING PREGNANCY, ANTEPARTUM: ICD-10-CM

## 2024-05-01 DIAGNOSIS — E11.9 TYPE 2 DIABETES MELLITUS WITHOUT COMPLICATION, WITH LONG-TERM CURRENT USE OF INSULIN: Chronic | ICD-10-CM

## 2024-05-01 DIAGNOSIS — K51.919 ULCERATIVE COLITIS WITH COMPLICATION, UNSPECIFIED LOCATION: ICD-10-CM

## 2024-05-01 LAB
ALBUMIN SERPL-MCNC: 3.8 G/DL (ref 3.5–5.2)
ALBUMIN/GLOB SERPL: 1.3 G/DL
ALP SERPL-CCNC: 92 U/L (ref 39–117)
ALT SERPL W P-5'-P-CCNC: 7 U/L (ref 1–33)
ANION GAP SERPL CALCULATED.3IONS-SCNC: 7 MMOL/L (ref 5–15)
AST SERPL-CCNC: 14 U/L (ref 1–32)
BASOPHILS # BLD AUTO: 0.03 10*3/MM3 (ref 0–0.2)
BASOPHILS NFR BLD AUTO: 0.4 % (ref 0–1.5)
BILIRUB SERPL-MCNC: 0.2 MG/DL (ref 0–1.2)
BUN SERPL-MCNC: 15 MG/DL (ref 6–20)
BUN/CREAT SERPL: 26.3 (ref 7–25)
CALCIUM SPEC-SCNC: 8.7 MG/DL (ref 8.6–10.5)
CHLORIDE SERPL-SCNC: 106 MMOL/L (ref 98–107)
CO2 SERPL-SCNC: 27 MMOL/L (ref 22–29)
CREAT SERPL-MCNC: 0.57 MG/DL (ref 0.57–1)
DEPRECATED RDW RBC AUTO: 41.9 FL (ref 37–54)
EGFRCR SERPLBLD CKD-EPI 2021: 118 ML/MIN/1.73
EOSINOPHIL # BLD AUTO: 0.05 10*3/MM3 (ref 0–0.4)
EOSINOPHIL NFR BLD AUTO: 0.6 % (ref 0.3–6.2)
ERYTHROCYTE [DISTWIDTH] IN BLOOD BY AUTOMATED COUNT: 14 % (ref 12.3–15.4)
GLOBULIN UR ELPH-MCNC: 3 GM/DL
GLUCOSE BLDC GLUCOMTR-MCNC: 105 MG/DL (ref 70–130)
GLUCOSE BLDC GLUCOMTR-MCNC: 86 MG/DL (ref 70–130)
GLUCOSE SERPL-MCNC: 101 MG/DL (ref 65–99)
HCT VFR BLD AUTO: 32.3 % (ref 34–46.6)
HGB BLD-MCNC: 10.2 G/DL (ref 12–15.9)
IMM GRANULOCYTES # BLD AUTO: 0.05 10*3/MM3 (ref 0–0.05)
IMM GRANULOCYTES NFR BLD AUTO: 0.6 % (ref 0–0.5)
LYMPHOCYTES # BLD AUTO: 1.29 10*3/MM3 (ref 0.7–3.1)
LYMPHOCYTES NFR BLD AUTO: 15.4 % (ref 19.6–45.3)
MCH RBC QN AUTO: 26.2 PG (ref 26.6–33)
MCHC RBC AUTO-ENTMCNC: 31.6 G/DL (ref 31.5–35.7)
MCV RBC AUTO: 82.8 FL (ref 79–97)
MONOCYTES # BLD AUTO: 0.3 10*3/MM3 (ref 0.1–0.9)
MONOCYTES NFR BLD AUTO: 3.6 % (ref 5–12)
NEUTROPHILS NFR BLD AUTO: 6.67 10*3/MM3 (ref 1.7–7)
NEUTROPHILS NFR BLD AUTO: 79.4 % (ref 42.7–76)
NRBC BLD AUTO-RTO: 0 /100 WBC (ref 0–0.2)
NT-PROBNP SERPL-MCNC: 51.4 PG/ML (ref 0–450)
PLATELET # BLD AUTO: 263 10*3/MM3 (ref 140–450)
PMV BLD AUTO: 10.1 FL (ref 6–12)
POTASSIUM SERPL-SCNC: 3.9 MMOL/L (ref 3.5–5.2)
PROT SERPL-MCNC: 6.8 G/DL (ref 6–8.5)
RBC # BLD AUTO: 3.9 10*6/MM3 (ref 3.77–5.28)
SODIUM SERPL-SCNC: 140 MMOL/L (ref 136–145)
WBC NRBC COR # BLD AUTO: 8.39 10*3/MM3 (ref 3.4–10.8)

## 2024-05-01 PROCEDURE — 87075 CULTR BACTERIA EXCEPT BLOOD: CPT | Performed by: OBSTETRICS & GYNECOLOGY

## 2024-05-01 PROCEDURE — 63710000001 ONDANSETRON ODT 4 MG TABLET DISPERSIBLE: Performed by: OBSTETRICS & GYNECOLOGY

## 2024-05-01 PROCEDURE — 25010000002 PIPERACILLIN SOD-TAZOBACTAM PER 1 G: Performed by: OBSTETRICS & GYNECOLOGY

## 2024-05-01 PROCEDURE — 25010000002 ENOXAPARIN PER 10 MG: Performed by: OBSTETRICS & GYNECOLOGY

## 2024-05-01 PROCEDURE — 25010000002 VANCOMYCIN HCL IN NACL 2-0.9 GM/500ML-% SOLUTION

## 2024-05-01 PROCEDURE — 87205 SMEAR GRAM STAIN: CPT | Performed by: OBSTETRICS & GYNECOLOGY

## 2024-05-01 PROCEDURE — 82948 REAGENT STRIP/BLOOD GLUCOSE: CPT

## 2024-05-01 PROCEDURE — 87077 CULTURE AEROBIC IDENTIFY: CPT | Performed by: OBSTETRICS & GYNECOLOGY

## 2024-05-01 PROCEDURE — 80053 COMPREHEN METABOLIC PANEL: CPT | Performed by: OBSTETRICS & GYNECOLOGY

## 2024-05-01 PROCEDURE — 85025 COMPLETE CBC W/AUTO DIFF WBC: CPT | Performed by: OBSTETRICS & GYNECOLOGY

## 2024-05-01 PROCEDURE — 87186 SC STD MICRODIL/AGAR DIL: CPT | Performed by: OBSTETRICS & GYNECOLOGY

## 2024-05-01 PROCEDURE — 96372 THER/PROPH/DIAG INJ SC/IM: CPT

## 2024-05-01 PROCEDURE — 83880 ASSAY OF NATRIURETIC PEPTIDE: CPT | Performed by: OBSTETRICS & GYNECOLOGY

## 2024-05-01 PROCEDURE — 87070 CULTURE OTHR SPECIMN AEROBIC: CPT | Performed by: OBSTETRICS & GYNECOLOGY

## 2024-05-01 RX ORDER — ONDANSETRON 4 MG/1
8 TABLET, ORALLY DISINTEGRATING ORAL EVERY 8 HOURS PRN
Status: CANCELLED | OUTPATIENT
Start: 2024-05-01

## 2024-05-01 RX ORDER — VANCOMYCIN 2 GRAM/500 ML IN 0.9 % SODIUM CHLORIDE INTRAVENOUS
2000 ONCE
Status: COMPLETED | OUTPATIENT
Start: 2024-05-01 | End: 2024-05-01

## 2024-05-01 RX ORDER — BISACODYL 10 MG
10 SUPPOSITORY, RECTAL RECTAL DAILY PRN
Status: DISCONTINUED | OUTPATIENT
Start: 2024-05-01 | End: 2024-05-03 | Stop reason: HOSPADM

## 2024-05-01 RX ORDER — SODIUM CHLORIDE 0.9 % (FLUSH) 0.9 %
10 SYRINGE (ML) INJECTION EVERY 12 HOURS SCHEDULED
Status: DISCONTINUED | OUTPATIENT
Start: 2024-05-01 | End: 2024-05-03 | Stop reason: HOSPADM

## 2024-05-01 RX ORDER — ONDANSETRON 4 MG/1
8 TABLET, ORALLY DISINTEGRATING ORAL EVERY 8 HOURS PRN
Status: DISCONTINUED | OUTPATIENT
Start: 2024-05-01 | End: 2024-05-03 | Stop reason: HOSPADM

## 2024-05-01 RX ORDER — ENOXAPARIN SODIUM 100 MG/ML
40 INJECTION SUBCUTANEOUS EVERY 12 HOURS
Status: CANCELLED | OUTPATIENT
Start: 2024-05-01

## 2024-05-01 RX ORDER — ENOXAPARIN SODIUM 100 MG/ML
40 INJECTION SUBCUTANEOUS EVERY 12 HOURS SCHEDULED
Status: DISCONTINUED | OUTPATIENT
Start: 2024-05-01 | End: 2024-05-03 | Stop reason: HOSPADM

## 2024-05-01 RX ORDER — ONDANSETRON 2 MG/ML
4 INJECTION INTRAMUSCULAR; INTRAVENOUS EVERY 8 HOURS PRN
Status: CANCELLED | OUTPATIENT
Start: 2024-05-01

## 2024-05-01 RX ORDER — SODIUM CHLORIDE 0.9 % (FLUSH) 0.9 %
10 SYRINGE (ML) INJECTION AS NEEDED
Status: DISCONTINUED | OUTPATIENT
Start: 2024-05-01 | End: 2024-05-03 | Stop reason: HOSPADM

## 2024-05-01 RX ORDER — ONDANSETRON 2 MG/ML
4 INJECTION INTRAMUSCULAR; INTRAVENOUS EVERY 8 HOURS PRN
Status: DISCONTINUED | OUTPATIENT
Start: 2024-05-01 | End: 2024-05-03 | Stop reason: HOSPADM

## 2024-05-01 RX ORDER — FUROSEMIDE 40 MG/1
40 TABLET ORAL DAILY
Status: DISCONTINUED | OUTPATIENT
Start: 2024-05-01 | End: 2024-05-03 | Stop reason: HOSPADM

## 2024-05-01 RX ORDER — LABETALOL 200 MG/1
200 TABLET, FILM COATED ORAL EVERY 8 HOURS SCHEDULED
Status: DISCONTINUED | OUTPATIENT
Start: 2024-05-01 | End: 2024-05-03 | Stop reason: HOSPADM

## 2024-05-01 RX ORDER — SODIUM CHLORIDE 0.9 % (FLUSH) 0.9 %
10 SYRINGE (ML) INJECTION EVERY 12 HOURS SCHEDULED
Status: CANCELLED | OUTPATIENT
Start: 2024-05-01

## 2024-05-01 RX ORDER — LISINOPRIL 40 MG/1
40 TABLET ORAL
Status: DISCONTINUED | OUTPATIENT
Start: 2024-05-01 | End: 2024-05-03 | Stop reason: HOSPADM

## 2024-05-01 RX ORDER — OXYCODONE HYDROCHLORIDE 10 MG/1
10 TABLET ORAL EVERY 4 HOURS PRN
Status: DISCONTINUED | OUTPATIENT
Start: 2024-05-01 | End: 2024-05-03 | Stop reason: HOSPADM

## 2024-05-01 RX ORDER — NIFEDIPINE 30 MG/1
60 TABLET, EXTENDED RELEASE ORAL
Status: DISCONTINUED | OUTPATIENT
Start: 2024-05-01 | End: 2024-05-03 | Stop reason: HOSPADM

## 2024-05-01 RX ORDER — BISACODYL 10 MG
10 SUPPOSITORY, RECTAL RECTAL DAILY PRN
Status: CANCELLED | OUTPATIENT
Start: 2024-05-01

## 2024-05-01 RX ORDER — SODIUM CHLORIDE 9 MG/ML
40 INJECTION, SOLUTION INTRAVENOUS AS NEEDED
Status: DISCONTINUED | OUTPATIENT
Start: 2024-05-01 | End: 2024-05-03 | Stop reason: HOSPADM

## 2024-05-01 RX ORDER — LIDOCAINE HYDROCHLORIDE 10 MG/ML
0.5 INJECTION, SOLUTION EPIDURAL; INFILTRATION; INTRACAUDAL; PERINEURAL ONCE AS NEEDED
Status: CANCELLED | OUTPATIENT
Start: 2024-05-01

## 2024-05-01 RX ORDER — SODIUM CHLORIDE 9 MG/ML
40 INJECTION, SOLUTION INTRAVENOUS AS NEEDED
Status: CANCELLED | OUTPATIENT
Start: 2024-05-01

## 2024-05-01 RX ORDER — LIDOCAINE HYDROCHLORIDE 10 MG/ML
0.5 INJECTION, SOLUTION EPIDURAL; INFILTRATION; INTRACAUDAL; PERINEURAL ONCE AS NEEDED
Status: DISCONTINUED | OUTPATIENT
Start: 2024-05-01 | End: 2024-05-03 | Stop reason: HOSPADM

## 2024-05-01 RX ORDER — BUDESONIDE 3 MG/1
9 CAPSULE, COATED PELLETS ORAL DAILY
Status: DISCONTINUED | OUTPATIENT
Start: 2024-05-02 | End: 2024-05-03 | Stop reason: HOSPADM

## 2024-05-01 RX ORDER — DOCUSATE SODIUM 100 MG/1
100 CAPSULE, LIQUID FILLED ORAL 2 TIMES DAILY PRN
Status: CANCELLED | OUTPATIENT
Start: 2024-05-01

## 2024-05-01 RX ORDER — FLUCONAZOLE 100 MG/1
150 TABLET ORAL ONCE
Status: COMPLETED | OUTPATIENT
Start: 2024-05-01 | End: 2024-05-01

## 2024-05-01 RX ORDER — PANTOPRAZOLE SODIUM 40 MG/1
40 TABLET, DELAYED RELEASE ORAL
Status: DISCONTINUED | OUTPATIENT
Start: 2024-05-01 | End: 2024-05-03 | Stop reason: HOSPADM

## 2024-05-01 RX ORDER — ENOXAPARIN SODIUM 100 MG/ML
40 INJECTION SUBCUTANEOUS EVERY 12 HOURS
Status: DISCONTINUED | OUTPATIENT
Start: 2024-05-01 | End: 2024-05-01

## 2024-05-01 RX ORDER — SODIUM CHLORIDE 0.9 % (FLUSH) 0.9 %
10 SYRINGE (ML) INJECTION AS NEEDED
Status: CANCELLED | OUTPATIENT
Start: 2024-05-01

## 2024-05-01 RX ORDER — DOCUSATE SODIUM 100 MG/1
100 CAPSULE, LIQUID FILLED ORAL 2 TIMES DAILY PRN
Status: DISCONTINUED | OUTPATIENT
Start: 2024-05-01 | End: 2024-05-03 | Stop reason: HOSPADM

## 2024-05-01 RX ORDER — PRENATAL VIT/IRON FUM/FOLIC AC 27MG-0.8MG
1 TABLET ORAL DAILY
Status: DISCONTINUED | OUTPATIENT
Start: 2024-05-01 | End: 2024-05-03 | Stop reason: HOSPADM

## 2024-05-01 RX ORDER — MESALAMINE 4 G/60ML
4 SUSPENSION RECTAL NIGHTLY
Status: DISCONTINUED | OUTPATIENT
Start: 2024-05-01 | End: 2024-05-03 | Stop reason: HOSPADM

## 2024-05-01 RX ADMIN — OXYCODONE HYDROCHLORIDE 10 MG: 10 TABLET ORAL at 14:14

## 2024-05-01 RX ADMIN — FLUCONAZOLE 150 MG: 100 TABLET ORAL at 18:01

## 2024-05-01 RX ADMIN — Medication 10 ML: at 21:21

## 2024-05-01 RX ADMIN — ENOXAPARIN SODIUM 40 MG: 100 INJECTION SUBCUTANEOUS at 21:18

## 2024-05-01 RX ADMIN — PANTOPRAZOLE SODIUM 40 MG: 40 TABLET, DELAYED RELEASE ORAL at 18:01

## 2024-05-01 RX ADMIN — OXYCODONE HYDROCHLORIDE 10 MG: 10 TABLET ORAL at 18:49

## 2024-05-01 RX ADMIN — ONDANSETRON 8 MG: 4 TABLET, ORALLY DISINTEGRATING ORAL at 15:07

## 2024-05-01 RX ADMIN — MESALAMINE 4 G: 4 ENEMA RECTAL at 21:18

## 2024-05-01 RX ADMIN — PIPERACILLIN AND TAZOBACTAM 4.5 G: 4; .5 INJECTION, POWDER, FOR SOLUTION INTRAVENOUS at 21:18

## 2024-05-01 RX ADMIN — LABETALOL HYDROCHLORIDE 200 MG: 200 TABLET, FILM COATED ORAL at 21:18

## 2024-05-01 RX ADMIN — PIPERACILLIN AND TAZOBACTAM 3.38 G: 3; .375 INJECTION, POWDER, LYOPHILIZED, FOR SOLUTION INTRAVENOUS at 14:13

## 2024-05-01 RX ADMIN — Medication 2000 MG: at 15:07

## 2024-05-01 NOTE — PROGRESS NOTES
Patient received Oxycodone 10 mg for pain control   Wound assessed and packed with approximately 15cm iodoform.   4x4 placed over top and secured with paper tape   Patient tolerated procedure well

## 2024-05-01 NOTE — PROGRESS NOTES
"    Maternal/Fetal Medicine Follow Up Note     Name: Antoinette Pack    : 1985     MRN: [id number]     Referring Provider: Geovani Razo III, MD    Chief Complaint  postpartum incision check     Subjective     History of Present Illness:  Antoinette Pack is a 38 y.o.  34w5d who presents today for incision check.   Concern for incision opening, drainage. No fever   Overall doing well.  Experiencing some increasing SOA today though overall has been stable       ANDRE: Estimated Date of Delivery: 24     ROS:   As noted in HPI.     Objective     Vital Signs  /51 (BP Location: Right arm, Patient Position: Sitting, Cuff Size: Large Adult)   Pulse 91   Temp 97.9 °F (36.6 °C) (Axillary)   Resp 18   Ht 149.9 cm (59\")   Wt 93.4 kg (205 lb 12.8 oz)   LMP  (LMP Unknown)   Estimated body mass index is 41.57 kg/m² as calculated from the following:    Height as of this encounter: 149.9 cm (59\").    Weight as of this encounter: 93.4 kg (205 lb 12.8 oz).    Abdomen: obese, soft. Incision mild erythema with 3 cm opening on right side. Some granulation tissue. Small amount of purulent discharge. Probed and fascia intact. Does not appear to track beyond edges of opening. Anaerobic and aerobic cultures obtained.     Assessment and Plan     Antoinette Pack is a 38 y.o.  34w5d     Diagnoses and all orders for this visit:    1. Pre-existing type 2 diabetes affecting pregnancy, antepartum (Primary)  -     Anaerobic Culture - Swab, Abdominal Wall; Future  -     Culture, Routine - Swab, Abdominal Wall; Future    2. History of CHF (congestive heart failure)  -     Anaerobic Culture - Swab, Abdominal Wall; Future  -     Culture, Routine - Swab, Abdominal Wall; Future    3. Chronic hypertension affecting pregnancy  -     Anaerobic Culture - Swab, Abdominal Wall; Future  -     Culture, Routine - Swab, Abdominal Wall; Future    4. Obstructive sleep apnea syndrome  -     Anaerobic Culture " - Swab, Abdominal Wall; Future  -     Culture, Routine - Swab, Abdominal Wall; Future    5. Morbid obesity with BMI of 40.0-44.9, adult  -     Anaerobic Culture - Swab, Abdominal Wall; Future  -     Culture, Routine - Swab, Abdominal Wall; Future    6.  wound disruption  -     Anaerobic Culture - Swab, Abdominal Wall; Future  -     Culture, Routine - Swab, Abdominal Wall; Future      Due to multiple co-morbidities, plan admission to gyn floor for IVAbx (Vanc/Zosyn) with transition to PO meds hopefully tomorrow. Wound cultures sent   Wound care consult planned   will need pain meds for packing        Follow Up  No follow-ups on file.    I spent 40 minutes caring for the patient on the day of service. This included: obtaining or reviewing a separately obtained medical history, reviewing patient records, performing a medically appropriate exam and/or evaluation, counseling or educating the patient/family/caregiver, ordering medications, labs, and/or procedures and documenting such in the medical record. This does not include time spent on review and interpretation of other tests such as fetal ultrasound or the performance of other procedures such as amniocentesis or CVS.    Rosmery Orellana MD FACOG  Maternal Fetal Medicine, [facility]    [facility]     2024

## 2024-05-01 NOTE — CONSULTS
Seen for pump while in hospital. Contract complete and on chart, RN shown how to document. Patient educated how to keep log. Forms provided Will follow daily while admitted.

## 2024-05-01 NOTE — NURSING NOTE
WOC consult for incision packing. MD messaged RN and I and will be performing packing and assessing wound soon. Recommend changing daily or BID depending on drainage and MD's discretion.         Will sign off.

## 2024-05-01 NOTE — PLAN OF CARE
Goal Outcome Evaluation:            VSS on RA. Pt wound dressing CDI. C/o pain relieved by PRN meds. Diabetes educator educated pt on self use of insulin pump; patient verbalized understanding. Pt visiting PACU to see , and pumps in room. Will continue plan of care.

## 2024-05-01 NOTE — H&P
Maternal/Fetal Medicine History and Physical Note     Name: Antoinette Pack    : 1985     MRN: [id number]     Chief Complaint:  Wound infection     Subjective     History of Present Illness:  Antoinette Pack is a 38 y.o.  POD11 s/p PCS with salpingectomy admitted from [facility] clinic with concern for wound infection and wound dehiscence. Patient with complicated medical history including T2DM (on a pump), CHTN, obesity, history of diastolic heart failure, sleep apnea, ulcerative colitis.   Patient reports noting incision opening and drainage this AM. No fever.   Evaluated in clinic and, due to co-morbidities, thought to be best to have 24 - 48 hours IV antibiotics before transitioning to PO abx.   Patient also with notable discomfort at the incision site in the clinic   Also experiencing some increased SOA. No cough.   Aerobic and anaerobic cultures sent from clinic     ANDRE: Estimated Date of Delivery: 24     ROS:   As noted in HPI.     Past Medical History:   Diagnosis Date    Anxiety     CHF (congestive heart failure) 2023    Colitis     Depression     Diabetes mellitus 2018    Diverticulitis     GERD (gastroesophageal reflux disease)     Hypertension     Kidney stone     Narcolepsy     Pancreatitis 2023    PCOS (polycystic ovarian syndrome)     Polycystic ovary syndrome     Rectal bleeding     SINCE  - USES MESALAMINE SUPPOSITORY NIGHTLY    Seizures     Sleep apnea     Urinary tract infection       Past Surgical History:   Procedure Laterality Date    CARDIAC CATHETERIZATION      CARPAL TUNNEL RELEASE       SECTION N/A 2024    Procedure:  SECTION PRIMARY;  Surgeon: Axel Keys MD;  Location:  [facility];  Service: Obstetrics/Gynecology;  Laterality: N/A;    COLONOSCOPY      COLONOSCOPY N/A 2024    Procedure: COLONOSCOPY;  Surgeon: Tom Mueller MD;  Location:  [facility];  Service: Gastroenterology;   Laterality: N/A;    ENDOSCOPY      ENDOSCOPY N/A 2024    Procedure: ESOPHAGOGASTRODUODENOSCOPY;  Surgeon: Tom Mueller MD;  Location: [facility];  Service: Gastroenterology;  Laterality: N/A;    FRACTURE SURGERY      HARDWARE REMOVAL      WRIST    TENDON REPAIR      HAND    WISDOM TOOTH EXTRACTION        Family History   Problem Relation Age of Onset    Hypertension Mother     Depression Mother     Heart disease Mother     COPD Mother     Emphysema Mother     Hypertension Father     Diabetes Father     Heart disease Father     COPD Father     Heart failure Father     Hepatitis Father       OB History          2    Para   1    Term   0       1    AB   1    Living   1         SAB   1    IAB   0    Ectopic   0    Molar   0    Multiple   0    Live Births   1          Obstetric Comments   FOB #1  Pregnancy #1 SAB at 6 weeks  FOB #1 Pregnancy #2  current             Prior to Admission medications    Medication Sig Start Date End Date Taking? Authorizing Provider   Budesonide (ENTOCORT EC) 3 MG 24 hr capsule Take 3 capsules by mouth Daily.  Patient taking differently: Take 3 capsules by mouth Daily. For uc 24   Abhishek Cuba DO   Continuous Blood Gluc Sensor (Dexcom G6 Sensor) Use Every 10 (Ten) Days. 23   Vonnie Key APRN   Continuous Blood Gluc Transmit (Dexcom G6 Transmitter) misc Use 1 each Every 3 (Three) Months. 3/19/24   Vonnie Key APRN   Enoxaparin Sodium (LOVENOX) 40 MG/0.4ML solution prefilled syringe syringe Inject 0.4 mL under the skin into the appropriate area as directed Every 12 (Twelve) Hours for 14 days. Indications: Prevention of Unwanted Clot in Veins 24  Abhishek Cuba DO   folic acid (FOLVITE) 1 MG tablet Take 1 tablet by mouth Daily.  Patient not taking: Reported on 2024    Provider, MD Riaz   furosemide (LASIX) 40 MG tablet Take 1 tablet by mouth Daily As Needed (for fluid retention).  Patient not taking: Reported  "on 5/1/2024 4/24/24   Abhishek Cuba DO   Insulin Aspart (novoLOG) 100 UNIT/ML injection For use in insulin pump. Max Daily Dose: 250 units daily. 4/16/24   Vonnie Key APRN   Insulin Aspart, w/Niacinamide, (Fiasp) 100 UNIT/ML solution Inject 350 Units as directed Daily. For use in insulin pump. Max dose of 350u/day. 4/16/24   Vonnie Key APRN   Insulin Disposable Pump (Omnipod 5 G6 Pods, Gen 5,) misc Change 2 (Two) Times a Day as directed. 2/27/24   Vonnie Key APRN   Insulin Pen Needle 32G X 4 MM misc Use to inject insulin up to 4 times daily as directed 3/15/23   Vonnie Key APRN   Insulin Syringe 31G X 5/16\" 1 ML misc Use 1 each 3 (Three) Times a Day With Meals. As needed for additional insulin bolus with meals. 3/27/24   Dolores Fernandez MD   labetalol (NORMODYNE) 200 MG tablet Take 1 tablet by mouth Every 12 (Twelve) Hours.  Patient taking differently: Take 1 tablet by mouth Daily. 4/24/24   Abhishek Cuba DO   lisinopril (PRINIVIL,ZESTRIL) 40 MG tablet Take 1 tablet by mouth Daily. 4/24/24   Abhishek Cuba DO   mesalamine (ROWASA) 4 g enema Insert 1 enema into the rectum Every Night. 4/24/24   Abhishek Cuba DO   NIFEdipine CC (ADALAT CC) 60 MG 24 hr tablet Take 1 tablet by mouth Daily. 4/24/24   Abhishek Cuba DO   pantoprazole (PROTONIX) 40 MG EC tablet Take 1 tablet by mouth 2 (Two) Times a Day Before Meals. 4/24/24   Abhishek Cuba DO   Prenatal Vit-Fe Fumarate-FA (PRENATAL PO) Take 1 tablet by mouth Daily.    Provider, MD Riaz   sucralfate (CARAFATE) 1 g tablet Take 1 tablet by mouth 4 (Four) Times a Day Before Meals & at Bedtime for 7 days. 4/24/24 5/1/24  Bereket, Abhishek L, DO        Objective     Vital Signs  LMP  (LMP Unknown)   Estimated body mass index is 41.57 kg/m² as calculated from the following:    Height as of an earlier encounter on 5/1/24: 149.9 cm (59\").    Weight as of an earlier encounter on 5/1/24: 93.4 kg (205 lb 12.8 " oz).    Physical Exam  Uncomfortable appearing   Slightly increased work of breathing   Abd soft. Incision with 3 cm right sided opening. Approximately 2cms deep. Fascia intact with probing. Some small purulent drainage. Two other superficial areas of possible purulent drainage. Mild erythema. Moderately tender     Labs: (pending)    CBC w/ diff   CMP   BMP   Aerobic and anaerobic cx sent from clinic           Assessment and Plan     Diagnoses and all orders for this visit:    1. S/P  section, total salpingectomy left unicornuate uterus (Primary)    2. Type 2 diabetes mellitus without complication, with long-term current use of insulin    3. History of CHF (congestive heart failure)    4. Chronic hypertension affecting pregnancy    5. Ulcerative colitis with complication, unspecified location    6. Surgical wound infection    7. Obstructive sleep apnea syndrome       Plan admit for broad spectrum IV antibiotics due to multiple co-morbidities and high risk of MRSA, sepsis, decompensation   Plan Vanc/Zosyn with transition to PO antibiotics in next 24 - 48 hours   Wound cultures pending   Labs pending   Wound Care consult requested.       I spent 45 minutes caring for the patient on the day of service. This included: obtaining or reviewing a separately obtained medical history, reviewing patient records, performing a medically appropriate exam and/or evaluation, counseling or educating the patient/family/caregiver, ordering medications, labs, and/or procedures and documenting such in the medical record. This does not include time spent on review and interpretation of other tests such as fetal ultrasound or the performance of other procedures such as amniocentesis or CVS.    Rosmery Orellana MD FACOG  Maternal Fetal Medicine, [facility]     2024

## 2024-05-01 NOTE — PROGRESS NOTES
Pt here for pp incision check.  Pt reports noted a gauze pad this am when lifting up abdomen to check incision. Pt reports it has been draining some and has opened up on the right side

## 2024-05-01 NOTE — PROGRESS NOTES
Pharmacy Consult-Vancomycin Dosing  Antoinette Pack is a  38 y.o. female receiving vancomycin therapy.     Indication: SSTI  Consulting Provider: ABDIFATAH ARMSTRONG Consult: No    Goal AUC: 400 - 600 mg/L*hr    Current Antimicrobial Therapy  Anti-Infectives (From admission, onward)      Ordered     Dose/Rate Route Frequency Start Stop    05/01/24 1306  piperacillin-tazobactam (ZOSYN) 4.5 g IVPB in 100 mL NS MBP (CD)        Ordering Provider: Rosmery Orellana MD    4.5 g  over 4 Hours Intravenous Every 8 Hours 05/01/24 2000 05/03/24 1959 05/01/24 1306  piperacillin-tazobactam (ZOSYN) 3.375 g IVPB in 100 mL NS MBP (CD)        Ordering Provider: Rosmery Orellana MD    3.375 g  over 30 Minutes Intravenous Once 05/01/24 1400      05/01/24 1306  Pharmacy to dose vancomycin        Ordering Provider: Rosmery Orellana MD     Does not apply Continuous PRN 05/01/24 1306 05/03/24 1305          Allergies  Allergies as of 05/01/2024 - Reviewed 05/01/2024   Allergen Reaction Noted    Dilantin [phenytoin] Mental Status Change 03/23/2017    Keppra [levetiracetam] Mental Status Change 03/23/2017    Meperidine Rash 01/27/2014    Topamax [topiramate] Mental Status Change 03/23/2017       Labs                Evaluation of Dosing     Last Dose Received in the ED/Outside Facility: No  Is Patient on Dialysis or Renal Replacement: No    Ht - 149.9 cm  Wt - 93.4 kg    Estimated Creatinine Clearance: 122.9 mL/min (by C-G formula based on SCr of 0.64 mg/dL).    No intake/output data recorded.    Microbiology and Radiology  Microbiology Results (last 10 days)       ** No results found for the last 240 hours. **          Reported Vancomycin Levels                      InsightRX AUC Calculation:    Current AUC:   -- mg/L*hr    Predicted Steady State AUC on Current Dose: -- mg/L*hr  _________________________________    Predicted Steady State AUC on New Dose:   -- mg/L*hr    Assessment/Plan:  1. Pharmacy to dose vancomycin for  "SSTI.   2. Patient to receive a loading dose of vancomycin 2000 mg IV x 1 (~21 mg/kg).  3. Will start on a maintenance dose of vancomycin 1000 mg IV q12h ~15 mg/kg AdjBW).  4. No vancomycin level scheduled as of now since the H&P states, \"Evaluated in clinic and, due to co-morbidities, thought to be best to have 24 - 48 hours IV antibiotics before transitioning to PO abx.\"  5. If duration is planned to be extended, then levels can be drawn.  6. Monitor renal function, cultures and sensitivities, and clinical status, and adjust regimen as necessary.  Pharmacy will continue to follow.    "

## 2024-05-01 NOTE — TELEPHONE ENCOUNTER
Attempted to reach patient by phone, received voice mail, left message stating Dr. Fernandez has reviewed your blood sugar log and states These Look fabulous!  No changes.  Will also send in a MyChart message.  Hetal Marie RN

## 2024-05-02 PROBLEM — L08.9 WOUND INFECTION: Status: ACTIVE | Noted: 2024-05-02

## 2024-05-02 PROBLEM — T14.8XXA WOUND INFECTION: Status: ACTIVE | Noted: 2024-05-02

## 2024-05-02 LAB
ANION GAP SERPL CALCULATED.3IONS-SCNC: 10 MMOL/L (ref 5–15)
BUN SERPL-MCNC: 16 MG/DL (ref 6–20)
BUN/CREAT SERPL: 22.9 (ref 7–25)
CALCIUM SPEC-SCNC: 8.6 MG/DL (ref 8.6–10.5)
CHLORIDE SERPL-SCNC: 104 MMOL/L (ref 98–107)
CO2 SERPL-SCNC: 26 MMOL/L (ref 22–29)
CREAT SERPL-MCNC: 0.7 MG/DL (ref 0.57–1)
EGFRCR SERPLBLD CKD-EPI 2021: 112.3 ML/MIN/1.73
GLUCOSE BLDC GLUCOMTR-MCNC: 113 MG/DL (ref 70–130)
GLUCOSE BLDC GLUCOMTR-MCNC: 138 MG/DL (ref 70–130)
GLUCOSE BLDC GLUCOMTR-MCNC: 82 MG/DL (ref 70–130)
GLUCOSE BLDC GLUCOMTR-MCNC: 94 MG/DL (ref 70–130)
GLUCOSE SERPL-MCNC: 86 MG/DL (ref 65–99)
POTASSIUM SERPL-SCNC: 3.8 MMOL/L (ref 3.5–5.2)
SODIUM SERPL-SCNC: 140 MMOL/L (ref 136–145)
VANCOMYCIN SERPL-MCNC: 9.1 MCG/ML (ref 5–40)

## 2024-05-02 PROCEDURE — 80202 ASSAY OF VANCOMYCIN: CPT

## 2024-05-02 PROCEDURE — A9270 NON-COVERED ITEM OR SERVICE: HCPCS | Performed by: OBSTETRICS & GYNECOLOGY

## 2024-05-02 PROCEDURE — 63710000001 PANTOPRAZOLE 40 MG TABLET DELAYED-RELEASE: Performed by: OBSTETRICS & GYNECOLOGY

## 2024-05-02 PROCEDURE — 25010000002 ENOXAPARIN PER 10 MG: Performed by: OBSTETRICS & GYNECOLOGY

## 2024-05-02 PROCEDURE — 96366 THER/PROPH/DIAG IV INF ADDON: CPT

## 2024-05-02 PROCEDURE — 63710000001 DOCUSATE SODIUM 100 MG CAPSULE: Performed by: OBSTETRICS & GYNECOLOGY

## 2024-05-02 PROCEDURE — 80048 BASIC METABOLIC PNL TOTAL CA: CPT

## 2024-05-02 PROCEDURE — 63710000001 OXYCODONE 10 MG TABLET: Performed by: OBSTETRICS & GYNECOLOGY

## 2024-05-02 PROCEDURE — 25010000002 VANCOMYCIN HCL 1.25 G RECONSTITUTED SOLUTION 1 EACH VIAL

## 2024-05-02 PROCEDURE — 63710000001: Performed by: OBSTETRICS & GYNECOLOGY

## 2024-05-02 PROCEDURE — 63710000001 ONDANSETRON ODT 4 MG TABLET DISPERSIBLE: Performed by: OBSTETRICS & GYNECOLOGY

## 2024-05-02 PROCEDURE — 99231 SBSQ HOSP IP/OBS SF/LOW 25: CPT | Performed by: OBSTETRICS & GYNECOLOGY

## 2024-05-02 PROCEDURE — 25010000002 PIPERACILLIN SOD-TAZOBACTAM PER 1 G: Performed by: OBSTETRICS & GYNECOLOGY

## 2024-05-02 PROCEDURE — 96372 THER/PROPH/DIAG INJ SC/IM: CPT

## 2024-05-02 PROCEDURE — 82948 REAGENT STRIP/BLOOD GLUCOSE: CPT

## 2024-05-02 PROCEDURE — 63710000001 LABETALOL 200 MG TABLET: Performed by: OBSTETRICS & GYNECOLOGY

## 2024-05-02 PROCEDURE — 25810000003 SODIUM CHLORIDE 0.9 % SOLUTION 250 ML FLEX CONT

## 2024-05-02 PROCEDURE — G0378 HOSPITAL OBSERVATION PER HR: HCPCS

## 2024-05-02 PROCEDURE — 25010000002 VANCOMYCIN 1 G RECONSTITUTED SOLUTION 1 EACH VIAL

## 2024-05-02 PROCEDURE — 96365 THER/PROPH/DIAG IV INF INIT: CPT

## 2024-05-02 RX ORDER — PROCHLORPERAZINE 25 MG/1
1 SUPPOSITORY RECTAL
Qty: 1 EACH | Refills: 3 | Status: SHIPPED | OUTPATIENT
Start: 2024-05-02

## 2024-05-02 RX ORDER — PROCHLORPERAZINE 25 MG/1
SUPPOSITORY RECTAL
Qty: 3 EACH | Refills: 11 | Status: SHIPPED | OUTPATIENT
Start: 2024-05-02

## 2024-05-02 RX ORDER — INSULIN PMP CART,AUT,G6/7,CNTR
1 EACH SUBCUTANEOUS DAILY
Qty: 30 EACH | Refills: 1 | Status: SHIPPED | OUTPATIENT
Start: 2024-05-02

## 2024-05-02 RX ADMIN — ONDANSETRON 8 MG: 4 TABLET, ORALLY DISINTEGRATING ORAL at 10:15

## 2024-05-02 RX ADMIN — LABETALOL HYDROCHLORIDE 200 MG: 200 TABLET, FILM COATED ORAL at 22:08

## 2024-05-02 RX ADMIN — OXYCODONE HYDROCHLORIDE 10 MG: 10 TABLET ORAL at 22:18

## 2024-05-02 RX ADMIN — LISINOPRIL 40 MG: 40 TABLET ORAL at 10:10

## 2024-05-02 RX ADMIN — VANCOMYCIN HYDROCHLORIDE 1250 MG: 1.25 INJECTION, POWDER, LYOPHILIZED, FOR SOLUTION INTRAVENOUS at 16:36

## 2024-05-02 RX ADMIN — NIFEDIPINE 60 MG: 30 TABLET, EXTENDED RELEASE ORAL at 10:08

## 2024-05-02 RX ADMIN — PIPERACILLIN AND TAZOBACTAM 4.5 G: 4; .5 INJECTION, POWDER, FOR SOLUTION INTRAVENOUS at 12:11

## 2024-05-02 RX ADMIN — LABETALOL HYDROCHLORIDE 200 MG: 200 TABLET, FILM COATED ORAL at 05:22

## 2024-05-02 RX ADMIN — OXYCODONE HYDROCHLORIDE 10 MG: 10 TABLET ORAL at 12:11

## 2024-05-02 RX ADMIN — DOCUSATE SODIUM 100 MG: 100 CAPSULE, LIQUID FILLED ORAL at 17:38

## 2024-05-02 RX ADMIN — OXYCODONE HYDROCHLORIDE 10 MG: 10 TABLET ORAL at 02:51

## 2024-05-02 RX ADMIN — PRENATAL VITAMINS-IRON FUMARATE 27 MG IRON-FOLIC ACID 0.8 MG TABLET 1 TABLET: at 10:09

## 2024-05-02 RX ADMIN — ENOXAPARIN SODIUM 40 MG: 100 INJECTION SUBCUTANEOUS at 22:08

## 2024-05-02 RX ADMIN — PANTOPRAZOLE SODIUM 40 MG: 40 TABLET, DELAYED RELEASE ORAL at 10:11

## 2024-05-02 RX ADMIN — ENOXAPARIN SODIUM 40 MG: 100 INJECTION SUBCUTANEOUS at 10:13

## 2024-05-02 RX ADMIN — PIPERACILLIN AND TAZOBACTAM 4.5 G: 4; .5 INJECTION, POWDER, FOR SOLUTION INTRAVENOUS at 04:14

## 2024-05-02 RX ADMIN — FUROSEMIDE 40 MG: 40 TABLET ORAL at 10:09

## 2024-05-02 RX ADMIN — Medication 10 ML: at 19:30

## 2024-05-02 RX ADMIN — BUDESONIDE 9 MG: 3 CAPSULE, COATED PELLETS ORAL at 10:14

## 2024-05-02 RX ADMIN — PIPERACILLIN AND TAZOBACTAM 4.5 G: 4; .5 INJECTION, POWDER, FOR SOLUTION INTRAVENOUS at 22:08

## 2024-05-02 RX ADMIN — MESALAMINE 4 G: 4 ENEMA RECTAL at 22:08

## 2024-05-02 RX ADMIN — VANCOMYCIN HYDROCHLORIDE 1000 MG: 1 INJECTION, POWDER, LYOPHILIZED, FOR SOLUTION INTRAVENOUS at 01:05

## 2024-05-02 RX ADMIN — Medication 10 ML: at 22:19

## 2024-05-02 RX ADMIN — PANTOPRAZOLE SODIUM 40 MG: 40 TABLET, DELAYED RELEASE ORAL at 17:38

## 2024-05-02 NOTE — PLAN OF CARE
Goal Outcome Evaluation:                                 VSS. Tolerating diet. Site CDI with packing. Up ad nimesh. PRN indicated for pain. ABX infusing

## 2024-05-02 NOTE — PROGRESS NOTES
Maternal/Fetal Medicine Antepartum Progress Note     Name: Antoinette Pack    : 1985     MRN: [id number]       #: 2      Antoinette Pack is a 38 y.o.  POD12 s/p PCS, salpingectomy admitted with post op wound infection   Currently stable on Vanc/Zosyn.   No interval issues   Pain controlled     ANDRE: Estimated Date of Delivery: 24     ROS:   As noted in HPI.     Objective     Vital Signs  Temp:  [97 °F (36.1 °C)-98.5 °F (36.9 °C)] 97.9 °F (36.6 °C)   Temp src: Temporal   BP: (106-141)/(55-77) 129/72   Heart Rate:  [63-81] 76   Resp:  [15-18] 18   SpO2:  [92 %-99 %] 96 %       Device (Oxygen Therapy): room air             Intake/Output last 24 hours:      Intake/Output Summary (Last 24 hours) at 2024 1439  Last data filed at 2024 1211  Gross per 24 hour   Intake 300 ml   Output --   Net 300 ml       Intake/Output this shift:    I/O this shift:  In: 300 [IV Piggyback:300]  Out: -       Exam     NAD   Resting comfortably   Abdomen obese, soft, generally nontender though slight tenderness at incision site. Improved erythema. Packing removed and replaced without complication     Labs:   BMP:   Lab Results   Component Value Date     2024    K 3.8 2024     2024    CO2 26.0 2024    BUN 16 2024    CREATININE 0.70 2024     CBC w/ diff:   Lab Results   Component Value Date    WBC 8.39 2024    NEUTRORELPCT 79.4 (H) 2024    AUTOIGPER 0.6 (H) 2024    LYMPHORELPCT 15.4 (L) 2024    MONORELPCT 3.6 (L) 2024    EOSRELPCT 0.6 2024    BASORELPCT 0.4 2024    HGB 10.2 (L) 2024    HCT 32.3 (L) 2024    MCV 82.8 2024    RDW 14.0 2024     2024     CMP:   Lab Results   Component Value Date     2024    K 3.8 2024     2024    CO2 26.0 2024    BUN 16 2024    CREATININE 0.70 2024    GLUCOSE 86 2024    ALBUMIN 3.8  2024    CALCIUM 8.6 2024    AST 14 2024    ALT 7 2024    BILITOT 0.2 2024     FSBS's:   Glucose   Date/Time Value Ref Range Status   2024 1222 82 70 - 130 mg/dL Final   2024 0720 94 70 - 130 mg/dL Final   2024 2145 105 70 - 130 mg/dL Final   2024 1813 86 70 - 130 mg/dL Final   2024 0958 111 70 - 130 mg/dL Final   2024 0739 87 70 - 130 mg/dL Final   2024 2105 112 70 - 130 mg/dL Final   2024 1835 92 70 - 130 mg/dL Final     Anaerobic Cx pending   Aerobic Cx small GN bacilli       Medications (current):       Budesonide, 9 mg, Oral, Daily  enoxaparin, 40 mg, Subcutaneous, Q12H  furosemide, 40 mg, Oral, Daily  labetalol, 200 mg, Oral, Q8H  lisinopril, 40 mg, Oral, Q24H  mesalamine, 4 g, Rectal, Nightly  NIFEdipine XL, 60 mg, Oral, Q24H  pantoprazole, 40 mg, Oral, BID AC  piperacillin-tazobactam, 4.5 g, Intravenous, Q8H  prenatal vitamin, 1 tablet, Oral, Daily  sodium chloride, 10 mL, Intravenous, Q12H  vancomycin, 1,250 mg, Intravenous, Q12H         bisacodyl    docusate sodium    lidocaine PF 1%    ondansetron ODT **OR** ondansetron    oxyCODONE    Pharmacy to dose vancomycin    sodium chloride    sodium chloride      Assessment and Plan     Antoinette Pack is a 38 y.o.  POD12 s/p PCS, salpingectomy admitted with wound infection   Stable on Vanc/Zosyn   Will continue while await wound cultures then transition to oral antibiotics.   Cont all home meds   Glucoses well controlled currently         I spent 30 minutes caring for the patient on the day of service. This included: obtaining or reviewing a separately obtained medical history, reviewing patient records, performing a medically appropriate exam and/or evaluation, counseling or educating the patient/family/caregiver, ordering medications, labs, and/or procedures and documenting such in the medical record. This does not include time spent on review and interpretation of  other tests such as fetal ultrasound or the performance of other procedures such as amniocentesis or CVS.    Rosmery Orellana MD FACOG  Maternal Fetal Medicine, [facility]     2024

## 2024-05-02 NOTE — PROGRESS NOTES
Pharmacy Consult-Vancomycin Dosing  Antoinette Pack is a  38 y.o. female receiving vancomycin therapy.     Indication: SSTI  Consulting Provider: Rosmery Orellana MD   ID Consult: No    Goal AUC: 400 - 600 mg/L*hr    Current Antimicrobial Therapy  Vancomycin 1000mg q12h  Zosyn 4.5gm q8h      Allergies  Allergies as of 05/01/2024 - Reviewed 05/01/2024   Allergen Reaction Noted    Dilantin [phenytoin] Mental Status Change 03/23/2017    Keppra [levetiracetam] Mental Status Change 03/23/2017    Meperidine Rash 01/27/2014    Topamax [topiramate] Mental Status Change 03/23/2017       Labs    Results from last 7 days   Lab Units 05/02/24  1225 05/01/24  1401   BUN mg/dL 16 15   CREATININE mg/dL 0.70 0.57       Results from last 7 days   Lab Units 05/01/24  1401   WBC 10*3/mm3 8.39       Evaluation of Dosing     Is Patient on Dialysis or Renal Replacement: No    Ht - 149.9 cm  Wt - 93.4 kg    Estimated Creatinine Clearance: 112.3 mL/min (by C-G formula based on SCr of 0.7 mg/dL).    No intake/output data recorded.    Microbiology and Radiology  Microbiology Results (last 10 days)       Procedure Component Value - Date/Time    Wound Culture - Swab, Abdominal Wall [[id number]]  (Abnormal) Collected: 05/01/24 1035    Lab Status: Preliminary result Specimen: Swab from Abdominal Wall Updated: 05/02/24 0844     Wound Culture Scant growth (1+) Gram Negative Bacilli     Gram Stain Rare (1+) WBCs seen      No organisms seen          Reported Vancomycin Levels    Results from last 7 days   Lab Units 05/02/24  1225   VANCOMYCIN RM mcg/mL 9.10                   InsightRX AUC Calculation:    Current AUC:   514 mg/L*hr    Predicted Steady State AUC on Current Dose: 376 mg/L*hr  _________________________________    Predicted Steady State AUC on New Dose:   470 mg/L*hr    Assessment/Plan:  Pharmacy to dose vancomycin for SSTI.   Goal AUC: 400-600 mg/L*hr  5/2 SCr - 0.7  5/1 WBC - 8.39  24hr tmax - 98.5  5/2 vancomycin level -  9.1 mcg/ml @1225  Vancomycin level drawn 10 hours following 2nd dose    Increase to vancomycin 1250mg q12h  Monitor renal function, clinical status and infusion related reactions  Follow vancomycin levels and adjust dose accordingly    Thanks  Bryce Cho [facility]  5/2/2024  13:05 EDT

## 2024-05-02 NOTE — CONSULTS
Diabetes Education    Patient Name:  Antoinette Pack  YOB: 1985  MRN: [id number]  Admit Date:  2024      Diabetes education rounding on pt while using insulin pump. Reviewed glucose readings, well managed. Pt denies any difficulty but kaye state her current Dexcom G6 sensor will  tomorrow evening and she does not have another one here or at home. We discussed ways to obtain- pt plans to request her home pharmacy transfer prescription to our retail pharmacy and will have  . Pt provided with retail pharmacy ph# as well as additional insuliun pump logs. Reminded to fill out. Notified pts RUPA Shi of plan via Epic Secure chat message.     TTS 15 min spent w/ pt 15 min.      Electronically signed by:  Natividad Barba RN  24 15:47 EDT

## 2024-05-02 NOTE — PLAN OF CARE
Goal Outcome Evaluation:  Plan of Care Reviewed With: patient        Progress: improving  Outcome Evaluation: Dr. Orellana did pt's packing/dressing change this afternoon after pt was premedicated. Vancomycin and Zosyn infused as ordered during this shift. Diabetes Educator met with patient and coordinated care with her pump. Pt hoping to go home tomorrow after IV antibiotics.

## 2024-05-02 NOTE — CASE MANAGEMENT/SOCIAL WORK
Discharge Planning Assessment  [facility]     Patient Name: Antoinette Pack  MRN: [id number]  Today's Date: 5/2/2024    Admit Date: 5/1/2024    Plan: Home with  transporting.   Discharge Needs Assessment       Row Name 05/02/24 1137       Living Environment    People in Home spouse    Name(s) of People in Home  Michelle [phone]    Primary Care Provided by self    Provides Primary Care For no one    Family Caregiver if Needed spouse    Family Caregiver Names  Michelle [phone]    Quality of Family Relationships involved;helpful    Able to Return to Prior Arrangements yes       Transition Planning    Transportation Anticipated family or friend will provide   Michelle [phone]       Discharge Needs Assessment    Readmission Within the Last 30 Days no previous admission in last 30 days    Equipment Currently Used at Home cpap                   Discharge Plan       Row Name 05/02/24 1138       Plan    Plan Home with  transporting.    Plan Comments SW'er met with patient at bedside. Patient has a baby in the NICU. Lives in [facility] with  Michelle [phone]. Patient reports functioning independently with ADL's, DME includes CPAP and no  HH. Patients PCP is Shawna Lambert DO. Patient has [employer] and [employer]. Plan is home with  transporting.    Final Discharge Disposition Code 01 - home or self-care                  Continued Care and Services - Admitted Since 5/1/2024    No active coordination exists for this encounter.       Selected Continued Care - Episodes Includes continued care and service providers with selected services from the active episodes listed below      General Specialty Pharmacy Episode start date: 10/24/2023   There are no active outsourced providers for this episode.             L[facility] Endocrine Disorders Episode start date: 2/17/2022   There are no active outsourced providers for this episode.                  Expected Discharge Date and Time       Expected Discharge Date Expected Discharge Time    May 4, 2024            Demographic Summary       Row Name 05/02/24 1136       General Information    Admission Type inpatient    Referral Source admission list    Reason for Consult discharge planning                   Functional Status       Row Name 05/02/24 1136       Functional Status    Usual Activity Tolerance good    Current Activity Tolerance good       Functional Status, IADL    Medications independent    Meal Preparation independent    Housekeeping independent    Laundry independent    Shopping independent    IADL Comments functioning independently  with ADL's, DME includes CPAP and no  HH       Employment/    Employment/ Comments [employer] and [employer]                   Psychosocial    No documentation.                  Abuse/Neglect    No documentation.                  Legal    No documentation.                  Substance Abuse    No documentation.                  Patient Forms    No documentation.                     JO ANN Faustin (Kay)

## 2024-05-03 ENCOUNTER — READMISSION MANAGEMENT (OUTPATIENT)
Dept: CALL CENTER | Facility: HOSPITAL | Age: 39
End: 2024-05-03
Payer: MEDICARE

## 2024-05-03 VITALS
SYSTOLIC BLOOD PRESSURE: 92 MMHG | OXYGEN SATURATION: 92 % | TEMPERATURE: 96.8 F | HEART RATE: 76 BPM | DIASTOLIC BLOOD PRESSURE: 53 MMHG | RESPIRATION RATE: 17 BRPM

## 2024-05-03 LAB
ANION GAP SERPL CALCULATED.3IONS-SCNC: 7 MMOL/L (ref 5–15)
BACTERIA SPEC AEROBE CULT: ABNORMAL
BUN SERPL-MCNC: 16 MG/DL (ref 6–20)
BUN/CREAT SERPL: 21.1 (ref 7–25)
CALCIUM SPEC-SCNC: 8.2 MG/DL (ref 8.6–10.5)
CHLORIDE SERPL-SCNC: 100 MMOL/L (ref 98–107)
CO2 SERPL-SCNC: 29 MMOL/L (ref 22–29)
CREAT SERPL-MCNC: 0.76 MG/DL (ref 0.57–1)
EGFRCR SERPLBLD CKD-EPI 2021: 101.7 ML/MIN/1.73
GLUCOSE BLDC GLUCOMTR-MCNC: 103 MG/DL (ref 70–130)
GLUCOSE BLDC GLUCOMTR-MCNC: 161 MG/DL (ref 70–130)
GLUCOSE SERPL-MCNC: 99 MG/DL (ref 65–99)
GRAM STN SPEC: ABNORMAL
GRAM STN SPEC: ABNORMAL
POTASSIUM SERPL-SCNC: 3.3 MMOL/L (ref 3.5–5.2)
SODIUM SERPL-SCNC: 136 MMOL/L (ref 136–145)

## 2024-05-03 PROCEDURE — A9270 NON-COVERED ITEM OR SERVICE: HCPCS | Performed by: OBSTETRICS & GYNECOLOGY

## 2024-05-03 PROCEDURE — 99238 HOSP IP/OBS DSCHRG MGMT 30/<: CPT | Performed by: OBSTETRICS & GYNECOLOGY

## 2024-05-03 PROCEDURE — 63710000001 NIFEDIPINE XL 30 MG TABLET SUSTAINED-RELEASE 24 HOUR: Performed by: OBSTETRICS & GYNECOLOGY

## 2024-05-03 PROCEDURE — 96366 THER/PROPH/DIAG IV INF ADDON: CPT

## 2024-05-03 PROCEDURE — 96375 TX/PRO/DX INJ NEW DRUG ADDON: CPT

## 2024-05-03 PROCEDURE — 63710000001 PRENATAL VITAMIN 27-0.8 27-0.8 MG TABLET: Performed by: OBSTETRICS & GYNECOLOGY

## 2024-05-03 PROCEDURE — 63710000001 PANTOPRAZOLE 40 MG TABLET DELAYED-RELEASE: Performed by: OBSTETRICS & GYNECOLOGY

## 2024-05-03 PROCEDURE — 63710000001 LISINOPRIL 40 MG TABLET: Performed by: OBSTETRICS & GYNECOLOGY

## 2024-05-03 PROCEDURE — 63710000001 POTASSIUM CHLORIDE 20 MEQ TABLET CONTROLLED-RELEASE: Performed by: OBSTETRICS & GYNECOLOGY

## 2024-05-03 PROCEDURE — 25010000002 ENOXAPARIN PER 10 MG: Performed by: OBSTETRICS & GYNECOLOGY

## 2024-05-03 PROCEDURE — 25010000002 VANCOMYCIN HCL 1.25 G RECONSTITUTED SOLUTION 1 EACH VIAL

## 2024-05-03 PROCEDURE — 63710000001 OXYCODONE 10 MG TABLET: Performed by: OBSTETRICS & GYNECOLOGY

## 2024-05-03 PROCEDURE — 25010000002 ONDANSETRON PER 1 MG: Performed by: OBSTETRICS & GYNECOLOGY

## 2024-05-03 PROCEDURE — 25010000002 PIPERACILLIN SOD-TAZOBACTAM PER 1 G: Performed by: OBSTETRICS & GYNECOLOGY

## 2024-05-03 PROCEDURE — 80048 BASIC METABOLIC PNL TOTAL CA: CPT

## 2024-05-03 PROCEDURE — 63710000001 LABETALOL 200 MG TABLET: Performed by: OBSTETRICS & GYNECOLOGY

## 2024-05-03 PROCEDURE — 25810000003 SODIUM CHLORIDE 0.9 % SOLUTION 250 ML FLEX CONT

## 2024-05-03 PROCEDURE — 63710000001 BUDESONIDE 3 MG CAPSULE DELAYED-RELEASE PARTICLES: Performed by: OBSTETRICS & GYNECOLOGY

## 2024-05-03 PROCEDURE — G0378 HOSPITAL OBSERVATION PER HR: HCPCS

## 2024-05-03 PROCEDURE — 82948 REAGENT STRIP/BLOOD GLUCOSE: CPT

## 2024-05-03 PROCEDURE — 96372 THER/PROPH/DIAG INJ SC/IM: CPT

## 2024-05-03 PROCEDURE — 63710000001 DOCUSATE SODIUM 100 MG CAPSULE: Performed by: OBSTETRICS & GYNECOLOGY

## 2024-05-03 PROCEDURE — 25010000002 METOCLOPRAMIDE PER 10 MG: Performed by: OBSTETRICS & GYNECOLOGY

## 2024-05-03 RX ORDER — SUCRALFATE 1 G/1
1 TABLET ORAL
Qty: 28 TABLET | Refills: 0 | Status: SHIPPED | OUTPATIENT
Start: 2024-05-03 | End: 2024-05-10

## 2024-05-03 RX ORDER — BUDESONIDE 3 MG/1
9 CAPSULE, COATED PELLETS ORAL DAILY
Qty: 90 CAPSULE | Refills: 1 | Status: SHIPPED | OUTPATIENT
Start: 2024-05-03

## 2024-05-03 RX ORDER — LABETALOL 200 MG/1
200 TABLET, FILM COATED ORAL DAILY
Qty: 90 TABLET | Refills: 3 | Status: SHIPPED | OUTPATIENT
Start: 2024-05-03

## 2024-05-03 RX ORDER — AMOXICILLIN AND CLAVULANATE POTASSIUM 875; 125 MG/1; MG/1
1 TABLET, FILM COATED ORAL EVERY 12 HOURS SCHEDULED
Qty: 28 TABLET | Refills: 0 | Status: SHIPPED | OUTPATIENT
Start: 2024-05-03 | End: 2024-05-18

## 2024-05-03 RX ORDER — OXYCODONE HYDROCHLORIDE 10 MG/1
10 TABLET ORAL EVERY 4 HOURS PRN
Qty: 9 TABLET | Refills: 0 | Status: SHIPPED | OUTPATIENT
Start: 2024-05-03 | End: 2024-05-06

## 2024-05-03 RX ORDER — AMOXICILLIN AND CLAVULANATE POTASSIUM 875; 125 MG/1; MG/1
1 TABLET, FILM COATED ORAL EVERY 12 HOURS SCHEDULED
Status: DISCONTINUED | OUTPATIENT
Start: 2024-05-03 | End: 2024-05-03 | Stop reason: HOSPADM

## 2024-05-03 RX ORDER — METOCLOPRAMIDE HYDROCHLORIDE 5 MG/ML
10 INJECTION INTRAMUSCULAR; INTRAVENOUS ONCE
Status: COMPLETED | OUTPATIENT
Start: 2024-05-03 | End: 2024-05-03

## 2024-05-03 RX ORDER — POTASSIUM CHLORIDE 20 MEQ/1
40 TABLET, EXTENDED RELEASE ORAL EVERY 4 HOURS
Status: COMPLETED | OUTPATIENT
Start: 2024-05-03 | End: 2024-05-03

## 2024-05-03 RX ADMIN — DOCUSATE SODIUM 100 MG: 100 CAPSULE, LIQUID FILLED ORAL at 12:15

## 2024-05-03 RX ADMIN — POTASSIUM CHLORIDE 40 MEQ: 1500 TABLET, EXTENDED RELEASE ORAL at 12:15

## 2024-05-03 RX ADMIN — POTASSIUM CHLORIDE 40 MEQ: 1500 TABLET, EXTENDED RELEASE ORAL at 16:19

## 2024-05-03 RX ADMIN — NIFEDIPINE 60 MG: 30 TABLET, EXTENDED RELEASE ORAL at 09:07

## 2024-05-03 RX ADMIN — METOCLOPRAMIDE 10 MG: 5 INJECTION, SOLUTION INTRAMUSCULAR; INTRAVENOUS at 15:06

## 2024-05-03 RX ADMIN — PANTOPRAZOLE SODIUM 40 MG: 40 TABLET, DELAYED RELEASE ORAL at 16:38

## 2024-05-03 RX ADMIN — PRENATAL VITAMINS-IRON FUMARATE 27 MG IRON-FOLIC ACID 0.8 MG TABLET 1 TABLET: at 09:08

## 2024-05-03 RX ADMIN — LABETALOL HYDROCHLORIDE 200 MG: 200 TABLET, FILM COATED ORAL at 05:42

## 2024-05-03 RX ADMIN — PIPERACILLIN AND TAZOBACTAM 4.5 G: 4; .5 INJECTION, POWDER, FOR SOLUTION INTRAVENOUS at 12:24

## 2024-05-03 RX ADMIN — BUDESONIDE 9 MG: 3 CAPSULE, COATED PELLETS ORAL at 09:07

## 2024-05-03 RX ADMIN — LISINOPRIL 40 MG: 40 TABLET ORAL at 09:07

## 2024-05-03 RX ADMIN — ENOXAPARIN SODIUM 40 MG: 100 INJECTION SUBCUTANEOUS at 09:07

## 2024-05-03 RX ADMIN — PIPERACILLIN AND TAZOBACTAM 4.5 G: 4; .5 INJECTION, POWDER, FOR SOLUTION INTRAVENOUS at 04:32

## 2024-05-03 RX ADMIN — ONDANSETRON 4 MG: 2 INJECTION INTRAMUSCULAR; INTRAVENOUS at 12:15

## 2024-05-03 RX ADMIN — PANTOPRAZOLE SODIUM 40 MG: 40 TABLET, DELAYED RELEASE ORAL at 09:08

## 2024-05-03 RX ADMIN — OXYCODONE HYDROCHLORIDE 10 MG: 10 TABLET ORAL at 04:05

## 2024-05-03 RX ADMIN — VANCOMYCIN HYDROCHLORIDE 1250 MG: 1.25 INJECTION, POWDER, LYOPHILIZED, FOR SOLUTION INTRAVENOUS at 05:42

## 2024-05-03 RX ADMIN — Medication 10 ML: at 09:08

## 2024-05-03 RX ADMIN — OXYCODONE HYDROCHLORIDE 10 MG: 10 TABLET ORAL at 12:15

## 2024-05-03 NOTE — CONSULTS
Diabetes Education    Patient Name:  Antoinette Pack  YOB: 1985  MRN: [id number]  Admit Date:  5/1/2024    Insulin Pump follow up:   Met with patient and family in room. Patient confirmed that Dexcom sensors are ready for  in pharmacy.  to pick it up.  requested a vial of Novolog to be called into the pharmacy also since he did not know when he would be able to  and bring her another vial. She will be staying at the [facility] for her daughter. Will message the provider for their request.    Electronically signed by:  Ольга Gomez RN  05/03/24 12:11 EDT

## 2024-05-03 NOTE — PLAN OF CARE
Goal Outcome Evaluation:      Discharge information packet sent home with patient. Discharge education included medication, activity restrictions, signs/symptoms of infection and wound dehiscence, provider follow-up, when to call provider or present to ED. Patient sent with additional dressing change supplies and materials to protect dressing during shower. Continue plan of care.

## 2024-05-03 NOTE — PLAN OF CARE
Goal Outcome Evaluation:  Plan of Care Reviewed With: patient           Outcome Evaluation: VSS.  Patient had IV restarted, tolerated well.  RA.  IV in place and patent infusing without issue.  Patient is up ad nimesh.  She has had an adequate amount of UOP noted.  Have administered scheduled and prn medicaiton as indicated.  Will continue to monitor patient throughout the rest of this shift.

## 2024-05-03 NOTE — PROGRESS NOTES
Pt to  her Augmentin prescription (14 days BID) in the hospital pharmacy tomorrow 5/4/24 when visiting her infant in the NICU    Pt directed to go to [facility] and ask for the laborist if unable to get her antibiotics at the pharmacy

## 2024-05-03 NOTE — PROGRESS NOTES
"5/3/2024     PROGRESS NOTE    POD #13  Readmit for postoperative wound infection     SUBJECTIVE   No complaints, has a room at [facility] for baby in NICU, will start breastfeeding \"Jody\" tomorrow  Doing well.  Pain controlled.  Ambulating.  Tolerating PO.    PHYSICAL EXAMINATION      Vital Signs Range for the last 24 hours  Temperature: Temp:  [97.2 °F (36.2 °C)-97.9 °F (36.6 °C)] 97.5 °F (36.4 °C)   BP: BP: ()/(55-79) 115/68   Pulse: Heart Rate:  [69-88] 80   Respirations: Resp:  [16-18] 17     Constitutional:  No acute distress.  Abdomen:  Soft, non-distended, appropriately tender.  Fundus: firm and beneath umbilicus.  Incision:  Clean/dry/packing tape present     LABS  Hematocrit   Date Value Ref Range Status   2024 32.3 (L) 34.0 - 46.6 % Final     Lab Results   Component Value Date    HEPBSAG Negative 2023         Assessment  POD# 13 after Primary  with left total salpingectomy no right tube present  Wound infection s/p IV vancomycin and zosyn: wound culture positive for E.coli, susceptible to all but ampicillin, continue with iodine packing daily at home  3.  Mild hypokalemia - oral replacement now and this afternoon  Plan  Discharge home on PO antibiotics Augmentin  mg BID x 14 days  2. Follow up in [facility] in 2 weeks  D/W Dr. Jim LIANG  This note has been electronically signed.        Carey Ramos MD  5/3/2024  11:51 EDT    "

## 2024-05-03 NOTE — DISCHARGE SUMMARY
Date of Discharge:  5/3/2024    Discharge Diagnosis: wound infection    Presenting Problem/History of Present Illness  Post-operative infection [T81.40XA]  Wound infection after surgery [T81.49XA]  Wound infection [T14.8XXA, L08.9]       Hospital Course  Patient is a 38 y.o. female presented with drainage from her incision to clinic on DOA and started IV antibiotics with wound culture growth of e. Coli susceptible to all but ampicillin.  Transitioned to PO antibiotics and received daily changes of her wound packing and was instructed on how to do this at home..      Procedures Performed         Consults:   Consults       Date and Time Order Name Status Description    4/20/2024  9:11 AM Inpatient Gastroenterology Consult Completed     4/18/2024  2:27 PM Inpatient Cardiology Consult Completed            Latest Reference Range & Units 05/03/24 05:55   Sodium 136 - 145 mmol/L 136   Potassium 3.5 - 5.2 mmol/L 3.3 (L)   Chloride 98 - 107 mmol/L 100   CO2 22.0 - 29.0 mmol/L 29.0   Anion Gap 5.0 - 15.0 mmol/L 7.0   BUN 6 - 20 mg/dL 16   Creatinine 0.57 - 1.00 mg/dL 0.76   BUN/Creatinine Ratio 7.0 - 25.0  21.1   eGFR >60.0 mL/min/1.73 101.7   Glucose 65 - 99 mg/dL 99   Calcium 8.6 - 10.5 mg/dL 8.2 (L)   (L): Data is abnormally low  Pertinent Test Results: labs:   and microbiology: wound culture: positive for        Lab   Scant growth (1+) Escherichia coli Abnormal          Condition on Discharge:  stable    Physical Exam:   Vital Signs Range for the last 24 hours  Temperature: Temp:  [97.2 °F (36.2 °C)-97.7 °F (36.5 °C)] 97.3 °F (36.3 °C)   BP: BP: ()/(55-79) 113/71   Pulse: Heart Rate:  [69-88] 77   Respirations: Resp:  [16-18] 18     Constitutional:  Well developed, well nourished, no acute distress.  Lungs:  Clear to auscultation bilaterally, normal breath sounds.   Heart:  Normal rate and rhythm, no murmurs.   Abdomen:  Soft, nontender.  Uterus: nontender firm and below umbilicus  Incision: no erythema, small  opening packed with iodine gauze  Extremities: no cyanosis, clubbing or edema, no evidence of DVT.          Discharge Disposition  Home or Self Care    Discharge Medications     Discharge Medications        Changes to Medications        Instructions Start Date   Dexcom G6 Transmitter misc  What changed: Another medication with the same name was added. Make sure you understand how and when to take each.   1 each, Does not apply, Every 3 Months      Dexcom G6 Transmitter misc  What changed: You were already taking a medication with the same name, and this prescription was added. Make sure you understand how and when to take each.   1 each, Does not apply, Every 3 Months      Omnipod 5 G6 Pods (Gen 5) misc  What changed: Another medication with the same name was added. Make sure you understand how and when to take each.   Change 2 (Two) Times a Day as directed.      Omnipod 5 G6 Pods (Gen 5) misc  What changed: You were already taking a medication with the same name, and this prescription was added. Make sure you understand how and when to take each.   Use 1 each Day.             Continue These Medications        Instructions Start Date   BD Pen Needle Brandee U/F 32G X 4 MM misc  Generic drug: Insulin Pen Needle   Use to inject insulin up to 4 times daily as directed      Budesonide 3 MG 24 hr capsule  Commonly known as: ENTOCORT EC   9 mg, Oral, Daily, For uc      Dexcom G6 Sensor   Change sensor Every 10 (Ten) Days.      furosemide 40 MG tablet  Commonly known as: LASIX   40 mg, Oral, Daily PRN      labetalol 200 MG tablet  Commonly known as: NORMODYNE   200 mg, Oral, Daily      lisinopril 40 MG tablet  Commonly known as: PRINIVIL,ZESTRIL   40 mg, Oral, Every 24 Hours Scheduled      mesalamine 4 g enema  Commonly known as: ROWASA   4 g, Rectal, Nightly      NIFEdipine CC 60 MG 24 hr tablet  Commonly known as: ADALAT CC   60 mg, Oral, Every 24 Hours Scheduled      pantoprazole 40 MG EC tablet  Commonly known as:  "PROTONIX   40 mg, Oral, 2 Times Daily Before Meals      PRENATAL PO   1 tablet, Oral, Daily      TRUEplus Insulin Syringe 31G X 5/16\" 1 ML misc  Generic drug: Insulin Syringe-Needle U-100   1 each, Does not apply, 3 Times Daily With Meals, As needed for additional insulin bolus with meals.             ASK your doctor about these medications        Instructions Start Date   Enoxaparin Sodium 40 MG/0.4ML solution prefilled syringe syringe  Commonly known as: LOVENOX   40 mg, Subcutaneous, Every 12 Hours Scheduled      Fiasp 100 UNIT/ML solution  Generic drug: Insulin Aspart (w/Niacinamide)   350 Units, Injection, Daily, For use in insulin pump. Max dose of 350u/day.      folic acid 1 MG tablet  Commonly known as: FOLVITE   1 mg, Oral, Daily      Insulin Aspart 100 UNIT/ML injection  Commonly known as: novoLOG   For use in insulin pump. Max Daily Dose: 250 units daily.      sucralfate 1 g tablet  Commonly known as: CARAFATE  Ask about: Should I take this medication?   1 g, Oral, 4 Times Daily Before Meals & Nightly               Discharge Diet: diabetic diet as tolerated    Activity at Discharge: as tolerated    Follow-up Appointments  Future Appointments   Date Time Provider Department Center   5/24/2024  1:30 PM Pablo Cabrera MD [facility]         Test Results Pending at Discharge  Pending Labs       Order Current Status    Anaerobic Culture - Swab, Abdominal Wall In process             Carey Ramos MD  05/03/24  13:53 EDT    Time: Discharge 25 min    Pt needs a follow up appointment with CHARO Orellana in 2 weeks for wound check    "

## 2024-05-03 NOTE — CASE MANAGEMENT/SOCIAL WORK
Case Management Discharge Note      Final Note: 'er met with patient and family at bedside. No discharge planning needs. Plan is home with family transporting         Selected Continued Care - Admitted Since 5/1/2024       Destination    No services have been selected for the patient.                Durable Medical Equipment    No services have been selected for the patient.                Dialysis/Infusion    No services have been selected for the patient.                Home Medical Care    No services have been selected for the patient.                Therapy    No services have been selected for the patient.                Community Resources    No services have been selected for the patient.                Community & DME    No services have been selected for the patient.                    Selected Continued Care - Episodes Includes continued care and service providers with selected services from the active episodes listed below      General Specialty Pharmacy Episode start date: 10/24/2023   There are no active outsourced providers for this episode.             [facility] Endocrine Disorders Episode start date: 2/17/2022   There are no active outsourced providers for this episode.                      Final Discharge Disposition Code: 01 - home or self-care

## 2024-05-03 NOTE — PLAN OF CARE
Goal Outcome Evaluation:  Plan of Care Reviewed With: patient, spouse        Progress: improving  Outcome Evaluation: VSS on RA, wears home CPAP at night and during naps. Pain managed with prn oxycodone x1 prior to dressing change by Dr. Fernandez, prn ondansetron given prior to oxycodone; patient reported nausea and dizziness around 2 hours following oxycodone administration, VSS at this time. One-time dose of metroclopramide ordered, patient reported relief. Patient is up ad nimesh, ambulating in room frequently, pumping. Patient visited her baby in the NICU twice this shift. Antibiotics infusing. Potassium replaced. Calm, cooperative, pleasant. Family at bedside, interacting with patient. Care ongoing, continue plan of care.

## 2024-05-04 NOTE — OUTREACH NOTE
Prep Survey      Flowsheet Row Responses   [facility]th[facility] facility patient discharged from? [facility]   Is LACE score < 7 ? No   Eligibility Readm Mgmt   Discharge diagnosis Wound infection after surgery   Does the patient have one of the following disease processes/diagnoses(primary or secondary)? Other   Does the patient have Home health ordered? No   Is there a DME ordered? No   Prep survey completed? Yes            Danita ZAMBRANO - Registered Nurse

## 2024-05-06 LAB — BACTERIA SPEC ANAEROBE CULT: ABNORMAL

## 2024-05-08 ENCOUNTER — READMISSION MANAGEMENT (OUTPATIENT)
Dept: CALL CENTER | Facility: HOSPITAL | Age: 39
End: 2024-05-08
Payer: MEDICARE

## 2024-05-08 ENCOUNTER — TELEPHONE (OUTPATIENT)
Dept: OBSTETRICS AND GYNECOLOGY | Facility: HOSPITAL | Age: 39
End: 2024-05-08
Payer: MEDICARE

## 2024-05-09 ENCOUNTER — TELEPHONE (OUTPATIENT)
Dept: OBSTETRICS AND GYNECOLOGY | Facility: HOSPITAL | Age: 39
End: 2024-05-09
Payer: MEDICARE

## 2024-05-10 ENCOUNTER — TELEPHONE (OUTPATIENT)
Dept: OBSTETRICS AND GYNECOLOGY | Facility: HOSPITAL | Age: 39
End: 2024-05-10
Payer: MEDICARE

## 2024-05-10 ENCOUNTER — DOCUMENTATION (OUTPATIENT)
Dept: OBSTETRICS AND GYNECOLOGY | Facility: HOSPITAL | Age: 39
End: 2024-05-10
Payer: MEDICARE

## 2024-05-10 DIAGNOSIS — K62.5 RECTAL BLEEDING: Primary | ICD-10-CM

## 2024-05-11 ENCOUNTER — APPOINTMENT (OUTPATIENT)
Dept: CT IMAGING | Facility: HOSPITAL | Age: 39
End: 2024-05-11
Payer: MEDICARE

## 2024-05-11 ENCOUNTER — HOSPITAL ENCOUNTER (EMERGENCY)
Facility: HOSPITAL | Age: 39
Discharge: HOME | End: 2024-05-11
Attending: EMERGENCY MEDICINE
Payer: MEDICARE

## 2024-05-11 VITALS
WEIGHT: 200 LBS | RESPIRATION RATE: 18 BRPM | HEART RATE: 85 BPM | OXYGEN SATURATION: 95 % | BODY MASS INDEX: 40.32 KG/M2 | HEIGHT: 59 IN | SYSTOLIC BLOOD PRESSURE: 129 MMHG | TEMPERATURE: 98.7 F | DIASTOLIC BLOOD PRESSURE: 70 MMHG

## 2024-05-11 DIAGNOSIS — J20.4 PARAINFLUENZA VIRUS BRONCHITIS: Primary | ICD-10-CM

## 2024-05-11 DIAGNOSIS — K62.5 RECTAL BLEEDING: ICD-10-CM

## 2024-05-11 DIAGNOSIS — Z98.890 RECENT MAJOR SURGERY: ICD-10-CM

## 2024-05-11 DIAGNOSIS — R52 BODY ACHES: ICD-10-CM

## 2024-05-11 DIAGNOSIS — R68.89 FLU-LIKE SYMPTOMS: ICD-10-CM

## 2024-05-11 DIAGNOSIS — R10.84 GENERALIZED ABDOMINAL PAIN: ICD-10-CM

## 2024-05-11 LAB
ALBUMIN SERPL-MCNC: 4 G/DL (ref 3.5–5.2)
ALBUMIN/GLOB SERPL: 1.3 G/DL
ALP SERPL-CCNC: 77 U/L (ref 39–117)
ALT SERPL W P-5'-P-CCNC: 16 U/L (ref 1–33)
ANION GAP SERPL CALCULATED.3IONS-SCNC: 10 MMOL/L (ref 5–15)
AST SERPL-CCNC: 16 U/L (ref 1–32)
B PARAPERT DNA SPEC QL NAA+PROBE: NOT DETECTED
B PERT DNA SPEC QL NAA+PROBE: NOT DETECTED
B-HCG UR QL: NEGATIVE
BASOPHILS # BLD AUTO: 0.01 10*3/MM3 (ref 0–0.2)
BASOPHILS NFR BLD AUTO: 0.2 % (ref 0–1.5)
BILIRUB SERPL-MCNC: 0.4 MG/DL (ref 0–1.2)
BILIRUB UR QL STRIP: NEGATIVE
BUN SERPL-MCNC: 12 MG/DL (ref 6–20)
BUN/CREAT SERPL: 21.4 (ref 7–25)
C PNEUM DNA NPH QL NAA+NON-PROBE: NOT DETECTED
CALCIUM SPEC-SCNC: 8.8 MG/DL (ref 8.6–10.5)
CHLORIDE SERPL-SCNC: 103 MMOL/L (ref 98–107)
CLARITY UR: CLEAR
CO2 SERPL-SCNC: 27 MMOL/L (ref 22–29)
COLOR UR: YELLOW
CREAT SERPL-MCNC: 0.56 MG/DL (ref 0.57–1)
DEPRECATED RDW RBC AUTO: 43.1 FL (ref 37–54)
EGFRCR SERPLBLD CKD-EPI 2021: 118.5 ML/MIN/1.73
EOSINOPHIL # BLD AUTO: 0.1 10*3/MM3 (ref 0–0.4)
EOSINOPHIL NFR BLD AUTO: 2.1 % (ref 0.3–6.2)
ERYTHROCYTE [DISTWIDTH] IN BLOOD BY AUTOMATED COUNT: 14 % (ref 12.3–15.4)
EXPIRATION DATE: NORMAL
FLUAV SUBTYP SPEC NAA+PROBE: NOT DETECTED
FLUBV RNA ISLT QL NAA+PROBE: NOT DETECTED
GLOBULIN UR ELPH-MCNC: 3 GM/DL
GLUCOSE SERPL-MCNC: 94 MG/DL (ref 65–99)
GLUCOSE UR STRIP-MCNC: NEGATIVE MG/DL
HADV DNA SPEC NAA+PROBE: NOT DETECTED
HCOV 229E RNA SPEC QL NAA+PROBE: NOT DETECTED
HCOV HKU1 RNA SPEC QL NAA+PROBE: NOT DETECTED
HCOV NL63 RNA SPEC QL NAA+PROBE: NOT DETECTED
HCOV OC43 RNA SPEC QL NAA+PROBE: NOT DETECTED
HCT VFR BLD AUTO: 34.1 % (ref 34–46.6)
HGB BLD-MCNC: 10.4 G/DL (ref 12–15.9)
HGB UR QL STRIP.AUTO: NEGATIVE
HMPV RNA NPH QL NAA+NON-PROBE: NOT DETECTED
HOLD SPECIMEN: NORMAL
HPIV1 RNA ISLT QL NAA+PROBE: NOT DETECTED
HPIV2 RNA SPEC QL NAA+PROBE: NOT DETECTED
HPIV3 RNA NPH QL NAA+PROBE: DETECTED
HPIV4 P GENE NPH QL NAA+PROBE: NOT DETECTED
IMM GRANULOCYTES # BLD AUTO: 0.02 10*3/MM3 (ref 0–0.05)
IMM GRANULOCYTES NFR BLD AUTO: 0.4 % (ref 0–0.5)
INTERNAL NEGATIVE CONTROL: NORMAL
INTERNAL POSITIVE CONTROL: NORMAL
KETONES UR QL STRIP: NEGATIVE
LEUKOCYTE ESTERASE UR QL STRIP.AUTO: NEGATIVE
LIPASE SERPL-CCNC: 24 U/L (ref 13–60)
LYMPHOCYTES # BLD AUTO: 1.5 10*3/MM3 (ref 0.7–3.1)
LYMPHOCYTES NFR BLD AUTO: 31.6 % (ref 19.6–45.3)
Lab: NORMAL
M PNEUMO IGG SER IA-ACNC: NOT DETECTED
MCH RBC QN AUTO: 26.2 PG (ref 26.6–33)
MCHC RBC AUTO-ENTMCNC: 30.5 G/DL (ref 31.5–35.7)
MCV RBC AUTO: 85.9 FL (ref 79–97)
MONOCYTES # BLD AUTO: 0.3 10*3/MM3 (ref 0.1–0.9)
MONOCYTES NFR BLD AUTO: 6.3 % (ref 5–12)
NEUTROPHILS NFR BLD AUTO: 2.81 10*3/MM3 (ref 1.7–7)
NEUTROPHILS NFR BLD AUTO: 59.4 % (ref 42.7–76)
NITRITE UR QL STRIP: NEGATIVE
NRBC BLD AUTO-RTO: 0 /100 WBC (ref 0–0.2)
PH UR STRIP.AUTO: 6.5 [PH] (ref 5–8)
PLATELET # BLD AUTO: 185 10*3/MM3 (ref 140–450)
PMV BLD AUTO: 10.6 FL (ref 6–12)
POTASSIUM SERPL-SCNC: 3.7 MMOL/L (ref 3.5–5.2)
PROT SERPL-MCNC: 7 G/DL (ref 6–8.5)
PROT UR QL STRIP: NEGATIVE
QT INTERVAL: 348 MS
QTC INTERVAL: 428 MS
RBC # BLD AUTO: 3.97 10*6/MM3 (ref 3.77–5.28)
RHINOVIRUS RNA SPEC NAA+PROBE: NOT DETECTED
RSV RNA NPH QL NAA+NON-PROBE: NOT DETECTED
SARS-COV-2 RNA NPH QL NAA+NON-PROBE: NOT DETECTED
SODIUM SERPL-SCNC: 140 MMOL/L (ref 136–145)
SP GR UR STRIP: 1.01 (ref 1–1.03)
UROBILINOGEN UR QL STRIP: NORMAL
WBC NRBC COR # BLD AUTO: 4.74 10*3/MM3 (ref 3.4–10.8)
WHOLE BLOOD HOLD COAG: NORMAL
WHOLE BLOOD HOLD SPECIMEN: NORMAL

## 2024-05-11 PROCEDURE — 71275 CT ANGIOGRAPHY CHEST: CPT

## 2024-05-11 PROCEDURE — 74177 CT ABD & PELVIS W/CONTRAST: CPT

## 2024-05-11 PROCEDURE — 96374 THER/PROPH/DIAG INJ IV PUSH: CPT

## 2024-05-11 PROCEDURE — 83690 ASSAY OF LIPASE: CPT | Performed by: EMERGENCY MEDICINE

## 2024-05-11 PROCEDURE — 94799 UNLISTED PULMONARY SVC/PX: CPT

## 2024-05-11 PROCEDURE — 93005 ELECTROCARDIOGRAM TRACING: CPT | Performed by: EMERGENCY MEDICINE

## 2024-05-11 PROCEDURE — 85025 COMPLETE CBC W/AUTO DIFF WBC: CPT | Performed by: EMERGENCY MEDICINE

## 2024-05-11 PROCEDURE — 25510000001 IOPAMIDOL PER 1 ML: Performed by: EMERGENCY MEDICINE

## 2024-05-11 PROCEDURE — 25010000002 METHYLPREDNISOLONE PER 125 MG: Performed by: EMERGENCY MEDICINE

## 2024-05-11 PROCEDURE — 0202U NFCT DS 22 TRGT SARS-COV-2: CPT | Performed by: EMERGENCY MEDICINE

## 2024-05-11 PROCEDURE — 80053 COMPREHEN METABOLIC PANEL: CPT | Performed by: EMERGENCY MEDICINE

## 2024-05-11 PROCEDURE — 81025 URINE PREGNANCY TEST: CPT | Performed by: EMERGENCY MEDICINE

## 2024-05-11 PROCEDURE — 81003 URINALYSIS AUTO W/O SCOPE: CPT | Performed by: EMERGENCY MEDICINE

## 2024-05-11 PROCEDURE — 99285 EMERGENCY DEPT VISIT HI MDM: CPT

## 2024-05-11 PROCEDURE — 94640 AIRWAY INHALATION TREATMENT: CPT

## 2024-05-11 RX ORDER — SODIUM CHLORIDE 9 MG/ML
10 INJECTION, SOLUTION INTRAMUSCULAR; INTRAVENOUS; SUBCUTANEOUS AS NEEDED
Status: DISCONTINUED | OUTPATIENT
Start: 2024-05-11 | End: 2024-05-11 | Stop reason: HOSPADM

## 2024-05-11 RX ORDER — ALBUTEROL SULFATE 90 UG/1
2 AEROSOL, METERED RESPIRATORY (INHALATION) EVERY 6 HOURS PRN
Qty: 8.5 G | Refills: 0 | Status: SHIPPED | OUTPATIENT
Start: 2024-05-11

## 2024-05-11 RX ORDER — METHYLPREDNISOLONE SODIUM SUCCINATE 125 MG/2ML
125 INJECTION, POWDER, LYOPHILIZED, FOR SOLUTION INTRAMUSCULAR; INTRAVENOUS ONCE
Status: COMPLETED | OUTPATIENT
Start: 2024-05-11 | End: 2024-05-11

## 2024-05-11 RX ORDER — IPRATROPIUM BROMIDE AND ALBUTEROL SULFATE 2.5; .5 MG/3ML; MG/3ML
3 SOLUTION RESPIRATORY (INHALATION) ONCE
Status: COMPLETED | OUTPATIENT
Start: 2024-05-11 | End: 2024-05-11

## 2024-05-11 RX ADMIN — METHYLPREDNISOLONE SODIUM SUCCINATE 125 MG: 125 INJECTION, POWDER, FOR SOLUTION INTRAMUSCULAR; INTRAVENOUS at 21:00

## 2024-05-11 RX ADMIN — IPRATROPIUM BROMIDE AND ALBUTEROL SULFATE 3 ML: .5; 2.5 SOLUTION RESPIRATORY (INHALATION) at 20:48

## 2024-05-11 RX ADMIN — IOPAMIDOL 85 ML: 755 INJECTION, SOLUTION INTRAVENOUS at 17:59

## 2024-05-11 NOTE — ED PROVIDER NOTES
Subjective   History of Present Illness  Patient is a pleasant 38-year-old female with complicated recent and chronic past medical history.  Presents to the emergency department with multiple complaints including fatigue, cough, dyspnea, mild chest discomfort, abdominal pain, rectal bleeding, and recent  wound infection.  She states that she, 2 weeks ago had a .  Baby was delivered at approximately 33 weeks and is currently in the NICU.  Earlier this week she developed some redness and drainage at the incision site was diagnosed with a postop wound infection.  She was briefly admitted and started on antibiotics.  She is currently taking oral antibiotics and says that the wound infection has been improving.  Her baby is in the NICU and over the past 2 days she has developed a cough.  She is concerned because she does not want to spread anything to her baby.  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  She has had rectal bleeding for several weeks which has been attributed possibly to this.  She states that her OB/GYN has been attempting to coordinate with gastroenterology to get GI consultation or follow-up in office but has been unsuccessful thus far arranging this appointment.  Denies fever, vomiting, diarrhea, or other acute complaints.      Review of Systems   All other systems reviewed and are negative.      Past Medical History:   Diagnosis Date    Anxiety     CHF (congestive heart failure) 2023    Colitis     Depression     Diabetes mellitus     Diverticulitis     GERD (gastroesophageal reflux disease)     Hypertension     Kidney stone     Narcolepsy     Pancreatitis 2023    PCOS (polycystic ovarian syndrome)     Polycystic ovary syndrome     Rectal bleeding     SINCE  - USES MESALAMINE SUPPOSITORY NIGHTLY    Seizures     Sleep apnea     Urinary tract infection        Allergies   Allergen Reactions    Dilantin [Phenytoin] Mental Status Change    Keppra  [Levetiracetam] Mental Status Change    Meperidine Rash    Topamax [Topiramate] Mental Status Change       Past Surgical History:   Procedure Laterality Date    CARDIAC CATHETERIZATION      CARPAL TUNNEL RELEASE       SECTION N/A 2024    Procedure:  SECTION PRIMARY;  Surgeon: Axel Keys MD;  Location:  [facility];  Service: Obstetrics/Gynecology;  Laterality: N/A;    COLONOSCOPY      COLONOSCOPY N/A 2024    Procedure: COLONOSCOPY;  Surgeon: Tom Mueller MD;  Location:  [facility];  Service: Gastroenterology;  Laterality: N/A;    ENDOSCOPY      ENDOSCOPY N/A 2024    Procedure: ESOPHAGOGASTRODUODENOSCOPY;  Surgeon: Tom Mueller MD;  Location:  [facility];  Service: Gastroenterology;  Laterality: N/A;    FRACTURE SURGERY      HARDWARE REMOVAL      WRIST    TENDON REPAIR      HAND    WISDOM TOOTH EXTRACTION         Family History   Problem Relation Age of Onset    Hypertension Mother     Depression Mother     Heart disease Mother     COPD Mother     Emphysema Mother     Hypertension Father     Diabetes Father     Heart disease Father     COPD Father     Heart failure Father     Hepatitis Father        Social History     Socioeconomic History    Marital status:    Tobacco Use    Smoking status: Former     Current packs/day: 0.00     Average packs/day: 1 pack/day for 20.0 years (20.0 ttl pk-yrs)     Types: Cigarettes     Start date:      Quit date:      Years since quittin.3     Passive exposure: Never    Smokeless tobacco: Never    Tobacco comments:     QUIT IN 2017   Vaping Use    Vaping status: Never Used   Substance and Sexual Activity    Alcohol use: No    Drug use: No    Sexual activity: Defer           Objective   Physical Exam  Vitals and nursing note reviewed.   Constitutional:       General: She is not in acute distress.     Appearance: She is well-developed.   HENT:      Head: Normocephalic and atraumatic.   Eyes:       Conjunctiva/sclera: Conjunctivae normal.      Pupils: Pupils are equal, round, and reactive to light.   Cardiovascular:      Rate and Rhythm: Normal rate and regular rhythm.      Heart sounds: Normal heart sounds.   Pulmonary:      Effort: Pulmonary effort is normal. No respiratory distress.      Breath sounds: Normal breath sounds.   Abdominal:      General: Bowel sounds are normal. There is no distension.      Palpations: Abdomen is soft. There is no mass.      Tenderness: There is no abdominal tenderness. There is no rebound.   Musculoskeletal:         General: Normal range of motion.      Cervical back: Normal range of motion and neck supple.   Skin:     General: Skin is warm and dry.      Capillary Refill: Capillary refill takes less than 2 seconds.   Neurological:      General: No focal deficit present.      Mental Status: She is alert and oriented to person, place, and time.   Psychiatric:         Mood and Affect: Mood normal.         Behavior: Behavior normal.         Procedures           ED Course      Recent Results (from the past 24 hour(s))   ECG 12 Lead Dyspnea    Collection Time: 05/11/24  4:15 PM   Result Value Ref Range    QT Interval 348 ms    QTC Interval 428 ms      Latest Reference Range & Units 05/11/24 16:46 05/11/24 16:50   Sodium 136 - 145 mmol/L 140    Potassium 3.5 - 5.2 mmol/L 3.7    Chloride 98 - 107 mmol/L 103    CO2 22.0 - 29.0 mmol/L 27.0    Anion Gap 5.0 - 15.0 mmol/L 10.0    BUN 6 - 20 mg/dL 12    Creatinine 0.57 - 1.00 mg/dL 0.56 (L)    BUN/Creatinine Ratio 7.0 - 25.0  21.4    eGFR >60.0 mL/min/1.73 118.5    Glucose 65 - 99 mg/dL 94    Calcium 8.6 - 10.5 mg/dL 8.8    Alkaline Phosphatase 39 - 117 U/L 77    Total Protein 6.0 - 8.5 g/dL 7.0    Albumin 3.5 - 5.2 g/dL 4.0    Globulin gm/dL 3.0    A/G Ratio g/dL 1.3    AST (SGOT) 1 - 32 U/L 16    ALT (SGPT) 1 - 33 U/L 16    Total Bilirubin 0.0 - 1.2 mg/dL 0.4    Lipase 13 - 60 U/L 24    WBC 3.40 - 10.80 10*3/mm3 4.74    RBC 3.77 - 5.28  "10*6/mm3 3.97    Hemoglobin 12.0 - 15.9 g/dL 10.4 (L)    Hematocrit 34.0 - 46.6 % 34.1    Platelets 140 - 450 10*3/mm3 185    RDW 12.3 - 15.4 % 14.0    MCV 79.0 - 97.0 fL 85.9    MCH 26.6 - 33.0 pg 26.2 (L)    MCHC 31.5 - 35.7 g/dL 30.5 (L)    MPV 6.0 - 12.0 fL 10.6    RDW-SD 37.0 - 54.0 fl 43.1    Neutrophil Rel % 42.7 - 76.0 % 59.4    Lymphocyte Rel % 19.6 - 45.3 % 31.6    Monocyte Rel % 5.0 - 12.0 % 6.3    Eosinophil Rel % 0.3 - 6.2 % 2.1    Basophil Rel % 0.0 - 1.5 % 0.2    Immature Granulocyte Rel % 0.0 - 0.5 % 0.4    Neutrophils Absolute 1.70 - 7.00 10*3/mm3 2.81    Lymphocytes Absolute 0.70 - 3.10 10*3/mm3 1.50    Monocytes Absolute 0.10 - 0.90 10*3/mm3 0.30    Eosinophils Absolute 0.00 - 0.40 10*3/mm3 0.10    Basophils Absolute 0.00 - 0.20 10*3/mm3 0.01    Immature Grans, Absolute 0.00 - 0.05 10*3/mm3 0.02    nRBC 0.0 - 0.2 /100 WBC 0.0    Color, UA Yellow, Straw   Yellow   Appearance, UA Clear   Clear   Specific Gravity, UA 1.001 - 1.030   1.012   pH, UA 5.0 - 8.0   6.5   Glucose Negative   Negative   Ketones, UA Negative   Negative   Blood, UA Negative   Negative   Nitrite, UA Negative   Negative   Leukocytes, UA Negative   Negative   Protein, UA Negative   Negative   Bilirubin, UA Negative   Negative   Urobilinogen, UA 0.2 - 1.0 E.U./dL   1.0 E.U./dL   HCG, Urine QL Negative   Negative   (L): Data is abnormally low    No orders to display     Vitals:    05/11/24 1608   BP: 125/66   BP Location: Left arm   Patient Position: Sitting   Pulse: 95   Resp: 18   Temp: 98.7 °F (37.1 °C)   TempSrc: Oral   SpO2: 97%   Weight: 90.7 kg (200 lb)   Height: 149.9 cm (59\")     Medications   Sodium Chloride (PF) 0.9 % 10 mL (has no administration in time range)     ECG/EMG Results (last 24 hours)       Procedure Component Value Units Date/Time    ECG 12 Lead Dyspnea [[id number]] Collected: 05/11/24 1615     Updated: 05/11/24 1617     QT Interval 348 ms      QTC Interval 428 ms     Narrative:      Test Reason : " Dyspnea  Blood Pressure :   */*   mmHG  Vent. Rate :  91 BPM     Atrial Rate :  91 BPM     P-R Int : 146 ms          QRS Dur :  82 ms      QT Int : 348 ms       P-R-T Axes :  36  63  34 degrees     QTc Int : 428 ms    Normal sinus rhythm  Normal ECG  When compared with ECG of 2024 00:58,  No significant change was found    Referred By:            Confirmed By:           ECG 12 Lead Dyspnea   Preliminary Result   Test Reason : Dyspnea   Blood Pressure :   */*   mmHG   Vent. Rate :  91 BPM     Atrial Rate :  91 BPM      P-R Int : 146 ms          QRS Dur :  82 ms       QT Int : 348 ms       P-R-T Axes :  36  63  34 degrees      QTc Int : 428 ms      Normal sinus rhythm   Normal ECG   When compared with ECG of 2024 00:58,   No significant change was found      Referred By:            Confirmed By:                                                  Medical Decision Making  Patients workup is reassuring.  I suspect that the majority of her symptoms are related to her bronchitis.  She is already on antibiotics for her  wound infect, which now seems to be healing well with no definitive sign of bacterial infection.  Pt has reliable outpatient follow up and understands to have a low threshold to return to the ED if symptoms persist, worsen, or other concerns arise.    Pt maintained O2 saturations over 90% when walking     Problems Addressed:  Body aches: complicated acute illness or injury  Flu-like symptoms: complicated acute illness or injury  Generalized abdominal pain: complicated acute illness or injury  Parainfluenza virus bronchitis: complicated acute illness or injury  Recent major surgery: complicated acute illness or injury  Rectal bleeding: complicated acute illness or injury    Amount and/or Complexity of Data Reviewed  External Data Reviewed: notes.  Labs: ordered. Decision-making details documented in ED Course.  Radiology: ordered and independent interpretation performed. Decision-making  details documented in ED Course.  ECG/medicine tests: ordered and independent interpretation performed. Decision-making details documented in ED Course.    Risk  Prescription drug management.        Final diagnoses:   Parainfluenza virus bronchitis   Flu-like symptoms   Body aches   Recent major surgery   Generalized abdominal pain   Rectal bleeding       ED Disposition  ED Disposition       ED Disposition   Discharge    Condition   Stable    Comment   --               DISCHARGE    Patient discharged in stable condition.    Reviewed implications of results, diagnosis, meds, responsibility to follow up, warning signs and symptoms of possible worsening, potential complications and reasons to return to ER.    Patient/Family voiced understanding of above instructions.    Discussed plan for discharge, as there is no emergent indication for admission.  Pt/family is agreeable and understands need for follow up and possible repeat testing.  Pt/family is aware that discharge does not mean that nothing is wrong but that it indicates no emergency is currently present that requires admission and they must continue care with follow-up as given below or with a physician of their choice.     FOLLOW-UP  Shawna Lambert, DO  [address] [facility] [id number] [phone]    Schedule an appointment as soon as possible for a visit       [facility] EMERGENCY DEPARTMENT  [address] [place] [id number] [phone]    If symptoms worsen         Medication List        New Prescriptions      albuterol sulfate  (90 Base) MCG/ACT inhaler  Commonly known as: PROVENTIL HFA;VENTOLIN HFA;PROAIR HFA  Inhale 2 puffs by mouth every 6 (Six) Hours As Needed for Wheezing.            Stop      sucralfate 1 g tablet  Commonly known as: CARAFATE               Where to Get Your Medications        These medications were sent to [facility]  [address] ,  [facility] [address]      Hours: Monday to Friday 7 AM to 5:30 PM, Saturday & Sunday 8 AM to 4:30 PM Phone: [phone]   albuterol sulfate  (90 Base) MCG/ACT inhaler            Last Taylor DO  05/14/24 1023

## 2024-05-14 LAB
QT INTERVAL: 348 MS
QTC INTERVAL: 428 MS

## 2024-05-16 ENCOUNTER — POSTPARTUM VISIT (OUTPATIENT)
Dept: OBSTETRICS AND GYNECOLOGY | Facility: HOSPITAL | Age: 39
End: 2024-05-16
Payer: MEDICARE

## 2024-05-16 VITALS
SYSTOLIC BLOOD PRESSURE: 123 MMHG | WEIGHT: 201 LBS | TEMPERATURE: 98.9 F | RESPIRATION RATE: 20 BRPM | HEART RATE: 86 BPM | HEIGHT: 59 IN | BODY MASS INDEX: 40.52 KG/M2 | DIASTOLIC BLOOD PRESSURE: 67 MMHG

## 2024-05-16 DIAGNOSIS — O24.119 PRE-EXISTING TYPE 2 DIABETES AFFECTING PREGNANCY, ANTEPARTUM: Primary | ICD-10-CM

## 2024-05-16 DIAGNOSIS — O10.919 CHRONIC HYPERTENSION AFFECTING PREGNANCY: ICD-10-CM

## 2024-05-16 NOTE — PROGRESS NOTES
"    Maternal/Fetal Medicine Postpartum Visit     Name: Antoinette Pack    : 1985     MRN: [id number]     Referring Provider: Rosmery Orellana MD    Chief Complaint  Postpartum Care    Subjective     History of Present Illness:  Antoinette Pack is a 38 y.o.  post op x 4 weeks for follow up/incision check   Currently being treated for URI    No incision concerns. Still being packed.   Still taking Labetalol though stopped Lisinopril due to  low BP   Infant stable in NICU   Pumping     Past medical, surgical, OB and social history reviewed and no changes noted.    ROS:   14 point review of systems reviewed. Pertinent positives and negatives reviewed in HPI. All other systems negative.    Objective     Vital Signs  /67   Pulse 86   Temp 98.9 °F (37.2 °C) (Oral)   Resp 20   Ht 149.9 cm (59\")   Wt 91.2 kg (201 lb)   LMP  (LMP Unknown)   Estimated body mass index is 40.6 kg/m² as calculated from the following:    Height as of this encounter: 149.9 cm (59\").    Weight as of this encounter: 91.2 kg (201 lb).    Physical Exam:  Gen: no apparent distress   HEENT: normocephalic, atraumatic   Neuro: no focal deficits appreciated   Skin: no rashes or lesions visible.   Pulm: Normal effort   Psych: Alert, oriented   Abdomen: nontender, obese. Inc: clean, small 2 cm right sided incision opening   Packing removed and replaced     Assessment and Plan       38 y.o.  post op x 4 weeks for follow up/incision check/BP check    Continue packing   BP well controlled on current regimen   Continue to monitor glucoses   Follow up 2 weeks      I spent 15 minutes caring for the patient on the day of service. This included: obtaining or reviewing a separately obtained medical history, reviewing patient records, performing a medically appropriate exam and/or evaluation, counseling or educating the patient/family/caregiver, ordering medications, labs, and/or procedures and documenting such in " the medical record. This does not include time spent on review and interpretation of other tests such as fetal ultrasound or the performance of other procedures such as amniocentesis or CVS.    Rosmery Orellana MD FACOG  Maternal Fetal Medicine, [facility]     2024

## 2024-05-21 ENCOUNTER — TELEPHONE (OUTPATIENT)
Dept: OBSTETRICS AND GYNECOLOGY | Facility: HOSPITAL | Age: 39
End: 2024-05-21
Payer: MEDICARE

## 2024-05-21 NOTE — TELEPHONE ENCOUNTER
Ms. Hampton called office to report she is having incisional pain and she thinks it is infected again.  She denies fever.  She reports she and her  are unable to look at the incision or see what is going on with it.  Unable to inform nursing if any drainage coming out of it.  She reports she finished antibiotics 4 days ago.  She is staying at [facility] due to baby in the NICU.  This RN spoke to Dr Orellana . Pt informed that if she wants to be seen tonight she will need to go to the ER due to being 1 month post surgery , pt does not want to go to the ER due to having a baby in the NICU.  Will have pt come to clinic tomorrow at 11:30 for incision check.  Pt with vu of plan of care

## 2024-05-22 ENCOUNTER — APPOINTMENT (OUTPATIENT)
Dept: CT IMAGING | Facility: HOSPITAL | Age: 39
End: 2024-05-22
Payer: MEDICARE

## 2024-05-22 ENCOUNTER — POSTPARTUM VISIT (OUTPATIENT)
Dept: OBSTETRICS AND GYNECOLOGY | Facility: HOSPITAL | Age: 39
End: 2024-05-22
Payer: MEDICARE

## 2024-05-22 ENCOUNTER — HOSPITAL ENCOUNTER (OUTPATIENT)
Facility: HOSPITAL | Age: 39
LOS: 1 days | Discharge: HOME | End: 2024-05-27
Attending: OBSTETRICS & GYNECOLOGY | Admitting: OBSTETRICS & GYNECOLOGY
Payer: MEDICARE

## 2024-05-22 VITALS
WEIGHT: 197 LBS | DIASTOLIC BLOOD PRESSURE: 65 MMHG | BODY MASS INDEX: 39.79 KG/M2 | SYSTOLIC BLOOD PRESSURE: 123 MMHG | TEMPERATURE: 98.2 F

## 2024-05-22 DIAGNOSIS — E11.65 PRE-EXISTING TYPE 2 DIABETES MELLITUS WITH HYPERGLYCEMIA DURING PREGNANCY IN THIRD TRIMESTER: Primary | ICD-10-CM

## 2024-05-22 DIAGNOSIS — T81.49XA WOUND INFECTION AFTER SURGERY: ICD-10-CM

## 2024-05-22 DIAGNOSIS — O10.919 CHRONIC HYPERTENSION AFFECTING PREGNANCY: ICD-10-CM

## 2024-05-22 DIAGNOSIS — O24.119 PRE-EXISTING TYPE 2 DIABETES AFFECTING PREGNANCY, ANTEPARTUM: ICD-10-CM

## 2024-05-22 DIAGNOSIS — O24.113 PRE-EXISTING TYPE 2 DIABETES MELLITUS WITH HYPERGLYCEMIA DURING PREGNANCY IN THIRD TRIMESTER: Primary | ICD-10-CM

## 2024-05-22 DIAGNOSIS — E66.01 MORBID OBESITY WITH BMI OF 40.0-44.9, ADULT: ICD-10-CM

## 2024-05-22 DIAGNOSIS — Z86.79 HISTORY OF CHF (CONGESTIVE HEART FAILURE): ICD-10-CM

## 2024-05-22 LAB
ALBUMIN SERPL-MCNC: 4 G/DL (ref 3.5–5.2)
ALBUMIN/GLOB SERPL: 1.2 G/DL
ALP SERPL-CCNC: 84 U/L (ref 39–117)
ALT SERPL W P-5'-P-CCNC: 12 U/L (ref 1–33)
ANION GAP SERPL CALCULATED.3IONS-SCNC: 11 MMOL/L (ref 5–15)
AST SERPL-CCNC: 14 U/L (ref 1–32)
BACTERIA UR QL AUTO: NORMAL /HPF
BASOPHILS # BLD AUTO: 0.03 10*3/MM3 (ref 0–0.2)
BASOPHILS NFR BLD AUTO: 0.4 % (ref 0–1.5)
BILIRUB SERPL-MCNC: 0.7 MG/DL (ref 0–1.2)
BILIRUB UR QL STRIP: NEGATIVE
BUN SERPL-MCNC: 15 MG/DL (ref 6–20)
BUN/CREAT SERPL: 30 (ref 7–25)
CALCIUM SPEC-SCNC: 9.1 MG/DL (ref 8.6–10.5)
CHLORIDE SERPL-SCNC: 101 MMOL/L (ref 98–107)
CLARITY UR: CLEAR
CO2 SERPL-SCNC: 26 MMOL/L (ref 22–29)
COLOR UR: YELLOW
CREAT SERPL-MCNC: 0.5 MG/DL (ref 0.57–1)
D-LACTATE SERPL-SCNC: 0.5 MMOL/L (ref 0.5–2)
DEPRECATED RDW RBC AUTO: 43.6 FL (ref 37–54)
EGFRCR SERPLBLD CKD-EPI 2021: 121 ML/MIN/1.73
EOSINOPHIL # BLD AUTO: 0.13 10*3/MM3 (ref 0–0.4)
EOSINOPHIL NFR BLD AUTO: 1.7 % (ref 0.3–6.2)
ERYTHROCYTE [DISTWIDTH] IN BLOOD BY AUTOMATED COUNT: 14.4 % (ref 12.3–15.4)
GEN 5 2HR TROPONIN T REFLEX: 8 NG/L
GLOBULIN UR ELPH-MCNC: 3.3 GM/DL
GLUCOSE BLDC GLUCOMTR-MCNC: 139 MG/DL (ref 70–130)
GLUCOSE BLDC GLUCOMTR-MCNC: 76 MG/DL (ref 70–130)
GLUCOSE SERPL-MCNC: 72 MG/DL (ref 65–99)
GLUCOSE UR STRIP-MCNC: NEGATIVE MG/DL
HCT VFR BLD AUTO: 35.4 % (ref 34–46.6)
HGB BLD-MCNC: 10.7 G/DL (ref 12–15.9)
HGB UR QL STRIP.AUTO: ABNORMAL
HYALINE CASTS UR QL AUTO: NORMAL /LPF
IMM GRANULOCYTES # BLD AUTO: 0.03 10*3/MM3 (ref 0–0.05)
IMM GRANULOCYTES NFR BLD AUTO: 0.4 % (ref 0–0.5)
KETONES UR QL STRIP: NEGATIVE
LEUKOCYTE ESTERASE UR QL STRIP.AUTO: NEGATIVE
LYMPHOCYTES # BLD AUTO: 1.79 10*3/MM3 (ref 0.7–3.1)
LYMPHOCYTES NFR BLD AUTO: 22.7 % (ref 19.6–45.3)
MCH RBC QN AUTO: 25.2 PG (ref 26.6–33)
MCHC RBC AUTO-ENTMCNC: 30.2 G/DL (ref 31.5–35.7)
MCV RBC AUTO: 83.5 FL (ref 79–97)
MONOCYTES # BLD AUTO: 0.37 10*3/MM3 (ref 0.1–0.9)
MONOCYTES NFR BLD AUTO: 4.7 % (ref 5–12)
NEUTROPHILS NFR BLD AUTO: 5.52 10*3/MM3 (ref 1.7–7)
NEUTROPHILS NFR BLD AUTO: 70.1 % (ref 42.7–76)
NITRITE UR QL STRIP: NEGATIVE
NRBC BLD AUTO-RTO: 0 /100 WBC (ref 0–0.2)
NT-PROBNP SERPL-MCNC: <36 PG/ML (ref 0–450)
PH UR STRIP.AUTO: 6.5 [PH] (ref 5–8)
PLATELET # BLD AUTO: 181 10*3/MM3 (ref 140–450)
PMV BLD AUTO: 10.1 FL (ref 6–12)
POTASSIUM SERPL-SCNC: 4 MMOL/L (ref 3.5–5.2)
PROCALCITONIN SERPL-MCNC: <0.02 NG/ML (ref 0–0.25)
PROT SERPL-MCNC: 7.3 G/DL (ref 6–8.5)
PROT UR QL STRIP: NEGATIVE
RBC # BLD AUTO: 4.24 10*6/MM3 (ref 3.77–5.28)
RBC # UR STRIP: NORMAL /HPF
REF LAB TEST METHOD: NORMAL
SODIUM SERPL-SCNC: 138 MMOL/L (ref 136–145)
SP GR UR STRIP: 1.01 (ref 1–1.03)
SQUAMOUS #/AREA URNS HPF: NORMAL /HPF
TROPONIN T DELTA: 0 NG/L
TROPONIN T SERPL HS-MCNC: 8 NG/L
UROBILINOGEN UR QL STRIP: ABNORMAL
WBC # UR STRIP: NORMAL /HPF
WBC NRBC COR # BLD AUTO: 7.87 10*3/MM3 (ref 3.4–10.8)

## 2024-05-22 PROCEDURE — 63710000001 ONDANSETRON ODT 4 MG TABLET DISPERSIBLE: Performed by: OBSTETRICS & GYNECOLOGY

## 2024-05-22 PROCEDURE — 85025 COMPLETE CBC W/AUTO DIFF WBC: CPT | Performed by: OBSTETRICS & GYNECOLOGY

## 2024-05-22 PROCEDURE — 81001 URINALYSIS AUTO W/SCOPE: CPT | Performed by: OBSTETRICS & GYNECOLOGY

## 2024-05-22 PROCEDURE — 82948 REAGENT STRIP/BLOOD GLUCOSE: CPT

## 2024-05-22 PROCEDURE — 83880 ASSAY OF NATRIURETIC PEPTIDE: CPT | Performed by: OBSTETRICS & GYNECOLOGY

## 2024-05-22 PROCEDURE — 25010000002 PIPERACILLIN SOD-TAZOBACTAM PER 1 G: Performed by: OBSTETRICS & GYNECOLOGY

## 2024-05-22 PROCEDURE — 87186 SC STD MICRODIL/AGAR DIL: CPT | Performed by: OBSTETRICS & GYNECOLOGY

## 2024-05-22 PROCEDURE — 83605 ASSAY OF LACTIC ACID: CPT | Performed by: OBSTETRICS & GYNECOLOGY

## 2024-05-22 PROCEDURE — 80053 COMPREHEN METABOLIC PANEL: CPT | Performed by: OBSTETRICS & GYNECOLOGY

## 2024-05-22 PROCEDURE — 25510000001 IOPAMIDOL 61 % SOLUTION: Performed by: OBSTETRICS & GYNECOLOGY

## 2024-05-22 PROCEDURE — 84145 PROCALCITONIN (PCT): CPT | Performed by: OBSTETRICS & GYNECOLOGY

## 2024-05-22 PROCEDURE — 25010000002 VANCOMYCIN HCL IN NACL 1.75-0.9 GM/500ML-% SOLUTION

## 2024-05-22 PROCEDURE — 87075 CULTR BACTERIA EXCEPT BLOOD: CPT | Performed by: OBSTETRICS & GYNECOLOGY

## 2024-05-22 PROCEDURE — 0 DIATRIZOATE MEGLUMINE & SODIUM PER 1 ML: Performed by: OBSTETRICS & GYNECOLOGY

## 2024-05-22 PROCEDURE — 84484 ASSAY OF TROPONIN QUANT: CPT | Performed by: OBSTETRICS & GYNECOLOGY

## 2024-05-22 PROCEDURE — 87205 SMEAR GRAM STAIN: CPT | Performed by: OBSTETRICS & GYNECOLOGY

## 2024-05-22 PROCEDURE — 87070 CULTURE OTHR SPECIMN AEROBIC: CPT | Performed by: OBSTETRICS & GYNECOLOGY

## 2024-05-22 PROCEDURE — 87077 CULTURE AEROBIC IDENTIFY: CPT | Performed by: OBSTETRICS & GYNECOLOGY

## 2024-05-22 PROCEDURE — 93005 ELECTROCARDIOGRAM TRACING: CPT | Performed by: OBSTETRICS & GYNECOLOGY

## 2024-05-22 PROCEDURE — 87040 BLOOD CULTURE FOR BACTERIA: CPT | Performed by: OBSTETRICS & GYNECOLOGY

## 2024-05-22 PROCEDURE — 74178 CT ABD&PLV WO CNTR FLWD CNTR: CPT

## 2024-05-22 PROCEDURE — 93010 ELECTROCARDIOGRAM REPORT: CPT | Performed by: INTERNAL MEDICINE

## 2024-05-22 RX ORDER — ACETAMINOPHEN 325 MG/1
650 TABLET ORAL EVERY 6 HOURS PRN
Status: DISCONTINUED | OUTPATIENT
Start: 2024-05-22 | End: 2024-05-26

## 2024-05-22 RX ORDER — LISINOPRIL 40 MG/1
40 TABLET ORAL
Status: COMPLETED | OUTPATIENT
Start: 2024-05-23 | End: 2024-05-27

## 2024-05-22 RX ORDER — DEXTROSE MONOHYDRATE 25 G/50ML
25 INJECTION, SOLUTION INTRAVENOUS
Status: DISCONTINUED | OUTPATIENT
Start: 2024-05-22 | End: 2024-05-27 | Stop reason: HOSPADM

## 2024-05-22 RX ORDER — NIFEDIPINE 30 MG/1
60 TABLET, EXTENDED RELEASE ORAL
Status: DISCONTINUED | OUTPATIENT
Start: 2024-05-22 | End: 2024-05-22

## 2024-05-22 RX ORDER — VANCOMYCIN 1.75 GRAM/500 ML IN 0.9 % SODIUM CHLORIDE INTRAVENOUS
1750 ONCE
Status: COMPLETED | OUTPATIENT
Start: 2024-05-22 | End: 2024-05-22

## 2024-05-22 RX ORDER — SODIUM CHLORIDE 0.9 % (FLUSH) 0.9 %
10 SYRINGE (ML) INJECTION AS NEEDED
Status: DISCONTINUED | OUTPATIENT
Start: 2024-05-22 | End: 2024-05-27 | Stop reason: HOSPADM

## 2024-05-22 RX ORDER — NICOTINE POLACRILEX 4 MG
15 LOZENGE BUCCAL
Status: DISCONTINUED | OUTPATIENT
Start: 2024-05-22 | End: 2024-05-27 | Stop reason: HOSPADM

## 2024-05-22 RX ORDER — ONDANSETRON 4 MG/1
4 TABLET, ORALLY DISINTEGRATING ORAL EVERY 6 HOURS PRN
Status: DISCONTINUED | OUTPATIENT
Start: 2024-05-22 | End: 2024-05-27 | Stop reason: HOSPADM

## 2024-05-22 RX ORDER — IBUPROFEN 600 MG/1
1 TABLET ORAL
Status: DISCONTINUED | OUTPATIENT
Start: 2024-05-22 | End: 2024-05-27 | Stop reason: HOSPADM

## 2024-05-22 RX ORDER — NIFEDIPINE 30 MG/1
60 TABLET, EXTENDED RELEASE ORAL
Status: DISCONTINUED | OUTPATIENT
Start: 2024-05-23 | End: 2024-05-27 | Stop reason: HOSPADM

## 2024-05-22 RX ORDER — SODIUM CHLORIDE 0.9 % (FLUSH) 0.9 %
10 SYRINGE (ML) INJECTION EVERY 12 HOURS SCHEDULED
Status: DISCONTINUED | OUTPATIENT
Start: 2024-05-22 | End: 2024-05-27 | Stop reason: HOSPADM

## 2024-05-22 RX ADMIN — ACETAMINOPHEN 650 MG: 325 TABLET ORAL at 16:21

## 2024-05-22 RX ADMIN — Medication 10 ML: at 18:44

## 2024-05-22 RX ADMIN — ACETAMINOPHEN 650 MG: 325 TABLET ORAL at 22:15

## 2024-05-22 RX ADMIN — IOPAMIDOL 85 ML: 612 INJECTION, SOLUTION INTRAVENOUS at 17:31

## 2024-05-22 RX ADMIN — DIATRIZOATE MEGLUMINE AND DIATRIZOATE SODIUM 15 ML: 660; 100 LIQUID ORAL; RECTAL at 16:21

## 2024-05-22 RX ADMIN — PIPERACILLIN SODIUM AND TAZOBACTAM SODIUM 3.38 G: 3; .375 INJECTION, POWDER, LYOPHILIZED, FOR SOLUTION INTRAVENOUS at 22:10

## 2024-05-22 RX ADMIN — Medication 1750 MG: at 18:47

## 2024-05-22 RX ADMIN — Medication 10 ML: at 22:10

## 2024-05-22 RX ADMIN — ONDANSETRON 4 MG: 4 TABLET, ORALLY DISINTEGRATING ORAL at 16:21

## 2024-05-22 NOTE — CONSULTS
Diabetes Education    Patient Name:  Antoinette Pack  YOB: 1985  MRN: [id number]  Admit Date:  5/22/2024        Reviewed chart for diabetes education consult.  Noted history of diabetes.  Noted on Omnipod 5 insulin pump and DexCom continuous glucose monitor.  Spoke to Ms. Pack at the bedside this afternoon.  We completed her pump contract together.  She signed it at 3:52 pm.  Log sheets were left at the bedside for her to complete.  She was educated on logging bolus insulin delivery.  She stated she was familiar with how to use the logs.  A copy of the contract is on her paper chart at the nurses station.  Spoke with RN and reminded her that the log sheets are collected at the end of each day and added to paper chart and a new log sheet is left at bedside while patient is on pump.  Explained to Ms. Pack that one of our educators will come back to check on her regularly while she is in the hospital.  Thank you for this referral.       Electronically signed by:  Luzmaria Chun RN  05/22/24 16:07 EDT

## 2024-05-22 NOTE — PROGRESS NOTES
"    Maternal/Fetal Medicine New Patient Note     Name: Antoinette Pack    : 1985     MRN: [id number]    Chief Complaint    Incision pain     Subjective     History of Present Illness:  Antoinette Pack is a 39 y.o.  5 weeks s/p PCS in the setting of CHTN, COPD, diastolic heart failure, T2DM, UC presents with increasing incision pain and possible \"boil\".   Patient was admitted post op  - 5/3 with incision opening and infection.   Was on Vanc/Zosyn --> Augmentin x 10 days. Was seen on  for incision check and was noted to be healing well (by this MD).   Currently reporting increasing left sided tenderness and possible drainage from a new boil   No fever   Some increasing SOA and palpitations. No nausea, vomiting. Normal bowel movements     ANDRE: Estimated Date of Delivery: 24     ROS:   As noted in HPI.     Past Medical History:   Diagnosis Date    Anxiety     CHF (congestive heart failure) 2023    Colitis     Depression     Diabetes mellitus     type 2    Diverticulitis     GERD (gastroesophageal reflux disease)     History of transfusion 2024    2 units post delivery    Hypertension     chronic    Kidney stone     \"years ago\"    Narcolepsy     Pancreatitis 2023    PCOS (polycystic ovarian syndrome)     Rectal bleeding     SINCE  - USES MESALAMINE SUPPOSITORY NIGHTLY    Seizures     Sleep apnea       Past Surgical History:   Procedure Laterality Date    CARDIAC CATHETERIZATION      CARPAL TUNNEL RELEASE       SECTION N/A 2024    Procedure:  SECTION PRIMARY;  Surgeon: Axel Keys MD;  Location:  [facility];  Service: Obstetrics/Gynecology;  Laterality: N/A;    COLONOSCOPY      COLONOSCOPY N/A 2024    Procedure: COLONOSCOPY;  Surgeon: Tom Mueller MD;  Location:  [facility];  Service: Gastroenterology;  Laterality: N/A;    ENDOSCOPY      ENDOSCOPY N/A 2024    Procedure: " "ESOPHAGOGASTRODUODENOSCOPY;  Surgeon: Tom Mueller MD;  Location: [facility] ENDOSCOPY;  Service: Gastroenterology;  Laterality: N/A;    FRACTURE SURGERY Left     wrist    HARDWARE REMOVAL Left     WRIST    TENDON REPAIR Left     HAND    WISDOM TOOTH EXTRACTION        OB History          2    Para   1    Term   0       1    AB   1    Living   1         SAB   1    IAB   0    Ectopic   0    Molar   0    Multiple   0    Live Births   1          Obstetric Comments   FOB #1  Pregnancy #1 SAB at 6 weeks  FOB #1 Pregnancy #2  current               Current Outpatient Medications:     albuterol sulfate  (90 Base) MCG/ACT inhaler, Inhale 2 puffs by mouth every 6 (Six) Hours As Needed for Wheezing., Disp: 8.5 g, Rfl: 0    Budesonide (ENTOCORT EC) 3 MG 24 hr capsule, Take 3 capsules by mouth Daily., Disp: 90 capsule, Rfl: 1    Continuous Blood Gluc Transmit (Dexcom G6 Transmitter) misc, Use 1 each Every 3 (Three) Months., Disp: 1 each, Rfl: 1    Continuous Glucose Sensor (Dexcom G6 Sensor), Change sensor Every 10 (Ten) Days., Disp: 3 each, Rfl: 11    Continuous Glucose Transmitter (Dexcom G6 Transmitter) misc, Use 1 each Every 3 (Three) Months., Disp: 1 each, Rfl: 3    furosemide (LASIX) 40 MG tablet, Take 1 tablet by mouth Daily As Needed (for fluid retention)., Disp: 90 tablet, Rfl: 0    Insulin Aspart, w/Niacinamide, (Fiasp) 100 UNIT/ML solution, Inject 350 Units as directed Daily. For use in insulin pump. Max dose of 350u/day., Disp: 110 mL, Rfl: 5    Insulin Disposable Pump (Omnipod 5 G6 Pods, Gen 5,) misc, Change 2 (Two) Times a Day as directed., Disp: 60 each, Rfl: 5    Insulin Disposable Pump (Omnipod 5 G6 Pods, Gen 5,) misc, Use 1 each Day., Disp: 30 each, Rfl: 1    Insulin Pen Needle 32G X 4 MM misc, Use to inject insulin up to 4 times daily as directed, Disp: 400 each, Rfl: 1    Insulin Syringe 31G X 5/16\" 1 ML misc, Use 1 each 3 (Three) Times a Day With Meals. As needed for additional insulin " "bolus with meals., Disp: 100 each, Rfl: 3    labetalol (NORMODYNE) 200 MG tablet, Take 1 tablet by mouth Daily. (Patient taking differently: Take 1 tablet by mouth As Needed.), Disp: 90 tablet, Rfl: 3    lisinopril (PRINIVIL,ZESTRIL) 40 MG tablet, Take 1 tablet by mouth Daily., Disp: 90 tablet, Rfl: 0    mesalamine (ROWASA) 4 g enema, Insert 1 enema into the rectum Every Night., Disp: 28 enema, Rfl: 0    NIFEdipine CC (ADALAT CC) 60 MG 24 hr tablet, Take 1 tablet by mouth Daily., Disp: 90 tablet, Rfl: 0    pantoprazole (PROTONIX) 40 MG EC tablet, Take 1 tablet by mouth 2 (Two) Times a Day Before Meals., Disp: 120 tablet, Rfl: 0    Prenatal Vit-Fe Fumarate-FA (PRENATAL PO), Take 1 tablet by mouth Daily., Disp: , Rfl:     Objective     Vital Signs  /65 (BP Location: Right arm, Patient Position: Sitting, Cuff Size: Large Adult)   Temp 98.2 °F (36.8 °C)   Wt 89.4 kg (197 lb)   LMP  (LMP Unknown)   Estimated body mass index is 39.79 kg/m² as calculated from the following:    Height as of 24: 149.9 cm (59\").    Weight as of this encounter: 89.4 kg (197 lb).      Flushed, ill appearing, diaphoretic   Abdomen obese, soft   Incision malodorous with 2 cm left superior boil that is dusky in appearance and draining some purulent fluid. The incision line is intact with exception of prior known separation (which is clean and not draining). The incision line is erythematous. Inferior to the incision it is tense and distended though not draining. ++ tenderness     Assessment and Plan      39 y.o.  5 weeks s/p PCS with recurrent incision infection in the setting of T2DM, COPD, CHF, CHTN, UC   Plan admission now   CT-A/P with PO contrast to evaluate bowel as well as soft tissue   Labs: blood Cx, lactate, CBC w/diff, CMP, BNP   Collect wound cultures   Vanc/Zosyn   Likely will need incision opening and drainage in the OR   Determine need for Gen Surg assist based on imaging       I spent 45 minutes caring for the " patient on the day of service. This included: obtaining or reviewing a separately obtained medical history, reviewing patient records, performing a medically appropriate exam and/or evaluation, counseling or educating the patient/family/caregiver, ordering medications, labs, and/or procedures and documenting such in the medical record. This does not include time spent on review and interpretation of other tests such as fetal ultrasound or the performance of other procedures such as amniocentesis or CVS.    Rosmery Orellana MD FACOG  Maternal Fetal Medicine, [facility]     2024

## 2024-05-22 NOTE — H&P
"  History and physical  Maternal/Fetal Medicine New Patient Note     Name: Antoinette Pack    : 1985     MRN: [id number]    Chief Complaint    Incision pain     Subjective     History of Present Illness:  Antoinette Pack is a 39 y.o.  5 weeks s/p PCS in the setting of CHTN, COPD, diastolic heart failure, T2DM, UC presents with increasing incision pain and possible \"boil\".   Patient was admitted post op  - 5/3 with incision opening and infection.   Was on Vanc/Zosyn --> Augmentin x 10 days. Was seen on  for incision check and was noted to be healing well (by this MD).   Currently reporting increasing left sided tenderness and possible drainage from a new boil   No fever   Some increasing SOA and palpitations. No nausea, vomiting. Normal bowel movements     ANDRE: Estimated Date of Delivery: 24     ROS:   As noted in HPI.     Past Medical History:   Diagnosis Date    Anxiety     CHF (congestive heart failure) 2023    Colitis     Depression     Diabetes mellitus     type 2    Diverticulitis     GERD (gastroesophageal reflux disease)     History of transfusion 2024    2 units post delivery    Hypertension     chronic    Kidney stone     \"years ago\"    Narcolepsy     Pancreatitis 2023    PCOS (polycystic ovarian syndrome)     Rectal bleeding     SINCE  - USES MESALAMINE SUPPOSITORY NIGHTLY    Seizures     Sleep apnea       Past Surgical History:   Procedure Laterality Date    CARDIAC CATHETERIZATION      CARPAL TUNNEL RELEASE       SECTION N/A 2024    Procedure:  SECTION PRIMARY;  Surgeon: Axel Keys MD;  Location:  [facility]OR DELIVERY;  Service: Obstetrics/Gynecology;  Laterality: N/A;    COLONOSCOPY      COLONOSCOPY N/A 2024    Procedure: COLONOSCOPY;  Surgeon: Tom Mueller MD;  Location:  [facility] ENDOSCOPY;  Service: Gastroenterology;  Laterality: N/A;    ENDOSCOPY      ENDOSCOPY N/A 2024    Procedure: " "ESOPHAGOGASTRODUODENOSCOPY;  Surgeon: Tom Mueller MD;  Location: [facility] ENDOSCOPY;  Service: Gastroenterology;  Laterality: N/A;    FRACTURE SURGERY Left     wrist    HARDWARE REMOVAL Left     WRIST    TENDON REPAIR Left     HAND    WISDOM TOOTH EXTRACTION        OB History          2    Para   1    Term   0       1    AB   1    Living   1         SAB   1    IAB   0    Ectopic   0    Molar   0    Multiple   0    Live Births   1          Obstetric Comments   FOB #1  Pregnancy #1 SAB at 6 weeks  FOB #1 Pregnancy #2  current               Current Facility-Administered Medications:     Pharmacy to dose vancomycin, , Does not apply, Continuous PRN, Abhishek Cuba, DO    piperacillin-tazobactam (ZOSYN) 3.375 g IVPB in 100 mL NS MBP (CD), 3.375 g, Intravenous, Once, Abhishek Cuba, DO    piperacillin-tazobactam (ZOSYN) 3.375 g IVPB in 100 mL NS MBP (CD), 3.375 g, Intravenous, Q8H, Abhishek Cuba, DO    sodium chloride 0.9 % flush 10 mL, 10 mL, Intravenous, Q12H, Abhishek Cuba, DO    sodium chloride 0.9 % flush 10 mL, 10 mL, Intravenous, PRN, Abhishek Cuba, DO    Vancomycin HCl 1,250 mg in sodium chloride 0.9 % 250 mL VTB, 15 mg/kg, Intravenous, Q8H, Abhishek Cuba, DO    Objective     Vital Signs  /72 (BP Location: Right arm, Patient Position: Lying)   Pulse 77   Temp 97.6 °F (36.4 °C)   Resp 18   LMP  (LMP Unknown)   Estimated body mass index is 39.79 kg/m² as calculated from the following:    Height as of 24: 149.9 cm (59\").    Weight as of an earlier encounter on 24: 89.4 kg (197 lb).      Flushed, ill appearing, diaphoretic   Abdomen obese, soft   Incision malodorous with 2 cm left superior boil that is dusky in appearance and draining some purulent fluid. The incision line is intact with exception of prior known separation (which is clean and not draining). The incision line is erythematous. Inferior to the incision it is tense and distended though not " draining. ++ tenderness     Assessment and Plan      39 y.o.  5 weeks s/p PCS with recurrent incision infection in the setting of T2DM, COPD, CHF, CHTN, UC   Plan admission now   CT-A/P with PO contrast to evaluate bowel as well as soft tissue   Labs: blood Cx, lactate, CBC w/diff, CMP, BNP   Collect wound cultures   Vanc/Zosyn   Likely will need incision opening and drainage in the OR   Determine need for Gen Surg assist based on imaging       I spent 45 minutes caring for the patient on the day of service. This included: obtaining or reviewing a separately obtained medical history, reviewing patient records, performing a medically appropriate exam and/or evaluation, counseling or educating the patient/family/caregiver, ordering medications, labs, and/or procedures and documenting such in the medical record. This does not include time spent on review and interpretation of other tests such as fetal ultrasound or the performance of other procedures such as amniocentesis or CVS.    Rosmery Orellana MD FACOG  Maternal Fetal Medicine, [facility]     2024

## 2024-05-23 ENCOUNTER — TELEPHONE (OUTPATIENT)
Dept: CARDIOLOGY | Facility: CLINIC | Age: 39
End: 2024-05-23

## 2024-05-23 LAB
GLUCOSE BLDC GLUCOMTR-MCNC: 81 MG/DL (ref 70–130)
GLUCOSE BLDC GLUCOMTR-MCNC: 84 MG/DL (ref 70–130)
QT INTERVAL: 364 MS
QTC INTERVAL: 427 MS

## 2024-05-23 PROCEDURE — 63710000001: Performed by: OBSTETRICS & GYNECOLOGY

## 2024-05-23 PROCEDURE — 25810000003 SODIUM CHLORIDE 0.9 % SOLUTION 250 ML FLEX CONT

## 2024-05-23 PROCEDURE — A9270 NON-COVERED ITEM OR SERVICE: HCPCS | Performed by: OBSTETRICS & GYNECOLOGY

## 2024-05-23 PROCEDURE — 63710000001 POLYETHYLENE GLYCOL 17 G PACK: Performed by: OBSTETRICS & GYNECOLOGY

## 2024-05-23 PROCEDURE — 96367 TX/PROPH/DG ADDL SEQ IV INF: CPT

## 2024-05-23 PROCEDURE — 82948 REAGENT STRIP/BLOOD GLUCOSE: CPT

## 2024-05-23 PROCEDURE — 63710000001 PANTOPRAZOLE 40 MG TABLET DELAYED-RELEASE: Performed by: OBSTETRICS & GYNECOLOGY

## 2024-05-23 PROCEDURE — 25010000002 PIPERACILLIN SOD-TAZOBACTAM PER 1 G: Performed by: OBSTETRICS & GYNECOLOGY

## 2024-05-23 PROCEDURE — 63710000001 ONDANSETRON ODT 4 MG TABLET DISPERSIBLE: Performed by: OBSTETRICS & GYNECOLOGY

## 2024-05-23 PROCEDURE — 96365 THER/PROPH/DIAG IV INF INIT: CPT

## 2024-05-23 PROCEDURE — G0378 HOSPITAL OBSERVATION PER HR: HCPCS

## 2024-05-23 PROCEDURE — 63710000001 OXYCODONE-ACETAMINOPHEN 5-325 MG TABLET: Performed by: OBSTETRICS & GYNECOLOGY

## 2024-05-23 PROCEDURE — 96366 THER/PROPH/DIAG IV INF ADDON: CPT

## 2024-05-23 PROCEDURE — 25010000002 VANCOMYCIN HCL 1.25 G RECONSTITUTED SOLUTION 1 EACH VIAL

## 2024-05-23 RX ORDER — PRENATAL VIT/IRON FUM/FOLIC AC 27MG-0.8MG
1 TABLET ORAL DAILY
Status: DISCONTINUED | OUTPATIENT
Start: 2024-05-23 | End: 2024-05-27 | Stop reason: HOSPADM

## 2024-05-23 RX ORDER — MESALAMINE 4 G/60ML
4 SUSPENSION RECTAL NIGHTLY
Status: DISCONTINUED | OUTPATIENT
Start: 2024-05-23 | End: 2024-05-27 | Stop reason: HOSPADM

## 2024-05-23 RX ORDER — PRENATAL VIT/IRON FUM/FOLIC AC 27MG-0.8MG
1 TABLET ORAL DAILY
Status: DISCONTINUED | OUTPATIENT
Start: 2024-05-23 | End: 2024-05-23

## 2024-05-23 RX ORDER — POLYETHYLENE GLYCOL 3350 17 G/17G
17 POWDER, FOR SOLUTION ORAL DAILY
Status: DISCONTINUED | OUTPATIENT
Start: 2024-05-23 | End: 2024-05-25

## 2024-05-23 RX ORDER — OXYCODONE HYDROCHLORIDE AND ACETAMINOPHEN 5; 325 MG/1; MG/1
1 TABLET ORAL EVERY 6 HOURS PRN
Status: DISCONTINUED | OUTPATIENT
Start: 2024-05-23 | End: 2024-05-27 | Stop reason: HOSPADM

## 2024-05-23 RX ORDER — PANTOPRAZOLE SODIUM 40 MG/1
40 TABLET, DELAYED RELEASE ORAL
Status: DISCONTINUED | OUTPATIENT
Start: 2024-05-23 | End: 2024-05-27 | Stop reason: HOSPADM

## 2024-05-23 RX ORDER — OXYCODONE HYDROCHLORIDE 5 MG/1
5 TABLET ORAL ONCE
Status: COMPLETED | OUTPATIENT
Start: 2024-05-23 | End: 2024-05-23

## 2024-05-23 RX ADMIN — Medication 10 ML: at 22:12

## 2024-05-23 RX ADMIN — MESALAMINE 4 G: 4 ENEMA RECTAL at 22:12

## 2024-05-23 RX ADMIN — Medication 10 ML: at 10:09

## 2024-05-23 RX ADMIN — OXYCODONE HYDROCHLORIDE 5 MG: 5 TABLET ORAL at 07:30

## 2024-05-23 RX ADMIN — VANCOMYCIN HYDROCHLORIDE 1250 MG: 1.25 INJECTION, POWDER, LYOPHILIZED, FOR SOLUTION INTRAVENOUS at 05:24

## 2024-05-23 RX ADMIN — PIPERACILLIN SODIUM AND TAZOBACTAM SODIUM 3.38 G: 3; .375 INJECTION, POWDER, LYOPHILIZED, FOR SOLUTION INTRAVENOUS at 13:14

## 2024-05-23 RX ADMIN — ACETAMINOPHEN 650 MG: 325 TABLET ORAL at 03:40

## 2024-05-23 RX ADMIN — PRENATAL VITAMINS-IRON FUMARATE 27 MG IRON-FOLIC ACID 0.8 MG TABLET 1 TABLET: at 13:13

## 2024-05-23 RX ADMIN — ONDANSETRON 4 MG: 4 TABLET, ORALLY DISINTEGRATING ORAL at 07:31

## 2024-05-23 RX ADMIN — PIPERACILLIN SODIUM AND TAZOBACTAM SODIUM 3.38 G: 3; .375 INJECTION, POWDER, LYOPHILIZED, FOR SOLUTION INTRAVENOUS at 04:43

## 2024-05-23 RX ADMIN — PANTOPRAZOLE SODIUM 40 MG: 40 TABLET, DELAYED RELEASE ORAL at 17:57

## 2024-05-23 RX ADMIN — VANCOMYCIN HYDROCHLORIDE 1250 MG: 1.25 INJECTION, POWDER, LYOPHILIZED, FOR SOLUTION INTRAVENOUS at 17:57

## 2024-05-23 RX ADMIN — ONDANSETRON 4 MG: 4 TABLET, ORALLY DISINTEGRATING ORAL at 19:19

## 2024-05-23 RX ADMIN — LISINOPRIL 40 MG: 40 TABLET ORAL at 10:09

## 2024-05-23 RX ADMIN — NIFEDIPINE 60 MG: 30 TABLET, EXTENDED RELEASE ORAL at 10:08

## 2024-05-23 RX ADMIN — PIPERACILLIN SODIUM AND TAZOBACTAM SODIUM 3.38 G: 3; .375 INJECTION, POWDER, LYOPHILIZED, FOR SOLUTION INTRAVENOUS at 22:12

## 2024-05-23 RX ADMIN — POLYETHYLENE GLYCOL 3350 17 G: 17 POWDER, FOR SOLUTION ORAL at 13:13

## 2024-05-23 RX ADMIN — OXYCODONE HYDROCHLORIDE AND ACETAMINOPHEN 1 TABLET: 5; 325 TABLET ORAL at 19:19

## 2024-05-23 RX ADMIN — ONDANSETRON 4 MG: 4 TABLET, ORALLY DISINTEGRATING ORAL at 13:11

## 2024-05-23 RX ADMIN — OXYCODONE HYDROCHLORIDE AND ACETAMINOPHEN 1 TABLET: 5; 325 TABLET ORAL at 13:11

## 2024-05-23 NOTE — CONSULTS
Diabetes Education    Patient Name:  Antoinette Pack  YOB: 1985  MRN: [id number]  Admit Date:  5/22/2024      Follow Up Pump Visit:    Pt was seen at bedside today. Permission given for visit. Patient states she has had diabetes for 6-7 years now. Patient states her  is to bring her supplies for her. She is due to change her omnipod tomorrow, currently on her right side of abdomen. Due to change dexcom tonight. She states she is due to see her endocrinologist June 3rd. Last A1C is 5.3%. Denies any hypoglycemia issues. She continues to fill out her insulin pump logs, provided extra logs. Any complete logs to go into patient's chart. RN to document any bolus of insulin into MAR.     Will follow along during admission, call [phone] for immediate needs.    Electronically signed by:  Angle Delarosa RN  05/23/24 13:40 EDT

## 2024-05-23 NOTE — TELEPHONE ENCOUNTER
"    Name: Antoinette Pack \"Paris\"    Relationship: Self    Best Callback Number: [phone]    Incoming call to the Hub, requesting to  Reschedule their HFU appointment on 05.24.24.     Per Hub workflow, further review is needed before the task can be completed.    Result of Call: Please reach out to the patient to reschedule  PATIENT IS CURRENTLY ADMITTED AT [facility].    "

## 2024-05-23 NOTE — PLAN OF CARE
Goal Outcome Evaluation:  Plan of Care Reviewed With: patient        Progress: no change  Outcome Evaluation: VSS.  39 year old readmitted related to cellulitis of casearn section incision from delivery on 4/18/2024.  Patient has an IV in place and patent to saline lock.  She has been up ad nimesh and visited infant in NICU.  Have administered scheduled and prn medications as indicated.  Will continue to monitor patient throughout the rest of this shift.

## 2024-05-23 NOTE — PLAN OF CARE
Goal Outcome Evaluation:  Plan of Care Reviewed With: patient        Progress: improving  Outcome Evaluation: Per pt, she gave a total of 14.8 units of insulin yesterday via pump. Today's values have been charted on MAR. IV antibiotics infused as ordered; vancomycin infusing at this time. Oxycodone/Percocet has been effective with pain control. Pt has been pumping breastmilk and visiting  in NICU.

## 2024-05-23 NOTE — PROGRESS NOTES
"Progress Note    Patient name: Antoinette Pack  YOB: 1985   MRN: [id number]  Admission Date: 2024  Date of Service: 2024  Referring: Abhishek Cuba DO    ID: 39 y.o.     Post Op Day: * No surgery found *    Diagnosis:   S/p  delivery    Postoperative cellulitis of surgical wound       Subjective:      Patient complains of minimal/moderate incisional pain.  Ambulating, voiding, tolerating diet.  Pain well controlled.  The patient is currently breastfeeding.  This baby is in NICU stable condition    Objective:      Temp:  [97 °F (36.1 °C)-98.2 °F (36.8 °C)] 97.1 °F (36.2 °C)  Heart Rate:  [72-87] 77  Resp:  [18] 18  BP: ()/(63-90) 124/90    Medications:  Scheduled Meds:lisinopril, 40 mg, Oral, Q24H  mesalamine, 4 g, Rectal, Nightly  NIFEdipine XL, 60 mg, Oral, Q24H  pantoprazole, 40 mg, Oral, BID AC  piperacillin-tazobactam, 3.375 g, Intravenous, Once  piperacillin-tazobactam, 3.375 g, Intravenous, Q8H  sodium chloride, 10 mL, Intravenous, Q12H  vancomycin, 1,250 mg, Intravenous, Q12H      Continuous Infusions:insulin,   Pharmacy to dose vancomycin,       PRN Meds:.  acetaminophen    dextrose    dextrose    glucagon (human recombinant)    ondansetron ODT    oxyCODONE-acetaminophen    Pharmacy to dose vancomycin    sodium chloride     Exam:  Abdomen: soft, nontender  Uterus: nontender, firm  Incision: Decreased left erythema noted.  Minimal drainage noted.  Less edema.                    Extremities: nontender; 1+ edema     Labs:  Lab Results   Component Value Date    HGB 10.7 (L) 2024     No results found for: \"ABORH\"    Assessment/Plan:       s/p  delivery.  24  1. Postoperative cellulitis of surgical wound much improved today.  Wound culture pending.     CT abdomen pelvis without evidence of abscess or intra-abdominal pathology.     -Continue broad-spectrum antibiotics until culture results  2.  Chronic hypertension BP stable on current " regimen  3.  Chronic CHF stable  4.  Type 2 diabetes (OmniPod insulin pump/Dexcom fasting blood sugar 84 this morning  5.   Ulcerative colitis proctitis status post endoscopy(4/22/2024  6.   Obstructive sleep apnea syndrome  Abhishek Cuba,   5/23/2024

## 2024-05-24 LAB
GLUCOSE BLDC GLUCOMTR-MCNC: 115 MG/DL (ref 70–130)
GLUCOSE BLDC GLUCOMTR-MCNC: 73 MG/DL (ref 70–130)
GLUCOSE BLDC GLUCOMTR-MCNC: 96 MG/DL (ref 70–130)
GLUCOSE BLDC GLUCOMTR-MCNC: 98 MG/DL (ref 70–130)
VANCOMYCIN SERPL-MCNC: 9.8 MCG/ML (ref 5–40)

## 2024-05-24 PROCEDURE — G0378 HOSPITAL OBSERVATION PER HR: HCPCS

## 2024-05-24 PROCEDURE — A9270 NON-COVERED ITEM OR SERVICE: HCPCS | Performed by: OBSTETRICS & GYNECOLOGY

## 2024-05-24 PROCEDURE — 63710000001 NIFEDIPINE XL 30 MG TABLET SUSTAINED-RELEASE 24 HOUR: Performed by: OBSTETRICS & GYNECOLOGY

## 2024-05-24 PROCEDURE — 63710000001 PANTOPRAZOLE 40 MG TABLET DELAYED-RELEASE: Performed by: OBSTETRICS & GYNECOLOGY

## 2024-05-24 PROCEDURE — 63710000001 POLYETHYLENE GLYCOL 17 G PACK: Performed by: OBSTETRICS & GYNECOLOGY

## 2024-05-24 PROCEDURE — 63710000001 LISINOPRIL 40 MG TABLET: Performed by: OBSTETRICS & GYNECOLOGY

## 2024-05-24 PROCEDURE — 63710000001 OXYCODONE-ACETAMINOPHEN 5-325 MG TABLET: Performed by: OBSTETRICS & GYNECOLOGY

## 2024-05-24 PROCEDURE — 63710000001 PRENATAL VITAMIN 27-0.8 27-0.8 MG TABLET: Performed by: OBSTETRICS & GYNECOLOGY

## 2024-05-24 PROCEDURE — 25010000002 VANCOMYCIN HCL IN NACL 1.5-0.9 GM/500ML-% SOLUTION

## 2024-05-24 PROCEDURE — 82948 REAGENT STRIP/BLOOD GLUCOSE: CPT

## 2024-05-24 PROCEDURE — 25010000002 PIPERACILLIN SOD-TAZOBACTAM PER 1 G: Performed by: OBSTETRICS & GYNECOLOGY

## 2024-05-24 PROCEDURE — 25010000002 VANCOMYCIN HCL 1.25 G RECONSTITUTED SOLUTION 1 EACH VIAL

## 2024-05-24 PROCEDURE — 63710000001: Performed by: OBSTETRICS & GYNECOLOGY

## 2024-05-24 PROCEDURE — 80202 ASSAY OF VANCOMYCIN: CPT

## 2024-05-24 PROCEDURE — 63710000001 ONDANSETRON ODT 4 MG TABLET DISPERSIBLE: Performed by: OBSTETRICS & GYNECOLOGY

## 2024-05-24 PROCEDURE — 99231 SBSQ HOSP IP/OBS SF/LOW 25: CPT | Performed by: OBSTETRICS & GYNECOLOGY

## 2024-05-24 PROCEDURE — 25810000003 SODIUM CHLORIDE 0.9 % SOLUTION 250 ML FLEX CONT

## 2024-05-24 RX ORDER — VANCOMYCIN/0.9 % SOD CHLORIDE 1.5G/250ML
1500 PLASTIC BAG, INJECTION (ML) INTRAVENOUS EVERY 12 HOURS
Status: DISCONTINUED | OUTPATIENT
Start: 2024-05-24 | End: 2024-05-25

## 2024-05-24 RX ADMIN — NIFEDIPINE 60 MG: 30 TABLET, EXTENDED RELEASE ORAL at 08:32

## 2024-05-24 RX ADMIN — ONDANSETRON 4 MG: 4 TABLET, ORALLY DISINTEGRATING ORAL at 04:41

## 2024-05-24 RX ADMIN — POLYETHYLENE GLYCOL 3350 17 G: 17 POWDER, FOR SOLUTION ORAL at 08:32

## 2024-05-24 RX ADMIN — PIPERACILLIN SODIUM AND TAZOBACTAM SODIUM 3.38 G: 3; .375 INJECTION, POWDER, LYOPHILIZED, FOR SOLUTION INTRAVENOUS at 14:53

## 2024-05-24 RX ADMIN — Medication 10 ML: at 08:33

## 2024-05-24 RX ADMIN — PANTOPRAZOLE SODIUM 40 MG: 40 TABLET, DELAYED RELEASE ORAL at 17:14

## 2024-05-24 RX ADMIN — LISINOPRIL 40 MG: 40 TABLET ORAL at 08:32

## 2024-05-24 RX ADMIN — MESALAMINE 4 G: 4 ENEMA RECTAL at 21:15

## 2024-05-24 RX ADMIN — ONDANSETRON 4 MG: 4 TABLET, ORALLY DISINTEGRATING ORAL at 21:18

## 2024-05-24 RX ADMIN — PANTOPRAZOLE SODIUM 40 MG: 40 TABLET, DELAYED RELEASE ORAL at 08:32

## 2024-05-24 RX ADMIN — Medication 10 ML: at 21:16

## 2024-05-24 RX ADMIN — OXYCODONE HYDROCHLORIDE AND ACETAMINOPHEN 1 TABLET: 5; 325 TABLET ORAL at 14:54

## 2024-05-24 RX ADMIN — Medication 1500 MG: at 19:19

## 2024-05-24 RX ADMIN — PIPERACILLIN SODIUM AND TAZOBACTAM SODIUM 3.38 G: 3; .375 INJECTION, POWDER, LYOPHILIZED, FOR SOLUTION INTRAVENOUS at 06:22

## 2024-05-24 RX ADMIN — VANCOMYCIN HYDROCHLORIDE 1250 MG: 1.25 INJECTION, POWDER, LYOPHILIZED, FOR SOLUTION INTRAVENOUS at 05:02

## 2024-05-24 RX ADMIN — ONDANSETRON 4 MG: 4 TABLET, ORALLY DISINTEGRATING ORAL at 14:54

## 2024-05-24 RX ADMIN — PRENATAL VITAMINS-IRON FUMARATE 27 MG IRON-FOLIC ACID 0.8 MG TABLET 1 TABLET: at 08:32

## 2024-05-24 RX ADMIN — OXYCODONE HYDROCHLORIDE AND ACETAMINOPHEN 1 TABLET: 5; 325 TABLET ORAL at 04:22

## 2024-05-24 RX ADMIN — PIPERACILLIN SODIUM AND TAZOBACTAM SODIUM 3.38 G: 3; .375 INJECTION, POWDER, LYOPHILIZED, FOR SOLUTION INTRAVENOUS at 21:16

## 2024-05-24 RX ADMIN — OXYCODONE HYDROCHLORIDE AND ACETAMINOPHEN 1 TABLET: 5; 325 TABLET ORAL at 21:18

## 2024-05-24 NOTE — CONSULTS
Diabetes Education    Patient Name:  Antoinette Pack  YOB: 1985  MRN: [id number]  Admit Date:  5/22/2024        Insulin Pump follow up. Met with patient at bedside. She was preparing to pump. Patient is keeping track of her boluses on her flowsheet and providing her insulin information to the nurse. No issues with her pump or Dexcom. She is due to change her pod tonight. She does not think she has enough insulin to fill another pod. Will contact the provider for a vial of Novolog.     Diabetes education will continue to follow during admission.       Electronically signed by:  Ольга Gomez RN  05/24/24 15:10 EDT

## 2024-05-24 NOTE — PLAN OF CARE
Goal Outcome Evaluation:  Plan of Care Reviewed With: patient        Progress: improving  Outcome Evaluation: VSS. Pt continues on IV Zosyn and Vancomycin. Pt has visited in the NICU today and has been pumping. Pt states she still has not had a BM but states she will wait until tomorrow. Incision site not as red today. Inter-Dry placed to keep area/folds dry.

## 2024-05-24 NOTE — CASE MANAGEMENT/SOCIAL WORK
Discharge Planning Assessment  [facility]     Patient Name: Antoinette Pack  MRN: [id number]  Today's Date: 5/24/2024    Admit Date: 5/22/2024    Plan: Home with  Jonel [phone] transporting.   Discharge Needs Assessment       Row Name 05/24/24 1148       Living Environment    People in Home child(isabella), dependent;spouse    Name(s) of People in Home  (Jonel [phone])    Primary Care Provided by self    Provides Primary Care For child(isabella)    Family Caregiver if Needed spouse    Family Caregiver Names  (Jonel [phone])    Quality of Family Relationships involved;helpful    Able to Return to Prior Arrangements yes       Transition Planning    Transportation Anticipated family or friend will provide   (Jonel [phone])       Discharge Needs Assessment    Readmission Within the Last 30 Days no previous admission in last 30 days    Equipment Currently Used at Home cpap                   Discharge Plan       Row Name 05/24/24 1149       Plan    Plan Home with  Jonel [phone] transporting.    Plan Comments SW'er met with patient at bedside. Patient has a baby in the NICU. Patient lives in [place] with  (Jonel [phone]) and children. Functions independently with ADL's, DME includes Cpap, and no HH. Patient declined to establish living will at this time. Patients PCP is Shawna Lambert DO. Patient has [employer] and misc. commercial insurance. Plan is home with  Jonel [phone] transporting.    Final Discharge Disposition Code 01 - home or self-care                  Continued Care and Services - Admitted Since 5/22/2024    No active coordination exists for this encounter.       Selected Continued Care - Episodes Includes continued care and service providers with selected services from the active episodes listed below      General Specialty Pharmacy Episode start date: 10/24/2023   There are no active outsourced providers  for this episode.             [facility] Episode start date: 2/17/2022   There are no active outsourced providers for this episode.                 Expected Discharge Date and Time       Expected Discharge Date Expected Discharge Time    May 25, 2024            Demographic Summary       Row Name 05/24/24 1145       General Information    Admission Type observation    Referral Source admission list    Reason for Consult discharge planning                   Functional Status       Row Name 05/24/24 1146       Functional Status    Usual Activity Tolerance good    Current Activity Tolerance good       Functional Status, IADL    Medications independent    Meal Preparation independent    Housekeeping independent    Laundry independent    Shopping independent    IADL Comments independently with ADL's, DME includes Cpap, and no HH       Employment/    Employment/ Comments [employer]placement and misc. commercial insurance                   Psychosocial    No documentation.                  Abuse/Neglect    No documentation.                  Legal    No documentation.                  Substance Abuse    No documentation.                  Patient Forms    No documentation.                     JO ANN Faustin (Kay)

## 2024-05-24 NOTE — PROGRESS NOTES
Pharmacy Consult-Vancomycin Dosing  Antoinette Pack is a  39 y.o. female receiving vancomycin therapy.   Indication: Recurrent incision infection sp C Section  Consulting Provider: Bereket    Goal AUC: 400 - 600 mg/L*hr    Current Antimicrobial Therapy  Vancomycin (5/22 - 5/26)  Zosyn (5/22 - 5/26)    Allergies  Allergies as of 05/22/2024 - Reviewed 05/22/2024   Allergen Reaction Noted    Dilantin [phenytoin] Mental Status Change 03/23/2017    Keppra [levetiracetam] Mental Status Change 03/23/2017    Meperidine Rash 01/27/2014    Topamax [topiramate] Mental Status Change 03/23/2017     Labs    Results from last 7 days   Lab Units 05/22/24  1422   BUN mg/dL 15   CREATININE mg/dL 0.50*       Results from last 7 days   Lab Units 05/22/24  1422   WBC 10*3/mm3 7.87       Evaluation of Dosing  Ht -    Wt -      Estimated Creatinine Clearance: 151.9 mL/min (A) (by C-G formula based on SCr of 0.5 mg/dL (L)).  I/O last 3 completed shifts:  In: 351 [P.O.:351]  Out: -     Microbiology and Radiology  Microbiology Results (last 10 days)       Procedure Component Value - Date/Time    Wound Culture - Wound, Abdominal Cavity [[id number]]  (Abnormal) Collected: 05/22/24 1553    Lab Status: Preliminary result Specimen: Wound from Abdominal Cavity Updated: 05/24/24 0810     Wound Culture Rare Gram Negative Bacilli     Gram Stain Few (2+) WBCs seen      No organisms seen    Narrative:      Organism under investigation.      Blood Culture - Blood, Arm, Left [[id number]]  (Normal) Collected: 05/22/24 1427    Lab Status: Preliminary result Specimen: Blood from Arm, Left Updated: 05/23/24 1531     Blood Culture No growth at 24 hours    Blood Culture - Blood, Arm, Right [[id number]]  (Normal) Collected: 05/22/24 1422    Lab Status: Preliminary result Specimen: Blood from Arm, Right Updated: 05/23/24 1531     Blood Culture No growth at 24 hours          Reported Vancomycin Levels    Results from last 7 days   Lab Units 05/24/24  0694    VANCOMYCIN RM mcg/mL 9.80       InsightRX AUC Calculation:  Predicted Steady State AUC on Current Dose: 372 mg/L*hr  _________________________________  Predicted Est AUC on new Dose: 446 mg/L*hr    Assessment/Plan:  Increase Vancomycin dose to 1.5gm IV q12hrs per recommendation of Insight Rx kinetics to reach goal  - 600 mg/L*hr  Pharmacy will monitor for any changes in drug clearance and ADR's.    Thanks,  Tiffanie Salter, PharmD  5/24/2024  11:51 EDT

## 2024-05-24 NOTE — PLAN OF CARE
Goal Outcome Evaluation:  Plan of Care Reviewed With: patient        Progress: improving  Outcome Evaluation: VSS on RA. Up ad nimesh. Independent. IV abx infusing. 13.55 units insulin self administered via pump. Pain better controlled with PRN percocet. Pt sleeping between care.

## 2024-05-24 NOTE — PROGRESS NOTES
Postpartum Progress Note    Patient name: Antoinette Pack  YOB: 1985   MRN: [id number]  Referring Provider: Abhishek Cuba DO  Admission Date: 2024  Date of Service: 2024    ID: 39 y.o.     Diagnosis:   S/p  delivery * No surgery found *     Postoperative cellulitis of surgical wound       Subjective:        Ambulating, voiding, tolerating diet.  Pain well controlled.  The patient is currently breastfeeding.       Objective:      Vital signs:  Vital Signs Range for the last 24 hours  Temperature: Temp:  [96.6 °F (35.9 °C)-97.7 °F (36.5 °C)] 97.2 °F (36.2 °C)   Temp Source: Temp src: Temporal   BP: BP: (104-131)/(61-80) 106/61   Pulse: Heart Rate:  [67-78] 78   Respirations: Resp:  [15-18] 18   Weight:       General: Alert & oriented x4, in no apparent distress  Abdomen: soft, nontender  Uterus: firm, nontender  Incision: Decreased erythema and edema.  Minimal drainage noted Linge well  Extremities: nontender; trace of edema      Labs:  Lab Results   Component Value Date    WBC 7.87 2024    HGB 10.7 (L) 2024    HCT 35.4 2024    MCV 83.5 2024     2024         External Prenatal Results       Pregnancy Outside Results - Transcribed From Office Records - See Scanned Records For Details       Test Value Date Time    ABO  O  24 0525    Rh  Positive  24 0525    Antibody Screen  Negative  24 0005    Varicella IgG       Rubella ^ Immune  23     Hgb  10.7 g/dL 24 1422       10.4 g/dL 24 1646       10.2 g/dL 24 1401       8.8 g/dL 24 1456       9.2 g/dL 24 0646       9.1 g/dL 24 0721       6.8 g/dL 24 0525       8.3 g/dL 24 0858       8.4 g/dL 24 0005       8.2 g/dL 24 1145       8.4 g/dL 04/15/24 2141       10.0 g/dL 24 1215       10.6 g/dL 24 1826       9.8 g/dL 23 1239       10.4 g/dL 11/15/23 1603       11.2 g/dL 10/27/23 2112        9.5 g/dL 09/27/23 1332       8.7 g/dL 09/20/23 0235       9.0 g/dL 09/19/23 0054       10.2 g/dL 09/18/23 0828       9.5 g/dL 09/17/23 0023       10.1 g/dL 09/03/23 1610       10.1 g/dL 08/26/23 1727    Hct  35.4 % 05/22/24 1422       34.1 % 05/11/24 1646       32.3 % 05/01/24 1401       27.7 % 04/23/24 1456       29.9 % 04/21/24 0646       28.7 % 04/20/24 0721       21.9 % 04/19/24 0525       27.1 % 04/18/24 0858       27.9 % 04/18/24 0005       26.3 % 04/17/24 1145       26.7 % 04/15/24 2141       30.5 % 03/27/24 1215       30.9 % 01/29/24 1826       32.0 % 12/08/23 1239       32.5 % 11/15/23 1603       36.8 % 10/27/23 2112       30.1 % 09/27/23 1332       28.3 % 09/20/23 0235       29.0 % 09/19/23 0054       33.0 % 09/18/23 0828       31.2 % 09/17/23 0023       32.2 % 09/03/23 1610       32.7 % 08/26/23 1727    Glucose Fasting GTT       Glucose Tolerance Test 1 hour       Glucose Tolerance Test 3 hour       Gonorrhea (discrete) ^ Negative  11/03/23     Chlamydia (discrete) ^ Negative  11/03/23     RPR ^ Non-Reactive  11/16/23       ^ Non-Reactive  11/03/23     VDRL       Syphilis Antibody       HBsAg ^ Negative  11/16/23     Herpes Simplex Virus PCR       Herpes Simplex VIrus Culture       HIV ^ Non-Reactive  11/16/23     Hep C RNA Quant PCR       Hep C Antibody  Non-Reactive  04/20/24 0721    AFP       Group B Strep       GBS Susceptibility to Clindamycin       GBS Susceptibility to Erythromycin       Fetal Fibronectin       Genetic Testing, Maternal Blood                 Drug Screening       Test Value Date Time    NIPT        Urine Drug Screen       Amphetamine Screen  Negative  04/19/24 1152    Barbiturate Screen  Negative  04/19/24 1152    Benzodiazepine Screen  Negative  04/19/24 1152    Methadone Screen  Negative  04/19/24 1152    Phencyclidine Screen  Negative  04/19/24 1152    Opiates Screen  Negative  04/19/24 1152    THC Screen  Negative  04/19/24 1152    Cocaine Screen       Propoxyphene Screen        Buprenorphine Screen  Negative  24 1152    Methamphetamine Screen       Oxycodone Screen  Negative  24 1152              Legend    ^: Historical                            Assessment/Plan:      Status post  delivery 24  1.  A celine-incisional cellulitis/superficial infection healing well (negative abdominal CT and pelvis for  pathology)       Wound cultures pending       Continue broad-spectrum antibiotics until culture results  2.  Chronic hypertension BP stable on current regimen  3.  CHF-stable  4.  Type 2 diabetes (OmniPod insulin pump/Dexcom blood sugars under control)  5.  Ulcerative colitis proctitis status post endoscopy 2024  6.  Obstructive sleep apnea syndrome        PLAN: Continue present care    . Questions were answered.

## 2024-05-25 LAB
GLUCOSE BLDC GLUCOMTR-MCNC: 83 MG/DL (ref 70–130)
GLUCOSE BLDC GLUCOMTR-MCNC: 85 MG/DL (ref 70–130)
GLUCOSE BLDC GLUCOMTR-MCNC: 90 MG/DL (ref 70–130)
GLUCOSE BLDC GLUCOMTR-MCNC: 92 MG/DL (ref 70–130)

## 2024-05-25 PROCEDURE — 63710000001 DOCUSATE SODIUM 100 MG CAPSULE: Performed by: OBSTETRICS & GYNECOLOGY

## 2024-05-25 PROCEDURE — 63710000001 ONDANSETRON ODT 4 MG TABLET DISPERSIBLE: Performed by: OBSTETRICS & GYNECOLOGY

## 2024-05-25 PROCEDURE — 63710000001 NIFEDIPINE XL 30 MG TABLET SUSTAINED-RELEASE 24 HOUR: Performed by: OBSTETRICS & GYNECOLOGY

## 2024-05-25 PROCEDURE — 99231 SBSQ HOSP IP/OBS SF/LOW 25: CPT | Performed by: OBSTETRICS & GYNECOLOGY

## 2024-05-25 PROCEDURE — A9270 NON-COVERED ITEM OR SERVICE: HCPCS | Performed by: OBSTETRICS & GYNECOLOGY

## 2024-05-25 PROCEDURE — 63710000001 PRENATAL VITAMIN 27-0.8 27-0.8 MG TABLET: Performed by: OBSTETRICS & GYNECOLOGY

## 2024-05-25 PROCEDURE — 25010000002 PIPERACILLIN SOD-TAZOBACTAM PER 1 G: Performed by: OBSTETRICS & GYNECOLOGY

## 2024-05-25 PROCEDURE — G0378 HOSPITAL OBSERVATION PER HR: HCPCS

## 2024-05-25 PROCEDURE — 63710000001 LISINOPRIL 40 MG TABLET: Performed by: OBSTETRICS & GYNECOLOGY

## 2024-05-25 PROCEDURE — 63710000001 POLYETHYLENE GLYCOL 17 G PACK: Performed by: OBSTETRICS & GYNECOLOGY

## 2024-05-25 PROCEDURE — 63710000001: Performed by: OBSTETRICS & GYNECOLOGY

## 2024-05-25 PROCEDURE — 25010000002 VANCOMYCIN HCL IN NACL 1.5-0.9 GM/500ML-% SOLUTION

## 2024-05-25 PROCEDURE — 63710000001 FLUCONAZOLE 150 MG TABLET: Performed by: OBSTETRICS & GYNECOLOGY

## 2024-05-25 PROCEDURE — 82948 REAGENT STRIP/BLOOD GLUCOSE: CPT

## 2024-05-25 PROCEDURE — 63710000001 OXYCODONE-ACETAMINOPHEN 5-325 MG TABLET: Performed by: OBSTETRICS & GYNECOLOGY

## 2024-05-25 PROCEDURE — 63710000001 PANTOPRAZOLE 40 MG TABLET DELAYED-RELEASE: Performed by: OBSTETRICS & GYNECOLOGY

## 2024-05-25 RX ORDER — POLYETHYLENE GLYCOL 3350 17 G/17G
17 POWDER, FOR SOLUTION ORAL 2 TIMES DAILY
Status: DISCONTINUED | OUTPATIENT
Start: 2024-05-25 | End: 2024-05-27 | Stop reason: HOSPADM

## 2024-05-25 RX ORDER — DOCUSATE SODIUM 100 MG/1
100 CAPSULE, LIQUID FILLED ORAL 2 TIMES DAILY
Status: DISCONTINUED | OUTPATIENT
Start: 2024-05-25 | End: 2024-05-27 | Stop reason: HOSPADM

## 2024-05-25 RX ORDER — FLUCONAZOLE 100 MG/1
150 TABLET ORAL ONCE
Status: COMPLETED | OUTPATIENT
Start: 2024-05-25 | End: 2024-05-25

## 2024-05-25 RX ADMIN — Medication 1500 MG: at 05:50

## 2024-05-25 RX ADMIN — PRENATAL VITAMINS-IRON FUMARATE 27 MG IRON-FOLIC ACID 0.8 MG TABLET 1 TABLET: at 08:28

## 2024-05-25 RX ADMIN — PANTOPRAZOLE SODIUM 40 MG: 40 TABLET, DELAYED RELEASE ORAL at 17:22

## 2024-05-25 RX ADMIN — POLYETHYLENE GLYCOL 3350 17 G: 17 POWDER, FOR SOLUTION ORAL at 08:30

## 2024-05-25 RX ADMIN — NIFEDIPINE 60 MG: 30 TABLET, EXTENDED RELEASE ORAL at 08:28

## 2024-05-25 RX ADMIN — ONDANSETRON 4 MG: 4 TABLET, ORALLY DISINTEGRATING ORAL at 22:01

## 2024-05-25 RX ADMIN — ONDANSETRON 4 MG: 4 TABLET, ORALLY DISINTEGRATING ORAL at 14:57

## 2024-05-25 RX ADMIN — PANTOPRAZOLE SODIUM 40 MG: 40 TABLET, DELAYED RELEASE ORAL at 08:13

## 2024-05-25 RX ADMIN — LISINOPRIL 40 MG: 40 TABLET ORAL at 08:28

## 2024-05-25 RX ADMIN — POLYETHYLENE GLYCOL 3350 17 G: 17 POWDER, FOR SOLUTION ORAL at 21:48

## 2024-05-25 RX ADMIN — FLUCONAZOLE 150 MG: 150 TABLET ORAL at 12:30

## 2024-05-25 RX ADMIN — PIPERACILLIN SODIUM AND TAZOBACTAM SODIUM 3.38 G: 3; .375 INJECTION, POWDER, LYOPHILIZED, FOR SOLUTION INTRAVENOUS at 21:48

## 2024-05-25 RX ADMIN — MESALAMINE 4 G: 4 ENEMA RECTAL at 21:51

## 2024-05-25 RX ADMIN — OXYCODONE HYDROCHLORIDE AND ACETAMINOPHEN 1 TABLET: 5; 325 TABLET ORAL at 22:01

## 2024-05-25 RX ADMIN — PIPERACILLIN SODIUM AND TAZOBACTAM SODIUM 3.38 G: 3; .375 INJECTION, POWDER, LYOPHILIZED, FOR SOLUTION INTRAVENOUS at 14:57

## 2024-05-25 RX ADMIN — OXYCODONE HYDROCHLORIDE AND ACETAMINOPHEN 1 TABLET: 5; 325 TABLET ORAL at 14:57

## 2024-05-25 RX ADMIN — DOCUSATE SODIUM 100 MG: 100 CAPSULE, LIQUID FILLED ORAL at 21:48

## 2024-05-25 RX ADMIN — PIPERACILLIN SODIUM AND TAZOBACTAM SODIUM 3.38 G: 3; .375 INJECTION, POWDER, LYOPHILIZED, FOR SOLUTION INTRAVENOUS at 08:28

## 2024-05-25 RX ADMIN — Medication 10 ML: at 21:49

## 2024-05-25 RX ADMIN — Medication 10 ML: at 08:30

## 2024-05-25 NOTE — PLAN OF CARE
Goal Outcome Evaluation:  Plan of Care Reviewed With: patient        Progress: improving  Outcome Evaluation: VSS. Pain controlled with oral meds. Pre-medicated with oral Zofran. UOP-WNL. Pt slept well. IV ABX given. Pumping breast milk for baby that is in the NICU. Will continue to monitor.

## 2024-05-25 NOTE — PLAN OF CARE
Goal Outcome Evaluation:              Outcome Evaluation: VSS on RA. Pt has been comfortable this shift. Patent IV in place infusing IV antibiotics. Pt up ad nimesh ambulating in room and to NICU. PRN zofran and oxy x 1. Good oral intake with adequate UOP. Plan of possible D/C home tomorrow per Dr. Arevalo. Continuing POC and reporting as needed.

## 2024-05-25 NOTE — PROGRESS NOTES
Postpartum Progress Note    Patient name: Antoinette Pack  YOB: 1985   MRN: [id number]  Referring Provider: Abhishek Cuba DO  Admission Date: 2024  Date of Service: 2024    ID: 39 y.o.     Diagnosis:   S/p  delivery * No surgery found *     Postoperative cellulitis of surgical wound       Subjective:    Patient complaining of constipation.  Pain improving  Ambulating, voiding, tolerating diet.  Pain well controlled.  The patient is currently breastfeeding.   .    Objective:      Vital signs:  Vital Signs Range for the last 24 hours  Temperature: Temp:  [97 °F (36.1 °C)-97.5 °F (36.4 °C)] 97.5 °F (36.4 °C)   Temp Source: Temp src: Temporal   BP: BP: (106-147)/(56-82) 147/82   Pulse: Heart Rate:  [67-88] 88   Respirations: Resp:  [16-18] 18   Weight:       General: Alert & oriented x4, in no apparent distress  Abdomen: soft, nontender  Uterus: firm, nontender  Incision: No drainage noted edema and erythema resolving less tender  Extremities: nontender; trace of edema      Labs:  Lab Results   Component Value Date    WBC 7.87 2024    HGB 10.7 (L) 2024    HCT 35.4 2024    MCV 83.5 2024     2024         External Prenatal Results       Pregnancy Outside Results - Transcribed From Office Records - See Scanned Records For Details       Test Value Date Time    ABO  O  24 0525    Rh  Positive  24 0525    Antibody Screen  Negative  24 0005    Varicella IgG       Rubella ^ Immune  23     Hgb  10.7 g/dL 24 1422       10.4 g/dL 24 1646       10.2 g/dL 24 1401       8.8 g/dL 24 1456       9.2 g/dL 24 0646       9.1 g/dL 24 0721       6.8 g/dL 24 0525       8.3 g/dL 24 0858       8.4 g/dL 24 0005       8.2 g/dL 24 1145       8.4 g/dL 04/15/24 2141       10.0 g/dL 24 1215       10.6 g/dL 24 1826       9.8 g/dL 23 1239       10.4 g/dL  11/15/23 1603       11.2 g/dL 10/27/23 2112       9.5 g/dL 09/27/23 1332       8.7 g/dL 09/20/23 0235       9.0 g/dL 09/19/23 0054       10.2 g/dL 09/18/23 0828       9.5 g/dL 09/17/23 0023       10.1 g/dL 09/03/23 1610       10.1 g/dL 08/26/23 1727    Hct  35.4 % 05/22/24 1422       34.1 % 05/11/24 1646       32.3 % 05/01/24 1401       27.7 % 04/23/24 1456       29.9 % 04/21/24 0646       28.7 % 04/20/24 0721       21.9 % 04/19/24 0525       27.1 % 04/18/24 0858       27.9 % 04/18/24 0005       26.3 % 04/17/24 1145       26.7 % 04/15/24 2141       30.5 % 03/27/24 1215       30.9 % 01/29/24 1826       32.0 % 12/08/23 1239       32.5 % 11/15/23 1603       36.8 % 10/27/23 2112       30.1 % 09/27/23 1332       28.3 % 09/20/23 0235       29.0 % 09/19/23 0054       33.0 % 09/18/23 0828       31.2 % 09/17/23 0023       32.2 % 09/03/23 1610       32.7 % 08/26/23 1727    Glucose Fasting GTT       Glucose Tolerance Test 1 hour       Glucose Tolerance Test 3 hour       Gonorrhea (discrete) ^ Negative  11/03/23     Chlamydia (discrete) ^ Negative  11/03/23     RPR ^ Non-Reactive  11/16/23       ^ Non-Reactive  11/03/23     VDRL       Syphilis Antibody       HBsAg ^ Negative  11/16/23     Herpes Simplex Virus PCR       Herpes Simplex VIrus Culture       HIV ^ Non-Reactive  11/16/23     Hep C RNA Quant PCR       Hep C Antibody  Non-Reactive  04/20/24 0721    AFP       Group B Strep       GBS Susceptibility to Clindamycin       GBS Susceptibility to Erythromycin       Fetal Fibronectin       Genetic Testing, Maternal Blood                 Drug Screening       Test Value Date Time    NIPT        Urine Drug Screen       Amphetamine Screen  Negative  04/19/24 1152    Barbiturate Screen  Negative  04/19/24 1152    Benzodiazepine Screen  Negative  04/19/24 1152    Methadone Screen  Negative  04/19/24 1152    Phencyclidine Screen  Negative  04/19/24 1152    Opiates Screen  Negative  04/19/24 1152    THC Screen  Negative  04/19/24 1152     Cocaine Screen       Propoxyphene Screen       Buprenorphine Screen  Negative  24 1152    Methamphetamine Screen       Oxycodone Screen  Negative  24 1152              Legend    ^: Historical                            Assessment/Plan:         Status post  section left total salpingectomy  4 (unicornuate uterus )  1.  Periincisional cellulitis/superficial infection (negative abdominal CT and pelvis)       Preliminary wound culture (no growth of anaerobes and a rare gram-negative bacilli)       Blood cultures negative       Consider hidradenitis suppurativa ( HS)  2. .  Chronic hypertension BP stable on current regimen  3.    Type 2 diabetes (OmniPod insulin pump/Dexcom on sugars under well control  4.    Chronic CHF stable  5.    Ulcerative colitis proctitis status post endoscopy 2024  6.     Obstructive sleep apnea syndrome    7.     Constipation  PLAN  Discontinue vancomycin  Wait for final culture results  Increase MiraLAX twice daily add Abena-Colace.  Consider discharge home on doxycycline 100 mg daily for HS.   Questions were answered.

## 2024-05-26 LAB
BACTERIA SPEC AEROBE CULT: ABNORMAL
BACTERIA SPEC AEROBE CULT: ABNORMAL
GLUCOSE BLDC GLUCOMTR-MCNC: 100 MG/DL (ref 70–130)
GLUCOSE BLDC GLUCOMTR-MCNC: 56 MG/DL (ref 70–130)
GLUCOSE BLDC GLUCOMTR-MCNC: 79 MG/DL (ref 70–130)
GLUCOSE BLDC GLUCOMTR-MCNC: 86 MG/DL (ref 70–130)
GLUCOSE BLDC GLUCOMTR-MCNC: 95 MG/DL (ref 70–130)
GLUCOSE BLDC GLUCOMTR-MCNC: 97 MG/DL (ref 70–130)
GLUCOSE BLDC GLUCOMTR-MCNC: 99 MG/DL (ref 70–130)
GRAM STN SPEC: ABNORMAL
GRAM STN SPEC: ABNORMAL

## 2024-05-26 PROCEDURE — 82948 REAGENT STRIP/BLOOD GLUCOSE: CPT

## 2024-05-26 PROCEDURE — A9270 NON-COVERED ITEM OR SERVICE: HCPCS | Performed by: OBSTETRICS & GYNECOLOGY

## 2024-05-26 PROCEDURE — 25010000002 PIPERACILLIN SOD-TAZOBACTAM PER 1 G: Performed by: OBSTETRICS & GYNECOLOGY

## 2024-05-26 PROCEDURE — 63710000001 BISACODYL 5 MG TABLET DELAYED-RELEASE: Performed by: OBSTETRICS & GYNECOLOGY

## 2024-05-26 PROCEDURE — 63710000001: Performed by: OBSTETRICS & GYNECOLOGY

## 2024-05-26 PROCEDURE — 63710000001 PRENATAL VITAMIN 27-0.8 27-0.8 MG TABLET: Performed by: OBSTETRICS & GYNECOLOGY

## 2024-05-26 PROCEDURE — 63710000001 POLYETHYLENE GLYCOL 17 G PACK: Performed by: OBSTETRICS & GYNECOLOGY

## 2024-05-26 PROCEDURE — 63710000001 NIFEDIPINE XL 30 MG TABLET SUSTAINED-RELEASE 24 HOUR: Performed by: OBSTETRICS & GYNECOLOGY

## 2024-05-26 PROCEDURE — 63710000001 MAGNESIUM HYDROXIDE 400 MG/5ML SUSPENSION: Performed by: OBSTETRICS & GYNECOLOGY

## 2024-05-26 PROCEDURE — G0378 HOSPITAL OBSERVATION PER HR: HCPCS

## 2024-05-26 PROCEDURE — 63710000001 PANTOPRAZOLE 40 MG TABLET DELAYED-RELEASE: Performed by: OBSTETRICS & GYNECOLOGY

## 2024-05-26 PROCEDURE — 99233 SBSQ HOSP IP/OBS HIGH 50: CPT | Performed by: OBSTETRICS & GYNECOLOGY

## 2024-05-26 PROCEDURE — 63710000001 CEPHALEXIN 250 MG CAPSULE: Performed by: OBSTETRICS & GYNECOLOGY

## 2024-05-26 PROCEDURE — 63710000001 DOCUSATE SODIUM 100 MG CAPSULE: Performed by: OBSTETRICS & GYNECOLOGY

## 2024-05-26 PROCEDURE — 63710000001 LISINOPRIL 40 MG TABLET: Performed by: OBSTETRICS & GYNECOLOGY

## 2024-05-26 RX ORDER — ACETAMINOPHEN 325 MG/1
650 TABLET ORAL EVERY 4 HOURS PRN
Status: DISCONTINUED | OUTPATIENT
Start: 2024-05-26 | End: 2024-05-26

## 2024-05-26 RX ORDER — CEPHALEXIN 250 MG/1
500 CAPSULE ORAL EVERY 6 HOURS SCHEDULED
Status: DISCONTINUED | OUTPATIENT
Start: 2024-05-26 | End: 2024-05-27 | Stop reason: HOSPADM

## 2024-05-26 RX ORDER — BISACODYL 5 MG/1
10 TABLET, DELAYED RELEASE ORAL DAILY PRN
Status: DISCONTINUED | OUTPATIENT
Start: 2024-05-26 | End: 2024-05-27 | Stop reason: HOSPADM

## 2024-05-26 RX ORDER — ACETAMINOPHEN 325 MG/1
650 TABLET ORAL EVERY 4 HOURS PRN
Status: DISCONTINUED | OUTPATIENT
Start: 2024-05-26 | End: 2024-05-27 | Stop reason: HOSPADM

## 2024-05-26 RX ORDER — IBUPROFEN 600 MG/1
600 TABLET ORAL EVERY 6 HOURS PRN
Status: DISCONTINUED | OUTPATIENT
Start: 2024-05-26 | End: 2024-05-27 | Stop reason: HOSPADM

## 2024-05-26 RX ADMIN — PANTOPRAZOLE SODIUM 40 MG: 40 TABLET, DELAYED RELEASE ORAL at 08:01

## 2024-05-26 RX ADMIN — MAGNESIUM HYDROXIDE 10 ML: 400 SUSPENSION ORAL at 10:52

## 2024-05-26 RX ADMIN — BISACODYL 10 MG: 5 TABLET, COATED ORAL at 10:52

## 2024-05-26 RX ADMIN — Medication 10 ML: at 21:20

## 2024-05-26 RX ADMIN — DOCUSATE SODIUM 100 MG: 100 CAPSULE, LIQUID FILLED ORAL at 21:06

## 2024-05-26 RX ADMIN — PIPERACILLIN SODIUM AND TAZOBACTAM SODIUM 3.38 G: 3; .375 INJECTION, POWDER, LYOPHILIZED, FOR SOLUTION INTRAVENOUS at 05:49

## 2024-05-26 RX ADMIN — POLYETHYLENE GLYCOL 3350 17 G: 17 POWDER, FOR SOLUTION ORAL at 21:06

## 2024-05-26 RX ADMIN — PANTOPRAZOLE SODIUM 40 MG: 40 TABLET, DELAYED RELEASE ORAL at 17:31

## 2024-05-26 RX ADMIN — DOCUSATE SODIUM 100 MG: 100 CAPSULE, LIQUID FILLED ORAL at 08:01

## 2024-05-26 RX ADMIN — LISINOPRIL 40 MG: 40 TABLET ORAL at 08:01

## 2024-05-26 RX ADMIN — CEPHALEXIN 500 MG: 250 CAPSULE ORAL at 17:31

## 2024-05-26 RX ADMIN — CEPHALEXIN 500 MG: 250 CAPSULE ORAL at 12:44

## 2024-05-26 RX ADMIN — MESALAMINE 4 G: 4 ENEMA RECTAL at 21:06

## 2024-05-26 RX ADMIN — PRENATAL VITAMINS-IRON FUMARATE 27 MG IRON-FOLIC ACID 0.8 MG TABLET 1 TABLET: at 08:01

## 2024-05-26 RX ADMIN — POLYETHYLENE GLYCOL 3350 17 G: 17 POWDER, FOR SOLUTION ORAL at 08:02

## 2024-05-26 RX ADMIN — NIFEDIPINE 60 MG: 30 TABLET, EXTENDED RELEASE ORAL at 08:01

## 2024-05-26 NOTE — PROGRESS NOTES
[facility]  GYN Progress Note    Chief Complaint: HD 5    Subjective   Wound feeling better. Reports no BM Since Monday, 6 days. Feels bloated. No n/v. Otherwise has no complaints    Objective     Vital Signs Range for the last 24 hours  Temp:  [96.8 °F (36 °C)-98.2 °F (36.8 °C)] 97.7 °F (36.5 °C)   BP: ()/(55-82) 106/62   Heart Rate:  [70-88] 70   Resp:  [16-18] 16   SpO2:  [91 %-99 %] 91 %       Device (Oxygen Therapy): CPAP       Intake/Output last 24 hours:      Intake/Output Summary (Last 24 hours) at 5/26/2024 0935  Last data filed at 5/25/2024 1800  Gross per 24 hour   Intake 720 ml   Output --   Net 720 ml       Intake/Output this shift:    No intake/output data recorded.    Physical Exam:  General: Patient is comfortable, well appearing, and in no acute distress   Heart CVS exam: normal rate and regular rhythm.   Lungs Chest: clear to auscultation, no wheezes, rales or rhonchi, symmetric air entry.     Abdomen Abdominal exam: soft, nontender, nondistended, no masses or organomegaly  Incision with no erythema or exudate, well approximated.   Extremities Exam of extremities: no pedal edema noted       Laboratory Results:  Blood Sugar in Office          5/24/2024    07:32 5/24/2024    12:40 5/24/2024    16:42 5/24/2024    20:24 5/25/2024    08:10 5/25/2024    12:31 5/25/2024    17:02 5/25/2024    21:45 5/26/2024    00:09   Blood Sugar in Office   Glucose 73  98  115  96  85  92  83  90  56          5/26/2024    00:39 5/26/2024    07:55   Blood Sugar in Office   Glucose 99  100         Wound Culture: Proteus Mirabilis      - resistant to tetracycline. O/w pan suseptible        Assessment/Plan: HD 5 with Superficial wound infection    Wound Infection/Cellulitis  Resolving. Dc Zosyn and transition to Keflex x 7 days based on Cx and Sensitivities.   Blood Cx neg  2. T2DM   A. Stable control. No change    3. Chronic CHF   A. Stable. Continue medications as ordered     4. ALEJANDRO     5. BMI 39: complicates all  aspects of care     6. UC with constipation    - Miralax at BID, Colace. Add dulcolax and milk of mag     7 plan dc home tomorrow        Antonette Page MD  5/26/2024  09:35 EDT

## 2024-05-26 NOTE — PLAN OF CARE
Goal Outcome Evaluation:  Plan of Care Reviewed With: patient           Outcome Evaluation: VSS on RA. Pt has been up ad nimesh, visiting daughter in NICU. Pt pumping and sending milk to NICU. Patent IV in place, saline locked as pt moved to PO antibiotics in preperation for D/C home. Bowel regimine increased this shift for attempts at BM prior to D/C home. No PRN medications required. Pt monitoring blood glucose with home  monitor and self admin of insulin with checks conducted ACHS by staff. No other issues to report at this time. Continuing POC and reporting as needed.

## 2024-05-26 NOTE — PLAN OF CARE
Goal Outcome Evaluation:  Plan of Care Reviewed With: patient        Progress: improving  Outcome Evaluation: VSS.  39 year old white female admitted on 5/22/2024, she is on day 4 of this hospitalization.  Patient has an IV in place and patent, infusing without issue.  Patient is up ad nimesh and has had adequate amount of UOP noted.  Have administered scheduled andprn medication as indicated.  Will continue to monitor patient throughout the rest of this shift.

## 2024-05-27 ENCOUNTER — READMISSION MANAGEMENT (OUTPATIENT)
Dept: CALL CENTER | Facility: HOSPITAL | Age: 39
End: 2024-05-27
Payer: MEDICARE

## 2024-05-27 VITALS
OXYGEN SATURATION: 95 % | RESPIRATION RATE: 17 BRPM | SYSTOLIC BLOOD PRESSURE: 114 MMHG | TEMPERATURE: 97.9 F | HEART RATE: 73 BPM | DIASTOLIC BLOOD PRESSURE: 81 MMHG

## 2024-05-27 LAB
BACTERIA SPEC AEROBE CULT: NORMAL
BACTERIA SPEC AEROBE CULT: NORMAL
BACTERIA SPEC ANAEROBE CULT: ABNORMAL
GLUCOSE BLDC GLUCOMTR-MCNC: 98 MG/DL (ref 70–130)
GLUCOSE BLDC GLUCOMTR-MCNC: 98 MG/DL (ref 70–130)

## 2024-05-27 PROCEDURE — A9270 NON-COVERED ITEM OR SERVICE: HCPCS | Performed by: OBSTETRICS & GYNECOLOGY

## 2024-05-27 PROCEDURE — 63710000001 PRENATAL VITAMIN 27-0.8 27-0.8 MG TABLET: Performed by: OBSTETRICS & GYNECOLOGY

## 2024-05-27 PROCEDURE — 82948 REAGENT STRIP/BLOOD GLUCOSE: CPT

## 2024-05-27 PROCEDURE — 63710000001 NIFEDIPINE XL 30 MG TABLET SUSTAINED-RELEASE 24 HOUR: Performed by: OBSTETRICS & GYNECOLOGY

## 2024-05-27 PROCEDURE — 63710000001 PANTOPRAZOLE 40 MG TABLET DELAYED-RELEASE: Performed by: OBSTETRICS & GYNECOLOGY

## 2024-05-27 PROCEDURE — 63710000001 LISINOPRIL 40 MG TABLET: Performed by: OBSTETRICS & GYNECOLOGY

## 2024-05-27 PROCEDURE — 63710000001 CEPHALEXIN 250 MG CAPSULE: Performed by: OBSTETRICS & GYNECOLOGY

## 2024-05-27 PROCEDURE — 99239 HOSP IP/OBS DSCHRG MGMT >30: CPT | Performed by: OBSTETRICS & GYNECOLOGY

## 2024-05-27 PROCEDURE — G0378 HOSPITAL OBSERVATION PER HR: HCPCS

## 2024-05-27 PROCEDURE — 63710000001 BISACODYL 5 MG TABLET DELAYED-RELEASE: Performed by: OBSTETRICS & GYNECOLOGY

## 2024-05-27 PROCEDURE — 63710000001 OXYCODONE-ACETAMINOPHEN 5-325 MG TABLET: Performed by: OBSTETRICS & GYNECOLOGY

## 2024-05-27 PROCEDURE — 63710000001 ONDANSETRON ODT 4 MG TABLET DISPERSIBLE: Performed by: OBSTETRICS & GYNECOLOGY

## 2024-05-27 RX ORDER — POLYETHYLENE GLYCOL 3350 17 G/17G
17 POWDER, FOR SOLUTION ORAL DAILY PRN
Qty: 510 G | Refills: 2 | Status: SHIPPED | OUTPATIENT
Start: 2024-05-27

## 2024-05-27 RX ORDER — LABETALOL 200 MG/1
200 TABLET, FILM COATED ORAL AS NEEDED
Qty: 30 TABLET | Refills: 1 | Status: SHIPPED | OUTPATIENT
Start: 2024-05-27

## 2024-05-27 RX ORDER — LISINOPRIL 40 MG/1
40 TABLET ORAL
Qty: 90 TABLET | Refills: 0 | Status: SHIPPED | OUTPATIENT
Start: 2024-05-27

## 2024-05-27 RX ORDER — CEPHALEXIN 500 MG/1
500 CAPSULE ORAL EVERY 6 HOURS SCHEDULED
Qty: 24 CAPSULE | Refills: 0 | Status: SHIPPED | OUTPATIENT
Start: 2024-05-27 | End: 2024-06-05

## 2024-05-27 RX ORDER — CEPHALEXIN 250 MG/1
500 CAPSULE ORAL ONCE
Status: COMPLETED | OUTPATIENT
Start: 2024-05-27 | End: 2024-05-27

## 2024-05-27 RX ADMIN — LISINOPRIL 40 MG: 40 TABLET ORAL at 08:37

## 2024-05-27 RX ADMIN — CEPHALEXIN 500 MG: 250 CAPSULE ORAL at 12:10

## 2024-05-27 RX ADMIN — NIFEDIPINE 60 MG: 30 TABLET, EXTENDED RELEASE ORAL at 08:37

## 2024-05-27 RX ADMIN — CEPHALEXIN 500 MG: 250 CAPSULE ORAL at 05:26

## 2024-05-27 RX ADMIN — OXYCODONE HYDROCHLORIDE AND ACETAMINOPHEN 1 TABLET: 5; 325 TABLET ORAL at 02:50

## 2024-05-27 RX ADMIN — CEPHALEXIN 500 MG: 250 CAPSULE ORAL at 00:18

## 2024-05-27 RX ADMIN — CEPHALEXIN 500 MG: 250 CAPSULE ORAL at 13:14

## 2024-05-27 RX ADMIN — BISACODYL 10 MG: 5 TABLET, COATED ORAL at 00:18

## 2024-05-27 RX ADMIN — PANTOPRAZOLE SODIUM 40 MG: 40 TABLET, DELAYED RELEASE ORAL at 08:37

## 2024-05-27 RX ADMIN — PRENATAL VITAMINS-IRON FUMARATE 27 MG IRON-FOLIC ACID 0.8 MG TABLET 1 TABLET: at 08:37

## 2024-05-27 RX ADMIN — Medication 10 ML: at 08:38

## 2024-05-27 RX ADMIN — ONDANSETRON 4 MG: 4 TABLET, ORALLY DISINTEGRATING ORAL at 02:52

## 2024-05-27 NOTE — OUTREACH NOTE
Prep Survey      Flowsheet Row Responses   [facility] discharged [facility]? [facility]   Is LACE score < 7 ? No   Eligibility Readm Mgmt   Discharge diagnosis Postoperative cellulitis of surgical wound,  on 24   Does the patient have one of the following disease processes/diagnoses(primary or secondary)? Other   Does the patient have Home health ordered? No   Is there a DME ordered? No   Prep survey completed? Yes            Danita ZAMBRANO - Registered Nurse

## 2024-05-27 NOTE — PLAN OF CARE
Problem: Adult Inpatient Plan of Care  Goal: Optimal Comfort and Wellbeing  Outcome: Ongoing, Progressing  Intervention: Provide Person-Centered Care  Recent Flowsheet Documentation  Taken 5/27/2024 0815 by Stanford Pérez, RN  Trust Relationship/Rapport: care explained   Goal Outcome Evaluation:  Plan of Care Reviewed With: patient        Progress: improving          Pain controlled. Incision healing well. Complains of R breast tenderness; no redness or swelling noted (MD aware). Gave patient hand pump and encouraged to hand pump. Discharging home.

## 2024-05-27 NOTE — DISCHARGE SUMMARY
"Admission date: 2024  Discharge date: 24    Referring Provider: Abhishek Cuba DO    Admission diagnosis:  Wound infection [T14.8XXA, L08.9]  Postoperative cellulitis of surgical wound [T81.49XA]      Postoperative cellulitis of surgical wound  Status post  section and left total salpingectomy 24  Unicornuate uterus  Chronic hypertension  Type 2 diabetes(OmniPod insulin pump/Dexcom) blood sugars well-controlled  Chronic congestive heart failure-stable followed by cardiology  Ulcerative colitis/proctitis status post endoscopy 2024  Obstructive sleep apnea syndrome  Constipation        Discharge diagnosis:  Cellulitis resolved  (culture Proteus mirabilis)  Constipation resolved  Same as above    Consultants: Diabetes educator/maternal-fetal medicine      Hospital course:       Antoinette Pack a 39-year-old -1-1-1 5 weeks status post primary  section in the setting of chronic hypertension, COPD, diastolic heart failure, type 2 diabetes, ulcerative colitis with increasing incisional pain and possible \" boil\".  Patient previous admitted postop on 5  and 5 3 with incisional opening infection.  Patient given Vanco and Zosyn Augmentin for 10 days.  Follow-up exam showed incision healing well.  However increasing left-sided tenderness and drainage and erythema from new boil.  Patient denied fever.  Patient admitted increasing shortness of air palpitations on occasion.  Denied nausea vomiting has not had a bowel movement in 2 days.  Patient admitted to [facility] floor empirical antibiotics of Vanco and Zosyn initiated multiple boys boils identified and opened with 18-gauge needle cultures were obtained.  She underwent a CT of abdomen pelvis without evidence of intra-abdominal pathology and only superficial cellulitis without evidence of abscess formation.  Incision rapidly improve no further drainage.  Culture resulted Proteus mirabilis.  Sensitive to all except " tetracycline.  Her blood sugar and blood pressure remained well-controlled with current regimen.  She required MiraLAX and enema to resolve constipation.  By hospital day #6 we believe patient reached her [facility] benefit and discharged home.  Continue home medications, Rx Keflex 5 mg 4 times a day for 7 days.  She will follow-up with her primary OB next week, cardiology as scheduled and her gastroenterologist in [place].        Vitals:    05/27/24 0746   BP: 120/75   Pulse: 71   Resp: 16   Temp: 97.8 °F (36.6 °C)   SpO2: 98%       GENERAL:  Well-developed, well-nourished in no acute distress.   ABD: Uterus nontender, incision healing well superficial cellulitis resolved no drainage noted  EXTREMITIES:  No clubbing, cyanosis trace edema.  PSYCHIATRIC:  Normal affect and mood.      Discharge condition: stable  Discharge diet   Dietary Orders (From admission, onward)       Start     Ordered    05/25/24 1726  DIET MESSAGE Can patient please have a turkey and tomato sandwich with hernandez, chips, and fruit. Thank you.  Once        Comments: Can patient please have a turkey and tomato sandwich with hernandez, chips, and fruit. Thank you.    05/25/24 1726    05/24/24 1331  Diet: Diabetic; Consistent Carbohydrate; Fluid Consistency: Thin (IDDSI 0)  Diet Effective Now        References:    Diet Order [facility]   Question Answer Comment   Diets: Diabetic    Diabetic Diet: Consistent Carbohydrate    Fluid Consistency: Thin (IDDSI 0)        05/24/24 1331                  Additional Instructions: Call with fevers, uncontrolled nausea/vomiting/pain.  Medications:      Discharge Medications        Continue These Medications        Instructions Start Date   BD Pen Needle Brandee U/F 32G X 4 MM misc  Generic drug: Insulin Pen Needle   Use to inject insulin up to 4 times daily as directed      Dexcom G6 Sensor   Change sensor Every 10 (Ten) Days.      Dexcom G6 Transmitter misc   1 each, Does not apply, Every 3 Months      Dexcom G6  "Transmitter misc   1 each, Does not apply, Every 3 Months      Omnipod 5 G6 Pods (Gen 5) misc   Change 2 (Two) Times a Day as directed.      Omnipod 5 G6 Pods (Gen 5) misc   Use 1 each Day.      TRUEplus Insulin Syringe 31G X 5/16\" 1 ML misc  Generic drug: Insulin Syringe-Needle U-100   1 each, Does not apply, 3 Times Daily With Meals, As needed for additional insulin bolus with meals.             ASK your doctor about these medications        Instructions Start Date   albuterol sulfate  (90 Base) MCG/ACT inhaler  Commonly known as: PROVENTIL HFA;VENTOLIN HFA;PROAIR HFA   Inhale 2 puffs by mouth every 6 (Six) Hours As Needed for Wheezing.      Budesonide 3 MG 24 hr capsule  Commonly known as: ENTOCORT EC   9 mg, Oral, Daily      Fiasp 100 UNIT/ML solution  Generic drug: Insulin Aspart (w/Niacinamide)   350 Units, Injection, Daily, For use in insulin pump. Max dose of 350u/day.      furosemide 40 MG tablet  Commonly known as: LASIX   40 mg, Oral, Daily PRN      labetalol 200 MG tablet  Commonly known as: NORMODYNE   200 mg, Oral, Daily      lisinopril 40 MG tablet  Commonly known as: PRINIVIL,ZESTRIL   40 mg, Oral, Every 24 Hours Scheduled      mesalamine 4 g enema  Commonly known as: ROWASA   4 g, Rectal, Nightly      NIFEdipine CC 60 MG 24 hr tablet  Commonly known as: ADALAT CC   60 mg, Oral, Every 24 Hours Scheduled      NovoLOG 100 UNIT/ML injection  Generic drug: Insulin Aspart   Inject 350 Units as directed Daily. For use in insulin pump. Max dose of 350u/day      pantoprazole 40 MG EC tablet  Commonly known as: PROTONIX   40 mg, Oral, 2 Times Daily Before Meals      PRENATAL PO   1 tablet, Oral, Daily             Disposition:Home or Self Care  Follow up:   Future Appointments   Date Time Provider Department Center   5/30/2024 11:30 AM  [facility] FOLLOW UP [facility] None   6/3/2024 10:45 AM Vonnie Key APRN [facility]       I spent over 30 minutes on discharge activity which included: " face-to-face encounter counseling with the patient, reviewing the data in the system, coordination of the care with the nursing staff as well as consultants, documentation, and entering orders.      Abhishek Cuba,   09:16 EDT

## 2024-05-29 ENCOUNTER — TELEPHONE (OUTPATIENT)
Dept: OBSTETRICS AND GYNECOLOGY | Facility: HOSPITAL | Age: 39
End: 2024-05-29
Payer: MEDICARE

## 2024-05-29 NOTE — TELEPHONE ENCOUNTER
Patient called at approximately 1440 today, reports vaginal bleeding (6 weeks s/p C/S). States she started bleeding on Saturday, changing 1-2 pads/day, then bleeding increased today (she has changed 4 pads since this morning). No further complaints. Discussed patient complaints with Dr. Fernandez, who states this is likely her period, and to give patient bleeding precautions. Called patient back at 1510 and told her the above information, and to notify MD if soaking more than 1 pad/hour, passing large blood clots, or has dizziness/weakness. Patient voices understanding.

## 2024-06-05 ENCOUNTER — SPECIALTY PHARMACY (OUTPATIENT)
Dept: PHARMACY | Facility: HOSPITAL | Age: 39
End: 2024-06-05
Payer: MEDICARE

## 2024-06-05 ENCOUNTER — OFFICE VISIT (OUTPATIENT)
Dept: ENDOCRINOLOGY | Facility: CLINIC | Age: 39
End: 2024-06-05
Payer: MEDICARE

## 2024-06-05 VITALS
SYSTOLIC BLOOD PRESSURE: 159 MMHG | BODY MASS INDEX: 38.87 KG/M2 | DIASTOLIC BLOOD PRESSURE: 88 MMHG | OXYGEN SATURATION: 97 % | WEIGHT: 192.8 LBS | HEIGHT: 59 IN | HEART RATE: 84 BPM

## 2024-06-05 DIAGNOSIS — Z87.19 HISTORY OF PANCREATITIS: ICD-10-CM

## 2024-06-05 DIAGNOSIS — Z79.4 TYPE 2 DIABETES MELLITUS WITHOUT COMPLICATION, WITH LONG-TERM CURRENT USE OF INSULIN: Chronic | ICD-10-CM

## 2024-06-05 DIAGNOSIS — E11.65 PRE-EXISTING TYPE 2 DIABETES MELLITUS WITH HYPERGLYCEMIA DURING PREGNANCY IN THIRD TRIMESTER: Primary | ICD-10-CM

## 2024-06-05 DIAGNOSIS — O12.10 PROTEINURIA AFFECTING PREGNANCY, ANTEPARTUM: ICD-10-CM

## 2024-06-05 DIAGNOSIS — O10.919 CHRONIC HYPERTENSION AFFECTING PREGNANCY: ICD-10-CM

## 2024-06-05 DIAGNOSIS — E66.01 MORBID OBESITY WITH BMI OF 40.0-44.9, ADULT: ICD-10-CM

## 2024-06-05 DIAGNOSIS — O24.113 PRE-EXISTING TYPE 2 DIABETES MELLITUS WITH HYPERGLYCEMIA DURING PREGNANCY IN THIRD TRIMESTER: Primary | ICD-10-CM

## 2024-06-05 DIAGNOSIS — L73.2 HIDRADENITIS SUPPURATIVA: ICD-10-CM

## 2024-06-05 DIAGNOSIS — Z79.4 TYPE 2 DIABETES MELLITUS WITHOUT COMPLICATION, WITH LONG-TERM CURRENT USE OF INSULIN: Primary | ICD-10-CM

## 2024-06-05 DIAGNOSIS — O24.112 PRE-EXISTING TYPE 2 DIABETES MELLITUS WITH HYPERGLYCEMIA DURING PREGNANCY IN SECOND TRIMESTER: ICD-10-CM

## 2024-06-05 DIAGNOSIS — E11.9 TYPE 2 DIABETES MELLITUS WITHOUT COMPLICATION, WITH LONG-TERM CURRENT USE OF INSULIN: Chronic | ICD-10-CM

## 2024-06-05 DIAGNOSIS — E11.65 PRE-EXISTING TYPE 2 DIABETES MELLITUS WITH HYPERGLYCEMIA DURING PREGNANCY IN SECOND TRIMESTER: ICD-10-CM

## 2024-06-05 DIAGNOSIS — E11.9 TYPE 2 DIABETES MELLITUS WITHOUT COMPLICATION, WITH LONG-TERM CURRENT USE OF INSULIN: Primary | ICD-10-CM

## 2024-06-05 LAB
EXPIRATION DATE: NORMAL
HBA1C MFR BLD: 5 % (ref 4.5–5.7)
Lab: NORMAL

## 2024-06-05 RX ORDER — PROCHLORPERAZINE 25 MG/1
SUPPOSITORY RECTAL
Qty: 3 EACH | Refills: 5 | Status: SHIPPED | OUTPATIENT
Start: 2024-06-05

## 2024-06-05 RX ORDER — INSULIN PMP CART,AUT,G6/7,CNTR
1 EACH SUBCUTANEOUS DAILY
Qty: 30 EACH | Refills: 5 | Status: SHIPPED | OUTPATIENT
Start: 2024-06-05

## 2024-06-05 RX ORDER — INSULIN ASPART 100 [IU]/ML
INJECTION, SOLUTION INTRAVENOUS; SUBCUTANEOUS
Qty: 20 ML | Refills: 4 | Status: SHIPPED | OUTPATIENT
Start: 2024-06-05

## 2024-06-05 RX ORDER — PROCHLORPERAZINE 25 MG/1
1 SUPPOSITORY RECTAL
Qty: 1 EACH | Refills: 1 | Status: SHIPPED | OUTPATIENT
Start: 2024-06-05

## 2024-06-05 NOTE — PROGRESS NOTES
"   Specialty Pharmacy Patient Management Program  Endocrinology Reassessment     Antoinette Pack is a 39 y.o. female with Type 2 Diabetes enrolled in the Endocrinology Patient Management program offered by [facility]. A follow-up was conducted, including assessment of continued therapy appropriateness, medication adherence, and side effect incidence and management for Insulin therapy and CGM.    Patient delivered her baby since last visit. She uses Novolog in her Omniopod insulin pump and a Dexcom G6 to monitor her BG. Her insulin requirements have decreased now that she delivered the baby.     In the past, the patient has tried:                Drug Dose Reason for Discontinuation Notes   Trulicity   GI upset     Glipizide    Unsure                            Changes to Insurance Coverage or Financial Support  None    Relevant Past Medical History and Comorbidities  Relevant medical history and concomitant health conditions were discussed with the patient. The patient's chart has been reviewed for relevant past medical history and comorbid health conditions and updated as necessary.   Past Medical History:   Diagnosis Date    Anxiety     CHF (congestive heart failure) 05/2023    Colitis 2023    Depression     Diabetes mellitus 2018    type 2    Diverticulitis     GERD (gastroesophageal reflux disease)     History of transfusion 04/19/2024    2 units post delivery    Hypertension     chronic    Kidney stone     \"years ago\"    Narcolepsy 2022    Pancreatitis 09/2023    PCOS (polycystic ovarian syndrome)     Rectal bleeding     SINCE 2023 - USES MESALAMINE SUPPOSITORY NIGHTLY    Seizures 2012    Sleep apnea      Social History     Socioeconomic History    Marital status:    Tobacco Use    Smoking status: Former     Current packs/day: 0.00     Average packs/day: 1 pack/day for 20.0 years (20.0 ttl pk-yrs)     Types: Cigarettes     Start date: 1995     Quit date: 2015     Years since " quittin.4     Passive exposure: Never    Smokeless tobacco: Never    Tobacco comments:     QUIT IN 2017   Vaping Use    Vaping status: Never Used   Substance and Sexual Activity    Alcohol use: No    Drug use: No    Sexual activity: Defer       Problem list reviewed by Sravani Perez PharmD on 2024 at 11:22 AM    Allergies  Known allergies and reactions were discussed with the patient. The patient's chart has been reviewed for allergy information and updated as necessary.   Dilantin [phenytoin], Keppra [levetiracetam], Meperidine, and Topamax [topiramate]    Allergies reviewed by Sravani Perez, Manny on 2024 at 11:22 AM    Relevant Laboratory Values  A1C Last 3 Results          2023    00:23 2024    10:18 2024    11:45   HGBA1C Last 3 Results   Hemoglobin A1C 9.30  5.3  5.0      Lab Results   Component Value Date    HGBA1C 5.0 2024     Lab Results   Component Value Date    GLUCOSE 72 2024    CALCIUM 9.1 2024     2024    K 4.0 2024    CO2 26.0 2024     2024    BUN 15 2024    CREATININE 0.50 (L) 2024    EGFRIFAFRI  2016      Comment:      <15 Indicative of kidney failure.    EGFRIFNONA 101 2020    BCR 30.0 (H) 2024    ANIONGAP 11.0 2024     Lab Results   Component Value Date    CHOL 173 2023    CHLPL 159 2015    TRIG 111 2023    HDL 33 (L) 2023     (H) 2023       Current Medication List  This medication list has been reviewed with the patient and evaluated for any interactions or necessary modifications/recommendations, and updated to include all prescription medications, OTC medications, and supplements the patient is currently taking.  This list reflects what is contained in the patient's profile, which has also been marked as reviewed to communicate to other providers it is the most up to date version of the patient's current medication therapy.     Current  "Outpatient Medications:     Budesonide (ENTOCORT EC) 3 MG 24 hr capsule, Take 3 capsules by mouth Daily., Disp: 90 capsule, Rfl: 1    cephalexin (KEFLEX) 500 MG capsule, Take 1 capsule by mouth Every 6 (Six) Hours for 24 doses, Disp: 24 capsule, Rfl: 0    Continuous Glucose Sensor (Dexcom G6 Sensor), Change sensor Every 10 (Ten) Days., Disp: 3 each, Rfl: 5    Continuous Glucose Transmitter (Dexcom G6 Transmitter) misc, Use 1 each Every 3 (Three) Months., Disp: 1 each, Rfl: 1    furosemide (LASIX) 40 MG tablet, Take 1 tablet by mouth Daily As Needed (for fluid retention)., Disp: 90 tablet, Rfl: 0    Insulin Aspart (NovoLOG) 100 UNIT/ML injection, For use in insulin pump. MDD 50 units, Disp: 20 mL, Rfl: 4    Insulin Disposable Pump (Omnipod 5 G6 Pods, Gen 5,) misc, Use 1 each Day., Disp: 30 each, Rfl: 5    Insulin Pen Needle 32G X 4 MM misc, Use to inject insulin up to 4 times daily as directed, Disp: 400 each, Rfl: 1    Insulin Syringe 31G X 5/16\" 1 ML misc, Use 1 each 3 (Three) Times a Day With Meals. As needed for additional insulin bolus with meals., Disp: 100 each, Rfl: 3    labetalol (NORMODYNE) 200 MG tablet, Take 1 tablet by mouth As Needed (prn)., Disp: 30 tablet, Rfl: 1    lisinopril (PRINIVIL,ZESTRIL) 40 MG tablet, Take 1 tablet by mouth Daily., Disp: 90 tablet, Rfl: 0    mesalamine (ROWASA) 4 g enema, Insert 1 enema into the rectum Every Night., Disp: 28 enema, Rfl: 0    NIFEdipine CC (ADALAT CC) 60 MG 24 hr tablet, Take 1 tablet by mouth Daily for 120 days., Disp: 30 tablet, Rfl: 3    pantoprazole (PROTONIX) 40 MG EC tablet, Take 1 tablet by mouth 2 (Two) Times a Day Before Meals., Disp: 120 tablet, Rfl: 0    Prenatal Vit-Fe Fumarate-FA (PRENATAL PO), Take 1 tablet by mouth Daily., Disp: , Rfl:     sucralfate (CARAFATE) 1 g tablet, Take 1 tablet by mouth 4 (Four) Times a Day Before Meals & at Bedtime for 7 days., Disp: 28 tablet, Rfl: 0    Insulin Aspart, w/Niacinamide, (Fiasp) 100 UNIT/ML solution, " Inject 350 Units as directed Daily. For use in insulin pump. Max dose of 350u/day. (Patient not taking: Reported on 6/5/2024), Disp: 110 mL, Rfl: 5    polyethylene glycol (MIRALAX) 17 GM/SCOOP powder, Dissolve 1 capful (17 g) in 6 to 8 oz of liquid and drink by mouth Daily As Needed (Patient not taking: Reported on 6/5/2024), Disp: 510 g, Rfl: 2  No current facility-administered medications for this visit.    Medicines reviewed by Sravani Perez, PharmD on 6/5/2024 at 11:24 AM    Drug Interactions  No significant drug-drug interactions with diabetes medications expected according to literature.     Adverse Drug Reactions  Adverse Reactions Experienced: None  Plan for ADR Management: None required    Hospitalizations and Urgent Care Since Last Assessment  Hospitalizations or Admissions: None  ED Visits: None   Urgent Office Visits: None    Adherence and Self-Administration  Adherence related patient's specialty therapy was discussed with the patient. The Adherence segment of this outreach has been reviewed and updated.   Ongoing or New Barriers to Patient Adherence and/or Self-Administration: None   Methods for Supporting Patient Adherence and/or Self-Administration: None Required    Goals of Therapy  Goals related to the patient's specialty therapy was discussed with the patient. The Patient Goals segment of this outreach has been reviewed and updated.    Goals Addressed Today        Consistently take medications as prescribed      HEMOGLOBIN A1C < 7              Specialty Pharmacy General Goal      Prevent hypoglycemia            Reassessment Plan & Follow-Up  Patient's diabetes continues to improve with A1C down to 5% from 5.3%.  Provider Medication Therapy Changes: Per Vonnie Key, APRN will order  Novolog for use in insulin pump with MDD of 50 units   OmniPod 5 and Dexcom G6 supplies  Related Plans, Therapy Recommendations, or Issues to Be Addressed:   Updated RX's have been sent to [facility] for shipping  services.     Attestation  I attest the patient was actively involved in and has agreed to the above plan of care. If the prescribed therapy is at any point deemed not appropriate based on the current or future assessments, a consultation will be initiated with the patient's specialty care provider to determine the best course of action. The revised plan of therapy will be documented along with any required assessments and/or additional patient education provided.    Sravani Perez, PharmD, [facility]  Clinical Specialty Pharmacist, Endocrinology   6/5/2024  16:05 EDT

## 2024-06-05 NOTE — ASSESSMENT & PLAN NOTE
-Diabetes is at goal with A1c 5.0.  -Discussed dietary and exercise guidelines with patient.  -Discussed the importance of yearly eye exams.  -Discussed the importance of checking BG's regularly.  Continue using CGM.  2 week data download is as follows:  Very High: 0%  High: 1%  In Range: 99%  Low: 0%  Very Low: 0%  Trend shows regulated BG's without over hyper/hypos.  Basal: 40%  Bolus: 60%  Continue with current pump settings:  Basal:   12A: 1.3 u/hr  CR: 1:5  ISF: 25  -S/S hypoglycemia reviewed with Rule of 15's advised.  -Follow-up in 3 months.

## 2024-06-05 NOTE — PROGRESS NOTES
"Chief Complaint   Patient presents with    Diabetes        Antoinette Pack is a 39 y.o. female had concerns including Diabetes.   T2DM.    She delivered at 34w5d, baby was in the NICU for 5w4d.  Baby is doing well.  Patient is currently breastfeeding.  She reports that she is doing well after a difficult delivery and some postop complications including congestive heart failure, preeclampsia, infection to her  incision.  She has not been having to give any additional insulin recently as her blood sugars have been much better controlled.    Birth state: [place]  Previous history of radiation to face/neck: none  Consuming foods high in iodine: no  Family history of thyroid complications:none    The following portions of the patient's history were reviewed and updated as appropriate: allergies, current medications, past family history, past medical history, past social history, past surgical history and problem list.    History:  Diabetes was diagnosed 8-9 years ago.  Complications include none.  Last ophtho exam was , Dr. Bruno Office.  Current medications for diabetes include uses Novolog in an Omnipod 5 insulin pump.  Past meds: Trulicity, GI Issues, has problems with yeast, Glipizide-unsure why it was stopped  She checks her BG's with Dexcom CGM.  Hypos: rarely  Fasting range: 150-200+  Current pump settings:  Basal:   12A: 1.3 u/hr  CR: 1:5  ISF: 25    Diet: has made improvements to her diet.    Past Medical History:   Diagnosis Date    Anxiety     CHF (congestive heart failure) 2023    Colitis     Depression     Diabetes mellitus 2018    type 2    Diverticulitis     GERD (gastroesophageal reflux disease)     History of transfusion 2024    2 units post delivery    Hypertension     chronic    Kidney stone     \"years ago\"    Narcolepsy     Pancreatitis 2023    PCOS (polycystic ovarian syndrome)     Rectal bleeding     SINCE  - USES MESALAMINE SUPPOSITORY NIGHTLY    Seizures " 2012    Sleep apnea      Past Surgical History:   Procedure Laterality Date    CARDIAC CATHETERIZATION      CARPAL TUNNEL RELEASE       SECTION N/A 2024    Procedure:  SECTION PRIMARY;  Surgeon: Axel Keys MD;  Location:  [facility];  Service: Obstetrics/Gynecology;  Laterality: N/A;    COLONOSCOPY      COLONOSCOPY N/A 2024    Procedure: COLONOSCOPY;  Surgeon: oTm Mueller MD;  Location:  [facility];  Service: Gastroenterology;  Laterality: N/A;    ENDOSCOPY      ENDOSCOPY N/A 2024    Procedure: ESOPHAGOGASTRODUODENOSCOPY;  Surgeon: Tom Mueller MD;  Location:  [facility];  Service: Gastroenterology;  Laterality: N/A;    FRACTURE SURGERY Left     wrist    HARDWARE REMOVAL Left     WRIST    TENDON REPAIR Left     HAND    WISDOM TOOTH EXTRACTION        Family History   Problem Relation Age of Onset    Hypertension Mother     Depression Mother     Heart disease Mother     COPD Mother     Emphysema Mother     Hypertension Father     Diabetes Father     Heart disease Father     COPD Father     Heart failure Father     Hepatitis Father       Social History     Socioeconomic History    Marital status:    Tobacco Use    Smoking status: Former     Current packs/day: 0.00     Average packs/day: 1 pack/day for 20.0 years (20.0 ttl pk-yrs)     Types: Cigarettes     Start date:      Quit date:      Years since quittin.4     Passive exposure: Never    Smokeless tobacco: Never    Tobacco comments:     QUIT IN 2017   Vaping Use    Vaping status: Never Used   Substance and Sexual Activity    Alcohol use: No    Drug use: No    Sexual activity: Defer      Allergies   Allergen Reactions    Dilantin [Phenytoin] Mental Status Change    Keppra [Levetiracetam] Mental Status Change    Meperidine Rash    Topamax [Topiramate] Mental Status Change      Current Outpatient Medications on File Prior to Visit   Medication Sig Dispense Refill    Budesonide (ENTOCORT  "EC) 3 MG 24 hr capsule Take 3 capsules by mouth Daily. 90 capsule 1    cephalexin (KEFLEX) 500 MG capsule Take 1 capsule by mouth Every 6 (Six) Hours for 24 doses 24 capsule 0    furosemide (LASIX) 40 MG tablet Take 1 tablet by mouth Daily As Needed (for fluid retention). 90 tablet 0    Insulin Aspart, w/Niacinamide, (Fiasp) 100 UNIT/ML solution Inject 350 Units as directed Daily. For use in insulin pump. Max dose of 350u/day. (Patient not taking: Reported on 6/5/2024) 110 mL 5    Insulin Pen Needle 32G X 4 MM misc Use to inject insulin up to 4 times daily as directed 400 each 1    Insulin Syringe 31G X 5/16\" 1 ML misc Use 1 each 3 (Three) Times a Day With Meals. As needed for additional insulin bolus with meals. 100 each 3    labetalol (NORMODYNE) 200 MG tablet Take 1 tablet by mouth As Needed (prn). 30 tablet 1    lisinopril (PRINIVIL,ZESTRIL) 40 MG tablet Take 1 tablet by mouth Daily. 90 tablet 0    mesalamine (ROWASA) 4 g enema Insert 1 enema into the rectum Every Night. 28 enema 0    NIFEdipine CC (ADALAT CC) 60 MG 24 hr tablet Take 1 tablet by mouth Daily for 120 days. 30 tablet 3    pantoprazole (PROTONIX) 40 MG EC tablet Take 1 tablet by mouth 2 (Two) Times a Day Before Meals. 120 tablet 0    polyethylene glycol (MIRALAX) 17 GM/SCOOP powder Dissolve 1 capful (17 g) in 6 to 8 oz of liquid and drink by mouth Daily As Needed (Patient not taking: Reported on 6/5/2024) 510 g 2    Prenatal Vit-Fe Fumarate-FA (PRENATAL PO) Take 1 tablet by mouth Daily.      sucralfate (CARAFATE) 1 g tablet Take 1 tablet by mouth 4 (Four) Times a Day Before Meals & at Bedtime for 7 days. 28 tablet 0    [DISCONTINUED] albuterol sulfate  (90 Base) MCG/ACT inhaler Inhale 2 puffs by mouth every 6 (Six) Hours As Needed for Wheezing. 8.5 g 0    [DISCONTINUED] amoxicillin-clavulanate (AUGMENTIN) 875-125 MG per tablet Take 1 tablet by mouth Every 12 (Twelve) Hours for 28 doses. 28 tablet 0    [DISCONTINUED] Continuous Blood Gluc " Transmit (Dexcom G6 Transmitter) misc Use 1 each Every 3 (Three) Months. 1 each 1    [DISCONTINUED] Continuous Glucose Sensor (Dexcom G6 Sensor) Change sensor Every 10 (Ten) Days. 3 each 11    [DISCONTINUED] Continuous Glucose Transmitter (Dexcom G6 Transmitter) misc Use 1 each Every 3 (Three) Months. 1 each 3    [DISCONTINUED] Enoxaparin Sodium (LOVENOX) 40 MG/0.4ML solution prefilled syringe syringe Inject 0.4 mL under the skin into the appropriate area as directed Every 12 (Twelve) Hours for 14 days. Indications: Prevention of Unwanted Clot in Veins 11.2 mL 0    [DISCONTINUED] Insulin Aspart (NovoLOG) 100 UNIT/ML injection Inject 350 Units as directed Daily. For use in insulin pump. Max dose of 350u/day 100 mL 0    [DISCONTINUED] Insulin Disposable Pump (Omnipod 5 G6 Pods, Gen 5,) misc Change 2 (Two) Times a Day as directed. 60 each 5    [DISCONTINUED] Insulin Disposable Pump (Omnipod 5 G6 Pods, Gen 5,) misc Use 1 each Day. 30 each 1    [DISCONTINUED] oxyCODONE (ROXICODONE) 10 MG tablet Take 1 tablet by mouth 30 minutes prior to wound care; may take 1 tablet every 4 Hours As Needed for Moderate Pain for up to 3 days. 9 tablet 0     No current facility-administered medications on file prior to visit.      Review of Systems   Constitutional:  Positive for fatigue. Negative for diaphoresis, unexpected weight gain and unexpected weight loss.   HENT:          Neck tenderness   Eyes:  Negative for blurred vision and visual disturbance.   Cardiovascular:  Negative for palpitations.   Gastrointestinal:  Positive for constipation.   Endocrine: Negative for cold intolerance, heat intolerance, polydipsia, polyphagia and polyuria.   Skin:  Positive for dry skin.        Hair thinning   Neurological:  Negative for tremors.   Psychiatric/Behavioral:  Positive for sleep disturbance. Negative for agitation. The patient is not nervous/anxious.    All other systems reviewed and are negative.     /88 (BP Location: Right arm,  "Patient Position: Sitting, Cuff Size: Adult)   Pulse 84   Ht 149.9 cm (59\")   Wt 87.5 kg (192 lb 12.8 oz)   LMP  (LMP Unknown)   SpO2 97%   Breastfeeding Yes   BMI 38.94 kg/m²      Physical Exam  Vitals reviewed.   Constitutional:       Appearance: Normal appearance.   Eyes:      Extraocular Movements: Extraocular movements intact.   Cardiovascular:      Rate and Rhythm: Normal rate.   Pulmonary:      Effort: Pulmonary effort is normal.   Skin:     General: Skin is warm.   Neurological:      General: No focal deficit present.      Mental Status: She is alert and oriented to person, place, and time.   Psychiatric:         Mood and Affect: Mood normal.         Behavior: Behavior normal.         Thought Content: Thought content normal.         Judgment: Judgment normal.        Labs/Imaging  CMP  Lab Results   Component Value Date    GLUCOSE 72 05/22/2024    BUN 15 05/22/2024    CREATININE 0.50 (L) 05/22/2024    EGFRIFNONA 101 12/27/2020    EGFRIFAFRI  09/05/2016      Comment:      <15 Indicative of kidney failure.    BCR 30.0 (H) 05/22/2024    K 4.0 05/22/2024    CO2 26.0 05/22/2024    CALCIUM 9.1 05/22/2024    ALBUMIN 4.0 05/22/2024    LABIL2 1.3 (L) 02/05/2016    AST 14 05/22/2024    ALT 12 05/22/2024        CBC w/DIFF   Lab Results   Component Value Date    WBC 7.87 05/22/2024    RBC 4.24 05/22/2024    HGB 10.7 (L) 05/22/2024    HCT 35.4 05/22/2024    MCV 83.5 05/22/2024    MCH 25.2 (L) 05/22/2024    MCHC 30.2 (L) 05/22/2024    RDW 14.4 05/22/2024    RDWSD 43.6 05/22/2024    MPV 10.1 05/22/2024     05/22/2024    NEUTRORELPCT 70.1 05/22/2024    LYMPHORELPCT 22.7 05/22/2024    MONORELPCT 4.7 (L) 05/22/2024    EOSRELPCT 1.7 05/22/2024    BASORELPCT 0.4 05/22/2024    AUTOIGPER 0.4 05/22/2024    NEUTROABS 5.52 05/22/2024    LYMPHSABS 1.79 05/22/2024    MONOSABS 0.37 05/22/2024    EOSABS 0.13 05/22/2024    BASOSABS 0.03 05/22/2024    AUTOIGNUM 0.03 05/22/2024    NRBC 0.0 05/22/2024       TSH  Lab Results " "  Component Value Date    TSH 0.346 04/19/2024    TSH 0.022 (L) 12/08/2023    TSH 0.007 (L) 11/15/2023       T4  Lab Results   Component Value Date    FREET4 1.03 12/08/2023    FREET4 0.94 05/03/2023    FREET4 0.97 02/17/2022     No results found for: \"F2ARJFZ\"    T3  Lab Results   Component Value Date    T3FREE 3.43 02/17/2022     No results found for: \"W6AWIEG\"    TRAb  No results found for: \"TSURCPAB\"    TPO  No results found for: \"THYROIDAB\"    No valid procedures specified.    Assessment and Plan    Diagnoses and all orders for this visit:    1. Type 2 diabetes mellitus without complication, with long-term current use of insulin (Primary)  Assessment & Plan:  -Diabetes is at goal with A1c 5.0.  -Discussed dietary and exercise guidelines with patient.  -Discussed the importance of yearly eye exams.  -Discussed the importance of checking BG's regularly.  Continue using CGM.  2 week data download is as follows:  Very High: 0%  High: 1%  In Range: 99%  Low: 0%  Very Low: 0%  Trend shows regulated BG's without over hyper/hypos.  Basal: 40%  Bolus: 60%  Continue with current pump settings:  Basal:   12A: 1.3 u/hr  CR: 1:5  ISF: 25  -S/S hypoglycemia reviewed with Rule of 15's advised.  -Follow-up in 3 months.    Orders:  -     POC Glycosylated Hemoglobin (Hb A1C)                   Return in about 3 months (around 9/5/2024) for Follow-up appointment, A1C. The patient was instructed to contact the clinic with any interval questions or concerns.    This document has been electronically signed by CICI Veliz  June 5, 2024 16:18 EDT  Endocrinology  "

## 2024-06-07 ENCOUNTER — OFFICE VISIT (OUTPATIENT)
Dept: CARDIOLOGY | Facility: CLINIC | Age: 39
End: 2024-06-07
Payer: MEDICARE

## 2024-06-07 VITALS
BODY MASS INDEX: 38.71 KG/M2 | HEART RATE: 84 BPM | WEIGHT: 192 LBS | SYSTOLIC BLOOD PRESSURE: 160 MMHG | OXYGEN SATURATION: 98 % | HEIGHT: 59 IN | DIASTOLIC BLOOD PRESSURE: 90 MMHG

## 2024-06-07 DIAGNOSIS — I10 HYPERTENSION, UNSPECIFIED TYPE: ICD-10-CM

## 2024-06-07 DIAGNOSIS — I50.32 CHRONIC HEART FAILURE WITH PRESERVED EJECTION FRACTION (HFPEF): Primary | ICD-10-CM

## 2024-06-07 RX ORDER — SUCRALFATE 1 G/1
1 TABLET ORAL 3 TIMES DAILY
COMMUNITY

## 2024-06-07 NOTE — PROGRESS NOTES
"Chief Complaint  Dyspnea, unspecified type      Subjective   History of Present Illness    Problem List  -HFpEF, normal EKG, echo preserved systolic function  -hx of HFpEF  -CXR showed pulmonary edema  -C section 4/18/24 morning  -Long standing HTN  -DM  -morbid obesity  -COPD?  -ALEJANDRO  -possible UC  -duodenal ulcers    Mrs. Pack is a 39 year old lady being seen in follow up.  Had complicated pregnancy.  Had c section.  Had CHF and GI bleed.  Titrated meds.  BP now controlled when taking.  Has lost 50 lbs in last 6 weeks.  Feeling much better.  BP high today because child admited at  and BP meds at home.  ROS negative except for the above.             Objective   Vital Signs:  Vitals:    06/07/24 1606   BP: 160/90   Pulse: 84   SpO2: 98%     Estimated body mass index is 38.78 kg/m² as calculated from the following:    Height as of this encounter: 149.9 cm (59\").    Weight as of this encounter: 87.1 kg (192 lb).       Physical Exam  HENT:      Head: Normocephalic.   Eyes:      Extraocular Movements: Extraocular movements intact.   Cardiovascular:      Rate and Rhythm: Normal rate and regular rhythm.      Heart sounds: No murmur heard.     No gallop.   Pulmonary:      Breath sounds: Normal breath sounds.   Abdominal:      Palpations: Abdomen is soft.   Musculoskeletal:      Right lower leg: No edema.      Left lower leg: No edema.   Skin:     General: Skin is warm and dry.   Neurological:      General: No focal deficit present.      Mental Status: She is alert.   Psychiatric:         Mood and Affect: Mood normal.       Clinic discussed advanced directive        Assessment   -HFpEF, normal EKG, echo preserved systolic function  -hx of HFpEF  -CXR showed pulmonary edema  -C section 4/18/24 morning  -Long standing HTN  -DM  -morbid obesity  -COPD?  -ALEJANDRO  -possible UC  -duodenal ulcers    Plan   -doing great currently.  Cont. Current regimen.    -continue lisinopril and labetalol and nifedipine  -lasix as needed  -1-3 " month follow up      Pablo Cabrera MD  06/07/2024 16:25 EDT      No

## 2024-06-14 ENCOUNTER — HOSPITAL ENCOUNTER (EMERGENCY)
Facility: HOSPITAL | Age: 39
Discharge: HOME | End: 2024-06-14
Attending: EMERGENCY MEDICINE
Payer: MEDICARE

## 2024-06-14 ENCOUNTER — APPOINTMENT (OUTPATIENT)
Dept: CT IMAGING | Facility: HOSPITAL | Age: 39
End: 2024-06-14
Payer: MEDICARE

## 2024-06-14 VITALS
TEMPERATURE: 98.6 F | WEIGHT: 198 LBS | BODY MASS INDEX: 39.92 KG/M2 | HEART RATE: 63 BPM | RESPIRATION RATE: 18 BRPM | HEIGHT: 59 IN | DIASTOLIC BLOOD PRESSURE: 81 MMHG | SYSTOLIC BLOOD PRESSURE: 160 MMHG | OXYGEN SATURATION: 95 %

## 2024-06-14 DIAGNOSIS — K52.9 COLITIS: Primary | ICD-10-CM

## 2024-06-14 LAB
ALBUMIN SERPL-MCNC: 3.9 G/DL (ref 3.5–5.2)
ALBUMIN/GLOB SERPL: 1.2 G/DL
ALP SERPL-CCNC: 98 U/L (ref 39–117)
ALT SERPL W P-5'-P-CCNC: 14 U/L (ref 1–33)
ANION GAP SERPL CALCULATED.3IONS-SCNC: 10.8 MMOL/L (ref 5–15)
AST SERPL-CCNC: 13 U/L (ref 1–32)
BASOPHILS # BLD AUTO: 0.03 10*3/MM3 (ref 0–0.2)
BASOPHILS NFR BLD AUTO: 0.5 % (ref 0–1.5)
BILIRUB SERPL-MCNC: 0.4 MG/DL (ref 0–1.2)
BILIRUB UR QL STRIP: NEGATIVE
BUN SERPL-MCNC: 11 MG/DL (ref 6–20)
BUN/CREAT SERPL: 16.4 (ref 7–25)
CALCIUM SPEC-SCNC: 8.9 MG/DL (ref 8.6–10.5)
CHLORIDE SERPL-SCNC: 105 MMOL/L (ref 98–107)
CLARITY UR: CLEAR
CO2 SERPL-SCNC: 23.2 MMOL/L (ref 22–29)
COLOR UR: YELLOW
CREAT SERPL-MCNC: 0.67 MG/DL (ref 0.57–1)
CRP SERPL-MCNC: 1.6 MG/DL (ref 0–0.5)
D-LACTATE SERPL-SCNC: 0.8 MMOL/L (ref 0.5–2)
DEPRECATED RDW RBC AUTO: 40.5 FL (ref 37–54)
EGFRCR SERPLBLD CKD-EPI 2021: 112.8 ML/MIN/1.73
EOSINOPHIL # BLD AUTO: 0.14 10*3/MM3 (ref 0–0.4)
EOSINOPHIL NFR BLD AUTO: 2.3 % (ref 0.3–6.2)
ERYTHROCYTE [DISTWIDTH] IN BLOOD BY AUTOMATED COUNT: 13.5 % (ref 12.3–15.4)
ERYTHROCYTE [SEDIMENTATION RATE] IN BLOOD: 30 MM/HR (ref 0–20)
GLOBULIN UR ELPH-MCNC: 3.3 GM/DL
GLUCOSE SERPL-MCNC: 115 MG/DL (ref 65–99)
GLUCOSE UR STRIP-MCNC: NEGATIVE MG/DL
HCT VFR BLD AUTO: 33.8 % (ref 34–46.6)
HGB BLD-MCNC: 10.5 G/DL (ref 12–15.9)
HGB UR QL STRIP.AUTO: NEGATIVE
HOLD SPECIMEN: NORMAL
HOLD SPECIMEN: NORMAL
IMM GRANULOCYTES # BLD AUTO: 0.02 10*3/MM3 (ref 0–0.05)
IMM GRANULOCYTES NFR BLD AUTO: 0.3 % (ref 0–0.5)
KETONES UR QL STRIP: NEGATIVE
LEUKOCYTE ESTERASE UR QL STRIP.AUTO: NEGATIVE
LIPASE SERPL-CCNC: 54 U/L (ref 13–60)
LYMPHOCYTES # BLD AUTO: 1.88 10*3/MM3 (ref 0.7–3.1)
LYMPHOCYTES NFR BLD AUTO: 31.4 % (ref 19.6–45.3)
MCH RBC QN AUTO: 25.6 PG (ref 26.6–33)
MCHC RBC AUTO-ENTMCNC: 31.1 G/DL (ref 31.5–35.7)
MCV RBC AUTO: 82.4 FL (ref 79–97)
MONOCYTES # BLD AUTO: 0.45 10*3/MM3 (ref 0.1–0.9)
MONOCYTES NFR BLD AUTO: 7.5 % (ref 5–12)
NEUTROPHILS NFR BLD AUTO: 3.46 10*3/MM3 (ref 1.7–7)
NEUTROPHILS NFR BLD AUTO: 58 % (ref 42.7–76)
NITRITE UR QL STRIP: NEGATIVE
NRBC BLD AUTO-RTO: 0 /100 WBC (ref 0–0.2)
PH UR STRIP.AUTO: 6 [PH] (ref 5–8)
PLATELET # BLD AUTO: 175 10*3/MM3 (ref 140–450)
PMV BLD AUTO: 10 FL (ref 6–12)
POTASSIUM SERPL-SCNC: 3.8 MMOL/L (ref 3.5–5.2)
PROT SERPL-MCNC: 7.2 G/DL (ref 6–8.5)
PROT UR QL STRIP: NEGATIVE
RBC # BLD AUTO: 4.1 10*6/MM3 (ref 3.77–5.28)
SODIUM SERPL-SCNC: 139 MMOL/L (ref 136–145)
SP GR UR STRIP: 1.02 (ref 1–1.03)
TROPONIN T SERPL HS-MCNC: 10 NG/L
UROBILINOGEN UR QL STRIP: NORMAL
WBC NRBC COR # BLD AUTO: 5.98 10*3/MM3 (ref 3.4–10.8)
WHOLE BLOOD HOLD COAG: NORMAL
WHOLE BLOOD HOLD SPECIMEN: NORMAL

## 2024-06-14 PROCEDURE — 83605 ASSAY OF LACTIC ACID: CPT | Performed by: STUDENT IN AN ORGANIZED HEALTH CARE EDUCATION/TRAINING PROGRAM

## 2024-06-14 PROCEDURE — 25010000002 MORPHINE PER 10 MG: Performed by: EMERGENCY MEDICINE

## 2024-06-14 PROCEDURE — 85025 COMPLETE CBC W/AUTO DIFF WBC: CPT | Performed by: STUDENT IN AN ORGANIZED HEALTH CARE EDUCATION/TRAINING PROGRAM

## 2024-06-14 PROCEDURE — 96375 TX/PRO/DX INJ NEW DRUG ADDON: CPT

## 2024-06-14 PROCEDURE — 93005 ELECTROCARDIOGRAM TRACING: CPT | Performed by: STUDENT IN AN ORGANIZED HEALTH CARE EDUCATION/TRAINING PROGRAM

## 2024-06-14 PROCEDURE — 99284 EMERGENCY DEPT VISIT MOD MDM: CPT

## 2024-06-14 PROCEDURE — 81003 URINALYSIS AUTO W/O SCOPE: CPT | Performed by: STUDENT IN AN ORGANIZED HEALTH CARE EDUCATION/TRAINING PROGRAM

## 2024-06-14 PROCEDURE — 84484 ASSAY OF TROPONIN QUANT: CPT | Performed by: STUDENT IN AN ORGANIZED HEALTH CARE EDUCATION/TRAINING PROGRAM

## 2024-06-14 PROCEDURE — 74176 CT ABD & PELVIS W/O CONTRAST: CPT

## 2024-06-14 PROCEDURE — 83690 ASSAY OF LIPASE: CPT | Performed by: STUDENT IN AN ORGANIZED HEALTH CARE EDUCATION/TRAINING PROGRAM

## 2024-06-14 PROCEDURE — 74176 CT ABD & PELVIS W/O CONTRAST: CPT | Performed by: RADIOLOGY

## 2024-06-14 PROCEDURE — 25010000002 KETOROLAC TROMETHAMINE PER 15 MG: Performed by: EMERGENCY MEDICINE

## 2024-06-14 PROCEDURE — 86140 C-REACTIVE PROTEIN: CPT | Performed by: STUDENT IN AN ORGANIZED HEALTH CARE EDUCATION/TRAINING PROGRAM

## 2024-06-14 PROCEDURE — 85652 RBC SED RATE AUTOMATED: CPT | Performed by: STUDENT IN AN ORGANIZED HEALTH CARE EDUCATION/TRAINING PROGRAM

## 2024-06-14 PROCEDURE — 96374 THER/PROPH/DIAG INJ IV PUSH: CPT

## 2024-06-14 PROCEDURE — 36415 COLL VENOUS BLD VENIPUNCTURE: CPT

## 2024-06-14 PROCEDURE — 25810000003 LACTATED RINGERS SOLUTION: Performed by: EMERGENCY MEDICINE

## 2024-06-14 PROCEDURE — 25010000002 ONDANSETRON PER 1 MG: Performed by: EMERGENCY MEDICINE

## 2024-06-14 PROCEDURE — 80053 COMPREHEN METABOLIC PANEL: CPT | Performed by: STUDENT IN AN ORGANIZED HEALTH CARE EDUCATION/TRAINING PROGRAM

## 2024-06-14 RX ORDER — KETOROLAC TROMETHAMINE 30 MG/ML
15 INJECTION, SOLUTION INTRAMUSCULAR; INTRAVENOUS ONCE
Status: COMPLETED | OUTPATIENT
Start: 2024-06-14 | End: 2024-06-14

## 2024-06-14 RX ORDER — AMOXICILLIN AND CLAVULANATE POTASSIUM 875; 125 MG/1; MG/1
1 TABLET, FILM COATED ORAL 2 TIMES DAILY
Qty: 20 TABLET | Refills: 0 | Status: SHIPPED | OUTPATIENT
Start: 2024-06-14 | End: 2024-06-24

## 2024-06-14 RX ORDER — ONDANSETRON 2 MG/ML
4 INJECTION INTRAMUSCULAR; INTRAVENOUS ONCE
Status: COMPLETED | OUTPATIENT
Start: 2024-06-14 | End: 2024-06-14

## 2024-06-14 RX ORDER — AMOXICILLIN AND CLAVULANATE POTASSIUM 875; 125 MG/1; MG/1
1 TABLET, FILM COATED ORAL ONCE
Status: COMPLETED | OUTPATIENT
Start: 2024-06-14 | End: 2024-06-14

## 2024-06-14 RX ADMIN — KETOROLAC TROMETHAMINE 15 MG: 30 INJECTION, SOLUTION INTRAMUSCULAR; INTRAVENOUS at 23:13

## 2024-06-14 RX ADMIN — ONDANSETRON 4 MG: 2 INJECTION INTRAMUSCULAR; INTRAVENOUS at 21:22

## 2024-06-14 RX ADMIN — MORPHINE SULFATE 4 MG: 4 INJECTION, SOLUTION INTRAMUSCULAR; INTRAVENOUS at 21:22

## 2024-06-14 RX ADMIN — SODIUM CHLORIDE, POTASSIUM CHLORIDE, SODIUM LACTATE AND CALCIUM CHLORIDE 1000 ML: 600; 310; 30; 20 INJECTION, SOLUTION INTRAVENOUS at 21:22

## 2024-06-14 RX ADMIN — AMOXICILLIN AND CLAVULANATE POTASSIUM 1 TABLET: 875; 125 TABLET, FILM COATED ORAL at 21:22

## 2024-06-14 NOTE — ED NOTES
MEDICAL SCREENING:    Reason for Visit abd pain lower abd with hx of diverticulitis      Patient initially seen in triage.  The patient was advised further evaluation and diagnostic testing will be needed, some of the treatment and testing will be initiated in the lobby in order to begin the process.  The patient will be returned to the waiting area for the time being and possibly be re-assessed by a subsequent ED provider.  The patient will be brought back to the treatment area in as timely manner as possible.       Zaynab Spain DO  06/14/24 1954

## 2024-06-15 NOTE — DISCHARGE INSTRUCTIONS
Take antibiotics as directed.    Follow-up with your gastroenterologist on Monday.    Return to the ER for worsening pain, nausea, vomiting, inability to tolerate p.o., rectal bleeding, fevers, or any other concerning signs or symptoms.

## 2024-06-15 NOTE — ED PROVIDER NOTES
"Subjective   History of Present Illness  39-year-old female, history of anxiety, CHF, colitis, diabetes, depression, diverticulitis, hypertension, kidney stones, who presents today for evaluation of left-sided abdominal pain.  Patient said it woke her from sleep last night, is progressively worse throughout the day.  She has had some loose stools, has felt nauseous however no vomiting.  No fever, chills, sweats.  She has had diverticulitis before in the past, states it does feel similar.  No melena, no hematochezia.  No urinary symptoms.  Symptoms are moderate, continuous exacerbated by palpation movement, ambulation, alleviated by nothing.  Patient was concerned and therefore came to the ER for further evaluation.        Review of Systems   All other systems reviewed and are negative.      Past Medical History:   Diagnosis Date    Anxiety     CHF (congestive heart failure) 2023    Colitis     Depression     Diabetes mellitus     type 2    Diverticulitis     GERD (gastroesophageal reflux disease)     History of transfusion 2024    2 units post delivery    Hypertension     chronic    Kidney stone     \"years ago\"    Narcolepsy     Pancreatitis 2023    PCOS (polycystic ovarian syndrome)     Rectal bleeding     SINCE  - USES MESALAMINE SUPPOSITORY NIGHTLY    Seizures     Sleep apnea        Allergies   Allergen Reactions    Dilantin [Phenytoin] Mental Status Change    Keppra [Levetiracetam] Mental Status Change    Meperidine Rash    Topamax [Topiramate] Mental Status Change       Past Surgical History:   Procedure Laterality Date    CARDIAC CATHETERIZATION      CARPAL TUNNEL RELEASE       SECTION N/A 2024    Procedure:  SECTION PRIMARY;  Surgeon: Axel Keys MD;  Location: [facility] LABOR DELIVERY;  Service: Obstetrics/Gynecology;  Laterality: N/A;    COLONOSCOPY      COLONOSCOPY N/A 2024    Procedure: COLONOSCOPY;  Surgeon: Tom Mueller MD;  Location: [facility];  Service: Gastroenterology;  Laterality: N/A;    ENDOSCOPY      ENDOSCOPY N/A 2024    Procedure: ESOPHAGOGASTRODUODENOSCOPY;  Surgeon: Tom Mueller MD;  Location: [facility] ENDOSCOPY;  Service: Gastroenterology;  Laterality: N/A;    FRACTURE SURGERY Left     wrist    HARDWARE REMOVAL Left     WRIST    TENDON REPAIR Left     HAND    WISDOM TOOTH EXTRACTION         Family History   Problem Relation Age of Onset    Hypertension Mother     Depression Mother     Heart disease Mother     COPD Mother     Emphysema Mother     Hypertension Father     Diabetes Father     Heart disease Father     COPD Father     Heart failure Father     Hepatitis Father        Social History     Socioeconomic History    Marital status:    Tobacco Use    Smoking status: Former     Current packs/day: 0.00     Average packs/day: 1 pack/day for 20.0 years (20.0 ttl pk-yrs)     Types: Cigarettes     Start date:      Quit date:      Years since quittin.4     Passive exposure: Never    Smokeless tobacco: Never    Tobacco comments:     QUIT IN 2017   Vaping Use    Vaping status: Never Used   Substance and Sexual Activity    Alcohol use: No    Drug use: No    Sexual activity: Defer           Objective   Physical Exam  Vitals and nursing note reviewed.   Constitutional:       Appearance: She is well-developed.      Comments: 39-year-old female lying in bed peers no acute distress nontoxic, well-appearing   HENT:      Head: Normocephalic.      Nose: Nose normal.   Eyes:      Conjunctiva/sclera: Conjunctivae normal.      Pupils: Pupils are equal, round, and reactive to light.   Neck:      Vascular: No JVD.      Trachea: No tracheal deviation.   Cardiovascular:      Rate and Rhythm: Normal rate and regular rhythm.   Pulmonary:      Effort: Pulmonary effort is normal.   Abdominal:      Palpations: Abdomen is soft.      Comments: Patient has some moderate reproducible tenderness palpation of the left side of the  abdomen.   Musculoskeletal:         General: Normal range of motion.      Cervical back: Normal range of motion and neck supple.   Skin:     General: Skin is warm and dry.   Neurological:      Mental Status: She is alert.         Procedures           ED Course                                             Medical Decision Making  39-year-old female who presents today for evaluation of abdominal pain.  Workup shows colitis on CT scan.  White blood cell count is not elevated, lactate is within normal limits, is no pain out of portion to exam, low concern for mesenteric ischemia.  Patient was given pain control, antibiotics, IV fluids here in the emergency department, with moderate improvement of symptoms.  She had no rectal bleeding, H&H is stable.  I think patient can safely be discharged home and follow-up on an outpatient basis.  Patient is comfortable with discharge home and follow-up with gastroenterologist on Monday.  Strict return precautions discussed, patient voices understanding.    Problems Addressed:  Colitis: complicated acute illness or injury    Amount and/or Complexity of Data Reviewed  Labs: ordered.  Radiology: ordered.  ECG/medicine tests: ordered.    Risk  Prescription drug management.        Final diagnoses:   Colitis       ED Disposition  ED Disposition       ED Disposition   Discharge    Condition   Stable    Comment   --               No follow-up provider specified.       Medication List        New Prescriptions      amoxicillin-clavulanate 875-125 MG per tablet  Commonly known as: AUGMENTIN  Take 1 tablet by mouth 2 (Two) Times a Day for 10 days.               Where to Get Your Medications        These medications were sent to [facility] [id number] - [place]ER[place] - [address] [facility] [address] [facility] - [phone]  - [phone] FX  [address] [place] [address]      Phone: [phone]   amoxicillin-clavulanate 875-125 MG per tablet            Ashwin Hernandez  MD CELINA  06/14/24 1264

## 2024-06-16 LAB
QT INTERVAL: 378 MS
QTC INTERVAL: 430 MS

## 2024-06-17 ENCOUNTER — READMISSION MANAGEMENT (OUTPATIENT)
Dept: CALL CENTER | Facility: HOSPITAL | Age: 39
End: 2024-06-17
Payer: MEDICARE

## 2024-06-17 NOTE — OUTREACH NOTE
Medical Week 3 Survey      Flowsheet Row Responses   [facility] patient discharged from? [facility]   Does the patient have one of the following disease processes/diagnoses(primary or secondary)? Other   Week 3 attempt successful? No   Unsuccessful attempts Attempt 1  [Pt reports that she is currently at OB f/u appt]   Discharge diagnosis Postoperative cellulitis of surgical wound,  on 24            Hetal GOEL - Registered Nurse

## 2024-06-26 ENCOUNTER — SPECIALTY PHARMACY (OUTPATIENT)
Dept: PHARMACY | Facility: HOSPITAL | Age: 39
End: 2024-06-26
Payer: MEDICARE

## 2024-06-26 NOTE — PROGRESS NOTES
Specialty Pharmacy Refill Coordination Note     Antoinette is a 39 y.o. female contacted today regarding refills of  Dexcom sensor, Dexcom transmitter, Novolog specialty medication(s).    Reviewed and verified with patient:       Specialty medication(s) and dose(s) confirmed: yes    Refill Questions      Flowsheet Row Most Recent Value   Changes to allergies? No   Changes to medications? No   New conditions or infections since last clinic visit No   Unplanned office visit, urgent care, ED, or hospital admission in the last 4 weeks  No   How does patient/caregiver feel medication is working? Very good   Financial problems or insurance changes  No   Since the previous refill, were any specialty medication doses or scheduled injections missed or delayed?  No   Why were doses missed? na   Does this patient require a clinical escalation to a pharmacist? No            Delivery Questions      Flowsheet Row Most Recent Value   Copay verified? No   Copay amount na   Copay form of payment No copayment ($0)                   Follow-up: 30 day(s)     Carito Mills, Pharmacy Technician  Specialty Pharmacy Technician

## 2024-06-27 ENCOUNTER — SPECIALTY PHARMACY (OUTPATIENT)
Dept: PHARMACY | Facility: HOSPITAL | Age: 39
End: 2024-06-27
Payer: MEDICARE

## 2024-06-27 NOTE — PROGRESS NOTES
Specialty Pharmacy Refill Coordination Note     Antoinette is a 39 y.o. female contacted today regarding refills of  Omnipod specialty medication(s).    Reviewed and verified with patient:       Specialty medication(s) and dose(s) confirmed: yes    Refill Questions      Flowsheet Row Most Recent Value   Changes to allergies? No   Changes to medications? No   New conditions or infections since last clinic visit No   Unplanned office visit, urgent care, ED, or hospital admission in the last 4 weeks  No   How does patient/caregiver feel medication is working? Very good   Financial problems or insurance changes  No   Since the previous refill, were any specialty medication doses or scheduled injections missed or delayed?  No   Why were doses missed? na   Does this patient require a clinical escalation to a pharmacist? No            Delivery Questions      Flowsheet Row Most Recent Value   Copay verified? No   Copay amount na   Copay form of payment No copayment ($0)                   Follow-up: 30 day(s)     Carito Mills Pharmacy Technician  Specialty Pharmacy Technician

## 2024-07-03 ENCOUNTER — TELEPHONE (OUTPATIENT)
Dept: CARDIOLOGY | Facility: CLINIC | Age: 39
End: 2024-07-03
Payer: MEDICARE

## 2024-07-03 RX ORDER — FUROSEMIDE 40 MG/1
40 TABLET ORAL DAILY PRN
Qty: 90 TABLET | Refills: 0 | Status: SHIPPED | OUTPATIENT
Start: 2024-07-03

## 2024-07-03 RX ORDER — LISINOPRIL 40 MG/1
40 TABLET ORAL
Qty: 90 TABLET | Refills: 1 | Status: SHIPPED | OUTPATIENT
Start: 2024-07-03

## 2024-08-05 ENCOUNTER — SPECIALTY PHARMACY (OUTPATIENT)
Dept: PHARMACY | Facility: HOSPITAL | Age: 39
End: 2024-08-05
Payer: COMMERCIAL

## 2024-08-05 NOTE — PROGRESS NOTES
Specialty Pharmacy Refill Coordination Note     Antoinette is a 39 y.o. female contacted today regarding refills of  Novolog specialty medication(s).    Reviewed and verified with patient:       Specialty medication(s) and dose(s) confirmed: yes    Refill Questions      Flowsheet Row Most Recent Value   Changes to allergies? No   Changes to medications? No   New conditions or infections since last clinic visit No   Unplanned office visit, urgent care, ED, or hospital admission in the last 4 weeks  No   How does patient/caregiver feel medication is working? Very good   Financial problems or insurance changes  No   Since the previous refill, were any specialty medication doses or scheduled injections missed or delayed?  No   Why were doses missed? na   Does this patient require a clinical escalation to a pharmacist? No            Delivery Questions      Flowsheet Row Most Recent Value   Ship Date na   Delivery Date na   Signature Required No                   Follow-up: 30 day(s)     Carito Mills Pharmacy Technician  Specialty Pharmacy Technician

## 2024-08-06 ENCOUNTER — SPECIALTY PHARMACY (OUTPATIENT)
Dept: PHARMACY | Facility: HOSPITAL | Age: 39
End: 2024-08-06
Payer: COMMERCIAL

## 2024-08-13 ENCOUNTER — TELEPHONE (OUTPATIENT)
Dept: CARDIOLOGY | Facility: CLINIC | Age: 39
End: 2024-08-13
Payer: MEDICARE

## 2024-08-13 NOTE — TELEPHONE ENCOUNTER
Pt calls reporting systolic's from 150-180 and diastolic's between . Pt reports heart palpitations. Pt reports SOB. Pt reports chest pressure. Chest pressure (4/10) that radiates to shoulder blades. Pt reports chest pressure gets worse with activity. Pt says this has been occurring since last night (8/12). Patient reports no swelling. Pt reports taking medications as prescribed. Pt took labetalol 200 mg last night.     I recommended patient monitor BP tonight and take her labetalol again if needed. I counseled her on going to the hospital if her chest pain becomes unrelenting or transitions into crushing. I recommended patient take it easy tonight. Pt agreeable and understands she will hear from us in the morning.      Please advise.

## 2024-08-14 NOTE — TELEPHONE ENCOUNTER
Called patient to let her know Dr. Cabrera wants to get patient in sooner to adjust her medications. Patient encouraged to reach out to us if she continues to have issues.

## 2024-08-16 ENCOUNTER — HOSPITAL ENCOUNTER (INPATIENT)
Facility: HOSPITAL | Age: 39
LOS: 2 days | Discharge: HOME | DRG: 280 | End: 2024-08-19
Attending: EMERGENCY MEDICINE | Admitting: INTERNAL MEDICINE
Payer: MEDICARE

## 2024-08-16 ENCOUNTER — APPOINTMENT (OUTPATIENT)
Dept: GENERAL RADIOLOGY | Facility: HOSPITAL | Age: 39
DRG: 280 | End: 2024-08-16
Payer: MEDICARE

## 2024-08-16 DIAGNOSIS — E11.9 TYPE 2 DIABETES MELLITUS WITHOUT COMPLICATION, WITH LONG-TERM CURRENT USE OF INSULIN: Primary | ICD-10-CM

## 2024-08-16 DIAGNOSIS — Z79.4 TYPE 2 DIABETES MELLITUS WITHOUT COMPLICATION, WITH LONG-TERM CURRENT USE OF INSULIN: Primary | ICD-10-CM

## 2024-08-16 DIAGNOSIS — I50.33 ACUTE ON CHRONIC DIASTOLIC CONGESTIVE HEART FAILURE: ICD-10-CM

## 2024-08-16 LAB
A-A DO2: 89.6 MMHG (ref 0–300)
ALBUMIN SERPL-MCNC: 4.2 G/DL (ref 3.5–5.2)
ALBUMIN/GLOB SERPL: 1.1 G/DL
ALP SERPL-CCNC: 101 U/L (ref 39–117)
ALT SERPL W P-5'-P-CCNC: 14 U/L (ref 1–33)
ANION GAP SERPL CALCULATED.3IONS-SCNC: 15.4 MMOL/L (ref 5–15)
APAP SERPL-MCNC: <5 MCG/ML (ref 0–30)
APTT PPP: 26.3 SECONDS (ref 26.5–34.5)
ARTERIAL PATENCY WRIST A: POSITIVE
AST SERPL-CCNC: 16 U/L (ref 1–32)
ATMOSPHERIC PRESS: 726 MMHG
BASE EXCESS BLDA CALC-SCNC: -0.5 MMOL/L (ref 0–2)
BASOPHILS # BLD AUTO: 0.03 10*3/MM3 (ref 0–0.2)
BASOPHILS NFR BLD AUTO: 0.3 % (ref 0–1.5)
BDY SITE: ABNORMAL
BILIRUB SERPL-MCNC: 0.7 MG/DL (ref 0–1.2)
BILIRUB UR QL STRIP: NEGATIVE
BUN SERPL-MCNC: 13 MG/DL (ref 6–20)
BUN/CREAT SERPL: 16.9 (ref 7–25)
CALCIUM SPEC-SCNC: 9 MG/DL (ref 8.6–10.5)
CHLORIDE SERPL-SCNC: 103 MMOL/L (ref 98–107)
CK SERPL-CCNC: 64 U/L (ref 20–180)
CLARITY UR: CLEAR
CO2 BLDA-SCNC: 26.2 MMOL/L (ref 22–33)
CO2 SERPL-SCNC: 21.6 MMOL/L (ref 22–29)
COHGB MFR BLD: 1.1 % (ref 0–5)
COLOR UR: YELLOW
CREAT SERPL-MCNC: 0.77 MG/DL (ref 0.57–1)
CRP SERPL-MCNC: <0.3 MG/DL (ref 0–0.5)
D DIMER PPP FEU-MCNC: 0.9 MCGFEU/ML (ref 0–0.5)
DEPRECATED RDW RBC AUTO: 38.9 FL (ref 37–54)
EGFRCR SERPLBLD CKD-EPI 2021: 99.5 ML/MIN/1.73
EOSINOPHIL # BLD AUTO: 0.17 10*3/MM3 (ref 0–0.4)
EOSINOPHIL NFR BLD AUTO: 1.7 % (ref 0.3–6.2)
ERYTHROCYTE [DISTWIDTH] IN BLOOD BY AUTOMATED COUNT: 13.7 % (ref 12.3–15.4)
ERYTHROCYTE [SEDIMENTATION RATE] IN BLOOD: 12 MM/HR (ref 0–20)
ETHANOL BLD-MCNC: <10 MG/DL (ref 0–10)
ETHANOL UR QL: <0.01 %
FLUAV RNA RESP QL NAA+PROBE: NOT DETECTED
FLUBV RNA RESP QL NAA+PROBE: NOT DETECTED
GLOBULIN UR ELPH-MCNC: 3.9 GM/DL
GLUCOSE SERPL-MCNC: 167 MG/DL (ref 65–99)
GLUCOSE UR STRIP-MCNC: NEGATIVE MG/DL
HCO3 BLDA-SCNC: 24.9 MMOL/L (ref 20–26)
HCT VFR BLD AUTO: 47.7 % (ref 34–46.6)
HCT VFR BLD CALC: 45.4 % (ref 38–51)
HGB BLD-MCNC: 15.3 G/DL (ref 12–15.9)
HGB BLDA-MCNC: 14.8 G/DL (ref 13.5–17.5)
HGB UR QL STRIP.AUTO: NEGATIVE
IMM GRANULOCYTES # BLD AUTO: 0.03 10*3/MM3 (ref 0–0.05)
IMM GRANULOCYTES NFR BLD AUTO: 0.3 % (ref 0–0.5)
INHALED O2 CONCENTRATION: 32 %
INR PPP: 0.9 (ref 0.9–1.1)
KETONES UR QL STRIP: NEGATIVE
LEUKOCYTE ESTERASE UR QL STRIP.AUTO: NEGATIVE
LYMPHOCYTES # BLD AUTO: 3.54 10*3/MM3 (ref 0.7–3.1)
LYMPHOCYTES NFR BLD AUTO: 35.8 % (ref 19.6–45.3)
Lab: ABNORMAL
MAGNESIUM SERPL-MCNC: 1.8 MG/DL (ref 1.6–2.6)
MCH RBC QN AUTO: 25.9 PG (ref 26.6–33)
MCHC RBC AUTO-ENTMCNC: 32.1 G/DL (ref 31.5–35.7)
MCV RBC AUTO: 80.7 FL (ref 79–97)
METHGB BLD QL: 0.4 % (ref 0–3)
MODALITY: ABNORMAL
MONOCYTES # BLD AUTO: 0.5 10*3/MM3 (ref 0.1–0.9)
MONOCYTES NFR BLD AUTO: 5.1 % (ref 5–12)
NEUTROPHILS NFR BLD AUTO: 5.61 10*3/MM3 (ref 1.7–7)
NEUTROPHILS NFR BLD AUTO: 56.8 % (ref 42.7–76)
NITRITE UR QL STRIP: NEGATIVE
NRBC BLD AUTO-RTO: 0 /100 WBC (ref 0–0.2)
NT-PROBNP SERPL-MCNC: 781 PG/ML (ref 0–450)
OXYHGB MFR BLDV: 93.7 % (ref 94–99)
PCO2 BLDA: 42.8 MM HG (ref 35–45)
PCO2 TEMP ADJ BLD: ABNORMAL MM[HG]
PH BLDA: 7.37 PH UNITS (ref 7.35–7.45)
PH UR STRIP.AUTO: 7.5 [PH] (ref 5–8)
PH, TEMP CORRECTED: ABNORMAL
PLATELET # BLD AUTO: 196 10*3/MM3 (ref 140–450)
PMV BLD AUTO: 10.9 FL (ref 6–12)
PO2 BLDA: 80.1 MM HG (ref 83–108)
PO2 TEMP ADJ BLD: ABNORMAL MM[HG]
POTASSIUM SERPL-SCNC: 3.9 MMOL/L (ref 3.5–5.2)
PROT SERPL-MCNC: 8.1 G/DL (ref 6–8.5)
PROT UR QL STRIP: NEGATIVE
PROTHROMBIN TIME: 12.2 SECONDS (ref 12.1–14.7)
RBC # BLD AUTO: 5.91 10*6/MM3 (ref 3.77–5.28)
SALICYLATES SERPL-MCNC: <0.3 MG/DL
SAO2 % BLDCOA: 95.1 % (ref 94–99)
SARS-COV-2 RNA RESP QL NAA+PROBE: NOT DETECTED
SODIUM SERPL-SCNC: 140 MMOL/L (ref 136–145)
SP GR UR STRIP: 1.01 (ref 1–1.03)
T4 FREE SERPL-MCNC: 1.08 NG/DL (ref 0.92–1.68)
TROPONIN T SERPL HS-MCNC: 16 NG/L
TSH SERPL DL<=0.05 MIU/L-ACNC: 0.57 UIU/ML (ref 0.27–4.2)
UROBILINOGEN UR QL STRIP: NORMAL
VENTILATOR MODE: ABNORMAL
WBC NRBC COR # BLD AUTO: 9.88 10*3/MM3 (ref 3.4–10.8)

## 2024-08-16 PROCEDURE — 83880 ASSAY OF NATRIURETIC PEPTIDE: CPT | Performed by: EMERGENCY MEDICINE

## 2024-08-16 PROCEDURE — 85652 RBC SED RATE AUTOMATED: CPT | Performed by: EMERGENCY MEDICINE

## 2024-08-16 PROCEDURE — 94761 N-INVAS EAR/PLS OXIMETRY MLT: CPT

## 2024-08-16 PROCEDURE — 83735 ASSAY OF MAGNESIUM: CPT | Performed by: EMERGENCY MEDICINE

## 2024-08-16 PROCEDURE — 94799 UNLISTED PULMONARY SVC/PX: CPT

## 2024-08-16 PROCEDURE — 25010000002 FUROSEMIDE PER 20 MG

## 2024-08-16 PROCEDURE — 94640 AIRWAY INHALATION TREATMENT: CPT

## 2024-08-16 PROCEDURE — 71045 X-RAY EXAM CHEST 1 VIEW: CPT

## 2024-08-16 PROCEDURE — 36415 COLL VENOUS BLD VENIPUNCTURE: CPT

## 2024-08-16 PROCEDURE — 82375 ASSAY CARBOXYHB QUANT: CPT

## 2024-08-16 PROCEDURE — 71045 X-RAY EXAM CHEST 1 VIEW: CPT | Performed by: RADIOLOGY

## 2024-08-16 PROCEDURE — 99285 EMERGENCY DEPT VISIT HI MDM: CPT

## 2024-08-16 PROCEDURE — 84439 ASSAY OF FREE THYROXINE: CPT | Performed by: EMERGENCY MEDICINE

## 2024-08-16 PROCEDURE — 80307 DRUG TEST PRSMV CHEM ANLYZR: CPT | Performed by: EMERGENCY MEDICINE

## 2024-08-16 PROCEDURE — 82550 ASSAY OF CK (CPK): CPT | Performed by: EMERGENCY MEDICINE

## 2024-08-16 PROCEDURE — 93005 ELECTROCARDIOGRAM TRACING: CPT | Performed by: EMERGENCY MEDICINE

## 2024-08-16 PROCEDURE — 94664 DEMO&/EVAL PT USE INHALER: CPT

## 2024-08-16 PROCEDURE — 81003 URINALYSIS AUTO W/O SCOPE: CPT | Performed by: EMERGENCY MEDICINE

## 2024-08-16 PROCEDURE — 85610 PROTHROMBIN TIME: CPT | Performed by: EMERGENCY MEDICINE

## 2024-08-16 PROCEDURE — 83050 HGB METHEMOGLOBIN QUAN: CPT

## 2024-08-16 PROCEDURE — 80053 COMPREHEN METABOLIC PANEL: CPT | Performed by: EMERGENCY MEDICINE

## 2024-08-16 PROCEDURE — 85025 COMPLETE CBC W/AUTO DIFF WBC: CPT | Performed by: EMERGENCY MEDICINE

## 2024-08-16 PROCEDURE — 93010 ELECTROCARDIOGRAM REPORT: CPT | Performed by: INTERNAL MEDICINE

## 2024-08-16 PROCEDURE — 84484 ASSAY OF TROPONIN QUANT: CPT | Performed by: EMERGENCY MEDICINE

## 2024-08-16 PROCEDURE — 80179 DRUG ASSAY SALICYLATE: CPT | Performed by: EMERGENCY MEDICINE

## 2024-08-16 PROCEDURE — 80143 DRUG ASSAY ACETAMINOPHEN: CPT | Performed by: EMERGENCY MEDICINE

## 2024-08-16 PROCEDURE — 82077 ASSAY SPEC XCP UR&BREATH IA: CPT | Performed by: EMERGENCY MEDICINE

## 2024-08-16 PROCEDURE — 84443 ASSAY THYROID STIM HORMONE: CPT | Performed by: EMERGENCY MEDICINE

## 2024-08-16 PROCEDURE — 83036 HEMOGLOBIN GLYCOSYLATED A1C: CPT | Performed by: HOSPITALIST

## 2024-08-16 PROCEDURE — 85730 THROMBOPLASTIN TIME PARTIAL: CPT | Performed by: EMERGENCY MEDICINE

## 2024-08-16 PROCEDURE — 85379 FIBRIN DEGRADATION QUANT: CPT | Performed by: EMERGENCY MEDICINE

## 2024-08-16 PROCEDURE — 86140 C-REACTIVE PROTEIN: CPT | Performed by: EMERGENCY MEDICINE

## 2024-08-16 PROCEDURE — 87636 SARSCOV2 & INF A&B AMP PRB: CPT | Performed by: EMERGENCY MEDICINE

## 2024-08-16 PROCEDURE — 82805 BLOOD GASES W/O2 SATURATION: CPT

## 2024-08-16 PROCEDURE — 36600 WITHDRAWAL OF ARTERIAL BLOOD: CPT

## 2024-08-16 RX ORDER — FUROSEMIDE 10 MG/ML
80 INJECTION INTRAMUSCULAR; INTRAVENOUS ONCE
Status: DISCONTINUED | OUTPATIENT
Start: 2024-08-16 | End: 2024-08-16

## 2024-08-16 RX ORDER — IPRATROPIUM BROMIDE AND ALBUTEROL SULFATE 2.5; .5 MG/3ML; MG/3ML
3 SOLUTION RESPIRATORY (INHALATION) ONCE
Status: COMPLETED | OUTPATIENT
Start: 2024-08-16 | End: 2024-08-16

## 2024-08-16 RX ORDER — HYDRALAZINE HYDROCHLORIDE 20 MG/ML
20 INJECTION INTRAMUSCULAR; INTRAVENOUS ONCE
Status: COMPLETED | OUTPATIENT
Start: 2024-08-17 | End: 2024-08-17

## 2024-08-16 RX ORDER — FUROSEMIDE 10 MG/ML
80 INJECTION INTRAMUSCULAR; INTRAVENOUS ONCE
Status: COMPLETED | OUTPATIENT
Start: 2024-08-16 | End: 2024-08-16

## 2024-08-16 RX ORDER — SODIUM CHLORIDE 0.9 % (FLUSH) 0.9 %
10 SYRINGE (ML) INJECTION AS NEEDED
Status: DISCONTINUED | OUTPATIENT
Start: 2024-08-16 | End: 2024-08-19 | Stop reason: HOSPADM

## 2024-08-16 RX ORDER — FUROSEMIDE 10 MG/ML
INJECTION INTRAMUSCULAR; INTRAVENOUS
Status: COMPLETED
Start: 2024-08-16 | End: 2024-08-16

## 2024-08-16 RX ADMIN — FUROSEMIDE 80 MG: 10 INJECTION, SOLUTION INTRAMUSCULAR; INTRAVENOUS at 21:35

## 2024-08-16 RX ADMIN — IPRATROPIUM BROMIDE AND ALBUTEROL SULFATE 3 ML: .5; 2.5 SOLUTION RESPIRATORY (INHALATION) at 21:46

## 2024-08-16 RX ADMIN — FUROSEMIDE 80 MG: 10 INJECTION INTRAMUSCULAR; INTRAVENOUS at 21:35

## 2024-08-17 ENCOUNTER — APPOINTMENT (OUTPATIENT)
Dept: CARDIOLOGY | Facility: HOSPITAL | Age: 39
DRG: 280 | End: 2024-08-17
Payer: MEDICARE

## 2024-08-17 ENCOUNTER — APPOINTMENT (OUTPATIENT)
Dept: CT IMAGING | Facility: HOSPITAL | Age: 39
DRG: 280 | End: 2024-08-17
Payer: MEDICARE

## 2024-08-17 PROBLEM — I50.9 ACUTE EXACERBATION OF CHF (CONGESTIVE HEART FAILURE): Status: ACTIVE | Noted: 2024-08-17

## 2024-08-17 LAB
ALBUMIN SERPL-MCNC: 4.3 G/DL (ref 3.5–5.2)
ALBUMIN/GLOB SERPL: 1.2 G/DL
ALP SERPL-CCNC: 94 U/L (ref 39–117)
ALT SERPL W P-5'-P-CCNC: 13 U/L (ref 1–33)
AMPHET+METHAMPHET UR QL: NEGATIVE
AMPHETAMINES UR QL: NEGATIVE
ANION GAP SERPL CALCULATED.3IONS-SCNC: 12.4 MMOL/L (ref 5–15)
APTT PPP: 27.1 SECONDS (ref 26.5–34.5)
AST SERPL-CCNC: 14 U/L (ref 1–32)
BARBITURATES UR QL SCN: NEGATIVE
BASOPHILS # BLD AUTO: 0.03 10*3/MM3 (ref 0–0.2)
BASOPHILS NFR BLD AUTO: 0.3 % (ref 0–1.5)
BENZODIAZ UR QL SCN: NEGATIVE
BH CV ECHO MEAS - AO MAX PG: 9.7 MMHG
BH CV ECHO MEAS - AO MEAN PG: 4 MMHG
BH CV ECHO MEAS - AO ROOT DIAM: 3.1 CM
BH CV ECHO MEAS - AO V2 MAX: 156 CM/SEC
BH CV ECHO MEAS - AO V2 VTI: 28.5 CM
BH CV ECHO MEAS - AVA(I,D): 2.5 CM2
BH CV ECHO MEAS - EDV(CUBED): 191.1 ML
BH CV ECHO MEAS - EDV(MOD-SP2): 105 ML
BH CV ECHO MEAS - EDV(MOD-SP4): 102 ML
BH CV ECHO MEAS - EF(MOD-BP): 54 %
BH CV ECHO MEAS - EF(MOD-SP2): 52.4 %
BH CV ECHO MEAS - EF(MOD-SP4): 54.6 %
BH CV ECHO MEAS - ESV(CUBED): 47.4 ML
BH CV ECHO MEAS - ESV(MOD-SP2): 50 ML
BH CV ECHO MEAS - ESV(MOD-SP4): 46.3 ML
BH CV ECHO MEAS - FS: 37.2 %
BH CV ECHO MEAS - IVS/LVPW: 1.09 CM
BH CV ECHO MEAS - IVSD: 1.4 CM
BH CV ECHO MEAS - LAT PEAK E' VEL: 8.7 CM/SEC
BH CV ECHO MEAS - LV MASS(C)D: 343.6 GRAMS
BH CV ECHO MEAS - LV MAX PG: 3.5 MMHG
BH CV ECHO MEAS - LV MEAN PG: 2 MMHG
BH CV ECHO MEAS - LV V1 MAX: 94.2 CM/SEC
BH CV ECHO MEAS - LV V1 VTI: 20.7 CM
BH CV ECHO MEAS - LVIDD: 5.8 CM
BH CV ECHO MEAS - LVIDS: 3.6 CM
BH CV ECHO MEAS - LVOT AREA: 3.5 CM2
BH CV ECHO MEAS - LVOT DIAM: 2.1 CM
BH CV ECHO MEAS - LVPWD: 1.29 CM
BH CV ECHO MEAS - MED PEAK E' VEL: 6.4 CM/SEC
BH CV ECHO MEAS - MV A MAX VEL: 87.5 CM/SEC
BH CV ECHO MEAS - MV DEC SLOPE: 796 CM/SEC2
BH CV ECHO MEAS - MV DEC TIME: 0.15 SEC
BH CV ECHO MEAS - MV E MAX VEL: 121 CM/SEC
BH CV ECHO MEAS - MV E/A: 1.38
BH CV ECHO MEAS - PA ACC TIME: 0.13 SEC
BH CV ECHO MEAS - SV(LVOT): 71.7 ML
BH CV ECHO MEAS - SV(MOD-SP2): 55 ML
BH CV ECHO MEAS - SV(MOD-SP4): 55.7 ML
BH CV ECHO MEASUREMENTS AVERAGE E/E' RATIO: 16.03
BILIRUB SERPL-MCNC: 0.6 MG/DL (ref 0–1.2)
BUN SERPL-MCNC: 17 MG/DL (ref 6–20)
BUN/CREAT SERPL: 29.3 (ref 7–25)
BUPRENORPHINE SERPL-MCNC: NEGATIVE NG/ML
CALCIUM SPEC-SCNC: 9.1 MG/DL (ref 8.6–10.5)
CANNABINOIDS SERPL QL: NEGATIVE
CHLORIDE SERPL-SCNC: 105 MMOL/L (ref 98–107)
CHOLEST SERPL-MCNC: 186 MG/DL (ref 0–200)
CO2 SERPL-SCNC: 23.6 MMOL/L (ref 22–29)
COCAINE UR QL: NEGATIVE
CREAT SERPL-MCNC: 0.58 MG/DL (ref 0.57–1)
DEPRECATED RDW RBC AUTO: 38.8 FL (ref 37–54)
EGFRCR SERPLBLD CKD-EPI 2021: 116.8 ML/MIN/1.73
EOSINOPHIL # BLD AUTO: 0.03 10*3/MM3 (ref 0–0.4)
EOSINOPHIL NFR BLD AUTO: 0.3 % (ref 0.3–6.2)
ERYTHROCYTE [DISTWIDTH] IN BLOOD BY AUTOMATED COUNT: 13.9 % (ref 12.3–15.4)
FENTANYL UR-MCNC: NEGATIVE NG/ML
GEN 5 2HR TROPONIN T REFLEX: 30 NG/L
GLOBULIN UR ELPH-MCNC: 3.6 GM/DL
GLUCOSE BLDC GLUCOMTR-MCNC: 103 MG/DL (ref 70–130)
GLUCOSE BLDC GLUCOMTR-MCNC: 103 MG/DL (ref 70–130)
GLUCOSE BLDC GLUCOMTR-MCNC: 118 MG/DL (ref 70–130)
GLUCOSE BLDC GLUCOMTR-MCNC: 96 MG/DL (ref 70–130)
GLUCOSE SERPL-MCNC: 115 MG/DL (ref 65–99)
HBA1C MFR BLD: 6.6 % (ref 4.8–5.6)
HCT VFR BLD AUTO: 42.9 % (ref 34–46.6)
HDLC SERPL-MCNC: 53 MG/DL (ref 40–60)
HGB BLD-MCNC: 14 G/DL (ref 12–15.9)
IMM GRANULOCYTES # BLD AUTO: 0.03 10*3/MM3 (ref 0–0.05)
IMM GRANULOCYTES NFR BLD AUTO: 0.3 % (ref 0–0.5)
INR PPP: 0.96 (ref 0.9–1.1)
LDLC SERPL CALC-MCNC: 117 MG/DL (ref 0–100)
LDLC/HDLC SERPL: 2.18 {RATIO}
LYMPHOCYTES # BLD AUTO: 1.91 10*3/MM3 (ref 0.7–3.1)
LYMPHOCYTES NFR BLD AUTO: 16.6 % (ref 19.6–45.3)
MCH RBC QN AUTO: 26.2 PG (ref 26.6–33)
MCHC RBC AUTO-ENTMCNC: 32.6 G/DL (ref 31.5–35.7)
MCV RBC AUTO: 80.3 FL (ref 79–97)
METHADONE UR QL SCN: NEGATIVE
MONOCYTES # BLD AUTO: 0.65 10*3/MM3 (ref 0.1–0.9)
MONOCYTES NFR BLD AUTO: 5.6 % (ref 5–12)
NEUTROPHILS NFR BLD AUTO: 76.9 % (ref 42.7–76)
NEUTROPHILS NFR BLD AUTO: 8.87 10*3/MM3 (ref 1.7–7)
NRBC BLD AUTO-RTO: 0 /100 WBC (ref 0–0.2)
OPIATES UR QL: NEGATIVE
OXYCODONE UR QL SCN: NEGATIVE
PCP UR QL SCN: NEGATIVE
PLATELET # BLD AUTO: 172 10*3/MM3 (ref 140–450)
PMV BLD AUTO: 11 FL (ref 6–12)
POTASSIUM SERPL-SCNC: 3.9 MMOL/L (ref 3.5–5.2)
PROT SERPL-MCNC: 7.9 G/DL (ref 6–8.5)
PROTHROMBIN TIME: 12.9 SECONDS (ref 12.1–14.7)
QT INTERVAL: 300 MS
QT INTERVAL: 368 MS
QTC INTERVAL: 439 MS
QTC INTERVAL: 493 MS
RBC # BLD AUTO: 5.34 10*6/MM3 (ref 3.77–5.28)
SODIUM SERPL-SCNC: 141 MMOL/L (ref 136–145)
TRICYCLICS UR QL SCN: NEGATIVE
TRIGL SERPL-MCNC: 87 MG/DL (ref 0–150)
TROPONIN T DELTA: 14 NG/L
TROPONIN T SERPL HS-MCNC: 27 NG/L
UFH PPP CHRO-ACNC: <0.1 IU/ML (ref 0.3–0.7)
UFH PPP CHRO-ACNC: <0.1 IU/ML (ref 0.3–0.7)
VLDLC SERPL-MCNC: 16 MG/DL (ref 5–40)
WBC NRBC COR # BLD AUTO: 11.52 10*3/MM3 (ref 3.4–10.8)

## 2024-08-17 PROCEDURE — 80061 LIPID PANEL: CPT | Performed by: HOSPITALIST

## 2024-08-17 PROCEDURE — 83735 ASSAY OF MAGNESIUM: CPT | Performed by: INTERNAL MEDICINE

## 2024-08-17 PROCEDURE — 85027 COMPLETE CBC AUTOMATED: CPT | Performed by: INTERNAL MEDICINE

## 2024-08-17 PROCEDURE — 99222 1ST HOSP IP/OBS MODERATE 55: CPT | Performed by: INTERNAL MEDICINE

## 2024-08-17 PROCEDURE — 63710000001 ONDANSETRON ODT 4 MG TABLET DISPERSIBLE

## 2024-08-17 PROCEDURE — 93306 TTE W/DOPPLER COMPLETE: CPT

## 2024-08-17 PROCEDURE — 80053 COMPREHEN METABOLIC PANEL: CPT | Performed by: HOSPITALIST

## 2024-08-17 PROCEDURE — 85025 COMPLETE CBC W/AUTO DIFF WBC: CPT | Performed by: EMERGENCY MEDICINE

## 2024-08-17 PROCEDURE — 84100 ASSAY OF PHOSPHORUS: CPT | Performed by: INTERNAL MEDICINE

## 2024-08-17 PROCEDURE — 85520 HEPARIN ASSAY: CPT

## 2024-08-17 PROCEDURE — 84484 ASSAY OF TROPONIN QUANT: CPT | Performed by: EMERGENCY MEDICINE

## 2024-08-17 PROCEDURE — 99223 1ST HOSP IP/OBS HIGH 75: CPT | Performed by: HOSPITALIST

## 2024-08-17 PROCEDURE — 93005 ELECTROCARDIOGRAM TRACING: CPT | Performed by: EMERGENCY MEDICINE

## 2024-08-17 PROCEDURE — 25510000001 IOPAMIDOL PER 1 ML: Performed by: EMERGENCY MEDICINE

## 2024-08-17 PROCEDURE — 85520 HEPARIN ASSAY: CPT | Performed by: HOSPITALIST

## 2024-08-17 PROCEDURE — 25010000002 HEPARIN (PORCINE) PER 1000 UNITS: Performed by: HOSPITALIST

## 2024-08-17 PROCEDURE — 85730 THROMBOPLASTIN TIME PARTIAL: CPT | Performed by: HOSPITALIST

## 2024-08-17 PROCEDURE — 82948 REAGENT STRIP/BLOOD GLUCOSE: CPT

## 2024-08-17 PROCEDURE — 36415 COLL VENOUS BLD VENIPUNCTURE: CPT | Performed by: INTERNAL MEDICINE

## 2024-08-17 PROCEDURE — 25010000002 HYDRALAZINE PER 20 MG: Performed by: EMERGENCY MEDICINE

## 2024-08-17 PROCEDURE — 25010000002 HEPARIN (PORCINE) 25000-0.45 UT/250ML-% SOLUTION: Performed by: HOSPITALIST

## 2024-08-17 PROCEDURE — 85610 PROTHROMBIN TIME: CPT | Performed by: HOSPITALIST

## 2024-08-17 PROCEDURE — 71275 CT ANGIOGRAPHY CHEST: CPT

## 2024-08-17 PROCEDURE — 93306 TTE W/DOPPLER COMPLETE: CPT | Performed by: INTERNAL MEDICINE

## 2024-08-17 PROCEDURE — 84484 ASSAY OF TROPONIN QUANT: CPT | Performed by: HOSPITALIST

## 2024-08-17 PROCEDURE — 71275 CT ANGIOGRAPHY CHEST: CPT | Performed by: RADIOLOGY

## 2024-08-17 PROCEDURE — 36415 COLL VENOUS BLD VENIPUNCTURE: CPT | Performed by: HOSPITALIST

## 2024-08-17 PROCEDURE — 25010000002 HYDRALAZINE PER 20 MG: Performed by: NURSE PRACTITIONER

## 2024-08-17 RX ORDER — BISACODYL 5 MG/1
5 TABLET, DELAYED RELEASE ORAL DAILY PRN
Status: DISCONTINUED | OUTPATIENT
Start: 2024-08-17 | End: 2024-08-19 | Stop reason: HOSPADM

## 2024-08-17 RX ORDER — IOPAMIDOL 755 MG/ML
100 INJECTION, SOLUTION INTRAVASCULAR
Status: COMPLETED | OUTPATIENT
Start: 2024-08-17 | End: 2024-08-17

## 2024-08-17 RX ORDER — PANTOPRAZOLE SODIUM 40 MG/1
40 TABLET, DELAYED RELEASE ORAL
Status: DISCONTINUED | OUTPATIENT
Start: 2024-08-18 | End: 2024-08-19 | Stop reason: HOSPADM

## 2024-08-17 RX ORDER — LABETALOL 200 MG/1
200 TABLET, FILM COATED ORAL AS NEEDED
Status: CANCELLED | OUTPATIENT
Start: 2024-08-17

## 2024-08-17 RX ORDER — HEPARIN SODIUM 5000 [USP'U]/ML
4000 INJECTION, SOLUTION INTRAVENOUS; SUBCUTANEOUS AS NEEDED
Status: DISCONTINUED | OUTPATIENT
Start: 2024-08-17 | End: 2024-08-17

## 2024-08-17 RX ORDER — ONDANSETRON 4 MG/1
4 TABLET, ORALLY DISINTEGRATING ORAL EVERY 6 HOURS PRN
Status: DISCONTINUED | OUTPATIENT
Start: 2024-08-17 | End: 2024-08-19 | Stop reason: HOSPADM

## 2024-08-17 RX ORDER — PRENATAL VIT/IRON FUM/FOLIC AC 27MG-0.8MG
1 TABLET ORAL DAILY
Status: CANCELLED | OUTPATIENT
Start: 2024-08-17

## 2024-08-17 RX ORDER — HYDRALAZINE HYDROCHLORIDE 20 MG/ML
10 INJECTION INTRAMUSCULAR; INTRAVENOUS ONCE
Status: COMPLETED | OUTPATIENT
Start: 2024-08-17 | End: 2024-08-17

## 2024-08-17 RX ORDER — PANTOPRAZOLE SODIUM 40 MG/1
40 TABLET, DELAYED RELEASE ORAL
Status: CANCELLED | OUTPATIENT
Start: 2024-08-17

## 2024-08-17 RX ORDER — POLYETHYLENE GLYCOL 3350 17 G/17G
17 POWDER, FOR SOLUTION ORAL DAILY PRN
Status: DISCONTINUED | OUTPATIENT
Start: 2024-08-17 | End: 2024-08-19 | Stop reason: HOSPADM

## 2024-08-17 RX ORDER — HEPARIN SODIUM 5000 [USP'U]/ML
80 INJECTION, SOLUTION INTRAVENOUS; SUBCUTANEOUS ONCE
Status: DISCONTINUED | OUTPATIENT
Start: 2024-08-17 | End: 2024-08-17

## 2024-08-17 RX ORDER — LABETALOL 100 MG/1
100 TABLET, FILM COATED ORAL EVERY 12 HOURS SCHEDULED
Status: DISCONTINUED | OUTPATIENT
Start: 2024-08-17 | End: 2024-08-17

## 2024-08-17 RX ORDER — BUDESONIDE 3 MG/1
9 CAPSULE, COATED PELLETS ORAL DAILY
Status: CANCELLED | OUTPATIENT
Start: 2024-08-17

## 2024-08-17 RX ORDER — LORAZEPAM 0.5 MG/1
0.5 TABLET ORAL ONCE
Status: COMPLETED | OUTPATIENT
Start: 2024-08-17 | End: 2024-08-17

## 2024-08-17 RX ORDER — HYDRALAZINE HYDROCHLORIDE 25 MG/1
25 TABLET, FILM COATED ORAL ONCE
Status: COMPLETED | OUTPATIENT
Start: 2024-08-17 | End: 2024-08-17

## 2024-08-17 RX ORDER — NIFEDIPINE 30 MG/1
60 TABLET, EXTENDED RELEASE ORAL
Status: CANCELLED | OUTPATIENT
Start: 2024-08-17

## 2024-08-17 RX ORDER — NITROGLYCERIN 0.4 MG/1
0.4 TABLET SUBLINGUAL
Status: DISCONTINUED | OUTPATIENT
Start: 2024-08-17 | End: 2024-08-19 | Stop reason: HOSPADM

## 2024-08-17 RX ORDER — HEPARIN SODIUM 10000 [USP'U]/100ML
1500 INJECTION, SOLUTION INTRAVENOUS
Status: DISCONTINUED | OUTPATIENT
Start: 2024-08-17 | End: 2024-08-17

## 2024-08-17 RX ORDER — ISOSORBIDE MONONITRATE 30 MG/1
30 TABLET, EXTENDED RELEASE ORAL
Status: DISCONTINUED | OUTPATIENT
Start: 2024-08-17 | End: 2024-08-18

## 2024-08-17 RX ORDER — INSULIN LISPRO 100 [IU]/ML
0-50 INJECTION, SOLUTION INTRAVENOUS; SUBCUTANEOUS CONTINUOUS
Status: CANCELLED | OUTPATIENT
Start: 2024-08-17

## 2024-08-17 RX ORDER — NICOTINE POLACRILEX 4 MG
15 LOZENGE BUCCAL
Status: DISCONTINUED | OUTPATIENT
Start: 2024-08-17 | End: 2024-08-19 | Stop reason: HOSPADM

## 2024-08-17 RX ORDER — HEPARIN SODIUM 5000 [USP'U]/ML
2000 INJECTION, SOLUTION INTRAVENOUS; SUBCUTANEOUS AS NEEDED
Status: DISCONTINUED | OUTPATIENT
Start: 2024-08-17 | End: 2024-08-17

## 2024-08-17 RX ORDER — DEXTROSE MONOHYDRATE 25 G/50ML
25 INJECTION, SOLUTION INTRAVENOUS
Status: DISCONTINUED | OUTPATIENT
Start: 2024-08-17 | End: 2024-08-19 | Stop reason: HOSPADM

## 2024-08-17 RX ORDER — BISACODYL 10 MG
10 SUPPOSITORY, RECTAL RECTAL DAILY PRN
Status: DISCONTINUED | OUTPATIENT
Start: 2024-08-17 | End: 2024-08-19 | Stop reason: HOSPADM

## 2024-08-17 RX ORDER — SODIUM CHLORIDE 0.9 % (FLUSH) 0.9 %
10 SYRINGE (ML) INJECTION AS NEEDED
Status: DISCONTINUED | OUTPATIENT
Start: 2024-08-17 | End: 2024-08-19 | Stop reason: HOSPADM

## 2024-08-17 RX ORDER — ALBUTEROL SULFATE 0.83 MG/ML
2.5 SOLUTION RESPIRATORY (INHALATION) EVERY 4 HOURS PRN
Status: CANCELLED | OUTPATIENT
Start: 2024-08-17

## 2024-08-17 RX ORDER — ASPIRIN 81 MG/1
324 TABLET, CHEWABLE ORAL ONCE
Status: COMPLETED | OUTPATIENT
Start: 2024-08-17 | End: 2024-08-17

## 2024-08-17 RX ORDER — FUROSEMIDE 40 MG
40 TABLET ORAL DAILY PRN
Status: CANCELLED | OUTPATIENT
Start: 2024-08-17

## 2024-08-17 RX ORDER — ACETAMINOPHEN 325 MG/1
650 TABLET ORAL EVERY 6 HOURS PRN
Status: DISCONTINUED | OUTPATIENT
Start: 2024-08-17 | End: 2024-08-19 | Stop reason: HOSPADM

## 2024-08-17 RX ORDER — MESALAMINE 4 G/60ML
4 SUSPENSION RECTAL NIGHTLY
Status: CANCELLED | OUTPATIENT
Start: 2024-08-17

## 2024-08-17 RX ORDER — METOPROLOL TARTRATE 1 MG/ML
5 INJECTION, SOLUTION INTRAVENOUS ONCE
Status: COMPLETED | OUTPATIENT
Start: 2024-08-17 | End: 2024-08-17

## 2024-08-17 RX ORDER — LISINOPRIL 10 MG/1
40 TABLET ORAL
Status: CANCELLED | OUTPATIENT
Start: 2024-08-17

## 2024-08-17 RX ORDER — HYDRALAZINE HYDROCHLORIDE 20 MG/ML
10 INJECTION INTRAMUSCULAR; INTRAVENOUS EVERY 6 HOURS PRN
Status: DISCONTINUED | OUTPATIENT
Start: 2024-08-17 | End: 2024-08-19 | Stop reason: HOSPADM

## 2024-08-17 RX ORDER — ASPIRIN 81 MG/1
81 TABLET, CHEWABLE ORAL DAILY
Status: DISCONTINUED | OUTPATIENT
Start: 2024-08-17 | End: 2024-08-19 | Stop reason: HOSPADM

## 2024-08-17 RX ORDER — GLUCAGON 1 MG/ML
1 KIT INJECTION
Status: DISCONTINUED | OUTPATIENT
Start: 2024-08-17 | End: 2024-08-19 | Stop reason: HOSPADM

## 2024-08-17 RX ORDER — HEPARIN SODIUM 5000 [USP'U]/ML
4000 INJECTION, SOLUTION INTRAVENOUS; SUBCUTANEOUS ONCE
Status: DISCONTINUED | OUTPATIENT
Start: 2024-08-17 | End: 2024-08-17

## 2024-08-17 RX ORDER — ALBUTEROL SULFATE 90 UG/1
2 AEROSOL, METERED RESPIRATORY (INHALATION) EVERY 4 HOURS PRN
COMMUNITY
End: 2024-08-26

## 2024-08-17 RX ORDER — LABETALOL HYDROCHLORIDE 5 MG/ML
10 INJECTION, SOLUTION INTRAVENOUS EVERY 6 HOURS PRN
Status: DISCONTINUED | OUTPATIENT
Start: 2024-08-17 | End: 2024-08-19 | Stop reason: HOSPADM

## 2024-08-17 RX ORDER — SODIUM CHLORIDE 0.9 % (FLUSH) 0.9 %
10 SYRINGE (ML) INJECTION EVERY 12 HOURS SCHEDULED
Status: DISCONTINUED | OUTPATIENT
Start: 2024-08-17 | End: 2024-08-19 | Stop reason: HOSPADM

## 2024-08-17 RX ORDER — AMOXICILLIN 250 MG
2 CAPSULE ORAL 2 TIMES DAILY PRN
Status: DISCONTINUED | OUTPATIENT
Start: 2024-08-17 | End: 2024-08-19 | Stop reason: HOSPADM

## 2024-08-17 RX ORDER — MESALAMINE 1000 MG/1
1000 SUPPOSITORY RECTAL NIGHTLY
Status: DISCONTINUED | OUTPATIENT
Start: 2024-08-17 | End: 2024-08-19 | Stop reason: HOSPADM

## 2024-08-17 RX ORDER — BUDESONIDE 3 MG/1
9 CAPSULE, COATED PELLETS ORAL DAILY
Status: DISCONTINUED | OUTPATIENT
Start: 2024-08-17 | End: 2024-08-19 | Stop reason: HOSPADM

## 2024-08-17 RX ORDER — HEPARIN SODIUM 10000 [USP'U]/100ML
14.5 INJECTION, SOLUTION INTRAVENOUS
Status: DISCONTINUED | OUTPATIENT
Start: 2024-08-17 | End: 2024-08-17

## 2024-08-17 RX ORDER — ALBUTEROL SULFATE 0.83 MG/ML
2.5 SOLUTION RESPIRATORY (INHALATION) EVERY 6 HOURS PRN
Status: DISCONTINUED | OUTPATIENT
Start: 2024-08-17 | End: 2024-08-19 | Stop reason: HOSPADM

## 2024-08-17 RX ORDER — HEPARIN SODIUM 5000 [USP'U]/ML
4000 INJECTION, SOLUTION INTRAVENOUS; SUBCUTANEOUS ONCE
Status: COMPLETED | OUTPATIENT
Start: 2024-08-17 | End: 2024-08-17

## 2024-08-17 RX ORDER — SODIUM CHLORIDE 9 MG/ML
40 INJECTION, SOLUTION INTRAVENOUS AS NEEDED
Status: DISCONTINUED | OUTPATIENT
Start: 2024-08-17 | End: 2024-08-19 | Stop reason: HOSPADM

## 2024-08-17 RX ORDER — HYDRALAZINE HYDROCHLORIDE 25 MG/1
25 TABLET, FILM COATED ORAL EVERY 12 HOURS SCHEDULED
Status: DISCONTINUED | OUTPATIENT
Start: 2024-08-17 | End: 2024-08-18

## 2024-08-17 RX ORDER — PRENATAL VIT/IRON FUM/FOLIC AC 27MG-0.8MG
1 TABLET ORAL DAILY
Status: DISCONTINUED | OUTPATIENT
Start: 2024-08-17 | End: 2024-08-19 | Stop reason: HOSPADM

## 2024-08-17 RX ADMIN — HEPARIN SODIUM 4000 UNITS: 5000 INJECTION INTRAVENOUS; SUBCUTANEOUS at 04:05

## 2024-08-17 RX ADMIN — HEPARIN SODIUM 11.5 UNITS/KG/HR: 10000 INJECTION, SOLUTION INTRAVENOUS at 04:07

## 2024-08-17 RX ADMIN — Medication 10 ML: at 04:03

## 2024-08-17 RX ADMIN — ACETAMINOPHEN 650 MG: 325 TABLET ORAL at 04:04

## 2024-08-17 RX ADMIN — MESALAMINE 1000 MG: 1000 SUPPOSITORY RECTAL at 21:10

## 2024-08-17 RX ADMIN — LABETALOL HYDROCHLORIDE 100 MG: 100 TABLET, FILM COATED ORAL at 04:31

## 2024-08-17 RX ADMIN — ACETAMINOPHEN 650 MG: 325 TABLET ORAL at 21:19

## 2024-08-17 RX ADMIN — PRENATAL VITAMINS-IRON FUMARATE 27 MG IRON-FOLIC ACID 0.8 MG TABLET 1 TABLET: at 21:10

## 2024-08-17 RX ADMIN — ISOSORBIDE MONONITRATE 30 MG: 30 TABLET, EXTENDED RELEASE ORAL at 16:16

## 2024-08-17 RX ADMIN — METOPROLOL TARTRATE 5 MG: 1 INJECTION, SOLUTION INTRAVENOUS at 02:38

## 2024-08-17 RX ADMIN — IOPAMIDOL 70 ML: 755 INJECTION, SOLUTION INTRAVENOUS at 00:31

## 2024-08-17 RX ADMIN — ACETAMINOPHEN 650 MG: 325 TABLET ORAL at 13:51

## 2024-08-17 RX ADMIN — BUDESONIDE 9 MG: 3 CAPSULE ORAL at 21:10

## 2024-08-17 RX ADMIN — HYDRALAZINE HYDROCHLORIDE 20 MG: 20 INJECTION INTRAMUSCULAR; INTRAVENOUS at 00:32

## 2024-08-17 RX ADMIN — ONDANSETRON 4 MG: 4 TABLET, ORALLY DISINTEGRATING ORAL at 21:36

## 2024-08-17 RX ADMIN — HYDRALAZINE HYDROCHLORIDE 10 MG: 20 INJECTION INTRAMUSCULAR; INTRAVENOUS at 11:48

## 2024-08-17 RX ADMIN — HYDRALAZINE HYDROCHLORIDE 25 MG: 25 TABLET ORAL at 21:17

## 2024-08-17 RX ADMIN — ASPIRIN 324 MG: 81 TABLET, CHEWABLE ORAL at 02:05

## 2024-08-17 RX ADMIN — ASPIRIN 81 MG 81 MG: 81 TABLET ORAL at 08:49

## 2024-08-17 RX ADMIN — Medication 10 ML: at 21:10

## 2024-08-17 RX ADMIN — HYDRALAZINE HYDROCHLORIDE 25 MG: 25 TABLET ORAL at 15:46

## 2024-08-17 RX ADMIN — LORAZEPAM 0.5 MG: 0.5 TABLET ORAL at 12:26

## 2024-08-17 NOTE — PLAN OF CARE
Goal Outcome Evaluation:      Patient transferred to [facility] this shift. No acute changes or complaints per pt at this time. Family is at bedside. VSS on RA. Patient had questions about medications an breastfeeding, pt educated on what pharmacy recommended. Will continue POC.

## 2024-08-17 NOTE — CASE MANAGEMENT/SOCIAL WORK
Discharge Planning Assessment   [place]     Patient Name: Antoinette Pack  MRN: [id number]  Today's Date: 8/17/2024    Admit Date: 8/16/2024    Plan: Pt lives at home with family plans to return home at discharge. Pcp is Shawna Lambert, she uses [facility] and has [employer]. Pt uses a glucometer and cpap at home.   Discharge Needs Assessment       Row Name 08/17/24 1008       Living Environment    People in Home child(isabella), adult;spouse    Current Living Arrangements home    Potentially Unsafe Housing Conditions none    Primary Care Provided by self    Family Caregiver if Needed spouse    Quality of Family Relationships helpful;involved;supportive       Resource/Environmental Concerns    Resource/Environmental Concerns none       Transition Planning    Patient/Family Anticipates Transition to home with family    Patient/Family Anticipated Services at Transition none    Transportation Anticipated family or friend will provide       Discharge Needs Assessment    Readmission Within the Last 30 Days no previous admission in last 30 days    Equipment Currently Used at Home cpap;glucometer    Concerns to be Addressed no discharge needs identified;denies needs/concerns at this time    Anticipated Changes Related to Illness none    Equipment Needed After Discharge none                   Discharge Plan       Row Name 08/17/24 1009       Plan    Plan Pt lives at home with family plans to return home at discharge. Pcp is Shawna Lambert, she uses [facility] and has [employer] Re[employer]ment. Pt uses a glucometer and cpap at home.    Patient/Family in Agreement with Plan yes                  Continued Care and Services - Admitted Since 8/16/2024    No active coordination exists for this encounter.       Selected Continued Care - Episodes Includes continued care and service providers with selected services from the active episodes listed below      [facility] Episode start  date: 2/17/2022   There are no active outsourced providers for this episode.                 Expected Discharge Date and Time       Expected Discharge Date Expected Discharge Time    Aug 19, 2024            Demographic Summary       Row Name 08/17/24 1008       General Information    Admission Type inpatient    Arrived From home    Referral Source emergency department    Reason for Consult discharge planning                    Lori Montgomery RN

## 2024-08-17 NOTE — H&P
Hospitalist History and Physical        Patient Identification  Name: Antoinette Pack  Age/Sex: 39 y.o. female  :  1985        MRN: [id number]  Visit Number: [id number]  Admit Date: 2024   PCP: Shawna Lambert DO          Chief complaint short of breath, chest pain    History of Present Illness:  Patient is a 39 y.o. female with history of chronic diastolic CHF, ulcerative colitis, type II DM previously on insulin pump but no longer on insulin after significant weight loss, GERD, HTN, kidney stone, narcolepsy, pancreatitis, PCOS, seizures, and ALEJANDRO on CPAP, who presents with complaints of acute onset shortness of breath and chest pressure this evening while breastfeeding her baby. Patient gave birth to her baby in April of this year. Pregnancy was complicated by pre-eclampsia. She was previously on scheduled labetalol and lisinopril but now is only on lisinopril with labetalol for PRN use only, along with PRN lasix. She reports over the last few days her BP has been starting to creep up and she has had to use the labetalol for the first time in several months. She has not used any lasix, however, because she has not developed any lower extremity edema. This evening when her above mentioned symptoms started, her  brought her to the ED where her O2 sat was reportedly in the 70s on RA. She was placed on 3L NC in triage and BP has been better since. BP was 199/135 upon arrival and HR was in the 130s. ABG on 3L NC showed adequate oxygenation. Troponin was 16 with repeat trending up to 30 and BNP was elevated at 781. CRP was negative. D-dimer was elevated at 0.90. WBC was normal. UDS and ethanol levels were negative. CT PE protocol was negative for PE but showed mild interstitial edema and a small right pleural effusion. EKG showed sinus tachycardia but was not felt to show evidence of acute ischemia. Patient received 325mg ASA, 80mg IV lasix, 20mg IV hydralazine, and 5mg IV metoprolol. BP  "improved to as low as 150s systolic but has been trending back up and was in the 170s/130s when I was in the room with her. HR has improved to the 90s-100s mostly.     Review of Systems  Review of Systems   Constitutional:  Negative for activity change, appetite change, chills, diaphoresis, fatigue, fever and unexpected weight change.   HENT:  Negative for congestion, postnasal drip, rhinorrhea, sinus pressure, sinus pain and sore throat.    Eyes:  Negative for photophobia and visual disturbance.   Respiratory:  Positive for shortness of breath. Negative for cough and wheezing.    Cardiovascular:  Positive for chest pain and palpitations. Negative for leg swelling.   Gastrointestinal:  Negative for abdominal distention, abdominal pain, constipation, diarrhea, nausea and vomiting.   Endocrine: Negative for polyphagia and polyuria.   Genitourinary:  Negative for difficulty urinating, dysuria, flank pain, frequency and hematuria.   Musculoskeletal:  Negative for arthralgias, back pain, joint swelling, myalgias, neck pain and neck stiffness.   Skin:  Negative for color change, pallor, rash and wound.   Neurological:  Positive for headaches. Negative for dizziness, tremors, seizures, syncope, weakness, light-headedness and numbness.   Hematological:  Negative for adenopathy. Does not bruise/bleed easily.   Psychiatric/Behavioral:  Negative for agitation, behavioral problems and confusion.        History  Past Medical History:   Diagnosis Date    Anxiety     CHF (congestive heart failure) 05/2023    Colitis 2023    Depression     Diabetes mellitus 2018    type 2    Diverticulitis     GERD (gastroesophageal reflux disease)     History of transfusion 04/19/2024    2 units post delivery    Hypertension     chronic    Kidney stone     \"years ago\"    Narcolepsy 2022    Pancreatitis 09/2023    PCOS (polycystic ovarian syndrome)     Rectal bleeding     SINCE 2023 - USES MESALAMINE SUPPOSITORY NIGHTLY    Seizures 2012    Sleep " apnea      Past Surgical History:   Procedure Laterality Date    CARDIAC CATHETERIZATION      CARPAL TUNNEL RELEASE       SECTION N/A 2024    Procedure:  SECTION PRIMARY;  Surgeon: Axel Keys MD;  Location:  [facility];  Service: Obstetrics/Gynecology;  Laterality: N/A;    COLONOSCOPY      COLONOSCOPY N/A 2024    Procedure: COLONOSCOPY;  Surgeon: Tom Mueller MD;  Location:  [facility];  Service: Gastroenterology;  Laterality: N/A;    ENDOSCOPY      ENDOSCOPY N/A 2024    Procedure: ESOPHAGOGASTRODUODENOSCOPY;  Surgeon: Tom Mueller MD;  Location:  [facility];  Service: Gastroenterology;  Laterality: N/A;    FRACTURE SURGERY Left     wrist    HARDWARE REMOVAL Left     WRIST    TENDON REPAIR Left     HAND    WISDOM TOOTH EXTRACTION       Family History   Problem Relation Age of Onset    Hypertension Mother     Depression Mother     Heart disease Mother     COPD Mother     Emphysema Mother     Hypertension Father     Diabetes Father     Heart disease Father     COPD Father     Heart failure Father     Hepatitis Father      Social History     Tobacco Use    Smoking status: Former     Current packs/day: 0.00     Average packs/day: 1 pack/day for 20.0 years (20.0 ttl pk-yrs)     Types: Cigarettes     Start date:      Quit date:      Years since quittin.6     Passive exposure: Never    Smokeless tobacco: Never    Tobacco comments:     QUIT IN 2017   Vaping Use    Vaping status: Never Used   Substance Use Topics    Alcohol use: No    Drug use: No     (Not in a hospital admission)    Allergies:  Dilantin [phenytoin], Keppra [levetiracetam], Meperidine, and Topamax [topiramate]    Objective     Vital Signs  Temp:  [98.2 °F (36.8 °C)] 98.2 °F (36.8 °C)  Heart Rate:  [] 99  Resp:  [22-26] 26  BP: (156-199)/() 194/123  Body mass index is 55.5 kg/m².    Physical Exam:  Physical Exam  Constitutional:       General: She is not in acute  distress.     Appearance: She is obese. She is ill-appearing.   HENT:      Head: Normocephalic and atraumatic.      Right Ear: External ear normal.      Left Ear: External ear normal.      Nose: Nose normal.      Mouth/Throat:      Mouth: Mucous membranes are moist.      Pharynx: Oropharynx is clear.   Eyes:      Extraocular Movements: Extraocular movements intact.      Conjunctiva/sclera: Conjunctivae normal.      Pupils: Pupils are equal, round, and reactive to light.   Cardiovascular:      Rate and Rhythm: Regular rhythm. Tachycardia present.      Pulses: Normal pulses.      Heart sounds: Normal heart sounds. No murmur heard.  Pulmonary:      Effort: Pulmonary effort is normal. No respiratory distress.      Breath sounds: No wheezing or rales.      Comments: Crackles appreciated right lower lung zone  Abdominal:      General: Abdomen is flat. Bowel sounds are normal.      Palpations: Abdomen is soft.   Musculoskeletal:         General: Normal range of motion.      Cervical back: Normal range of motion and neck supple. No tenderness.      Right lower leg: No edema.      Left lower leg: No edema.   Lymphadenopathy:      Cervical: No cervical adenopathy.   Skin:     General: Skin is warm and dry.      Capillary Refill: Capillary refill takes less than 2 seconds.      Coloration: Skin is not jaundiced.      Findings: No bruising or lesion.   Neurological:      General: No focal deficit present.      Mental Status: She is alert and oriented to person, place, and time.   Psychiatric:         Mood and Affect: Mood normal.         Behavior: Behavior normal.         Thought Content: Thought content normal.           Results Review:       Lab Results:  Results from last 7 days   Lab Units 08/16/24  2145   WBC 10*3/mm3 9.88   HEMOGLOBIN g/dL 15.3   PLATELETS 10*3/mm3 196     Results from last 7 days   Lab Units 08/16/24  2145   CRP mg/dL <0.30     Results from last 7 days   Lab Units 08/16/24  2230   SODIUM mmol/L 140    POTASSIUM mmol/L 3.9   CHLORIDE mmol/L 103   CO2 mmol/L 21.6*   BUN mg/dL 13   CREATININE mg/dL 0.77   CALCIUM mg/dL 9.0   GLUCOSE mg/dL 167*     Results from last 7 days   Lab Units 08/16/24  2230   MAGNESIUM mg/dL 1.8     Hemoglobin A1C   Date Value Ref Range Status   08/16/2024 6.60 (H) 4.80 - 5.60 % Final     Results from last 7 days   Lab Units 08/16/24  2230   BILIRUBIN mg/dL 0.7   ALK PHOS U/L 101   AST (SGOT) U/L 16   ALT (SGPT) U/L 14     Results from last 7 days   Lab Units 08/17/24  0043 08/16/24  2230   CK TOTAL U/L  --  64   HSTROP T ng/L 30* 16*         Results from last 7 days   Lab Units 08/16/24  2145   INR  0.90     Results from last 7 days   Lab Units 08/16/24  2141   PH, ARTERIAL pH units 7.374   PO2 ART mm Hg 80.1*   PCO2, ARTERIAL mm Hg 42.8   HCO3 ART mmol/L 24.9       I have reviewed the patient's laboratory results.    Imaging:  Imaging Results (Last 72 Hours)       Procedure Component Value Units Date/Time    CT Angiogram Chest Pulmonary Embolism [[id number]] Collected: 08/17/24 0138     Updated: 08/17/24 0143    Narrative:      PROCEDURE: CT angiography of the chest performed with IV contrast on  August 17, 2024. The patient was administered 70 cc of Isovue-370  contrast IV without complication. The examination was performed with 2  mm axial imaging and sagittal and coronal reconstruction images. Total  . 3D surface display images were performed and projected in  multiple planes.     HISTORY: Shortness of breath. Difficulty with breathing.     COMPARISON: None.     FINDINGS:     Normal heart size  Trace volume of pericardial fluid.  Mild interstitial edema.  Minimal atelectasis at the left lung base.  No endobronchial lesion.  Trace volume of pericardial fluid.  Very small right pleural effusion.  No thoracic aortic aneurysm or dissection.   No pulmonary embolus.  Small unenlarged prevascular mediastinal nodes  Small unenlarged paratracheal nodes.  No free fluid or free air.   No  abscess or hematoma.       Impression:         1.  No thoracic aortic aneurysm or dissection.   2.  No pulmonary embolus  3.  Mild interstitial edema.  4.  Very small right pleural effusion.  5.  Normal heart size           This report was finalized on 8/17/2024 1:41 AM by Zoran Rubalcava MD.       XR Chest 1 View [[id number]] Collected: 08/16/24 2201     Updated: 08/16/24 2204    Narrative:      Procedure: Frontal view of chest obtained.     Comparison: None available     History: SOB     Findings:     Reticulation in the bilateral lungs.      No pneumonia or acute process seen in the chest.  Normal heart size and mediastinal contours.  Trachea is in midline position.  No edema or effusion is seen.  There is no evidence of pneumothorax.       Impression:         No evidence of acute disease in the chest.     Lung reticulation suspicious for a chronic process. CT can be  considered.      This report was finalized on 8/16/2024 10:02 PM by Marilynn Dahl MD.               I have personally reviewed the patient's radiologic imaging.    EKG:   Sinus tachycardia with premature supraventricular complexes and fusion complexes, , QTc 439  Possible Left atrial enlargement  Rightward axis  Borderline ECG  When compared with ECG of 14-JUN-2024 19:58,  fusion complexes are now present  premature supraventricular complexes are now present  Vent. rate has increased BY  51 BPM    I have personally reviewed the patient's EKG. No overt ST changes appreciated.         Assessment & Plan     - Acute onset chest pain, dyspnea, accelerated HTN and tachycardia with troponin and BNP elevation: differential diagnosis includes hypertensive emergency causing troponin elevation/nstemi type II and acute on chronic diastolic CHF with flash pulmonary edema, or NSTEMI type 1 causing rise in BP and flash pulmonary edema. Also on differential is postpartum cardiomyopathy which can occur up 5 months after delivery (patient still in that window  at 4 months). Diuresed 1700cc off with IV lasix. Patient reports breathing better now. Chest pain has eased off as well. BP still significantly elevated after receiving IV hydralazine followed by IV metoprolol; will start oral labetalol scheduled BID now and have IV hydralazine and labetalol available PRN. Full dose ASA given in the ED; continue baby ASA in the morning. Keep NPO except sips with meds since already after midnight. Start IV heparin infusion for NSTEMI. Repeat troponin now. Obtain updated ECHO in the morning. Consult cardiology for further recommendations.   - Acute hypoxic respiratory failure, secondary to acute on chronic diastolic CHF: see above. Titrate FiO2 down as patient tolerates.   - Type II DM: previously insulin dependent but no longer after losing weight. Still wears dexcom. Monitor accuchecks, low dose SSI available PRN.    - Hypertension, uncontrolled: Plan to continue home lisinopril once reconciled by pharmacy. Adding scheduled labetalol as noted above.   - Morbid obesity, BMI 55.     DVT Prophylaxis: anticoagulated on IV heparin infusion    Estimated Length of Stay >2 midnights    I discussed the patient's findings, assessment and plan with the patient, her  at bedside, and ED provider Dr Muller    Patient is high risk due to NSTEMI type 1 vs 2, accelerated HTN, flash pulmonary edema/acute on chronic diastolic CHF, acute hypoxic respiratory failure    Sagar Phipps MD  08/17/24  03:07 EDT

## 2024-08-17 NOTE — PLAN OF CARE
Goal Outcome Evaluation:               Pt alert and oriented this am. Family at bedside. No other complaints at this time.

## 2024-08-17 NOTE — PROGRESS NOTES
[facility]  Antoinette Pack is a  39 y.o. female receiving heparin infusion.     Therapy for (VTE/Cardiac):   Cardiac  Patient Dosing Weight: 87.1 kg  Initial Bolus (Y/N):   Y  Any Bolus (Y/N):   Y        Signs or Symptoms of Bleeding: N    Cardiac or Other (Not VTE)   Initial Bolus: 60 units/kg (Max 4,000 units)  Initial rate: 12 units/kg/hr (Max 1,000 units/hr)   Anti Xa Rebolus Infusion Hold time Change infusion Dose (Units/kg/hr) Next Anti Xa or aPTT Level Due   < 0.11 50 Units/kg  (4000 Units Max) None Increase by  3 Units/kg/hr 6 hours   0.11- 0.19 25 Units/kg  (2000 Units Max) None Increase by  2 Units/kg/hr 6 hours   0.2 - 0.29 0 None Increase by  1 Units/kg/hr 6 hours   0.3 - 0.5 0 None No Change 6 hours (after 2 consecutive levels in range check q24h @0700)   0.51 - 0.6 0 None Decrease by  1 Units/kg/hr 6 hours   0.61 - 0.8 0 30 Minutes Decrease by  2 Units/kg/hr 6 hours   0.81 - 1 0 60 Minutes Decrease by  3 Units/kg/hr 6 hours   >1 0 Hold  After Anti Xa less than 0.5 decrease previous rate by  4 Units/kg/hr  Every 2 hours until Anti Xa  less than 0.5 then when infusion restarts in 6 hours         Recommend anti-Xa every 6 hours.          Date   Time   Anti-Xa Current Rate (Unit/kg/hr) Bolus   (Units) Rate Change   (Unit/kg/hr) New Rate (Unit/kg/hr) Next   Anti-Xa Comments  Pump Check Daily   8/17 0327 <0.1 - 4000 initial 11.5 1000 Discussed initial rate with ED nurse.    8/17 1007 < 0.10               11.5       Date   Time   Anti-Xa Current Rate (Unit/kg/hr) Bolus   (Units) Rate Change   (Unit/kg/hr) New Rate (Unit/kg/hr) Next   Anti-Xa aily   8/17 0327 <0.1 - 4000 initial 11.5 1000 Discussed initial rate with ED nurse.                                                                                                                                                                                                                                      4000 +3 14.5 1800 Bolus, rate adjusted, no  s/s bleeding, d/w  Geoff GOODE                                                                                                                                                                                                                      Pharmacy will continue to follow anti-Xa results and monitor for signs and symptoms of bleeding or thrombosis.      08/17/24   [place]

## 2024-08-17 NOTE — ED PROVIDER NOTES
Subjective     History provided by:  Patient   used: No    Shortness of Breath  Severity:  Severe  Onset quality:  Sudden  Duration:  1 hour  Timing:  Constant  Progression:  Worsening  Chronicity:  New  Context: not activity, not animal exposure, not emotional upset, not fumes, not known allergens, not occupational exposure, not pollens, not smoke exposure, not strong odors, not URI and not weather changes    Relieved by:  Nothing  Worsened by:  Nothing  Ineffective treatments:  None tried  Associated symptoms: diaphoresis    Associated symptoms: no abdominal pain, no chest pain, no claudication, no cough, no ear pain, no fever, no headaches, no hemoptysis, no neck pain, no PND, no rash, no sore throat, no sputum production, no syncope, no swollen glands, no vomiting and no wheezing    Risk factors: obesity    Risk factors: no recent alcohol use, no family hx of DVT, no hx of cancer, no hx of PE/DVT, no oral contraceptive use, no prolonged immobilization, no recent surgery and no tobacco use        Review of Systems   Constitutional:  Positive for diaphoresis. Negative for activity change, appetite change, chills, fatigue and fever.   HENT:  Negative for congestion, ear pain and sore throat.    Eyes:  Negative for redness.   Respiratory:  Positive for shortness of breath. Negative for cough, hemoptysis, sputum production, chest tightness and wheezing.    Cardiovascular:  Negative for chest pain, palpitations, claudication, leg swelling, syncope and PND.   Gastrointestinal:  Negative for abdominal pain, diarrhea, nausea and vomiting.   Genitourinary:  Negative for dysuria and urgency.   Musculoskeletal:  Negative for arthralgias, back pain, myalgias and neck pain.   Skin:  Negative for pallor, rash and wound.   Neurological:  Negative for dizziness, speech difficulty, weakness and headaches.   Psychiatric/Behavioral:  Negative for agitation, behavioral problems, confusion and decreased  "concentration.    All other systems reviewed and are negative.      Past Medical History:   Diagnosis Date    Anxiety     CHF (congestive heart failure) 2023    Colitis     Depression     Diabetes mellitus 2018    type 2    Diverticulitis     GERD (gastroesophageal reflux disease)     History of transfusion 2024    2 units post delivery    Hypertension     chronic    Kidney stone     \"years ago\"    Narcolepsy     Pancreatitis 2023    PCOS (polycystic ovarian syndrome)     Rectal bleeding     SINCE  - USES MESALAMINE SUPPOSITORY NIGHTLY    Seizures     Sleep apnea        Allergies   Allergen Reactions    Milk-Related Compounds Unknown - High Severity    Dairycare [Lactase-Lactobacillus] Unknown - Low Severity     Pt is breastfeeding baby that is allergic to dairy, pt cannot have dairy products while breastfeeding    Dilantin [Phenytoin] Mental Status Change    Keppra [Levetiracetam] Mental Status Change    Meperidine Rash    Topamax [Topiramate] Mental Status Change       Past Surgical History:   Procedure Laterality Date    CARDIAC CATHETERIZATION      CARPAL TUNNEL RELEASE       SECTION N/A 2024    Procedure:  SECTION PRIMARY;  Surgeon: Axel Keys MD;  Location:  [facility];  Service: Obstetrics/Gynecology;  Laterality: N/A;    COLONOSCOPY      COLONOSCOPY N/A 2024    Procedure: COLONOSCOPY;  Surgeon: Tom Mueller MD;  Location: [facility];  Service: Gastroenterology;  Laterality: N/A;    ENDOSCOPY      ENDOSCOPY N/A 2024    Procedure: ESOPHAGOGASTRODUODENOSCOPY;  Surgeon: Tom Mueller MD;  Location:  [facility];  Service: Gastroenterology;  Laterality: N/A;    FRACTURE SURGERY Left     wrist    HARDWARE REMOVAL Left     WRIST    TENDON REPAIR Left     HAND    WISDOM TOOTH EXTRACTION         Family History   Problem Relation Age of Onset    Hypertension Mother     Depression Mother     Heart disease Mother     COPD Mother  "    Emphysema Mother     Hypertension Father     Diabetes Father     Heart disease Father     COPD Father     Heart failure Father     Hepatitis Father        Social History     Socioeconomic History    Marital status:    Tobacco Use    Smoking status: Former     Current packs/day: 0.00     Average packs/day: 1 pack/day for 20.0 years (20.0 ttl pk-yrs)     Types: Cigarettes     Start date: 1995     Quit date:      Years since quittin.6     Passive exposure: Never    Smokeless tobacco: Never    Tobacco comments:     QUIT IN 2017   Vaping Use    Vaping status: Never Used   Substance and Sexual Activity    Alcohol use: No    Drug use: No    Sexual activity: Defer           Objective   Physical Exam  Vitals and nursing note reviewed.   Constitutional:       General: She is not in acute distress.     Appearance: Normal appearance. She is well-developed. She is obese. She is ill-appearing. She is not toxic-appearing or diaphoretic.   HENT:      Head: Normocephalic and atraumatic.      Right Ear: External ear normal.      Left Ear: External ear normal.      Nose: Nose normal.      Mouth/Throat:      Pharynx: No oropharyngeal exudate.      Tonsils: No tonsillar exudate.   Eyes:      General: Lids are normal.      Conjunctiva/sclera: Conjunctivae normal.      Pupils: Pupils are equal, round, and reactive to light.   Neck:      Thyroid: No thyromegaly.   Cardiovascular:      Rate and Rhythm: Normal rate and regular rhythm.      Pulses: Normal pulses.      Heart sounds: Normal heart sounds, S1 normal and S2 normal.   Pulmonary:      Effort: Pulmonary effort is normal. Tachypnea, accessory muscle usage and respiratory distress present.      Breath sounds: Examination of the right-upper field reveals decreased breath sounds and rales. Examination of the left-upper field reveals decreased breath sounds and rales. Examination of the right-middle field reveals decreased breath sounds and rales. Examination of the  left-middle field reveals decreased breath sounds and rales. Examination of the right-lower field reveals decreased breath sounds and rales. Examination of the left-lower field reveals decreased breath sounds and rales. Decreased breath sounds, rhonchi and rales present. No wheezing.   Chest:      Chest wall: No tenderness.   Abdominal:      General: Bowel sounds are normal. There is no distension.      Palpations: Abdomen is soft.      Tenderness: There is no abdominal tenderness. There is no guarding or rebound.   Musculoskeletal:         General: No tenderness or deformity. Normal range of motion.      Cervical back: Full passive range of motion without pain, normal range of motion and neck supple.   Lymphadenopathy:      Cervical: No cervical adenopathy.   Skin:     General: Skin is warm and dry.      Coloration: Skin is not pale.      Findings: No erythema or rash.   Neurological:      Mental Status: She is alert and oriented to person, place, and time.      GCS: GCS eye subscore is 4. GCS verbal subscore is 5. GCS motor subscore is 6.      Cranial Nerves: No cranial nerve deficit.      Sensory: No sensory deficit.   Psychiatric:         Speech: Speech normal.         Behavior: Behavior normal.         Thought Content: Thought content normal.         Judgment: Judgment normal.         Procedures           ED Course  ED Course as of 08/19/24 1352   Fri Aug 16, 2024   2356 XR Chest 1 View  IMPRESSION:     No evidence of acute disease in the chest.     Lung reticulation suspicious for a chronic process. CT can be  considered.       [ES]   Sat Aug 17, 2024   0133 ECG 12 Lead Chest Pain  Vent. Rate : 108 BPM     Atrial Rate : 108 BPM     P-R Int : 152 ms          QRS Dur :  80 ms      QT Int : 368 ms       P-R-T Axes :  41  85  21 degrees     QTc Int : 493 ms     Sinus tachycardia  Possible Left atrial enlargement  Nonspecific T wave abnormality  Abnormal ECG  When compared with ECG of 16-AUG-2024 21:33,  (Unconfirmed)  fusion complexes are no longer present  premature supraventricular complexes are no longer present  Nonspecific T wave abnormality now evident in Anterior leads   [ES]   0133 ECG 12 Lead Dyspnea  Vent. Rate : 129 BPM     Atrial Rate : 129 BPM     P-R Int : 140 ms          QRS Dur :  82 ms      QT Int : 300 ms       P-R-T Axes :  54 100  41 degrees     QTc Int : 439 ms     Sinus tachycardia with premature supraventricular complexes and fusion complexes  Possible Left atrial enlargement  Rightward axis  Borderline ECG  When compared with ECG of 14-JUN-2024 19:58,  fusion complexes are now present  premature supraventricular complexes are now present  Vent. rate has increased BY  51 BPM   [ES]   0156 CT Angiogram Chest Pulmonary Embolism  IMPRESSION:     1.  No thoracic aortic aneurysm or dissection.   2.  No pulmonary embolus  3.  Mild interstitial edema.  4.  Very small right pleural effusion.  5.  Normal heart size   [ES]      ED Course User Index  [ES] Dnaiel Muller MD                                             Medical Decision Making  Problems Addressed:  Acute on chronic diastolic congestive heart failure: complicated acute illness or injury    Amount and/or Complexity of Data Reviewed  Labs: ordered.  Radiology: ordered. Decision-making details documented in ED Course.  ECG/medicine tests: ordered. Decision-making details documented in ED Course.    Risk  OTC drugs.  Prescription drug management.  Decision regarding hospitalization.        Final diagnoses:   Acute on chronic diastolic congestive heart failure       ED Disposition  ED Disposition       ED Disposition   Decision to Admit    Condition   --    Comment   Level of Care: Telemetry [5]   Diagnosis: Acute exacerbation of CHF (congestive heart failure) [508035]   Admitting Physician: ADA JANE [[id number]]   Attending Physician: ADA JANE [[id number]]   Certification: I Certify That Inpatient Hospital Services Are  Medically Necessary For Greater Than 2 Midnights                 Shawna Lambert, DO  [address] [facility] [address]  [phone]      1 week; hospital follow up dyspnea, uncontrolled BP    [facility]  [address] [place] [id number] [phone]             Medication List        New Prescriptions      fluconazole 150 MG tablet  Commonly known as: DIFLUCAN  Take 1 tablet by mouth Daily for 2 doses. 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            Changed      labetalol 200 MG tablet  Commonly known as: NORMODYNE  Take ½ tablet by mouth 2 (Two) Times a Day.  What changed:   how much to take  when to take this  reasons to take this            Stop      lisinopril 40 MG tablet  Commonly known as: PRINIVIL,ZESTRIL     NIFEdipine CC 60 MG 24 hr tablet  Commonly known as: ADALAT CC     NovoLOG 100 UNIT/ML injection  Generic drug: Insulin Aspart               Where to Get Your Medications        These medications were sent to [facility] [address] [address]      Hours: Monday to Friday 7 AM to 6 PM Phone: [phone]   fluconazole 150 MG tablet  hydrALAZINE 10 MG tablet  labetalol 200 MG tablet            Daniel Muller MD  08/19/24 [id number]

## 2024-08-17 NOTE — CONSULTS
"Date of Admit: 8/16/2024  Date of Consult: 08/17/24  No ref. provider found        Acute exacerbation of CHF (congestive heart failure)      Assessment      NSTEMI likely type II in the setting of hypertension causing demand ischemia  Hypertensive emergency on admission, improving  HFpEF with flash pulmonary edema    Recommendations     NSTEMI  Troponin trending downward. Likely secondary to hypertensive emergency causing demand ischemia.  Nuclear stress test in September 2023 showed no ischemia.  Discontinue IV heparin.  Continue to monitor.     2. Hypertension  3. HFpEF  Patient has been taking as needed labetalol and lasix. We discussed to maintain a daily blood pressure medicine routine. She is currently breastfeeding which limits medication options. For now would recommend starting her on hydralazine 25 mg PO BID and imdur 30 mg daily along with continuation of labetalol as tolerated by blood pressure.   Echocardiogram has been ordered to assess LV function.       Reason for consultation: NSTEMI and CHF    Subjective       Subjective     History of Present Illness    Antoinette Pack is a 39-year-old female with a past medical history significant for chronic diastolic CHF, ulcerative colitis, diabetes mellitus type 2, GERD, essential hypertension, seizures and obstructive sleep apnea.    Patient presented to the ER with complaints of acute onset shortness of breath and chest pressure. She states over the last few days she had been feeling \"unwell.\" Stats she had a baby in April 2024 and is currently breastfeeding. This pregnancy was complicated by pre-eclampsia. States she contacted her cardiologist about her high blood pressure at home and they made her an appointment for Monday but she continued to be short of breath with chest pressure and high blood pressure so she came to the ED. At home she has been taking lisinopril daily with as needed labetalol and lasix. She had not taken labetalol in months but did " take one for the first tie yesterday due to blood pressure elevating.  In the ED her oxygen was reportedly in the 70s on room air and she was placed on 3 L nasal cannula.  Initial blood pressure on arrival was 199/135 and heart rate in the 130s.  She received IV Lasix in the ER with over 1600 mL of urine output and improvement of blood pressure and shortness of breath.Echocardiogram in April 2024 showed normal LV function. On evaluation in the ED BP was still elevated and she was given 10 mg of hydralazine x1.  Troponin initially 16 trending up to 30.     Cardiac risk factors:Obesity    Last Echo: 4/18/2024    Left ventricular systolic function is normal. Calculated left ventricular EF = 56.4%    Left ventricular wall thickness is consistent with mild concentric hypertrophy.    Left ventricular diastolic function was not assessed.    There is a trivial pericardial effusion.    Last Stress: 9/5/2023    A pharmacological stress test was performed using regadenoson.    Findings consistent with a normal ECG stress test.    Myocardial perfusion imaging indicates a normal myocardial perfusion study with no evidence of ischemia.    Subtle fixed anterior wall perfusion defect most likely presents breast on admission due to normal anterior wall motion on the gated scans.    Normal LV cavity size. Normal LV wall motion noted.    Left ventricular ejection fraction is normal (Calculated EF = 60%).    Impressions are consistent with a low risk study.    Last Cath: 9/25/2015     RESULTS:   HEMODYNAMICS:   1.  Pre-angiographic pressure: Aortic pressure 156/96 (mean 121) mmHg.  2.  Left ventricular pressure: 165/28 mmHg.  3.  Post angiographic pressure: Left ventricular pressure 153/20 mmHg.  4.  Aortic pressure 134/83 (mean 107) mmHg.      CORONARY ARTERY ARTERIOGRAMS:       On fluoroscopy, there was no significant epicardial calcification noted.      LEFT MAIN CORONARY ARTERY: Is normal and bifurcates into medium-size  left  anterior descending and left circumflex systems in the usual fashion.      LEFT ANTERIOR DESCENDING CORONARY ARTERY: Gives rise to 2 small-to-medium-size  diagonal branches from the proximal segment and a small diagonal branch from  the mid segment. The left anterior descending coronary artery with the diagonal  branches appears angiographically normal.      LEFT CIRCUMFLEX CORONARY ARTERY: Gives rise to a small obtuse marginal branch  from the proximal segment and a medium and a small posterolateral branch  distally. The left circumflex system also appears angiographically normal.      RIGHT CORONARY ARTERY: Is a medium-caliber vessel and is dominant for posterior  circulation. It appears angiographically normal in the proximal and mid  segments. Distally, it gives rise to a medium-size posterior descending artery  branch that has mild diffuse narrowing of about 40% to 50% in its distal  segment. This is not hemodynamically significant.      LEFT VENTRICULAR ANGIOGRAM: Was done in 30' CABELLO projection. It reveals normal  left ventricular chamber size, wall motion and systolic function with an  estimated LV ejection fraction of about 60% to 65%. There is no pericardial or  valvular calcification noted.      CONCLUSION AND COMMENTS: The patient is a 30-year-old  female with  persistent chest pain suspicious for unstable angina and an abnormal Lexiscan  sestamibi study that revealed some anterior wall myocardial ischemia. Her  cardiac catheterization reveals:     1.  Normal left main coronary artery.   2.  Normal left anterior descending and left circumflex systems.   3.  Right coronary artery is dominant for posterior circulation with about 40%  to 50% diffuse narrowing in the distal segment of a medium-size posterior  descending artery branch, which is not hemodynamically significant.   4.  Normal LV  chamber size, wall motion, and systolic function with an  estimated LV ejection fraction of about 60%  "to 65%.   5.  Elevated left ventricular end-diastolic pressures which are probably  related to diastolic noncompliance of the left ventricle.      RECOMMENDATIONS:    1.  In view of mild degree of atherosclerotic disease in the distal PDA, with a  normal left circumflex and right coronary artery, would continue to emphasize  on risk factor modification as needed for now and perhaps look at a noncardiac  cause for her symptoms should they persist.   2.  For her diastolic noncompliance of the left ventricle, I would try to  optimize her antihypertensive medications.         Past Medical History:   Diagnosis Date    Anxiety     CHF (congestive heart failure) 2023    Colitis     Depression     Diabetes mellitus 2018    type 2    Diverticulitis     GERD (gastroesophageal reflux disease)     History of transfusion 2024    2 units post delivery    Hypertension     chronic    Kidney stone     \"years ago\"    Narcolepsy     Pancreatitis 2023    PCOS (polycystic ovarian syndrome)     Rectal bleeding     SINCE  - USES MESALAMINE SUPPOSITORY NIGHTLY    Seizures 2012    Sleep apnea      Past Surgical History:   Procedure Laterality Date    CARDIAC CATHETERIZATION      CARPAL TUNNEL RELEASE       SECTION N/A 2024    Procedure:  SECTION PRIMARY;  Surgeon: Axel Keys MD;  Location: [facility];  Service: Obstetrics/Gynecology;  Laterality: N/A;    COLONOSCOPY      COLONOSCOPY N/A 2024    Procedure: COLONOSCOPY;  Surgeon: Tom Mueller MD;  Location: [facility] ENDOSCOPY;  Service: Gastroenterology;  Laterality: N/A;    ENDOSCOPY      ENDOSCOPY N/A 2024    Procedure: ESOPHAGOGASTRODUODENOSCOPY;  Surgeon: Tom Mueller MD;  Location: [facility] END[facility];  Service: Gastroenterology;  Laterality: N/A;    FRACTURE SURGERY Left     wrist    HARDWARE REMOVAL Left     WRIST    TENDON REPAIR Left     HAND    WISDOM TOOTH EXTRACTION       Family History   Problem Relation " Age of Onset    Hypertension Mother     Depression Mother     Heart disease Mother     COPD Mother     Emphysema Mother     Hypertension Father     Diabetes Father     Heart disease Father     COPD Father     Heart failure Father     Hepatitis Father      Social History     Tobacco Use    Smoking status: Former     Current packs/day: 0.00     Average packs/day: 1 pack/day for 20.0 years (20.0 ttl pk-yrs)     Types: Cigarettes     Start date:      Quit date:      Years since quittin.6     Passive exposure: Never    Smokeless tobacco: Never    Tobacco comments:     QUIT IN 2017   Vaping Use    Vaping status: Never Used   Substance Use Topics    Alcohol use: No    Drug use: No     (Not in a hospital admission)    Allergies:  Dilantin [phenytoin], Keppra [levetiracetam], Meperidine, and Topamax [topiramate]    Review of Systems   Constitutional:  Negative for diaphoresis and fatigue.   HENT:  Negative for congestion and trouble swallowing.    Eyes:  Negative for photophobia and visual disturbance.   Respiratory:  Positive for shortness of breath. Negative for chest tightness.    Cardiovascular:  Positive for chest pain. Negative for palpitations.   Gastrointestinal:  Negative for diarrhea and vomiting.   Endocrine: Negative for polyphagia and polyuria.   Genitourinary:  Negative for dyspareunia and dysuria.   Skin:  Negative for rash and wound.   Allergic/Immunologic: Negative for food allergies and immunocompromised state.   Neurological:  Negative for dizziness and light-headedness.   Psychiatric/Behavioral:  Negative for confusion.        Objective       Objective      Vital Signs  Temp:  [98.2 °F (36.8 °C)] 98.2 °F (36.8 °C)  Heart Rate:  [] 75  Resp:  [22-26] 26  BP: (125-199)/() 125/70  Vital Signs (last 72 hrs)          0700  08/15 0659 08/15 0700  16 0659 16 0700  / 0659  0700   0908   Most Recent      Temp (°F)       98.2       98.2 (36.8)  2355    Heart  Rate     75 -  130       75 08/17 0615    Resp     22 -  26       26 08/16 2146    BP     125/70 -  199/135       125/70 08/17 0600    SpO2 (%)     94 -  99       99 08/17 0615    Flow (L/min)     3 -  4      2     2 08/17 0715          Body mass index is 55.5 kg/m².  Documented weights    08/16/24 2140   Weight: 87.1 kg (192 lb)            Intake/Output Summary (Last 24 hours) at 8/17/2024 0908  Last data filed at 8/16/2024 2300  Gross per 24 hour   Intake --   Output 1600 ml   Net -1600 ml     Physical Exam  Constitutional:       General: She is not in acute distress.     Appearance: Normal appearance. She is well-developed and normal weight.   HENT:      Head: Normocephalic and atraumatic.   Eyes:      General: Lids are normal.      Conjunctiva/sclera: Conjunctivae normal.      Pupils: Pupils are equal, round, and reactive to light.   Neck:      Vascular: No carotid bruit or JVD.   Cardiovascular:      Rate and Rhythm: Normal rate and regular rhythm.      Pulses: Normal pulses.      Heart sounds: Normal heart sounds, S1 normal and S2 normal. No murmur heard.  Pulmonary:      Effort: Pulmonary effort is normal. No respiratory distress.      Breath sounds: Normal breath sounds. No wheezing.   Abdominal:      General: Bowel sounds are normal. There is no distension.      Palpations: Abdomen is soft. There is no hepatomegaly or splenomegaly.      Tenderness: There is no abdominal tenderness.   Musculoskeletal:         General: No swelling. Normal range of motion.      Cervical back: Normal range of motion and neck supple.      Right lower leg: No edema.      Left lower leg: No edema.   Skin:     General: Skin is warm and dry.      Coloration: Skin is not jaundiced.      Findings: No rash.   Neurological:      General: No focal deficit present.      Mental Status: She is alert and oriented to person, place, and time. Mental status is at baseline.   Psychiatric:         Mood and Affect: Mood normal.         Speech:  Speech normal.         Behavior: Behavior normal.         Thought Content: Thought content normal.         Judgment: Judgment normal.             Results review     Results Review:    I reviewed the patient's new clinical results.  Results from last 7 days   Lab Units 08/17/24  0333 08/17/24  0043 08/16/24  2230   CK TOTAL U/L  --   --  64   HSTROP T ng/L 27* 30* 16*     Results from last 7 days   Lab Units 08/17/24  0327 08/16/24  2145   WBC 10*3/mm3 11.52* 9.88   HEMOGLOBIN g/dL 14.0 15.3   PLATELETS 10*3/mm3 172 196     Results from last 7 days   Lab Units 08/17/24  0333 08/16/24  2230   SODIUM mmol/L 141 140   POTASSIUM mmol/L 3.9 3.9   CHLORIDE mmol/L 105 103   CO2 mmol/L 23.6 21.6*   BUN mg/dL 17 13   CREATININE mg/dL 0.58 0.77   CALCIUM mg/dL 9.1 9.0   GLUCOSE mg/dL 115* 167*   ALT (SGPT) U/L 13 14   AST (SGOT) U/L 14 16     Lab Results   Component Value Date    INR 0.96 08/17/2024    INR 0.90 08/16/2024    INR 0.98 07/01/2023    INR 1.02 05/04/2018    INR 0.92 09/22/2015     Lab Results   Component Value Date    MG 1.8 08/16/2024    MG 2.0 07/01/2023     Lab Results   Component Value Date    TSH 0.568 08/16/2024    CHLPL 159 09/22/2015    TRIG 87 08/17/2024    HDL 53 08/17/2024     (H) 08/17/2024      Lab Results   Component Value Date    PROBNP 781.0 (H) 08/16/2024    PROBNP <36.0 05/22/2024    PROBNP 51.4 05/01/2024       ECG         ECG/EMG Results (last 24 hours)       Procedure Component Value Units Date/Time    ECG 12 Lead Dyspnea [[id number]] Collected: 08/16/24 2133     Updated: 08/16/24 2135     QT Interval 300 ms      QTC Interval 439 ms     Narrative:      Test Reason : Dyspnea  Blood Pressure :   */*   mmHG  Vent. Rate : 129 BPM     Atrial Rate : 129 BPM     P-R Int : 140 ms          QRS Dur :  82 ms      QT Int : 300 ms       P-R-T Axes :  54 100  41 degrees     QTc Int : 439 ms    Sinus tachycardia with premature supraventricular complexes and fusion complexes  Possible Left atrial  enlargement  Rightward axis  Borderline ECG  When compared with ECG of 14-JUN-2024 19:58,  fusion complexes are now present  premature supraventricular complexes are now present  Vent. rate has increased BY  51 BPM    Referred By: KHLOE           Confirmed By:     ECG 12 Lead Chest Pain [[id number]] Collected: 08/17/24 0130     Updated: 08/17/24 0132     QT Interval 368 ms      QTC Interval 493 ms     Narrative:      Test Reason : Chest Pain  Blood Pressure :   */*   mmHG  Vent. Rate : 108 BPM     Atrial Rate : 108 BPM     P-R Int : 152 ms          QRS Dur :  80 ms      QT Int : 368 ms       P-R-T Axes :  41  85  21 degrees     QTc Int : 493 ms    Sinus tachycardia  Possible Left atrial enlargement  Nonspecific T wave abnormality  Abnormal ECG  When compared with ECG of 16-AUG-2024 21:33, (Unconfirmed)  fusion complexes are no longer present  premature supraventricular complexes are no longer present  Nonspecific T wave abnormality now evident in Anterior leads    Referred By: KHLOE           Confirmed By:             Imaging Results (Last 72 Hours)       Procedure Component Value Units Date/Time    CT Angiogram Chest Pulmonary Embolism [[id number]] Collected: 08/17/24 0138     Updated: 08/17/24 0143    Narrative:      PROCEDURE: CT angiography of the chest performed with IV contrast on  August 17, 2024. The patient was administered 70 cc of Isovue-370  contrast IV without complication. The examination was performed with 2  mm axial imaging and sagittal and coronal reconstruction images. Total  . 3D surface display images were performed and projected in  multiple planes.     HISTORY: Shortness of breath. Difficulty with breathing.     COMPARISON: None.     FINDINGS:     Normal heart size  Trace volume of pericardial fluid.  Mild interstitial edema.  Minimal atelectasis at the left lung base.  No endobronchial lesion.  Trace volume of pericardial fluid.  Very small right pleural effusion.  No thoracic aortic  aneurysm or dissection.   No pulmonary embolus.  Small unenlarged prevascular mediastinal nodes  Small unenlarged paratracheal nodes.  No free fluid or free air.   No abscess or hematoma.       Impression:         1.  No thoracic aortic aneurysm or dissection.   2.  No pulmonary embolus  3.  Mild interstitial edema.  4.  Very small right pleural effusion.  5.  Normal heart size           This report was finalized on 8/17/2024 1:41 AM by Zoran Rubalcava MD.       XR Chest 1 View [[id number]] Collected: 08/16/24 2201     Updated: 08/16/24 2204    Narrative:      Procedure: Frontal view of chest obtained.     Comparison: None available     History: SOB     Findings:     Reticulation in the bilateral lungs.      No pneumonia or acute process seen in the chest.  Normal heart size and mediastinal contours.  Trachea is in midline position.  No edema or effusion is seen.  There is no evidence of pneumothorax.       Impression:         No evidence of acute disease in the chest.     Lung reticulation suspicious for a chronic process. CT can be  considered.      This report was finalized on 8/16/2024 10:02 PM by Marilynn Dahl MD.               I have discussed my impression and recommendations with the patient and family.    Thank you very much for asking us to be involved in this patient's care.  We will follow along with you.      Electronically signed by CICI Porter, 08/17/24, 9:08 AM EDT.     Please note that portions of this note were completed with a voice recognition program.    Patient seen and examined.  Chart reviewed.  Agree with nurse practitioners charting assessment and plan    Echocardiogram done today shows preserved ejection fraction.    Patient came in with accelerated hypertension.  Will start hydralazine and nitrate combination.  Continue labetalol.  Patient was on lisinopril as an outpatient and as needed Lasix.  Patient may not have taken labetalol prior to coming in as she was taking it as  needed only    Will continue to follow closely and clinically  .Electronically signed by Sohail Lujan MD, 08/17/24, 3:45 PM EDT.

## 2024-08-17 NOTE — PROGRESS NOTES
[facility]    Patient Identification:  Name:  Antoinette Pack  Age:  39 y.o.  Sex:  female  :  1985  MRN:  [id number]  Visit number:  [id number]  Primary Care Provider:  Shawna Lambert DO    Length of stay in inpatient status:  0    Brief Update     40 yo breastfeeding female that was admitted earlier today by Dr. Phipps for shortness of air and chest pain.  She was found to have hypertensive emergency causing suspected acute heart failure exacerbation and acute hypoxic respiratory failure.  When I saw her in ED room 111, Dr. Lujan was already in the room and evaluating the patient.  The patient currently denies any chest pain, trouble breathing, nausea, emesis, headache.  She was still on 2 L/min of oxygen.  On exam, her lungs are clear and she does not have leg edema.  She is alert, oriented, and can follow all commands.  Dr. Lujan would like to change her antihypertensive meds at home to scheduled hydralazine and Imdur, as the patient is still breast feeding.  Will monitor the blood pressures closely and will monitor her input and output closely.  She appears euvolemic at this time and does not need another dose of diuretic.  Will repeat the labs in the am.  Will move her to remote telemetry bed.  I await for the home medication reconciliation to be completed.    Last echocardiogram:  Results for orders placed during the hospital encounter of 24    Adult Transthoracic Echo Complete w/ Color, Spectral and Contrast if necessary per protocol    Interpretation Summary    Left ventricular systolic function is normal. Calculated left ventricular EF = 54% Left ventricular ejection fraction appears to be 51 - 55%.    Left ventricular wall thickness is consistent with mild concentric hypertrophy.    Left ventricular diastolic function was normal.    I have personally read the ECHO final report.     Diagnoses:  -Hypertensive emergency, present on  admission, causing an exacerbation of HFpEF and type II NSTEMI  -Elevated troponin levels, present on admission  -History of chronic diastolic CHF  -History of ulcerative colitis  -History of type II DM previously on insulin pump but no longer on insulin after significant weight loss  -Pre-eclampsia during recent pregnancy, gave birth in 4/2024  -History of GERD  -History of essential HTN  -History of narcolepsy  -History of PCOS  -History of seizures  -History of ALEJANDRO on CPAP  -Morbid obesity, BMI 55.5 kg/m2, which complicates all aspects of care    Judith Beal MD  [facility] Hospitalist  08/17/24  07:57 EDT

## 2024-08-18 LAB
ANION GAP SERPL CALCULATED.3IONS-SCNC: 10.8 MMOL/L (ref 5–15)
BUN SERPL-MCNC: 20 MG/DL (ref 6–20)
BUN/CREAT SERPL: 33.3 (ref 7–25)
CALCIUM SPEC-SCNC: 9.2 MG/DL (ref 8.6–10.5)
CHLORIDE SERPL-SCNC: 102 MMOL/L (ref 98–107)
CO2 SERPL-SCNC: 24.2 MMOL/L (ref 22–29)
CREAT SERPL-MCNC: 0.6 MG/DL (ref 0.57–1)
DEPRECATED RDW RBC AUTO: 40.5 FL (ref 37–54)
EGFRCR SERPLBLD CKD-EPI 2021: 115.8 ML/MIN/1.73
ERYTHROCYTE [DISTWIDTH] IN BLOOD BY AUTOMATED COUNT: 14 % (ref 12.3–15.4)
GEN 5 2HR TROPONIN T REFLEX: 20 NG/L
GLUCOSE BLDC GLUCOMTR-MCNC: 93 MG/DL (ref 70–130)
GLUCOSE SERPL-MCNC: 128 MG/DL (ref 65–99)
HCT VFR BLD AUTO: 38.6 % (ref 34–46.6)
HGB BLD-MCNC: 12.5 G/DL (ref 12–15.9)
MAGNESIUM SERPL-MCNC: 2 MG/DL (ref 1.6–2.6)
MCH RBC QN AUTO: 26.6 PG (ref 26.6–33)
MCHC RBC AUTO-ENTMCNC: 32.4 G/DL (ref 31.5–35.7)
MCV RBC AUTO: 82.1 FL (ref 79–97)
PHOSPHATE SERPL-MCNC: 4.8 MG/DL (ref 2.5–4.5)
PLATELET # BLD AUTO: 165 10*3/MM3 (ref 140–450)
PMV BLD AUTO: 11.5 FL (ref 6–12)
POTASSIUM SERPL-SCNC: 4 MMOL/L (ref 3.5–5.2)
QT INTERVAL: 384 MS
QTC INTERVAL: 485 MS
RBC # BLD AUTO: 4.7 10*6/MM3 (ref 3.77–5.28)
SODIUM SERPL-SCNC: 137 MMOL/L (ref 136–145)
TROPONIN T DELTA: 4 NG/L
TROPONIN T SERPL HS-MCNC: 16 NG/L
TROPONIN T SERPL HS-MCNC: 20 NG/L
WBC NRBC COR # BLD AUTO: 10.44 10*3/MM3 (ref 3.4–10.8)

## 2024-08-18 PROCEDURE — 99233 SBSQ HOSP IP/OBS HIGH 50: CPT | Performed by: INTERNAL MEDICINE

## 2024-08-18 PROCEDURE — 99232 SBSQ HOSP IP/OBS MODERATE 35: CPT | Performed by: NURSE PRACTITIONER

## 2024-08-18 PROCEDURE — 84484 ASSAY OF TROPONIN QUANT: CPT | Performed by: HOSPITALIST

## 2024-08-18 PROCEDURE — 82948 REAGENT STRIP/BLOOD GLUCOSE: CPT

## 2024-08-18 PROCEDURE — 93005 ELECTROCARDIOGRAM TRACING: CPT | Performed by: INTERNAL MEDICINE

## 2024-08-18 PROCEDURE — 94761 N-INVAS EAR/PLS OXIMETRY MLT: CPT

## 2024-08-18 PROCEDURE — 84484 ASSAY OF TROPONIN QUANT: CPT | Performed by: INTERNAL MEDICINE

## 2024-08-18 PROCEDURE — 93005 ELECTROCARDIOGRAM TRACING: CPT | Performed by: HOSPITALIST

## 2024-08-18 PROCEDURE — 36415 COLL VENOUS BLD VENIPUNCTURE: CPT

## 2024-08-18 PROCEDURE — 94799 UNLISTED PULMONARY SVC/PX: CPT

## 2024-08-18 RX ORDER — HYDRALAZINE HYDROCHLORIDE 10 MG/1
10 TABLET, FILM COATED ORAL EVERY 12 HOURS SCHEDULED
Status: DISCONTINUED | OUTPATIENT
Start: 2024-08-18 | End: 2024-08-19 | Stop reason: HOSPADM

## 2024-08-18 RX ORDER — METOPROLOL TARTRATE 25 MG/1
25 TABLET, FILM COATED ORAL EVERY 12 HOURS SCHEDULED
Status: DISCONTINUED | OUTPATIENT
Start: 2024-08-18 | End: 2024-08-19 | Stop reason: HOSPADM

## 2024-08-18 RX ORDER — METOPROLOL TARTRATE 25 MG/1
25 TABLET, FILM COATED ORAL EVERY 12 HOURS SCHEDULED
Status: DISCONTINUED | OUTPATIENT
Start: 2024-08-18 | End: 2024-08-18

## 2024-08-18 RX ORDER — FLUCONAZOLE 150 MG/1
150 TABLET ORAL EVERY 24 HOURS
Status: DISCONTINUED | OUTPATIENT
Start: 2024-08-18 | End: 2024-08-19 | Stop reason: HOSPADM

## 2024-08-18 RX ORDER — LABETALOL 200 MG/1
200 TABLET, FILM COATED ORAL EVERY 12 HOURS SCHEDULED
Status: DISCONTINUED | OUTPATIENT
Start: 2024-08-18 | End: 2024-08-18

## 2024-08-18 RX ADMIN — PRENATAL VITAMINS-IRON FUMARATE 27 MG IRON-FOLIC ACID 0.8 MG TABLET 1 TABLET: at 09:52

## 2024-08-18 RX ADMIN — ASPIRIN 81 MG 81 MG: 81 TABLET ORAL at 08:05

## 2024-08-18 RX ADMIN — Medication 10 ML: at 08:07

## 2024-08-18 RX ADMIN — HYDRALAZINE HYDROCHLORIDE 25 MG: 25 TABLET ORAL at 08:05

## 2024-08-18 RX ADMIN — PANTOPRAZOLE SODIUM 40 MG: 40 TABLET, DELAYED RELEASE ORAL at 16:39

## 2024-08-18 RX ADMIN — METOPROLOL TARTRATE 25 MG: 25 TABLET, FILM COATED ORAL at 13:40

## 2024-08-18 RX ADMIN — ISOSORBIDE MONONITRATE 30 MG: 30 TABLET, EXTENDED RELEASE ORAL at 08:05

## 2024-08-18 RX ADMIN — FLUCONAZOLE 150 MG: 150 TABLET ORAL at 14:13

## 2024-08-18 RX ADMIN — PANTOPRAZOLE SODIUM 40 MG: 40 TABLET, DELAYED RELEASE ORAL at 08:05

## 2024-08-18 RX ADMIN — HYDRALAZINE HYDROCHLORIDE 10 MG: 10 TABLET ORAL at 21:12

## 2024-08-18 RX ADMIN — Medication 10 ML: at 21:12

## 2024-08-18 RX ADMIN — BUDESONIDE 9 MG: 3 CAPSULE ORAL at 08:05

## 2024-08-18 NOTE — PROGRESS NOTES
"     LOS: 1 day     Name: Antoinette Pack  Age/Sex: 39 y.o. female  :  1985        PCP: Shawna Lambert DO  REF: No ref. provider found    Principal Problem:    Acute exacerbation of CHF (congestive heart failure)      Reason for follow-up: NSTEMI and CHF    Subjective     Subjective     Antoinette Pack is a 39-year-old female with a past medical history significant for chronic diastolic CHF, ulcerative colitis, diabetes mellitus type 2, GERD, essential hypertension, seizures and obstructive sleep apnea.     Interval History: Patient reports her breathing is better today.  Blood pressure well-controlled.  Reports intermittent palpitations.  Had good urine output with IV diuresis.    Vital Signs  Temp:  [97.5 °F (36.4 °C)-98.4 °F (36.9 °C)] 98.4 °F (36.9 °C)  Heart Rate:  [74-96] 79  Resp:  [17-20] 20  BP: ()/() 121/67  Vital Signs (last 72 hrs)         08/15 0700   0659  0700   0659  0700   0659  0700   0940   Most Recent      Temp (°F)     98.2    97.5 -  98.4       98.4 (36.9)  0600    Heart Rate   75 -  130    71 -  96      79     79 08/18 0805    Resp   22 -  26    17 -  20       20 08/18 0600    BP   125/70 -  199/135    99/58 -  169/139      121/67     121/67 18 0805    SpO2 (%)   94 -  99    92 -  100      96     96  0709    Flow (L/min)   3 -  4      2       2  0741          Documented weights    24 2140 24 1644 24 0500   Weight: 87.1 kg (192 lb) 85.7 kg (188 lb 14.4 oz) 86.6 kg (191 lb)      Body mass index is 38.58 kg/m².    Intake/Output Summary (Last 24 hours) at 2024 0940  Last data filed at 2024 1148  Gross per 24 hour   Intake 76.91 ml   Output --   Net 76.91 ml     Objective:  Vital signs: (most recent): Blood pressure 154/75, pulse 85, temperature 97.7 °F (36.5 °C), temperature source Oral, resp. rate 20, height 149.9 cm (59\"), weight 86.6 kg (191 lb), last menstrual period 07/15/2024, " SpO2 97%, currently breastfeeding.                Objective       Physical Exam:     General Appearance:    Alert, cooperative, in no acute distress   Head:    Normocephalic, without obvious abnormality, atraumatic   Eyes:            Conjunctivae and sclerae normal, no   icterus, no pallor, corneas clear.   Neck:   No adenopathy, supple, trachea midline, no thyromegaly, no   carotid bruit, no JVD   Lungs:     Clear to auscultation,respirations regular, even and                  unlabored    Heart:    Regular rhythm and normal rate, normal S1 and S2, no            murmur, no gallop, no rub, no click   Chest Wall:    No abnormalities observed   Abdomen:     Normal bowel sounds, no masses, no organomegaly, soft        nontender, nondistended, no guarding, no rebound                tenderness   Extremities:   Moves all extremities well, no edema, no cyanosis, no             redness   Pulses:   Pulses palpable and equal bilaterally   Skin:   No bleeding, bruising or rash       Neurologic:   Alert and oriented      Results review       Results Review:   Results from last 7 days   Lab Units 08/17/24  2355 08/17/24  0327 08/16/24  2145   WBC 10*3/mm3 10.44 11.52* 9.88   HEMOGLOBIN g/dL 12.5 14.0 15.3   PLATELETS 10*3/mm3 165 172 196     Results from last 7 days   Lab Units 08/17/24  2355 08/17/24  0333 08/16/24  2230   SODIUM mmol/L 137 141 140   POTASSIUM mmol/L 4.0 3.9 3.9   CHLORIDE mmol/L 102 105 103   CO2 mmol/L 24.2 23.6 21.6*   BUN mg/dL 20 17 13   CREATININE mg/dL 0.60 0.58 0.77   CALCIUM mg/dL 9.2 9.1 9.0   GLUCOSE mg/dL 128* 115* 167*   ALT (SGPT) U/L  --  13 14   AST (SGOT) U/L  --  14 16     Results from last 7 days   Lab Units 08/17/24  0333 08/17/24  0043 08/16/24  2230   CK TOTAL U/L  --   --  64   HSTROP T ng/L 27* 30* 16*     Lab Results   Component Value Date    INR 0.96 08/17/2024    INR 0.90 08/16/2024    INR 0.98 07/01/2023    INR 1.02 05/04/2018    INR 0.92 09/22/2015     Lab Results   Component Value  Date    MG 2.0 08/17/2024    MG 1.8 08/16/2024    MG 2.0 07/01/2023     Lab Results   Component Value Date    TSH 0.568 08/16/2024    CHLPL 159 09/22/2015    TRIG 87 08/17/2024    HDL 53 08/17/2024     (H) 08/17/2024      Imaging Results (Last 48 Hours)       Procedure Component Value Units Date/Time    CT Angiogram Chest Pulmonary Embolism [[id number]] Collected: 08/17/24 0138     Updated: 08/17/24 0143    Narrative:      PROCEDURE: CT angiography of the chest performed with IV contrast on  August 17, 2024. The patient was administered 70 cc of Isovue-370  contrast IV without complication. The examination was performed with 2  mm axial imaging and sagittal and coronal reconstruction images. Total  . 3D surface display images were performed and projected in  multiple planes.     HISTORY: Shortness of breath. Difficulty with breathing.     COMPARISON: None.     FINDINGS:     Normal heart size  Trace volume of pericardial fluid.  Mild interstitial edema.  Minimal atelectasis at the left lung base.  No endobronchial lesion.  Trace volume of pericardial fluid.  Very small right pleural effusion.  No thoracic aortic aneurysm or dissection.   No pulmonary embolus.  Small unenlarged prevascular mediastinal nodes  Small unenlarged paratracheal nodes.  No free fluid or free air.   No abscess or hematoma.       Impression:         1.  No thoracic aortic aneurysm or dissection.   2.  No pulmonary embolus  3.  Mild interstitial edema.  4.  Very small right pleural effusion.  5.  Normal heart size           This report was finalized on 8/17/2024 1:41 AM by Zoran Rubalcava MD.       XR Chest 1 View [[id number]] Collected: 08/16/24 2201     Updated: 08/16/24 2204    Narrative:      Procedure: Frontal view of chest obtained.     Comparison: None available     History: SOB     Findings:     Reticulation in the bilateral lungs.      No pneumonia or acute process seen in the chest.  Normal heart size and mediastinal  contours.  Trachea is in midline position.  No edema or effusion is seen.  There is no evidence of pneumothorax.       Impression:         No evidence of acute disease in the chest.     Lung reticulation suspicious for a chronic process. CT can be  considered.      This report was finalized on 8/16/2024 10:02 PM by Marilynn Dahl MD.             Lab Results   Component Value Date    BNP 11 09/21/2015              Echo   Results for orders placed during the hospital encounter of 08/16/24    Adult Transthoracic Echo Complete w/ Color, Spectral and Contrast if necessary per protocol    Interpretation Summary    Left ventricular systolic function is normal. Calculated left ventricular EF = 54% Left ventricular ejection fraction appears to be 51 - 55%.    Left ventricular wall thickness is consistent with mild concentric hypertrophy.    Left ventricular diastolic function was normal.       I reviewed the patient's new clinical results.    Telemetry: NSR 80's     Medication Review:   aspirin, 81 mg, Oral, Daily  Budesonide, 9 mg, Oral, Daily  hydrALAZINE, 25 mg, Oral, Q12H  isosorbide mononitrate, 30 mg, Oral, Q24H  mesalamine, 1,000 mg, Rectal, Nightly  pantoprazole, 40 mg, Oral, BID AC  prenatal vitamin 27-0.8, 1 tablet, Oral, Daily  sodium chloride, 10 mL, Intravenous, Q12H        Pharmacy to Dose Heparin,         Assessment      Assessment:  NSTEMI likely type II in the setting of hypertension causing demand ischemia  Hypertensive emergency on admission, resolved  HFpEF with flash pulmonary edema  Palpitations    Plan     Recommendations:  NSTEMI  Troponin trending downward.  Likely secondary to hypertensive emergency causing demand ischemia.  Nuclear stress test in September 2023 showed no ischemia.  No further workup at this time.    2.  Hypertension  3.  HFpEF  Had good urine output with IV diuresis.  Blood pressure normalized.  Patient is having intermittent palpitations secondary to PVCs.  Will stop Imdur and  add low-dose metoprolol 25 mg twice daily and decrease hydralazine to 10 mg twice daily.  Labetalol discontinued secondary to intermittent hypotension.  Echocardiogram showed normal LV function.  Patient to follow-up with her primary cardiologist tomorrow and we recommend outpatient event monitor to assess her palpitations.  Patient to be discharge from cardiac standpoint.    I discussed the patient's findings and my recommendations with patient and family    Electronically signed by CICI Porter, 08/18/24, 9:40 AM EDT.     Please note that portions of this note were completed with a voice recognition program.     Patient seen and examined.  Chart reviewed.  Agree with patient charting and recommendation and plan as per nurse practitioner.  Discussed care with patient also and discussed the medication changes that were recommended for her.  .Electronically signed by Sohail Lujan MD, 08/18/24, 3:00 PM EDT.

## 2024-08-18 NOTE — PLAN OF CARE
Goal Outcome Evaluation:                 Pt has been resting in bed through the night. Pt complained of nausea, PRN meds given per orders. No acute changes or concerns at this time.

## 2024-08-18 NOTE — PROGRESS NOTES
[facility]     Progress Note    Patient Name: Antoinette Pack  : 1985  MRN: [id number]  Primary Care Physician:  Shawna Lambert DO  Date of admission: 2024    Subjective   Subjective     Chief Complaint: 40 y/o female being seen in follow up for pulmonary edema    History of Present Illness  Patient Reports occasional palpitations today. Per my review of patient's bedside telemetry she is noted to have PVCs.    Review of Systems  Denies dyspnea, no chest pressure or pains. Compliant with CPAP.    Objective   Objective     Vitals:   Temp:  [97.7 °F (36.5 °C)-98.4 °F (36.9 °C)] 97.9 °F (36.6 °C)  Heart Rate:  [70-96] 70  Resp:  [18-20] 18  BP: ()/(58-82) 141/82    Physical Exam  Constitutional:       General: She is not in acute distress.     Appearance: She is well-developed. She is obese.   HENT:      Head: Normocephalic and atraumatic.   Eyes:      Conjunctiva/sclera: Conjunctivae normal.   Neck:      Thyroid: No thyromegaly.      Trachea: No tracheal deviation.   Cardiovascular:      Rate and Rhythm: Normal rate and regular rhythm. Frequent Extrasystoles are present.     Pulses:           Dorsalis pedis pulses are 2+ on the right side and 2+ on the left side.      Heart sounds: No murmur heard.     No friction rub. No gallop.   Pulmonary:      Effort: No respiratory distress.      Breath sounds: Normal breath sounds. No wheezing or rales.   Abdominal:      General: Bowel sounds are normal. There is no distension.      Palpations: Abdomen is soft.      Tenderness: There is no abdominal tenderness. There is no guarding.   Musculoskeletal:      Cervical back: Neck supple.      Right lower leg: No edema.      Left lower leg: No edema.   Skin:     General: Skin is warm and dry.      Findings: No erythema or rash.   Neurological:      Mental Status: She is alert and oriented to person, place, and time.      Cranial Nerves: No cranial nerve deficit.          Result Review    Result  Review:  I have personally reviewed the results from the time of this admission to 8/18/2024 17:42 EDT and agree with these findings:  [x]  Laboratory list / accordion  []  Microbiology  []  Radiology  []  EKG/Telemetry   []  Cardiology/Vascular   []  Pathology  []  Old records  []  Other:  Most notable findings include:     Results from last 7 days   Lab Units 08/17/24  2355 08/17/24  0327 08/16/24  2145   WBC 10*3/mm3 10.44 11.52* 9.88   HEMOGLOBIN g/dL 12.5 14.0 15.3   HEMATOCRIT % 38.6 42.9 47.7*   PLATELETS 10*3/mm3 165 172 196     Results from last 7 days   Lab Units 08/17/24  2355 08/17/24  0333 08/16/24  2230   SODIUM mmol/L 137 141 140   POTASSIUM mmol/L 4.0 3.9 3.9   CHLORIDE mmol/L 102 105 103   CO2 mmol/L 24.2 23.6 21.6*   BUN mg/dL 20 17 13   CREATININE mg/dL 0.60 0.58 0.77   CALCIUM mg/dL 9.2 9.1 9.0   BILIRUBIN mg/dL  --  0.6 0.7   ALK PHOS U/L  --  94 101   ALT (SGPT) U/L  --  13 14   AST (SGOT) U/L  --  14 16   GLUCOSE mg/dL 128* 115* 167*     Echocardiogram:  Interpretation Summary       Left ventricular systolic function is normal. Calculated left ventricular EF = 54% Left ventricular ejection fraction appears to be 51 - 55%.    Left ventricular wall thickness is consistent with mild concentric hypertrophy.    Left ventricular diastolic function was normal.    Assessment & Plan   Assessment / Plan     #Hypertensive emergency  #Flash pulmonary edema due to above  #Acute on chronic HFpEF, symptomatically improved/resolved  #NSTEMI, suspect type II due to #1  -Case has been extensively discussed with the patient at bedside and with cardiology APRN  -Treatment is complicated as patient is currently breast-feeding  -Patient with intermittent hypotension with labetalol and given frequent PVCs noted on telemetry, I have started the patient on low-dose metoprolol to tartrate  -Continue with low-dose hydralazine; Imdur has been discontinued per cardiology  -I have discussed with patient and encouraged her  to continue to hold the lisinopril as we have been doing during this hospital stay and given its recommendations regarding lactation  -Will repeat the patient's chemistry panel in a.m. and I have also added a magnesium level; plan to supplement the patient's potassium or magnesium if found to be deficient  -The patient is at a negative fluid balance; will hold on any further diuretics for now  -Cardiology recommending an event monitor per patient's primary cardiologist; hopefully will plan for discharge in a.m. and patient has a cardiology follow-up appointment in [place] tomorrow afternoon    Chronic:  -ALEJANDRO, compliant with CPAP  -Type 2 diabetes mellitus that is currently controlled  -Obesity per BMI 38.58 kg/m²  -Ulcerative colitis    VTE Prophylaxis:  SCDs    CODE STATUS:    Code Status (Patient has no pulse and is not breathing): CPR (Attempt to Resuscitate)  Medical Interventions (Patient has pulse or is breathing): Full Support    Disposition:  I expect patient to be discharged home in 24 hours if remains stable from a cardiac standpoint.    Marlyn Tomasz Sevilla, DO

## 2024-08-18 NOTE — PLAN OF CARE
Goal Outcome Evaluation:      Patient is sitting in chair at this time. VSS on RA. Pt complained of heaviness in chest this shift, Dr. Sevilla is aware, see orders. Pt has ambulated room independently this shift. No other complaints per pt at this time. Will continue POC.

## 2024-08-19 ENCOUNTER — READMISSION MANAGEMENT (OUTPATIENT)
Dept: CALL CENTER | Facility: HOSPITAL | Age: 39
End: 2024-08-19
Payer: MEDICARE

## 2024-08-19 ENCOUNTER — OFFICE VISIT (OUTPATIENT)
Dept: CARDIOLOGY | Facility: CLINIC | Age: 39
End: 2024-08-19
Payer: MEDICARE

## 2024-08-19 VITALS
OXYGEN SATURATION: 95 % | HEIGHT: 59 IN | TEMPERATURE: 98.1 F | SYSTOLIC BLOOD PRESSURE: 143 MMHG | DIASTOLIC BLOOD PRESSURE: 76 MMHG | HEART RATE: 80 BPM | WEIGHT: 188.05 LBS | RESPIRATION RATE: 18 BRPM | BODY MASS INDEX: 37.91 KG/M2

## 2024-08-19 VITALS
DIASTOLIC BLOOD PRESSURE: 100 MMHG | BODY MASS INDEX: 37.29 KG/M2 | WEIGHT: 185 LBS | HEIGHT: 59 IN | OXYGEN SATURATION: 97 % | HEART RATE: 77 BPM | SYSTOLIC BLOOD PRESSURE: 160 MMHG

## 2024-08-19 DIAGNOSIS — I50.32 CHRONIC HEART FAILURE WITH PRESERVED EJECTION FRACTION (HFPEF): Primary | ICD-10-CM

## 2024-08-19 DIAGNOSIS — I10 HYPERTENSION, UNSPECIFIED TYPE: ICD-10-CM

## 2024-08-19 DIAGNOSIS — I49.3 PVC (PREMATURE VENTRICULAR CONTRACTION): ICD-10-CM

## 2024-08-19 PROBLEM — I50.9 ACUTE EXACERBATION OF CHF (CONGESTIVE HEART FAILURE): Status: RESOLVED | Noted: 2024-08-17 | Resolved: 2024-08-19

## 2024-08-19 LAB
ANION GAP SERPL CALCULATED.3IONS-SCNC: 9.7 MMOL/L (ref 5–15)
BUN SERPL-MCNC: 16 MG/DL (ref 6–20)
BUN/CREAT SERPL: 27.1 (ref 7–25)
CALCIUM SPEC-SCNC: 8.9 MG/DL (ref 8.6–10.5)
CHLORIDE SERPL-SCNC: 99 MMOL/L (ref 98–107)
CO2 SERPL-SCNC: 23.3 MMOL/L (ref 22–29)
CREAT SERPL-MCNC: 0.59 MG/DL (ref 0.57–1)
EGFRCR SERPLBLD CKD-EPI 2021: 116.3 ML/MIN/1.73
GLUCOSE SERPL-MCNC: 100 MG/DL (ref 65–99)
MAGNESIUM SERPL-MCNC: 1.9 MG/DL (ref 1.6–2.6)
POTASSIUM SERPL-SCNC: 4 MMOL/L (ref 3.5–5.2)
QT INTERVAL: 408 MS
QTC INTERVAL: 461 MS
SODIUM SERPL-SCNC: 132 MMOL/L (ref 136–145)

## 2024-08-19 PROCEDURE — 94799 UNLISTED PULMONARY SVC/PX: CPT

## 2024-08-19 PROCEDURE — 83735 ASSAY OF MAGNESIUM: CPT | Performed by: INTERNAL MEDICINE

## 2024-08-19 PROCEDURE — 99239 HOSP IP/OBS DSCHRG MGMT >30: CPT | Performed by: INTERNAL MEDICINE

## 2024-08-19 PROCEDURE — 80048 BASIC METABOLIC PNL TOTAL CA: CPT | Performed by: INTERNAL MEDICINE

## 2024-08-19 RX ORDER — HYDRALAZINE HYDROCHLORIDE 10 MG/1
10 TABLET, FILM COATED ORAL EVERY 12 HOURS SCHEDULED
Qty: 60 TABLET | Refills: 0 | Status: SHIPPED | OUTPATIENT
Start: 2024-08-19 | End: 2024-08-24 | Stop reason: HOSPADM

## 2024-08-19 RX ORDER — FLUCONAZOLE 150 MG/1
150 TABLET ORAL EVERY 24 HOURS
Qty: 2 TABLET | Refills: 0 | Status: ON HOLD | OUTPATIENT
Start: 2024-08-19 | End: 2024-08-23

## 2024-08-19 RX ORDER — LABETALOL 200 MG/1
200 TABLET, FILM COATED ORAL 2 TIMES DAILY
Qty: 180 TABLET | Refills: 3 | Status: SHIPPED | OUTPATIENT
Start: 2024-08-19 | End: 2024-08-24 | Stop reason: HOSPADM

## 2024-08-19 RX ORDER — LABETALOL 200 MG/1
100 TABLET, FILM COATED ORAL 2 TIMES DAILY
Qty: 30 TABLET | Refills: 1 | Status: SHIPPED | OUTPATIENT
Start: 2024-08-19 | End: 2024-08-26 | Stop reason: DRUGHIGH

## 2024-08-19 RX ORDER — AMLODIPINE BESYLATE 10 MG/1
10 TABLET ORAL DAILY
Qty: 90 TABLET | Refills: 3 | Status: SHIPPED | OUTPATIENT
Start: 2024-08-19 | End: 2024-08-26

## 2024-08-19 RX ADMIN — Medication 10 ML: at 08:11

## 2024-08-19 RX ADMIN — METOPROLOL TARTRATE 25 MG: 25 TABLET, FILM COATED ORAL at 08:10

## 2024-08-19 RX ADMIN — ASPIRIN 81 MG 81 MG: 81 TABLET ORAL at 08:08

## 2024-08-19 RX ADMIN — BUDESONIDE 9 MG: 3 CAPSULE ORAL at 08:09

## 2024-08-19 RX ADMIN — PANTOPRAZOLE SODIUM 40 MG: 40 TABLET, DELAYED RELEASE ORAL at 08:11

## 2024-08-19 RX ADMIN — ACETAMINOPHEN 650 MG: 325 TABLET ORAL at 05:46

## 2024-08-19 RX ADMIN — PRENATAL VITAMINS-IRON FUMARATE 27 MG IRON-FOLIC ACID 0.8 MG TABLET 1 TABLET: at 08:11

## 2024-08-19 RX ADMIN — HYDRALAZINE HYDROCHLORIDE 10 MG: 10 TABLET ORAL at 08:08

## 2024-08-19 NOTE — PAYOR COMM NOTE
"[facility]  SERV[place]O CAM  PHONE  [phone]  FAX  [phone]  NPI:  [id number]    PENDING REF#[id number]    Antoinette Pack (39 y.o. Female)       Date of Birth   1985    Social Security Number       Address   [address] [facility] [address]    Home Phone   [phone]    MRN   [id number]       Religious   None    Marital Status                               Admission Date   8/16/24    Admission Type   Emergency    Admitting Provider   Judith Beal MD    Attending Provider       Department, Room/Bed   [address], 3328/       Discharge Date   8/19/2024    Discharge Disposition   Home or Self Care    Discharge Destination                                 Attending Provider: (none)   Allergies: Milk-related Compounds, Dairycare [Lactase-lactobacillus], Dilantin [Phenytoin], Keppra [Levetiracetam], Meperidine, Topamax [Topiramate]    Isolation: None   Infection: None   Code Status: CPR    Ht: 149.9 cm (59\")   Wt: 85.3 kg (188 lb 0.8 oz)    Admission Cmt: None   Principal Problem: Acute exacerbation of CHF (congestive heart failure) [I50.9]                   Active Insurance as of 8/16/2024       Primary Coverage       Payor Plan Insurance Group Employer/Plan Group    ANTHEM MEDICARE REPLACEMENT ANTHEM MEDICARE ADVANTAGE [id number]       Payor Plan Address Payor Plan Phone Number Payor Plan Fax Number Effective Dates    [address] [phone]  3/1/2024 - None Entered    [facility] [address]         Subscriber Name Subscriber Birth Date Member ID       ANTOINETTE PACK 1985 [id number]                     Emergency Contacts        (Rel.) Home Phone Work Phone Mobile Phone    Jonel Iverson (Father) [phone] -- --    PackMichelle moralez (Spouse) -- -- [phone]                 History & Physical        Sagar Phipps MD at 08/17/24 0307          Hospitalist History and Physical        Patient " Identification  Name: Antoinette Pack  Age/Sex: 39 y.o. female  :  1985        MRN: [id number]  Visit Number: [id number]  Admit Date: 2024   PCP: Shawna Lambert DO          Chief complaint short of breath, chest pain    History of Present Illness:  Patient is a 39 y.o. female with history of chronic diastolic CHF, ulcerative colitis, type II DM previously on insulin pump but no longer on insulin after significant weight loss, GERD, HTN, kidney stone, narcolepsy, pancreatitis, PCOS, seizures, and ALEJANDRO on CPAP, who presents with complaints of acute onset shortness of breath and chest pressure this evening while breastfeeding her baby. Patient gave birth to her baby in April of this year. Pregnancy was complicated by pre-eclampsia. She was previously on scheduled labetalol and lisinopril but now is only on lisinopril with labetalol for PRN use only, along with PRN lasix. She reports over the last few days her BP has been starting to creep up and she has had to use the labetalol for the first time in several months. She has not used any lasix, however, because she has not developed any lower extremity edema. This evening when her above mentioned symptoms started, her  brought her to the ED where her O2 sat was reportedly in the 70s on RA. She was placed on 3L NC in triage and BP has been better since. BP was 199/135 upon arrival and HR was in the 130s. ABG on 3L NC showed adequate oxygenation. Troponin was 16 with repeat trending up to 30 and BNP was elevated at 781. CRP was negative. D-dimer was elevated at 0.90. WBC was normal. UDS and ethanol levels were negative. CT PE protocol was negative for PE but showed mild interstitial edema and a small right pleural effusion. EKG showed sinus tachycardia but was not felt to show evidence of acute ischemia. Patient received 325mg ASA, 80mg IV lasix, 20mg IV hydralazine, and 5mg IV metoprolol. BP improved to as low as 150s systolic but has been  "trending back up and was in the 170s/130s when I was in the room with her. HR has improved to the 90s-100s mostly.     Review of Systems  Review of Systems   Constitutional:  Negative for activity change, appetite change, chills, diaphoresis, fatigue, fever and unexpected weight change.   HENT:  Negative for congestion, postnasal drip, rhinorrhea, sinus pressure, sinus pain and sore throat.    Eyes:  Negative for photophobia and visual disturbance.   Respiratory:  Positive for shortness of breath. Negative for cough and wheezing.    Cardiovascular:  Positive for chest pain and palpitations. Negative for leg swelling.   Gastrointestinal:  Negative for abdominal distention, abdominal pain, constipation, diarrhea, nausea and vomiting.   Endocrine: Negative for polyphagia and polyuria.   Genitourinary:  Negative for difficulty urinating, dysuria, flank pain, frequency and hematuria.   Musculoskeletal:  Negative for arthralgias, back pain, joint swelling, myalgias, neck pain and neck stiffness.   Skin:  Negative for color change, pallor, rash and wound.   Neurological:  Positive for headaches. Negative for dizziness, tremors, seizures, syncope, weakness, light-headedness and numbness.   Hematological:  Negative for adenopathy. Does not bruise/bleed easily.   Psychiatric/Behavioral:  Negative for agitation, behavioral problems and confusion.        History  Past Medical History:   Diagnosis Date    Anxiety     CHF (congestive heart failure) 05/2023    Colitis 2023    Depression     Diabetes mellitus 2018    type 2    Diverticulitis     GERD (gastroesophageal reflux disease)     History of transfusion 04/19/2024    2 units post delivery    Hypertension     chronic    Kidney stone     \"years ago\"    Narcolepsy 2022    Pancreatitis 09/2023    PCOS (polycystic ovarian syndrome)     Rectal bleeding     SINCE 2023 - USES MESALAMINE SUPPOSITORY NIGHTLY    Seizures 2012    Sleep apnea      Past Surgical History:   Procedure " Laterality Date    CARDIAC CATHETERIZATION      CARPAL TUNNEL RELEASE       SECTION N/A 2024    Procedure:  SECTION PRIMARY;  Surgeon: Axel Keys MD;  Location: [facility] LABOR DELIVERY;  Service: Obstetrics/Gynecology;  Laterality: N/A;    COLONOSCOPY      COLONOSCOPY N/A 2024    Procedure: COLONOSCOPY;  Surgeon: Tom Mueller MD;  Location: [facility] ENDOSCOPY;  Service: Gastroenterology;  Laterality: N/A;    ENDOSCOPY      ENDOSCOPY N/A 2024    Procedure: ESOPHAGOGASTRODUODENOSCOPY;  Surgeon: Tom Mueller MD;  Location: [facility] ENDOSCOPY;  Service: Gastroenterology;  Laterality: N/A;    FRACTURE SURGERY Left     wrist    HARDWARE REMOVAL Left     WRIST    TENDON REPAIR Left     HAND    WISDOM TOOTH EXTRACTION       Family History   Problem Relation Age of Onset    Hypertension Mother     Depression Mother     Heart disease Mother     COPD Mother     Emphysema Mother     Hypertension Father     Diabetes Father     Heart disease Father     COPD Father     Heart failure Father     Hepatitis Father      Social History     Tobacco Use    Smoking status: Former     Current packs/day: 0.00     Average packs/day: 1 pack/day for 20.0 years (20.0 ttl pk-yrs)     Types: Cigarettes     Start date:      Quit date:      Years since quittin.6     Passive exposure: Never    Smokeless tobacco: Never    Tobacco comments:     QUIT IN 2017   Vaping Use    Vaping status: Never Used   Substance Use Topics    Alcohol use: No    Drug use: No     (Not in a hospital admission)    Allergies:  Dilantin [phenytoin], Keppra [levetiracetam], Meperidine, and Topamax [topiramate]    Objective    Vital Signs  Temp:  [98.2 °F (36.8 °C)] 98.2 °F (36.8 °C)  Heart Rate:  [] 99  Resp:  [22-26] 26  BP: (156-199)/() 194/123  Body mass index is 55.5 kg/m².    Physical Exam:  Physical Exam  Constitutional:       General: She is not in acute distress.     Appearance: She is obese. She is  ill-appearing.   HENT:      Head: Normocephalic and atraumatic.      Right Ear: External ear normal.      Left Ear: External ear normal.      Nose: Nose normal.      Mouth/Throat:      Mouth: Mucous membranes are moist.      Pharynx: Oropharynx is clear.   Eyes:      Extraocular Movements: Extraocular movements intact.      Conjunctiva/sclera: Conjunctivae normal.      Pupils: Pupils are equal, round, and reactive to light.   Cardiovascular:      Rate and Rhythm: Regular rhythm. Tachycardia present.      Pulses: Normal pulses.      Heart sounds: Normal heart sounds. No murmur heard.  Pulmonary:      Effort: Pulmonary effort is normal. No respiratory distress.      Breath sounds: No wheezing or rales.      Comments: Crackles appreciated right lower lung zone  Abdominal:      General: Abdomen is flat. Bowel sounds are normal.      Palpations: Abdomen is soft.   Musculoskeletal:         General: Normal range of motion.      Cervical back: Normal range of motion and neck supple. No tenderness.      Right lower leg: No edema.      Left lower leg: No edema.   Lymphadenopathy:      Cervical: No cervical adenopathy.   Skin:     General: Skin is warm and dry.      Capillary Refill: Capillary refill takes less than 2 seconds.      Coloration: Skin is not jaundiced.      Findings: No bruising or lesion.   Neurological:      General: No focal deficit present.      Mental Status: She is alert and oriented to person, place, and time.   Psychiatric:         Mood and Affect: Mood normal.         Behavior: Behavior normal.         Thought Content: Thought content normal.           Results Review:       Lab Results:  Results from last 7 days   Lab Units 08/16/24  2145   WBC 10*3/mm3 9.88   HEMOGLOBIN g/dL 15.3   PLATELETS 10*3/mm3 196     Results from last 7 days   Lab Units 08/16/24  2145   CRP mg/dL <0.30     Results from last 7 days   Lab Units 08/16/24  2230   SODIUM mmol/L 140   POTASSIUM mmol/L 3.9   CHLORIDE mmol/L 103   CO2  mmol/L 21.6*   BUN mg/dL 13   CREATININE mg/dL 0.77   CALCIUM mg/dL 9.0   GLUCOSE mg/dL 167*     Results from last 7 days   Lab Units 08/16/24  2230   MAGNESIUM mg/dL 1.8     Hemoglobin A1C   Date Value Ref Range Status   08/16/2024 6.60 (H) 4.80 - 5.60 % Final     Results from last 7 days   Lab Units 08/16/24  2230   BILIRUBIN mg/dL 0.7   ALK PHOS U/L 101   AST (SGOT) U/L 16   ALT (SGPT) U/L 14     Results from last 7 days   Lab Units 08/17/24  0043 08/16/24  2230   CK TOTAL U/L  --  64   HSTROP T ng/L 30* 16*         Results from last 7 days   Lab Units 08/16/24  2145   INR  0.90     Results from last 7 days   Lab Units 08/16/24  2141   PH, ARTERIAL pH units 7.374   PO2 ART mm Hg 80.1*   PCO2, ARTERIAL mm Hg 42.8   HCO3 ART mmol/L 24.9       I have reviewed the patient's laboratory results.    Imaging:  Imaging Results (Last 72 Hours)       Procedure Component Value Units Date/Time    CT Angiogram Chest Pulmonary Embolism [[id number]] Collected: 08/17/24 0138     Updated: 08/17/24 0143    Narrative:      PROCEDURE: CT angiography of the chest performed with IV contrast on  August 17, 2024. The patient was administered 70 cc of Isovue-370  contrast IV without complication. The examination was performed with 2  mm axial imaging and sagittal and coronal reconstruction images. Total  . 3D surface display images were performed and projected in  multiple planes.     HISTORY: Shortness of breath. Difficulty with breathing.     COMPARISON: None.     FINDINGS:     Normal heart size  Trace volume of pericardial fluid.  Mild interstitial edema.  Minimal atelectasis at the left lung base.  No endobronchial lesion.  Trace volume of pericardial fluid.  Very small right pleural effusion.  No thoracic aortic aneurysm or dissection.   No pulmonary embolus.  Small unenlarged prevascular mediastinal nodes  Small unenlarged paratracheal nodes.  No free fluid or free air.   No abscess or hematoma.       Impression:         1.   No thoracic aortic aneurysm or dissection.   2.  No pulmonary embolus  3.  Mild interstitial edema.  4.  Very small right pleural effusion.  5.  Normal heart size           This report was finalized on 8/17/2024 1:41 AM by Zoran Rubalcava MD.       XR Chest 1 View [[id number]] Collected: 08/16/24 2201     Updated: 08/16/24 2204    Narrative:      Procedure: Frontal view of chest obtained.     Comparison: None available     History: SOB     Findings:     Reticulation in the bilateral lungs.      No pneumonia or acute process seen in the chest.  Normal heart size and mediastinal contours.  Trachea is in midline position.  No edema or effusion is seen.  There is no evidence of pneumothorax.       Impression:         No evidence of acute disease in the chest.     Lung reticulation suspicious for a chronic process. CT can be  considered.      This report was finalized on 8/16/2024 10:02 PM by Marilynn Dahl MD.               I have personally reviewed the patient's radiologic imaging.    EKG:   Sinus tachycardia with premature supraventricular complexes and fusion complexes, , QTc 439  Possible Left atrial enlargement  Rightward axis  Borderline ECG  When compared with ECG of 14-JUN-2024 19:58,  fusion complexes are now present  premature supraventricular complexes are now present  Vent. rate has increased BY  51 BPM    I have personally reviewed the patient's EKG. No overt ST changes appreciated.         Assessment & Plan    - Acute onset chest pain, dyspnea, accelerated HTN and tachycardia with troponin and BNP elevation: differential diagnosis includes hypertensive emergency causing troponin elevation/nstemi type II and acute on chronic diastolic CHF with flash pulmonary edema, or NSTEMI type 1 causing rise in BP and flash pulmonary edema. Also on differential is postpartum cardiomyopathy which can occur up 5 months after delivery (patient still in that window at 4 months). Diuresed 1700cc off with IV lasix.  Patient reports breathing better now. Chest pain has eased off as well. BP still significantly elevated after receiving IV hydralazine followed by IV metoprolol; will start oral labetalol scheduled BID now and have IV hydralazine and labetalol available PRN. Full dose ASA given in the ED; continue baby ASA in the morning. Keep NPO except sips with meds since already after midnight. Start IV heparin infusion for NSTEMI. Repeat troponin now. Obtain updated ECHO in the morning. Consult cardiology for further recommendations.   - Acute hypoxic respiratory failure, secondary to acute on chronic diastolic CHF: see above. Titrate FiO2 down as patient tolerates.   - Type II DM: previously insulin dependent but no longer after losing weight. Still wears dexcom. Monitor accuchecks, low dose SSI available PRN.    - Hypertension, uncontrolled: Plan to continue home lisinopril once reconciled by pharmacy. Adding scheduled labetalol as noted above.   - Morbid obesity, BMI 55.     DVT Prophylaxis: anticoagulated on IV heparin infusion    Estimated Length of Stay >2 midnights    I discussed the patient's findings, assessment and plan with the patient, her  at bedside, and ED provider Dr Muller    Patient is high risk due to NSTEMI type 1 vs 2, accelerated HTN, flash pulmonary edema/acute on chronic diastolic CHF, acute hypoxic respiratory failure    Sagar Phipps MD  08/17/24  03:07 EDT      Electronically signed by Sagar Phipps MD at 08/17/24 0327          Emergency Department Notes        Ray Ramos at 08/16/24 2137          EKG completed at 21:33, given to Dr. Muller at 21:36.    Electronically signed by Ray Ramos at 08/16/24 4721       Current Facility-Administered Medications   Medication Dose Route Frequency Provider Last Rate Last Admin    acetaminophen (TYLENOL) tablet 650 mg  650 mg Oral Q6H PRN Judith Beal MD   650 mg at 08/19/24 0565    albuterol (PROVENTIL) nebulizer  solution 0.083% 2.5 mg/3mL  2.5 mg Nebulization Q6H PRN Judith Beal MD        aspirin chewable tablet 81 mg  81 mg Oral Daily Judith Beal MD   81 mg at 08/19/24 0808    sennosides-docusate (PERICOLACE) 8.6-50 MG per tablet 2 tablet  2 tablet Oral BID PRN Judith Beal MD        And    polyethylene glycol (MIRALAX) packet 17 g  17 g Oral Daily PRN Judith Beal MD        And    bisacodyl (DULCOLAX) EC tablet 5 mg  5 mg Oral Daily PRN Judith Beal MD        And    bisacodyl (DULCOLAX) suppository 10 mg  10 mg Rectal Daily PRN Judith Beal MD        Budesonide (ENTOCORT EC) 24 hr capsule 9 mg  9 mg Oral Daily Judith Beal MD   9 mg at 08/19/24 0809    dextrose (D50W) (25 g/50 mL) IV injection 25 g  25 g Intravenous Q15 Min PRN Judith Beal MD        dextrose (GLUTOSE) oral gel 15 g  15 g Oral Q15 Min PRN Judith Beal MD        fluconazole (DIFLUCAN) tablet 150 mg  150 mg Oral Q24H Marlyn Sevilla, DO   150 mg at 08/18/24 1413    glucagon HCl (Diagnostic) injection 1 mg  1 mg Intramuscular Q15 Min PRN Judith Beal MD        hydrALAZINE (APRESOLINE) injection 10 mg  10 mg Intravenous Q6H PRN Judith Beal MD        hydrALAZINE (APRESOLINE) tablet 10 mg  10 mg Oral Q12H Mignon Tovar APRN   10 mg at 08/19/24 0808    labetalol (NORMODYNE,TRANDATE) injection 10 mg  10 mg Intravenous Q6H PRN Judith Beal MD        mesalamine (CANASA) suppository 1,000 mg  1,000 mg Rectal Nightly Judith Beal MD   1,000 mg at 08/17/24 2110    metoprolol tartrate (LOPRESSOR) tablet 25 mg  25 mg Oral Q12H Marlyn Sevilla DO   25 mg at 08/19/24 0810    nitroglycerin (NITROSTAT) SL tablet 0.4 mg  0.4 mg Sublingual Q5 Min PRN Judith Beal MD        ondansetron ODT (ZOFRAN-ODT) disintegrating tablet 4 mg  4 mg Translingual Q6H PRN Nell Cancino PA-C   4 mg at 08/17/24 2134    pantoprazole (PROTONIX) EC tablet 40 mg  40 mg Oral BID AC Emigdio  "Judith ZAMBRANO MD   40 mg at 08/19/24 0811    prenatal vitamin 27-0.8 tablet 1 tablet  1 tablet Oral Daily Judith Beal MD   1 tablet at 08/19/24 0811    sodium chloride 0.9 % flush 10 mL  10 mL Intravenous PRN Judith Beal MD        sodium chloride 0.9 % flush 10 mL  10 mL Intravenous Q12H Judith Beal MD   10 mL at 08/19/24 0811    sodium chloride 0.9 % flush 10 mL  10 mL Intravenous PRN Judith Beal MD        sodium chloride 0.9 % infusion 40 mL  40 mL Intravenous PRN Judith Beal MD         Current Outpatient Medications   Medication Sig Dispense Refill    albuterol sulfate  (90 Base) MCG/ACT inhaler Inhale 2 puffs Every 4 (Four) Hours As Needed for Wheezing.      Budesonide (ENTOCORT EC) 3 MG 24 hr capsule Take 3 capsules by mouth Daily. 90 capsule 1    fluconazole (DIFLUCAN) 150 MG tablet Take 1 tablet by mouth Daily for 2 doses. Indications: Vagina and Vulva Infection due to Candida Species Fungus 2 tablet 0    furosemide (LASIX) 40 MG tablet Take 1 tablet by mouth Daily As Needed (for fluid retention). 90 tablet 0    hydrALAZINE (APRESOLINE) 10 MG tablet Take 1 tablet by mouth Every 12 (Twelve) Hours. 60 tablet 0    labetalol (NORMODYNE) 200 MG tablet Take ½ tablet by mouth 2 (Two) Times a Day. 30 tablet 1    mesalamine (ROWASA) 4 g enema Insert 1 enema into the rectum Every Night. 28 enema 0    pantoprazole (PROTONIX) 40 MG EC tablet Take 1 tablet by mouth 2 (Two) Times a Day Before Meals. 120 tablet 0    Prenatal Vit-Fe Fumarate-FA (PRENATAL PO) Take 1 tablet by mouth Daily.      Continuous Glucose Transmitter (Dexcom G6 Transmitter) misc Use 1 each Every 3 (Three) Months. 1 each 1    Insulin Disposable Pump (Omnipod 5 G6 Pods, Gen 5,) misc Use 1 each Day. 30 each 5    Insulin Pen Needle 32G X 4 MM misc Use to inject insulin up to 4 times daily as directed 400 each 1    Insulin Syringe 31G X 5/16\" 1 ML misc Use 1 each 3 (Three) Times a Day With Meals. As needed for " additional insulin bolus with meals. 100 each 3     Orders (last 24 hrs)        Start     Ordered    08/19/24 1023  Discontinue IV  Once         08/19/24 1025    08/19/24 1021  Discharge patient  Once         08/19/24 1025    08/19/24 0850  Consult for CHF Education  Once        Provider:  (Not yet assigned)    08/19/24 0849    08/19/24 0600  Basic Metabolic Panel  Morning Draw         08/18/24 1741    08/19/24 0600  Magnesium  Morning Draw         08/18/24 1741    08/19/24 0000  labetalol (NORMODYNE) 200 MG tablet  2 Times Daily         08/19/24 1025    08/19/24 0000  fluconazole (DIFLUCAN) 150 MG tablet  Every 24 Hours         08/19/24 1025    08/19/24 0000  hydrALAZINE (APRESOLINE) 10 MG tablet  Every 12 Hours Scheduled         08/19/24 1025    08/19/24 0000  Discharge Follow-up with PCP         08/19/24 1025    08/19/24 0000  Discharge Follow-up with Specified Provider: Keep follow up cardiology appt today         08/19/24 1025    08/19/24 0000  Diet: Cardiac Diets, Diabetic Diets; Healthy Heart (2-3 Na+); Thin (IDDSI 0); Consistent Carbohydrate         08/19/24 1025    08/19/24 0000  Measure Blood Pressure         08/19/24 1025    08/19/24 0000  Gradually Increase Activity Until at Pre-Hospitalization Level         08/19/24 1025    08/19/24 0000  Measure Weight        Comments: Every morning; take 1 lasix pill every morning as needed for weight gain greater than or equal to 3lbs in 24 hour period.    08/19/24 1025    08/19/24 0000  Check Blood Glucose - Fingerstick        Comments: Complete no more than 45 minutes prior to patient eating      08/19/24 1025    08/19/24 0000  Ambulatory Referral to Heart Failure Clinic         08/19/24 1114    08/18/24 2100  hydrALAZINE (APRESOLINE) tablet 10 mg  Every 12 Hours Scheduled         08/18/24 1335    08/18/24 1947  ECG 12 Lead Chest Pain  STAT         08/18/24 1946    08/18/24 1947  High Sensitivity Troponin T  STAT         08/18/24 1946    08/18/24 1743  Strict  Intake & Output  Every Shift       08/18/24 1742    08/18/24 1743  Apply External Urinary Catheter  Once         08/18/24 1742    08/18/24 1621  POC Glucose Once  PROCEDURE ONCE        Comments: Complete no more than 45 minutes prior to patient eating      08/18/24 1614    08/18/24 1537  High Sensitivity Troponin T 2Hr  PROCEDURE ONCE         08/18/24 1448    08/18/24 1430  metoprolol tartrate (LOPRESSOR) tablet 25 mg  Every 12 Hours Scheduled,   Status:  Discontinued         08/18/24 1331    08/18/24 1430  labetalol (NORMODYNE) tablet 200 mg  Every 12 Hours Scheduled,   Status:  Discontinued         08/18/24 1333    08/18/24 1400  fluconazole (DIFLUCAN) tablet 150 mg  Every 24 Hours         08/18/24 1250    08/18/24 1400  metoprolol tartrate (LOPRESSOR) tablet 25 mg  Every 12 Hours Scheduled         08/18/24 1334    08/18/24 1328  High Sensitivity Troponin T  STAT         08/18/24 1327    08/18/24 1326  ECG 12 Lead Dyspnea  STAT         08/18/24 1326    08/18/24 0730  pantoprazole (PROTONIX) EC tablet 40 mg  2 Times Daily Before Meals         08/17/24 1928 08/17/24 2123  ondansetron ODT (ZOFRAN-ODT) disintegrating tablet 4 mg  Every 6 Hours PRN         08/17/24 2123 08/17/24 2100  hydrALAZINE (APRESOLINE) tablet 25 mg  Every 12 Hours Scheduled,   Status:  Discontinued         08/17/24 1525    08/17/24 2100  mesalamine (CANASA) suppository 1,000 mg  Nightly         08/17/24 1928 08/17/24 2045  Budesonide (ENTOCORT EC) 24 hr capsule 9 mg  Daily         08/17/24 1928 08/17/24 2045  prenatal vitamin 27-0.8 tablet 1 tablet  Daily         08/17/24 1928 08/17/24 1928  albuterol (PROVENTIL) nebulizer solution 0.083% 2.5 mg/3mL  Every 6 Hours PRN         08/17/24 1928    08/17/24 1540  isosorbide mononitrate (IMDUR) 24 hr tablet 30 mg  Every 24 Hours Scheduled,   Status:  Discontinued         08/17/24 1524    08/17/24 0900  aspirin chewable tablet 81 mg  Daily         08/17/24 0224    08/17/24 0800  Oral Care  " 2 Times Daily       08/17/24 0227    08/17/24 0317  acetaminophen (TYLENOL) tablet 650 mg  Every 6 Hours PRN         08/17/24 0317    08/17/24 0307  labetalol (NORMODYNE,TRANDATE) injection 10 mg  Every 6 Hours PRN         08/17/24 0307    08/17/24 0306  hydrALAZINE (APRESOLINE) injection 10 mg  Every 6 Hours PRN         08/17/24 0307    08/17/24 0243  sodium chloride 0.9 % flush 10 mL  Every 12 Hours Scheduled         08/17/24 0227 08/17/24 0228  dextrose (GLUTOSE) oral gel 15 g  Every 15 Minutes PRN         08/17/24 0228 08/17/24 0228  dextrose (D50W) (25 g/50 mL) IV injection 25 g  Every 15 Minutes PRN         08/17/24 0228 08/17/24 0228  glucagon HCl (Diagnostic) injection 1 mg  Every 15 Minutes PRN         08/17/24 0228 08/17/24 0228  Daily Weights  Daily       08/17/24 0227 08/17/24 0226  sennosides-docusate (PERICOLACE) 8.6-50 MG per tablet 2 tablet  2 Times Daily PRN        Placed in \"And\" Linked Group    08/17/24 0227 08/17/24 0226  polyethylene glycol (MIRALAX) packet 17 g  Daily PRN        Placed in \"And\" Linked Group    08/17/24 0227 08/17/24 0226  bisacodyl (DULCOLAX) EC tablet 5 mg  Daily PRN        Placed in \"And\" Linked Group    08/17/24 0227 08/17/24 0226  bisacodyl (DULCOLAX) suppository 10 mg  Daily PRN        Placed in \"And\" Linked Group    08/17/24 0227 08/17/24 0226  nitroglycerin (NITROSTAT) SL tablet 0.4 mg  Every 5 Minutes PRN         08/17/24 0227 08/17/24 0225  sodium chloride 0.9 % flush 10 mL  As Needed         08/17/24 0227 08/17/24 0225  sodium chloride 0.9 % infusion 40 mL  As Needed         08/17/24 0227 08/16/24 2133  sodium chloride 0.9 % flush 10 mL  As Needed        Placed in \"And\" Linked Group    08/16/24 2137    Unscheduled  Up With Assistance  As Needed       08/17/24 0227    Unscheduled  Follow Hypoglycemia Standing Orders For Blood Glucose <70 & Notify Provider of Treatment  As Needed      Comments: Follow Hypoglycemia Orders As Outlined " in Process Instructions (Open Order Report to View Full Instructions)  Notify Provider Any Time Hypoglycemia Treatment is Administered    24 0228    Unscheduled  POC Glucose Finger PRN  As Needed      Comments: Complete no more than 45 minutes prior to patient eating      24 1538    --  albuterol sulfate  (90 Base) MCG/ACT inhaler  Every 4 Hours PRN         24 0753                     Physician Progress Notes (last 24 hours)        Marlyn Sevilla DO at 24 1742           [facility]     Progress Note    Patient Name: Antoinette Pack  : 1985  MRN: [id number]  Primary Care Physician:  Shawna Lambert DO  Date of admission: 2024    Subjective   Subjective     Chief Complaint: 38 y/o female being seen in follow up for pulmonary edema    History of Present Illness  Patient Reports occasional palpitations today. Per my review of patient's bedside telemetry she is noted to have PVCs.    Review of Systems  Denies dyspnea, no chest pressure or pains. Compliant with CPAP.    Objective   Objective     Vitals:   Temp:  [97.7 °F (36.5 °C)-98.4 °F (36.9 °C)] 97.9 °F (36.6 °C)  Heart Rate:  [70-96] 70  Resp:  [18-20] 18  BP: ()/(58-82) 141/82    Physical Exam  Constitutional:       General: She is not in acute distress.     Appearance: She is well-developed. She is obese.   HENT:      Head: Normocephalic and atraumatic.   Eyes:      Conjunctiva/sclera: Conjunctivae normal.   Neck:      Thyroid: No thyromegaly.      Trachea: No tracheal deviation.   Cardiovascular:      Rate and Rhythm: Normal rate and regular rhythm. Frequent Extrasystoles are present.     Pulses:           Dorsalis pedis pulses are 2+ on the right side and 2+ on the left side.      Heart sounds: No murmur heard.     No friction rub. No gallop.   Pulmonary:      Effort: No respiratory distress.      Breath sounds: Normal breath sounds. No wheezing or rales.   Abdominal:      General: Bowel  sounds are normal. There is no distension.      Palpations: Abdomen is soft.      Tenderness: There is no abdominal tenderness. There is no guarding.   Musculoskeletal:      Cervical back: Neck supple.      Right lower leg: No edema.      Left lower leg: No edema.   Skin:     General: Skin is warm and dry.      Findings: No erythema or rash.   Neurological:      Mental Status: She is alert and oriented to person, place, and time.      Cranial Nerves: No cranial nerve deficit.          Result Review    Result Review:  I have personally reviewed the results from the time of this admission to 8/18/2024 17:42 EDT and agree with these findings:  [x]  Laboratory list / accordion  []  Microbiology  []  Radiology  []  EKG/Telemetry   []  Cardiology/Vascular   []  Pathology  []  Old records  []  Other:  Most notable findings include:     Results from last 7 days   Lab Units 08/17/24  2355 08/17/24  0327 08/16/24  2145   WBC 10*3/mm3 10.44 11.52* 9.88   HEMOGLOBIN g/dL 12.5 14.0 15.3   HEMATOCRIT % 38.6 42.9 47.7*   PLATELETS 10*3/mm3 165 172 196     Results from last 7 days   Lab Units 08/17/24  2355 08/17/24  0333 08/16/24  2230   SODIUM mmol/L 137 141 140   POTASSIUM mmol/L 4.0 3.9 3.9   CHLORIDE mmol/L 102 105 103   CO2 mmol/L 24.2 23.6 21.6*   BUN mg/dL 20 17 13   CREATININE mg/dL 0.60 0.58 0.77   CALCIUM mg/dL 9.2 9.1 9.0   BILIRUBIN mg/dL  --  0.6 0.7   ALK PHOS U/L  --  94 101   ALT (SGPT) U/L  --  13 14   AST (SGOT) U/L  --  14 16   GLUCOSE mg/dL 128* 115* 167*     Echocardiogram:  Interpretation Summary       Left ventricular systolic function is normal. Calculated left ventricular EF = 54% Left ventricular ejection fraction appears to be 51 - 55%.    Left ventricular wall thickness is consistent with mild concentric hypertrophy.    Left ventricular diastolic function was normal.    Assessment & Plan   Assessment / Plan     #Hypertensive emergency  #Flash pulmonary edema due to above  #Acute on chronic HFpEF,  symptomatically improved/resolved  #NSTEMI, suspect type II due to #1  -Case has been extensively discussed with the patient at bedside and with cardiology APRN  -Treatment is complicated as patient is currently breast-feeding  -Patient with intermittent hypotension with labetalol and given frequent PVCs noted on telemetry, I have started the patient on low-dose metoprolol to tartrate  -Continue with low-dose hydralazine; Imdur has been discontinued per cardiology  -I have discussed with patient and encouraged her to continue to hold the lisinopril as we have been doing during this hospital stay and given its recommendations regarding lactation  -Will repeat the patient's chemistry panel in a.m. and I have also added a magnesium level; plan to supplement the patient's potassium or magnesium if found to be deficient  -The patient is at a negative fluid balance; will hold on any further diuretics for now  -Cardiology recommending an event monitor per patient's primary cardiologist; hopefully will plan for discharge in a.m. and patient has a cardiology follow-up appointment in [place] tomorrow afternoon    Chronic:  -ALEJANDRO, compliant with CPAP  -Type 2 diabetes mellitus that is currently controlled  -Obesity per BMI 38.58 kg/m²  -Ulcerative colitis    VTE Prophylaxis:  SCDs    CODE STATUS:    Code Status (Patient has no pulse and is not breathing): CPR (Attempt to Resuscitate)  Medical Interventions (Patient has pulse or is breathing): Full Support    Disposition:  I expect patient to be discharged home in 24 hours if remains stable from a cardiac standpoint.    Marlyn Sevilla DO               Electronically signed by Marlyn Sevilla DO at 08/18/24 5457       Consult Notes (last 24 hours)  Notes from 08/18/24 1326 through 08/19/24 1326   No notes of this type exist for this encounter.

## 2024-08-19 NOTE — PLAN OF CARE
Goal Outcome Evaluation:      Patient being discharged today

## 2024-08-19 NOTE — PLAN OF CARE
Goal Outcome Evaluation:         Patient resting in bed at this time. Patient family at bedside. Patient VSS on room air; Patient reported continued chest discomfort, PA notified, EKG and Troponin ordered. Patient voices no other complaints or concerns at this time, maintain plan of care.

## 2024-08-19 NOTE — DISCHARGE INSTR - APPOINTMENTS
Follow-up appointment with Florinda Bliss August 29, 2024 @ 10:30      Someone from the heart failure clinic will call you with an appt

## 2024-08-19 NOTE — DISCHARGE SUMMARY
[facility] HOSPITALISTS DISCHARGE SUMMARY    Patient Identification:  Name:  Antoinette Pack  Age:  39 y.o.  Sex:  female  :  1985  MRN:  [id number]  Visit Number:  [id number]    Date of Admission: 2024  Date of Discharge:  2024    PCP: Shawna Lambert, DO    DISCHARGE DIAGNOSIS  #Hypertensive emergency, present upon admission, resolved  #Flash pulmonary edema, suspect due to above and resolved  #Uncontrolled essential hypertension  #Acute NSTEMI, suspect type II in the setting of above  #Symptomatic palpitations with premature ventricular contractions noted on telemetry  #Obstructive sleep apnea, compliant with CPAP  #Non-insulin-dependent type 2 diabetes mellitus that is currently controlled  #Obesity per BMI 37.98 kg/m²  #Vulvovaginal candidiasis    CONSULTS   Cardiology    PROCEDURES PERFORMED  Echocardiogram  Interpretation Summary    Left ventricular systolic function is normal. Calculated left ventricular EF = 54% Left ventricular ejection fraction appears to be 51 - 55%.    Left ventricular wall thickness is consistent with mild concentric hypertrophy.    Left ventricular diastolic function was normal.    HOSPITAL COURSE  Patient is a 39 y.o. female presented to [facility] complaining of the acute onset of shortness of breath and chest pain.  Please see the admitting history and physical for further details.    Workup in the emergency department revealed severely uncontrolled hypertension with tachycardia and elevated high-sensitivity troponin T and BNP.  There was initial concern for acute on chronic HFpEF with flash pulmonary edema or type I non-ST elevation myocardial infarction causing uncontrolled hypertension and flash pulmonary edema.  There was initially some concern for postpartum cardiomyopathy.  Patient received IV hydralazine in the emergency department followed by IV metoprolol.  The patient was admitted to the hospital medicine service for  further evaluation and management.    Patient received a full dose aspirin in the emergency department with plans to continue aspirin initially while workup was underway.  The patient underwent an echocardiogram as noted above.  Patient with previous echocardiogram in April 2024 during her most recent pregnancy (patient's daughter currently approximately 4 months old) did reveal some diastolic dysfunction, however, most recent echocardiogram reported normal left ventricular diastolic function.  EF stable at 51 to 55%.    The patient also received IV diuresis upon admission and almost instantly had 1700 cc while in the emergency department.  Cardiology was consulted early on during the patient's hospitalization and followed her throughout her admission.    Patient is currently breast-feeding which did limit blood pressure medications.  Cardiology had initially recommended low-dose Imdur with hydralazine prior to the echocardiogram results.  Later on during the patient's hospitalization she did complain of some chest discomfort and palpitations.  Patient was noted to have premature ventricular contractions.  Patient was started on a low-dose oral metoprolol with improvement in her symptomatic premature ventricular contractions.  It is felt that the metoprolol would be more beneficial for the patient from a rhythm standpoint, however, labetalol is likely the safer choice if patient continues to breast-feed.  Patient was continued on hydralazine and was encouraged to continue holding her lisinopril (due to lactation side effects) until she has discussed with her primary cardiologist regarding optimal blood pressure management especially while the patient continues to breast-feed.    I encouraged the patient to continue using her Lasix on a daily as needed basis.  I have also encouraged the patient to monitor her blood pressure and heart rate at home and keep a log to present to her primary cardiologist upon follow-up  visits.  I have also suggested that the patient begin early morning daily weights as well.    Patient was discharged today with her spouse in a hemodynamically stable condition and was encouraged to return to the emergency department should her symptoms recur.    VITAL SIGNS:  Temp:  [97.7 °F (36.5 °C)-98.5 °F (36.9 °C)] 98.1 °F (36.7 °C)  Heart Rate:  [70-90] 90  Resp:  [18-20] 20  BP: (141-171)/(75-94) 170/94  SpO2:  [93 %-97 %] 95 %  on   ;   Device (Oxygen Therapy): room air    Body mass index is 37.98 kg/m².  Wt Readings from Last 3 Encounters:   08/19/24 85.3 kg (188 lb 0.8 oz)   06/14/24 89.8 kg (198 lb)   06/07/24 87.1 kg (192 lb)       PHYSICAL EXAM:  Physical Exam  Constitutional:       General: She is not in acute distress.     Appearance: She is well-developed.   HENT:      Head: Normocephalic and atraumatic.   Eyes:      Conjunctiva/sclera: Conjunctivae normal.   Neck:      Trachea: No tracheal deviation.   Cardiovascular:      Rate and Rhythm: Normal rate and regular rhythm.      Heart sounds: No murmur heard.     No friction rub. No gallop.   Pulmonary:      Effort: No respiratory distress.      Breath sounds: Normal breath sounds. No wheezing or rales.   Abdominal:      General: Bowel sounds are normal. There is no distension.      Palpations: Abdomen is soft.      Tenderness: There is no abdominal tenderness. There is no guarding.   Skin:     General: Skin is warm and dry.      Findings: No erythema or rash.   Neurological:      Mental Status: She is alert and oriented to person, place, and time.      Cranial Nerves: No cranial nerve deficit.        Present during visit: Patient's spouse    DISCHARGE MEDICATIONS:     Your medication list        START taking these medications        Instructions Last Dose Given Next Dose Due   fluconazole 150 MG tablet  Commonly known as: DIFLUCAN      Take 1 tablet by mouth Daily for 2 doses. 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              CHANGE how you take these medications        Instructions Last Dose Given Next Dose Due   labetalol 200 MG tablet  Commonly known as: NORMODYNE  What changed:   how much to take  when to take this  reasons to take this      Take 0.5 tablets by mouth 2 (Two) Times a Day.              CONTINUE taking these medications        Instructions Last Dose Given Next Dose Due   albuterol sulfate  (90 Base) MCG/ACT inhaler  Commonly known as: PROVENTIL HFA;VENTOLIN HFA;PROAIR HFA      Inhale 2 puffs Every 4 (Four) Hours As Needed for Wheezing.       BD Pen Needle Brandee U/F 32G X 4 MM misc  Generic drug: Insulin Pen Needle      Use to inject insulin up to 4 times daily as directed       Budesonide 3 MG 24 hr capsule  Commonly known as: ENTOCORT EC      Take 3 capsules by mouth Daily.       Dexcom G6 Transmitter misc      Use 1 each Every 3 (Three) Months.       furosemide 40 MG tablet  Commonly known as: LASIX      Take 1 tablet by mouth Daily As Needed (for fluid retention).       mesalamine 4 g enema  Commonly known as: ROWASA      Insert 1 enema into the rectum Every Night.       Omnipod 5 G6 Pods (Gen 5) misc      Use 1 each Day.       pantoprazole 40 MG EC tablet  Commonly known as: PROTONIX      Take 1 tablet by mouth 2 (Two) Times a Day Before Meals.       PRENATAL PO      Take 1 tablet by mouth Daily.       TRUEplus Insulin Syringe 31G X 5/16\" 1 ML misc  Generic drug: Insulin Syringe-Needle U-100      Use 1 each 3 (Three) Times a Day With Meals. As needed for additional insulin bolus with meals.              STOP taking these medications      lisinopril 40 MG tablet  Commonly known as: PRINIVIL,ZESTRIL        NIFEdipine CC 60 MG 24 hr tablet  Commonly known as: ADALAT CC        NovoLOG 100 UNIT/ML injection  Generic drug: Insulin Aspart                  Where to Get Your Medications    "     These medications were sent to [facility] [place]  [address] [facility] [address]      Hours: Monday to Friday 7 AM to 6 PM Phone: [phone]   fluconazole 150 MG tablet  hydrALAZINE 10 MG tablet  labetalol 200 MG tablet         DISCHARGE DISPOSITION   Stable    Diet Instructions       Diet: Cardiac Diets, Diabetic Diets; Healthy Heart (2-3 Na+); Thin (IDDSI 0); Consistent Carbohydrate      Discharge Diet:  Cardiac Diets  Diabetic Diets       Cardiac Diet: Healthy Heart (2-3 Na+)    Fluid Consistency: Thin (IDDSI 0)    Diabetic Diet: Consistent Carbohydrate          Activity Instructions       Gradually Increase Activity Until at Pre-Hospitalization Level      Measure Blood Pressure      1-2 times per day with heart rate; keep a log for primary cardiologist    Measure Weight      Every morning; take 1 lasix pill every morning as needed for weight gain greater than or equal to 3lbs in 24 hour period.          Future Appointments   Date Time Provider Department Center   8/19/2024  1:45 PM Pablo Cabrera MD [facility]   9/9/2024  9:00 AM Vonnie Key APRN [facility]     Your Scheduled Appointments      Aug 19, 2024 1:45 PM  Follow Up with Pablo Cabrera MD  [facility] CARDIOLOGY ([place]) [address] [place] [address] [address]  [facility] [address] [phone]   -Bring photo ID, insurance card, and list of medications to appointment  -If testing was completed outside of [facility] then patient must bring images on a disc  -Copay will be collected at time of appointment  -Established patients should arrive 15 minutes prior to appointment         Sep 09, 2024 9:00 AM  FOLLOW UP with CICI Veliz  [facility] ENDOCRINOLOGY ([place]) [address] [facility] [address]  [facility] [address] [phone]             Additional Instructions for the Follow-ups that You Need to Schedule       Discharge Follow-up with PCP   As  directed       Currently Documented PCP:    Shawna Lambert DO    PCP Phone Number:    [phone]     Follow Up Details: 1 week; hospital follow up dyspnea, uncontrolled BP        Discharge Follow-up with Specified Provider: Keep follow up cardiology appt today   As directed      To: Keep follow up cardiology appt today               Follow-up Information       Shawna Lambert DO .    Specialty: Family Medicine  Why: 1 week; hospital follow up dyspnea, uncontrolled BP  Contact information:  [address] [facility] [address]  [phone]                              TEST  RESULTS PENDING AT DISCHARGE       CODE STATUS  Code Status and Medical Interventions: CPR (Attempt to Resuscitate); Full Support   Ordered at: 08/17/24 0227     Code Status (Patient has no pulse and is not breathing):    CPR (Attempt to Resuscitate)     Medical Interventions (Patient has pulse or is breathing):    Full Support       Marlyn Sevilla DO  08/19/24  10:25 EDT    Please note that this discharge summary required more than 30 minutes to complete.    Please send a copy of this dictation to the following providers:  Shawna Lambert DO

## 2024-08-19 NOTE — CONSULTS
Heart Failure Education Consult    39 y.o. female PMHx: HFpEF, DMII, GERD, HTN, PCOS, ALEJANDRO     There are no active hospital problems to display for this patient.      Social History     Socioeconomic History    Marital status:    Tobacco Use    Smoking status: Former     Current packs/day: 0.00     Average packs/day: 1 pack/day for 20.0 years (20.0 ttl pk-yrs)     Types: Cigarettes     Start date:      Quit date:      Years since quittin.6     Passive exposure: Never    Smokeless tobacco: Never    Tobacco comments:     QUIT IN 2017   Vaping Use    Vaping status: Never Used   Substance and Sexual Activity    Alcohol use: No    Drug use: No    Sexual activity: Defer       Type of Heart Failure: Diastolic     Length of diagnosis: Previous Diagnosis     Current HF knowledge: fair     Have you had HF education/teaching in the past? No  Edema No        Shortness of Breath: No     Number of pillows used to sleep at night: 1  Current HF zone  green  Barriers to learning: Other none identified      Materials Provided:HF Action Plan, Daily Weight Monitoring , and 2 Gm Na+ diet       Do you check your weight daily? No    Current weight        24  0500 24  0505   Weight: 86.6 kg (191 lb) 85.3 kg (188 lb 0.8 oz)         Most recent ReDS Not ordered this admission.    Most recent ProBNP   Lab Results   Component Value Date    PROBNP 781.0 (H) 2024           Most recent   Results for orders placed during the hospital encounter of 24    Adult Transthoracic Echo Complete w/ Color, Spectral and Contrast if necessary per protocol    Interpretation Summary    Left ventricular systolic function is normal. Calculated left ventricular EF = 54% Left ventricular ejection fraction appears to be 51 - 55%.    Left ventricular wall thickness is consistent with mild concentric hypertrophy.    Left ventricular diastolic function was normal.            Medications Prior to Admission   Medication Sig  "Dispense Refill Last Dose    albuterol sulfate  (90 Base) MCG/ACT inhaler Inhale 2 puffs Every 4 (Four) Hours As Needed for Wheezing.   8/16/2024    Budesonide (ENTOCORT EC) 3 MG 24 hr capsule Take 3 capsules by mouth Daily. 90 capsule 1 8/16/2024    furosemide (LASIX) 40 MG tablet Take 1 tablet by mouth Daily As Needed (for fluid retention). 90 tablet 0 Past Week    lisinopril (PRINIVIL,ZESTRIL) 40 MG tablet Take 1 tablet by mouth Daily. 90 tablet 1 8/16/2024    mesalamine (ROWASA) 4 g enema Insert 1 enema into the rectum Every Night. 28 enema 0 8/16/2024    NIFEdipine CC (ADALAT CC) 60 MG 24 hr tablet Take 1 tablet by mouth Daily for 120 days. 30 tablet 3 8/16/2024    pantoprazole (PROTONIX) 40 MG EC tablet Take 1 tablet by mouth 2 (Two) Times a Day Before Meals. 120 tablet 0 8/16/2024    Prenatal Vit-Fe Fumarate-FA (PRENATAL PO) Take 1 tablet by mouth Daily.   8/16/2024    [DISCONTINUED] labetalol (NORMODYNE) 200 MG tablet Take 1 tablet by mouth As Needed (prn). (Patient taking differently: Take 1 tablet by mouth Daily As Needed (HTN/tachycardia).) 30 tablet 1 8/16/2024    Continuous Glucose Transmitter (Dexcom G6 Transmitter) misc Use 1 each Every 3 (Three) Months. 1 each 1 Unknown    Insulin Aspart (NovoLOG) 100 UNIT/ML injection For use in insulin pump. MDD 50 units (Patient taking differently: Inject 0-50 Units under the skin into the appropriate area as directed Continuous. For use in insulin pump. MDD 50 units) 20 mL 4 More than a month    Insulin Disposable Pump (Omnipod 5 G6 Pods, Gen 5,) misc Use 1 each Day. 30 each 5 Unknown    Insulin Pen Needle 32G X 4 MM misc Use to inject insulin up to 4 times daily as directed 400 each 1 Unknown    Insulin Syringe 31G X 5/16\" 1 ML misc Use 1 each 3 (Three) Times a Day With Meals. As needed for additional insulin bolus with meals. 100 each 3 Unknown       Unable to provide heart failure education today:  []  Patient/family refused   []  Not available  []  Not " able to participate   []  Other:              Referral candidate for HF Clinic:Yes     Thank you for this consult. Please let me know if I can be of any assistance with HF education for this patient.    <30 minutes was spent on this visit    Electronically signed by,   Elisha Bolaños RN ,DNP, MSN, CHFN  08/19/24 11:12 EDT

## 2024-08-20 NOTE — PROGRESS NOTES
"Chief Complaint  Chronic heart failure with preserved ejection fraction (HFp      Subjective   History of Present Illness    Problem List  -HFpEF, normal EKG, echo preserved systolic function  -hx of HFpEF  -CXR showed pulmonary edema  -C section 4/18/24 morning  -Long standing HTN  -DM  -morbid obesity  -COPD?  -ALEJANDRO  -possible UC  -duodenal ulcers    Mrs. Pack is a 39 year old lady being seen in follow up.  Had complicated pregnancy.  Had c section.  Had CHF and GI bleed.  Titrated meds.  BP now controlled when taking.  Has lost 50 lbs in last 6 weeks.  Feeling much better.  BP high today because child admited at  and BP meds at home.  ROS negative except for the above.      Update 8/20/24  BP had been on the rise.  Was exposed noxious fumes likely and had flash pulmonary edema that required brief hospitalization.   also needed to be treated for difficulty breathing.             Objective   Vital Signs:  Vitals:    08/19/24 1351   BP: 160/100   Pulse: 77   SpO2: 97%     Estimated body mass index is 37.37 kg/m² as calculated from the following:    Height as of this encounter: 149.9 cm (59\").    Weight as of this encounter: 83.9 kg (185 lb).       Physical Exam  HENT:      Head: Normocephalic.   Eyes:      Extraocular Movements: Extraocular movements intact.   Cardiovascular:      Rate and Rhythm: Normal rate and regular rhythm.      Heart sounds: No murmur heard.     No gallop.   Pulmonary:      Breath sounds: Normal breath sounds.   Abdominal:      Palpations: Abdomen is soft.   Musculoskeletal:      Right lower leg: No edema.      Left lower leg: No edema.   Skin:     General: Skin is warm and dry.   Neurological:      General: No focal deficit present.      Mental Status: She is alert.   Psychiatric:         Mood and Affect: Mood normal.       Clinic discussed advanced directive        Assessment   -HFpEF, normal EKG, echo preserved systolic function  -hx of HFpEF  -CXR showed pulmonary edema  -C " section 4/18/24 morning  -Long standing HTN  -DM  -morbid obesity  -COPD?  -ALEJANDRO  -possible UC  -duodenal ulcers    Plan   -resume labetalol, amlodipine, lisinopril  -cont. Hydralazine  -lasix as needed  -1 week follow up      Pablo Cabrera MD  06/07/2024 16:25 EDT

## 2024-08-20 NOTE — PAYOR COMM NOTE
"[facility]  NPI:[id number]    Utilization Review  Contact: Jessica Carballo RN  Phone: [phone]  Fax:[phone]    DISCHARGE NOTIFICATION     Awaiting approval pend ref# [id number]     Antoinette Pack (39 y.o. Female)       Date of Birth   1985    Social Security Number       Address   [address] [facility] [address]    Home Phone   [phone]    MRN   [id number]       Rastafarian   None    Marital Status                               Admission Date   8/16/24    Admission Type   Emergency    Admitting Provider   Judith Beal MD    Attending Provider       Department, Room/Bed   [place] [id number]/       Discharge Date   8/19/2024    Discharge Disposition   Home or Self Care    Discharge Destination                                 Attending Provider: (none)   Allergies: Milk-related Compounds, Dairycare [Lactase-lactobacillus], Dilantin [Phenytoin], Keppra [Levetiracetam], Meperidine, Topamax [Topiramate]    Isolation: None   Infection: None   Code Status: Prior    Ht: 149.9 cm (59\")   Wt: 85.3 kg (188 lb 0.8 oz)    Admission Cmt: None   Principal Problem: Acute exacerbation of CHF (congestive heart failure) [I50.9]                   Active Insurance as of 8/16/2024       Primary Coverage       Payor Plan Insurance Group Employer/Plan Group    ANTHEM MEDICARE REPLACEMENT ANTHEM MEDICARE ADVANTAGE [id number]       Payor Plan Address Payor Plan Phone Number Payor Plan Fax Number Effective Dates    [address] [phone]  3/1/2024 - None Entered    [facility] [address]         Subscriber Name Subscriber Birth Date Member ID       ANTOINETTE PACK 1985 [id number]               Secondary Coverage       Payor Plan Insurance Group Employer/Plan Group    MISC COMMERCIAL MISC COMMERCIAL        Coverage Address Coverage Phone Number Coverage Fax Number Effective Dates    [address] [phone] " 2023 - None Entered    [place] [id number]         Subscriber Name Subscriber Birth Date Member ID       ANTOINETTE PACK 1985 [id number]                     Emergency Contacts        (Rel.) Home Phone Work Phone Mobile Phone    Jonel Iverson (Father) [phone] -- --    Michelle Pack (Spouse) -- -- [phone]                 Discharge Summary        Marlyn Sevilla DO at 24 1025              [facility] HOSPITALISTS DISCHARGE SUMMARY    Patient Identification:  Name:  Antoinette Pack  Age:  39 y.o.  Sex:  female  :  1985  MRN:  [id number]  Visit Number:  [id number]    Date of Admission: 2024  Date of Discharge:  2024    PCP: Shawna Lambert DO    DISCHARGE DIAGNOSIS  #Hypertensive emergency, present upon admission, resolved  #Flash pulmonary edema, suspect due to above and resolved  #Uncontrolled essential hypertension  #Acute NSTEMI, suspect type II in the setting of above  #Symptomatic palpitations with premature ventricular contractions noted on telemetry  #Obstructive sleep apnea, compliant with CPAP  #Non-insulin-dependent type 2 diabetes mellitus that is currently controlled  #Obesity per BMI 37.98 kg/m²  #Vulvovaginal candidiasis    CONSULTS   Cardiology    PROCEDURES PERFORMED  Echocardiogram  Interpretation Summary    Left ventricular systolic function is normal. Calculated left ventricular EF = 54% Left ventricular ejection fraction appears to be 51 - 55%.    Left ventricular wall thickness is consistent with mild concentric hypertrophy.    Left ventricular diastolic function was normal.    HOSPITAL COURSE  Patient is a 39 y.o. female [facility] complaining of the acute onset of shortness of breath and chest pain.  Please see the admitting history and physical for further details.    Workup in the emergency department revealed severely uncontrolled hypertension with tachycardia and elevated  high-sensitivity troponin T and BNP.  There was initial concern for acute on chronic HFpEF with flash pulmonary edema or type I non-ST elevation myocardial infarction causing uncontrolled hypertension and flash pulmonary edema.  There was initially some concern for postpartum cardiomyopathy.  Patient received IV hydralazine in the emergency department followed by IV metoprolol.  The patient was admitted to the hospital medicine service for further evaluation and management.    Patient received a full dose aspirin in the emergency department with plans to continue aspirin initially while workup was underway.  The patient underwent an echocardiogram as noted above.  Patient with previous echocardiogram in April 2024 during her most recent pregnancy (patient's daughter currently approximately 4 months old) did reveal some diastolic dysfunction, however, most recent echocardiogram reported normal left ventricular diastolic function.  EF stable at 51 to 55%.    The patient also received IV diuresis upon admission and almost instantly had 1700 cc while in the emergency department.  Cardiology was consulted early on during the patient's hospitalization and followed her throughout her admission.    Patient is currently breast-feeding which did limit blood pressure medications.  Cardiology had initially recommended low-dose Imdur with hydralazine prior to the echocardiogram results.  Later on during the patient's hospitalization she did complain of some chest discomfort and palpitations.  Patient was noted to have premature ventricular contractions.  Patient was started on a low-dose oral metoprolol with improvement in her symptomatic premature ventricular contractions.  It is felt that the metoprolol would be more beneficial for the patient from a rhythm standpoint, however, labetalol is likely the safer choice if patient continues to breast-feed.  Patient was continued on hydralazine and was encouraged to continue holding  her lisinopril (due to lactation side effects) until she has discussed with her primary cardiologist regarding optimal blood pressure management especially while the patient continues to breast-feed.    I encouraged the patient to continue using her Lasix on a daily as needed basis.  I have also encouraged the patient to monitor her blood pressure and heart rate at home and keep a log to present to her primary cardiologist upon follow-up visits.  I have also suggested that the patient begin early morning daily weights as well.    Patient was discharged today with her spouse in a hemodynamically stable condition and was encouraged to return to the emergency department should her symptoms recur.    VITAL SIGNS:  Temp:  [97.7 °F (36.5 °C)-98.5 °F (36.9 °C)] 98.1 °F (36.7 °C)  Heart Rate:  [70-90] 90  Resp:  [18-20] 20  BP: (141-171)/(75-94) 170/94  SpO2:  [93 %-97 %] 95 %  on   ;   Device (Oxygen Therapy): room air    Body mass index is 37.98 kg/m².  Wt Readings from Last 3 Encounters:   08/19/24 85.3 kg (188 lb 0.8 oz)   06/14/24 89.8 kg (198 lb)   06/07/24 87.1 kg (192 lb)       PHYSICAL EXAM:  Physical Exam  Constitutional:       General: She is not in acute distress.     Appearance: She is well-developed.   HENT:      Head: Normocephalic and atraumatic.   Eyes:      Conjunctiva/sclera: Conjunctivae normal.   Neck:      Trachea: No tracheal deviation.   Cardiovascular:      Rate and Rhythm: Normal rate and regular rhythm.      Heart sounds: No murmur heard.     No friction rub. No gallop.   Pulmonary:      Effort: No respiratory distress.      Breath sounds: Normal breath sounds. No wheezing or rales.   Abdominal:      General: Bowel sounds are normal. There is no distension.      Palpations: Abdomen is soft.      Tenderness: There is no abdominal tenderness. There is no guarding.   Skin:     General: Skin is warm and dry.      Findings: No erythema or rash.   Neurological:      Mental Status: She is alert and  "oriented to person, place, and time.      Cranial Nerves: No cranial nerve deficit.        Present during visit: Patient's spouse    DISCHARGE MEDICATIONS:     Your medication list        START taking these medications        Instructions Last Dose Given Next Dose Due   fluconazole 150 MG tablet  Commonly known as: DIFLUCAN      Take 1 tablet by mouth Daily for 2 doses. Indications: Vagina and Vulva Infection due to Candida Species Fungus       hydrALAZINE 10 MG tablet  Commonly known as: APRESOLINE      Take 1 tablet by mouth Every 12 (Twelve) Hours.              CHANGE how you take these medications        Instructions Last Dose Given Next Dose Due   labetalol 200 MG tablet  Commonly known as: NORMODYNE  What changed:   how much to take  when to take this  reasons to take this      Take 0.5 tablets by mouth 2 (Two) Times a Day.              CONTINUE taking these medications        Instructions Last Dose Given Next Dose Due   albuterol sulfate  (90 Base) MCG/ACT inhaler  Commonly known as: PROVENTIL HFA;VENTOLIN HFA;PROAIR HFA      Inhale 2 puffs Every 4 (Four) Hours As Needed for Wheezing.       BD Pen Needle Brandee U/F 32G X 4 MM misc  Generic drug: Insulin Pen Needle      Use to inject insulin up to 4 times daily as directed       Budesonide 3 MG 24 hr capsule  Commonly known as: ENTOCORT EC      Take 3 capsules by mouth Daily.       Dexcom G6 Transmitter misc      Use 1 each Every 3 (Three) Months.       furosemide 40 MG tablet  Commonly known as: LASIX      Take 1 tablet by mouth Daily As Needed (for fluid retention).       mesalamine 4 g enema  Commonly known as: ROWASA      Insert 1 enema into the rectum Every Night.       Omnipod 5 G6 Pods (Gen 5) misc      Use 1 each Day.       pantoprazole 40 MG EC tablet  Commonly known as: PROTONIX      Take 1 tablet by mouth 2 (Two) Times a Day Before Meals.       PRENATAL PO      Take 1 tablet by mouth Daily.       TRUEplus Insulin Syringe 31G X 5/16\" 1 ML " misc  Generic drug: Insulin Syringe-Needle U-100      Use 1 each 3 (Three) Times a Day With Meals. As needed for additional insulin bolus with meals.              STOP taking these medications      lisinopril 40 MG tablet  Commonly known as: PRINIVIL,ZESTRIL        NIFEdipine CC 60 MG 24 hr tablet  Commonly known as: ADALAT CC        NovoLOG 100 UNIT/ML injection  Generic drug: Insulin Aspart                  Where to Get Your Medications        These medications were sent to [facility] [address] [place] [address]      Hours: Monday to Friday 7 AM to 6 PM Phone: [phone]   fluconazole 150 MG tablet  hydrALAZINE 10 MG tablet  labetalol 200 MG tablet         DISCHARGE DISPOSITION   Stable    Diet Instructions       Diet: Cardiac Diets, Diabetic Diets; Healthy Heart (2-3 Na+); Thin (IDDSI 0); Consistent Carbohydrate      Discharge Diet:  Cardiac Diets  Diabetic Diets       Cardiac Diet: Healthy Heart (2-3 Na+)    Fluid Consistency: Thin (IDDSI 0)    Diabetic Diet: Consistent Carbohydrate          Activity Instructions       Gradually Increase Activity Until at Pre-Hospitalization Level      Measure Blood Pressure      1-2 times per day with heart rate; keep a log for primary cardiologist    Measure Weight      Every morning; take 1 lasix pill every morning as needed for weight gain greater than or equal to 3lbs in 24 hour period.          Future Appointments   Date Time Provider Department Center   8/19/2024  1:45 PM Pablo Cabrera MD [facility]   9/9/2024  9:00 AM Vonnie Key APRN [facility]     Your Scheduled Appointments      Aug 19, 2024 1:45 PM  Follow Up with Pablo Cabrera MD  [facility] ([place]) [address]  [facility] [address]  [phone]   -Bring photo ID, insurance card, and list of medications to appointment  -If testing was completed outside of [facility] then patient must bring  images on a disc  -Copay will be collected at time of appointment  -Established patients should arrive 15 minutes prior to appointment         Sep 09, 2024 9:00 AM  FOLLOW UP with CICI Veliz  [facility] ENDOCRINOLOGY ([place]) [address] [address] [phone]             Additional Instructions for the Follow-ups that You Need to Schedule       Discharge Follow-up with PCP   As directed       Currently Documented PCP:    Shawna Lambert DO    PCP Phone Number:    [phone]     Follow Up Details: 1 week; hospital follow up dyspnea, uncontrolled BP        Discharge Follow-up with Specified Provider: Keep follow up cardiology appt today   As directed      To: Keep follow up cardiology appt today               Follow-up Information       Shawna Lambert DO .    Specialty: Family Medicine  Why: 1 week; hospital follow up dyspnea, uncontrolled BP  Contact information:  [address] [facility] [address]  [phone]                              TEST  RESULTS PENDING AT DISCHARGE       CODE STATUS  Code Status and Medical Interventions: CPR (Attempt to Resuscitate); Full Support   Ordered at: 08/17/24 0227     Code Status (Patient has no pulse and is not breathing):    CPR (Attempt to Resuscitate)     Medical Interventions (Patient has pulse or is breathing):    Full Support       Marlyn Sevilla DO  08/19/24  10:25 EDT    Please note that this discharge summary required more than 30 minutes to complete.    Please send a copy of this dictation to the following providers:  Shawna Lambert DO    Electronically signed by Marlyn Sevilla DO at 08/19/24 8203

## 2024-08-20 NOTE — PAYOR COMM NOTE
"[facility]  NPI:[id number]    Utilization Review  Contact: Jessica Carballo RN  Phone: [phone]  Fax:[phone]    DISCHARGE NOTIFICATIONAntoinette Pack (39 y.o. Female)       Date of Birth   1985    Social Security Number       [address] [facility] [address]    Home Phone   [phone]    MRN   [id number]       Adventism   None    Marital Status                               Admission Date   8/16/24    Admission Type   Emergency    Admitting Provider   Judith Beal MD    Attending Provider       Department, Room/Bed   [address], [address]/       Discharge Date   8/19/2024    Discharge Disposition   Home or Self Care    Discharge Destination                                 Attending Provider: (none)   Allergies: Milk-related Compounds, Dairycare [Lactase-lactobacillus], Dilantin [Phenytoin], Keppra [Levetiracetam], Meperidine, Topamax [Topiramate]    Isolation: None   Infection: None   Code Status: Prior    Ht: 149.9 cm (59\")   Wt: 85.3 kg (188 lb 0.8 oz)    Admission Cmt: None   Principal Problem: Acute exacerbation of CHF (congestive heart failure) [I50.9]                   Active Insurance as of 8/16/2024       Primary Coverage       Payor Plan Insurance Group Employer/Plan Group    ANTHEM MEDICARE REPLACEMENT ANTHEM MEDICARE ADVANTAGE [id number]       Payor Plan Address Payor Plan Phone Number Payor Plan Fax Number Effective Dates    [address] [phone]  3/1/2024 - None Entered    [facility] [address]         Subscriber Name Subscriber Birth Date Member ID       ANTOINETTE PACK 1985 [id number]               Secondary Coverage       Payor Plan Insurance Group Employer/Plan Group    MISC COMMERCIAL MISC COMMERCIAL        Coverage Address Coverage Phone Number Coverage Fax Number Effective Dates    [address] [phone] 8/26/2023 - None Entered    [place] [id number]         " Subscriber Name Subscriber Birth Date Member ID       ANTOINETTE PACK 1985 [id number]                     Emergency Contacts        (Rel.) Home Phone Work Phone Mobile Phone    Jonel Iverson (Father) [phone] -- --    Michelle Pack (Spouse) -- -- [phone]                 Discharge Summary        Marlyn Sevilla DO at 24 1025              [facility] HOSPITALISTS DISCHARGE SUMMARY    Patient Identification:  Name:  Antoinette Pack  Age:  39 y.o.  Sex:  female  :  1985  MRN:  [id number]  Visit Number:  [id number]    Date of Admission: 2024  Date of Discharge:  2024    PCP: Shawna Lambert DO    DISCHARGE DIAGNOSIS  #Hypertensive emergency, present upon admission, resolved  #Flash pulmonary edema, suspect due to above and resolved  #Uncontrolled essential hypertension  #Acute NSTEMI, suspect type II in the setting of above  #Symptomatic palpitations with premature ventricular contractions noted on telemetry  #Obstructive sleep apnea, compliant with CPAP  #Non-insulin-dependent type 2 diabetes mellitus that is currently controlled  #Obesity per BMI 37.98 kg/m²  #Vulvovaginal candidiasis    CONSULTS   Cardiology    PROCEDURES PERFORMED  Echocardiogram  Interpretation Summary    Left ventricular systolic function is normal. Calculated left ventricular EF = 54% Left ventricular ejection fraction appears to be 51 - 55%.    Left ventricular wall thickness is consistent with mild concentric hypertrophy.    Left ventricular diastolic function was normal.    HOSPITAL COURSE  Patient is a 39 y.o. female presented to [facility] complaining of the acute onset of shortness of breath and chest pain.  Please see the admitting history and physical for further details.    Workup in the emergency department revealed severely uncontrolled hypertension with tachycardia and elevated high-sensitivity troponin T and BNP.  There was initial  concern for acute on chronic HFpEF with flash pulmonary edema or type I non-ST elevation myocardial infarction causing uncontrolled hypertension and flash pulmonary edema.  There was initially some concern for postpartum cardiomyopathy.  Patient received IV hydralazine in the emergency department followed by IV metoprolol.  The patient was admitted to the hospital medicine service for further evaluation and management.    Patient received a full dose aspirin in the emergency department with plans to continue aspirin initially while workup was underway.  The patient underwent an echocardiogram as noted above.  Patient with previous echocardiogram in April 2024 during her most recent pregnancy (patient's daughter currently approximately 4 months old) did reveal some diastolic dysfunction, however, most recent echocardiogram reported normal left ventricular diastolic function.  EF stable at 51 to 55%.    The patient also received IV diuresis upon admission and almost instantly had 1700 cc while in the emergency department.  Cardiology was consulted early on during the patient's hospitalization and followed her throughout her admission.    Patient is currently breast-feeding which did limit blood pressure medications.  Cardiology had initially recommended low-dose Imdur with hydralazine prior to the echocardiogram results.  Later on during the patient's hospitalization she did complain of some chest discomfort and palpitations.  Patient was noted to have premature ventricular contractions.  Patient was started on a low-dose oral metoprolol with improvement in her symptomatic premature ventricular contractions.  It is felt that the metoprolol would be more beneficial for the patient from a rhythm standpoint, however, labetalol is likely the safer choice if patient continues to breast-feed.  Patient was continued on hydralazine and was encouraged to continue holding her lisinopril (due to lactation side effects) until she  has discussed with her primary cardiologist regarding optimal blood pressure management especially while the patient continues to breast-feed.    I encouraged the patient to continue using her Lasix on a daily as needed basis.  I have also encouraged the patient to monitor her blood pressure and heart rate at home and keep a log to present to her primary cardiologist upon follow-up visits.  I have also suggested that the patient begin early morning daily weights as well.    Patient was discharged today with her spouse in a hemodynamically stable condition and was encouraged to return to the emergency department should her symptoms recur.    VITAL SIGNS:  Temp:  [97.7 °F (36.5 °C)-98.5 °F (36.9 °C)] 98.1 °F (36.7 °C)  Heart Rate:  [70-90] 90  Resp:  [18-20] 20  BP: (141-171)/(75-94) 170/94  SpO2:  [93 %-97 %] 95 %  on   ;   Device (Oxygen Therapy): room air    Body mass index is 37.98 kg/m².  Wt Readings from Last 3 Encounters:   08/19/24 85.3 kg (188 lb 0.8 oz)   06/14/24 89.8 kg (198 lb)   06/07/24 87.1 kg (192 lb)       PHYSICAL EXAM:  Physical Exam  Constitutional:       General: She is not in acute distress.     Appearance: She is well-developed.   HENT:      Head: Normocephalic and atraumatic.   Eyes:      Conjunctiva/sclera: Conjunctivae normal.   Neck:      Trachea: No tracheal deviation.   Cardiovascular:      Rate and Rhythm: Normal rate and regular rhythm.      Heart sounds: No murmur heard.     No friction rub. No gallop.   Pulmonary:      Effort: No respiratory distress.      Breath sounds: Normal breath sounds. No wheezing or rales.   Abdominal:      General: Bowel sounds are normal. There is no distension.      Palpations: Abdomen is soft.      Tenderness: There is no abdominal tenderness. There is no guarding.   Skin:     General: Skin is warm and dry.      Findings: No erythema or rash.   Neurological:      Mental Status: She is alert and oriented to person, place, and time.      Cranial Nerves: No  "cranial nerve deficit.        Present during visit: Patient's spouse    DISCHARGE MEDICATIONS:     Your medication list        START taking these medications        Instructions Last Dose Given Next Dose Due   fluconazole 150 MG tablet  Commonly known as: DIFLUCAN      Take 1 tablet by mouth Daily for 2 doses. Indications: Vagina and Vulva Infection due to Candida Species Fungus       hydrALAZINE 10 MG tablet  Commonly known as: APRESOLINE      Take 1 tablet by mouth Every 12 (Twelve) Hours.              CHANGE how you take these medications        Instructions Last Dose Given Next Dose Due   labetalol 200 MG tablet  Commonly known as: NORMODYNE  What changed:   how much to take  when to take this  reasons to take this      Take 0.5 tablets by mouth 2 (Two) Times a Day.              CONTINUE taking these medications        Instructions Last Dose Given Next Dose Due   albuterol sulfate  (90 Base) MCG/ACT inhaler  Commonly known as: PROVENTIL HFA;VENTOLIN HFA;PROAIR HFA      Inhale 2 puffs Every 4 (Four) Hours As Needed for Wheezing.       BD Pen Needle Brandee U/F 32G X 4 MM misc  Generic drug: Insulin Pen Needle      Use to inject insulin up to 4 times daily as directed       Budesonide 3 MG 24 hr capsule  Commonly known as: ENTOCORT EC      Take 3 capsules by mouth Daily.       Dexcom G6 Transmitter misc      Use 1 each Every 3 (Three) Months.       furosemide 40 MG tablet  Commonly known as: LASIX      Take 1 tablet by mouth Daily As Needed (for fluid retention).       mesalamine 4 g enema  Commonly known as: ROWASA      Insert 1 enema into the rectum Every Night.       Omnipod 5 G6 Pods (Gen 5) misc      Use 1 each Day.       pantoprazole 40 MG EC tablet  Commonly known as: PROTONIX      Take 1 tablet by mouth 2 (Two) Times a Day Before Meals.       PRENATAL PO      Take 1 tablet by mouth Daily.       TRUEplus Insulin Syringe 31G X 5/16\" 1 ML misc  Generic drug: Insulin Syringe-Needle U-100      Use 1 each 3 " (Three) Times a Day With Meals. As needed for additional insulin bolus with meals.              STOP taking these medications      lisinopril 40 MG tablet  Commonly known as: PRINIVIL,ZESTRIL        NIFEdipine CC 60 MG 24 hr tablet  Commonly known as: ADALAT CC        NovoLOG 100 UNIT/ML injection  Generic drug: Insulin Aspart                  Where to Get Your Medications        These medications were sent to [facility] [place] [address] [address] [place] [address]      Hours: Monday to Friday 7 AM to 6 PM Phone: [phone]   fluconazole 150 MG tablet  hydrALAZINE 10 MG tablet  labetalol 200 MG tablet         DISCHARGE DISPOSITION   Stable    Diet Instructions       Diet: Cardiac Diets, Diabetic Diets; Healthy Heart (2-3 Na+); Thin (IDDSI 0); Consistent Carbohydrate      Discharge Diet:  Cardiac Diets  Diabetic Diets       Cardiac Diet: Healthy Heart (2-3 Na+)    Fluid Consistency: Thin (IDDSI 0)    Diabetic Diet: Consistent Carbohydrate          Activity Instructions       Gradually Increase Activity Until at Pre-Hospitalization Level      Measure Blood Pressure      1-2 times per day with heart rate; keep a log for primary cardiologist    Measure Weight      Every morning; take 1 lasix pill every morning as needed for weight gain greater than or equal to 3lbs in 24 hour period.          Future Appointments   Date Time Provider Department Center   8/19/2024  1:45 PM Pablo Cabrera MD [facility]   9/9/2024  9:00 AM Vonnie Key APRN [facility]     Your Scheduled Appointments      Aug 19, 2024 1:45 PM  Follow Up with Pablo Cabrera MD  [facility] ([place]) [address] [place] [id number]  [phone]   -Bring photo ID, insurance card, and list of medications to appointment  -If testing was completed outside of [facility] then patient must bring images on a disc  -Copay will be collected at time of  appointment  -Established patients should arrive 15 minutes prior to appointment         Sep 09, 2024 9:00 AM  FOLLOW UP with CICI Veliz  [facility] ([place]) [address] [place] [address] [phone]             Additional Instructions for the Follow-ups that You Need to Schedule       Discharge Follow-up with PCP   As directed       Currently Documented PCP:    Shawna Lambert DO    PCP Phone Number:    [phone]     Follow Up Details: 1 week; hospital follow up dyspnea, uncontrolled BP        Discharge Follow-up with Specified Provider: Keep follow up cardiology appt today   As directed      To: Keep follow up cardiology appt today               Follow-up Information       Shawna Lambert DO .    Specialty: Family Medicine  Why: 1 week; hospital follow up dyspnea, uncontrolled BP  Contact information:  [address] [facility] [address] [phone]                              TEST  RESULTS PENDING AT DISCHARGE       CODE STATUS  Code Status and Medical Interventions: CPR (Attempt to Resuscitate); Full Support   Ordered at: 08/17/24 0227     Code Status (Patient has no pulse and is not breathing):    CPR (Attempt to Resuscitate)     Medical Interventions (Patient has pulse or is breathing):    Full Support       Marlyn Sevilla DO  08/19/24  10:25 EDT    Please note that this discharge summary required more than 30 minutes to complete.    Please send a copy of this dictation to the following providers:  Shawna Lambert DO    Electronically signed by Marlyn Sevilla DO at 08/19/24 6556

## 2024-08-21 NOTE — PROGRESS NOTES
"Enter Query Response Below      Query Response: - acute diastolic heart failure ruled out              If applicable, please update the problem list.       Patient: Antoinette Pack        : 1985  Account: [id number]           Admit Date: 2024        How to Respond to this query:       a. Click New Note     b. Answer query within the yellow box.                c. Update the Problem List, if applicable.      If you have any questions about this query contact me at: [email or web address]           Patient presented  with shortness of breath. ER notes state \"ill appearing, tachypnea, accessory muscle usage, respiratory distress.\" Heart rate 129, respiratory rate 22, oxygen saturation 94% on 4L via nasal cannula, proBNP 781.0     ECHO  showed  - Left ventricular systolic function is normal. Calculated left ventricular EF = 54% Left ventricular ejection fraction appears to be 51 - 55%. Left ventricular wall thickness is consistent with mild concentric hypertrophy. Left ventricular diastolic function was normal\"     On  the patient was treated with IV lasix. The discharge summary states \"There was initial concern for acute on chronic HFpEF with flash pulmonary edema\"      Please clarify the following:    - acute diastolic heart failure ruled in  - acute diastolic heart failure ruled out  - other- specify______  - unable to determine        By submitting this query, we are merely seeking further clarification of documentation to accurately reflect all conditions that you are monitoring, evaluating, treating or that extend the hospitalization or utilize additional resources of care. Please utilize your independent clinical judgment when addressing the question(s) above.     This query and your response, once completed, will be entered into the legal medical record.    Sincerely,  Kamla Salgado APRKRISTINE-BC  Clinical Documentation Integrity Program   "

## 2024-08-22 ENCOUNTER — READMISSION MANAGEMENT (OUTPATIENT)
Dept: CALL CENTER | Facility: HOSPITAL | Age: 39
End: 2024-08-22
Payer: MEDICARE

## 2024-08-22 NOTE — OUTREACH NOTE
CHF Week 1 Survey      Flowsheet Row Responses   [facility] patient discharged from? [facility]   Does the patient have one of the following disease processes/diagnoses(primary or secondary)? CHF   CHF Week 1 attempt successful? Yes   Call start time 0901   Call end time 0915   Discharge diagnosis Acute exacerbation of CHF (congestive heart failure)   Meds reviewed with patient/caregiver? Yes   Is the patient having any side effects they believe may be caused by any medication additions or changes? No   Does the patient have all medications ordered at discharge? No   What is keeping the patient from filling the prescriptions? Lost script/didn't receive  [pt was supposed to receive hydralazine at discharge and amlodopine prescribed from cardio at f/u on 8/19/24]   Nursing Interventions Nurse called pharmacy  [called pharmacy and hydralazine and amlodopine both ready for  at no cost to pt]   Is the patient taking all medications as directed (includes completed medication regime)? Yes   Comments regarding appointments pt had cardio f/u on 8/19/24, Hf clinic on 8/26/24 and next cardio f/u on 9/5/24   Does the patient have a primary care provider?  Yes   Has the patient kept scheduled appointments due by today? Yes   Has home health visited the patient within 72 hours of discharge? N/A   Psychosocial issues? No   Did the patient receive a copy of their discharge instructions? Yes   Nursing interventions Reviewed instructions with patient   What is the patient's perception of their health status since discharge? Same  [Reports still having some BROKOS and HTN, provided latest BP reading of 167/100. New meds prescribed but not picked up, this Rn followed up on and pt will  today.  Pt denies edema, reports she never really had any prior to admit.]   Nursing interventions Nurse provided patient education  [Pt has lasix now to use prn.  Advised on importance of weighing daily, monitor for 2-3#daily wt gain, 5#  weekly gain along with increase in s/s.  Pt has CGM, trying to eat healthy while breastfeeding while maintaining adequate glucose readings.]   Is the patient able to teach back signs and symptoms of worsening condition? (i.e. weight gain, shortness of air, etc.) Yes   Is the patient able to teach back Heart Failure Zones? Yes   CHF Zone this Call Yellow Zone   Yellow Zone Not feeling as good as usual  [self reports between green-yellow, aware to notify MD for worsening s/s.]    CHF Week 1 call completed? Yes   Call end time 0915            ALBERT FOY - Registered Nurse

## 2024-08-23 ENCOUNTER — APPOINTMENT (OUTPATIENT)
Dept: CT IMAGING | Facility: HOSPITAL | Age: 39
DRG: 304 | End: 2024-08-23
Payer: MEDICARE

## 2024-08-23 ENCOUNTER — APPOINTMENT (OUTPATIENT)
Dept: GENERAL RADIOLOGY | Facility: HOSPITAL | Age: 39
DRG: 304 | End: 2024-08-23
Payer: COMMERCIAL

## 2024-08-23 ENCOUNTER — HOSPITAL ENCOUNTER (INPATIENT)
Facility: HOSPITAL | Age: 39
LOS: 1 days | Discharge: HOME | DRG: 304 | End: 2024-08-24
Attending: STUDENT IN AN ORGANIZED HEALTH CARE EDUCATION/TRAINING PROGRAM | Admitting: INTERNAL MEDICINE
Payer: MEDICARE

## 2024-08-23 ENCOUNTER — TELEPHONE (OUTPATIENT)
Dept: CARDIOLOGY | Facility: CLINIC | Age: 39
End: 2024-08-23
Payer: MEDICARE

## 2024-08-23 DIAGNOSIS — J96.01 ACUTE RESPIRATORY FAILURE WITH HYPOXIA: Primary | ICD-10-CM

## 2024-08-23 DIAGNOSIS — J81.0 ACUTE PULMONARY EDEMA: ICD-10-CM

## 2024-08-23 LAB
A-A DO2: 44 MMHG (ref 0–300)
ALBUMIN SERPL-MCNC: 3.9 G/DL (ref 3.5–5.2)
ALBUMIN/GLOB SERPL: 1.1 G/DL
ALP SERPL-CCNC: 88 U/L (ref 39–117)
ALT SERPL W P-5'-P-CCNC: 13 U/L (ref 1–33)
ANION GAP SERPL CALCULATED.3IONS-SCNC: 12.5 MMOL/L (ref 5–15)
APTT PPP: 23.3 SECONDS (ref 26.5–34.5)
ARTERIAL PATENCY WRIST A: ABNORMAL
AST SERPL-CCNC: 15 U/L (ref 1–32)
ATMOSPHERIC PRESS: 732 MMHG
BACTERIA UR QL AUTO: NORMAL /HPF
BASE EXCESS BLDA CALC-SCNC: -0.2 MMOL/L (ref 0–2)
BASOPHILS # BLD AUTO: 0.03 10*3/MM3 (ref 0–0.2)
BASOPHILS NFR BLD AUTO: 0.3 % (ref 0–1.5)
BDY SITE: ABNORMAL
BILIRUB SERPL-MCNC: 0.5 MG/DL (ref 0–1.2)
BILIRUB UR QL STRIP: NEGATIVE
BUN SERPL-MCNC: 15 MG/DL (ref 6–20)
BUN/CREAT SERPL: 22.4 (ref 7–25)
CALCIUM SPEC-SCNC: 8.9 MG/DL (ref 8.6–10.5)
CHLORIDE SERPL-SCNC: 104 MMOL/L (ref 98–107)
CLARITY UR: CLEAR
CO2 BLDA-SCNC: 23.2 MMOL/L (ref 22–33)
CO2 SERPL-SCNC: 20.5 MMOL/L (ref 22–29)
COHGB MFR BLD: 1 % (ref 0–5)
COLOR UR: YELLOW
CREAT SERPL-MCNC: 0.67 MG/DL (ref 0.57–1)
D-LACTATE SERPL-SCNC: 0.9 MMOL/L (ref 0.5–2)
DEPRECATED RDW RBC AUTO: 39.2 FL (ref 37–54)
EGFRCR SERPLBLD CKD-EPI 2021: 112.8 ML/MIN/1.73
EOSINOPHIL # BLD AUTO: 0.09 10*3/MM3 (ref 0–0.4)
EOSINOPHIL NFR BLD AUTO: 1 % (ref 0.3–6.2)
ERYTHROCYTE [DISTWIDTH] IN BLOOD BY AUTOMATED COUNT: 13.7 % (ref 12.3–15.4)
GEN 5 2HR TROPONIN T REFLEX: 13 NG/L
GLOBULIN UR ELPH-MCNC: 3.6 GM/DL
GLUCOSE BLDC GLUCOMTR-MCNC: 107 MG/DL (ref 70–130)
GLUCOSE BLDC GLUCOMTR-MCNC: 165 MG/DL (ref 70–130)
GLUCOSE SERPL-MCNC: 104 MG/DL (ref 65–99)
GLUCOSE UR STRIP-MCNC: NEGATIVE MG/DL
HCO3 BLDA-SCNC: 22.3 MMOL/L (ref 20–26)
HCT VFR BLD AUTO: 39.5 % (ref 34–46.6)
HCT VFR BLD CALC: 41.7 % (ref 38–51)
HGB BLD-MCNC: 12.8 G/DL (ref 12–15.9)
HGB BLDA-MCNC: 13.6 G/DL (ref 13.5–17.5)
HGB UR QL STRIP.AUTO: ABNORMAL
HOLD SPECIMEN: NORMAL
HOLD SPECIMEN: NORMAL
HYALINE CASTS UR QL AUTO: NORMAL /LPF
IMM GRANULOCYTES # BLD AUTO: 0.02 10*3/MM3 (ref 0–0.05)
IMM GRANULOCYTES NFR BLD AUTO: 0.2 % (ref 0–0.5)
INHALED O2 CONCENTRATION: 21 %
INR PPP: 1.01 (ref 0.9–1.1)
KETONES UR QL STRIP: NEGATIVE
LEUKOCYTE ESTERASE UR QL STRIP.AUTO: NEGATIVE
LYMPHOCYTES # BLD AUTO: 1.71 10*3/MM3 (ref 0.7–3.1)
LYMPHOCYTES NFR BLD AUTO: 19.1 % (ref 19.6–45.3)
Lab: ABNORMAL
MCH RBC QN AUTO: 26 PG (ref 26.6–33)
MCHC RBC AUTO-ENTMCNC: 32.4 G/DL (ref 31.5–35.7)
MCV RBC AUTO: 80.3 FL (ref 79–97)
METHGB BLD QL: 0.4 % (ref 0–3)
MODALITY: ABNORMAL
MONOCYTES # BLD AUTO: 0.39 10*3/MM3 (ref 0.1–0.9)
MONOCYTES NFR BLD AUTO: 4.4 % (ref 5–12)
NEUTROPHILS NFR BLD AUTO: 6.7 10*3/MM3 (ref 1.7–7)
NEUTROPHILS NFR BLD AUTO: 75 % (ref 42.7–76)
NITRITE UR QL STRIP: NEGATIVE
NRBC BLD AUTO-RTO: 0 /100 WBC (ref 0–0.2)
NT-PROBNP SERPL-MCNC: 967.9 PG/ML (ref 0–450)
OXYHGB MFR BLDV: 92.7 % (ref 94–99)
PCO2 BLDA: 29.8 MM HG (ref 35–45)
PCO2 TEMP ADJ BLD: ABNORMAL MM[HG]
PH BLDA: 7.48 PH UNITS (ref 7.35–7.45)
PH UR STRIP.AUTO: 7 [PH] (ref 5–8)
PH, TEMP CORRECTED: ABNORMAL
PLATELET # BLD AUTO: 138 10*3/MM3 (ref 140–450)
PMV BLD AUTO: 11.1 FL (ref 6–12)
PO2 BLDA: 66.2 MM HG (ref 83–108)
PO2 TEMP ADJ BLD: ABNORMAL MM[HG]
POTASSIUM SERPL-SCNC: 3.8 MMOL/L (ref 3.5–5.2)
PROCALCITONIN SERPL-MCNC: <0.02 NG/ML (ref 0–0.25)
PROT SERPL-MCNC: 7.5 G/DL (ref 6–8.5)
PROT UR QL STRIP: ABNORMAL
PROTHROMBIN TIME: 13.4 SECONDS (ref 12.1–14.7)
QT INTERVAL: 354 MS
QTC INTERVAL: 425 MS
RBC # BLD AUTO: 4.92 10*6/MM3 (ref 3.77–5.28)
RBC # UR STRIP: NORMAL /HPF
REF LAB TEST METHOD: NORMAL
SAO2 % BLDCOA: 94 % (ref 94–99)
SODIUM SERPL-SCNC: 137 MMOL/L (ref 136–145)
SP GR UR STRIP: 1.01 (ref 1–1.03)
SQUAMOUS #/AREA URNS HPF: NORMAL /HPF
TROPONIN T DELTA: 1 NG/L
TROPONIN T SERPL HS-MCNC: 12 NG/L
TROPONIN T SERPL HS-MCNC: 8 NG/L
UROBILINOGEN UR QL STRIP: ABNORMAL
VENTILATOR MODE: ABNORMAL
WBC # UR STRIP: NORMAL /HPF
WBC NRBC COR # BLD AUTO: 8.94 10*3/MM3 (ref 3.4–10.8)
WHOLE BLOOD HOLD COAG: NORMAL
WHOLE BLOOD HOLD SPECIMEN: NORMAL

## 2024-08-23 PROCEDURE — 71045 X-RAY EXAM CHEST 1 VIEW: CPT

## 2024-08-23 PROCEDURE — 80053 COMPREHEN METABOLIC PANEL: CPT | Performed by: STUDENT IN AN ORGANIZED HEALTH CARE EDUCATION/TRAINING PROGRAM

## 2024-08-23 PROCEDURE — 71275 CT ANGIOGRAPHY CHEST: CPT | Performed by: RADIOLOGY

## 2024-08-23 PROCEDURE — 83050 HGB METHEMOGLOBIN QUAN: CPT

## 2024-08-23 PROCEDURE — 71045 X-RAY EXAM CHEST 1 VIEW: CPT | Performed by: RADIOLOGY

## 2024-08-23 PROCEDURE — 93005 ELECTROCARDIOGRAM TRACING: CPT | Performed by: STUDENT IN AN ORGANIZED HEALTH CARE EDUCATION/TRAINING PROGRAM

## 2024-08-23 PROCEDURE — 93010 ELECTROCARDIOGRAM REPORT: CPT | Performed by: SPECIALIST

## 2024-08-23 PROCEDURE — 63710000001 INSULIN LISPRO (HUMAN) PER 5 UNITS: Performed by: INTERNAL MEDICINE

## 2024-08-23 PROCEDURE — 85730 THROMBOPLASTIN TIME PARTIAL: CPT | Performed by: STUDENT IN AN ORGANIZED HEALTH CARE EDUCATION/TRAINING PROGRAM

## 2024-08-23 PROCEDURE — 25010000002 FUROSEMIDE PER 20 MG: Performed by: INTERNAL MEDICINE

## 2024-08-23 PROCEDURE — 84145 PROCALCITONIN (PCT): CPT | Performed by: STUDENT IN AN ORGANIZED HEALTH CARE EDUCATION/TRAINING PROGRAM

## 2024-08-23 PROCEDURE — 71275 CT ANGIOGRAPHY CHEST: CPT

## 2024-08-23 PROCEDURE — 82948 REAGENT STRIP/BLOOD GLUCOSE: CPT

## 2024-08-23 PROCEDURE — 84484 ASSAY OF TROPONIN QUANT: CPT | Performed by: INTERNAL MEDICINE

## 2024-08-23 PROCEDURE — 93005 ELECTROCARDIOGRAM TRACING: CPT | Performed by: INTERNAL MEDICINE

## 2024-08-23 PROCEDURE — 83880 ASSAY OF NATRIURETIC PEPTIDE: CPT | Performed by: STUDENT IN AN ORGANIZED HEALTH CARE EDUCATION/TRAINING PROGRAM

## 2024-08-23 PROCEDURE — 94640 AIRWAY INHALATION TREATMENT: CPT

## 2024-08-23 PROCEDURE — 94799 UNLISTED PULMONARY SVC/PX: CPT

## 2024-08-23 PROCEDURE — 25010000002 HYDRALAZINE PER 20 MG

## 2024-08-23 PROCEDURE — 82805 BLOOD GASES W/O2 SATURATION: CPT

## 2024-08-23 PROCEDURE — 25010000002 ENOXAPARIN PER 10 MG: Performed by: INTERNAL MEDICINE

## 2024-08-23 PROCEDURE — 25010000002 METHYLPREDNISOLONE PER 125 MG: Performed by: STUDENT IN AN ORGANIZED HEALTH CARE EDUCATION/TRAINING PROGRAM

## 2024-08-23 PROCEDURE — 84484 ASSAY OF TROPONIN QUANT: CPT | Performed by: STUDENT IN AN ORGANIZED HEALTH CARE EDUCATION/TRAINING PROGRAM

## 2024-08-23 PROCEDURE — 36415 COLL VENOUS BLD VENIPUNCTURE: CPT

## 2024-08-23 PROCEDURE — 83605 ASSAY OF LACTIC ACID: CPT | Performed by: STUDENT IN AN ORGANIZED HEALTH CARE EDUCATION/TRAINING PROGRAM

## 2024-08-23 PROCEDURE — 25010000002 LABETALOL 5 MG/ML SOLUTION: Performed by: STUDENT IN AN ORGANIZED HEALTH CARE EDUCATION/TRAINING PROGRAM

## 2024-08-23 PROCEDURE — 25510000001 IOPAMIDOL PER 1 ML: Performed by: STUDENT IN AN ORGANIZED HEALTH CARE EDUCATION/TRAINING PROGRAM

## 2024-08-23 PROCEDURE — 85025 COMPLETE CBC W/AUTO DIFF WBC: CPT | Performed by: STUDENT IN AN ORGANIZED HEALTH CARE EDUCATION/TRAINING PROGRAM

## 2024-08-23 PROCEDURE — 82375 ASSAY CARBOXYHB QUANT: CPT

## 2024-08-23 PROCEDURE — 99285 EMERGENCY DEPT VISIT HI MDM: CPT

## 2024-08-23 PROCEDURE — 36600 WITHDRAWAL OF ARTERIAL BLOOD: CPT

## 2024-08-23 PROCEDURE — 99223 1ST HOSP IP/OBS HIGH 75: CPT | Performed by: INTERNAL MEDICINE

## 2024-08-23 PROCEDURE — 87040 BLOOD CULTURE FOR BACTERIA: CPT | Performed by: STUDENT IN AN ORGANIZED HEALTH CARE EDUCATION/TRAINING PROGRAM

## 2024-08-23 PROCEDURE — 85610 PROTHROMBIN TIME: CPT | Performed by: STUDENT IN AN ORGANIZED HEALTH CARE EDUCATION/TRAINING PROGRAM

## 2024-08-23 PROCEDURE — 81001 URINALYSIS AUTO W/SCOPE: CPT | Performed by: STUDENT IN AN ORGANIZED HEALTH CARE EDUCATION/TRAINING PROGRAM

## 2024-08-23 PROCEDURE — 25010000002 FUROSEMIDE PER 20 MG: Performed by: STUDENT IN AN ORGANIZED HEALTH CARE EDUCATION/TRAINING PROGRAM

## 2024-08-23 RX ORDER — HYDRALAZINE HYDROCHLORIDE 20 MG/ML
10 INJECTION INTRAMUSCULAR; INTRAVENOUS ONCE
Status: COMPLETED | OUTPATIENT
Start: 2024-08-23 | End: 2024-08-23

## 2024-08-23 RX ORDER — NICOTINE POLACRILEX 4 MG
15 LOZENGE BUCCAL
Status: DISCONTINUED | OUTPATIENT
Start: 2024-08-23 | End: 2024-08-24 | Stop reason: HOSPADM

## 2024-08-23 RX ORDER — DEXTROSE MONOHYDRATE 25 G/50ML
25 INJECTION, SOLUTION INTRAVENOUS
Status: DISCONTINUED | OUTPATIENT
Start: 2024-08-23 | End: 2024-08-24 | Stop reason: HOSPADM

## 2024-08-23 RX ORDER — ENOXAPARIN SODIUM 100 MG/ML
40 INJECTION SUBCUTANEOUS DAILY
Status: DISCONTINUED | OUTPATIENT
Start: 2024-08-24 | End: 2024-08-23

## 2024-08-23 RX ORDER — IPRATROPIUM BROMIDE AND ALBUTEROL SULFATE 2.5; .5 MG/3ML; MG/3ML
SOLUTION RESPIRATORY (INHALATION)
Status: COMPLETED
Start: 2024-08-23 | End: 2024-08-23

## 2024-08-23 RX ORDER — LABETALOL HYDROCHLORIDE 5 MG/ML
10 INJECTION, SOLUTION INTRAVENOUS ONCE
Status: COMPLETED | OUTPATIENT
Start: 2024-08-23 | End: 2024-08-23

## 2024-08-23 RX ORDER — INSULIN LISPRO 100 [IU]/ML
2-9 INJECTION, SOLUTION INTRAVENOUS; SUBCUTANEOUS
Status: DISCONTINUED | OUTPATIENT
Start: 2024-08-23 | End: 2024-08-24 | Stop reason: HOSPADM

## 2024-08-23 RX ORDER — FUROSEMIDE 10 MG/ML
40 INJECTION INTRAMUSCULAR; INTRAVENOUS 2 TIMES DAILY
Status: DISCONTINUED | OUTPATIENT
Start: 2024-08-23 | End: 2024-08-24 | Stop reason: HOSPADM

## 2024-08-23 RX ORDER — METHYLPREDNISOLONE SODIUM SUCCINATE 125 MG/2ML
125 INJECTION, POWDER, LYOPHILIZED, FOR SOLUTION INTRAMUSCULAR; INTRAVENOUS ONCE
Status: COMPLETED | OUTPATIENT
Start: 2024-08-23 | End: 2024-08-23

## 2024-08-23 RX ORDER — PRENATAL VIT/IRON FUM/FOLIC AC 27MG-0.8MG
1 TABLET ORAL DAILY
Status: CANCELLED | OUTPATIENT
Start: 2024-08-24

## 2024-08-23 RX ORDER — SODIUM CHLORIDE 9 MG/ML
40 INJECTION, SOLUTION INTRAVENOUS AS NEEDED
Status: DISCONTINUED | OUTPATIENT
Start: 2024-08-23 | End: 2024-08-24 | Stop reason: HOSPADM

## 2024-08-23 RX ORDER — ENOXAPARIN SODIUM 100 MG/ML
40 INJECTION SUBCUTANEOUS NIGHTLY
Status: DISCONTINUED | OUTPATIENT
Start: 2024-08-23 | End: 2024-08-24 | Stop reason: HOSPADM

## 2024-08-23 RX ORDER — BISACODYL 5 MG/1
5 TABLET, DELAYED RELEASE ORAL DAILY PRN
Status: DISCONTINUED | OUTPATIENT
Start: 2024-08-23 | End: 2024-08-24 | Stop reason: HOSPADM

## 2024-08-23 RX ORDER — LABETALOL 100 MG/1
100 TABLET, FILM COATED ORAL 2 TIMES DAILY
Status: CANCELLED | OUTPATIENT
Start: 2024-08-23

## 2024-08-23 RX ORDER — SODIUM CHLORIDE 0.9 % (FLUSH) 0.9 %
10 SYRINGE (ML) INJECTION AS NEEDED
Status: DISCONTINUED | OUTPATIENT
Start: 2024-08-23 | End: 2024-08-24 | Stop reason: HOSPADM

## 2024-08-23 RX ORDER — BISACODYL 10 MG
10 SUPPOSITORY, RECTAL RECTAL DAILY PRN
Status: DISCONTINUED | OUTPATIENT
Start: 2024-08-23 | End: 2024-08-24 | Stop reason: HOSPADM

## 2024-08-23 RX ORDER — ASPIRIN 81 MG/1
324 TABLET, CHEWABLE ORAL ONCE
Status: DISCONTINUED | OUTPATIENT
Start: 2024-08-23 | End: 2024-08-24 | Stop reason: HOSPADM

## 2024-08-23 RX ORDER — HYDRALAZINE HYDROCHLORIDE 10 MG/1
10 TABLET, FILM COATED ORAL EVERY 12 HOURS SCHEDULED
Status: CANCELLED | OUTPATIENT
Start: 2024-08-23

## 2024-08-23 RX ORDER — MESALAMINE 4 G/60ML
4 SUSPENSION RECTAL NIGHTLY
Status: CANCELLED | OUTPATIENT
Start: 2024-08-23

## 2024-08-23 RX ORDER — POLYETHYLENE GLYCOL 3350 17 G/17G
17 POWDER, FOR SOLUTION ORAL DAILY PRN
Status: DISCONTINUED | OUTPATIENT
Start: 2024-08-23 | End: 2024-08-24 | Stop reason: HOSPADM

## 2024-08-23 RX ORDER — SODIUM CHLORIDE 0.9 % (FLUSH) 0.9 %
10 SYRINGE (ML) INJECTION EVERY 12 HOURS SCHEDULED
Status: DISCONTINUED | OUTPATIENT
Start: 2024-08-23 | End: 2024-08-24 | Stop reason: HOSPADM

## 2024-08-23 RX ORDER — AMLODIPINE BESYLATE 10 MG/1
10 TABLET ORAL DAILY
Status: CANCELLED | OUTPATIENT
Start: 2024-08-24

## 2024-08-23 RX ORDER — IBUPROFEN 400 MG/1
600 TABLET, FILM COATED ORAL ONCE
Status: COMPLETED | OUTPATIENT
Start: 2024-08-24 | End: 2024-08-23

## 2024-08-23 RX ORDER — FUROSEMIDE 10 MG/ML
80 INJECTION INTRAMUSCULAR; INTRAVENOUS ONCE
Status: COMPLETED | OUTPATIENT
Start: 2024-08-23 | End: 2024-08-23

## 2024-08-23 RX ORDER — PANTOPRAZOLE SODIUM 40 MG/1
40 TABLET, DELAYED RELEASE ORAL
Status: CANCELLED | OUTPATIENT
Start: 2024-08-24

## 2024-08-23 RX ORDER — IPRATROPIUM BROMIDE AND ALBUTEROL SULFATE 2.5; .5 MG/3ML; MG/3ML
3 SOLUTION RESPIRATORY (INHALATION) ONCE
Status: COMPLETED | OUTPATIENT
Start: 2024-08-23 | End: 2024-08-23

## 2024-08-23 RX ORDER — IOPAMIDOL 755 MG/ML
100 INJECTION, SOLUTION INTRAVASCULAR
Status: COMPLETED | OUTPATIENT
Start: 2024-08-23 | End: 2024-08-23

## 2024-08-23 RX ORDER — ALBUTEROL SULFATE 0.83 MG/ML
2.5 SOLUTION RESPIRATORY (INHALATION) EVERY 4 HOURS PRN
Status: CANCELLED | OUTPATIENT
Start: 2024-08-23

## 2024-08-23 RX ORDER — FUROSEMIDE 40 MG
40 TABLET ORAL DAILY PRN
Status: CANCELLED | OUTPATIENT
Start: 2024-08-23

## 2024-08-23 RX ORDER — LABETALOL 200 MG/1
200 TABLET, FILM COATED ORAL 2 TIMES DAILY
Status: CANCELLED | OUTPATIENT
Start: 2024-08-23

## 2024-08-23 RX ORDER — BUDESONIDE 3 MG/1
9 CAPSULE, COATED PELLETS ORAL DAILY
Status: CANCELLED | OUTPATIENT
Start: 2024-08-24

## 2024-08-23 RX ORDER — AMOXICILLIN 250 MG
2 CAPSULE ORAL 2 TIMES DAILY PRN
Status: DISCONTINUED | OUTPATIENT
Start: 2024-08-23 | End: 2024-08-24 | Stop reason: HOSPADM

## 2024-08-23 RX ADMIN — FUROSEMIDE 80 MG: 10 INJECTION, SOLUTION INTRAMUSCULAR; INTRAVENOUS at 15:35

## 2024-08-23 RX ADMIN — IBUPROFEN 600 MG: 400 TABLET, FILM COATED ORAL at 23:40

## 2024-08-23 RX ADMIN — METHYLPREDNISOLONE SODIUM SUCCINATE 125 MG: 125 INJECTION, POWDER, FOR SOLUTION INTRAMUSCULAR; INTRAVENOUS at 13:34

## 2024-08-23 RX ADMIN — IOPAMIDOL 70 ML: 755 INJECTION, SOLUTION INTRAVENOUS at 16:32

## 2024-08-23 RX ADMIN — IPRATROPIUM BROMIDE AND ALBUTEROL SULFATE 3 ML: .5; 2.5 SOLUTION RESPIRATORY (INHALATION) at 13:22

## 2024-08-23 RX ADMIN — FUROSEMIDE 40 MG: 10 INJECTION, SOLUTION INTRAMUSCULAR; INTRAVENOUS at 21:06

## 2024-08-23 RX ADMIN — ENOXAPARIN SODIUM 40 MG: 40 INJECTION SUBCUTANEOUS at 21:05

## 2024-08-23 RX ADMIN — IPRATROPIUM BROMIDE AND ALBUTEROL SULFATE 3 ML: 2.5; .5 SOLUTION RESPIRATORY (INHALATION) at 13:22

## 2024-08-23 RX ADMIN — LABETALOL HYDROCHLORIDE 10 MG: 5 INJECTION, SOLUTION INTRAVENOUS at 13:38

## 2024-08-23 RX ADMIN — Medication 10 ML: at 21:05

## 2024-08-23 RX ADMIN — INSULIN LISPRO 2 UNITS: 100 INJECTION, SOLUTION INTRAVENOUS; SUBCUTANEOUS at 21:06

## 2024-08-23 RX ADMIN — HYDRALAZINE HYDROCHLORIDE 10 MG: 20 INJECTION INTRAMUSCULAR; INTRAVENOUS at 21:06

## 2024-08-23 NOTE — H&P
[facility] HOSPITALIST HISTORY AND PHYSICAL    Patient Identification:  Name:  Antoinette Pack  Age:  39 y.o.  Sex:  female  :  1985  MRN:  [id number]   Admit Date: 2024   Visit Number:  [id number]  Primary Care Physician:  Shawna Lambert DO       Chief complaint: Shortness of breath    History of presenting illness: Ms. Pack is a 39 y.o. female who presented to [facility] emergency department on 2024 with a chief complaint of shortness of breath.  Patient has a past medical history remarkable for type 2 diabetes mellitus, essential hypertension, ALEJANDRO, anxiety/depression.  Patient states she was in her usual state of health when she was sitting on the couch watching TV and feeding her baby when she had sudden onset shortness of breath.  Patient states she felt a sudden onset chest pain which was substernal in nature and lasted for several minutes.  She stated it felt like she was kicked in the chest.  When the pain resolved, patient had continued shortness of breath.  Patient did call EMS who brought patient to the emergency department for further treatment and evaluation.  Patient denied any nausea, vomiting, diaphoresis or any other symptoms.  Initial evaluation in the emergency department did consist of basic laboratory work as well as physical exam and vital signs.  Initial vital signs found patient's blood pressure 202/120, respiratory rate 34, heart rate 103, temperature 98 °F and oxygen saturation 99% on room air.  Initial lab work did include CBC, CMP and ABG.  CMP was essentially within normal limits.  CBC was within normal limits except for mildly reduced platelet count of 138.  ABG demonstrated pH 7.48, pCO2 29, pO2 66 and bicarb 22 on room air.  CT chest was obtained which demonstrated no evidence of pulmonary embolism but per my interpretation did appear to have pulmonary edema.  With this in mind, patient's presentation appears most consistent  "with flash pulmonary edema secondary to hypertensive emergency.  Patient did receive labetalol 10 mg IV x 1 dose in the emergency department with dramatic improvement in blood pressure.  Patient will now be admitted for further treatment and evaluation.  -------------------------------------------------------------------------------------------------------------------  Past Medical History:   Diagnosis Date    Anxiety     CHF (congestive heart failure) 2023    Colitis     Depression     Diabetes mellitus     type 2    Diverticulitis     GERD (gastroesophageal reflux disease)     History of transfusion 2024    2 units post delivery    Hypertension     chronic    Kidney stone     \"years ago\"    Narcolepsy     Pancreatitis 2023    PCOS (polycystic ovarian syndrome)     Rectal bleeding     SINCE  - USES MESALAMINE SUPPOSITORY NIGHTLY    Seizures 2012    Sleep apnea      Past Surgical History:   Procedure Laterality Date    CARDIAC CATHETERIZATION      CARPAL TUNNEL RELEASE       SECTION N/A 2024    Procedure:  SECTION PRIMARY;  Surgeon: Axel Keys MD;  Location:  [facility] LABOR DELIVERY;  Service: Obstetrics/Gynecology;  Laterality: N/A;    COLONOSCOPY      COLONOSCOPY N/A 2024    Procedure: COLONOSCOPY;  Surgeon: Tom Mueller MD;  Location: [facility] ENDOSCOPY;  Service: Gastroenterology;  Laterality: N/A;    ENDOSCOPY      ENDOSCOPY N/A 2024    Procedure: ESOPHAGOGASTRODUODENOSCOPY;  Surgeon: Tom Mueller MD;  Location: [facility] ENDOSCOPY;  Service: Gastroenterology;  Laterality: N/A;    FRACTURE SURGERY Left     wrist    HARDWARE REMOVAL Left     WRIST    TENDON REPAIR Left     HAND    WISDOM TOOTH EXTRACTION       Family History   Problem Relation Age of Onset    Hypertension Mother     Depression Mother     Heart disease Mother     COPD Mother     Emphysema Mother     Hypertension Father     Diabetes Father     Heart disease Father     COPD Father     " Heart failure Father     Hepatitis Father      Social History     Socioeconomic History    Marital status:    Tobacco Use    Smoking status: Former     Current packs/day: 0.00     Average packs/day: 1 pack/day for 20.0 years (20.0 ttl pk-yrs)     Types: Cigarettes     Start date: 1995     Quit date:      Years since quittin.6     Passive exposure: Never    Smokeless tobacco: Never    Tobacco comments:     QUIT IN 2017   Vaping Use    Vaping status: Never Used   Substance and Sexual Activity    Alcohol use: No    Drug use: No    Sexual activity: Defer     ---------------------------------------------------------------------------------------------------------------------   Allergies:  Milk-related compounds, Dairycare [lactase-lactobacillus], Dilantin [phenytoin], Keppra [levetiracetam], Meperidine, and Topamax [topiramate]  ---------------------------------------------------------------------------------------------------------------------   Prior to Admission Medications       Prescriptions Last Dose Informant Patient Reported? Taking?    albuterol sulfate  (90 Base) MCG/ACT inhaler  Self Yes No    Inhale 2 puffs Every 4 (Four) Hours As Needed for Wheezing.    amLODIPine (NORVASC) 10 MG tablet   No No    Take 1 tablet by mouth Daily.    Budesonide (ENTOCORT EC) 3 MG 24 hr capsule  Self No No    Take 3 capsules by mouth Daily.    Continuous Glucose Transmitter (Dexcom G6 Transmitter) misc  Self No No    Use 1 each Every 3 (Three) Months.    fluconazole (DIFLUCAN) 150 MG tablet   No No    Take 1 tablet by mouth Daily for 2 doses. 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    hydrALAZINE (APRESOLINE) 10 MG tablet   No No    Take 1 tablet by mouth Every 12 (Twelve) Hours.    Insulin Disposable Pump (Omnipod 5 G6 Pods, Gen 5,) misc   No No    Use 1 each Day.  "   Insulin Pen Needle 32G X 4 MM [facility], Self No No    Use to inject insulin up to 4 times daily as directed    Insulin Syringe 31G X 5/16\" 1 ML misc   No No    Use 1 each 3 (Three) Times a Day With Meals. As needed for additional insulin bolus with meals.    labetalol (NORMODYNE) 200 MG tablet   No No    Take ½ tablet by mouth 2 (Two) Times a Day.    labetalol (NORMODYNE) 200 MG tablet   No No    Take 1 tablet by mouth 2 (Two) Times a Day.    mesalamine (ROWASA) 4 g enema  Self No No    Insert 1 enema into the rectum Every Night.    pantoprazole (PROTONIX) 40 MG EC tablet  Self No No    Take 1 tablet by mouth 2 (Two) Times a Day Before Meals.    Prenatal Vit-Fe Fumarate-FA (PRENATAL PO)  Self Yes No    Take 1 tablet by mouth Daily.          Hospital Scheduled Meds:  aspirin, 324 mg, Oral, Once         ---------------------------------------------------------------------------------------------------------------------   Review of Systems   On review of systems the patient denies the following unless noted above:     Constitutional:  Fevers, chills, weight change, fatigue     Eyes: Vision changes, headache, double vision, loss of vision     ENT: Runny nose, nose bleeds, ringing in ears, pain with swallowing, sore throat     Cardiovascular: Chest pains, palpitations, PND, orthopnea     Respiratory: Cough, wheezing, SOA, hemoptysis     GI:  Abdominal pain, diarrhea, constipation, change in stool caliber,    Rectal bleeding, vomiting or nausea     : Difficulty voiding, dysuria, hematuria     Musculoskeletal: Changes of any chronic joint pain, swelling     Skin: Rash, itching, easy bruisability     Neurological: Unilateral weakness, new onset numbness, speech difficulties     Psychiatric: Sadness, tearfulness, feelings of hopelessness, racing thoughts     Endocrine:  Heat or cold intolerance, mood swings, polydipsia, polyphagia,    recent " hypoglycemia  --------------------------------------------------------------------------------------------------------------------  Vital Signs:  Temp:  [98 °F (36.7 °C)] 98 °F (36.7 °C)  Heart Rate:  [] 94  Resp:  [28-34] 28  BP: (135-202)/() 162/88      08/23/24  1316   Weight: 83.9 kg (184 lb 15.5 oz)     Body mass index is 37.36 kg/m².  ---------------------------------------------------------------------------------------------------------------------   Physical exam:  Constitutional: Obese by BMI  female in no apparent distress.     HENT:  Head:  Normocephalic and atraumatic.  Mouth:  Moist mucous membranes.    Eyes:  Conjunctivae and EOM are normal.  Pupils are equal, round, and reactive to light.  No scleral icterus.    Neck:  Neck supple. No thyromegaly.  No JVD present.    Cardiovascular:  Regular rate and rhythm with no murmurs, rubs, clicks or gallops appreciated.  Pulmonary/Chest: Diffuse bilateral inspiratory crackles with no wheezes appreciated  Abdominal:  Soft. Nontender. Nondistended  Bowel sounds are normal in all four quadrants. No organomegally appreciated.   Musculoskeletal:  No edema, no tenderness, and no deformity.  No red or swollen joints anywhere.    Neurological:  Alert, Cranial nerves II-XII intact with no focal defecits.  No facial droop.  No slurred speech.   Skin:  Warm and dry to palpation with no rashes or lesions appreciated.  Peripheral vascular:  2+ radial and pedal pulses in bilateral upper and lower extremities.  Psychiatric:  Alert and oriented x3, demonstrates appropriate judgement and insight.  --------------------------------------------------------------------------  ---------------------------------------------------------------------------------------------------------------------   Results from last 7 days   Lab Units 08/23/24  1415 08/17/24  0515 08/17/24  0327 08/16/24  2144   CRP mg/dL  --   --   --  <0.30   LACTATE mmol/L 0.9  --   --   --   "  WBC 10*3/mm3 8.94 10.44 11.52* 9.88   HEMOGLOBIN g/dL 12.8 12.5 14.0 15.3   HEMATOCRIT % 39.5 38.6 42.9 47.7*   MCV fL 80.3 82.1 80.3 80.7   MCHC g/dL 32.4 32.4 32.6 32.1   PLATELETS 10*3/mm3 138* 165 172 196   INR  1.01  --  0.96 0.90     Results from last 7 days   Lab Units 08/23/24  1332   PH, ARTERIAL pH units 7.483*   PO2 ART mm Hg 66.2*   PCO2, ARTERIAL mm Hg 29.8*   HCO3 ART mmol/L 22.3     Results from last 7 days   Lab Units 08/23/24  1415 08/19/24  0204 08/17/24  2355 08/17/24  0333 08/16/24  2230   SODIUM mmol/L 137 132* 137 141 140   POTASSIUM mmol/L 3.8 4.0 4.0 3.9 3.9   MAGNESIUM mg/dL  --  1.9 2.0  --  1.8   CHLORIDE mmol/L 104 99 102 105 103   CO2 mmol/L 20.5* 23.3 24.2 23.6 21.6*   BUN mg/dL 15 16 20 17 13   CREATININE mg/dL 0.67 0.59 0.60 0.58 0.77   CALCIUM mg/dL 8.9 8.9 9.2 9.1 9.0   GLUCOSE mg/dL 104* 100* 128* 115* 167*   ALBUMIN g/dL 3.9  --   --  4.3 4.2   BILIRUBIN mg/dL 0.5  --   --  0.6 0.7   ALK PHOS U/L 88  --   --  94 101   AST (SGOT) U/L 15  --   --  14 16   ALT (SGPT) U/L 13  --   --  13 14   Estimated Creatinine Clearance: 109.5 mL/min (by C-G formula based on SCr of 0.67 mg/dL).  No results found for: \"AMMONIA\"  Results from last 7 days   Lab Units 08/23/24  1610 08/23/24  1415 08/18/24 2005 08/17/24  0043 08/16/24  2230   CK TOTAL U/L  --   --   --   --  64   HSTROP T ng/L 13 12 20*   < > 16*    < > = values in this interval not displayed.     Results from last 7 days   Lab Units 08/23/24  1415 08/16/24  2145   PROBNP pg/mL 967.9* 781.0*     Lab Results   Component Value Date    HGBA1C 6.60 (H) 08/16/2024     Lab Results   Component Value Date    TSH 0.568 08/16/2024    FREET4 1.08 08/16/2024     Lab Results   Component Value Date    PREGTESTUR Negative 09/17/2023    HCGQUANT 86,484.00 10/27/2023     Pain Management Panel  More data exists         Latest Ref Rng & Units 8/16/2024 4/19/2024   Pain Management Panel   Amphetamine, Urine Qual Negative Negative  Negative  " "  Barbiturates Screen, Urine Negative Negative  Negative    Benzodiazepine Screen, Urine Negative Negative  Negative    Buprenorphine, Screen, Urine Negative Negative  Negative    Cocaine Screen, Urine Negative Negative  Negative    Fentanyl, Urine Negative Negative  Negative    Methadone Screen , Urine Negative Negative  Negative    Methamphetamine, Ur Negative Negative  Negative       Details                 No results found for: \"BLOODCX\"  No results found for: \"URINECX\"  No results found for: \"WOUNDCX\"  No results found for: \"STOOLCX\"  Results from last 7 days   Lab Units 08/17/24  0333   CHOLESTEROL mg/dL 186   TRIGLYCERIDES mg/dL 87   HDL CHOL mg/dL 53   LDL CHOL mg/dL 117*     ---------------------------------------------------------------------------------------------------------------------  Imaging Results (Last 7 Days)       Procedure Component Value Units Date/Time    CT Angiogram Chest Pulmonary Embolism [[id number]] Collected: 08/23/24 1635     Updated: 08/23/24 1639    Narrative:      PROCEDURE: CT ANGIOGRAM CHEST PULMONARY EMBOLISM-     HISTORY: SOB     COMPARISON: None .     TECHNIQUE: Multiple axial CT images were obtained from the thoracic  inlet through the upper abdomen following the administration of Isovue  300 per the CT PE protocol. Coronal and oblique 3D MIP images were  reconstructed from the original axial data set. This study was performed  with techniques to keep radiation doses as low as reasonably achievable  (ALARA). Individualized dose reduction techniques using automated  exposure control or adjustment of mA and/or kV according to the patient  size were employed.     FINDINGS: There are no filling defects within the pulmonary arteries to  suggest an embolus. The thoracic aorta is normal in caliber with no  evidence of dissection. The heart is normal in size. There are no  pleural or pericardial effusions. There is no adenopathy. The thyroid  gland is unremarkable.   Lung windows " reveal no focal opacities or  suspicious pulmonary nodules. The visualized upper abdomen is  unremarkable. Bone windows reveal no acute osseous abnormalities.       Impression:      No evidence of pulmonary embolus or dissection.              This report was finalized on 8/23/2024 4:37 PM by Donte Dexter M.D..       XR Chest AP [[id number]] Collected: 08/23/24 1424     Updated: 08/23/24 1426    Narrative:      PROCEDURE: XR CHEST AP-       HISTORY: Chest Pain Triage Protocol     COMPARISON: 8/16/2024.     FINDINGS: The heart is normal in size. The mediastinum is unremarkable.  The lungs are clear. There is no pneumothorax. There are no acute  osseous abnormalities.       Impression:      No acute cardiopulmonary process.        This report was finalized on 8/23/2024 2:24 PM by Donte Dexter M.D..               I have personally reviewed the radiology images and read the final radiology report.  ---------------------------------------------------------------------------------------------------------------------  Assessment and Plan:    Acute hypoxic respiratory failure -patient did have oxygen saturation of 87 to 88% on room air while in the emergency department.  Likely secondary to flash pulmonary edema from hypertensive emergency.  Continue supplemental oxygen to maintain O2 saturation greater than 90%.    2.  Flash pulmonary edema -patient has received Lasix 80 mg IV x 1 dose in the emergency department.  Will continue Lasix 40 mg IV twice daily and monitor strict ins and outs.  As this has been the second hospitalization for flash pulmonary edema and less than 1 month, will consult cardiology services for further recommendations.    3.  Hypertensive emergency -patient did respond well to IV labetalol in the emergency department.  Will restart home antihypertensive medications and target systolic blood pressure to be reduced by 20% over the next 24 hours.    4.  Type 2 diabetes mellitus -will start  Accu-Cheks before every meal and nightly with sliding scale insulin      Abhishek William,   08/23/24  18:44 EDT

## 2024-08-23 NOTE — CASE MANAGEMENT/SOCIAL WORK
Discharge Planning Assessment  [facility]     Patient Name: Antoinette Pack  MRN: [id number]  Today's Date: 8/23/2024    Admit Date: 8/23/2024    Plan: Pt lives at home with family plans to return home at discharge. Pcp is Shawna Lambert, she uses [facility] and has [employer] replacement. Pt uses a cpap and glucometer at home.   Discharge Needs Assessment       Row Name 08/23/24 1836       Living Environment    People in Home spouse    Current Living Arrangements home    Potentially Unsafe Housing Conditions none    Primary Care Provided by self    Family Caregiver if Needed spouse    Quality of Family Relationships helpful;involved;supportive    Able to Return to Prior Arrangements yes       Resource/Environmental Concerns    Resource/Environmental Concerns none       Transition Planning    Patient/Family Anticipates Transition to home with family    Patient/Family Anticipated Services at Transition none       Discharge Needs Assessment    Readmission Within the Last 30 Days current reason for admission unrelated to previous admission    Equipment Currently Used at Home cpap;glucometer    Concerns to be Addressed no discharge needs identified    Anticipated Changes Related to Illness none    Equipment Needed After Discharge none                   Discharge Plan       Row Name 08/23/24 1838       Plan    Plan Pt lives at home with family plans to return home at discharge. Pcp is Shawna Lambert, she uses [facility] and has [employer] replacement. Pt uses a cpap and glucometer at home.                  Continued Care and Services - Admitted Since 8/23/2024    No active coordination exists for this encounter.       Selected Continued Care - Episodes Includes continued care and service providers with selected services from the active episodes listed below      [facility] Episode start date: 2/17/2022   There are no active outsourced providers for this episode.                  Expected Discharge Date and Time       Expected Discharge Date Expected Discharge Time    Aug 25, 2024            Demographic Summary       Row Name 08/23/24 6670       General Information    Admission Type inpatient    Arrived From home    Referral Source emergency department    Reason for Consult discharge planning                      Lori Montgomery RN

## 2024-08-23 NOTE — TELEPHONE ENCOUNTER
Caller: Michelle Pack    Relationship: Emergency Contact    Best call back number: [phone]     What is the best time to reach you: ANY    Who are you requesting to speak with (clinical staff, provider,  specific staff member): DR COOL      What was the call regarding: PATIENT'S  CALLED TO ADVISE THAT THE PATIENT IS BEING TAKEN BY EMS TO [facility] IN [place] FOR WHAT HE STATES IS A HEART ATTACK. PLEASE CONTACT MR PACK IN REGARDS TO THIS ISSUE.    Is it okay if the provider responds through [facility]: PLEASE CALL

## 2024-08-23 NOTE — ED PROVIDER NOTES
"Subjective   History of Present Illness  39-year-old female with past medical history of hypertension, diabetes, congestive heart failure, and pancreatitis presents to the ER due to concerns for increasing shortness of breath and difficulty breathing.  Patient noted sudden onset severe shortness of breath where she felt like someone was sitting on her chest.  Patient arrived by EMS on 100% nonrebreather.  Patient appeared to be air hungry.  Limited review of systems due to patient's acuity.  Blood pressure stable.  Confirmed chest pain.      Review of Systems   Respiratory:  Positive for shortness of breath.    Cardiovascular:  Positive for chest pain.   All other systems reviewed and are negative.      Past Medical History:   Diagnosis Date    Anxiety     CHF (congestive heart failure) 2023    Colitis     Depression     Diabetes mellitus     type 2    Diverticulitis     GERD (gastroesophageal reflux disease)     History of transfusion 2024    2 units post delivery    Hypertension     chronic    Kidney stone     \"years ago\"    Narcolepsy     Pancreatitis 2023    PCOS (polycystic ovarian syndrome)     Rectal bleeding     SINCE  - USES MESALAMINE SUPPOSITORY NIGHTLY    Seizures     Sleep apnea        Allergies   Allergen Reactions    Milk-Related Compounds Unknown - High Severity    Dairycare [Lactase-Lactobacillus] Unknown - Low Severity     Pt is breastfeeding baby that is allergic to dairy, pt cannot have dairy products while breastfeeding    Dilantin [Phenytoin] Mental Status Change    Keppra [Levetiracetam] Mental Status Change    Meperidine Rash    Topamax [Topiramate] Mental Status Change       Past Surgical History:   Procedure Laterality Date    CARDIAC CATHETERIZATION      CARPAL TUNNEL RELEASE       SECTION N/A 2024    Procedure:  SECTION PRIMARY;  Surgeon: Axel Keys MD;  Location: [facility] LABOR DELIVERY;  Service: Obstetrics/Gynecology;  " Laterality: N/A;    COLONOSCOPY      COLONOSCOPY N/A 2024    Procedure: COLONOSCOPY;  Surgeon: Tom Mueller MD;  Location:  [facility];  Service: Gastroenterology;  Laterality: N/A;    ENDOSCOPY      ENDOSCOPY N/A 2024    Procedure: ESOPHAGOGASTRODUODENOSCOPY;  Surgeon: Tom Mueller MD;  Location:  [facility];  Service: Gastroenterology;  Laterality: N/A;    FRACTURE SURGERY Left     wrist    HARDWARE REMOVAL Left     WRIST    TENDON REPAIR Left     HAND    WISDOM TOOTH EXTRACTION         Family History   Problem Relation Age of Onset    Hypertension Mother     Depression Mother     Heart disease Mother     COPD Mother     Emphysema Mother     Hypertension Father     Diabetes Father     Heart disease Father     COPD Father     Heart failure Father     Hepatitis Father        Social History     Socioeconomic History    Marital status:    Tobacco Use    Smoking status: Former     Current packs/day: 0.00     Average packs/day: 1 pack/day for 20.0 years (20.0 ttl pk-yrs)     Types: Cigarettes     Start date: 1995     Quit date:      Years since quittin.6     Passive exposure: Never    Smokeless tobacco: Never    Tobacco comments:     QUIT IN 2017   Vaping Use    Vaping status: Never Used   Substance and Sexual Activity    Alcohol use: No    Drug use: No    Sexual activity: Defer           Objective   Physical Exam  Constitutional:       General: She is not in acute distress.     Appearance: Normal appearance. She is not ill-appearing.   HENT:      Head: Normocephalic and atraumatic.      Right Ear: External ear normal.      Left Ear: External ear normal.      Nose: Nose normal.      Mouth/Throat:      Mouth: Mucous membranes are moist.   Eyes:      Extraocular Movements: Extraocular movements intact.      Pupils: Pupils are equal, round, and reactive to light.   Cardiovascular:      Rate and Rhythm: Normal rate and regular rhythm.      Heart sounds: No murmur  heard.  Pulmonary:      Effort: Pulmonary effort is normal. Tachypnea and respiratory distress present.      Breath sounds: Examination of the right-upper field reveals wheezing. Examination of the left-upper field reveals wheezing. Examination of the right-middle field reveals wheezing. Examination of the left-middle field reveals wheezing. Wheezing present.   Abdominal:      General: Bowel sounds are normal.      Palpations: Abdomen is soft.      Tenderness: There is no abdominal tenderness.   Musculoskeletal:         General: No deformity or signs of injury. Normal range of motion.      Cervical back: Normal range of motion and neck supple.   Skin:     General: Skin is warm and dry.      Findings: No erythema.   Neurological:      General: No focal deficit present.      Mental Status: She is alert and oriented to person, place, and time. Mental status is at baseline.      Cranial Nerves: No cranial nerve deficit.   Psychiatric:         Mood and Affect: Mood normal.         Behavior: Behavior normal.         Thought Content: Thought content normal.         Procedures           ED Course  ED Course as of 08/23/24 1812   Fri Aug 23, 2024   1323 EKG normal sinus rhythm.  87 bpm.  No acute ST elevation.  QTc 425.  Electronically signed by Gerardo Cisneros DO, 08/23/24, 1:24 PM EDT.   [SF]      ED Course User Index  [SF] Gerardo Cisneros DO                                 CT Angiogram Chest Pulmonary Embolism    Result Date: 8/23/2024  No evidence of pulmonary embolus or dissection.     This report was finalized on 8/23/2024 4:37 PM by Donte Dexter M.D..      XR Chest AP    Result Date: 8/23/2024  No acute cardiopulmonary process.   This report was finalized on 8/23/2024 2:24 PM by Donte Dexter M.D..         Results for orders placed or performed during the hospital encounter of 08/23/24   Comprehensive Metabolic Panel    Specimen: Arm, Right; Blood   Result Value Ref Range    Glucose 104 (H) 65 - 99 mg/dL     BUN 15 6 - 20 mg/dL    Creatinine 0.67 0.57 - 1.00 mg/dL    Sodium 137 136 - 145 mmol/L    Potassium 3.8 3.5 - 5.2 mmol/L    Chloride 104 98 - 107 mmol/L    CO2 20.5 (L) 22.0 - 29.0 mmol/L    Calcium 8.9 8.6 - 10.5 mg/dL    Total Protein 7.5 6.0 - 8.5 g/dL    Albumin 3.9 3.5 - 5.2 g/dL    ALT (SGPT) 13 1 - 33 U/L    AST (SGOT) 15 1 - 32 U/L    Alkaline Phosphatase 88 39 - 117 U/L    Total Bilirubin 0.5 0.0 - 1.2 mg/dL    Globulin 3.6 gm/dL    A/G Ratio 1.1 g/dL    BUN/Creatinine Ratio 22.4 7.0 - 25.0    Anion Gap 12.5 5.0 - 15.0 mmol/L    eGFR 112.8 >60.0 mL/min/1.73   High Sensitivity Troponin T    Specimen: Arm, Right; Blood   Result Value Ref Range    HS Troponin T 12 <14 ng/L   CBC Auto Differential    Specimen: Arm, Right; Blood   Result Value Ref Range    WBC 8.94 3.40 - 10.80 10*3/mm3    RBC 4.92 3.77 - 5.28 10*6/mm3    Hemoglobin 12.8 12.0 - 15.9 g/dL    Hematocrit 39.5 34.0 - 46.6 %    MCV 80.3 79.0 - 97.0 fL    MCH 26.0 (L) 26.6 - 33.0 pg    MCHC 32.4 31.5 - 35.7 g/dL    RDW 13.7 12.3 - 15.4 %    RDW-SD 39.2 37.0 - 54.0 fl    MPV 11.1 6.0 - 12.0 fL    Platelets 138 (L) 140 - 450 10*3/mm3    Neutrophil % 75.0 42.7 - 76.0 %    Lymphocyte % 19.1 (L) 19.6 - 45.3 %    Monocyte % 4.4 (L) 5.0 - 12.0 %    Eosinophil % 1.0 0.3 - 6.2 %    Basophil % 0.3 0.0 - 1.5 %    Immature Grans % 0.2 0.0 - 0.5 %    Neutrophils, Absolute 6.70 1.70 - 7.00 10*3/mm3    Lymphocytes, Absolute 1.71 0.70 - 3.10 10*3/mm3    Monocytes, Absolute 0.39 0.10 - 0.90 10*3/mm3    Eosinophils, Absolute 0.09 0.00 - 0.40 10*3/mm3    Basophils, Absolute 0.03 0.00 - 0.20 10*3/mm3    Immature Grans, Absolute 0.02 0.00 - 0.05 10*3/mm3    nRBC 0.0 0.0 - 0.2 /100 WBC   BNP    Specimen: Arm, Right; Blood   Result Value Ref Range    proBNP 967.9 (H) 0.0 - 450.0 pg/mL   Lactic Acid, Plasma    Specimen: Arm, Right; Blood   Result Value Ref Range    Lactate 0.9 0.5 - 2.0 mmol/L   Protime-INR    Specimen: Arm, Right; Blood   Result Value Ref Range     Protime 13.4 12.1 - 14.7 Seconds    INR 1.01 0.90 - 1.10   aPTT    Specimen: Arm, Right; Blood   Result Value Ref Range    PTT 23.3 (L) 26.5 - 34.5 seconds   Procalcitonin    Specimen: Arm, Right; Blood   Result Value Ref Range    Procalcitonin <0.02 0.00 - 0.25 ng/mL   Urinalysis With Microscopic If Indicated (No Culture) - Urine, Clean Catch    Specimen: Urine, Clean Catch   Result Value Ref Range    Color, UA Yellow Yellow, Straw    Appearance, UA Clear Clear    pH, UA 7.0 5.0 - 8.0    Specific Gravity, UA 1.011 1.005 - 1.030    Glucose, UA Negative Negative    Ketones, UA Negative Negative    Bilirubin, UA Negative Negative    Blood, UA Moderate (2+) (A) Negative    Protein, UA Trace (A) Negative    Leuk Esterase, UA Negative Negative    Nitrite, UA Negative Negative    Urobilinogen, UA 0.2 E.U./dL 0.2 - 1.0 E.U./dL   Blood Gas, Arterial With Co-Ox    Specimen: Arterial Blood   Result Value Ref Range    Site Left Brachial     John's Test N/A     pH, Arterial 7.483 (H) 7.350 - 7.450 pH units    pCO2, Arterial 29.8 (L) 35.0 - 45.0 mm Hg    pO2, Arterial 66.2 (L) 83.0 - 108.0 mm Hg    HCO3, Arterial 22.3 20.0 - 26.0 mmol/L    Base Excess, Arterial -0.2 (L) 0.0 - 2.0 mmol/L    O2 Saturation, Arterial 94.0 94.0 - 99.0 %    Hemoglobin, Blood Gas 13.6 13.5 - 17.5 g/dL    Hematocrit, Blood Gas 41.7 38.0 - 51.0 %    Oxyhemoglobin 92.7 (L) 94 - 99 %    Methemoglobin 0.40 0.00 - 3.00 %    Carboxyhemoglobin 1.0 0 - 5 %    A-a DO2 44.0 0.0 - 300.0 mmHg    CO2 Content 23.2 22 - 33 mmol/L    Barometric Pressure for Blood Gas 732 mmHg    Modality Room Air     FIO2 21 %    Ventilator Mode NA     Collected by [id number]     pH, Temp Corrected      pCO2, Temperature Corrected      pO2, Temperature Corrected     Urinalysis, Microscopic Only - Urine, Clean Catch    Specimen: Urine, Clean Catch   Result Value Ref Range    RBC, UA 0-2 None Seen, 0-2 /HPF    WBC, UA 0-2 None Seen, 0-2 /HPF    Bacteria, UA None Seen None Seen /HPF     Squamous Epithelial Cells, UA 0-2 None Seen, 0-2 /HPF    Hyaline Casts, UA None Seen None Seen /LPF    Methodology Automated Microscopy    High Sensitivity Troponin T 2Hr    Specimen: Arm, Right; Blood   Result Value Ref Range    HS Troponin T 13 <14 ng/L    Troponin T Delta 1 >=-4 - <+4 ng/L   POC Glucose Once    Specimen: Blood   Result Value Ref Range    Glucose 107 70 - 130 mg/dL   ECG 12 Lead ED Triage Standing Order; Chest Pain   Result Value Ref Range    QT Interval 354 ms    QTC Interval 425 ms   Green Top (Gel)   Result Value Ref Range    Extra Tube Hold for add-ons.    Lavender Top   Result Value Ref Range    Extra Tube hold for add-on    Gold Top - SST   Result Value Ref Range    Extra Tube Hold for add-ons.    Light Blue Top   Result Value Ref Range    Extra Tube Hold for add-ons.      Pulmonary edema            Medical Decision Making  CBC and CMP are listed.  Troponin trended and within normal limits.  CT PE study ruled out pulmonary embolism.  Elevated BNP noted.  IV Lasix given.  Patient symptoms improved.  However, patient O2 saturations dropped to approximately to 88 to 90%..  Patient currently requiring 2 L by nasal cannula.  New oxygen demand.    Recommend admission for further work up and treatment.  Hospitalist team consulted and made aware of the patient.  Consults and orders placed per hospitalist request.  Patient was agreeable to admission plan.  Vitals stable on admission.    Amount and/or Complexity of Data Reviewed  Labs: ordered. Decision-making details documented in ED Course.  Radiology: ordered. Decision-making details documented in ED Course.  ECG/medicine tests: ordered.    Risk  OTC drugs.  Prescription drug management.        Final diagnoses:   Acute respiratory failure with hypoxia   Acute pulmonary edema       ED Disposition  ED Disposition       ED Disposition   Decision to Admit    Condition   --    Comment   Level of Care: Telemetry [5]   Diagnosis: Acute hypoxic respiratory  failure [[id number]]   Certification: I Certify That Inpatient Hospital Services Are Medically Necessary For Greater Than 2 Midnights                 No follow-up provider specified.       Medication List      No changes were made to your prescriptions during this visit.            Gerardo Cisneros,   08/23/24 1817

## 2024-08-24 ENCOUNTER — READMISSION MANAGEMENT (OUTPATIENT)
Dept: CALL CENTER | Facility: HOSPITAL | Age: 39
End: 2024-08-24
Payer: MEDICARE

## 2024-08-24 VITALS
RESPIRATION RATE: 20 BRPM | OXYGEN SATURATION: 95 % | BODY MASS INDEX: 37.38 KG/M2 | DIASTOLIC BLOOD PRESSURE: 94 MMHG | SYSTOLIC BLOOD PRESSURE: 115 MMHG | TEMPERATURE: 97.6 F | HEIGHT: 59 IN | WEIGHT: 185.41 LBS | HEART RATE: 90 BPM

## 2024-08-24 PROBLEM — J96.01 ACUTE HYPOXIC RESPIRATORY FAILURE: Status: RESOLVED | Noted: 2024-08-23 | Resolved: 2024-08-24

## 2024-08-24 LAB
ANION GAP SERPL CALCULATED.3IONS-SCNC: 17.6 MMOL/L (ref 5–15)
BUN SERPL-MCNC: 16 MG/DL (ref 6–20)
BUN/CREAT SERPL: 27.6 (ref 7–25)
CALCIUM SPEC-SCNC: 9.6 MG/DL (ref 8.6–10.5)
CHLORIDE SERPL-SCNC: 98 MMOL/L (ref 98–107)
CO2 SERPL-SCNC: 22.4 MMOL/L (ref 22–29)
CREAT SERPL-MCNC: 0.58 MG/DL (ref 0.57–1)
DEPRECATED RDW RBC AUTO: 39.1 FL (ref 37–54)
EGFRCR SERPLBLD CKD-EPI 2021: 116.8 ML/MIN/1.73
ERYTHROCYTE [DISTWIDTH] IN BLOOD BY AUTOMATED COUNT: 13.8 % (ref 12.3–15.4)
GLUCOSE BLDC GLUCOMTR-MCNC: 104 MG/DL (ref 70–130)
GLUCOSE BLDC GLUCOMTR-MCNC: 107 MG/DL (ref 70–130)
GLUCOSE BLDC GLUCOMTR-MCNC: 133 MG/DL (ref 70–130)
GLUCOSE BLDC GLUCOMTR-MCNC: 161 MG/DL (ref 70–130)
GLUCOSE SERPL-MCNC: 150 MG/DL (ref 65–99)
HCT VFR BLD AUTO: 41.5 % (ref 34–46.6)
HGB BLD-MCNC: 13.8 G/DL (ref 12–15.9)
MCH RBC QN AUTO: 26.5 PG (ref 26.6–33)
MCHC RBC AUTO-ENTMCNC: 33.3 G/DL (ref 31.5–35.7)
MCV RBC AUTO: 79.7 FL (ref 79–97)
PLATELET # BLD AUTO: 157 10*3/MM3 (ref 140–450)
PMV BLD AUTO: 11.7 FL (ref 6–12)
POTASSIUM SERPL-SCNC: 3.5 MMOL/L (ref 3.5–5.2)
QT INTERVAL: 350 MS
QT INTERVAL: 432 MS
QTC INTERVAL: 453 MS
QTC INTERVAL: 462 MS
RBC # BLD AUTO: 5.21 10*6/MM3 (ref 3.77–5.28)
SODIUM SERPL-SCNC: 138 MMOL/L (ref 136–145)
TROPONIN T SERPL HS-MCNC: 12 NG/L
WBC NRBC COR # BLD AUTO: 7.48 10*3/MM3 (ref 3.4–10.8)

## 2024-08-24 PROCEDURE — 84484 ASSAY OF TROPONIN QUANT: CPT | Performed by: INTERNAL MEDICINE

## 2024-08-24 PROCEDURE — 99239 HOSP IP/OBS DSCHRG MGMT >30: CPT | Performed by: INTERNAL MEDICINE

## 2024-08-24 PROCEDURE — 25010000002 ONDANSETRON PER 1 MG

## 2024-08-24 PROCEDURE — 25010000002 FUROSEMIDE PER 20 MG: Performed by: INTERNAL MEDICINE

## 2024-08-24 PROCEDURE — 85027 COMPLETE CBC AUTOMATED: CPT | Performed by: INTERNAL MEDICINE

## 2024-08-24 PROCEDURE — 80048 BASIC METABOLIC PNL TOTAL CA: CPT | Performed by: INTERNAL MEDICINE

## 2024-08-24 PROCEDURE — 93005 ELECTROCARDIOGRAM TRACING: CPT | Performed by: INTERNAL MEDICINE

## 2024-08-24 PROCEDURE — 82948 REAGENT STRIP/BLOOD GLUCOSE: CPT

## 2024-08-24 PROCEDURE — 93010 ELECTROCARDIOGRAM REPORT: CPT | Performed by: SPECIALIST

## 2024-08-24 RX ORDER — HYDRALAZINE HYDROCHLORIDE 25 MG/1
25 TABLET, FILM COATED ORAL 3 TIMES DAILY
Qty: 90 TABLET | Refills: 1 | Status: SHIPPED | OUTPATIENT
Start: 2024-08-24 | End: 2024-08-26

## 2024-08-24 RX ORDER — LABETALOL 100 MG/1
100 TABLET, FILM COATED ORAL 2 TIMES DAILY
Status: DISCONTINUED | OUTPATIENT
Start: 2024-08-24 | End: 2024-08-24 | Stop reason: HOSPADM

## 2024-08-24 RX ORDER — ALUMINA, MAGNESIA, AND SIMETHICONE 2400; 2400; 240 MG/30ML; MG/30ML; MG/30ML
15 SUSPENSION ORAL ONCE
Status: COMPLETED | OUTPATIENT
Start: 2024-08-24 | End: 2024-08-24

## 2024-08-24 RX ORDER — AMLODIPINE BESYLATE 10 MG/1
10 TABLET ORAL DAILY
Status: CANCELLED | OUTPATIENT
Start: 2024-08-24

## 2024-08-24 RX ORDER — AMLODIPINE BESYLATE 10 MG/1
10 TABLET ORAL DAILY
Status: DISCONTINUED | OUTPATIENT
Start: 2024-08-24 | End: 2024-08-24 | Stop reason: HOSPADM

## 2024-08-24 RX ORDER — ALBUTEROL SULFATE 0.83 MG/ML
2.5 SOLUTION RESPIRATORY (INHALATION) EVERY 4 HOURS PRN
Status: DISCONTINUED | OUTPATIENT
Start: 2024-08-24 | End: 2024-08-24 | Stop reason: HOSPADM

## 2024-08-24 RX ORDER — BUDESONIDE 3 MG/1
9 CAPSULE, COATED PELLETS ORAL DAILY
Status: DISCONTINUED | OUTPATIENT
Start: 2024-08-24 | End: 2024-08-24 | Stop reason: HOSPADM

## 2024-08-24 RX ORDER — PANTOPRAZOLE SODIUM 40 MG/1
40 TABLET, DELAYED RELEASE ORAL
Status: DISCONTINUED | OUTPATIENT
Start: 2024-08-24 | End: 2024-08-24 | Stop reason: HOSPADM

## 2024-08-24 RX ORDER — PRENATAL VIT/IRON FUM/FOLIC AC 27MG-0.8MG
1 TABLET ORAL DAILY
Status: DISCONTINUED | OUTPATIENT
Start: 2024-08-24 | End: 2024-08-24 | Stop reason: HOSPADM

## 2024-08-24 RX ORDER — HYDRALAZINE HYDROCHLORIDE 10 MG/1
10 TABLET, FILM COATED ORAL EVERY 12 HOURS SCHEDULED
Status: DISCONTINUED | OUTPATIENT
Start: 2024-08-24 | End: 2024-08-24 | Stop reason: HOSPADM

## 2024-08-24 RX ORDER — ONDANSETRON 2 MG/ML
4 INJECTION INTRAMUSCULAR; INTRAVENOUS ONCE
Status: COMPLETED | OUTPATIENT
Start: 2024-08-24 | End: 2024-08-24

## 2024-08-24 RX ORDER — MESALAMINE 1000 MG/1
1000 SUPPOSITORY RECTAL NIGHTLY
Status: DISCONTINUED | OUTPATIENT
Start: 2024-08-24 | End: 2024-08-24 | Stop reason: HOSPADM

## 2024-08-24 RX ADMIN — PANTOPRAZOLE SODIUM 40 MG: 40 TABLET, DELAYED RELEASE ORAL at 17:15

## 2024-08-24 RX ADMIN — ONDANSETRON 4 MG: 2 INJECTION INTRAMUSCULAR; INTRAVENOUS at 05:59

## 2024-08-24 RX ADMIN — Medication 10 ML: at 08:55

## 2024-08-24 RX ADMIN — BUDESONIDE 9 MG: 3 CAPSULE ORAL at 14:26

## 2024-08-24 RX ADMIN — FUROSEMIDE 40 MG: 10 INJECTION, SOLUTION INTRAMUSCULAR; INTRAVENOUS at 08:55

## 2024-08-24 RX ADMIN — ALUMINUM HYDROXIDE, MAGNESIUM HYDROXIDE, DIMETHICONE 15 ML: 400; 400; 40 SUSPENSION ORAL at 11:02

## 2024-08-24 RX ADMIN — PRENATAL VITAMINS-IRON FUMARATE 27 MG IRON-FOLIC ACID 0.8 MG TABLET 1 TABLET: at 14:26

## 2024-08-24 RX ADMIN — HYDRALAZINE HYDROCHLORIDE 10 MG: 10 TABLET ORAL at 12:14

## 2024-08-24 RX ADMIN — LABETALOL HYDROCHLORIDE 100 MG: 100 TABLET, FILM COATED ORAL at 14:26

## 2024-08-24 NOTE — OUTREACH NOTE
CHF Week 2 Survey      Flowsheet Row Responses   [facility] facility patient discharged from? [facility]   Does the patient have one of the following disease processes/diagnoses(primary or secondary)? CHF   Week 2 attempt successful? No   Unsuccessful attempts Attempt 1   Revoke Readmitted            ANUJ PATRICK - Registered Nurse

## 2024-08-24 NOTE — DISCHARGE SUMMARY
[facility] HOSPITALIST MEDICINE DISCHARGE SUMMARY    Patient Identification:  Name:  Antoinette Pack  Age:  39 y.o.  Sex:  female  :  1985  MRN:  [id number]  Visit Number:  [id number]    Date of Admission: 2024  Date of Discharge: 2024    PCP: Shawna Lambert, DO    DISCHARGE DIAGNOSIS   Hypertensive emergency  Acute hypoxic respiratory failure  Flash pulmonary edema  Type 2 diabetes mellitus      CONSULTS  Dr. Guerrero, Cardiology      PROCEDURES PERFORMED   None      HOSPITAL COURSE  Ms. Pack is a 39 y.o. female who presented to [facility] emergency department on 2024 with a chief complaint of shortness of breath.  Patient has a past medical history remarkable for type 2 diabetes mellitus, essential hypertension, ALEJANDRO, anxiety/depression.  Patient states she was in her usual state of health when she was sitting on the couch watching TV and feeding her baby when she had sudden onset shortness of breath.  Patient states she felt a sudden onset chest pain which was substernal in nature and lasted for several minutes.  She stated it felt like she was kicked in the chest.  When the pain resolved, patient had continued shortness of breath.  Patient did call EMS who brought patient to the emergency department for further treatment and evaluation.  Patient denied any nausea, vomiting, diaphoresis or any other symptoms.  Initial evaluation in the emergency department did consist of basic laboratory work as well as physical exam and vital signs.  Initial vital signs found patient's blood pressure 202/120, respiratory rate 34, heart rate 103, temperature 98 °F and oxygen saturation 99% on room air.  Initial lab work did include CBC, CMP and ABG.  CMP was essentially within normal limits.  CBC was within normal limits except for mildly reduced platelet count of 138.  ABG demonstrated pH 7.48, pCO2 29, pO2 66 and bicarb 22 on room air.  CT chest was obtained which  demonstrated no evidence of pulmonary embolism but per my interpretation did appear to have pulmonary edema.  With this in mind, patient's presentation appears most consistent with flash pulmonary edema secondary to hypertensive emergency.  Patient did receive labetalol 10 mg IV x 1 dose in the emergency department with dramatic improvement in blood pressure.  Patient was then admitted for further treatment and evaluation.     Patient was admitted to telemetry unit and restarted on home antihypertensive medications.  Patient's blood pressure dramatically improved throughout the remainder of her hospitalization.  Did discuss patient's case with on-call cardiology services who did recommend increasing patient's hydralazine from 10 mg p.o. twice daily to 25 mg p.o. 3 times daily and to follow-up with her primary cardiologist in the near future.  This plan was discussed with the patient and she expressed understanding and willingness to proceed with the beforementioned plan.  As patient is now normotensive and hypoxic respiratory failure has resolved, it is felt patient has reached maximum medical benefit of current hospitalization and she will be discharged home in stable condition today.    VITAL SIGNS:      08/23/24  1316 08/23/24  1922 08/24/24  0500   Weight: 83.9 kg (184 lb 15.5 oz) 84.1 kg (185 lb 6.5 oz) 84.1 kg (185 lb 6.5 oz) (New admit less than 24 hours.)     Body mass index is 37.45 kg/m².    PHYSICAL EXAM:  Constitutional: Obese by BMI  female in no apparent distress.     HENT:  Head:  Normocephalic and atraumatic.  Mouth:  Moist mucous membranes.    Eyes:  Conjunctivae and EOM are normal.  Pupils are equal, round, and reactive to light.  No scleral icterus.    Neck:  Neck supple. No thyromegaly.  No JVD present.    Cardiovascular:  Regular rate and rhythm with no murmurs, rubs, clicks or gallops appreciated.  Pulmonary/Chest: Clear to auscultation bilaterally  Abdominal:  Soft. Nontender.  Nondistended  Bowel sounds are normal in all four quadrants. No organomegally appreciated.   Musculoskeletal:  No edema, no tenderness, and no deformity.  No red or swollen joints anywhere.    Neurological:  Alert, Cranial nerves II-XII intact with no focal defecits.  No facial droop.  No slurred speech.   Skin:  Warm and dry to palpation with no rashes or lesions appreciated.  Peripheral vascular:  2+ radial and pedal pulses in bilateral upper and lower extremities.  Psychiatric:  Alert and oriented x3, demonstrates appropriate judgement and insight.    DISCHARGE DISPOSITION   Stable    DISCHARGE MEDICATIONS:     Discharge Medications        Changes to Medications        Instructions Start Date   hydrALAZINE 25 MG tablet  Commonly known as: APRESOLINE  What changed:   medication strength  how much to take  when to take this   25 mg, Oral, 3 Times Daily             Continue These Medications        Instructions Start Date   albuterol sulfate  (90 Base) MCG/ACT inhaler  Commonly known as: PROVENTIL HFA;VENTOLIN HFA;PROAIR HFA   2 puffs, Inhalation, Every 4 Hours PRN      amLODIPine 10 MG tablet  Commonly known as: NORVASC   10 mg, Oral, Daily      Budesonide 3 MG 24 hr capsule  Commonly known as: ENTOCORT EC   9 mg, Oral, Daily      furosemide 40 MG tablet  Commonly known as: LASIX   40 mg, Oral, Daily PRN      labetalol 200 MG tablet  Commonly known as: NORMODYNE   Take ½ tablet by mouth 2 (Two) Times a Day.      labetalol 200 MG tablet  Commonly known as: NORMODYNE   200 mg, Oral, 2 Times Daily      mesalamine 4 g enema  Commonly known as: ROWASA   4 g, Rectal, Nightly      pantoprazole 40 MG EC tablet  Commonly known as: PROTONIX   40 mg, Oral, 2 Times Daily Before Meals      PRENATAL PO   1 tablet, Oral, Daily                 Your Scheduled Appointments      Aug 26, 2024 10:00 AM  Initial Evaluation with [facility]  [facility] ([place]) [address] [facility]  [id number]  [phone]   Bring all previous medical records and films, along with current medications and insurance information.         Sep 05, 2024 11:15 AM  Follow Up with Pablo Cabrera MD  [facility] CARDIOLOGY ([place]) [address]  [facility] [address] [phone]   -Bring photo ID, insurance card, and list of medications to appointment  -If testing was completed outside of [facility] then patient must bring images on a disc  -Copay will be collected at time of appointment  -Established patients should arrive 15 minutes prior to appointment         Sep 09, 2024 9:00 AM  FOLLOW UP with CICI Veliz  [facility] ENDOCRINOLOGY ([place]) [address]  [facility] [address] [phone]             Activity Instructions    Resume activity as tolerated.          Additional Instructions for the Follow-ups that You Need to Schedule       Discharge Follow-up with Specified Provider: Dr. Lujan; 2 Days   As directed      To: Dr. Lujan   Follow Up: 2 Days   Follow Up Details: Hypertensive Emergency with flash pulmonary edema follow up               Follow-up Information       Shawna Lambert DO Follow up in 1 week(s).    Specialty: Family Medicine  Why: PATIENT WILL BE NOTIFIED ABOUT APPT ON MONDAY 8/26 AM  Contact information:  [address] [facility] [address] [phone]               Sohail Lujan MD Follow up in 2 day(s).    Specialties: Cardiology, Interventional Cardiology  Why: PATIENT WILL BE NOTIFIED ABOUT APPT ON MONDAY 8/26 AM  Contact information:  [address]  [address]  [facility] [address]  [phone]                             TEST  RESULTS PENDING AT DISCHARGE  Pending Labs       Order Current Status    Blood Culture - Blood, Arm, Right Preliminary result    Blood Culture - Blood, Hand, Left Preliminary result             Abhishek William DO  08/24/24  17:39 EDT    Please note that this  discharge summary required more than 30 minutes to complete.    Please send a copy of this dictation to the following providers:  Shawna Lambert, DO

## 2024-08-24 NOTE — PLAN OF CARE
Goal Outcome Evaluation:  Patient is resting in bed watching television. Patient A&Ox4. Patient is currently on room air. Patient has tolerated all interventions. No complaints or concerns at this time. No signs of acute distress noted. Will continue to follow plan of care.

## 2024-08-24 NOTE — OUTREACH NOTE
Prep Survey      Flowsheet Row Responses   [facility] facility patient discharged from? [facility]   Is LACE score < 7 ? No   Eligibility Readm Mgmt   Discharge diagnosis Hypertensive emergency, Acute hypoxic respiratory failure   Does the patient have one of the following disease processes/diagnoses(primary or secondary)? Other   Prep survey completed? Yes            Danyelle PIERSON - Registered Nurse

## 2024-08-24 NOTE — PLAN OF CARE
Goal Outcome Evaluation:   Received patient from ED. Resting in bed at this time. Denies any acute distress or and SOB. Tolerating oxygen well on 2LNC. Denies any chest pain. Patient educated on Fall risk and calling for help when she needs to get up. Verbalized understanding. HOB elevated. Bed alarm refused, BSC to bedside, and call bell in reach. Care plan started.

## 2024-08-24 NOTE — PLAN OF CARE
Problem: Adult Inpatient Plan of Care  Goal: Absence of Hospital-Acquired Illness or Injury  Intervention: Prevent Skin Injury  Recent Flowsheet Documentation  Taken 8/23/2024 1902 by Alfredito Mccall RN  Body Position: supine     Problem: Adult Inpatient Plan of Care  Goal: Absence of Hospital-Acquired Illness or Injury  Intervention: Prevent and Manage VTE (Venous Thromboembolism) Risk  Recent Flowsheet Documentation  Taken 8/23/2024 1902 by Alfredito Mccall RN  Activity Management: activity encouraged   Goal Outcome Evaluation:

## 2024-08-26 ENCOUNTER — HOSPITAL ENCOUNTER (OUTPATIENT)
Dept: CARDIOLOGY | Facility: HOSPITAL | Age: 39
Discharge: HOME | End: 2024-08-26
Admitting: PHYSICIAN ASSISTANT
Payer: COMMERCIAL

## 2024-08-26 ENCOUNTER — TELEPHONE (OUTPATIENT)
Dept: CARDIOLOGY | Facility: CLINIC | Age: 39
End: 2024-08-26
Payer: COMMERCIAL

## 2024-08-26 ENCOUNTER — TELEPHONE (OUTPATIENT)
Dept: TELEMETRY | Facility: HOSPITAL | Age: 39
End: 2024-08-26
Payer: COMMERCIAL

## 2024-08-26 VITALS
BODY MASS INDEX: 37.38 KG/M2 | SYSTOLIC BLOOD PRESSURE: 133 MMHG | HEART RATE: 66 BPM | WEIGHT: 185.4 LBS | OXYGEN SATURATION: 96 % | HEIGHT: 59 IN | DIASTOLIC BLOOD PRESSURE: 73 MMHG

## 2024-08-26 DIAGNOSIS — I10 ESSENTIAL HYPERTENSION: ICD-10-CM

## 2024-08-26 DIAGNOSIS — I50.32 CHRONIC DIASTOLIC HEART FAILURE: ICD-10-CM

## 2024-08-26 DIAGNOSIS — I50.32 CHRONIC HEART FAILURE WITH PRESERVED EJECTION FRACTION (HFPEF): Primary | ICD-10-CM

## 2024-08-26 DIAGNOSIS — G47.33 OSA ON CPAP: ICD-10-CM

## 2024-08-26 DIAGNOSIS — R00.2 PALPITATIONS: ICD-10-CM

## 2024-08-26 DIAGNOSIS — E66.01 SEVERE OBESITY (BMI 35.0-39.9) WITH COMORBIDITY: ICD-10-CM

## 2024-08-26 PROBLEM — K85.90 ACUTE PANCREATITIS: Status: RESOLVED | Noted: 2023-09-18 | Resolved: 2024-08-26

## 2024-08-26 LAB
ABSOLUTE LUNG FLUID CONTENT: 29 % (ref 20–35)
ANION GAP SERPL CALCULATED.3IONS-SCNC: 10.2 MMOL/L (ref 5–15)
BUN SERPL-MCNC: 23 MG/DL (ref 6–20)
BUN/CREAT SERPL: 46.9 (ref 7–25)
CALCIUM SPEC-SCNC: 9.1 MG/DL (ref 8.6–10.5)
CHLORIDE SERPL-SCNC: 104 MMOL/L (ref 98–107)
CO2 SERPL-SCNC: 25.8 MMOL/L (ref 22–29)
CREAT SERPL-MCNC: 0.49 MG/DL (ref 0.57–1)
EGFRCR SERPLBLD CKD-EPI 2021: 121.6 ML/MIN/1.73
GLUCOSE SERPL-MCNC: 86 MG/DL (ref 65–99)
MAGNESIUM SERPL-MCNC: 2.1 MG/DL (ref 1.6–2.6)
NT-PROBNP SERPL-MCNC: 47.8 PG/ML (ref 0–450)
POTASSIUM SERPL-SCNC: 4.5 MMOL/L (ref 3.5–5.2)
SODIUM SERPL-SCNC: 140 MMOL/L (ref 136–145)

## 2024-08-26 PROCEDURE — 94726 PLETHYSMOGRAPHY LUNG VOLUMES: CPT | Performed by: PHYSICIAN ASSISTANT

## 2024-08-26 PROCEDURE — 80048 BASIC METABOLIC PNL TOTAL CA: CPT | Performed by: PHYSICIAN ASSISTANT

## 2024-08-26 PROCEDURE — 83880 ASSAY OF NATRIURETIC PEPTIDE: CPT | Performed by: PHYSICIAN ASSISTANT

## 2024-08-26 PROCEDURE — 36415 COLL VENOUS BLD VENIPUNCTURE: CPT | Performed by: PHYSICIAN ASSISTANT

## 2024-08-26 PROCEDURE — 83735 ASSAY OF MAGNESIUM: CPT | Performed by: PHYSICIAN ASSISTANT

## 2024-08-26 RX ORDER — METOPROLOL TARTRATE 100 MG
100 TABLET ORAL ONCE AS NEEDED
Status: DISCONTINUED | OUTPATIENT
Start: 2024-08-26 | End: 2024-08-30

## 2024-08-26 RX ORDER — HYDRALAZINE HYDROCHLORIDE 25 MG/1
25 TABLET, FILM COATED ORAL 3 TIMES DAILY
Qty: 90 TABLET | Refills: 1 | Status: SHIPPED | OUTPATIENT
Start: 2024-08-26

## 2024-08-26 RX ORDER — LISINOPRIL 40 MG/1
40 TABLET ORAL DAILY
COMMUNITY
End: 2024-08-30

## 2024-08-26 RX ORDER — METOPROLOL TARTRATE 50 MG
50 TABLET ORAL 2 TIMES DAILY
Qty: 60 TABLET | Refills: 2 | Status: SHIPPED | OUTPATIENT
Start: 2024-08-26 | End: 2024-11-24

## 2024-08-26 RX ORDER — NITROGLYCERIN 0.4 MG/1
0.4 TABLET SUBLINGUAL
Qty: 10 TABLET | Refills: 0 | Status: SHIPPED | OUTPATIENT
Start: 2024-08-26

## 2024-08-26 RX ORDER — NIFEDIPINE 60 MG/1
60 TABLET, EXTENDED RELEASE ORAL DAILY
COMMUNITY
End: 2024-08-26 | Stop reason: HOSPADM

## 2024-08-26 RX ORDER — NITROGLYCERIN 0.4 MG/1
0.4 TABLET SUBLINGUAL
Status: DISCONTINUED | OUTPATIENT
Start: 2024-08-26 | End: 2024-08-30

## 2024-08-26 RX ORDER — METOPROLOL TARTRATE 50 MG
50 TABLET ORAL ONCE AS NEEDED
Status: DISCONTINUED | OUTPATIENT
Start: 2024-08-26 | End: 2024-08-30

## 2024-08-26 RX ORDER — AMLODIPINE BESYLATE 10 MG/1
10 TABLET ORAL DAILY
Qty: 90 TABLET | Refills: 3 | Status: SHIPPED | OUTPATIENT
Start: 2024-08-26 | End: 2024-08-30

## 2024-08-26 NOTE — PROGRESS NOTES
[facility]  EVANS Corona Aimee Russell, DO  [address]  [facility] [address]  [place] [address]    Thank you for asking me to see Antoinette Pack for congestive heart failure.    HPI:     This is a 39 y.o. female with known past medical history of:    Chronic HFpEF  TTE from 08/17/24 with EF 51-55% & mild concentric hypertrophy.  LV wall thickness consistent with mild concentric hypertrophy  Hypertensive emergency with recent flash pulmonary edema requiring hospitalizations  ALEJANDRO with CPAP compliance  Nonobstructive CAD:   [facility] in 09/2015 with mild degree of atherosclerotic disease in the distal PDA, with a normal left circumflex and right coronary artery per 2015 report.   Obesity per BMI  Type 2 DM, ID  Ulcerative colitis        Antoinette Pack presents for today for Heart failure clinic evaluation.  The patient is typically seen by Shawna Lambert DO.  Patient's primary cardiologist is Dr. Pablo Cabrera .     Last known EF 51-55%.   Last known hospitalization and/or ED visit:   August 16-19, 2024 with hypertensive emergency upon admission with flash pulmonary edema acute NSTEMI as well as symptomatic palpitations  August 23-24, 2024 with hypertensive emergency and acute  hypoxemic respiratory failure with flash pulmonary edema  Accompanied by:  & infant daughter          08/26/2024 visit data/details regarding:   Dyspnea: Present with exertion  Lower extremity swelling: Denies  Abdominal swelling: Deneis  Home weight: Weight monitoring booklet provided during initial visit; Has scale.   Home BP: BP monitoring booklet provided during initial visit; Has BP cuff. 102,103 up to 130s  Home heart rate: HR monitoring booklet provided during initial visit.   Daily activities of living: Performing on her own  Pillows/lying flat: 2 pillows in bed   HF zone: Yellow  Mrs. Pack presents today reporting some ongoing shortness of breath.   She reports this  morning she did not take any of her medications secondary to systolic blood pressure of 118.  We discuss hold parameters at length and provide list of such.   She was evaluated at length by in clinic pharmacist and myself with significant review of medications.  Given recent hospitalizations as well as prior recommendations taken into account, she reports her home medication list had become confusion and included many duplicates.  Our mutual goal was for patient to leave clinic today with a concise medication list with hold parameters defined.     She reports when she has events of shortness of breath/flash pulmonary edema they are of very sudden onset.  When she has checked her blood pressure during these events it has been as high as 200s systolic.    Patient is breast feeding at present.  She has been on Lisinopril and is tolerating this well.       Review of Systems - Review of Systems   Constitutional: Positive for malaise/fatigue. Negative for decreased appetite and fever.   HENT:  Negative for congestion and ear pain.    Eyes:  Negative for blurred vision and double vision.   Cardiovascular:  Positive for dyspnea on exertion and palpitations. Negative for leg swelling.   Respiratory:  Positive for shortness of breath. Negative for cough and hemoptysis.    Endocrine: Negative for cold intolerance and heat intolerance.   Hematologic/Lymphatic: Negative for adenopathy and bleeding problem.   Skin:  Negative for dry skin and nail changes.   Musculoskeletal:  Negative for arthritis and falls.   Gastrointestinal:  Negative for bloating and anorexia.   Genitourinary:  Negative for bladder incontinence and dysuria.   Neurological:  Negative for aphonia and difficulty with concentration.   Psychiatric/Behavioral:  Negative for altered mental status and hallucinations.          All other systems were reviewed and were negative.    Patient Active Problem List   Diagnosis    Simple goiter    Pre-existing type 2 diabetes  mellitus with hyperglycemia during pregnancy in third trimester    History of CHF (congestive heart failure)    Chronic hypertension affecting pregnancy    History of pancreatitis    Morbid obesity with BMI of 40.0-44.9, adult    Pre-existing type 2 diabetes affecting pregnancy, antepartum    History of seizure disorder    ALEJANDRO on CPAP    Proteinuria affecting pregnancy, antepartum    Rectal bleeding    Shortness of breath    Antepartum multigravida of advanced maternal age    Pain of pelvic girdle    Pregnancy    Elevated blood pressure reading    Pulmonary edema    Essential hypertension    Type 2 diabetes mellitus without complication, with long-term current use of insulin    S/P  section, total salpingectomy left unicornuate uterus    Anemia    Ulcerative colitis with complication    Surgical wound infection    Wound infection after surgery    Wound infection    Postoperative cellulitis of surgical wound       family history includes COPD in her father and mother; Depression in her mother; Diabetes in her father; Emphysema in her mother; Heart disease in her father and mother; Heart failure in her father; Hepatitis in her father; Hypertension in her father and mother.     reports that she quit smoking about 9 years ago. Her smoking use included cigarettes. She started smoking about 29 years ago. She has a 20 pack-year smoking history. She has never been exposed to tobacco smoke. She has never used smokeless tobacco. She reports that she does not drink alcohol and does not use drugs.    Allergies   Allergen Reactions    Milk-Related Compounds Unknown - High Severity    Dairycare [Lactase-Lactobacillus] Unknown - Low Severity     Pt is breastfeeding baby that is allergic to dairy, pt cannot have dairy products while breastfeeding    Dilantin [Phenytoin] Mental Status Change    Keppra [Levetiracetam] Mental Status Change    Meperidine Rash    Topamax [Topiramate] Mental Status Change         Current  Outpatient Medications:     amLODIPine (NORVASC) 10 MG tablet, Take 1 tablet by mouth Daily. Hold if top number of blood pressure is less than 110., Disp: 90 tablet, Rfl: 3    Budesonide (ENTOCORT EC) 3 MG 24 hr capsule, Take 3 capsules by mouth Daily., Disp: 90 capsule, Rfl: 1    furosemide (LASIX) 40 MG tablet, Take 1 tablet by mouth Daily As Needed (for fluid retention)., Disp: 90 tablet, Rfl: 0    hydrALAZINE (APRESOLINE) 25 MG tablet, Take 1 tablet by mouth 3 (Three) Times a Day. Hold if top number of blood pressure is less than 110., Disp: 90 tablet, Rfl: 1    lisinopril (PRINIVIL,ZESTRIL) 40 MG tablet, Take 1 tablet by mouth Daily. Do not take if top number of blood pressure is less than 100., Disp: , Rfl:     mesalamine (ROWASA) 4 g enema, Insert 1 enema into the rectum Every Night., Disp: 28 enema, Rfl: 0    pantoprazole (PROTONIX) 40 MG EC tablet, Take 1 tablet by mouth 2 (Two) Times a Day Before Meals., Disp: 120 tablet, Rfl: 0    Prenatal Vit-Fe Fumarate-FA (PRENATAL PO), Take 1 tablet by mouth Daily., Disp: , Rfl:     metoprolol tartrate (LOPRESSOR) 50 MG tablet, Take 1 tablet by mouth 2 (Two) Times a Day for 90 days. Hold if top number of blood pressure is less than 100 or if heart rate is less than 60., Disp: 60 tablet, Rfl: 2    nitroglycerin (NITROSTAT) 0.4 MG SL tablet, Place 1 tablet under the tongue Every 5 (Five) Minutes As Needed for Chest Pain (Flash pulmonary edema) for up to 10 doses. Place one tablet under the tongue for chest pain every 5 minutes until chest pain is relieved. If you have to take 3 tablets, take the 3rd and go to the hospital to be evaluated., Disp: 10 tablet, Rfl: 0    Current Facility-Administered Medications:     metoprolol tartrate (LOPRESSOR) tablet 100 mg, 100 mg, Oral, Once PRN, Irene Sunshine PA-C    metoprolol tartrate (LOPRESSOR) tablet 50 mg, 50 mg, Oral, Once PRN, Irene Sunshine PA-C    nitroglycerin (NITROSTAT) SL tablet 0.4 mg, 0.4 mg,  Sublingual, Once in imaging, Irene Sunshine PA-C      Physical Exam:  I have reviewed the patient's current vital signs as documented in the patient's EMR.   Vitals:    08/26/24 1018   BP: 133/73   Pulse: 66   SpO2: 96%     Body mass index is 37.45 kg/m².       08/26/24  1018   Weight: 84.1 kg (185 lb 6.4 oz)      Physical Exam  Vitals and nursing note reviewed.   Constitutional:       General: She is awake.      Appearance: Normal appearance. She is well-developed and well-groomed.   HENT:      Head: Normocephalic and atraumatic.   Eyes:      General: Lids are normal.      Conjunctiva/sclera:      Right eye: Right conjunctiva is not injected.      Left eye: Left conjunctiva is not injected.   Cardiovascular:      Rate and Rhythm: Normal rate and regular rhythm.   Pulmonary:      Breath sounds: No decreased breath sounds, wheezing or rhonchi.   Abdominal:      Palpations: There is no shifting dullness.      Tenderness: There is no abdominal tenderness.   Musculoskeletal:      Right lower leg: No edema.      Left lower leg: No edema.   Neurological:      Mental Status: She is alert and oriented to person, place, and time.   Psychiatric:         Attention and Perception: Attention normal.         Mood and Affect: Mood normal.         Behavior: Behavior is cooperative.          JVP: Volume/Pulsation: Normal.        DATA REVIEWED:       ---------------------------------------------------  TTE/JAYDON:  Results for orders placed during the hospital encounter of 08/16/24    Adult Transthoracic Echo Complete w/ Color, Spectral and Contrast if necessary per protocol    Interpretation Summary    Left ventricular systolic function is normal. Calculated left ventricular EF = 54% Left ventricular ejection fraction appears to be 51 - 55%.    Left ventricular wall thickness is consistent with mild concentric hypertrophy.    Left ventricular diastolic function was normal.        LAST HEART CATH/IF AVAILABLE:       Cardiac  Catheterization/Vascular Study    Accession Number: [id number]   Date of Study: 2015   Ordering Provider: Interface, See Report   Clinical Indications: None Listed        Reading Physicians  Performing Staff    Interface, See Report    No performing staff assigned to study.       Patient Information    Patient Name  Rafita Pack MRN  [id number] Legal Sex  Female  (Age)  1985 (39 y.o.)     Race Ethnicity Encounter Category   White or  Not  or  Emergency         Printable Vessel Diagram    Printable Vessel Diagram         Conclusion    [place], [place] [address]  [phone]     NAME:                    RAFITA PACK  ROOM NUMBER:             [id number]  MEDICAL RECORD NUMBER:   [id number]  VISIT NUMBER:            [id number]  PHYSICIAN:                    Magnus Martinez MD, [facility]  PRIMARY CARE PROVIDER:     DATE OF PROCEDURE:       2015     YOB: 1985     REFERRING PHYSICIAN:  Yinka Torrez MD     CLINICAL HISTORY:  The patient is a 30-year-old  female with multiple  risk factors for coronary artery disease including a strongly positive family  history of premature coronary artery disease, who has been having recurrent  episodes of chest pain and discomfort suspicious for CCS class IV  angina/unstable angina. She had an abnormal Lexiscan sestamibi study with some  anterior wall myocardial ischemia. In view of her continued unstable anginal  symptoms, she was given the option of further cardiac evaluation with cardiac  catheterization to rule out any underlying significant coronary artery disease  and to help with the decision regarding further management. The procedure of  cardiac catheterization and potential risks and complications and potential  benefits and alternative methods of management have been explained to the  patient and she expressed understanding of the same and is wanting to  proceed.  Informed consent was obtained. A timeout was performed prior to starting the  procedure to identify the right patient and the right procedure.      DESCRIPTION OF PROCEDURE: After  obtaining informed consent and a timeout was  performed, the patient was brought down to the cardiac catheterization  laboratory and was prepped and draped in a sterile fashion over the right wrist  area for radial artery access. The area was anesthetized with local 2%  Xylocaine infiltration. Access was obtained to the right radial artery after  Allis test was performed, which was found to be normal. A micropuncture needle  was used to obtain access without any difficulty and a 5-New Zealander sheath was  placed over the guidewire into the right radial artery. Then a radial artery  cocktail was injected through the sheath. Then a 5-[place] Ze[place]ander Mac left 3.5  coronary catheter was advanced over the  guidewire and was engaged into the  left coronary ostium and left coronary angiography was performed in multiple  oblique projections. This catheter was then exchanged with a 5-[place]ander Mac  right coronary catheter, that was engaged into the right coronary ostium and  right coronary angiography was performed in multiple oblique projections. This  catheter was then exchanged with a 5-[place] Ze[place]ander angled pigtail catheter, that was  advanced into the left ventricle and left ventricular pressures were obtained.  Pullback pressures were obtained. At the end of the procedure, all the  catheters and the sheath were removed, and adequate hemostasis was obtained at  the arterial puncture site using a TR band. Total contrast used: Isovue 85 mL.  Fluoro time: 4.68 minutes.      For further details of the procedure, please review the record maintained by  the monitoring tech.     RESULTS:   HEMODYNAMICS:   1.  Pre-angiographic pressure: Aortic pressure 156/96 (mean 121) mmHg.  2.  Left ventricular pressure: 165/28 mmHg.  3.  Post angiographic pressure: Left  ventricular pressure 153/20 mmHg.  4.  Aortic pressure 134/83 (mean 107) mmHg.      CORONARY ARTERY ARTERIOGRAMS:       On fluoroscopy, there was no significant epicardial calcification noted.      LEFT MAIN CORONARY ARTERY: Is normal and bifurcates into medium-size left  anterior descending and left circumflex systems in the usual fashion.      LEFT ANTERIOR DESCENDING CORONARY ARTERY: Gives rise to 2 small-to-medium-size  diagonal branches from the proximal segment and a small diagonal branch from  the mid segment. The left anterior descending coronary artery with the diagonal  branches appears angiographically normal.      LEFT CIRCUMFLEX CORONARY ARTERY: Gives rise to a small obtuse marginal branch  from the proximal segment and a medium and a small posterolateral branch  distally. The left circumflex system also appears angiographically normal.      RIGHT CORONARY ARTERY: Is a medium-caliber vessel and is dominant for posterior  circulation. It appears angiographically normal in the proximal and mid  segments. Distally, it gives rise to a medium-size posterior descending artery  branch that has mild diffuse narrowing of about 40% to 50% in its distal  segment. This is not hemodynamically significant.      LEFT VENTRICULAR ANGIOGRAM: Was done in 30' CABELLO projection. It reveals normal  left ventricular chamber size, wall motion and systolic function with an  estimated LV ejection fraction of about 60% to 65%. There is no pericardial or  valvular calcification noted.      CONCLUSION AND COMMENTS: The patient is a 30-year-old  female with  persistent chest pain suspicious for unstable angina and an abnormal Lexiscan  sestamibi study that revealed some anterior wall myocardial ischemia. Her  cardiac catheterization reveals:     1.  Normal left main coronary artery.   2.  Normal left anterior descending and left circumflex systems.   3.  Right coronary artery is dominant for posterior circulation with about  40%  to 50% diffuse narrowing in the distal segment of a medium-size posterior  descending artery branch, which is not hemodynamically significant.   4.  Normal LV  chamber size, wall motion, and systolic function with an  estimated LV ejection fraction of about 60% to 65%.   5.  Elevated left ventricular end-diastolic pressures which are probably  related to diastolic noncompliance of the left ventricle.      RECOMMENDATIONS:    1.  In view of mild degree of atherosclerotic disease in the distal PDA, with a  normal left circumflex and right coronary artery, would continue to emphasize  on risk factor modification as needed for now and perhaps look at a noncardiac  cause for her symptoms should they persist.   2.  For her diastolic noncompliance of the left ventricle, I would try to  optimize her antihypertensive medications.             D:   VIBHA 09/24/2015 11:59:34  T:   shanae 09/25/2015 08:57:42  JOB ID:[id number]  [id number]  Revision Count:     0    -----------------------------------------------------  CXR/Imaging:   Imaging Results (Most Recent)       None            I personally reviewed and interpreted the CXR.      -----------------------------------------------------  CT:   CT Angiogram Chest Pulmonary Embolism    Result Date: 8/23/2024  No evidence of pulmonary embolus or dissection.     This report was finalized on 8/23/2024 4:37 PM by Donte Dexter M.D..      XR Chest AP    Result Date: 8/23/2024  No acute cardiopulmonary process.   This report was finalized on 8/23/2024 2:24 PM by Donte Dexter M.D..      CT Angiogram Chest Pulmonary Embolism    Result Date: 8/17/2024   1.  No thoracic aortic aneurysm or dissection. 2.  No pulmonary embolus 3.  Mild interstitial edema. 4.  Very small right pleural effusion. 5.  Normal heart size    This report was finalized on 8/17/2024 1:41 AM by Zoran Rubalcava MD.      XR Chest 1 View    Result Date: 8/16/2024   No evidence of acute disease in the chest.   Lung reticulation suspicious for a chronic process. CT can be considered.  This report was finalized on 8/16/2024 10:02 PM by Marilynn Dahl MD.     I personally reviewed the images of the CT scan.  My personal interpretation is below.      ----------------------------------------------------      --------------------------------------------------------------------------------------------------    Laboratory evaluations:    Lab Results   Component Value Date    GLUCOSE 86 08/26/2024    BUN 23 (H) 08/26/2024    CREATININE 0.49 (L) 08/26/2024    EGFRIFNONA 101 12/27/2020    EGFRIFAFRI  09/05/2016      Comment:      <15 Indicative of kidney failure.    BCR 46.9 (H) 08/26/2024    K 4.5 08/26/2024    CO2 25.8 08/26/2024    CALCIUM 9.1 08/26/2024    ALBUMIN 3.9 08/23/2024    LABIL2 1.3 (L) 02/05/2016    AST 15 08/23/2024    ALT 13 08/23/2024     Lab Results   Component Value Date    WBC 7.48 08/24/2024    HGB 13.8 08/24/2024    HCT 41.5 08/24/2024    MCV 79.7 08/24/2024     08/24/2024     Lab Results   Component Value Date    CHOL 186 08/17/2024    CHLPL 159 09/22/2015    TRIG 87 08/17/2024    HDL 53 08/17/2024     (H) 08/17/2024     Lab Results   Component Value Date    TSH 0.568 08/16/2024     Lab Results   Component Value Date    HGBA1C 6.60 (H) 08/16/2024     Lab Results   Component Value Date    ALT 13 08/23/2024     Lab Results   Component Value Date    HGBA1C 6.60 (H) 08/16/2024    HGBA1C 5.0 06/05/2024    HGBA1C 5.3 01/31/2024     Lab Results   Component Value Date    CREATININE 0.49 (L) 08/26/2024     Lab Results   Component Value Date    IRON 22 (L) 07/17/2023    TIBC 444 07/17/2023    FERRITIN 8.80 (L) 07/17/2023     Lab Results   Component Value Date    INR 1.01 08/23/2024    INR 0.96 08/17/2024    INR 0.90 08/16/2024    PROTIME 13.4 08/23/2024    PROTIME 12.9 08/17/2024    PROTIME 12.2 08/16/2024        Lab Results   Component Value Date    ABSOLUTELUNG 29 08/26/2024             1. Chronic  heart failure with preserved ejection fraction (HFpEF)    2. Chronic diastolic heart failure    3. Essential hypertension    4. Palpitations    5. Severe obesity (BMI 35.0-39.9) with comorbidity    6. ALEJANDRO on CPAP          ORDERS PLACED TODAY:  Orders Placed This Encounter   Procedures    ReDs Vest    CT Angiogram Coronary    Basic Metabolic Panel    Magnesium    proBNP    Basic Metabolic Panel    Magnesium    proBNP        Diagnoses and all orders for this visit:    1. Chronic heart failure with preserved ejection fraction (HFpEF) (Primary)  -     Basic Metabolic Panel; Future  -     Magnesium; Future  -     proBNP; Future  -     Basic Metabolic Panel; Standing  -     Basic Metabolic Panel  -     Magnesium; Standing  -     Magnesium  -     proBNP; Standing  -     proBNP  -     ReDs Vest  -     amLODIPine (NORVASC) 10 MG tablet; Take 1 tablet by mouth Daily. Hold if top number of blood pressure is less than 110.  Dispense: 90 tablet; Refill: 3  -     CT Angiogram Coronary; Standing  -     metoprolol tartrate (LOPRESSOR) tablet 100 mg  -     metoprolol tartrate (LOPRESSOR) tablet 50 mg  -     nitroglycerin (NITROSTAT) SL tablet 0.4 mg  -     CT Angiogram Coronary    2. Chronic diastolic heart failure    3. Essential hypertension    4. Palpitations    5. Severe obesity (BMI 35.0-39.9) with comorbidity    6. ALEJANDRO on CPAP    Other orders  -     metoprolol tartrate (LOPRESSOR) 50 MG tablet; Take 1 tablet by mouth 2 (Two) Times a Day for 90 days. Hold if top number of blood pressure is less than 100 or if heart rate is less than 60.  Dispense: 60 tablet; Refill: 2  -     nitroglycerin (NITROSTAT) 0.4 MG SL tablet; Place 1 tablet under the tongue Every 5 (Five) Minutes As Needed for Chest Pain (Flash pulmonary edema) for up to 10 doses. Place one tablet under the tongue for chest pain every 5 minutes until chest pain is relieved. If you have to take 3 tablets, take the 3rd and go to the hospital to be evaluated.  Dispense:  10 tablet; Refill: 0  -     hydrALAZINE (APRESOLINE) 25 MG tablet; Take 1 tablet by mouth 3 (Three) Times a Day. Hold if top number of blood pressure is less than 110.  Dispense: 90 tablet; Refill: 1             MEDS ORDERED TODAY:    New Medications Ordered This Visit   Medications    metoprolol tartrate (LOPRESSOR) 50 MG tablet     Sig: Take 1 tablet by mouth 2 (Two) Times a Day for 90 days. Hold if top number of blood pressure is less than 100 or if heart rate is less than 60.     Dispense:  60 tablet     Refill:  2    nitroglycerin (NITROSTAT) 0.4 MG SL tablet     Sig: Place 1 tablet under the tongue Every 5 (Five) Minutes As Needed for Chest Pain (Flash pulmonary edema) for up to 10 doses. Place one tablet under the tongue for chest pain every 5 minutes until chest pain is relieved. If you have to take 3 tablets, take the 3rd and go to the hospital to be evaluated.     Dispense:  10 tablet     Refill:  0    amLODIPine (NORVASC) 10 MG tablet     Sig: Take 1 tablet by mouth Daily. Hold if top number of blood pressure is less than 110.     Dispense:  90 tablet     Refill:  3    hydrALAZINE (APRESOLINE) 25 MG tablet     Sig: Take 1 tablet by mouth 3 (Three) Times a Day. Hold if top number of blood pressure is less than 110.     Dispense:  90 tablet     Refill:  1    metoprolol tartrate (LOPRESSOR) tablet 100 mg    metoprolol tartrate (LOPRESSOR) tablet 50 mg    nitroglycerin (NITROSTAT) SL tablet 0.4 mg        ---------------------------------------------------------------------------------------------------------------------------          ASSESSMENT/PLAN:      Diagnosis Plan   1. Chronic heart failure with preserved ejection fraction (HFpEF)  Basic Metabolic Panel    Magnesium    proBNP    Basic Metabolic Panel    Basic Metabolic Panel    Magnesium    Magnesium    proBNP    proBNP    ReDs Vest    amLODIPine (NORVASC) 10 MG tablet    CT Angiogram Coronary    metoprolol tartrate (LOPRESSOR) tablet 100 mg     metoprolol tartrate (LOPRESSOR) tablet 50 mg    nitroglycerin (NITROSTAT) SL tablet 0.4 mg    CT Angiogram Coronary      2. Chronic diastolic heart failure        3. Essential hypertension        4. Palpitations        5. Severe obesity (BMI 35.0-39.9) with comorbidity        6. ALEJANDRO on CPAP            not acutely decompensated chronic diastolic heart failure. CHF.     NYHA stage Stage C: Structural heart disease is present AND symptoms have occurredFC-Class II: Slight limitation of physical activity. Comfortable at rest Ordinary physical activity results in fatigue, palpitation, dyspnea (shortness of breath).     Today, Patient appears euvolemic.and with  Moderate perfusion. The patient's hemodynamics are currently acceptable. HR is: normal and is at goal. BP/MAP was reviewed and there isroom for medication up-titration.  Clinical trajectory was assessed and haswaxed and waned.     CHF GOAL DIRECTED MEDICAL THERAPY FOR PATIENT ADDRESSED/ADJUSTED:     GDMT: HFpEF    Drug Class   Drug   Dose Last Dose Adjustment Notes   ACEi/ARB/ARNI Lisinopril 40mg     Beta Blocker Labetolol 100mg     MRA Breastfeeding      SGLT2i Breastfeeding   N/A   Secondaries if applicable:          -CHF Specific BB:    Given palpitations, will transition Labetolol 100mg BID to Metoprolol tartrate 50mg.  We discussed processes/benefits of HF clinic including nursing, pharmacist, and provider evaluation during each visit with ability for in office ReDS vest, labs, and ability to provide IV diuresis in the clinic with close outpatient monitoring.  Additionally, patient was educated about the availability of delivery of medications to patient's clinic room prior to leaving the building which assists with medication compliance and insures medications are in hands when changes are made (if patient opts for apothecary usage) with thorough guidance regarding changes and medication schedule provided.          -ACE/ARB/ARNi:   Continue Lisinopril 40mg qd  per patient's primary cardiologist.      -MRA:   Breastfeeding at present.     -SGLT2 inhibitor therapy:   Breastfeeding at present.  When no longer breastfeeding, patient would likely benefit from this addition give underlying DM 2 as well.     -Diuretic regimen:   ReDS Vest reading for. 08/26/24 is  29; ReDs Vest reading reviewed with patient.    Continue PRN Lasix.   BMP, Mag, & ProBNP reviewed with patient.      -Fluid restriction/Sodium restriction:   Requested 2000 ml restriction  Patient has been asked to weigh daily and was provided with a printed diuretic strategy.  1,500 mg Na restriction was discussed.    -Devices if applicable:       -Acute vs. Chronic underlying conditions other than HF addressed during visit:   Essential HTN:   Stop Nifedipine as she also has Amlodipine on board.   Continue Lisinopril 40mg qd with hold parameters for hypotension discussed.   Continue hydralazine PRN with hold parameters.   See above regarding BB transition in relation to HF.   LHC from 2015 reviewed.  Stress test from 2023 reviewed.   CT Angio of Coronaries ordered.     Palpitations:   Continue wearing monitor and f/u with Dr. Brooks.   Beta blocker as outlined within document.     Recent flash pulmonary edema:   PRN Nitro provided for a few tablets for relief, as patient lives around 45 minutes away from hospital, should an event like this happen again.     Identifiable barriers to Heart Failure Self-care:   Medical Barriers:  Breastfeeding currently limiting medication adjustments  Social Barriers:  No immediate known barriers        CONSIDERATIONS:     -Sleep/Apnea:   CPAP compliant    --------------------------------------------------------------------------------        Class 2 Severe Obesity (BMI >=35 and <=39.9). Obesity-related health conditions include the following: hypertension, coronary heart disease, and diabetes mellitus. Obesity is improving with lifestyle modifications. BMI is is above average; BMI  management plan is completed. We discussed  calorie monitoring, though patient is currently breastfeeding .              >45 minutes out of 60 minutes face to face spent counseling patient extensively on dietary Na+ intake, importance of activity, weight monitoring, compliance with medications in addition to importance of titration with goal directed medical therapy and follow up appointments.            This document has been electronically signed by Irene Sunshine PA-C  August 26, 2024 13:45 EDT      Dictated Utilizing Dragon Dictation: Part of this note may be an electronic transcription/translation of spoken language to printed text using the Dragon Dictation System.    Follow-up appointment and medication changes provided in hand delivered After Visit Summary as well as reviewed in the room.

## 2024-08-26 NOTE — TELEPHONE ENCOUNTER
Patient called asking if she needed to be seen sooner due to recent hospitalization from 8/23 - 8/24. Patient's next appointment 9/5/2024. Advised patient that to keep current appointment time. Patient states she is currently wearing Holter monitor. Monitor placed on 8/19. Order is for 7 days. Informed patient, patient will mail back monitor today. Patient verbalizes understanding.

## 2024-08-26 NOTE — PROGRESS NOTES
Heart Failure Clinic  Pharmacist Note     Antoinette Pack is a 39 y.o. female seen in the Heart Failure Clinic for HFpEF with a most recent EF of 51-55% on 8/17/24. The patient has been hospitalized twice in the past month for flash pulmonary edema episodes on 8/16-8/19 and then again on 8/23-/24. On her most recent discharge, her hydralazine was increased to 25mg tid.     Antoinette Pack reports a fair understanding of medications.  She is accompanied by her , who also knows about her medications and helps her decide when to take them according to her BP. They report adherence, except for when her BP is too low, like this morning when she did not take any of her BP medications. She reports that she held Labetalol, Lisinopril, Nifedipine, Amlodipine and hydralazine. Of note, Lisinopril and Nifedipine were stopped on her first discharge, but lisinopril was added back by her cardiologist. She reports that she is supposed to take Labetalol 100mg bid and Hydralazine 25mg tid, but only has the 10mg tablets of Hydralazine and has been taking it bid. She reports that she had Lisinopril and Nifedipine at home and was planning on continuing both, took Lisinopril last yesterday.     She reports SOB but denies any swelling. She currently takes Lasix 40mg PRN and reports taking it 1-2 times last week. She reports that she did not take it yesterday, since her BP was low.     She reports that they gave her a nitro at the hospital and it gave her relief and she requested some to be used when she needed it for the pulmonary edema, but reports that they would not give her any, for fear that she would not present to the hospital. Both explain that they live 47 minutes away, and that with driving fast, which is a long time for her to not be able to breathe and have SOB.     Off note, patient is also breastfeeding and plans to until her child is 1 year old (April 2025).  Patient's  did report that if she  "needs to be on other medications that are limited by her breastfeeding, he wants her to be on those medications and he will convince her to stop breastfeeding.     Medication Use:   Hx of med intolerances:  None related to HF  Retail Rx Management: [facility]     Past Medical History:   Diagnosis Date    Anxiety     CHF (congestive heart failure) 05/2023    Colitis 2023    Depression     Diabetes mellitus 2018    type 2    Diverticulitis     GERD (gastroesophageal reflux disease)     History of transfusion 04/19/2024    2 units post delivery    Hypertension     chronic    Kidney stone     \"years ago\"    Narcolepsy 2022    Pancreatitis 09/2023    PCOS (polycystic ovarian syndrome)     Rectal bleeding     SINCE 2023 - USES MESALAMINE SUPPOSITORY NIGHTLY    Seizures 2012    Sleep apnea      ALLERGIES: Milk-related compounds, Dairycare [lactase-lactobacillus], Dilantin [phenytoin], Keppra [levetiracetam], Meperidine, and Topamax [topiramate]  Current Outpatient Medications   Medication Sig Dispense Refill    amLODIPine (NORVASC) 10 MG tablet Take 1 tablet by mouth Daily. Hold if top number of blood pressure is less than 110. 90 tablet 3    Budesonide (ENTOCORT EC) 3 MG 24 hr capsule Take 3 capsules by mouth Daily. 90 capsule 1    furosemide (LASIX) 40 MG tablet Take 1 tablet by mouth Daily As Needed (for fluid retention). 90 tablet 0    hydrALAZINE (APRESOLINE) 25 MG tablet Take 1 tablet by mouth 3 (Three) Times a Day. Hold if top number of blood pressure is less than 110. 90 tablet 1    lisinopril (PRINIVIL,ZESTRIL) 40 MG tablet Take 1 tablet by mouth Daily. Do not take if top number of blood pressure is less than 100.      mesalamine (ROWASA) 4 g enema Insert 1 enema into the rectum Every Night. 28 enema 0    pantoprazole (PROTONIX) 40 MG EC tablet Take 1 tablet by mouth 2 (Two) Times a Day Before Meals. 120 tablet 0    Prenatal Vit-Fe Fumarate-FA (PRENATAL PO) Take 1 tablet by mouth Daily.      metoprolol tartrate " "(LOPRESSOR) 50 MG tablet Take 1 tablet by mouth 2 (Two) Times a Day for 90 days. Hold if top number of blood pressure is less than 100 or if heart rate is less than 60. 60 tablet 2    nitroglycerin (NITROSTAT) 0.4 MG SL tablet Place 1 tablet under the tongue Every 5 (Five) Minutes As Needed for Chest Pain (Flash pulmonary edema) for up to 10 doses. Place one tablet under the tongue for chest pain every 5 minutes until chest pain is relieved. If you have to take 3 tablets, take the 3rd and go to the hospital to be evaluated. 10 tablet 0     Current Facility-Administered Medications   Medication Dose Route Frequency Provider Last Rate Last Admin    metoprolol tartrate (LOPRESSOR) tablet 100 mg  100 mg Oral Once PRN Irene Sunshine PA-C        metoprolol tartrate (LOPRESSOR) tablet 50 mg  50 mg Oral Once PRN Irene Sunshine PA-C        nitroglycerin (NITROSTAT) SL tablet 0.4 mg  0.4 mg Sublingual Once in imaging Irene Sunshine PA-C           Vaccination History:   Pneumonia: PPSV23 2020; not interested in any vaccines  Annual Influenza: Not interested in any vaccines    Objective  Vitals:    08/26/24 1018   BP: 133/73   BP Location: Left arm   Patient Position: Sitting   Cuff Size: Adult   Pulse: 66   SpO2: 96%   Weight: 84.1 kg (185 lb 6.4 oz)   Height: 149.9 cm (59\")     Wt Readings from Last 3 Encounters:   08/26/24 84.1 kg (185 lb 6.4 oz)   08/24/24 84.1 kg (185 lb 6.5 oz)   08/19/24 83.9 kg (185 lb)         08/26/24  1018   Weight: 84.1 kg (185 lb 6.4 oz)     Lab Results   Component Value Date    GLUCOSE 86 08/26/2024    BUN 23 (H) 08/26/2024    CREATININE 0.49 (L) 08/26/2024    EGFRIFNONA 101 12/27/2020    EGFRIFAFRI  09/05/2016      Comment:      <15 Indicative of kidney failure.    BCR 46.9 (H) 08/26/2024    K 4.5 08/26/2024    CO2 25.8 08/26/2024    CALCIUM 9.1 08/26/2024    ALBUMIN 3.9 08/23/2024    LABIL2 1.3 (L) 02/05/2016    AST 15 08/23/2024    ALT 13 08/23/2024     Lab Results "   Component Value Date    WBC 7.48 08/24/2024    HGB 13.8 08/24/2024    HCT 41.5 08/24/2024    MCV 79.7 08/24/2024     08/24/2024     Lab Results   Component Value Date    CKTOTAL 64 08/16/2024    CKMB <0.2 09/22/2015    CKMBINDEX 0.3 09/22/2015    TROPONINI 0.029 10/11/2018    TROPONINT 12 08/24/2024     Lab Results   Component Value Date    PROBNP 47.8 08/26/2024     Results for orders placed during the hospital encounter of 08/16/24    Adult Transthoracic Echo Complete w/ Color, Spectral and Contrast if necessary per protocol    Interpretation Summary    Left ventricular systolic function is normal. Calculated left ventricular EF = 54% Left ventricular ejection fraction appears to be 51 - 55%.    Left ventricular wall thickness is consistent with mild concentric hypertrophy.    Left ventricular diastolic function was normal.         GDMT    Drug Class   Drug   Dose Last Dose Adjustment Additional Titration   Notes   ACEi/ARB/ARNI Lisinopril 40mg 8/19/24 -Dr. Cabrera restarted     Beta Blocker Lopressor 50mg bid 8/26/24 Metoprolol may be continued in HF in lactating patients ([facility]) Stopped Labetalol   MRA        SGLT2i    Not recommended in breastfeeding     Hydralazine  10-25mg tid 8/24/24- on discharge - pt had been using the 10mg    Lasix 40mg PRN    Other HTN meds- Amlodipine       Drug Therapy Problems    1. Drug Interactions Screening- Duplication of therapy- Nifedipine and Amlodipine  2. Drug-Disease Interactions: NSAIDS  3. GDMT- concerns for hypotension  4. BREASTFEEDING- and GDMT  5. Request Rx for nitro    Recommendations:     Nifedipine was stopped on 8/19/24 discharge and was not restarted by her primary cardiologist. Nifedipine to be discontinued.   2. Patient has UC and therefore, is already avoiding NSAIDS.   3. Irene to transition Labetalol to Toprol based off of UpToDate article, Management of heart failure during pregnancy (underlined below). She put hold parameters on all BP  medications. Lisinopril already on board by primary cardiologist (pt reports being put on right after the birth of her daughter). He continued Amlodipine, Hydralazine, Labetalol and Lisinopril on 8/19/24.   4A. Breastfeeding Research   Per UpToDate article, Treatment of hypertension in pregnant and postpartum patients-  ACE inhibitors - These drugs are transferred into milk at very low levels. Captopril and enalapril may be used in lactating patients. However, newborns may be more susceptible to the hemodynamic effects of these drugs, such as hypotension, and sequelae, such as oliguria and seizures. Therefore, we suggest that the hemodynamic status of the infant be considered when deciding whether patients taking these drugs should breastfeed.   Beta blockers and alpha/beta blockers - Propranolol, metoprolol, and labetalol have the lowest transfer into milk of this class of drug with relative infant doses of less than 2 percent. None have been associated with adverse events in infants.   Intense diuresis from a loop diuretic may decrease milk production when lactation is being established, but low doses with more gradual diuresis are less of a concern. The effects of loop diuretics on established lactation have not been studied.   Amlodipine- Use of a calcium channel blocker other than amlodipine may be preferred in lactating patients (ESC [[place] 2020]).    Per UpToDate article, Management of heart failure during pregnancy-  ACE inhibitors- Levels in breast milk of ACE inhibitors are low and not expected to cause adverse effects in  infants [16]. An ACE inhibitor that has been studied in breast milk or  infants (such as enalapril, captopril, quinapril, or benazepril) is preferred [17]. There are no data on ARB or ARNI safety during breastfeeding, and therefore we do not recommend using an ARB or ARNI during breastfeeding.   Beta blockers- Among the three beta blockers recommended to treat stable  HFrEF, we suggest use of metoprolol in nursing mothers, based upon the following considerations:  ?Though metoprolol (which is 10 percent protein bound and 40 percent renally excreted) is excreted into breast milk,  infants have had very low or undetectable serum levels [16,21]. Limited studies on the use of metoprolol during breastfeeding have reported no adverse reactions in  infants.  ?Since carvedilol is highly protein bound (95 percent) and has low renal excretion (1 percent), carvedilol likely presents a low risk for accumulation in  infants [16]. In patients with hypertension or on stable doses of carvedilol, it may be reasonable to use carvedilol despite less published experience than metoprolol.  ?Since bisoprolol has relatively low protein binding (30 percent) and moderately high renal excretion (50 percent), it presents a moderately high risk for accumulation in  infants [16]. Since there is little published experience with bisoprolol during breastfeeding, other agents may be preferred when nursing a  or  infant.        We avoid atenolol with lactation, as other agents are preferred.  Aldosterone antagonists -- The aldosterone antagonists spironolactone and eplerenone, which compete with aldosterone for the mineralocorticoid receptor, prolong survival in selected patients with HF. However, in animal studies, the antiandrogen effects of spironolactone caused feminization of the male fetus [22,23]. There are neither data nor clinical experience to support the safety of these agents during pregnancy. Thus, we suggest that these agents not be used during pregnancy.  Limited data suggest that spironolactone use is acceptable during breastfeeding since the maternal diuretic effect is unlikely to be potent enough to affect lactation, and the dose to the breastfeeding infant is low [16]. In patients with HF who are breastfeeding, we generally avoid eplerenone due  to lack of data.  SGLT2 inhibitors- The SGLT2 inhibitors dapagliflozin and empagliflozin are uncharged molecules that are highly protein bound and unlikely to pass into breastmilk in clinically important amounts [16]. However, due to lack of published data, we do not recommend these medications while nursing a  or  infant.   Hydralazine- Limited data on breast milk and  infant serum levels suggest that use of hydralazine is acceptable in nursing mothers [16]. Evidence is lacking on use of isosorbide dinitrate during breastfeeding. Since we usually restart ACE inhibitor therapy postpartum, we seldom use hydralazine plus nitrate to treat HF in nursing mothers.     Per [facility]- on the current medications she is on at this time  Amlodipine- Amlodipine is present in breast milk.   Multiple reports have evaluated the presence of amlodipine in breast milk:  *Use of a calcium channel blocker other than amlodipine may be preferred in lactating patients (ESC [[place] 2020]).  Lisinopril- It is not known if lisinopril is present in breast milk.  Due to the potential for serious adverse reactions in the  infant, the  recommends a decision be made whether to discontinue breastfeeding or to discontinue lisinopril.  *When postpartum treatment with an ACE inhibitor is needed, consider use of an agent other than lisinopril (ESC [[place] 2020]). Avoid breastfeeding if high maternal doses of an ACE inhibitor are needed (ACOG 2019).  Hydralazine- Hydralazine is present in breast milk.  Data related to the presence of hydralazine in breast milk are available from a patient receiving oral hydralazine 50 mg 3 times a day, initiated prior to delivery. Hydralazine and metabolites were present in breast milk sampled 30 minutes after a midday dose at 8 weeks postpartum (Aníbal ). In a study of 4 breastfeeding women, breast milk concentrations of hydralazine were approximately one-half of  maternal serum concentrations. Hydralazine was detected in the serum of their breastfeeding infants at a concentration 10 times smaller than concentrations found in a hypertensive infant being treated therapeutically with hydralazine (Kar 1986).  The  recommends that caution be used if administered to patients who are breastfeeding. Hydralazine is considered compatible with breastfeeding; however, sufficient data are not available following long-term use (WHO 2002).  Metoprolol- Metoprolol is present in breast milk.  The  recommends monitoring the  infant for adverse events such as bradycardia; constipation; diarrhea; and dry mouth, skin, or eyes when metoprolol is administered to a mother who is a slow metabolizer.  When treatment for hypertension is needed in a breastfeeding patient, consider use of an agent other than metoprolol (ACOG 2019). Metoprolol may be continued for the treatment of heart failure in lactating patients; treatment should be made as part of a shared decision-making process (AHA/ACC/HFSA [[place] ]).  Lasix- Furosemide is present in breast milk.  According to the , the decision to breastfeed during therapy should consider the risk of infant exposure, the benefits of breastfeeding to the infant, and the benefits of treatment to the mother. In general, large doses of loop diuretics have the potential to decrease milk volume and suppress lactation; use should be avoided when possible (WHO 2002). When used for the treatment of heart failure, furosemide may be considered with close  monitoring (AHA/ACC/HFSA [[place]reich ]).  4B. Breastfeeding Recommendations-  -Made Irene aware of these findings.   -Will forward the above information concerning Lisinopril and Amlodipine to primary cardiologist, Dr. Cabrera. Specifics highlighted in red.   -Would suggest changing Lisinopril to Enalapril, since she is taking a high dose of  Lisinopril. Suemp states about Enalapril- When postpartum treatment with an ACE inhibitor is needed, available guidelines consider enalapril to be acceptable for use (AHA/ACC/HFSA [[place]reich 2022], ESC [[place] 2020]). Avoid breastfeeding if high maternal doses of an ACE inhibitor are needed (ACOG 2019).  - Would suggest changing amlodipine to nifedipine. [facility] states the following pertaining to Nifedipine- Nifedipine is considered compatible with breastfeeding (ESC [[place] 2020]; SOGC [[place] 2022]; WHO 2002), although data following long-term use is limited (WHO 2002). According to the , the decision to continue or discontinue breastfeeding during therapy should consider the risk of infant exposure, the benefits of breastfeeding to the infant, and benefits of treatment to the mother    **Of note- Dr. Cabrera responded with the following after sending him my concerns: Her BP has been stable on this regimen and further adjustments have been difficult.  I think transition to nifedipine would be ok.  I would be hesitant with lisinopril to enalapril as she is well controlled. -added on 8/26/24 @3:18PM.     5. Made Irene aware and she sent in a Rx for it. Counseled patient on how to take and recommended for her to quickly make her way to the hospital when she does take one.  agrees that they will do so.   6. Went over AVS and med list with patient and , filling out the chart. I also wrote out the different hold parameters and what to hold if her BP or HR falls within those parameters. Patient seemed very apprehensive to take all 4 BP meds (Amlodipine, Hydralazine 25mg, Lisinopril and Metoprolol). I encouraged her to take them all depending on her BP and that she could back off of amlodipine or hydralazine if needed. Counseled her to try to take Lopressor and Lisinopril daily and try to wean off of Hydralazine if needed. She felt more comfortable with that. She also mentioned that she  would probably continue to use the 10mg of Hydralazine.       Discharge medications have been reviewed and reconciled.    Patient was educated on heart failure medications and the importance of medication adherence. All questions were addressed and patient expressed understanding. Used teach-back method to assess understanding.     Thank you for allowing me to participate in the care of your patient,    Daisy Randall [facility]  08/26/24  14:33 EDT

## 2024-08-26 NOTE — PROGRESS NOTES
Heart Failure Clinic    Date: 08/26/24     Vitals:    08/26/24 1018   BP: 133/73   Pulse: 66   SpO2: 96%    Weight 185.4    Method of arrival: Ambulatory    Weighing self daily: Yes    Monitoring Heart Failure Zones: No    Today's HF Zone: Reviewed Zones    Taking medications as prescribed: Has questions regarding medications    Edema No    Shortness of Air: Yes    Number of pillows used at night:adjustable bed HOB elevated    Educational Materials given:  AVS. Heart Success patient booklet                                                                         ReDS Value: 29  25-35 Optimal Value Status      Luis Torres RN 08/26/24 10:20 EDT

## 2024-08-27 ENCOUNTER — TELEPHONE (OUTPATIENT)
Dept: TELEMETRY | Facility: HOSPITAL | Age: 39
End: 2024-08-27
Payer: COMMERCIAL

## 2024-08-27 ENCOUNTER — READMISSION MANAGEMENT (OUTPATIENT)
Dept: CALL CENTER | Facility: HOSPITAL | Age: 39
End: 2024-08-27
Payer: COMMERCIAL

## 2024-08-27 ENCOUNTER — SPECIALTY PHARMACY (OUTPATIENT)
Dept: PHARMACY | Facility: HOSPITAL | Age: 39
End: 2024-08-27
Payer: COMMERCIAL

## 2024-08-27 NOTE — OUTREACH NOTE
Medical Week 1 Survey      Flowsheet Row Responses   [facility] patient discharged from? [facility]   Does the patient have one of the following disease processes/diagnoses(primary or secondary)? Other   Week 1 attempt successful? Yes   Call start time 1419   Call end time 1421   Discharge diagnosis Hypertensive emergency, Acute hypoxic respiratory failure   Meds reviewed with patient/caregiver? Yes   Is the patient having any side effects they believe may be caused by any medication additions or changes? No   Does the patient have all medications ordered at discharge? Yes   Is the patient taking all medications as directed (includes completed medication regime)? Yes   Comments regarding appointments seen the HF clinic yesterday (8/26),  will see cardiology on 8/30   Does the patient have a primary care provider?  Yes   Does the patient have an appointment with their PCP within 7 days of discharge? Yes   Comments regarding PCP Hospital follow up with PCP on 8/29   Has the patient kept scheduled appointments due by today? Yes   Has home health visited the patient within 72 hours of discharge? N/A   Psychosocial issues? No   Did the patient receive a copy of their discharge instructions? Yes   Nursing interventions Reviewed instructions with patient   What is the patient's perception of their health status since discharge? Worsening   Is the patient/caregiver able to teach back signs and symptoms related to disease process for when to call PCP? Yes   Is the patient/caregiver able to teach back signs and symptoms related to disease process for when to call 911? Yes   Is the patient/caregiver able to teach back the hierarchy of who to call/visit for symptoms/problems? PCP, Specialist, Home health nurse, Urgent Care, ED, 911 Yes   Additional teach back comments States she is checking her weight daily and keeping track of it, states she monitoring herself for fluid gain.   Week 1 call completed? Yes   Would this patient  benefit from a Referral to Amb Social Work? No   Is the patient interested in additional calls from an ambulatory ? No   Wrap up additional comments Doing well, was seen at HF clinic yesterday, confirmed other upcoming appts.   Call end time 1421            Julia BURNS - Registered Nurse

## 2024-08-27 NOTE — PROGRESS NOTES
Specialty Pharmacy Patient Management Program  Refill Coordination - Pharmacist Escalation      Antoinette Pack is a 39 y.o. female with Type 2 Diabetes seen by an Endocrinology provider and enrolled in the Endocrinology Patient Management program offered by [facility].      Spoke with patient regarding recent hospitalizations. Patient was admitted and evaluated for SOB and chest pain. She is now being followed by Cardiology for assessment and management of congestive heart failure. Patient states that her insulin pump therapy was discontinued while admitted and her diabetes was managed with injections. Post discharge, she has resumed using her insulin pump and reports BG's running the low 100s. She denies any episodes of hypoglycemia at this time. Will inform endocrinology provider of recent hospitalizations. Patient has follow-up appointment scheduled for 9/9/24. Will complete clinical assessment at visit and work with provider to make appropriate adjustments to diabetes regimen at clinic visit. No changes recommended at this time.      Thank you,      Sravani Perez, PharmD, AARON HUSOTN  Clinical Specialty Pharmacist, Endocrinology  8/27/2024  11:58 EDT

## 2024-08-27 NOTE — PAYOR COMM NOTE
"[facility]  NPI:[id number]    Utilization Review  Contact: Jessica Carballo RN  Phone: [phone]  Fax:[phone]    DISCHARGE NOTIFICATION    Antoinette Pack (39 y.o. Female)       Date of Birth   1985    Social Security Number       [address] [address]    Home Phone   [phone]    MRN   [id number]       Islam   None    Marital Status                               Admission Date   8/23/24    Admission Type   Emergency    Admitting Provider   Abhishek William DO    Attending Provider       Department, Room/Bed   [facility], 3304/1S       Discharge Date   8/24/2024    Discharge Disposition   Home or Self Care    Discharge Destination   Home                              Attending Provider: (none)   Allergies: Milk-related Compounds, Dairycare [Lactase-lactobacillus], Dilantin [Phenytoin], Keppra [Levetiracetam], Meperidine, Topamax [Topiramate]    Isolation: None   Infection: None   Code Status: Prior    Ht: 149.9 cm (59\")   Wt: 84.1 kg (185 lb 6.5 oz)    Admission Cmt: None   Principal Problem: Acute hypoxic respiratory failure [J96.01]                   Active Insurance as of 8/23/2024       Primary Coverage       Payor Plan Insurance Group Employer/Plan Group    ANTHEM MEDICARE REPLACEMENT ANTHEM MEDICARE ADVANTAGE [id number]       Payor Plan Address Payor Plan Phone Number Payor Plan Fax Number Effective Dates    [address] [phone]  3/1/2024 - None Entered    [facility] [address]         Subscriber Name Subscriber Birth Date Member ID       ANTOINETTE PACK 1985 [id number]               Secondary Coverage       Payor Plan Insurance Group Employer/Plan Group    MISC COMMERCIAL MISC COMMERCIAL        Coverage Address Coverage Phone Number Coverage Fax Number Effective Dates    [address] [phone] 8/26/2023 - None Entered    [place] [id number]         Subscriber " Name Subscriber Birth Date Member ID       ANTOINETTE PACK 1985 [id number]                     Emergency Contacts        (Rel.) Home Phone Work Phone Mobile Phone    Jonel Iverson (Father) [phone] -- --    Michelle Pack (Spouse) -- -- [phone]                 Discharge Summary        Abhishek William DO at 24 [phone]              [facility] HOSPITALIST MEDICINE DISCHARGE SUMMARY    Patient Identification:  Name:  Antoinette Pack  Age:  39 y.o.  Sex:  female  :  1985  MRN:  [id number]  Visit Number:  [id number]    Date of Admission: 2024  Date of Discharge: 2024    PCP: Shawna Lambert DO    DISCHARGE DIAGNOSIS   Hypertensive emergency  Acute hypoxic respiratory failure  Flash pulmonary edema  Type 2 diabetes mellitus      CONSULTS  Dr. Guerrero, Cardiology      PROCEDURES PERFORMED   None      HOSPITAL COURSE  Ms. Pack is a 39 y.o. female who presented to [facility] emergency department on 2024 with a chief complaint of shortness of breath.  Patient has a past medical history remarkable for type 2 diabetes mellitus, essential hypertension, ALEJANDRO, anxiety/depression.  Patient states she was in her usual state of health when she was sitting on the couch watching TV and feeding her baby when she had sudden onset shortness of breath.  Patient states she felt a sudden onset chest pain which was substernal in nature and lasted for several minutes.  She stated it felt like she was kicked in the chest.  When the pain resolved, patient had continued shortness of breath.  Patient did call EMS who brought patient to the emergency department for further treatment and evaluation.  Patient denied any nausea, vomiting, diaphoresis or any other symptoms.  Initial evaluation in the emergency department did consist of basic laboratory work as well as physical exam and vital signs.  Initial vital signs found patient's blood pressure  202/120, respiratory rate 34, heart rate 103, temperature 98 °F and oxygen saturation 99% on room air.  Initial lab work did include CBC, CMP and ABG.  CMP was essentially within normal limits.  CBC was within normal limits except for mildly reduced platelet count of 138.  ABG demonstrated pH 7.48, pCO2 29, pO2 66 and bicarb 22 on room air.  CT chest was obtained which demonstrated no evidence of pulmonary embolism but per my interpretation did appear to have pulmonary edema.  With this in mind, patient's presentation appears most consistent with flash pulmonary edema secondary to hypertensive emergency.  Patient did receive labetalol 10 mg IV x 1 dose in the emergency department with dramatic improvement in blood pressure.  Patient was then admitted for further treatment and evaluation.     Patient was admitted to telemetry unit and restarted on home antihypertensive medications.  Patient's blood pressure dramatically improved throughout the remainder of her hospitalization.  Did discuss patient's case with on-call cardiology services who did recommend increasing patient's hydralazine from 10 mg p.o. twice daily to 25 mg p.o. 3 times daily and to follow-up with her primary cardiologist in the near future.  This plan was discussed with the patient and she expressed understanding and willingness to proceed with the beforementioned plan.  As patient is now normotensive and hypoxic respiratory failure has resolved, it is felt patient has reached maximum medical benefit of current hospitalization and she will be discharged home in stable condition today.    VITAL SIGNS:      08/23/24  1316 08/23/24  1922 08/24/24  0500   Weight: 83.9 kg (184 lb 15.5 oz) 84.1 kg (185 lb 6.5 oz) 84.1 kg (185 lb 6.5 oz) (New admit less than 24 hours.)     Body mass index is 37.45 kg/m².    PHYSICAL EXAM:  Constitutional: Obese by BMI  female in no apparent distress.     HENT:  Head:  Normocephalic and atraumatic.  Mouth:  Moist  mucous membranes.    Eyes:  Conjunctivae and EOM are normal.  Pupils are equal, round, and reactive to light.  No scleral icterus.    Neck:  Neck supple. No thyromegaly.  No JVD present.    Cardiovascular:  Regular rate and rhythm with no murmurs, rubs, clicks or gallops appreciated.  Pulmonary/Chest: Clear to auscultation bilaterally  Abdominal:  Soft. Nontender. Nondistended  Bowel sounds are normal in all four quadrants. No organomegally appreciated.   Musculoskeletal:  No edema, no tenderness, and no deformity.  No red or swollen joints anywhere.    Neurological:  Alert, Cranial nerves II-XII intact with no focal defecits.  No facial droop.  No slurred speech.   Skin:  Warm and dry to palpation with no rashes or lesions appreciated.  Peripheral vascular:  2+ radial and pedal pulses in bilateral upper and lower extremities.  Psychiatric:  Alert and oriented x3, demonstrates appropriate judgement and insight.    DISCHARGE DISPOSITION   Stable    DISCHARGE MEDICATIONS:     Discharge Medications        Changes to Medications        Instructions Start Date   hydrALAZINE 25 MG tablet  Commonly known as: APRESOLINE  What changed:   medication strength  how much to take  when to take this   25 mg, Oral, 3 Times Daily             Continue These Medications        Instructions Start Date   albuterol sulfate  (90 Base) MCG/ACT inhaler  Commonly known as: PROVENTIL HFA;VENTOLIN HFA;PROAIR HFA   2 puffs, Inhalation, Every 4 Hours PRN      amLODIPine 10 MG tablet  Commonly known as: NORVASC   10 mg, Oral, Daily      Budesonide 3 MG 24 hr capsule  Commonly known as: ENTOCORT EC   9 mg, Oral, Daily      furosemide 40 MG tablet  Commonly known as: LASIX   40 mg, Oral, Daily PRN      labetalol 200 MG tablet  Commonly known as: NORMODYNE   Take ½ tablet by mouth 2 (Two) Times a Day.      labetalol 200 MG tablet  Commonly known as: NORMODYNE   200 mg, Oral, 2 Times Daily      mesalamine 4 g enema  Commonly known as: ROWASA    4 g, Rectal, Nightly      pantoprazole 40 MG EC tablet  Commonly known as: PROTONIX   40 mg, Oral, 2 Times Daily Before Meals      PRENATAL PO   1 tablet, Oral, Daily                 Your Scheduled Appointments      Aug 26, 2024 10:00 AM  Initial Evaluation with [facility]  [facility] ([place]) [address] [address] [phone]   Bring all previous medical records and films, along with current medications and insurance information.         Sep 05, 2024 11:15 AM  Follow Up with Pablo Cabrera MD  [facility] CARDIOLOGY ([place]) [address]  [facility] [address] [phone]   -Bring photo ID, insurance card, and list of medications to appointment  -If testing was completed outside of [facility] then patient must bring images on a disc  -Copay will be collected at time of appointment  -Established patients should arrive 15 minutes prior to appointment         Sep 09, 2024 9:00 AM  FOLLOW UP with CICI Veliz  [facility] ENDOCRINOLOGY ([place]) [address]  [facility] [address] [phone]             Activity Instructions    Resume activity as tolerated.          Additional Instructions for the Follow-ups that You Need to Schedule       Discharge Follow-up with Specified Provider: Dr. Lujan; 2 Days   As directed      To: Dr. Lujan   Follow Up: 2 Days   Follow Up Details: Hypertensive Emergency with flash pulmonary edema follow up               Follow-up Information       Shawna Lambert DO Follow up in 1 week(s).    Specialty: Family Medicine  Why: PATIENT WILL BE NOTIFIED ABOUT APPT ON MONDAY 8/26 AM  Contact information:  [address] [facility] [address] [phone]               Sohail Lujan MD Follow up in 2 day(s).    Specialties: Cardiology, Interventional Cardiology  Why: PATIENT WILL BE NOTIFIED ABOUT APPT ON MONDAY 8/26 AM  Contact  information:  [address]  [facility] [address]  [phone]                             TEST  RESULTS PENDING AT DISCHARGE  Pending Labs       Order Current Status    Blood Culture - Blood, Arm, Right Preliminary result    Blood Culture - Blood, Hand, Left Preliminary result             Abhishek William DO  08/24/24  17:39 EDT    Please note that this discharge summary required more than 30 minutes to complete.    Please send a copy of this dictation to the following providers:  Shawna Lambert DO    Electronically signed by Abhishek William DO at 08/24/24 2568

## 2024-08-28 LAB
BACTERIA SPEC AEROBE CULT: NORMAL
BACTERIA SPEC AEROBE CULT: NORMAL

## 2024-08-30 ENCOUNTER — OFFICE VISIT (OUTPATIENT)
Dept: CARDIOLOGY | Facility: CLINIC | Age: 39
End: 2024-08-30
Payer: COMMERCIAL

## 2024-08-30 VITALS
SYSTOLIC BLOOD PRESSURE: 178 MMHG | HEIGHT: 59 IN | DIASTOLIC BLOOD PRESSURE: 100 MMHG | OXYGEN SATURATION: 98 % | WEIGHT: 187.5 LBS | BODY MASS INDEX: 37.8 KG/M2 | HEART RATE: 70 BPM

## 2024-08-30 DIAGNOSIS — I10 ESSENTIAL HYPERTENSION: ICD-10-CM

## 2024-08-30 DIAGNOSIS — I50.32 CHRONIC DIASTOLIC HEART FAILURE: ICD-10-CM

## 2024-08-30 DIAGNOSIS — I50.32 CHRONIC HEART FAILURE WITH PRESERVED EJECTION FRACTION (HFPEF): Primary | ICD-10-CM

## 2024-08-30 RX ORDER — ENALAPRIL MALEATE 20 MG/1
20 TABLET ORAL DAILY
Qty: 90 TABLET | Refills: 3 | Status: SHIPPED | OUTPATIENT
Start: 2024-08-30

## 2024-08-30 RX ORDER — NIFEDIPINE 90 MG/1
90 TABLET, EXTENDED RELEASE ORAL DAILY
Qty: 90 TABLET | Refills: 3 | Status: SHIPPED | OUTPATIENT
Start: 2024-08-30

## 2024-08-30 RX ORDER — FLUCONAZOLE 150 MG/1
150 TABLET ORAL DAILY
COMMUNITY
Start: 2024-08-29 | End: 2024-08-31

## 2024-08-30 NOTE — PROGRESS NOTES
"Chief Complaint  Chronic heart failure with preserved ejection fraction (Follow up)      Subjective   History of Present Illness    Problem List  -HFpEF, normal EKG, echo preserved systolic function  -hx of HFpEF  -CXR showed pulmonary edema  -C section 4/18/24 morning  -Long standing HTN  -DM  -morbid obesity  -COPD?  -ALEJANDRO  -possible UC  -duodenal ulcers    Mrs. Pack is a 39 year old lady being seen in follow up.  Had complicated pregnancy.  Had c section.  Had CHF and GI bleed.  Titrated meds.  BP now controlled when taking.  Has lost 50 lbs in last 6 weeks.  Feeling much better.  BP high today because child admited at  and BP meds at home.  ROS negative except for the above.      Update 8/20/24  BP had been on the rise.  Was exposed noxious fumes likely and had flash pulmonary edema that required brief hospitalization.   also needed to be treated for difficulty breathing.      Update 8/30/24  BP well controlled at home.  Pharmacist concerned about her symptoms.             Objective   Vital Signs:  Vitals:    08/30/24 0838   BP: 178/100   Pulse: 70   SpO2: 98%     Estimated body mass index is 37.87 kg/m² as calculated from the following:    Height as of this encounter: 149.9 cm (59\").    Weight as of this encounter: 85 kg (187 lb 8 oz).       Physical Exam  HENT:      Head: Normocephalic.   Eyes:      Extraocular Movements: Extraocular movements intact.   Cardiovascular:      Rate and Rhythm: Normal rate and regular rhythm.      Heart sounds: No murmur heard.     No gallop.   Pulmonary:      Breath sounds: Normal breath sounds.   Abdominal:      Palpations: Abdomen is soft.   Musculoskeletal:      Right lower leg: No edema.      Left lower leg: No edema.   Skin:     General: Skin is warm and dry.   Neurological:      General: No focal deficit present.      Mental Status: She is alert.   Psychiatric:         Mood and Affect: Mood normal.       Clinic discussed advanced directive        Assessment "   -HFpEF, normal EKG, echo preserved systolic function  -hx of HFpEF  -CXR showed pulmonary edema  -C section 4/18/24 morning  -Long standing HTN  -DM  -morbid obesity  -COPD?  -ALEJANDRO  -possible UC  -duodenal ulcers    Plan   -cont. Metoprolol  -changed amlodipine to nifedipine 90mg.  Change lisinopril to enalapril 20mg  -cont. Hydralazine  -lasix as needed  -blood work in few days  -when stop breast feeding will transition to MRA  -few week follow up      Pablo Cabrera MD  06/07/2024 16:25 EDT

## 2024-09-12 ENCOUNTER — LAB (OUTPATIENT)
Dept: LAB | Facility: HOSPITAL | Age: 39
End: 2024-09-12
Payer: COMMERCIAL

## 2024-09-12 ENCOUNTER — OFFICE VISIT (OUTPATIENT)
Dept: ENDOCRINOLOGY | Facility: CLINIC | Age: 39
End: 2024-09-12
Payer: MEDICARE

## 2024-09-12 ENCOUNTER — SPECIALTY PHARMACY (OUTPATIENT)
Dept: PHARMACY | Facility: HOSPITAL | Age: 39
End: 2024-09-12
Payer: MEDICARE

## 2024-09-12 ENCOUNTER — HOSPITAL ENCOUNTER (OUTPATIENT)
Dept: CARDIOLOGY | Facility: HOSPITAL | Age: 39
Discharge: HOME | End: 2024-09-12
Payer: COMMERCIAL

## 2024-09-12 ENCOUNTER — READMISSION MANAGEMENT (OUTPATIENT)
Dept: CALL CENTER | Facility: HOSPITAL | Age: 39
End: 2024-09-12
Payer: MEDICARE

## 2024-09-12 VITALS
SYSTOLIC BLOOD PRESSURE: 153 MMHG | DIASTOLIC BLOOD PRESSURE: 82 MMHG | WEIGHT: 187.6 LBS | HEART RATE: 68 BPM | BODY MASS INDEX: 37.82 KG/M2 | HEIGHT: 59 IN | OXYGEN SATURATION: 97 %

## 2024-09-12 VITALS
SYSTOLIC BLOOD PRESSURE: 188 MMHG | BODY MASS INDEX: 37.97 KG/M2 | OXYGEN SATURATION: 97 % | HEART RATE: 76 BPM | DIASTOLIC BLOOD PRESSURE: 98 MMHG | WEIGHT: 188 LBS

## 2024-09-12 DIAGNOSIS — Z79.4 TYPE 2 DIABETES MELLITUS WITH HYPERGLYCEMIA, WITH LONG-TERM CURRENT USE OF INSULIN: ICD-10-CM

## 2024-09-12 DIAGNOSIS — I50.32 CHRONIC HEART FAILURE WITH PRESERVED EJECTION FRACTION (HFPEF): ICD-10-CM

## 2024-09-12 DIAGNOSIS — I50.32 CHRONIC DIASTOLIC HEART FAILURE: ICD-10-CM

## 2024-09-12 DIAGNOSIS — I10 UNCONTROLLED HYPERTENSION: ICD-10-CM

## 2024-09-12 DIAGNOSIS — E11.65 PRE-EXISTING TYPE 2 DIABETES MELLITUS WITH HYPERGLYCEMIA DURING PREGNANCY IN THIRD TRIMESTER: Primary | ICD-10-CM

## 2024-09-12 DIAGNOSIS — Z79.4 TYPE 2 DIABETES MELLITUS WITHOUT COMPLICATION, WITH LONG-TERM CURRENT USE OF INSULIN: ICD-10-CM

## 2024-09-12 DIAGNOSIS — E11.9 TYPE 2 DIABETES MELLITUS WITHOUT COMPLICATION, WITH LONG-TERM CURRENT USE OF INSULIN: ICD-10-CM

## 2024-09-12 DIAGNOSIS — Z79.4 TYPE 2 DIABETES MELLITUS WITHOUT COMPLICATION, WITH LONG-TERM CURRENT USE OF INSULIN: Primary | ICD-10-CM

## 2024-09-12 DIAGNOSIS — I50.32 CHRONIC HEART FAILURE WITH PRESERVED EJECTION FRACTION (HFPEF): Primary | ICD-10-CM

## 2024-09-12 DIAGNOSIS — E66.01 CLASS 2 SEVERE OBESITY WITH SERIOUS COMORBIDITY AND BODY MASS INDEX (BMI) OF 35.0 TO 35.9 IN ADULT, UNSPECIFIED OBESITY TYPE: ICD-10-CM

## 2024-09-12 DIAGNOSIS — E11.9 TYPE 2 DIABETES MELLITUS WITHOUT COMPLICATION, WITH LONG-TERM CURRENT USE OF INSULIN: Primary | ICD-10-CM

## 2024-09-12 DIAGNOSIS — O24.113 PRE-EXISTING TYPE 2 DIABETES MELLITUS WITH HYPERGLYCEMIA DURING PREGNANCY IN THIRD TRIMESTER: Primary | ICD-10-CM

## 2024-09-12 DIAGNOSIS — G47.33 OSA ON CPAP: ICD-10-CM

## 2024-09-12 DIAGNOSIS — E11.65 TYPE 2 DIABETES MELLITUS WITH HYPERGLYCEMIA, WITH LONG-TERM CURRENT USE OF INSULIN: ICD-10-CM

## 2024-09-12 DIAGNOSIS — I10 ESSENTIAL HYPERTENSION: ICD-10-CM

## 2024-09-12 PROBLEM — T81.49XA POSTOPERATIVE CELLULITIS OF SURGICAL WOUND: Status: RESOLVED | Noted: 2024-05-22 | Resolved: 2024-09-12

## 2024-09-12 PROBLEM — E55.9 VITAMIN D DEFICIENCY: Status: ACTIVE | Noted: 2024-09-12

## 2024-09-12 PROBLEM — J30.2 SEASONAL ALLERGIC RHINITIS: Status: ACTIVE | Noted: 2024-09-12

## 2024-09-12 PROBLEM — E53.8 VITAMIN B12 DEFICIENCY: Status: ACTIVE | Noted: 2024-09-12

## 2024-09-12 PROBLEM — O24.119 PRE-EXISTING TYPE 2 DIABETES AFFECTING PREGNANCY, ANTEPARTUM: Status: RESOLVED | Noted: 2023-11-21 | Resolved: 2024-09-12

## 2024-09-12 PROBLEM — T81.49XA WOUND INFECTION AFTER SURGERY: Status: RESOLVED | Noted: 2024-05-01 | Resolved: 2024-09-12

## 2024-09-12 PROBLEM — E78.2 MIXED HYPERLIPIDEMIA: Status: ACTIVE | Noted: 2024-09-12

## 2024-09-12 PROBLEM — E78.5 HLD (HYPERLIPIDEMIA): Status: ACTIVE | Noted: 2023-03-18

## 2024-09-12 PROBLEM — L40.9 PSORIASIS: Status: ACTIVE | Noted: 2024-09-12

## 2024-09-12 PROBLEM — Z34.90 PREGNANCY: Status: RESOLVED | Noted: 2024-04-15 | Resolved: 2024-09-12

## 2024-09-12 PROBLEM — F34.1 PRIMARY DYSTHYMIA: Status: ACTIVE | Noted: 2024-09-12

## 2024-09-12 LAB — ABSOLUTE LUNG FLUID CONTENT: 35 % (ref 20–35)

## 2024-09-12 PROCEDURE — 3077F SYST BP >= 140 MM HG: CPT | Performed by: NURSE PRACTITIONER

## 2024-09-12 PROCEDURE — 94726 PLETHYSMOGRAPHY LUNG VOLUMES: CPT | Performed by: PHYSICIAN ASSISTANT

## 2024-09-12 PROCEDURE — 36415 COLL VENOUS BLD VENIPUNCTURE: CPT

## 2024-09-12 PROCEDURE — 1159F MED LIST DOCD IN RCRD: CPT | Performed by: NURSE PRACTITIONER

## 2024-09-12 PROCEDURE — 1160F RVW MEDS BY RX/DR IN RCRD: CPT | Performed by: NURSE PRACTITIONER

## 2024-09-12 PROCEDURE — 3044F HG A1C LEVEL LT 7.0%: CPT | Performed by: NURSE PRACTITIONER

## 2024-09-12 PROCEDURE — 99214 OFFICE O/P EST MOD 30 MIN: CPT | Performed by: NURSE PRACTITIONER

## 2024-09-12 PROCEDURE — 99215 OFFICE O/P EST HI 40 MIN: CPT | Performed by: PHYSICIAN ASSISTANT

## 2024-09-12 PROCEDURE — 80048 BASIC METABOLIC PNL TOTAL CA: CPT

## 2024-09-12 PROCEDURE — 3080F DIAST BP >= 90 MM HG: CPT | Performed by: NURSE PRACTITIONER

## 2024-09-12 PROCEDURE — 95251 CONT GLUC MNTR ANALYSIS I&R: CPT | Performed by: NURSE PRACTITIONER

## 2024-09-12 RX ORDER — PROCHLORPERAZINE 25 MG/1
SUPPOSITORY RECTAL
Qty: 3 EACH | Refills: 5 | Status: SHIPPED | OUTPATIENT
Start: 2024-09-12

## 2024-09-12 RX ORDER — HYDRALAZINE HYDROCHLORIDE 25 MG/1
25 TABLET, FILM COATED ORAL 3 TIMES DAILY
Qty: 125 TABLET | Refills: 1 | Status: SHIPPED | OUTPATIENT
Start: 2024-09-12

## 2024-09-12 RX ORDER — PEN NEEDLE, DIABETIC 30 GX3/16"
1 NEEDLE, DISPOSABLE MISCELLANEOUS DAILY
Qty: 100 EACH | Refills: 5 | Status: SHIPPED | OUTPATIENT
Start: 2024-09-12

## 2024-09-12 RX ORDER — INSULIN GLARGINE 100 [IU]/ML
10 INJECTION, SOLUTION SUBCUTANEOUS NIGHTLY
Qty: 15 ML | Refills: 1 | Status: SHIPPED | OUTPATIENT
Start: 2024-09-12

## 2024-09-12 RX ORDER — PROCHLORPERAZINE 25 MG/1
1 SUPPOSITORY RECTAL
Qty: 1 EACH | Refills: 1 | Status: SHIPPED | OUTPATIENT
Start: 2024-09-12

## 2024-09-12 NOTE — PROGRESS NOTES
Heart Failure Clinic    Date: 09/12/24     Vitals:    09/12/24 0925   BP: (!) 184/94   Pulse: 68   SpO2: 97%    Weight 187    Method of arrival: Ambulatory    Weighing self daily: Yes    Monitoring Heart Failure Zones: Yes    Today's HF Zone: Green/ Yellow    Taking medications as prescribed: Yes    Edema No    Shortness of Air: Yes with activity    Number of pillows used at night: Adjustable bed with HOB raised    Educational Materials given:  AVS                                                                         ReDS Value: 35  25-35 Optimal Value Status      Paula Donovan MA 09/12/24 09:31 EDT

## 2024-09-12 NOTE — OUTREACH NOTE
Medical Week 3 Survey      Flowsheet Row Responses   [facility] [place] facility patient discharged from? [facility]   Does the patient have one of the following disease processes/diagnoses(primary or secondary)? Other   Week 3 attempt successful? No   Call start time 0955   Unsuccessful attempts Attempt 1            Danyelle CROSS - Registered Nurse

## 2024-09-12 NOTE — PROGRESS NOTES
"   Specialty Pharmacy Patient Management Program  Endocrinology Reassessment     Antoinette Pack is a 39 y.o. female with Type 2 Diabetes enrolled in the Endocrinology Patient Management program offered by [facility]. A follow-up was conducted, including assessment of continued therapy appropriateness, medication adherence, and side effect incidence and management for Insulin therapy and CGM.    Patient is currently using Novolog injections PRN ~ 10 units per day total. Her insulin requirements have significantly decreased postpartum. She states she has not used her Omnipod for a couple of days due to connectivity difficulties. She monitors her BG with Dexcom with average readings in 100-110s. She reports that after she eats a high carb meal that her BG spikes high and then drops low without taking insulin. She reports that she does not feel bad when her BG drops after these spikes and that it only occurs when she eats high carb meals.     Patient recently had a baby and is currently breastfeeding.       In the past, the patient has tried:                Drug Dose Reason for Discontinuation Notes   Trulicity   GI upset     Glipizide    Unsure                            Changes to Insurance Coverage or Financial Support  None    Relevant Past Medical History and Comorbidities  Relevant medical history and concomitant health conditions were discussed with the patient. The patient's chart has been reviewed for relevant past medical history and comorbid health conditions and updated as necessary.   Past Medical History:   Diagnosis Date    Anxiety     CHF (congestive heart failure) 05/2023    Colitis 2023    Depression     Diabetes mellitus 2018    type 2    Diverticulitis     GERD (gastroesophageal reflux disease)     History of transfusion 04/19/2024    2 units post delivery    Hypertension     chronic    Kidney stone     \"years ago\"    Narcolepsy 2022    Pancreatitis 09/2023    PCOS " (polycystic ovarian syndrome)     Rectal bleeding     SINCE  - USES MESALAMINE SUPPOSITORY NIGHTLY    Seizures 2012    Sleep apnea      Social History     Socioeconomic History    Marital status:    Tobacco Use    Smoking status: Former     Current packs/day: 0.00     Average packs/day: 1 pack/day for 20.0 years (20.0 ttl pk-yrs)     Types: Cigarettes     Start date: 1995     Quit date: 2015     Years since quittin.7     Passive exposure: Never    Smokeless tobacco: Never    Tobacco comments:     QUIT IN 2017   Vaping Use    Vaping status: Never Used   Substance and Sexual Activity    Alcohol use: No    Drug use: No    Sexual activity: Defer       Problem list reviewed by Sravani Perez PharmKENNA on 2024 at 11:26 AM    Allergies  Known allergies and reactions were discussed with the patient. The patient's chart has been reviewed for allergy information and updated as necessary.   Milk-related compounds, Dairycare [lactase-lactobacillus], Dilantin [phenytoin], Keppra [levetiracetam], Meperidine, and Topamax [topiramate]    Allergies reviewed by Sravani Perez, PharmKENNA on 2024 at 11:26 AM    Relevant Laboratory Values  A1C Last 3 Results          2024    10:18 2024    11:45 2024    21:45   HGBA1C Last 3 Results   Hemoglobin A1C 5.3  5.0  6.60      Lab Results   Component Value Date    HGBA1C 6.60 (H) 2024     Lab Results   Component Value Date    GLUCOSE 86 2024    CALCIUM 9.1 2024     2024    K 4.5 2024    CO2 25.8 2024     2024    BUN 23 (H) 2024    CREATININE 0.49 (L) 2024    EGFRIFAFRI  2016      Comment:      <15 Indicative of kidney failure.    EGFRIFNONA 101 2020    BCR 46.9 (H) 2024    ANIONGAP 10.2 2024     Lab Results   Component Value Date    CHOL 186 2024    CHLPL 159 2015    TRIG 87 2024    HDL 53 2024     (H) 2024       Current Medication  List  This medication list has been reviewed with the patient and evaluated for any interactions or necessary modifications/recommendations, and updated to include all prescription medications, OTC medications, and supplements the patient is currently taking.  This list reflects what is contained in the patient's profile, which has also been marked as reviewed to communicate to other providers it is the most up to date version of the patient's current medication therapy.     Current Outpatient Medications:     Budesonide (ENTOCORT EC) 3 MG 24 hr capsule, Take 3 capsules by mouth Daily., Disp: 90 capsule, Rfl: 1    Continuous Glucose Sensor (Dexcom G6 Sensor), Change sensor Every 10 (Ten) Days., Disp: 3 each, Rfl: 5    Continuous Glucose Transmitter (Dexcom G6 Transmitter) misc, Use 1 each Every 3 (Three) Months., Disp: 1 each, Rfl: 1    enalapril (VASOTEC) 20 MG tablet, Take 1 tablet by mouth Daily., Disp: 90 tablet, Rfl: 3    furosemide (LASIX) 40 MG tablet, Take 1 tablet by mouth Daily As Needed (for fluid retention). (Patient not taking: Reported on 9/12/2024), Disp: 90 tablet, Rfl: 0    Insulin Aspart (NovoLOG) 100 UNIT/ML injection, For use in insulin pump. MDD 50 units (Patient not taking: Reported on 9/12/2024), Disp: 20 mL, Rfl: 4    mesalamine (ROWASA) 4 g enema, Insert 1 enema into the rectum Every Night., Disp: 28 enema, Rfl: 0    metoprolol tartrate (LOPRESSOR) 50 MG tablet, Take 1 tablet by mouth 2 (Two) Times a Day for 90 days. Hold if top number of blood pressure is less than 100 or if heart rate is less than 60., Disp: 60 tablet, Rfl: 2    NIFEdipine XL (PROCARDIA XL) 90 MG 24 hr tablet, Take 1 tablet by mouth Daily., Disp: 90 tablet, Rfl: 3    nitroglycerin (NITROSTAT) 0.4 MG SL tablet, Place 1 tablet under the tongue Every 5 (Five) Minutes As Needed for Chest Pain (Flash pulmonary edema) for up to 10 doses. Place one tablet under the tongue for chest pain every 5 minutes until chest pain is  relieved. If you have to take 3 tablets, take the 3rd and go to the hospital to be evaluated. (Patient not taking: Reported on 9/12/2024), Disp: 10 tablet, Rfl: 0    pantoprazole (PROTONIX) 40 MG EC tablet, Take 1 tablet by mouth 2 (Two) Times a Day Before Meals., Disp: 120 tablet, Rfl: 0    Prenatal Vit-Fe Fumarate-FA (PRENATAL PO), Take 1 tablet by mouth Daily., Disp: , Rfl:     hydrALAZINE (APRESOLINE) 25 MG tablet, Take 1 tablet by mouth 3 (Three) Times a Day. Hold if top number of blood pressure is less than 110. Can take extra hydralazine 25mg as needed for top number greater than, Disp: 125 tablet, Rfl: 1    Insulin Disposable Pump (Omnipod 5 G6 Pods, Gen 5,) misc, Use 1 each Day. (Patient not taking: Reported on 9/12/2024), Disp: 30 each, Rfl: 5  No current facility-administered medications for this visit.    Medicines reviewed by Sravani Perez, PharmD on 9/12/2024 at 11:26 AM    Drug Interactions  No significant drug-drug interactions with diabetes medications expected according to literature.   Furosemide is associated with hyperglycemia and therefore may diminish the therapeutic effect of antidiabetic agents.   Beta blockers and ACEis may enhance the hypoglycemic effect of antidiabetic agents.   Other interactions:  Patient is currently breastfeeding. According to [employer], all flagged medications were acceptable for breastfeeding except Furosemide and Nitroglycerin.   HCTZ 50 mg/day or less suggested as acceptable alternative to Furosemide.   Nitroglycerin has not been studied in lactation, therefore no direct recommendation available.     Adverse Drug Reactions  Adverse Reactions Experienced: None  Plan for ADR Management: None required    Hospitalizations and Urgent Care Since Last Assessment  Hospitalizations or Admissions: Acute respiratory failure/pulmonary edema (8/23/24), Acute congestive heart failure (8/16/24)  ED Visits: Colitis (6/14/24)  Urgent Office Visits: None    Adherence and  Self-Administration  Adherence related patient's specialty therapy was discussed with the patient. The Adherence segment of this outreach has been reviewed and updated.   Ongoing or New Barriers to Patient Adherence and/or Self-Administration: None   Methods for Supporting Patient Adherence and/or Self-Administration: None Required    Goals of Therapy  Goals related to the patient's specialty therapy was discussed with the patient. The Patient Goals segment of this outreach has been reviewed and updated.    Goals Addressed Today        Consistently take medications as prescribed      HEMOGLOBIN A1C < 7               Specialty Pharmacy General Goal      Prevent hypoglycemia            Reassessment Plan & Follow-Up  Patient's diabetes is controlled with a most recent A1C of 6.6%.   Provider Medication Therapy Changes: Per Vonnie Key, APRN will order  Lantus 10 units nightly   Dexcom G6 supplies  Stop insulin pump at this time  Related Plans, Therapy Recommendations, or Issues to Be Addressed:   Updated RX's have been sent to [facility] for shipping services.   Patient is currently breastfeeding. According to [employer], all flagged medications were acceptable for breastfeeding except Furosemide and Nitroglycerin. Patient was seen by [facility] HF clinic pharmacist and provider today. Extended recommendations for changes to HF clinic team today for adjustments to regimen.   HCTZ 50 mg/day or less suggested as acceptable alternative to Furosemide.   Nitroglycerin has not been studied in lactation, therefore no direct recommendation available.     Attestation  I attest the patient was actively involved in and has agreed to the above plan of care. If the prescribed therapy is at any point deemed not appropriate based on the current or future assessments, a consultation will be initiated with the patient's specialty care provider to determine the best course of action. The revised plan of therapy will be documented along with  any required assessments and/or additional patient education provided.    Kylie Aguilar, Pharmacy Intern   Sravani Perez, PharmD, [facility]  Clinical Specialty Pharmacist, Endocrinology   9/12/2024  11:28 EDT

## 2024-09-12 NOTE — PROGRESS NOTES
"Chief Complaint   Patient presents with    Type 2 diabetes mellitus without complication, with long-te        Antoinette Pack is a 39 y.o. female had concerns including Type 2 diabetes mellitus without complication, with long-te.   T2DM.    She has had flash pulmonary edema and has been back in the hospital.  She is now being treated for CHF as well. She has not been able to get her insulin pump back on.  She has been administering injections to control her BG's since.  She is not using much insulin at this time.    Birth state: [place]  Previous history of radiation to face/neck: none  Consuming foods high in iodine: no  Family history of thyroid complications: none    The following portions of the patient's history were reviewed and updated as appropriate: allergies, current medications, past family history, past medical history, past social history, past surgical history and problem list.    History:  Diabetes was diagnosed 8-9 years ago.  Complications include none.  Last ophtho exam was 2021, Dr. Bruno Office.  Current medications for diabetes include uses Novolog 10 units daily max.  Past meds: Trulicity, GI Issues, has problems with yeast, Glipizide-unsure why it was stopped  She checks her BG's with Dexcom CGM.  Hypos: rarely  Fasting range: 150-200+    Current pump settings:  Basal:   12A: 1.3 u/hr  CR: 1:5  ISF: 25    Diet: has made improvements to her diet.    Past Medical History:   Diagnosis Date    Anxiety     CHF (congestive heart failure) 05/2023    Colitis 2023    Depression     Diabetes mellitus 2018    type 2    Diverticulitis     GERD (gastroesophageal reflux disease)     History of transfusion 04/19/2024    2 units post delivery    Hypertension     chronic    Kidney stone     \"years ago\"    Narcolepsy 2022    Pancreatitis 09/2023    PCOS (polycystic ovarian syndrome)     Rectal bleeding     SINCE 2023 - USES MESALAMINE SUPPOSITORY NIGHTLY    Seizures 2012    Sleep apnea      Past Surgical " History:   Procedure Laterality Date    CARDIAC CATHETERIZATION      CARPAL TUNNEL RELEASE       SECTION N/A 2024    Procedure:  SECTION PRIMARY;  Surgeon: Axel Keys MD;  Location:  [facility];  Service: Obstetrics/Gynecology;  Laterality: N/A;    COLONOSCOPY      COLONOSCOPY N/A 2024    Procedure: COLONOSCOPY;  Surgeon: Tom Mueller MD;  Location:  [facility];  Service: Gastroenterology;  Laterality: N/A;    ENDOSCOPY      ENDOSCOPY N/A 2024    Procedure: ESOPHAGOGASTRODUODENOSCOPY;  Surgeon: Tom Mueller MD;  Location:  [facility];  Service: Gastroenterology;  Laterality: N/A;    FRACTURE SURGERY Left     wrist    HARDWARE REMOVAL Left     WRIST    TENDON REPAIR Left     HAND    WISDOM TOOTH EXTRACTION        Family History   Problem Relation Age of Onset    Hypertension Mother     Depression Mother     Heart disease Mother     COPD Mother     Emphysema Mother     Hypertension Father     Diabetes Father     Heart disease Father     COPD Father     Heart failure Father     Hepatitis Father       Social History     Socioeconomic History    Marital status:    Tobacco Use    Smoking status: Former     Current packs/day: 0.00     Average packs/day: 1 pack/day for 20.0 years (20.0 ttl pk-yrs)     Types: Cigarettes     Start date: 1995     Quit date:      Years since quittin.7     Passive exposure: Never    Smokeless tobacco: Never    Tobacco comments:     QUIT IN 2017   Vaping Use    Vaping status: Never Used   Substance and Sexual Activity    Alcohol use: No    Drug use: No    Sexual activity: Defer      Allergies   Allergen Reactions    Milk-Related Compounds Unknown - High Severity    Dairycare [Lactase-Lactobacillus] Unknown - Low Severity     Pt is breastfeeding baby that is allergic to dairy, pt cannot have dairy products while breastfeeding    Dilantin [Phenytoin] Mental Status Change    Keppra [Levetiracetam] Mental Status Change     Meperidine Rash    Topamax [Topiramate] Mental Status Change      Current Outpatient Medications on File Prior to Visit   Medication Sig Dispense Refill    Budesonide (ENTOCORT EC) 3 MG 24 hr capsule Take 3 capsules by mouth Daily. 90 capsule 1    enalapril (VASOTEC) 20 MG tablet Take 1 tablet by mouth Daily. 90 tablet 3    furosemide (LASIX) 40 MG tablet Take 1 tablet by mouth Daily As Needed (for fluid retention). 90 tablet 0    hydrALAZINE (APRESOLINE) 25 MG tablet Take 1 tablet by mouth 3 (Three) Times a Day. Hold if top number of blood pressure is less than 110. 90 tablet 1    Insulin Aspart (NovoLOG) 100 UNIT/ML injection For use in insulin pump. MDD 50 units (Patient taking differently: Inject 0-50 Units under the skin into the appropriate area as directed Continuous. Has not been using in pump, been using 10 units per day) 20 mL 4    Insulin Disposable Pump (Omnipod 5 G6 Pods, Gen 5,) misc Use 1 each Day. (Patient not taking: Reported on 9/12/2024) 30 each 5    mesalamine (ROWASA) 4 g enema Insert 1 enema into the rectum Every Night. 28 enema 0    NIFEdipine XL (PROCARDIA XL) 90 MG 24 hr tablet Take 1 tablet by mouth Daily. 90 tablet 3    nitroglycerin (NITROSTAT) 0.4 MG SL tablet Place 1 tablet under the tongue Every 5 (Five) Minutes As Needed for Chest Pain (Flash pulmonary edema) for up to 10 doses. Place one tablet under the tongue for chest pain every 5 minutes until chest pain is relieved. If you have to take 3 tablets, take the 3rd and go to the hospital to be evaluated. 10 tablet 0    pantoprazole (PROTONIX) 40 MG EC tablet Take 1 tablet by mouth 2 (Two) Times a Day Before Meals. 120 tablet 0    Prenatal Vit-Fe Fumarate-FA (PRENATAL PO) Take 1 tablet by mouth Daily.      Continuous Glucose Sensor (Dexcom G6 Sensor) Change sensor Every 10 (Ten) Days. 3 each 5    Continuous Glucose Transmitter (Dexcom G6 Transmitter) misc Use 1 each Every 3 (Three) Months. 1 each 1    metoprolol tartrate (LOPRESSOR)  50 MG tablet Take 1 tablet by mouth 2 (Two) Times a Day for 90 days. Hold if top number of blood pressure is less than 100 or if heart rate is less than 60. (Patient taking differently: Take 1 tablet by mouth As Needed. Hold if top number of blood pressure is less than 100 or if heart rate is less than 60.) 60 tablet 2     No current facility-administered medications on file prior to visit.      Review of Systems   Constitutional:  Positive for fatigue. Negative for diaphoresis, unexpected weight gain and unexpected weight loss.   HENT:          Neck tenderness   Eyes:  Negative for blurred vision and visual disturbance.   Cardiovascular:  Negative for palpitations.   Gastrointestinal:  Positive for constipation.   Endocrine: Negative for cold intolerance, heat intolerance, polydipsia, polyphagia and polyuria.   Skin:  Positive for dry skin.        Hair thinning   Neurological:  Negative for tremors.   Psychiatric/Behavioral:  Positive for sleep disturbance. Negative for agitation. The patient is not nervous/anxious.    All other systems reviewed and are negative.     BP (!) 188/98 (BP Location: Right arm, Patient Position: Sitting, Cuff Size: Small Adult)   Pulse 76   Wt 85.3 kg (188 lb)   LMP 07/15/2024 (Approximate)   SpO2 97%   BMI 37.97 kg/m²      Physical Exam  Vitals reviewed.   Constitutional:       Appearance: Normal appearance.   Eyes:      Extraocular Movements: Extraocular movements intact.   Cardiovascular:      Rate and Rhythm: Normal rate.   Pulmonary:      Effort: Pulmonary effort is normal.   Skin:     General: Skin is warm.   Neurological:      General: No focal deficit present.      Mental Status: She is alert and oriented to person, place, and time.   Psychiatric:         Mood and Affect: Mood normal.         Behavior: Behavior normal.         Thought Content: Thought content normal.         Judgment: Judgment normal.        Labs/Imaging  CMP  Lab Results   Component Value Date    GLUCOSE 86  "08/26/2024    BUN 23 (H) 08/26/2024    CREATININE 0.49 (L) 08/26/2024    EGFRIFNONA 101 12/27/2020    EGFRIFAFRI  09/05/2016      Comment:      <15 Indicative of kidney failure.    BCR 46.9 (H) 08/26/2024    K 4.5 08/26/2024    CO2 25.8 08/26/2024    CALCIUM 9.1 08/26/2024    ALBUMIN 3.9 08/23/2024    LABIL2 1.3 (L) 02/05/2016    AST 15 08/23/2024    ALT 13 08/23/2024        CBC w/DIFF   Lab Results   Component Value Date    WBC 7.48 08/24/2024    RBC 5.21 08/24/2024    HGB 13.8 08/24/2024    HCT 41.5 08/24/2024    MCV 79.7 08/24/2024    MCH 26.5 (L) 08/24/2024    MCHC 33.3 08/24/2024    RDW 13.8 08/24/2024    RDWSD 39.1 08/24/2024    MPV 11.7 08/24/2024     08/24/2024    NEUTRORELPCT 75.0 08/23/2024    LYMPHORELPCT 19.1 (L) 08/23/2024    MONORELPCT 4.4 (L) 08/23/2024    EOSRELPCT 1.0 08/23/2024    BASORELPCT 0.3 08/23/2024    AUTOIGPER 0.2 08/23/2024    NEUTROABS 6.70 08/23/2024    LYMPHSABS 1.71 08/23/2024    MONOSABS 0.39 08/23/2024    EOSABS 0.09 08/23/2024    BASOSABS 0.03 08/23/2024    AUTOIGNUM 0.02 08/23/2024    NRBC 0.0 08/23/2024       TSH  Lab Results   Component Value Date    TSH 0.568 08/16/2024    TSH 0.346 04/19/2024    TSH 0.022 (L) 12/08/2023       T4  Lab Results   Component Value Date    FREET4 1.08 08/16/2024    FREET4 1.03 12/08/2023    FREET4 0.94 05/03/2023     No results found for: \"E5KGKQO\"    T3  Lab Results   Component Value Date    T3FREE 3.43 02/17/2022     No results found for: \"A5BHSWI\"    TRAb  No results found for: \"TSURCPAB\"    TPO  No results found for: \"THYROIDAB\"    No valid procedures specified.    Assessment and Plan    Diagnoses and all orders for this visit:    1. Type 2 diabetes mellitus without complication, with long-term current use of insulin (Primary)  Assessment & Plan:  -Diabetes is above goal with A1c 6.6.  -Discussed dietary and exercise guidelines with patient.  -Discussed the importance of yearly eye exams.  -Discussed the importance of checking BG's " regularly.  Continue using CGM.  2 week data download is as follows:  Very High: 0%  High: 6%  In Range: 94%  Low: 0%  Very Low: 0%  Trend shows regulated BG's without over hyper/hypos.  -Stop insulin pump.  -Start Lantus 5 units QHS, increase by 2 units every 3 days until FSBG .  -S/S hypoglycemia reviewed with Rule of 15's advised.  -Follow-up in 3 months.            Return in about 3 months (around 12/12/2024) for A1C, Follow-up appointment. The patient was instructed to contact the clinic with any interval questions or concerns.    This document has been electronically signed by CICI Veliz  September 12, 2024 09:34 EDT  Endocrinology    Please note that portions of this document were completed with a voice recognition program. Efforts were made to edit the dictations, but occasionally words are mis-transcribed.

## 2024-09-12 NOTE — ASSESSMENT & PLAN NOTE
-Diabetes is above goal with A1c 6.6.  -Discussed dietary and exercise guidelines with patient.  -Discussed the importance of yearly eye exams.  -Discussed the importance of checking BG's regularly.  Continue using CGM.  2 week data download is as follows:  Very High: 0%  High: 6%  In Range: 94%  Low: 0%  Very Low: 0%  Trend shows regulated BG's without over hyper/hypos.  -Stop insulin pump.  -Start Lantus 5 units QHS, increase by 2 units every 3 days until FSBG .  -S/S hypoglycemia reviewed with Rule of 15's advised.  -Follow-up in 3 months.

## 2024-09-12 NOTE — PROGRESS NOTES
[facility]  EVANS Corona Mary Beth, APRN      Thank you for asking me to see Antoinette Pack for congestive heart failure.    HPI:     This is a 39 y.o. female with known past medical history of:    Chronic HFpEF  TTE from 08/17/24 with EF 51-55% & mild concentric hypertrophy.  LV wall thickness consistent with mild concentric hypertrophy  Hypertensive emergency with recent flash pulmonary edema requiring hospitalizations  ALEJANDRO with CPAP compliance  Nonobstructive CAD:   [facility] in 09/2015 with mild degree of atherosclerotic disease in the distal PDA, with a normal left circumflex and right coronary artery per 2015 report.   Obesity per BMI  Type 2 DM, ID  Ulcerative colitis        Antoinette Pack presents for today for Heart failure clinic evaluation.  The patient is typically seen by Florinda Bliss APRN.  Patient's primary cardiologist is Dr. Pablo Cabrera .     Last known EF 51-55%.   Last known hospitalization and/or ED visit:   August 16-19, 2024 with hypertensive emergency upon admission with flash pulmonary edema acute NSTEMI as well as symptomatic palpitations  August 23-24, 2024 with hypertensive emergency and acute  hypoxemic respiratory failure with flash pulmonary edema  Accompanied by:  & infant daughter          09/12/2024 visit data/details regarding:   Dyspnea: Present with exertion  Lower extremity swelling: Denies  Abdominal swelling: Deneis  Home weight: Weight monitoring booklet provided during initial visit; Has scale.   Home BP: BP monitoring booklet provided during initial visit.    Home heart rate: HR monitoring booklet provided during initial visit.   Daily activities of living: Performing on her own  Pillows/lying flat: 2 pillows in bed   HF zone: Yellow  Mrs. Pack is chest pain free and doing well. She reports  dyspnea has improved at rest.  She does still have some dyspnea with prolonged exertion.     She was  evaluated by Dr. Cabrera since her last visit with some medication adjustments and she feels she is tolerating them well.        Review of Systems - Review of Systems   Constitutional: Negative for decreased appetite, fever and malaise/fatigue.   HENT:  Negative for congestion and ear pain.    Eyes:  Negative for blurred vision and double vision.   Cardiovascular:  Positive for dyspnea on exertion. Negative for leg swelling and palpitations.   Respiratory:  Positive for shortness of breath. Negative for cough and hemoptysis.    Endocrine: Negative for cold intolerance and heat intolerance.   Hematologic/Lymphatic: Negative for adenopathy and bleeding problem.   Skin:  Negative for dry skin and nail changes.   Musculoskeletal:  Negative for arthritis and falls.   Gastrointestinal:  Negative for bloating and anorexia.   Genitourinary:  Negative for bladder incontinence and dysuria.   Neurological:  Negative for aphonia and difficulty with concentration.   Psychiatric/Behavioral:  Negative for altered mental status and hallucinations.          All other systems were reviewed and were negative.    Patient Active Problem List   Diagnosis    Simple goiter    History of CHF (congestive heart failure)    Chronic hypertension affecting pregnancy    History of pancreatitis    History of seizure disorder    ALEJANDRO on CPAP    Proteinuria affecting pregnancy, antepartum    Rectal bleeding    Shortness of breath    Antepartum multigravida of advanced maternal age    Pain of pelvic girdle    Elevated blood pressure reading    Pulmonary edema    Essential hypertension    Type 2 diabetes mellitus without complication, with long-term current use of insulin    S/P  section, total salpingectomy left unicornuate uterus    Anemia    Ulcerative colitis with complication    Surgical wound infection    Wound infection    Chronic heart failure with preserved ejection fraction (HFpEF)    Gastroesophageal reflux disease    HLD  (hyperlipidemia)    Mixed hyperlipidemia    Psoriasis    Vitamin B12 deficiency    Vitamin D deficiency    Seasonal allergic rhinitis    Primary dysthymia       family history includes COPD in her father and mother; Depression in her mother; Diabetes in her father; Emphysema in her mother; Heart disease in her father and mother; Heart failure in her father; Hepatitis in her father; Hypertension in her father and mother.     reports that she quit smoking about 9 years ago. Her smoking use included cigarettes. She started smoking about 29 years ago. She has a 20 pack-year smoking history. She has never been exposed to tobacco smoke. She has never used smokeless tobacco. She reports that she does not drink alcohol and does not use drugs.    Allergies   Allergen Reactions    Milk-Related Compounds Unknown - High Severity    Dairycare [Lactase-Lactobacillus] Unknown - Low Severity     Pt is breastfeeding baby that is allergic to dairy, pt cannot have dairy products while breastfeeding    Dilantin [Phenytoin] Mental Status Change    Keppra [Levetiracetam] Mental Status Change    Meperidine Rash    Topamax [Topiramate] Mental Status Change         Current Outpatient Medications:     Budesonide (ENTOCORT EC) 3 MG 24 hr capsule, Take 3 capsules by mouth Daily., Disp: 90 capsule, Rfl: 1    enalapril (VASOTEC) 20 MG tablet, Take 1 tablet by mouth Daily., Disp: 90 tablet, Rfl: 3    hydrALAZINE (APRESOLINE) 25 MG tablet, Take 1 tablet by mouth 3 (Three) Times a Day. Hold if top number of blood pressure is less than 110. Can take extra hydralazine 25mg as needed for top number greater than, Disp: 125 tablet, Rfl: 1    mesalamine (ROWASA) 4 g enema, Insert 1 enema into the rectum Every Night., Disp: 28 enema, Rfl: 0    metoprolol tartrate (LOPRESSOR) 50 MG tablet, Take 1 tablet by mouth 2 (Two) Times a Day for 90 days. Hold if top number of blood pressure is less than 100 or if heart rate is less than 60., Disp: 60 tablet, Rfl:  2    NIFEdipine XL (PROCARDIA XL) 90 MG 24 hr tablet, Take 1 tablet by mouth Daily., Disp: 90 tablet, Rfl: 3    pantoprazole (PROTONIX) 40 MG EC tablet, Take 1 tablet by mouth 2 (Two) Times a Day Before Meals., Disp: 120 tablet, Rfl: 0    Prenatal Vit-Fe Fumarate-FA (PRENATAL PO), Take 1 tablet by mouth Daily., Disp: , Rfl:     Continuous Glucose Sensor (Dexcom G6 Sensor), Change sensor Every 10 (Ten) Days., Disp: 3 each, Rfl: 5    Continuous Glucose Transmitter (Dexcom G6 Transmitter) misc, Use 1 each Every 3 (Three) Months., Disp: 1 each, Rfl: 1    furosemide (LASIX) 40 MG tablet, Take 1 tablet by mouth Daily As Needed (for fluid retention). (Patient not taking: Reported on 9/12/2024), Disp: 90 tablet, Rfl: 0    Insulin Aspart (NovoLOG) 100 UNIT/ML injection, For use in insulin pump. MDD 50 units (Patient not taking: Reported on 9/12/2024), Disp: 20 mL, Rfl: 4    Insulin Disposable Pump (Omnipod 5 G6 Pods, Gen 5,) misc, Use 1 each Day. (Patient not taking: Reported on 9/12/2024), Disp: 30 each, Rfl: 5    Insulin Glargine (Lantus SoloStar) 100 UNIT/ML injection pen, Inject 10 Units under the skin into the appropriate area as directed Every Night., Disp: 15 mL, Rfl: 1    Insulin Pen Needle (Pen Needles) 32G X 4 MM misc, Use 1 each Daily., Disp: 100 each, Rfl: 5    nitroglycerin (NITROSTAT) 0.4 MG SL tablet, Place 1 tablet under the tongue Every 5 (Five) Minutes As Needed for Chest Pain (Flash pulmonary edema) for up to 10 doses. Place one tablet under the tongue for chest pain every 5 minutes until chest pain is relieved. If you have to take 3 tablets, take the 3rd and go to the hospital to be evaluated. (Patient not taking: Reported on 9/12/2024), Disp: 10 tablet, Rfl: 0      Physical Exam:  I have reviewed the patient's current vital signs as documented in the patient's EMR.   Vitals:    09/12/24 0945   BP: 153/82   Pulse:    SpO2:        Body mass index is 37.89 kg/m².       09/12/24  0925   Weight: 85.1 kg (187  lb 9.6 oz)        Physical Exam  Vitals and nursing note reviewed.   Constitutional:       General: She is awake.      Appearance: Normal appearance. She is well-developed and well-groomed.   HENT:      Head: Normocephalic and atraumatic.   Eyes:      General: Lids are normal.      Conjunctiva/sclera:      Right eye: Right conjunctiva is not injected.      Left eye: Left conjunctiva is not injected.   Cardiovascular:      Rate and Rhythm: Normal rate and regular rhythm.   Pulmonary:      Breath sounds: No decreased breath sounds, wheezing or rhonchi.      Comments: Bilateral lungs with clear breath sounds.   Abdominal:      Palpations: There is no shifting dullness.      Tenderness: There is no abdominal tenderness.   Musculoskeletal:      Right lower leg: No edema.      Left lower leg: No edema.   Neurological:      Mental Status: She is alert and oriented to person, place, and time.   Psychiatric:         Attention and Perception: Attention normal.         Mood and Affect: Mood normal.         Behavior: Behavior is cooperative.          JVP: Volume/Pulsation: Normal.        DATA REVIEWED:       ---------------------------------------------------  TTE/JAYDON:  Results for orders placed during the hospital encounter of 08/16/24    Adult Transthoracic Echo Complete w/ Color, Spectral and Contrast if necessary per protocol    Interpretation Summary    Left ventricular systolic function is normal. Calculated left ventricular EF = 54% Left ventricular ejection fraction appears to be 51 - 55%.    Left ventricular wall thickness is consistent with mild concentric hypertrophy.    Left ventricular diastolic function was normal.        LAST HEART CATH/IF AVAILABLE:       Cardiac Catheterization/Vascular Study    Accession Number: [id number]   Date of Study: 09/25/2015   Ordering Provider: Interface, See Report   Clinical Indications: None Listed        Reading Physicians  Performing Staff    Interface, See Report    No performing  staff assigned to study.       Patient Information    Patient Name  Rafita Pack MRN  [id number] Legal Sex  Female  (Age)  1985 (39 y.o.)     Race Ethnicity Encounter Category   White or  Not  or  Emergency         Printable Vessel Diagram    Printable Vessel Diagram         Conclusion    [place], [place] [address]  [phone]     NAME:                    RAFITA PACK  ROOM NUMBER:             [id number]  MEDICAL RECORD NUMBER:   [id number]  VISIT NUMBER:            [id number]  PHYSICIAN:                    Magnus Martinez MD, [facility]  PRIMARY CARE PROVIDER:     DATE OF PROCEDURE:       2015     YOB: 1985     REFERRING PHYSICIAN:  Yinka Torrez MD     CLINICAL HISTORY:  The patient is a 30-year-old  female with multiple  risk factors for coronary artery disease including a strongly positive family  history of premature coronary artery disease, who has been having recurrent  episodes of chest pain and discomfort suspicious for CCS class IV  angina/unstable angina. She had an abnormal Lexiscan sestamibi study with some  anterior wall myocardial ischemia. In view of her continued unstable anginal  symptoms, she was given the option of further cardiac evaluation with cardiac  catheterization to rule out any underlying significant coronary artery disease  and to help with the decision regarding further management. The procedure of  cardiac catheterization and potential risks and complications and potential  benefits and alternative methods of management have been explained to the  patient and she expressed understanding of the same and is wanting to proceed.  Informed consent was obtained. A timeout was performed prior to starting the  procedure to identify the right patient and the right procedure.      DESCRIPTION OF PROCEDURE: After  obtaining informed consent and a timeout was  performed, the patient  was brought down to the cardiac catheterization  laboratory and was prepped and draped in a sterile fashion over the right wrist  area for radial artery access. The area was anesthetized with local 2%  Xylocaine infiltration. Access was obtained to the right radial artery after  Allis test was performed, which was found to be normal. A micropuncture needle  was used to obtain access without any difficulty and a 5-Malay sheath was  placed over the guidewire into the right radial artery. Then a radial artery  cocktail was injected through the sheath. Then a 5-Malay Mac left 3.5  coronary catheter was advanced over the  guidewire and was engaged into the  left coronary ostium and left coronary angiography was performed in multiple  oblique projections. This catheter was then exchanged with a 5-Malay Mac  right coronary catheter, that was engaged into the right coronary ostium and  right coronary angiography was performed in multiple oblique projections. This  catheter was then exchanged with a 5-Malay angled pigtail catheter, that was  advanced into the left ventricle and left ventricular pressures were obtained.  Pullback pressures were obtained. At the end of the procedure, all the  catheters and the sheath were removed, and adequate hemostasis was obtained at  the arterial puncture site using a TR band. Total contrast used: Isovue 85 mL.  Fluoro time: 4.68 minutes.      For further details of the procedure, please review the record maintained by  the monitoring tech.     RESULTS:   HEMODYNAMICS:   1.  Pre-angiographic pressure: Aortic pressure 156/96 (mean 121) mmHg.  2.  Left ventricular pressure: 165/28 mmHg.  3.  Post angiographic pressure: Left ventricular pressure 153/20 mmHg.  4.  Aortic pressure 134/83 (mean 107) mmHg.      CORONARY ARTERY ARTERIOGRAMS:       On fluoroscopy, there was no significant epicardial calcification noted.      LEFT MAIN CORONARY ARTERY: Is normal and bifurcates into  medium-size left  anterior descending and left circumflex systems in the usual fashion.      LEFT ANTERIOR DESCENDING CORONARY ARTERY: Gives rise to 2 small-to-medium-size  diagonal branches from the proximal segment and a small diagonal branch from  the mid segment. The left anterior descending coronary artery with the diagonal  branches appears angiographically normal.      LEFT CIRCUMFLEX CORONARY ARTERY: Gives rise to a small obtuse marginal branch  from the proximal segment and a medium and a small posterolateral branch  distally. The left circumflex system also appears angiographically normal.      RIGHT CORONARY ARTERY: Is a medium-caliber vessel and is dominant for posterior  circulation. It appears angiographically normal in the proximal and mid  segments. Distally, it gives rise to a medium-size posterior descending artery  branch that has mild diffuse narrowing of about 40% to 50% in its distal  segment. This is not hemodynamically significant.      LEFT VENTRICULAR ANGIOGRAM: Was done in 30' CABELLO projection. It reveals normal  left ventricular chamber size, wall motion and systolic function with an  estimated LV ejection fraction of about 60% to 65%. There is no pericardial or  valvular calcification noted.      CONCLUSION AND COMMENTS: The patient is a 30-year-old  female with  persistent chest pain suspicious for unstable angina and an abnormal Lexiscan  sestamibi study that revealed some anterior wall myocardial ischemia. Her  cardiac catheterization reveals:     1.  Normal left main coronary artery.   2.  Normal left anterior descending and left circumflex systems.   3.  Right coronary artery is dominant for posterior circulation with about 40%  to 50% diffuse narrowing in the distal segment of a medium-size posterior  descending artery branch, which is not hemodynamically significant.   4.  Normal LV  chamber size, wall motion, and systolic function with an  estimated LV ejection fraction of  about 60% to 65%.   5.  Elevated left ventricular end-diastolic pressures which are probably  related to diastolic noncompliance of the left ventricle.      RECOMMENDATIONS:    1.  In view of mild degree of atherosclerotic disease in the distal PDA, with a  normal left circumflex and right coronary artery, would continue to emphasize  on risk factor modification as needed for now and perhaps look at a noncardiac  cause for her symptoms should they persist.   2.  For her diastolic noncompliance of the left ventricle, I would try to  optimize her antihypertensive medications.             D:   VIBHA 09/24/2015 11:59:34  T:   shanae 09/25/2015 08:57:42  JOB ID:[id number]  [id number]  Revision Count:     0    -----------------------------------------------------  CXR/Imaging:   Imaging Results (Most Recent)       None            I personally reviewed and interpreted the CXR.      -----------------------------------------------------  CT:   CT Angiogram Chest Pulmonary Embolism    Result Date: 8/23/2024  No evidence of pulmonary embolus or dissection.     This report was finalized on 8/23/2024 4:37 PM by Donte Dexter M.D..      XR Chest AP    Result Date: 8/23/2024  No acute cardiopulmonary process.   This report was finalized on 8/23/2024 2:24 PM by Donte Dexter M.D..      CT Angiogram Chest Pulmonary Embolism    Result Date: 8/17/2024   1.  No thoracic aortic aneurysm or dissection. 2.  No pulmonary embolus 3.  Mild interstitial edema. 4.  Very small right pleural effusion. 5.  Normal heart size    This report was finalized on 8/17/2024 1:41 AM by Zoran Rubalcava MD.      XR Chest 1 View    Result Date: 8/16/2024   No evidence of acute disease in the chest.  Lung reticulation suspicious for a chronic process. CT can be considered.  This report was finalized on 8/16/2024 10:02 PM by Marilynn Dahl MD.     I personally reviewed the images of the CT scan.  My personal interpretation is below.       ----------------------------------------------------      --------------------------------------------------------------------------------------------------    Laboratory evaluations:    Lab Results   Component Value Date    GLUCOSE 86 08/26/2024    BUN 23 (H) 08/26/2024    CREATININE 0.49 (L) 08/26/2024    EGFRIFNONA 101 12/27/2020    EGFRIFAFRI  09/05/2016      Comment:      <15 Indicative of kidney failure.    BCR 46.9 (H) 08/26/2024    K 4.5 08/26/2024    CO2 25.8 08/26/2024    CALCIUM 9.1 08/26/2024    ALBUMIN 3.9 08/23/2024    LABIL2 1.3 (L) 02/05/2016    AST 15 08/23/2024    ALT 13 08/23/2024     Lab Results   Component Value Date    WBC 7.48 08/24/2024    HGB 13.8 08/24/2024    HCT 41.5 08/24/2024    MCV 79.7 08/24/2024     08/24/2024     Lab Results   Component Value Date    CHOL 186 08/17/2024    CHLPL 159 09/22/2015    TRIG 87 08/17/2024    HDL 53 08/17/2024     (H) 08/17/2024     Lab Results   Component Value Date    TSH 0.568 08/16/2024     Lab Results   Component Value Date    HGBA1C 6.60 (H) 08/16/2024     Lab Results   Component Value Date    ALT 13 08/23/2024     Lab Results   Component Value Date    HGBA1C 6.60 (H) 08/16/2024    HGBA1C 5.0 06/05/2024    HGBA1C 5.3 01/31/2024     Lab Results   Component Value Date    CREATININE 0.49 (L) 08/26/2024     Lab Results   Component Value Date    IRON 22 (L) 07/17/2023    TIBC 444 07/17/2023    FERRITIN 8.80 (L) 07/17/2023     Lab Results   Component Value Date    INR 1.01 08/23/2024    INR 0.96 08/17/2024    INR 0.90 08/16/2024    PROTIME 13.4 08/23/2024    PROTIME 12.9 08/17/2024    PROTIME 12.2 08/16/2024        Lab Results   Component Value Date    ABSOLUTELUNG 35 09/12/2024    ABSOLUTELUNG 29 08/26/2024             1. Chronic heart failure with preserved ejection fraction (HFpEF)    2. ALEJANDRO on CPAP    3. Uncontrolled hypertension    4. Type 2 diabetes mellitus without complication, with long-term current use of insulin    5. Class 2  severe obesity with serious comorbidity and body mass index (BMI) of 35.0 to 35.9 in adult, unspecified obesity type          ORDERS PLACED TODAY:  Orders Placed This Encounter   Procedures    ReDs Vest        Diagnoses and all orders for this visit:    1. Chronic heart failure with preserved ejection fraction (HFpEF) (Primary)  -     ReDs Vest    2. ALEJANDRO on CPAP    3. Uncontrolled hypertension    4. Type 2 diabetes mellitus without complication, with long-term current use of insulin    5. Class 2 severe obesity with serious comorbidity and body mass index (BMI) of 35.0 to 35.9 in adult, unspecified obesity type    Other orders  -     hydrALAZINE (APRESOLINE) 25 MG tablet; Take 1 tablet by mouth 3 (Three) Times a Day. Hold if top number of blood pressure is less than 110. Can take extra hydralazine 25mg as needed for top number greater than  Dispense: 125 tablet; Refill: 1             MEDS ORDERED TODAY:    New Medications Ordered This Visit   Medications    hydrALAZINE (APRESOLINE) 25 MG tablet     Sig: Take 1 tablet by mouth 3 (Three) Times a Day. Hold if top number of blood pressure is less than 110. Can take extra hydralazine 25mg as needed for top number greater than     Dispense:  125 tablet     Refill:  1        ---------------------------------------------------------------------------------------------------------------------------          ASSESSMENT/PLAN:      Diagnosis Plan   1. Chronic heart failure with preserved ejection fraction (HFpEF)  ReDs Vest      2. ALEJANDRO on CPAP        3. Uncontrolled hypertension        4. Type 2 diabetes mellitus without complication, with long-term current use of insulin        5. Class 2 severe obesity with serious comorbidity and body mass index (BMI) of 35.0 to 35.9 in adult, unspecified obesity type            not acutely decompensated chronic diastolic heart failure. CHF.     NYHA stage Stage C: Structural heart disease is present AND symptoms have occurredFC-Class II:  Slight limitation of physical activity. Comfortable at rest Ordinary physical activity results in fatigue, palpitation, dyspnea (shortness of breath).     Today, Patient appears euvolemic.and with  Moderate perfusion. The patient's hemodynamics are currently acceptable. HR is: normal and is at goal. BP/MAP was reviewed and there isroom for medication up-titration.  Clinical trajectory was assessed and haswaxed and waned.     CHF GOAL DIRECTED MEDICAL THERAPY FOR PATIENT ADDRESSED/ADJUSTED:     GDMT: HFpEF    Drug Class   Drug   Dose Last Dose Adjustment Notes   ACEi/ARB/ARNI Enalapril 20 mg     Beta Blocker Lopressor 50 mgBID     MRA Breastfeeding      SGLT2i Breastfeeding   N/A   Secondaries if applicable:          -CHF Specific BB:    Metoprolol 50mg BID with patient tolerating wel.   We discussed processes/benefits of HF clinic including nursing, pharmacist, and provider evaluation during each visit with ability for in office ReDS vest, labs, and ability to provide IV diuresis in the clinic with close outpatient monitoring.  Additionally, patient was educated about the availability of delivery of medications to patient's clinic room prior to leaving the building which assists with medication compliance and insures medications are in hands when changes are made (if patient opts for apothecary usage) with thorough guidance regarding changes and medication schedule provided.          -ACE/ARB/ARNi:   Continue Enalapril 20mg qd.      -MRA:   Breastfeeding at present.     -SGLT2 inhibitor therapy:   Breastfeeding at present.  When no longer breastfeeding, patient would likely benefit from this addition give underlying DM 2 as well.     -Diuretic regimen:   ReDS Vest reading for. 09/12/24 is 35; ReDs Vest reading reviewed with patient.    Continue PRN Lasix.   BMP, Mag, & ProBNP reviewed with patient.      -Fluid restriction/Sodium restriction:   Requested 2000 ml restriction  Patient has been asked to weigh daily and  was provided with a printed diuretic strategy.  1,500 mg Na restriction was discussed.    -Devices if applicable:       -Acute vs. Chronic underlying conditions other than HF addressed during visit:   Uncontrolled HTN:   Continue Nifedipine 90mg qd per her General Cardiologist.    Continue Enalapril.   Hydralazine 25mg TID continued with extra 25mg PRN when SBP>160mmHg.   Home BP log is more controlled than BP this AM in office, so I suspect visit as a whole is contributing to some degree.       ALEJANDRO on CPAP:   Impact of ALEJANDRO on HF discussed at length with patient.   She reports mask for CPAP broke a few nights ago but she is working with her PCP to have new mask arranged.      Identifiable barriers to Heart Failure Self-care:   Medical Barriers:  Breastfeeding currently limiting medication adjustments  Social Barriers:  No immediate known barriers            --------------------------------------------------------------------------------        Class 2 Severe Obesity (BMI >=35 and <=39.9). Obesity-related health conditions include the following: hypertension, coronary heart disease, and diabetes mellitus. Obesity is improving with lifestyle modifications. BMI is is above average; BMI management plan is completed. We discussed  calorie monitoring, though patient is currently breastfeeding .              >45 minutes out of 60 minutes face to face spent counseling patient extensively on dietary Na+ intake, importance of activity, weight monitoring, compliance with medications in addition to importance of titration with goal directed medical therapy and follow up appointments.            This document has been electronically signed by Irene Sunshine PA-C  September 12, 2024 15:31 EDT      Dictated Utilizing Dragon Dictation: Part of this note may be an electronic transcription/translation of spoken language to printed text using the Dragon Dictation System.    Follow-up appointment and medication changes provided  in hand delivered After Visit Summary as well as reviewed in the room.

## 2024-09-13 LAB
ANION GAP SERPL CALCULATED.3IONS-SCNC: 10.4 MMOL/L (ref 5–15)
BUN SERPL-MCNC: 11 MG/DL (ref 6–20)
BUN/CREAT SERPL: 19.3 (ref 7–25)
CALCIUM SPEC-SCNC: 9.1 MG/DL (ref 8.6–10.5)
CHLORIDE SERPL-SCNC: 104 MMOL/L (ref 98–107)
CO2 SERPL-SCNC: 26.6 MMOL/L (ref 22–29)
CREAT SERPL-MCNC: 0.57 MG/DL (ref 0.57–1)
EGFRCR SERPLBLD CKD-EPI 2021: 117.3 ML/MIN/1.73
GLUCOSE SERPL-MCNC: 83 MG/DL (ref 65–99)
POTASSIUM SERPL-SCNC: 4.3 MMOL/L (ref 3.5–5.2)
SODIUM SERPL-SCNC: 141 MMOL/L (ref 136–145)

## 2024-09-13 NOTE — PROGRESS NOTES
" [facility] Failure Clinic  Pharmacist Note     Antoinette Pack is a 39 y.o. female seen in the Heart Failure Clinic for HFpEF with a most recent EF of 51-55% on 8/17/24.     Antoinette Pack reports a fair understanding of medications.  She also reports she is still breastfeeding.      She reports SOB but denies any swelling. She currently takes Lasix 40mg PRN but tells me she hasn't needed to use it. She has a BP log and BPs vary 110/68 to 179/92.     Medication Use:   Hx of med intolerances:  None related to HF  Retail Rx Management: [facility]     Past Medical History:   Diagnosis Date    Anxiety     CHF (congestive heart failure) 05/2023    Colitis 2023    Depression     Diabetes mellitus 2018    type 2    Diverticulitis     GERD (gastroesophageal reflux disease)     History of transfusion 04/19/2024    2 units post delivery    Hypertension     chronic    Kidney stone     \"years ago\"    Narcolepsy 2022    Pancreatitis 09/2023    PCOS (polycystic ovarian syndrome)     Rectal bleeding     SINCE 2023 - USES MESALAMINE SUPPOSITORY NIGHTLY    Seizures 2012    Sleep apnea      ALLERGIES: Milk-related compounds, Dairycare [lactase-lactobacillus], Dilantin [phenytoin], Keppra [levetiracetam], Meperidine, and Topamax [topiramate]  Current Outpatient Medications   Medication Sig Dispense Refill    Budesonide (ENTOCORT EC) 3 MG 24 hr capsule Take 3 capsules by mouth Daily. 90 capsule 1    enalapril (VASOTEC) 20 MG tablet Take 1 tablet by mouth Daily. 90 tablet 3    hydrALAZINE (APRESOLINE) 25 MG tablet Take 1 tablet by mouth 3 (Three) Times a Day. Hold if top number of blood pressure is less than 110. Can take extra hydralazine 25mg as needed for top number greater than 125 tablet 1    mesalamine (ROWASA) 4 g enema Insert 1 enema into the rectum Every Night. 28 enema 0    metoprolol tartrate (LOPRESSOR) 50 MG tablet Take 1 tablet by mouth 2 (Two) Times a Day for 90 days. Hold if top number of blood pressure is " less than 100 or if heart rate is less than 60. 60 tablet 2    NIFEdipine XL (PROCARDIA XL) 90 MG 24 hr tablet Take 1 tablet by mouth Daily. 90 tablet 3    pantoprazole (PROTONIX) 40 MG EC tablet Take 1 tablet by mouth 2 (Two) Times a Day Before Meals. 120 tablet 0    Prenatal Vit-Fe Fumarate-FA (PRENATAL PO) Take 1 tablet by mouth Daily.      Continuous Glucose Sensor (Dexcom G6 Sensor) Change sensor Every 10 (Ten) Days. 3 each 5    Continuous Glucose Transmitter (Dexcom G6 Transmitter) misc Use 1 each Every 3 (Three) Months. 1 each 1    furosemide (LASIX) 40 MG tablet Take 1 tablet by mouth Daily As Needed (for fluid retention). (Patient not taking: Reported on 9/12/2024) 90 tablet 0    Insulin Aspart (NovoLOG) 100 UNIT/ML injection For use in insulin pump. MDD 50 units (Patient not taking: Reported on 9/12/2024) 20 mL 4    Insulin Disposable Pump (Omnipod 5 G6 Pods, Gen 5,) misc Use 1 each Day. (Patient not taking: Reported on 9/12/2024) 30 each 5    Insulin Glargine (Lantus SoloStar) 100 UNIT/ML injection pen Inject 10 Units under the skin into the appropriate area as directed Every Night. 15 mL 1    Insulin Pen Needle (Pen Needles) 32G X 4 MM misc Use 1 each Daily. 100 each 5    nitroglycerin (NITROSTAT) 0.4 MG SL tablet Place 1 tablet under the tongue Every 5 (Five) Minutes As Needed for Chest Pain (Flash pulmonary edema) for up to 10 doses. Place one tablet under the tongue for chest pain every 5 minutes until chest pain is relieved. If you have to take 3 tablets, take the 3rd and go to the hospital to be evaluated. (Patient not taking: Reported on 9/12/2024) 10 tablet 0     No current facility-administered medications for this encounter.       Vaccination History:   Pneumonia: PPSV23 2020; not interested in any vaccines  Annual Influenza: Not interested in any vaccines    Objective  Vitals:    09/12/24 0925 09/12/24 0945   BP: (!) 184/94 153/82   BP Location: Right arm    Patient Position: Sitting    Cuff  "Size: Adult    Pulse: 68    SpO2: 97%    Weight: 85.1 kg (187 lb 9.6 oz)    Height: 149.9 cm (59\")      Wt Readings from Last 3 Encounters:   09/12/24 85.1 kg (187 lb 9.6 oz)   09/12/24 85.3 kg (188 lb)   08/30/24 85 kg (187 lb 8 oz)         09/12/24  0925   Weight: 85.1 kg (187 lb 9.6 oz)     Lab Results   Component Value Date    GLUCOSE 83 09/12/2024    BUN 11 09/12/2024    CREATININE 0.57 09/12/2024    EGFRIFNONA 101 12/27/2020    EGFRIFAFRI  09/05/2016      Comment:      <15 Indicative of kidney failure.    BCR 19.3 09/12/2024    K 4.3 09/12/2024    CO2 26.6 09/12/2024    CALCIUM 9.1 09/12/2024    ALBUMIN 3.9 08/23/2024    LABIL2 1.3 (L) 02/05/2016    AST 15 08/23/2024    ALT 13 08/23/2024     Lab Results   Component Value Date    WBC 7.48 08/24/2024    HGB 13.8 08/24/2024    HCT 41.5 08/24/2024    MCV 79.7 08/24/2024     08/24/2024     Lab Results   Component Value Date    CKTOTAL 64 08/16/2024    CKMB <0.2 09/22/2015    CKMBINDEX 0.3 09/22/2015    TROPONINI 0.029 10/11/2018    TROPONINT 12 08/24/2024     Lab Results   Component Value Date    PROBNP 47.8 08/26/2024     Results for orders placed during the hospital encounter of 08/16/24    Adult Transthoracic Echo Complete w/ Color, Spectral and Contrast if necessary per protocol    Interpretation Summary    Left ventricular systolic function is normal. Calculated left ventricular EF = 54% Left ventricular ejection fraction appears to be 51 - 55%.    Left ventricular wall thickness is consistent with mild concentric hypertrophy.    Left ventricular diastolic function was normal.         GDMT    Drug Class   Drug   Dose Last Dose Adjustment Additional Titration   Notes   ACEi/ARB/ARNI enalapril 20mg 8/30/24     Beta Blocker Lopressor 50mg bid 8/26/24 Metoprolol may be continued in HF in lactating patients (Lexicomp) Stopped Labetalol   MRA        SGLT2i    Not recommended in breastfeeding     Hydralazine  10-25mg tid 8/24/24- on discharge - pt had been " using the 10mg    Lasix 40mg PRN      Drug Therapy Problems    1. Lactacting Mother    Recommendations:     Counseled Pt that while we are trying to utilize medications least likely to harm baby that no medication is without risk.  Also requested she speak to child's pediatrician about the medications she is taking while breastfeeding. Also explained we would like to initiate appropriate GDMT when she finishes breastfeeding.   Pt was counseled that lasix is not recommending when breastfeeding.        Patient was educated on heart failure medications and the importance of medication adherence. All questions were addressed and patient expressed understanding.     Thank you for allowing me to participate in the care of your patient,    Angela Sommers, PharmD  09/13/24  13:24 EDT

## 2024-09-13 NOTE — ADDENDUM NOTE
Encounter addended by: Angela Sommers, PharmD on: 9/13/2024 1:50 PM   Actions taken: Clinical Note Signed

## 2024-09-13 NOTE — ADDENDUM NOTE
Encounter addended by: Angela Sommers, PharmD on: 9/13/2024 1:38 PM   Actions taken: Order Reconciliation Section accessed, Pend clinical note

## 2024-09-18 ENCOUNTER — SPECIALTY PHARMACY (OUTPATIENT)
Dept: PHARMACY | Facility: HOSPITAL | Age: 39
End: 2024-09-18
Payer: MEDICARE

## 2024-09-26 ENCOUNTER — OFFICE VISIT (OUTPATIENT)
Dept: CARDIOLOGY | Facility: CLINIC | Age: 39
End: 2024-09-26
Payer: MEDICARE

## 2024-09-26 ENCOUNTER — LAB (OUTPATIENT)
Facility: HOSPITAL | Age: 39
End: 2024-09-26
Payer: MEDICARE

## 2024-09-26 VITALS
BODY MASS INDEX: 38.34 KG/M2 | DIASTOLIC BLOOD PRESSURE: 80 MMHG | SYSTOLIC BLOOD PRESSURE: 132 MMHG | OXYGEN SATURATION: 98 % | WEIGHT: 190.2 LBS | HEART RATE: 80 BPM | HEIGHT: 59 IN

## 2024-09-26 DIAGNOSIS — I50.32 CHRONIC HEART FAILURE WITH PRESERVED EJECTION FRACTION (HFPEF): ICD-10-CM

## 2024-09-26 DIAGNOSIS — I50.32 CHRONIC HEART FAILURE WITH PRESERVED EJECTION FRACTION (HFPEF): Primary | ICD-10-CM

## 2024-09-26 PROCEDURE — 36415 COLL VENOUS BLD VENIPUNCTURE: CPT

## 2024-09-26 PROCEDURE — 85025 COMPLETE CBC W/AUTO DIFF WBC: CPT

## 2024-09-26 PROCEDURE — 99214 OFFICE O/P EST MOD 30 MIN: CPT | Performed by: INTERNAL MEDICINE

## 2024-09-26 PROCEDURE — 3079F DIAST BP 80-89 MM HG: CPT | Performed by: INTERNAL MEDICINE

## 2024-09-26 PROCEDURE — 3075F SYST BP GE 130 - 139MM HG: CPT | Performed by: INTERNAL MEDICINE

## 2024-09-26 PROCEDURE — 80053 COMPREHEN METABOLIC PANEL: CPT

## 2024-09-26 PROCEDURE — 83880 ASSAY OF NATRIURETIC PEPTIDE: CPT

## 2024-09-27 LAB
ALBUMIN SERPL-MCNC: 4.3 G/DL (ref 3.5–5.2)
ALBUMIN/GLOB SERPL: 1.5 G/DL
ALP SERPL-CCNC: 101 U/L (ref 39–117)
ALT SERPL W P-5'-P-CCNC: 15 U/L (ref 1–33)
ANION GAP SERPL CALCULATED.3IONS-SCNC: 9.1 MMOL/L (ref 5–15)
AST SERPL-CCNC: 17 U/L (ref 1–32)
BASOPHILS # BLD AUTO: 0.02 10*3/MM3 (ref 0–0.2)
BASOPHILS NFR BLD AUTO: 0.3 % (ref 0–1.5)
BILIRUB SERPL-MCNC: 0.4 MG/DL (ref 0–1.2)
BUN SERPL-MCNC: 12 MG/DL (ref 6–20)
BUN/CREAT SERPL: 14.8 (ref 7–25)
CALCIUM SPEC-SCNC: 9.3 MG/DL (ref 8.6–10.5)
CHLORIDE SERPL-SCNC: 106 MMOL/L (ref 98–107)
CO2 SERPL-SCNC: 25.9 MMOL/L (ref 22–29)
CREAT SERPL-MCNC: 0.81 MG/DL (ref 0.57–1)
DEPRECATED RDW RBC AUTO: 44.9 FL (ref 37–54)
EGFRCR SERPLBLD CKD-EPI 2021: 93.7 ML/MIN/1.73
EOSINOPHIL # BLD AUTO: 0.15 10*3/MM3 (ref 0–0.4)
EOSINOPHIL NFR BLD AUTO: 2.2 % (ref 0.3–6.2)
ERYTHROCYTE [DISTWIDTH] IN BLOOD BY AUTOMATED COUNT: 14.9 % (ref 12.3–15.4)
GLOBULIN UR ELPH-MCNC: 2.8 GM/DL
GLUCOSE SERPL-MCNC: 107 MG/DL (ref 65–99)
HCT VFR BLD AUTO: 39.4 % (ref 34–46.6)
HGB BLD-MCNC: 13.1 G/DL (ref 12–15.9)
IMM GRANULOCYTES # BLD AUTO: 0.02 10*3/MM3 (ref 0–0.05)
IMM GRANULOCYTES NFR BLD AUTO: 0.3 % (ref 0–0.5)
LYMPHOCYTES # BLD AUTO: 2.12 10*3/MM3 (ref 0.7–3.1)
LYMPHOCYTES NFR BLD AUTO: 31.5 % (ref 19.6–45.3)
MCH RBC QN AUTO: 27.5 PG (ref 26.6–33)
MCHC RBC AUTO-ENTMCNC: 33.2 G/DL (ref 31.5–35.7)
MCV RBC AUTO: 82.6 FL (ref 79–97)
MONOCYTES # BLD AUTO: 0.4 10*3/MM3 (ref 0.1–0.9)
MONOCYTES NFR BLD AUTO: 6 % (ref 5–12)
NEUTROPHILS NFR BLD AUTO: 4.01 10*3/MM3 (ref 1.7–7)
NEUTROPHILS NFR BLD AUTO: 59.7 % (ref 42.7–76)
NRBC BLD AUTO-RTO: 0 /100 WBC (ref 0–0.2)
NT-PROBNP SERPL-MCNC: 101 PG/ML (ref 0–450)
PLATELET # BLD AUTO: 158 10*3/MM3 (ref 140–450)
PMV BLD AUTO: 11.3 FL (ref 6–12)
POTASSIUM SERPL-SCNC: 4.2 MMOL/L (ref 3.5–5.2)
PROT SERPL-MCNC: 7.1 G/DL (ref 6–8.5)
RBC # BLD AUTO: 4.77 10*6/MM3 (ref 3.77–5.28)
SODIUM SERPL-SCNC: 141 MMOL/L (ref 136–145)
WBC NRBC COR # BLD AUTO: 6.72 10*3/MM3 (ref 3.4–10.8)

## 2024-10-14 ENCOUNTER — TELEPHONE (OUTPATIENT)
Dept: CARDIOLOGY | Facility: CLINIC | Age: 39
End: 2024-10-14
Payer: MEDICARE

## 2024-10-14 NOTE — TELEPHONE ENCOUNTER
Called pt regarding message about holter monitor results. Informed pt we haven't received results yet from Lavinia, but as soon as Dr. Cabrera reviews holter information, we will notify her. Pt verbalized understanding. Asked how pt was doing and she stated she was feeling well and asymptomatic. Asked her to call us with changes or if there is anything we can help with. Verbalized understanding.

## 2024-10-15 ENCOUNTER — SPECIALTY PHARMACY (OUTPATIENT)
Dept: PHARMACY | Facility: HOSPITAL | Age: 39
End: 2024-10-15
Payer: MEDICARE

## 2024-10-28 ENCOUNTER — OFFICE VISIT (OUTPATIENT)
Dept: CARDIOLOGY | Facility: CLINIC | Age: 39
End: 2024-10-28
Payer: MEDICARE

## 2024-10-28 VITALS
BODY MASS INDEX: 39.61 KG/M2 | OXYGEN SATURATION: 99 % | DIASTOLIC BLOOD PRESSURE: 110 MMHG | HEIGHT: 59 IN | SYSTOLIC BLOOD PRESSURE: 202 MMHG | HEART RATE: 81 BPM | WEIGHT: 196.5 LBS

## 2024-10-28 DIAGNOSIS — I50.32 CHRONIC HEART FAILURE WITH PRESERVED EJECTION FRACTION (HFPEF): Primary | ICD-10-CM

## 2024-10-28 DIAGNOSIS — I10 ESSENTIAL HYPERTENSION: ICD-10-CM

## 2024-10-28 PROCEDURE — 99214 OFFICE O/P EST MOD 30 MIN: CPT | Performed by: INTERNAL MEDICINE

## 2024-10-28 PROCEDURE — 3080F DIAST BP >= 90 MM HG: CPT | Performed by: INTERNAL MEDICINE

## 2024-10-28 PROCEDURE — 93000 ELECTROCARDIOGRAM COMPLETE: CPT | Performed by: INTERNAL MEDICINE

## 2024-10-28 PROCEDURE — 3077F SYST BP >= 140 MM HG: CPT | Performed by: INTERNAL MEDICINE

## 2024-10-28 RX ORDER — SPIRONOLACTONE 100 MG/1
100 TABLET, FILM COATED ORAL DAILY
Qty: 90 TABLET | Refills: 3 | Status: SHIPPED | OUTPATIENT
Start: 2024-10-28

## 2024-10-28 NOTE — PROGRESS NOTES
"Chief Complaint  Chronic heart failure with preserved ejection fraction (HFp      Subjective   History of Present Illness    Problem List  -HFpEF, normal EKG, echo preserved systolic function  -hx of HFpEF  -CXR showed pulmonary edema  -C section 4/18/24 morning  -Long standing HTN  -DM  -morbid obesity  -COPD?  -ALEJANDRO  -UC  -duodenal ulcers    Mrs. Pack is a 39 year old lady being seen in follow up.  Had complicated pregnancy.  Had c section.  Had CHF and GI bleed.  Titrated meds.  BP now controlled when taking.  Has lost 50 lbs in last 6 weeks.  Feeling much better.  BP high today because child admited at  and BP meds at home.  ROS negative except for the above.      Update 8/20/24  BP had been on the rise.  Was exposed noxious fumes likely and had flash pulmonary edema that required brief hospitalization.   also needed to be treated for difficulty breathing.      Update 8/30/24  BP well controlled at home.  Pharmacist concerned about her symptoms.      Update 9/26/24  Occasional blood pressure spikes.  Some occasional shortness of breaht.      Update 10/28/24  Weight up.  BP shoots up.  Chest tightness and short of breath.             Objective   Vital Signs:  Vitals:    10/28/24 1021   BP: (!) 202/110   Pulse: 81   SpO2: 99%     Estimated body mass index is 39.67 kg/m² as calculated from the following:    Height as of this encounter: 149.9 cm (59.02\").    Weight as of this encounter: 89.1 kg (196 lb 8 oz).       Physical Exam  HENT:      Head: Normocephalic.   Eyes:      Extraocular Movements: Extraocular movements intact.   Cardiovascular:      Rate and Rhythm: Normal rate and regular rhythm.      Heart sounds: No murmur heard.     No gallop.   Pulmonary:      Breath sounds: Normal breath sounds.   Abdominal:      Palpations: Abdomen is soft.   Musculoskeletal:      Right lower leg: No edema.      Left lower leg: No edema.   Skin:     General: Skin is warm and dry.   Neurological:      General: No " focal deficit present.      Mental Status: She is alert.   Psychiatric:         Mood and Affect: Mood normal.       Clinic discussed advanced directive    ECG 12 Lead    Date/Time: 10/28/2024 11:28 AM  Performed by: Pablo Cabrera MD    Authorized by: Pablo Cabrera MD  Rhythm: sinus rhythm    Clinical impression: normal ECG           Assessment   -HFpEF, normal EKG, echo preserved systolic function  -hx of HFpEF  -CXR showed pulmonary edema  -C section 4/18/24 morning  -Long standing HTN  -DM  -morbid obesity  -COPD?  -ALEJANDRO  -UC  -duodenal ulcers    Plan   -take lasix to help with fluid retention  -start spironolactone 100mg.  Relatively breast feeding safe  -cont. Metoprolol, hydralazine, nifedipine, enalapril  -blood work in few days  -few week follow up      Pablo Cabrera MD

## 2024-11-01 ENCOUNTER — TELEPHONE (OUTPATIENT)
Dept: CARDIOLOGY | Facility: CLINIC | Age: 39
End: 2024-11-01
Payer: MEDICARE

## 2024-11-01 NOTE — TELEPHONE ENCOUNTER
Relayed Dr. Cabrera's recommendations. Pt verbalized understanding. States she will get labs drawn. Informed pt orders are in the computer and she can go to any  facility to have labs drawn. ER warnings given. Verbalized understanding and agrerable to plan of care.

## 2024-11-01 NOTE — TELEPHONE ENCOUNTER
Pt called stating last night her BP was 210/117 with  1.5 hrs after taking evening dose of cardiac meds. No precipitating events identified. Pt states SOA, fatigue, and chest pressure when BP is high. Also notices fast heart rate. Reviewed medications and pt compliant with all medications- and pt has been taking all 3 doses of hydralazine each day. Pt was able to start spironolactone the day it was ordered. Pt stated this am her BP is 119/77 with HR 84 prior to taking her medications. Symptoms have subsided except fatigue. Stated she has felt fatigued since starting spironolactone. Pt has not had repeat lab work since office visit. Educated on importance of lab work while her adjusting medications. Verbalized understanding. Pt said she is calling to see if there is medication or if she can take an extra dose of an existing medication she can take when her blood pressure is that elevated. Pt is breast feeding.

## 2024-11-11 ENCOUNTER — SPECIALTY PHARMACY (OUTPATIENT)
Dept: PHARMACY | Facility: HOSPITAL | Age: 39
End: 2024-11-11
Payer: MEDICARE

## 2024-11-11 NOTE — PROGRESS NOTES
Specialty Pharmacy Refill Coordination Note     Antoinette is a 39 y.o. female contacted today regarding refills of  Dexcom G6 specialty medication(s).    Reviewed and verified with patient:       Specialty medication(s) and dose(s) confirmed: yes    Refill Questions      Flowsheet Row Most Recent Value   Changes to allergies? No   Changes to medications? No   New conditions or infections since last clinic visit No   Unplanned office visit, urgent care, ED, or hospital admission in the last 4 weeks  No   How does patient/caregiver feel medication is working? Very good   Financial problems or insurance changes  No   Since the previous refill, were any specialty medication doses or scheduled injections missed or delayed?  No   Why were doses missed? na   Does this patient require a clinical escalation to a pharmacist? No            Delivery Questions      Flowsheet Row Most Recent Value   Delivery method [employer]   Delivery address verified with patient/caregiver? Yes   Delivery address Home   Number of medications in delivery 1   Medication(s) being filled and delivered Continuous Glucose Sensor (Dexcom G6 Sensor)   Doses left of specialty medications na   Copay verified? No   Copay amount 0$   Copay form of payment No copayment ($0)   Ship Date 11/12   Delivery Date 11/13   Signature Required No                   Follow-up: 30 day(s)     Carito Mills, Pharmacy Technician  Specialty Pharmacy Technician

## 2024-11-18 ENCOUNTER — HOSPITAL ENCOUNTER (OUTPATIENT)
Dept: CARDIOLOGY | Facility: HOSPITAL | Age: 39
Discharge: HOME | End: 2024-11-18
Admitting: PHYSICIAN ASSISTANT
Payer: MEDICARE

## 2024-11-18 VITALS
HEIGHT: 59 IN | DIASTOLIC BLOOD PRESSURE: 86 MMHG | SYSTOLIC BLOOD PRESSURE: 142 MMHG | HEART RATE: 89 BPM | BODY MASS INDEX: 39.92 KG/M2 | WEIGHT: 198 LBS | OXYGEN SATURATION: 96 %

## 2024-11-18 DIAGNOSIS — I50.32 CHRONIC HEART FAILURE WITH PRESERVED EJECTION FRACTION (HFPEF): Chronic | ICD-10-CM

## 2024-11-18 DIAGNOSIS — G47.33 OSA ON CPAP: ICD-10-CM

## 2024-11-18 DIAGNOSIS — R09.89 LABILE HYPERTENSION: Primary | ICD-10-CM

## 2024-11-18 LAB
ABSOLUTE LUNG FLUID CONTENT: 33 % (ref 20–35)
ANION GAP SERPL CALCULATED.3IONS-SCNC: 11.6 MMOL/L (ref 5–15)
BUN SERPL-MCNC: 12 MG/DL (ref 6–20)
BUN/CREAT SERPL: 28.6 (ref 7–25)
CALCIUM SPEC-SCNC: 8.8 MG/DL (ref 8.6–10.5)
CHLORIDE SERPL-SCNC: 103 MMOL/L (ref 98–107)
CO2 SERPL-SCNC: 23.4 MMOL/L (ref 22–29)
CREAT SERPL-MCNC: 0.42 MG/DL (ref 0.57–1)
EGFRCR SERPLBLD CKD-EPI 2021: 126.2 ML/MIN/1.73
GLUCOSE SERPL-MCNC: 204 MG/DL (ref 65–99)
MAGNESIUM SERPL-MCNC: 1.8 MG/DL (ref 1.6–2.6)
NT-PROBNP SERPL-MCNC: 362.4 PG/ML (ref 0–450)
POTASSIUM SERPL-SCNC: 4.2 MMOL/L (ref 3.5–5.2)
SODIUM SERPL-SCNC: 138 MMOL/L (ref 136–145)

## 2024-11-18 PROCEDURE — 83880 ASSAY OF NATRIURETIC PEPTIDE: CPT | Performed by: PHYSICIAN ASSISTANT

## 2024-11-18 PROCEDURE — 83735 ASSAY OF MAGNESIUM: CPT | Performed by: PHYSICIAN ASSISTANT

## 2024-11-18 PROCEDURE — G2211 COMPLEX E/M VISIT ADD ON: HCPCS | Performed by: PHYSICIAN ASSISTANT

## 2024-11-18 PROCEDURE — 80048 BASIC METABOLIC PNL TOTAL CA: CPT | Performed by: PHYSICIAN ASSISTANT

## 2024-11-18 PROCEDURE — 36415 COLL VENOUS BLD VENIPUNCTURE: CPT | Performed by: PHYSICIAN ASSISTANT

## 2024-11-18 PROCEDURE — 94726 PLETHYSMOGRAPHY LUNG VOLUMES: CPT | Performed by: PHYSICIAN ASSISTANT

## 2024-11-18 PROCEDURE — 99215 OFFICE O/P EST HI 40 MIN: CPT | Performed by: PHYSICIAN ASSISTANT

## 2024-11-18 NOTE — PROGRESS NOTES
Heart Failure Clinic    Date: 11/18/24     Vitals:    11/18/24 1046   BP: (!) 180/101   Pulse: 89   SpO2: 96%    Weight 198    Method of arrival: Ambulatory    Weighing self daily: Yes    Monitoring Heart Failure Zones: Yes    Today's HF Zone: Yellow     Taking medications as prescribed: Yes    Edema No    Shortness of Air: Yes    Number of pillows used at night: Adjustable bed with HOB elevated    Educational Materials given:  AVS                                                                         ReDS Value: 33  25-35 Optimal Value Status      Paula Donovan MA 11/18/24 10:47 EST

## 2024-11-18 NOTE — PROGRESS NOTES
" Heart Failure Clinic  Pharmacist Note     Antoinette Pack is a 39 y.o. female seen in the Heart Failure Clinic for HFpEF with a most recent EF of 51-55% on 8/17/24.     Antoinette Pack reports a fair understanding of medications.  She also reports she is still breastfeeding.      She reports SOB but denies any swelling. She currently takes Lasix 40mg PRN and tells me she uses it 2-3x/week when swelling is worse and blood pressure is elevated. She reports her BP ranges from 115s-120s/70s and HR ranges from 70-100s.    Medication Use:   Hx of med intolerances:  None related to HF  Retail Rx Management: [facility]     Past Medical History:   Diagnosis Date    Anxiety     CHF (congestive heart failure) 05/2023    Colitis 2023    Depression     Diabetes mellitus 2018    type 2    Diverticulitis     GERD (gastroesophageal reflux disease)     History of transfusion 04/19/2024    2 units post delivery    Hypertension     chronic    Kidney stone     \"years ago\"    Narcolepsy 2022    Pancreatitis 09/2023    PCOS (polycystic ovarian syndrome)     Rectal bleeding     SINCE 2023 - USES MESALAMINE SUPPOSITORY NIGHTLY    Seizures 2012    Sleep apnea      ALLERGIES: Milk-related compounds, Dairycare [lactase-lactobacillus], Dilantin [phenytoin], Keppra [levetiracetam], Meperidine, and Topamax [topiramate]  Current Outpatient Medications   Medication Sig Dispense Refill    Budesonide (ENTOCORT EC) 3 MG 24 hr capsule Take 3 capsules by mouth Daily. 90 capsule 1    Continuous Glucose Sensor (Dexcom G6 Sensor) Change sensor Every 10 (Ten) Days. 3 each 5    Continuous Glucose Transmitter (Dexcom G6 Transmitter) misc Use 1 each Every 3 (Three) Months. 1 each 1    enalapril (VASOTEC) 20 MG tablet Take 1 tablet by mouth Daily. 90 tablet 3    furosemide (LASIX) 40 MG tablet Take 1 tablet by mouth Daily As Needed (for fluid retention). 90 tablet 0    hydrALAZINE (APRESOLINE) 25 MG tablet Take 1 tablet by mouth 3 (Three) " "Times a Day. Hold if top number of blood pressure is less than 110. Can take extra hydralazine 25mg as needed for top number greater than 125 tablet 1    Insulin Glargine (Lantus SoloStar) 100 UNIT/ML injection pen Inject 10 Units under the skin into the appropriate area as directed Every Night. 15 mL 1    Insulin Pen Needle (Pen Needles) 32G X 4 MM misc Use 1 each Daily. 100 each 5    mesalamine (ROWASA) 4 g enema Insert 1 enema into the rectum Every Night. 28 enema 0    NIFEdipine XL (PROCARDIA XL) 90 MG 24 hr tablet Take 1 tablet by mouth Daily. 90 tablet 3    nitroglycerin (NITROSTAT) 0.4 MG SL tablet Place 1 tablet under the tongue Every 5 (Five) Minutes As Needed for Chest Pain (Flash pulmonary edema) for up to 10 doses. Place one tablet under the tongue for chest pain every 5 minutes until chest pain is relieved. If you have to take 3 tablets, take the 3rd and go to the hospital to be evaluated. 10 tablet 0    pantoprazole (PROTONIX) 40 MG EC tablet Take 1 tablet by mouth 2 (Two) Times a Day Before Meals. 120 tablet 0    Prenatal Vit-Fe Fumarate-FA (PRENATAL PO) Take 1 tablet by mouth Daily.      spironolactone (ALDACTONE) 100 MG tablet Take 1 tablet by mouth Daily. 90 tablet 3    metoprolol tartrate (LOPRESSOR) 50 MG tablet Take 1 tablet by mouth 2 (Two) Times a Day for 90 days. Hold if top number of blood pressure is less than 100 or if heart rate is less than 60. (Patient not taking: Reported on 11/18/2024) 60 tablet 2     No current facility-administered medications for this encounter.       Vaccination History:   Pneumonia: PPSV23 2020; not interested in any vaccines  Annual Influenza: Not interested in any vaccines    Objective  Vitals:    11/18/24 1046   BP: (!) 180/101   BP Location: Left arm   Patient Position: Sitting   Cuff Size: Adult   Pulse: 89   SpO2: 96%   Weight: 89.8 kg (198 lb)   Height: 149.9 cm (59\")     Wt Readings from Last 3 Encounters:   11/18/24 89.8 kg (198 lb)   10/28/24 89.1 kg " (196 lb 8 oz)   09/26/24 86.3 kg (190 lb 3.2 oz)         11/18/24  1046   Weight: 89.8 kg (198 lb)     Lab Results   Component Value Date    GLUCOSE 107 (H) 09/26/2024    BUN 12 09/26/2024    CREATININE 0.81 09/26/2024    EGFRIFNONA 101 12/27/2020    EGFRIFAFRI  09/05/2016      Comment:      <15 Indicative of kidney failure.    BCR 14.8 09/26/2024    K 4.2 09/26/2024    CO2 25.9 09/26/2024    CALCIUM 9.3 09/26/2024    ALBUMIN 4.3 09/26/2024    LABIL2 1.3 (L) 02/05/2016    AST 17 09/26/2024    ALT 15 09/26/2024     Lab Results   Component Value Date    WBC 6.72 09/26/2024    HGB 13.1 09/26/2024    HCT 39.4 09/26/2024    MCV 82.6 09/26/2024     09/26/2024     Lab Results   Component Value Date    CKTOTAL 64 08/16/2024    CKMB <0.2 09/22/2015    CKMBINDEX 0.3 09/22/2015    TROPONINI 0.029 10/11/2018    TROPONINT 12 08/24/2024     Lab Results   Component Value Date    PROBNP 101.0 09/26/2024     Results for orders placed during the hospital encounter of 08/16/24    Adult Transthoracic Echo Complete w/ Color, Spectral and Contrast if necessary per protocol    Interpretation Summary    Left ventricular systolic function is normal. Calculated left ventricular EF = 54% Left ventricular ejection fraction appears to be 51 - 55%.    Left ventricular wall thickness is consistent with mild concentric hypertrophy.    Left ventricular diastolic function was normal.         GDMT    Drug Class   Drug   Dose Last Dose Adjustment Additional Titration   Notes   ACEi/ARB/ARNI enalapril 20mg 8/30/24     Beta Blocker Lopressor 50mg bid 8/26/24 Metoprolol may be continued in HF in lactating patients (Lexicomp) Stopped Labetalol   MRA        SGLT2i    Not recommended in breastfeeding     Hydralazine  10-25mg tid 8/24/24- on discharge - pt had been using the 25mg    Lasix 40mg PRN      Drug Therapy Problems    1. Lactacting Mother    Recommendations:     Counseled Pt that while we are trying to utilize medications least likely to harm  baby that no medication is without risk.  Also requested she speak to child's pediatrician about the medications she is taking while breastfeeding. Also explained we would like to initiate appropriate GDMT when she finishes breastfeeding.   Previously noted that patient was counseled that lasix is not recommending when breastfeeding.        Patient was educated on heart failure medications and the importance of medication adherence. All questions were addressed and patient expressed understanding.     Thank you for allowing me to participate in the care of your patient,    Raisa Castañeda [facility]  11/18/24  11:09 EST

## 2024-11-18 NOTE — PROGRESS NOTES
[facility]  EVANS Corona Mary Beth, APRN      Thank you for asking me to see Antoinette Pack for congestive heart failure.    HPI:     This is a 39 y.o. female with known past medical history of:    Chronic HFpEF  TTE from 08/17/24 with EF 51-55% & mild concentric hypertrophy.  LV wall thickness consistent with mild concentric hypertrophy  Hypertensive emergency with recent flash pulmonary edema requiring hospitalizations  ALEJANDRO with CPAP compliance  Nonobstructive CAD:   [facility] in 09/2015 with mild degree of atherosclerotic disease in the distal PDA, with a normal left circumflex and right coronary artery per 2015 report.   Obesity per BMI  Type 2 DM, ID  Ulcerative colitis        Antoinette Pack presents for today for Heart failure clinic evaluation.  The patient is typically seen by Florinda Bliss APRN.  Patient's primary cardiologist is Dr. Pablo Cabrera .     Last known EF 51-55%.   Last known hospitalization and/or ED visit:   August 16-19, 2024 with hypertensive emergency upon admission with flash pulmonary edema acute NSTEMI as well as symptomatic palpitations  August 23-24, 2024 with hypertensive emergency and acute  hypoxemic respiratory failure with flash pulmonary edema  Accompanied by:  & infant daughter          11/18/2024 visit data/details regarding:   Dyspnea: Present with exertion  Lower extremity swelling: Denies  Abdominal swelling: Deneis  Home weight: Weight monitoring booklet provided during initial visit; Has scale.   Home BP: BP monitoring booklet provided during initial visit.    Home heart rate: HR monitoring booklet provided during initial visit.   Daily activities of living: Performing on her own  Pillows/lying flat: 2 pillows in bed   HF zone: Yellow  Mrs. Pack  is chest pain free.  She reports a few episodes of elevated BP but states it is mostly running 110s systolic.   She reports she did have an isolated  episode yesterday of SBP being close to 200.  She reports she took PRN hydralazine in addition to scheduled and it improved.   BP in office today was initially 180s systolic but improved to 140s when rechecked shortly after patient had been resting for a bit.      Review of Systems - Review of Systems   Constitutional: Negative for decreased appetite, fever and malaise/fatigue.   HENT:  Negative for congestion and ear pain.    Eyes:  Negative for blurred vision and double vision.   Cardiovascular:  Positive for dyspnea on exertion. Negative for leg swelling and palpitations.   Respiratory:  Positive for shortness of breath. Negative for cough and hemoptysis.    Endocrine: Negative for cold intolerance and heat intolerance.   Hematologic/Lymphatic: Negative for adenopathy and bleeding problem.   Skin:  Negative for dry skin and nail changes.   Musculoskeletal:  Negative for arthritis and falls.   Gastrointestinal:  Negative for bloating and anorexia.   Genitourinary:  Negative for bladder incontinence and dysuria.   Neurological:  Negative for aphonia and difficulty with concentration.   Psychiatric/Behavioral:  Negative for altered mental status and hallucinations.          All other systems were reviewed and were negative.    Patient Active Problem List   Diagnosis    Simple goiter    History of CHF (congestive heart failure)    Chronic hypertension affecting pregnancy    History of pancreatitis    History of seizure disorder    ALEJANDRO on CPAP    Proteinuria affecting pregnancy, antepartum    Rectal bleeding    Shortness of breath    Antepartum multigravida of advanced maternal age    Pain of pelvic girdle    Elevated blood pressure reading    Pulmonary edema    Essential hypertension    Type 2 diabetes mellitus without complication, with long-term current use of insulin    S/P  section, total salpingectomy left unicornuate uterus    Anemia    Ulcerative colitis with complication    Surgical wound infection     Wound infection    Chronic heart failure with preserved ejection fraction (HFpEF)    Gastroesophageal reflux disease    HLD (hyperlipidemia)    Mixed hyperlipidemia    Psoriasis    Vitamin B12 deficiency    Vitamin D deficiency    Seasonal allergic rhinitis    Primary dysthymia       family history includes COPD in her father and mother; Depression in her mother; Diabetes in her father; Emphysema in her mother; Heart disease in her father and mother; Heart failure in her father; Hepatitis in her father; Hypertension in her father and mother.     reports that she quit smoking about 9 years ago. Her smoking use included cigarettes. She started smoking about 29 years ago. She has a 20 pack-year smoking history. She has been exposed to tobacco smoke. She has never used smokeless tobacco. She reports that she does not drink alcohol and does not use drugs.    Allergies   Allergen Reactions    Milk-Related Compounds Unknown - High Severity    Dairycare [Lactase-Lactobacillus] Unknown - Low Severity     Pt is breastfeeding baby that is allergic to dairy, pt cannot have dairy products while breastfeeding    Dilantin [Phenytoin] Mental Status Change    Keppra [Levetiracetam] Mental Status Change    Meperidine Rash    Topamax [Topiramate] Mental Status Change         Current Outpatient Medications:     Budesonide (ENTOCORT EC) 3 MG 24 hr capsule, Take 3 capsules by mouth Daily., Disp: 90 capsule, Rfl: 1    Continuous Glucose Sensor (Dexcom G6 Sensor), Change sensor Every 10 (Ten) Days., Disp: 3 each, Rfl: 5    Continuous Glucose Transmitter (Dexcom G6 Transmitter) misc, Use 1 each Every 3 (Three) Months., Disp: 1 each, Rfl: 1    enalapril (VASOTEC) 20 MG tablet, Take 1 tablet by mouth Daily., Disp: 90 tablet, Rfl: 3    furosemide (LASIX) 40 MG tablet, Take 1 tablet by mouth Daily As Needed (for fluid retention)., Disp: 90 tablet, Rfl: 0    hydrALAZINE (APRESOLINE) 25 MG tablet, Take 1 tablet by mouth 3 (Three) Times a Day.  Hold if top number of blood pressure is less than 110. Can take extra hydralazine 25mg as needed for top number greater than, Disp: 125 tablet, Rfl: 1    Insulin Glargine (Lantus SoloStar) 100 UNIT/ML injection pen, Inject 10 Units under the skin into the appropriate area as directed Every Night., Disp: 15 mL, Rfl: 1    Insulin Pen Needle (Pen Needles) 32G X 4 MM misc, Use 1 each Daily., Disp: 100 each, Rfl: 5    mesalamine (ROWASA) 4 g enema, Insert 1 enema into the rectum Every Night., Disp: 28 enema, Rfl: 0    NIFEdipine XL (PROCARDIA XL) 90 MG 24 hr tablet, Take 1 tablet by mouth Daily., Disp: 90 tablet, Rfl: 3    nitroglycerin (NITROSTAT) 0.4 MG SL tablet, Place 1 tablet under the tongue Every 5 (Five) Minutes As Needed for Chest Pain (Flash pulmonary edema) for up to 10 doses. Place one tablet under the tongue for chest pain every 5 minutes until chest pain is relieved. If you have to take 3 tablets, take the 3rd and go to the hospital to be evaluated., Disp: 10 tablet, Rfl: 0    pantoprazole (PROTONIX) 40 MG EC tablet, Take 1 tablet by mouth 2 (Two) Times a Day Before Meals., Disp: 120 tablet, Rfl: 0    Prenatal Vit-Fe Fumarate-FA (PRENATAL PO), Take 1 tablet by mouth Daily., Disp: , Rfl:     spironolactone (ALDACTONE) 100 MG tablet, Take 1 tablet by mouth Daily., Disp: 90 tablet, Rfl: 3    metoprolol tartrate (LOPRESSOR) 50 MG tablet, Take 1 tablet by mouth 2 (Two) Times a Day for 90 days. Hold if top number of blood pressure is less than 100 or if heart rate is less than 60. (Patient not taking: Reported on 11/18/2024), Disp: 60 tablet, Rfl: 2      Physical Exam:  I have reviewed the patient's current vital signs as documented in the patient's EMR.   Vitals:    11/18/24 1110   BP: 142/86   Pulse:    SpO2:          Body mass index is 39.99 kg/m².       11/18/24  1046   Weight: 89.8 kg (198 lb)          Physical Exam  Vitals and nursing note reviewed.   Constitutional:       General: She is awake.       Appearance: Normal appearance. She is well-developed and well-groomed.   HENT:      Head: Normocephalic and atraumatic.   Eyes:      General: Lids are normal.      Conjunctiva/sclera:      Right eye: Right conjunctiva is not injected.      Left eye: Left conjunctiva is not injected.   Cardiovascular:      Rate and Rhythm: Normal rate and regular rhythm.   Pulmonary:      Breath sounds: No decreased breath sounds, wheezing or rhonchi.      Comments: Bilateral lungs with clear breath sounds.   Abdominal:      Palpations: There is no shifting dullness.      Tenderness: There is no abdominal tenderness.   Musculoskeletal:      Right lower leg: No edema.      Left lower leg: No edema.   Neurological:      Mental Status: She is alert and oriented to person, place, and time.   Psychiatric:         Attention and Perception: Attention normal.         Mood and Affect: Mood normal.         Behavior: Behavior is cooperative.      Comments: Very pleasant.           JVP: Volume/Pulsation: Normal.        DATA REVIEWED:       ---------------------------------------------------  TTE/JAYDON:  Results for orders placed during the hospital encounter of 08/16/24    Adult Transthoracic Echo Complete w/ Color, Spectral and Contrast if necessary per protocol    Interpretation Summary    Left ventricular systolic function is normal. Calculated left ventricular EF = 54% Left ventricular ejection fraction appears to be 51 - 55%.    Left ventricular wall thickness is consistent with mild concentric hypertrophy.    Left ventricular diastolic function was normal.        LAST HEART CATH/IF AVAILABLE:       Cardiac Catheterization/Vascular Study    Accession Number: [id number]   Date of Study: 09/25/2015   Ordering Provider: Interface, See Report   Clinical Indications: None Listed        Reading Physicians  Performing Staff    Interface, See Report    No performing staff assigned to study.       Patient Information    Patient Name  Antoinette Gray  Ramone MRN  [id number] Legal Sex  Female  (Age)  1985 (39 y.o.)     Race Ethnicity Encounter Category   White or  Not  or  Emergency         Printable Vessel Diagram    Printable Vessel Diagram         Conclusion    [place], [place] [address]  [phone]     NAME:                    RAFITA BRISENO  ROOM NUMBER:             [id number]  MEDICAL RECORD NUMBER:   [id number]  VISIT NUMBER:            [id number]  PHYSICIAN:                    Magnus Martinez MD, [facility]  PRIMARY CARE PROVIDER:     DATE OF PROCEDURE:       2015     YOB: 1985     REFERRING PHYSICIAN:  Yinka Torrez MD     CLINICAL HISTORY:  The patient is a 30-year-old  female with multiple  risk factors for coronary artery disease including a strongly positive family  history of premature coronary artery disease, who has been having recurrent  episodes of chest pain and discomfort suspicious for CCS class IV  angina/unstable angina. She had an abnormal Lexiscan sestamibi study with some  anterior wall myocardial ischemia. In view of her continued unstable anginal  symptoms, she was given the option of further cardiac evaluation with cardiac  catheterization to rule out any underlying significant coronary artery disease  and to help with the decision regarding further management. The procedure of  cardiac catheterization and potential risks and complications and potential  benefits and alternative methods of management have been explained to the  patient and she expressed understanding of the same and is wanting to proceed.  Informed consent was obtained. A timeout was performed prior to starting the  procedure to identify the right patient and the right procedure.      DESCRIPTION OF PROCEDURE: After  obtaining informed consent and a timeout was  performed, the patient was brought down to the cardiac catheterization  laboratory and was prepped and  draped in a sterile fashion over the right wrist  area for radial artery access. The area was anesthetized with local 2%  Xylocaine infiltration. Access was obtained to the right radial artery after  Allis test was performed, which was found to be normal. A micropuncture needle  was used to obtain access without any difficulty and a 5-[place] sheath was  placed over the guidewire into the right radial artery. Then a radial artery  cocktail was injected through the sheath. Then a 5-[place] Mac left 3.5  coronary catheter was advanced over the  guidewire and was engaged into the  left coronary ostium and left coronary angiography was performed in multiple  oblique projections. This catheter was then exchanged with a 5-[place] Mac  right coronary catheter, that was engaged into the right coronary ostium and  right coronary angiography was performed in multiple oblique projections. This  catheter was then exchanged with a 5-[place] angled pigtail catheter, that was  advanced into the left ventricle and left ventricular pressures were obtained.  Pullback pressures were obtained. At the end of the procedure, all the  catheters and the sheath were removed, and adequate hemostasis was obtained at  the arterial puncture site using a TR band. Total contrast used: Isovue 85 mL.  Fluoro time: 4.68 minutes.      For further details of the procedure, please review the record maintained by  the monitoring tech.     RESULTS:   HEMODYNAMICS:   1.  Pre-angiographic pressure: Aortic pressure 156/96 (mean 121) mmHg.  2.  Left ventricular pressure: 165/28 mmHg.  3.  Post angiographic pressure: Left ventricular pressure 153/20 mmHg.  4.  Aortic pressure 134/83 (mean 107) mmHg.      CORONARY ARTERY ARTERIOGRAMS:       On fluoroscopy, there was no significant epicardial calcification noted.      LEFT MAIN CORONARY ARTERY: Is normal and bifurcates into medium-size left  anterior descending and left circumflex systems in the usual  fashion.      LEFT ANTERIOR DESCENDING CORONARY ARTERY: Gives rise to 2 small-to-medium-size  diagonal branches from the proximal segment and a small diagonal branch from  the mid segment. The left anterior descending coronary artery with the diagonal  branches appears angiographically normal.      LEFT CIRCUMFLEX CORONARY ARTERY: Gives rise to a small obtuse marginal branch  from the proximal segment and a medium and a small posterolateral branch  distally. The left circumflex system also appears angiographically normal.      RIGHT CORONARY ARTERY: Is a medium-caliber vessel and is dominant for posterior  circulation. It appears angiographically normal in the proximal and mid  segments. Distally, it gives rise to a medium-size posterior descending artery  branch that has mild diffuse narrowing of about 40% to 50% in its distal  segment. This is not hemodynamically significant.      LEFT VENTRICULAR ANGIOGRAM: Was done in 30' CABELLO projection. It reveals normal  left ventricular chamber size, wall motion and systolic function with an  estimated LV ejection fraction of about 60% to 65%. There is no pericardial or  valvular calcification noted.      CONCLUSION AND COMMENTS: The patient is a 30-year-old  female with  persistent chest pain suspicious for unstable angina and an abnormal Lexiscan  sestamibi study that revealed some anterior wall myocardial ischemia. Her  cardiac catheterization reveals:     1.  Normal left main coronary artery.   2.  Normal left anterior descending and left circumflex systems.   3.  Right coronary artery is dominant for posterior circulation with about 40%  to 50% diffuse narrowing in the distal segment of a medium-size posterior  descending artery branch, which is not hemodynamically significant.   4.  Normal LV  chamber size, wall motion, and systolic function with an  estimated LV ejection fraction of about 60% to 65%.   5.  Elevated left ventricular end-diastolic pressures  which are probably  related to diastolic noncompliance of the left ventricle.      RECOMMENDATIONS:    1.  In view of mild degree of atherosclerotic disease in the distal PDA, with a  normal left circumflex and right coronary artery, would continue to emphasize  on risk factor modification as needed for now and perhaps look at a noncardiac  cause for her symptoms should they persist.   2.  For her diastolic noncompliance of the left ventricle, I would try to  optimize her antihypertensive medications.             D:   VIBHA 09/24/2015 11:59:34  T:   shanae 09/25/2015 08:57:42  JOB ID:[id number]  [id number]  Revision Count:     0    -----------------------------------------------------  CXR/Imaging:   Imaging Results (Most Recent)       None            I personally reviewed and interpreted the CXR.      -----------------------------------------------------  CT:   No radiology results for the last 30 days.  I personally reviewed the images of the CT scan.  My personal interpretation is below.      ----------------------------------------------------      --------------------------------------------------------------------------------------------------    Laboratory evaluations:    Lab Results   Component Value Date    GLUCOSE 204 (H) 11/18/2024    BUN 12 11/18/2024    CREATININE 0.42 (L) 11/18/2024    EGFRIFNONA 101 12/27/2020    EGFRIFAFRI  09/05/2016      Comment:      <15 Indicative of kidney failure.    BCR 28.6 (H) 11/18/2024    K 4.2 11/18/2024    CO2 23.4 11/18/2024    CALCIUM 8.8 11/18/2024    ALBUMIN 4.3 09/26/2024    LABIL2 1.3 (L) 02/05/2016    AST 17 09/26/2024    ALT 15 09/26/2024     Lab Results   Component Value Date    WBC 6.72 09/26/2024    HGB 13.1 09/26/2024    HCT 39.4 09/26/2024    MCV 82.6 09/26/2024     09/26/2024     Lab Results   Component Value Date    CHOL 186 08/17/2024    CHLPL 159 09/22/2015    TRIG 87 08/17/2024    HDL 53 08/17/2024     (H) 08/17/2024     Lab Results    Component Value Date    TSH 0.568 08/16/2024     Lab Results   Component Value Date    HGBA1C 6.60 (H) 08/16/2024     Lab Results   Component Value Date    ALT 15 09/26/2024     Lab Results   Component Value Date    HGBA1C 6.60 (H) 08/16/2024    HGBA1C 5.0 06/05/2024    HGBA1C 5.3 01/31/2024     Lab Results   Component Value Date    CREATININE 0.42 (L) 11/18/2024     Lab Results   Component Value Date    IRON 22 (L) 07/17/2023    TIBC 444 07/17/2023    FERRITIN 8.80 (L) 07/17/2023     Lab Results   Component Value Date    INR 1.01 08/23/2024    INR 0.96 08/17/2024    INR 0.90 08/16/2024    PROTIME 13.4 08/23/2024    PROTIME 12.9 08/17/2024    PROTIME 12.2 08/16/2024        Lab Results   Component Value Date    ABSOLUTELUNG 33 11/18/2024    ABSOLUTELUNG 35 09/12/2024    ABSOLUTELUNG 29 08/26/2024             1. Labile hypertension    2. Chronic heart failure with preserved ejection fraction (HFpEF)            ORDERS PLACED TODAY:  Orders Placed This Encounter   Procedures    ReDs Vest    Basic Metabolic Panel    Magnesium    proBNP        Diagnoses and all orders for this visit:    1. Labile hypertension (Primary)    2. Chronic heart failure with preserved ejection fraction (HFpEF)  -     Basic Metabolic Panel; Standing  -     Basic Metabolic Panel  -     Magnesium; Standing  -     Magnesium  -     proBNP; Standing  -     proBNP  -     ReDs Vest               MEDS ORDERED TODAY:    No orders of the defined types were placed in this encounter.       ---------------------------------------------------------------------------------------------------------------------------          ASSESSMENT/PLAN:      Diagnosis Plan   1. Labile hypertension        2. Chronic heart failure with preserved ejection fraction (HFpEF)  Basic Metabolic Panel    Basic Metabolic Panel    Magnesium    Magnesium    proBNP    proBNP    ReDs Vest            not acutely decompensated chronic diastolic heart failure. CHF.     NYHA stage Stage C:  Structural heart disease is present AND symptoms have occurredFC-Class II: Slight limitation of physical activity. Comfortable at rest Ordinary physical activity results in fatigue, palpitation, dyspnea (shortness of breath).     Today, Patient appears euvolemic.and with  Moderate perfusion. The patient's hemodynamics are currently acceptable. HR is: normal and is at goal. BP/MAP was reviewed and there isroom for medication up-titration.  Clinical trajectory was assessed and haswaxed and waned.     CHF GOAL DIRECTED MEDICAL THERAPY FOR PATIENT ADDRESSED/ADJUSTED:     GDMT: HFpEF    Drug Class   Drug   Dose Last Dose Adjustment Notes   ACEi/ARB/ARNI Enalapril 20 mg     Beta Blocker Lopressor 50 mgBID     MRA Spironolactone 100mg qd     SGLT2i Breastfeeding   N/A   Secondaries if applicable:          -CHF Specific BB:    Metoprolol 50mg BID with patient tolerating wel.   We discussed processes/benefits of HF clinic including nursing, pharmacist, and provider evaluation during each visit with ability for in office ReDS vest, labs, and ability to provide IV diuresis in the clinic with close outpatient monitoring.  Additionally, patient was educated about the availability of delivery of medications to patient's clinic room prior to leaving the building which assists with medication compliance and insures medications are in hands when changes are made (if patient opts for apothecary usage) with thorough guidance regarding changes and medication schedule provided.          -ACE/ARB/ARNi:   Continue Enalapril 20mg qd.      -MRA:   Continue per primary cardiology team.     -SGLT2 inhibitor therapy:   Breastfeeding at present.  When no longer breastfeeding, patient would likely benefit from this addition give underlying DM 2 as well.     -Diuretic regimen:   ReDS Vest reading for. 11/18/24 is 33; ReDs Vest reading reviewed with patient.    Continue PRN Lasix. (Rarely utilizing)  BMP, Mag, & ProBNP reviewed with patient.       -Fluid restriction/Sodium restriction:   Requested 2000 ml restriction  Patient has been asked to weigh daily and was provided with a printed diuretic strategy.  1,500 mg Na restriction was discussed.    -Devices if applicable:       -Acute vs. Chronic underlying conditions other than HF addressed during visit:   Labile HTN:   Continue Nifedipine 90mg qd per her General Cardiologist.    Continue Enalapril.   Hydralazine 25mg TID continued with extra 25mg PRN when SBP>160mmHg.   Home BP log is more controlled than BP this AM in office, so I suspect visit as a whole is contributing to some degree.       ALEJANDRO on CPAP:   Impact of ALEJANDRO on HF discussed at length with patient.   She reports mask for CPAP broke a few nights ago but she is working with her PCP to have new mask arranged.      Identifiable barriers to Heart Failure Self-care:   Medical Barriers:  Breastfeeding currently limiting medication adjustments  Social Barriers:  No immediate known barriers            --------------------------------------------------------------------------------        Class 2 Severe Obesity (BMI >=35 and <=39.9). Obesity-related health conditions include the following: hypertension, coronary heart disease, and diabetes mellitus. Obesity is improving with lifestyle modifications. BMI is is above average; BMI management plan is completed. We discussed  calorie monitoring, though patient is currently breastfeeding .              >45minutes out of 60 minutes face to face spent counseling patient extensively on dietary Na+ intake, importance of activity, weight monitoring, compliance with medications in addition to importance of titration with goal directed medical therapy and follow up appointments.            This document has been electronically signed by Irene Sunshine PA-C  November 18, 2024 12:40 EST      Dictated Utilizing Dragon Dictation: Part of this note may be an electronic transcription/translation of spoken  language to printed text using the Dragon Dictation System.    Follow-up appointment and medication changes provided in hand delivered After Visit Summary as well as reviewed in the room.

## 2024-11-23 RX ORDER — METOPROLOL TARTRATE 50 MG
TABLET ORAL
Qty: 180 TABLET | Refills: 2 | Status: SHIPPED | OUTPATIENT
Start: 2024-11-23

## 2024-12-04 NOTE — PROGRESS NOTES
"This provider is located at the [facility] (through [facility]), [address], [facility], [address] using a secure [employer] Video Visit through [employer]. Patient is being seen remotely via telehealth at their home address in [place], and stated they are in a secure environment for this session. The patient's condition being diagnosed/treated is appropriate for telemedicine. The provider identified herself as well as her credentials.   The patient, and/or patients guardian, consent to be seen remotely, and when consent is given they understand that the consent allows for patient identifiable information to be sent to a third party as needed.   They may refuse to be seen remotely at any time. The electronic data is encrypted and password protected, and the patient and/or guardian has been advised of the potential risks to privacy not withstanding such measures.    Mode of Visit: Video  Location of patient: -OTHER-: in parking lot at [facility]  Location of provider: +[facility]+  You have chosen to receive care through a telehealth visit.  The patient has signed the video visit consent form.  The visit included audio and video interaction. No technical issues occurred during this visit.      Subjective   Antoinette Pack is a 39 y.o. female who presents today for follow up    Chief Complaint:  Bipolar disorder, depression, anxiety    History of Present Illness:    She states she has a 7 month old daughter, she is breastfeeding. She states she is tearful, moods fluctuating and \"is a mess\"  She states she is having visual hallucinations.  She is sleeping, but is up frequently taking care of infant.  Appetite is good  Denies SI/AH, is having visual hallucinations  She would like to start on medication today. She has not been on any psychiatric medications since pregnancy and birth. She states she thought she \"wouldn't need\" medication anymore.    The following portions of " "the patient's history were reviewed and updated as appropriate: allergies, current medications, past family history, past medical history, past social history, past surgical history and problem list.      Past Medical History:  Past Medical History:   Diagnosis Date    Anxiety     CHF (congestive heart failure) 2023    Colitis     Depression     Diabetes mellitus     type 2    Diverticulitis     GERD (gastroesophageal reflux disease)     History of transfusion 2024    2 units post delivery    Hypertension     chronic    Kidney stone     \"years ago\"    Narcolepsy     Pancreatitis 2023    PCOS (polycystic ovarian syndrome)     Rectal bleeding     SINCE  - USES MESALAMINE SUPPOSITORY NIGHTLY    Seizures 2012    Sleep apnea        Social History:  Social History     Socioeconomic History    Marital status:    Tobacco Use    Smoking status: Former     Current packs/day: 0.00     Average packs/day: 1 pack/day for 20.0 years (20.0 ttl pk-yrs)     Types: Cigarettes     Start date: 1995     Quit date:      Years since quittin.9     Passive exposure: Past    Smokeless tobacco: Never    Tobacco comments:     QUIT IN 2017   Vaping Use    Vaping status: Never Used   Substance and Sexual Activity    Alcohol use: No    Drug use: No    Sexual activity: Defer       Family History:  Family History   Problem Relation Age of Onset    Hypertension Mother     Depression Mother     Heart disease Mother     COPD Mother     Emphysema Mother     Hypertension Father     Diabetes Father     Heart disease Father     COPD Father     Heart failure Father     Hepatitis Father        Past Surgical History:  Past Surgical History:   Procedure Laterality Date    CARDIAC CATHETERIZATION      CARPAL TUNNEL RELEASE       SECTION N/A 2024    Procedure:  SECTION PRIMARY;  Surgeon: Axel Keys MD;  Location: [facility] LABOR DELIVERY;  Service: Obstetrics/Gynecology;  Laterality: N/A;    " COLONOSCOPY      COLONOSCOPY N/A 2024    Procedure: COLONOSCOPY;  Surgeon: Tom Mueller MD;  Location:  [facility];  Service: Gastroenterology;  Laterality: N/A;    ENDOSCOPY      ENDOSCOPY N/A 2024    Procedure: ESOPHAGOGASTRODUODENOSCOPY;  Surgeon: Tom Mueller MD;  Location:  [facility];  Service: Gastroenterology;  Laterality: N/A;    FRACTURE SURGERY Left     wrist    HARDWARE REMOVAL Left     WRIST    TENDON REPAIR Left     HAND    WISDOM TOOTH EXTRACTION         Problem List:  Patient Active Problem List   Diagnosis    Simple goiter    History of CHF (congestive heart failure)    Chronic hypertension affecting pregnancy    History of pancreatitis    History of seizure disorder    ALEJANDRO on CPAP    Proteinuria affecting pregnancy, antepartum    Rectal bleeding    Shortness of breath    Antepartum multigravida of advanced maternal age    Pain of pelvic girdle    Elevated blood pressure reading    Pulmonary edema    Essential hypertension    Type 2 diabetes mellitus without complication, with long-term current use of insulin    S/P  section, total salpingectomy left unicornuate uterus    Anemia    Ulcerative colitis with complication    Surgical wound infection    Wound infection    Chronic heart failure with preserved ejection fraction (HFpEF)    Gastroesophageal reflux disease    HLD (hyperlipidemia)    Mixed hyperlipidemia    Psoriasis    Vitamin B12 deficiency    Vitamin D deficiency    Seasonal allergic rhinitis    Primary dysthymia        Allergy:   Allergies   Allergen Reactions    Milk-Related Compounds Unknown - High Severity    Dairycare [Bacid] Unknown - Low Severity     Pt is breastfeeding baby that is allergic to dairy, pt cannot have dairy products while breastfeeding    Dilantin [Phenytoin] Mental Status Change    Keppra [Levetiracetam] Mental Status Change    Meperidine Rash    Topamax [Topiramate] Mental Status Change        Current Medications:   Current  Outpatient Medications   Medication Sig Dispense Refill    ARIPiprazole (Abilify) 2 MG tablet Take 1 tablet at night x 2 weeks, then increase to 2 tablets at night 60 tablet 0    Budesonide (ENTOCORT EC) 3 MG 24 hr capsule Take 3 capsules by mouth Daily. 90 capsule 1    Continuous Glucose Sensor (Dexcom G6 Sensor) Change sensor Every 10 (Ten) Days. 3 each 5    Continuous Glucose Transmitter (Dexcom G6 Transmitter) misc Use 1 each Every 3 (Three) Months. 1 each 1    enalapril (VASOTEC) 20 MG tablet Take 1 tablet by mouth Daily. 90 tablet 3    furosemide (LASIX) 40 MG tablet Take 1 tablet by mouth Daily As Needed (for fluid retention). 90 tablet 0    hydrALAZINE (APRESOLINE) 25 MG tablet Take 1 tablet by mouth 3 (Three) Times a Day. Hold if top number of blood pressure is less than 110. Can take extra hydralazine 25mg as needed for top number greater than 125 tablet 1    Insulin Glargine (Lantus SoloStar) 100 UNIT/ML injection pen Inject 10 Units under the skin into the appropriate area as directed Every Night. 15 mL 1    Insulin Pen Needle (Pen Needles) 32G X 4 MM misc Use 1 each Daily. 100 each 5    mesalamine (ROWASA) 4 g enema Insert 1 enema into the rectum Every Night. 28 enema 0    metoprolol tartrate (LOPRESSOR) 50 MG tablet TAKE 1 TABLET BY MOUTH 2 TIMES A DAY; HOLD IF TOP NUMBER OF BLOOD PRESSURE IS LESS THAN 100 OR IF HEART RATE IS LESS THAN 60 180 tablet 2    NIFEdipine XL (PROCARDIA XL) 90 MG 24 hr tablet Take 1 tablet by mouth Daily. 90 tablet 3    nitroglycerin (NITROSTAT) 0.4 MG SL tablet Place 1 tablet under the tongue Every 5 (Five) Minutes As Needed for Chest Pain (Flash pulmonary edema) for up to 10 doses. Place one tablet under the tongue for chest pain every 5 minutes until chest pain is relieved. If you have to take 3 tablets, take the 3rd and go to the hospital to be evaluated. 10 tablet 0    pantoprazole (PROTONIX) 40 MG EC tablet Take 1 tablet by mouth 2 (Two) Times a Day Before Meals. 120  tablet 0    Prenatal Vit-Fe Fumarate-FA (PRENATAL PO) Take 1 tablet by mouth Daily.      spironolactone (ALDACTONE) 100 MG tablet Take 1 tablet by mouth Daily. 90 tablet 3     No current facility-administered medications for this visit.       Review of Symptoms:    Review of Systems   Constitutional:  Positive for fatigue.   Psychiatric/Behavioral:  Positive for dysphoric mood, hallucinations, sleep disturbance, depressed mood and stress. Negative for agitation, self-injury and suicidal ideas. The patient is nervous/anxious.    All other systems reviewed and are negative.        Physical Exam:   currently breastfeeding.  There is no height or weight on file to calculate BMI.    12/06/24    MENTAL STATUS EXAM   General Appearance:  Cleanly groomed and dressed  Eye Contact:  Good eye contact  Attitude:  Cooperative  Motor Activity:  Normal gait, posture  Speech:  Normal rate, tone, volume  Language:  Spontaneous  Mood and affect:  Anxious, depressed and other  Other Comment:  Tearful at times  Hopelessness:  Denies  Thought Process:  Goal-directed and linear  Associations/ Thought Content:  No delusions  Hallucinations:  Visual  Suicidal Ideations:  Not present  Homicidal Ideation:  Not present  Sensorium:  Alert  Orientation:  Person, place, time and situation  Insight:  Fair  Judgement:  Fair  Reliability:  Fair  Impulse Control:  Fair    PHQ-9 Total Score: 14    Lab Results:   Hospital Outpatient Visit on 11/18/2024   Component Date Value Ref Range Status    Glucose 11/18/2024 204 (H)  65 - 99 mg/dL Final    BUN 11/18/2024 12  6 - 20 mg/dL Final    Creatinine 11/18/2024 0.42 (L)  0.57 - 1.00 mg/dL Final    Sodium 11/18/2024 138  136 - 145 mmol/L Final    Potassium 11/18/2024 4.2  3.5 - 5.2 mmol/L Final    Slight hemolysis detected by analyzer. Result may be falsely elevated.    Chloride 11/18/2024 103  98 - 107 mmol/L Final    CO2 11/18/2024 23.4  22.0 - 29.0 mmol/L Final    Calcium 11/18/2024 8.8  8.6 - 10.5 mg/dL  Final    BUN/Creatinine Ratio 2024 28.6 (H)  7.0 - 25.0 Final    Anion Gap 2024 11.6  5.0 - 15.0 mmol/L Final    eGFR 2024 126.2  >60.0 mL/min/1.73 Final    Magnesium 2024 1.8  1.6 - 2.6 mg/dL Final    proBNP 2024 362.4  0.0 - 450.0 pg/mL Final    Absolute Lung Fluid Content 2024 33  20 - 35 % Final       Assessment & Plan   Problems Addressed this Visit    None  Visit Diagnoses       Bipolar disorder, in partial remission, most recent episode depressed    -  Primary    Relevant Medications    ARIPiprazole (Abilify) 2 MG tablet    Other Relevant Orders    CBC & Differential    Comprehensive Metabolic Panel    TSH    T4, Free    Vitamin B12    Panic disorder with agoraphobia        Relevant Medications    ARIPiprazole (Abilify) 2 MG tablet    Other Relevant Orders    CBC & Differential    Comprehensive Metabolic Panel    TSH    T4, Free    Vitamin B12    Generalized anxiety disorder        Relevant Medications    ARIPiprazole (Abilify) 2 MG tablet    Other Relevant Orders    CBC & Differential    Comprehensive Metabolic Panel    TSH    T4, Free    Vitamin B12    Medication management              Diagnoses         Codes Comments    Bipolar disorder, in partial remission, most recent episode depressed    -  Primary ICD-10-CM: F31.75  ICD-9-CM: 296.55     Panic disorder with agoraphobia     ICD-10-CM: F40.01  ICD-9-CM: 300.21     Generalized anxiety disorder     ICD-10-CM: F41.1  ICD-9-CM: 300.02     Medication management     ICD-10-CM: Z79.899  ICD-9-CM: V58.69           Social History     Tobacco Use   Smoking Status Former    Current packs/day: 0.00    Average packs/day: 1 pack/day for 20.0 years (20.0 ttl pk-yrs)    Types: Cigarettes    Start date: 1995    Quit date: 2015    Years since quittin.9    Passive exposure: Past   Smokeless Tobacco Never   Tobacco Comments    QUIT IN 2017     JORGE reviewed and appropriate. Patient counseled on use of controlled substances.      -The benefits of a healthy diet and exercise were discussed with patient, especially the positive effects they have on mental health. Patient encouraged to consider lifestyle modification regarding  diet and exercise patterns to maximize results of mental health treatment.  -Reviewed previous available documentation  -Reviewed most recent available labs     Lengthy discussion with patient on the possible side effects of antipsychotic medications including increased cholesterol, increased blood sugar, and possibility of weight gain.  Also discussed the need to monitor lab work associated with this.  The risk of muscle movement disorders with this class of medication was also discussed.  -.lactation    Visit Diagnoses:    ICD-10-CM ICD-9-CM   1. Bipolar disorder, in partial remission, most recent episode depressed  F31.75 296.55   2. Panic disorder with agoraphobia  F40.01 300.21   3. Generalized anxiety disorder  F41.1 300.02   4. Medication management  Z79.899 V58.69       TREATMENT PLAN/GOALS: Continue supportive psychotherapy efforts and medications as indicated. Treatment and medication options discussed during today's visit. Patient acknowledged and verbally consented to continue with current treatment plan and was educated on the importance of compliance with treatment and follow-up appointments.    MEDICATION ISSUES:    Discussed medication options and treatment plan of prescribed medication as well as the risks, benefits, and side effects including potential falls, possible impaired driving and metabolic adversities among others. Patient is agreeable to call the office with any worsening of symptoms or onset of side effects. Patient is agreeable to call 911 or go to the nearest ER should he/she begin having SI/HI.     MEDS ORDERED DURING VISIT:  New Medications Ordered This Visit   Medications    ARIPiprazole (Abilify) 2 MG tablet     Sig: Take 1 tablet at night x 2 weeks, then increase to 2 tablets at  night     Dispense:  60 tablet     Refill:  0     -She is breastfeeding  -CBC, CMP, TSH, T4 and vitamin B12  -Start aripiprazole 2 mg tablet take 1 tablet at night for 2 weeks then increase to 2 tablets at night for mood and hallucinations    Return in about 1 month (around 1/6/2025).    I spent 30 minutes caring for Antoinette on this date of service. This time includes time spent by me in the following activities: preparing for the visit, performing a medically appropriate examination and/or evaluation, counseling and educating the patient/family/caregiver, documenting information in the medical record, and ordering medications                 This document has been electronically signed by CICI Lopez  December 6, 2024 08:41 EST    Part of this note may be an electronic transcription/translation of spoken language to printed text using the Dragon Dictation System.

## 2024-12-06 ENCOUNTER — SPECIALTY PHARMACY (OUTPATIENT)
Dept: PHARMACY | Facility: HOSPITAL | Age: 39
End: 2024-12-06
Payer: MEDICARE

## 2024-12-06 ENCOUNTER — TELEMEDICINE (OUTPATIENT)
Dept: PSYCHIATRY | Facility: CLINIC | Age: 39
End: 2024-12-06
Payer: MEDICARE

## 2024-12-06 DIAGNOSIS — Z79.899 MEDICATION MANAGEMENT: ICD-10-CM

## 2024-12-06 DIAGNOSIS — F41.1 GENERALIZED ANXIETY DISORDER: ICD-10-CM

## 2024-12-06 DIAGNOSIS — F31.75 BIPOLAR DISORDER, IN PARTIAL REMISSION, MOST RECENT EPISODE DEPRESSED: Primary | ICD-10-CM

## 2024-12-06 DIAGNOSIS — F40.01 PANIC DISORDER WITH AGORAPHOBIA: ICD-10-CM

## 2024-12-06 RX ORDER — ARIPIPRAZOLE 2 MG/1
TABLET ORAL
Qty: 60 TABLET | Refills: 0 | Status: SHIPPED | OUTPATIENT
Start: 2024-12-06

## 2024-12-06 NOTE — PROGRESS NOTES
Specialty Pharmacy Refill Coordination Note     Antoinette is a 39 y.o. female contacted today regarding refills of  Dexcom sensors and transmitter specialty medication(s).    Reviewed and verified with patient:       Specialty medication(s) and dose(s) confirmed: yes    Refill Questions      Flowsheet Row Most Recent Value   Changes to allergies? No   Changes to medications? No   New conditions or infections since last clinic visit No   Unplanned office visit, urgent care, ED, or hospital admission in the last 4 weeks  No   How does patient/caregiver feel medication is working? Very good   Financial problems or insurance changes  No   If yes, describe changes in insurance or financial issues. na   Since the previous refill, were any specialty medication doses or scheduled injections missed or delayed?  No   If yes, please provide the amount na   Why were doses missed? na   Does this patient require a clinical escalation to a pharmacist? No            Delivery Questions      Flowsheet Row Most Recent Value   Delivery method [employer]   Delivery address verified with patient/caregiver? Yes   Delivery address Home   Number of medications in delivery 2   Medication(s) being filled and delivered Continuous Glucose Sensor (Dexcom G6 Sensor), Continuous Glucose Transmitter (Dexcom G6 Transmitter)   Doses left of specialty medications na   Copay verified? No   Copay amount 0$   Copay form of payment No copayment ($0)   Ship Date 12/09   Delivery Date 12/10   Signature Required No                   Follow-up: 30 day(s)     Carito Mills, Pharmacy Technician  Specialty Pharmacy Technician

## 2024-12-18 ENCOUNTER — OFFICE VISIT (OUTPATIENT)
Dept: ENDOCRINOLOGY | Facility: CLINIC | Age: 39
End: 2024-12-18
Payer: MEDICARE

## 2024-12-18 ENCOUNTER — SPECIALTY PHARMACY (OUTPATIENT)
Dept: PHARMACY | Facility: HOSPITAL | Age: 39
End: 2024-12-18
Payer: MEDICARE

## 2024-12-18 VITALS
OXYGEN SATURATION: 96 % | WEIGHT: 198.6 LBS | SYSTOLIC BLOOD PRESSURE: 165 MMHG | BODY MASS INDEX: 40.11 KG/M2 | DIASTOLIC BLOOD PRESSURE: 92 MMHG | HEART RATE: 90 BPM

## 2024-12-18 DIAGNOSIS — Z79.4 TYPE 2 DIABETES MELLITUS WITHOUT COMPLICATION, WITH LONG-TERM CURRENT USE OF INSULIN: ICD-10-CM

## 2024-12-18 DIAGNOSIS — Z79.4 TYPE 2 DIABETES MELLITUS WITH HYPERGLYCEMIA, WITH LONG-TERM CURRENT USE OF INSULIN: ICD-10-CM

## 2024-12-18 DIAGNOSIS — E11.9 TYPE 2 DIABETES MELLITUS WITHOUT COMPLICATION, WITH LONG-TERM CURRENT USE OF INSULIN: Primary | ICD-10-CM

## 2024-12-18 DIAGNOSIS — E11.65 TYPE 2 DIABETES MELLITUS WITH HYPERGLYCEMIA, WITH LONG-TERM CURRENT USE OF INSULIN: ICD-10-CM

## 2024-12-18 DIAGNOSIS — E11.9 TYPE 2 DIABETES MELLITUS WITHOUT COMPLICATION, WITH LONG-TERM CURRENT USE OF INSULIN: ICD-10-CM

## 2024-12-18 DIAGNOSIS — Z79.4 TYPE 2 DIABETES MELLITUS WITHOUT COMPLICATION, WITH LONG-TERM CURRENT USE OF INSULIN: Primary | ICD-10-CM

## 2024-12-18 LAB
EXPIRATION DATE: ABNORMAL
HBA1C MFR BLD: 5.9 % (ref 4.5–5.7)
Lab: ABNORMAL

## 2024-12-18 PROCEDURE — 80061 LIPID PANEL: CPT | Performed by: NURSE PRACTITIONER

## 2024-12-18 PROCEDURE — 84443 ASSAY THYROID STIM HORMONE: CPT | Performed by: NURSE PRACTITIONER

## 2024-12-18 PROCEDURE — 84439 ASSAY OF FREE THYROXINE: CPT | Performed by: NURSE PRACTITIONER

## 2024-12-18 PROCEDURE — 80053 COMPREHEN METABOLIC PANEL: CPT | Performed by: NURSE PRACTITIONER

## 2024-12-18 RX ORDER — PROCHLORPERAZINE 25 MG/1
1 SUPPOSITORY RECTAL
Qty: 1 EACH | Refills: 1 | Status: SHIPPED | OUTPATIENT
Start: 2024-12-18

## 2024-12-18 RX ORDER — INSULIN GLARGINE 100 [IU]/ML
15 INJECTION, SOLUTION SUBCUTANEOUS NIGHTLY
Qty: 15 ML | Refills: 1 | Status: SHIPPED | OUTPATIENT
Start: 2024-12-18

## 2024-12-18 RX ORDER — PROCHLORPERAZINE 25 MG/1
SUPPOSITORY RECTAL
Qty: 3 EACH | Refills: 5 | Status: SHIPPED | OUTPATIENT
Start: 2024-12-18

## 2024-12-18 NOTE — PROGRESS NOTES
"Chief Complaint   Patient presents with    Follow-up     T2DM        Antoinette Pack is a 39 y.o. female had concerns including Follow-up (T2DM).   T2DM.    She has been doing well.  She is feeling some better.  She does report that her blood pressure is slightly elevated but it has been running more normal at home.  She has been taking her meds without missing doses.  She reports that she has noted that her blood sugars are starting to creep up more.    Birth state: [place]  Previous history of radiation to face/neck: none  Consuming foods high in iodine: no  Family history of thyroid complications: none    The following portions of the patient's history were reviewed and updated as appropriate: allergies, current medications, past family history, past medical history, past social history, past surgical history and problem list.    History:  Diabetes was diagnosed 8-9 years ago.  Complications include none.  Last ophtho exam was , Dr. Bruno Office.  Current medications for diabetes include uses Lantus 10 units QHS.  Past meds: Trulicity, GI Issues, has problems with yeast, Glipizide-unsure why it was stopped  She checks her BG's with Dexcom CGM.  Hypos: rarely  Fasting range: 150-200+    Diet: has made improvements to her diet.    Past Medical History:   Diagnosis Date    Anxiety     CHF (congestive heart failure) 2023    Colitis     Depression     Diabetes mellitus 2018    type 2    Diverticulitis     GERD (gastroesophageal reflux disease)     History of transfusion 2024    2 units post delivery    Hypertension     chronic    Kidney stone     \"years ago\"    Narcolepsy     Pancreatitis 2023    PCOS (polycystic ovarian syndrome)     Rectal bleeding     SINCE  - USES MESALAMINE SUPPOSITORY NIGHTLY    Seizures 2012    Sleep apnea      Past Surgical History:   Procedure Laterality Date    CARDIAC CATHETERIZATION      CARPAL TUNNEL RELEASE       SECTION N/A 2024    " Procedure:  SECTION PRIMARY;  Surgeon: Axel Keys MD;  Location: [facility] LABOR DELIVERY;  Service: Obstetrics/Gynecology;  Laterality: N/A;    COLONOSCOPY      COLONOSCOPY N/A 2024    Procedure: COLONOSCOPY;  Surgeon: Tom Mueller MD;  Location: [facility] ENDOSCOPY;  Service: Gastroenterology;  Laterality: N/A;    ENDOSCOPY      ENDOSCOPY N/A 2024    Procedure: ESOPHAGOGASTRODUODENOSCOPY;  Surgeon: Tom Mueller MD;  Location:  [facility];  Service: Gastroenterology;  Laterality: N/A;    FRACTURE SURGERY Left     wrist    HARDWARE REMOVAL Left     WRIST    TENDON REPAIR Left     HAND    WISDOM TOOTH EXTRACTION        Family History   Problem Relation Age of Onset    Hypertension Mother     Depression Mother     Heart disease Mother     COPD Mother     Emphysema Mother     Hypertension Father     Diabetes Father     Heart disease Father     COPD Father     Heart failure Father     Hepatitis Father       Social History     Socioeconomic History    Marital status:    Tobacco Use    Smoking status: Former     Current packs/day: 0.00     Average packs/day: 1 pack/day for 20.0 years (20.0 ttl pk-yrs)     Types: Cigarettes     Start date: 1995     Quit date:      Years since quittin.9     Passive exposure: Past    Smokeless tobacco: Never    Tobacco comments:     QUIT IN 2017   Vaping Use    Vaping status: Never Used   Substance and Sexual Activity    Alcohol use: No    Drug use: No    Sexual activity: Defer      Allergies   Allergen Reactions    Milk-Related Compounds Unknown - High Severity    Dairycare [Bacid] Unknown - Low Severity     Pt is breastfeeding baby that is allergic to dairy, pt cannot have dairy products while breastfeeding    Dilantin [Phenytoin] Mental Status Change    Keppra [Levetiracetam] Mental Status Change    Meperidine Rash    Topamax [Topiramate] Mental Status Change      Current Outpatient Medications on File Prior to Visit   Medication Sig  Dispense Refill    ARIPiprazole (Abilify) 2 MG tablet Take 1 tablet at night x 2 weeks, then increase to 2 tablets at night 60 tablet 0    Budesonide (ENTOCORT EC) 3 MG 24 hr capsule Take 3 capsules by mouth Daily. 90 capsule 1    Continuous Glucose Sensor (Dexcom G6 Sensor) Change sensor Every 10 (Ten) Days. 3 each 5    Continuous Glucose Transmitter (Dexcom G6 Transmitter) misc Use 1 each Every 3 (Three) Months. 1 each 1    enalapril (VASOTEC) 20 MG tablet Take 1 tablet by mouth Daily. 90 tablet 3    hydrALAZINE (APRESOLINE) 25 MG tablet Take 1 tablet by mouth 3 (Three) Times a Day. Hold if top number of blood pressure is less than 110. Can take extra hydralazine 25mg as needed for top number greater than 125 tablet 1    Insulin Glargine (Lantus SoloStar) 100 UNIT/ML injection pen Inject 10 Units under the skin into the appropriate area as directed Every Night. 15 mL 1    Insulin Pen Needle (Pen Needles) 32G X 4 MM misc Use 1 each Daily. 100 each 5    mesalamine (ROWASA) 4 g enema Insert 1 enema into the rectum Every Night. 28 enema 0    metoprolol tartrate (LOPRESSOR) 50 MG tablet TAKE 1 TABLET BY MOUTH 2 TIMES A DAY; HOLD IF TOP NUMBER OF BLOOD PRESSURE IS LESS THAN 100 OR IF HEART RATE IS LESS THAN 60 180 tablet 2    NIFEdipine XL (PROCARDIA XL) 90 MG 24 hr tablet Take 1 tablet by mouth Daily. 90 tablet 3    nitroglycerin (NITROSTAT) 0.4 MG SL tablet Place 1 tablet under the tongue Every 5 (Five) Minutes As Needed for Chest Pain (Flash pulmonary edema) for up to 10 doses. Place one tablet under the tongue for chest pain every 5 minutes until chest pain is relieved. If you have to take 3 tablets, take the 3rd and go to the hospital to be evaluated. 10 tablet 0    pantoprazole (PROTONIX) 40 MG EC tablet Take 1 tablet by mouth 2 (Two) Times a Day Before Meals. 120 tablet 0    Prenatal Vit-Fe Fumarate-FA (PRENATAL PO) Take 1 tablet by mouth Daily.      spironolactone (ALDACTONE) 100 MG tablet Take 1 tablet by mouth  Daily. 90 tablet 3    furosemide (LASIX) 40 MG tablet Take 1 tablet by mouth Daily As Needed (for fluid retention). 90 tablet 0     No current facility-administered medications on file prior to visit.      Review of Systems   Constitutional:  Positive for fatigue. Negative for diaphoresis, unexpected weight gain and unexpected weight loss.   HENT:          Neck tenderness   Eyes:  Negative for blurred vision and visual disturbance.   Cardiovascular:  Negative for palpitations.   Gastrointestinal:  Positive for constipation.   Endocrine: Negative for cold intolerance, heat intolerance, polydipsia, polyphagia and polyuria.   Skin:  Positive for dry skin.        Hair thinning   Neurological:  Negative for tremors.   Psychiatric/Behavioral:  Positive for sleep disturbance. Negative for agitation. The patient is not nervous/anxious.    All other systems reviewed and are negative.     /92 (BP Location: Left arm, Patient Position: Sitting, Cuff Size: Adult)   Pulse 90   Wt 90.1 kg (198 lb 9.6 oz)   SpO2 96%   BMI 40.11 kg/m²      Physical Exam  Vitals reviewed.   Constitutional:       Appearance: Normal appearance.   Eyes:      Extraocular Movements: Extraocular movements intact.   Cardiovascular:      Rate and Rhythm: Normal rate.   Pulmonary:      Effort: Pulmonary effort is normal.   Skin:     General: Skin is warm.   Neurological:      General: No focal deficit present.      Mental Status: She is alert and oriented to person, place, and time.   Psychiatric:         Mood and Affect: Mood normal.         Behavior: Behavior normal.         Thought Content: Thought content normal.         Judgment: Judgment normal.        Labs/Imaging  CMP  Lab Results   Component Value Date    GLUCOSE 204 (H) 11/18/2024    BUN 12 11/18/2024    CREATININE 0.42 (L) 11/18/2024    EGFRIFNONA 101 12/27/2020    EGFRIFAFRI  09/05/2016      Comment:      <15 Indicative of kidney failure.    BCR 28.6 (H) 11/18/2024    K 4.2 11/18/2024     "CO2 23.4 11/18/2024    CALCIUM 8.8 11/18/2024    ALBUMIN 4.3 09/26/2024    LABIL2 1.3 (L) 02/05/2016    AST 17 09/26/2024    ALT 15 09/26/2024        CBC w/DIFF   Lab Results   Component Value Date    WBC 6.72 09/26/2024    RBC 4.77 09/26/2024    HGB 13.1 09/26/2024    HCT 39.4 09/26/2024    MCV 82.6 09/26/2024    MCH 27.5 09/26/2024    MCHC 33.2 09/26/2024    RDW 14.9 09/26/2024    RDWSD 44.9 09/26/2024    MPV 11.3 09/26/2024     09/26/2024    NEUTRORELPCT 59.7 09/26/2024    LYMPHORELPCT 31.5 09/26/2024    MONORELPCT 6.0 09/26/2024    EOSRELPCT 2.2 09/26/2024    BASORELPCT 0.3 09/26/2024    AUTOIGPER 0.3 09/26/2024    NEUTROABS 4.01 09/26/2024    LYMPHSABS 2.12 09/26/2024    MONOSABS 0.40 09/26/2024    EOSABS 0.15 09/26/2024    BASOSABS 0.02 09/26/2024    AUTOIGNUM 0.02 09/26/2024    NRBC 0.0 09/26/2024       TSH  Lab Results   Component Value Date    TSH 0.568 08/16/2024    TSH 0.346 04/19/2024    TSH 0.022 (L) 12/08/2023       T4  Lab Results   Component Value Date    FREET4 1.08 08/16/2024    FREET4 1.03 12/08/2023    FREET4 0.94 05/03/2023     No results found for: \"Q8DGXSD\"    T3  Lab Results   Component Value Date    T3FREE 3.43 02/17/2022     No results found for: \"C1WFDEN\"  Lab Results   Component Value Date    HGBA1C 5.9 (A) 12/18/2024     TRAb  No results found for: \"TSURCPAB\"    TPO  No results found for: \"THYROIDAB\"    No valid procedures specified.    Assessment and Plan    Diagnoses and all orders for this visit:    1. Type 2 diabetes mellitus without complication, with long-term current use of insulin (Primary)  Assessment & Plan:  -Diabetes is improving with A1c 5.9.  -Discussed dietary and exercise guidelines with patient.  -Discussed the importance of yearly eye exams.  -Discussed the importance of checking BG's regularly.  Continue using CGM.  2 week data download is as follows:  Very High: 1%  High: 29%  In Range: 70%  Low: 0%  Very Low: 0%  Trend shows some mild overall " hypers.  -Increase Lantus 15 units QHS.  -S/S hypoglycemia reviewed with Rule of 15's advised.  -Follow-up in 3 months.    Orders:  -     POC Glycosylated Hemoglobin (Hb A1C)  -     Comprehensive Metabolic Panel  -     Lipid Panel  -     TSH  -     T4, free          Return in about 3 months (around 3/18/2025) for Follow-up appointment, A1C. The patient was instructed to contact the clinic with any interval questions or concerns.    This document has been electronically signed by CICI Veliz  December 18, 2024 11:34 EST  Endocrinology    Please note that portions of this document were completed with a voice recognition program. Efforts were made to edit the dictations, but occasionally words are mis-transcribed.

## 2024-12-18 NOTE — PROGRESS NOTES
"   Specialty Pharmacy Patient Management Program  Endocrinology Reassessment     Antoinette Pack is a 39 y.o. female with Type 2 Diabetes enrolled in the Endocrinology Patient Management program offered by [facility]. A follow-up was conducted, including assessment of continued therapy appropriateness, medication adherence, and side effect incidence and management for Insulin therapy and CGM.    Patient is currently taking Lantus 10 units QHS to control BG. She monitors her BG with Dexcom G6 and reports that readings are never above 300. She denies low BG <70.     Patient recently had a baby and is currently breastfeeding.       In the past, the patient has tried:                Drug Dose Reason for Discontinuation Notes   Trulicity   GI upset     Glipizide    Unsure                            Changes to Insurance Coverage or Financial Support  None    Relevant Past Medical History and Comorbidities  Relevant medical history and concomitant health conditions were discussed with the patient. The patient's chart has been reviewed for relevant past medical history and comorbid health conditions and updated as necessary.   Past Medical History:   Diagnosis Date    Anxiety     CHF (congestive heart failure) 05/2023    Colitis 2023    Depression     Diabetes mellitus 2018    type 2    Diverticulitis     GERD (gastroesophageal reflux disease)     History of transfusion 04/19/2024    2 units post delivery    Hypertension     chronic    Kidney stone     \"years ago\"    Narcolepsy 2022    Pancreatitis 09/2023    PCOS (polycystic ovarian syndrome)     Rectal bleeding     SINCE 2023 - USES MESALAMINE SUPPOSITORY NIGHTLY    Seizures 2012    Sleep apnea      Social History     Socioeconomic History    Marital status:    Tobacco Use    Smoking status: Former     Current packs/day: 0.00     Average packs/day: 1 pack/day for 20.0 years (20.0 ttl pk-yrs)     Types: Cigarettes     Start date: " 1995     Quit date:      Years since quittin.9     Passive exposure: Past    Smokeless tobacco: Never    Tobacco comments:     QUIT IN 2017   Vaping Use    Vaping status: Never Used   Substance and Sexual Activity    Alcohol use: No    Drug use: No    Sexual activity: Defer       Problem list reviewed by Sravani Perez, PharmKENNA on 2024 at 11:07 AM    Allergies  Known allergies and reactions were discussed with the patient. The patient's chart has been reviewed for allergy information and updated as necessary.   Milk-related compounds, Dairycare [bacid], Dilantin [phenytoin], Keppra [levetiracetam], Meperidine, and Topamax [topiramate]    Allergies reviewed by Sravani Perez PharmD on 2024 at 11:07 AM    Relevant Laboratory Values  A1C Last 3 Results          2024    11:45 2024    21:45 2024    11:24   HGBA1C Last 3 Results   Hemoglobin A1C 5.0  6.60  5.9      Lab Results   Component Value Date    HGBA1C 5.9 (A) 2024     Lab Results   Component Value Date    GLUCOSE 204 (H) 2024    CALCIUM 8.8 2024     2024    K 4.2 2024    CO2 23.4 2024     2024    BUN 12 2024    CREATININE 0.42 (L) 2024    EGFRIFAFRI  2016      Comment:      <15 Indicative of kidney failure.    EGFRIFNONA 101 2020    BCR 28.6 (H) 2024    ANIONGAP 11.6 2024     Lab Results   Component Value Date    CHOL 186 2024    CHLPL 159 2015    TRIG 87 2024    HDL 53 2024     (H) 2024       Current Medication List  This medication list has been reviewed with the patient and evaluated for any interactions or necessary modifications/recommendations, and updated to include all prescription medications, OTC medications, and supplements the patient is currently taking.  This list reflects what is contained in the patient's profile, which has also been marked as reviewed to communicate to other providers  it is the most up to date version of the patient's current medication therapy.     Current Outpatient Medications:     ARIPiprazole (Abilify) 2 MG tablet, Take 1 tablet at night x 2 weeks, then increase to 2 tablets at night, Disp: 60 tablet, Rfl: 0    Budesonide (ENTOCORT EC) 3 MG 24 hr capsule, Take 3 capsules by mouth Daily., Disp: 90 capsule, Rfl: 1    Continuous Glucose Sensor (Dexcom G6 Sensor), Change sensor Every 10 (Ten) Days., Disp: 3 each, Rfl: 5    Continuous Glucose Transmitter (Dexcom G6 Transmitter) misc, Use 1 each Every 3 (Three) Months., Disp: 1 each, Rfl: 1    enalapril (VASOTEC) 20 MG tablet, Take 1 tablet by mouth Daily., Disp: 90 tablet, Rfl: 3    furosemide (LASIX) 40 MG tablet, Take 1 tablet by mouth Daily As Needed (for fluid retention)., Disp: 90 tablet, Rfl: 0    hydrALAZINE (APRESOLINE) 25 MG tablet, Take 1 tablet by mouth 3 (Three) Times a Day. Hold if top number of blood pressure is less than 110. Can take extra hydralazine 25mg as needed for top number greater than, Disp: 125 tablet, Rfl: 1    Insulin Glargine (Lantus SoloStar) 100 UNIT/ML injection pen, Inject 10 Units under the skin into the appropriate area as directed Every Night., Disp: 15 mL, Rfl: 1    Insulin Pen Needle (Pen Needles) 32G X 4 MM misc, Use 1 each Daily., Disp: 100 each, Rfl: 5    mesalamine (ROWASA) 4 g enema, Insert 1 enema into the rectum Every Night., Disp: 28 enema, Rfl: 0    metoprolol tartrate (LOPRESSOR) 50 MG tablet, TAKE 1 TABLET BY MOUTH 2 TIMES A DAY; HOLD IF TOP NUMBER OF BLOOD PRESSURE IS LESS THAN 100 OR IF HEART RATE IS LESS THAN 60, Disp: 180 tablet, Rfl: 2    NIFEdipine XL (PROCARDIA XL) 90 MG 24 hr tablet, Take 1 tablet by mouth Daily., Disp: 90 tablet, Rfl: 3    nitroglycerin (NITROSTAT) 0.4 MG SL tablet, Place 1 tablet under the tongue Every 5 (Five) Minutes As Needed for Chest Pain (Flash pulmonary edema) for up to 10 doses. Place one tablet under the tongue for chest pain every 5 minutes  until chest pain is relieved. If you have to take 3 tablets, take the 3rd and go to the hospital to be evaluated., Disp: 10 tablet, Rfl: 0    pantoprazole (PROTONIX) 40 MG EC tablet, Take 1 tablet by mouth 2 (Two) Times a Day Before Meals., Disp: 120 tablet, Rfl: 0    Prenatal Vit-Fe Fumarate-FA (PRENATAL PO), Take 1 tablet by mouth Daily., Disp: , Rfl:     spironolactone (ALDACTONE) 100 MG tablet, Take 1 tablet by mouth Daily., Disp: 90 tablet, Rfl: 3    Medicines reviewed by Sravani Perez, PharmD on 12/18/2024 at 11:08 AM    Drug Interactions  No significant drug-drug interactions with diabetes medications expected according to literature.   Furosemide is associated with hyperglycemia and therefore may diminish the therapeutic effect of antidiabetic agents.   Beta blockers and ACEis may enhance the hypoglycemic effect of antidiabetic agents.   Other interactions:  Patient is currently breastfeeding. According to LactMed, all flagged medications were acceptable for breastfeeding except Furosemide and Nitroglycerin.   HCTZ 50 mg/day or less suggested as acceptable alternative to Furosemide.   Nitroglycerin has not been studied in lactation, therefore no direct recommendation available.     Adverse Drug Reactions  Adverse Reactions Experienced: None  Plan for ADR Management: None required    Hospitalizations and Urgent Care Since Last Assessment  Hospitalizations or Admissions: None  ED Visits: None  Urgent Office Visits: None    Adherence and Self-Administration  Adherence related patient's specialty therapy was discussed with the patient. The Adherence segment of this outreach has been reviewed and updated.   Ongoing or New Barriers to Patient Adherence and/or Self-Administration: None   Methods for Supporting Patient Adherence and/or Self-Administration: None Required    Goals of Therapy  Goals related to the patient's specialty therapy was discussed with the patient. The Patient Goals segment of this outreach has  been reviewed and updated.    Goals Addressed Today        Consistently take medications as prescribed      HEMOGLOBIN A1C < 7                Specialty Pharmacy General Goal      Prevent hypoglycemia            Reassessment Plan & Follow-Up  Patient's diabetes continues to improve with A1C down to 5.9%.   Provider Medication Therapy Changes: Per Vonnie Key, APRN will order  Increase to Lantus 15 units nightly   Dexcom G6 supplies  Related Plans, Therapy Recommendations, or Issues to Be Addressed:   Updated [facility].   Patient is currently breastfeeding. According to LactMed, all flagged medications were acceptable for breastfeeding except Furosemide and Nitroglycerin. Recommendations for changes to therapy have been discussed with HF clinic team with no changes to date.   HCTZ 50 mg/day or less suggested as acceptable alternative to Furosemide.   Nitroglycerin has not been studied in lactation, therefore no direct recommendation available. (Pt only uses PRN)     Attestation  I attest the patient was actively involved in and has agreed to the above plan of care. If the prescribed therapy is at any point deemed not appropriate based on the current or future assessments, a consultation will be initiated with the patient's specialty care provider to determine the best course of action. The revised plan of therapy will be documented along with any required assessments and/or additional patient education provided.    Sravani Perez, PharmD, [facility]  Clinical Specialty Pharmacist, Endocrinology   12/18/2024  14:51 EST

## 2024-12-19 LAB
ALBUMIN SERPL-MCNC: 4.3 G/DL (ref 3.5–5.2)
ALBUMIN/GLOB SERPL: 1.2 G/DL
ALP SERPL-CCNC: 90 U/L (ref 39–117)
ALT SERPL W P-5'-P-CCNC: 12 U/L (ref 1–33)
ANION GAP SERPL CALCULATED.3IONS-SCNC: 9 MMOL/L (ref 5–15)
AST SERPL-CCNC: 16 U/L (ref 1–32)
BILIRUB SERPL-MCNC: 0.8 MG/DL (ref 0–1.2)
BUN SERPL-MCNC: 14 MG/DL (ref 6–20)
BUN/CREAT SERPL: 25 (ref 7–25)
CALCIUM SPEC-SCNC: 9.1 MG/DL (ref 8.6–10.5)
CHLORIDE SERPL-SCNC: 104 MMOL/L (ref 98–107)
CHOLEST SERPL-MCNC: 177 MG/DL (ref 0–200)
CO2 SERPL-SCNC: 26 MMOL/L (ref 22–29)
CREAT SERPL-MCNC: 0.56 MG/DL (ref 0.57–1)
EGFRCR SERPLBLD CKD-EPI 2021: 117.8 ML/MIN/1.73
GLOBULIN UR ELPH-MCNC: 3.6 GM/DL
GLUCOSE SERPL-MCNC: 83 MG/DL (ref 65–99)
HDLC SERPL-MCNC: 48 MG/DL (ref 40–60)
LDLC SERPL CALC-MCNC: 113 MG/DL (ref 0–100)
LDLC/HDLC SERPL: 2.33 {RATIO}
POTASSIUM SERPL-SCNC: 3.9 MMOL/L (ref 3.5–5.2)
PROT SERPL-MCNC: 7.9 G/DL (ref 6–8.5)
SODIUM SERPL-SCNC: 139 MMOL/L (ref 136–145)
T4 FREE SERPL-MCNC: 1.2 NG/DL (ref 0.92–1.68)
TRIGL SERPL-MCNC: 86 MG/DL (ref 0–150)
TSH SERPL DL<=0.05 MIU/L-ACNC: 0.38 UIU/ML (ref 0.27–4.2)
VLDLC SERPL-MCNC: 16 MG/DL (ref 5–40)

## 2024-12-20 RX ORDER — ATORVASTATIN CALCIUM 10 MG/1
10 TABLET, FILM COATED ORAL DAILY
Qty: 30 TABLET | Refills: 11 | Status: SHIPPED | OUTPATIENT
Start: 2024-12-20 | End: 2025-12-20

## 2025-01-02 ENCOUNTER — HOSPITAL ENCOUNTER (INPATIENT)
Facility: HOSPITAL | Age: 40
LOS: 1 days | Discharge: HOME | DRG: 280 | End: 2025-01-05
Attending: STUDENT IN AN ORGANIZED HEALTH CARE EDUCATION/TRAINING PROGRAM | Admitting: INTERNAL MEDICINE
Payer: MEDICARE

## 2025-01-02 ENCOUNTER — APPOINTMENT (OUTPATIENT)
Dept: CT IMAGING | Facility: HOSPITAL | Age: 40
DRG: 280 | End: 2025-01-02
Payer: MEDICARE

## 2025-01-02 ENCOUNTER — APPOINTMENT (OUTPATIENT)
Dept: GENERAL RADIOLOGY | Facility: HOSPITAL | Age: 40
DRG: 280 | End: 2025-01-02
Payer: MEDICARE

## 2025-01-02 DIAGNOSIS — I16.9 HYPERTENSIVE CRISIS: ICD-10-CM

## 2025-01-02 DIAGNOSIS — J96.01 ACUTE HYPOXIC RESPIRATORY FAILURE: Primary | ICD-10-CM

## 2025-01-02 DIAGNOSIS — E87.6 HYPOKALEMIA: ICD-10-CM

## 2025-01-02 DIAGNOSIS — I50.9 ACUTE EXACERBATION OF CHRONIC HEART FAILURE: ICD-10-CM

## 2025-01-02 PROBLEM — I50.33 CHF (CONGESTIVE HEART FAILURE), NYHA CLASS II, ACUTE ON CHRONIC, DIASTOLIC: Status: ACTIVE | Noted: 2025-01-02

## 2025-01-02 LAB
A-A DO2: 129.5 MMHG (ref 0–300)
ALBUMIN SERPL-MCNC: 3.9 G/DL (ref 3.5–5.2)
ALBUMIN/GLOB SERPL: 1.3 G/DL
ALP SERPL-CCNC: 101 U/L (ref 39–117)
ALT SERPL W P-5'-P-CCNC: 15 U/L (ref 1–33)
ANION GAP SERPL CALCULATED.3IONS-SCNC: 14.9 MMOL/L (ref 5–15)
ARTERIAL PATENCY WRIST A: POSITIVE
AST SERPL-CCNC: 14 U/L (ref 1–32)
ATMOSPHERIC PRESS: 733 MMHG
B PARAPERT DNA SPEC QL NAA+PROBE: NOT DETECTED
B PERT DNA SPEC QL NAA+PROBE: NOT DETECTED
BASE EXCESS BLDA CALC-SCNC: -1.3 MMOL/L (ref 0–2)
BASOPHILS # BLD AUTO: 0.03 10*3/MM3 (ref 0–0.2)
BASOPHILS NFR BLD AUTO: 0.4 % (ref 0–1.5)
BDY SITE: ABNORMAL
BILIRUB SERPL-MCNC: 0.5 MG/DL (ref 0–1.2)
BUN SERPL-MCNC: 13 MG/DL (ref 6–20)
BUN/CREAT SERPL: 22.8 (ref 7–25)
C PNEUM DNA NPH QL NAA+NON-PROBE: NOT DETECTED
CALCIUM SPEC-SCNC: 8.8 MG/DL (ref 8.6–10.5)
CHLORIDE SERPL-SCNC: 101 MMOL/L (ref 98–107)
CO2 BLDA-SCNC: 24.6 MMOL/L (ref 22–33)
CO2 SERPL-SCNC: 21.1 MMOL/L (ref 22–29)
COHGB MFR BLD: 1.4 % (ref 0–5)
CREAT SERPL-MCNC: 0.57 MG/DL (ref 0.57–1)
D DIMER PPP FEU-MCNC: 0.44 MCGFEU/ML (ref 0–0.5)
D-LACTATE SERPL-SCNC: 1.8 MMOL/L (ref 0.5–2)
DEPRECATED RDW RBC AUTO: 37.7 FL (ref 37–54)
EGFRCR SERPLBLD CKD-EPI 2021: 117.3 ML/MIN/1.73
EOSINOPHIL # BLD AUTO: 0.2 10*3/MM3 (ref 0–0.4)
EOSINOPHIL NFR BLD AUTO: 2.6 % (ref 0.3–6.2)
ERYTHROCYTE [DISTWIDTH] IN BLOOD BY AUTOMATED COUNT: 12.6 % (ref 12.3–15.4)
FLUAV SUBTYP SPEC NAA+PROBE: NOT DETECTED
FLUBV RNA ISLT QL NAA+PROBE: NOT DETECTED
GAS FLOW AIRWAY: 4 LPM
GEN 5 1HR TROPONIN T REFLEX: 20 NG/L
GLOBULIN UR ELPH-MCNC: 2.9 GM/DL
GLUCOSE BLDC GLUCOMTR-MCNC: 150 MG/DL (ref 70–130)
GLUCOSE BLDC GLUCOMTR-MCNC: 234 MG/DL (ref 70–130)
GLUCOSE SERPL-MCNC: 234 MG/DL (ref 65–99)
HADV DNA SPEC NAA+PROBE: NOT DETECTED
HBA1C MFR BLD: 5.7 % (ref 4.8–5.6)
HCO3 BLDA-SCNC: 23.4 MMOL/L (ref 20–26)
HCOV 229E RNA SPEC QL NAA+PROBE: NOT DETECTED
HCOV HKU1 RNA SPEC QL NAA+PROBE: NOT DETECTED
HCOV NL63 RNA SPEC QL NAA+PROBE: NOT DETECTED
HCOV OC43 RNA SPEC QL NAA+PROBE: NOT DETECTED
HCT VFR BLD AUTO: 36.5 % (ref 34–46.6)
HCT VFR BLD CALC: 42.6 % (ref 38–51)
HGB BLD-MCNC: 12.4 G/DL (ref 12–15.9)
HGB BLDA-MCNC: 13.9 G/DL (ref 13.5–17.5)
HMPV RNA NPH QL NAA+NON-PROBE: NOT DETECTED
HOLD SPECIMEN: NORMAL
HOLD SPECIMEN: NORMAL
HPIV1 RNA ISLT QL NAA+PROBE: NOT DETECTED
HPIV2 RNA SPEC QL NAA+PROBE: NOT DETECTED
HPIV3 RNA NPH QL NAA+PROBE: NOT DETECTED
HPIV4 P GENE NPH QL NAA+PROBE: NOT DETECTED
IMM GRANULOCYTES # BLD AUTO: 0.05 10*3/MM3 (ref 0–0.05)
IMM GRANULOCYTES NFR BLD AUTO: 0.7 % (ref 0–0.5)
INHALED O2 CONCENTRATION: 36 %
INR PPP: 1.02 (ref 0.9–1.1)
LYMPHOCYTES # BLD AUTO: 1.92 10*3/MM3 (ref 0.7–3.1)
LYMPHOCYTES NFR BLD AUTO: 25.4 % (ref 19.6–45.3)
Lab: ABNORMAL
M PNEUMO IGG SER IA-ACNC: NOT DETECTED
MCH RBC QN AUTO: 28.3 PG (ref 26.6–33)
MCHC RBC AUTO-ENTMCNC: 34 G/DL (ref 31.5–35.7)
MCV RBC AUTO: 83.3 FL (ref 79–97)
METHGB BLD QL: 0.4 % (ref 0–3)
MODALITY: ABNORMAL
MONOCYTES # BLD AUTO: 0.34 10*3/MM3 (ref 0.1–0.9)
MONOCYTES NFR BLD AUTO: 4.5 % (ref 5–12)
NEUTROPHILS NFR BLD AUTO: 5.02 10*3/MM3 (ref 1.7–7)
NEUTROPHILS NFR BLD AUTO: 66.4 % (ref 42.7–76)
NRBC BLD AUTO-RTO: 0 /100 WBC (ref 0–0.2)
NT-PROBNP SERPL-MCNC: 449 PG/ML (ref 0–450)
OXYHGB MFR BLDV: 93.1 % (ref 94–99)
PCO2 BLDA: 38.8 MM HG (ref 35–45)
PCO2 TEMP ADJ BLD: ABNORMAL MM[HG]
PH BLDA: 7.39 PH UNITS (ref 7.35–7.45)
PH, TEMP CORRECTED: ABNORMAL
PLATELET # BLD AUTO: 134 10*3/MM3 (ref 140–450)
PMV BLD AUTO: 11.5 FL (ref 6–12)
PO2 BLDA: 74.5 MM HG (ref 83–108)
PO2 TEMP ADJ BLD: ABNORMAL MM[HG]
POTASSIUM SERPL-SCNC: 3.4 MMOL/L (ref 3.5–5.2)
POTASSIUM SERPL-SCNC: 4 MMOL/L (ref 3.5–5.2)
PROT SERPL-MCNC: 6.8 G/DL (ref 6–8.5)
PROTHROMBIN TIME: 13.5 SECONDS (ref 12.1–14.7)
QT INTERVAL: 332 MS
QT INTERVAL: 352 MS
QTC INTERVAL: 432 MS
QTC INTERVAL: 443 MS
RBC # BLD AUTO: 4.38 10*6/MM3 (ref 3.77–5.28)
RHINOVIRUS RNA SPEC NAA+PROBE: NOT DETECTED
RSV RNA NPH QL NAA+NON-PROBE: NOT DETECTED
SAO2 % BLDCOA: 94.8 % (ref 94–99)
SARS-COV-2 RNA RESP QL NAA+PROBE: NOT DETECTED
SODIUM SERPL-SCNC: 137 MMOL/L (ref 136–145)
TROPONIN T % DELTA: 18 %
TROPONIN T NUMERIC DELTA: 3 NG/L
TROPONIN T SERPL HS-MCNC: 15 NG/L
TROPONIN T SERPL HS-MCNC: 17 NG/L
VENTILATOR MODE: ABNORMAL
WBC NRBC COR # BLD AUTO: 7.56 10*3/MM3 (ref 3.4–10.8)
WHOLE BLOOD HOLD COAG: NORMAL
WHOLE BLOOD HOLD SPECIMEN: NORMAL

## 2025-01-02 PROCEDURE — 83036 HEMOGLOBIN GLYCOSYLATED A1C: CPT | Performed by: INTERNAL MEDICINE

## 2025-01-02 PROCEDURE — 71275 CT ANGIOGRAPHY CHEST: CPT | Performed by: RADIOLOGY

## 2025-01-02 PROCEDURE — 93005 ELECTROCARDIOGRAM TRACING: CPT | Performed by: STUDENT IN AN ORGANIZED HEALTH CARE EDUCATION/TRAINING PROGRAM

## 2025-01-02 PROCEDURE — 25010000002 ENOXAPARIN PER 10 MG: Performed by: INTERNAL MEDICINE

## 2025-01-02 PROCEDURE — 84132 ASSAY OF SERUM POTASSIUM: CPT | Performed by: INTERNAL MEDICINE

## 2025-01-02 PROCEDURE — 71045 X-RAY EXAM CHEST 1 VIEW: CPT

## 2025-01-02 PROCEDURE — 80053 COMPREHEN METABOLIC PANEL: CPT | Performed by: STUDENT IN AN ORGANIZED HEALTH CARE EDUCATION/TRAINING PROGRAM

## 2025-01-02 PROCEDURE — 85379 FIBRIN DEGRADATION QUANT: CPT | Performed by: STUDENT IN AN ORGANIZED HEALTH CARE EDUCATION/TRAINING PROGRAM

## 2025-01-02 PROCEDURE — 99223 1ST HOSP IP/OBS HIGH 75: CPT | Performed by: INTERNAL MEDICINE

## 2025-01-02 PROCEDURE — 25010000002 FUROSEMIDE PER 20 MG: Performed by: STUDENT IN AN ORGANIZED HEALTH CARE EDUCATION/TRAINING PROGRAM

## 2025-01-02 PROCEDURE — G0378 HOSPITAL OBSERVATION PER HR: HCPCS

## 2025-01-02 PROCEDURE — 84484 ASSAY OF TROPONIN QUANT: CPT

## 2025-01-02 PROCEDURE — 0202U NFCT DS 22 TRGT SARS-COV-2: CPT | Performed by: STUDENT IN AN ORGANIZED HEALTH CARE EDUCATION/TRAINING PROGRAM

## 2025-01-02 PROCEDURE — 94799 UNLISTED PULMONARY SVC/PX: CPT

## 2025-01-02 PROCEDURE — 83605 ASSAY OF LACTIC ACID: CPT | Performed by: STUDENT IN AN ORGANIZED HEALTH CARE EDUCATION/TRAINING PROGRAM

## 2025-01-02 PROCEDURE — 71045 X-RAY EXAM CHEST 1 VIEW: CPT | Performed by: RADIOLOGY

## 2025-01-02 PROCEDURE — 71275 CT ANGIOGRAPHY CHEST: CPT

## 2025-01-02 PROCEDURE — 94640 AIRWAY INHALATION TREATMENT: CPT

## 2025-01-02 PROCEDURE — 83880 ASSAY OF NATRIURETIC PEPTIDE: CPT | Performed by: STUDENT IN AN ORGANIZED HEALTH CARE EDUCATION/TRAINING PROGRAM

## 2025-01-02 PROCEDURE — 85025 COMPLETE CBC W/AUTO DIFF WBC: CPT | Performed by: STUDENT IN AN ORGANIZED HEALTH CARE EDUCATION/TRAINING PROGRAM

## 2025-01-02 PROCEDURE — 85610 PROTHROMBIN TIME: CPT | Performed by: STUDENT IN AN ORGANIZED HEALTH CARE EDUCATION/TRAINING PROGRAM

## 2025-01-02 PROCEDURE — 25510000001 IOPAMIDOL PER 1 ML: Performed by: STUDENT IN AN ORGANIZED HEALTH CARE EDUCATION/TRAINING PROGRAM

## 2025-01-02 PROCEDURE — 99291 CRITICAL CARE FIRST HOUR: CPT

## 2025-01-02 PROCEDURE — 82375 ASSAY CARBOXYHB QUANT: CPT

## 2025-01-02 PROCEDURE — 25010000002 METHYLPREDNISOLONE PER 125 MG: Performed by: STUDENT IN AN ORGANIZED HEALTH CARE EDUCATION/TRAINING PROGRAM

## 2025-01-02 PROCEDURE — 82805 BLOOD GASES W/O2 SATURATION: CPT

## 2025-01-02 PROCEDURE — 83050 HGB METHEMOGLOBIN QUAN: CPT

## 2025-01-02 PROCEDURE — 63710000001 INSULIN LISPRO (HUMAN) PER 5 UNITS

## 2025-01-02 PROCEDURE — 36600 WITHDRAWAL OF ARTERIAL BLOOD: CPT

## 2025-01-02 PROCEDURE — 84484 ASSAY OF TROPONIN QUANT: CPT | Performed by: STUDENT IN AN ORGANIZED HEALTH CARE EDUCATION/TRAINING PROGRAM

## 2025-01-02 PROCEDURE — 82948 REAGENT STRIP/BLOOD GLUCOSE: CPT

## 2025-01-02 PROCEDURE — 36415 COLL VENOUS BLD VENIPUNCTURE: CPT | Performed by: INTERNAL MEDICINE

## 2025-01-02 RX ORDER — CLONIDINE HYDROCHLORIDE 0.1 MG/1
0.2 TABLET ORAL ONCE
Status: COMPLETED | OUTPATIENT
Start: 2025-01-02 | End: 2025-01-02

## 2025-01-02 RX ORDER — FUROSEMIDE 10 MG/ML
80 INJECTION INTRAMUSCULAR; INTRAVENOUS ONCE
Status: COMPLETED | OUTPATIENT
Start: 2025-01-02 | End: 2025-01-02

## 2025-01-02 RX ORDER — LIDOCAINE HYDROCHLORIDE 20 MG/ML
15 SOLUTION OROPHARYNGEAL ONCE
Status: COMPLETED | OUTPATIENT
Start: 2025-01-02 | End: 2025-01-02

## 2025-01-02 RX ORDER — POLYETHYLENE GLYCOL 3350 17 G/17G
17 POWDER, FOR SOLUTION ORAL DAILY PRN
Status: DISCONTINUED | OUTPATIENT
Start: 2025-01-02 | End: 2025-01-05 | Stop reason: HOSPADM

## 2025-01-02 RX ORDER — NICOTINE POLACRILEX 4 MG
15 LOZENGE BUCCAL
Status: DISCONTINUED | OUTPATIENT
Start: 2025-01-02 | End: 2025-01-05 | Stop reason: HOSPADM

## 2025-01-02 RX ORDER — FUROSEMIDE 10 MG/ML
40 INJECTION INTRAMUSCULAR; INTRAVENOUS EVERY 12 HOURS
Status: DISCONTINUED | OUTPATIENT
Start: 2025-01-02 | End: 2025-01-03

## 2025-01-02 RX ORDER — CALCIUM CARBONATE 500 MG/1
1 TABLET, CHEWABLE ORAL 2 TIMES DAILY PRN
Status: DISCONTINUED | OUTPATIENT
Start: 2025-01-02 | End: 2025-01-05 | Stop reason: HOSPADM

## 2025-01-02 RX ORDER — CARVEDILOL 6.25 MG/1
12.5 TABLET ORAL 2 TIMES DAILY WITH MEALS
Status: DISCONTINUED | OUTPATIENT
Start: 2025-01-02 | End: 2025-01-02

## 2025-01-02 RX ORDER — SODIUM CHLORIDE 0.9 % (FLUSH) 0.9 %
10 SYRINGE (ML) INJECTION EVERY 12 HOURS SCHEDULED
Status: DISCONTINUED | OUTPATIENT
Start: 2025-01-02 | End: 2025-01-05 | Stop reason: HOSPADM

## 2025-01-02 RX ORDER — SODIUM CHLORIDE 0.9 % (FLUSH) 0.9 %
10 SYRINGE (ML) INJECTION AS NEEDED
Status: DISCONTINUED | OUTPATIENT
Start: 2025-01-02 | End: 2025-01-05 | Stop reason: HOSPADM

## 2025-01-02 RX ORDER — DEXTROSE MONOHYDRATE 25 G/50ML
25 INJECTION, SOLUTION INTRAVENOUS
Status: DISCONTINUED | OUTPATIENT
Start: 2025-01-02 | End: 2025-01-05 | Stop reason: HOSPADM

## 2025-01-02 RX ORDER — CARVEDILOL 25 MG/1
25 TABLET ORAL 2 TIMES DAILY WITH MEALS
Status: DISCONTINUED | OUTPATIENT
Start: 2025-01-02 | End: 2025-01-05 | Stop reason: HOSPADM

## 2025-01-02 RX ORDER — FLUTICASONE PROPIONATE 50 MCG
2 SPRAY, SUSPENSION (ML) NASAL DAILY PRN
COMMUNITY

## 2025-01-02 RX ORDER — METOPROLOL TARTRATE 50 MG
50 TABLET ORAL 2 TIMES DAILY
COMMUNITY

## 2025-01-02 RX ORDER — POTASSIUM CHLORIDE 1500 MG/1
40 TABLET, EXTENDED RELEASE ORAL ONCE
Status: COMPLETED | OUTPATIENT
Start: 2025-01-02 | End: 2025-01-02

## 2025-01-02 RX ORDER — IOPAMIDOL 755 MG/ML
100 INJECTION, SOLUTION INTRAVASCULAR
Status: COMPLETED | OUTPATIENT
Start: 2025-01-02 | End: 2025-01-02

## 2025-01-02 RX ORDER — BISACODYL 10 MG
10 SUPPOSITORY, RECTAL RECTAL DAILY PRN
Status: DISCONTINUED | OUTPATIENT
Start: 2025-01-02 | End: 2025-01-05 | Stop reason: HOSPADM

## 2025-01-02 RX ORDER — IPRATROPIUM BROMIDE AND ALBUTEROL SULFATE 2.5; .5 MG/3ML; MG/3ML
3 SOLUTION RESPIRATORY (INHALATION) ONCE
Status: COMPLETED | OUTPATIENT
Start: 2025-01-02 | End: 2025-01-02

## 2025-01-02 RX ORDER — ARIPIPRAZOLE 2 MG/1
2 TABLET ORAL DAILY
COMMUNITY

## 2025-01-02 RX ORDER — ALUMINA, MAGNESIA, AND SIMETHICONE 2400; 2400; 240 MG/30ML; MG/30ML; MG/30ML
15 SUSPENSION ORAL ONCE
Status: COMPLETED | OUTPATIENT
Start: 2025-01-02 | End: 2025-01-02

## 2025-01-02 RX ORDER — PHENOBARBITAL, HYOSCYAMINE SULFATE, ATROPINE SULFATE AND SCOPOLAMINE HYDROBROMIDE .0194; .1037; 16.2; .0065 MG/1; MG/1; MG/1; MG/1
2 TABLET ORAL ONCE
Status: COMPLETED | OUTPATIENT
Start: 2025-01-02 | End: 2025-01-02

## 2025-01-02 RX ORDER — BISACODYL 5 MG/1
5 TABLET, DELAYED RELEASE ORAL DAILY PRN
Status: DISCONTINUED | OUTPATIENT
Start: 2025-01-02 | End: 2025-01-05 | Stop reason: HOSPADM

## 2025-01-02 RX ORDER — METHYLPREDNISOLONE SODIUM SUCCINATE 125 MG/2ML
125 INJECTION, POWDER, LYOPHILIZED, FOR SOLUTION INTRAMUSCULAR; INTRAVENOUS ONCE
Status: COMPLETED | OUTPATIENT
Start: 2025-01-02 | End: 2025-01-02

## 2025-01-02 RX ORDER — INSULIN LISPRO 100 [IU]/ML
3-14 INJECTION, SOLUTION INTRAVENOUS; SUBCUTANEOUS
Status: DISCONTINUED | OUTPATIENT
Start: 2025-01-02 | End: 2025-01-05 | Stop reason: HOSPADM

## 2025-01-02 RX ORDER — SODIUM CHLORIDE 9 MG/ML
40 INJECTION, SOLUTION INTRAVENOUS AS NEEDED
Status: DISCONTINUED | OUTPATIENT
Start: 2025-01-02 | End: 2025-01-05 | Stop reason: HOSPADM

## 2025-01-02 RX ORDER — ACETAMINOPHEN 325 MG/1
650 TABLET ORAL EVERY 4 HOURS PRN
Status: DISCONTINUED | OUTPATIENT
Start: 2025-01-02 | End: 2025-01-05 | Stop reason: HOSPADM

## 2025-01-02 RX ORDER — ENOXAPARIN SODIUM 100 MG/ML
40 INJECTION SUBCUTANEOUS DAILY
Status: DISCONTINUED | OUTPATIENT
Start: 2025-01-02 | End: 2025-01-05 | Stop reason: HOSPADM

## 2025-01-02 RX ORDER — NIFEDIPINE 30 MG/1
90 TABLET, EXTENDED RELEASE ORAL ONCE
Status: COMPLETED | OUTPATIENT
Start: 2025-01-02 | End: 2025-01-02

## 2025-01-02 RX ORDER — AMOXICILLIN 250 MG
2 CAPSULE ORAL 2 TIMES DAILY PRN
Status: DISCONTINUED | OUTPATIENT
Start: 2025-01-02 | End: 2025-01-05 | Stop reason: HOSPADM

## 2025-01-02 RX ORDER — PANTOPRAZOLE SODIUM 40 MG/1
40 TABLET, DELAYED RELEASE ORAL 2 TIMES DAILY PRN
COMMUNITY

## 2025-01-02 RX ADMIN — PHENOBARBITAL, HYOSCYAMINE SULFATE, ATROPINE SULFATE AND SCOPOLAMINE HYDROBROMIDE 32.4 MG: .0194; .1037; 16.2; .0065 TABLET ORAL at 08:15

## 2025-01-02 RX ADMIN — CLONIDINE HYDROCHLORIDE 0.2 MG: 0.1 TABLET ORAL at 08:14

## 2025-01-02 RX ADMIN — Medication 10 ML: at 20:19

## 2025-01-02 RX ADMIN — ALUMINUM HYDROXIDE, MAGNESIUM HYDROXIDE, AND DIMETHICONE 15 ML: 400; 400; 40 SUSPENSION ORAL at 08:15

## 2025-01-02 RX ADMIN — FUROSEMIDE 80 MG: 10 INJECTION, SOLUTION INTRAMUSCULAR; INTRAVENOUS at 04:11

## 2025-01-02 RX ADMIN — LIDOCAINE HYDROCHLORIDE 15 ML: 20 SOLUTION ORAL at 08:15

## 2025-01-02 RX ADMIN — POTASSIUM CHLORIDE 40 MEQ: 1500 TABLET, EXTENDED RELEASE ORAL at 17:32

## 2025-01-02 RX ADMIN — FUROSEMIDE 80 MG: 10 INJECTION, SOLUTION INTRAMUSCULAR; INTRAVENOUS at 12:06

## 2025-01-02 RX ADMIN — IPRATROPIUM BROMIDE AND ALBUTEROL SULFATE 3 ML: .5; 2.5 SOLUTION RESPIRATORY (INHALATION) at 14:11

## 2025-01-02 RX ADMIN — INSULIN LISPRO 5 UNITS: 100 INJECTION, SOLUTION INTRAVENOUS; SUBCUTANEOUS at 20:19

## 2025-01-02 RX ADMIN — ENOXAPARIN SODIUM 40 MG: 100 INJECTION SUBCUTANEOUS at 16:35

## 2025-01-02 RX ADMIN — CARVEDILOL 25 MG: 25 TABLET, FILM COATED ORAL at 18:14

## 2025-01-02 RX ADMIN — CARVEDILOL 25 MG: 25 TABLET, FILM COATED ORAL at 08:59

## 2025-01-02 RX ADMIN — IOPAMIDOL 70 ML: 755 INJECTION, SOLUTION INTRAVENOUS at 11:36

## 2025-01-02 RX ADMIN — METHYLPREDNISOLONE SODIUM SUCCINATE 125 MG: 125 INJECTION, POWDER, FOR SOLUTION INTRAMUSCULAR; INTRAVENOUS at 14:14

## 2025-01-02 RX ADMIN — POTASSIUM CHLORIDE 40 MEQ: 1500 TABLET, EXTENDED RELEASE ORAL at 07:57

## 2025-01-02 RX ADMIN — NIFEDIPINE 90 MG: 30 TABLET, FILM COATED, EXTENDED RELEASE ORAL at 08:59

## 2025-01-02 NOTE — CASE MANAGEMENT/SOCIAL WORK
Discharge Planning Assessment   Louie     Patient Name: Antoinette Pack  MRN: [id number]  Today's Date: 1/2/2025    Admit Date: 1/2/2025    Plan: Spoke with patient at bedside. Patient lives at home with spouse and children and will return at discharge. Patient has no POA or Living Will. Patient uses no DME or HH. Patients PCP Florinda Bliss and pharmacy [facility]. Patients spouse will transport home at discharge.   Discharge Needs Assessment       Row Name 01/02/25 1601       Living Environment    People in Home spouse    Name(s) of People in Home Michelle Pack Spouse   [phone]    Current Living Arrangements home    Potentially Unsafe Housing Conditions none    In the past 12 months has the electric, gas, oil, or water company threatened to shut off services in your home? No    Primary Care Provided by self    Provides Primary Care For child(isabella)    Family Caregiver if Needed spouse    Family Caregiver Names Michelle Pack Spouse   [phone]    Quality of Family Relationships helpful;involved;supportive    Able to Return to Prior Arrangements yes       Resource/Environmental Concerns    Resource/Environmental Concerns none    Transportation Concerns none       Transportation Needs    In the past 12 months, has lack of transportation kept you from medical appointments or from getting medications? no    In the past 12 months, has lack of transportation kept you from meetings, work, or from getting things needed for daily living? No       Food Insecurity    Within the past 12 months, you worried that your food would run out before you got the money to buy more. Never true    Within the past 12 months, the food you bought just didn't last and you didn't have money to get more. Never true       Transition Planning    Patient/Family Anticipates Transition to home with family    Patient/Family Anticipated Services at Transition none    Transportation Anticipated family or friend will provide        Discharge Needs Assessment    Readmission Within the Last 30 Days no previous admission in last 30 days    Equipment Currently Used at Home none    Concerns to be Addressed discharge planning    Do you want help finding or keeping work or a job? I do not need or want help    Do you want help with school or training? For example, starting or completing job training or getting a high school diploma, GED or equivalent No    Anticipated Changes Related to Illness none    Equipment Needed After Discharge none                   Discharge Plan       Row Name 01/02/25 1602       Plan    Plan Spoke with patient at bedside. Patient lives at home with spouse and children and will return at discharge. Patient has no POA or Living Will. Patient uses no DME or HH. Patients PCP Florinda Bliss and [facility]. Patients spouse will transport home at discharge.    Patient/Family in Agreement with Plan yes                  Continued Care and Services - Admitted Since 1/2/2025    No active coordination exists for this encounter.       Selected Continued Care - Episodes Includes continued care and service providers with selected services from the active episodes listed below      Heart Failure Episode start date: 8/26/2024   There are no active outsourced providers for this episode.             [facility] Episode start date: 2/17/2022   There are no active outsourced providers for this episode.                 Expected Discharge Date and Time       Expected Discharge Date Expected Discharge Time    Abundio 3, 2025            Demographic Summary       Row Name 01/02/25 1601       General Information    Admission Type observation    Arrived From home    Referral Source emergency department    Reason for Consult discharge planning    Preferred Language English                     Kemi Salinas

## 2025-01-02 NOTE — ED PROVIDER NOTES
"Subjective   History of Present Illness  Patient is a 39-year-old morbidly obese female that presents with a history of CHF (failure with preserved ejection fraction), diabetes mellitus type 2, hypertension and chronic kidney disease, that presents with shortness of breath and chest pain.  Patient states that she is prescribed as needed Lasix but has not needed medication recently.  Patient states that around 1:00AM; she started noticing significant worsening shortness of breath, audible wheezing and gurgling which usually signifies that she is volume overloaded.  She took 1 tablet of sublingual nitroglycerin and drove from [facility].    On patient is grossly hypertensive, tachypneic and tachycardic patient is recurrently requiring 4 L of oxygen to maintain saturations of 94%.    Patient reports that she started developing heart failure post COVID in 2023.  Patient then became pregnant and ultimately had to be delivered early at roughly 34 weeks gestation due to diffuse pulmonary edema.  Patient is actually prescribed several different cardiac medications that she does not routinely take at this time given that she is actively breast-feeding and is currently 8 months postdelivery.      Review of Systems    Past Medical History:   Diagnosis Date    Anxiety     CHF (congestive heart failure) 05/2023    Colitis 2023    Depression     Diabetes mellitus 2018    type 2    Diverticulitis     GERD (gastroesophageal reflux disease)     History of transfusion 04/19/2024    2 units post delivery    Hypertension     chronic    Kidney stone     \"years ago\"    Narcolepsy 2022    Pancreatitis 09/2023    PCOS (polycystic ovarian syndrome)     Rectal bleeding     SINCE 2023 - USES MESALAMINE SUPPOSITORY NIGHTLY    Seizures 2012    Sleep apnea        Allergies   Allergen Reactions    Milk-Related Compounds Unknown - High Severity    Dairycare [Bacid] Unknown - Low Severity     Pt is breastfeeding baby that is allergic to dairy, pt " cannot have dairy products while breastfeeding    Dilantin [Phenytoin] Mental Status Change    Keppra [Levetiracetam] Mental Status Change    Meperidine Rash    Topamax [Topiramate] Mental Status Change       Past Surgical History:   Procedure Laterality Date    CARDIAC CATHETERIZATION      CARPAL TUNNEL RELEASE       SECTION N/A 2024    Procedure:  SECTION PRIMARY;  Surgeon: Axel Keys MD;  Location:  [facility];  Service: Obstetrics/Gynecology;  Laterality: N/A;    COLONOSCOPY      COLONOSCOPY N/A 2024    Procedure: COLONOSCOPY;  Surgeon: Tom Mueller MD;  Location:  [facility];  Service: Gastroenterology;  Laterality: N/A;    ENDOSCOPY      ENDOSCOPY N/A 2024    Procedure: ESOPHAGOGASTRODUODENOSCOPY;  Surgeon: Tom Mueller MD;  Location:  [facility];  Service: Gastroenterology;  Laterality: N/A;    FRACTURE SURGERY Left     wrist    HARDWARE REMOVAL Left     WRIST    TENDON REPAIR Left     HAND    WISDOM TOOTH EXTRACTION         Family History   Problem Relation Age of Onset    Hypertension Mother     Depression Mother     Heart disease Mother     COPD Mother     Emphysema Mother     Hypertension Father     Diabetes Father     Heart disease Father     COPD Father     Heart failure Father     Hepatitis Father        Social History     Socioeconomic History    Marital status:    Tobacco Use    Smoking status: Former     Current packs/day: 0.00     Average packs/day: 1 pack/day for 20.0 years (20.0 ttl pk-yrs)     Types: Cigarettes     Start date: 1995     Quit date:      Years since quitting: 10.0     Passive exposure: Past    Smokeless tobacco: Never    Tobacco comments:     QUIT IN 2017   Vaping Use    Vaping status: Never Used   Substance and Sexual Activity    Alcohol use: No    Drug use: No    Sexual activity: Defer           Objective   Physical Exam  Vitals and nursing note reviewed.   Constitutional:       General: She is in  acute distress.      Appearance: She is obese. She is ill-appearing, toxic-appearing and diaphoretic.   HENT:      Head: Normocephalic and atraumatic.      Right Ear: External ear normal.      Left Ear: External ear normal.      Nose: Nose normal.   Eyes:      Conjunctiva/sclera: Conjunctivae normal.      Pupils: Pupils are equal, round, and reactive to light.   Neck:      Vascular: No JVD.      Trachea: No tracheal deviation.   Cardiovascular:      Rate and Rhythm: Regular rhythm. Tachycardia present.      Heart sounds: Normal heart sounds. No murmur heard.  Pulmonary:      Effort: Tachypnea, accessory muscle usage and respiratory distress present.      Breath sounds: No wheezing.      Comments: Grossly diminished breath sounds throughout all lung fields audible crackles in the right upper lung field.    Tachypnea with use of  muscles.    Initially on room air 83% on air    On 4 L nasal cannula with saturations improving to 94%.  Abdominal:      General: Bowel sounds are normal.      Palpations: Abdomen is soft.      Tenderness: There is no abdominal tenderness.   Musculoskeletal:         General: No deformity. Normal range of motion.      Cervical back: Normal range of motion and neck supple.      Right lower leg: Edema present.      Left lower leg: Edema present.      Comments: +1 pitting edema lower extremities bilaterally.   Skin:     General: Skin is warm and dry.      Capillary Refill: Capillary refill takes less than 2 seconds.      Coloration: Skin is not pale.      Findings: No erythema or rash.   Neurological:      General: No focal deficit present.      Mental Status: She is alert and oriented to person, place, and time.      Cranial Nerves: No cranial nerve deficit.   Psychiatric:         Mood and Affect: Mood is anxious.         Thought Content: Thought content normal.         Critical Care    Performed by: Zaynab Spain DO  Authorized by: Zaynab Spain DO    Critical care provider statement:      Critical care time (minutes):  35    Critical care time was exclusive of:  Separately billable procedures and treating other patients and teaching time    Critical care was necessary to treat or prevent imminent or life-threatening deterioration of the following conditions:  Cardiac failure, respiratory failure, CNS failure or compromise, circulatory failure and metabolic crisis    Critical care was time spent personally by me on the following activities:  Blood draw for specimens, development of treatment plan with patient or surrogate, evaluation of patient's response to treatment, examination of patient, obtaining history from patient or surrogate, interpretation of cardiac output measurements, ordering and performing treatments and interventions, ordering and review of laboratory studies, ordering and review of radiographic studies, pulse oximetry, review of old charts and re-evaluation of patient's condition    I assumed direction of critical care for this patient from another provider in my specialty: no      Care discussed with: admitting provider         Results for orders placed or performed during the hospital encounter of 01/02/25   ECG 12 Lead Dyspnea    Collection Time: 01/02/25  2:19 AM   Result Value Ref Range    QT Interval 332 ms    QTC Interval 443 ms   Blood Gas, Arterial With Co-Ox    Collection Time: 01/02/25  2:33 AM    Specimen: Arterial Blood   Result Value Ref Range    Site Right Radial     John's Test Positive     pH, Arterial 7.389 7.350 - 7.450 pH units    pCO2, Arterial 38.8 35.0 - 45.0 mm Hg    pO2, Arterial 74.5 (L) 83.0 - 108.0 mm Hg    HCO3, Arterial 23.4 20.0 - 26.0 mmol/L    Base Excess, Arterial -1.3 (L) 0.0 - 2.0 mmol/L    O2 Saturation, Arterial 94.8 94.0 - 99.0 %    Hemoglobin, Blood Gas 13.9 13.5 - 17.5 g/dL    Hematocrit, Blood Gas 42.6 38.0 - 51.0 %    Oxyhemoglobin 93.1 (L) 94 - 99 %    Methemoglobin 0.40 0.00 - 3.00 %    Carboxyhemoglobin 1.4 0 - 5 %    A-a DO2 129.5  0.0 - 300.0 mmHg    CO2 Content 24.6 22 - 33 mmol/L    Barometric Pressure for Blood Gas 733 mmHg    Modality Nasal Cannula     FIO2 36 %    Flow Rate 4.0 lpm    Ventilator Mode NA     Collected by [id number]     pH, Temp Corrected      pCO2, Temperature Corrected      pO2, Temperature Corrected     High Sensitivity Troponin T    Collection Time: 01/02/25  2:58 AM    Specimen: Arm, Right; Blood   Result Value Ref Range    HS Troponin T 17 (H) <14 ng/L   Comprehensive Metabolic Panel    Collection Time: 01/02/25  2:58 AM    Specimen: Arm, Right; Blood   Result Value Ref Range    Glucose 234 (H) 65 - 99 mg/dL    BUN 13 6 - 20 mg/dL    Creatinine 0.57 0.57 - 1.00 mg/dL    Sodium 137 136 - 145 mmol/L    Potassium 3.4 (L) 3.5 - 5.2 mmol/L    Chloride 101 98 - 107 mmol/L    CO2 21.1 (L) 22.0 - 29.0 mmol/L    Calcium 8.8 8.6 - 10.5 mg/dL    Total Protein 6.8 6.0 - 8.5 g/dL    Albumin 3.9 3.5 - 5.2 g/dL    ALT (SGPT) 15 1 - 33 U/L    AST (SGOT) 14 1 - 32 U/L    Alkaline Phosphatase 101 39 - 117 U/L    Total Bilirubin 0.5 0.0 - 1.2 mg/dL    Globulin 2.9 gm/dL    A/G Ratio 1.3 g/dL    BUN/Creatinine Ratio 22.8 7.0 - 25.0    Anion Gap 14.9 5.0 - 15.0 mmol/L    eGFR 117.3 >60.0 mL/min/1.73   Protime-INR    Collection Time: 01/02/25  2:58 AM    Specimen: Arm, Right; Blood   Result Value Ref Range    Protime 13.5 12.1 - 14.7 Seconds    INR 1.02 0.90 - 1.10   BNP    Collection Time: 01/02/25  2:58 AM    Specimen: Arm, Right; Blood   Result Value Ref Range    proBNP 449.0 0.0 - 450.0 pg/mL   Lactic Acid, Plasma    Collection Time: 01/02/25  2:58 AM    Specimen: Arm, Right; Blood   Result Value Ref Range    Lactate 1.8 0.5 - 2.0 mmol/L   CBC Auto Differential    Collection Time: 01/02/25  2:58 AM    Specimen: Arm, Right; Blood   Result Value Ref Range    WBC 7.56 3.40 - 10.80 10*3/mm3    RBC 4.38 3.77 - 5.28 10*6/mm3    Hemoglobin 12.4 12.0 - 15.9 g/dL    Hematocrit 36.5 34.0 - 46.6 %    MCV 83.3 79.0 - 97.0 fL    MCH 28.3 26.6 - 33.0  pg    MCHC 34.0 31.5 - 35.7 g/dL    RDW 12.6 12.3 - 15.4 %    RDW-SD 37.7 37.0 - 54.0 fl    MPV 11.5 6.0 - 12.0 fL    Platelets 134 (L) 140 - 450 10*3/mm3    Neutrophil % 66.4 42.7 - 76.0 %    Lymphocyte % 25.4 19.6 - 45.3 %    Monocyte % 4.5 (L) 5.0 - 12.0 %    Eosinophil % 2.6 0.3 - 6.2 %    Basophil % 0.4 0.0 - 1.5 %    Immature Grans % 0.7 (H) 0.0 - 0.5 %    Neutrophils, Absolute 5.02 1.70 - 7.00 10*3/mm3    Lymphocytes, Absolute 1.92 0.70 - 3.10 10*3/mm3    Monocytes, Absolute 0.34 0.10 - 0.90 10*3/mm3    Eosinophils, Absolute 0.20 0.00 - 0.40 10*3/mm3    Basophils, Absolute 0.03 0.00 - 0.20 10*3/mm3    Immature Grans, Absolute 0.05 0.00 - 0.05 10*3/mm3    nRBC 0.0 0.0 - 0.2 /100 WBC   D-dimer, Quantitative    Collection Time: 01/02/25  2:58 AM    Specimen: Arm, Right; Blood   Result Value Ref Range    D-Dimer, Quantitative 0.44 0.00 - 0.50 MCGFEU/mL   Green Top (Gel)    Collection Time: 01/02/25  2:58 AM   Result Value Ref Range    Extra Tube Hold for add-ons.    Lavender Top    Collection Time: 01/02/25  2:58 AM   Result Value Ref Range    Extra Tube hold for add-on    Gold Top - SST    Collection Time: 01/02/25  2:58 AM   Result Value Ref Range    Extra Tube Hold for add-ons.    Light Blue Top    Collection Time: 01/02/25  2:58 AM   Result Value Ref Range    Extra Tube Hold for add-ons.    Respiratory Panel PCR w/COVID-19(SARS-CoV-2) [facility] In-House, NP Swab in UTM/VTM, 2 HR TAT - Swab, Nasopharynx    Collection Time: 01/02/25  3:00 AM    Specimen: Nasopharynx; Swab   Result Value Ref Range    ADENOVIRUS, PCR Not Detected Not Detected    Coronavirus 229E Not Detected Not Detected    Coronavirus HKU1 Not Detected Not Detected    Coronavirus NL63 Not Detected Not Detected    Coronavirus OC43 Not Detected Not Detected    COVID19 Not Detected Not Detected - Ref. Range    Human Metapneumovirus Not Detected Not Detected    Human Rhinovirus/Enterovirus Not Detected Not Detected    Influenza A PCR  Not Detected Not Detected    Influenza B PCR Not Detected Not Detected    Parainfluenza Virus 1 Not Detected Not Detected    Parainfluenza Virus 2 Not Detected Not Detected    Parainfluenza Virus 3 Not Detected Not Detected    Parainfluenza Virus 4 Not Detected Not Detected    RSV, PCR Not Detected Not Detected    Bordetella pertussis pcr Not Detected Not Detected    Bordetella parapertussis PCR Not Detected Not Detected    Chlamydophila pneumoniae PCR Not Detected Not Detected    Mycoplasma pneumo by PCR Not Detected Not Detected   High Sensitivity Troponin T 1Hr    Collection Time: 01/02/25  4:08 AM    Specimen: Arm, Right; Blood   Result Value Ref Range    HS Troponin T 20 (H) <14 ng/L    Troponin T Numeric Delta 3 ng/L    Troponin T % Delta 18 Abnormal if >/= 20% %   ECG 12 Lead Chest Pain    Collection Time: 01/02/25  8:28 AM   Result Value Ref Range    QT Interval 352 ms    QTC Interval 432 ms     *Note: Due to a large number of results and/or encounters for the requested time period, some results have not been displayed. A complete set of results can be found in Results Review.                   ED Course  ED Course as of 01/02/25 1508   Thu Jan 02, 2025   0745 Patient presented in severe respiratory distress hypertensive crisis was hypoxic on 4 L.  Patient was given 80 of Lasix after diffuse pulmonary edema on chest x-ray concerning for CHF exacerbation.  Patient actually states that she was diagnosed with heart failure prior to becoming pregnant in 2023.  Patient actually to be delivered at 34 weeks gestation due to diffuse pulmonary edema.    -He states that she is actually prescribed numerous cardiac meds but does not routinely take them as she is predominantly breast-feeding  Prescribed Lasix but only taken as needed and recently has not been on any of the medications.  Follows with cardiology in [place].    Will be admitted to hospital service.  Current pressure 165/95 still remains on supplemental  oxygen at bedside but will titrate accordingly  Electronically signed by Zaynab Spain DO, 01/02/25, 7:46 AM EST.   [LK]   0748 Echo: 8/2024 showed estimated EF at roughly 54%. [LK]   0748 She was also replaced with potassium of 40 p.o. initial potassium 3.4.  Electronically signed by Zaynab Spain DO, 01/02/25, 7:48 AM EST.   [LK]   0832 ECG 12 Lead Chest Pain  Normal sinus rhythm, rate 91, QTc 432, no acute ST or T wave changes [CW]      ED Course User Index  [CW] Jarrett Santoyo DO  [LK] Zaynab Spain DO                                                       Medical Decision Making  --Good urine output after initial dose of Lasix  --CT angio negative, appears to have some mild volume overload  --Additional dose of IV Lasix  --Good urine output after Lasix however on multiple attempts to moved to room air, she became hypoxic with SpO2 86 to 88% at rest  --Discussed with medicine, will admit    Problems Addressed:  Acute exacerbation of chronic heart failure: complicated acute illness or injury  Acute hypoxic respiratory failure: complicated acute illness or injury  Hypertensive crisis: complicated acute illness or injury  Hypokalemia: complicated acute illness or injury  Patient is a currently breast-feeding mother: complicated acute illness or injury    Amount and/or Complexity of Data Reviewed  Labs: ordered.  Radiology: ordered.  ECG/medicine tests: ordered. Decision-making details documented in ED Course.    Risk  OTC drugs.  Prescription drug management.        Final diagnoses:   Acute hypoxic respiratory failure   Hypertensive crisis   Hypokalemia   Acute exacerbation of chronic heart failure   Patient is a currently breast-feeding mother       ED Disposition  ED Disposition       ED Disposition   Decision to Admit    Condition   --    Comment   --               No follow-up provider specified.       Medication List      No changes were made to your prescriptions during this visit.             Jarrett Santoyo,   01/02/25 150

## 2025-01-02 NOTE — H&P
[facility]  History & Physical    Patient Identification:  Name:  Antoinette Pack  Age:  39 y.o.  Sex:  female  :  1985  MRN:  [id number]   Visit Number:  [id number]  Admit Date: 2025   Primary Care Physician:  Florinda Bliss APRN    Subjective     Chief complaint: Shortness of breath    History of presenting illness:      Antoinette Pack is a 39 y.o. female who presented for further evaluation of shortness of breath.  Patient was seen and examined in the ED with no family present at the bedside.  She is ill-appearing and has difficulty speaking and complete sentences due to shortness of breath.  She reports she initially noticed some increased lower extremity edema over the past few days but noted she had been sitting here at the hospital with her father who is currently admitted and thought this was the likely cause thus did not take any of her as needed Lasix.  She reports she is otherwise compliant with home spironolactone, metoprolol, enalapril.  Last night when she laid down to go to bed she found herself to be acutely short of breath.  She tried to sit up in bed thinking she was orthopneic as she has been during prior heart failure exacerbations though as symptoms had not improved she did ask her  to call an ambulance.  She states when EMS arrived she was given nitroglycerin however was not experiencing chest pain at that time.  Throughout the day today she notes she has had intermittent chest pain which is like a sharp cramp that radiates into her left shoulder-last for a few seconds and resolves spontaneously.  She is having intermittent palpitations which she states is also typical for her during a heart failure exacerbation.  She notes she has not been ill recently otherwise-no cough, no fever, no abdominal pain, diarrhea or vomiting.  She denied dysuria.  She has responded well to Lasix thus far with over a liter of urine  output in the ED today.  She reports she is already noticed some improvement in her work of breathing and leg swelling.  On further discussion she did report BP is typically well-controlled at home however recently has been intermittently labile despite compliance with home medications as above.    Past medical history is significant for chronic HFpEF, diabetes mellitus, GERD, hypertension, hyperlipidemia, recently postpartum and breast-feeding, ALEJANDRO on CPAP    Upon arrival to the ED, vital signs were temp 98, heart rate 105, respirations 18, /118, SpO2 83% on RA.  Initial HS troponin was 17 with repeat at 20.  Potassium was 3.4, glucose 234.  CBC only notable for thrombocytopenia at 134.  CT PE protocol was negative, imaging consistent with central pulmonary vascular congestion with edema, hypoinflated lungs.    Known Emergency Department medications received prior to my evaluation included 114.   Emergency Department Room location at the time of my evaluation was Maalox, Coreg, clonidine, Lovenox, Lasix, Isovue, DuoNeb, lidocaine, Solu-Medrol, nifedipine, Donnatal, potassium chloride.     ---------------------------------------------------------------------------------------------------------------------   Review of Systems   Constitutional:  Negative for chills and fever.   HENT:  Negative for congestion and rhinorrhea.    Respiratory:  Positive for shortness of breath. Negative for cough.    Cardiovascular:  Positive for chest pain, palpitations and leg swelling.   Gastrointestinal:  Negative for abdominal pain, diarrhea and nausea.   Genitourinary:  Negative for difficulty urinating and dysuria.   Musculoskeletal:  Negative for arthralgias and myalgias.   Skin:  Negative for rash and wound.   Neurological:  Negative for dizziness and light-headedness.        ---------------------------------------------------------------------------------------------------------------------   Past Medical History:  "  Diagnosis Date    Anxiety     CHF (congestive heart failure) 2023    Colitis     Depression     Diabetes mellitus 2018    type 2    Diverticulitis     GERD (gastroesophageal reflux disease)     History of transfusion 2024    2 units post delivery    Hypertension     chronic    Kidney stone     \"years ago\"    Narcolepsy     Pancreatitis 2023    PCOS (polycystic ovarian syndrome)     Rectal bleeding     SINCE  - USES MESALAMINE SUPPOSITORY NIGHTLY    Seizures     Sleep apnea      Past Surgical History:   Procedure Laterality Date    CARDIAC CATHETERIZATION      CARPAL TUNNEL RELEASE       SECTION N/A 2024    Procedure:  SECTION PRIMARY;  Surgeon: Axel Keys MD;  Location: [facility]OR DELIVERY;  Service: Obstetrics/Gynecology;  Laterality: N/A;    COLONOSCOPY      COLONOSCOPY N/A 2024    Procedure: COLONOSCOPY;  Surgeon: Tom Mueller MD;  Location: [facility] ENDOSCOPY;  Service: Gastroenterology;  Laterality: N/A;    ENDOSCOPY      ENDOSCOPY N/A 2024    Procedure: ESOPHAGOGASTRODUODENOSCOPY;  Surgeon: Tom Mueller MD;  Location: [facility] ENDOSCOPY;  Service: Gastroenterology;  Laterality: N/A;    FRACTURE SURGERY Left     wrist    HARDWARE REMOVAL Left     WRIST    TENDON REPAIR Left     HAND    WISDOM TOOTH EXTRACTION       Family History   Problem Relation Age of Onset    Hypertension Mother     Depression Mother     Heart disease Mother     COPD Mother     Emphysema Mother     Hypertension Father     Diabetes Father     Heart disease Father     COPD Father     Heart failure Father     Hepatitis Father      Social History     Socioeconomic History    Marital status:    Tobacco Use    Smoking status: Former     Current packs/day: 0.00     Average packs/day: 1 pack/day for 20.0 years (20.0 ttl pk-yrs)     Types: Cigarettes     Start date: 1995     Quit date:      Years since quitting: 10.0     Passive exposure: Past    Smokeless " tobacco: Never    Tobacco comments:     QUIT IN 2017   Vaping Use    Vaping status: Never Used   Substance and Sexual Activity    Alcohol use: No    Drug use: No    Sexual activity: Defer     ---------------------------------------------------------------------------------------------------------------------   Allergies:  Milk-related compounds, Dairycare [bacid], Dilantin [phenytoin], Keppra [levetiracetam], Meperidine, and Topamax [topiramate]  ---------------------------------------------------------------------------------------------------------------------   Home medications:    Medications below are reported home medications pulling from within the system; at this time, these medications have not been reconciled unless otherwise specified and are in the verification process for further verifcation as current home medications.  (Not in a hospital admission)      Hospital Scheduled Meds:  carvedilol, 25 mg, Oral, BID With Meals  enoxaparin, 40 mg, Subcutaneous, Daily  furosemide, 40 mg, Intravenous, Q12H  potassium chloride, 40 mEq, Oral, Once  sodium chloride, 10 mL, Intravenous, Q12H           Current listed hospital scheduled medications may not yet reflect those currently placed in orders that are signed and held awaiting patient's arrival to floor.   ---------------------------------------------------------------------------------------------------------------------     Objective     Vital Signs:  Temp:  [98 °F (36.7 °C)-98.6 °F (37 °C)] 98.6 °F (37 °C)  Heart Rate:  [] 75  Resp:  [18-28] 28  BP: (108-226)/() 115/67      01/02/25  0221   Weight: 89.8 kg (198 lb)     Body mass index is 39.99 kg/m².  ---------------------------------------------------------------------------------------------------------------------       Physical Exam  Vitals and nursing note reviewed.   Constitutional:       General: She is not in acute distress.     Appearance: She is obese. She is ill-appearing.   GREGORIO:       "Head: Normocephalic and atraumatic.   Eyes:      Extraocular Movements: Extraocular movements intact.   Cardiovascular:      Rate and Rhythm: Normal rate and regular rhythm.   Pulmonary:      Effort: Pulmonary effort is normal.      Comments: Diminished breath sounds bilaterally  Abdominal:      Palpations: Abdomen is soft.      Tenderness: There is no abdominal tenderness.   Musculoskeletal:      Right lower leg: No edema.      Left lower leg: No edema.   Skin:     General: Skin is warm and dry.   Neurological:      Mental Status: She is alert. Mental status is at baseline.   Psychiatric:         Mood and Affect: Mood normal.             ---------------------------------------------------------------------------------------------------------------------  EKG:        I have personally looked at the EKG.  ---------------------------------------------------------------------------------------------------------------------   Results from last 7 days   Lab Units 01/02/25  0258   LACTATE mmol/L 1.8   WBC 10*3/mm3 7.56   HEMOGLOBIN g/dL 12.4   HEMATOCRIT % 36.5   MCV fL 83.3   MCHC g/dL 34.0   PLATELETS 10*3/mm3 134*   INR  1.02     Results from last 7 days   Lab Units 01/02/25  0233   PH, ARTERIAL pH units 7.389   PO2 ART mm Hg 74.5*   PCO2, ARTERIAL mm Hg 38.8   HCO3 ART mmol/L 23.4     Results from last 7 days   Lab Units 01/02/25  0258   SODIUM mmol/L 137   POTASSIUM mmol/L 3.4*   CHLORIDE mmol/L 101   CO2 mmol/L 21.1*   BUN mg/dL 13   CREATININE mg/dL 0.57   CALCIUM mg/dL 8.8   GLUCOSE mg/dL 234*   ALBUMIN g/dL 3.9   BILIRUBIN mg/dL 0.5   ALK PHOS U/L 101   AST (SGOT) U/L 14   ALT (SGPT) U/L 15   Estimated Creatinine Clearance: 133.7 mL/min (by C-G formula based on SCr of 0.57 mg/dL).  No results found for: \"AMMONIA\"  Results from last 7 days   Lab Units 01/02/25  0408 01/02/25  0258   HSTROP T ng/L 20* 17*     Results from last 7 days   Lab Units 01/02/25  0258   PROBNP pg/mL 449.0     Lab Results   Component Value " "Date    HGBA1C 5.70 (H) 01/02/2025     Lab Results   Component Value Date    TSH 0.376 12/18/2024    FREET4 1.20 12/18/2024     Lab Results   Component Value Date    PREGTESTUR Negative 09/17/2023    HCGQUANT 86,484.00 10/27/2023     Pain Management Panel  More data exists         Latest Ref Rng & Units 8/16/2024 4/19/2024   Pain Management Panel   Amphetamine, Urine Qual Negative Negative  Negative    Barbiturates Screen, Urine Negative Negative  Negative    Benzodiazepine Screen, Urine Negative Negative  Negative    Buprenorphine, Screen, Urine Negative Negative  Negative    Cocaine Screen, Urine Negative Negative  Negative    Fentanyl, Urine Negative Negative  Negative    Methadone Screen , Urine Negative Negative  Negative    Methamphetamine, Ur Negative Negative  Negative       Details                 No results found for: \"BLOODCX\"  No results found for: \"URINECX\"  No results found for: \"WOUNDCX\"  No results found for: \"STOOLCX\"      ---------------------------------------------------------------------------------------------------------------------  Imaging Results (Last 7 Days)       Procedure Component Value Units Date/Time    CT Angiogram Chest Pulmonary Embolism [[id number]] Collected: 01/02/25 1139     Updated: 01/02/25 1142    Narrative:      PROCEDURE: CT ANGIOGRAM CHEST PULMONARY EMBOLISM-     HISTORY: r/o PE; J96.01-Acute respiratory failure with hypoxia;  I16.9-Hypertensive crisis, unspecified; E87.6-Hypokalemia; I50.9-Heart  failure, unspecified; Z39.1-Encounter for care and examination of  lactating mother     COMPARISON: 8/23/2024.     TECHNIQUE: Multiple axial CT images were obtained from the thoracic  inlet through the upper abdomen following the administration of Isovue  300 per the CT PE protocol. Coronal and oblique 3D MIP images were  reconstructed from the original axial data set. This study was performed  with techniques to keep radiation doses as low as reasonably achievable  (ALARA). " "Individualized dose reduction techniques using automated  exposure control or adjustment of mA and/or kV according to the patient  size were employed.     FINDINGS: There are no filling defects within the pulmonary arteries to  suggest an embolus. The thoracic aorta is normal in caliber the heart is  normal in size. There are no pleural or pericardial effusions. There is  no adenopathy. The thyroid gland is unremarkable.   Lung windows reveal  no focal opacities or suspicious pulmonary nodules. The visualized upper  abdomen is unremarkable. Bone windows reveal no acute osseous  abnormalities.       Impression:      No evidence of pulmonary embolus.              This report was finalized on 1/2/2025 11:40 AM by Donte Dexter M.D..       XR Chest 1 View [[id number]] Collected: 01/02/25 0350     Updated: 01/02/25 0354    Narrative:      PROCEDURE: Portable chest x-ray examination performed on January 2, 2025. Single view.     HISTORY: Possible pulmonary edema. Hypoxia. Respiratory failure.     COMPARISON: None.     FINDINGS:     Normal heart size.  Central pulmonary vascular congestion with edema.  Hypoinflated lungs.  No pleural effusion or pneumothorax.  No fracture or foreign body.       Impression:         Central pulmonary vascular congestion with edema.  Hypoinflated lungs  No pleural effusion or pneumothorax  No fracture or foreign body.     This report was finalized on 1/2/2025 3:51 AM by Zoran Rubalcava MD.               Cultures:  No results found for: \"BLOODCX\", \"URINECX\", \"WOUNDCX\", \"MRSACX\", \"RESPCX\", \"STOOLCX\"    Last echocardiogram:  Results for orders placed during the hospital encounter of 08/16/24    Adult Transthoracic Echo Complete w/ Color, Spectral and Contrast if necessary per protocol    Interpretation Summary    Left ventricular systolic function is normal. Calculated left ventricular EF = 54% Left ventricular ejection fraction appears to be 51 - 55%.    Left ventricular wall thickness is " consistent with mild concentric hypertrophy.    Left ventricular diastolic function was normal.          I have personally reviewed the above radiology images and read the final radiology report on 01/02/25  ---------------------------------------------------------------------------------------------------------------------  Assessment / Plan     Active Hospital Problems    Diagnosis  POA    **CHF (congestive heart failure), NYHA class II, acute on chronic, diastolic [I50.33]  Yes       ASSESSMENT/PLAN:    Acute on chronic heart failure with preserved ejection fraction  Acute respiratory failure with hypoxia, due to above  Hypertensive urgency versus emergency  NSTEMI suspect type II 2/2 due to above  Hypokalemia  Patient presented secondary to sudden onset shortness of breath which began last night after she laid down for bed.  Had noticed some increased lower extremity edema over the past few days but had not taken as needed Lasix as she was not feeling significantly of breath at that time.  She denies any cough or fever.  She reports she is compliant with home medications otherwise.  On arrival to the ED she was saturating 83% on room air and subsequently placed on 4 L per nasal cannula.  Initial HS troponin was 17 with repeat at 20.  BP was significantly elevated at 226/118 and has improved with medications given in the ED.  Imaging workup negative for PE, concerning for CHF with pulmonary edema noted.  She was given IV Lasix in the ED and has responded well with over 1 L of urine output thus far and weaned to 2 L.  Will admit to the telemetry unit for further management  Continue scheduled Lasix 40 mg IV twice daily with close monitoring of clinical volume status, daily weights, I's and O's.  Continue to monitor BP trend and treat as indicated  Will update TTE  Monitor respiratory status closely-titrate supplemental O2 as needed and wean to baseline as able.  Will continue to monitor and replace electrolytes as  needed per protocol  Patient complaining of intermittent chest pain throughout the day today will repeat troponin and EKG now to continue to trend  Continue supportive care measures    Chronic:  Diabetes mellitus  GERD  Hyperlipidemia  ALEJANDRO on CPAP  Plan to continue home medication regimen as indicated once med rec is complete   Will cover with SSI for now. Resume home insulin regimen at appropriate doses. Accuchecks to monitor with hypoglycemia protocol in place    ----------  -DVT prophylaxis: lovenox   -Activity: as tolerated  -Expected length of stay: less than two midnights   -Disposition Pending course, anticipate return home once clinically improved    High risk secondary to resp failure due to CHF exacerbation     Code Status and Medical Interventions: CPR (Attempt to Resuscitate); Full Support   Ordered at: 01/02/25 1528     Level Of Support Discussed With:    Patient     Code Status (Patient has no pulse and is not breathing):    CPR (Attempt to Resuscitate)     Medical Interventions (Patient has pulse or is breathing):    Full Support       Rob Haywood PA-C   01/02/25  16:50 EST

## 2025-01-03 ENCOUNTER — APPOINTMENT (OUTPATIENT)
Dept: CARDIOLOGY | Facility: HOSPITAL | Age: 40
DRG: 280 | End: 2025-01-03
Payer: COMMERCIAL

## 2025-01-03 LAB
ANION GAP SERPL CALCULATED.3IONS-SCNC: 12.8 MMOL/L (ref 5–15)
AV MEAN PRESS GRAD SYS DOP V1V2: 5 MMHG
AV VMAX SYS DOP: 139 CM/SEC
BH CV ECHO MEAS - ACS: 1.8 CM
BH CV ECHO MEAS - AO MAX PG: 7.7 MMHG
BH CV ECHO MEAS - AO ROOT DIAM: 3.2 CM
BH CV ECHO MEAS - AO V2 VTI: 25 CM
BH CV ECHO MEAS - AVA(I,D): 3 CM2
BH CV ECHO MEAS - EDV(CUBED): 157.5 ML
BH CV ECHO MEAS - EDV(MOD-SP2): 126 ML
BH CV ECHO MEAS - EDV(MOD-SP4): 162 ML
BH CV ECHO MEAS - EF(MOD-SP2): 56 %
BH CV ECHO MEAS - EF(MOD-SP4): 56.3 %
BH CV ECHO MEAS - ESV(CUBED): 68.9 ML
BH CV ECHO MEAS - ESV(MOD-SP2): 55.4 ML
BH CV ECHO MEAS - ESV(MOD-SP4): 70.8 ML
BH CV ECHO MEAS - FS: 24.1 %
BH CV ECHO MEAS - IVS/LVPW: 1 CM
BH CV ECHO MEAS - IVSD: 1.1 CM
BH CV ECHO MEAS - LA DIMENSION: 3 CM
BH CV ECHO MEAS - LAT PEAK E' VEL: 10.3 CM/SEC
BH CV ECHO MEAS - LV DIASTOLIC VOL/BSA (35-75): 88.2 CM2
BH CV ECHO MEAS - LV MASS(C)D: 234.8 GRAMS
BH CV ECHO MEAS - LV MAX PG: 4.1 MMHG
BH CV ECHO MEAS - LV MEAN PG: 2 MMHG
BH CV ECHO MEAS - LV SYSTOLIC VOL/BSA (12-30): 38.6 CM2
BH CV ECHO MEAS - LV V1 MAX: 101 CM/SEC
BH CV ECHO MEAS - LV V1 VTI: 21.5 CM
BH CV ECHO MEAS - LVIDD: 5.4 CM
BH CV ECHO MEAS - LVIDS: 4.1 CM
BH CV ECHO MEAS - LVOT AREA: 3.5 CM2
BH CV ECHO MEAS - LVOT DIAM: 2.1 CM
BH CV ECHO MEAS - LVPWD: 1.1 CM
BH CV ECHO MEAS - MED PEAK E' VEL: 6 CM/SEC
BH CV ECHO MEAS - MR MAX PG: 22 MMHG
BH CV ECHO MEAS - MR MAX VEL: 233.5 CM/SEC
BH CV ECHO MEAS - MV A MAX VEL: 85.7 CM/SEC
BH CV ECHO MEAS - MV DEC SLOPE: 452 CM/SEC2
BH CV ECHO MEAS - MV DEC TIME: 0.25 SEC
BH CV ECHO MEAS - MV E MAX VEL: 113 CM/SEC
BH CV ECHO MEAS - MV E/A: 1.32
BH CV ECHO MEAS - MV MAX PG: 5.7 MMHG
BH CV ECHO MEAS - MV MEAN PG: 3 MMHG
BH CV ECHO MEAS - MV V2 VTI: 27.8 CM
BH CV ECHO MEAS - MVA(VTI): 2.7 CM2
BH CV ECHO MEAS - PA ACC TIME: 0.13 SEC
BH CV ECHO MEAS - PA V2 MAX: 105 CM/SEC
BH CV ECHO MEAS - RAP SYSTOLE: 10 MMHG
BH CV ECHO MEAS - RVSP: 26 MMHG
BH CV ECHO MEAS - SV(LVOT): 74.5 ML
BH CV ECHO MEAS - SV(MOD-SP2): 70.6 ML
BH CV ECHO MEAS - SV(MOD-SP4): 91.2 ML
BH CV ECHO MEAS - SVI(LVOT): 40.6 ML/M2
BH CV ECHO MEAS - SVI(MOD-SP2): 38.5 ML/M2
BH CV ECHO MEAS - SVI(MOD-SP4): 49.7 ML/M2
BH CV ECHO MEAS - TAPSE (>1.6): 2.47 CM
BH CV ECHO MEAS - TR MAX PG: 16 MMHG
BH CV ECHO MEAS - TR MAX VEL: 200 CM/SEC
BH CV ECHO MEASUREMENTS AVERAGE E/E' RATIO: 13.87
BUN SERPL-MCNC: 22 MG/DL (ref 6–20)
BUN/CREAT SERPL: 28.6 (ref 7–25)
CALCIUM SPEC-SCNC: 9.1 MG/DL (ref 8.6–10.5)
CHLORIDE SERPL-SCNC: 99 MMOL/L (ref 98–107)
CO2 SERPL-SCNC: 23.2 MMOL/L (ref 22–29)
CREAT SERPL-MCNC: 0.77 MG/DL (ref 0.57–1)
DEPRECATED RDW RBC AUTO: 37.9 FL (ref 37–54)
EGFRCR SERPLBLD CKD-EPI 2021: 99.5 ML/MIN/1.73
ERYTHROCYTE [DISTWIDTH] IN BLOOD BY AUTOMATED COUNT: 12.6 % (ref 12.3–15.4)
GEN 5 1HR TROPONIN T REFLEX: 19 NG/L
GLUCOSE BLDC GLUCOMTR-MCNC: 136 MG/DL (ref 70–130)
GLUCOSE BLDC GLUCOMTR-MCNC: 169 MG/DL (ref 70–130)
GLUCOSE BLDC GLUCOMTR-MCNC: 195 MG/DL (ref 70–130)
GLUCOSE BLDC GLUCOMTR-MCNC: 217 MG/DL (ref 70–130)
GLUCOSE SERPL-MCNC: 225 MG/DL (ref 65–99)
HCT VFR BLD AUTO: 39 % (ref 34–46.6)
HGB BLD-MCNC: 13 G/DL (ref 12–15.9)
LEFT ATRIUM VOLUME INDEX: 27.8 ML/M2
LV EF BIPLANE MOD: 55.7 %
MAGNESIUM SERPL-MCNC: 1.9 MG/DL (ref 1.6–2.6)
MCH RBC QN AUTO: 27.8 PG (ref 26.6–33)
MCHC RBC AUTO-ENTMCNC: 33.3 G/DL (ref 31.5–35.7)
MCV RBC AUTO: 83.3 FL (ref 79–97)
PLATELET # BLD AUTO: 167 10*3/MM3 (ref 140–450)
PMV BLD AUTO: 11.2 FL (ref 6–12)
POTASSIUM SERPL-SCNC: 4.2 MMOL/L (ref 3.5–5.2)
QT INTERVAL: 402 MS
QTC INTERVAL: 460 MS
RBC # BLD AUTO: 4.68 10*6/MM3 (ref 3.77–5.28)
SODIUM SERPL-SCNC: 135 MMOL/L (ref 136–145)
TROPONIN T % DELTA: 12 %
TROPONIN T NUMERIC DELTA: 2 NG/L
TROPONIN T SERPL HS-MCNC: 17 NG/L
WBC NRBC COR # BLD AUTO: 12.33 10*3/MM3 (ref 3.4–10.8)

## 2025-01-03 PROCEDURE — 25010000002 ENOXAPARIN PER 10 MG: Performed by: INTERNAL MEDICINE

## 2025-01-03 PROCEDURE — 82948 REAGENT STRIP/BLOOD GLUCOSE: CPT

## 2025-01-03 PROCEDURE — 84484 ASSAY OF TROPONIN QUANT: CPT | Performed by: INTERNAL MEDICINE

## 2025-01-03 PROCEDURE — 93306 TTE W/DOPPLER COMPLETE: CPT | Performed by: INTERNAL MEDICINE

## 2025-01-03 PROCEDURE — G0378 HOSPITAL OBSERVATION PER HR: HCPCS

## 2025-01-03 PROCEDURE — 83735 ASSAY OF MAGNESIUM: CPT | Performed by: INTERNAL MEDICINE

## 2025-01-03 PROCEDURE — 85027 COMPLETE CBC AUTOMATED: CPT | Performed by: INTERNAL MEDICINE

## 2025-01-03 PROCEDURE — 93005 ELECTROCARDIOGRAM TRACING: CPT | Performed by: INTERNAL MEDICINE

## 2025-01-03 PROCEDURE — 63710000001 INSULIN LISPRO (HUMAN) PER 5 UNITS

## 2025-01-03 PROCEDURE — 99233 SBSQ HOSP IP/OBS HIGH 50: CPT | Performed by: INTERNAL MEDICINE

## 2025-01-03 PROCEDURE — 80048 BASIC METABOLIC PNL TOTAL CA: CPT | Performed by: INTERNAL MEDICINE

## 2025-01-03 PROCEDURE — 63710000001 INSULIN GLARGINE PER 5 UNITS: Performed by: INTERNAL MEDICINE

## 2025-01-03 PROCEDURE — 93306 TTE W/DOPPLER COMPLETE: CPT

## 2025-01-03 PROCEDURE — 25010000002 FUROSEMIDE PER 20 MG: Performed by: INTERNAL MEDICINE

## 2025-01-03 RX ORDER — PRENATAL VIT/IRON FUM/FOLIC AC 27MG-0.8MG
1 TABLET ORAL DAILY
Status: DISCONTINUED | OUTPATIENT
Start: 2025-01-03 | End: 2025-01-05 | Stop reason: HOSPADM

## 2025-01-03 RX ORDER — PANTOPRAZOLE SODIUM 40 MG/1
40 TABLET, DELAYED RELEASE ORAL
Status: DISCONTINUED | OUTPATIENT
Start: 2025-01-03 | End: 2025-01-05 | Stop reason: HOSPADM

## 2025-01-03 RX ORDER — PANTOPRAZOLE SODIUM 40 MG/1
40 TABLET, DELAYED RELEASE ORAL 2 TIMES DAILY PRN
Status: DISCONTINUED | OUTPATIENT
Start: 2025-01-03 | End: 2025-01-05 | Stop reason: HOSPADM

## 2025-01-03 RX ORDER — NIFEDIPINE 30 MG/1
90 TABLET, EXTENDED RELEASE ORAL DAILY
Status: DISCONTINUED | OUTPATIENT
Start: 2025-01-03 | End: 2025-01-05 | Stop reason: HOSPADM

## 2025-01-03 RX ORDER — ENALAPRIL MALEATE 10 MG/1
20 TABLET ORAL DAILY
Status: DISCONTINUED | OUTPATIENT
Start: 2025-01-03 | End: 2025-01-05 | Stop reason: HOSPADM

## 2025-01-03 RX ORDER — FUROSEMIDE 10 MG/ML
80 INJECTION INTRAMUSCULAR; INTRAVENOUS EVERY 12 HOURS
Status: DISCONTINUED | OUTPATIENT
Start: 2025-01-03 | End: 2025-01-05 | Stop reason: HOSPADM

## 2025-01-03 RX ORDER — HYDRALAZINE HYDROCHLORIDE 25 MG/1
25 TABLET, FILM COATED ORAL EVERY 8 HOURS SCHEDULED
Status: DISCONTINUED | OUTPATIENT
Start: 2025-01-03 | End: 2025-01-05 | Stop reason: HOSPADM

## 2025-01-03 RX ORDER — PANTOPRAZOLE SODIUM 40 MG/10ML
40 INJECTION, POWDER, LYOPHILIZED, FOR SOLUTION INTRAVENOUS
Status: DISCONTINUED | OUTPATIENT
Start: 2025-01-03 | End: 2025-01-03

## 2025-01-03 RX ORDER — ARIPIPRAZOLE 2 MG/1
2 TABLET ORAL DAILY
Status: DISCONTINUED | OUTPATIENT
Start: 2025-01-03 | End: 2025-01-05 | Stop reason: HOSPADM

## 2025-01-03 RX ORDER — SPIRONOLACTONE 100 MG/1
100 TABLET, FILM COATED ORAL DAILY
Status: DISCONTINUED | OUTPATIENT
Start: 2025-01-03 | End: 2025-01-05 | Stop reason: HOSPADM

## 2025-01-03 RX ADMIN — CARVEDILOL 25 MG: 25 TABLET, FILM COATED ORAL at 17:26

## 2025-01-03 RX ADMIN — FUROSEMIDE 80 MG: 10 INJECTION, SOLUTION INTRAMUSCULAR; INTRAVENOUS at 22:38

## 2025-01-03 RX ADMIN — ANTACID TABLETS 1 TABLET: 500 TABLET, CHEWABLE ORAL at 04:06

## 2025-01-03 RX ADMIN — FUROSEMIDE 40 MG: 10 INJECTION, SOLUTION INTRAMUSCULAR; INTRAVENOUS at 15:00

## 2025-01-03 RX ADMIN — HYDRALAZINE HYDROCHLORIDE 25 MG: 25 TABLET ORAL at 13:58

## 2025-01-03 RX ADMIN — Medication 10 ML: at 20:45

## 2025-01-03 RX ADMIN — INSULIN LISPRO 3 UNITS: 100 INJECTION, SOLUTION INTRAVENOUS; SUBCUTANEOUS at 17:25

## 2025-01-03 RX ADMIN — PRENATAL VITAMINS-IRON FUMARATE 27 MG IRON-FOLIC ACID 0.8 MG TABLET 1 TABLET: at 08:56

## 2025-01-03 RX ADMIN — ENOXAPARIN SODIUM 40 MG: 100 INJECTION SUBCUTANEOUS at 08:55

## 2025-01-03 RX ADMIN — Medication 10 ML: at 08:58

## 2025-01-03 RX ADMIN — INSULIN GLARGINE 15 UNITS: 100 INJECTION, SOLUTION SUBCUTANEOUS at 20:45

## 2025-01-03 RX ADMIN — PANTOPRAZOLE SODIUM 40 MG: 40 TABLET, DELAYED RELEASE ORAL at 05:56

## 2025-01-03 RX ADMIN — HYDRALAZINE HYDROCHLORIDE 25 MG: 25 TABLET ORAL at 22:38

## 2025-01-03 RX ADMIN — INSULIN LISPRO 3 UNITS: 100 INJECTION, SOLUTION INTRAVENOUS; SUBCUTANEOUS at 08:55

## 2025-01-03 RX ADMIN — PANTOPRAZOLE SODIUM 40 MG: 40 TABLET, DELAYED RELEASE ORAL at 17:26

## 2025-01-03 RX ADMIN — INSULIN LISPRO 3 UNITS: 100 INJECTION, SOLUTION INTRAVENOUS; SUBCUTANEOUS at 20:45

## 2025-01-03 RX ADMIN — FUROSEMIDE 40 MG: 10 INJECTION, SOLUTION INTRAMUSCULAR; INTRAVENOUS at 03:29

## 2025-01-03 NOTE — PROGRESS NOTES
[facility] HOSPITALIST PROGRESS NOTE    Subjective     History:   Antoinette Pack is a 39 y.o. female admitted on 1/2/2025 secondary to CHF (congestive heart failure), NYHA class II, acute on chronic, diastolic     She claims to be doing no better than yesterday evening despite easily looking much better.  She endorsed persistent shortness of breath and palpitations.    Objective     Vital Signs  Temp:  [97.5 °F (36.4 °C)-98.2 °F (36.8 °C)] 98.2 °F (36.8 °C)  Heart Rate:  [73-97] 82  Resp:  [18-20] 18  BP: ()/(55-82) 141/71    Intake/Output Summary (Last 24 hours) at 1/3/2025 1850  Last data filed at 1/3/2025 1400  Gross per 24 hour   Intake 480 ml   Output 750 ml   Net -270 ml         Physical Exam:  General: In no acute distress.  Cardiovascular: Regular rate and rhythm.  Normal S1, S2.  No murmurs, gallops or rubs.  Lungs: Clear to auscultation bilaterally.  Abdomen: Normal, active bowel sounds.  Soft, nontender, nondistended.  Extremities: No edema.    Results Review:    Results from last 7 days   Lab Units 01/03/25  0106 01/02/25  0258   WBC 10*3/mm3 12.33* 7.56   HEMOGLOBIN g/dL 13.0 12.4   PLATELETS 10*3/mm3 167 134*     Results from last 7 days   Lab Units 01/03/25  0106 01/02/25  1704 01/02/25  0258   SODIUM mmol/L 135*  --  137   POTASSIUM mmol/L 4.2 4.0 3.4*   CHLORIDE mmol/L 99  --  101   CO2 mmol/L 23.2  --  21.1*   BUN mg/dL 22*  --  13   CREATININE mg/dL 0.77  --  0.57   CALCIUM mg/dL 9.1  --  8.8   GLUCOSE mg/dL 225*  --  234*     Results from last 7 days   Lab Units 01/02/25  0258   BILIRUBIN mg/dL 0.5   ALK PHOS U/L 101   AST (SGOT) U/L 14   ALT (SGPT) U/L 15     Results from last 7 days   Lab Units 01/03/25  0106   MAGNESIUM mg/dL 1.9     Results from last 7 days   Lab Units 01/02/25  0258   INR  1.02     Results from last 7 days   Lab Units 01/03/25  1309 01/03/25  1209 01/02/25  1704   HSTROP T ng/L 19* 17* 15*       Imaging Results (Last 24 Hours)       ** No  results found for the last 24 hours. **              Medications:  ARIPiprazole, 2 mg, Oral, Daily  carvedilol, 25 mg, Oral, BID With Meals  enalapril, 20 mg, Oral, Daily  enoxaparin, 40 mg, Subcutaneous, Daily  furosemide, 40 mg, Intravenous, Q12H  hydrALAZINE, 25 mg, Oral, Q8H  insulin glargine, 15 Units, Subcutaneous, Nightly  insulin lispro, 3-14 Units, Subcutaneous, 4x Daily AC & at Bedtime  NIFEdipine XL, 90 mg, Oral, Daily  pantoprazole, 40 mg, Oral, BID AC  prenatal vitamin 27-0.8, 1 tablet, Oral, Daily  sodium chloride, 10 mL, Intravenous, Q12H  spironolactone, 100 mg, Oral, Daily               Assessment & Plan   Acute on chronic diastolic congestive heart failure/pulmonary edema - continue IV Lasix.  Will increase to 80 mg twice daily from 40 mg twice daily.  Echo showed an EF of about 55% and grade 2 diastolic dysfunction.  It also showed mild LVH.  Estimated RVSP was less than 35 mmHg.  Serial troponin I levels were flat and in the teens.  proBNP was normal.  TSH and free T4 had been normal on 12/18/2024.  She claims to not be doing any better than yesterday evening, but much better than on presentation.  Continue current management.  Medications include carvedilol, spironolactone and enalapril (she is breast-feeding.  Apparently this medication was cleared by her OB/GYN).  Again, note her EF is normal.  She follows with a cardiologist in [place].    Hypertensive urgency versus emergency - unclear if her extreme blood pressure elevation (226/118 on arrival) precipitated flash pulmonary edema or if the converse was true.  Regardless, her blood pressure is now good.  Continue current antihypertensive agents.    Acute hypoxic respiratory failure - due to CHF exacerbation/pulmonary edema.  Is now requiring 2 L supplemental oxygen.    Hypokalemia - supplemented.  Continue monitoring while on IV Lasix.  Magnesium level normal.    Type 2 diabetes mellitus - hemoglobin A1c only 5.7.  On Lantus and SSI.    ALEJANDRO  (uses CPAP)    VTE Prophylaxis:  Pharmacologic VTE prophylaxis orders are present.      Diet Order   Procedures    Diet: Cardiac, Diabetic; Healthy Heart (2-3 Na+); Consistent Carbohydrate; Fluid Consistency: Thin (IDDSI 0)      Level Of Support Discussed With: Patient  Code Status (Patient has no pulse and is not breathing): CPR (Attempt to Resuscitate)  Medical Interventions (Patient has pulse or is breathing): Full Support     GIppx:   Disposition:      Jamaica Matthews MD  01/03/25  18:50 EST

## 2025-01-03 NOTE — PLAN OF CARE
Goal Outcome Evaluation:  Plan of Care Reviewed With: patient           Outcome Evaluation: Patient resting in bed at this time. VSS. A&O. Pt ambulated in room this shift. No acute changes or concerns at this time. Will continue with plan of care.

## 2025-01-03 NOTE — PLAN OF CARE
Goal Outcome Evaluation:      Pt has been resting this shift. No signs and symptoms of acute distress noted. No complaints of chest pain or SOB reported.

## 2025-01-04 ENCOUNTER — APPOINTMENT (OUTPATIENT)
Dept: GENERAL RADIOLOGY | Facility: HOSPITAL | Age: 40
DRG: 280 | End: 2025-01-04
Payer: MEDICARE

## 2025-01-04 LAB
ANION GAP SERPL CALCULATED.3IONS-SCNC: 11 MMOL/L (ref 5–15)
BUN SERPL-MCNC: 27 MG/DL (ref 6–20)
BUN/CREAT SERPL: 37 (ref 7–25)
CALCIUM SPEC-SCNC: 8.3 MG/DL (ref 8.6–10.5)
CHLORIDE SERPL-SCNC: 102 MMOL/L (ref 98–107)
CO2 SERPL-SCNC: 24 MMOL/L (ref 22–29)
CREAT SERPL-MCNC: 0.73 MG/DL (ref 0.57–1)
EGFRCR SERPLBLD CKD-EPI 2021: 106.1 ML/MIN/1.73
GLUCOSE BLDC GLUCOMTR-MCNC: 111 MG/DL (ref 70–130)
GLUCOSE BLDC GLUCOMTR-MCNC: 120 MG/DL (ref 70–130)
GLUCOSE BLDC GLUCOMTR-MCNC: 125 MG/DL (ref 70–130)
GLUCOSE BLDC GLUCOMTR-MCNC: 141 MG/DL (ref 70–130)
GLUCOSE SERPL-MCNC: 138 MG/DL (ref 65–99)
MAGNESIUM SERPL-MCNC: 1.9 MG/DL (ref 1.6–2.6)
POTASSIUM SERPL-SCNC: 4 MMOL/L (ref 3.5–5.2)
SODIUM SERPL-SCNC: 137 MMOL/L (ref 136–145)

## 2025-01-04 PROCEDURE — 63710000001 INSULIN GLARGINE PER 5 UNITS: Performed by: INTERNAL MEDICINE

## 2025-01-04 PROCEDURE — 99232 SBSQ HOSP IP/OBS MODERATE 35: CPT | Performed by: INTERNAL MEDICINE

## 2025-01-04 PROCEDURE — 71045 X-RAY EXAM CHEST 1 VIEW: CPT | Performed by: RADIOLOGY

## 2025-01-04 PROCEDURE — 25010000002 FUROSEMIDE PER 20 MG: Performed by: INTERNAL MEDICINE

## 2025-01-04 PROCEDURE — 71045 X-RAY EXAM CHEST 1 VIEW: CPT

## 2025-01-04 PROCEDURE — 82948 REAGENT STRIP/BLOOD GLUCOSE: CPT

## 2025-01-04 PROCEDURE — 80048 BASIC METABOLIC PNL TOTAL CA: CPT | Performed by: INTERNAL MEDICINE

## 2025-01-04 PROCEDURE — 83735 ASSAY OF MAGNESIUM: CPT | Performed by: INTERNAL MEDICINE

## 2025-01-04 PROCEDURE — 25010000002 ENOXAPARIN PER 10 MG: Performed by: INTERNAL MEDICINE

## 2025-01-04 RX ADMIN — Medication 10 ML: at 09:50

## 2025-01-04 RX ADMIN — PANTOPRAZOLE SODIUM 40 MG: 40 TABLET, DELAYED RELEASE ORAL at 17:28

## 2025-01-04 RX ADMIN — Medication 10 ML: at 21:09

## 2025-01-04 RX ADMIN — ENOXAPARIN SODIUM 40 MG: 100 INJECTION SUBCUTANEOUS at 09:41

## 2025-01-04 RX ADMIN — FUROSEMIDE 80 MG: 10 INJECTION, SOLUTION INTRAMUSCULAR; INTRAVENOUS at 09:42

## 2025-01-04 RX ADMIN — FUROSEMIDE 80 MG: 10 INJECTION, SOLUTION INTRAMUSCULAR; INTRAVENOUS at 21:09

## 2025-01-04 RX ADMIN — HYDRALAZINE HYDROCHLORIDE 25 MG: 25 TABLET ORAL at 05:59

## 2025-01-04 RX ADMIN — NIFEDIPINE 90 MG: 30 TABLET, FILM COATED, EXTENDED RELEASE ORAL at 09:40

## 2025-01-04 RX ADMIN — INSULIN GLARGINE 15 UNITS: 100 INJECTION, SOLUTION SUBCUTANEOUS at 21:08

## 2025-01-04 RX ADMIN — HYDRALAZINE HYDROCHLORIDE 25 MG: 25 TABLET ORAL at 21:08

## 2025-01-04 RX ADMIN — PANTOPRAZOLE SODIUM 40 MG: 40 TABLET, DELAYED RELEASE ORAL at 09:41

## 2025-01-04 RX ADMIN — HYDRALAZINE HYDROCHLORIDE 25 MG: 25 TABLET ORAL at 14:46

## 2025-01-04 RX ADMIN — SPIRONOLACTONE 100 MG: 100 TABLET ORAL at 09:40

## 2025-01-04 RX ADMIN — PRENATAL VITAMINS-IRON FUMARATE 27 MG IRON-FOLIC ACID 0.8 MG TABLET 1 TABLET: at 09:42

## 2025-01-04 NOTE — PLAN OF CARE
Goal Outcome Evaluation:   Patient has been pleasant. Patient remains on 2L/NC, saturations remaining above 90%. Compliant with care. Patient resting in bed. Call light In reach.

## 2025-01-04 NOTE — PLAN OF CARE
Goal Outcome Evaluation:           Progress: improving          Problem: Adult Inpatient Plan of Care  Goal: Plan of Care Review  Outcome: Progressing  Flowsheets  Taken 1/4/2025 1341 by Dacia Canales RN  Progress: improving  Taken 1/3/2025 1525 by Lenora Hudson RN  Plan of Care Reviewed With: patient  Goal: Patient-Specific Goal (Individualized)  Outcome: Progressing  Goal: Absence of Hospital-Acquired Illness or Injury  Outcome: Progressing  Intervention: Identify and Manage Fall Risk  Recent Flowsheet Documentation  Taken 1/4/2025 1300 by Dacia Canales RN  Safety Promotion/Fall Prevention:   nonskid shoes/slippers when out of bed   safety round/check completed  Taken 1/4/2025 1100 by Dacia Canales RN  Safety Promotion/Fall Prevention:   nonskid shoes/slippers when out of bed   safety round/check completed  Taken 1/4/2025 0900 by Dacia Canales RN  Safety Promotion/Fall Prevention:   nonskid shoes/slippers when out of bed   safety round/check completed  Taken 1/4/2025 0701 by Dacia Canales RN  Safety Promotion/Fall Prevention:   nonskid shoes/slippers when out of bed   safety round/check completed  Intervention: Prevent Infection  Recent Flowsheet Documentation  Taken 1/4/2025 0701 by Dacia Canales RN  Infection Prevention: hand hygiene promoted  Goal: Optimal Comfort and Wellbeing  Outcome: Progressing  Goal: Readiness for Transition of Care  Outcome: Progressing     Problem: Comorbidity Management  Goal: Blood Glucose Level Within Target Range  Outcome: Progressing  Intervention: Monitor and Manage Glycemia  Recent Flowsheet Documentation  Taken 1/4/2025 0701 by Dcaia Canales RN  Medication Review/Management: medications reviewed  Goal: Maintenance of Heart Failure Symptom Control  Outcome: Progressing  Intervention: Maintain Heart Failure Management  Recent Flowsheet Documentation  Taken 1/4/2025 0701 by Dacia Canales RN  Medication Review/Management: medications reviewed  Goal: Blood  Pressure in Desired Range  Outcome: Progressing  Intervention: Maintain Blood Pressure Management  Recent Flowsheet Documentation  Taken 1/4/2025 0701 by Dacia Canales, RN  Medication Review/Management: medications reviewed

## 2025-01-05 ENCOUNTER — READMISSION MANAGEMENT (OUTPATIENT)
Dept: CALL CENTER | Facility: HOSPITAL | Age: 40
End: 2025-01-05
Payer: COMMERCIAL

## 2025-01-05 VITALS
HEIGHT: 59 IN | OXYGEN SATURATION: 98 % | SYSTOLIC BLOOD PRESSURE: 134 MMHG | TEMPERATURE: 97.8 F | HEART RATE: 83 BPM | WEIGHT: 197.6 LBS | DIASTOLIC BLOOD PRESSURE: 75 MMHG | BODY MASS INDEX: 39.84 KG/M2 | RESPIRATION RATE: 18 BRPM

## 2025-01-05 PROBLEM — I50.31 ACUTE HEART FAILURE WITH PRESERVED EJECTION FRACTION (HFPEF): Status: ACTIVE | Noted: 2025-01-05

## 2025-01-05 PROBLEM — I50.33 ACUTE ON CHRONIC HEART FAILURE WITH PRESERVED EJECTION FRACTION (HFPEF): Status: ACTIVE | Noted: 2025-01-05

## 2025-01-05 LAB
ANION GAP SERPL CALCULATED.3IONS-SCNC: 15.1 MMOL/L (ref 5–15)
BUN SERPL-MCNC: 30 MG/DL (ref 6–20)
BUN/CREAT SERPL: 40 (ref 7–25)
CALCIUM SPEC-SCNC: 8.7 MG/DL (ref 8.6–10.5)
CHLORIDE SERPL-SCNC: 98 MMOL/L (ref 98–107)
CO2 SERPL-SCNC: 22.9 MMOL/L (ref 22–29)
CREAT SERPL-MCNC: 0.75 MG/DL (ref 0.57–1)
EGFRCR SERPLBLD CKD-EPI 2021: 102.7 ML/MIN/1.73
GLUCOSE BLDC GLUCOMTR-MCNC: 103 MG/DL (ref 70–130)
GLUCOSE BLDC GLUCOMTR-MCNC: 130 MG/DL (ref 70–130)
GLUCOSE SERPL-MCNC: 162 MG/DL (ref 65–99)
POTASSIUM SERPL-SCNC: 3.4 MMOL/L (ref 3.5–5.2)
SODIUM SERPL-SCNC: 136 MMOL/L (ref 136–145)

## 2025-01-05 PROCEDURE — 82948 REAGENT STRIP/BLOOD GLUCOSE: CPT

## 2025-01-05 PROCEDURE — 25010000002 ENOXAPARIN PER 10 MG: Performed by: INTERNAL MEDICINE

## 2025-01-05 PROCEDURE — 25010000002 FUROSEMIDE PER 20 MG: Performed by: INTERNAL MEDICINE

## 2025-01-05 PROCEDURE — 80048 BASIC METABOLIC PNL TOTAL CA: CPT | Performed by: INTERNAL MEDICINE

## 2025-01-05 PROCEDURE — 99239 HOSP IP/OBS DSCHRG MGMT >30: CPT | Performed by: INTERNAL MEDICINE

## 2025-01-05 RX ORDER — POTASSIUM CHLORIDE 1500 MG/1
40 TABLET, EXTENDED RELEASE ORAL EVERY 4 HOURS
Status: COMPLETED | OUTPATIENT
Start: 2025-01-05 | End: 2025-01-05

## 2025-01-05 RX ORDER — FUROSEMIDE 40 MG/1
40 TABLET ORAL DAILY
Qty: 15 TABLET | Refills: 0 | Status: SHIPPED | OUTPATIENT
Start: 2025-01-05

## 2025-01-05 RX ORDER — FUROSEMIDE 40 MG/1
40 TABLET ORAL DAILY
Qty: 90 TABLET | Refills: 0 | Status: SHIPPED | OUTPATIENT
Start: 2025-01-05 | End: 2025-01-05

## 2025-01-05 RX ADMIN — PANTOPRAZOLE SODIUM 40 MG: 40 TABLET, DELAYED RELEASE ORAL at 09:32

## 2025-01-05 RX ADMIN — ENOXAPARIN SODIUM 40 MG: 100 INJECTION SUBCUTANEOUS at 09:33

## 2025-01-05 RX ADMIN — POTASSIUM CHLORIDE 40 MEQ: 1500 TABLET, EXTENDED RELEASE ORAL at 06:05

## 2025-01-05 RX ADMIN — FUROSEMIDE 80 MG: 10 INJECTION, SOLUTION INTRAMUSCULAR; INTRAVENOUS at 09:33

## 2025-01-05 RX ADMIN — PRENATAL VITAMINS-IRON FUMARATE 27 MG IRON-FOLIC ACID 0.8 MG TABLET 1 TABLET: at 09:32

## 2025-01-05 RX ADMIN — CARVEDILOL 25 MG: 25 TABLET, FILM COATED ORAL at 09:32

## 2025-01-05 RX ADMIN — SPIRONOLACTONE 100 MG: 100 TABLET ORAL at 09:32

## 2025-01-05 RX ADMIN — ARIPIPRAZOLE 2 MG: 2 TABLET ORAL at 09:32

## 2025-01-05 RX ADMIN — Medication 10 ML: at 09:33

## 2025-01-05 RX ADMIN — POTASSIUM CHLORIDE 40 MEQ: 1500 TABLET, EXTENDED RELEASE ORAL at 09:32

## 2025-01-05 RX ADMIN — ENALAPRIL MALEATE 20 MG: 10 TABLET ORAL at 09:31

## 2025-01-05 RX ADMIN — NIFEDIPINE 90 MG: 30 TABLET, FILM COATED, EXTENDED RELEASE ORAL at 09:31

## 2025-01-05 NOTE — PROGRESS NOTES
[facility] HOSPITALIST PROGRESS NOTE    Subjective     History:   Antoinette Pack is a 39 y.o. female admitted on 1/2/2025 secondary to CHF (congestive heart failure), NYHA class II, acute on chronic, diastolic     Doing better than yesterday but still does not feel ready to go home.  Both her shortness of breath and palpitations have improved, but have not resolved.  Worried that she lives far away and would not be readily able to make it back if her symptoms recurred.  Frustrated that she is continuing to experience such symptoms even though she delivered her baby in April.  Said that the symptoms had been thought to be pregnancy related.      Objective     Vital Signs  Temp:  [97.5 °F (36.4 °C)-98.5 °F (36.9 °C)] 98 °F (36.7 °C)  Heart Rate:  [3-85] 80  Resp:  [18-20] 20  BP: (112-142)/(52-87) 123/65    Intake/Output Summary (Last 24 hours) at 1/4/2025 2021  Last data filed at 1/4/2025 1200  Gross per 24 hour   Intake 600 ml   Output 1550 ml   Net -950 ml         Physical Exam:  General: In no acute distress.  Cardiovascular: Regular rate and rhythm.  Normal S1, S2.  No murmurs, gallops or rubs.  Lungs: Clear to auscultation bilaterally.  Abdomen: Normal, active bowel sounds.  Soft, nontender, nondistended.  Extremities: No edema.      Results Review:    Results from last 7 days   Lab Units 01/03/25  0106 01/02/25  0258   WBC 10*3/mm3 12.33* 7.56   HEMOGLOBIN g/dL 13.0 12.4   PLATELETS 10*3/mm3 167 134*     Results from last 7 days   Lab Units 01/04/25  0338 01/03/25  0106 01/02/25  1704 01/02/25  0258   SODIUM mmol/L 137 135*  --  137   POTASSIUM mmol/L 4.0 4.2 4.0 3.4*   CHLORIDE mmol/L 102 99  --  101   CO2 mmol/L 24.0 23.2  --  21.1*   BUN mg/dL 27* 22*  --  13   CREATININE mg/dL 0.73 0.77  --  0.57   CALCIUM mg/dL 8.3* 9.1  --  8.8   GLUCOSE mg/dL 138* 225*  --  234*     Results from last 7 days   Lab Units 01/02/25  0258   BILIRUBIN mg/dL 0.5   ALK PHOS U/L 101   AST (SGOT) U/L 14    ALT (SGPT) U/L 15     Results from last 7 days   Lab Units 01/04/25  0338 01/03/25  0106   MAGNESIUM mg/dL 1.9 1.9     Results from last 7 days   Lab Units 01/02/25  0258   INR  1.02     Results from last 7 days   Lab Units 01/03/25  1309 01/03/25  1209 01/02/25  1704   HSTROP T ng/L 19* 17* 15*       Imaging Results (Last 24 Hours)       Procedure Component Value Units Date/Time    XR Chest 1 View [[id number]] Collected: 01/04/25 1418     Updated: 01/04/25 1422    Narrative:      PROCEDURE: Portable chest x-ray examination performed on January 4, 2025. Single view.     HISTORY: Fluid overload.     COMPARISON: None.     FINDINGS:     Mild enlarged heart size  Central pulmonary vascular congestion with mild edema.  No lobar consolidation or pleural effusion.  Hypoinflated lungs.       Impression:         Mild enlarged heart size.  Central pulmonary vascular congestion with mild edema.  No pleural effusion.   No pneumothorax  Hypoinflated lungs.     This report was finalized on 1/4/2025 2:20 PM by Zoran Rubalcava MD.                 Medications:  ARIPiprazole, 2 mg, Oral, Daily  carvedilol, 25 mg, Oral, BID With Meals  enalapril, 20 mg, Oral, Daily  enoxaparin, 40 mg, Subcutaneous, Daily  furosemide, 80 mg, Intravenous, Q12H  hydrALAZINE, 25 mg, Oral, Q8H  insulin glargine, 15 Units, Subcutaneous, Nightly  insulin lispro, 3-14 Units, Subcutaneous, 4x Daily AC & at Bedtime  NIFEdipine XL, 90 mg, Oral, Daily  pantoprazole, 40 mg, Oral, BID AC  prenatal vitamin 27-0.8, 1 tablet, Oral, Daily  sodium chloride, 10 mL, Intravenous, Q12H  spironolactone, 100 mg, Oral, Daily               Assessment & Plan   Acute on chronic diastolic congestive heart failure/pulmonary edema - continue IV Lasix at 80 mg twice daily (had not had adequate diuresis at 40 mg twice daily).  Is now approximately 6 L negative.  Echo showed an EF of about 55% and grade 2 diastolic dysfunction.  It also showed mild LVH.  Estimated RVSP was less than  35 mmHg.  Serial troponin I levels were flat and in the teens.  proBNP was normal.  TSH and free T4 had been normal on 12/18/2024.  Medications include carvedilol, spironolactone and enalapril (she is breast-feeding.  Apparently this medication was cleared by her OB/GYN).  Again, note her EF is normal.  She follows with a cardiologist in [place].  May benefit from scheduled rather than as needed Lasix use.     Hypertensive urgency versus emergency - unclear if her extreme blood pressure elevation (226/118 on arrival) had precipitated flash pulmonary edema or if the converse had been true.  Regardless, her blood pressure is now good.  Continue current antihypertensive agents.     Acute hypoxic respiratory failure - due to CHF exacerbation/pulmonary edema.  Is now requiring 2 L supplemental oxygen.     Hypokalemia - supplemented.  Continue monitoring while on IV Lasix.  Magnesium level normal.     Type 2 diabetes mellitus - hemoglobin A1c only 5.7.  On Lantus and SSI.     ALEJANDRO (uses CPAP)    Disposition - probable discharge tomorrow.      VTE Prophylaxis:  Pharmacologic VTE prophylaxis orders are present.      Diet Order   Procedures    Diet: Cardiac, Diabetic; Healthy Heart (2-3 Na+); Consistent Carbohydrate; Fluid Consistency: Thin (IDDSI 0)      Level Of Support Discussed With: Patient  Code Status (Patient has no pulse and is not breathing): CPR (Attempt to Resuscitate)  Medical Interventions (Patient has pulse or is breathing): Full Support     GIppx:   Disposition:      Jamaica Matthews MD  01/04/25  20:21 EST

## 2025-01-05 NOTE — DISCHARGE SUMMARY
[facility] DISCHARGE SUMMARY      Date of Admission: 1/2/2025    Date of Discharge:  1/5/2025    PCP: Florinda Bliss APRN    Admission Diagnoses: CHF (congestive heart failure), NYHA class II, acute on chronic, diastolic  Hypertensive urgency versus emergency  Acute hypoxic respiratory failure  Hypokalemia    Discharge Diagnoses:  Acute on chronic diastolic congestive heart failure exacerbation/pulmonary edema  Hypertensive urgency versus emergency  Acute hypoxic respiratory failure  Hypokalemia    Chronic medical problems:  Type 2 diabetes mellitus (hemoglobin A1c 5.7)  Obstructive sleep apnea (uses CPAP)      History of Present Illness and Hospital course by problems:  Antoinette Pack is a 39-year-old female with a history of diastolic congestive heart failure, type 2 diabetes mellitus, ALEJANDRO (on CPAP) and hypertension who presented to the ED with acute onset shortness of breath and complaints of frothy sputum production.  She had been diagnosed with COVID-19 in August 2023 and had had first noted lower extremity edema then.  She had been diagnosed with diastolic congestive heart failure but did not have any significant respiratory symptoms associated with it until she was 34 weeks pregnant in April of last year.  She stated that throughout her pregnancy, she had been monitored very closely by her obstetrician and cardiologist.  She had had lower extremity edema throughout her pregnancy but had never gone into pulmonary edema until 34 weeks gestational age.  Her baby was emergently delivered.  Since then, she had done well on scheduled spironolactone, metoprolol, enalapril (note this was apparently approved by her obstetrician even though she is still breast-feeding) and as needed Lasix.  Typically, she uses Lasix about once weekly.  She does not weigh herself daily and does not follow a strict low-sodium diet.  She had noted lower extremity edema for about 1 to 2 days but had not taken Lasix.   The shortness of breath had occurred the night of presentation and almost immediately after she had romero down for bed.  She described passing frothy sputum.  It had not appeared pink or blood-tinged.  On arrival here, blood pressure was 226/118.  Oxygen saturation had been 83% on room air.  Serial troponin I levels were 17 and 20, respectively.  BMP was notable for a potassium of 3.4 and glucose of 234.  CBC showed mild thrombocytopenia at 134.  CT a of the chest showed no evidence of a pulmonary embolus.  It showed findings consistent with central pulmonary vascular congestion and edema.  She was given two 80 mg doses of IV Lasix in the ED over the course of several hours.  Even though she had excellent diuresis with it, she was not thought to be doing well enough to be able to go home.  She had also continued to require supplemental oxygen.  On admission, she was continued on Lasix.  She is about 7 kg negative.  Echocardiogram showed an EF of about 55% and grade 2 diastolic dysfunction.  It also showed mild LVH.  Estimated RVSP was less than 35 mmHg.  Serial troponin I levels, as above, were flat.  proBNP was normal.  TSH and free T4 had been normal on 12/18/2024 and were not rechecked.  She was placed on carvedilol (because it had been thought at the time that this was the beta-blocker she had been using-she stated today that she had actually been on metoprolol) and continued on spironolactone and enalapril.  Today, she is reporting that she feels well and has had resolution of her shortness of breath and palpitations.  Blood pressure has long since normalized.  Oxygen saturation is close to 100% on room air.  Of course, her potassium was repleted.  Magnesium level was normal.  I have advised her to take Lasix daily instead of as needed.  She is to weigh herself each morning (after urinating).  She is to follow a less than 2 g sodium diet.        Condition on Discharge: Good.    Vital Signs  Vitals:    01/05/25  1000   BP: 134/75   Pulse:    Resp: 18   Temp: 97.8 °F (36.6 °C)   SpO2:        Physical Exam:  General: In no acute distress.  Cardiovascular: Regular rate and rhythm.  Normal S1, S2.  No murmurs, gallops or rubs.  Lungs: Clear to auscultation bilaterally.  Abdomen: Normal, active bowel sounds.  Soft, nontender, nondistended.  Extremities: No edema.    Discharge Disposition:   Home.      Discharge Medications:     Discharge Medications        Changes to Medications        Instructions Start Date   furosemide 40 MG tablet  Commonly known as: LASIX  What changed:   when to take this  reasons to take this   40 mg, Oral, Daily             Continue These Medications        Instructions Start Date   ARIPiprazole 2 MG tablet  Commonly known as: ABILIFY   2 mg, Oral, Daily      BD Pen Needle Brandee U/F 32G X 4 MM misc  Generic drug: Insulin Pen Needle   1 each, Not Applicable, Daily      Dexcom G6 Sensor   Change sensor Every 10 (Ten) Days.      Dexcom G6 Transmitter misc   1 each, Not Applicable, Every 3 Months      enalapril 20 MG tablet  Commonly known as: VASOTEC   20 mg, Oral, Daily      fluticasone 50 MCG/ACT nasal spray  Commonly known as: FLONASE   2 sprays, Nasal, Daily PRN      hydrALAZINE 25 MG tablet  Commonly known as: APRESOLINE   25 mg, Oral, 3 Times Daily, Hold if top number of blood pressure is less than 110. Can take extra hydralazine 25mg as needed for top number greater than      Lantus SoloStar 100 UNIT/ML injection pen  Generic drug: Insulin Glargine   15 Units, Subcutaneous, Nightly      metoprolol tartrate 50 MG tablet  Commonly known as: LOPRESSOR   50 mg, 2 Times Daily      NIFEdipine XL 90 MG 24 hr tablet  Commonly known as: PROCARDIA XL   90 mg, Oral, Daily      nitroglycerin 0.4 MG SL tablet  Commonly known as: NITROSTAT   0.4 mg, Sublingual, Every 5 Minutes PRN, Place one tablet under the tongue for chest pain every 5 minutes until chest pain is relieved. If you have to take 3 tablets, take the  "3rd and go to the hospital to be evaluated.      pantoprazole 40 MG EC tablet  Commonly known as: PROTONIX   40 mg, Oral, 2 Times Daily PRN      PRENATAL PO   1 tablet, Oral, Daily      spironolactone 100 MG tablet  Commonly known as: ALDACTONE   100 mg, Oral, Daily                 Discharge Diet:   Diabetic/less than 2 g sodium      Activity at Discharge:  As tolerated.    Discharge instructions:  Weigh first thing every morning after urinating and before any food or fluid intake.    Follow-up Appointments:   Follow-up Information       Florinda Bliss APRN .    Specialty: Nurse Practitioner  Why: Office is closed on the weekend. Someone will call Monday with an appointment.  Contact information:  [id number]  [place] [address]  [phone]                           Your Scheduled Appointments      Jan 13, 2025 8:30 AM  iodine Video Visit with CICI Lopez  [facility] ([place]) [address]  [place] [address]  [phone]   Please review the Appointment Instructions website the day before your appointment.    Ensure in iodine that you click Visits and not Urgent Care Video Visits to access your scheduled video visit.    Please sign in to iodine at least 15 minutes early for your appointment to complete the eCheck in process prior to your appointment.    The video portion of your visit will take place within the [employer] platform.  You will not be required to download an application for this visit as [employer] will automatically launch within your web browser     To test your hardware prior to the visit starting after clicking \"Join video visit\" above or if you complete eCheck in more than 24 hours prior to your appointment, click \"Test hardware.\"     Make sure that, during your video visit, you are in the state you verified when you scheduled your video appointment.              Jan 17, 2025 10:45 AM  Follow Up with Pablo Cabrera MD  [facility] " [facility] ([place]) [address]  [facility] [place] [facility] [id number] [phone]   -Bring photo ID, insurance card, and list of medications to appointment  -If testing was completed outside of [facility] then patient must bring images on a disc  -Copay will be collected at time of appointment  -Established patients should arrive 15 minutes prior to appointment         Feb 18, 2025 10:30 AM  Follow Up with [facility]  [facility] ([place]) [address] [address]  [phone]   Arrive 15 minutes prior to appointment.         May 08, 2025 9:15 AM  FOLLOW UP with CICI Veliz  [facility] ([place]) [address]  [facility] [id number] [phone]      Additional instructions:                  Test Results Pending at Discharge:       The ASCVD Risk score (Ac YADAV, et al., 2019) failed to calculate for the following reasons:    Risk score cannot be calculated because patient has a medical history suggesting prior/existing ASCVD      Jamaica Matthews MD   01/05/25  18:15 EST      Time: Greater than 30 minutes spent on this discharge.

## 2025-01-05 NOTE — PLAN OF CARE
Goal Outcome Evaluation:    Patient has been pleasant. Compliant with care. Patient remains on 2L/NC, saturations remaining above 90%. Patient resting in bed. Call light in reach.

## 2025-01-05 NOTE — OUTREACH NOTE
Prep Survey      Flowsheet Row Responses   [facility] facility patient discharged from? [facility]   Is LACE score < 7 ? No   Eligibility Readm Mgmt   Discharge diagnosis A/C CHF   Does the patient have one of the following disease processes/diagnoses(primary or secondary)? CHF   Does the patient have Home health ordered? No   Is there a DME ordered? No   Prep survey completed? Yes            Danita ZAMBRANO - Registered Nurse

## 2025-01-05 NOTE — NURSING NOTE
Pt being discharged home today on room air. RUPA Lorenzo made aware the discharge is complete. Family to transport home.

## 2025-01-06 ENCOUNTER — TELEPHONE (OUTPATIENT)
Dept: TELEMETRY | Facility: HOSPITAL | Age: 40
End: 2025-01-06
Payer: COMMERCIAL

## 2025-01-06 NOTE — PAYOR COMM NOTE
"CONTACT:  HORACE COLLINS RN  UTILIZATION MANAGEMENT DEPT.   [facility]    [address] [facility] [address], [address]   PHONE:  [phone]   FAX: [phone]   [id number]        [facility] 1/5/2024---AUTH PENDING    REF #[id number]           Antoinette Pack (39 y.o. Female)       Date of Birth   1985    Social Security Number       [address]   [address] [address]    Home Phone   [phone]    MRN   [id number]       [facility]    Marital Status                               Admission Date   1/2/25    Admission Type   Emergency    Admitting Provider   Jamaica Matthews MD    Attending Provider       Department, Room/Bed   [address], 3305/1S       Discharge Date   1/5/2025    Discharge Disposition   Home or Self Care    Discharge Destination   Home                              Attending Provider: (none)   Allergies: Dilantin [Phenytoin], Keppra [Levetiracetam], Meperidine, Topamax [Topiramate]    Isolation: None   Infection: None   Code Status: Prior    Ht: 149.9 cm (59\")   Wt: 89.6 kg (197 lb 9.6 oz)    Admission Cmt: None   Principal Problem: CHF (congestive heart failure), NYHA class II, acute on chronic, diastolic [I50.33]                   Active Insurance as of 1/2/2025       Primary Coverage       Payor Plan Insurance Group Employer/Plan Group    ANTHEM MEDICARE REPLACEMENT ANTHEM MED ADV PPO [id number]       Payor Plan Address Payor Plan Phone Number Payor Plan Fax Number Effective Dates    [address] [phone]  3/1/2024 - None Entered    [facility] [address]         Subscriber Name Subscriber Birth Date Member ID       ANTOINETTE PACK 1985 [id number]                     Emergency Contacts        (Rel.) Home Phone Work Phone Mobile Phone    Michelle Pack (Spouse) -- -- [phone]    Jonel Iverson (Father) [phone] -- --                 Discharge Summary        Jamaica Matthews MD " at 01/05/25 1157              [facility] DISCHARGE SUMMARY      Date of Admission: 1/2/2025    Date of Discharge:  1/5/2025    PCP: Florinda Bliss APRN    Admission Diagnoses: CHF (congestive heart failure), NYHA class II, acute on chronic, diastolic  Hypertensive urgency versus emergency  Acute hypoxic respiratory failure  Hypokalemia    Discharge Diagnoses:  Acute on chronic diastolic congestive heart failure exacerbation/pulmonary edema  Hypertensive urgency versus emergency  Acute hypoxic respiratory failure  Hypokalemia    Chronic medical problems:  Type 2 diabetes mellitus (hemoglobin A1c 5.7)  Obstructive sleep apnea (uses CPAP)      History of Present Illness and Hospital course by problems:  Antoinette Pack is a 39-year-old female with a history of diastolic congestive heart failure, type 2 diabetes mellitus, ALEJANDRO (on CPAP) and hypertension who presented to the ED with acute onset shortness of breath and complaints of frothy sputum production.  She had been diagnosed with COVID-19 in August 2023 and had had first noted lower extremity edema then.  She had been diagnosed with diastolic congestive heart failure but did not have any significant respiratory symptoms associated with it until she was 34 weeks pregnant in April of last year.  She stated that throughout her pregnancy, she had been monitored very closely by her obstetrician and cardiologist.  She had had lower extremity edema throughout her pregnancy but had never gone into pulmonary edema until 34 weeks gestational age.  Her baby was emergently delivered.  Since then, she had done well on scheduled spironolactone, metoprolol, enalapril (note this was apparently approved by her obstetrician even though she is still breast-feeding) and as needed Lasix.  Typically, she uses Lasix about once weekly.  She does not weigh herself daily and does not follow a strict low-sodium diet.  She had noted lower extremity edema for about 1 to 2 days  but had not taken Lasix.  The shortness of breath had occurred the night of presentation and almost immediately after she had romero down for bed.  She described passing frothy sputum.  It had not appeared pink or blood-tinged.  On arrival here, blood pressure was 226/118.  Oxygen saturation had been 83% on room air.  Serial troponin I levels were 17 and 20, respectively.  BMP was notable for a potassium of 3.4 and glucose of 234.  CBC showed mild thrombocytopenia at 134.  CT a of the chest showed no evidence of a pulmonary embolus.  It showed findings consistent with central pulmonary vascular congestion and edema.  She was given two 80 mg doses of IV Lasix in the ED over the course of several hours.  Even though she had excellent diuresis with it, she was not thought to be doing well enough to be able to go home.  She had also continued to require supplemental oxygen.  On admission, she was continued on Lasix.  She is about 7 kg negative.  Echocardiogram showed an EF of about 55% and grade 2 diastolic dysfunction.  It also showed mild LVH.  Estimated RVSP was less than 35 mmHg.  Serial troponin I levels, as above, were flat.  proBNP was normal.  TSH and free T4 had been normal on 12/18/2024 and were not rechecked.  She was placed on carvedilol (because it had been thought at the time that this was the beta-blocker she had been using-she stated today that she had actually been on metoprolol) and continued on spironolactone and enalapril.  Today, she is reporting that she feels well and has had resolution of her shortness of breath and palpitations.  Blood pressure has long since normalized.  Oxygen saturation is close to 100% on room air.  Of course, her potassium was repleted.  Magnesium level was normal.  I have advised her to take Lasix daily instead of as needed.  She is to weigh herself each morning (after urinating).  She is to follow a less than 2 g sodium diet.        Condition on Discharge: Good.    Vital  Signs  Vitals:    01/05/25 1000   BP: 134/75   Pulse:    Resp: 18   Temp: 97.8 °F (36.6 °C)   SpO2:        Physical Exam:  General: In no acute distress.  Cardiovascular: Regular rate and rhythm.  Normal S1, S2.  No murmurs, gallops or rubs.  Lungs: Clear to auscultation bilaterally.  Abdomen: Normal, active bowel sounds.  Soft, nontender, nondistended.  Extremities: No edema.    Discharge Disposition:   Home.      Discharge Medications:     Discharge Medications        Changes to Medications        Instructions Start Date   furosemide 40 MG tablet  Commonly known as: LASIX  What changed:   when to take this  reasons to take this   40 mg, Oral, Daily             Continue These Medications        Instructions Start Date   ARIPiprazole 2 MG tablet  Commonly known as: ABILIFY   2 mg, Oral, Daily      BD Pen Needle Brandee U/F 32G X 4 MM misc  Generic drug: Insulin Pen Needle   1 each, Not Applicable, Daily      Dexcom G6 Sensor   Change sensor Every 10 (Ten) Days.      Dexcom G6 Transmitter misc   1 each, Not Applicable, Every 3 Months      enalapril 20 MG tablet  Commonly known as: VASOTEC   20 mg, Oral, Daily      fluticasone 50 MCG/ACT nasal spray  Commonly known as: FLONASE   2 sprays, Nasal, Daily PRN      hydrALAZINE 25 MG tablet  Commonly known as: APRESOLINE   25 mg, Oral, 3 Times Daily, Hold if top number of blood pressure is less than 110. Can take extra hydralazine 25mg as needed for top number greater than      Lantus SoloStar 100 UNIT/ML injection pen  Generic drug: Insulin Glargine   15 Units, Subcutaneous, Nightly      metoprolol tartrate 50 MG tablet  Commonly known as: LOPRESSOR   50 mg, 2 Times Daily      NIFEdipine XL 90 MG 24 hr tablet  Commonly known as: PROCARDIA XL   90 mg, Oral, Daily      nitroglycerin 0.4 MG SL tablet  Commonly known as: NITROSTAT   0.4 mg, Sublingual, Every 5 Minutes PRN, Place one tablet under the tongue for chest pain every 5 minutes until chest pain is relieved. If you have  "to take 3 tablets, take the 3rd and go to the hospital to be evaluated.      pantoprazole 40 MG EC tablet  Commonly known as: PROTONIX   40 mg, Oral, 2 Times Daily PRN      PRENATAL PO   1 tablet, Oral, Daily      spironolactone 100 MG tablet  Commonly known as: ALDACTONE   100 mg, Oral, Daily                 Discharge Diet:   Diabetic/less than 2 g sodium      Activity at Discharge:  As tolerated.    Discharge instructions:  Weigh first thing every morning after urinating and before any food or fluid intake.    Follow-up Appointments:   Follow-up Information       Florinda Bliss APRN .    Specialty: Nurse Practitioner  Why: Office is closed on the weekend. Someone will call Monday with an appointment.  Contact information:  [address]  [place] [address]  [phone]                           Your Scheduled Appointments      Jan 13, 2025 8:30 AM  [employer] Video Visit with CICI Lopez  [facility] [address]  [place] [address]  [phone]   Please review the Appointment Instructions website the day before your appointment.    Ensure in [employer] that you click Visits and not Urgent Care Video Visits to access your scheduled video visit.    Please sign in to [employer] at least 15 minutes early for your appointment to complete the eCheck in process prior to your appointment.    The video portion of your visit will take place within the [employer] platform.  You will not be required to download an application for this visit as [employer] will automatically launch within your web browser     To test your hardware prior to the visit starting after clicking \"Join video visit\" above or if you complete eCheck in more than 24 hours prior to your appointment, click \"Test hardware.\"     Make sure that, during your video visit, you are in the state you verified when you scheduled your video appointment.              Jan 17, 2025 10:45 AM  Follow Up with Pablo Norwood " MD Deborah  [facility] CARDIOLOGY ([place]) [address]  [facility] [address] [phone]   -Bring photo ID, insurance card, and list of medications to appointment  -If testing was completed outside of [facility] then patient must bring images on a disc  -Copay will be collected at time of appointment  -Established patients should arrive 15 minutes prior to appointment         Feb 18, 2025 10:30 AM  [facility]  [facility] ([place]) [address] [id number] [phone]   Arrive 15 minutes prior to appointment.         May 08, 2025 9:15 AM  FOLLOW UP with CICI Veliz  [facility] ENDOCRINOLOGY ([place]) [address]  [facility] [address] [phone]      Additional instructions:                  Test Results Pending at Discharge:       The ASCVD Risk score (Ac YADAV, et al., 2019) failed to calculate for the following reasons:    Risk score cannot be calculated because patient has a medical history suggesting prior/existing ASCVD      Jamaica Montana MD   01/05/25  18:15 EST      Time: Greater than 30 minutes spent on this discharge.            Electronically signed by Jamaica Montana MD at 01/05/25 1833       Discharge Order (From admission, onward)       Start     Ordered    01/05/25 1019  Discharge patient  Once        Expected Discharge Date: 01/05/25   Discharge Disposition: Home or Self Care   Physician of Record for Attribution - Please select from Treatment Team: JAMAICA MONTANA [[id number]]   Review needed by CMO to determine Physician of Record: No   Please choose which facility the patient is currently admitted if they are being discharged to another facility or unit.:  [place]      Question Answer Comment   Physician of Record for Attribution - Please select from Treatment Team HARDY, JAMAICA A    Review needed by CMO to determine  Physician of Record No    Please choose which facility the patient is currently admitted if they are being discharged to another facility or unit. ALLEN Palma        01/05/25 1027

## 2025-01-08 ENCOUNTER — SPECIALTY PHARMACY (OUTPATIENT)
Dept: PHARMACY | Facility: HOSPITAL | Age: 40
End: 2025-01-08
Payer: COMMERCIAL

## 2025-01-08 RX ORDER — ACYCLOVIR 800 MG/1
1 TABLET ORAL
Qty: 2 EACH | Refills: 2 | Status: SHIPPED | OUTPATIENT
Start: 2025-01-08

## 2025-01-08 NOTE — PROGRESS NOTES
"Specialty Pharmacy Refill Coordination Note     Antoinette is a 39 y.o. female contacted today regarding refills of  Freestyle Sensor specialty medication(s).    Reviewed and verified with patient:       Specialty medication(s) and dose(s) confirmed: yes    Refill Questions      Flowsheet Row Most Recent Value   Changes to allergies? No   Changes to medications? No   New conditions or infections since last clinic visit No   Unplanned office visit, urgent care, ED, or hospital admission in the last 4 weeks  Yes  [Patient had ED to Hospital Admission due to HF exacerbation. Patient sees HF clinic, rescheduled her with them for next week. No changes made]   How does patient/caregiver feel medication is working? Very good   Financial problems or insurance changes  No   If yes, describe changes in insurance or financial issues. na   Since the previous refill, were any specialty medication doses or scheduled injections missed or delayed?  Yes  [Patient was previously taking Lantus \"when she needed it\" instead of daily. She was told on discharge to take it daily. Imformed her to call us if she started having low sugars.]   If yes, please provide the amount na   Why were doses missed? na   Does this patient require a clinical escalation to a pharmacist? Yes            Delivery Questions      Flowsheet Row Most Recent Value   Delivery method UPS   Delivery address verified with patient/caregiver? Yes   Delivery address Home   Number of medications in delivery 1   Medication(s) being filled and delivered Continuous Glucose Sensor (FreeStyle Esvin 3 Sensor)   Doses left of specialty medications na   Copay verified? No   Copay amount 0$   Copay form of payment No copayment ($0)   Ship Date 01/09   Delivery Date 01/10   Signature Required No                   Follow-up: 30 day(s)     Carito Mills, Pharmacy Technician  Specialty Pharmacy Technician        "

## 2025-01-09 NOTE — PROGRESS NOTES
Refill for Freestyle Evsin sensors escalated to pharmacist due to recent admission for HF exacerbation.  Pt rescheduled with HF clinic for sooner appointment; appt now scheduled for 1/17/25.  All endocrinology needs addressed at this time.    Gerardo Zafar RPH  01/09/25  10:09 EST

## 2025-01-10 ENCOUNTER — READMISSION MANAGEMENT (OUTPATIENT)
Dept: CALL CENTER | Facility: HOSPITAL | Age: 40
End: 2025-01-10
Payer: COMMERCIAL

## 2025-01-10 NOTE — OUTREACH NOTE
CHF Week 1 Survey      Flowsheet Row Responses   [facility] facility patient discharged from? [place]   Does the patient have one of the following disease processes/diagnoses(primary or secondary)? CHF   CHF Week 1 attempt successful? No   Unsuccessful attempts Attempt 1            KARMEN ROSAS - Registered Nurse

## 2025-01-13 ENCOUNTER — TELEPHONE (OUTPATIENT)
Dept: CARDIOLOGY | Facility: CLINIC | Age: 40
End: 2025-01-13
Payer: MEDICARE

## 2025-01-13 NOTE — TELEPHONE ENCOUNTER
Pt LVM requesting return call. Returned call and pt stated she has noticed some pitting edema in lower extremities. Has not increased since recent discharge from hospital. Pt states she has not been elevating her legs. Recommended elevating her legs when resting, decreasing salt intake to max 2,000 mg day or less. States slightly more SOA with exertion. Pt hasn't been doing daily weights. Recommended doing daily weight first thing in the morning before eating and record daily. Asked pt to notify us if weight increases more than 2-3 lbs in 24 hours or more than 5 lbs in a week. Asked pt to notify us of daily weight tomorrow morning and if elevating legs has improved LE edema. Verbalized understanding and agreeable to plan of care.

## 2025-01-14 NOTE — TELEPHONE ENCOUNTER
Pt returned call and stated her weight this morning is 183 lbs. Unsure of what her weight was after recent discharge from hospital or prior to hospitalization. Stated she is elevating her legs at rest. Stated she is at hospital because her father just . Pt plans on keeping f/u appt 25 at 10:45

## 2025-01-15 ENCOUNTER — READMISSION MANAGEMENT (OUTPATIENT)
Dept: CALL CENTER | Facility: HOSPITAL | Age: 40
End: 2025-01-15
Payer: MEDICARE

## 2025-01-15 NOTE — OUTREACH NOTE
CHF Week 1 Survey      Flowsheet Row Responses   [facility] patient discharged from? [place]   Does the patient have one of the following disease processes/diagnoses(primary or secondary)? CHF   CHF Week 1 attempt successful? Yes   Call start time 1059   Call end time 1103   List who call center can speak with pt   Meds reviewed with patient/caregiver? Yes   Is the patient having any side effects they believe may be caused by any medication additions or changes? No   Does the patient have all medications ordered at discharge? Yes   Is the patient taking all medications as directed (includes completed medication regime)? Yes   Comments regarding appointments Pt to see pcp on 1-.   Does the patient have a primary care provider?  Yes   Does the patient have an appointment with their PCP within 7 days of discharge? Yes   Has the patient kept scheduled appointments due by today? N/A   Has home health visited the patient within 72 hours of discharge? N/A   Pulse Ox monitoring None   Psychosocial issues? No   Did the patient receive a copy of their discharge instructions? Yes   Nursing interventions Reviewed instructions with patient   What is the patient's perception of their health status since discharge? Improving   Is the patient/caregiver able to teach back the hierarchy of who to call/visit for symptoms/problems? PCP, Specialist, Home health nurse, Urgent Care, ED, 911 Yes   Is the patient able to teach back Heart Failure Zones? Yes   CHF Zone this Call Green Zone   Green Zone Patient reports doing well, No new or worsening shortness of breath   Green Zone Interventions Meds as directed    CHF Week 1 call completed? Yes   Is the patient interested in additional calls from an ambulatory ? No   Would this patient benefit from a Referral to Amb Social Work? No   Wrap up additional comments Pt reports feeling well. Pt has all medications. Pt has all f/u appointments scheduled. Pt does not require  02.   Call end time 1103            Danyelle CROSS - Registered Nurse

## 2025-01-17 ENCOUNTER — OFFICE VISIT (OUTPATIENT)
Dept: CARDIOLOGY | Facility: CLINIC | Age: 40
End: 2025-01-17
Payer: MEDICARE

## 2025-01-17 ENCOUNTER — SPECIALTY PHARMACY (OUTPATIENT)
Dept: CARDIOLOGY | Facility: CLINIC | Age: 40
End: 2025-01-17
Payer: MEDICARE

## 2025-01-17 VITALS
SYSTOLIC BLOOD PRESSURE: 182 MMHG | HEIGHT: 59 IN | OXYGEN SATURATION: 98 % | DIASTOLIC BLOOD PRESSURE: 86 MMHG | BODY MASS INDEX: 39.8 KG/M2 | HEART RATE: 65 BPM | WEIGHT: 197.4 LBS

## 2025-01-17 DIAGNOSIS — I15.0 RENOVASCULAR HYPERTENSION: ICD-10-CM

## 2025-01-17 DIAGNOSIS — J81.0 FLASH PULMONARY EDEMA: ICD-10-CM

## 2025-01-17 DIAGNOSIS — I50.32 CHRONIC HEART FAILURE WITH PRESERVED EJECTION FRACTION (HFPEF): Primary | ICD-10-CM

## 2025-01-17 DIAGNOSIS — I50.9 HEART FAILURE, UNSPECIFIED HF CHRONICITY, UNSPECIFIED HEART FAILURE TYPE: ICD-10-CM

## 2025-01-17 RX ORDER — MESALAMINE 4 G/60ML
4 SUSPENSION RECTAL NIGHTLY
COMMUNITY
Start: 2024-11-25

## 2025-01-17 RX ORDER — ISOSORBIDE MONONITRATE 30 MG/1
30 TABLET, EXTENDED RELEASE ORAL DAILY
Qty: 90 TABLET | Refills: 3 | Status: SHIPPED | OUTPATIENT
Start: 2025-01-17

## 2025-01-17 RX ORDER — SACUBITRIL AND VALSARTAN 49; 51 MG/1; MG/1
1 TABLET, FILM COATED ORAL 2 TIMES DAILY
Qty: 180 TABLET | Refills: 3 | Status: SHIPPED | OUTPATIENT
Start: 2025-01-17

## 2025-01-17 RX ORDER — BUDESONIDE 3 MG/1
3 CAPSULE, COATED PELLETS ORAL EVERY MORNING
COMMUNITY
Start: 2024-10-31

## 2025-01-17 RX ORDER — POTASSIUM CHLORIDE 1500 MG/1
20 TABLET, EXTENDED RELEASE ORAL DAILY
COMMUNITY
Start: 2025-01-13

## 2025-01-17 NOTE — PROGRESS NOTES
"Chief Complaint  Chronic heart failure with preserved ejection fraction (HFp      Subjective   History of Present Illness    Problem List  -HFpEF, normal EKG, echo preserved systolic function  -hx of HFpEF  -CXR showed pulmonary edema  -C section 24 morning  -Long standing HTN  -DM  -morbid obesity  -COPD?  -ALEJANDRO  -UC  -duodenal ulcers    Mrs. Pack is a 39 year old lady being seen in follow up.  Had complicated pregnancy.  Had c section.  Had CHF and GI bleed.  Titrated meds.  BP now controlled when taking.  Has lost 50 lbs in last 6 weeks.  Feeling much better.  BP high today because child admited at  and BP meds at home.  ROS negative except for the above.      Update 24  BP had been on the rise.  Was exposed noxious fumes likely and had flash pulmonary edema that required brief hospitalization.   also needed to be treated for difficulty breathing.      Update 24  BP well controlled at home.  Pharmacist concerned about her symptoms.      Update 24  Occasional blood pressure spikes.  Some occasional shortness of breaht.      Update 10/28/24  Weight up.  BP shoots up.  Chest tightness and short of breath.      Update 25  Father recently .  While visiting him in hospital BP shot up and had flash pulmonary edema.  No diuretic today and BP elevated.             Objective   Vital Signs:  Vitals:    25 1051   BP: (!) 182/86   Pulse: 65   SpO2: 98%     Estimated body mass index is 39.85 kg/m² as calculated from the following:    Height as of this encounter: 149.9 cm (59.02\").    Weight as of this encounter: 89.5 kg (197 lb 6.4 oz).       Physical Exam  HENT:      Head: Normocephalic.   Eyes:      Extraocular Movements: Extraocular movements intact.   Cardiovascular:      Rate and Rhythm: Normal rate and regular rhythm.      Heart sounds: No murmur heard.     No gallop.   Pulmonary:      Breath sounds: Normal breath sounds.   Abdominal:      Palpations: Abdomen is soft. "   Musculoskeletal:      Right lower leg: No edema.      Left lower leg: No edema.   Skin:     General: Skin is warm and dry.   Neurological:      General: No focal deficit present.      Mental Status: She is alert.   Psychiatric:         Mood and Affect: Mood normal.       Clinic discussed advanced directive        Assessment   -HFpEF, normal EKG, echo preserved systolic function  -hx of HFpEF  -CXR showed pulmonary edema  -C section 4/18/24 morning  -Long standing HTN  -DM  -morbid obesity  -COPD?  -ALEJANDRO  -UC  -duodenal ulcers    Plan   -hast stopped breast feeding  -stop ACE.  Start middle dose entresto in 3 days  -cont. Spironolactone  -cont. Metoprolol, hydralazine, nifedipine  -lasix as needed  -US of renal arteries  -will also add imdur  -blood work  -few week follow up      Pablo Cabrera MD

## 2025-01-20 ENCOUNTER — TELEPHONE (OUTPATIENT)
Dept: CARDIOLOGY | Facility: CLINIC | Age: 40
End: 2025-01-20
Payer: MEDICARE

## 2025-01-20 NOTE — TELEPHONE ENCOUNTER
Spoke to Shiloh with [employer] who stated starting Entresto will count as a qualifying event for Heart Patch. Shiloh advised to give patient her cell numer [phone] to pt for questions or concers. Called pt to talk to her about Dr. Cabrera's recommendation for [employer] Heart Patch to help monitor fluid status for congestive heart failure dxLuis Ballesteros will mail monitor to patient with instructions on how to apply monitor. Pt to call Shiloh with questions about monitor. Pt verbalized understanding and agreeable to plan of care. Requested I send [employer] message with Shiloh's cell number.

## 2025-01-21 ENCOUNTER — LAB (OUTPATIENT)
Dept: LAB | Facility: HOSPITAL | Age: 40
End: 2025-01-21
Payer: MEDICARE

## 2025-01-21 DIAGNOSIS — I50.32 CHRONIC HEART FAILURE WITH PRESERVED EJECTION FRACTION (HFPEF): ICD-10-CM

## 2025-01-21 DIAGNOSIS — J81.0 FLASH PULMONARY EDEMA: ICD-10-CM

## 2025-01-21 DIAGNOSIS — I15.0 RENOVASCULAR HYPERTENSION: ICD-10-CM

## 2025-01-21 LAB
ALBUMIN SERPL-MCNC: 4 G/DL (ref 3.5–5.2)
ALBUMIN/GLOB SERPL: 1.4 G/DL
ALP SERPL-CCNC: 85 U/L (ref 39–117)
ALT SERPL W P-5'-P-CCNC: 18 U/L (ref 1–33)
ANION GAP SERPL CALCULATED.3IONS-SCNC: 13.1 MMOL/L (ref 5–15)
AST SERPL-CCNC: 15 U/L (ref 1–32)
BASOPHILS # BLD AUTO: 0.02 10*3/MM3 (ref 0–0.2)
BASOPHILS NFR BLD AUTO: 0.3 % (ref 0–1.5)
BILIRUB SERPL-MCNC: 0.4 MG/DL (ref 0–1.2)
BUN SERPL-MCNC: 12 MG/DL (ref 6–20)
BUN/CREAT SERPL: 19.4 (ref 7–25)
CALCIUM SPEC-SCNC: 9.2 MG/DL (ref 8.6–10.5)
CHLORIDE SERPL-SCNC: 102 MMOL/L (ref 98–107)
CO2 SERPL-SCNC: 25.9 MMOL/L (ref 22–29)
CREAT SERPL-MCNC: 0.62 MG/DL (ref 0.57–1)
DEPRECATED RDW RBC AUTO: 37.9 FL (ref 37–54)
EGFRCR SERPLBLD CKD-EPI 2021: 114.9 ML/MIN/1.73
EOSINOPHIL # BLD AUTO: 0.2 10*3/MM3 (ref 0–0.4)
EOSINOPHIL NFR BLD AUTO: 3.1 % (ref 0.3–6.2)
ERYTHROCYTE [DISTWIDTH] IN BLOOD BY AUTOMATED COUNT: 12.7 % (ref 12.3–15.4)
GLOBULIN UR ELPH-MCNC: 2.9 GM/DL
GLUCOSE SERPL-MCNC: 128 MG/DL (ref 65–99)
HCT VFR BLD AUTO: 36.5 % (ref 34–46.6)
HGB BLD-MCNC: 12.7 G/DL (ref 12–15.9)
IMM GRANULOCYTES # BLD AUTO: 0.04 10*3/MM3 (ref 0–0.05)
IMM GRANULOCYTES NFR BLD AUTO: 0.6 % (ref 0–0.5)
LYMPHOCYTES # BLD AUTO: 1.81 10*3/MM3 (ref 0.7–3.1)
LYMPHOCYTES NFR BLD AUTO: 27.8 % (ref 19.6–45.3)
MCH RBC QN AUTO: 28.7 PG (ref 26.6–33)
MCHC RBC AUTO-ENTMCNC: 34.8 G/DL (ref 31.5–35.7)
MCV RBC AUTO: 82.4 FL (ref 79–97)
MONOCYTES # BLD AUTO: 0.35 10*3/MM3 (ref 0.1–0.9)
MONOCYTES NFR BLD AUTO: 5.4 % (ref 5–12)
NEUTROPHILS NFR BLD AUTO: 4.08 10*3/MM3 (ref 1.7–7)
NEUTROPHILS NFR BLD AUTO: 62.8 % (ref 42.7–76)
NRBC BLD AUTO-RTO: 0 /100 WBC (ref 0–0.2)
NT-PROBNP SERPL-MCNC: 638 PG/ML (ref 0–450)
PLATELET # BLD AUTO: 168 10*3/MM3 (ref 140–450)
PMV BLD AUTO: 11.1 FL (ref 6–12)
POTASSIUM SERPL-SCNC: 4.2 MMOL/L (ref 3.5–5.2)
PROT SERPL-MCNC: 6.9 G/DL (ref 6–8.5)
RBC # BLD AUTO: 4.43 10*6/MM3 (ref 3.77–5.28)
SODIUM SERPL-SCNC: 141 MMOL/L (ref 136–145)
WBC NRBC COR # BLD AUTO: 6.5 10*3/MM3 (ref 3.4–10.8)

## 2025-01-21 PROCEDURE — 83880 ASSAY OF NATRIURETIC PEPTIDE: CPT

## 2025-01-21 PROCEDURE — 85025 COMPLETE CBC W/AUTO DIFF WBC: CPT

## 2025-01-21 PROCEDURE — 36415 COLL VENOUS BLD VENIPUNCTURE: CPT

## 2025-01-21 PROCEDURE — 80053 COMPREHEN METABOLIC PANEL: CPT

## 2025-01-22 ENCOUNTER — SPECIALTY PHARMACY (OUTPATIENT)
Facility: HOSPITAL | Age: 40
End: 2025-01-22
Payer: MEDICARE

## 2025-01-22 ENCOUNTER — TELEPHONE (OUTPATIENT)
Dept: CARDIOLOGY | Facility: CLINIC | Age: 40
End: 2025-01-22
Payer: MEDICARE

## 2025-01-22 DIAGNOSIS — I10 ESSENTIAL HYPERTENSION: Chronic | ICD-10-CM

## 2025-01-22 DIAGNOSIS — I50.33 CHF (CONGESTIVE HEART FAILURE), NYHA CLASS II, ACUTE ON CHRONIC, DIASTOLIC: Primary | ICD-10-CM

## 2025-01-22 NOTE — PROGRESS NOTES
" Specialty Pharmacy Patient Management Program  Cardiology Initial Assessment     Antoinette Pack was referred by a Cardiology provider to the Cardiology Patient Management program offered by [facility] for Type 2 Diabetes with Cardiovascular risk factors on 01/22/25.  An initial outreach was conducted, including assessment of therapy appropriateness and specialty medication education for Ozempic. The patient was introduced to services offered by [facility], including: regular assessments, refill coordination, mail order delivery options, prior authorization maintenance, and financial assistance programs as applicable. The patient was also provided with contact information for the pharmacy team.     Insurance Coverage & Financial Support  [facility]     Relevant Past Medical History and Comorbidities  Relevant medical history and concomitant health conditions were discussed with the patient. The patient's chart has been reviewed for relevant past medical history and comorbid conditions and updated as necessary.  Past Medical History:   Diagnosis Date    Anxiety     CHF (congestive heart failure) 05/2023    Colitis 2023    Depression     Diabetes mellitus 2018    type 2    Diverticulitis     GERD (gastroesophageal reflux disease)     History of transfusion 04/19/2024    2 units post delivery    Hypertension     chronic    Kidney stone     \"years ago\"    Narcolepsy 2022    Pancreatitis 09/2023    PCOS (polycystic ovarian syndrome)     Rectal bleeding     SINCE 2023 - USES MESALAMINE SUPPOSITORY NIGHTLY    Seizures 2012    Sleep apnea      Social History     Socioeconomic History    Marital status:    Tobacco Use    Smoking status: Former     Current packs/day: 0.00     Average packs/day: 1 pack/day for 20.0 years (20.0 ttl pk-yrs)     Types: Cigarettes     Start date: 1/1/1995     Quit date: 2015     Years since quitting: 10.0     Passive exposure: Past    " Smokeless tobacco: Never    Tobacco comments:     QUIT IN 2017   Vaping Use    Vaping status: Never Used   Substance and Sexual Activity    Alcohol use: No    Drug use: No    Sexual activity: Defer       Problem list reviewed by Arely Rosario, PharmD on 1/22/2025 at 10:23 AM    Allergies  Known allergies and reactions were discussed with the patient. The patient's chart has been reviewed for  allergy information and updated as necessary.   Allergies   Allergen Reactions    Dilantin [Phenytoin] Mental Status Change    Keppra [Levetiracetam] Mental Status Change    Meperidine Rash    Topamax [Topiramate] Mental Status Change       Allergies reviewed by Arely Rosario, PharmD on 1/22/2025 at 10:23 AM    Relevant Laboratory Values  Relevant laboratory values were discussed with the patient. The following specialty medication dose adjustment(s) are recommended: None    Lab Results   Component Value Date    GLUCOSE 128 (H) 01/21/2025    CALCIUM 9.2 01/21/2025     01/21/2025    K 4.2 01/21/2025    CO2 25.9 01/21/2025     01/21/2025    BUN 12 01/21/2025    CREATININE 0.62 01/21/2025    EGFRIFAFRI  09/05/2016      Comment:      <15 Indicative of kidney failure.    EGFRIFNONA 101 12/27/2020    BCR 19.4 01/21/2025    ANIONGAP 13.1 01/21/2025     Lab Results   Component Value Date    CHOL 177 12/18/2024    CHLPL 159 09/22/2015    TRIG 86 12/18/2024    HDL 48 12/18/2024     (H) 12/18/2024         Current Medication List  This medication list has been reviewed with the patient and evaluated for any interactions or necessary modifications/recommendations, and updated to include all prescription medications, OTC medications, and supplements the patient is currently taking.  This list reflects what is contained in the patient's profile, which has also been marked as reviewed to communicate to other providers it is the most up to date version of the patient's current medication therapy.     Current Outpatient  Medications:     Semaglutide,0.25 or 0.5MG/DOS, (OZEMPIC) 2 MG/3ML solution pen-injector, Inject 0.25 mg under the skin into the appropriate area as directed 1 (One) Time Per Week for 28 days, THEN 0.5 mg 1 (One) Time Per Week for 14 days., Disp: 3 mL, Rfl: 0    ARIPiprazole (ABILIFY) 2 MG tablet, Take 1 tablet by mouth Daily., Disp: , Rfl:     Budesonide (ENTOCORT EC) 3 MG 24 hr capsule, Take 1 capsule by mouth Every Morning., Disp: , Rfl:     Continuous Glucose Sensor (FreeStyle Esvni 3 Sensor) misc, Use 1 each Every 14 (Fourteen) Days., Disp: 2 each, Rfl: 2    Continuous Glucose Transmitter (Dexcom G6 Transmitter) misc, Use 1 each Every 3 (Three) Months., Disp: 1 each, Rfl: 1    fluticasone (FLONASE) 50 MCG/ACT nasal spray, Administer 2 sprays into the nostril(s) as directed by provider Daily As Needed for Rhinitis or Allergies., Disp: , Rfl:     furosemide (LASIX) 40 MG tablet, Take 1 tablet by mouth Daily., Disp: 15 tablet, Rfl: 0    hydrALAZINE (APRESOLINE) 25 MG tablet, Take 1 tablet by mouth 3 (Three) Times a Day. Hold if top number of blood pressure is less than 110. Can take extra hydralazine 25mg as needed for top number greater than, Disp: 125 tablet, Rfl: 1    Insulin Glargine (Lantus SoloStar) 100 UNIT/ML injection pen, Inject 15 Units under the skin into the appropriate area as directed Every Night., Disp: 15 mL, Rfl: 1    Insulin Pen Needle (Pen Needles) 32G X 4 MM misc, Use 1 each Daily., Disp: 100 each, Rfl: 5    isosorbide mononitrate (IMDUR) 30 MG 24 hr tablet, Take 1 tablet by mouth Daily., Disp: 90 tablet, Rfl: 3    mesalamine (ROWASA) 4 g enema, Insert 1 enema into the rectum Every Night., Disp: , Rfl:     metoprolol tartrate (LOPRESSOR) 50 MG tablet, Take 1 tablet by mouth 2 (Two) Times a Day., Disp: , Rfl:     NIFEdipine XL (PROCARDIA XL) 90 MG 24 hr tablet, Take 1 tablet by mouth Daily., Disp: 90 tablet, Rfl: 3    nitroglycerin (NITROSTAT) 0.4 MG SL tablet, Place 1 tablet under the tongue  Every 5 (Five) Minutes As Needed for Chest Pain (Flash pulmonary edema) for up to 10 doses. Place one tablet under the tongue for chest pain every 5 minutes until chest pain is relieved. If you have to take 3 tablets, take the 3rd and go to the hospital to be evaluated., Disp: 10 tablet, Rfl: 0    pantoprazole (PROTONIX) 40 MG EC tablet, Take 1 tablet by mouth 2 (Two) Times a Day As Needed (acid reflux)., Disp: , Rfl:     potassium chloride (KLOR-CON M20) 20 MEQ CR tablet, Take 1 tablet by mouth Daily. (Patient not taking: Reported on 1/17/2025), Disp: , Rfl:     Prenatal Vit-Fe Fumarate-FA (PRENATAL PO), Take 1 tablet by mouth Daily., Disp: , Rfl:     sacubitril-valsartan (Entresto) 49-51 MG tablet, Take 1 tablet by mouth 2 (Two) Times a Day., Disp: 180 tablet, Rfl: 3    spironolactone (ALDACTONE) 100 MG tablet, Take 1 tablet by mouth Daily., Disp: 90 tablet, Rfl: 3  No current facility-administered medications for this visit.    Medicines reviewed by Arely Rosario, PharmD on 1/22/2025 at 10:23 AM    Drug Interactions  None    Initial Education Provided for Specialty Medication  The patient has been provided with the following education and any applicable administration techniques (i.e. self-injection) have been demonstrated for the therapies indicated. All questions and concerns have been addressed prior to the patient receiving the medication, and the patient has verbalized comprehension of the education and any materials provided. Additional patient education shall be provided and documented upon request by the patient, provider, or payer.    Ozempic® (semaglutide)  Medication Expectations   Why am I taking this medication? You are taking Ozempic, along with diet and exercise, to lower blood sugar because you have type 2 diabetes. You may also be taking Ozempic if you have diabetes and cardiovascular disease to reduce your risk of heart attack or stroke.    What should I expect while on this medication? You  should expect to see your blood sugar, A1c and possibly weight decrease over time.   How does the medication work? Ozempic is a non-insulin injection that works with your body to release insulin in response to your blood sugar rising.  This medication also slows down food from leaving your stomach, making you feel whittaker for longer.   How long will I be on this medication for? The amount of time you will be on this medication will be determined by your doctor based on blood sugar and A1c control. You will most likely be on this medication or another diabetes medication throughout your lifetime. Do not abruptly stop this medication without talking to your doctor first.    How do I take this medication? Take as directed on your prescription label. Ozempic is injected under the skin (subcutaneously) of your stomach, thigh or upper arm.  Use this medication once weekly, on the same day each week, and it can be given with or without food.    What are some possible side effects? You may notice you don't feel as hungry, especially when you first start using Ozempic.  The most common side effects are nausea, stomach cramping, and constipation. Stop using Ozempic and call your doctor immediately if you have severe pain in your stomach area that will not go away.    What happens if I miss a dose? If you miss a dose, take it as soon as you remember as long as it is within 5 days after your missed dose.  If more than 5 days have passed, skip the missed dose and resume Ozempic on the regularly scheduled day.     Medication Safety   What are things I should warn my doctor immediately about? Do not use Ozempic if you or a family member have ever had medullary thyroid cancer (MTC) or Multiple Endocrine Neoplasia syndrome type 2 (MEN 2).  Tell your doctor if you have or have had problems with your kidneys or pancreas, or if you are pregnant, planning to become pregnant. Stop using Ozempic and get medical help right away if you have  severe pain that will not go away, or if you notice any signs/symptoms of an allergic reaction (rash, hives, difficulty breathing, etc.).    What are things that I should be cautious of? Be cautious of any side effects from this medication. Talk to your doctor if any new ones develop or aren't getting better.    What are some medications that can interact with this one? Taking Ozempic with other medications that also lower your blood sugar such as insulin and glipizide/glimepiride/glyburide may increase the risk of low blood sugar. Your doctor may reduce the dose of these medications when you start Ozempic.  Always tell your doctor or pharmacist immediately if you start taking any new medications, including over-the-counter medications, vitamins, and herbal supplements.       Medication Storage/Handling   How should I handle this medication? Keep this medication out of reach of pets/children and keep the pen capped    How does this medication need to be stored? Store in the refrigerator prior to first use, but do not freeze.  After first use, you may continue to store in the refrigerator or at room temperature for 56 days.  Protect from excessive heat and sunlight.   How should I dispose of this medication? Used Ozempic pens should be thrown away after 56 days, even if medication remains. If your doctor decides to stop this medication, take to your local police station for proper disposal. Some pharmacies also have take-back bins for medication drop-off.      Resources/Support   How can I remind myself to take this medication? You can download reminder apps to help you manage your refills. You may also set an alarm on your phone to remind you.    Is financial support available?  [employer] can provide co-pay cards if you have commercial insurance or patient assistance if you have Medicare or no insurance.    Which vaccines are recommended for me? Talk to your doctor about these vaccines: Flu, Coronavirus  (COVID-19), Pneumococcal (pneumonia), Tdap, Hepatitis B, Zoster (shingles)         Adherence and Self-Administration  Adherence related to the patient's specialty therapy was discussed with the patient. The Adherence segment of this outreach has been reviewed and updated.     Is there a concern with patient's ability to self administer the medication correctly and without issue?: No  Were any potential barriers to adherence identified during the initial assessment or patient education?: No  Are there any concerns regarding the patient's understanding of the importance of medication adherence?: No  Methods for Supporting Patient Adherence and/or Self-Administration: N/A- patient has used before    Open Medication Therapy Problems  No medication therapy recommendations to display    Goals of Therapy  Goals related to the patient's specialty therapy were discussed with the patient. The Patient Goals segment of this outreach has been reviewed and updated.   Goals Addressed Today        Specialty Pharmacy General Goal      A1C < 7 %     Lab Results   Component Value Date    HGBA1C 5.70 (H) 01/02/2025    HGBA1C 5.9 (A) 12/18/2024    HGBA1C 6.60 (H) 08/16/2024    HGBA1C 5.0 06/05/2024    HGBA1C 5.3 01/31/2024                Reassessment Plan & Follow-Up  1. Medication Therapy Changes: Begin Ozempic 0.25mg SC weekly for 4 weeks then increase to 0.5mg SC weekly  2. Related Plans, Therapy Recommendations, or Therapy Problems to Be Addressed: Will follow to up titrate dosing based on patient tolerability; she has an endocrinologist that she is going to follow up with to adjust insulin dosing as needed. Also, she is no longer breastfeeding and had tubes removed.   3. Pharmacist to perform regular assessments no more than (6) months from the previous assessment.  4. Care Coordinator to set up future refill outreaches, coordinate prescription delivery, and escalate clinical questions to pharmacist.  5. Welcome information and  patient satisfaction survey to be sent by specialty pharmacy team with patient's initial fill.    Attestation  Therapeutic appropriateness: Appropriate   I attest the patient was actively involved in and has agreed to the above plan of care. If the prescribed therapy is at any point deemed not appropriate based on the current or future assessments, a consultation will be initiated with the patient's specialty care provider to determine the best course of action. The revised plan of therapy will be documented along with any required assessments and/or additional patient education provided.     Arely Rosario, PharmD, [facility]  Clinical Specialty Pharmacist, Cardiology  1/22/2025  10:23 EST

## 2025-01-22 NOTE — TELEPHONE ENCOUNTER
"Relayed Dr. Cabrera's comments on labs to patient. Pt stated edema is \"the same\". Verbalized understanding for repeat labs later this week.   "

## 2025-01-22 NOTE — TELEPHONE ENCOUNTER
----- Message from Pablo Cabrera sent at 1/21/2025 11:15 PM EST -----  Stable blood work.  Bnp little elevated.  How is edema.  Also I want to repeat bnp and bmp later this week.  Suspect bnp will come down with entresto.

## 2025-01-22 NOTE — PROGRESS NOTES
Stable blood work.  Bnp little elevated.  How is edema.  Also I want to repeat bnp and bmp later this week.  Suspect bnp will come down with entresto.

## 2025-01-23 ENCOUNTER — HOSPITAL ENCOUNTER (OUTPATIENT)
Dept: CARDIOLOGY | Facility: HOSPITAL | Age: 40
Discharge: HOME | End: 2025-01-23
Admitting: INTERNAL MEDICINE
Payer: MEDICARE

## 2025-01-23 DIAGNOSIS — I15.0 RENOVASCULAR HYPERTENSION: ICD-10-CM

## 2025-01-23 DIAGNOSIS — I50.32 CHRONIC HEART FAILURE WITH PRESERVED EJECTION FRACTION (HFPEF): ICD-10-CM

## 2025-01-23 DIAGNOSIS — J81.0 FLASH PULMONARY EDEMA: ICD-10-CM

## 2025-01-23 PROCEDURE — 93975 VASCULAR STUDY: CPT

## 2025-01-23 PROCEDURE — 93975 VASCULAR STUDY: CPT | Performed by: RADIOLOGY

## 2025-01-24 ENCOUNTER — TELEPHONE (OUTPATIENT)
Dept: CARDIOLOGY | Facility: CLINIC | Age: 40
End: 2025-01-24
Payer: MEDICARE

## 2025-01-24 ENCOUNTER — APPOINTMENT (OUTPATIENT)
Dept: GENERAL RADIOLOGY | Facility: HOSPITAL | Age: 40
End: 2025-01-24
Payer: MEDICARE

## 2025-01-24 ENCOUNTER — APPOINTMENT (OUTPATIENT)
Dept: CT IMAGING | Facility: HOSPITAL | Age: 40
End: 2025-01-24
Payer: MEDICARE

## 2025-01-24 ENCOUNTER — HOSPITAL ENCOUNTER (EMERGENCY)
Facility: HOSPITAL | Age: 40
Discharge: HOME | End: 2025-01-24
Attending: EMERGENCY MEDICINE
Payer: MEDICARE

## 2025-01-24 VITALS
SYSTOLIC BLOOD PRESSURE: 165 MMHG | RESPIRATION RATE: 17 BRPM | HEART RATE: 104 BPM | HEIGHT: 59 IN | TEMPERATURE: 99.6 F | DIASTOLIC BLOOD PRESSURE: 82 MMHG | OXYGEN SATURATION: 97 % | WEIGHT: 203 LBS | BODY MASS INDEX: 40.92 KG/M2

## 2025-01-24 DIAGNOSIS — I10 HYPERTENSION, UNSPECIFIED TYPE: Primary | ICD-10-CM

## 2025-01-24 DIAGNOSIS — N61.0 MASTITIS: ICD-10-CM

## 2025-01-24 LAB
ALBUMIN SERPL-MCNC: 4.1 G/DL (ref 3.5–5.2)
ALBUMIN/GLOB SERPL: 1.1 G/DL
ALP SERPL-CCNC: 78 U/L (ref 39–117)
ALT SERPL W P-5'-P-CCNC: 11 U/L (ref 1–33)
ANION GAP SERPL CALCULATED.3IONS-SCNC: 11.2 MMOL/L (ref 5–15)
AST SERPL-CCNC: 12 U/L (ref 1–32)
B PARAPERT DNA SPEC QL NAA+PROBE: NOT DETECTED
B PERT DNA SPEC QL NAA+PROBE: NOT DETECTED
BASOPHILS # BLD AUTO: 0.03 10*3/MM3 (ref 0–0.2)
BASOPHILS NFR BLD AUTO: 0.2 % (ref 0–1.5)
BILIRUB SERPL-MCNC: 1.6 MG/DL (ref 0–1.2)
BILIRUB UR QL STRIP: NEGATIVE
BUN SERPL-MCNC: 7 MG/DL (ref 6–20)
BUN/CREAT SERPL: 12.7 (ref 7–25)
C PNEUM DNA NPH QL NAA+NON-PROBE: NOT DETECTED
CALCIUM SPEC-SCNC: 8.9 MG/DL (ref 8.6–10.5)
CHLORIDE SERPL-SCNC: 102 MMOL/L (ref 98–107)
CLARITY UR: CLEAR
CO2 SERPL-SCNC: 24.8 MMOL/L (ref 22–29)
COLOR UR: YELLOW
CREAT SERPL-MCNC: 0.55 MG/DL (ref 0.57–1)
CRP SERPL-MCNC: 11.41 MG/DL (ref 0–0.5)
D DIMER PPP FEU-MCNC: 0.34 MCGFEU/ML (ref 0–0.5)
D-LACTATE SERPL-SCNC: 0.8 MMOL/L (ref 0.5–2)
DEPRECATED RDW RBC AUTO: 37.7 FL (ref 37–54)
EGFRCR SERPLBLD CKD-EPI 2021: 118.3 ML/MIN/1.73
EOSINOPHIL # BLD AUTO: 0.07 10*3/MM3 (ref 0–0.4)
EOSINOPHIL NFR BLD AUTO: 0.5 % (ref 0.3–6.2)
ERYTHROCYTE [DISTWIDTH] IN BLOOD BY AUTOMATED COUNT: 12.5 % (ref 12.3–15.4)
ERYTHROCYTE [SEDIMENTATION RATE] IN BLOOD: 27 MM/HR (ref 0–20)
FLUAV SUBTYP SPEC NAA+PROBE: NOT DETECTED
FLUBV RNA ISLT QL NAA+PROBE: NOT DETECTED
GEN 5 1HR TROPONIN T REFLEX: 10 NG/L
GLOBULIN UR ELPH-MCNC: 3.6 GM/DL
GLUCOSE SERPL-MCNC: 102 MG/DL (ref 65–99)
GLUCOSE UR STRIP-MCNC: NEGATIVE MG/DL
HADV DNA SPEC NAA+PROBE: NOT DETECTED
HCOV 229E RNA SPEC QL NAA+PROBE: NOT DETECTED
HCOV HKU1 RNA SPEC QL NAA+PROBE: NOT DETECTED
HCOV NL63 RNA SPEC QL NAA+PROBE: NOT DETECTED
HCOV OC43 RNA SPEC QL NAA+PROBE: NOT DETECTED
HCT VFR BLD AUTO: 34.9 % (ref 34–46.6)
HGB BLD-MCNC: 11.8 G/DL (ref 12–15.9)
HGB UR QL STRIP.AUTO: NEGATIVE
HMPV RNA NPH QL NAA+NON-PROBE: NOT DETECTED
HOLD SPECIMEN: NORMAL
HPIV1 RNA ISLT QL NAA+PROBE: NOT DETECTED
HPIV2 RNA SPEC QL NAA+PROBE: NOT DETECTED
HPIV3 RNA NPH QL NAA+PROBE: NOT DETECTED
HPIV4 P GENE NPH QL NAA+PROBE: NOT DETECTED
IMM GRANULOCYTES # BLD AUTO: 0.06 10*3/MM3 (ref 0–0.05)
IMM GRANULOCYTES NFR BLD AUTO: 0.5 % (ref 0–0.5)
KETONES UR QL STRIP: NEGATIVE
LEUKOCYTE ESTERASE UR QL STRIP.AUTO: NEGATIVE
LYMPHOCYTES # BLD AUTO: 1.05 10*3/MM3 (ref 0.7–3.1)
LYMPHOCYTES NFR BLD AUTO: 8.2 % (ref 19.6–45.3)
M PNEUMO IGG SER IA-ACNC: NOT DETECTED
MAGNESIUM SERPL-MCNC: 1.7 MG/DL (ref 1.6–2.6)
MCH RBC QN AUTO: 28.3 PG (ref 26.6–33)
MCHC RBC AUTO-ENTMCNC: 33.8 G/DL (ref 31.5–35.7)
MCV RBC AUTO: 83.7 FL (ref 79–97)
MONOCYTES # BLD AUTO: 0.59 10*3/MM3 (ref 0.1–0.9)
MONOCYTES NFR BLD AUTO: 4.6 % (ref 5–12)
NEUTROPHILS NFR BLD AUTO: 11.04 10*3/MM3 (ref 1.7–7)
NEUTROPHILS NFR BLD AUTO: 86 % (ref 42.7–76)
NITRITE UR QL STRIP: NEGATIVE
NRBC BLD AUTO-RTO: 0 /100 WBC (ref 0–0.2)
NT-PROBNP SERPL-MCNC: 955.9 PG/ML (ref 0–450)
PH UR STRIP.AUTO: >=9 [PH] (ref 5–8)
PLATELET # BLD AUTO: 123 10*3/MM3 (ref 140–450)
PMV BLD AUTO: 10.6 FL (ref 6–12)
POTASSIUM SERPL-SCNC: 4 MMOL/L (ref 3.5–5.2)
PROCALCITONIN SERPL-MCNC: 0.06 NG/ML (ref 0–0.25)
PROT SERPL-MCNC: 7.7 G/DL (ref 6–8.5)
PROT UR QL STRIP: ABNORMAL
QT INTERVAL: 322 MS
QTC INTERVAL: 431 MS
RBC # BLD AUTO: 4.17 10*6/MM3 (ref 3.77–5.28)
RHINOVIRUS RNA SPEC NAA+PROBE: NOT DETECTED
RSV RNA NPH QL NAA+NON-PROBE: NOT DETECTED
SARS-COV-2 RNA RESP QL NAA+PROBE: NOT DETECTED
SODIUM SERPL-SCNC: 138 MMOL/L (ref 136–145)
SP GR UR STRIP: 1.02 (ref 1–1.03)
TROPONIN T NUMERIC DELTA: -1 NG/L
TROPONIN T SERPL HS-MCNC: 11 NG/L
UROBILINOGEN UR QL STRIP: ABNORMAL
WBC NRBC COR # BLD AUTO: 12.84 10*3/MM3 (ref 3.4–10.8)
WHOLE BLOOD HOLD COAG: NORMAL
WHOLE BLOOD HOLD COAG: NORMAL
WHOLE BLOOD HOLD SPECIMEN: NORMAL
WHOLE BLOOD HOLD SPECIMEN: NORMAL

## 2025-01-24 PROCEDURE — 94640 AIRWAY INHALATION TREATMENT: CPT

## 2025-01-24 PROCEDURE — 96374 THER/PROPH/DIAG INJ IV PUSH: CPT

## 2025-01-24 PROCEDURE — 99285 EMERGENCY DEPT VISIT HI MDM: CPT

## 2025-01-24 PROCEDURE — 81003 URINALYSIS AUTO W/O SCOPE: CPT | Performed by: NURSE PRACTITIONER

## 2025-01-24 PROCEDURE — 83735 ASSAY OF MAGNESIUM: CPT | Performed by: NURSE PRACTITIONER

## 2025-01-24 PROCEDURE — 83880 ASSAY OF NATRIURETIC PEPTIDE: CPT | Performed by: NURSE PRACTITIONER

## 2025-01-24 PROCEDURE — 85652 RBC SED RATE AUTOMATED: CPT | Performed by: NURSE PRACTITIONER

## 2025-01-24 PROCEDURE — 80053 COMPREHEN METABOLIC PANEL: CPT | Performed by: NURSE PRACTITIONER

## 2025-01-24 PROCEDURE — 86140 C-REACTIVE PROTEIN: CPT | Performed by: NURSE PRACTITIONER

## 2025-01-24 PROCEDURE — 94799 UNLISTED PULMONARY SVC/PX: CPT

## 2025-01-24 PROCEDURE — 25510000001 IOPAMIDOL PER 1 ML: Performed by: EMERGENCY MEDICINE

## 2025-01-24 PROCEDURE — 25010000002 HYDRALAZINE PER 20 MG: Performed by: NURSE PRACTITIONER

## 2025-01-24 PROCEDURE — 87040 BLOOD CULTURE FOR BACTERIA: CPT | Performed by: NURSE PRACTITIONER

## 2025-01-24 PROCEDURE — 96375 TX/PRO/DX INJ NEW DRUG ADDON: CPT

## 2025-01-24 PROCEDURE — 93005 ELECTROCARDIOGRAM TRACING: CPT | Performed by: EMERGENCY MEDICINE

## 2025-01-24 PROCEDURE — 71275 CT ANGIOGRAPHY CHEST: CPT

## 2025-01-24 PROCEDURE — 71045 X-RAY EXAM CHEST 1 VIEW: CPT | Performed by: RADIOLOGY

## 2025-01-24 PROCEDURE — 25010000002 PROCHLORPERAZINE 10 MG/2ML SOLUTION: Performed by: NURSE PRACTITIONER

## 2025-01-24 PROCEDURE — 0202U NFCT DS 22 TRGT SARS-COV-2: CPT | Performed by: NURSE PRACTITIONER

## 2025-01-24 PROCEDURE — 85025 COMPLETE CBC W/AUTO DIFF WBC: CPT | Performed by: NURSE PRACTITIONER

## 2025-01-24 PROCEDURE — 83605 ASSAY OF LACTIC ACID: CPT | Performed by: NURSE PRACTITIONER

## 2025-01-24 PROCEDURE — 93010 ELECTROCARDIOGRAM REPORT: CPT | Performed by: INTERNAL MEDICINE

## 2025-01-24 PROCEDURE — 71045 X-RAY EXAM CHEST 1 VIEW: CPT

## 2025-01-24 PROCEDURE — 25010000002 KETOROLAC TROMETHAMINE PER 15 MG: Performed by: NURSE PRACTITIONER

## 2025-01-24 PROCEDURE — 36415 COLL VENOUS BLD VENIPUNCTURE: CPT

## 2025-01-24 PROCEDURE — 84145 PROCALCITONIN (PCT): CPT | Performed by: NURSE PRACTITIONER

## 2025-01-24 PROCEDURE — 71275 CT ANGIOGRAPHY CHEST: CPT | Performed by: RADIOLOGY

## 2025-01-24 PROCEDURE — 85379 FIBRIN DEGRADATION QUANT: CPT | Performed by: NURSE PRACTITIONER

## 2025-01-24 PROCEDURE — 84484 ASSAY OF TROPONIN QUANT: CPT | Performed by: NURSE PRACTITIONER

## 2025-01-24 RX ORDER — PROCHLORPERAZINE EDISYLATE 5 MG/ML
5 INJECTION INTRAMUSCULAR; INTRAVENOUS ONCE
Status: COMPLETED | OUTPATIENT
Start: 2025-01-24 | End: 2025-01-24

## 2025-01-24 RX ORDER — CEPHALEXIN 500 MG/1
500 CAPSULE ORAL 2 TIMES DAILY
Qty: 14 CAPSULE | Refills: 0 | Status: SHIPPED | OUTPATIENT
Start: 2025-01-24 | End: 2025-01-31

## 2025-01-24 RX ORDER — HYDRALAZINE HYDROCHLORIDE 20 MG/ML
10 INJECTION INTRAMUSCULAR; INTRAVENOUS ONCE
Status: COMPLETED | OUTPATIENT
Start: 2025-01-24 | End: 2025-01-24

## 2025-01-24 RX ORDER — SODIUM CHLORIDE 0.9 % (FLUSH) 0.9 %
10 SYRINGE (ML) INJECTION AS NEEDED
Status: DISCONTINUED | OUTPATIENT
Start: 2025-01-24 | End: 2025-01-24 | Stop reason: HOSPADM

## 2025-01-24 RX ORDER — METOPROLOL TARTRATE 1 MG/ML
2.5 INJECTION, SOLUTION INTRAVENOUS ONCE
Status: COMPLETED | OUTPATIENT
Start: 2025-01-24 | End: 2025-01-24

## 2025-01-24 RX ORDER — IPRATROPIUM BROMIDE AND ALBUTEROL SULFATE 2.5; .5 MG/3ML; MG/3ML
3 SOLUTION RESPIRATORY (INHALATION) ONCE
Status: COMPLETED | OUTPATIENT
Start: 2025-01-24 | End: 2025-01-24

## 2025-01-24 RX ORDER — IOPAMIDOL 755 MG/ML
100 INJECTION, SOLUTION INTRAVASCULAR
Status: COMPLETED | OUTPATIENT
Start: 2025-01-24 | End: 2025-01-24

## 2025-01-24 RX ORDER — KETOROLAC TROMETHAMINE 30 MG/ML
30 INJECTION, SOLUTION INTRAMUSCULAR; INTRAVENOUS ONCE
Status: COMPLETED | OUTPATIENT
Start: 2025-01-24 | End: 2025-01-24

## 2025-01-24 RX ADMIN — HYDRALAZINE HYDROCHLORIDE 10 MG: 20 INJECTION INTRAMUSCULAR; INTRAVENOUS at 16:55

## 2025-01-24 RX ADMIN — PROCHLORPERAZINE EDISYLATE 5 MG: 5 INJECTION INTRAMUSCULAR; INTRAVENOUS at 16:55

## 2025-01-24 RX ADMIN — METOPROLOL TARTRATE 2.5 MG: 1 INJECTION, SOLUTION INTRAVENOUS at 17:47

## 2025-01-24 RX ADMIN — KETOROLAC TROMETHAMINE 30 MG: 30 INJECTION, SOLUTION INTRAMUSCULAR; INTRAVENOUS at 16:55

## 2025-01-24 RX ADMIN — IPRATROPIUM BROMIDE AND ALBUTEROL SULFATE 3 ML: 2.5; .5 SOLUTION RESPIRATORY (INHALATION) at 16:44

## 2025-01-24 RX ADMIN — IOPAMIDOL 70 ML: 755 INJECTION, SOLUTION INTRAVENOUS at 17:31

## 2025-01-24 RX ADMIN — CEPHALEXIN 500 MG: 250 CAPSULE ORAL at 21:30

## 2025-01-24 NOTE — TELEPHONE ENCOUNTER
Called pt who stated she ended up applying heart patch last night. Pt took lasix 1/22/25, 1/23/25, and today 1/24/25. Reports legs are still swollen. SOB unchanged. Getting ordered repeat labs drawn today. Recently stopped breast feeding and is having a lot of pain and now running a fever. Advised to f/u with OB or PCP. Stated she will get appt with them today. Confirmed f/u appt with Dr. Cabrera for 1/27/25 at 8:45 Verbalized understanding and agreeable to plan of care.

## 2025-01-24 NOTE — ED NOTES
Patient in restroom at this time. Provided urine specimen cup just in case sample is needed during visit.

## 2025-01-25 NOTE — ED PROVIDER NOTES
"Subjective   History of Present Illness  Patient is a 39-year-old female with significant past medical history positive for anxiety, CHF, type 2 diabetes, GERD, PCOS, sleep apnea presenting to the ER complaints of left breast pain, shortness of breath.  Patient denies any fever, cough, nausea, vomiting or any additional symptoms today.  Patient reports that she recently had a stop breast-feeding because of engorgement and change in her medication that does not allow her to continue to breast-feed.  Patient reports pain and engorgement of her left breast.  Patient has no additional symptoms today.    History provided by:  Patient   used: No        Review of Systems   Constitutional: Negative.  Negative for fever.   HENT: Negative.     Respiratory:  Positive for chest tightness and shortness of breath.    Cardiovascular: Negative.  Negative for chest pain.   Gastrointestinal: Negative.  Negative for abdominal pain.   Endocrine: Negative.    Genitourinary: Negative.  Negative for dysuria.   Skin: Negative.    Neurological: Negative.    Psychiatric/Behavioral: Negative.     All other systems reviewed and are negative.      Past Medical History:   Diagnosis Date    Anxiety     CHF (congestive heart failure) 05/2023    Colitis 2023    Depression     Diabetes mellitus 2018    type 2    Diverticulitis     GERD (gastroesophageal reflux disease)     History of transfusion 04/19/2024    2 units post delivery    Hypertension     chronic    Kidney stone     \"years ago\"    Narcolepsy 2022    Pancreatitis 09/2023    PCOS (polycystic ovarian syndrome)     Rectal bleeding     SINCE 2023 - USES MESALAMINE SUPPOSITORY NIGHTLY    Seizures 2012    Sleep apnea        Allergies   Allergen Reactions    Dilantin [Phenytoin] Mental Status Change    Keppra [Levetiracetam] Mental Status Change    Meperidine Rash    Topamax [Topiramate] Mental Status Change       Past Surgical History:   Procedure Laterality Date    CARDIAC " CATHETERIZATION      CARPAL TUNNEL RELEASE       SECTION N/A 2024    Procedure:  SECTION PRIMARY;  Surgeon: Axel Keys MD;  Location:  [facility];  Service: Obstetrics/Gynecology;  Laterality: N/A;    COLONOSCOPY      COLONOSCOPY N/A 2024    Procedure: COLONOSCOPY;  Surgeon: Tom Mueller MD;  Location:  [facility];  Service: Gastroenterology;  Laterality: N/A;    ENDOSCOPY      ENDOSCOPY N/A 2024    Procedure: ESOPHAGOGASTRODUODENOSCOPY;  Surgeon: Tom Mueller MD;  Location:  [facility];  Service: Gastroenterology;  Laterality: N/A;    FRACTURE SURGERY Left     wrist    HARDWARE REMOVAL Left     WRIST    TENDON REPAIR Left     HAND    WISDOM TOOTH EXTRACTION         Family History   Problem Relation Age of Onset    Hypertension Mother     Depression Mother     Heart disease Mother     COPD Mother     Emphysema Mother     Hypertension Father     Diabetes Father     Heart disease Father     COPD Father     Heart failure Father     Hepatitis Father        Social History     Socioeconomic History    Marital status:    Tobacco Use    Smoking status: Former     Current packs/day: 0.00     Average packs/day: 1 pack/day for 20.0 years (20.0 ttl pk-yrs)     Types: Cigarettes     Start date: 1995     Quit date:      Years since quitting: 10.0     Passive exposure: Past    Smokeless tobacco: Never    Tobacco comments:     QUIT IN 2017   Vaping Use    Vaping status: Never Used   Substance and Sexual Activity    Alcohol use: No    Drug use: No    Sexual activity: Defer           Objective   Physical Exam  Vitals and nursing note reviewed.   Constitutional:       General: She is not in acute distress.     Appearance: She is well-developed. She is not diaphoretic.   HENT:      Head: Normocephalic and atraumatic.      Right Ear: External ear normal.      Left Ear: External ear normal.      Nose: Nose normal.   Eyes:      Conjunctiva/sclera: Conjunctivae  normal.      Pupils: Pupils are equal, round, and reactive to light.   Neck:      Vascular: No JVD.      Trachea: No tracheal deviation.   Cardiovascular:      Rate and Rhythm: Normal rate and regular rhythm.      Heart sounds: Normal heart sounds. No murmur heard.  Pulmonary:      Effort: Pulmonary effort is normal. No respiratory distress.      Breath sounds: Rhonchi present. No wheezing.   Chest:   Breasts:     Left: Swelling and tenderness present.   Abdominal:      General: Bowel sounds are normal.      Palpations: Abdomen is soft.      Tenderness: There is no abdominal tenderness.   Musculoskeletal:         General: No deformity. Normal range of motion.      Cervical back: Normal range of motion and neck supple.   Skin:     General: Skin is warm and dry.      Coloration: Skin is not pale.      Findings: No erythema or rash.   Neurological:      Mental Status: She is alert and oriented to person, place, and time.      Cranial Nerves: No cranial nerve deficit.   Psychiatric:         Behavior: Behavior normal.         Thought Content: Thought content normal.         Procedures       Results for orders placed or performed during the hospital encounter of 01/24/25   ECG 12 Lead Dyspnea    Collection Time: 01/24/25 12:42 PM   Result Value Ref Range    QT Interval 322 ms    QTC Interval 431 ms   Comprehensive Metabolic Panel    Collection Time: 01/24/25  1:27 PM    Specimen: Arm, Right; Blood   Result Value Ref Range    Glucose 102 (H) 65 - 99 mg/dL    BUN 7 6 - 20 mg/dL    Creatinine 0.55 (L) 0.57 - 1.00 mg/dL    Sodium 138 136 - 145 mmol/L    Potassium 4.0 3.5 - 5.2 mmol/L    Chloride 102 98 - 107 mmol/L    CO2 24.8 22.0 - 29.0 mmol/L    Calcium 8.9 8.6 - 10.5 mg/dL    Total Protein 7.7 6.0 - 8.5 g/dL    Albumin 4.1 3.5 - 5.2 g/dL    ALT (SGPT) 11 1 - 33 U/L    AST (SGOT) 12 1 - 32 U/L    Alkaline Phosphatase 78 39 - 117 U/L    Total Bilirubin 1.6 (H) 0.0 - 1.2 mg/dL    Globulin 3.6 gm/dL    A/G Ratio 1.1 g/dL     BUN/Creatinine Ratio 12.7 7.0 - 25.0    Anion Gap 11.2 5.0 - 15.0 mmol/L    eGFR 118.3 >60.0 mL/min/1.73   High Sensitivity Troponin T    Collection Time: 01/24/25  1:27 PM    Specimen: Arm, Right; Blood   Result Value Ref Range    HS Troponin T 11 <14 ng/L   BNP    Collection Time: 01/24/25  1:27 PM    Specimen: Arm, Right; Blood   Result Value Ref Range    proBNP 955.9 (H) 0.0 - 450.0 pg/mL   D-dimer, Quantitative    Collection Time: 01/24/25  1:27 PM    Specimen: Arm, Right; Blood   Result Value Ref Range    D-Dimer, Quantitative 0.34 0.00 - 0.50 MCGFEU/mL   Procalcitonin    Collection Time: 01/24/25  1:27 PM    Specimen: Arm, Right; Blood   Result Value Ref Range    Procalcitonin 0.06 0.00 - 0.25 ng/mL   C-reactive Protein    Collection Time: 01/24/25  1:27 PM    Specimen: Arm, Right; Blood   Result Value Ref Range    C-Reactive Protein 11.41 (H) 0.00 - 0.50 mg/dL   Sedimentation Rate    Collection Time: 01/24/25  1:27 PM    Specimen: Arm, Right; Blood   Result Value Ref Range    Sed Rate 27 (H) 0 - 20 mm/hr   Magnesium    Collection Time: 01/24/25  1:27 PM    Specimen: Arm, Right; Blood   Result Value Ref Range    Magnesium 1.7 1.6 - 2.6 mg/dL   CBC Auto Differential    Collection Time: 01/24/25  1:27 PM    Specimen: Arm, Right; Blood   Result Value Ref Range    WBC 12.84 (H) 3.40 - 10.80 10*3/mm3    RBC 4.17 3.77 - 5.28 10*6/mm3    Hemoglobin 11.8 (L) 12.0 - 15.9 g/dL    Hematocrit 34.9 34.0 - 46.6 %    MCV 83.7 79.0 - 97.0 fL    MCH 28.3 26.6 - 33.0 pg    MCHC 33.8 31.5 - 35.7 g/dL    RDW 12.5 12.3 - 15.4 %    RDW-SD 37.7 37.0 - 54.0 fl    MPV 10.6 6.0 - 12.0 fL    Platelets 123 (L) 140 - 450 10*3/mm3    Neutrophil % 86.0 (H) 42.7 - 76.0 %    Lymphocyte % 8.2 (L) 19.6 - 45.3 %    Monocyte % 4.6 (L) 5.0 - 12.0 %    Eosinophil % 0.5 0.3 - 6.2 %    Basophil % 0.2 0.0 - 1.5 %    Immature Grans % 0.5 0.0 - 0.5 %    Neutrophils, Absolute 11.04 (H) 1.70 - 7.00 10*3/mm3    Lymphocytes, Absolute 1.05 0.70 - 3.10  10*3/mm3    Monocytes, Absolute 0.59 0.10 - 0.90 10*3/mm3    Eosinophils, Absolute 0.07 0.00 - 0.40 10*3/mm3    Basophils, Absolute 0.03 0.00 - 0.20 10*3/mm3    Immature Grans, Absolute 0.06 (H) 0.00 - 0.05 10*3/mm3    nRBC 0.0 0.0 - 0.2 /100 WBC   Green Top (Gel)    Collection Time: 01/24/25  1:27 PM   Result Value Ref Range    Extra Tube Hold for add-ons.    Lavender Top    Collection Time: 01/24/25  1:27 PM   Result Value Ref Range    Extra Tube hold for add-on    Gold Top - SST    Collection Time: 01/24/25  1:27 PM   Result Value Ref Range    Extra Tube Hold for add-ons.    Light Blue Top    Collection Time: 01/24/25  1:27 PM   Result Value Ref Range    Extra Tube Hold for add-ons.    Urinalysis With Microscopic If Indicated (No Culture) - Urine, Clean Catch    Collection Time: 01/24/25  3:23 PM    Specimen: Urine, Clean Catch   Result Value Ref Range    Color, UA Yellow Yellow, Straw    Appearance, UA Clear Clear    pH, UA >=9.0 (H) 5.0 - 8.0    Specific Gravity, UA 1.016 1.005 - 1.030    Glucose, UA Negative Negative    Ketones, UA Negative Negative    Bilirubin, UA Negative Negative    Blood, UA Negative Negative    Protein, UA Trace (A) Negative    Leuk Esterase, UA Negative Negative    Nitrite, UA Negative Negative    Urobilinogen, UA 1.0 E.U./dL 0.2 - 1.0 E.U./dL   Lactic Acid, Plasma    Collection Time: 01/24/25  3:23 PM    Specimen: Hand, Right; Blood   Result Value Ref Range    Lactate 0.8 0.5 - 2.0 mmol/L   High Sensitivity Troponin T 1Hr    Collection Time: 01/24/25  3:23 PM    Specimen: Hand, Right; Blood   Result Value Ref Range    HS Troponin T 10 <14 ng/L    Troponin T Numeric Delta -1 Abnormal if >/=3 ng/L   Green Top (Gel)    Collection Time: 01/24/25  3:23 PM   Result Value Ref Range    Extra Tube Hold for add-ons.    Lavender Top    Collection Time: 01/24/25  3:23 PM   Result Value Ref Range    Extra Tube hold for add-on    Gold Top - SST    Collection Time: 01/24/25  3:23 PM   Result Value  Ref Range    Extra Tube Hold for add-ons.    Light Blue Top    Collection Time: 01/24/25  3:23 PM   Result Value Ref Range    Extra Tube Hold for add-ons.    Respiratory Panel PCR w/COVID-19(SARS-CoV-2) [facility]-House, NP Swab in UTM/VTM, 2 HR TAT - Swab, Nasopharynx    Collection Time: 01/24/25  4:00 PM    Specimen: Nasopharynx; Swab   Result Value Ref Range    ADENOVIRUS, PCR Not Detected Not Detected    Coronavirus 229E Not Detected Not Detected    Coronavirus HKU1 Not Detected Not Detected    Coronavirus NL63 Not Detected Not Detected    Coronavirus OC43 Not Detected Not Detected    COVID19 Not Detected Not Detected - Ref. Range    Human Metapneumovirus Not Detected Not Detected    Human Rhinovirus/Enterovirus Not Detected Not Detected    Influenza A PCR Not Detected Not Detected    Influenza B PCR Not Detected Not Detected    Parainfluenza Virus 1 Not Detected Not Detected    Parainfluenza Virus 2 Not Detected Not Detected    Parainfluenza Virus 3 Not Detected Not Detected    Parainfluenza Virus 4 Not Detected Not Detected    RSV, PCR Not Detected Not Detected    Bordetella pertussis pcr Not Detected Not Detected    Bordetella parapertussis PCR Not Detected Not Detected    Chlamydophila pneumoniae PCR Not Detected Not Detected    Mycoplasma pneumo by PCR Not Detected Not Detected     *Note: Due to a large number of results and/or encounters for the requested time period, some results have not been displayed. A complete set of results can be found in Results Review.      CT Angiogram Chest Pulmonary Embolism   Final Result   Impression:   1. Unremarkable CTA of the chest.       This report was finalized on 1/24/2025 6:30 PM by Geoff Rodriguez DO.          XR Chest 1 View   Final Result     Unremarkable exam with no acute cardiopulmonary findings identified.       This report was finalized on 1/24/2025 3:43 PM by Dr. Bharath Chávez MD.               ED Course  ED Course as of 01/24/25 2126   Fri  Jan 24, 2025   1247 EKG was obtained  Sinus tachycardia with a ventricular rate of 108, OR interval 148, QRS 88, , QTc 431 no acute ST changes signify ischemia normal axis normal intervals  Electronically signed by Cuco Pinto MD, 01/24/25, 12:47 PM EST.   [AM]   1504 C-Reactive Protein(!): 11.41 [SS]   1504 proBNP(!): 955.9 [SS]   1504 Sed Rate(!): 27 [SS]   1504 WBC(!): 12.84 [SS]   1504 Total Bilirubin(!): 1.6 [SS]   1837 CT Angiogram Chest Pulmonary Embolism [SS]   1837 XR Chest 1 View [SS]      ED Course User Index  [AM] Cuco Pinto MD  [SS] Char Marquez APRN                                                       Medical Decision Making  Patient is a 39-year-old female with significant past medical history positive for anxiety, CHF, type 2 diabetes, GERD, PCOS, sleep apnea presenting to the ER complaints of left breast pain, shortness of breath.  Patient denies any fever, cough, nausea, vomiting or any additional symptoms today.  Patient reports that she recently had a stop breast-feeding because of engorgement and change in her medication that does not allow her to continue to breast-feed.  Patient reports pain and engorgement of her left breast.  Patient has no additional symptoms today.    MDM:    Escalation of care including admission/observation considered    - Discussions of management with other providers:  None    - Discussed/reviewed with Radiology regarding test interpretation    - Independent interpretation: None    - Additional patient history obtained from: None    - Review of external non-ED record (if available):  Prior Inpt record, Office record, Outpt record, Prior Outpt labs, PCP record, Outside ED record, Other    - Chronic conditions affecting care: See HPI and medical Hx.    - Social Determinants of health significantly affecting care:  None        Medical Decision Making Discussion:    Mastitis     The patient has been given very strict return precautions to return  to the emergency department should there be any acute change or worsening of their condition.  I have explained my findings and the patient has expressed understanding to me.  I explained that the work-up performed in the ED has been based on the specific complaint and concern, as the nature of emergency medicine is complaint driven and they understand that new symptoms may arise.  I have told them that, should there be any new symptoms, worsening or changing symptoms, a new work-up may be indicated that they are encouraged to return to the emergency department or promptly contact their primary care physician. We have employed a shared decision-making process as the discussion of their disposition.  The patient has been educated as to the nature of the visit, the tests and work-up performed and the findings from today's visit. At this time, there does not appear to be any acute emergent process that necessitates admission to the hospital, however, the patient understands that this can change unexpectedly. At this time, the patient is stable for discharge home and agrees to follow-up with her primary care physician in the next 24 to 48 hours or earlier should they be able to obtain an appointment.    The patient was counseled regarding diagnostic results and treatment plan and patient has indicated understanding of these instructions.    Problems Addressed:  Hypertension, unspecified type: complicated acute illness or injury  Mastitis: complicated acute illness or injury    Amount and/or Complexity of Data Reviewed  Labs: ordered. Decision-making details documented in ED Course.  Radiology: ordered. Decision-making details documented in ED Course.  ECG/medicine tests: ordered.    Risk  Prescription drug management.        Final diagnoses:   Hypertension, unspecified type   Mastitis       ED Disposition  ED Disposition       ED Disposition   Discharge    Condition   Stable    Comment   --               Florinda Bliss  Vidya, APRN  [address]  [place] [address]  [phone]    Schedule an appointment as soon as possible for a visit            Medication List        New Prescriptions      cephalexin 500 MG capsule  Commonly known as: KEFLEX  Take 1 capsule by mouth 2 (Two) Times a Day for 7 days.               Where to Get Your Medications        These medications were sent to [facility] [id number] - [place] [facility] [address] [place], [place] [address]E - [phone]  - [phone] FX  [address] [facility] [place] [address]      Phone: [phone]   cephalexin 500 MG capsule            Char Marquez, APRN  01/24/25 5986

## 2025-01-27 ENCOUNTER — OFFICE VISIT (OUTPATIENT)
Dept: CARDIOLOGY | Facility: CLINIC | Age: 40
End: 2025-01-27
Payer: MEDICARE

## 2025-01-27 VITALS
OXYGEN SATURATION: 98 % | DIASTOLIC BLOOD PRESSURE: 82 MMHG | SYSTOLIC BLOOD PRESSURE: 138 MMHG | WEIGHT: 206 LBS | HEART RATE: 86 BPM | HEIGHT: 59 IN | BODY MASS INDEX: 41.53 KG/M2

## 2025-01-27 DIAGNOSIS — I10 ESSENTIAL HYPERTENSION: ICD-10-CM

## 2025-01-27 DIAGNOSIS — J81.0 FLASH PULMONARY EDEMA: Primary | ICD-10-CM

## 2025-01-27 DIAGNOSIS — I50.32 CHRONIC HEART FAILURE WITH PRESERVED EJECTION FRACTION (HFPEF): ICD-10-CM

## 2025-01-27 PROCEDURE — 99214 OFFICE O/P EST MOD 30 MIN: CPT | Performed by: INTERNAL MEDICINE

## 2025-01-27 PROCEDURE — 3079F DIAST BP 80-89 MM HG: CPT | Performed by: INTERNAL MEDICINE

## 2025-01-27 PROCEDURE — 3075F SYST BP GE 130 - 139MM HG: CPT | Performed by: INTERNAL MEDICINE

## 2025-01-27 RX ORDER — FLUCONAZOLE 150 MG/1
TABLET ORAL
Qty: 6 TABLET | Refills: 5 | Status: SHIPPED | OUTPATIENT
Start: 2025-01-27

## 2025-01-27 NOTE — PROGRESS NOTES
"Chief Complaint  Chronic heart failure with preserved ejection fraction  (Follow up)      Subjective   History of Present Illness    Problem List  -HFpEF, normal EKG, echo preserved systolic function  -hx of HFpEF  -CXR showed pulmonary edema  -C section 24 morning  -Long standing HTN  -DM  -morbid obesity  -COPD?  -ALEJANDRO  -UC  -duodenal ulcers    Mrs. Pack is a 39 year old lady being seen in follow up.  Had complicated pregnancy.  Had c section.  Had CHF and GI bleed.  Titrated meds.  BP now controlled when taking.  Has lost 50 lbs in last 6 weeks.  Feeling much better.  BP high today because child admited at  and BP meds at home.  ROS negative except for the above.      Update 24  BP had been on the rise.  Was exposed noxious fumes likely and had flash pulmonary edema that required brief hospitalization.   also needed to be treated for difficulty breathing.      Update 24  BP well controlled at home.  Pharmacist concerned about her symptoms.      Update 24  Occasional blood pressure spikes.  Some occasional shortness of breaht.      Update 10/28/24  Weight up.  BP shoots up.  Chest tightness and short of breath.      Update 25  Father recently .  While visiting him in hospital BP shot up and had flash pulmonary edema.  No diuretic today and BP elevated.        Update 25  Has gained a few pounds.  Bnp little worse.  Only taking entresto once daily.           Objective   Vital Signs:  Vitals:    25 0841   BP: 138/82   Pulse: 86   SpO2: 98%     Estimated body mass index is 41.61 kg/m² as calculated from the following:    Height as of this encounter: 149.9 cm (59\").    Weight as of this encounter: 93.4 kg (206 lb).       Physical Exam  HENT:      Head: Normocephalic.   Eyes:      Extraocular Movements: Extraocular movements intact.   Cardiovascular:      Rate and Rhythm: Normal rate and regular rhythm.      Heart sounds: No murmur heard.     No gallop.   Pulmonary:     "  Breath sounds: Normal breath sounds.   Abdominal:      Palpations: Abdomen is soft.   Musculoskeletal:      Right lower leg: No edema.      Left lower leg: No edema.   Skin:     General: Skin is warm and dry.   Neurological:      General: No focal deficit present.      Mental Status: She is alert.   Psychiatric:         Mood and Affect: Mood normal.       Clinic discussed advanced directive        Assessment   -HFpEF, normal EKG, echo preserved systolic function  -hx of HFpEF  -CXR showed pulmonary edema  -C section 4/18/24 morning  -Long standing HTN  -DM  -morbid obesity  -COPD?  -ALEJANDRO  -UC  -duodenal ulcers    Plan   -hast stopped breast feeding  -take middle dose entresto twice daily  -cont. Spironolactone  -cont. Metoprolol, hydralazine, nifedipine  -having a lot of nausea.  Hold on ozempic for few weeks.    -lasix as needed (daily currently)  -US of renal arteries was normal  -cont. imdur  -blood work in a week.  -also having mastitis, inflammatory markers are elevated.      -few week follow up      Pablo Cabrera MD

## 2025-01-28 ENCOUNTER — READMISSION MANAGEMENT (OUTPATIENT)
Dept: CALL CENTER | Facility: HOSPITAL | Age: 40
End: 2025-01-28
Payer: MEDICARE

## 2025-01-28 NOTE — OUTREACH NOTE
CHF Week 3 Survey      Flowsheet Row Responses   [facility] facility patient discharged from? [place]   Does the patient have one of the following disease processes/diagnoses(primary or secondary)? CHF   Week 3 attempt successful? No   Unsuccessful attempts Attempt 1  [patient answered but could not hear me and then lost phone connection.]            NANDINI BARNETT - Registered Nurse

## 2025-01-29 LAB
BACTERIA SPEC AEROBE CULT: NORMAL
BACTERIA SPEC AEROBE CULT: NORMAL

## 2025-01-31 ENCOUNTER — LAB (OUTPATIENT)
Dept: LAB | Facility: HOSPITAL | Age: 40
End: 2025-01-31
Payer: MEDICARE

## 2025-01-31 DIAGNOSIS — I10 ESSENTIAL HYPERTENSION: ICD-10-CM

## 2025-01-31 DIAGNOSIS — I50.33 CHF (CONGESTIVE HEART FAILURE), NYHA CLASS II, ACUTE ON CHRONIC, DIASTOLIC: ICD-10-CM

## 2025-01-31 DIAGNOSIS — I50.32 CHRONIC HEART FAILURE WITH PRESERVED EJECTION FRACTION (HFPEF): ICD-10-CM

## 2025-01-31 DIAGNOSIS — J81.0 FLASH PULMONARY EDEMA: ICD-10-CM

## 2025-01-31 PROCEDURE — 85652 RBC SED RATE AUTOMATED: CPT

## 2025-01-31 PROCEDURE — 83880 ASSAY OF NATRIURETIC PEPTIDE: CPT

## 2025-01-31 PROCEDURE — 86141 C-REACTIVE PROTEIN HS: CPT

## 2025-01-31 PROCEDURE — 80053 COMPREHEN METABOLIC PANEL: CPT

## 2025-01-31 PROCEDURE — 36415 COLL VENOUS BLD VENIPUNCTURE: CPT

## 2025-02-01 LAB
ALBUMIN SERPL-MCNC: 3.9 G/DL (ref 3.5–5.2)
ALBUMIN/GLOB SERPL: 1.1 G/DL
ALP SERPL-CCNC: 73 U/L (ref 39–117)
ALT SERPL W P-5'-P-CCNC: 13 U/L (ref 1–33)
ANION GAP SERPL CALCULATED.3IONS-SCNC: 7.6 MMOL/L (ref 5–15)
AST SERPL-CCNC: 15 U/L (ref 1–32)
BILIRUB SERPL-MCNC: 0.6 MG/DL (ref 0–1.2)
BUN SERPL-MCNC: 9 MG/DL (ref 6–20)
BUN/CREAT SERPL: 13.2 (ref 7–25)
CALCIUM SPEC-SCNC: 9.2 MG/DL (ref 8.6–10.5)
CHLORIDE SERPL-SCNC: 101 MMOL/L (ref 98–107)
CO2 SERPL-SCNC: 27.4 MMOL/L (ref 22–29)
CREAT SERPL-MCNC: 0.68 MG/DL (ref 0.57–1)
CRP SERPL-MCNC: 0.6 MG/DL (ref 0.01–0.5)
EGFRCR SERPLBLD CKD-EPI 2021: 112.4 ML/MIN/1.73
ERYTHROCYTE [SEDIMENTATION RATE] IN BLOOD: 24 MM/HR (ref 0–20)
GLOBULIN UR ELPH-MCNC: 3.4 GM/DL
GLUCOSE SERPL-MCNC: 109 MG/DL (ref 65–99)
NT-PROBNP SERPL-MCNC: 676 PG/ML (ref 0–450)
POTASSIUM SERPL-SCNC: 4 MMOL/L (ref 3.5–5.2)
PROT SERPL-MCNC: 7.3 G/DL (ref 6–8.5)
SODIUM SERPL-SCNC: 136 MMOL/L (ref 136–145)

## 2025-02-04 ENCOUNTER — TELEPHONE (OUTPATIENT)
Dept: CARDIOLOGY | Facility: CLINIC | Age: 40
End: 2025-02-04
Payer: MEDICARE

## 2025-02-04 DIAGNOSIS — I10 ESSENTIAL HYPERTENSION: Primary | Chronic | ICD-10-CM

## 2025-02-04 DIAGNOSIS — I50.32 CHRONIC HEART FAILURE WITH PRESERVED EJECTION FRACTION (HFPEF): Chronic | ICD-10-CM

## 2025-02-04 NOTE — TELEPHONE ENCOUNTER
Called Shiloh with [facility] who stated no increase in thoracic fluid readings. Called pt to address MyChart message. Relayed Heart Patch Readings. Pt stated she took PRN lasix this morning and it is starting to kick in. Asked pt to call if symptoms persist or worsen. Pt verbalized understanding.

## 2025-02-04 NOTE — TELEPHONE ENCOUNTER
"Relayed Dr. Cabrera's comments and recommendation for repeat lab work. Verbalized understanding. Pt stated she has been SOB this morning and has naima LE edema. Stated she was \"fine\" yesterday. No increase in fluid status noted on [facility] heart patch. Pt reports taking 2 lasix tablets (40 mg each) this morning around 7:30 and has not noticed an increase in UOP since taking the diuretics. She does say she is not quite as SOB as she was this morning, but still has some SOB with exertion. Pt reports she is healing well from mastitis and secondary abscess.  "

## 2025-02-04 NOTE — TELEPHONE ENCOUNTER
----- Message from Pablo Cabrera sent at 2/4/2025 10:51 AM EST -----  Inflammatory markers and bnp improving.  Great news.  Would repeat probnp and esr and hscrp next time you see me or later this week.  Feeling any better?

## 2025-02-04 NOTE — PROGRESS NOTES
Inflammatory markers and bnp improving.  Great news.  Would repeat probnp and esr and hscrp next time you see me or later this week.  Feeling any better?

## 2025-02-05 ENCOUNTER — SPECIALTY PHARMACY (OUTPATIENT)
Dept: PHARMACY | Facility: HOSPITAL | Age: 40
End: 2025-02-05
Payer: MEDICARE

## 2025-02-05 NOTE — PROGRESS NOTES
Specialty Pharmacy Refill Coordination Note     Antoinette is a 39 y.o. female contacted today regarding refills of  Freestyle sensors specialty medication(s).    Reviewed and verified with patient:       Specialty medication(s) and dose(s) confirmed: yes    Refill Questions      Flowsheet Row Most Recent Value   Changes to allergies? No   Changes to medications? Yes  [Patient was started on Ozempic to be managed by Cardiology. Provider aware]   New conditions or infections since last clinic visit Yes   If yes, please describe  Mastitis   Unplanned office visit, urgent care, ED, or hospital admission in the last 4 weeks  Yes  [Patient went to the ED for mastitis and was put on keflex 500 BID x 7 days. She has finished course]   How does patient/caregiver feel medication is working? Very good   Financial problems or insurance changes  No   If yes, describe changes in insurance or financial issues. na   Since the previous refill, were any specialty medication doses or scheduled injections missed or delayed?  No  [Holding ozempic per PCP. informed cardiology PharmD]   If yes, please provide the amount na   Why were doses missed? na   Does this patient require a clinical escalation to a pharmacist? Yes            Delivery Questions      Flowsheet Row Most Recent Value   Delivery method UPS   Delivery address verified with patient/caregiver? Yes   Delivery address Home   Number of medications in delivery 1   Medication(s) being filled and delivered Continuous Glucose Sensor (FreeStyle Esvin 3 Sensor)   Doses left of specialty medications NA   Copay verified? Yes   Copay amount 0.00$   Copay form of payment No copayment ($0)   Ship Date 02/06   Delivery Date Selection 02/07/25   Signature Required No                   Follow-up: 30 day(s)     Carito Mills, Pharmacy Technician  Specialty Pharmacy Technician

## 2025-02-05 NOTE — TELEPHONE ENCOUNTER
Called to check on pt. Reports feeling a little better today. Not as SOB as yesterday. Still has edema in naima LE

## 2025-02-06 ENCOUNTER — SPECIALTY PHARMACY (OUTPATIENT)
Dept: PHARMACY | Facility: HOSPITAL | Age: 40
End: 2025-02-06
Payer: MEDICARE

## 2025-02-06 ENCOUNTER — TELEPHONE (OUTPATIENT)
Dept: PHARMACY | Facility: HOSPITAL | Age: 40
End: 2025-02-06
Payer: MEDICARE

## 2025-02-06 NOTE — PROGRESS NOTES
Endocrinology care coordinator was refilling Freestyle Esvin 3 sensors and during coordination pt stated that she was started on Ozempic by cardiology and is on keflex 500mg po bid x 7 days for mastitis.    Drug interaction check ran with pt's new medications and showed the following:  -Ozempic can increase the hypoglycemic effect of insulins (pt is on Lantus)  -Abilify and lasix may diminish the effect of Ozempic  -Metoprolol may enhance the hypoglycemic effect of Ozempic.    Overall, it is recommended that the pt monitor her glucose closely during initiation and titration of Ozempic.  Passed this recommendation on to the PharmD that manages the pt's Ozempic (cardiology manages Ozempic) so it can be relayed to the pt.    Gerardo Zafar RPH  02/06/25  12:26 EST    Addendum: Arely Rosario PharmD with cardiology responded that pt was informed of all of the risks with Ozempic and to monitor her blood glucose.    Gerardo Zafar RPH  02/06/25  13:43 EST

## 2025-02-06 NOTE — TELEPHONE ENCOUNTER
Patients sensor has been showing low glucose but with fingersticks it is actually around 40 points higher. Spoke to provider and she said to inform patient to decrease insulin to 12 units to keep her in range while on Ozempic and to let us know if she is having lows.

## 2025-02-10 NOTE — PROGRESS NOTES
"This provider is located at the [facility] (through [facility]), [address], [place], [address] using a secure [employer] Video Visit through [employer]. Patient is being seen remotely via telehealth in [place], and stated they are in a secure environment for this session. The patient's condition being diagnosed/treated is appropriate for telemedicine. The provider identified herself as well as her credentials.   The patient, and/or patients guardian, consent to be seen remotely, and when consent is given they understand that the consent allows for patient identifiable information to be sent to a third party as needed.   They may refuse to be seen remotely at any time. The electronic data is encrypted and password protected, and the patient and/or guardian has been advised of the potential risks to privacy not withstanding such measures.    Mode of Visit: Video  Location of patient: -OTHER-: in parking lot at [facility]  Location of provider: +[facility]  You have chosen to receive care through a telehealth visit.  The patient has signed the video visit consent form.  The visit included audio and video interaction. No technical issues occurred during this visit.      Subjective   Antoinette Pack is a 39 y.o. female who presents today for follow up    Chief Complaint:  Bipolar disorder, depression, anxiety    Today is a follow-up visit.     Medication compliance: patient not compliant with medication, has SE. She states she stopped taking Abilify, it caused her to be \"tired\", she has to get up with the baby during the night.  Depression rated 8/10, with 10 being the worst.  Anxiety rated 10/10, with 10 being the worst.    Sleep is poor, getting about 5 hours a night,  Nightmares: Occasional  Appetite is good.  Hallucinations: Patient has visual hallucinations  Self-harm: Patient denies thoughts of self-harm.  Alcohol use: no  Drug use: no  Marijuana use: no     Chronic " "health issues, no acute physical or medical issues today.    Details: she has had another pulmonary edema hospitalization in early January 2025, she states she stays really tired. They adjusted medications, cardiac medications were adjusted, Entresto, Imdur, and Spironolactone, Metoprolol, Hydralazine and Lasix. She states she stays anxious worrying about another pulmonary edema episode. She had to stop breastfeeding due to her medication changes. She developed mastitis, which has resolved. She reports her daughter is doing really good, except she is \"throwing up\" after she eats. She is under care of GI provider at  in [place]. She reports babies pediatrician is at [facility].  She states she continues to have visual hallucinations, a \"figure\", she is aware it is \"not real\".         History of Present Illness:      The following portions of the patient's history were reviewed and updated as appropriate: allergies, current medications, past family history, past medical history, past social history, past surgical history and problem list.      Past Medical History:  Past Medical History:   Diagnosis Date    Anxiety     CHF (congestive heart failure) 05/2023    Colitis 2023    Depression     Diabetes mellitus 2018    type 2    Diverticulitis     GERD (gastroesophageal reflux disease)     History of transfusion 04/19/2024    2 units post delivery    Hypertension     chronic    Kidney stone     \"years ago\"    Narcolepsy 2022    Pancreatitis 09/2023    PCOS (polycystic ovarian syndrome)     Rectal bleeding     SINCE 2023 - USES MESALAMINE SUPPOSITORY NIGHTLY    Seizures 2012    Sleep apnea        Social History:  Social History     Socioeconomic History    Marital status:    Tobacco Use    Smoking status: Former     Current packs/day: 0.00     Average packs/day: 1 pack/day for 20.0 years (20.0 ttl pk-yrs)     Types: Cigarettes     Start date: 1/1/1995     Quit date: 2015     Years since quitting: 10.1     " Passive exposure: Past    Smokeless tobacco: Never    Tobacco comments:     QUIT IN 2017   Vaping Use    Vaping status: Never Used   Substance and Sexual Activity    Alcohol use: No    Drug use: No    Sexual activity: Defer       Family History:  Family History   Problem Relation Age of Onset    Hypertension Mother     Depression Mother     Heart disease Mother     COPD Mother     Emphysema Mother     Heart failure Mother     Hypertension Father     Diabetes Father     Heart disease Father     COPD Father     Heart failure Father     Hepatitis Father        Past Surgical History:  Past Surgical History:   Procedure Laterality Date    CARDIAC CATHETERIZATION      CARPAL TUNNEL RELEASE       SECTION N/A 2024    Procedure:  SECTION PRIMARY;  Surgeon: Axel Keys MD;  Location: [facility];  Service: Obstetrics/Gynecology;  Laterality: N/A;    COLONOSCOPY      COLONOSCOPY N/A 2024    Procedure: COLONOSCOPY;  Surgeon: Tom Mueller MD;  Location: [facility];  Service: Gastroenterology;  Laterality: N/A;    ENDOSCOPY      ENDOSCOPY N/A 2024    Procedure: ESOPHAGOGASTRODUODENOSCOPY;  Surgeon: Tom Mueller MD;  Location: [facility];  Service: Gastroenterology;  Laterality: N/A;    FRACTURE SURGERY Left     wrist    HARDWARE REMOVAL Left     WRIST    TENDON REPAIR Left     HAND    WISDOM TOOTH EXTRACTION         Problem List:  Patient Active Problem List   Diagnosis    Simple goiter    History of CHF (congestive heart failure)    Chronic hypertension affecting pregnancy    History of pancreatitis    History of seizure disorder    ALEJANDRO on CPAP    Proteinuria affecting pregnancy, antepartum    Rectal bleeding    Shortness of breath    Antepartum multigravida of advanced maternal age    Pain of pelvic girdle    Elevated blood pressure reading    Pulmonary edema    Essential hypertension    Type 2 diabetes mellitus without complication, with long-term current use of  insulin    S/P  section, total salpingectomy left unicornuate uterus    Anemia    Ulcerative colitis with complication    Surgical wound infection    Wound infection    Chronic heart failure with preserved ejection fraction (HFpEF)    Gastroesophageal reflux disease    HLD (hyperlipidemia)    Mixed hyperlipidemia    Psoriasis    Vitamin B12 deficiency    Vitamin D deficiency    Seasonal allergic rhinitis    Primary dysthymia    CHF (congestive heart failure), NYHA class II, acute on chronic, diastolic    Acute on chronic heart failure with preserved ejection fraction (HFpEF)    Acute heart failure with preserved ejection fraction (HFpEF)        Allergy:   Allergies   Allergen Reactions    Dilantin [Phenytoin] Mental Status Change    Keppra [Levetiracetam] Mental Status Change    Meperidine Rash    Topamax [Topiramate] Mental Status Change        Current Medications:   Current Outpatient Medications   Medication Sig Dispense Refill    amoxicillin-clavulanate (AUGMENTIN) 875-125 MG per tablet Take 1 tablet by mouth 2 (Two) Times a Day.      ARIPiprazole (ABILIFY) 2 MG tablet Take 1 tablet by mouth Daily.      Budesonide (ENTOCORT EC) 3 MG 24 hr capsule Take 1 capsule by mouth Every Morning.      buPROPion XL (Wellbutrin XL) 150 MG 24 hr tablet Take 1 tablet by mouth Every Morning. 30 tablet 1    Continuous Glucose Sensor (FreeStyle Esvin 3 Sensor) misc Use 1 each Every 14 (Fourteen) Days. 2 each 2    Continuous Glucose Transmitter (Dexcom G6 Transmitter) misc Use 1 each Every 3 (Three) Months. 1 each 1    fluconazole (Diflucan) 150 MG tablet Take every 72 hours as needed for yeast infection. 6 tablet 5    fluticasone (FLONASE) 50 MCG/ACT nasal spray Administer 2 sprays into the nostril(s) as directed by provider Daily As Needed for Rhinitis or Allergies.      furosemide (LASIX) 40 MG tablet Take 1 tablet by mouth Daily. 15 tablet 0    hydrALAZINE (APRESOLINE) 25 MG tablet Take 1 tablet by mouth 3 (Three) Times  a Day. Hold if top number of blood pressure is less than 110. Can take extra hydralazine 25mg as needed for top number greater than 125 tablet 1    Insulin Glargine (Lantus SoloStar) 100 UNIT/ML injection pen Inject 15 Units under the skin into the appropriate area as directed Every Night. 15 mL 1    Insulin Pen Needle (Pen Needles) 32G X 4 MM misc Use 1 each Daily. 100 each 5    isosorbide mononitrate (IMDUR) 30 MG 24 hr tablet Take 1 tablet by mouth Daily. 90 tablet 3    mesalamine (ROWASA) 4 g enema Insert 1 enema into the rectum Every Night.      metoprolol tartrate (LOPRESSOR) 50 MG tablet Take 1 tablet by mouth 2 (Two) Times a Day.      mupirocin (BACTROBAN) 2 % ointment Apply 1 Application topically to the appropriate area as directed 3 (Three) Times a Day.      NIFEdipine XL (PROCARDIA XL) 90 MG 24 hr tablet Take 1 tablet by mouth Daily. 90 tablet 3    nitroglycerin (NITROSTAT) 0.4 MG SL tablet Place 1 tablet under the tongue Every 5 (Five) Minutes As Needed for Chest Pain (Flash pulmonary edema) for up to 10 doses. Place one tablet under the tongue for chest pain every 5 minutes until chest pain is relieved. If you have to take 3 tablets, take the 3rd and go to the hospital to be evaluated. 10 tablet 0    nystatin (MYCOSTATIN) 600968 UNIT/GM powder Apply  topically to the appropriate area as directed 4 (Four) Times a Day.      pantoprazole (PROTONIX) 40 MG EC tablet Take 1 tablet by mouth 2 (Two) Times a Day As Needed (acid reflux).      potassium chloride (KLOR-CON M20) 20 MEQ CR tablet Take 1 tablet by mouth Daily.      Prenatal Vit-Fe Fumarate-FA (PRENATAL PO) Take 1 tablet by mouth Daily.      prochlorperazine (COMPAZINE) 5 MG tablet Take 1 tablet by mouth Every 6 (Six) Hours As Needed.      sacubitril-valsartan (Entresto)  MG tablet Take 1 tablet by mouth 2 (Two) Times a Day. 180 tablet 3    Semaglutide,0.25 or 0.5MG/DOS, (OZEMPIC) 2 MG/3ML solution pen-injector Inject 0.25 mg under the skin into  the appropriate area as directed 1 (One) Time Per Week for 28 days, THEN 0.5 mg 1 (One) Time Per Week for 14 days. 3 mL 0    spironolactone (ALDACTONE) 100 MG tablet Take 1 tablet by mouth Daily. 90 tablet 3    sulfamethoxazole-trimethoprim (BACTRIM DS,SEPTRA DS) 800-160 MG per tablet Take 1 tablet by mouth 2 (Two) Times a Day. (Patient not taking: Reported on 2/13/2025)       No current facility-administered medications for this visit.       Review of Symptoms:    Review of Systems   Constitutional:  Positive for fatigue.   Neurological:  Negative for seizures.   Psychiatric/Behavioral:  Positive for hallucinations, sleep disturbance, depressed mood and stress. Negative for agitation, dysphoric mood, self-injury and suicidal ideas. The patient is nervous/anxious.    All other systems reviewed and are negative.      Physical Exam:   not currently breastfeeding.  There is no height or weight on file to calculate BMI.    02/14/25    MENTAL STATUS EXAM   General Appearance:  Cleanly groomed and dressed  Eye Contact:  Good eye contact  Attitude:  Cooperative  Motor Activity:  Normal gait, posture  Speech:  Normal rate, tone, volume  Language:  Spontaneous  Mood and affect:  Normal, pleasant  Hopelessness:  Denies  Thought Process:  Goal-directed and linear  Associations/ Thought Content:  No delusions  Hallucinations:  Visual  Suicidal Ideations:  Not present  Homicidal Ideation:  Not present  Sensorium:  Alert  Orientation:  Person, place, time and situation  Insight:  Fair  Judgement:  Fair  Reliability:  Fair  Impulse Control:  Fair    PHQ-9 Total Score: (Patient-Rptd) 15    Lab Results:   Lab on 02/13/2025   Component Date Value Ref Range Status    Sed Rate 02/13/2025 12  0 - 20 mm/hr Final    proBNP 02/13/2025 336.0  0.0 - 450.0 pg/mL Final    HS C-Reactive Protein 02/13/2025 0.229  0.010 - 0.500 mg/dL Final    Glucose 02/13/2025 89  65 - 99 mg/dL Final    BUN 02/13/2025 9  6 - 20 mg/dL Final    Creatinine  02/13/2025 0.81  0.57 - 1.00 mg/dL Final    Sodium 02/13/2025 137  136 - 145 mmol/L Final    Potassium 02/13/2025 4.0  3.5 - 5.2 mmol/L Final    Chloride 02/13/2025 104  98 - 107 mmol/L Final    CO2 02/13/2025 23.7  22.0 - 29.0 mmol/L Final    Calcium 02/13/2025 8.8  8.6 - 10.5 mg/dL Final    BUN/Creatinine Ratio 02/13/2025 11.1  7.0 - 25.0 Final    Anion Gap 02/13/2025 9.3  5.0 - 15.0 mmol/L Final    eGFR 02/13/2025 93.7  >60.0 mL/min/1.73 Final   Lab on 01/31/2025   Component Date Value Ref Range Status    proBNP 01/31/2025 676.0 (H)  0.0 - 450.0 pg/mL Final    Sed Rate 01/31/2025 24 (H)  0 - 20 mm/hr Final    HS C-Reactive Protein 01/31/2025 0.602 (H)  0.010 - 0.500 mg/dL Final    Glucose 01/31/2025 109 (H)  65 - 99 mg/dL Final    BUN 01/31/2025 9  6 - 20 mg/dL Final    Creatinine 01/31/2025 0.68  0.57 - 1.00 mg/dL Final    Sodium 01/31/2025 136  136 - 145 mmol/L Final    Potassium 01/31/2025 4.0  3.5 - 5.2 mmol/L Final    Chloride 01/31/2025 101  98 - 107 mmol/L Final    CO2 01/31/2025 27.4  22.0 - 29.0 mmol/L Final    Calcium 01/31/2025 9.2  8.6 - 10.5 mg/dL Final    Total Protein 01/31/2025 7.3  6.0 - 8.5 g/dL Final    Albumin 01/31/2025 3.9  3.5 - 5.2 g/dL Final    ALT (SGPT) 01/31/2025 13  1 - 33 U/L Final    AST (SGOT) 01/31/2025 15  1 - 32 U/L Final    Alkaline Phosphatase 01/31/2025 73  39 - 117 U/L Final    Total Bilirubin 01/31/2025 0.6  0.0 - 1.2 mg/dL Final    Globulin 01/31/2025 3.4  gm/dL Final    A/G Ratio 01/31/2025 1.1  g/dL Final    BUN/Creatinine Ratio 01/31/2025 13.2  7.0 - 25.0 Final    Anion Gap 01/31/2025 7.6  5.0 - 15.0 mmol/L Final    eGFR 01/31/2025 112.4  >60.0 mL/min/1.73 Final   Admission on 01/24/2025, Discharged on 01/24/2025   Component Date Value Ref Range Status    QT Interval 01/24/2025 322  ms Final    QTC Interval 01/24/2025 431  ms Final    Extra Tube 01/24/2025 Hold for add-ons.   Final    Auto resulted.    Extra Tube 01/24/2025 hold for add-on   Final    Auto resulted     Extra Tube 01/24/2025 Hold for add-ons.   Final    Auto resulted.    Extra Tube 01/24/2025 Hold for add-ons.   Final    Auto resulted    Glucose 01/24/2025 102 (H)  65 - 99 mg/dL Final    BUN 01/24/2025 7  6 - 20 mg/dL Final    Creatinine 01/24/2025 0.55 (L)  0.57 - 1.00 mg/dL Final    Sodium 01/24/2025 138  136 - 145 mmol/L Final    Potassium 01/24/2025 4.0  3.5 - 5.2 mmol/L Final    Chloride 01/24/2025 102  98 - 107 mmol/L Final    CO2 01/24/2025 24.8  22.0 - 29.0 mmol/L Final    Calcium 01/24/2025 8.9  8.6 - 10.5 mg/dL Final    Total Protein 01/24/2025 7.7  6.0 - 8.5 g/dL Final    Albumin 01/24/2025 4.1  3.5 - 5.2 g/dL Final    ALT (SGPT) 01/24/2025 11  1 - 33 U/L Final    AST (SGOT) 01/24/2025 12  1 - 32 U/L Final    Alkaline Phosphatase 01/24/2025 78  39 - 117 U/L Final    Total Bilirubin 01/24/2025 1.6 (H)  0.0 - 1.2 mg/dL Final    Globulin 01/24/2025 3.6  gm/dL Final    A/G Ratio 01/24/2025 1.1  g/dL Final    BUN/Creatinine Ratio 01/24/2025 12.7  7.0 - 25.0 Final    Anion Gap 01/24/2025 11.2  5.0 - 15.0 mmol/L Final    eGFR 01/24/2025 118.3  >60.0 mL/min/1.73 Final    Color, UA 01/24/2025 Yellow  Yellow, Straw Final    Appearance, UA 01/24/2025 Clear  Clear Final    pH, UA 01/24/2025 >=9.0 (H)  5.0 - 8.0 Final    Specific Gravity, UA 01/24/2025 1.016  1.005 - 1.030 Final    Glucose, UA 01/24/2025 Negative  Negative Final    Ketones, UA 01/24/2025 Negative  Negative Final    Bilirubin, UA 01/24/2025 Negative  Negative Final    Blood, UA 01/24/2025 Negative  Negative Final    Protein, UA 01/24/2025 Trace (A)  Negative Final    Leuk Esterase, UA 01/24/2025 Negative  Negative Final    Nitrite, UA 01/24/2025 Negative  Negative Final    Urobilinogen, UA 01/24/2025 1.0 E.U./dL  0.2 - 1.0 E.U./dL Final    HS Troponin T 01/24/2025 11  <14 ng/L Final    proBNP 01/24/2025 955.9 (H)  0.0 - 450.0 pg/mL Final    D-Dimer, Quantitative 01/24/2025 0.34  0.00 - 0.50 MCGFEU/mL Final    Blood Culture 01/24/2025 No growth at 5  days   Final    Blood Culture 01/24/2025 No growth at 5 days   Final    Lactate 01/24/2025 0.8  0.5 - 2.0 mmol/L Final    Procalcitonin 01/24/2025 0.06  0.00 - 0.25 ng/mL Final    C-Reactive Protein 01/24/2025 11.41 (H)  0.00 - 0.50 mg/dL Final    Sed Rate 01/24/2025 27 (H)  0 - 20 mm/hr Final    Magnesium 01/24/2025 1.7  1.6 - 2.6 mg/dL Final    Extra Tube 01/24/2025 Hold for add-ons.   Final    Auto resulted.    Extra Tube 01/24/2025 hold for add-on   Final    Auto resulted    Extra Tube 01/24/2025 Hold for add-ons.   Final    Auto resulted.    Extra Tube 01/24/2025 Hold for add-ons.   Final    Auto resulted    WBC 01/24/2025 12.84 (H)  3.40 - 10.80 10*3/mm3 Final    RBC 01/24/2025 4.17  3.77 - 5.28 10*6/mm3 Final    Hemoglobin 01/24/2025 11.8 (L)  12.0 - 15.9 g/dL Final    Hematocrit 01/24/2025 34.9  34.0 - 46.6 % Final    MCV 01/24/2025 83.7  79.0 - 97.0 fL Final    MCH 01/24/2025 28.3  26.6 - 33.0 pg Final    MCHC 01/24/2025 33.8  31.5 - 35.7 g/dL Final    RDW 01/24/2025 12.5  12.3 - 15.4 % Final    RDW-SD 01/24/2025 37.7  37.0 - 54.0 fl Final    MPV 01/24/2025 10.6  6.0 - 12.0 fL Final    Platelets 01/24/2025 123 (L)  140 - 450 10*3/mm3 Final    Neutrophil % 01/24/2025 86.0 (H)  42.7 - 76.0 % Final    Lymphocyte % 01/24/2025 8.2 (L)  19.6 - 45.3 % Final    Monocyte % 01/24/2025 4.6 (L)  5.0 - 12.0 % Final    Eosinophil % 01/24/2025 0.5  0.3 - 6.2 % Final    Basophil % 01/24/2025 0.2  0.0 - 1.5 % Final    Immature Grans % 01/24/2025 0.5  0.0 - 0.5 % Final    Neutrophils, Absolute 01/24/2025 11.04 (H)  1.70 - 7.00 10*3/mm3 Final    Lymphocytes, Absolute 01/24/2025 1.05  0.70 - 3.10 10*3/mm3 Final    Monocytes, Absolute 01/24/2025 0.59  0.10 - 0.90 10*3/mm3 Final    Eosinophils, Absolute 01/24/2025 0.07  0.00 - 0.40 10*3/mm3 Final    Basophils, Absolute 01/24/2025 0.03  0.00 - 0.20 10*3/mm3 Final    Immature Grans, Absolute 01/24/2025 0.06 (H)  0.00 - 0.05 10*3/mm3 Final    nRBC 01/24/2025 0.0  0.0 - 0.2 /100 WBC  Final    HS Troponin T 01/24/2025 10  <14 ng/L Final    Troponin T Numeric Delta 01/24/2025 -1  Abnormal if >/=3 ng/L Final    ADENOVIRUS, PCR 01/24/2025 Not Detected  Not Detected Final    Coronavirus 229E 01/24/2025 Not Detected  Not Detected Final    Coronavirus HKU1 01/24/2025 Not Detected  Not Detected Final    Coronavirus NL63 01/24/2025 Not Detected  Not Detected Final    Coronavirus OC43 01/24/2025 Not Detected  Not Detected Final    COVID19 01/24/2025 Not Detected  Not Detected - Ref. Range Final    Human Metapneumovirus 01/24/2025 Not Detected  Not Detected Final    Human Rhinovirus/Enterovirus 01/24/2025 Not Detected  Not Detected Final    Influenza A PCR 01/24/2025 Not Detected  Not Detected Final    Influenza B PCR 01/24/2025 Not Detected  Not Detected Final    Parainfluenza Virus 1 01/24/2025 Not Detected  Not Detected Final    Parainfluenza Virus 2 01/24/2025 Not Detected  Not Detected Final    Parainfluenza Virus 3 01/24/2025 Not Detected  Not Detected Final    Parainfluenza Virus 4 01/24/2025 Not Detected  Not Detected Final    RSV, PCR 01/24/2025 Not Detected  Not Detected Final    Bordetella pertussis pcr 01/24/2025 Not Detected  Not Detected Final    Bordetella parapertussis PCR 01/24/2025 Not Detected  Not Detected Final    Chlamydophila pneumoniae PCR 01/24/2025 Not Detected  Not Detected Final    Mycoplasma pneumo by PCR 01/24/2025 Not Detected  Not Detected Final   Lab on 01/21/2025   Component Date Value Ref Range Status    Glucose 01/21/2025 128 (H)  65 - 99 mg/dL Final    BUN 01/21/2025 12  6 - 20 mg/dL Final    Creatinine 01/21/2025 0.62  0.57 - 1.00 mg/dL Final    Sodium 01/21/2025 141  136 - 145 mmol/L Final    Potassium 01/21/2025 4.2  3.5 - 5.2 mmol/L Final    Chloride 01/21/2025 102  98 - 107 mmol/L Final    CO2 01/21/2025 25.9  22.0 - 29.0 mmol/L Final    Calcium 01/21/2025 9.2  8.6 - 10.5 mg/dL Final    Total Protein 01/21/2025 6.9  6.0 - 8.5 g/dL Final    Albumin 01/21/2025 4.0   3.5 - 5.2 g/dL Final    ALT (SGPT) 01/21/2025 18  1 - 33 U/L Final    AST (SGOT) 01/21/2025 15  1 - 32 U/L Final    Alkaline Phosphatase 01/21/2025 85  39 - 117 U/L Final    Total Bilirubin 01/21/2025 0.4  0.0 - 1.2 mg/dL Final    Globulin 01/21/2025 2.9  gm/dL Final    A/G Ratio 01/21/2025 1.4  g/dL Final    BUN/Creatinine Ratio 01/21/2025 19.4  7.0 - 25.0 Final    Anion Gap 01/21/2025 13.1  5.0 - 15.0 mmol/L Final    eGFR 01/21/2025 114.9  >60.0 mL/min/1.73 Final    proBNP 01/21/2025 638.0 (H)  0.0 - 450.0 pg/mL Final    WBC 01/21/2025 6.50  3.40 - 10.80 10*3/mm3 Final    RBC 01/21/2025 4.43  3.77 - 5.28 10*6/mm3 Final    Hemoglobin 01/21/2025 12.7  12.0 - 15.9 g/dL Final    Hematocrit 01/21/2025 36.5  34.0 - 46.6 % Final    MCV 01/21/2025 82.4  79.0 - 97.0 fL Final    MCH 01/21/2025 28.7  26.6 - 33.0 pg Final    MCHC 01/21/2025 34.8  31.5 - 35.7 g/dL Final    RDW 01/21/2025 12.7  12.3 - 15.4 % Final    RDW-SD 01/21/2025 37.9  37.0 - 54.0 fl Final    MPV 01/21/2025 11.1  6.0 - 12.0 fL Final    Platelets 01/21/2025 168  140 - 450 10*3/mm3 Final    Neutrophil % 01/21/2025 62.8  42.7 - 76.0 % Final    Lymphocyte % 01/21/2025 27.8  19.6 - 45.3 % Final    Monocyte % 01/21/2025 5.4  5.0 - 12.0 % Final    Eosinophil % 01/21/2025 3.1  0.3 - 6.2 % Final    Basophil % 01/21/2025 0.3  0.0 - 1.5 % Final    Immature Grans % 01/21/2025 0.6 (H)  0.0 - 0.5 % Final    Neutrophils, Absolute 01/21/2025 4.08  1.70 - 7.00 10*3/mm3 Final    Lymphocytes, Absolute 01/21/2025 1.81  0.70 - 3.10 10*3/mm3 Final    Monocytes, Absolute 01/21/2025 0.35  0.10 - 0.90 10*3/mm3 Final    Eosinophils, Absolute 01/21/2025 0.20  0.00 - 0.40 10*3/mm3 Final    Basophils, Absolute 01/21/2025 0.02  0.00 - 0.20 10*3/mm3 Final    Immature Grans, Absolute 01/21/2025 0.04  0.00 - 0.05 10*3/mm3 Final    nRBC 01/21/2025 0.0  0.0 - 0.2 /100 WBC Final       Assessment & Plan   Problems Addressed this Visit    None  Visit Diagnoses       Bipolar disorder, in  partial remission, most recent episode depressed    -  Primary    Relevant Medications    buPROPion XL (Wellbutrin XL) 150 MG 24 hr tablet    Panic disorder with agoraphobia        Relevant Medications    buPROPion XL (Wellbutrin XL) 150 MG 24 hr tablet    Generalized anxiety disorder        Relevant Medications    buPROPion XL (Wellbutrin XL) 150 MG 24 hr tablet    Medication management              Diagnoses         Codes Comments    Bipolar disorder, in partial remission, most recent episode depressed    -  Primary ICD-10-CM: F31.75  ICD-9-CM: 296.55     Panic disorder with agoraphobia     ICD-10-CM: F40.01  ICD-9-CM: 300.21     Generalized anxiety disorder     ICD-10-CM: F41.1  ICD-9-CM: 300.02     Medication management     ICD-10-CM: Z79.899  ICD-9-CM: V58.69           Social History     Tobacco Use   Smoking Status Former    Current packs/day: 0.00    Average packs/day: 1 pack/day for 20.0 years (20.0 ttl pk-yrs)    Types: Cigarettes    Start date: 1/1/1995    Quit date: 2015    Years since quitting: 10.1    Passive exposure: Past   Smokeless Tobacco Never   Tobacco Comments    QUIT IN 2017     JORGE reviewed and appropriate. Patient counseled on use of controlled substances.     -The benefits of a healthy diet and exercise were discussed with patient, especially the positive effects they have on mental health. Patient encouraged to consider lifestyle modification regarding  diet and exercise patterns to maximize results of mental health treatment.  -Reviewed previous available documentation  -Reviewed most recent available labs     -Pt has no previous history of seizure or current eating disorder, which would be a contraindication for use. The possibility of activation with initiation was discussed and patient verbalizes understanding.    -previous psychiatric medications include zoloft, abilify, wellbutrin    -Counseled regarding manic behaviors, instructed to notify provider if she develops grandiose  ideas, requiring little sleep, but excessive energy, participating in risky behaviors.     -She is no longer breastfeeding.      Visit Diagnoses:    ICD-10-CM ICD-9-CM   1. Bipolar disorder, in partial remission, most recent episode depressed  F31.75 296.55   2. Panic disorder with agoraphobia  F40.01 300.21   3. Generalized anxiety disorder  F41.1 300.02   4. Medication management  Z79.899 V58.69       TREATMENT PLAN/GOALS: Continue supportive psychotherapy efforts and medications as indicated. Treatment and medication options discussed during today's visit. Patient acknowledged and verbally consented to continue with current treatment plan and was educated on the importance of compliance with treatment and follow-up appointments.    MEDICATION ISSUES:    Discussed medication options and treatment plan of prescribed medication as well as the risks, benefits, and side effects including potential falls, possible impaired driving and metabolic adversities among others. Patient is agreeable to call the office with any worsening of symptoms or onset of side effects. Patient is agreeable to call 911 or go to the nearest ER should he/she begin having SI/HI.     MEDS ORDERED DURING VISIT:  New Medications Ordered This Visit   Medications    buPROPion XL (Wellbutrin XL) 150 MG 24 hr tablet     Sig: Take 1 tablet by mouth Every Morning.     Dispense:  30 tablet     Refill:  1     -D/C Abilify  -Start wellbutrin  mg tablet, take 1 tablet every morning.    Return in about 2 months (around 4/14/2025).    I spent 30 minutes caring for Antoinette on this date of service. This time includes time spent by me in the following activities: preparing for the visit, performing a medically appropriate examination and/or evaluation, counseling and educating the patient/family/caregiver, documenting information in the medical record, and ordering medications             This document has been electronically signed by Martha PIERSON  Senia, APRN  February 14, 2025 13:52 EST    Part of this note may be an electronic transcription/translation of spoken language to printed text using the Dragon Dictation System.

## 2025-02-13 ENCOUNTER — OFFICE VISIT (OUTPATIENT)
Dept: CARDIOLOGY | Facility: CLINIC | Age: 40
End: 2025-02-13
Payer: MEDICARE

## 2025-02-13 ENCOUNTER — LAB (OUTPATIENT)
Facility: HOSPITAL | Age: 40
End: 2025-02-13
Payer: MEDICARE

## 2025-02-13 VITALS
OXYGEN SATURATION: 95 % | WEIGHT: 195.6 LBS | SYSTOLIC BLOOD PRESSURE: 192 MMHG | BODY MASS INDEX: 39.43 KG/M2 | HEART RATE: 79 BPM | DIASTOLIC BLOOD PRESSURE: 102 MMHG | HEIGHT: 59 IN

## 2025-02-13 DIAGNOSIS — I50.32 CHRONIC HEART FAILURE WITH PRESERVED EJECTION FRACTION (HFPEF): Primary | ICD-10-CM

## 2025-02-13 DIAGNOSIS — I10 ESSENTIAL HYPERTENSION: Chronic | ICD-10-CM

## 2025-02-13 DIAGNOSIS — I50.32 CHRONIC HEART FAILURE WITH PRESERVED EJECTION FRACTION (HFPEF): Chronic | ICD-10-CM

## 2025-02-13 LAB — ERYTHROCYTE [SEDIMENTATION RATE] IN BLOOD: 12 MM/HR (ref 0–20)

## 2025-02-13 PROCEDURE — 99214 OFFICE O/P EST MOD 30 MIN: CPT | Performed by: INTERNAL MEDICINE

## 2025-02-13 PROCEDURE — 3077F SYST BP >= 140 MM HG: CPT | Performed by: INTERNAL MEDICINE

## 2025-02-13 PROCEDURE — 80048 BASIC METABOLIC PNL TOTAL CA: CPT

## 2025-02-13 PROCEDURE — 36415 COLL VENOUS BLD VENIPUNCTURE: CPT

## 2025-02-13 PROCEDURE — 86141 C-REACTIVE PROTEIN HS: CPT

## 2025-02-13 PROCEDURE — 85652 RBC SED RATE AUTOMATED: CPT

## 2025-02-13 PROCEDURE — 3080F DIAST BP >= 90 MM HG: CPT | Performed by: INTERNAL MEDICINE

## 2025-02-13 PROCEDURE — 83880 ASSAY OF NATRIURETIC PEPTIDE: CPT

## 2025-02-13 RX ORDER — PROCHLORPERAZINE MALEATE 5 MG/1
5 TABLET ORAL EVERY 6 HOURS PRN
COMMUNITY
Start: 2025-01-29

## 2025-02-13 RX ORDER — SACUBITRIL AND VALSARTAN 97; 103 MG/1; MG/1
1 TABLET, FILM COATED ORAL 2 TIMES DAILY
Qty: 180 TABLET | Refills: 3 | Status: SHIPPED | OUTPATIENT
Start: 2025-02-13

## 2025-02-13 RX ORDER — SULFAMETHOXAZOLE AND TRIMETHOPRIM 800; 160 MG/1; MG/1
1 TABLET ORAL 2 TIMES DAILY
COMMUNITY
Start: 2025-01-29

## 2025-02-13 RX ORDER — NYSTATIN 100000 [USP'U]/G
POWDER TOPICAL 4 TIMES DAILY
COMMUNITY
Start: 2025-02-06

## 2025-02-13 RX ORDER — MUPIROCIN 20 MG/G
1 OINTMENT TOPICAL 3 TIMES DAILY
COMMUNITY
Start: 2025-01-29

## 2025-02-13 NOTE — PROGRESS NOTES
"Chief Complaint  Flash pulmonary edema; Chronic heart failure with preserved ejection fraction (HFp; and Dyspnea, unspecified type      Subjective   History of Present Illness    Problem List  -HFpEF, normal EKG, echo preserved systolic function  -hx of HFpEF  -CXR showed pulmonary edema  -C section 24 morning  -Long standing HTN  -DM  -morbid obesity  -COPD?  -ALEJANDRO  -UC  -duodenal ulcers    Mrs. Pack is a 39 year old lady being seen in follow up.  Had complicated pregnancy.  Had c section.  Had CHF and GI bleed.  Titrated meds.  BP now controlled when taking.  Has lost 50 lbs in last 6 weeks.  Feeling much better.  BP high today because child admited at  and BP meds at home.  ROS negative except for the above.      Update 24  BP had been on the rise.  Was exposed noxious fumes likely and had flash pulmonary edema that required brief hospitalization.   also needed to be treated for difficulty breathing.      Update 24  BP well controlled at home.  Pharmacist concerned about her symptoms.      Update 24  Occasional blood pressure spikes.  Some occasional shortness of breaht.      Update 10/28/24  Weight up.  BP shoots up.  Chest tightness and short of breath.      Update 25  Father recently .  While visiting him in hospital BP shot up and had flash pulmonary edema.  No diuretic today and BP elevated.        Update 25  Has gained a few pounds.  Bnp little worse.  Only taking entresto once daily.      Update 25  BP well controlled at home.  High today.  NBP improving.           Objective   Vital Signs:  Vitals:    25 0943   BP: (!) 192/102   Pulse: 79   SpO2: 95%     Estimated body mass index is 39.48 kg/m² as calculated from the following:    Height as of this encounter: 149.9 cm (59.02\").    Weight as of this encounter: 88.7 kg (195 lb 9.6 oz).       Physical Exam  HENT:      Head: Normocephalic.   Eyes:      Extraocular Movements: Extraocular movements intact. "   Cardiovascular:      Rate and Rhythm: Normal rate and regular rhythm.      Heart sounds: No murmur heard.     No gallop.   Pulmonary:      Breath sounds: Normal breath sounds.   Abdominal:      Palpations: Abdomen is soft.   Musculoskeletal:      Right lower leg: No edema.      Left lower leg: No edema.   Skin:     General: Skin is warm and dry.   Neurological:      General: No focal deficit present.      Mental Status: She is alert.   Psychiatric:         Mood and Affect: Mood normal.       Clinic discussed advanced directive        Assessment   -HFpEF, normal EKG, echo preserved systolic function  -hx of HFpEF  -CXR showed pulmonary edema  -C section 4/18/24 morning  -Long standing HTN  -DM  -morbid obesity  -COPD?  -ALEJANDRO  -UC  -duodenal ulcers    Plan   -hast stopped breast feeding  -increase to high dose entresto   -cont. Spironolactone  -cont. Metoprolol, hydralazine, nifedipine  -lasix as needed  -US of renal arteries was normal  -cont. imdur  -blood work in a week and today  -also having mastitis, inflammatory markers are elevated.      -few week follow up      Pablo Cabrera MD

## 2025-02-14 ENCOUNTER — TELEMEDICINE (OUTPATIENT)
Dept: PSYCHIATRY | Facility: CLINIC | Age: 40
End: 2025-02-14
Payer: MEDICARE

## 2025-02-14 DIAGNOSIS — F41.1 GENERALIZED ANXIETY DISORDER: ICD-10-CM

## 2025-02-14 DIAGNOSIS — F31.75 BIPOLAR DISORDER, IN PARTIAL REMISSION, MOST RECENT EPISODE DEPRESSED: Primary | ICD-10-CM

## 2025-02-14 DIAGNOSIS — Z79.899 MEDICATION MANAGEMENT: ICD-10-CM

## 2025-02-14 DIAGNOSIS — F40.01 PANIC DISORDER WITH AGORAPHOBIA: ICD-10-CM

## 2025-02-14 LAB
ANION GAP SERPL CALCULATED.3IONS-SCNC: 9.3 MMOL/L (ref 5–15)
BUN SERPL-MCNC: 9 MG/DL (ref 6–20)
BUN/CREAT SERPL: 11.1 (ref 7–25)
CALCIUM SPEC-SCNC: 8.8 MG/DL (ref 8.6–10.5)
CHLORIDE SERPL-SCNC: 104 MMOL/L (ref 98–107)
CO2 SERPL-SCNC: 23.7 MMOL/L (ref 22–29)
CREAT SERPL-MCNC: 0.81 MG/DL (ref 0.57–1)
CRP SERPL-MCNC: 0.23 MG/DL (ref 0.01–0.5)
EGFRCR SERPLBLD CKD-EPI 2021: 93.7 ML/MIN/1.73
GLUCOSE SERPL-MCNC: 89 MG/DL (ref 65–99)
NT-PROBNP SERPL-MCNC: 336 PG/ML (ref 0–450)
POTASSIUM SERPL-SCNC: 4 MMOL/L (ref 3.5–5.2)
SODIUM SERPL-SCNC: 137 MMOL/L (ref 136–145)

## 2025-02-14 RX ORDER — BUPROPION HYDROCHLORIDE 150 MG/1
150 TABLET ORAL EVERY MORNING
Qty: 30 TABLET | Refills: 1 | Status: SHIPPED | OUTPATIENT
Start: 2025-02-14 | End: 2026-02-14

## 2025-02-17 ENCOUNTER — TELEPHONE (OUTPATIENT)
Dept: CARDIOLOGY | Facility: CLINIC | Age: 40
End: 2025-02-17
Payer: MEDICARE

## 2025-02-17 DIAGNOSIS — I10 ESSENTIAL HYPERTENSION: Primary | Chronic | ICD-10-CM

## 2025-02-17 NOTE — TELEPHONE ENCOUNTER
Pt called bc she had an episode Friday evening 2/14/25 8:00 pm shortly after dinner BP was 228/119,  and she was SOB, blurry vision, palpitations, swinging between hot flashes and freezing, and nauseated. Pt took metoprolol, 2 entresto tabs, and 2 hydralazine tabs. Approx 1.5 hours later her BP was 187/94 with  she took another hydralazine.  At 11 pm her BP was 148/70 with HR 70 and she took another hydralazine tab and went to bed. Reports feeling okay since then, just very tired.

## 2025-02-17 NOTE — TELEPHONE ENCOUNTER
Relayed order for 24 hr urine to pt. Informed her she can  supplies and instructions for the test at [facility]. Pt to return specimen to [facility] lab. Verbalized understanding and agreeable to plan of care.

## 2025-02-18 ENCOUNTER — TELEPHONE (OUTPATIENT)
Dept: CARDIOLOGY | Facility: CLINIC | Age: 40
End: 2025-02-18
Payer: MEDICARE

## 2025-02-20 ENCOUNTER — TELEPHONE (OUTPATIENT)
Dept: CARDIOLOGY | Facility: CLINIC | Age: 40
End: 2025-02-20
Payer: MEDICARE

## 2025-02-20 ENCOUNTER — LAB (OUTPATIENT)
Dept: LAB | Facility: HOSPITAL | Age: 40
End: 2025-02-20
Payer: MEDICARE

## 2025-02-20 DIAGNOSIS — I50.32 CHRONIC HEART FAILURE WITH PRESERVED EJECTION FRACTION (HFPEF): ICD-10-CM

## 2025-02-20 PROCEDURE — 80053 COMPREHEN METABOLIC PANEL: CPT

## 2025-02-20 PROCEDURE — 36415 COLL VENOUS BLD VENIPUNCTURE: CPT

## 2025-02-20 NOTE — TELEPHONE ENCOUNTER
Returned pt call to discuss MyChart message. Pt stated pitting edema started today. Denies SOB. Hasn't been able to elevate legs much due to active days with childrearing. Has not limited salt intake. Advised to go ahead and take lasix. Informed pt if she needs to take lasix several days in a row or frequently to call us so we can place an order to have labs checked. Verbalized understanding and agreeable to plan of care.

## 2025-02-21 LAB
ALBUMIN SERPL-MCNC: 4.3 G/DL (ref 3.5–5.2)
ALBUMIN/GLOB SERPL: 1.3 G/DL
ALP SERPL-CCNC: 93 U/L (ref 39–117)
ALT SERPL W P-5'-P-CCNC: 14 U/L (ref 1–33)
ANION GAP SERPL CALCULATED.3IONS-SCNC: 11 MMOL/L (ref 5–15)
AST SERPL-CCNC: 13 U/L (ref 1–32)
BILIRUB SERPL-MCNC: 0.7 MG/DL (ref 0–1.2)
BUN SERPL-MCNC: 12 MG/DL (ref 6–20)
BUN/CREAT SERPL: 23.5 (ref 7–25)
CALCIUM SPEC-SCNC: 9.3 MG/DL (ref 8.6–10.5)
CHLORIDE SERPL-SCNC: 103 MMOL/L (ref 98–107)
CO2 SERPL-SCNC: 24 MMOL/L (ref 22–29)
CREAT SERPL-MCNC: 0.51 MG/DL (ref 0.57–1)
EGFRCR SERPLBLD CKD-EPI 2021: 120.4 ML/MIN/1.73
GLOBULIN UR ELPH-MCNC: 3.2 GM/DL
GLUCOSE SERPL-MCNC: 113 MG/DL (ref 65–99)
POTASSIUM SERPL-SCNC: 4.4 MMOL/L (ref 3.5–5.2)
PROT SERPL-MCNC: 7.5 G/DL (ref 6–8.5)
SODIUM SERPL-SCNC: 138 MMOL/L (ref 136–145)

## 2025-02-26 ENCOUNTER — LAB (OUTPATIENT)
Dept: LAB | Facility: HOSPITAL | Age: 40
End: 2025-02-26
Payer: MEDICARE

## 2025-02-26 DIAGNOSIS — I10 ESSENTIAL HYPERTENSION: Chronic | ICD-10-CM

## 2025-02-26 PROCEDURE — 83835 ASSAY OF METANEPHRINES: CPT

## 2025-02-26 PROCEDURE — 81050 URINALYSIS VOLUME MEASURE: CPT

## 2025-02-27 ENCOUNTER — TELEPHONE (OUTPATIENT)
Dept: CARDIOLOGY | Facility: CLINIC | Age: 40
End: 2025-02-27
Payer: MEDICARE

## 2025-02-27 RX ORDER — SEMAGLUTIDE 0.68 MG/ML
INJECTION, SOLUTION SUBCUTANEOUS
Qty: 3 ML | Refills: 0 | Status: SHIPPED | OUTPATIENT
Start: 2025-02-27 | End: 2025-04-11

## 2025-02-27 NOTE — TELEPHONE ENCOUNTER
Called pt to address pt MyChart message. Notified pt we will start prior authorization for entresto. Verbalized understanding. Started prior auth on Cover My Meds and site stated this prior auth cannot be filed electronically. Medicare prior auth form completed and faxed to [phone]. Confirmation of fax transmission received.

## 2025-02-28 ENCOUNTER — LAB (OUTPATIENT)
Dept: LAB | Facility: HOSPITAL | Age: 40
End: 2025-02-28
Payer: MEDICARE

## 2025-02-28 DIAGNOSIS — I10 ESSENTIAL HYPERTENSION: Chronic | ICD-10-CM

## 2025-03-04 ENCOUNTER — SPECIALTY PHARMACY (OUTPATIENT)
Dept: CARDIOLOGY | Facility: CLINIC | Age: 40
End: 2025-03-04
Payer: MEDICARE

## 2025-03-05 ENCOUNTER — SPECIALTY PHARMACY (OUTPATIENT)
Dept: PHARMACY | Facility: HOSPITAL | Age: 40
End: 2025-03-05
Payer: MEDICARE

## 2025-03-05 ENCOUNTER — TELEPHONE (OUTPATIENT)
Dept: CARDIOLOGY | Facility: CLINIC | Age: 40
End: 2025-03-05
Payer: MEDICARE

## 2025-03-05 DIAGNOSIS — E11.9 TYPE 2 DIABETES MELLITUS WITHOUT COMPLICATION, WITH LONG-TERM CURRENT USE OF INSULIN: Primary | ICD-10-CM

## 2025-03-05 DIAGNOSIS — Z79.4 TYPE 2 DIABETES MELLITUS WITHOUT COMPLICATION, WITH LONG-TERM CURRENT USE OF INSULIN: Primary | ICD-10-CM

## 2025-03-05 RX ORDER — SEMAGLUTIDE 0.68 MG/ML
0.5 INJECTION, SOLUTION SUBCUTANEOUS WEEKLY
Qty: 3 ML | Refills: 2 | Status: SHIPPED | OUTPATIENT
Start: 2025-03-05 | End: 2025-03-10

## 2025-03-05 NOTE — PROGRESS NOTES
Specialty Pharmacy Patient Management Program  Prescription Order or Refill Request     Patient will be filling or currently fills medications at [facility], and requires a prescription order/refill on the following:      Requested Prescriptions     Pending Prescriptions Disp Refills    Semaglutide,0.25 or 0.5MG/DOS, (Ozempic, 0.25 or 0.5 MG/DOSE,) 2 MG/3ML solution pen-injector 3 mL 2     Sig: Inject 0.5 mg under the skin into the appropriate area as directed 1 (One) Time Per Week.     Pended for provider, Vonnie BOLANOS, to review and approve if appropriate.     Last Office Visit: 12/18/2024  Next Office Visit: 5/8/2025    Gerardo Zafar RPH  3/5/2025  15:05 EST

## 2025-03-07 ENCOUNTER — SPECIALTY PHARMACY (OUTPATIENT)
Dept: PHARMACY | Facility: HOSPITAL | Age: 40
End: 2025-03-07
Payer: MEDICARE

## 2025-03-07 DIAGNOSIS — Z79.4 TYPE 2 DIABETES MELLITUS WITH HYPERGLYCEMIA, WITH LONG-TERM CURRENT USE OF INSULIN: Primary | ICD-10-CM

## 2025-03-07 DIAGNOSIS — E11.65 TYPE 2 DIABETES MELLITUS WITH HYPERGLYCEMIA, WITH LONG-TERM CURRENT USE OF INSULIN: Primary | ICD-10-CM

## 2025-03-07 LAB
METANEPH 24H UR-MCNC: 96 UG/L
METANEPH 24H UR-MRATE: 82 UG/24 HR (ref 36–209)
NORMETANEPHRINE 24H UR-MCNC: 445 UG/L
NORMETANEPHRINE 24H UR-MRATE: 378 UG/24 HR (ref 131–612)

## 2025-03-07 RX ORDER — LANSOPRAZOLE 30 MG/1
30 CAPSULE, DELAYED RELEASE ORAL 2 TIMES DAILY
COMMUNITY

## 2025-03-07 NOTE — PROGRESS NOTES
"   Specialty Pharmacy Patient Management Program  Endocrinology Reassessment     Antoinette Pack is a 39 y.o. female with Type 2 Diabetes enrolled in the Endocrinology Patient Management program offered by [facility]. A follow-up was conducted, including assessment of continued therapy appropriateness, medication adherence, and side effect incidence and management for Insulin therapy, Ozempic, and CGM.    Patient is currently taking Lantus 12 units QHS to control BG. She monitors her BG with Freestyle Esvin 3 Sensors and reports that readings are around 90 in the morning and 130 throughout the day.    Pt was recently started by cardiology on Ozempic 0.25mg subq weekly.  Pt states that tomorrow she is due to increase to the 0.5mg subq weekly dose.  Pt states that she has been experiencing some stomach pain higher in her abdomen a few times per week and described the pain as a \"pancreatic\" pain.  Pt also reports being constipated.  Per pt her symptoms started after initiating Ozempic therapy.    Pt denies any other side effects other than those described above.  Pt denies any adherence issues as well and reports around 99% adherence.  Pt also denies any recent low BG < 70 episodes.    Pt states that she is no longer breastfeeding at this time.  Pt has had some issues with her Freestyle Esvin 3 sensors not working but has had success getting replacement sensors from the .    Pt denies history of thyroid cancer, reports history of pancreatitis, and reports recurrent UTI's/yeast infections.     In the past, the patient has tried:                Drug Dose Reason for Discontinuation Notes   Trulicity   GI upset     Glipizide    Unsure                            Changes to Insurance Coverage or Financial Support  None    Relevant Past Medical History and Comorbidities  Relevant medical history and concomitant health conditions were discussed with the patient. The patient's chart " "has been reviewed for relevant past medical history and comorbid health conditions and updated as necessary.   Past Medical History:   Diagnosis Date    Anxiety     CHF (congestive heart failure) 05/2023    Colitis 2023    Depression     Diabetes mellitus 2018    type 2    Diverticulitis     GERD (gastroesophageal reflux disease)     History of transfusion 04/19/2024    2 units post delivery    Hypertension     chronic    Kidney stone     \"years ago\"    Narcolepsy 2022    Pancreatitis 09/2023    PCOS (polycystic ovarian syndrome)     Rectal bleeding     SINCE 2023 - USES MESALAMINE SUPPOSITORY NIGHTLY    Seizures 2012    Sleep apnea      Social History     Socioeconomic History    Marital status:    Tobacco Use    Smoking status: Former     Current packs/day: 0.00     Average packs/day: 1 pack/day for 20.0 years (20.0 ttl pk-yrs)     Types: Cigarettes     Start date: 1/1/1995     Quit date: 2015     Years since quitting: 10.1     Passive exposure: Past    Smokeless tobacco: Never    Tobacco comments:     QUIT IN 2017   Vaping Use    Vaping status: Never Used   Substance and Sexual Activity    Alcohol use: No    Drug use: No    Sexual activity: Defer            Allergies  Known allergies and reactions were discussed with the patient. The patient's chart has been reviewed for allergy information and updated as necessary.   Dilantin [phenytoin], Keppra [levetiracetam], and Topamax [topiramate]    Allergies reviewed by Gerardo Zafar RP on 3/7/2025 at  2:21 PM    Relevant Laboratory Values  A1C Last 3 Results          8/16/2024    21:45 12/18/2024    11:24 1/2/2025    16:22   HGBA1C Last 3 Results   Hemoglobin A1C 6.60  5.9  5.70      Lab Results   Component Value Date    HGBA1C 5.70 (H) 01/02/2025     Lab Results   Component Value Date    GLUCOSE 113 (H) 02/20/2025    CALCIUM 9.3 02/20/2025     02/20/2025    K 4.4 02/20/2025    CO2 24.0 02/20/2025     02/20/2025    BUN 12 02/20/2025    CREATININE " 0.51 (L) 02/20/2025    EGFRIFAFRI  09/05/2016      Comment:      <15 Indicative of kidney failure.    EGFRIFNONA 101 12/27/2020    BCR 23.5 02/20/2025    ANIONGAP 11.0 02/20/2025     Lab Results   Component Value Date    CHOL 177 12/18/2024    CHLPL 159 09/22/2015    TRIG 86 12/18/2024    HDL 48 12/18/2024     (H) 12/18/2024       Current Medication List  This medication list has been reviewed with the patient and evaluated for any interactions or necessary modifications/recommendations, and updated to include all prescription medications, OTC medications, and supplements the patient is currently taking.  This list reflects what is contained in the patient's profile, which has also been marked as reviewed to communicate to other providers it is the most up to date version of the patient's current medication therapy.     Current Outpatient Medications:     Budesonide (ENTOCORT EC) 3 MG 24 hr capsule, Take 3 capsules by mouth Every Morning., Disp: , Rfl:     buPROPion XL (Wellbutrin XL) 150 MG 24 hr tablet, Take 1 tablet by mouth Every Morning., Disp: 30 tablet, Rfl: 1    furosemide (LASIX) 40 MG tablet, Take 1 tablet by mouth Daily. (Patient taking differently: Take 1 tablet by mouth Daily As Needed.), Disp: 15 tablet, Rfl: 0    hydrALAZINE (APRESOLINE) 25 MG tablet, Take 1 tablet by mouth 3 (Three) Times a Day. Hold if top number of blood pressure is less than 110. Can take extra hydralazine 25mg as needed for top number greater than, Disp: 125 tablet, Rfl: 1    Insulin Glargine (Lantus SoloStar) 100 UNIT/ML injection pen, Inject 12 Units under the skin into the appropriate area as directed Every Night., Disp: 15 mL, Rfl: 0    Insulin Pen Needle (Pen Needles) 32G X 4 MM misc, Use to inject insulin once daily, Disp: 100 each, Rfl: 2    isosorbide mononitrate (IMDUR) 30 MG 24 hr tablet, Take 1 tablet by mouth Daily., Disp: 90 tablet, Rfl: 3    lansoprazole (PREVACID) 30 MG capsule, Take 1 capsule by mouth 2  (Two) Times a Day., Disp: , Rfl:     mesalamine (ROWASA) 4 g enema, Insert 1 enema into the rectum Every Night., Disp: , Rfl:     metoprolol tartrate (LOPRESSOR) 50 MG tablet, Take 1 tablet by mouth 2 (Two) Times a Day., Disp: , Rfl:     NIFEdipine XL (PROCARDIA XL) 90 MG 24 hr tablet, Take 1 tablet by mouth Daily., Disp: 90 tablet, Rfl: 3    nitroglycerin (NITROSTAT) 0.4 MG SL tablet, Place 1 tablet under the tongue Every 5 (Five) Minutes As Needed for Chest Pain (Flash pulmonary edema) for up to 10 doses. Place one tablet under the tongue for chest pain every 5 minutes until chest pain is relieved. If you have to take 3 tablets, take the 3rd and go to the hospital to be evaluated., Disp: 10 tablet, Rfl: 0    sacubitril-valsartan (Entresto)  MG tablet, Take 1 tablet by mouth 2 (Two) Times a Day., Disp: 180 tablet, Rfl: 3    Semaglutide,0.25 or 0.5MG/DOS, (Ozempic, 0.25 or 0.5 MG/DOSE,) 2 MG/3ML solution pen-injector, Inject 0.5 mg under the skin into the appropriate area as directed 1 (One) Time Per Week., Disp: 3 mL, Rfl: 2    spironolactone (ALDACTONE) 100 MG tablet, Take 1 tablet by mouth Daily., Disp: 90 tablet, Rfl: 3    Continuous Glucose Sensor (FreeStyle Esvin 3 Plus Sensor), Apply one sensor externally every 15 days, Disp: 2 each, Rfl: 2    fluconazole (Diflucan) 150 MG tablet, Take every 72 hours as needed for yeast infection. (Patient not taking: Reported on 3/7/2025), Disp: 6 tablet, Rfl: 5    fluticasone (FLONASE) 50 MCG/ACT nasal spray, Administer 2 sprays into the nostril(s) as directed by provider Daily As Needed for Rhinitis or Allergies. (Patient not taking: Reported on 3/7/2025), Disp: , Rfl:     mupirocin (BACTROBAN) 2 % ointment, Apply 1 Application topically to the appropriate area as directed 3 (Three) Times a Day. (Patient not taking: Reported on 3/7/2025), Disp: , Rfl:     nystatin (MYCOSTATIN) 250310 UNIT/GM powder, Apply  topically to the appropriate area as directed 4 (Four) Times  "a Day. (Patient not taking: Reported on 3/7/2025), Disp: , Rfl:     potassium chloride (KLOR-CON M20) 20 MEQ CR tablet, Take 1 tablet by mouth Daily. (Patient not taking: Reported on 3/7/2025), Disp: , Rfl:     Prenatal Vit-Fe Fumarate-FA (PRENATAL PO), Take 1 tablet by mouth Daily. (Patient not taking: Reported on 3/7/2025), Disp: , Rfl:     prochlorperazine (COMPAZINE) 5 MG tablet, Take 1 tablet by mouth Every 6 (Six) Hours As Needed. (Patient not taking: Reported on 3/7/2025), Disp: , Rfl:     Medicines reviewed by Gerardo Zafar RP on 3/7/2025 at  2:32 PM    Drug Interactions with diabetic medications  Furosemide is associated with hyperglycemia and therefore may diminish the therapeutic effect of antidiabetic agents.   Beta blockers may mask the hypoglycemic effect of antidiabetic agents.  Ozempic may enhance the hypoglycemic effect of Lantus    Pt was educated during clinical assessment today on the above drug interactions.    Adverse Drug Reactions  Adverse Reactions Experienced: Pt states that she has been experiencing some stomach pain higher in her abdomen a few times per week and described the pain as a \"pancreatic\" pain.  Pt also reports being constipated.   Plan for ADR Management: Consult Endocrinology provider    Hospitalizations and Urgent Care Since Last Assessment  Hospitalizations or Admissions: Admission 1/2/25 for CHF  ED Visits: ED visit 1/24/25 for HTN and mastitis  Urgent Office Visits: None    Adherence and Self-Administration  Adherence related patient's specialty therapy was discussed with the patient. The Adherence segment of this outreach has been reviewed and updated.   Ongoing or New Barriers to Patient Adherence and/or Self-Administration: None   Methods for Supporting Patient Adherence and/or Self-Administration: None Required    Goals of Therapy  Goals related to the patient's specialty therapy was discussed with the patient. The Patient Goals segment of this outreach has been " reviewed and updated.    Goals Addressed Today        HEMOGLOBIN A1C < 7      Pt at goal with most recent A1c = 5.7%.            Reassessment Plan & Follow-Up  Patient's diabetes controlled with most recent A1C on 1/2/25 of 5.7%.   Provider Medication Therapy Changes: Per Vonnie Key, APRN will order  Lantus 12 units nightly with pen needles  Upgrade to Freestyle Esvin 3 Plus sensors. Education provided with today's visit.  OMID Ozempic.  Ozempic removed from home medication list and pt counseled to stop taking this medication per provider Vonnie BOLANOS.  Related Plans, Therapy Recommendations, or Issues to Be Addressed:   Updated RX's have been sent to [facility] for delivery.   Recommend that the pt have lipid panel re-drawn with upcoming appt as LDL elevated as 113mg/dL on 12/18/24.  Pt previously on statin but this was dc'ed due to recent pregnancy.  Recommended yearly flu vaccine.  Pt reports being up to date on her pneumococcal vaccine.  Instructed pt to go to ER if she experiences any pancreatitis symptoms in the meantime.  Notified HF clinic PharmD of the side effects that the pt is experiencing with the Ozempic and that it was dc'ed by Endo provider.    Attestation  I attest the patient was actively involved in and has agreed to the above plan of care. If the prescribed therapy is at any point deemed not appropriate based on the current or future assessments, a consultation will be initiated with the patient's specialty care provider to determine the best course of action. The revised plan of therapy will be documented along with any required assessments and/or additional patient education provided.    Gerardo Zafar RPH.  03/10/25  08:08 EDT      Education provided with today's visit:    FreeStyle Esvin 3 Plus:  Educated patient on using Freestyle Esvin 3 device to monitor BG:     Freestyle Esvin 3 has two parts: a sensor and a reader, which is an nagi on smart phone.   The smart phone  "can allow readings to be automatically sent to the clinic using our invitation code allowing us to review for your appointment.    Sensors:  Sensors come pre-assembled.  Sensors are placed on back of patient's upper arm. Do not apply to the stomach.  Educated on application process:   Clean area with alcohol beforehand.   Sensor placement is important and you will want to change your insertion site with each sensor. Using the same site too often might not allow the skin to heal, causing scarring or skin irritation.  Must be changed every 15 days. The aaliyah will notify you when it's time to change the sensor. One month supply will include two sensors.  Choose a site:   At least 3 inches from any injection site  Away from scarring, tattoos, irritation, and bones  Unlikely to be bumped, pushed, or laid on while sleeping  To apply the sensor, press firmly and listen for the click. Pull back slowly after a few seconds.   Patient may purchase sensor covers if concerned about sensor falling off or use a band-aid.  Sensor goes into the subcutaneous (fatty) part of the skin. This can cause a real-time lag. If a finger stick is slightly off from your sensor reading, this is why.     Aaliyah on Smart Phone:  Will be used to alert you when blood sugar is low or high and to obtain blood sugar readings. Low alerts are set for 70 and high alerts for 250.   To start a new sensor:  Open the Aaliyah on smart phone and tap \"start new sensor\"  Scan the sensor by touching it with the top of your smart phone. You'll receive a tone and vibration when you've successfully started it.   When starting a new sensor, there is a 60 minute warm up period before you will get BG readings.  Aaliyah with notify you when it is time to change sensor.       "

## 2025-03-10 RX ORDER — INSULIN GLARGINE 100 [IU]/ML
12 INJECTION, SOLUTION SUBCUTANEOUS NIGHTLY
Qty: 15 ML | Refills: 0 | Status: SHIPPED | OUTPATIENT
Start: 2025-03-10

## 2025-03-10 RX ORDER — HYDROCHLOROTHIAZIDE 12.5 MG/1
CAPSULE ORAL
Qty: 2 EACH | Refills: 2 | Status: SHIPPED | OUTPATIENT
Start: 2025-03-10

## 2025-03-10 RX ORDER — PEN NEEDLE, DIABETIC 30 GX3/16"
1 NEEDLE, DISPOSABLE MISCELLANEOUS DAILY
Qty: 100 EACH | Refills: 2 | Status: SHIPPED | OUTPATIENT
Start: 2025-03-10

## 2025-03-13 ENCOUNTER — LAB (OUTPATIENT)
Dept: LAB | Facility: HOSPITAL | Age: 40
End: 2025-03-13
Payer: MEDICARE

## 2025-03-13 ENCOUNTER — OFFICE VISIT (OUTPATIENT)
Dept: CARDIOLOGY | Facility: CLINIC | Age: 40
End: 2025-03-13
Payer: MEDICARE

## 2025-03-13 VITALS
HEART RATE: 84 BPM | DIASTOLIC BLOOD PRESSURE: 84 MMHG | OXYGEN SATURATION: 100 % | WEIGHT: 196.3 LBS | SYSTOLIC BLOOD PRESSURE: 146 MMHG | HEIGHT: 59 IN | BODY MASS INDEX: 39.57 KG/M2

## 2025-03-13 DIAGNOSIS — E66.01 SEVERE OBESITY (BMI 35.0-39.9) WITH COMORBIDITY: Primary | ICD-10-CM

## 2025-03-13 PROCEDURE — 82384 ASSAY THREE CATECHOLAMINES: CPT

## 2025-03-13 RX ORDER — METOPROLOL TARTRATE 100 MG/1
100 TABLET ORAL 2 TIMES DAILY
Qty: 180 TABLET | Refills: 3 | Status: SHIPPED | OUTPATIENT
Start: 2025-03-13

## 2025-03-13 NOTE — PROGRESS NOTES
"Chief Complaint  Flash pulmonary edema and Chronic heart failure with preserved ejection fraction (HFp      Subjective   History of Present Illness    Problem List  -HFpEF, normal EKG, echo preserved systolic function  -hx of HFpEF  -CXR showed pulmonary edema  -C section 24 morning  -Long standing HTN  -DM  -morbid obesity  -COPD?  -ALEJANDRO  -UC  -duodenal ulcers    Mrs. Pack is a 39 year old lady being seen in follow up.  Had complicated pregnancy.  Had c section.  Had CHF and GI bleed.  Titrated meds.  BP now controlled when taking.  Has lost 50 lbs in last 6 weeks.  Feeling much better.  BP high today because child admited at  and BP meds at home.  ROS negative except for the above.      Update 24  BP had been on the rise.  Was exposed noxious fumes likely and had flash pulmonary edema that required brief hospitalization.   also needed to be treated for difficulty breathing.      Update 24  BP well controlled at home.  Pharmacist concerned about her symptoms.      Update 24  Occasional blood pressure spikes.  Some occasional shortness of breaht.      Update 10/28/24  Weight up.  BP shoots up.  Chest tightness and short of breath.      Update 25  Father recently .  While visiting him in hospital BP shot up and had flash pulmonary edema.  No diuretic today and BP elevated.        Update 25  Has gained a few pounds.  Bnp little worse.  Only taking entresto once daily.      Update 25  BP well controlled at home.  High today.  NBP improving.      Update 3/13/25  Having some racing heart rate but overall much better.           Objective   Vital Signs:  Vitals:    25 0948   BP: 146/84   Pulse: 84   SpO2: 100%     Estimated body mass index is 39.63 kg/m² as calculated from the following:    Height as of this encounter: 149.9 cm (59.02\").    Weight as of this encounter: 89 kg (196 lb 4.8 oz).       Physical Exam  HENT:      Head: Normocephalic.   Eyes:      Extraocular " Movements: Extraocular movements intact.   Cardiovascular:      Rate and Rhythm: Normal rate and regular rhythm.      Heart sounds: No murmur heard.     No gallop.   Pulmonary:      Breath sounds: Normal breath sounds.   Abdominal:      Palpations: Abdomen is soft.   Musculoskeletal:      Right lower leg: No edema.      Left lower leg: No edema.   Skin:     General: Skin is warm and dry.   Neurological:      General: No focal deficit present.      Mental Status: She is alert.   Psychiatric:         Mood and Affect: Mood normal.       Clinic discussed advanced directive        Assessment   -HFpEF, normal EKG, echo preserved systolic function  -hx of HFpEF  -CXR showed pulmonary edema  -C section 4/18/24 morning  -Long standing HTN  -DM  -morbid obesity  -COPD?  -ALEJANDRO  -UC  -duodenal ulcers    Plan   -hast stopped breast feeding  -full dose entresto  -cont. Spironolactone  -cont. Metoprolol (increase 50mg bid), hydralazine, nifedipine  -lasix as needed  -US of renal arteries was normal, urine metanephrines normal  -cont. imdur  -intolerant of GLP-1 agonist. Bariatric surgery referral.      Pablo Cabrera MD

## 2025-03-16 ENCOUNTER — APPOINTMENT (OUTPATIENT)
Dept: CT IMAGING | Facility: HOSPITAL | Age: 40
End: 2025-03-16
Payer: MEDICARE

## 2025-03-16 ENCOUNTER — APPOINTMENT (OUTPATIENT)
Dept: GENERAL RADIOLOGY | Facility: HOSPITAL | Age: 40
End: 2025-03-16
Payer: MEDICARE

## 2025-03-16 ENCOUNTER — HOSPITAL ENCOUNTER (EMERGENCY)
Facility: HOSPITAL | Age: 40
Discharge: HOME | End: 2025-03-16
Attending: EMERGENCY MEDICINE | Admitting: EMERGENCY MEDICINE
Payer: MEDICARE

## 2025-03-16 VITALS
HEIGHT: 59 IN | BODY MASS INDEX: 39.51 KG/M2 | OXYGEN SATURATION: 93 % | WEIGHT: 196 LBS | RESPIRATION RATE: 16 BRPM | DIASTOLIC BLOOD PRESSURE: 92 MMHG | SYSTOLIC BLOOD PRESSURE: 135 MMHG | TEMPERATURE: 98.4 F | HEART RATE: 86 BPM

## 2025-03-16 DIAGNOSIS — K51.90 ULCERATIVE COLITIS WITHOUT COMPLICATIONS, UNSPECIFIED LOCATION: ICD-10-CM

## 2025-03-16 DIAGNOSIS — A09 DIARRHEA OF INFECTIOUS ORIGIN: Primary | ICD-10-CM

## 2025-03-16 LAB
027 TOXIN: NORMAL
ADV 40+41 DNA STL QL NAA+NON-PROBE: NOT DETECTED
ALBUMIN SERPL-MCNC: 4.3 G/DL (ref 3.5–5.2)
ALBUMIN/GLOB SERPL: 1.1 G/DL
ALP SERPL-CCNC: 85 U/L (ref 39–117)
ALT SERPL W P-5'-P-CCNC: 12 U/L (ref 1–33)
ANION GAP SERPL CALCULATED.3IONS-SCNC: 11.3 MMOL/L (ref 5–15)
AST SERPL-CCNC: 11 U/L (ref 1–32)
ASTRO TYP 1-8 RNA STL QL NAA+NON-PROBE: NOT DETECTED
BACTERIA UR QL AUTO: ABNORMAL /HPF
BASOPHILS # BLD AUTO: 0.03 10*3/MM3 (ref 0–0.2)
BASOPHILS NFR BLD AUTO: 0.4 % (ref 0–1.5)
BILIRUB SERPL-MCNC: 0.7 MG/DL (ref 0–1.2)
BILIRUB UR QL STRIP: ABNORMAL
BUN SERPL-MCNC: 12 MG/DL (ref 6–20)
BUN/CREAT SERPL: 20 (ref 7–25)
C CAYETANENSIS DNA STL QL NAA+NON-PROBE: NOT DETECTED
C COLI+JEJ+UPSA DNA STL QL NAA+NON-PROBE: NOT DETECTED
C DIFF TOX GENS STL QL NAA+PROBE: NEGATIVE
CALCIUM SPEC-SCNC: 9.3 MG/DL (ref 8.6–10.5)
CHLORIDE SERPL-SCNC: 102 MMOL/L (ref 98–107)
CLARITY UR: ABNORMAL
CO2 SERPL-SCNC: 23.7 MMOL/L (ref 22–29)
COLOR UR: ABNORMAL
CREAT SERPL-MCNC: 0.6 MG/DL (ref 0.57–1)
CRP SERPL-MCNC: 1.08 MG/DL (ref 0–0.5)
CRYPTOSP DNA STL QL NAA+NON-PROBE: NOT DETECTED
DEPRECATED RDW RBC AUTO: 38.8 FL (ref 37–54)
E HISTOLYT DNA STL QL NAA+NON-PROBE: NOT DETECTED
EAEC PAA PLAS AGGR+AATA ST NAA+NON-PRB: DETECTED
EC STX1+STX2 GENES STL QL NAA+NON-PROBE: NOT DETECTED
EGFRCR SERPLBLD CKD-EPI 2021: 115.8 ML/MIN/1.73
EOSINOPHIL # BLD AUTO: 0.32 10*3/MM3 (ref 0–0.4)
EOSINOPHIL NFR BLD AUTO: 3.9 % (ref 0.3–6.2)
EPEC EAE GENE STL QL NAA+NON-PROBE: NOT DETECTED
ERYTHROCYTE [DISTWIDTH] IN BLOOD BY AUTOMATED COUNT: 13.2 % (ref 12.3–15.4)
ERYTHROCYTE [SEDIMENTATION RATE] IN BLOOD: 16 MM/HR (ref 0–20)
ETEC LTA+ST1A+ST1B TOX ST NAA+NON-PROBE: NOT DETECTED
G LAMBLIA DNA STL QL NAA+NON-PROBE: NOT DETECTED
GLOBULIN UR ELPH-MCNC: 3.9 GM/DL
GLUCOSE SERPL-MCNC: 120 MG/DL (ref 65–99)
GLUCOSE UR STRIP-MCNC: NEGATIVE MG/DL
HCG SERPL QL: NEGATIVE
HCT VFR BLD AUTO: 38.9 % (ref 34–46.6)
HGB BLD-MCNC: 13.3 G/DL (ref 12–15.9)
HGB UR QL STRIP.AUTO: ABNORMAL
HOLD SPECIMEN: NORMAL
HYALINE CASTS UR QL AUTO: ABNORMAL /LPF
IMM GRANULOCYTES # BLD AUTO: 0.04 10*3/MM3 (ref 0–0.05)
IMM GRANULOCYTES NFR BLD AUTO: 0.5 % (ref 0–0.5)
KETONES UR QL STRIP: NEGATIVE
LACTOFERRIN STL QL LA: POSITIVE
LEUKOCYTE ESTERASE UR QL STRIP.AUTO: ABNORMAL
LIPASE SERPL-CCNC: 24 U/L (ref 13–60)
LYMPHOCYTES # BLD AUTO: 1.77 10*3/MM3 (ref 0.7–3.1)
LYMPHOCYTES NFR BLD AUTO: 21.4 % (ref 19.6–45.3)
MCH RBC QN AUTO: 28 PG (ref 26.6–33)
MCHC RBC AUTO-ENTMCNC: 34.2 G/DL (ref 31.5–35.7)
MCV RBC AUTO: 81.9 FL (ref 79–97)
MONOCYTES # BLD AUTO: 0.38 10*3/MM3 (ref 0.1–0.9)
MONOCYTES NFR BLD AUTO: 4.6 % (ref 5–12)
NEUTROPHILS NFR BLD AUTO: 5.75 10*3/MM3 (ref 1.7–7)
NEUTROPHILS NFR BLD AUTO: 69.2 % (ref 42.7–76)
NITRITE UR QL STRIP: NEGATIVE
NOROVIRUS GI+II RNA STL QL NAA+NON-PROBE: NOT DETECTED
NRBC BLD AUTO-RTO: 0 /100 WBC (ref 0–0.2)
NT-PROBNP SERPL-MCNC: <36 PG/ML (ref 0–450)
P SHIGELLOIDES DNA STL QL NAA+NON-PROBE: NOT DETECTED
PH UR STRIP.AUTO: 5.5 [PH] (ref 5–8)
PLATELET # BLD AUTO: 202 10*3/MM3 (ref 140–450)
PMV BLD AUTO: 10.1 FL (ref 6–12)
POTASSIUM SERPL-SCNC: 4 MMOL/L (ref 3.5–5.2)
PROT SERPL-MCNC: 8.2 G/DL (ref 6–8.5)
PROT UR QL STRIP: ABNORMAL
RBC # BLD AUTO: 4.75 10*6/MM3 (ref 3.77–5.28)
RBC # UR STRIP: ABNORMAL /HPF
REF LAB TEST METHOD: ABNORMAL
RVA RNA STL QL NAA+NON-PROBE: NOT DETECTED
S ENT+BONG DNA STL QL NAA+NON-PROBE: NOT DETECTED
SAPO I+II+IV+V RNA STL QL NAA+NON-PROBE: NOT DETECTED
SHIGELLA SP+EIEC IPAH ST NAA+NON-PROBE: NOT DETECTED
SODIUM SERPL-SCNC: 137 MMOL/L (ref 136–145)
SP GR UR STRIP: 1.03 (ref 1–1.03)
SQUAMOUS #/AREA URNS HPF: ABNORMAL /HPF
UROBILINOGEN UR QL STRIP: ABNORMAL
V CHOL+PARA+VUL DNA STL QL NAA+NON-PROBE: NOT DETECTED
V CHOLERAE DNA STL QL NAA+NON-PROBE: NOT DETECTED
WBC # UR STRIP: ABNORMAL /HPF
WBC NRBC COR # BLD AUTO: 8.29 10*3/MM3 (ref 3.4–10.8)
WHOLE BLOOD HOLD COAG: NORMAL
WHOLE BLOOD HOLD SPECIMEN: NORMAL
Y ENTEROCOL DNA STL QL NAA+NON-PROBE: NOT DETECTED

## 2025-03-16 PROCEDURE — 80053 COMPREHEN METABOLIC PANEL: CPT | Performed by: PHYSICIAN ASSISTANT

## 2025-03-16 PROCEDURE — 87507 IADNA-DNA/RNA PROBE TQ 12-25: CPT | Performed by: PHYSICIAN ASSISTANT

## 2025-03-16 PROCEDURE — 83690 ASSAY OF LIPASE: CPT | Performed by: PHYSICIAN ASSISTANT

## 2025-03-16 PROCEDURE — 87046 STOOL CULTR AEROBIC BACT EA: CPT | Performed by: PHYSICIAN ASSISTANT

## 2025-03-16 PROCEDURE — 87493 C DIFF AMPLIFIED PROBE: CPT | Performed by: PHYSICIAN ASSISTANT

## 2025-03-16 PROCEDURE — 87045 FECES CULTURE AEROBIC BACT: CPT | Performed by: PHYSICIAN ASSISTANT

## 2025-03-16 PROCEDURE — 85025 COMPLETE CBC W/AUTO DIFF WBC: CPT | Performed by: PHYSICIAN ASSISTANT

## 2025-03-16 PROCEDURE — 85652 RBC SED RATE AUTOMATED: CPT | Performed by: PHYSICIAN ASSISTANT

## 2025-03-16 PROCEDURE — 96374 THER/PROPH/DIAG INJ IV PUSH: CPT

## 2025-03-16 PROCEDURE — 25510000001 IOPAMIDOL 61 % SOLUTION: Performed by: EMERGENCY MEDICINE

## 2025-03-16 PROCEDURE — 99285 EMERGENCY DEPT VISIT HI MDM: CPT

## 2025-03-16 PROCEDURE — 25010000002 PROCHLORPERAZINE 10 MG/2ML SOLUTION: Performed by: PHYSICIAN ASSISTANT

## 2025-03-16 PROCEDURE — 71045 X-RAY EXAM CHEST 1 VIEW: CPT

## 2025-03-16 PROCEDURE — 25010000002 MORPHINE PER 10 MG: Performed by: EMERGENCY MEDICINE

## 2025-03-16 PROCEDURE — 87427 SHIGA-LIKE TOXIN AG IA: CPT | Performed by: PHYSICIAN ASSISTANT

## 2025-03-16 PROCEDURE — 86140 C-REACTIVE PROTEIN: CPT | Performed by: PHYSICIAN ASSISTANT

## 2025-03-16 PROCEDURE — 36415 COLL VENOUS BLD VENIPUNCTURE: CPT

## 2025-03-16 PROCEDURE — 96375 TX/PRO/DX INJ NEW DRUG ADDON: CPT

## 2025-03-16 PROCEDURE — 83630 LACTOFERRIN FECAL (QUAL): CPT | Performed by: PHYSICIAN ASSISTANT

## 2025-03-16 PROCEDURE — 83880 ASSAY OF NATRIURETIC PEPTIDE: CPT | Performed by: PHYSICIAN ASSISTANT

## 2025-03-16 PROCEDURE — 81001 URINALYSIS AUTO W/SCOPE: CPT | Performed by: PHYSICIAN ASSISTANT

## 2025-03-16 PROCEDURE — 84703 CHORIONIC GONADOTROPIN ASSAY: CPT | Performed by: PHYSICIAN ASSISTANT

## 2025-03-16 PROCEDURE — 74177 CT ABD & PELVIS W/CONTRAST: CPT

## 2025-03-16 RX ORDER — IOPAMIDOL 612 MG/ML
100 INJECTION, SOLUTION INTRAVASCULAR
Status: COMPLETED | OUTPATIENT
Start: 2025-03-16 | End: 2025-03-16

## 2025-03-16 RX ORDER — DICYCLOMINE HCL 20 MG
20 TABLET ORAL EVERY 8 HOURS PRN
Qty: 20 TABLET | Refills: 0 | Status: SHIPPED | OUTPATIENT
Start: 2025-03-16

## 2025-03-16 RX ORDER — SODIUM CHLORIDE 0.9 % (FLUSH) 0.9 %
10 SYRINGE (ML) INJECTION AS NEEDED
Status: DISCONTINUED | OUTPATIENT
Start: 2025-03-16 | End: 2025-03-16 | Stop reason: HOSPADM

## 2025-03-16 RX ORDER — IOPAMIDOL 612 MG/ML
100 INJECTION, SOLUTION INTRAVASCULAR
Status: DISCONTINUED | OUTPATIENT
Start: 2025-03-16 | End: 2025-03-16 | Stop reason: HOSPADM

## 2025-03-16 RX ORDER — PROCHLORPERAZINE EDISYLATE 5 MG/ML
5 INJECTION INTRAMUSCULAR; INTRAVENOUS ONCE
Status: COMPLETED | OUTPATIENT
Start: 2025-03-16 | End: 2025-03-16

## 2025-03-16 RX ORDER — ONDANSETRON 2 MG/ML
4 INJECTION INTRAMUSCULAR; INTRAVENOUS ONCE
Status: DISCONTINUED | OUTPATIENT
Start: 2025-03-16 | End: 2025-03-16

## 2025-03-16 RX ADMIN — PROCHLORPERAZINE EDISYLATE 5 MG: 5 INJECTION INTRAMUSCULAR; INTRAVENOUS at 09:14

## 2025-03-16 RX ADMIN — MORPHINE SULFATE 4 MG: 4 INJECTION, SOLUTION INTRAMUSCULAR; INTRAVENOUS at 09:15

## 2025-03-16 RX ADMIN — IOPAMIDOL 80 ML: 612 INJECTION, SOLUTION INTRAVENOUS at 09:42

## 2025-03-16 NOTE — ED PROVIDER NOTES
"Subjective   History of Present Illness  39-year-old female presents secondary to upper along with lower abdominal pain.  Patient has a past medical history of ulcerative colitis and is followed by Dr. Peguero.  She states her symptoms began yesterday.  She reports numerous episodes of diarrhea containing blood and mucus.  She denies any fever.  Patient has had nausea.  She has a past medical history of ulcerative colitis, flash pulmonary edema, seizure, kidney stone, congestive heart failure, pancreatitis, diabetes, hypertension.  She presents by private vehicle.      Review of Systems   Constitutional: Negative.  Negative for fever.   HENT: Negative.     Respiratory: Negative.     Cardiovascular: Negative.  Negative for chest pain.   Gastrointestinal: Negative.  Positive for abdominal pain and diarrhea.   Endocrine: Negative.    Genitourinary: Negative.  Negative for dysuria.   Skin: Negative.    Neurological: Negative.    Psychiatric/Behavioral: Negative.     All other systems reviewed and are negative.      Past Medical History:   Diagnosis Date    Anxiety     CHF (congestive heart failure) 05/2023    Colitis 2023    Depression     Diabetes mellitus 2018    type 2    Diverticulitis     Diverticulitis of colon     GERD (gastroesophageal reflux disease)     History of transfusion 04/19/2024    2 units post delivery    Hypertension     chronic    Kidney stone     \"years ago\"    Narcolepsy 2022    Pancreatitis 09/2023    PCOS (polycystic ovarian syndrome)     Polycystic ovary syndrome     Rectal bleeding     SINCE 2023 - USES MESALAMINE SUPPOSITORY NIGHTLY    Seizures 2012    Sleep apnea     Type 2 diabetes mellitus Unknown    Vitamin D deficiency        Allergies   Allergen Reactions    Dilantin [Phenytoin] Mental Status Change    Keppra [Levetiracetam] Mental Status Change    Topamax [Topiramate] Mental Status Change       Past Surgical History:   Procedure Laterality Date    ABDOMINAL SURGERY  04/24    Caesarean "    CARDIAC CATHETERIZATION      CARPAL TUNNEL RELEASE       SECTION N/A 2024    Procedure:  SECTION PRIMARY;  Surgeon: Axel Keys MD;  Location:  [facility];  Service: Obstetrics/Gynecology;  Laterality: N/A;    COLONOSCOPY      COLONOSCOPY N/A 2024    Procedure: COLONOSCOPY;  Surgeon: Tom Mueller MD;  Location:  [facility];  Service: Gastroenterology;  Laterality: N/A;    ENDOSCOPY      ENDOSCOPY N/A 2024    Procedure: ESOPHAGOGASTRODUODENOSCOPY;  Surgeon: Tom Mueller MD;  Location:  [facility];  Service: Gastroenterology;  Laterality: N/A;    FRACTURE SURGERY Left     wrist    HARDWARE REMOVAL Left     WRIST    TENDON REPAIR Left     HAND    UPPER GASTROINTESTINAL ENDOSCOPY      WISDOM TOOTH EXTRACTION         Family History   Problem Relation Age of Onset    Hypertension Mother     Depression Mother     Heart disease Mother     COPD Mother     Emphysema Mother     Heart failure Mother     Heart attack Mother     Anxiety disorder Mother     Drug abuse Mother     Colon polyps Mother     Hypertension Father     Diabetes Father     Heart disease Father     COPD Father     Heart failure Father     Hepatitis Father     Arrhythmia Father     Colon polyps Maternal Grandfather     Obesity Sister     Anxiety disorder Sister     Depression Sister     Anemia Sister     Anxiety disorder Sister     Drug abuse Sister     Paranoid behavior Sister     Drug abuse Brother        Social History     Socioeconomic History    Marital status:    Tobacco Use    Smoking status: Former     Current packs/day: 0.00     Average packs/day: 1 pack/day for 20.0 years (20.0 ttl pk-yrs)     Types: Cigarettes     Start date: 1995     Quit date:      Years since quitting: 10.2     Passive exposure: Past    Smokeless tobacco: Never    Tobacco comments:     QUIT IN 2017   Vaping Use    Vaping status: Never Used   Substance and Sexual Activity    Alcohol use: No    Drug  use: No    Sexual activity: Defer           Objective   Physical Exam  Vitals and nursing note reviewed.   Constitutional:       General: She is not in acute distress.     Appearance: She is well-developed. She is not diaphoretic.   HENT:      Head: Normocephalic and atraumatic.      Right Ear: External ear normal.      Left Ear: External ear normal.      Nose: Nose normal.   Eyes:      Conjunctiva/sclera: Conjunctivae normal.      Pupils: Pupils are equal, round, and reactive to light.   Neck:      Vascular: No JVD.      Trachea: No tracheal deviation.   Cardiovascular:      Rate and Rhythm: Normal rate and regular rhythm.      Heart sounds: Normal heart sounds. No murmur heard.  Pulmonary:      Effort: Pulmonary effort is normal. No respiratory distress.      Breath sounds: Normal breath sounds. No wheezing.   Abdominal:      General: Bowel sounds are normal.      Palpations: Abdomen is soft.      Tenderness: There is generalized abdominal tenderness.   Musculoskeletal:         General: No deformity. Normal range of motion.      Cervical back: Normal range of motion and neck supple.   Skin:     General: Skin is warm and dry.      Coloration: Skin is not pale.      Findings: No erythema or rash.   Neurological:      Mental Status: She is alert and oriented to person, place, and time.      Cranial Nerves: No cranial nerve deficit.   Psychiatric:         Behavior: Behavior normal.         Thought Content: Thought content normal.         Procedures           ED Course                                                       Medical Decision Making  39-year-old female presents secondary to upper along with lower abdominal pain.  Patient has a past medical history of ulcerative colitis and is followed by Dr. Peguero.  She states her symptoms began yesterday.  She reports numerous episodes of diarrhea containing blood and mucus.  She denies any fever.  Patient has had nausea.  She has a past medical history of ulcerative  colitis, flash pulmonary edema, seizure, kidney stone, congestive heart failure, pancreatitis, diabetes, hypertension.  She presents by private vehicle.    Problems Addressed:  Diarrhea of infectious origin: complicated acute illness or injury  Ulcerative colitis without complications, unspecified location: complicated acute illness or injury    Amount and/or Complexity of Data Reviewed  Labs: ordered. Decision-making details documented in ED Course.  Radiology: ordered. Decision-making details documented in ED Course.    Risk  Prescription drug management.        Final diagnoses:   Diarrhea of infectious origin   Ulcerative colitis without complications, unspecified location       ED Disposition  ED Disposition       ED Disposition   Discharge    Condition   Stable    Comment   --               IzaiahFlorinda, APRN  [address]  [place] [address]  [phone]    Schedule an appointment as soon as possible for a visit       Geoff Peguero MD  [address] [facility]  [place] [address]  [phone]    Schedule an appointment as soon as possible for a visit            Medication List        New Prescriptions      amoxicillin-clavulanate 875-125 MG per tablet  Commonly known as: AUGMENTIN  Take 1 tablet by mouth 2 (Two) Times a Day.     dicyclomine 20 MG tablet  Commonly known as: BENTYL  Take 1 tablet by mouth Every 8 (Eight) Hours As Needed for Abdominal Cramping.            Changed      furosemide 40 MG tablet  Commonly known as: LASIX  Take 1 tablet by mouth Daily.  What changed:   when to take this  reasons to take this               Where to Get Your Medications        You can get these medications from any pharmacy    Bring a paper prescription for each of these medications  amoxicillin-clavulanate 875-125 MG per tablet  dicyclomine 20 MG tablet            Roel Clark PA  03/16/25 1261

## 2025-03-19 ENCOUNTER — SPECIALTY PHARMACY (OUTPATIENT)
Dept: PHARMACY | Facility: HOSPITAL | Age: 40
End: 2025-03-19
Payer: MEDICARE

## 2025-03-19 NOTE — PROGRESS NOTES
Specialty Pharmacy Patient Management Program  Refill Outreach     Antoinette was contacted today regarding refills of their medication(s).    Refill Questions      Flowsheet Row Most Recent Value   Changes to allergies? No   Changes to medications? No   New conditions or infections since last clinic visit No   If yes, please describe  na   Unplanned office visit, urgent care, ED, or hospital admission in the last 4 weeks  No   How does patient/caregiver feel medication is working? Very good   Financial problems or insurance changes  No   If yes, describe changes in insurance or financial issues. na   Since the previous refill, were any specialty medication doses or scheduled injections missed or delayed?  No   If yes, please provide the amount na   Why were doses missed? na   Does this patient require a clinical escalation to a pharmacist? No            Delivery Questions      Flowsheet Row Most Recent Value   Delivery method UPS   Delivery address verified with patient/caregiver? Yes   Delivery address Home   Number of medications in delivery 1   Medication(s) being filled and delivered Continuous Glucose Sensor (FreeStyle Esvin 3 Plus Sensor)   Doses left of specialty medications NA   Copay verified? Yes   Copay amount 0.00$   Copay form of payment No copayment ($0)   Delivery Date Selection 03/20/25   Signature Required No                 Follow-up: 30 day(s)     Carito Mills, Pharmacy Technician  3/19/2025  13:26 EDT

## 2025-03-21 LAB
BACTERIA SPEC CULT: NORMAL
BACTERIA SPEC CULT: NORMAL
CAMPYLOBACTER STL CULT: NORMAL
E COLI SXT STL QL IA: NEGATIVE
SALM + SHIG STL CULT: NORMAL

## 2025-03-24 ENCOUNTER — LAB (OUTPATIENT)
Dept: LAB | Facility: HOSPITAL | Age: 40
End: 2025-03-24
Payer: MEDICARE

## 2025-03-24 ENCOUNTER — TELEPHONE (OUTPATIENT)
Dept: CARDIOLOGY | Facility: CLINIC | Age: 40
End: 2025-03-24
Payer: MEDICARE

## 2025-03-24 DIAGNOSIS — I50.32 CHRONIC HEART FAILURE WITH PRESERVED EJECTION FRACTION (HFPEF): ICD-10-CM

## 2025-03-24 DIAGNOSIS — I50.32 CHRONIC HEART FAILURE WITH PRESERVED EJECTION FRACTION (HFPEF): Primary | ICD-10-CM

## 2025-03-24 LAB
DOPAMINE 24H UR-MRATE: 385 UG/24 HR (ref 0–510)
DOPAMINE UR-MCNC: 302 UG/L
EPINEPH 24H UR-MRATE: <4 UG/24 HR (ref 0–20)
EPINEPH UR-MCNC: <3 UG/L
NOREPINEPH 24H UR-MRATE: 74 UG/24 HR (ref 0–135)
NOREPINEPH UR-MCNC: 58 UG/L

## 2025-03-24 PROCEDURE — 80048 BASIC METABOLIC PNL TOTAL CA: CPT

## 2025-03-24 PROCEDURE — 36415 COLL VENOUS BLD VENIPUNCTURE: CPT

## 2025-03-24 PROCEDURE — 83880 ASSAY OF NATRIURETIC PEPTIDE: CPT

## 2025-03-24 NOTE — TELEPHONE ENCOUNTER
Called pt to gather more information related to MyChart message. Stated she has had some increased SOB with exertion and took a dose of prn lasix this morning. [place] and doesn't want to end up with pulmonary edema. Order for repeat labs placed. Pt verbalized understanding and agreeable to plan of care.

## 2025-03-25 LAB
ANION GAP SERPL CALCULATED.3IONS-SCNC: 12.5 MMOL/L (ref 5–15)
BUN SERPL-MCNC: 8 MG/DL (ref 6–20)
BUN/CREAT SERPL: 15.7 (ref 7–25)
CALCIUM SPEC-SCNC: 9.2 MG/DL (ref 8.6–10.5)
CHLORIDE SERPL-SCNC: 102 MMOL/L (ref 98–107)
CO2 SERPL-SCNC: 23.5 MMOL/L (ref 22–29)
CREAT SERPL-MCNC: 0.51 MG/DL (ref 0.57–1)
EGFRCR SERPLBLD CKD-EPI 2021: 120.4 ML/MIN/1.73
GLUCOSE SERPL-MCNC: 120 MG/DL (ref 65–99)
NT-PROBNP SERPL-MCNC: 48.7 PG/ML (ref 0–450)
POTASSIUM SERPL-SCNC: 4.5 MMOL/L (ref 3.5–5.2)
SODIUM SERPL-SCNC: 138 MMOL/L (ref 136–145)

## 2025-04-04 ENCOUNTER — APPOINTMENT (OUTPATIENT)
Dept: GENERAL RADIOLOGY | Facility: HOSPITAL | Age: 40
End: 2025-04-04
Payer: MEDICARE

## 2025-04-04 ENCOUNTER — HOSPITAL ENCOUNTER (EMERGENCY)
Facility: HOSPITAL | Age: 40
Discharge: HOME | End: 2025-04-04
Attending: EMERGENCY MEDICINE
Payer: MEDICARE

## 2025-04-04 VITALS
RESPIRATION RATE: 18 BRPM | HEART RATE: 66 BPM | WEIGHT: 200 LBS | OXYGEN SATURATION: 95 % | HEIGHT: 59 IN | SYSTOLIC BLOOD PRESSURE: 148 MMHG | DIASTOLIC BLOOD PRESSURE: 85 MMHG | TEMPERATURE: 99 F | BODY MASS INDEX: 40.32 KG/M2

## 2025-04-04 DIAGNOSIS — R06.02 SOB (SHORTNESS OF BREATH): ICD-10-CM

## 2025-04-04 DIAGNOSIS — J20.8 ACUTE BACTERIAL BRONCHITIS: Primary | ICD-10-CM

## 2025-04-04 DIAGNOSIS — B96.89 ACUTE BACTERIAL BRONCHITIS: Primary | ICD-10-CM

## 2025-04-04 LAB
ALBUMIN SERPL-MCNC: 4.2 G/DL (ref 3.5–5.2)
ALBUMIN/GLOB SERPL: 1.2 G/DL
ALP SERPL-CCNC: 82 U/L (ref 39–117)
ALT SERPL W P-5'-P-CCNC: 14 U/L (ref 1–33)
ANION GAP SERPL CALCULATED.3IONS-SCNC: 12 MMOL/L (ref 5–15)
AST SERPL-CCNC: 13 U/L (ref 1–32)
BASOPHILS # BLD AUTO: 0.02 10*3/MM3 (ref 0–0.2)
BASOPHILS NFR BLD AUTO: 0.3 % (ref 0–1.5)
BILIRUB SERPL-MCNC: 0.6 MG/DL (ref 0–1.2)
BUN SERPL-MCNC: 9 MG/DL (ref 6–20)
BUN/CREAT SERPL: 15 (ref 7–25)
CALCIUM SPEC-SCNC: 8.7 MG/DL (ref 8.6–10.5)
CHLORIDE SERPL-SCNC: 105 MMOL/L (ref 98–107)
CO2 SERPL-SCNC: 24 MMOL/L (ref 22–29)
CREAT SERPL-MCNC: 0.6 MG/DL (ref 0.57–1)
CRP SERPL-MCNC: 0.74 MG/DL (ref 0–0.5)
DEPRECATED RDW RBC AUTO: 41.4 FL (ref 37–54)
EGFRCR SERPLBLD CKD-EPI 2021: 115.8 ML/MIN/1.73
EOSINOPHIL # BLD AUTO: 0.16 10*3/MM3 (ref 0–0.4)
EOSINOPHIL NFR BLD AUTO: 2.5 % (ref 0.3–6.2)
ERYTHROCYTE [DISTWIDTH] IN BLOOD BY AUTOMATED COUNT: 13.8 % (ref 12.3–15.4)
GEN 5 1HR TROPONIN T REFLEX: 7 NG/L
GLOBULIN UR ELPH-MCNC: 3.5 GM/DL
GLUCOSE SERPL-MCNC: 123 MG/DL (ref 65–99)
HCT VFR BLD AUTO: 36 % (ref 34–46.6)
HGB BLD-MCNC: 11.8 G/DL (ref 12–15.9)
IMM GRANULOCYTES # BLD AUTO: 0.04 10*3/MM3 (ref 0–0.05)
IMM GRANULOCYTES NFR BLD AUTO: 0.6 % (ref 0–0.5)
LYMPHOCYTES # BLD AUTO: 1.28 10*3/MM3 (ref 0.7–3.1)
LYMPHOCYTES NFR BLD AUTO: 20.2 % (ref 19.6–45.3)
MCH RBC QN AUTO: 27.6 PG (ref 26.6–33)
MCHC RBC AUTO-ENTMCNC: 32.8 G/DL (ref 31.5–35.7)
MCV RBC AUTO: 84.3 FL (ref 79–97)
MONOCYTES # BLD AUTO: 0.45 10*3/MM3 (ref 0.1–0.9)
MONOCYTES NFR BLD AUTO: 7.1 % (ref 5–12)
NEUTROPHILS NFR BLD AUTO: 4.4 10*3/MM3 (ref 1.7–7)
NEUTROPHILS NFR BLD AUTO: 69.3 % (ref 42.7–76)
NRBC BLD AUTO-RTO: 0 /100 WBC (ref 0–0.2)
NT-PROBNP SERPL-MCNC: 153.2 PG/ML (ref 0–450)
PLATELET # BLD AUTO: 166 10*3/MM3 (ref 140–450)
PMV BLD AUTO: 10.8 FL (ref 6–12)
POTASSIUM SERPL-SCNC: 3.9 MMOL/L (ref 3.5–5.2)
PROT SERPL-MCNC: 7.7 G/DL (ref 6–8.5)
RBC # BLD AUTO: 4.27 10*6/MM3 (ref 3.77–5.28)
SODIUM SERPL-SCNC: 141 MMOL/L (ref 136–145)
TROPONIN T NUMERIC DELTA: 1 NG/L
TROPONIN T SERPL HS-MCNC: 6 NG/L
WBC NRBC COR # BLD AUTO: 6.35 10*3/MM3 (ref 3.4–10.8)

## 2025-04-04 PROCEDURE — 80053 COMPREHEN METABOLIC PANEL: CPT | Performed by: EMERGENCY MEDICINE

## 2025-04-04 PROCEDURE — 83880 ASSAY OF NATRIURETIC PEPTIDE: CPT | Performed by: EMERGENCY MEDICINE

## 2025-04-04 PROCEDURE — 71045 X-RAY EXAM CHEST 1 VIEW: CPT | Performed by: RADIOLOGY

## 2025-04-04 PROCEDURE — 99284 EMERGENCY DEPT VISIT MOD MDM: CPT

## 2025-04-04 PROCEDURE — 86140 C-REACTIVE PROTEIN: CPT | Performed by: EMERGENCY MEDICINE

## 2025-04-04 PROCEDURE — 84484 ASSAY OF TROPONIN QUANT: CPT | Performed by: EMERGENCY MEDICINE

## 2025-04-04 PROCEDURE — 71045 X-RAY EXAM CHEST 1 VIEW: CPT

## 2025-04-04 PROCEDURE — 93005 ELECTROCARDIOGRAM TRACING: CPT | Performed by: EMERGENCY MEDICINE

## 2025-04-04 PROCEDURE — 93010 ELECTROCARDIOGRAM REPORT: CPT | Performed by: INTERNAL MEDICINE

## 2025-04-04 PROCEDURE — 25810000003 SODIUM CHLORIDE 0.9 % SOLUTION: Performed by: EMERGENCY MEDICINE

## 2025-04-04 PROCEDURE — 85025 COMPLETE CBC W/AUTO DIFF WBC: CPT | Performed by: EMERGENCY MEDICINE

## 2025-04-04 PROCEDURE — 36415 COLL VENOUS BLD VENIPUNCTURE: CPT

## 2025-04-04 RX ORDER — METHYLPREDNISOLONE 4 MG/1
TABLET ORAL
Qty: 21 TABLET | Refills: 0 | Status: SHIPPED | OUTPATIENT
Start: 2025-04-04

## 2025-04-04 RX ORDER — SODIUM CHLORIDE 0.9 % (FLUSH) 0.9 %
10 SYRINGE (ML) INJECTION AS NEEDED
Status: DISCONTINUED | OUTPATIENT
Start: 2025-04-04 | End: 2025-04-04 | Stop reason: HOSPADM

## 2025-04-04 RX ADMIN — SODIUM CHLORIDE 1000 ML: 9 INJECTION, SOLUTION INTRAVENOUS at 20:05

## 2025-04-05 LAB
QT INTERVAL: 344 MS
QTC INTERVAL: 420 MS

## 2025-04-08 ENCOUNTER — PATIENT MESSAGE (OUTPATIENT)
Dept: CARDIOLOGY | Facility: CLINIC | Age: 40
End: 2025-04-08
Payer: MEDICARE

## 2025-04-08 ENCOUNTER — TELEPHONE (OUTPATIENT)
Dept: CARDIOLOGY | Facility: CLINIC | Age: 40
End: 2025-04-08
Payer: MEDICARE

## 2025-04-08 DIAGNOSIS — I50.32 CHRONIC HEART FAILURE WITH PRESERVED EJECTION FRACTION (HFPEF): Primary | ICD-10-CM

## 2025-04-08 NOTE — ED PROVIDER NOTES
Subjective     URI  Presenting symptoms: congestion, cough and rhinorrhea    Presenting symptoms: no ear pain, no facial pain, no fatigue, no fever and no sore throat    Severity:  Mild  Onset quality:  Gradual  Duration: Few.  Timing:  Constant  Progression:  Worsening  Chronicity:  New  Relieved by:  Nothing  Worsened by:  Nothing  Ineffective treatments:  None tried  Associated symptoms: sinus pain    Associated symptoms: no arthralgias, no headaches, no myalgias, no neck pain, no sneezing, no swollen glands and no wheezing    Risk factors: diabetes mellitus    Risk factors: not elderly, no chronic cardiac disease, no chronic kidney disease, no chronic respiratory disease, no immunosuppression, no recent illness, no recent travel and no sick contacts        Review of Systems   Constitutional:  Negative for activity change, appetite change, chills, diaphoresis, fatigue and fever.   HENT:  Positive for congestion, rhinorrhea and sinus pain. Negative for ear pain, sneezing and sore throat.    Eyes:  Negative for redness.   Respiratory:  Positive for cough. Negative for chest tightness, shortness of breath and wheezing.    Cardiovascular:  Negative for chest pain, palpitations and leg swelling.   Gastrointestinal:  Negative for abdominal pain, diarrhea, nausea and vomiting.   Genitourinary:  Negative for dysuria and urgency.   Musculoskeletal:  Negative for arthralgias, back pain, myalgias and neck pain.   Skin:  Negative for pallor, rash and wound.   Neurological:  Negative for dizziness, speech difficulty, weakness and headaches.   Psychiatric/Behavioral:  Negative for agitation, behavioral problems, confusion and decreased concentration.    All other systems reviewed and are negative.      Past Medical History:   Diagnosis Date    Anxiety     CHF (congestive heart failure) 05/2023    Colitis 2023    Depression     Diabetes mellitus 2018    type 2    Diverticulitis     Diverticulitis of colon     GERD  "(gastroesophageal reflux disease)     History of transfusion 2024    2 units post delivery    Hypertension     chronic    Kidney stone     \"years ago\"    Narcolepsy     Pancreatitis 2023    PCOS (polycystic ovarian syndrome)     Polycystic ovary syndrome     Rectal bleeding     SINCE  - USES MESALAMINE SUPPOSITORY NIGHTLY    Seizures     Sleep apnea     Type 2 diabetes mellitus Unknown    Vitamin D deficiency        Allergies   Allergen Reactions    Dilantin [Phenytoin] Mental Status Change    Keppra [Levetiracetam] Mental Status Change    Topamax [Topiramate] Mental Status Change       Past Surgical History:   Procedure Laterality Date    ABDOMINAL SURGERY      Caesarean    CARDIAC CATHETERIZATION      CARPAL TUNNEL RELEASE       SECTION N/A 2024    Procedure:  SECTION PRIMARY;  Surgeon: Axel Keys MD;  Location:  [facility];  Service: Obstetrics/Gynecology;  Laterality: N/A;    COLONOSCOPY      COLONOSCOPY N/A 2024    Procedure: COLONOSCOPY;  Surgeon: Tom Mueller MD;  Location: [facility];  Service: Gastroenterology;  Laterality: N/A;    ENDOSCOPY      ENDOSCOPY N/A 2024    Procedure: ESOPHAGOGASTRODUODENOSCOPY;  Surgeon: Tom Mueller MD;  Location: [facility];  Service: Gastroenterology;  Laterality: N/A;    FRACTURE SURGERY Left     wrist    HARDWARE REMOVAL Left     WRIST    TENDON REPAIR Left     HAND    UPPER GASTROINTESTINAL ENDOSCOPY      WISDOM TOOTH EXTRACTION         Family History   Problem Relation Age of Onset    Hypertension Mother     Depression Mother     Heart disease Mother     COPD Mother     Emphysema Mother     Heart failure Mother     Heart attack Mother     Anxiety disorder Mother     Drug abuse Mother     Colon polyps Mother     Hypertension Father     Diabetes Father     Heart disease Father     COPD Father     Heart failure Father     Hepatitis Father     Arrhythmia Father     Colon polyps " Maternal Grandfather     Obesity Sister     Anxiety disorder Sister     Depression Sister     Anemia Sister     Anxiety disorder Sister     Drug abuse Sister     Paranoid behavior Sister     Drug abuse Brother        Social History     Socioeconomic History    Marital status:    Tobacco Use    Smoking status: Former     Current packs/day: 0.00     Average packs/day: 1 pack/day for 20.0 years (20.0 ttl pk-yrs)     Types: Cigarettes     Start date: 1/1/1995     Quit date: 2015     Years since quitting: 10.2     Passive exposure: Past    Smokeless tobacco: Never    Tobacco comments:     QUIT IN 2017   Vaping Use    Vaping status: Never Used   Substance and Sexual Activity    Alcohol use: No    Drug use: No    Sexual activity: Defer           Objective   Physical Exam  Vitals and nursing note reviewed.   Constitutional:       General: She is not in acute distress.     Appearance: Normal appearance. She is well-developed. She is not toxic-appearing or diaphoretic.   HENT:      Head: Normocephalic and atraumatic.      Right Ear: External ear normal.      Left Ear: External ear normal.      Nose: Nose normal. Congestion present.      Mouth/Throat:      Pharynx: No oropharyngeal exudate.      Tonsils: No tonsillar exudate.   Eyes:      General: Lids are normal.      Conjunctiva/sclera: Conjunctivae normal.      Pupils: Pupils are equal, round, and reactive to light.   Neck:      Thyroid: No thyromegaly.   Cardiovascular:      Rate and Rhythm: Normal rate and regular rhythm.      Pulses: Normal pulses.      Heart sounds: Normal heart sounds, S1 normal and S2 normal.   Pulmonary:      Effort: Pulmonary effort is normal. No tachypnea or respiratory distress.      Breath sounds: Normal breath sounds. No decreased breath sounds, wheezing or rales.   Chest:      Chest wall: Tenderness present.   Abdominal:      General: Bowel sounds are normal. There is no distension.      Palpations: Abdomen is soft.      Tenderness:  There is no abdominal tenderness. There is no guarding or rebound.   Musculoskeletal:         General: No tenderness or deformity. Normal range of motion.      Cervical back: Full passive range of motion without pain, normal range of motion and neck supple.   Lymphadenopathy:      Cervical: No cervical adenopathy.   Skin:     General: Skin is warm and dry.      Coloration: Skin is not pale.      Findings: No erythema or rash.   Neurological:      Mental Status: She is alert and oriented to person, place, and time.      GCS: GCS eye subscore is 4. GCS verbal subscore is 5. GCS motor subscore is 6.      Cranial Nerves: No cranial nerve deficit.      Sensory: No sensory deficit.   Psychiatric:         Speech: Speech normal.         Behavior: Behavior normal.         Thought Content: Thought content normal.         Judgment: Judgment normal.         Procedures           ED Course  ED Course as of 04/08/25 0836   Fri Apr 04, 2025 2207 ECG 12 Lead Other; Abnormal EKG  Vent. Rate :  90 BPM     Atrial Rate :  90 BPM     P-R Int : 148 ms          QRS Dur : 112 ms      QT Int : 344 ms       P-R-T Axes :  41  77   5 degrees    QTcB Int : 420 ms     Normal sinus rhythm  Incomplete right bundle branch block  Nonspecific T wave abnormality  Abnormal ECG  When compared with ECG of 24-Jan-2025 12:42,  Incomplete right bundle branch block is now present  Inverted T waves have replaced nonspecific T wave abnormality in Inferior  leads   [ES]      ED Course User Index  [ES] Daniel Muller MD                  HEART Score: 1                                      Medical Decision Making  Problems Addressed:  Acute bacterial bronchitis: complicated acute illness or injury  SOB (shortness of breath): complicated acute illness or injury    Amount and/or Complexity of Data Reviewed  Labs: ordered.  Radiology: ordered.  ECG/medicine tests: ordered. Decision-making details documented in ED Course.    Risk  Prescription drug  management.        Final diagnoses:   Acute bacterial bronchitis   SOB (shortness of breath)       ED Disposition  ED Disposition       ED Disposition   Discharge    Condition   Stable    Comment   --               Florinda Bliss, APRN  [address] [facility] [address]  [place] [address]  [phone]    Schedule an appointment as soon as possible for a visit in 1 day  EVALUATE         Medication List        New Prescriptions      methylPREDNISolone 4 MG dose pack  Commonly known as: MEDROL  Take as directed on package instructions.            Changed      furosemide 40 MG tablet  Commonly known as: LASIX  Take 1 tablet by mouth Daily.  What changed:   when to take this  reasons to take this               Where to Get Your Medications        These medications were [facility] to [id number] - [facility] [facility] [address] [facility] [address] [place] [address] - [phone]  - [phone] FX  [address] [facility] [place] [address]      Phone: [phone]   amoxicillin-clavulanate 875-125 MG per tablet  methylPREDNISolone 4 MG dose pack            Daniel Muller MD  04/08/25 6964

## 2025-04-08 NOTE — TELEPHONE ENCOUNTER
Called pt to get more information on Waveseer[facility] message. Stated over the last week and a half her weight has gone from 191 lbs to 203 lbs. Advised pt take PRN lasix and potassium as prescribed for 3 days then repeat labs on 4/11/25. Asked pt to call if SOB worsens or with new cardiac symptoms. Pt verbalized understanding and agreeable to plan of care.

## 2025-04-10 ENCOUNTER — TELEPHONE (OUTPATIENT)
Dept: CARDIOLOGY | Facility: CLINIC | Age: 40
End: 2025-04-10
Payer: MEDICARE

## 2025-04-10 NOTE — PROGRESS NOTES
Subjective   Antoinette Pack is a 39 y.o. female who presents today for follow up    Chief Complaint:  Bipolar disorder, panic disorder    History of Present Illness:   History of Present Illness   History of Present Illness  The patient is a 40-year-old female who presents via virtual visit for a follow-up of bipolar disorder and panic disorder.    She reports persistent respiratory distress, which she attributes to her cardiac condition. She has been experiencing elevated blood pressure, even in the absence of emotional distress. She recalls an episode of pulmonary edema in 01/2025, during which she took nitroglycerin due to her remote location from the hospital. Despite this, her blood pressure was recorded as 200/139 upon arrival at the hospital. She also experienced high blood pressure during her pregnancy, which resolved post-delivery. However, she developed preeclampsia and pericardial effusion, necessitating the initiation of two antihypertensive medications. Her blood pressure remained stable until it began to spike again, leading to an emergency room visit. She is currently on Entresto, Imdur, spironolactone, metoprolol, hydralazine, and Lasix for her cardiac condition. She occasionally takes hydralazine when her blood pressure is elevated. She is not breastfeeding and reports frequent urination due to her diuretic medications. She is considering weight loss surgery as a potential solution to her health issues. She has gained weight despite no changes in her diet and hopes that weight loss surgery will alleviate her symptoms. She acknowledges the need for increased physical activity but reports difficulty in walking across the floor. She is currently on Entresto, Imdur, spironolactone, metoprolol, hydralazine, and Lasix for her cardiac condition. She occasionally takes hydralazine when her blood pressure is elevated.    She rates her depression at 7/10 and anxiety at 150/10. She reports  "intermittent hallucinations, which she believes are stress-induced. She is currently on Wellbutrin for her psychiatric symptoms, which she believes is beneficial. She has previously tried Abilify but discontinued it due to excessive drowsiness. She has also tried Zoloft, which she found helpful, but discontinued it due to hallucinations during stressful events.    She has been diagnosed with sleep apnea and is currently being prescribed a medication, unsure of the name, she expresses concern about the potential impact of this medication on her heart rate, which has been consistently between 80 and 90 beats per minute. Instructed her to discuss with cardiologist.    MEDICATIONS  Current: Entresto, Imdur, spironolactone, metoprolol, hydralazine, Lasix, Wellbutrin XL, propranolol            The following portions of the patient's history were reviewed and updated as appropriate: allergies, current medications, past family history, past medical history, past social history, past surgical history and problem list.      Past Medical History:  Past Medical History:   Diagnosis Date    Anxiety     CHF (congestive heart failure) 05/2023    Colitis 2023    Depression     Diabetes mellitus 2018    type 2    Diverticulitis     Diverticulitis of colon     GERD (gastroesophageal reflux disease)     History of transfusion 04/19/2024    2 units post delivery    Hypertension     chronic    Kidney stone     \"years ago\"    Narcolepsy 2022    Pancreatitis 09/2023    PCOS (polycystic ovarian syndrome)     Polycystic ovary syndrome     Rectal bleeding     SINCE 2023 - USES MESALAMINE SUPPOSITORY NIGHTLY    Seizures 2012    Sleep apnea     Type 2 diabetes mellitus Unknown    Vitamin D deficiency        Social History:  Social History     Socioeconomic History    Marital status:    Tobacco Use    Smoking status: Former     Current packs/day: 0.00     Average packs/day: 1 pack/day for 20.0 years (20.0 ttl pk-yrs)     Types: " Cigarettes     Start date: 1995     Quit date:      Years since quitting: 10.2     Passive exposure: Past    Smokeless tobacco: Never    Tobacco comments:     QUIT IN 2017   Vaping Use    Vaping status: Never Used   Substance and Sexual Activity    Alcohol use: No    Drug use: No    Sexual activity: Defer       Family History:  Family History   Problem Relation Age of Onset    Hypertension Mother     Depression Mother     Heart disease Mother     COPD Mother     Emphysema Mother     Heart failure Mother     Heart attack Mother     Anxiety disorder Mother     Drug abuse Mother     Colon polyps Mother     Hypertension Father     Diabetes Father     Heart disease Father     COPD Father     Heart failure Father     Hepatitis Father     Arrhythmia Father     Colon polyps Maternal Grandfather     Obesity Sister     Anxiety disorder Sister     Depression Sister     Anemia Sister     Anxiety disorder Sister     Drug abuse Sister     Paranoid behavior Sister     Drug abuse Brother        Past Surgical History:  Past Surgical History:   Procedure Laterality Date    ABDOMINAL SURGERY      Caesarean    CARDIAC CATHETERIZATION      CARPAL TUNNEL RELEASE       SECTION N/A 2024    Procedure:  SECTION PRIMARY;  Surgeon: Axel Keys MD;  Location:  [facility];  Service: Obstetrics/Gynecology;  Laterality: N/A;    COLONOSCOPY      COLONOSCOPY N/A 2024    Procedure: COLONOSCOPY;  Surgeon: Tom Mueller MD;  Location:  [facility];  Service: Gastroenterology;  Laterality: N/A;    ENDOSCOPY      ENDOSCOPY N/A 2024    Procedure: ESOPHAGOGASTRODUODENOSCOPY;  Surgeon: Tom Mueller MD;  Location:  [facility];  Service: Gastroenterology;  Laterality: N/A;    FRACTURE SURGERY Left     wrist    HARDWARE REMOVAL Left     WRIST    TENDON REPAIR Left     HAND    UPPER GASTROINTESTINAL ENDOSCOPY      WISDOM TOOTH EXTRACTION         Problem List:  Patient Active Problem List    Diagnosis    Simple goiter    History of CHF (congestive heart failure)    Chronic hypertension affecting pregnancy    History of pancreatitis    History of seizure disorder    ALEJANDRO on CPAP    Proteinuria affecting pregnancy, antepartum    Rectal bleeding    Shortness of breath    Antepartum multigravida of advanced maternal age    Pain of pelvic girdle    Elevated blood pressure reading    Pulmonary edema    Essential hypertension    Type 2 diabetes mellitus without complication, with long-term current use of insulin    S/P  section, total salpingectomy left unicornuate uterus    Anemia    Ulcerative colitis with complication    Surgical wound infection    Wound infection    Chronic heart failure with preserved ejection fraction (HFpEF)    Gastroesophageal reflux disease    HLD (hyperlipidemia)    Mixed hyperlipidemia    Psoriasis    Vitamin B12 deficiency    Vitamin D deficiency    Seasonal allergic rhinitis    Primary dysthymia    CHF (congestive heart failure), NYHA class II, acute on chronic, diastolic    Acute on chronic heart failure with preserved ejection fraction (HFpEF)    Acute heart failure with preserved ejection fraction (HFpEF)       Allergy:   Allergies   Allergen Reactions    Dilantin [Phenytoin] Mental Status Change    Keppra [Levetiracetam] Mental Status Change    Topamax [Topiramate] Mental Status Change        Current Medications:   Current Outpatient Medications   Medication Sig Dispense Refill    amoxicillin-clavulanate (AUGMENTIN) 875-125 MG per tablet Take 1 tablet by mouth 2 (Two) Times a Day for 10 days. 20 tablet 0    Budesonide (ENTOCORT EC) 3 MG 24 hr capsule Take 3 capsules by mouth Every Morning.      Continuous Glucose Sensor (FreeStyle Esvin 3 Plus Sensor) Apply one sensor externally every 15 days 2 each 2    dicyclomine (BENTYL) 20 MG tablet Take 1 tablet by mouth Every 8 (Eight) Hours As Needed for Abdominal Cramping. 20 tablet 0    fluconazole (Diflucan) 150 MG tablet  Take every 72 hours as needed for yeast infection. (Patient not taking: Reported on 3/13/2025) 6 tablet 5    fluticasone (FLONASE) 50 MCG/ACT nasal spray Administer 2 sprays into the nostril(s) as directed by provider Daily As Needed for Rhinitis or Allergies. (Patient not taking: Reported on 3/7/2025)      furosemide (LASIX) 40 MG tablet Take 1 tablet by mouth Daily. (Patient taking differently: Take 1 tablet by mouth Daily As Needed.) 15 tablet 0    hydrALAZINE (APRESOLINE) 25 MG tablet Take 1 tablet by mouth 3 (Three) Times a Day. Hold if top number of blood pressure is less than 110. Can take extra hydralazine 25mg as needed for top number greater than 125 tablet 1    Insulin Glargine (Lantus SoloStar) 100 UNIT/ML injection pen Inject 12 Units under the skin into the appropriate area as directed Every Night. 15 mL 0    Insulin Pen Needle (Pen Needles) 32G X 4 MM misc Use to inject insulin once daily 100 each 2    isosorbide mononitrate (IMDUR) 30 MG 24 hr tablet Take 1 tablet by mouth Daily. 90 tablet 3    lansoprazole (PREVACID) 30 MG capsule Take 1 capsule by mouth 2 (Two) Times a Day.      mesalamine (ROWASA) 4 g enema Insert 1 enema into the rectum Every Night.      methylPREDNISolone (MEDROL) 4 MG dose pack Take as directed on package instructions. 21 tablet 0    metoprolol tartrate (LOPRESSOR) 100 MG tablet Take 1 tablet by mouth 2 (Two) Times a Day. 180 tablet 3    mupirocin (BACTROBAN) 2 % ointment Apply 1 Application topically to the appropriate area as directed 3 (Three) Times a Day. (Patient not taking: Reported on 3/13/2025)      NIFEdipine XL (PROCARDIA XL) 90 MG 24 hr tablet Take 1 tablet by mouth Daily. 90 tablet 3    nitroglycerin (NITROSTAT) 0.4 MG SL tablet Place 1 tablet under the tongue Every 5 (Five) Minutes As Needed for Chest Pain (Flash pulmonary edema) for up to 10 doses. Place one tablet under the tongue for chest pain every 5 minutes until chest pain is relieved. If you have to take 3  tablets, take the 3rd and go to the hospital to be evaluated. 10 tablet 0    nystatin (MYCOSTATIN) 960553 UNIT/GM powder Apply  topically to the appropriate area as directed 4 (Four) Times a Day. (Patient not taking: Reported on 3/13/2025)      potassium chloride (KLOR-CON M20) 20 MEQ CR tablet Take 1 tablet by mouth Daily. (Patient not taking: Reported on 3/13/2025)      Prenatal Vit-Fe Fumarate-FA (PRENATAL PO) Take 1 tablet by mouth Daily. (Patient not taking: Reported on 3/7/2025)      prochlorperazine (COMPAZINE) 5 MG tablet Take 1 tablet by mouth Every 6 (Six) Hours As Needed.      sacubitril-valsartan (Entresto)  MG tablet Take 1 tablet by mouth 2 (Two) Times a Day. 180 tablet 3    sertraline (Zoloft) 50 MG tablet Take 1 tablets daily x 2 weeks, then increase 2 tablets D/C Wellbutrin 60 tablet 2    spironolactone (ALDACTONE) 100 MG tablet Take 1 tablet by mouth Daily. 90 tablet 3     No current facility-administered medications for this visit.       Review of Symptoms:    Review of Systems   Respiratory:  Positive for shortness of breath.    Psychiatric/Behavioral:  Positive for sleep disturbance, depressed mood and stress. Negative for decreased concentration, dysphoric mood, hallucinations, self-injury and suicidal ideas. The patient is nervous/anxious.    All other systems reviewed and are negative.      Objective   Physical Exam:   not currently breastfeeding.  There is no height or weight on file to calculate BMI.  No height and weight on file for this encounter.    04/14/25  MENTAL STATUS EXAM   General Appearance:  Cleanly groomed and dressed  Eye Contact:  Good eye contact  Attitude:  Cooperative  Motor Activity:  Normal gait, posture  Speech:  Normal rate, tone, volume  Language:  Spontaneous  Mood and affect:  Anxious, depressed and other  Other Comment:  Tearful  Hopelessness:  Denies  Thought Process:  Logical  Associations/ Thought Content:  No delusions  Hallucinations:  Auditory and  visual  Suicidal Ideations:  Not present  Homicidal Ideation:  Not present  Sensorium:  Alert  Orientation:  Person, place, time and situation  Insight:  Fair  Judgement:  Fair  Reliability:  Fair  Impulse Control:  Fair      PHQ-Score Total:  PHQ-9 Total Score: (Patient-Rptd) 17    Lab Results:   Lab on 04/11/2025   Component Date Value Ref Range Status    Glucose 04/11/2025 182 (H)  65 - 99 mg/dL Final    BUN 04/11/2025 12  6 - 20 mg/dL Final    Creatinine 04/11/2025 0.59  0.57 - 1.00 mg/dL Final    Sodium 04/11/2025 140  136 - 145 mmol/L Final    Potassium 04/11/2025 3.9  3.5 - 5.2 mmol/L Final    Chloride 04/11/2025 106  98 - 107 mmol/L Final    CO2 04/11/2025 23.7  22.0 - 29.0 mmol/L Final    Calcium 04/11/2025 8.7  8.6 - 10.5 mg/dL Final    BUN/Creatinine Ratio 04/11/2025 20.3  7.0 - 25.0 Final    Anion Gap 04/11/2025 10.3  5.0 - 15.0 mmol/L Final    eGFR 04/11/2025 116.3  >60.0 mL/min/1.73 Final    proBNP 04/11/2025 436.0  0.0 - 450.0 pg/mL Final   Admission on 04/04/2025, Discharged on 04/04/2025   Component Date Value Ref Range Status    Glucose 04/04/2025 123 (H)  65 - 99 mg/dL Final    BUN 04/04/2025 9  6 - 20 mg/dL Final    Creatinine 04/04/2025 0.60  0.57 - 1.00 mg/dL Final    Sodium 04/04/2025 141  136 - 145 mmol/L Final    Potassium 04/04/2025 3.9  3.5 - 5.2 mmol/L Final    Chloride 04/04/2025 105  98 - 107 mmol/L Final    CO2 04/04/2025 24.0  22.0 - 29.0 mmol/L Final    Calcium 04/04/2025 8.7  8.6 - 10.5 mg/dL Final    Total Protein 04/04/2025 7.7  6.0 - 8.5 g/dL Final    Albumin 04/04/2025 4.2  3.5 - 5.2 g/dL Final    ALT (SGPT) 04/04/2025 14  1 - 33 U/L Final    AST (SGOT) 04/04/2025 13  1 - 32 U/L Final    Alkaline Phosphatase 04/04/2025 82  39 - 117 U/L Final    Total Bilirubin 04/04/2025 0.6  0.0 - 1.2 mg/dL Final    Globulin 04/04/2025 3.5  gm/dL Final    A/G Ratio 04/04/2025 1.2  g/dL Final    BUN/Creatinine Ratio 04/04/2025 15.0  7.0 - 25.0 Final    Anion Gap 04/04/2025 12.0  5.0 - 15.0  mmol/L Final    eGFR 04/04/2025 115.8  >60.0 mL/min/1.73 Final    C-Reactive Protein 04/04/2025 0.74 (H)  0.00 - 0.50 mg/dL Final    HS Troponin T 04/04/2025 6  <14 ng/L Final    QT Interval 04/04/2025 344  ms Final    QTC Interval 04/04/2025 420  ms Final    WBC 04/04/2025 6.35  3.40 - 10.80 10*3/mm3 Final    RBC 04/04/2025 4.27  3.77 - 5.28 10*6/mm3 Final    Hemoglobin 04/04/2025 11.8 (L)  12.0 - 15.9 g/dL Final    Hematocrit 04/04/2025 36.0  34.0 - 46.6 % Final    MCV 04/04/2025 84.3  79.0 - 97.0 fL Final    MCH 04/04/2025 27.6  26.6 - 33.0 pg Final    MCHC 04/04/2025 32.8  31.5 - 35.7 g/dL Final    RDW 04/04/2025 13.8  12.3 - 15.4 % Final    RDW-SD 04/04/2025 41.4  37.0 - 54.0 fl Final    MPV 04/04/2025 10.8  6.0 - 12.0 fL Final    Platelets 04/04/2025 166  140 - 450 10*3/mm3 Final    Neutrophil % 04/04/2025 69.3  42.7 - 76.0 % Final    Lymphocyte % 04/04/2025 20.2  19.6 - 45.3 % Final    Monocyte % 04/04/2025 7.1  5.0 - 12.0 % Final    Eosinophil % 04/04/2025 2.5  0.3 - 6.2 % Final    Basophil % 04/04/2025 0.3  0.0 - 1.5 % Final    Immature Grans % 04/04/2025 0.6 (H)  0.0 - 0.5 % Final    Neutrophils, Absolute 04/04/2025 4.40  1.70 - 7.00 10*3/mm3 Final    Lymphocytes, Absolute 04/04/2025 1.28  0.70 - 3.10 10*3/mm3 Final    Monocytes, Absolute 04/04/2025 0.45  0.10 - 0.90 10*3/mm3 Final    Eosinophils, Absolute 04/04/2025 0.16  0.00 - 0.40 10*3/mm3 Final    Basophils, Absolute 04/04/2025 0.02  0.00 - 0.20 10*3/mm3 Final    Immature Grans, Absolute 04/04/2025 0.04  0.00 - 0.05 10*3/mm3 Final    nRBC 04/04/2025 0.0  0.0 - 0.2 /100 WBC Final    proBNP 04/04/2025 153.2  0.0 - 450.0 pg/mL Final    HS Troponin T 04/04/2025 7  <14 ng/L Final    Troponin T Numeric Delta 04/04/2025 1  Abnormal if >/=3 ng/L Final   Lab on 03/24/2025   Component Date Value Ref Range Status    Glucose 03/24/2025 120 (H)  65 - 99 mg/dL Final    BUN 03/24/2025 8  6 - 20 mg/dL Final    Creatinine 03/24/2025 0.51 (L)  0.57 - 1.00 mg/dL Final     Sodium 03/24/2025 138  136 - 145 mmol/L Final    Potassium 03/24/2025 4.5  3.5 - 5.2 mmol/L Final    Slight hemolysis detected by analyzer. Result may be falsely elevated.    Chloride 03/24/2025 102  98 - 107 mmol/L Final    CO2 03/24/2025 23.5  22.0 - 29.0 mmol/L Final    Calcium 03/24/2025 9.2  8.6 - 10.5 mg/dL Final    BUN/Creatinine Ratio 03/24/2025 15.7  7.0 - 25.0 Final    Anion Gap 03/24/2025 12.5  5.0 - 15.0 mmol/L Final    eGFR 03/24/2025 120.4  >60.0 mL/min/1.73 Final    proBNP 03/24/2025 48.7  0.0 - 450.0 pg/mL Final   Admission on 03/16/2025, Discharged on 03/16/2025   Component Date Value Ref Range Status    Glucose 03/16/2025 120 (H)  65 - 99 mg/dL Final    BUN 03/16/2025 12  6 - 20 mg/dL Final    Creatinine 03/16/2025 0.60  0.57 - 1.00 mg/dL Final    Sodium 03/16/2025 137  136 - 145 mmol/L Final    Potassium 03/16/2025 4.0  3.5 - 5.2 mmol/L Final    Chloride 03/16/2025 102  98 - 107 mmol/L Final    CO2 03/16/2025 23.7  22.0 - 29.0 mmol/L Final    Calcium 03/16/2025 9.3  8.6 - 10.5 mg/dL Final    Total Protein 03/16/2025 8.2  6.0 - 8.5 g/dL Final    Albumin 03/16/2025 4.3  3.5 - 5.2 g/dL Final    ALT (SGPT) 03/16/2025 12  1 - 33 U/L Final    AST (SGOT) 03/16/2025 11  1 - 32 U/L Final    Alkaline Phosphatase 03/16/2025 85  39 - 117 U/L Final    Total Bilirubin 03/16/2025 0.7  0.0 - 1.2 mg/dL Final    Globulin 03/16/2025 3.9  gm/dL Final    A/G Ratio 03/16/2025 1.1  g/dL Final    BUN/Creatinine Ratio 03/16/2025 20.0  7.0 - 25.0 Final    Anion Gap 03/16/2025 11.3  5.0 - 15.0 mmol/L Final    eGFR 03/16/2025 115.8  >60.0 mL/min/1.73 Final    HCG Qualitative 03/16/2025 Negative  Negative Final    Color, UA 03/16/2025 Orange (A)  Yellow, Straw Final    Appearance, UA 03/16/2025 Cloudy (A)  Clear Final    pH, UA 03/16/2025 5.5  5.0 - 8.0 Final    Specific Gravity, UA 03/16/2025 1.026  1.005 - 1.030 Final    Glucose, UA 03/16/2025 Negative  Negative Final    Ketones, UA 03/16/2025 Negative  Negative Final     Bilirubin, UA 03/16/2025 Small (1+) (A)  Negative Final    Blood, UA 03/16/2025 Large (3+) (A)  Negative Final    Protein, UA 03/16/2025 30 mg/dL (1+) (A)  Negative Final    Leuk Esterase, UA 03/16/2025 Small (1+) (A)  Negative Final    Nitrite, UA 03/16/2025 Negative  Negative Final    Urobilinogen, UA 03/16/2025 0.2 E.U./dL  0.2 - 1.0 E.U./dL Final    C-Reactive Protein 03/16/2025 1.08 (H)  0.00 - 0.50 mg/dL Final    Sed Rate 03/16/2025 16  0 - 20 mm/hr Final    WBC 03/16/2025 8.29  3.40 - 10.80 10*3/mm3 Final    RBC 03/16/2025 4.75  3.77 - 5.28 10*6/mm3 Final    Hemoglobin 03/16/2025 13.3  12.0 - 15.9 g/dL Final    Hematocrit 03/16/2025 38.9  34.0 - 46.6 % Final    MCV 03/16/2025 81.9  79.0 - 97.0 fL Final    MCH 03/16/2025 28.0  26.6 - 33.0 pg Final    MCHC 03/16/2025 34.2  31.5 - 35.7 g/dL Final    RDW 03/16/2025 13.2  12.3 - 15.4 % Final    RDW-SD 03/16/2025 38.8  37.0 - 54.0 fl Final    MPV 03/16/2025 10.1  6.0 - 12.0 fL Final    Platelets 03/16/2025 202  140 - 450 10*3/mm3 Final    Neutrophil % 03/16/2025 69.2  42.7 - 76.0 % Final    Lymphocyte % 03/16/2025 21.4  19.6 - 45.3 % Final    Monocyte % 03/16/2025 4.6 (L)  5.0 - 12.0 % Final    Eosinophil % 03/16/2025 3.9  0.3 - 6.2 % Final    Basophil % 03/16/2025 0.4  0.0 - 1.5 % Final    Immature Grans % 03/16/2025 0.5  0.0 - 0.5 % Final    Neutrophils, Absolute 03/16/2025 5.75  1.70 - 7.00 10*3/mm3 Final    Lymphocytes, Absolute 03/16/2025 1.77  0.70 - 3.10 10*3/mm3 Final    Monocytes, Absolute 03/16/2025 0.38  0.10 - 0.90 10*3/mm3 Final    Eosinophils, Absolute 03/16/2025 0.32  0.00 - 0.40 10*3/mm3 Final    Basophils, Absolute 03/16/2025 0.03  0.00 - 0.20 10*3/mm3 Final    Immature Grans, Absolute 03/16/2025 0.04  0.00 - 0.05 10*3/mm3 Final    nRBC 03/16/2025 0.0  0.0 - 0.2 /100 WBC Final    Extra Tube 03/16/2025 Hold for add-ons.   Final    Auto resulted.    Extra Tube 03/16/2025 hold for add-on   Final    Auto resulted    Extra Tube 03/16/2025 Hold for  add-ons.   Final    Auto resulted    Campylobacter 03/16/2025 Not Detected  Not Detected Final    Plesiomonas shigelloides 03/16/2025 Not Detected  Not Detected Final    Salmonella 03/16/2025 Not Detected  Not Detected Final    Vibrio 03/16/2025 Not Detected  Not Detected Final    Vibrio cholerae 03/16/2025 Not Detected  Not Detected Final    Yersinia enterocolitica 03/16/2025 Not Detected  Not Detected Final    Enteroaggregative E. coli (EAEC) 03/16/2025 Detected (A)  Not Detected Final    Enteropathogenic E. coli (EPEC) 03/16/2025 Not Detected  Not Detected Final    Enterotoxigenic E. coli (ETEC) lt/* 03/16/2025 Not Detected  Not Detected Final    Shiga-like toxin-producing E. coli* 03/16/2025 Not Detected  Not Detected Final    Shigella/Enteroinvasive E. coli (E* 03/16/2025 Not Detected  Not Detected Final    Cryptosporidium 03/16/2025 Not Detected  Not Detected Final    Cyclospora cayetanensis 03/16/2025 Not Detected  Not Detected Final    Entamoeba histolytica 03/16/2025 Not Detected  Not Detected Final    Giardia lamblia 03/16/2025 Not Detected  Not Detected Final    Adenovirus F40/41 03/16/2025 Not Detected  Not Detected Final    Astrovirus 03/16/2025 Not Detected  Not Detected Final    Norovirus GI/GII 03/16/2025 Not Detected  Not Detected Final    Rotavirus A 03/16/2025 Not Detected  Not Detected Final    Sapovirus (I, II, IV or V) 03/16/2025 Not Detected  Not Detected Final    Lactoferrin, Qual 03/16/2025 Positive (A)  Negative Final    Salmonella/Shigella Screen 03/16/2025 Final report   Final    Result 1 03/16/2025 Comment   Final    No Salmonella or Shigella recovered.    Campylobacter Culture 03/16/2025 Final report   Final    Result 1 03/16/2025 Comment   Final    No Campylobacter species isolated.    E coli, Shiga toxin Assay 03/16/2025 Negative  Negative Final    Toxigenic C. difficile by PCR 03/16/2025 Negative  Negative Final    027 Toxin 03/16/2025 Presumptive Negative   Final    RBC, UA  03/16/2025 Too Numerous to Count (A)  None Seen, 0-2 /HPF Final    WBC, UA 03/16/2025 3-5 (A)  None Seen, 0-2 /HPF Final    Urine culture not indicated.    Bacteria, UA 03/16/2025 None Seen  None Seen /HPF Final    Squamous Epithelial Cells, UA 03/16/2025 3-6 (A)  None Seen, 0-2 /HPF Final    Hyaline Casts, UA 03/16/2025 None Seen  None Seen /LPF Final    Methodology 03/16/2025 Manual Light Microscopy   Final    proBNP 03/16/2025 <36.0  0.0 - 450.0 pg/mL Final    Lipase 03/16/2025 24  13 - 60 U/L Final     Results  Laboratory Studies  BNP was 35. ProBNP was 336.    Assessment & Plan   Problems Addressed this Visit    None  Visit Diagnoses         Bipolar disorder, in partial remission, most recent episode depressed    -  Primary    Relevant Medications    sertraline (Zoloft) 50 MG tablet      Panic disorder with agoraphobia        Relevant Medications    sertraline (Zoloft) 50 MG tablet      Generalized anxiety disorder        Relevant Medications    sertraline (Zoloft) 50 MG tablet          Diagnoses         Codes Comments      Bipolar disorder, in partial remission, most recent episode depressed    -  Primary ICD-10-CM: F31.75  ICD-9-CM: 296.55       Panic disorder with agoraphobia     ICD-10-CM: F40.01  ICD-9-CM: 300.21       Generalized anxiety disorder     ICD-10-CM: F41.1  ICD-9-CM: 300.02           Assessment & Plan  1. Bipolar disorder.  The current medication regimen includes Wellbutrin  mg daily. Given the persistent fluctuations in blood pressure and the potential for Wellbutrin to elevate heart rate, it is prudent to discontinue this medication. The patient will be transitioned to sertraline 50 mg daily, with the initial dosage being one tablet daily for two weeks. If tolerated, the dosage will be increased to two tablets daily. The patient has been advised to inform her cardiologist about this change in medication. Potential side effects of sertraline, including nausea and diarrhea, have been  discussed, and it is recommended to take the medication at night to mitigate these effects.    2. Panic disorder.  The patient reports ongoing anxiety and panic related to her health concerns, particularly her cardiac issues. She has been advised to monitor for any increase in shortness of breath or swelling and to seek immediate medical attention if these symptoms worsen. The patient has also been encouraged to express her concerns to her cardiologist and ensure that all her medications are known to her specialists.    3. Cardiac issues.  The patient has a history of pulmonary edema and fluctuating BNP levels. She is currently on Entresto, Imdur, spironolactone, metoprolol, hydralazine, and Lasix. The patient reports episodes of high blood pressure and difficulty breathing. She has been advised to continue her current cardiac medications and to keep her cardiologist informed of any new symptoms or concerns. The patient is also considering weight loss surgery, which may help alleviate some of her symptoms.    Follow-up  The patient is scheduled for a follow-up visit on 07/14/2025 at 9:00 AM.    Social History     Tobacco Use   Smoking Status Former    Current packs/day: 0.00    Average packs/day: 1 pack/day for 20.0 years (20.0 ttl pk-yrs)    Types: Cigarettes    Start date: 1/1/1995    Quit date: 2015    Years since quitting: 10.2    Passive exposure: Past   Smokeless Tobacco Never   Tobacco Comments    QUIT IN 2017       JORGE reviewed and appropriate. Patient counseled on use of controlled substances.     -The benefits of a healthy diet and exercise were discussed with patient, especially the positive effects they have on mental health. Patient encouraged to consider lifestyle modification regarding  diet and exercise patterns to maximize results of mental health treatment.  -Reviewed previous available documentation  -Reviewed most recent available labs     -I've explained to her that drugs of the SSRI class  can have side effects such as weight gain, sexual dysfunction, insomnia, headache, nausea. These medications are generally effective at alleviating symptoms of anxiety and/or depression. Let me know if significant side effects do occur.        Visit Diagnoses:    ICD-10-CM ICD-9-CM   1. Bipolar disorder, in partial remission, most recent episode depressed  F31.75 296.55   2. Panic disorder with agoraphobia  F40.01 300.21   3. Generalized anxiety disorder  F41.1 300.02         TREATMENT PLAN/GOALS: Continue supportive psychotherapy efforts and medications as indicated. Treatment and medication options discussed during today's visit. Patient acknowledged and verbally consented to continue with current treatment plan and was educated on the importance of compliance with treatment and follow-up appointments.    MEDICATION ISSUES:  Discussed medication options and treatment plan of prescribed medication as well as the risks, benefits, and side effects including potential falls, possible impaired driving and metabolic adversities among others. Patient is agreeable to call the office with any worsening of symptoms or onset of side effects. Patient is agreeable to call 911 or go to the nearest ER should he/she begin having SI/HI.     MEDS ORDERED DURING VISIT:  New Medications Ordered This Visit   Medications    sertraline (Zoloft) 50 MG tablet     Sig: Take 1 tablets daily x 2 weeks, then increase 2 tablets D/C Wellbutrin     Dispense:  60 tablet     Refill:  2       -Continue Wellbutrin   -Start Zoloft 50 mg tablet take 1 tablet by mouth daily for 2 weeks if tolerated increase to 2 tablets/day for anxiety and depression     Return in about 3 months (around 7/14/2025).         Prognosis: Guarded dependent on medication/follow up and treatment plan compliance.  Functionality: pt showing improvements in important areas of daily functioning.     Short-term goals: Patient will adhere to medication regimen and note continued  improvement in symptoms over the next 3 months.   Long-term goals: Patient will be adherent to medication management and psychotherapy with continued improvement in symptoms over the next 6 months.    I spent 30 minutes caring for Antoinette on this date of service. This time includes time spent by me in the following activities: preparing for the visit, performing a medically appropriate examination and/or evaluation, counseling and educating the patient/family/caregiver, documenting information in the medical record, and ordering medications    I have personally reviewed this patients note, confirmed and/or updated, this visit as appropriate. History of present illness- interval history, physical examination, assessment and plan, allergies, current medications, past family history, past medical history, past social history, past surgical history and problem list.          This document has been electronically signed by CICI Lopez   April 14, 2025 09:44 EDT    Patient or patient representative verbalized consent for the use of Ambient Listening during the visit with  CICI Lopez for chart documentation. 4/14/2025  09:21 EDT    Part of this note may be an electronic transcription/translation of spoken language to printed text using the Dragon Dictation System.

## 2025-04-10 NOTE — TELEPHONE ENCOUNTER
Relayed Dr. Cabrera's comments pt. Verbalized understanding. Pt's current weight is 203.4 lbs. Euvolemic weight is 191 lbs. Started taking PRN lasix on Monday. UOP has increased since starting lasix and SOB has improved, but still present with exertion. Getting labs tomorrow morning. Pt verbalized understanding and agreeable to plan of care.

## 2025-04-10 NOTE — TELEPHONE ENCOUNTER
----- Message from Pablo Cabrera sent at 4/8/2025 10:15 PM EDT -----  Has incomplete RBBB which is new for her.  However really nothing to worry about.  One of her electrical fascicles not working as well as it should which is either vagally mediated or from elevated heart rate or will eventually get a complete RBBB.  Regardless in general nothing to worry about.  I actually have incomplete RBBB on my own EKG and it does not worry me.  ----- Message -----  From: Marimar Bartlett RN  Sent: 4/8/2025  11:27 AM EDT  To: Pablo Cabrera MD    Pt went to PCP who did EKG. Noted change and sent her to  [facility]. She would like for you to review her EKG

## 2025-04-11 ENCOUNTER — TELEPHONE (OUTPATIENT)
Dept: PHARMACY | Facility: HOSPITAL | Age: 40
End: 2025-04-11
Payer: MEDICARE

## 2025-04-11 ENCOUNTER — LAB (OUTPATIENT)
Dept: LAB | Facility: HOSPITAL | Age: 40
End: 2025-04-11
Payer: MEDICARE

## 2025-04-11 DIAGNOSIS — Z79.4 TYPE 2 DIABETES MELLITUS WITHOUT COMPLICATION, WITH LONG-TERM CURRENT USE OF INSULIN: Primary | ICD-10-CM

## 2025-04-11 DIAGNOSIS — I50.32 CHRONIC HEART FAILURE WITH PRESERVED EJECTION FRACTION (HFPEF): ICD-10-CM

## 2025-04-11 DIAGNOSIS — E11.9 TYPE 2 DIABETES MELLITUS WITHOUT COMPLICATION, WITH LONG-TERM CURRENT USE OF INSULIN: Primary | ICD-10-CM

## 2025-04-11 PROCEDURE — 80048 BASIC METABOLIC PNL TOTAL CA: CPT

## 2025-04-11 PROCEDURE — 83880 ASSAY OF NATRIURETIC PEPTIDE: CPT

## 2025-04-11 PROCEDURE — 36415 COLL VENOUS BLD VENIPUNCTURE: CPT

## 2025-04-11 NOTE — TELEPHONE ENCOUNTER
Patient is having issues with freestyle sensors. They are not sticking very well and are malfunctioning. Patient was originally switched due to formulary changes on her insurance. Submitted new PA for Dexcom G7 to see if we can switch her back since it was more successful

## 2025-04-12 LAB
ANION GAP SERPL CALCULATED.3IONS-SCNC: 10.3 MMOL/L (ref 5–15)
BUN SERPL-MCNC: 12 MG/DL (ref 6–20)
BUN/CREAT SERPL: 20.3 (ref 7–25)
CALCIUM SPEC-SCNC: 8.7 MG/DL (ref 8.6–10.5)
CHLORIDE SERPL-SCNC: 106 MMOL/L (ref 98–107)
CO2 SERPL-SCNC: 23.7 MMOL/L (ref 22–29)
CREAT SERPL-MCNC: 0.59 MG/DL (ref 0.57–1)
EGFRCR SERPLBLD CKD-EPI 2021: 116.3 ML/MIN/1.73
GLUCOSE SERPL-MCNC: 182 MG/DL (ref 65–99)
NT-PROBNP SERPL-MCNC: 436 PG/ML (ref 0–450)
POTASSIUM SERPL-SCNC: 3.9 MMOL/L (ref 3.5–5.2)
SODIUM SERPL-SCNC: 140 MMOL/L (ref 136–145)

## 2025-04-14 ENCOUNTER — SPECIALTY PHARMACY (OUTPATIENT)
Dept: PHARMACY | Facility: HOSPITAL | Age: 40
End: 2025-04-14
Payer: MEDICARE

## 2025-04-14 ENCOUNTER — TELEMEDICINE (OUTPATIENT)
Dept: PSYCHIATRY | Facility: CLINIC | Age: 40
End: 2025-04-14
Payer: MEDICARE

## 2025-04-14 DIAGNOSIS — F40.01 PANIC DISORDER WITH AGORAPHOBIA: ICD-10-CM

## 2025-04-14 DIAGNOSIS — F41.1 GENERALIZED ANXIETY DISORDER: ICD-10-CM

## 2025-04-14 DIAGNOSIS — F31.75 BIPOLAR DISORDER, IN PARTIAL REMISSION, MOST RECENT EPISODE DEPRESSED: Primary | ICD-10-CM

## 2025-04-14 PROCEDURE — G2211 COMPLEX E/M VISIT ADD ON: HCPCS | Performed by: NURSE PRACTITIONER

## 2025-04-14 PROCEDURE — 99214 OFFICE O/P EST MOD 30 MIN: CPT | Performed by: NURSE PRACTITIONER

## 2025-04-14 PROCEDURE — 1160F RVW MEDS BY RX/DR IN RCRD: CPT | Performed by: NURSE PRACTITIONER

## 2025-04-14 PROCEDURE — 1159F MED LIST DOCD IN RCRD: CPT | Performed by: NURSE PRACTITIONER

## 2025-04-14 RX ORDER — BUPROPION HYDROCHLORIDE 150 MG/1
150 TABLET ORAL EVERY MORNING
Qty: 30 TABLET | Refills: 2 | Status: SHIPPED | OUTPATIENT
Start: 2025-04-14 | End: 2025-04-14

## 2025-04-14 RX ORDER — ACYCLOVIR 400 MG/1
1 TABLET ORAL
Qty: 3 EACH | Refills: 5 | Status: SHIPPED | OUTPATIENT
Start: 2025-04-14

## 2025-04-14 NOTE — PROGRESS NOTES
Specialty Pharmacy Patient Management Program  Refill Outreach     Antoinette was contacted today regarding refills of their medication(s). Lantus Solostar & Dexcom G7 sensors.    Refill Questions      Flowsheet Row Most Recent Value   Changes to allergies? No   Changes to medications? No   New conditions or infections since last clinic visit No   Unplanned office visit, urgent care, ED, or hospital admission in the last 4 weeks  No   How does patient/caregiver feel medication is working? Good   Financial problems or insurance changes  No   Since the previous refill, were any specialty medication doses or scheduled injections missed or delayed?  No   Does this patient require a clinical escalation to a pharmacist? No            Delivery Questions      Flowsheet Row Most Recent Value   Delivery method UPS   Delivery address verified with patient/caregiver? Yes   Delivery address Home   Number of medications in delivery 2   Medication(s) being filled and delivered Insulin Glargine (Lantus SoloStar), Continuous Glucose Sensor (Dexcom G7 Sensor)   Doses left of specialty medications 2 weeks   Copay verified? Yes   Copay amount $12.15   Copay form of payment Credit/debit on file   Delivery Date Selection 04/22/25   Signature Required No   Do you consent to receive electronic handouts?  Yes                 Follow-up: 30  day(s)     Rosa Jack, Pharmacy Technician  4/14/2025  14:14 EDT

## 2025-04-14 NOTE — TELEPHONE ENCOUNTER
PA was approved for Dexcom G7 sensors. RX send to [facility] per CICI Veliz.     Thank you,     Sravani Perez, PharmD, [facility], [facility]  Clinical Specialty Pharmacist, Endocrinology  04/14/25  13:58 EDT

## 2025-04-23 ENCOUNTER — TELEPHONE (OUTPATIENT)
Dept: CARDIOLOGY | Facility: CLINIC | Age: 40
End: 2025-04-23
Payer: MEDICARE

## 2025-04-23 DIAGNOSIS — I50.32 CHRONIC HEART FAILURE WITH PRESERVED EJECTION FRACTION (HFPEF): Primary | ICD-10-CM

## 2025-04-23 NOTE — TELEPHONE ENCOUNTER
Called pt to gather more information regarding JRapidt message. Pt states she is feeling short of breath with any ambulation and takes several minutes to recover from walking a few steps.  Reports her lungs are feeling very full. Standing up helps her breath a little bit better. Has to elevate head of bed when sleeping as she is unable to lay flat due to breathing right now. Able to lay flat when euvolemic. Chest pressure 4/10. Chest pressure not associated activity or rest. Not sure how long it lasts. Denies radiating pain. Some nausea and cold chills. Stated BP and HR well controlled at home. /72 HR 84 this morning after cardiac medication. Has been taking furosemide 40 mg daily. Has not been taking potassium supplement.   Please advise.

## 2025-04-24 ENCOUNTER — LAB (OUTPATIENT)
Dept: LAB | Facility: HOSPITAL | Age: 40
End: 2025-04-24
Payer: MEDICARE

## 2025-04-24 DIAGNOSIS — I50.32 CHRONIC HEART FAILURE WITH PRESERVED EJECTION FRACTION (HFPEF): ICD-10-CM

## 2025-04-24 LAB
ANION GAP SERPL CALCULATED.3IONS-SCNC: 10.6 MMOL/L (ref 5–15)
BUN SERPL-MCNC: 13 MG/DL (ref 6–20)
BUN/CREAT SERPL: 21.7 (ref 7–25)
CALCIUM SPEC-SCNC: 9 MG/DL (ref 8.6–10.5)
CHLORIDE SERPL-SCNC: 104 MMOL/L (ref 98–107)
CO2 SERPL-SCNC: 22.4 MMOL/L (ref 22–29)
CREAT SERPL-MCNC: 0.6 MG/DL (ref 0.57–1)
EGFRCR SERPLBLD CKD-EPI 2021: 115.8 ML/MIN/1.73
GLUCOSE SERPL-MCNC: 138 MG/DL (ref 65–99)
NT-PROBNP SERPL-MCNC: 295 PG/ML (ref 0–450)
POTASSIUM SERPL-SCNC: 4.3 MMOL/L (ref 3.5–5.2)
SODIUM SERPL-SCNC: 137 MMOL/L (ref 136–145)

## 2025-04-24 PROCEDURE — 80048 BASIC METABOLIC PNL TOTAL CA: CPT

## 2025-04-24 PROCEDURE — 83880 ASSAY OF NATRIURETIC PEPTIDE: CPT

## 2025-04-24 PROCEDURE — 36415 COLL VENOUS BLD VENIPUNCTURE: CPT

## 2025-04-24 NOTE — TELEPHONE ENCOUNTER
Relayed recommendation for labs and CXR. Pt verbalized understanding and agreeable to plan of care.

## 2025-04-24 NOTE — TELEPHONE ENCOUNTER
Spoke with pt this morning. States symptoms are same as yesterday. Has good UOP but does not notice and increase in UOP with lasix doses.

## 2025-04-25 ENCOUNTER — HOSPITAL ENCOUNTER (OUTPATIENT)
Dept: GENERAL RADIOLOGY | Facility: HOSPITAL | Age: 40
Discharge: HOME | End: 2025-04-25
Payer: MEDICARE

## 2025-04-25 DIAGNOSIS — I50.32 CHRONIC HEART FAILURE WITH PRESERVED EJECTION FRACTION (HFPEF): ICD-10-CM

## 2025-04-25 PROCEDURE — 71046 X-RAY EXAM CHEST 2 VIEWS: CPT | Performed by: RADIOLOGY

## 2025-04-25 PROCEDURE — 71046 X-RAY EXAM CHEST 2 VIEWS: CPT

## 2025-05-01 ENCOUNTER — SPECIALTY PHARMACY (OUTPATIENT)
Dept: PHARMACY | Facility: HOSPITAL | Age: 40
End: 2025-05-01
Payer: MEDICARE

## 2025-05-01 ENCOUNTER — OFFICE VISIT (OUTPATIENT)
Dept: ENDOCRINOLOGY | Facility: CLINIC | Age: 40
End: 2025-05-01
Payer: MEDICARE

## 2025-05-01 VITALS
WEIGHT: 204.4 LBS | OXYGEN SATURATION: 98 % | BODY MASS INDEX: 41.28 KG/M2 | SYSTOLIC BLOOD PRESSURE: 178 MMHG | HEART RATE: 81 BPM | DIASTOLIC BLOOD PRESSURE: 104 MMHG

## 2025-05-01 DIAGNOSIS — Z79.4 TYPE 2 DIABETES MELLITUS WITHOUT COMPLICATION, WITH LONG-TERM CURRENT USE OF INSULIN: Primary | ICD-10-CM

## 2025-05-01 DIAGNOSIS — E11.9 TYPE 2 DIABETES MELLITUS WITHOUT COMPLICATION, WITH LONG-TERM CURRENT USE OF INSULIN: Primary | ICD-10-CM

## 2025-05-01 DIAGNOSIS — E11.65 TYPE 2 DIABETES MELLITUS WITH HYPERGLYCEMIA, WITH LONG-TERM CURRENT USE OF INSULIN: ICD-10-CM

## 2025-05-01 DIAGNOSIS — Z79.4 TYPE 2 DIABETES MELLITUS WITH HYPERGLYCEMIA, WITH LONG-TERM CURRENT USE OF INSULIN: ICD-10-CM

## 2025-05-01 LAB
EXPIRATION DATE: ABNORMAL
HBA1C MFR BLD: 6.6 % (ref 4.5–5.7)
Lab: ABNORMAL

## 2025-05-01 RX ORDER — INSULIN GLARGINE 100 [IU]/ML
25 INJECTION, SOLUTION SUBCUTANEOUS NIGHTLY
Qty: 15 ML | Refills: 2 | Status: SHIPPED | OUTPATIENT
Start: 2025-05-01

## 2025-05-01 RX ORDER — PEN NEEDLE, DIABETIC 30 GX3/16"
1 NEEDLE, DISPOSABLE MISCELLANEOUS DAILY
Qty: 100 EACH | Refills: 2 | Status: SHIPPED | OUTPATIENT
Start: 2025-05-01

## 2025-05-01 RX ORDER — MODAFINIL 200 MG/1
200 TABLET ORAL DAILY
COMMUNITY
Start: 2025-03-25 | End: 2025-11-20

## 2025-05-01 NOTE — ASSESSMENT & PLAN NOTE
-Diabetes is improving with A1c 6.6.  -Discussed dietary and exercise guidelines with patient.  -Discussed the importance of yearly eye exams.  -Discussed the importance of checking BG's regularly.  Continue using CGM.  2 week data download is as follows:  Very High: 21%  High: 42%  In Range: 37%  Low: 0%  Very Low: 0%  Trend shows some mild overall hypers.  -Increase Lantus 25 units QHS.  -S/S hypoglycemia reviewed with Rule of 15's advised.  -Follow-up in 3 months.

## 2025-05-01 NOTE — PROGRESS NOTES
Chief Complaint   Patient presents with    Diabetes     F/u        Antoinette Pack is a 39 y.o. female had concerns including Diabetes (F/u).   T2DM.    She is having increased swelling and cardiac issues with her blood pressure elevating.  She reports that she saw her cardiologist and is wanting her to get bariatric surgery.  She is having this completed at [facility].  Cardiology recommended this to help with the fluid overload that she is having. She has been taking her medication regularly.     Diabetes:  She has been doing well.  She is feeling some better.  She does report that her blood pressure is slightly elevated but it has been running more normal at home.  She has been taking her meds without missing doses.  She reports that she has noted that her blood sugars are starting to creep up more.    Birth state: [place]  Previous history of radiation to face/neck: none  Consuming foods high in iodine: no  Family history of thyroid complications: none    The following portions of the patient's history were reviewed and updated as appropriate: allergies, current medications, past family history, past medical history, past social history, past surgical history and problem list.    Diabetes was diagnosed 8-9 years ago.  Complications include none.  Last ophtho exam was 2021, Dr. Bruno Office.  Current medications for diabetes include uses Lantus 15 units QHS.  Past meds: Trulicity, GI Issues, has problems with yeast, Glipizide-unsure why it was stopped  She checks her BG's with Dexcom CGM.  Hypos: rarely  Fasting range: 150-200+    Diet: has made improvements to her diet.    Past Medical History:   Diagnosis Date    Anxiety     Unsure of date    CHF (congestive heart failure) 05/2023    Colitis 2023    Depression     Not sure of exact date    Diabetes mellitus 2018    type 2    Diverticulitis     Diverticulitis of colon     GERD (gastroesophageal reflux disease)     History of transfusion 04/19/2024  "   2 units post delivery    Hypertension     chronic    Kidney stone     \"years ago\"    Narcolepsy     Pancreatitis 2023    PCOS (polycystic ovarian syndrome)     Polycystic ovary syndrome     Rectal bleeding     SINCE  - USES MESALAMINE SUPPOSITORY NIGHTLY    Seizures     Dont know of date    Sleep apnea     Type 2 diabetes mellitus Unknown    Vitamin D deficiency      Past Surgical History:   Procedure Laterality Date    ABDOMINAL SURGERY      Caesarean    CARDIAC CATHETERIZATION      CARPAL TUNNEL RELEASE       SECTION N/A 2024    Procedure:  SECTION PRIMARY;  Surgeon: Axel Keys MD;  Location:  [facility];  Service: Obstetrics/Gynecology;  Laterality: N/A;    COLONOSCOPY      COLONOSCOPY N/A 2024    Procedure: COLONOSCOPY;  Surgeon: Tmo Mueller MD;  Location:  [facility];  Service: Gastroenterology;  Laterality: N/A;    ENDOSCOPY      ENDOSCOPY N/A 2024    Procedure: ESOPHAGOGASTRODUODENOSCOPY;  Surgeon: Tom Mueller MD;  Location:  [facility];  Service: Gastroenterology;  Laterality: N/A;    FRACTURE SURGERY Left     wrist    HARDWARE REMOVAL Left     WRIST    TENDON REPAIR Left     HAND    UPPER GASTROINTESTINAL ENDOSCOPY      WISDOM TOOTH EXTRACTION        Family History   Problem Relation Age of Onset    Hypertension Mother     Depression Mother     Heart disease Mother     COPD Mother     Emphysema Mother     Heart failure Mother     Heart attack Mother     Anxiety disorder Mother     Drug abuse Mother     Colon polyps Mother     Hypertension Father     Diabetes Father     Heart disease Father     COPD Father     Heart failure Father     Hepatitis Father     Arrhythmia Father     Colon polyps Maternal Grandfather     Obesity Sister     Anxiety disorder Sister     Depression Sister     Anemia Sister     Anxiety disorder Sister     Drug abuse Sister     Paranoid behavior Sister     Thyroid disease Sister     Drug abuse " Brother       Social History     Socioeconomic History    Marital status:    Tobacco Use    Smoking status: Former     Current packs/day: 0.00     Average packs/day: 1 pack/day for 20.0 years (20.0 ttl pk-yrs)     Types: Cigarettes     Start date: 1/1/1995     Quit date: 2015     Years since quitting: 10.3     Passive exposure: Past    Smokeless tobacco: Never    Tobacco comments:     QUIT IN 2017   Vaping Use    Vaping status: Never Used   Substance and Sexual Activity    Alcohol use: No    Drug use: No    Sexual activity: Defer      Allergies   Allergen Reactions    Dilantin [Phenytoin] Mental Status Change    Keppra [Levetiracetam] Mental Status Change    Topamax [Topiramate] Mental Status Change      Current Outpatient Medications on File Prior to Visit   Medication Sig Dispense Refill    Budesonide (ENTOCORT EC) 3 MG 24 hr capsule Take 3 capsules by mouth Every Morning. (Patient not taking: Reported on 5/1/2025)      Continuous Glucose Sensor (Dexcom G7 Sensor) misc Change sensor Every 10 (Ten) Days. 3 each 5    dicyclomine (BENTYL) 20 MG tablet Take 1 tablet by mouth Every 8 (Eight) Hours As Needed for Abdominal Cramping. (Patient not taking: Reported on 5/1/2025) 20 tablet 0    fluticasone (FLONASE) 50 MCG/ACT nasal spray Administer 2 sprays into the nostril(s) as directed by provider Daily As Needed for Rhinitis or Allergies. (Patient not taking: Reported on 3/7/2025)      furosemide (LASIX) 40 MG tablet Take 1 tablet by mouth Daily. 15 tablet 0    hydrALAZINE (APRESOLINE) 25 MG tablet Take 1 tablet by mouth 3 (Three) Times a Day. Hold if top number of blood pressure is less than 110. Can take extra hydralazine 25mg as needed for top number greater than 125 tablet 1    isosorbide mononitrate (IMDUR) 30 MG 24 hr tablet Take 1 tablet by mouth Daily. 90 tablet 3    lansoprazole (PREVACID) 30 MG capsule Take 1 capsule by mouth 2 (Two) Times a Day.      mesalamine (ROWASA) 4 g enema Insert 1 enema into the  rectum Every Night.      metoprolol tartrate (LOPRESSOR) 100 MG tablet Take 1 tablet by mouth 2 (Two) Times a Day. 180 tablet 3    NIFEdipine XL (PROCARDIA XL) 90 MG 24 hr tablet Take 1 tablet by mouth Daily. 90 tablet 3    nitroglycerin (NITROSTAT) 0.4 MG SL tablet Place 1 tablet under the tongue Every 5 (Five) Minutes As Needed for Chest Pain (Flash pulmonary edema) for up to 10 doses. Place one tablet under the tongue for chest pain every 5 minutes until chest pain is relieved. If you have to take 3 tablets, take the 3rd and go to the hospital to be evaluated. 10 tablet 0    potassium chloride (KLOR-CON M20) 20 MEQ CR tablet Take 1 tablet by mouth Daily. (Patient not taking: Reported on 3/7/2025)      Prenatal Vit-Fe Fumarate-FA (PRENATAL PO) Take 1 tablet by mouth Daily. (Patient not taking: Reported on 3/7/2025)      prochlorperazine (COMPAZINE) 5 MG tablet Take 1 tablet by mouth Every 6 (Six) Hours As Needed. (Patient not taking: Reported on 5/1/2025)      sacubitril-valsartan (Entresto)  MG tablet Take 1 tablet by mouth 2 (Two) Times a Day. 180 tablet 3    sertraline (Zoloft) 50 MG tablet Take 1 tablets daily x 2 weeks, then increase 2 tablets D/C Wellbutrin 60 tablet 2    spironolactone (ALDACTONE) 100 MG tablet Take 1 tablet by mouth Daily. 90 tablet 3    [DISCONTINUED] amoxicillin-clavulanate (AUGMENTIN) 875-125 MG per tablet Take 1 tablet by mouth 2 (Two) Times a Day for 10 days. 20 tablet 0    [DISCONTINUED] fluconazole (Diflucan) 150 MG tablet Take every 72 hours as needed for yeast infection. (Patient not taking: Reported on 3/13/2025) 6 tablet 5    [DISCONTINUED] Insulin Glargine (Lantus SoloStar) 100 UNIT/ML injection pen Inject 12 Units under the skin into the appropriate area as directed Every Night. (Patient taking differently: Inject 15 Units under the skin into the appropriate area as directed Every Night.) 15 mL 0    [DISCONTINUED] Insulin Pen Needle (Pen Needles) 32G X 4 MM misc Use to  inject insulin once daily 100 each 2    [DISCONTINUED] methylPREDNISolone (MEDROL) 4 MG dose pack Take as directed on package instructions. 21 tablet 0    [DISCONTINUED] mupirocin (BACTROBAN) 2 % ointment Apply 1 Application topically to the appropriate area as directed 3 (Three) Times a Day. (Patient not taking: Reported on 3/13/2025)      [DISCONTINUED] nystatin (MYCOSTATIN) 888266 UNIT/GM powder Apply  topically to the appropriate area as directed 4 (Four) Times a Day. (Patient not taking: Reported on 3/13/2025)       No current facility-administered medications on file prior to visit.      Review of Systems   Constitutional:  Positive for fatigue. Negative for diaphoresis, unexpected weight gain and unexpected weight loss.   HENT:          Neck tenderness   Eyes:  Negative for blurred vision and visual disturbance.   Cardiovascular:  Negative for palpitations.   Gastrointestinal:  Positive for constipation.   Endocrine: Negative for cold intolerance, heat intolerance, polydipsia, polyphagia and polyuria.   Skin:  Positive for dry skin.        Hair thinning   Neurological:  Negative for tremors.   Psychiatric/Behavioral:  Positive for sleep disturbance. Negative for agitation. The patient is not nervous/anxious.    All other systems reviewed and are negative.     BP (!) 178/104 (BP Location: Left arm, Patient Position: Sitting, Cuff Size: Adult)   Pulse 81   Wt 92.7 kg (204 lb 6.4 oz)   SpO2 98%   BMI 41.28 kg/m²      Physical Exam  Vitals reviewed.   Constitutional:       Appearance: Normal appearance.   Eyes:      Extraocular Movements: Extraocular movements intact.   Cardiovascular:      Rate and Rhythm: Normal rate.   Pulmonary:      Effort: Pulmonary effort is normal.   Skin:     General: Skin is warm.   Neurological:      General: No focal deficit present.      Mental Status: She is alert and oriented to person, place, and time.   Psychiatric:         Mood and Affect: Mood normal.         Behavior:  "Behavior normal.         Thought Content: Thought content normal.         Judgment: Judgment normal.        Labs/Imaging  CMP  Lab Results   Component Value Date    GLUCOSE 138 (H) 04/24/2025    BUN 13 04/24/2025    CREATININE 0.60 04/24/2025    EGFRIFNONA 101 12/27/2020    EGFRIFAFRI  09/05/2016      Comment:      <15 Indicative of kidney failure.    BCR 21.7 04/24/2025    K 4.3 04/24/2025    CO2 22.4 04/24/2025    CALCIUM 9.0 04/24/2025    ALBUMIN 4.2 04/04/2025    AST 13 04/04/2025    ALT 14 04/04/2025        CBC w/DIFF   Lab Results   Component Value Date    WBC 6.35 04/04/2025    RBC 4.27 04/04/2025    HGB 11.8 (L) 04/04/2025    HCT 36.0 04/04/2025    MCV 84.3 04/04/2025    MCH 27.6 04/04/2025    MCHC 32.8 04/04/2025    RDW 13.8 04/04/2025    RDWSD 41.4 04/04/2025    MPV 10.8 04/04/2025     04/04/2025    NEUTRORELPCT 69.3 04/04/2025    LYMPHORELPCT 20.2 04/04/2025    MONORELPCT 7.1 04/04/2025    EOSRELPCT 2.5 04/04/2025    BASORELPCT 0.3 04/04/2025    AUTOIGPER 0.6 (H) 04/04/2025    NEUTROABS 4.40 04/04/2025    LYMPHSABS 1.28 04/04/2025    MONOSABS 0.45 04/04/2025    EOSABS 0.16 04/04/2025    BASOSABS 0.02 04/04/2025    AUTOIGNUM 0.04 04/04/2025    NRBC 0.0 04/04/2025       TSH  Lab Results   Component Value Date    TSH 0.376 12/18/2024    TSH 0.568 08/16/2024    TSH 0.346 04/19/2024       T4  Lab Results   Component Value Date    FREET4 1.20 12/18/2024    FREET4 1.08 08/16/2024    FREET4 1.03 12/08/2023     No results found for: \"N4WOKNK\"    T3  Lab Results   Component Value Date    T3FREE 3.43 02/17/2022     No results found for: \"I0OOABW\"  Lab Results   Component Value Date    HGBA1C 6.6 (A) 05/01/2025     TRAb  No results found for: \"TSURCPAB\"    TPO  No results found for: \"THYROIDAB\"    No valid procedures specified.    Assessment and Plan    Diagnoses and all orders for this visit:    1. Type 2 diabetes mellitus without complication, with long-term current use of insulin (Primary)  Assessment & " Plan:  -Diabetes is improving with A1c 6.6.  -Discussed dietary and exercise guidelines with patient.  -Discussed the importance of yearly eye exams.  -Discussed the importance of checking BG's regularly.  Continue using CGM.  2 week data download is as follows:  Very High: 21%  High: 42%  In Range: 37%  Low: 0%  Very Low: 0%  Trend shows some mild overall hypers.  -Increase Lantus 25 units QHS.  -S/S hypoglycemia reviewed with Rule of 15's advised.  -Follow-up in 3 months.    Orders:  -     POC Glycosylated Hemoglobin (Hb A1C)          Return in about 3 months (around 8/1/2025) for Follow-up appointment, A1C. The patient was instructed to contact the clinic with any interval questions or concerns.    This document has been electronically signed by CICI Veliz  May 1, 2025 12:22 EDT  Endocrinology    Please note that portions of this document were completed with a voice recognition program. Efforts were made to edit the dictations, but occasionally words are mis-transcribed.

## 2025-05-01 NOTE — PROGRESS NOTES
Specialty Pharmacy Patient Management Program  Endocrinology Reassessment     Antoinette Pack is a 39 y.o. female with Type 2 Diabetes enrolled in the Endocrinology Patient Management program offered by [facility]. A follow-up was conducted, including assessment of continued therapy appropriateness, medication adherence, and side effect incidence and management for Insulin therapy, Ozempic, and CGM.    Patient is currently taking Lantus 15 units QHS to control BG. She monitors her BG with Freestyle Esvin 3 Sensors and reports that readings are around 160 in the morning and 200-250 throughout the day. Patient reports she has one sensor left of Freestyle Esvin 3 and then will start Dexcom G7.     Pt denies any other side effects from insulin therapy.  Pt denies any adherence issues as well and reports around 99% adherence.  Pt also denies any recent low BG < 70 episodes.    Pt denies history of thyroid cancer, reports history of pancreatitis, and reports recurrent UTI's/yeast infections.     In the past, the patient has tried:                Drug Dose Reason for Discontinuation Notes   Trulicity   GI upset     Glipizide   Ozempic   Unsure   Pancreatitis                           Changes to Insurance Coverage or Financial Support  None    Relevant Past Medical History and Comorbidities  Relevant medical history and concomitant health conditions were discussed with the patient. The patient's chart has been reviewed for relevant past medical history and comorbid health conditions and updated as necessary.   Past Medical History:   Diagnosis Date    Anxiety     Unsure of date    CHF (congestive heart failure) 05/2023    Colitis 2023    Depression     Not sure of exact date    Diabetes mellitus 2018    type 2    Diverticulitis     Diverticulitis of colon     GERD (gastroesophageal reflux disease)     History of transfusion 04/19/2024    2 units post delivery    Hypertension     chronic     "Kidney stone     \"years ago\"    Narcolepsy 2022    Pancreatitis 09/2023    PCOS (polycystic ovarian syndrome)     Polycystic ovary syndrome     Rectal bleeding     SINCE 2023 - USES MESALAMINE SUPPOSITORY NIGHTLY    Seizures 2012    Dont know of date    Sleep apnea     Type 2 diabetes mellitus Unknown    Vitamin D deficiency      Social History     Socioeconomic History    Marital status:    Tobacco Use    Smoking status: Former     Current packs/day: 0.00     Average packs/day: 1 pack/day for 20.0 years (20.0 ttl pk-yrs)     Types: Cigarettes     Start date: 1/1/1995     Quit date: 2015     Years since quitting: 10.3     Passive exposure: Past    Smokeless tobacco: Never    Tobacco comments:     QUIT IN 2017   Vaping Use    Vaping status: Never Used   Substance and Sexual Activity    Alcohol use: No    Drug use: No    Sexual activity: Defer       Problem list reviewed by Faiza Rosales RPH on 5/1/2025 at  9:55 AM    Allergies  Known allergies and reactions were discussed with the patient. The patient's chart has been reviewed for allergy information and updated as necessary.   Dilantin [phenytoin], Keppra [levetiracetam], and Topamax [topiramate]    Allergies reviewed by Faiza Rosales RPH on 5/1/2025 at  9:55 AM    Relevant Laboratory Values  A1C Last 3 Results          12/18/2024    11:24 1/2/2025    16:22 5/1/2025    09:50   HGBA1C Last 3 Results   Hemoglobin A1C 5.9  5.70  6.6      Lab Results   Component Value Date    HGBA1C 6.6 (A) 05/01/2025     Lab Results   Component Value Date    GLUCOSE 138 (H) 04/24/2025    CALCIUM 9.0 04/24/2025     04/24/2025    K 4.3 04/24/2025    CO2 22.4 04/24/2025     04/24/2025    BUN 13 04/24/2025    CREATININE 0.60 04/24/2025    EGFRIFAFRI  09/05/2016      Comment:      <15 Indicative of kidney failure.    EGFRIFNONA 101 12/27/2020    BCR 21.7 04/24/2025    ANIONGAP 10.6 04/24/2025     Lab Results   Component Value Date    CHOL 177 12/18/2024    CHLPL " 159 09/22/2015    TRIG 86 12/18/2024    HDL 48 12/18/2024     (H) 12/18/2024       Current Medication List  This medication list has been reviewed with the patient and evaluated for any interactions or necessary modifications/recommendations, and updated to include all prescription medications, OTC medications, and supplements the patient is currently taking.  This list reflects what is contained in the patient's profile, which has also been marked as reviewed to communicate to other providers it is the most up to date version of the patient's current medication therapy.     Current Outpatient Medications:     Continuous Glucose Sensor (Dexcom G7 Sensor) misc, Change sensor Every 10 (Ten) Days., Disp: 3 each, Rfl: 5    furosemide (LASIX) 40 MG tablet, Take 1 tablet by mouth Daily., Disp: 15 tablet, Rfl: 0    hydrALAZINE (APRESOLINE) 25 MG tablet, Take 1 tablet by mouth 3 (Three) Times a Day. Hold if top number of blood pressure is less than 110. Can take extra hydralazine 25mg as needed for top number greater than, Disp: 125 tablet, Rfl: 1    Insulin Glargine (Lantus SoloStar) 100 UNIT/ML injection pen, Inject 12 Units under the skin into the appropriate area as directed Every Night. (Patient taking differently: Inject 15 Units under the skin into the appropriate area as directed Every Night.), Disp: 15 mL, Rfl: 0    Insulin Pen Needle (Pen Needles) 32G X 4 MM misc, Use to inject insulin once daily, Disp: 100 each, Rfl: 2    isosorbide mononitrate (IMDUR) 30 MG 24 hr tablet, Take 1 tablet by mouth Daily., Disp: 90 tablet, Rfl: 3    lansoprazole (PREVACID) 30 MG capsule, Take 1 capsule by mouth 2 (Two) Times a Day., Disp: , Rfl:     mesalamine (ROWASA) 4 g enema, Insert 1 enema into the rectum Every Night., Disp: , Rfl:     metoprolol tartrate (LOPRESSOR) 100 MG tablet, Take 1 tablet by mouth 2 (Two) Times a Day., Disp: 180 tablet, Rfl: 3    modafinil (PROVIGIL) 200 MG tablet, Take 1 tablet by mouth Daily.,  Disp: , Rfl:     NIFEdipine XL (PROCARDIA XL) 90 MG 24 hr tablet, Take 1 tablet by mouth Daily., Disp: 90 tablet, Rfl: 3    nitroglycerin (NITROSTAT) 0.4 MG SL tablet, Place 1 tablet under the tongue Every 5 (Five) Minutes As Needed for Chest Pain (Flash pulmonary edema) for up to 10 doses. Place one tablet under the tongue for chest pain every 5 minutes until chest pain is relieved. If you have to take 3 tablets, take the 3rd and go to the hospital to be evaluated., Disp: 10 tablet, Rfl: 0    sacubitril-valsartan (Entresto)  MG tablet, Take 1 tablet by mouth 2 (Two) Times a Day., Disp: 180 tablet, Rfl: 3    sertraline (Zoloft) 50 MG tablet, Take 1 tablets daily x 2 weeks, then increase 2 tablets D/C Wellbutrin, Disp: 60 tablet, Rfl: 2    spironolactone (ALDACTONE) 100 MG tablet, Take 1 tablet by mouth Daily., Disp: 90 tablet, Rfl: 3    Budesonide (ENTOCORT EC) 3 MG 24 hr capsule, Take 3 capsules by mouth Every Morning. (Patient not taking: Reported on 5/1/2025), Disp: , Rfl:     dicyclomine (BENTYL) 20 MG tablet, Take 1 tablet by mouth Every 8 (Eight) Hours As Needed for Abdominal Cramping. (Patient not taking: Reported on 5/1/2025), Disp: 20 tablet, Rfl: 0    fluticasone (FLONASE) 50 MCG/ACT nasal spray, Administer 2 sprays into the nostril(s) as directed by provider Daily As Needed for Rhinitis or Allergies. (Patient not taking: Reported on 3/7/2025), Disp: , Rfl:     potassium chloride (KLOR-CON M20) 20 MEQ CR tablet, Take 1 tablet by mouth Daily. (Patient not taking: Reported on 3/7/2025), Disp: , Rfl:     Prenatal Vit-Fe Fumarate-FA (PRENATAL PO), Take 1 tablet by mouth Daily. (Patient not taking: Reported on 3/7/2025), Disp: , Rfl:     prochlorperazine (COMPAZINE) 5 MG tablet, Take 1 tablet by mouth Every 6 (Six) Hours As Needed. (Patient not taking: Reported on 5/1/2025), Disp: , Rfl:   No current facility-administered medications for this visit.    Medicines reviewed by Faiza Rosales RPH on  "5/1/2025 at  9:57 AM    Drug Interactions with diabetic medications  Furosemide is associated with hyperglycemia and therefore may diminish the therapeutic effect of antidiabetic agents.   Beta blockers may mask the hypoglycemic effect of antidiabetic agents.  Ozempic may enhance the hypoglycemic effect of Lantus    Pt was educated during clinical assessment today on the above drug interactions.    Adverse Drug Reactions  Adverse Reactions Experienced: Pt states that she has been experiencing some stomach pain higher in her abdomen a few times per week and described the pain as a \"pancreatic\" pain.  Pt also reports being constipated.   Plan for ADR Management: Consult Endocrinology provider    Hospitalizations and Urgent Care Since Last Assessment  Hospitalizations or Admissions: Admission 1/2/25 for CHF  ED Visits: ED visit 1/24/25 for HTN and mastitis  Urgent Office Visits: None    Adherence and Self-Administration  Adherence related patient's specialty therapy was discussed with the patient. The Adherence segment of this outreach has been reviewed and updated.   Ongoing or New Barriers to Patient Adherence and/or Self-Administration: None   Methods for Supporting Patient Adherence and/or Self-Administration: None Required    Goals of Therapy  Goals related to the patient's specialty therapy was discussed with the patient. The Patient Goals segment of this outreach has been reviewed and updated.    Goals Addressed Today        Consistently take medications as prescribed      Specialty Pharmacy General Goal      Prevent hypoglycemia       Specialty Pharmacy General Goal      A1C < 7 %     Lab Results   Component Value Date    HGBA1C 5.70 (H) 01/02/2025    HGBA1C 5.9 (A) 12/18/2024    HGBA1C 6.60 (H) 08/16/2024    HGBA1C 5.0 06/05/2024    HGBA1C 5.3 01/31/2024                Reassessment Plan & Follow-Up  Patient's diabetes is uncontrolled with most recent A1C on 5/1/25 of 6.6%.   Provider Medication Therapy " Changes: Per verbal order from CICI Veliz:  Increase Lantus to 25 units nightly with pen needles  Start Dexcom G7 sensors after using last Freestyle Esvin 3 sensor  Related Plans, Therapy Recommendations, or Issues to Be Addressed:   Updated RX's have been sent to [facility] for delivery.   Pharmacist to perform regular reassessments no more than (6) months from the previous assessment.  Care Coordinator to set up future refill outreaches, coordinate prescription delivery, and escalate clinical questions to pharmacist.     Attestation  I attest the patient was actively involved in and has agreed to the above plan of care. If the prescribed therapy is at any point deemed not appropriate based on the current or future assessments, a consultation will be initiated with the patient's specialty care provider to determine the best course of action. The revised plan of therapy will be documented along with any required assessments and/or additional patient education provided.    Faiza Rosales RPH  05/01/25  10:32 EDT

## 2025-05-02 ENCOUNTER — DOCUMENTATION (OUTPATIENT)
Dept: CARDIOLOGY | Facility: HOSPITAL | Age: 40
End: 2025-05-02
Payer: MEDICARE

## 2025-05-08 ENCOUNTER — TELEPHONE (OUTPATIENT)
Dept: CARDIOLOGY | Facility: CLINIC | Age: 40
End: 2025-05-08
Payer: MEDICARE

## 2025-05-16 ENCOUNTER — SPECIALTY PHARMACY (OUTPATIENT)
Dept: PHARMACY | Facility: HOSPITAL | Age: 40
End: 2025-05-16
Payer: MEDICARE

## 2025-05-16 ENCOUNTER — TELEPHONE (OUTPATIENT)
Dept: PHARMACY | Facility: HOSPITAL | Age: 40
End: 2025-05-16

## 2025-05-16 NOTE — TELEPHONE ENCOUNTER
Patient sugar is running up to 200-300, she is taking Lantus 20 units QHS. Also when she saw [facility] they told her that her thyroid levels were off and sent results to PCP but she never heard anything else. She is also having dizziness spells. Started taking Modafinil in April, but says her BP is fine, (Can increase BP per cardiology note on 05/08).

## 2025-05-16 NOTE — PROGRESS NOTES
Specialty Pharmacy Patient Management Program  Refill Outreach     Antoinette was contacted today regarding refills of their medication(s).    Refill Questions      Flowsheet Row Most Recent Value   Changes to allergies? No   Changes to medications? No   New conditions or infections since last clinic visit Yes  [Patient sugar is running up to 200-300, also when she saw [facility] they told her that her thyroid levels were off and sent results to PCP but she never heard anything else. She is also having dizziness spells]   If yes, please describe  Patient sugar is running up to 200-300, also when she saw [facility] they told her that her thyroid levels were off and sent results to PCP but she never heard anything else. She is also having dizziness spells   Unplanned office visit, urgent care, ED, or hospital admission in the last 4 weeks  No   How does patient/caregiver feel medication is working? Very good   Financial problems or insurance changes  No   If yes, describe changes in insurance or financial issues. na   Since the previous refill, were any specialty medication doses or scheduled injections missed or delayed?  No   If yes, please provide the amount na   Why were doses missed? na   Does this patient require a clinical escalation to a pharmacist? Yes            Delivery Questions      Flowsheet Row Most Recent Value   Delivery method UPS   Delivery address verified with patient/caregiver? Yes   Delivery address Home   Number of medications in delivery 1   Medication(s) being filled and delivered Continuous Glucose Sensor (Dexcom G7 Sensor)   Doses left of specialty medications na   Copay verified? Yes   Copay amount 0.00$   Copay form of payment No copayment ($0)   Delivery Date Selection 05/21/25   Signature Required No   Do you consent to receive electronic handouts?  Yes                 Follow-up: 30 day(s)     Carito Mills, Pharmacy Technician  5/16/2025  15:31 EDT

## 2025-05-19 NOTE — TELEPHONE ENCOUNTER
A few things:  -1- is her BG elevation in the morning or after eating meals?  -2- Can she send over her thyroid labs as I do not have copies of those to address that?   -3- I can adjust her medication once I have these factors.

## 2025-06-09 ENCOUNTER — TELEPHONE (OUTPATIENT)
Dept: CARDIOLOGY | Facility: CLINIC | Age: 40
End: 2025-06-09
Payer: MEDICARE

## 2025-06-09 RX ORDER — FUROSEMIDE 40 MG/1
40 TABLET ORAL DAILY PRN
Qty: 30 TABLET | Refills: 0 | Status: SHIPPED | OUTPATIENT
Start: 2025-06-09

## 2025-06-09 RX ORDER — HYDRALAZINE HYDROCHLORIDE 25 MG/1
25 TABLET, FILM COATED ORAL 3 TIMES DAILY
Qty: 125 TABLET | Refills: 1 | Status: SHIPPED | OUTPATIENT
Start: 2025-06-09

## 2025-06-09 NOTE — TELEPHONE ENCOUNTER
Caller: Antoinette Pack    Relationship to patient: Self    Best call back number: [phone]     Type of visit: 3 MO FU     Requested date:  06/12/25 AFTER 10 AM      Additional notes: PT IS HAVING AN EGD DONE ON THURSDAY AND IS WANTING TO SEE DR COOL AFTER. PLEASE CALL PT WITH A SUITABLE TIME IF POSSIBLE.

## 2025-06-09 NOTE — TELEPHONE ENCOUNTER
Called pt to discuss possibility of moving appt up a day earlier after EGD appt and pt decided to keep appt on Friday as she will have sedation on Thursday with EGD. Pt would also like refills for hydralazine and lasix sent to [facility]. Per Dr. Cabrera NATALY continue hydralazine. Continue lasix as needed.

## 2025-06-16 ENCOUNTER — OFFICE VISIT (OUTPATIENT)
Dept: CARDIOLOGY | Facility: CLINIC | Age: 40
End: 2025-06-16
Payer: MEDICARE

## 2025-06-16 VITALS
WEIGHT: 204 LBS | DIASTOLIC BLOOD PRESSURE: 100 MMHG | HEART RATE: 94 BPM | BODY MASS INDEX: 41.12 KG/M2 | OXYGEN SATURATION: 96 % | SYSTOLIC BLOOD PRESSURE: 172 MMHG | HEIGHT: 59 IN

## 2025-06-16 DIAGNOSIS — R07.2 PRECORDIAL PAIN: Primary | ICD-10-CM

## 2025-06-16 PROCEDURE — 99214 OFFICE O/P EST MOD 30 MIN: CPT | Performed by: INTERNAL MEDICINE

## 2025-06-16 PROCEDURE — 3077F SYST BP >= 140 MM HG: CPT | Performed by: INTERNAL MEDICINE

## 2025-06-16 PROCEDURE — 3080F DIAST BP >= 90 MM HG: CPT | Performed by: INTERNAL MEDICINE

## 2025-06-16 RX ORDER — METOPROLOL TARTRATE 25 MG/1
100 TABLET, FILM COATED ORAL ONCE
Status: CANCELLED | OUTPATIENT
Start: 2025-06-16

## 2025-06-16 RX ORDER — METOPROLOL TARTRATE 25 MG/1
200 TABLET, FILM COATED ORAL ONCE
Status: CANCELLED | OUTPATIENT
Start: 2025-06-16 | End: 2025-06-16

## 2025-06-16 RX ORDER — IVABRADINE 5 MG/1
15 TABLET, FILM COATED ORAL ONCE
Status: CANCELLED | OUTPATIENT
Start: 2025-06-16 | End: 2025-06-16

## 2025-06-16 RX ORDER — SODIUM CHLORIDE 0.9 % (FLUSH) 0.9 %
10 SYRINGE (ML) INJECTION EVERY 12 HOURS SCHEDULED
Status: CANCELLED | OUTPATIENT
Start: 2025-06-16

## 2025-06-16 RX ORDER — SODIUM CHLORIDE 0.9 % (FLUSH) 0.9 %
10 SYRINGE (ML) INJECTION AS NEEDED
Status: CANCELLED | OUTPATIENT
Start: 2025-06-16

## 2025-06-16 RX ORDER — NITROGLYCERIN 0.4 MG/1
0.8 TABLET SUBLINGUAL
Status: CANCELLED | OUTPATIENT
Start: 2025-06-16

## 2025-06-16 RX ORDER — METOPROLOL TARTRATE 25 MG/1
150 TABLET, FILM COATED ORAL ONCE
Status: CANCELLED | OUTPATIENT
Start: 2025-06-16

## 2025-06-16 RX ORDER — METOPROLOL TARTRATE 25 MG/1
50 TABLET, FILM COATED ORAL ONCE
Status: CANCELLED | OUTPATIENT
Start: 2025-06-16

## 2025-06-16 RX ORDER — NITROGLYCERIN 0.4 MG/1
0.4 TABLET SUBLINGUAL
Status: CANCELLED | OUTPATIENT
Start: 2025-06-16 | End: 2025-06-16

## 2025-06-16 RX ORDER — AMOXICILLIN 500 MG/1
500 CAPSULE ORAL AS NEEDED
COMMUNITY
Start: 2025-06-13

## 2025-06-16 RX ORDER — CEPHALEXIN 500 MG/1
500 CAPSULE ORAL AS NEEDED
COMMUNITY
Start: 2025-06-09

## 2025-06-16 RX ORDER — METOPROLOL TARTRATE 50 MG
50 TABLET ORAL
Status: CANCELLED | OUTPATIENT
Start: 2025-06-16

## 2025-06-16 RX ORDER — SODIUM CHLORIDE 9 MG/ML
40 INJECTION, SOLUTION INTRAVENOUS AS NEEDED
Status: CANCELLED | OUTPATIENT
Start: 2025-06-16

## 2025-06-16 RX ORDER — FLUCONAZOLE 150 MG/1
TABLET ORAL
Qty: 10 TABLET | Refills: 0 | Status: SHIPPED | OUTPATIENT
Start: 2025-06-16

## 2025-06-16 RX ORDER — METOPROLOL TARTRATE 1 MG/ML
5 INJECTION, SOLUTION INTRAVENOUS
Status: CANCELLED | OUTPATIENT
Start: 2025-06-16

## 2025-06-16 NOTE — PROGRESS NOTES
Chief Complaint  Chronic heart failure with preserved ejection fractio (Follow up), Cardiac Clearance, and Chest Pain      Subjective   History of Present Illness    Problem List  -HFpEF, normal EKG, echo preserved systolic function  -hx of HFpEF  -CXR showed pulmonary edema  -C section 24 morning  -Long standing HTN  -DM  -morbid obesity  -COPD?  -ALEJANDRO  -UC  -duodenal ulcers    Mrs. Pack is a 39 year old lady being seen in follow up.  Had complicated pregnancy.  Had c section.  Had CHF and GI bleed.  Titrated meds.  BP now controlled when taking.  Has lost 50 lbs in last 6 weeks.  Feeling much better.  BP high today because child admited at  and BP meds at home.  ROS negative except for the above.      Update 24  BP had been on the rise.  Was exposed noxious fumes likely and had flash pulmonary edema that required brief hospitalization.   also needed to be treated for difficulty breathing.      Update 24  BP well controlled at home.  Pharmacist concerned about her symptoms.      Update 24  Occasional blood pressure spikes.  Some occasional shortness of breaht.      Update 10/28/24  Weight up.  BP shoots up.  Chest tightness and short of breath.      Update 25  Father recently .  While visiting him in hospital BP shot up and had flash pulmonary edema.  No diuretic today and BP elevated.        Update 25  Has gained a few pounds.  Bnp little worse.  Only taking entresto once daily.      Update 25  BP well controlled at home.  High today.  NBP improving.      Update 3/13/25  Having some racing heart rate but overall much better.      Update 25  Looking into bariatric surgery.  Still getting some chest pain with exertion.  BP for most part controlled but remains labile at times.           Objective   Vital Signs:  Vitals:    25 0943   BP: 172/100   Pulse: 94   SpO2: 96%     Estimated body mass index is 41.2 kg/m² as calculated from the following:    Height as  "of this encounter: 149.9 cm (59\").    Weight as of this encounter: 92.5 kg (204 lb).       Physical Exam  HENT:      Head: Normocephalic.   Eyes:      Extraocular Movements: Extraocular movements intact.   Cardiovascular:      Rate and Rhythm: Normal rate and regular rhythm.      Heart sounds: No murmur heard.     No gallop.   Pulmonary:      Breath sounds: Normal breath sounds.   Abdominal:      Palpations: Abdomen is soft.   Musculoskeletal:      Right lower leg: No edema.      Left lower leg: No edema.   Skin:     General: Skin is warm and dry.   Neurological:      General: No focal deficit present.      Mental Status: She is alert.   Psychiatric:         Mood and Affect: Mood normal.       Clinic discussed advanced directive        Assessment   -HFpEF, normal EKG, echo preserved systolic function  -hx of HFpEF  -CXR showed pulmonary edema  -C section 4/18/24 morning  -Long standing HTN  -DM  -morbid obesity  -COPD?  -ALEJANDRO  -UC  -duodenal ulcers    Plan   -hast stopped breast feeding  -full dose entresto  -cont. Spironolactone  -cont. Metoprolol, hydralazine, nifedipine  -lasix as needed  -US of renal arteries was normal, urine metanephrines normal  -cont. imdur  -intolerant of GLP-1 agonist. Bariatric surgery referral.  Will get coronary angiogram prior to surgery.  After performed should be able to \"clear.\"      Pablo Cabrera MD      "

## 2025-06-17 ENCOUNTER — SPECIALTY PHARMACY (OUTPATIENT)
Dept: PHARMACY | Facility: HOSPITAL | Age: 40
End: 2025-06-17
Payer: MEDICARE

## 2025-06-17 ENCOUNTER — TELEPHONE (OUTPATIENT)
Dept: PHARMACY | Facility: HOSPITAL | Age: 40
End: 2025-06-17
Payer: MEDICARE

## 2025-06-17 NOTE — TELEPHONE ENCOUNTER
Patient is still waiting for bariatric surgery, pending cardiac clearance. Her sugar has been staying around 200. She is using 2-3 units of short acting insulin twice daily with meals and 30 units of long acting insulin at night. She wants to know what she should do.

## 2025-06-17 NOTE — PROGRESS NOTES
Specialty Pharmacy Patient Management Program  Refill Outreach     Antoinette was contacted today regarding refills of their medication(s).    Refill Questions      Flowsheet Row Most Recent Value   Changes to allergies? No   Changes to medications? No   New conditions or infections since last clinic visit No   If yes, please describe  na   Unplanned office visit, urgent care, ED, or hospital admission in the last 4 weeks  No   How does patient/caregiver feel medication is working? Very good   Financial problems or insurance changes  No   If yes, describe changes in insurance or financial issues. na   Since the previous refill, were any specialty medication doses or scheduled injections missed or delayed?  No   If yes, please provide the amount na   Why were doses missed? na   Does this patient require a clinical escalation to a pharmacist? No            Delivery Questions      Flowsheet Row Most Recent Value   Delivery method UPS   Delivery address verified with patient/caregiver? Yes   Delivery address Home   Number of medications in delivery 1   Medication(s) being filled and delivered Continuous Glucose Sensor (Dexcom G7 Sensor)   Doses left of specialty medications na   Copay verified? Yes   Copay amount 0.00$   Copay form of payment No copayment ($0)   Delivery Date Selection 06/19/25   Signature Required No   Do you consent to receive electronic handouts?  Yes                 Follow-up: 30 day(s)     Carito Mills, Pharmacy Technician  6/17/2025  15:56 EDT

## 2025-06-17 NOTE — TELEPHONE ENCOUNTER
Continue Lantus 30 units daily and I would increase short acting insulin to 8 units 3 times daily with meals.  She should be taking this 10 to 15 minutes before she eats.  Looking at her Dexcom readings online, fasting glucose looks okay, it looks like sugar is going up with meals.  Increasing mealtime insulin should help with this.

## 2025-06-18 ENCOUNTER — TELEPHONE (OUTPATIENT)
Facility: HOSPITAL | Age: 40
End: 2025-06-18
Payer: MEDICARE

## 2025-06-19 ENCOUNTER — HOSPITAL ENCOUNTER (OUTPATIENT)
Facility: HOSPITAL | Age: 40
Discharge: HOME | End: 2025-06-19
Payer: MEDICARE

## 2025-06-19 VITALS
HEART RATE: 63 BPM | BODY MASS INDEX: 41.29 KG/M2 | SYSTOLIC BLOOD PRESSURE: 132 MMHG | TEMPERATURE: 97.8 F | OXYGEN SATURATION: 100 % | RESPIRATION RATE: 10 BRPM | DIASTOLIC BLOOD PRESSURE: 70 MMHG | WEIGHT: 204.81 LBS | HEIGHT: 59 IN

## 2025-06-19 DIAGNOSIS — R07.2 PRECORDIAL PAIN: ICD-10-CM

## 2025-06-19 PROCEDURE — A9270 NON-COVERED ITEM OR SERVICE: HCPCS | Performed by: INTERNAL MEDICINE

## 2025-06-19 PROCEDURE — 25510000001 IOPAMIDOL PER 1 ML: Performed by: INTERNAL MEDICINE

## 2025-06-19 PROCEDURE — 63710000001 METOPROLOL TARTRATE 100 MG TABLET: Performed by: INTERNAL MEDICINE

## 2025-06-19 PROCEDURE — 63710000001 NITROGLYCERIN 0.4 MG SUBLINGUAL TABLET: Performed by: INTERNAL MEDICINE

## 2025-06-19 PROCEDURE — 63710000001 IVABRADINE HCL 5 MG TABLET: Performed by: INTERNAL MEDICINE

## 2025-06-19 PROCEDURE — 75574 CT ANGIO HRT W/3D IMAGE: CPT

## 2025-06-19 RX ORDER — SODIUM CHLORIDE 0.9 % (FLUSH) 0.9 %
10 SYRINGE (ML) INJECTION AS NEEDED
Status: DISCONTINUED | OUTPATIENT
Start: 2025-06-19 | End: 2025-06-20 | Stop reason: HOSPADM

## 2025-06-19 RX ORDER — SODIUM CHLORIDE 0.9 % (FLUSH) 0.9 %
10 SYRINGE (ML) INJECTION EVERY 12 HOURS SCHEDULED
Status: DISCONTINUED | OUTPATIENT
Start: 2025-06-19 | End: 2025-06-20 | Stop reason: HOSPADM

## 2025-06-19 RX ORDER — IVABRADINE 5 MG/1
15 TABLET, FILM COATED ORAL ONCE
Status: COMPLETED | OUTPATIENT
Start: 2025-06-19 | End: 2025-06-19

## 2025-06-19 RX ORDER — METOPROLOL TARTRATE 100 MG/1
200 TABLET ORAL ONCE
Status: COMPLETED | OUTPATIENT
Start: 2025-06-19 | End: 2025-06-19

## 2025-06-19 RX ORDER — SODIUM CHLORIDE 9 MG/ML
40 INJECTION, SOLUTION INTRAVENOUS AS NEEDED
Status: DISCONTINUED | OUTPATIENT
Start: 2025-06-19 | End: 2025-06-20 | Stop reason: HOSPADM

## 2025-06-19 RX ORDER — METOPROLOL TARTRATE 100 MG/1
100 TABLET ORAL ONCE
Status: COMPLETED | OUTPATIENT
Start: 2025-06-19 | End: 2025-06-19

## 2025-06-19 RX ORDER — METOPROLOL TARTRATE 25 MG/1
50 TABLET, FILM COATED ORAL ONCE
Status: COMPLETED | OUTPATIENT
Start: 2025-06-19 | End: 2025-06-19

## 2025-06-19 RX ORDER — METOPROLOL TARTRATE 1 MG/ML
5 INJECTION, SOLUTION INTRAVENOUS
Status: DISCONTINUED | OUTPATIENT
Start: 2025-06-19 | End: 2025-06-20 | Stop reason: HOSPADM

## 2025-06-19 RX ORDER — IOPAMIDOL 755 MG/ML
100 INJECTION, SOLUTION INTRAVASCULAR
Status: COMPLETED | OUTPATIENT
Start: 2025-06-19 | End: 2025-06-19

## 2025-06-19 RX ORDER — METOPROLOL TARTRATE 25 MG/1
50 TABLET, FILM COATED ORAL
Status: DISCONTINUED | OUTPATIENT
Start: 2025-06-19 | End: 2025-06-20 | Stop reason: HOSPADM

## 2025-06-19 RX ORDER — NITROGLYCERIN 0.4 MG/1
0.8 TABLET SUBLINGUAL
Status: COMPLETED | OUTPATIENT
Start: 2025-06-19 | End: 2025-06-19

## 2025-06-19 RX ORDER — NITROGLYCERIN 0.4 MG/1
0.4 TABLET SUBLINGUAL
Status: COMPLETED | OUTPATIENT
Start: 2025-06-19 | End: 2025-06-19

## 2025-06-19 RX ADMIN — NITROGLYCERIN 0.8 MG: 0.4 TABLET SUBLINGUAL at 12:51

## 2025-06-19 RX ADMIN — IVABRADINE 15 MG: 5 TABLET, FILM COATED ORAL at 11:05

## 2025-06-19 RX ADMIN — METOPROLOL TARTRATE 200 MG: 100 TABLET, FILM COATED ORAL at 12:08

## 2025-06-19 RX ADMIN — IOPAMIDOL 65 ML: 755 INJECTION, SOLUTION INTRAVENOUS at 12:59

## 2025-06-24 ENCOUNTER — TRANSCRIBE ORDERS (OUTPATIENT)
Dept: ADMINISTRATIVE | Facility: HOSPITAL | Age: 40
End: 2025-06-24
Payer: MEDICARE

## 2025-06-24 DIAGNOSIS — Z12.31 VISIT FOR SCREENING MAMMOGRAM: Primary | ICD-10-CM

## 2025-06-25 ENCOUNTER — TELEPHONE (OUTPATIENT)
Dept: CARDIOLOGY | Facility: CLINIC | Age: 40
End: 2025-06-25
Payer: MEDICARE

## 2025-06-25 ENCOUNTER — RESULTS FOLLOW-UP (OUTPATIENT)
Dept: CARDIOLOGY | Facility: CLINIC | Age: 40
End: 2025-06-25
Payer: MEDICARE

## 2025-06-25 DIAGNOSIS — R07.2 PRECORDIAL PAIN: ICD-10-CM

## 2025-06-25 DIAGNOSIS — I10 ESSENTIAL HYPERTENSION: ICD-10-CM

## 2025-06-25 DIAGNOSIS — J81.0 FLASH PULMONARY EDEMA: ICD-10-CM

## 2025-06-25 DIAGNOSIS — I50.32 CHRONIC HEART FAILURE WITH PRESERVED EJECTION FRACTION (HFPEF): Primary | ICD-10-CM

## 2025-06-25 DIAGNOSIS — E66.01 SEVERE OBESITY (BMI 35.0-39.9) WITH COMORBIDITY: ICD-10-CM

## 2025-06-25 NOTE — TELEPHONE ENCOUNTER
Cardiac clearance letter faxed to Dr. Ventura's office at [phone]. Received confirmation of fax transmission.

## 2025-06-25 NOTE — TELEPHONE ENCOUNTER
Received request for cardiac clearance for bariatric surgery. Called office for additional information. Planning for Dr. Grey Ventura to perfom a duodenal switch procedure with general anesthesia in a hospital setting. She will be inpatient for 1 day.  Requested clearance faxed to [phone]. Spoke with pt who reported no changes since LOV on 6/16/25. Compliant with all medications.   Please advise.

## 2025-06-25 NOTE — TELEPHONE ENCOUNTER
Relayed results and Dr. Cabrera's comments and recommendations. Pt would like to wait until 3 months after bariatric surgery to reassess need for lipid lowering agent. Lab orders in the computer. Pt verbalized understanding and agreeable to POC.

## 2025-06-27 ENCOUNTER — TELEPHONE (OUTPATIENT)
Dept: ENDOCRINOLOGY | Facility: CLINIC | Age: 40
End: 2025-06-27

## 2025-06-27 NOTE — TELEPHONE ENCOUNTER
Caller: MAGGI WITH [facility]    Relationship to patient:     Best call back number: [phone]    Patient is needing: PLEASE FAX PT'S MOST RECENT LABS TO [facility], FAX # [phone].

## 2025-06-27 NOTE — TELEPHONE ENCOUNTER
Received a VM from Yashira Sepulveda at Dr. Grey Ventura office. Requested most recent EKG for cardiac clearance. Faxed to [phone] per request. Received confirmation of fax transmission.

## 2025-07-01 ENCOUNTER — TELEPHONE (OUTPATIENT)
Dept: CARDIOLOGY | Facility: CLINIC | Age: 40
End: 2025-07-01
Payer: MEDICARE

## 2025-07-01 DIAGNOSIS — I49.3 PVC (PREMATURE VENTRICULAR CONTRACTION): Primary | ICD-10-CM

## 2025-07-01 NOTE — TELEPHONE ENCOUNTER
Called pt to get more information related to [employer] message. Pt stated she is getting dizzy with heart palpitations. SOB when walking short distances. Feels very tired all the time. Pt feels the fatigue and palpitations have increased in frequency since LOV. Pt reports palpitations 10-15 times per day. Palpitations last a few seconds each time. Reports minimal LE swelling, but states she really hasn't been able to do anything but lay in the bed due to fatigue and SOB so her legs have been elevated. Reports Saturday night 6/28/25 BP was 212/110 HR 120s. Took prn hydralazine, prn lasix and an extra dose of metoprolol and went to bed. Stated her LE were swollen Sunday and Monday so she took her PRN lasix at that time. Hasn't needed to take additional HTN meds since evening of 6/28/25. ER warnings given.   Today BP 120s/70s and HR 70s-80s  Pt is worried about upcoming GI surgery next week since she is becoming more symptomatic.     Please advise

## 2025-07-02 ENCOUNTER — APPOINTMENT (OUTPATIENT)
Facility: HOSPITAL | Age: 40
End: 2025-07-02
Payer: MEDICARE

## 2025-07-02 ENCOUNTER — HOSPITAL ENCOUNTER (OUTPATIENT)
Facility: HOSPITAL | Age: 40
Setting detail: OBSERVATION
Discharge: HOME | End: 2025-07-03
Attending: FAMILY MEDICINE | Admitting: EMERGENCY MEDICINE
Payer: MEDICARE

## 2025-07-02 DIAGNOSIS — R06.00 DYSPNEA, UNSPECIFIED TYPE: Primary | ICD-10-CM

## 2025-07-02 LAB
ALBUMIN SERPL-MCNC: 4.4 G/DL (ref 3.5–5.2)
ALBUMIN/GLOB SERPL: 1.2 G/DL
ALP SERPL-CCNC: 67 U/L (ref 39–117)
ALT SERPL W P-5'-P-CCNC: 14 U/L (ref 1–33)
ANION GAP SERPL CALCULATED.3IONS-SCNC: 15.6 MMOL/L (ref 5–15)
ANISOCYTOSIS BLD QL: NORMAL
AST SERPL-CCNC: 20 U/L (ref 1–32)
ATMOSPHERIC PRESS: ABNORMAL MM[HG]
B PARAPERT DNA SPEC QL NAA+PROBE: NOT DETECTED
B PERT DNA SPEC QL NAA+PROBE: NOT DETECTED
BACTERIA UR QL AUTO: ABNORMAL /HPF
BASE EXCESS BLDV CALC-SCNC: -1.3 MMOL/L (ref -2–2)
BASOPHILS # BLD AUTO: 0.02 10*3/MM3 (ref 0–0.2)
BASOPHILS NFR BLD AUTO: 0.2 % (ref 0–1.5)
BDY SITE: ABNORMAL
BILIRUB SERPL-MCNC: 0.5 MG/DL (ref 0–1.2)
BILIRUB UR QL STRIP: NEGATIVE
BODY TEMPERATURE: 37
BUN SERPL-MCNC: 15.9 MG/DL (ref 6–20)
BUN/CREAT SERPL: 28.4 (ref 7–25)
C PNEUM DNA NPH QL NAA+NON-PROBE: NOT DETECTED
CALCIUM SPEC-SCNC: 9 MG/DL (ref 8.6–10.5)
CHLORIDE SERPL-SCNC: 105 MMOL/L (ref 98–107)
CLARITY UR: CLEAR
CO2 BLDA-SCNC: 24.9 MMOL/L (ref 22–33)
CO2 SERPL-SCNC: 19.4 MMOL/L (ref 22–29)
COHGB MFR BLD: 1.3 %
COLOR UR: YELLOW
CREAT SERPL-MCNC: 0.56 MG/DL (ref 0.57–1)
DEPRECATED RDW RBC AUTO: 36.7 FL (ref 37–54)
EGFRCR SERPLBLD CKD-EPI 2021: 117 ML/MIN/1.73
EOSINOPHIL # BLD AUTO: 0.19 10*3/MM3 (ref 0–0.4)
EOSINOPHIL NFR BLD AUTO: 2.2 % (ref 0.3–6.2)
EPAP: 0
ERYTHROCYTE [DISTWIDTH] IN BLOOD BY AUTOMATED COUNT: 13.8 % (ref 12.3–15.4)
FLUAV SUBTYP SPEC NAA+PROBE: NOT DETECTED
FLUBV RNA NPH QL NAA+NON-PROBE: NOT DETECTED
GEN 5 1HR TROPONIN T REFLEX: 13 NG/L
GLOBULIN UR ELPH-MCNC: 3.7 GM/DL
GLUCOSE SERPL-MCNC: 103 MG/DL (ref 65–99)
GLUCOSE UR STRIP-MCNC: NEGATIVE MG/DL
HADV DNA SPEC NAA+PROBE: NOT DETECTED
HCO3 BLDV-SCNC: 23.6 MMOL/L (ref 22–28)
HCOV 229E RNA SPEC QL NAA+PROBE: NOT DETECTED
HCOV HKU1 RNA SPEC QL NAA+PROBE: NOT DETECTED
HCOV NL63 RNA SPEC QL NAA+PROBE: NOT DETECTED
HCOV OC43 RNA SPEC QL NAA+PROBE: NOT DETECTED
HCT VFR BLD AUTO: 32.6 % (ref 34–46.6)
HGB BLD-MCNC: 10.2 G/DL (ref 12–15.9)
HGB BLDA-MCNC: 10.6 G/DL (ref 14–18)
HGB UR QL STRIP.AUTO: ABNORMAL
HMPV RNA NPH QL NAA+NON-PROBE: NOT DETECTED
HOLD SPECIMEN: NORMAL
HPIV1 RNA ISLT QL NAA+PROBE: NOT DETECTED
HPIV2 RNA SPEC QL NAA+PROBE: NOT DETECTED
HPIV3 RNA NPH QL NAA+PROBE: NOT DETECTED
HPIV4 P GENE NPH QL NAA+PROBE: NOT DETECTED
HYALINE CASTS UR QL AUTO: ABNORMAL /LPF
IMM GRANULOCYTES # BLD AUTO: 0.02 10*3/MM3 (ref 0–0.05)
IMM GRANULOCYTES NFR BLD AUTO: 0.2 % (ref 0–0.5)
INHALED O2 CONCENTRATION: 21 %
IPAP: 0
KETONES UR QL STRIP: ABNORMAL
LEUKOCYTE ESTERASE UR QL STRIP.AUTO: NEGATIVE
LYMPHOCYTES # BLD AUTO: 1.77 10*3/MM3 (ref 0.7–3.1)
LYMPHOCYTES NFR BLD AUTO: 20.9 % (ref 19.6–45.3)
M PNEUMO IGG SER IA-ACNC: NOT DETECTED
MAGNESIUM SERPL-MCNC: 2 MG/DL (ref 1.6–2.6)
MCH RBC QN AUTO: 22.8 PG (ref 26.6–33)
MCHC RBC AUTO-ENTMCNC: 31.3 G/DL (ref 31.5–35.7)
MCV RBC AUTO: 72.9 FL (ref 79–97)
METHGB BLD QL: 0.7 %
MICROCYTES BLD QL: NORMAL
MODALITY: ABNORMAL
MONOCYTES # BLD AUTO: 0.44 10*3/MM3 (ref 0.1–0.9)
MONOCYTES NFR BLD AUTO: 5.2 % (ref 5–12)
NEUTROPHILS NFR BLD AUTO: 6.01 10*3/MM3 (ref 1.7–7)
NEUTROPHILS NFR BLD AUTO: 71.3 % (ref 42.7–76)
NITRITE UR QL STRIP: NEGATIVE
NT-PROBNP SERPL-MCNC: 244 PG/ML (ref 0–450)
OXYHGB MFR BLDV: 63.2 %
PAW @ PEAK INSP FLOW SETTING VENT: 0 CMH2O
PCO2 BLDV: 39.6 MM HG (ref 41–51)
PH BLDV: 7.38 PH UNITS (ref 7.31–7.41)
PH UR STRIP.AUTO: 6 [PH] (ref 5–8)
PLATELET # BLD AUTO: 219 10*3/MM3 (ref 140–450)
PMV BLD AUTO: 10.6 FL (ref 6–12)
PO2 BLDV: 36.9 MM HG (ref 27–53)
POTASSIUM SERPL-SCNC: 3.9 MMOL/L (ref 3.5–5.2)
PROT SERPL-MCNC: 8.1 G/DL (ref 6–8.5)
PROT UR QL STRIP: NEGATIVE
RBC # BLD AUTO: 4.47 10*6/MM3 (ref 3.77–5.28)
RBC # UR STRIP: ABNORMAL /HPF
REF LAB TEST METHOD: ABNORMAL
RHINOVIRUS RNA SPEC NAA+PROBE: NOT DETECTED
RSV RNA NPH QL NAA+NON-PROBE: NOT DETECTED
SARS-COV-2 RNA RESP QL NAA+PROBE: NOT DETECTED
SMALL PLATELETS BLD QL SMEAR: ADEQUATE
SODIUM SERPL-SCNC: 140 MMOL/L (ref 136–145)
SP GR UR STRIP: <=1.005 (ref 1–1.03)
SQUAMOUS #/AREA URNS HPF: ABNORMAL /HPF
T4 FREE SERPL-MCNC: 1.37 NG/DL (ref 0.92–1.68)
TOTAL RATE: 0 BREATHS/MINUTE
TROPONIN T NUMERIC DELTA: 2 NG/L
TROPONIN T SERPL HS-MCNC: 11 NG/L
TSH SERPL DL<=0.05 MIU/L-ACNC: 0.38 UIU/ML (ref 0.27–4.2)
UROBILINOGEN UR QL STRIP: ABNORMAL
WBC # UR STRIP: ABNORMAL /HPF
WBC MORPH BLD: NORMAL
WBC NRBC COR # BLD AUTO: 8.45 10*3/MM3 (ref 3.4–10.8)
WHOLE BLOOD HOLD COAG: NORMAL
WHOLE BLOOD HOLD SPECIMEN: NORMAL

## 2025-07-02 PROCEDURE — 25010000002 HYDRALAZINE PER 20 MG

## 2025-07-02 PROCEDURE — 25010000002 FUROSEMIDE PER 20 MG

## 2025-07-02 PROCEDURE — 99285 EMERGENCY DEPT VISIT HI MDM: CPT | Performed by: FAMILY MEDICINE

## 2025-07-02 PROCEDURE — 36415 COLL VENOUS BLD VENIPUNCTURE: CPT

## 2025-07-02 PROCEDURE — 84484 ASSAY OF TROPONIN QUANT: CPT | Performed by: FAMILY MEDICINE

## 2025-07-02 PROCEDURE — 94660 CPAP INITIATION&MGMT: CPT

## 2025-07-02 PROCEDURE — 93005 ELECTROCARDIOGRAM TRACING: CPT | Performed by: FAMILY MEDICINE

## 2025-07-02 PROCEDURE — G0378 HOSPITAL OBSERVATION PER HR: HCPCS

## 2025-07-02 PROCEDURE — 82820 HEMOGLOBIN-OXYGEN AFFINITY: CPT

## 2025-07-02 PROCEDURE — 96374 THER/PROPH/DIAG INJ IV PUSH: CPT

## 2025-07-02 PROCEDURE — 83880 ASSAY OF NATRIURETIC PEPTIDE: CPT

## 2025-07-02 PROCEDURE — 71046 X-RAY EXAM CHEST 2 VIEWS: CPT

## 2025-07-02 PROCEDURE — 99285 EMERGENCY DEPT VISIT HI MDM: CPT

## 2025-07-02 PROCEDURE — 82805 BLOOD GASES W/O2 SATURATION: CPT

## 2025-07-02 PROCEDURE — 83735 ASSAY OF MAGNESIUM: CPT

## 2025-07-02 PROCEDURE — 81001 URINALYSIS AUTO W/SCOPE: CPT

## 2025-07-02 PROCEDURE — 84439 ASSAY OF FREE THYROXINE: CPT

## 2025-07-02 PROCEDURE — 94799 UNLISTED PULMONARY SVC/PX: CPT

## 2025-07-02 PROCEDURE — 93005 ELECTROCARDIOGRAM TRACING: CPT

## 2025-07-02 PROCEDURE — 84443 ASSAY THYROID STIM HORMONE: CPT

## 2025-07-02 PROCEDURE — 0202U NFCT DS 22 TRGT SARS-COV-2: CPT

## 2025-07-02 PROCEDURE — 71275 CT ANGIOGRAPHY CHEST: CPT

## 2025-07-02 PROCEDURE — 96375 TX/PRO/DX INJ NEW DRUG ADDON: CPT

## 2025-07-02 PROCEDURE — 25810000003 SODIUM CHLORIDE 0.9 % SOLUTION

## 2025-07-02 PROCEDURE — 85025 COMPLETE CBC W/AUTO DIFF WBC: CPT

## 2025-07-02 PROCEDURE — 85007 BL SMEAR W/DIFF WBC COUNT: CPT

## 2025-07-02 PROCEDURE — 25510000001 IOPAMIDOL PER 1 ML: Performed by: FAMILY MEDICINE

## 2025-07-02 PROCEDURE — 80053 COMPREHEN METABOLIC PANEL: CPT

## 2025-07-02 RX ORDER — NIFEDIPINE 30 MG/1
90 TABLET, EXTENDED RELEASE ORAL DAILY
Status: DISCONTINUED | OUTPATIENT
Start: 2025-07-03 | End: 2025-07-03 | Stop reason: HOSPADM

## 2025-07-02 RX ORDER — SODIUM CHLORIDE 0.9 % (FLUSH) 0.9 %
10 SYRINGE (ML) INJECTION EVERY 12 HOURS SCHEDULED
Status: DISCONTINUED | OUTPATIENT
Start: 2025-07-02 | End: 2025-07-03 | Stop reason: HOSPADM

## 2025-07-02 RX ORDER — HYDRALAZINE HYDROCHLORIDE 20 MG/ML
10 INJECTION INTRAMUSCULAR; INTRAVENOUS EVERY 6 HOURS PRN
Status: DISCONTINUED | OUTPATIENT
Start: 2025-07-02 | End: 2025-07-03 | Stop reason: HOSPADM

## 2025-07-02 RX ORDER — METOPROLOL TARTRATE 25 MG/1
100 TABLET, FILM COATED ORAL 2 TIMES DAILY
Status: DISCONTINUED | OUTPATIENT
Start: 2025-07-03 | End: 2025-07-03 | Stop reason: HOSPADM

## 2025-07-02 RX ORDER — SODIUM CHLORIDE 0.9 % (FLUSH) 0.9 %
10 SYRINGE (ML) INJECTION AS NEEDED
Status: DISCONTINUED | OUTPATIENT
Start: 2025-07-02 | End: 2025-07-03 | Stop reason: HOSPADM

## 2025-07-02 RX ORDER — HYDRALAZINE HYDROCHLORIDE 20 MG/ML
20 INJECTION INTRAMUSCULAR; INTRAVENOUS ONCE
Status: COMPLETED | OUTPATIENT
Start: 2025-07-02 | End: 2025-07-02

## 2025-07-02 RX ORDER — NICOTINE POLACRILEX 4 MG
15 LOZENGE BUCCAL
Status: DISCONTINUED | OUTPATIENT
Start: 2025-07-02 | End: 2025-07-03 | Stop reason: HOSPADM

## 2025-07-02 RX ORDER — DEXTROSE MONOHYDRATE 25 G/50ML
25 INJECTION, SOLUTION INTRAVENOUS
Status: DISCONTINUED | OUTPATIENT
Start: 2025-07-02 | End: 2025-07-03 | Stop reason: HOSPADM

## 2025-07-02 RX ORDER — ACETAMINOPHEN 325 MG/1
650 TABLET ORAL EVERY 4 HOURS PRN
Status: DISCONTINUED | OUTPATIENT
Start: 2025-07-02 | End: 2025-07-03 | Stop reason: HOSPADM

## 2025-07-02 RX ORDER — METOPROLOL TARTRATE 25 MG/1
100 TABLET, FILM COATED ORAL ONCE
Status: COMPLETED | OUTPATIENT
Start: 2025-07-02 | End: 2025-07-02

## 2025-07-02 RX ORDER — FUROSEMIDE 10 MG/ML
20 INJECTION INTRAMUSCULAR; INTRAVENOUS ONCE
Status: COMPLETED | OUTPATIENT
Start: 2025-07-02 | End: 2025-07-02

## 2025-07-02 RX ORDER — FUROSEMIDE 10 MG/ML
20 INJECTION INTRAMUSCULAR; INTRAVENOUS EVERY 6 HOURS
Status: DISCONTINUED | OUTPATIENT
Start: 2025-07-03 | End: 2025-07-03

## 2025-07-02 RX ORDER — INSULIN LISPRO 100 [IU]/ML
3-14 INJECTION, SOLUTION INTRAVENOUS; SUBCUTANEOUS
Status: DISCONTINUED | OUTPATIENT
Start: 2025-07-02 | End: 2025-07-03 | Stop reason: HOSPADM

## 2025-07-02 RX ORDER — IBUPROFEN 600 MG/1
1 TABLET ORAL
Status: DISCONTINUED | OUTPATIENT
Start: 2025-07-02 | End: 2025-07-03 | Stop reason: HOSPADM

## 2025-07-02 RX ORDER — IOPAMIDOL 755 MG/ML
100 INJECTION, SOLUTION INTRAVASCULAR
Status: COMPLETED | OUTPATIENT
Start: 2025-07-02 | End: 2025-07-02

## 2025-07-02 RX ADMIN — Medication 10 ML: at 22:32

## 2025-07-02 RX ADMIN — METOPROLOL TARTRATE 100 MG: 25 TABLET, FILM COATED ORAL at 20:39

## 2025-07-02 RX ADMIN — HYDRALAZINE HYDROCHLORIDE 20 MG: 20 INJECTION INTRAMUSCULAR; INTRAVENOUS at 20:40

## 2025-07-02 RX ADMIN — IOPAMIDOL 85 ML: 755 INJECTION, SOLUTION INTRAVENOUS at 16:52

## 2025-07-02 RX ADMIN — FUROSEMIDE 20 MG: 10 INJECTION, SOLUTION INTRAMUSCULAR; INTRAVENOUS at 21:18

## 2025-07-02 RX ADMIN — SODIUM CHLORIDE 500 ML: 9 INJECTION, SOLUTION INTRAVENOUS at 18:56

## 2025-07-02 RX ADMIN — ACETAMINOPHEN 650 MG: 325 TABLET ORAL at 23:18

## 2025-07-02 NOTE — FSED PROVIDER NOTE
Subjective  History of Present Illness:    Patient is a 40-year-old female with a history of congestive heart failure presenting to the emergency department sent from her cardiology appointment due to hypertension, palpitations and shortness of breath.  Patient states that her symptoms originally began Sunday evening and that she had an elevated blood pressure reading at home at 200s systolically.  She reports that she took her hydralazine at home and that she was able to decrease her blood pressure back down.  However, patient states that she has continued to have shortness of breath since Sunday.  At arrival she was at outpatient cardiology having a Holter monitor placed when she had an elevated blood pressure reading, complained of palpations and had dyspnea on exertion.  Recommended to come to the emergency department for further evaluation.  On exam patient appears tachypneic and is hypertensive.  She has diminished lung sounds to both lower lung fields.  There is no peripheral edema appreciated on exam.  She denies vomiting, changes in vision, headache, chest pain, fevers.  Patient denies past medical history of asthma or COPD.  She reports her last echo was in January and that her ejection fraction was normal at that time.    Nurses Notes reviewed and agree, including vitals, allergies, social history and prior medical history.     REVIEW OF SYSTEMS: All systems reviewed and not pertinent unless noted.  Review of Systems   Constitutional:  Positive for fatigue.   Respiratory:  Positive for shortness of breath.    Cardiovascular:  Positive for palpitations.   Neurological:  Positive for light-headedness (Upon standing).   All other systems reviewed and are negative.      Past Medical History:   Diagnosis Date    Anxiety     Unsure of date    CHF (congestive heart failure) 05/2023    Colitis 2023    Depression     Not sure of exact date    Diabetes mellitus 2018    type 2    Diverticulitis     Diverticulitis of  "colon     GERD (gastroesophageal reflux disease)     H. pylori infection     History of transfusion 2024    2 units post delivery    Hypertension     chronic    Kidney stone     \"years ago\"    Narcolepsy     Pancreatitis 2023    PCOS (polycystic ovarian syndrome)     Polycystic ovary syndrome     Rectal bleeding     SINCE  - USES MESALAMINE SUPPOSITORY NIGHTLY    Seizures     Dont know of date    Sleep apnea     Type 2 diabetes mellitus Unknown    Vitamin D deficiency        Allergies:    Zofran [ondansetron], Dilantin [phenytoin], Keppra [levetiracetam], and Topamax [topiramate]      Past Surgical History:   Procedure Laterality Date    ABDOMINAL SURGERY      Caesarean    CARDIAC CATHETERIZATION      CARPAL TUNNEL RELEASE       SECTION N/A 2024    Procedure:  SECTION PRIMARY;  Surgeon: Axel Keys MD;  Location: [facility] DELIVERY;  Service: Obstetrics/Gynecology;  Laterality: N/A;    COLONOSCOPY      COLONOSCOPY N/A 2024    Procedure: COLONOSCOPY;  Surgeon: Tom Mueller MD;  Location: [facility] ENDOSCOPY;  Service: Gastroenterology;  Laterality: N/A;    ENDOSCOPY      ENDOSCOPY N/A 2024    Procedure: ESOPHAGOGASTRODUODENOSCOPY;  Surgeon: oTm Mueller MD;  Location: [facility] ENDOSCOPY;  Service: Gastroenterology;  Laterality: N/A;    FRACTURE SURGERY Left     wrist    HARDWARE REMOVAL Left     WRIST    TENDON REPAIR Left     HAND    UPPER GASTROINTESTINAL ENDOSCOPY      WISDOM TOOTH EXTRACTION           Social History     Socioeconomic History    Marital status:    Tobacco Use    Smoking status: Former     Current packs/day: 0.00     Average packs/day: 1 pack/day for 20.0 years (20.0 ttl pk-yrs)     Types: Cigarettes     Start date: 1995     Quit date:      Years since quitting: 10.5     Passive exposure: Past    Smokeless tobacco: Never    Tobacco comments:     QUIT IN 2017   Vaping Use    Vaping status: Never Used   Substance and " "Sexual Activity    Alcohol use: No    Drug use: No    Sexual activity: Defer         Family History   Problem Relation Age of Onset    Hypertension Mother     Depression Mother     Heart disease Mother     COPD Mother     Emphysema Mother     Heart failure Mother     Heart attack Mother     Anxiety disorder Mother     Drug abuse Mother     Colon polyps Mother     Hypertension Father     Diabetes Father     Heart disease Father     COPD Father     Heart failure Father     Hepatitis Father     Arrhythmia Father     Colon polyps Maternal Grandfather     Obesity Sister     Anxiety disorder Sister     Depression Sister     Anemia Sister     Anxiety disorder Sister     Drug abuse Sister     Paranoid behavior Sister     Thyroid disease Sister     Drug abuse Brother        Objective  Physical Exam:  /75 (BP Location: Left arm, Patient Position: Lying)   Pulse 76   Temp 98.7 °F (37.1 °C) (Oral)   Resp 15   Ht 149.9 cm (59\")   Wt 91.6 kg (202 lb)   SpO2 99%   BMI 40.80 kg/m²      Physical Exam  Vitals and nursing note reviewed.   Constitutional:       General: She is not in acute distress.     Appearance: She is well-developed. She is obese. She is not ill-appearing or toxic-appearing.   HENT:      Head: Normocephalic and atraumatic.      Mouth/Throat:      Mouth: Mucous membranes are moist.   Eyes:      Extraocular Movements: Extraocular movements intact.      Pupils: Pupils are equal, round, and reactive to light.   Cardiovascular:      Rate and Rhythm: Normal rate and regular rhythm.      Heart sounds: Normal heart sounds.   Pulmonary:      Effort: Tachypnea present. No accessory muscle usage or respiratory distress.      Breath sounds: No stridor. Examination of the right-lower field reveals decreased breath sounds. Examination of the left-lower field reveals decreased breath sounds. Decreased breath sounds present. No wheezing, rhonchi or rales.   Chest:      Chest wall: No tenderness or edema.   Abdominal: "      General: Bowel sounds are normal.      Palpations: Abdomen is soft.   Musculoskeletal:         General: Normal range of motion.      Right lower leg: No edema.      Left lower leg: No edema.   Skin:     General: Skin is warm and dry.   Neurological:      General: No focal deficit present.      Mental Status: She is alert and oriented to person, place, and time.   Psychiatric:         Mood and Affect: Mood normal.         Behavior: Behavior normal.         Procedures    ED Course:    ED Course as of 07/03/25 0049 Wed Jul 02, 2025   1613 XR Chest 2 View [DA]   1643 EKG is normal sinus rhythm with a rightward axis rate of 85 bpm NM interval is 154 ms QRS is 116 ms QTc is 454 ms [EG]      ED Course User Index  [DA] Shaw Robb PA-C  [EG] Cassandra Zapata DO       Lab Results (last 24 hours)       Procedure Component Value Units Date/Time    High Sensitivity Troponin T [[id number]]  (Normal) Collected: 07/02/25 1622    Specimen: Blood Updated: 07/02/25 1645     HS Troponin T 11 ng/L     CBC & Differential [[id number]]  (Abnormal) Collected: 07/02/25 1622    Specimen: Blood Updated: 07/02/25 1650    Narrative:      The following orders were created for panel order CBC & Differential.  Procedure                               Abnormality         Status                     ---------                               -----------         ------                     CBC Auto Differential[[id number]]        Abnormal            Final result               Scan Slide[[id number]]                                       Final result                 Please view results for these tests on the individual orders.    Comprehensive Metabolic Panel [[id number]]  (Abnormal) Collected: 07/02/25 1622    Specimen: Blood Updated: 07/02/25 1649     Glucose 103 mg/dL      BUN 15.9 mg/dL      Creatinine 0.56 mg/dL      Sodium 140 mmol/L      Potassium 3.9 mmol/L      Chloride 105 mmol/L      CO2 19.4 mmol/L      Calcium 9.0 mg/dL      Total  Protein 8.1 g/dL      Albumin 4.4 g/dL      ALT (SGPT) 14 U/L      AST (SGOT) 20 U/L      Alkaline Phosphatase 67 U/L      Total Bilirubin 0.5 mg/dL      Globulin 3.7 gm/dL      A/G Ratio 1.2 g/dL      BUN/Creatinine Ratio 28.4     Anion Gap 15.6 mmol/L      eGFR 117.0 mL/min/1.73     Narrative:      GFR Categories in Chronic Kidney Disease (CKD)              GFR Category          GFR (mL/min/1.73)    Interpretation  G1                    90 or greater        Normal or high (1)  G2                    60-89                Mild decrease (1)  G3a                   45-59                Mild to moderate decrease  G3b                   30-44                Moderate to severe decrease  G4                    15-29                Severe decrease  G5                    14 or less           Kidney failure    (1)In the absence of evidence of kidney disease, neither GFR category G1 or G2 fulfill the criteria for CKD.    eGFR calculation 2021 CKD-EPI creatinine equation, which does not include race as a factor    BNP [[id number]]  (Normal) Collected: 07/02/25 1622    Specimen: Blood Updated: 07/02/25 1647     proBNP 244.0 pg/mL     Narrative:      This assay is used as an aid in the diagnosis of individuals suspected of having heart failure. It can be used as an aid in the diagnosis of acute decompensated heart failure (ADHF) in patients presenting with signs and symptoms of ADHF to the emergency department (ED). In addition, NT-proBNP of <300 pg/mL indicates ADHF is not likely.    Age Range Result Interpretation  NT-proBNP Concentration (pg/mL:      <50             Positive            >450                   Gray                 300-450                    Negative             <300    50-75           Positive            >900                  Gray                300-900                  Negative            <300      >75             Positive            >1800                  Gray                300-1800                  Negative             <300    Magnesium [[id number]]  (Normal) Collected: 07/02/25 1622    Specimen: Blood Updated: 07/02/25 1649     Magnesium 2.0 mg/dL     CBC Auto Differential [[id number]]  (Abnormal) Collected: 07/02/25 1622    Specimen: Blood Updated: 07/02/25 1650     WBC 8.45 10*3/mm3      RBC 4.47 10*6/mm3      Hemoglobin 10.2 g/dL      Hematocrit 32.6 %      MCV 72.9 fL      MCH 22.8 pg      MCHC 31.3 g/dL      RDW 13.8 %      RDW-SD 36.7 fl      MPV 10.6 fL      Platelets 219 10*3/mm3      Neutrophil % 71.3 %      Lymphocyte % 20.9 %      Monocyte % 5.2 %      Eosinophil % 2.2 %      Basophil % 0.2 %      Immature Grans % 0.2 %      Neutrophils, Absolute 6.01 10*3/mm3      Lymphocytes, Absolute 1.77 10*3/mm3      Monocytes, Absolute 0.44 10*3/mm3      Eosinophils, Absolute 0.19 10*3/mm3      Basophils, Absolute 0.02 10*3/mm3      Immature Grans, Absolute 0.02 10*3/mm3     Narrative:      Appended report. These results have been appended to a previously verified report.    TSH [[id number]]  (Normal) Collected: 07/02/25 1622    Specimen: Blood Updated: 07/02/25 1654     TSH 0.381 uIU/mL     T4, Free [[id number]]  (Normal) Collected: 07/02/25 1622    Specimen: Blood Updated: 07/02/25 1655     Free T4 1.37 ng/dL     Scan Slide [[id number]] Collected: 07/02/25 1622    Specimen: Blood Updated: 07/02/25 1650     Anisocytosis Slight/1+     Microcytes Slight/1+     WBC Morphology Normal     Platelet Estimate Adequate    Urinalysis With Microscopic If Indicated (No Culture) - Urine, Clean Catch [[id number]]  (Abnormal) Collected: 07/02/25 1658    Specimen: Urine, Clean Catch Updated: 07/02/25 1752     Color, UA Yellow     Appearance, UA Clear     pH, UA 6.0     Specific Gravity, UA <=1.005     Glucose, UA Negative     Ketones, UA 40 mg/dL (2+)     Bilirubin, UA Negative     Blood, UA Large (3+)     Protein, UA Negative     Leuk Esterase, UA Negative     Nitrite, UA Negative     Urobilinogen, UA 0.2 E.U./dL    Urinalysis, Microscopic  Only - Urine, Clean Catch [[id number]]  (Abnormal) Collected: 07/02/25 1658    Specimen: Urine, Clean Catch Updated: 07/02/25 1755     RBC, UA Too Numerous to Count /HPF      WBC, UA None Seen /HPF      Bacteria, UA None Seen /HPF      Squamous Epithelial Cells, UA 0-2 /HPF      Hyaline Casts, UA None Seen /LPF      Methodology Manual Light Microscopy    High Sensitivity Troponin T 1Hr [[id number]]  (Normal) Collected: 07/02/25 1745    Specimen: Blood Updated: 07/02/25 1808     HS Troponin T 13 ng/L      Troponin T Numeric Delta 2 ng/L     Narrative:      High Sensitive Troponin T Reference Range:  <14.0 ng/L- Negative Female for AMI  <22.0 ng/L- Negative Male for AMI  >=14 - Abnormal Female indicating possible myocardial injury.  >=22 - Abnormal Male indicating possible myocardial injury.   Clinicians would have to utilize clinical acumen, EKG, Troponin, and serial changes to determine if it is an Acute Myocardial Infarction or myocardial injury due to an underlying chronic condition.         Blood Gas, Venous With Co-Ox [[id number]]  (Abnormal) Collected: 07/02/25 1958    Specimen: Venous Blood Updated: 07/02/25 1958     Site Nurse/Dr Draw     pH, Venous 7.384 pH Units      pCO2, Venous 39.6 mm Hg      Comment: 84 Value below reference range        pO2, Venous 36.9 mm Hg      HCO3, Venous 23.6 mmol/L      Base Excess, Venous -1.3 mmol/L      Hemoglobin, Blood Gas 10.6 g/dL      Oxyhemoglobin Venous 63.2 %      Methemoglobin Venous 0.7 %      Carboxyhemoglobin Venous 1.3 %      CO2 Content 24.9 mmol/L      Temperature 37.0     Barometric Pressure for Blood Gas --     Comment: N/A        Modality Room Air     FIO2 21 %      Rate 0 Breaths/minute      PIP 0 cmH2O      Comment: Meter: [id number]     :  [id number]        IPAP 0     EPAP 0    Respiratory Panel PCR w/COVID-19(SARS-CoV-2) STIVEN/VICENTA/KLAUDIA/PAD/COR/ESTRELLA In-House, NP Swab in UTM/VTM, 2 HR TAT - Swab, Nasopharynx [[id number]]  (Normal) Collected: 07/02/25  2116    Specimen: Swab from Nasopharynx Updated: 07/02/25 2211     ADENOVIRUS, PCR Not Detected     Coronavirus 229E Not Detected     Coronavirus HKU1 Not Detected     Coronavirus NL63 Not Detected     Coronavirus OC43 Not Detected     COVID19 Not Detected     Human Metapneumovirus Not Detected     Human Rhinovirus/Enterovirus Not Detected     Influenza A PCR Not Detected     Influenza B PCR Not Detected     Parainfluenza Virus 1 Not Detected     Parainfluenza Virus 2 Not Detected     Parainfluenza Virus 3 Not Detected     Parainfluenza Virus 4 Not Detected     RSV, PCR Not Detected     Bordetella pertussis pcr Not Detected     Bordetella parapertussis PCR Not Detected     Chlamydophila pneumoniae PCR Not Detected     Mycoplasma pneumo by PCR Not Detected    Narrative:      In the setting of a positive respiratory panel with a viral infection PLUS a negative procalcitonin without other underlying concern for bacterial infection, consider observing off antibiotics or discontinuation of antibiotics and continue supportive care. If the respiratory panel is positive for atypical bacterial infection (Bordetella pertussis, Chlamydophila pneumoniae, or Mycoplasma pneumoniae), consider antibiotic de-escalation to target atypical bacterial infection.             XR Chest 2 View  Result Date: 7/2/2025  XR CHEST 2 VW Date of Exam: 7/2/2025 4:45 PM EDT Indication: sob Comparison: Chest CT 7/2/2025. Chest radiograph 4/25/2025. Findings: Electronic device projects over the left chest wall. Mediastinum: Cardiac silhouette appears unchanged and normal in size Lungs: The lungs appear clear without focal consolidation appreciated. Pleura: No pleural effusion or pneumothorax. Bones and soft tissues: No acute, displaced fracture seen.     Impression: Impression: No radiographic evidence of acute cardiopulmonary abnormality. Electronically Signed: John Zuniga MD  7/2/2025 5:00 PM EDT  Workstation ID: [id number]    CT Angiogram Chest  Pulmonary Embolism  Result Date: 7/2/2025  CT ANGIOGRAM CHEST PULMONARY EMBOLISM Date of Exam: 7/2/2025 4:40 PM EDT Indication: Pulmonary Embolism. Comparison: Chest radiograph 4/25/2025. Chest CT 1/24/2025. Technique: Axial CT images were obtained of the chest after the uneventful intravenous administration of 85 mL Isovue-370 utilizing pulmonary embolism protocol.  In addition, a 3-D volume rendered image was created for interpretation.  Reconstructed coronal and sagittal images were also obtained. Automated exposure control and iterative construction methods were used. Findings: Thyroid and thoracic inlet: No significant abnormality. Lymph nodes: No pathologic appearing lymph nodes by imaging criteria. Cardiovascular: Normal appearing heart size. No pericardial effusion. Aorta and main pulmonary artery diameters are within normal range.No coronary artery calcifications.. No evidence of pulmonary embolism, noting degraded evaluation of distal subsegmental pulmonary artery secondary to respiratory motion artifacts. Esophagus: No significant abnormality. Lung parenchyma: Expiratory phase imaging with associated respiratory motion artifacts and scattered atelectasis. No suspicious appearing opacities within this limitation. Airways: Patent trachea and mainstem bronchi. Pleura: No pleural effusion or pneumothorax. Chest wall and osseous structures: Degenerative changes of the imaged spine. No acute osseous abnormality. Included abdomen: Cholelithiasis.     Impression: Impression: No evidence of pulmonary embolism or acute intrathoracic finding within limitation of respiratory motion artifacts. Electronically Signed: John Zuniga MD  7/2/2025 5:00 PM EDT  Workstation ID: [id number]         MDM     Amount and/or Complexity of Data Reviewed  Clinical lab tests: reviewed  Tests in the radiology section of CPT®: reviewed  Tests in the medicine section of CPT®: reviewed        Initial impression of presenting illness:  Patient is an obese 40-year-old woman presenting to the emergency department no acute distress.    DDX: includes but is not limited to: CHF exacerbation, pulmonary edema, pleural effusion, pneumonia    Patient arrives POV with stable vitals interpreted by myself.     Pertinent features from physical exam: Patient is tachypneic and hypertensive.  Patient shows no signs of respiratory distress and is afebrile on exam.    Initial diagnostic plan: Labs and imaging.    Results from initial plan were reviewed and interpreted by me revealing patient's initial troponin was 11, 1 hour repeat was 12.  EKG is deferred to physician.  Patient's CMP showed an anion gap of 15.6.  BNP was within normal limits.  Patient's UA showed no signs of infection but was positive for blood, patient confirms that she is currently on her menstrual cycle.  RVP was negative for any viruses.  Patient's chest x-ray showed no acute findings.  Patient's chest CT showed no evidence of pulmonary embolism. An amount of edema was appreciated on Pt's CT imaging by Dr. Turcios in addition this is consistent with patient's physical exam findings and symptoms.    Diagnostic information from other sources: Chart review    Interventions: Medications administered as below    Medications   sodium chloride 0.9 % flush 10 mL (has no administration in time range)   metoprolol tartrate (LOPRESSOR) tablet 100 mg (has no administration in time range)   NIFEdipine XL (PROCARDIA XL) 24 hr tablet 90 mg (has no administration in time range)   sodium chloride 0.9 % flush 10 mL (10 mL Intravenous Given 7/2/25 2232)   sodium chloride 0.9 % flush 10 mL (has no administration in time range)   acetaminophen (TYLENOL) tablet 650 mg (650 mg Oral Given 7/2/25 2318)   furosemide (LASIX) injection 20 mg (has no administration in time range)   hydrALAZINE (APRESOLINE) injection 10 mg (has no administration in time range)   dextrose (GLUTOSE) oral gel 15 g (has no administration in  time range)   dextrose (D50W) (25 g/50 mL) IV injection 25 g (has no administration in time range)   glucagon (GLUCAGEN) injection 1 mg (has no administration in time range)   Insulin Lispro (humaLOG) injection 3-14 Units (0 Units Subcutaneous Hold 7/2/25 2309)   iopamidol (ISOVUE-370) 76 % injection 100 mL (85 mL Intravenous Given 7/2/25 1652)   sodium chloride 0.9 % bolus 500 mL (0 mL Intravenous Stopped 7/2/25 2043)   metoprolol tartrate (LOPRESSOR) tablet 100 mg (100 mg Oral Given 7/2/25 2039)   hydrALAZINE (APRESOLINE) injection 20 mg (20 mg Intravenous Given 7/2/25 2040)   furosemide (LASIX) injection 20 mg (20 mg Intravenous Given 7/2/25 2118)       Reevaluation: Upon reevaluation patient reports continued dyspnea that is worse on exertion.  In addition patient is still hypertensive after receiving labetalol.  Consultations/Discussion of results with other physicians: Discussed admission for patient in the CDU with Cecilio Bradley PA-C as well as Dr. Turcios, for diuresis, blood pressure control and observation.  In addition recommend that patient have a repeat echocardiogram due to continued dyspnea.        Counseling: Discussed the results above with the patient regarding need for CDU due to her having continuous dyspnea on exertion.  Patient understands and agrees plan of care.      -----  ED Disposition       ED Disposition   Decision to Admit    Condition   --    Comment   --             Final diagnoses:   None     Your Follow-Up Providers    Follow-up information has not been specified.       Contact information for after-discharge care    Follow-up information has not been specified.          Your medication list        CONTINUE taking these medications        Instructions Last Dose Given Next Dose Due   Dexcom G7 Sensor misc      Change sensor Every 10 (Ten) Days.       Pen Needles 32G X 4 MM misc      Use to inject insulin once daily              ASK your doctor about these medications         Instructions Last Dose Given Next Dose Due   Entresto  MG tablet  Generic drug: sacubitril-valsartan      Take 1 tablet by mouth 2 (Two) Times a Day.       fluconazole 150 MG tablet  Commonly known as: Diflucan      Take 1 tablet as needed (not more often than every 72 hours) for yeast infection.       furosemide 40 MG tablet  Commonly known as: LASIX      Take 1 tablet by mouth Daily As Needed (fluid retention).       hydrALAZINE 25 MG tablet  Commonly known as: APRESOLINE      Take 1 tablet by mouth 3 (Three) Times a Day. Hold if top number of blood pressure is less than 110. Can take extra hydralazine 25mg as needed for top number greater than       isosorbide mononitrate 30 MG 24 hr tablet  Commonly known as: IMDUR      Take 1 tablet by mouth Daily.       lansoprazole 30 MG capsule  Commonly known as: PREVACID      Take 1 capsule by mouth 2 (Two) Times a Day.       Lantus SoloStar 100 UNIT/ML injection pen  Generic drug: Insulin Glargine      Inject 25 Units under the skin into the appropriate area as directed Every Night.       metoprolol tartrate 100 MG tablet  Commonly known as: LOPRESSOR      Take 1 tablet by mouth 2 (Two) Times a Day.       modafinil 200 MG tablet  Commonly known as: PROVIGIL      Take 1 tablet by mouth Daily.       NIFEdipine XL 90 MG 24 hr tablet  Commonly known as: PROCARDIA XL      Take 1 tablet by mouth Daily.       nitroglycerin 0.4 MG SL tablet  Commonly known as: NITROSTAT      Place 1 tablet under the tongue Every 5 (Five) Minutes As Needed for Chest Pain (Flash pulmonary edema) for up to 10 doses. Place one tablet under the tongue for chest pain every 5 minutes until chest pain is relieved. If you have to take 3 tablets, take the 3rd and go to the hospital to be evaluated.       sertraline 50 MG tablet  Commonly known as: Zoloft      Take 1 tablets daily x 2 weeks, then increase 2 tablets D/C Wellbutrin       spironolactone 100 MG tablet  Commonly known as: ALDACTONE       Take 1 tablet by mouth Daily.

## 2025-07-02 NOTE — TELEPHONE ENCOUNTER
Pt came into clinic to have 24 hour monitor placed. She would like to know if she is still cleared for her gastric surgery next week.  Please advise

## 2025-07-02 NOTE — TELEPHONE ENCOUNTER
"Pt in clinic for 24 hour e patch placement. BP very elevated. 220/112 with HR 90s. Recehcked after pt rested: 218/120 with HR 94 after resting. Pt had palpitations that \"felt different\" than the palpitations she had in the past. Staff advised ER. Pt verbalized understanding and agreeable to POC.   "

## 2025-07-03 ENCOUNTER — APPOINTMENT (OUTPATIENT)
Facility: HOSPITAL | Age: 40
End: 2025-07-03
Payer: MEDICARE

## 2025-07-03 ENCOUNTER — READMISSION MANAGEMENT (OUTPATIENT)
Dept: CALL CENTER | Facility: HOSPITAL | Age: 40
End: 2025-07-03
Payer: MEDICARE

## 2025-07-03 VITALS
HEART RATE: 81 BPM | SYSTOLIC BLOOD PRESSURE: 147 MMHG | RESPIRATION RATE: 26 BRPM | WEIGHT: 202 LBS | DIASTOLIC BLOOD PRESSURE: 82 MMHG | OXYGEN SATURATION: 100 % | TEMPERATURE: 97.8 F | HEIGHT: 59 IN | BODY MASS INDEX: 40.72 KG/M2

## 2025-07-03 PROBLEM — R06.02 SHORTNESS OF BREATH: Status: RESOLVED | Noted: 2024-02-28 | Resolved: 2025-07-03

## 2025-07-03 LAB
ALBUMIN SERPL-MCNC: 4.1 G/DL (ref 3.5–5.2)
ALBUMIN/GLOB SERPL: 1.2 G/DL
ALP SERPL-CCNC: 59 U/L (ref 39–117)
ALT SERPL W P-5'-P-CCNC: 10 U/L (ref 1–33)
ANION GAP SERPL CALCULATED.3IONS-SCNC: 13.5 MMOL/L (ref 5–15)
AORTIC DIMENSIONLESS INDEX: 0.67 (DI)
AST SERPL-CCNC: 16 U/L (ref 1–32)
AV MEAN PRESS GRAD SYS DOP V1V2: 5 MMHG
AV VMAX SYS DOP: 166 CM/SEC
BASOPHILS # BLD AUTO: 0.02 10*3/MM3 (ref 0–0.2)
BASOPHILS NFR BLD AUTO: 0.3 % (ref 0–1.5)
BH CV ECHO MEAS - AO MAX PG: 11 MMHG
BH CV ECHO MEAS - AO ROOT DIAM: 2.9 CM
BH CV ECHO MEAS - AO V2 VTI: 37.9 CM
BH CV ECHO MEAS - AVA(I,D): 2.31 CM2
BH CV ECHO MEAS - EDV(CUBED): 117.6 ML
BH CV ECHO MEAS - EDV(MOD-SP2): 67.3 ML
BH CV ECHO MEAS - EDV(MOD-SP4): 94.2 ML
BH CV ECHO MEAS - EF(MOD-SP2): 61.1 %
BH CV ECHO MEAS - EF(MOD-SP4): 64.3 %
BH CV ECHO MEAS - ESV(CUBED): 39.3 ML
BH CV ECHO MEAS - ESV(MOD-SP2): 26.2 ML
BH CV ECHO MEAS - ESV(MOD-SP4): 33.6 ML
BH CV ECHO MEAS - FS: 30.6 %
BH CV ECHO MEAS - IVS/LVPW: 1.09 CM
BH CV ECHO MEAS - IVSD: 1.2 CM
BH CV ECHO MEAS - LA DIMENSION: 3.6 CM
BH CV ECHO MEAS - LAT PEAK E' VEL: 9 CM/SEC
BH CV ECHO MEAS - LV DIASTOLIC VOL/BSA (35-75): 50.9 CM2
BH CV ECHO MEAS - LV MASS(C)D: 213.3 GRAMS
BH CV ECHO MEAS - LV MAX PG: 3.3 MMHG
BH CV ECHO MEAS - LV MEAN PG: 2 MMHG
BH CV ECHO MEAS - LV SYSTOLIC VOL/BSA (12-30): 18.1 CM2
BH CV ECHO MEAS - LV V1 MAX: 90.2 CM/SEC
BH CV ECHO MEAS - LV V1 VTI: 25.3 CM
BH CV ECHO MEAS - LVIDD: 4.9 CM
BH CV ECHO MEAS - LVIDS: 3.4 CM
BH CV ECHO MEAS - LVOT AREA: 3.5 CM2
BH CV ECHO MEAS - LVOT DIAM: 2.1 CM
BH CV ECHO MEAS - LVPWD: 1.1 CM
BH CV ECHO MEAS - MED PEAK E' VEL: 6.6 CM/SEC
BH CV ECHO MEAS - MV A MAX VEL: 93.8 CM/SEC
BH CV ECHO MEAS - MV DEC SLOPE: 551 CM/SEC2
BH CV ECHO MEAS - MV DEC TIME: 0.19 SEC
BH CV ECHO MEAS - MV E MAX VEL: 104 CM/SEC
BH CV ECHO MEAS - MV E/A: 1.11
BH CV ECHO MEAS - MV MAX PG: 6 MMHG
BH CV ECHO MEAS - MV MEAN PG: 3 MMHG
BH CV ECHO MEAS - MV V2 VTI: 38.5 CM
BH CV ECHO MEAS - MVA(VTI): 2.28 CM2
BH CV ECHO MEAS - PA ACC TIME: 0.12 SEC
BH CV ECHO MEAS - PA V2 MAX: 123 CM/SEC
BH CV ECHO MEAS - SV(LVOT): 87.6 ML
BH CV ECHO MEAS - SV(MOD-SP2): 41.1 ML
BH CV ECHO MEAS - SV(MOD-SP4): 60.6 ML
BH CV ECHO MEAS - SVI(LVOT): 47.3 ML/M2
BH CV ECHO MEAS - SVI(MOD-SP2): 22.2 ML/M2
BH CV ECHO MEAS - SVI(MOD-SP4): 32.7 ML/M2
BH CV ECHO MEAS - TAPSE (>1.6): 2.7 CM
BH CV ECHO MEASUREMENTS AVERAGE E/E' RATIO: 13.33
BH CV VAS BP RIGHT ARM: NORMAL MMHG
BH CV XLRA - RV BASE: 2.7 CM
BH CV XLRA - RV LENGTH: 5.9 CM
BH CV XLRA - RV MID: 2.5 CM
BH CV XLRA - TDI S': 12.3 CM/SEC
BILIRUB SERPL-MCNC: 0.4 MG/DL (ref 0–1.2)
BUN SERPL-MCNC: 18.9 MG/DL (ref 6–20)
BUN/CREAT SERPL: 32 (ref 7–25)
CALCIUM SPEC-SCNC: 8.5 MG/DL (ref 8.6–10.5)
CHLORIDE SERPL-SCNC: 107 MMOL/L (ref 98–107)
CO2 SERPL-SCNC: 22.5 MMOL/L (ref 22–29)
CREAT SERPL-MCNC: 0.59 MG/DL (ref 0.57–1)
DEPRECATED RDW RBC AUTO: 38.8 FL (ref 37–54)
EGFRCR SERPLBLD CKD-EPI 2021: 115.6 ML/MIN/1.73
EOSINOPHIL # BLD AUTO: 0.22 10*3/MM3 (ref 0–0.4)
EOSINOPHIL NFR BLD AUTO: 3.1 % (ref 0.3–6.2)
ERYTHROCYTE [DISTWIDTH] IN BLOOD BY AUTOMATED COUNT: 14 % (ref 12.3–15.4)
GLOBULIN UR ELPH-MCNC: 3.3 GM/DL
GLUCOSE BLDC GLUCOMTR-MCNC: 125 MG/DL (ref 70–130)
GLUCOSE SERPL-MCNC: 124 MG/DL (ref 65–99)
HCT VFR BLD AUTO: 32.1 % (ref 34–46.6)
HGB BLD-MCNC: 9.6 G/DL (ref 12–15.9)
IMM GRANULOCYTES # BLD AUTO: 0.01 10*3/MM3 (ref 0–0.05)
IMM GRANULOCYTES NFR BLD AUTO: 0.1 % (ref 0–0.5)
IVRT: 95 MS
LEFT ATRIUM VOLUME INDEX: 23.3 ML/M2
LIPASE SERPL-CCNC: 29 U/L (ref 13–60)
LV EF BIPLANE MOD: 63.5 %
LYMPHOCYTES # BLD AUTO: 1.6 10*3/MM3 (ref 0.7–3.1)
LYMPHOCYTES NFR BLD AUTO: 22.5 % (ref 19.6–45.3)
MCH RBC QN AUTO: 22.6 PG (ref 26.6–33)
MCHC RBC AUTO-ENTMCNC: 29.9 G/DL (ref 31.5–35.7)
MCV RBC AUTO: 75.5 FL (ref 79–97)
MONOCYTES # BLD AUTO: 0.41 10*3/MM3 (ref 0.1–0.9)
MONOCYTES NFR BLD AUTO: 5.8 % (ref 5–12)
NEUTROPHILS NFR BLD AUTO: 4.85 10*3/MM3 (ref 1.7–7)
NEUTROPHILS NFR BLD AUTO: 68.2 % (ref 42.7–76)
NT-PROBNP SERPL-MCNC: 236 PG/ML (ref 0–450)
PLATELET # BLD AUTO: 214 10*3/MM3 (ref 140–450)
PMV BLD AUTO: 10.7 FL (ref 6–12)
POTASSIUM SERPL-SCNC: 3.6 MMOL/L (ref 3.5–5.2)
PROT SERPL-MCNC: 7.4 G/DL (ref 6–8.5)
QT INTERVAL: 382 MS
QTC INTERVAL: 454 MS
RBC # BLD AUTO: 4.25 10*6/MM3 (ref 3.77–5.28)
SODIUM SERPL-SCNC: 143 MMOL/L (ref 136–145)
TROPONIN T SERPL HS-MCNC: 10 NG/L
WBC NRBC COR # BLD AUTO: 7.11 10*3/MM3 (ref 3.4–10.8)

## 2025-07-03 PROCEDURE — 83690 ASSAY OF LIPASE: CPT | Performed by: NURSE PRACTITIONER

## 2025-07-03 PROCEDURE — 71045 X-RAY EXAM CHEST 1 VIEW: CPT

## 2025-07-03 PROCEDURE — 93306 TTE W/DOPPLER COMPLETE: CPT | Performed by: INTERNAL MEDICINE

## 2025-07-03 PROCEDURE — 93306 TTE W/DOPPLER COMPLETE: CPT

## 2025-07-03 PROCEDURE — 83880 ASSAY OF NATRIURETIC PEPTIDE: CPT

## 2025-07-03 PROCEDURE — 84484 ASSAY OF TROPONIN QUANT: CPT

## 2025-07-03 PROCEDURE — 82948 REAGENT STRIP/BLOOD GLUCOSE: CPT

## 2025-07-03 PROCEDURE — 25010000002 PROCHLORPERAZINE 10 MG/2ML SOLUTION: Performed by: NURSE PRACTITIONER

## 2025-07-03 PROCEDURE — 80053 COMPREHEN METABOLIC PANEL: CPT

## 2025-07-03 PROCEDURE — 94799 UNLISTED PULMONARY SVC/PX: CPT

## 2025-07-03 PROCEDURE — 25010000002 FUROSEMIDE PER 20 MG

## 2025-07-03 PROCEDURE — 94660 CPAP INITIATION&MGMT: CPT

## 2025-07-03 PROCEDURE — G0378 HOSPITAL OBSERVATION PER HR: HCPCS

## 2025-07-03 PROCEDURE — 96376 TX/PRO/DX INJ SAME DRUG ADON: CPT

## 2025-07-03 PROCEDURE — 96375 TX/PRO/DX INJ NEW DRUG ADDON: CPT

## 2025-07-03 PROCEDURE — 25010000002 SULFUR HEXAFLUORIDE MICROSPH 60.7-25 MG RECONSTITUTED SUSPENSION: Performed by: NURSE PRACTITIONER

## 2025-07-03 PROCEDURE — 85025 COMPLETE CBC W/AUTO DIFF WBC: CPT

## 2025-07-03 RX ORDER — PROCHLORPERAZINE EDISYLATE 5 MG/ML
10 INJECTION INTRAMUSCULAR; INTRAVENOUS ONCE
Status: COMPLETED | OUTPATIENT
Start: 2025-07-03 | End: 2025-07-03

## 2025-07-03 RX ADMIN — PROCHLORPERAZINE EDISYLATE 10 MG: 5 INJECTION INTRAMUSCULAR; INTRAVENOUS at 08:14

## 2025-07-03 RX ADMIN — ACETAMINOPHEN 650 MG: 325 TABLET ORAL at 08:07

## 2025-07-03 RX ADMIN — Medication 10 ML: at 08:10

## 2025-07-03 RX ADMIN — METOPROLOL TARTRATE 100 MG: 25 TABLET, FILM COATED ORAL at 08:07

## 2025-07-03 RX ADMIN — NIFEDIPINE 90 MG: 30 TABLET, FILM COATED, EXTENDED RELEASE ORAL at 08:07

## 2025-07-03 RX ADMIN — SULFUR HEXAFLUORIDE 2 ML: KIT at 09:58

## 2025-07-03 RX ADMIN — FUROSEMIDE 20 MG: 10 INJECTION, SOLUTION INTRAMUSCULAR; INTRAVENOUS at 03:53

## 2025-07-03 NOTE — PLAN OF CARE
Problem: Adult Inpatient Plan of Care  Goal: Plan of Care Review  Outcome: Met   Goal Outcome Evaluation:

## 2025-07-03 NOTE — DISCHARGE SUMMARY
[facility] CDU  DISCHARGE SUMMARY      Patient Name: Antoinette Pcak  : 1985  MRN: [id number]    Date of Admission: 2025  Date of Discharge:  25   Primary Care Physician: Florinda Bliss APRN      Presenting Problem:   Shortness of breath [R06.02]    Active and Resolved Hospital Problems:  Active Hospital Problems   No active problems to display.      Resolved Hospital Problems    Diagnosis POA    **Shortness of breath [R06.02] Yes         Hospital Course:     Plan:      #Shortness of breath #CHF  - Echocardiogram : EF 55.7%, LV hypertrophy  - proBNP: 244  - CXR: No acute cardiopulmonary abnormalities  - CTA chest: No PE or acute intrathoracic findings  - RVP negative  - Lasix given in ED, scheduled Q6h  - Supplemental O2 PRN, CPAP at night  - Echocardiogram scheduled for tomorrow morning  - Continue home Lasix, spironolactone     #HTN  - Hydralazine PRN systolic > 160  - Continue home metoprolol, nifedipine, Entresto, hydralazine     #T2DM  - Glucose: 103  - Accu-Chek AC & HS, SSI  - Continue home insulin regimen     #Obesity  - BMI: 40.8  - Complicates all aspects of care  - Bariatric surgery scheduled for next week     #VTE PPx  - Not clinically indicated as patient can ambulate    Patient's echo was normal, will be discharged home as she remains symptom free and is on room air.  Advised to follow up with Dr Cabrera this afternoon.  Patient agrees with discharge plan    Nurses Notes reviewed and agree, including vitals, allergies, social history and prior medical history.     REVIEW OF SYSTEMS: All systems reviewed and not pertinent unless noted.  Review of Systems   Constitutional:  Positive for fatigue.   HENT: Negative.     Respiratory: Negative.     Cardiovascular: Negative.    Endocrine: Negative.    Genitourinary: Negative.    Musculoskeletal: Negative.    Skin: Negative.    Neurological: Negative.    Psychiatric/Behavioral: Negative.         Past Medical  "History:   Diagnosis Date    Anxiety     Unsure of date    CHF (congestive heart failure) 2023    Colitis     Depression     Not sure of exact date    Diabetes mellitus     type 2    Diverticulitis     Diverticulitis of colon     GERD (gastroesophageal reflux disease)     H. pylori infection     History of transfusion 2024    2 units post delivery    Hypertension     chronic    Kidney stone     \"years ago\"    Narcolepsy     Pancreatitis 2023    PCOS (polycystic ovarian syndrome)     Polycystic ovary syndrome     Rectal bleeding     SINCE  - USES MESALAMINE SUPPOSITORY NIGHTLY    Seizures     Dont know of date    Sleep apnea     Type 2 diabetes mellitus Unknown    Vitamin D deficiency        Allergies:    Zofran [ondansetron], Dilantin [phenytoin], Keppra [levetiracetam], and Topamax [topiramate]      Past Surgical History:   Procedure Laterality Date    ABDOMINAL SURGERY      Caesarean    CARDIAC CATHETERIZATION      CARPAL TUNNEL RELEASE       SECTION N/A 2024    Procedure:  SECTION PRIMARY;  Surgeon: Axel Keys MD;  Location:  [facility];  Service: Obstetrics/Gynecology;  Laterality: N/A;    COLONOSCOPY      COLONOSCOPY N/A 2024    Procedure: COLONOSCOPY;  Surgeon: Tom Mueller MD;  Location:  [facility];  Service: Gastroenterology;  Laterality: N/A;    ENDOSCOPY      ENDOSCOPY N/A 2024    Procedure: ESOPHAGOGASTRODUODENOSCOPY;  Surgeon: Tom Mueller MD;  Location:  [facility];  Service: Gastroenterology;  Laterality: N/A;    FRACTURE SURGERY Left     wrist    HARDWARE REMOVAL Left     WRIST    TENDON REPAIR Left     HAND    UPPER GASTROINTESTINAL ENDOSCOPY      WISDOM TOOTH EXTRACTION           Social History     Socioeconomic History    Marital status:    Tobacco Use    Smoking status: Former     Current packs/day: 0.00     Average packs/day: 1 pack/day for 20.0 years (20.0 ttl pk-yrs)     Types: " "Cigarettes     Start date: 1/1/1995     Quit date: 2015     Years since quitting: 10.5     Passive exposure: Past    Smokeless tobacco: Never    Tobacco comments:     QUIT IN 2017   Vaping Use    Vaping status: Never Used   Substance and Sexual Activity    Alcohol use: No    Drug use: No    Sexual activity: Defer         Family History   Problem Relation Age of Onset    Hypertension Mother     Depression Mother     Heart disease Mother     COPD Mother     Emphysema Mother     Heart failure Mother     Heart attack Mother     Anxiety disorder Mother     Drug abuse Mother     Colon polyps Mother     Hypertension Father     Diabetes Father     Heart disease Father     COPD Father     Heart failure Father     Hepatitis Father     Arrhythmia Father     Colon polyps Maternal Grandfather     Obesity Sister     Anxiety disorder Sister     Depression Sister     Anemia Sister     Anxiety disorder Sister     Drug abuse Sister     Paranoid behavior Sister     Thyroid disease Sister     Drug abuse Brother        Objective  Physical Exam:  /82 (BP Location: Left arm, Patient Position: Lying)   Pulse 81   Temp 97.8 °F (36.6 °C) (Oral)   Resp 26   Ht 149.9 cm (59\")   Wt 91.6 kg (202 lb)   SpO2 100%   BMI 40.80 kg/m²      Physical Exam  Vitals and nursing note reviewed.   Constitutional:       Appearance: Normal appearance. She is obese.   HENT:      Head: Normocephalic and atraumatic.   Cardiovascular:      Rate and Rhythm: Normal rate and regular rhythm.   Pulmonary:      Effort: Pulmonary effort is normal.      Breath sounds: Normal breath sounds.   Abdominal:      General: Bowel sounds are normal.      Palpations: Abdomen is soft.   Musculoskeletal:         General: Normal range of motion.   Skin:     General: Skin is warm and dry.   Neurological:      General: No focal deficit present.      Mental Status: She is alert and oriented to person, place, and time.   Psychiatric:         Mood and Affect: Mood normal.       "   Behavior: Behavior normal.         Procedures    XR Chest 1 View  Result Date: 7/3/2025  XR CHEST 1 VW Date of Exam: 7/3/2025 5:55 AM EDT Indication: dyspnea Comparison: Chest radiograph 7/2/2025, chest CT 7/2/2025 Findings: Heart size stable. Negative for consolidation, edema, large effusion or pneumothorax. Osseous structures unremarkable.     Impression: Impression: No active pulmonary process. Electronically Signed: Roel Aquino MD  7/3/2025 8:41 AM EDT  Workstation ID: [id number]    XR Chest 2 View  Result Date: 7/2/2025  XR CHEST 2 VW Date of Exam: 7/2/2025 4:45 PM EDT Indication: sob Comparison: Chest CT 7/2/2025. Chest radiograph 4/25/2025. Findings: Electronic device projects over the left chest wall. Mediastinum: Cardiac silhouette appears unchanged and normal in size Lungs: The lungs appear clear without focal consolidation appreciated. Pleura: No pleural effusion or pneumothorax. Bones and soft tissues: No acute, displaced fracture seen.     Impression: Impression: No radiographic evidence of acute cardiopulmonary abnormality. Electronically Signed: John Zuniga MD  7/2/2025 5:00 PM EDT  Workstation ID: [id number]    CT Angiogram Chest Pulmonary Embolism  Result Date: 7/2/2025  CT ANGIOGRAM CHEST PULMONARY EMBOLISM Date of Exam: 7/2/2025 4:40 PM EDT Indication: Pulmonary Embolism. Comparison: Chest radiograph 4/25/2025. Chest CT 1/24/2025. Technique: Axial CT images were obtained of the chest after the uneventful intravenous administration of 85 mL Isovue-370 utilizing pulmonary embolism protocol.  In addition, a 3-D volume rendered image was created for interpretation.  Reconstructed coronal and sagittal images were also obtained. Automated exposure control and iterative construction methods were used. Findings: Thyroid and thoracic inlet: No significant abnormality. Lymph nodes: No pathologic appearing lymph nodes by imaging criteria. Cardiovascular: Normal appearing heart size. No pericardial  effusion. Aorta and main pulmonary artery diameters are within normal range.No coronary artery calcifications.. No evidence of pulmonary embolism, noting degraded evaluation of distal subsegmental pulmonary artery secondary to respiratory motion artifacts. Esophagus: No significant abnormality. Lung parenchyma: Expiratory phase imaging with associated respiratory motion artifacts and scattered atelectasis. No suspicious appearing opacities within this limitation. Airways: Patent trachea and mainstem bronchi. Pleura: No pleural effusion or pneumothorax. Chest wall and osseous structures: Degenerative changes of the imaged spine. No acute osseous abnormality. Included abdomen: Cholelithiasis.     Impression: Impression: No evidence of pulmonary embolism or acute intrathoracic finding within limitation of respiratory motion artifacts. Electronically Signed: John Zuniga MD  7/2/2025 5:00 PM EDT  Workstation ID: [id number]             Site   Date Value Ref Range Status   07/02/2025 Nurse/Dr Daigle  Final     Hemoglobin, Blood Gas   Date Value Ref Range Status   07/02/2025 10.6 (L) 14 - 18 g/dL Final     Oxyhemoglobin Venous   Date Value Ref Range Status   07/02/2025 63.2 % Final     Methemoglobin Venous   Date Value Ref Range Status   07/02/2025 0.7 % Final     Carboxyhemoglobin Venous   Date Value Ref Range Status   07/02/2025 1.3 % Final     CO2 Content   Date Value Ref Range Status   07/02/2025 24.9 22 - 33 mmol/L Final     Barometric Pressure for Blood Gas   Date Value Ref Range Status   07/02/2025   Final     Comment:     N/A     Modality   Date Value Ref Range Status   07/02/2025 Room Air  Final     FIO2   Date Value Ref Range Status   07/02/2025 21 % Final       Results from last 7 days   Lab Units 07/03/25  0629 07/02/25  1745 07/02/25  1622   HSTROP T ng/L 10 13 11       Results from last 7 days   Lab Units 07/03/25  0629 07/02/25  1622   WBC 10*3/mm3 7.11 8.45   HEMOGLOBIN g/dL 9.6* 10.2*   HEMATOCRIT % 32.1*  "32.6*   PLATELETS 10*3/mm3 214 219       Results from last 7 days   Lab Units 07/03/25  0629 07/02/25  1622   SODIUM mmol/L 143 140   POTASSIUM mmol/L 3.6 3.9   CHLORIDE mmol/L 107 105   CO2 mmol/L 22.5 19.4*   BUN mg/dL 18.9 15.9   CREATININE mg/dL 0.59 0.56*   CALCIUM mg/dL 8.5* 9.0   BILIRUBIN mg/dL 0.4 0.5   ALK PHOS U/L 59 67   ALT (SGPT) U/L 10 14   AST (SGOT) U/L 16 20   GLUCOSE mg/dL 124* 103*       Lab Results   Component Value Date    CHOL 177 12/18/2024    CHLPL 159 09/22/2015    TRIG 86 12/18/2024    HDL 48 12/18/2024     (H) 12/18/2024               No results found for: \"URINECX\"    @[email or web address]@    ED Disposition       ED Disposition   Decision to Admit    Condition   --    Comment   --                    Discharge Medication List:      Your medication list        CHANGE how you take these medications        Instructions Last Dose Given Next Dose Due   sertraline 50 MG tablet  Commonly known as: Zoloft  What changed:   how much to take  when to take this  additional instructions      Take 1 tablets daily x 2 weeks, then increase 2 tablets D/C Wellbutrin              CONTINUE taking these medications        Instructions Last Dose Given Next Dose Due   Dexcom G7 Sensor misc      Change sensor Every 10 (Ten) Days.       Entresto  MG tablet  Generic drug: sacubitril-valsartan      Take 1 tablet by mouth 2 (Two) Times a Day.       fluconazole 150 MG tablet  Commonly known as: Diflucan      Take 1 tablet as needed (not more often than every 72 hours) for yeast infection.       furosemide 40 MG tablet  Commonly known as: LASIX      Take 1 tablet by mouth Daily As Needed (fluid retention).       hydrALAZINE 25 MG tablet  Commonly known as: APRESOLINE      Take 1 tablet by mouth 3 (Three) Times a Day. Hold if top number of blood pressure is less than 110. Can take extra hydralazine 25mg as needed for top number greater than       isosorbide mononitrate 30 MG 24 hr tablet  Commonly " known as: IMDUR      Take 1 tablet by mouth Daily.       lansoprazole 30 MG capsule  Commonly known as: PREVACID      Take 1 capsule by mouth 2 (Two) Times a Day.       Lantus SoloStar 100 UNIT/ML injection pen  Generic drug: Insulin Glargine      Inject 25 Units under the skin into the appropriate area as directed Every Night.       metoprolol tartrate 100 MG tablet  Commonly known as: LOPRESSOR      Take 1 tablet by mouth 2 (Two) Times a Day.       modafinil 200 MG tablet  Commonly known as: PROVIGIL      Take 1 tablet by mouth Daily.       NIFEdipine XL 90 MG 24 hr tablet  Commonly known as: PROCARDIA XL      Take 1 tablet by mouth Daily.       nitroglycerin 0.4 MG SL tablet  Commonly known as: NITROSTAT      Place 1 tablet under the tongue Every 5 (Five) Minutes As Needed for Chest Pain (Flash pulmonary edema) for up to 10 doses. Place one tablet under the tongue for chest pain every 5 minutes until chest pain is relieved. If you have to take 3 tablets, take the 3rd and go to the hospital to be evaluated.       Pen Needles 32G X 4 MM misc      Use to inject insulin once daily       spironolactone 100 MG tablet  Commonly known as: ALDACTONE      Take 1 tablet by mouth Daily.                 CICI Anderson   07/03/25   10:48 EDT       Time Spent on Discharge:  I spent  30  minutes on this discharge activity which included: face-to-face encounter with the patient, reviewing the data in the system, coordination of the care with the nursing staff as well as consultants, documentation, and entering orders. Patient spent 14 hours in the CDU.

## 2025-07-03 NOTE — ED NOTES
" Antoinette Balespton    Nursing Report ED to Floor:  Mental status: alert and oriented x 4  Ambulatory status: ambulatory  Oxygen Therapy:  in RA  Cardiac Rhythm: NSR  Admitted from: from home  Safety Concerns:  None  Precautions: None  Social Issues: None  ED Room #:  6    ED Nurse Phone Extension - [phone] or may call [phone].      HPI:   Chief Complaint   Patient presents with    Shortness of Breath       Past Medical History:  Past Medical History:   Diagnosis Date    Anxiety     Unsure of date    CHF (congestive heart failure) 2023    Colitis     Depression     Not sure of exact date    Diabetes mellitus 2018    type 2    Diverticulitis     Diverticulitis of colon     GERD (gastroesophageal reflux disease)     H. pylori infection     History of transfusion 2024    2 units post delivery    Hypertension     chronic    Kidney stone     \"years ago\"    Narcolepsy     Pancreatitis 2023    PCOS (polycystic ovarian syndrome)     Polycystic ovary syndrome     Rectal bleeding     SINCE  - USES MESALAMINE SUPPOSITORY NIGHTLY    Seizures     Dont know of date    Sleep apnea     Type 2 diabetes mellitus Unknown    Vitamin D deficiency         Past Surgical History:  Past Surgical History:   Procedure Laterality Date    ABDOMINAL SURGERY      Caesarean    CARDIAC CATHETERIZATION      CARPAL TUNNEL RELEASE       SECTION N/A 2024    Procedure:  SECTION PRIMARY;  Surgeon: Axel Keys MD;  Location:  [facility];  Service: Obstetrics/Gynecology;  Laterality: N/A;    COLONOSCOPY      COLONOSCOPY N/A 2024    Procedure: COLONOSCOPY;  Surgeon: Tom Mueller MD;  Location:  [facility];  Service: Gastroenterology;  Laterality: N/A;    ENDOSCOPY      ENDOSCOPY N/A 2024    Procedure: ESOPHAGOGASTRODUODENOSCOPY;  Surgeon: Tom Mueller MD;  Location:  [facility];  Service: Gastroenterology;  Laterality: N/A;    FRACTURE SURGERY Left     wrist    " HARDWARE REMOVAL Left     WRIST    TENDON REPAIR Left     HAND    UPPER GASTROINTESTINAL ENDOSCOPY      WISDOM TOOTH EXTRACTION          Admitting Doctor:   Yair Turcios MD    Consulting Provider(s):  Consults       No orders found from 6/3/2025 to 7/3/2025.             Admitting Diagnosis:   There were no encounter diagnoses.    Most Recent Vitals:   Vitals:    07/02/25 2030 07/02/25 2039 07/02/25 2100 07/02/25 2118   BP: 173/97 173/97 170/75    BP Location:  Left arm     Patient Position:  Lying     Pulse: 98 84 101 88   Resp:  16     Temp:       TempSrc:       SpO2: 97% 100% 99%    Weight:       Height:           Active LDAs/IV Access:   Lines, Drains & Airways       Active LDAs       Name Placement date Placement time Site Days    Peripheral IV 07/02/25 1623 18 G Anterior;Distal;Right;Upper Arm 07/02/25  1623  Arm  less than 1                    Labs (abnormal labs have a star):   Labs Reviewed   COMPREHENSIVE METABOLIC PANEL - Abnormal; Notable for the following components:       Result Value    Glucose 103 (*)     Creatinine 0.56 (*)     CO2 19.4 (*)     BUN/Creatinine Ratio 28.4 (*)     Anion Gap 15.6 (*)     All other components within normal limits    Narrative:     GFR Categories in Chronic Kidney Disease (CKD)              GFR Category          GFR (mL/min/1.73)    Interpretation  G1                    90 or greater        Normal or high (1)  G2                    60-89                Mild decrease (1)  G3a                   45-59                Mild to moderate decrease  G3b                   30-44                Moderate to severe decrease  G4                    15-29                Severe decrease  G5                    14 or less           Kidney failure    (1)In the absence of evidence of kidney disease, neither GFR category G1 or G2 fulfill the criteria for CKD.    eGFR calculation 2021 CKD-EPI creatinine equation, which does not include race as a factor   URINALYSIS W/ MICROSCOPIC IF  INDICATED (NO CULTURE) - Abnormal; Notable for the following components:    Ketones, UA 40 mg/dL (2+) (*)     Blood, UA Large (3+) (*)     All other components within normal limits   CBC WITH AUTO DIFFERENTIAL - Abnormal; Notable for the following components:    Hemoglobin 10.2 (*)     Hematocrit 32.6 (*)     MCV 72.9 (*)     MCH 22.8 (*)     MCHC 31.3 (*)     RDW-SD 36.7 (*)     All other components within normal limits    Narrative:     Appended report. These results have been appended to a previously verified report.   URINALYSIS, MICROSCOPIC ONLY - Abnormal; Notable for the following components:    RBC, UA Too Numerous to Count (*)     All other components within normal limits   BLOOD GAS, VENOUS W/CO-OXIMETRY - Abnormal; Notable for the following components:    pCO2, Venous 39.6 (*)     Hemoglobin, Blood Gas 10.6 (*)     All other components within normal limits   TROPONIN - Normal   BNP (IN-HOUSE) - Normal    Narrative:     This assay is used as an aid in the diagnosis of individuals suspected of having heart failure. It can be used as an aid in the diagnosis of acute decompensated heart failure (ADHF) in patients presenting with signs and symptoms of ADHF to the emergency department (ED). In addition, NT-proBNP of <300 pg/mL indicates ADHF is not likely.    Age Range Result Interpretation  NT-proBNP Concentration (pg/mL:      <50             Positive            >450                   Gray                 300-450                    Negative             <300    50-75           Positive            >900                  Gray                300-900                  Negative            <300      >75             Positive            >1800                  Gray                300-1800                  Negative            <300   MAGNESIUM - Normal   TSH - Normal   T4, FREE - Normal   HIGH SENSITIVITIY TROPONIN T 1HR - Normal    Narrative:     High Sensitive Troponin T Reference Range:  <14.0 ng/L- Negative Female for  AMI  <22.0 ng/L- Negative Male for AMI  >=14 - Abnormal Female indicating possible myocardial injury.  >=22 - Abnormal Male indicating possible myocardial injury.   Clinicians would have to utilize clinical acumen, EKG, Troponin, and serial changes to determine if it is an Acute Myocardial Infarction or myocardial injury due to an underlying chronic condition.        RESPIRATORY PANEL PCR W/ COVID-19 (SARS-COV-2), NP SWAB IN UTM/VTP, 2 HR TAT   RAINBOW DRAW    Narrative:     The following orders were created for panel order Ideal Draw.  Procedure                               Abnormality         Status                     ---------                               -----------         ------                     Green Top (Gel)[[id number]]                                  Final result               Lavender Top[[id number]]                                     Final result               Gold Top - SST[[id number]]                                   Final result               Martinez Top[[id number]]                                         Final result               Light Blue Top[[id number]]                                   Final result                 Please view results for these tests on the individual orders.   SCAN SLIDE   BLOOD GAS, VENOUS   CBC AND DIFFERENTIAL    Narrative:     The following orders were created for panel order CBC & Differential.  Procedure                               Abnormality         Status                     ---------                               -----------         ------                     CBC Auto Differential[[id number]]        Abnormal            Final result               Scan Slide[[id number]]                                       Final result                 Please view results for these tests on the individual orders.   GREEN TOP   LAVENDER TOP   GOLD TOP - SST   GRAY TOP   LIGHT BLUE TOP       Meds Given in ED:   Medications   sodium chloride 0.9 % flush 10 mL (has no administration in  time range)   furosemide (LASIX) injection 20 mg (has no administration in time range)   hydrALAZINE (APRESOLINE) injection 10 mg (has no administration in time range)   iopamidol (ISOVUE-370) 76 % injection 100 mL (85 mL Intravenous Given 7/2/25 1652)   sodium chloride 0.9 % bolus 500 mL (0 mL Intravenous Stopped 7/2/25 2043)   metoprolol tartrate (LOPRESSOR) tablet 100 mg (100 mg Oral Given 7/2/25 2039)   hydrALAZINE (APRESOLINE) injection 20 mg (20 mg Intravenous Given 7/2/25 2040)   furosemide (LASIX) injection 20 mg (20 mg Intravenous Given 7/2/25 2118)           Last NIH score:                                                          Dysphagia screening results:        Santos Coma Scale:  No data recorded     CIWA:        Restraint Type:            Isolation Status:  No active isolations

## 2025-07-03 NOTE — OUTREACH NOTE
Prep Survey      Flowsheet Row Responses   [facility] facility patient discharged from? [facility]   Is LACE score < 7 ? No   Eligibility Readm Mgmt   Discharge diagnosis Shortness of breath chf   Does the patient have one of the following disease processes/diagnoses(primary or secondary)? CHF   Does the patient have Home health ordered? No   Is there a DME ordered? No   Prep survey completed? Yes            ANUJ PATRICK - Registered Nurse

## 2025-07-03 NOTE — H&P
[facility]     HISTORY AND PHYSICAL    Patient Name: Antoinette Pack  : 1985  MRN: [id number]  Primary Care Physician:  Florinda Bliss APRN  Date of admission: 2025    Subjective   Subjective     Chief Complaint: shortness of breath    History of Present Illness  Patient is a 40 y.o. female with PMHx of CHF, HTN, HLD, T2DM, sleep apnea who presented to the ED earlier today with complaints of shortness of breath.  States that since Saturday, she has felt sick and had difficulty breathing with ambulation.  Also stated that her children were sick this past weekend.  Also complaining of generalized fatigue, headache, and dyspnea on exertion.  Patient's proBNP was 244.  Troponin was 11 with repeat of 13.  Other labs were not of concern.  CXR and CTA chest showed no acute findings.  Had elevated BP reading upon arrival and was given metoprolol and hydralazine.  Also was given 500 mL of IVF, as well as 20 mg of Lasix.  Patient initially wanted to be discharged, however then decided that she wanted to stay the night.  It was deemed by provider in the ED that patient be admitted to the CDU for further evaluation, trending of labs, further symptomatic treatment, and cardiac testing with echocardiogram in the morning.    Review of Systems   Constitutional:  Positive for fatigue.   Respiratory:  Positive for shortness of breath.    Neurological:  Positive for headaches.   All other systems reviewed and are negative.    Objective   Objective     Vitals:   Temp:  [97.9 °F (36.6 °C)-98.7 °F (37.1 °C)] 98.7 °F (37.1 °C)  Heart Rate:  [] 76  Resp:  [15-18] 15  BP: (143-195)/() 143/75    Physical Exam  Constitutional:       General: She is not in acute distress.     Appearance: Normal appearance. She is obese.   HENT:      Head: Normocephalic and atraumatic.   Cardiovascular:      Rate and Rhythm: Normal rate and regular rhythm.      Pulses: Normal pulses.   Pulmonary:      Effort: Tachypnea  present.      Breath sounds: Decreased breath sounds (mild, bilateral lung bases) present.   Abdominal:      General: Abdomen is flat. Bowel sounds are normal.      Palpations: Abdomen is soft.   Musculoskeletal:         General: No swelling or tenderness. Normal range of motion.      Cervical back: Normal range of motion and neck supple.   Skin:     General: Skin is warm and dry.   Neurological:      General: No focal deficit present.      Mental Status: She is alert and oriented to person, place, and time.   Psychiatric:         Mood and Affect: Mood normal.         Behavior: Behavior normal.     Result Review    Result Review:  I have personally reviewed the results from the time of this admission to 7/2/2025 22:32 EDT and agree with these findings:  [x]  Laboratory list / accordion  [x]  Microbiology  [x]  Radiology  [x]  EKG/Telemetry   [x]  Cardiology/Vascular   []  Pathology  [x]  Old records  []  Other:    Assessment & Plan   Assessment / Plan     Brief CDU Summary:  Upon arrival to the CDU, patient still dyspneic.  Was unable to walk to the bathroom and needed bedside commode.  Ordered Lasix every 6 hours and has CPAP scheduled for sleep tonight.  Will repeat labs in the morning.  Had RVP prior to admission and results were negative.  Echocardiogram and repeat CXR scheduled for tomorrow morning.  Pending results and clinical assessment, expect patient to be discharged tomorrow morning.    Active Hospital Problems:  Active Hospital Problems    Diagnosis     **Shortness of breath      Plan:     #Shortness of breath #CHF  - Echocardiogram 01/03: EF 55.7%, LV hypertrophy  - proBNP: 244  - CXR: No acute cardiopulmonary abnormalities  - CTA chest: No PE or acute intrathoracic findings  - RVP negative  - Lasix given in ED, scheduled Q6h  - Supplemental O2 PRN, CPAP at night  - Echocardiogram scheduled for tomorrow morning  - Continue home Lasix, spironolactone    #HTN  - Hydralazine PRN systolic > 160  - Continue  home metoprolol, nifedipine, Entresto, hydralazine    #T2DM  - Glucose: 103  - Accu-Chek AC & HS, SSI  - Continue home insulin regimen    #Obesity  - BMI: 40.8  - Complicates all aspects of care  - Bariatric surgery scheduled for next week    #VTE PPx  - Not clinically indicated as patient can ambulate    Disposition:  I expect patient to be discharged 07/03/2025, pending clinical assessment, morning labs, and imaging results.    Quinn Bradley PA-C

## 2025-07-07 LAB
QT INTERVAL: 388 MS
QTC INTERVAL: 469 MS

## 2025-07-09 ENCOUNTER — READMISSION MANAGEMENT (OUTPATIENT)
Dept: CALL CENTER | Facility: HOSPITAL | Age: 40
End: 2025-07-09
Payer: MEDICARE

## 2025-07-09 ENCOUNTER — TELEPHONE (OUTPATIENT)
Dept: CARDIOLOGY | Facility: CLINIC | Age: 40
End: 2025-07-09
Payer: MEDICARE

## 2025-07-09 NOTE — TELEPHONE ENCOUNTER
Pt LVM stating she has a medication question. Returned call. Stated she had gastric surgery yesterday and discharged home today. Stated on discharge they advised her to talk to cardiology because her diuretic might need to be adjusted as she will not be drinking as much. Pt states she is suppose to drink minimum of 64 oz of water daily. Reports no edema at this time. BP was very elevated yesterday post-op, but well controlled today. Shortness of breath and BP control greatly improved after recent ER visit where she received IV lasix  Please advise.

## 2025-07-09 NOTE — OUTREACH NOTE
CHF Week 1 Survey      Flowsheet Row Responses   [facility] patient discharged from? [facility]   Does the patient have one of the following disease processes/diagnoses(primary or secondary)? CHF   CHF Week 1 attempt successful? Yes   Call start time 1105   Call end time 1112   Discharge diagnosis Shortness of breath chf   Meds reviewed with patient/caregiver? Yes   Does the patient have all medications ordered at discharge? N/A   Does the patient have a primary care provider?  Yes   Does the patient have an appointment with their PCP within 7 days of discharge? Yes   DME comments Wears CPAP at night   Pulse Ox monitoring Intermittent   Pulse Ox device source Patient   O2 Sat: education provided Sat levels, Monitoring frequency, When to seek care   Comments Pt reports that she had bariatric surgery yesterday at [facility], where she is currently inpt.   Did the patient receive a copy of their discharge instructions? Yes   Nursing interventions Reviewed instructions with patient   What is the patient's perception of their health status since discharge? New symptoms unrelated to diagnosis   Nursing interventions Nurse provided patient education   Is the patient able to teach back signs and symptoms of worsening condition? (i.e. weight gain, shortness of air, etc.) Yes   If the patient is a current smoker, are they able to teach back resources for cessation? Not a smoker   Is the patient/caregiver able to teach back the hierarchy of who to call/visit for symptoms/problems? PCP, Specialist, Home health nurse, Urgent Care, ED, 911 Yes   CHF Zone this Call Green Zone   Green Zone Patient reports doing well, No new swelling -  feet, ankles and legs look normal for you, No chest pain   Green Zone Interventions Daily weight check, Follow up visits planned    CHF Week 1 call completed? Yes   Revoked No further contact(revokes)-requires comment   Call end time 1112            Hetal GOEL - Registered Nurse

## 2025-07-10 ENCOUNTER — APPOINTMENT (OUTPATIENT)
Dept: GENERAL RADIOLOGY | Facility: HOSPITAL | Age: 40
End: 2025-07-10
Payer: MEDICARE

## 2025-07-10 LAB
ALBUMIN SERPL-MCNC: 3.8 G/DL (ref 3.5–5.2)
ALBUMIN/GLOB SERPL: 1.2 G/DL
ALP SERPL-CCNC: 49 U/L (ref 39–117)
ALT SERPL W P-5'-P-CCNC: 84 U/L (ref 1–33)
AMYLASE SERPL-CCNC: 44 U/L (ref 28–100)
ANION GAP SERPL CALCULATED.3IONS-SCNC: 13.4 MMOL/L (ref 5–15)
AST SERPL-CCNC: 36 U/L (ref 1–32)
BASOPHILS # BLD AUTO: 0.02 10*3/MM3 (ref 0–0.2)
BASOPHILS NFR BLD AUTO: 0.2 % (ref 0–1.5)
BILIRUB SERPL-MCNC: 0.5 MG/DL (ref 0–1.2)
BUN SERPL-MCNC: 15.9 MG/DL (ref 6–20)
BUN/CREAT SERPL: 37 (ref 7–25)
CALCIUM SPEC-SCNC: 8.6 MG/DL (ref 8.6–10.5)
CHLORIDE SERPL-SCNC: 102 MMOL/L (ref 98–107)
CO2 SERPL-SCNC: 21.6 MMOL/L (ref 22–29)
CREAT SERPL-MCNC: 0.43 MG/DL (ref 0.57–1)
CRP SERPL-MCNC: 16.2 MG/DL (ref 0–0.5)
DEPRECATED RDW RBC AUTO: 38.3 FL (ref 37–54)
EGFRCR SERPLBLD CKD-EPI 2021: 124.7 ML/MIN/1.73
EOSINOPHIL # BLD AUTO: 0.01 10*3/MM3 (ref 0–0.4)
EOSINOPHIL NFR BLD AUTO: 0.1 % (ref 0.3–6.2)
ERYTHROCYTE [DISTWIDTH] IN BLOOD BY AUTOMATED COUNT: 14.3 % (ref 12.3–15.4)
ERYTHROCYTE [SEDIMENTATION RATE] IN BLOOD: 36 MM/HR (ref 0–20)
GLOBULIN UR ELPH-MCNC: 3.1 GM/DL
GLUCOSE SERPL-MCNC: 100 MG/DL (ref 65–99)
HCG SERPL QL: NEGATIVE
HCT VFR BLD AUTO: 29.3 % (ref 34–46.6)
HGB BLD-MCNC: 8.7 G/DL (ref 12–15.9)
HOLD SPECIMEN: NORMAL
HOLD SPECIMEN: NORMAL
IMM GRANULOCYTES # BLD AUTO: 0.04 10*3/MM3 (ref 0–0.05)
IMM GRANULOCYTES NFR BLD AUTO: 0.4 % (ref 0–0.5)
LIPASE SERPL-CCNC: 34 U/L (ref 13–60)
LYMPHOCYTES # BLD AUTO: 1.61 10*3/MM3 (ref 0.7–3.1)
LYMPHOCYTES NFR BLD AUTO: 15.3 % (ref 19.6–45.3)
MAGNESIUM SERPL-MCNC: 2 MG/DL (ref 1.6–2.6)
MCH RBC QN AUTO: 22.4 PG (ref 26.6–33)
MCHC RBC AUTO-ENTMCNC: 29.7 G/DL (ref 31.5–35.7)
MCV RBC AUTO: 75.5 FL (ref 79–97)
MONOCYTES # BLD AUTO: 0.8 10*3/MM3 (ref 0.1–0.9)
MONOCYTES NFR BLD AUTO: 7.6 % (ref 5–12)
NEUTROPHILS NFR BLD AUTO: 76.4 % (ref 42.7–76)
NEUTROPHILS NFR BLD AUTO: 8.03 10*3/MM3 (ref 1.7–7)
NRBC BLD AUTO-RTO: 0 /100 WBC (ref 0–0.2)
PLATELET # BLD AUTO: 156 10*3/MM3 (ref 140–450)
PMV BLD AUTO: 10.2 FL (ref 6–12)
POTASSIUM SERPL-SCNC: 3.4 MMOL/L (ref 3.5–5.2)
PROCALCITONIN SERPL-MCNC: 0.08 NG/ML (ref 0–0.25)
PROT SERPL-MCNC: 6.9 G/DL (ref 6–8.5)
RBC # BLD AUTO: 3.88 10*6/MM3 (ref 3.77–5.28)
SODIUM SERPL-SCNC: 137 MMOL/L (ref 136–145)
WBC NRBC COR # BLD AUTO: 10.51 10*3/MM3 (ref 3.4–10.8)
WHOLE BLOOD HOLD COAG: NORMAL
WHOLE BLOOD HOLD SPECIMEN: NORMAL

## 2025-07-10 PROCEDURE — 84703 CHORIONIC GONADOTROPIN ASSAY: CPT | Performed by: PHYSICIAN ASSISTANT

## 2025-07-10 PROCEDURE — 71045 X-RAY EXAM CHEST 1 VIEW: CPT | Performed by: RADIOLOGY

## 2025-07-10 PROCEDURE — 85025 COMPLETE CBC W/AUTO DIFF WBC: CPT

## 2025-07-10 PROCEDURE — 81001 URINALYSIS AUTO W/SCOPE: CPT | Performed by: STUDENT IN AN ORGANIZED HEALTH CARE EDUCATION/TRAINING PROGRAM

## 2025-07-10 PROCEDURE — 71045 X-RAY EXAM CHEST 1 VIEW: CPT

## 2025-07-10 PROCEDURE — 84145 PROCALCITONIN (PCT): CPT | Performed by: PHYSICIAN ASSISTANT

## 2025-07-10 PROCEDURE — 80053 COMPREHEN METABOLIC PANEL: CPT | Performed by: STUDENT IN AN ORGANIZED HEALTH CARE EDUCATION/TRAINING PROGRAM

## 2025-07-10 PROCEDURE — 83690 ASSAY OF LIPASE: CPT | Performed by: STUDENT IN AN ORGANIZED HEALTH CARE EDUCATION/TRAINING PROGRAM

## 2025-07-10 PROCEDURE — 85652 RBC SED RATE AUTOMATED: CPT | Performed by: PHYSICIAN ASSISTANT

## 2025-07-10 PROCEDURE — 83735 ASSAY OF MAGNESIUM: CPT | Performed by: PHYSICIAN ASSISTANT

## 2025-07-10 PROCEDURE — 99283 EMERGENCY DEPT VISIT LOW MDM: CPT

## 2025-07-10 PROCEDURE — 36415 COLL VENOUS BLD VENIPUNCTURE: CPT

## 2025-07-10 PROCEDURE — 82150 ASSAY OF AMYLASE: CPT | Performed by: PHYSICIAN ASSISTANT

## 2025-07-10 PROCEDURE — 86140 C-REACTIVE PROTEIN: CPT | Performed by: PHYSICIAN ASSISTANT

## 2025-07-10 RX ORDER — SODIUM CHLORIDE 0.9 % (FLUSH) 0.9 %
10 SYRINGE (ML) INJECTION AS NEEDED
Status: DISCONTINUED | OUTPATIENT
Start: 2025-07-10 | End: 2025-07-11 | Stop reason: HOSPADM

## 2025-07-10 NOTE — TELEPHONE ENCOUNTER
Relayed Dr. Cabrera's comments and recommendations. Pt verbalized understanding and agreeable to POC.

## 2025-07-11 ENCOUNTER — HOSPITAL ENCOUNTER (EMERGENCY)
Facility: HOSPITAL | Age: 40
Discharge: HOME | End: 2025-07-11
Attending: STUDENT IN AN ORGANIZED HEALTH CARE EDUCATION/TRAINING PROGRAM
Payer: MEDICARE

## 2025-07-11 VITALS
HEIGHT: 59 IN | TEMPERATURE: 99.6 F | SYSTOLIC BLOOD PRESSURE: 151 MMHG | BODY MASS INDEX: 38.91 KG/M2 | DIASTOLIC BLOOD PRESSURE: 68 MMHG | OXYGEN SATURATION: 97 % | HEART RATE: 91 BPM | RESPIRATION RATE: 16 BRPM | WEIGHT: 193 LBS

## 2025-07-11 DIAGNOSIS — G89.18 POST-OPERATIVE PAIN: Primary | ICD-10-CM

## 2025-07-11 LAB
BACTERIA UR QL AUTO: NORMAL /HPF
BILIRUB UR QL STRIP: NEGATIVE
CLARITY UR: CLEAR
COLOR UR: YELLOW
D-LACTATE SERPL-SCNC: 0.6 MMOL/L (ref 0.5–2)
GLUCOSE UR STRIP-MCNC: NEGATIVE MG/DL
HGB UR QL STRIP.AUTO: NEGATIVE
HYALINE CASTS UR QL AUTO: NORMAL /LPF
KETONES UR QL STRIP: ABNORMAL
LEUKOCYTE ESTERASE UR QL STRIP.AUTO: ABNORMAL
NITRITE UR QL STRIP: NEGATIVE
PH UR STRIP.AUTO: 6 [PH] (ref 5–8)
PROT UR QL STRIP: ABNORMAL
RBC # UR STRIP: NORMAL /HPF
REF LAB TEST METHOD: NORMAL
SP GR UR STRIP: 1.03 (ref 1–1.03)
SQUAMOUS #/AREA URNS HPF: NORMAL /HPF
UROBILINOGEN UR QL STRIP: ABNORMAL
WBC # UR STRIP: NORMAL /HPF

## 2025-07-11 PROCEDURE — 87040 BLOOD CULTURE FOR BACTERIA: CPT | Performed by: PHYSICIAN ASSISTANT

## 2025-07-11 PROCEDURE — 83605 ASSAY OF LACTIC ACID: CPT | Performed by: PHYSICIAN ASSISTANT

## 2025-07-11 RX ORDER — HYDROCODONE BITARTRATE AND ACETAMINOPHEN 5; 325 MG/1; MG/1
1 TABLET ORAL ONCE
Refills: 0 | Status: COMPLETED | OUTPATIENT
Start: 2025-07-11 | End: 2025-07-11

## 2025-07-11 RX ORDER — HYDROCODONE BITARTRATE AND ACETAMINOPHEN 5; 325 MG/1; MG/1
1 TABLET ORAL EVERY 8 HOURS PRN
Qty: 12 TABLET | Refills: 0 | Status: SHIPPED | OUTPATIENT
Start: 2025-07-11

## 2025-07-11 RX ADMIN — HYDROCODONE BITARTRATE AND ACETAMINOPHEN 1 TABLET: 5; 325 TABLET ORAL at 03:24

## 2025-07-11 NOTE — DISCHARGE INSTRUCTIONS
Please follow-up with your gastroenterology team tomorrow.  Please call the clinic they will be expecting your call.  Take Norco as prescribed and return to the emergency department if any worsening symptoms.

## 2025-07-11 NOTE — ED NOTES
[facility] at this [facility], Dr. Kayleigh Shin consulted with Dr. Ventura at this time.

## 2025-07-11 NOTE — ED NOTES
MEDICAL SCREENING:    Reason for Visit: had gastric bypass surgery on Tuesday in [place]. Reports worsening pain and feeling very ill. Reports fever    Patient initially seen in triage.  The patient was advised further evaluation and diagnostic testing will be needed, some of the treatment and testing will be initiated in the lobby in order to begin the process.  The patient will be returned to the waiting area for the time being and possibly be re-assessed by a subsequent ED provider.  The patient will be brought back to the treatment area in as timely manner as possible.      Tiffanie Cleveland PA  07/10/25 9310

## 2025-07-11 NOTE — ED PROVIDER NOTES
"Subjective   History of Present Illness  Antoinette is a 40-year-old past medical history for CHF, DM, PCOS, recent gastric bypass surgery on Tuesday at [facility] presenting to the emergency department for postoperative pain.  Patient reports since the procedure she has had epigastric pain.  She is scheduled to see Dr. Ventura the surgeon on Wednesday but reports symptoms worsened today prompting her visit.  She denies any fevers or other systemic signs of infection.  No vomiting.  No bowel changes.  Last bowel movement just prior to arrival.      Review of Systems   Constitutional: Negative.  Negative for activity change and fever.   HENT: Negative.  Negative for congestion.    Respiratory: Negative.  Negative for cough and shortness of breath.    Cardiovascular: Negative.  Negative for chest pain.   Gastrointestinal:  Positive for abdominal pain.   Endocrine: Negative.    Genitourinary: Negative.  Negative for dysuria.   Skin: Negative.    Neurological: Negative.  Negative for dizziness, weakness and light-headedness.   Psychiatric/Behavioral: Negative.     All other systems reviewed and are negative.      Past Medical History:   Diagnosis Date    Anxiety     Unsure of date    CHF (congestive heart failure) 05/2023    Colitis 2023    Depression     Not sure of exact date    Diabetes mellitus 2018    type 2    Diverticulitis     Diverticulitis of colon     GERD (gastroesophageal reflux disease)     H. pylori infection     History of transfusion 04/19/2024    2 units post delivery    Hypertension     chronic    Kidney stone     \"years ago\"    Narcolepsy 2022    Pancreatitis 09/2023    PCOS (polycystic ovarian syndrome)     Polycystic ovary syndrome     Rectal bleeding     SINCE 2023 - USES MESALAMINE SUPPOSITORY NIGHTLY    Seizures 2012    Dont know of date    Sleep apnea     Type 2 diabetes mellitus Unknown    Vitamin D deficiency        Allergies   Allergen Reactions    Zofran [Ondansetron] Other " (See Comments)     States cardiology told her not to take    Dilantin [Phenytoin] Mental Status Change    Keppra [Levetiracetam] Mental Status Change    Topamax [Topiramate] Mental Status Change       Past Surgical History:   Procedure Laterality Date    ABDOMINAL SURGERY      Caesarean    CARDIAC CATHETERIZATION      CARPAL TUNNEL RELEASE       SECTION N/A 2024    Procedure:  SECTION PRIMARY;  Surgeon: Axel Keys MD;  Location:  [facility];  Service: Obstetrics/Gynecology;  Laterality: N/A;    COLONOSCOPY      COLONOSCOPY N/A 2024    Procedure: COLONOSCOPY;  Surgeon: Tom Mueller MD;  Location:  [facility];  Service: Gastroenterology;  Laterality: N/A;    ENDOSCOPY      ENDOSCOPY N/A 2024    Procedure: ESOPHAGOGASTRODUODENOSCOPY;  Surgeon: Tom Mueller MD;  Location:  [facility];  Service: Gastroenterology;  Laterality: N/A;    FRACTURE SURGERY Left     wrist    HARDWARE REMOVAL Left     WRIST    TENDON REPAIR Left     HAND    UPPER GASTROINTESTINAL ENDOSCOPY      WISDOM TOOTH EXTRACTION         Family History   Problem Relation Age of Onset    Hypertension Mother     Depression Mother     Heart disease Mother     COPD Mother     Emphysema Mother     Heart failure Mother     Heart attack Mother     Anxiety disorder Mother     Drug abuse Mother     Colon polyps Mother     Hypertension Father     Diabetes Father     Heart disease Father     COPD Father     Heart failure Father     Hepatitis Father     Arrhythmia Father     Colon polyps Maternal Grandfather     Obesity Sister     Anxiety disorder Sister     Depression Sister     Anemia Sister     Anxiety disorder Sister     Drug abuse Sister     Paranoid behavior Sister     Thyroid disease Sister     Drug abuse Brother        Social History     Socioeconomic History    Marital status:    Tobacco Use    Smoking status: Former     Current packs/day: 0.00     Average packs/day: 1 pack/day for  20.0 years (20.0 ttl pk-yrs)     Types: Cigarettes     Start date: 1/1/1995     Quit date: 2015     Years since quitting: 10.5     Passive exposure: Past    Smokeless tobacco: Never    Tobacco comments:     QUIT IN 2017   Vaping Use    Vaping status: Never Used   Substance and Sexual Activity    Alcohol use: No    Drug use: No    Sexual activity: Defer           Objective   Physical Exam  Constitutional:       Appearance: Normal appearance.   HENT:      Head: Normocephalic and atraumatic.      Right Ear: Tympanic membrane normal.      Left Ear: Tympanic membrane normal.      Nose: Nose normal. No congestion or rhinorrhea.      Mouth/Throat:      Mouth: Mucous membranes are moist.      Pharynx: No oropharyngeal exudate or posterior oropharyngeal erythema.   Eyes:      Extraocular Movements: Extraocular movements intact.      Pupils: Pupils are equal, round, and reactive to light.   Cardiovascular:      Rate and Rhythm: Normal rate and regular rhythm.   Pulmonary:      Effort: Pulmonary effort is normal. No respiratory distress.      Breath sounds: Normal breath sounds.   Abdominal:      General: Abdomen is flat. Bowel sounds are normal. There is no distension.   Musculoskeletal:         General: No swelling. Normal range of motion.      Cervical back: Normal range of motion. No rigidity or tenderness.   Skin:     General: Skin is warm.      Capillary Refill: Capillary refill takes less than 2 seconds.      Coloration: Skin is not jaundiced or pale.   Neurological:      General: No focal deficit present.      Mental Status: She is alert and oriented to person, place, and time.         Procedures           ED Course                                                       Medical Decision Making  40-year-old presenting to the emergency department with epigastric pain postop from a gastric bypass procedure.  Patient is afebrile and hemodynamically stable nontoxic-appearing.  Abdomen is nonperitoneal take no rebound or  guarding.  Patient is given Norco for pain control with improvement in symptoms.  Basic labs remarkable for normal WBC.  Normal lactic acid.  I have consulted gastric bypass team at [facility].  Dr. Marlee Engel recommends no imaging at this time as postoperative changes will be difficult to differentiate between true infection.  Furthermore patient has an otherwise benign workup.  Recommends patient be evaluated in clinic tomorrow.  Patient is instructed to follow-up with her team tomorrow morning and return to the emergency department for any worsening symptoms.  Of note at this time patient's symptoms are much improved and patient is stable for discharge.    Problems Addressed:  Post-operative pain: complicated acute illness or injury    Amount and/or Complexity of Data Reviewed  Labs: ordered.  Radiology: ordered.    Risk  Prescription drug management.        Final diagnoses:   Post-operative pain       ED Disposition  ED Disposition       ED Disposition   Discharge    Condition   Stable    Comment   --               IzaiahFlorinda, APRN  [address]  [place] [address]  [phone]    Go in 2 days      Gastroenterology Team at [facility]    Go today           Medication List        New Prescriptions      HYDROcodone-acetaminophen 5-325 MG per tablet  Commonly known as: NORCO  Take 1 tablet by mouth Every 8 (Eight) Hours As Needed for Severe Pain.            Changed      Lantus SoloStar 100 UNIT/ML injection pen  Generic drug: Insulin Glargine  Inject 25 Units under the skin into the appropriate area as directed Every Night.  What changed: how much to take     sertraline 50 MG tablet  Commonly known as: Zoloft  Take 1 tablets daily x 2 weeks, then increase 2 tablets D/C Wellbutrin  What changed:   how much to take  when to take this  additional instructions               Where to Get Your Medications        These medications were sent to [facility] [id number] -  AR[place] MADERA - [address] - [phone] PH - [phone] FX  [address] [place] [address]      Phone: [phone]   HYDROcodone-acetaminophen 5-325 MG per tablet            Chandni Ventura MD  07/11/25 0612

## 2025-07-11 NOTE — ED NOTES
Patient is complaining of worsening pain at this time.  VSS.  NAND.  RR is even and unlabored.  Patient is alert and oriented x4.  I apologized to the patient for wait times.

## 2025-07-15 ENCOUNTER — TELEPHONE (OUTPATIENT)
Dept: CARDIOLOGY | Facility: CLINIC | Age: 40
End: 2025-07-15
Payer: MEDICARE

## 2025-07-15 DIAGNOSIS — I10 ESSENTIAL HYPERTENSION: Primary | Chronic | ICD-10-CM

## 2025-07-15 NOTE — TELEPHONE ENCOUNTER
Called pt. Stated she is having some fluid retention following a recent stay at [facility]. She was an inpatient for abdominal pain following recent gastric surgery. Pt stated she did take a lasix this morning. Asked if okay to take another lasix tomorrow. Advised if she is still having edema tomorrow, okay to take another lasix. Advised to have labs redrawn since taking lasix regularly. Pt verbalized understanding and agreeable to POC.

## 2025-07-16 LAB
BACTERIA SPEC AEROBE CULT: NORMAL
BACTERIA SPEC AEROBE CULT: NORMAL

## 2025-07-17 LAB
QT INTERVAL: 382 MS
QTC INTERVAL: 454 MS

## 2025-07-18 ENCOUNTER — LAB (OUTPATIENT)
Dept: LAB | Facility: HOSPITAL | Age: 40
End: 2025-07-18
Payer: MEDICARE

## 2025-07-18 ENCOUNTER — TRANSCRIBE ORDERS (OUTPATIENT)
Dept: ADMINISTRATIVE | Facility: HOSPITAL | Age: 40
End: 2025-07-18
Payer: MEDICARE

## 2025-07-18 ENCOUNTER — SPECIALTY PHARMACY (OUTPATIENT)
Dept: PHARMACY | Facility: HOSPITAL | Age: 40
End: 2025-07-18
Payer: MEDICARE

## 2025-07-18 DIAGNOSIS — E87.6 HYPOPOTASSEMIA: ICD-10-CM

## 2025-07-18 DIAGNOSIS — J81.0 FLASH PULMONARY EDEMA: ICD-10-CM

## 2025-07-18 DIAGNOSIS — R07.2 PRECORDIAL PAIN: ICD-10-CM

## 2025-07-18 DIAGNOSIS — E66.01 SEVERE OBESITY (BMI 35.0-39.9) WITH COMORBIDITY: ICD-10-CM

## 2025-07-18 DIAGNOSIS — D64.9 ANEMIA, UNSPECIFIED TYPE: ICD-10-CM

## 2025-07-18 DIAGNOSIS — D64.9 ANEMIA, UNSPECIFIED TYPE: Primary | ICD-10-CM

## 2025-07-18 DIAGNOSIS — I10 ESSENTIAL HYPERTENSION: Chronic | ICD-10-CM

## 2025-07-18 DIAGNOSIS — I50.32 CHRONIC HEART FAILURE WITH PRESERVED EJECTION FRACTION (HFPEF): ICD-10-CM

## 2025-07-18 PROCEDURE — 85025 COMPLETE CBC W/AUTO DIFF WBC: CPT

## 2025-07-18 PROCEDURE — 36415 COLL VENOUS BLD VENIPUNCTURE: CPT

## 2025-07-18 PROCEDURE — 86141 C-REACTIVE PROTEIN HS: CPT

## 2025-07-18 PROCEDURE — 80053 COMPREHEN METABOLIC PANEL: CPT

## 2025-07-18 PROCEDURE — 80061 LIPID PANEL: CPT

## 2025-07-18 NOTE — PROGRESS NOTES
Specialty Pharmacy Patient Management Program  Refill Outreach     Antoinette was contacted today regarding refills of their medication(s).    Refill Questions      Flowsheet Row Most Recent Value   Changes to allergies? No   Changes to medications? Yes  [Had 2 ED visits.One 07/11 for post op pain and 1 in [place] for post op inflammation.She says she is doing better now.Was given PRN norco and decadron which caused temporary high sugars but they are okay now.Since surgery she is only using lantus PRN]   New conditions or infections since last clinic visit Yes   If yes, please describe  Had 2 ED visits.One 07/11 for post op pain and 1 in [place] for post op inflammation.She says she is doing better now.Was given PRN norco and decadron which caused temporary high sugars but they are okay now.Since surgery she is only using lantus PRN   Unplanned office visit, urgent care, ED, or hospital admission in the last 4 weeks  Yes   How does patient/caregiver feel medication is working? Very good   Financial problems or insurance changes  No   If yes, describe changes in insurance or financial issues. na   Since the previous refill, were any specialty medication doses or scheduled injections missed or delayed?  Yes   If yes, please provide the amount na   Why were doses missed? Had 2 ED visits.One 07/11 for post op pain and 1 in [place] for post op inflammation.She says she is doing better now.Was given PRN norco and decadron which caused temporary high sugars but they are okay now.Since surgery she is only using lantus PRN   Does this patient require a clinical escalation to a pharmacist? Yes            Delivery Questions      Flowsheet Row Most Recent Value   Delivery method [employer]   Delivery address verified with patient/caregiver? Yes   Delivery address Home   Number of medications in delivery 1   Medication(s) being filled and delivered Continuous Glucose Sensor (Dexcom G7 Sensor)   Doses left of specialty  medications na   Copay verified? Yes   Copay amount 0.00$   Copay form of payment No copayment ($0)   Delivery Date Selection 07/21/25   Signature Required No   Do you consent to receive electronic handouts?  Yes                 Follow-up: 30 day(s)     Carito Mills, Pharmacy Technician  7/18/2025  14:57 EDT

## 2025-07-18 NOTE — PROGRESS NOTES
Specialty Pharmacy Patient Management Program  Refill Coordination - Pharmacist Escalation      Antoinette Pack is a 40 y.o. female with Type 2 Diabetes seen by an Endocrinology provider and enrolled in the Endocrinology Patient Management program offered by [facility].      Pharmacy staff spoke with patient regarding recent ED visits. She was admitted to ED for post op pain and post op inflammation from recent gastric bypass surgery.  She reports doing better at this time. She was prescribed Norco PRN for pain and Decadron. She initially had elevated BG's with addition of steroid but reports that BG's have since leveled out. She also reports only taking Lantus PRN since surgery as her BG's have improved. Will continue with normal follow-up at this time. Patient was agreeable to reaching out to clinic if any further issues arise.       Thank you,      Sravani Perez, PharmD, [facility]  Clinical Specialty Pharmacist, Endocrinology  7/18/2025  16:10 EDT

## 2025-07-19 LAB
ALBUMIN SERPL-MCNC: 4 G/DL (ref 3.5–5.2)
ALBUMIN/GLOB SERPL: 1.2 G/DL
ALP SERPL-CCNC: 57 U/L (ref 39–117)
ALT SERPL W P-5'-P-CCNC: 15 U/L (ref 1–33)
ANION GAP SERPL CALCULATED.3IONS-SCNC: 12 MMOL/L (ref 5–15)
AST SERPL-CCNC: 19 U/L (ref 1–32)
BASOPHILS # BLD AUTO: 0.01 10*3/MM3 (ref 0–0.2)
BASOPHILS NFR BLD AUTO: 0.1 % (ref 0–1.5)
BILIRUB SERPL-MCNC: 0.4 MG/DL (ref 0–1.2)
BUN SERPL-MCNC: 16 MG/DL (ref 6–20)
BUN/CREAT SERPL: 30.2 (ref 7–25)
CALCIUM SPEC-SCNC: 8.7 MG/DL (ref 8.6–10.5)
CHLORIDE SERPL-SCNC: 102 MMOL/L (ref 98–107)
CHOLEST SERPL-MCNC: 148 MG/DL (ref 0–200)
CO2 SERPL-SCNC: 23 MMOL/L (ref 22–29)
CREAT SERPL-MCNC: 0.53 MG/DL (ref 0.57–1)
CRP SERPL-MCNC: 3.06 MG/DL (ref 0.01–0.5)
DEPRECATED RDW RBC AUTO: 42.7 FL (ref 37–54)
EGFRCR SERPLBLD CKD-EPI 2021: 118.6 ML/MIN/1.73
EOSINOPHIL # BLD AUTO: 0.08 10*3/MM3 (ref 0–0.4)
EOSINOPHIL NFR BLD AUTO: 1.1 % (ref 0.3–6.2)
ERYTHROCYTE [DISTWIDTH] IN BLOOD BY AUTOMATED COUNT: 15.6 % (ref 12.3–15.4)
GLOBULIN UR ELPH-MCNC: 3.4 GM/DL
GLUCOSE SERPL-MCNC: 77 MG/DL (ref 65–99)
HCT VFR BLD AUTO: 33.8 % (ref 34–46.6)
HDLC SERPL-MCNC: 28 MG/DL (ref 40–60)
HGB BLD-MCNC: 10.2 G/DL (ref 12–15.9)
IMM GRANULOCYTES # BLD AUTO: 0.07 10*3/MM3 (ref 0–0.05)
IMM GRANULOCYTES NFR BLD AUTO: 1 % (ref 0–0.5)
LDLC SERPL CALC-MCNC: 95 MG/DL (ref 0–100)
LDLC/HDLC SERPL: 3.31 {RATIO}
LYMPHOCYTES # BLD AUTO: 1.65 10*3/MM3 (ref 0.7–3.1)
LYMPHOCYTES NFR BLD AUTO: 23.7 % (ref 19.6–45.3)
MCH RBC QN AUTO: 23.1 PG (ref 26.6–33)
MCHC RBC AUTO-ENTMCNC: 30.2 G/DL (ref 31.5–35.7)
MCV RBC AUTO: 76.6 FL (ref 79–97)
MONOCYTES # BLD AUTO: 0.28 10*3/MM3 (ref 0.1–0.9)
MONOCYTES NFR BLD AUTO: 4 % (ref 5–12)
NEUTROPHILS NFR BLD AUTO: 4.88 10*3/MM3 (ref 1.7–7)
NEUTROPHILS NFR BLD AUTO: 70.1 % (ref 42.7–76)
NRBC BLD AUTO-RTO: 0 /100 WBC (ref 0–0.2)
PLATELET # BLD AUTO: 206 10*3/MM3 (ref 140–450)
PMV BLD AUTO: 11.6 FL (ref 6–12)
POTASSIUM SERPL-SCNC: 3.4 MMOL/L (ref 3.5–5.2)
PROT SERPL-MCNC: 7.4 G/DL (ref 6–8.5)
RBC # BLD AUTO: 4.41 10*6/MM3 (ref 3.77–5.28)
SODIUM SERPL-SCNC: 137 MMOL/L (ref 136–145)
TRIGL SERPL-MCNC: 136 MG/DL (ref 0–150)
VLDLC SERPL-MCNC: 25 MG/DL (ref 5–40)
WBC NRBC COR # BLD AUTO: 6.97 10*3/MM3 (ref 3.4–10.8)

## 2025-07-21 DIAGNOSIS — I10 ESSENTIAL HYPERTENSION: Primary | Chronic | ICD-10-CM

## 2025-07-21 DIAGNOSIS — I50.32 CHRONIC HEART FAILURE WITH PRESERVED EJECTION FRACTION (HFPEF): Chronic | ICD-10-CM

## 2025-07-21 DIAGNOSIS — E78.5 HYPERLIPIDEMIA, UNSPECIFIED HYPERLIPIDEMIA TYPE: ICD-10-CM

## 2025-08-05 DIAGNOSIS — Z86.79 HISTORY OF CHF (CONGESTIVE HEART FAILURE): Primary | ICD-10-CM

## 2025-08-05 DIAGNOSIS — R06.02 SHORTNESS OF BREATH: ICD-10-CM

## 2025-08-06 ENCOUNTER — TELEMEDICINE (OUTPATIENT)
Dept: PSYCHIATRY | Facility: CLINIC | Age: 40
End: 2025-08-06
Payer: MEDICARE

## 2025-08-06 DIAGNOSIS — F31.75 BIPOLAR DISORDER, IN PARTIAL REMISSION, MOST RECENT EPISODE DEPRESSED: Primary | ICD-10-CM

## 2025-08-06 DIAGNOSIS — Z79.899 MEDICATION MANAGEMENT: ICD-10-CM

## 2025-08-06 DIAGNOSIS — F41.1 GENERALIZED ANXIETY DISORDER: ICD-10-CM

## 2025-08-06 DIAGNOSIS — F40.01 PANIC DISORDER WITH AGORAPHOBIA: ICD-10-CM

## 2025-08-06 RX ORDER — SERTRALINE HYDROCHLORIDE 100 MG/1
150 TABLET, FILM COATED ORAL DAILY
Qty: 45 TABLET | Refills: 1 | Status: SHIPPED | OUTPATIENT
Start: 2025-08-06

## 2025-08-12 ENCOUNTER — LAB (OUTPATIENT)
Dept: LAB | Facility: HOSPITAL | Age: 40
End: 2025-08-12
Payer: MEDICARE

## 2025-08-12 DIAGNOSIS — E78.5 HYPERLIPIDEMIA, UNSPECIFIED HYPERLIPIDEMIA TYPE: ICD-10-CM

## 2025-08-12 DIAGNOSIS — F31.75 BIPOLAR DISORDER, IN PARTIAL REMISSION, MOST RECENT EPISODE DEPRESSED: ICD-10-CM

## 2025-08-12 DIAGNOSIS — I10 ESSENTIAL HYPERTENSION: Chronic | ICD-10-CM

## 2025-08-12 DIAGNOSIS — F40.01 PANIC DISORDER WITH AGORAPHOBIA: ICD-10-CM

## 2025-08-12 DIAGNOSIS — F41.1 GENERALIZED ANXIETY DISORDER: ICD-10-CM

## 2025-08-12 DIAGNOSIS — R06.02 SHORTNESS OF BREATH: ICD-10-CM

## 2025-08-12 DIAGNOSIS — Z86.79 HISTORY OF CHF (CONGESTIVE HEART FAILURE): ICD-10-CM

## 2025-08-12 DIAGNOSIS — I50.32 CHRONIC HEART FAILURE WITH PRESERVED EJECTION FRACTION (HFPEF): Chronic | ICD-10-CM

## 2025-08-12 PROCEDURE — 85025 COMPLETE CBC W/AUTO DIFF WBC: CPT

## 2025-08-12 PROCEDURE — 84439 ASSAY OF FREE THYROXINE: CPT

## 2025-08-12 PROCEDURE — 80053 COMPREHEN METABOLIC PANEL: CPT

## 2025-08-12 PROCEDURE — 83880 ASSAY OF NATRIURETIC PEPTIDE: CPT

## 2025-08-12 PROCEDURE — 86141 C-REACTIVE PROTEIN HS: CPT

## 2025-08-12 PROCEDURE — 84443 ASSAY THYROID STIM HORMONE: CPT

## 2025-08-12 PROCEDURE — 82607 VITAMIN B-12: CPT

## 2025-08-12 PROCEDURE — 36415 COLL VENOUS BLD VENIPUNCTURE: CPT

## 2025-08-13 LAB
ALBUMIN SERPL-MCNC: 3.9 G/DL (ref 3.5–5.2)
ALBUMIN/GLOB SERPL: 1.3 G/DL
ALP SERPL-CCNC: 64 U/L (ref 39–117)
ALT SERPL W P-5'-P-CCNC: 16 U/L (ref 1–33)
ANION GAP SERPL CALCULATED.3IONS-SCNC: 10.4 MMOL/L (ref 5–15)
AST SERPL-CCNC: 18 U/L (ref 1–32)
BASOPHILS # BLD AUTO: 0.02 10*3/MM3 (ref 0–0.2)
BASOPHILS NFR BLD AUTO: 0.4 % (ref 0–1.5)
BILIRUB SERPL-MCNC: 0.6 MG/DL (ref 0–1.2)
BUN SERPL-MCNC: 18 MG/DL (ref 6–20)
BUN/CREAT SERPL: 36 (ref 7–25)
CALCIUM SPEC-SCNC: 8.8 MG/DL (ref 8.6–10.5)
CHLORIDE SERPL-SCNC: 107 MMOL/L (ref 98–107)
CO2 SERPL-SCNC: 22.6 MMOL/L (ref 22–29)
CREAT SERPL-MCNC: 0.5 MG/DL (ref 0.57–1)
CRP SERPL-MCNC: 0.23 MG/DL (ref 0.01–0.5)
DEPRECATED RDW RBC AUTO: 48.4 FL (ref 37–54)
EGFRCR SERPLBLD CKD-EPI 2021: 120.3 ML/MIN/1.73
EOSINOPHIL # BLD AUTO: 0.11 10*3/MM3 (ref 0–0.4)
EOSINOPHIL NFR BLD AUTO: 2.3 % (ref 0.3–6.2)
ERYTHROCYTE [DISTWIDTH] IN BLOOD BY AUTOMATED COUNT: 17.5 % (ref 12.3–15.4)
GLOBULIN UR ELPH-MCNC: 3.1 GM/DL
GLUCOSE SERPL-MCNC: 82 MG/DL (ref 65–99)
HCT VFR BLD AUTO: 34.1 % (ref 34–46.6)
HGB BLD-MCNC: 10.7 G/DL (ref 12–15.9)
IMM GRANULOCYTES # BLD AUTO: 0.01 10*3/MM3 (ref 0–0.05)
IMM GRANULOCYTES NFR BLD AUTO: 0.2 % (ref 0–0.5)
LYMPHOCYTES # BLD AUTO: 1.52 10*3/MM3 (ref 0.7–3.1)
LYMPHOCYTES NFR BLD AUTO: 31.3 % (ref 19.6–45.3)
MCH RBC QN AUTO: 24 PG (ref 26.6–33)
MCHC RBC AUTO-ENTMCNC: 31.4 G/DL (ref 31.5–35.7)
MCV RBC AUTO: 76.5 FL (ref 79–97)
MONOCYTES # BLD AUTO: 0.32 10*3/MM3 (ref 0.1–0.9)
MONOCYTES NFR BLD AUTO: 6.6 % (ref 5–12)
NEUTROPHILS NFR BLD AUTO: 2.87 10*3/MM3 (ref 1.7–7)
NEUTROPHILS NFR BLD AUTO: 59.2 % (ref 42.7–76)
NT-PROBNP SERPL-MCNC: 123 PG/ML (ref 0–450)
PLATELET # BLD AUTO: 128 10*3/MM3 (ref 140–450)
PMV BLD AUTO: 11.2 FL (ref 6–12)
POTASSIUM SERPL-SCNC: 4 MMOL/L (ref 3.5–5.2)
PROT SERPL-MCNC: 7 G/DL (ref 6–8.5)
RBC # BLD AUTO: 4.46 10*6/MM3 (ref 3.77–5.28)
SODIUM SERPL-SCNC: 140 MMOL/L (ref 136–145)
T4 FREE SERPL-MCNC: 1.12 NG/DL (ref 0.92–1.68)
TSH SERPL DL<=0.05 MIU/L-ACNC: 0.41 UIU/ML (ref 0.27–4.2)
VIT B12 BLD-MCNC: 908 PG/ML (ref 211–946)
WBC NRBC COR # BLD AUTO: 4.85 10*3/MM3 (ref 3.4–10.8)

## 2025-08-14 ENCOUNTER — TRANSCRIBE ORDERS (OUTPATIENT)
Dept: ADMINISTRATIVE | Facility: HOSPITAL | Age: 40
End: 2025-08-14
Payer: MEDICARE

## 2025-08-14 ENCOUNTER — APPOINTMENT (OUTPATIENT)
Dept: CT IMAGING | Facility: HOSPITAL | Age: 40
End: 2025-08-14
Payer: MEDICARE

## 2025-08-14 ENCOUNTER — HOSPITAL ENCOUNTER (EMERGENCY)
Facility: HOSPITAL | Age: 40
Discharge: OTHER INSTITUTION | End: 2025-08-15
Attending: STUDENT IN AN ORGANIZED HEALTH CARE EDUCATION/TRAINING PROGRAM
Payer: MEDICARE

## 2025-08-14 ENCOUNTER — APPOINTMENT (OUTPATIENT)
Dept: ULTRASOUND IMAGING | Facility: HOSPITAL | Age: 40
End: 2025-08-14
Payer: MEDICARE

## 2025-08-14 DIAGNOSIS — T81.43XA POSTPROCEDURAL INTRAABDOMINAL ABSCESS: Primary | ICD-10-CM

## 2025-08-14 DIAGNOSIS — K62.9 RECTAL ABNORMALITY: ICD-10-CM

## 2025-08-14 DIAGNOSIS — R13.14 DYSPHAGIA, PHARYNGOESOPHAGEAL PHASE: Primary | ICD-10-CM

## 2025-08-14 DIAGNOSIS — K65.1 POSTPROCEDURAL INTRAABDOMINAL ABSCESS: Primary | ICD-10-CM

## 2025-08-14 DIAGNOSIS — K29.50 ANTRAL GASTRITIS: ICD-10-CM

## 2025-08-14 LAB
ALBUMIN SERPL-MCNC: 3.9 G/DL (ref 3.5–5.2)
ALBUMIN/GLOB SERPL: 1.3 G/DL
ALP SERPL-CCNC: 69 U/L (ref 39–117)
ALT SERPL W P-5'-P-CCNC: 11 U/L (ref 1–33)
ANION GAP SERPL CALCULATED.3IONS-SCNC: 12.3 MMOL/L (ref 5–15)
AST SERPL-CCNC: 11 U/L (ref 1–32)
BACTERIA UR QL AUTO: ABNORMAL /HPF
BASOPHILS # BLD AUTO: 0.02 10*3/MM3 (ref 0–0.2)
BASOPHILS NFR BLD AUTO: 0.3 % (ref 0–1.5)
BILIRUB SERPL-MCNC: 0.7 MG/DL (ref 0–1.2)
BILIRUB UR QL STRIP: ABNORMAL
BUN SERPL-MCNC: 14.1 MG/DL (ref 6–20)
BUN/CREAT SERPL: 33.6 (ref 7–25)
CALCIUM SPEC-SCNC: 8.5 MG/DL (ref 8.6–10.5)
CHLORIDE SERPL-SCNC: 105 MMOL/L (ref 98–107)
CLARITY UR: ABNORMAL
CO2 SERPL-SCNC: 21.7 MMOL/L (ref 22–29)
COLOR UR: ABNORMAL
CREAT SERPL-MCNC: 0.42 MG/DL (ref 0.57–1)
CRP SERPL-MCNC: 8.08 MG/DL (ref 0–0.5)
D-LACTATE SERPL-SCNC: 0.5 MMOL/L (ref 0.5–2)
DEPRECATED RDW RBC AUTO: 47.6 FL (ref 37–54)
EGFRCR SERPLBLD CKD-EPI 2021: 125.4 ML/MIN/1.73
EOSINOPHIL # BLD AUTO: 0.09 10*3/MM3 (ref 0–0.4)
EOSINOPHIL NFR BLD AUTO: 1.2 % (ref 0.3–6.2)
ERYTHROCYTE [DISTWIDTH] IN BLOOD BY AUTOMATED COUNT: 16.9 % (ref 12.3–15.4)
GEN 5 1HR TROPONIN T REFLEX: 7 NG/L
GLOBULIN UR ELPH-MCNC: 2.9 GM/DL
GLUCOSE SERPL-MCNC: 86 MG/DL (ref 65–99)
GLUCOSE UR STRIP-MCNC: NEGATIVE MG/DL
HCT VFR BLD AUTO: 35.7 % (ref 34–46.6)
HGB BLD-MCNC: 10.7 G/DL (ref 12–15.9)
HGB UR QL STRIP.AUTO: NEGATIVE
HOLD SPECIMEN: NORMAL
HOLD SPECIMEN: NORMAL
HYALINE CASTS UR QL AUTO: ABNORMAL /LPF
IMM GRANULOCYTES # BLD AUTO: 0.02 10*3/MM3 (ref 0–0.05)
IMM GRANULOCYTES NFR BLD AUTO: 0.3 % (ref 0–0.5)
KETONES UR QL STRIP: ABNORMAL
LEUKOCYTE ESTERASE UR QL STRIP.AUTO: ABNORMAL
LIPASE SERPL-CCNC: 30 U/L (ref 13–60)
LYMPHOCYTES # BLD AUTO: 1.39 10*3/MM3 (ref 0.7–3.1)
LYMPHOCYTES NFR BLD AUTO: 18.1 % (ref 19.6–45.3)
MCH RBC QN AUTO: 23.2 PG (ref 26.6–33)
MCHC RBC AUTO-ENTMCNC: 30 G/DL (ref 31.5–35.7)
MCV RBC AUTO: 77.4 FL (ref 79–97)
MONOCYTES # BLD AUTO: 0.6 10*3/MM3 (ref 0.1–0.9)
MONOCYTES NFR BLD AUTO: 7.8 % (ref 5–12)
NEUTROPHILS NFR BLD AUTO: 5.58 10*3/MM3 (ref 1.7–7)
NEUTROPHILS NFR BLD AUTO: 72.3 % (ref 42.7–76)
NITRITE UR QL STRIP: NEGATIVE
NRBC BLD AUTO-RTO: 0 /100 WBC (ref 0–0.2)
PH UR STRIP.AUTO: 5.5 [PH] (ref 5–8)
PLATELET # BLD AUTO: 119 10*3/MM3 (ref 140–450)
PMV BLD AUTO: 11.4 FL (ref 6–12)
POTASSIUM SERPL-SCNC: 3.8 MMOL/L (ref 3.5–5.2)
PROCALCITONIN SERPL-MCNC: 0.03 NG/ML (ref 0–0.25)
PROT SERPL-MCNC: 6.8 G/DL (ref 6–8.5)
PROT UR QL STRIP: ABNORMAL
RBC # BLD AUTO: 4.61 10*6/MM3 (ref 3.77–5.28)
RBC # UR STRIP: ABNORMAL /HPF
REF LAB TEST METHOD: ABNORMAL
SODIUM SERPL-SCNC: 139 MMOL/L (ref 136–145)
SP GR UR STRIP: >1.03 (ref 1–1.03)
SQUAMOUS #/AREA URNS HPF: ABNORMAL /HPF
TROPONIN T NUMERIC DELTA: 0 NG/L
TROPONIN T SERPL HS-MCNC: 7 NG/L
UROBILINOGEN UR QL STRIP: ABNORMAL
WBC # UR STRIP: ABNORMAL /HPF
WBC NRBC COR # BLD AUTO: 7.7 10*3/MM3 (ref 3.4–10.8)
WHOLE BLOOD HOLD COAG: NORMAL
WHOLE BLOOD HOLD SPECIMEN: NORMAL

## 2025-08-14 PROCEDURE — 86140 C-REACTIVE PROTEIN: CPT | Performed by: PHYSICIAN ASSISTANT

## 2025-08-14 PROCEDURE — 81001 URINALYSIS AUTO W/SCOPE: CPT | Performed by: STUDENT IN AN ORGANIZED HEALTH CARE EDUCATION/TRAINING PROGRAM

## 2025-08-14 PROCEDURE — 25510000001 IOPAMIDOL 61 % SOLUTION: Performed by: STUDENT IN AN ORGANIZED HEALTH CARE EDUCATION/TRAINING PROGRAM

## 2025-08-14 PROCEDURE — 80053 COMPREHEN METABOLIC PANEL: CPT | Performed by: STUDENT IN AN ORGANIZED HEALTH CARE EDUCATION/TRAINING PROGRAM

## 2025-08-14 PROCEDURE — 74177 CT ABD & PELVIS W/CONTRAST: CPT

## 2025-08-14 PROCEDURE — 25010000002 PIPERACILLIN SOD-TAZOBACTAM PER 1 G: Performed by: PHYSICIAN ASSISTANT

## 2025-08-14 PROCEDURE — 25010000002 PROCHLORPERAZINE 10 MG/2ML SOLUTION: Performed by: EMERGENCY MEDICINE

## 2025-08-14 PROCEDURE — 87040 BLOOD CULTURE FOR BACTERIA: CPT | Performed by: PHYSICIAN ASSISTANT

## 2025-08-14 PROCEDURE — 25810000003 SODIUM CHLORIDE 0.9 % SOLUTION: Performed by: PHYSICIAN ASSISTANT

## 2025-08-14 PROCEDURE — 99285 EMERGENCY DEPT VISIT HI MDM: CPT

## 2025-08-14 PROCEDURE — 85025 COMPLETE CBC W/AUTO DIFF WBC: CPT | Performed by: STUDENT IN AN ORGANIZED HEALTH CARE EDUCATION/TRAINING PROGRAM

## 2025-08-14 PROCEDURE — 93005 ELECTROCARDIOGRAM TRACING: CPT | Performed by: STUDENT IN AN ORGANIZED HEALTH CARE EDUCATION/TRAINING PROGRAM

## 2025-08-14 PROCEDURE — 74177 CT ABD & PELVIS W/CONTRAST: CPT | Performed by: RADIOLOGY

## 2025-08-14 PROCEDURE — 36415 COLL VENOUS BLD VENIPUNCTURE: CPT

## 2025-08-14 PROCEDURE — 84484 ASSAY OF TROPONIN QUANT: CPT | Performed by: STUDENT IN AN ORGANIZED HEALTH CARE EDUCATION/TRAINING PROGRAM

## 2025-08-14 PROCEDURE — 83690 ASSAY OF LIPASE: CPT | Performed by: STUDENT IN AN ORGANIZED HEALTH CARE EDUCATION/TRAINING PROGRAM

## 2025-08-14 PROCEDURE — 84145 PROCALCITONIN (PCT): CPT | Performed by: PHYSICIAN ASSISTANT

## 2025-08-14 PROCEDURE — 96375 TX/PRO/DX INJ NEW DRUG ADDON: CPT

## 2025-08-14 PROCEDURE — 83605 ASSAY OF LACTIC ACID: CPT | Performed by: PHYSICIAN ASSISTANT

## 2025-08-14 PROCEDURE — 25010000002 MORPHINE PER 10 MG: Performed by: EMERGENCY MEDICINE

## 2025-08-14 PROCEDURE — 76705 ECHO EXAM OF ABDOMEN: CPT

## 2025-08-14 PROCEDURE — 96365 THER/PROPH/DIAG IV INF INIT: CPT

## 2025-08-14 RX ORDER — IOPAMIDOL 612 MG/ML
100 INJECTION, SOLUTION INTRAVASCULAR
Status: COMPLETED | OUTPATIENT
Start: 2025-08-14 | End: 2025-08-14

## 2025-08-14 RX ORDER — SODIUM CHLORIDE 0.9 % (FLUSH) 0.9 %
10 SYRINGE (ML) INJECTION AS NEEDED
Status: DISCONTINUED | OUTPATIENT
Start: 2025-08-14 | End: 2025-08-15 | Stop reason: HOSPADM

## 2025-08-14 RX ORDER — PROCHLORPERAZINE EDISYLATE 5 MG/ML
5 INJECTION INTRAMUSCULAR; INTRAVENOUS ONCE
Status: COMPLETED | OUTPATIENT
Start: 2025-08-14 | End: 2025-08-14

## 2025-08-14 RX ADMIN — MORPHINE SULFATE 4 MG: 4 INJECTION, SOLUTION INTRAMUSCULAR; INTRAVENOUS at 21:30

## 2025-08-14 RX ADMIN — SODIUM CHLORIDE 1000 ML: 9 INJECTION, SOLUTION INTRAVENOUS at 17:22

## 2025-08-14 RX ADMIN — PIPERACILLIN AND TAZOBACTAM 3.38 G: 3; .375 INJECTION, POWDER, FOR SOLUTION INTRAVENOUS at 20:26

## 2025-08-14 RX ADMIN — IOPAMIDOL 100 ML: 612 INJECTION, SOLUTION INTRAVENOUS at 17:54

## 2025-08-14 RX ADMIN — PROCHLORPERAZINE EDISYLATE 5 MG: 5 INJECTION INTRAMUSCULAR; INTRAVENOUS at 21:30

## 2025-08-15 VITALS
HEIGHT: 59 IN | SYSTOLIC BLOOD PRESSURE: 138 MMHG | HEART RATE: 97 BPM | OXYGEN SATURATION: 94 % | BODY MASS INDEX: 38.84 KG/M2 | WEIGHT: 192.68 LBS | DIASTOLIC BLOOD PRESSURE: 97 MMHG | TEMPERATURE: 98.6 F | RESPIRATION RATE: 15 BRPM

## 2025-08-15 LAB
QT INTERVAL: 328 MS
QTC INTERVAL: 416 MS

## 2025-08-15 PROCEDURE — 25010000002 PIPERACILLIN SOD-TAZOBACTAM PER 1 G: Performed by: PHYSICIAN ASSISTANT

## 2025-08-15 PROCEDURE — 96366 THER/PROPH/DIAG IV INF ADDON: CPT

## 2025-08-15 PROCEDURE — 96376 TX/PRO/DX INJ SAME DRUG ADON: CPT

## 2025-08-15 PROCEDURE — 25010000002 PROCHLORPERAZINE 10 MG/2ML SOLUTION: Performed by: PHYSICIAN ASSISTANT

## 2025-08-15 RX ORDER — PROCHLORPERAZINE EDISYLATE 5 MG/ML
5 INJECTION INTRAMUSCULAR; INTRAVENOUS ONCE
Status: COMPLETED | OUTPATIENT
Start: 2025-08-15 | End: 2025-08-15

## 2025-08-15 RX ADMIN — PIPERACILLIN AND TAZOBACTAM 3.38 G: 3; .375 INJECTION, POWDER, FOR SOLUTION INTRAVENOUS at 05:51

## 2025-08-15 RX ADMIN — PROCHLORPERAZINE EDISYLATE 5 MG: 5 INJECTION INTRAMUSCULAR; INTRAVENOUS at 05:50

## 2025-08-19 LAB
BACTERIA SPEC AEROBE CULT: NORMAL
BACTERIA SPEC AEROBE CULT: NORMAL

## 2025-08-20 ENCOUNTER — SPECIALTY PHARMACY (OUTPATIENT)
Dept: PHARMACY | Facility: HOSPITAL | Age: 40
End: 2025-08-20
Payer: MEDICARE

## 2025-08-21 ENCOUNTER — TELEPHONE (OUTPATIENT)
Dept: ENDOCRINOLOGY | Facility: CLINIC | Age: 40
End: 2025-08-21
Payer: MEDICARE

## (undated) DEVICE — THE BITE BLOCK MAXI, LATEX FREE STRAP IS USED TO PROTECT THE ENDOSCOPE INSERTION TUBE FROM BEING BITTEN BY THE PATIENT.

## (undated) DEVICE — SYR LUERLOK 50ML

## (undated) DEVICE — TRY SPINE BLCK WHITACRE 25G 3X5IN

## (undated) DEVICE — ADHS SKIN PREMIERPRO EXOFIN TOPICAL HI/VISC .5ML

## (undated) DEVICE — MAT PREVALON MOBL TRANSFR AIR WO/PAD 39X80IN

## (undated) DEVICE — SYS CLS SKIN PREMIERPRO EXOFINFUSION 22CM

## (undated) DEVICE — SUT PDS 0 CTX 36IN DYED Z370T

## (undated) DEVICE — GLOVE,SURG,SENSICARE,ALOE,LF,PF,7: Brand: MEDLINE

## (undated) DEVICE — INTRO ACCSR BLNT TP

## (undated) DEVICE — SOL IRR NACL 0.9PCT BT 1000ML

## (undated) DEVICE — PK C/SECT 10

## (undated) DEVICE — ADAPT CLN LUM OLYMP AIR/H20

## (undated) DEVICE — SUT MNCRYL 3/0 27L Y523H BX/36

## (undated) DEVICE — SOL IRR H2O BTL 1000ML STRL

## (undated) DEVICE — LUBE GEL ENDOGLIDE 1.1OZ

## (undated) DEVICE — HYBRID CO2 TUBING/CAP SET FOR OLYMPUS® SCOPES & CO2 SOURCE: Brand: ERBE

## (undated) DEVICE — SOLIDIFIER LIQ PREMISORB 1500CC

## (undated) DEVICE — STPLR SKIN SUBCUTICULAR INSORB 2030

## (undated) DEVICE — FIRST STEP BEDSIDE ADD WATER KIT - RESEALABLE STAND-UP POUCH, ENDOSCOPIC CLEANING PAD - 1 POUCH: Brand: FIRST STEP BEDSIDE ADD WATER KIT - RESEALABLE STAND-UP POUCH, ENDOSCOPIC CLEANIN

## (undated) DEVICE — ENSEAL 20 CM SHAFT, LARGE JAW: Brand: ENSEAL X1

## (undated) DEVICE — KT ORCA ORCAPOD DISP STRL

## (undated) DEVICE — SINGLE-USE BIOPSY FORCEPS: Brand: RADIAL JAW 4

## (undated) DEVICE — TUBING, SUCTION, 1/4" X 10', STRAIGHT: Brand: MEDLINE

## (undated) DEVICE — SAFELINER SUCTION CANISTER 1000CC: Brand: DEROYAL

## (undated) DEVICE — LUBE JELLY FOIL PACK 1.4 OZ: Brand: MEDLINE INDUSTRIES, INC.

## (undated) DEVICE — SUT PLAIN  3/0 CT1 27IN 842H

## (undated) DEVICE — CONTN GRAD MEAS TRIANG 32OZ BLK

## (undated) DEVICE — VIOLET BRAIDED (POLYGLACTIN 910), SYNTHETIC ABSORBABLE SUTURE: Brand: COATED VICRYL